# Patient Record
Sex: FEMALE | Race: BLACK OR AFRICAN AMERICAN | NOT HISPANIC OR LATINO | Employment: OTHER | URBAN - NONMETROPOLITAN AREA
[De-identification: names, ages, dates, MRNs, and addresses within clinical notes are randomized per-mention and may not be internally consistent; named-entity substitution may affect disease eponyms.]

---

## 2017-01-04 ENCOUNTER — OFFICE VISIT (OUTPATIENT)
Dept: FAMILY MEDICINE CLINIC | Facility: CLINIC | Age: 67
End: 2017-01-04

## 2017-01-04 VITALS
HEIGHT: 63 IN | DIASTOLIC BLOOD PRESSURE: 72 MMHG | WEIGHT: 218.5 LBS | SYSTOLIC BLOOD PRESSURE: 130 MMHG | BODY MASS INDEX: 38.71 KG/M2

## 2017-01-04 DIAGNOSIS — E11.8 TYPE 2 DIABETES MELLITUS WITH COMPLICATION, WITHOUT LONG-TERM CURRENT USE OF INSULIN (HCC): Primary | ICD-10-CM

## 2017-01-04 DIAGNOSIS — Z11.59 NEED FOR HEPATITIS C SCREENING TEST: ICD-10-CM

## 2017-01-04 DIAGNOSIS — E11.49 NEUROLOGIC DISORDER ASSOCIATED WITH DIABETES MELLITUS (HCC): ICD-10-CM

## 2017-01-04 PROCEDURE — 99213 OFFICE O/P EST LOW 20 MIN: CPT | Performed by: GENERAL PRACTICE

## 2017-01-04 RX ORDER — PREGABALIN 150 MG/1
150 CAPSULE ORAL 2 TIMES DAILY
Qty: 180 CAPSULE | Refills: 1 | Status: SHIPPED | OUTPATIENT
Start: 2017-01-04 | End: 2017-08-03 | Stop reason: SDUPTHER

## 2017-01-04 NOTE — MR AVS SNAPSHOT
Lexivan Wade   1/4/2017 10:45 AM   Office Visit    Dept Phone:  [phone]   Encounter #:  [id number]    Provider:  Joyce Plata MD   Department:  [facility]                Your Full Care Plan              Today's Medication Changes          These changes are accurate as of: 1/4/17 11:51 AM.  If you have any questions, ask your nurse or doctor.               Medication(s)that have changed:     pregabalin 150 MG capsule   Commonly known as:  LYRICA   Take 1 capsule by mouth 2 (Two) Times a Day.   What changed:  See the new instructions.   Changed by:  Joyce Plata MD            Where to Get Your Medications      You can get these medications from any pharmacy     Bring a paper prescription for each of these medications     pregabalin 150 MG capsule                  Your Updated Medication List          This list is accurate as of: 1/4/17 11:51 AM.  Always use your most recent med list.                aspirin 81 MG tablet       atorvastatin 40 MG tablet   Commonly known as:  LIPITOR       brimonidine 0.2 % ophthalmic solution   Commonly known as:  ALPHAGAN   Administer 1 drop to both eyes 2 (Two) Times a Day.       carvedilol 25 MG tablet   Commonly known as:  COREG   TAKE 1 TABLET BY MOUTH 2 TIMES PER DAY       digoxin 125 MCG tablet   Commonly known as:  LANOXIN       furosemide 80 MG tablet   Commonly known as:  LASIX   TAKE 1 TABLET BY MOUTH DAILY       glimepiride 4 MG tablet   Commonly known as:  AMARYL   TAKE 1 TABLET BY MOUTH 2 TIMES A DAY       Insulin Pen Needle 31G X 5 MM misc       latanoprost 0.005 % ophthalmic solution   Commonly known as:  XALATAN   Administer 1 drop to both eyes Every Night. 90 day supply.       lisinopril 10 MG tablet   Commonly known as:  PRINIVIL,ZESTRIL       LOVAZA 1 G capsule   Generic drug:  omega-3 acid ethyl esters   TAKE 2 CAPSULES BY MOUTH 2 TIMES A DAY       magnesium oxide 400 (241.3 MG) MG tablet  tablet   Commonly known as:  MAGOX       metFORMIN 1000 MG tablet   Commonly known as:  GLUCOPHAGE       NEXIUM 40 MG capsule   Generic drug:  esomeprazole   TAKE 1 CAPSULE BY MOUTH DAILY       potassium chloride 10 MEQ CR capsule   Commonly known as:  MICRO-K       pregabalin 150 MG capsule   Commonly known as:  LYRICA   Take 1 capsule by mouth 2 (Two) Times a Day.       VICTOZA 18 MG/3ML solution pen-injector   Generic drug:  Liraglutide       vitamin D 41202 UNITS capsule capsule   Commonly known as:  ERGOCALCIFEROL       ZETIA 10 MG tablet   Generic drug:  ezetimibe               Instructions    Robitussin DM for cough    Coricidin for runny nose and congestion     Patient Instructions History      Upcoming Appointments     Visit Type Date Time Department    OFFICE VISIT 1/4/2017 10:45 AM [facility]    OFFICE VISIT 4/3/2017  9:30 AM [facility]    VISUAL FIELD 4/26/2017  2:30 PM [facility] OPHTHALMOLOGY [facility]    OV WITH VISUAL FIELD 4/26/2017  3:00 PM [facility] OPHTHALMOLOGY [facility]      MyChart Signup     Our records indicate that you have declined [facility]. If you would like to sign up for [facility], please email [email or web address] or call [phone] to obtain an activation code.             Other Info from Your Visit           Your Appointments     Apr 03, 2017  9:30 AM CDT   Office Visit with Joyce Plata MD   [facility]INE (--)    [address]  [address] [place] [id number] [phone]           Arrive 15 minutes prior to appointment.            Apr 26, 2017  2:30 PM CDT   Visual Field with [facility] OPHTHALMOLOGY [facility] OPHTHALMOLOGY (--)    [address] [place] [address] [address]  [address] [place] [id number] [phone]            Apr 26, 2017  3:00 PM CDT   office visit with visual fields with Kun Walton MD   [facility] OPHTHALMOLOGY (--)    [address] [place] [address]  "Dr  [address] [facility] [id number] [phone]              Allergies     Aldactone [Spironolactone]      Nsaids        Reason for Visit     Follow-up     Hypertension           Vital Signs     Blood Pressure Height Weight Body Mass Index Smoking Status       130/72 (BP Location: Left arm, Patient Position: Sitting, Cuff Size: Adult) 63\" (160 cm) 218 lb 8 oz (99.1 kg) 38.71 kg/m2 Never Smoker         "

## 2017-01-04 NOTE — PROGRESS NOTES
"Subjective   Lexi Wade is a 66 y.o. female.     Chief Complaint   Patient presents with   • Follow-up   • Hypertension     Hypertension   This is a chronic problem. The current episode started more than 1 year ago. The problem is unchanged. The problem is controlled. Pertinent negatives include no chest pain, headaches, neck pain, palpitations or shortness of breath. Past treatments include beta blockers and ACE inhibitors. The current treatment provides significant improvement. There are no compliance problems.       The following portions of the patient's history were reviewed and updated as appropriate: allergies, current medications, past social history and problem list.    Current Outpatient Prescriptions:   •  aspirin 81 MG tablet, Take 81 mg by mouth every night., Disp: , Rfl:   •  atorvastatin (LIPITOR) 40 MG tablet, Take 40 mg by mouth daily., Disp: , Rfl:   •  brimonidine (ALPHAGAN) 0.2 % ophthalmic solution, Administer 1 drop to both eyes 2 (Two) Times a Day., Disp: 5 mL, Rfl: 3  •  carvedilol (COREG) 25 MG tablet, TAKE 1 TABLET BY MOUTH 2 TIMES PER DAY, Disp: 180 tablet, Rfl: 3  •  digoxin (LANOXIN) 125 MCG tablet, Take 125 mcg by mouth every day., Disp: , Rfl:   •  ezetimibe (ZETIA) 10 MG tablet, Take 10 mg by mouth daily., Disp: , Rfl:   •  furosemide (LASIX) 80 MG tablet, TAKE 1 TABLET BY MOUTH DAILY, Disp: 90 tablet, Rfl: 3  •  glimepiride (AMARYL) 4 MG tablet, TAKE 1 TABLET BY MOUTH 2 TIMES A DAY, Disp: 180 tablet, Rfl: 3  •  Insulin Pen Needle 31G X 5 MM misc, BD Insulin Pen Needle UF Mini 31 gauge x 3/16\"     USE DAILY WITH VICTOZA, Disp: , Rfl:   •  latanoprost (XALATAN) 0.005 % ophthalmic solution, Administer 1 drop to both eyes Every Night. 90 day supply., Disp: 2.5 mL, Rfl: 5  •  Liraglutide (VICTOZA) 18 MG/3ML solution pen-injector, Inject 12 mg under the skin Daily., Disp: , Rfl:   •  lisinopril (PRINIVIL,ZESTRIL) 10 MG tablet, Take 10 mg by mouth daily., Disp: , Rfl:   •  LOVAZA 1 G " capsule, TAKE 2 CAPSULES BY MOUTH 2 TIMES A DAY, Disp: 360 capsule, Rfl: 2  •  magnesium oxide (MAGOX) 400 (241.3 MG) MG tablet tablet, Take 400 mg by mouth daily., Disp: , Rfl:   •  metFORMIN (GLUCOPHAGE) 1000 MG tablet, TAKE 1 TABLET BY MOUTH 2 TIMES PER DAY, Disp: , Rfl: 3  •  NEXIUM 40 MG capsule, TAKE 1 CAPSULE BY MOUTH DAILY, Disp: 90 capsule, Rfl: 2  •  potassium chloride (MICRO-K) 10 MEQ CR capsule, Take 10 mEq by mouth 2 (two) times a day., Disp: , Rfl:   •  pregabalin (LYRICA) 150 MG capsule, Take 1 capsule by mouth 2 (Two) Times a Day., Disp: 180 capsule, Rfl: 1  •  vitamin D (ERGOCALCIFEROL) 22936 UNITS capsule capsule, Take 50,000 Units by mouth 1 (one) time per week., Disp: , Rfl:     Review of Systems   Constitutional: Negative.  Negative for activity change, appetite change, chills, fatigue, fever and unexpected weight change.   HENT: Negative.  Negative for congestion, ear pain, hearing loss, nosebleeds, rhinorrhea, sinus pressure, sneezing, sore throat, tinnitus and trouble swallowing.    Eyes: Negative.  Negative for pain, discharge, redness, itching and visual disturbance.   Respiratory: Negative.  Negative for apnea, cough, chest tightness, shortness of breath and wheezing.    Cardiovascular: Negative.  Negative for chest pain, palpitations and leg swelling.   Gastrointestinal: Negative.  Negative for abdominal distention, abdominal pain, constipation, diarrhea, nausea and vomiting.   Endocrine: Negative.    Genitourinary: Negative.  Negative for dysuria, frequency and urgency.   Musculoskeletal: Negative.  Negative for arthralgias, back pain, gait problem, joint swelling, myalgias, neck pain and neck stiffness.   Skin: Negative.  Negative for color change and rash.   Allergic/Immunologic: Negative.    Neurological: Negative.  Negative for dizziness, weakness, light-headedness, numbness and headaches.   Hematological: Negative.  Negative for adenopathy.   Psychiatric/Behavioral: Negative.   "Negative for dysphoric mood and sleep disturbance. The patient is not nervous/anxious.      Objective     Visit Vitals   • /72 (BP Location: Left arm, Patient Position: Sitting, Cuff Size: Adult)   • Ht 63\" (160 cm)   • Wt 218 lb 8 oz (99.1 kg)   • BMI 38.71 kg/m2     Physical Exam   Constitutional: She is oriented to person, place, and time. She appears well-developed and well-nourished. No distress.   HENT:   Head: Normocephalic and atraumatic.   Nose: Nose normal.   Mouth/Throat: Oropharynx is clear and moist.   Eyes: Conjunctivae and EOM are normal. Pupils are equal, round, and reactive to light. Right eye exhibits no discharge. Left eye exhibits no discharge.   Neck: No thyromegaly present.   Cardiovascular: Normal rate, regular rhythm, normal heart sounds and intact distal pulses.    Pulmonary/Chest: Effort normal and breath sounds normal.   Lymphadenopathy:     She has no cervical adenopathy.   Neurological: She is alert and oriented to person, place, and time.   Skin: Skin is warm and dry.   Psychiatric: She has a normal mood and affect.   Nursing note and vitals reviewed.    Assessment/Plan     Problem List Items Addressed This Visit        Endocrine    Type 2 diabetes mellitus - Primary    Relevant Orders    Hepatitis C Antibody    Hemoglobin A1c    LDL Cholesterol, Direct    Comprehensive Metabolic Panel    Neurologic disorder associated with diabetes mellitus      Other Visit Diagnoses     Need for hepatitis C screening test        Relevant Orders    Hepatitis C Antibody        Continue current treatment.     New Medications Ordered This Visit   Medications   • pregabalin (LYRICA) 150 MG capsule     Sig: Take 1 capsule by mouth 2 (Two) Times a Day.     Dispense:  180 capsule     Refill:  1     Not to exceed 5 additional fills before 05/02/2017.       "

## 2017-01-30 ENCOUNTER — HOSPITAL ENCOUNTER (EMERGENCY)
Facility: HOSPITAL | Age: 67
Discharge: HOME | End: 2017-01-30
Attending: FAMILY MEDICINE

## 2017-01-30 ENCOUNTER — APPOINTMENT (OUTPATIENT)
Dept: GENERAL RADIOLOGY | Facility: HOSPITAL | Age: 67
End: 2017-01-30

## 2017-01-30 VITALS
RESPIRATION RATE: 16 BRPM | SYSTOLIC BLOOD PRESSURE: 144 MMHG | TEMPERATURE: 97.4 F | WEIGHT: 221 LBS | HEIGHT: 63 IN | HEART RATE: 69 BPM | BODY MASS INDEX: 39.16 KG/M2 | OXYGEN SATURATION: 96 % | DIASTOLIC BLOOD PRESSURE: 72 MMHG

## 2017-01-30 DIAGNOSIS — M79.671 RIGHT FOOT PAIN: Primary | ICD-10-CM

## 2017-01-30 PROCEDURE — 99283 EMERGENCY DEPT VISIT LOW MDM: CPT

## 2017-01-30 PROCEDURE — 25010000002 TRIAMCINOLONE PER 10 MG: Performed by: NURSE PRACTITIONER

## 2017-01-30 PROCEDURE — 96372 THER/PROPH/DIAG INJ SC/IM: CPT

## 2017-01-30 PROCEDURE — 73630 X-RAY EXAM OF FOOT: CPT

## 2017-01-30 RX ORDER — TRIAMCINOLONE ACETONIDE 40 MG/ML
40 INJECTION, SUSPENSION INTRA-ARTICULAR; INTRAMUSCULAR ONCE
Status: COMPLETED | OUTPATIENT
Start: 2017-01-30 | End: 2017-01-30

## 2017-01-30 RX ADMIN — TRIAMCINOLONE ACETONIDE 40 MG: 40 INJECTION, SUSPENSION INTRA-ARTICULAR; INTRAMUSCULAR at 14:21

## 2017-02-24 RX ORDER — FLURBIPROFEN SODIUM 0.3 MG/ML
SOLUTION/ DROPS OPHTHALMIC
Qty: 100 EACH | Refills: 3 | Status: SHIPPED | OUTPATIENT
Start: 2017-02-24 | End: 2018-03-15 | Stop reason: SDUPTHER

## 2017-03-27 RX ORDER — ATORVASTATIN CALCIUM 40 MG/1
TABLET, FILM COATED ORAL
Qty: 90 TABLET | Refills: 2 | Status: SHIPPED | OUTPATIENT
Start: 2017-03-27 | End: 2017-06-13

## 2017-04-03 ENCOUNTER — LAB (OUTPATIENT)
Dept: LAB | Facility: HOSPITAL | Age: 67
End: 2017-04-03

## 2017-04-03 ENCOUNTER — OFFICE VISIT (OUTPATIENT)
Dept: FAMILY MEDICINE CLINIC | Facility: CLINIC | Age: 67
End: 2017-04-03

## 2017-04-03 ENCOUNTER — RESULTS ENCOUNTER (OUTPATIENT)
Dept: FAMILY MEDICINE CLINIC | Facility: CLINIC | Age: 67
End: 2017-04-03

## 2017-04-03 VITALS
HEIGHT: 63 IN | BODY MASS INDEX: 39.44 KG/M2 | HEART RATE: 100 BPM | SYSTOLIC BLOOD PRESSURE: 134 MMHG | WEIGHT: 222.6 LBS | DIASTOLIC BLOOD PRESSURE: 74 MMHG

## 2017-04-03 DIAGNOSIS — Z12.11 SCREENING FOR COLON CANCER: Primary | ICD-10-CM

## 2017-04-03 DIAGNOSIS — Z11.59 NEED FOR HEPATITIS C SCREENING TEST: ICD-10-CM

## 2017-04-03 DIAGNOSIS — I10 ESSENTIAL HYPERTENSION: Chronic | ICD-10-CM

## 2017-04-03 DIAGNOSIS — E78.49 OTHER HYPERLIPIDEMIA: Chronic | ICD-10-CM

## 2017-04-03 DIAGNOSIS — E11.8 TYPE 2 DIABETES MELLITUS WITH COMPLICATION, WITHOUT LONG-TERM CURRENT USE OF INSULIN (HCC): Chronic | ICD-10-CM

## 2017-04-03 DIAGNOSIS — E11.8 TYPE 2 DIABETES MELLITUS WITH COMPLICATION, WITHOUT LONG-TERM CURRENT USE OF INSULIN (HCC): ICD-10-CM

## 2017-04-03 DIAGNOSIS — Z12.11 SCREENING FOR COLON CANCER: ICD-10-CM

## 2017-04-03 DIAGNOSIS — Z12.11 SCREEN FOR COLON CANCER: ICD-10-CM

## 2017-04-03 LAB
ALBUMIN SERPL-MCNC: 4.1 G/DL (ref 3.4–4.8)
ALBUMIN/GLOB SERPL: 1.4 G/DL (ref 1.1–1.8)
ALP SERPL-CCNC: 136 U/L (ref 38–126)
ALT SERPL W P-5'-P-CCNC: 22 U/L (ref 9–52)
ANION GAP SERPL CALCULATED.3IONS-SCNC: 14 MMOL/L (ref 5–15)
ARTICHOKE IGE QN: 92 MG/DL (ref 1–129)
AST SERPL-CCNC: 34 U/L (ref 14–36)
BILIRUB SERPL-MCNC: 0.4 MG/DL (ref 0.2–1.3)
BUN BLD-MCNC: 14 MG/DL (ref 7–21)
BUN/CREAT SERPL: 17.1 (ref 7–25)
CALCIUM SPEC-SCNC: 9.8 MG/DL (ref 8.4–10.2)
CHLORIDE SERPL-SCNC: 100 MMOL/L (ref 95–110)
CO2 SERPL-SCNC: 27 MMOL/L (ref 22–31)
CREAT BLD-MCNC: 0.82 MG/DL (ref 0.5–1)
GFR SERPL CREATININE-BSD FRML MDRD: 84 ML/MIN/1.73 (ref 45–104)
GLOBULIN UR ELPH-MCNC: 2.9 GM/DL (ref 2.3–3.5)
GLUCOSE BLD-MCNC: 217 MG/DL (ref 60–100)
HBA1C MFR BLD: 9.25 % (ref 4–5.6)
POTASSIUM BLD-SCNC: 4 MMOL/L (ref 3.5–5.1)
PROT SERPL-MCNC: 7 G/DL (ref 6.3–8.6)
SODIUM BLD-SCNC: 141 MMOL/L (ref 137–145)

## 2017-04-03 PROCEDURE — 99214 OFFICE O/P EST MOD 30 MIN: CPT | Performed by: GENERAL PRACTICE

## 2017-04-03 PROCEDURE — 80053 COMPREHEN METABOLIC PANEL: CPT | Performed by: GENERAL PRACTICE

## 2017-04-03 PROCEDURE — 86803 HEPATITIS C AB TEST: CPT | Performed by: GENERAL PRACTICE

## 2017-04-03 PROCEDURE — 83036 HEMOGLOBIN GLYCOSYLATED A1C: CPT | Performed by: GENERAL PRACTICE

## 2017-04-03 PROCEDURE — 83721 ASSAY OF BLOOD LIPOPROTEIN: CPT | Performed by: GENERAL PRACTICE

## 2017-04-03 PROCEDURE — 36415 COLL VENOUS BLD VENIPUNCTURE: CPT

## 2017-04-03 NOTE — PROGRESS NOTES
Subjective   Lexi Wade is a 66 y.o. female.     Chief Complaint   Patient presents with   • Diabetes     labs   • Hypertension     For review and evaluation of management of chronic medical problems. Labs pending.   Diabetes   She presents for her follow-up diabetic visit. She has type 2 diabetes mellitus. Her disease course has been stable. Pertinent negatives for hypoglycemia include no dizziness, headaches or nervousness/anxiousness. There are no diabetic associated symptoms. Pertinent negatives for diabetes include no chest pain, no fatigue and no weakness. Diabetic complications include heart disease. Risk factors for coronary artery disease include dyslipidemia, diabetes mellitus and hypertension. Current diabetic treatment includes oral agent (dual therapy). She is compliant with treatment all of the time. She is following a generally healthy diet. When asked about meal planning, she reported none. She rarely participates in exercise. There is no change in her home blood glucose trend. An ACE inhibitor/angiotensin II receptor blocker is being taken. Eye exam is current.   Hypertension   The current episode started more than 1 year ago. The problem is unchanged. The problem is controlled. Pertinent negatives include no chest pain, headaches, neck pain, palpitations or shortness of breath. There are no associated agents to hypertension. Risk factors for coronary artery disease include diabetes mellitus, dyslipidemia and obesity. Past treatments include beta blockers, ACE inhibitors and diuretics. The current treatment provides significant improvement. There are no compliance problems.    Hyperlipidemia   This is a chronic problem. The current episode started more than 1 year ago. The problem is controlled. Recent lipid tests were reviewed and are normal. Exacerbating diseases include diabetes. There are no known factors aggravating her hyperlipidemia. Pertinent negatives include no chest pain, myalgias or  shortness of breath. Current antihyperlipidemic treatment includes statins. The current treatment provides significant improvement of lipids. There are no compliance problems.  Risk factors for coronary artery disease include diabetes mellitus, dyslipidemia, post-menopausal and hypertension.      The following portions of the patient's history were reviewed and updated as appropriate: allergies, current medications, past social history and problem list.    Current Outpatient Prescriptions:   •  aspirin 81 MG tablet, Take 81 mg by mouth every night., Disp: , Rfl:   •  atorvastatin (LIPITOR) 40 MG tablet, TAKE 1 TABLET BY MOUTH DAILY, Disp: 90 tablet, Rfl: 2  •  B-D UF III MINI PEN NEEDLES 31G X 5 MM misc, USE DAILY WITH VICTOZA, Disp: 100 each, Rfl: 3  •  brimonidine (ALPHAGAN) 0.2 % ophthalmic solution, Administer 1 drop to both eyes 2 (Two) Times a Day., Disp: 5 mL, Rfl: 3  •  carvedilol (COREG) 25 MG tablet, TAKE 1 TABLET BY MOUTH 2 TIMES PER DAY, Disp: 180 tablet, Rfl: 3  •  digoxin (LANOXIN) 125 MCG tablet, Take 125 mcg by mouth every day., Disp: , Rfl:   •  ezetimibe (ZETIA) 10 MG tablet, Take 10 mg by mouth daily., Disp: , Rfl:   •  furosemide (LASIX) 80 MG tablet, TAKE 1 TABLET BY MOUTH DAILY, Disp: 90 tablet, Rfl: 3  •  glimepiride (AMARYL) 4 MG tablet, TAKE 1 TABLET BY MOUTH 2 TIMES A DAY, Disp: 180 tablet, Rfl: 3  •  latanoprost (XALATAN) 0.005 % ophthalmic solution, Administer 1 drop to both eyes Every Night. 90 day supply., Disp: 2.5 mL, Rfl: 5  •  Liraglutide (VICTOZA) 18 MG/3ML solution pen-injector, Inject 12 mg under the skin Daily., Disp: , Rfl:   •  lisinopril (PRINIVIL,ZESTRIL) 10 MG tablet, Take 10 mg by mouth daily., Disp: , Rfl:   •  LOVAZA 1 G capsule, TAKE 2 CAPSULES BY MOUTH 2 TIMES A DAY, Disp: 360 capsule, Rfl: 2  •  magnesium oxide (MAGOX) 400 (241.3 MG) MG tablet tablet, Take 400 mg by mouth daily., Disp: , Rfl:   •  metFORMIN (GLUCOPHAGE) 1000 MG tablet, TAKE 1 TABLET BY MOUTH 2 TIMES  "PER DAY, Disp: , Rfl: 3  •  NEXIUM 40 MG capsule, TAKE 1 CAPSULE BY MOUTH DAILY, Disp: 90 capsule, Rfl: 2  •  potassium chloride (MICRO-K) 10 MEQ CR capsule, Take 10 mEq by mouth 2 (two) times a day., Disp: , Rfl:   •  pregabalin (LYRICA) 150 MG capsule, Take 1 capsule by mouth 2 (Two) Times a Day., Disp: 180 capsule, Rfl: 1  •  vitamin D (ERGOCALCIFEROL) 11166 UNITS capsule capsule, Take 50,000 Units by mouth 1 (one) time per week., Disp: , Rfl:     Review of Systems   Constitutional: Negative.  Negative for activity change, appetite change, chills, fatigue, fever and unexpected weight change.   HENT: Negative.  Negative for congestion, ear pain, hearing loss, nosebleeds, rhinorrhea, sinus pressure, sneezing, sore throat, tinnitus and trouble swallowing.    Eyes: Negative.  Negative for pain, discharge, redness, itching and visual disturbance.   Respiratory: Negative.  Negative for apnea, cough, chest tightness, shortness of breath and wheezing.    Cardiovascular: Negative.  Negative for chest pain, palpitations and leg swelling.   Gastrointestinal: Negative.  Negative for abdominal distention, abdominal pain, constipation, diarrhea, nausea and vomiting.   Endocrine: Negative.    Genitourinary: Negative.  Negative for dysuria, frequency and urgency.   Musculoskeletal: Negative.  Negative for arthralgias, back pain, gait problem, joint swelling, myalgias, neck pain and neck stiffness.   Skin: Negative.  Negative for color change and rash.   Allergic/Immunologic: Negative.    Neurological: Negative.  Negative for dizziness, weakness, light-headedness, numbness and headaches.   Hematological: Negative.  Negative for adenopathy.   Psychiatric/Behavioral: Negative.  Negative for dysphoric mood and sleep disturbance. The patient is not nervous/anxious.      Objective     Visit Vitals   • /74   • Pulse 100   • Ht 63\" (160 cm)   • Wt 222 lb 9.6 oz (101 kg)   • BMI 39.43 kg/m2     Physical Exam   Constitutional: She " is oriented to person, place, and time. She appears well-developed and well-nourished. No distress.   HENT:   Head: Normocephalic and atraumatic.   Nose: Nose normal.   Mouth/Throat: Oropharynx is clear and moist.   Eyes: Conjunctivae and EOM are normal. Pupils are equal, round, and reactive to light. Right eye exhibits no discharge. Left eye exhibits no discharge.   Neck: No thyromegaly present.   Cardiovascular: Normal rate, regular rhythm, normal heart sounds and intact distal pulses.    Pulmonary/Chest: Effort normal and breath sounds normal.   Lymphadenopathy:     She has no cervical adenopathy.   Neurological: She is alert and oriented to person, place, and time.   Skin: Skin is warm and dry.   Psychiatric: She has a normal mood and affect.   Nursing note and vitals reviewed.    Results for orders placed or performed in visit on 04/03/17   Hepatitis C Antibody   Result Value Ref Range    Hepatitis C Ab Negative Negative   Hemoglobin A1c   Result Value Ref Range    Hemoglobin A1C 9.25 (H) 4 - 5.6 %   LDL Cholesterol, Direct   Result Value Ref Range    LDL Cholesterol  92 1 - 129 mg/dL   Comprehensive Metabolic Panel   Result Value Ref Range    Glucose 217 (H) 60 - 100 mg/dL    BUN 14 7 - 21 mg/dL    Creatinine 0.82 0.50 - 1.00 mg/dL    Sodium 141 137 - 145 mmol/L    Potassium 4.0 3.5 - 5.1 mmol/L    Chloride 100 95 - 110 mmol/L    CO2 27.0 22.0 - 31.0 mmol/L    Calcium 9.8 8.4 - 10.2 mg/dL    Total Protein 7.0 6.3 - 8.6 g/dL    Albumin 4.10 3.40 - 4.80 g/dL    ALT (SGPT) 22 9 - 52 U/L    AST (SGOT) 34 14 - 36 U/L    Alkaline Phosphatase 136 (H) 38 - 126 U/L    Total Bilirubin 0.4 0.2 - 1.3 mg/dL    eGFR   Amer 84 45 - 104 mL/min/1.73    Globulin 2.9 2.3 - 3.5 gm/dL    A/G Ratio 1.4 1.1 - 1.8 g/dL    BUN/Creatinine Ratio 17.1 7.0 - 25.0    Anion Gap 14.0 5.0 - 15.0 mmol/L      Assessment/Plan     Problem List Items Addressed This Visit        Cardiovascular and Mediastinum    Essential hypertension        Endocrine    Type 2 diabetes mellitus    Relevant Orders    Hemoglobin A1c    Basic Metabolic Panel      Other Visit Diagnoses     Screening for colon cancer    -  Primary    Other hyperlipidemia  (Chronic)       Screen for colon cancer        Relevant Orders    Cologuard        Will notify regarding results.     No orders of the defined types were placed in this encounter.

## 2017-04-04 LAB — HCV AB SER DONR QL: NEGATIVE

## 2017-04-26 ENCOUNTER — OFFICE VISIT (OUTPATIENT)
Dept: OPHTHALMOLOGY | Facility: CLINIC | Age: 67
End: 2017-04-26

## 2017-04-26 DIAGNOSIS — Z96.1 PSEUDOPHAKIA: ICD-10-CM

## 2017-04-26 DIAGNOSIS — H40.1131 PRIMARY OPEN ANGLE GLAUCOMA OF BOTH EYES, MILD STAGE: Primary | ICD-10-CM

## 2017-04-26 PROCEDURE — 99212 OFFICE O/P EST SF 10 MIN: CPT | Performed by: OPHTHALMOLOGY

## 2017-04-26 PROCEDURE — 76514 ECHO EXAM OF EYE THICKNESS: CPT | Performed by: OPHTHALMOLOGY

## 2017-04-26 PROCEDURE — 92083 EXTENDED VISUAL FIELD XM: CPT | Performed by: OPHTHALMOLOGY

## 2017-04-26 NOTE — PROGRESS NOTES
Subjective   Lexi Wade is a 66 y.o. female.   Chief Complaint   Patient presents with   • Glaucoma     +FH brother  Uses Latanoprost qHS OU and Brimonidine BID OU  Patient states she misses taking her drops maybe 5x per month  Denies itching or redness    • Artificial Lens     Vision is stable      HPI     Glaucoma   In both eyes.  Treatment compliance is misses drops infrequently. Additional comments: +FH brother  Uses Latanoprost qHS OU and Brimonidine BID OU  Patient states she misses taking her drops maybe 5x per month  Denies itching or redness            Artificial Lens    Additional comments: Vision is stable        Last edited by Norris Foote MD on 4/26/2017  3:54 PM. (History)          Review of Systems   Constitutional: Negative for chills and fever.   Respiratory: Negative for shortness of breath.    Cardiovascular: Negative for chest pain.   Gastrointestinal: Positive for abdominal pain.   Genitourinary: Negative for dysuria.   Musculoskeletal: Negative for myalgias.   Psychiatric/Behavioral: Negative for confusion.       Objective   Base Eye Exam     Visual Acuity (Snellen - Linear)      Right Left   Dist sc 20/30 +1 20/30 -2   Near sc J3 J7         Tonometry (Applanation, 4:05 PM)      Right Left   Pressure 14 12         Pachymetry (4/26/2017)      Right Left   Thickness 531 524         Pupils      Pupils   Right PERRL   Left PERRL         Extraocular Movement      Right Left   Result Full Full         Neuro/Psych     Oriented x3:  Yes    Mood/Affect:  Normal            Slit Lamp and Fundus Exam     External Exam      Right Left    External Normal Normal      Slit Lamp Exam      Right Left    Lids/Lashes Normal Normal    Conjunctiva/Sclera White and quiet White and quiet    Cornea Clear Clear    Anterior Chamber Deep and quiet Deep and quiet    Iris Round and reactive Round and reactive    Lens Posterior chamber intraocular lens Posterior chamber intraocular lens    Vitreous Normal  Normal                  Dickinson Visual Field :   OD- reliable; developing superior arcuate (present on two previous HVF 4/2016 and 10/2014  OS- reliable, full      Assessment/Plan   Diagnoses and all orders for this visit:    Primary open angle glaucoma of both eyes, mild stage  Comments:  Patient has increased C/D ratio, over the age of 50, black and +FH with thinning temporally OD and Superior and temporal wedge thinning OS. HVF does not correspond to the OCT findings. Continue current treatment. F/u in 6 months HVF/OCT/Dilate/Gonio.    Pseudophakia  Comments:  Vision is stable observe.     Plan:       Return in about 6 months (around 10/26/2017) for Dilated exam, IOP check, OCT discs, Visual Field 24-2.                            This document has been signed by Norris Fotoe MD on April 26, 2017 4:51 PM

## 2017-06-01 ENCOUNTER — TELEPHONE (OUTPATIENT)
Dept: FAMILY MEDICINE CLINIC | Facility: CLINIC | Age: 67
End: 2017-06-01

## 2017-06-01 DIAGNOSIS — Z12.11 SCREENING FOR COLON CANCER: Primary | ICD-10-CM

## 2017-06-01 NOTE — TELEPHONE ENCOUNTER
Pr Dr. Quiroz, Ms. Huong Wade has been called with her recent [facility] Colorectal Screening Results & Recommendation.     The Cologuard Colorectal Screening came back as Positive. Results will be filed in the patient's chart.     I have called Ms. Wade and given her the results and relayed to her that she needs to be scheduled for a Colonoscopy.     She states her last Colonoscopy was quite sometime ago. I have found records indicating a Colonoscopy was performed December 2004, but could not determine who did the exam.     Liset Ba MA has sent the referral to Dr. Fleming's office for the patient to be scheduled for the Colonoscopy, She is to follow-up with Dr. Plata after her Colonoscopy.      (Pr Dr. Quiroz, Covering Provider during Dr. Plata's absence)

## 2017-06-02 RX ORDER — LISINOPRIL 10 MG/1
TABLET ORAL
Qty: 90 TABLET | Refills: 3 | Status: SHIPPED | OUTPATIENT
Start: 2017-06-02 | End: 2017-06-13

## 2017-06-05 ENCOUNTER — PREP FOR SURGERY (OUTPATIENT)
Dept: OTHER | Facility: HOSPITAL | Age: 67
End: 2017-06-05

## 2017-06-05 DIAGNOSIS — R19.5 POSITIVE COLORECTAL CANCER SCREENING USING DNA-BASED STOOL TEST: Primary | ICD-10-CM

## 2017-06-05 RX ORDER — DEXTROSE AND SODIUM CHLORIDE 5; .45 G/100ML; G/100ML
100 INJECTION, SOLUTION INTRAVENOUS CONTINUOUS
Status: CANCELLED | OUTPATIENT
Start: 2017-06-14

## 2017-06-13 RX ORDER — ESOMEPRAZOLE MAGNESIUM 40 MG/1
40 CAPSULE, DELAYED RELEASE ORAL DAILY
COMMUNITY
End: 2018-04-10 | Stop reason: SDUPTHER

## 2017-06-13 RX ORDER — GLIMEPIRIDE 4 MG/1
4 TABLET ORAL 2 TIMES DAILY
COMMUNITY
End: 2017-10-06 | Stop reason: SDUPTHER

## 2017-06-13 RX ORDER — FUROSEMIDE 80 MG
80 TABLET ORAL DAILY
COMMUNITY
End: 2017-10-06 | Stop reason: SDUPTHER

## 2017-06-13 RX ORDER — ATORVASTATIN CALCIUM 40 MG/1
40 TABLET, FILM COATED ORAL DAILY
COMMUNITY
End: 2017-10-06 | Stop reason: SDUPTHER

## 2017-06-13 RX ORDER — LISINOPRIL 10 MG/1
10 TABLET ORAL DAILY
COMMUNITY
End: 2017-10-06 | Stop reason: SDUPTHER

## 2017-06-13 RX ORDER — OMEGA-3-ACID ETHYL ESTERS 1 G/1
1 CAPSULE, LIQUID FILLED ORAL 2 TIMES DAILY
COMMUNITY
End: 2019-11-15 | Stop reason: SDUPTHER

## 2017-06-13 RX ORDER — DEXTROSE AND SODIUM CHLORIDE 5; .45 G/100ML; G/100ML
20 INJECTION, SOLUTION INTRAVENOUS CONTINUOUS
Status: CANCELLED | OUTPATIENT
Start: 2017-06-14

## 2017-06-13 RX ORDER — CARVEDILOL 25 MG/1
25 TABLET ORAL 2 TIMES DAILY WITH MEALS
COMMUNITY
End: 2017-10-06 | Stop reason: SDUPTHER

## 2017-06-14 ENCOUNTER — ANESTHESIA (OUTPATIENT)
Dept: GASTROENTEROLOGY | Facility: HOSPITAL | Age: 67
End: 2017-06-14

## 2017-06-14 ENCOUNTER — HOSPITAL ENCOUNTER (OUTPATIENT)
Facility: HOSPITAL | Age: 67
Setting detail: HOSPITAL OUTPATIENT SURGERY
Discharge: HOME | End: 2017-06-14
Attending: SURGERY | Admitting: SURGERY

## 2017-06-14 ENCOUNTER — ANESTHESIA EVENT (OUTPATIENT)
Dept: GASTROENTEROLOGY | Facility: HOSPITAL | Age: 67
End: 2017-06-14

## 2017-06-14 VITALS
HEART RATE: 77 BPM | TEMPERATURE: 97.8 F | WEIGHT: 215.83 LBS | RESPIRATION RATE: 18 BRPM | OXYGEN SATURATION: 96 % | BODY MASS INDEX: 38.24 KG/M2 | HEIGHT: 63 IN | DIASTOLIC BLOOD PRESSURE: 58 MMHG | SYSTOLIC BLOOD PRESSURE: 110 MMHG

## 2017-06-14 DIAGNOSIS — R19.5 POSITIVE COLORECTAL CANCER SCREENING USING DNA-BASED STOOL TEST: ICD-10-CM

## 2017-06-14 LAB — GLUCOSE BLDC GLUCOMTR-MCNC: 181 MG/DL (ref 70–130)

## 2017-06-14 PROCEDURE — 25010000002 PROPOFOL 10 MG/ML EMULSION: Performed by: NURSE ANESTHETIST, CERTIFIED REGISTERED

## 2017-06-14 PROCEDURE — G0121 COLON CA SCRN NOT HI RSK IND: HCPCS | Performed by: SURGERY

## 2017-06-14 PROCEDURE — 25010000002 MIDAZOLAM PER 1 MG: Performed by: NURSE ANESTHETIST, CERTIFIED REGISTERED

## 2017-06-14 PROCEDURE — 82962 GLUCOSE BLOOD TEST: CPT

## 2017-06-14 PROCEDURE — 25010000002 FENTANYL CITRATE (PF) 100 MCG/2ML SOLUTION: Performed by: NURSE ANESTHETIST, CERTIFIED REGISTERED

## 2017-06-14 RX ORDER — MIDAZOLAM HYDROCHLORIDE 1 MG/ML
INJECTION INTRAMUSCULAR; INTRAVENOUS AS NEEDED
Status: DISCONTINUED | OUTPATIENT
Start: 2017-06-14 | End: 2017-06-14 | Stop reason: SURG

## 2017-06-14 RX ORDER — FENTANYL CITRATE 50 UG/ML
INJECTION, SOLUTION INTRAMUSCULAR; INTRAVENOUS AS NEEDED
Status: DISCONTINUED | OUTPATIENT
Start: 2017-06-14 | End: 2017-06-14 | Stop reason: SURG

## 2017-06-14 RX ORDER — PROPOFOL 10 MG/ML
VIAL (ML) INTRAVENOUS AS NEEDED
Status: DISCONTINUED | OUTPATIENT
Start: 2017-06-14 | End: 2017-06-14 | Stop reason: SURG

## 2017-06-14 RX ORDER — SODIUM CHLORIDE, SODIUM GLUCONATE, SODIUM ACETATE, POTASSIUM CHLORIDE, AND MAGNESIUM CHLORIDE 526; 502; 368; 37; 30 MG/100ML; MG/100ML; MG/100ML; MG/100ML; MG/100ML
INJECTION, SOLUTION INTRAVENOUS CONTINUOUS PRN
Status: DISCONTINUED | OUTPATIENT
Start: 2017-06-14 | End: 2017-06-14 | Stop reason: SURG

## 2017-06-14 RX ORDER — DEXTROSE AND SODIUM CHLORIDE 5; .45 G/100ML; G/100ML
100 INJECTION, SOLUTION INTRAVENOUS CONTINUOUS
Status: DISCONTINUED | OUTPATIENT
Start: 2017-06-14 | End: 2017-06-14 | Stop reason: HOSPADM

## 2017-06-14 RX ADMIN — DEXTROSE AND SODIUM CHLORIDE 100 ML/HR: 5; 450 INJECTION, SOLUTION INTRAVENOUS at 09:35

## 2017-06-14 RX ADMIN — MIDAZOLAM 1 MG: 1 INJECTION INTRAMUSCULAR; INTRAVENOUS at 10:30

## 2017-06-14 RX ADMIN — FENTANYL CITRATE 50 MCG: 50 INJECTION, SOLUTION INTRAMUSCULAR; INTRAVENOUS at 10:20

## 2017-06-14 RX ADMIN — MIDAZOLAM 1 MG: 1 INJECTION INTRAMUSCULAR; INTRAVENOUS at 10:20

## 2017-06-14 RX ADMIN — SODIUM CHLORIDE, SODIUM GLUCONATE, SODIUM ACETATE, POTASSIUM CHLORIDE, AND MAGNESIUM CHLORIDE: 526; 502; 368; 37; 30 INJECTION, SOLUTION INTRAVENOUS at 10:20

## 2017-06-14 RX ADMIN — PROPOFOL 70 MG: 10 INJECTION, EMULSION INTRAVENOUS at 10:30

## 2017-06-14 RX ADMIN — PROPOFOL 80 MG: 10 INJECTION, EMULSION INTRAVENOUS at 10:20

## 2017-06-14 RX ADMIN — FENTANYL CITRATE 50 MCG: 50 INJECTION, SOLUTION INTRAMUSCULAR; INTRAVENOUS at 10:30

## 2017-06-14 NOTE — PLAN OF CARE
Problem: Patient Care Overview (Adult)  Goal: Plan of Care Review  Outcome: Outcome(s) achieved Date Met:  06/14/17 06/14/17 1104   Coping/Psychosocial Response Interventions   Plan Of Care Reviewed With patient   Patient Care Overview   Progress no change         Problem: GI Endoscopy (Adult)  Goal: Signs and Symptoms of Listed Potential Problems Will be Absent or Manageable (GI Endoscopy)  Outcome: Outcome(s) achieved Date Met:  06/14/17 06/14/17 1104   GI Endoscopy   Problems Assessed (GI Endoscopy) all   Problems Present (GI Endoscopy) none

## 2017-06-14 NOTE — H&P
No chief complaint on file.  Referred by Dr Plata for csope.  Polyp on cscope in 2004    Lexi Wade is a 66 y.o. female referred today for evaluation for colonoscopy.  She notes no change in bowel habits, no blood in the stool.     Prior Colonoscopy:yes  Prior Polyps:yes  Family History of Colon Cancer:no  On anticoagulation:no    Past Surgical History:   Procedure Laterality Date   • BREAST BIOPSY  07/28/2014    Stereotactic breast biopsy (Sterotactic mammotome biopsy and clip placement. Abnormal right-sided mammogram demonstrating microcalcifications.)   • CARDIAC CATHETERIZATION  10/28/2010    Cardiac cath 27410 (Noevidence of any obstructive epicardial coronary artery disease noted. Severe left ventricular dysfunction with an EF of 25%)   • CARDIAC CATHETERIZATION  01/31/2002    Cardiac cath 06551 (No significant coronary atherosclerosis. Probably mild left ventricular systolic dysfunction.)   • CARDIAC PACEMAKER PLACEMENT     • CATARACT EXTRACTION  01/28/2014    Remove cataract, insert lens (Right eye)   • CATARACT EXTRACTION  11/22/2011    Remove cataract, insert lens (Left eye)   • CATARACT EXTRACTION     • CENTRAL VENOUS LINE INSERTION  05/20/2016    Central line insertion (Successful placement of right upper extremity midline.)   • COLONOSCOPY  12/14/2004    Single small non-bleeding polyp in the rectum. The polyp was removed by hot biopsy polypectomy   • MAMMO BILATERAL  09/17/2014    [place] [address] [facility] (Microcalcifications of the breast) , Reason: s/p mammotome bx right side for benign disease   • MAMMO BILATERAL  07/24/2014    [place]MOGRAPHY [place] [address] [facility] (Abnormal mammogram, unspecified)    • OTHER SURGICAL HISTORY  11/02/2012    DEBRIDE NAIL 6 OR MORE 52663 (Onychomycosis)    • OTHER SURGICAL HISTORY  04/27/2016    EXTENDED VISUAL FIELDS STUDY 39595 (Open angle with borderline findings, low risk, unspecified eye)    • OTHER SURGICAL HISTORY       EXTENDED VISUAL FIELDS STUDY 82225 (Borderline glaucoma)  (2): 10/02/2014, 04/17/2013   • OTHER SURGICAL HISTORY      OCT DISC NFL 48201 (Borderline glaucoma)  (2): 02/02/2015, 08/21/2013   • PAP SMEAR  06/10/2010    NEGATIVE   • TOTAL ABDOMINAL HYSTERECTOMY WITH SALPINGO OOPHORECTOMY  09/16/2013    Lysis of adhesions.     Past Medical History:   Diagnosis Date   • Artificial lens present     IN  POSITION - BOTH   • Borderline glaucoma    • Cardiomyopathy    • Congestive heart failure    • Diabetes mellitus     IMPROVING   • Essential hypertension    • GERD (gastroesophageal reflux disease)    • History of bone density study 09/25/2015    DXA BONE DENSITY AXIAL [address]79[address] [facility] (Screening for osteoporosis)    • History of echocardiogram 03/25/2013    Echocadiogram W/ color flow 80343 (Mild left atrial enlargement wiht mild LV enlargement w/mild concentric LVH. Global hypokinesis. EF 30%. Mitral and Av thickened. Aortic sclerosis. Severe mitral regurg. mild tricuspid regurg. Mild right atrial enlargement noted. Pacemaker wire noted)   03/25/2013 RACHELE WILLIAM    • History of mammography, diagnostic 09/17/2014    [place]MOGR[place]HY [place] [address] [facility] (Microcalcifications of the breast) , Reason: s/p mammotome bx right side for benign disease   • History of mammography, diagnostic 07/24/2014    MAMMOGRAPHY DIAGNOSTIC UNILAT RT [address] [facility] (Abnormal mammogram, unspecified)    • History of mammography, screening 07/03/2012    BENIGN   • Hypercholesterolemia    • Low back pain    • Microcalcifications of the breast     BENIGN CHANGES   • Need for prophylactic vaccination against Streptococcus pneumoniae (pneumococcus)    • Neurologic disorder associated with diabetes mellitus    • Obesity    • Type 2 diabetes mellitus     NO BDR   • Upper respiratory infection    • Vitamin D deficiency      Social History     Social History   • Marital status: Single     Spouse name: N/A   • Number of children: N/A   • Years of  education: N/A     Occupational History   • Not on file.     Social History Main Topics   • Smoking status: Never Smoker   • Smokeless tobacco: Not on file   • Alcohol use No   • Drug use: No   • Sexual activity: Defer     Other Topics Concern   • Not on file     Social History Narrative     Current Facility-Administered Medications   Medication Dose Route Frequency Provider Last Rate Last Dose   • dextrose 5 % and sodium chloride 0.45 % infusion  100 mL/hr Intravenous Continuous Orville Farias  mL/hr at 06/14/17 0935 100 mL/hr at 06/14/17 0935       Review of Systems   Constitutional: Negative.    HENT: Negative for hearing loss, nosebleeds and trouble swallowing.    Respiratory: Negative for apnea, chest tightness and shortness of breath.    Cardiovascular: Negative for chest pain and palpitations.   Gastrointestinal: Negative for abdominal distention, abdominal pain, blood in stool, constipation, diarrhea, nausea and vomiting.   Genitourinary: Negative for difficulty urinating, dysuria, frequency and urgency.   Musculoskeletal: Negative for back pain, joint swelling and neck pain.   Skin: Negative for rash.   Neurological: Negative for dizziness, seizures, weakness, light-headedness, numbness and headaches.   Hematological: Negative for adenopathy.   Psychiatric/Behavioral: Negative for agitation. The patient is not nervous/anxious.        Vitals:    06/14/17 0918   BP: 148/79   Pulse: 79   Temp: 97 °F (36.1 °C)   SpO2: 100%       Physical Exam   Constitutional: She is oriented to person, place, and time. She appears well-developed and well-nourished. No distress.   HENT:   Head: Normocephalic and atraumatic.   Nose: Nose normal.   Eyes: Conjunctivae are normal. No scleral icterus.   Neck: Normal range of motion. No tracheal deviation present. No thyromegaly present.   Cardiovascular: Normal rate, regular rhythm and normal heart sounds.    No murmur heard.  Pulmonary/Chest: Effort normal and breath  sounds normal. No respiratory distress. She has no wheezes. She has no rales. She exhibits no tenderness.   Abdominal: Soft. She exhibits no distension. There is no tenderness. There is no rebound and no guarding. No hernia.   Musculoskeletal: She exhibits no tenderness or deformity.   Neurological: She is alert and oriented to person, place, and time.   Skin: Skin is warm and dry. No rash noted.   Psychiatric: She has a normal mood and affect. Her behavior is normal. Judgment and thought content normal.   Vitals reviewed.      Assessment     In need of screening colonoscopy.    Plan     Risks, benefits, rationale and prep for colonoscopy have been discussed with the patient.  The patient indicates understanding of these issues and agrees with the plan.        EMR Dragon/Transcription disclaimer:  Much of this encounter note is on electronic transcription/translation of spoken language to printed text.  The electronic translation of spoken language may permit erroneous or at times, nonsensical words or phrases to be inadvertently transcribed;  Although I have reviewed the note for such errors, some may still exist.

## 2017-06-14 NOTE — ANESTHESIA PREPROCEDURE EVALUATION
Anesthesia Evaluation     NPO Solid Status: > 8 hours       Airway   Mallampati: II  TM distance: >3 FB  Neck ROM: full  no difficulty expected  Dental - normal exam     Pulmonary - normal exam   (+) recent URI,   Cardiovascular - normal exam    (+) pacemaker, hypertension, CHF, hyperlipidemia      Neuro/Psych  GI/Hepatic/Renal/Endo    (+) obesity, morbid obesity, GERD, diabetes mellitus type 2,     Musculoskeletal     Abdominal  - normal exam   Substance History      OB/GYN          Other                                        Anesthesia Plan    ASA 3     MAC     intravenous induction

## 2017-06-14 NOTE — ANESTHESIA POSTPROCEDURE EVALUATION
Patient: Lexi Wade    Procedure Summary     Date Anesthesia Start Anesthesia Stop Room / Location    06/14/17 1019 1047 [facility] ENDOSCOPY 2 / [facility] ENDOSCOPY       Procedure Diagnosis Surgeon Provider    COLONOSCOPY (N/A ) Positive colorectal cancer screening using DNA-based stool test  (Positive colorectal cancer screening using DNA-based stool test [R19.5]) MD Mary Fitzgerald CRNA          Anesthesia Type: MAC  Last vitals  BP      Temp      Pulse     Resp      SpO2        Post Anesthesia Care and Evaluation    Patient location during evaluation: PACU  Patient participation: complete - patient participated  Level of consciousness: awake and alert  Pain management: adequate  Airway patency: patent  Anesthetic complications: No anesthetic complications    Cardiovascular status: acceptable  Respiratory status: acceptable  Hydration status: acceptable

## 2017-06-14 NOTE — PLAN OF CARE
Problem: Patient Care Overview (Adult)  Goal: Plan of Care Review  Outcome: Ongoing (interventions implemented as appropriate)    06/14/17 1046   Coping/Psychosocial Response Interventions   Plan Of Care Reviewed With patient   Patient Care Overview   Progress no change   Outcome Evaluation   Outcome Summary/Follow up Plan vss         Problem: GI Endoscopy (Adult)  Goal: Signs and Symptoms of Listed Potential Problems Will be Absent or Manageable (GI Endoscopy)  Outcome: Ongoing (interventions implemented as appropriate)    06/14/17 1046   GI Endoscopy   Problems Assessed (GI Endoscopy) all   Problems Present (GI Endoscopy) none

## 2017-06-22 RX ORDER — DIGOXIN 125 MCG
TABLET ORAL
Qty: 90 TABLET | Refills: 3 | Status: SHIPPED | OUTPATIENT
Start: 2017-06-22 | End: 2018-05-21 | Stop reason: SDUPTHER

## 2017-06-26 ENCOUNTER — LAB (OUTPATIENT)
Dept: LAB | Facility: HOSPITAL | Age: 67
End: 2017-06-26

## 2017-06-26 DIAGNOSIS — E11.8 TYPE 2 DIABETES MELLITUS WITH COMPLICATION, WITHOUT LONG-TERM CURRENT USE OF INSULIN (HCC): Chronic | ICD-10-CM

## 2017-06-26 LAB
ANION GAP SERPL CALCULATED.3IONS-SCNC: 12 MMOL/L (ref 5–15)
BUN BLD-MCNC: 12 MG/DL (ref 7–21)
BUN/CREAT SERPL: 14.1 (ref 7–25)
CALCIUM SPEC-SCNC: 9 MG/DL (ref 8.4–10.2)
CHLORIDE SERPL-SCNC: 99 MMOL/L (ref 95–110)
CO2 SERPL-SCNC: 28 MMOL/L (ref 22–31)
CREAT BLD-MCNC: 0.85 MG/DL (ref 0.5–1)
GFR SERPL CREATININE-BSD FRML MDRD: 81 ML/MIN/1.73 (ref 45–104)
GLUCOSE BLD-MCNC: 327 MG/DL (ref 60–100)
HBA1C MFR BLD: 9.41 % (ref 4–5.6)
POTASSIUM BLD-SCNC: 3.7 MMOL/L (ref 3.5–5.1)
SODIUM BLD-SCNC: 139 MMOL/L (ref 137–145)

## 2017-06-26 PROCEDURE — 36415 COLL VENOUS BLD VENIPUNCTURE: CPT

## 2017-06-26 PROCEDURE — 83036 HEMOGLOBIN GLYCOSYLATED A1C: CPT | Performed by: GENERAL PRACTICE

## 2017-06-26 PROCEDURE — 80048 BASIC METABOLIC PNL TOTAL CA: CPT | Performed by: GENERAL PRACTICE

## 2017-07-03 ENCOUNTER — OFFICE VISIT (OUTPATIENT)
Dept: FAMILY MEDICINE CLINIC | Facility: CLINIC | Age: 67
End: 2017-07-03

## 2017-07-03 VITALS
HEART RATE: 80 BPM | BODY MASS INDEX: 38.98 KG/M2 | WEIGHT: 220 LBS | HEIGHT: 63 IN | SYSTOLIC BLOOD PRESSURE: 130 MMHG | DIASTOLIC BLOOD PRESSURE: 70 MMHG

## 2017-07-03 DIAGNOSIS — I42.9 CARDIOMYOPATHY (HCC): Chronic | ICD-10-CM

## 2017-07-03 DIAGNOSIS — I10 ESSENTIAL HYPERTENSION: Chronic | ICD-10-CM

## 2017-07-03 DIAGNOSIS — E11.8 TYPE 2 DIABETES MELLITUS WITH COMPLICATION, WITHOUT LONG-TERM CURRENT USE OF INSULIN (HCC): Chronic | ICD-10-CM

## 2017-07-03 DIAGNOSIS — I50.22 CHRONIC SYSTOLIC CONGESTIVE HEART FAILURE (HCC): ICD-10-CM

## 2017-07-03 DIAGNOSIS — Z00.00 MEDICARE ANNUAL WELLNESS VISIT, INITIAL: Primary | ICD-10-CM

## 2017-07-03 DIAGNOSIS — E78.49 OTHER HYPERLIPIDEMIA: Chronic | ICD-10-CM

## 2017-07-03 DIAGNOSIS — E55.9 VITAMIN D DEFICIENCY: ICD-10-CM

## 2017-07-03 DIAGNOSIS — N93.9 VAGINAL BLEEDING: ICD-10-CM

## 2017-07-03 PROBLEM — E78.5 HYPERLIPIDEMIA: Chronic | Status: ACTIVE | Noted: 2017-07-03

## 2017-07-03 PROCEDURE — G0438 PPPS, INITIAL VISIT: HCPCS | Performed by: GENERAL PRACTICE

## 2017-07-03 PROCEDURE — 99213 OFFICE O/P EST LOW 20 MIN: CPT | Performed by: GENERAL PRACTICE

## 2017-07-03 RX ORDER — ERGOCALCIFEROL 1.25 MG/1
50000 CAPSULE ORAL WEEKLY
Qty: 12 CAPSULE | Refills: 3 | Status: SHIPPED | OUTPATIENT
Start: 2017-07-03 | End: 2018-06-05

## 2017-07-03 NOTE — PATIENT INSTRUCTIONS
Medicare Wellness  Personal Prevention Plan of Service     Date of Office Visit:  2017  Encounter Provider:  Joyce Plata MD  Place of Service:  [facility]  Patient Name: Lexi Wade  :  1950    As part of the Medicare Wellness portion of your visit today, we are providing you with this personalized preventive plan of services (PPPS). This plan is based upon recommendations of the [place] Preventive Services Task Force (USPSTF) and the Advisory Committee on Immunization Practices (ACIP).    This lists the preventive care services that should be considered, and provides dates of when you are due. Items listed as completed are up-to-date and do not require any further intervention.    Health Maintenance   Topic Date Due   • INFLUENZA VACCINE  2017   • DIABETIC EYE EXAM  2017   • URINE MICROALBUMIN  2017   • DIABETIC FOOT EXAM  10/04/2017   • HEMOGLOBIN A1C  2017   • LIPID PANEL  2018   • MEDICARE ANNUAL WELLNESS  2018   • MAMMOGRAM  10/05/2018   • COLOGUARD  2020   • TDAP/TD VACCINES (2 - Td) 2027   • COLONOSCOPY  2027   • HEPATITIS C SCREENING  Completed   • PNEUMOCOCCAL VACCINES (65+ LOW/MEDIUM RISK)  Completed   • ZOSTER VACCINE  Completed       Orders Placed This Encounter   Procedures   • Ambulatory Referral to Obstetrics / Gynecology     Referral Priority:   Routine     Referral Type:   Consultation     Referral Reason:   Specialty Services Required     Referred to Provider:   Avinash Montez MD     Requested Specialty:   Obstetrics and Gynecology     Number of Visits Requested:   1       Return in about 3 months (around 10/3/2017) for Recheck.

## 2017-07-03 NOTE — PROGRESS NOTES
Subjective   Lexi Wade is a 66 y.o. female.   Chief Complaint   Patient presents with   • Hypertension   • Diabetes     labs MEDICARE WELLNESS     For review and evaluation of management of chronic medical problems. Labs reviewed. Is still having some vaginal bleeding off and on, pap in Oct was negative. Has had TAHBSO for benign cause.   Diabetes   She presents for her follow-up diabetic visit. She has type 2 diabetes mellitus. Her disease course has been stable. There are no hypoglycemic associated symptoms. Pertinent negatives for hypoglycemia include no dizziness, headaches or nervousness/anxiousness. There are no diabetic associated symptoms. Pertinent negatives for diabetes include no chest pain, no fatigue and no weakness. There are no hypoglycemic complications. Diabetic complications include heart disease. Risk factors for coronary artery disease include dyslipidemia, diabetes mellitus and hypertension. Current diabetic treatment includes oral agent (dual therapy) (victoza). She is compliant with treatment some of the time. She is following a generally healthy diet. When asked about meal planning, she reported none. She rarely participates in exercise. There is no change in her home blood glucose trend. An ACE inhibitor/angiotensin II receptor blocker is being taken. Eye exam is current.      The following portions of the patient's history were reviewed and updated as appropriate: allergies, current medications, past social history and problem list.    Outpatient Medications Prior to Visit   Medication Sig Dispense Refill   • aspirin 81 MG tablet Take 81 mg by mouth every night.     • atorvastatin (LIPITOR) 40 MG tablet Take 40 mg by mouth Daily.     • B-D UF III MINI PEN NEEDLES 31G X 5 MM misc USE DAILY WITH VICTOZA 100 each 3   • brimonidine (ALPHAGAN) 0.2 % ophthalmic solution Administer 1 drop to both eyes 2 (Two) Times a Day. 5 mL 3   • carvedilol (COREG) 25 MG tablet Take 25 mg by mouth 2  (Two) Times a Day With Meals.     • digoxin (LANOXIN) 125 MCG tablet TAKE 1 TABLET BY MOUTH DAILY 90 tablet 3   • esomeprazole (nexIUM) 40 MG capsule Take 40 mg by mouth Daily.     • ezetimibe (ZETIA) 10 MG tablet Take 10 mg by mouth daily.     • furosemide (LASIX) 80 MG tablet Take 80 mg by mouth Daily.     • glimepiride (AMARYL) 4 MG tablet Take 4 mg by mouth 2 (Two) Times a Day.     • latanoprost (XALATAN) 0.005 % ophthalmic solution Administer 1 drop to both eyes Every Night. 90 day supply. 2.5 mL 5   • Liraglutide (VICTOZA) 18 MG/3ML solution pen-injector Inject 12 mg under the skin Daily.     • lisinopril (PRINIVIL,ZESTRIL) 10 MG tablet Take 10 mg by mouth Daily.     • magnesium oxide (MAGOX) 400 (241.3 MG) MG tablet tablet Take 400 mg by mouth daily.     • metFORMIN (GLUCOPHAGE) 1000 MG tablet Take 1,000 mg by mouth 2 (Two) Times a Day With Meals.     • omega-3 acid ethyl esters (LOVAZA) 1 G capsule Take 1 g by mouth 2 (Two) Times a Day. 2 capsules bid     • potassium chloride (MICRO-K) 10 MEQ CR capsule Take 10 mEq by mouth 2 (two) times a day.     • pregabalin (LYRICA) 150 MG capsule Take 1 capsule by mouth 2 (Two) Times a Day. 180 capsule 1   • vitamin D (ERGOCALCIFEROL) 53402 UNITS capsule capsule Take 50,000 Units by mouth 1 (one) time per week.       No facility-administered medications prior to visit.      Review of Systems   Constitutional: Negative.  Negative for activity change, appetite change, chills, fatigue, fever and unexpected weight change.   HENT: Negative.  Negative for congestion, ear pain, hearing loss, nosebleeds, rhinorrhea, sinus pressure, sneezing, sore throat, tinnitus and trouble swallowing.    Eyes: Negative.  Negative for pain, discharge, redness, itching and visual disturbance.   Respiratory: Negative.  Negative for apnea, cough, chest tightness, shortness of breath and wheezing.    Cardiovascular: Negative.  Negative for chest pain, palpitations and leg swelling.  "  Gastrointestinal: Negative.  Negative for abdominal distention, abdominal pain, constipation, diarrhea, nausea and vomiting.   Endocrine: Negative.    Genitourinary: Negative.  Negative for dysuria, frequency and urgency.   Musculoskeletal: Negative.  Negative for arthralgias, back pain, gait problem, joint swelling, myalgias, neck pain and neck stiffness.   Skin: Negative.  Negative for color change and rash.   Allergic/Immunologic: Negative.    Neurological: Negative.  Negative for dizziness, weakness, light-headedness, numbness and headaches.   Hematological: Negative.  Negative for adenopathy.   Psychiatric/Behavioral: Negative.  Negative for dysphoric mood and sleep disturbance. The patient is not nervous/anxious.      Objective   Visit Vitals   • /70   • Pulse 80   • Ht 63\" (160 cm)   • Wt 220 lb (99.8 kg)   • BMI 38.97 kg/m2     Physical Exam   Constitutional: She is oriented to person, place, and time. She appears well-developed and well-nourished. No distress.   HENT:   Head: Normocephalic and atraumatic.   Nose: Nose normal.   Mouth/Throat: Oropharynx is clear and moist.   Eyes: Conjunctivae and EOM are normal. Pupils are equal, round, and reactive to light. Right eye exhibits no discharge. Left eye exhibits no discharge.   Neck: No thyromegaly present.   Cardiovascular: Normal rate, regular rhythm, normal heart sounds and intact distal pulses.    Pulmonary/Chest: Effort normal and breath sounds normal.   Lymphadenopathy:     She has no cervical adenopathy.   Neurological: She is alert and oriented to person, place, and time.   Skin: Skin is warm and dry.   Psychiatric: She has a normal mood and affect.   Nursing note and vitals reviewed.    Results for orders placed or performed in visit on 06/26/17   Hemoglobin A1c   Result Value Ref Range    Hemoglobin A1C 9.41 (H) 4 - 5.6 %   Basic Metabolic Panel   Result Value Ref Range    Glucose 327 (H) 60 - 100 mg/dL    BUN 12 7 - 21 mg/dL    Creatinine " 0.85 0.50 - 1.00 mg/dL    Sodium 139 137 - 145 mmol/L    Potassium 3.7 3.5 - 5.1 mmol/L    Chloride 99 95 - 110 mmol/L    CO2 28.0 22.0 - 31.0 mmol/L    Calcium 9.0 8.4 - 10.2 mg/dL    eGFR   Amer 81 45 - 104 mL/min/1.73    BUN/Creatinine Ratio 14.1 7.0 - 25.0    Anion Gap 12.0 5.0 - 15.0 mmol/L      Assessment/Plan   Problem List Items Addressed This Visit        Cardiovascular and Mediastinum    Cardiomyopathy (Chronic)    Essential hypertension (Chronic)    Relevant Orders    Comprehensive Metabolic Panel    Urinalysis With / Culture If Indicated    Hyperlipidemia (Chronic)    Relevant Orders    Comprehensive Metabolic Panel    Lipid Panel    Congestive heart failure    Relevant Orders    Digoxin level       Digestive    Vitamin D deficiency (Chronic)    Relevant Orders    Vitamin D 25 Hydroxy       Endocrine    Type 2 diabetes mellitus (Chronic)    Relevant Orders    Comprehensive Metabolic Panel    Hemoglobin A1c    Urinalysis With / Culture If Indicated    MicroAlbumin, Urine, Random      Other Visit Diagnoses     Medicare annual wellness visit, initial    -  Primary    Vaginal bleeding        Relevant Orders    Ambulatory Referral to Obstetrics / Gynecology    Digoxin level        Advised to make sure she is taking her Victoza daily. If hemoglobin A1c not better next visit will switch to insulin. Recommended weight loss and exercise.      New Medications Ordered This Visit   Medications   • vitamin D (ERGOCALCIFEROL) 81385 UNITS capsule capsule     Sig: Take 1 capsule by mouth 1 (One) Time Per Week.     Dispense:  12 capsule     Refill:  3     Return in about 3 months (around 10/3/2017) for Annual physical.

## 2017-07-03 NOTE — PROGRESS NOTES
QUICK REFERENCE INFORMATION:  The ABCs of the Annual Wellness Visit    Initial Medicare Wellness Visit    HEALTH RISK ASSESSMENT    1950    Recent Hospitalizations:  No hospitalization(s) within the last year..    Current Medical Providers:  Patient Care Team:  Joyce Plata MD as PCP - General  Joyce Plata MD as PCP - Claims Attributed  Wang Felipe MD as Consulting Physician (Cardiology)    Smoking Status:  History   Smoking Status   • Never Smoker   Smokeless Tobacco   • Never Used     Alcohol Consumption:  History   Alcohol Use No     Depression Screen:   PHQ-9 Depression Screening 7/3/2017   Little interest or pleasure in doing things 3   Feeling down, depressed, or hopeless 0   Trouble falling or staying asleep, or sleeping too much 2   Feeling tired or having little energy 0   Poor appetite or overeating 2   Feeling bad about yourself - or that you are a failure or have let yourself or your family down 1   Trouble concentrating on things, such as reading the newspaper or watching television 0   Moving or speaking so slowly that other people could have noticed. Or the opposite - being so fidgety or restless that you have been moving around a lot more than usual 0   Thoughts that you would be better off dead, or of hurting yourself in some way 0   PHQ-9 Total Score 8     Health Habits and Functional and Cognitive Screening:  Functional & Cognitive Status 7/3/2017   Do you have difficulty preparing food and eating? No   Do you have difficulty bathing yourself? No   Do you have difficulty getting dressed? No   Do you have difficulty using the toilet? No   Do you have difficulty moving around from place to place? No   In the past year have you fallen or experienced a near fall? No   Do you need help using the phone?  No   Are you deaf or do you have serious difficulty hearing?  No   Do you need help with transportation? No   Do you need help shopping? No   Do you need help preparing meals?  No    Do you need help with housework?  No   Do you need help with laundry? No   Do you need help taking your medications? No   Do you need help managing money? No     Health Habits  Current Diet: Unhealthy Diet  Dental Exam: Not up to date  Eye Exam: Up to date  Exercise (times per week): 0 times per week  Current Exercise Activities Include: None    Does the patient have evidence of cognitive impairment? No    Asiprin use counseling: Taking ASA appropriately as indicated    Recent Lab Results:    Visual Acuity:  No exam data present    Age-appropriate Screening Schedule:  Refer to the list below for future screening recommendations based on patient's age, sex and/or medical conditions. Orders for these recommended tests are listed in the plan section. The patient has been provided with a written plan.    Health Maintenance   Topic Date Due   • INFLUENZA VACCINE  08/01/2017   • DIABETIC EYE EXAM  08/31/2017   • URINE MICROALBUMIN  09/27/2017   • DIABETIC FOOT EXAM  10/04/2017   • HEMOGLOBIN A1C  12/26/2017   • LIPID PANEL  04/03/2018   • MAMMOGRAM  10/05/2018   • TDAP/TD VACCINES (2 - Td) 04/04/2027   • COLONOSCOPY  06/14/2027   • PNEUMOCOCCAL VACCINES (65+ LOW/MEDIUM RISK)  Completed   • ZOSTER VACCINE  Completed        Subjective   History of Present Illness    Lexi Wade is a 66 y.o. female who presents for an Annual Wellness Visit.    The following portions of the patient's history were reviewed and updated as appropriate: allergies, current medications, past family history, past medical history, past social history, past surgical history and problem list.    Outpatient Medications Prior to Visit   Medication Sig Dispense Refill   • aspirin 81 MG tablet Take 81 mg by mouth every night.     • atorvastatin (LIPITOR) 40 MG tablet Take 40 mg by mouth Daily.     • B-D UF III MINI PEN NEEDLES 31G X 5 MM misc USE DAILY WITH VICTOZA 100 each 3   • brimonidine (ALPHAGAN) 0.2 % ophthalmic solution Administer 1 drop to  both eyes 2 (Two) Times a Day. 5 mL 3   • carvedilol (COREG) 25 MG tablet Take 25 mg by mouth 2 (Two) Times a Day With Meals.     • digoxin (LANOXIN) 125 MCG tablet TAKE 1 TABLET BY MOUTH DAILY 90 tablet 3   • esomeprazole (nexIUM) 40 MG capsule Take 40 mg by mouth Daily.     • ezetimibe (ZETIA) 10 MG tablet Take 10 mg by mouth daily.     • furosemide (LASIX) 80 MG tablet Take 80 mg by mouth Daily.     • glimepiride (AMARYL) 4 MG tablet Take 4 mg by mouth 2 (Two) Times a Day.     • latanoprost (XALATAN) 0.005 % ophthalmic solution Administer 1 drop to both eyes Every Night. 90 day supply. 2.5 mL 5   • Liraglutide (VICTOZA) 18 MG/3ML solution pen-injector Inject 12 mg under the skin Daily.     • lisinopril (PRINIVIL,ZESTRIL) 10 MG tablet Take 10 mg by mouth Daily.     • magnesium oxide (MAGOX) 400 (241.3 MG) MG tablet tablet Take 400 mg by mouth daily.     • metFORMIN (GLUCOPHAGE) 1000 MG tablet Take 1,000 mg by mouth 2 (Two) Times a Day With Meals.     • omega-3 acid ethyl esters (LOVAZA) 1 G capsule Take 1 g by mouth 2 (Two) Times a Day. 2 capsules bid     • potassium chloride (MICRO-K) 10 MEQ CR capsule Take 10 mEq by mouth 2 (two) times a day.     • pregabalin (LYRICA) 150 MG capsule Take 1 capsule by mouth 2 (Two) Times a Day. 180 capsule 1   • vitamin D (ERGOCALCIFEROL) 95548 UNITS capsule capsule Take 50,000 Units by mouth 1 (one) time per week.       No facility-administered medications prior to visit.        Patient Active Problem List   Diagnosis   • Pseudophakia   • Primary open angle glaucoma   • Cardiomyopathy   • Congestive heart failure   • Essential hypertension   • GERD (gastroesophageal reflux disease)   • Type 2 diabetes mellitus   • Vitamin D deficiency   • Borderline glaucoma   • Neurologic disorder associated with diabetes mellitus   • Hyperlipidemia       Advance Care Planning:  has NO advance directive - information provided to the patient today    Identification of Risk Factors:  Risk factors  include: weight , unhealthy diet, inactivity and increased fall risk.    Review of Systems   Constitutional: Negative.  Negative for activity change, appetite change, chills, fatigue, fever and unexpected weight change.   HENT: Negative.  Negative for congestion, ear pain, hearing loss, nosebleeds, rhinorrhea, sinus pressure, sneezing, sore throat, tinnitus and trouble swallowing.    Eyes: Negative.  Negative for pain, discharge, redness, itching and visual disturbance.   Respiratory: Negative.  Negative for apnea, cough, chest tightness, shortness of breath and wheezing.    Cardiovascular: Negative.  Negative for chest pain, palpitations and leg swelling.   Gastrointestinal: Negative.  Negative for abdominal distention, abdominal pain, constipation, diarrhea, nausea and vomiting.   Endocrine: Negative.    Genitourinary: Negative.  Negative for dysuria, frequency and urgency.   Musculoskeletal: Negative.  Negative for arthralgias, back pain, gait problem, joint swelling, myalgias, neck pain and neck stiffness.   Skin: Negative.  Negative for color change and rash.   Allergic/Immunologic: Negative.    Neurological: Negative.  Negative for dizziness, weakness, light-headedness, numbness and headaches.   Hematological: Negative.  Negative for adenopathy.   Psychiatric/Behavioral: Negative.  Negative for dysphoric mood and sleep disturbance. The patient is not nervous/anxious.        Compared to one year ago, the patient feels her physical health is the same.  Compared to one year ago, the patient feels her mental health is better.    Objective     Physical Exam   Constitutional: She is oriented to person, place, and time. She appears well-developed and well-nourished. No distress.   HENT:   Head: Normocephalic and atraumatic.   Nose: Nose normal.   Mouth/Throat: Oropharynx is clear and moist.   Eyes: Conjunctivae and EOM are normal. Pupils are equal, round, and reactive to light. Right eye exhibits no discharge. Left  "eye exhibits no discharge.   Neck: No thyromegaly present.   Cardiovascular: Normal rate, regular rhythm, normal heart sounds and intact distal pulses.    Pulmonary/Chest: Effort normal and breath sounds normal.   Lymphadenopathy:     She has no cervical adenopathy.   Neurological: She is alert and oriented to person, place, and time.   Skin: Skin is warm and dry.   Psychiatric: She has a normal mood and affect.   Nursing note and vitals reviewed.      Vitals:    07/03/17 0957   BP: 130/70   Pulse: 80   Weight: 220 lb (99.8 kg)   Height: 63\" (160 cm)   PainSc: 0-No pain       Body mass index is 38.97 kg/(m^2).  Discussed the patient's BMI with her. The BMI is above average; BMI management plan is completed.    Assessment/Plan   Patient Self-Management and Personalized Health Advice  The patient has been provided with information about: diet, exercise, weight management and fall prevention and preventive services including:   · Exercise counseling provided, Fall Risk assessment done, Fall Risk plan of care done.    Visit Diagnoses:    ICD-10-CM ICD-9-CM   1. Medicare annual wellness visit, initial Z00.00 V70.0   2. Type 2 diabetes mellitus with complication, without long-term current use of insulin E11.8 250.90   3. Vaginal bleeding N93.9 623.8   4. Cardiomyopathy I42.9 425.4   5. Essential hypertension I10 401.9   6. Other hyperlipidemia E78.4 272.4   7. Vitamin D deficiency E55.9 268.9   8. Chronic systolic congestive heart failure I50.22 428.22     428.0       Orders Placed This Encounter   Procedures   • Comprehensive Metabolic Panel     Standing Status:   Future     Standing Expiration Date:   7/3/2018   • Hemoglobin A1c     Standing Status:   Future     Standing Expiration Date:   7/3/2018   • Lipid Panel     Standing Status:   Future     Standing Expiration Date:   7/3/2018   • Vitamin D 25 Hydroxy     Standing Status:   Future     Standing Expiration Date:   7/3/2018   • Urinalysis With / Culture If Indicated "     Standing Status:   Future     Standing Expiration Date:   11/30/2017   • MicroAlbumin, Urine, Random     Standing Status:   Future     Standing Expiration Date:   11/30/2017   • Digoxin level     Standing Status:   Future     Standing Expiration Date:   7/3/2018   • Ambulatory Referral to Obstetrics / Gynecology     Referral Priority:   Routine     Referral Type:   Consultation     Referral Reason:   Specialty Services Required     Referred to Provider:   Avinash Montez MD     Requested Specialty:   Obstetrics and Gynecology     Number of Visits Requested:   1       Outpatient Encounter Prescriptions as of 7/3/2017   Medication Sig Dispense Refill   • aspirin 81 MG tablet Take 81 mg by mouth every night.     • atorvastatin (LIPITOR) 40 MG tablet Take 40 mg by mouth Daily.     • B-D UF III MINI PEN NEEDLES 31G X 5 MM misc USE DAILY WITH VICTOZA 100 each 3   • brimonidine (ALPHAGAN) 0.2 % ophthalmic solution Administer 1 drop to both eyes 2 (Two) Times a Day. 5 mL 3   • carvedilol (COREG) 25 MG tablet Take 25 mg by mouth 2 (Two) Times a Day With Meals.     • digoxin (LANOXIN) 125 MCG tablet TAKE 1 TABLET BY MOUTH DAILY 90 tablet 3   • esomeprazole (nexIUM) 40 MG capsule Take 40 mg by mouth Daily.     • ezetimibe (ZETIA) 10 MG tablet Take 10 mg by mouth daily.     • furosemide (LASIX) 80 MG tablet Take 80 mg by mouth Daily.     • glimepiride (AMARYL) 4 MG tablet Take 4 mg by mouth 2 (Two) Times a Day.     • latanoprost (XALATAN) 0.005 % ophthalmic solution Administer 1 drop to both eyes Every Night. 90 day supply. 2.5 mL 5   • Liraglutide (VICTOZA) 18 MG/3ML solution pen-injector Inject 12 mg under the skin Daily.     • lisinopril (PRINIVIL,ZESTRIL) 10 MG tablet Take 10 mg by mouth Daily.     • magnesium oxide (MAGOX) 400 (241.3 MG) MG tablet tablet Take 400 mg by mouth daily.     • metFORMIN (GLUCOPHAGE) 1000 MG tablet Take 1,000 mg by mouth 2 (Two) Times a Day With Meals.     • omega-3 acid ethyl esters (LOVAZA) 1  G capsule Take 1 g by mouth 2 (Two) Times a Day. 2 capsules bid     • potassium chloride (MICRO-K) 10 MEQ CR capsule Take 10 mEq by mouth 2 (two) times a day.     • pregabalin (LYRICA) 150 MG capsule Take 1 capsule by mouth 2 (Two) Times a Day. 180 capsule 1   • vitamin D (ERGOCALCIFEROL) 43828 UNITS capsule capsule Take 1 capsule by mouth 1 (One) Time Per Week. 12 capsule 3   • [DISCONTINUED] vitamin D (ERGOCALCIFEROL) 81417 UNITS capsule capsule Take 50,000 Units by mouth 1 (one) time per week.       No facility-administered encounter medications on file as of 7/3/2017.        Reviewed use of high risk medication in the elderly: yes  Reviewed for potential of harmful drug interactions in the elderly: yes    Follow Up:  Return in about 3 months (around 10/3/2017) for Annual physical.     An After Visit Summary and PPPS with all of these plans were given to the patient.   Information has been scanned into chart.  Discussed importance of taking medications as prescribed. Encouraged healthy eating habits with low fat, low salt choices and working towards maintaining a healthy weight. Recommended regular exercise if able as well as care to prevent falls.

## 2017-07-05 RX ORDER — POTASSIUM CHLORIDE 750 MG/1
CAPSULE, EXTENDED RELEASE ORAL
Qty: 180 CAPSULE | Refills: 1 | Status: SHIPPED | OUTPATIENT
Start: 2017-07-05 | End: 2017-10-06 | Stop reason: SDUPTHER

## 2017-07-05 RX ORDER — EZETIMIBE 10 MG/1
TABLET ORAL
Qty: 90 TABLET | Refills: 2 | Status: SHIPPED | OUTPATIENT
Start: 2017-07-05 | End: 2018-04-04 | Stop reason: SDUPTHER

## 2017-07-27 ENCOUNTER — APPOINTMENT (OUTPATIENT)
Dept: LAB | Facility: HOSPITAL | Age: 67
End: 2017-07-27

## 2017-07-27 ENCOUNTER — OFFICE VISIT (OUTPATIENT)
Dept: OBSTETRICS AND GYNECOLOGY | Facility: CLINIC | Age: 67
End: 2017-07-27

## 2017-07-27 VITALS
BODY MASS INDEX: 38.27 KG/M2 | SYSTOLIC BLOOD PRESSURE: 124 MMHG | DIASTOLIC BLOOD PRESSURE: 63 MMHG | WEIGHT: 216 LBS | HEIGHT: 63 IN

## 2017-07-27 DIAGNOSIS — N93.9 VAGINAL BLEEDING: Primary | Chronic | ICD-10-CM

## 2017-07-27 DIAGNOSIS — B96.89 BV (BACTERIAL VAGINOSIS): ICD-10-CM

## 2017-07-27 DIAGNOSIS — R30.0 DYSURIA: ICD-10-CM

## 2017-07-27 DIAGNOSIS — N76.0 BV (BACTERIAL VAGINOSIS): ICD-10-CM

## 2017-07-27 LAB
BILIRUB UR QL STRIP: NEGATIVE
CLARITY UR: CLEAR
COLOR UR: YELLOW
GLUCOSE UR STRIP-MCNC: NEGATIVE MG/DL
HGB UR QL STRIP.AUTO: ABNORMAL
KETONES UR QL STRIP: NEGATIVE
LEUKOCYTE ESTERASE UR QL STRIP.AUTO: NEGATIVE
NITRITE UR QL STRIP: NEGATIVE
PH UR STRIP.AUTO: 6 [PH] (ref 5–9)
PROT UR QL STRIP: ABNORMAL
SP GR UR STRIP: 1.02 (ref 1–1.03)
UROBILINOGEN UR QL STRIP: ABNORMAL

## 2017-07-27 PROCEDURE — 99204 OFFICE O/P NEW MOD 45 MIN: CPT | Performed by: OBSTETRICS & GYNECOLOGY

## 2017-07-27 PROCEDURE — 81003 URINALYSIS AUTO W/O SCOPE: CPT | Performed by: OBSTETRICS & GYNECOLOGY

## 2017-07-27 RX ORDER — METRONIDAZOLE 500 MG/1
500 TABLET ORAL 3 TIMES DAILY
Qty: 21 TABLET | Refills: 0 | Status: SHIPPED | OUTPATIENT
Start: 2017-07-27 | End: 2017-08-03

## 2017-07-27 RX ORDER — FLUCONAZOLE 150 MG/1
TABLET ORAL
Qty: 2 TABLET | Refills: 11 | Status: SHIPPED | OUTPATIENT
Start: 2017-07-27 | End: 2018-02-22

## 2017-07-27 NOTE — PROGRESS NOTES
Lexi Wade is a 66 y.o. y/o female     Chief Complaint: Vaginal bleeding    HPI: 66-year-old G0 who has undergone a total abdominal hysterectomy and bilateral salpingo-oophorectomy for benign disease has had some episodes of vaginal spotting over the past year.  She has not noticed any rectal bleeding or urinary bleeding.  She occasionally has some discomfort with urination, but is not having any urinary complaints at this time.  She is not sexually active.    Review of Systems   Constitutional: Negative for activity change, appetite change, chills, diaphoresis, fatigue, fever and unexpected weight change.   Gastrointestinal: Negative for abdominal pain, constipation, diarrhea and nausea.   Genitourinary: Positive for dysuria and vaginal bleeding. Negative for difficulty urinating, dyspareunia, pelvic pain, urgency, vaginal discharge and vaginal pain.   Neurological: Negative for headaches.   Psychiatric/Behavioral: Negative for dysphoric mood. The patient is not nervous/anxious.       Breast ROS: negative    The following portions of the patient's history were reviewed and updated as appropriate: allergies, current medications, past family history, past medical history, past social history, past surgical history and problem list.    Allergies   Allergen Reactions   • Aldactone [Spironolactone]    • Nsaids           Current Outpatient Prescriptions:   •  aspirin 81 MG tablet, Take 81 mg by mouth every night., Disp: , Rfl:   •  atorvastatin (LIPITOR) 40 MG tablet, Take 40 mg by mouth Daily., Disp: , Rfl:   •  B-D UF III MINI PEN NEEDLES 31G X 5 MM misc, USE DAILY WITH VICTOZA, Disp: 100 each, Rfl: 3  •  brimonidine (ALPHAGAN) 0.2 % ophthalmic solution, Administer 1 drop to both eyes 2 (Two) Times a Day., Disp: 5 mL, Rfl: 3  •  carvedilol (COREG) 25 MG tablet, Take 25 mg by mouth 2 (Two) Times a Day With Meals., Disp: , Rfl:   •  digoxin (LANOXIN) 125 MCG tablet, TAKE 1 TABLET BY MOUTH DAILY, Disp: 90 tablet,  Rfl: 3  •  esomeprazole (nexIUM) 40 MG capsule, Take 40 mg by mouth Daily., Disp: , Rfl:   •  furosemide (LASIX) 80 MG tablet, Take 80 mg by mouth Daily., Disp: , Rfl:   •  glimepiride (AMARYL) 4 MG tablet, Take 4 mg by mouth 2 (Two) Times a Day., Disp: , Rfl:   •  latanoprost (XALATAN) 0.005 % ophthalmic solution, Administer 1 drop to both eyes Every Night. 90 day supply., Disp: 2.5 mL, Rfl: 5  •  Liraglutide (VICTOZA) 18 MG/3ML solution pen-injector, Inject 12 mg under the skin Daily., Disp: , Rfl:   •  lisinopril (PRINIVIL,ZESTRIL) 10 MG tablet, Take 10 mg by mouth Daily., Disp: , Rfl:   •  magnesium oxide (MAGOX) 400 (241.3 MG) MG tablet tablet, Take 400 mg by mouth daily., Disp: , Rfl:   •  metFORMIN (GLUCOPHAGE) 1000 MG tablet, Take 1,000 mg by mouth 2 (Two) Times a Day With Meals., Disp: , Rfl:   •  omega-3 acid ethyl esters (LOVAZA) 1 G capsule, Take 1 g by mouth 2 (Two) Times a Day. 2 capsules bid, Disp: , Rfl:   •  potassium chloride (MICRO-K) 10 MEQ CR capsule, TAKE 1 CAPSULE BY MOUTH TWICE DAILY, Disp: 180 capsule, Rfl: 1  •  pregabalin (LYRICA) 150 MG capsule, Take 1 capsule by mouth 2 (Two) Times a Day., Disp: 180 capsule, Rfl: 1  •  vitamin D (ERGOCALCIFEROL) 82086 UNITS capsule capsule, Take 1 capsule by mouth 1 (One) Time Per Week., Disp: 12 capsule, Rfl: 3  •  ZETIA 10 MG tablet, TAKE 1 TABELT BY MOUTH DAILY, Disp: 90 tablet, Rfl: 2  •  fluconazole (DIFLUCAN) 150 MG tablet, Take one po today and take one po in 4 days., Disp: 2 tablet, Rfl: 11  •  metroNIDAZOLE (FLAGYL) 500 MG tablet, Take 1 tablet by mouth 3 (Three) Times a Day for 7 days., Disp: 21 tablet, Rfl: 0     The patient has a family history of   Family History   Problem Relation Age of Onset   • Heart disease Other    • Hypertension Other    • Alzheimer's disease Other    • Diabetes Sister      x 2   • Hypertension Sister    • Hyperlipidemia Sister    • Bone cancer Brother         Past Medical History:   Diagnosis Date   • Artificial lens  present     IN  POSITION - BOTH   • Borderline glaucoma    • Cardiomyopathy    • Congestive heart failure    • Diabetes mellitus     IMPROVING   • Essential hypertension    • GERD (gastroesophageal reflux disease)    • History of bone density study 09/25/2015    DXA BONE DENSITY AXIAL [address] [facility] (Screening for osteoporosis)    • History of echocardiogram 03/25/2013    Echocadiogram W/ color flow 76132 (Mild left atrial enlargement wiht mild LV enlargement w/mild concentric LVH. Global hypokinesis. EF 30%. Mitral and Av thickened. Aortic sclerosis. Severe mitral regurg. mild tricuspid regurg. Mild right atrial enlargement noted. Pacemaker wire noted)   03/25/2013 [facility]AC[facility] WILLIAM    • History of mammography, diagnostic 09/17/2014    [place] [address] [facility] (Microcalcifications of the breast) , Reason: s/p mammotome bx right side for benign disease   • History of mammography, diagnostic 07/24/2014    M[place]MOGRAPHY [place] [address] [facility] (Abnormal mammogram, unspecified)    • History of mammography, screening 07/03/2012    BENIGN   • Hypercholesterolemia    • Low back pain    • Microcalcifications of the breast     BENIGN CHANGES   • Need for prophylactic vaccination against Streptococcus pneumoniae (pneumococcus)    • Neurologic disorder associated with diabetes mellitus    • Obesity    • Type 2 diabetes mellitus     NO BDR   • Upper respiratory infection    • Vaginal bleeding 7/27/2017   • Vitamin D deficiency         OB History     No data available           Social History     Social History   • Marital status: Single     Spouse name: N/A   • Number of children: N/A   • Years of education: N/A     Occupational History   • Not on file.     Social History Main Topics   • Smoking status: Never Smoker   • Smokeless tobacco: Never Used   • Alcohol use No   • Drug use: No   • Sexual activity: Defer     Other Topics Concern   • Not on file     Social History Narrative        Past Surgical  History:   Procedure Laterality Date   • BREAST BIOPSY  07/28/2014    Stereotactic breast biopsy (Sterotactic mammotome biopsy and clip placement. Abnormal right-sided mammogram demonstrating microcalcifications.)   • CARDIAC CATHETERIZATION  10/28/2010    Cardiac cath 08221 (Noevidence of any obstructive epicardial coronary artery disease noted. Severe left ventricular dysfunction with an EF of 25%)   • CARDIAC CATHETERIZATION  01/31/2002    Cardiac cath 08246 (No significant coronary atherosclerosis. Probably mild left ventricular systolic dysfunction.)   • CARDIAC PACEMAKER PLACEMENT     • CATARACT EXTRACTION  01/28/2014    Remove cataract, insert lens (Right eye)   • CATARACT EXTRACTION  11/22/2011    Remove cataract, insert lens (Left eye)   • CATARACT EXTRACTION     • CENTRAL VENOUS LINE INSERTION  05/20/2016    Central line insertion (Successful placement of right upper extremity midline.)   • COLONOSCOPY  12/14/2004    Single small non-bleeding polyp in the rectum. The polyp was removed by hot biopsy polypectomy   • COLONOSCOPY N/A 6/14/2017    Procedure: COLONOSCOPY;  Surgeon: Orville Farias MD;  Location: [facility] ENDOSCOPY;  Service:    • MAMMO BILATERAL  09/17/2014    MAMMOGRAPHY [place]NOSTIC [place]IL[place] [address] [facility] (Microcalcifications of the breast) , Reason: s/p mammotome bx right side for benign disease   • MAMMO BILATERAL  07/24/2014    MAMMOGRAPHY DIAGNOSTIC UNILAT RT [address] [facility] (Abnormal mammogram, unspecified)    • OTHER SURGICAL HISTORY  11/02/2012    DEBRIDE NAIL 6 OR MORE 46658 (Onychomycosis)    • OTHER SURGICAL HISTORY  04/27/2016    EXTENDED VISUAL FIELDS STUDY 61566 (Open angle with borderline findings, low risk, unspecified eye)    • OTHER SURGICAL HISTORY      EXTENDED VISUAL FIELDS STUDY 03768 (Borderline glaucoma)  (2): 10/02/2014, 04/17/2013   • OTHER SURGICAL HISTORY      OCT DISC NFL 98507 (Borderline glaucoma)  (2): 02/02/2015, 08/21/2013   • PAP SMEAR  06/10/2010    NEGATIVE  "  • TOTAL ABDOMINAL HYSTERECTOMY WITH SALPINGO OOPHORECTOMY  09/16/2013    Lysis of adhesions.        Patient Active Problem List   Diagnosis   • Pseudophakia   • Primary open angle glaucoma   • Cardiomyopathy   • Congestive heart failure   • Essential hypertension   • GERD (gastroesophageal reflux disease)   • Type 2 diabetes mellitus   • Vitamin D deficiency   • Borderline glaucoma   • Neurologic disorder associated with diabetes mellitus   • Hyperlipidemia   • Vaginal bleeding        Documented Vitals    07/27/17 1100   BP: 124/63   Weight: 216 lb (98 kg)   Height: 63\" (160 cm)   PainSc: 0-No pain       Physical Exam   Constitutional: She is oriented to person, place, and time. No distress.   Obese black female weighing 216 pounds with BMI 38.3.    She looks younger than her stated age.   HENT:   Head: Normocephalic and atraumatic.   Eyes: Conjunctivae and EOM are normal. Pupils are equal, round, and reactive to light.   Neck: Normal range of motion. Neck supple. No JVD present. No tracheal deviation present. No thyromegaly present.   Cardiovascular: Normal rate, regular rhythm, normal heart sounds and intact distal pulses.  Exam reveals no gallop and no friction rub.    No murmur heard.  Pulmonary/Chest: Effort normal and breath sounds normal. No stridor. No respiratory distress. She has no wheezes. She has no rales.   Abdominal: Soft. Bowel sounds are normal. She exhibits no distension and no mass. There is no tenderness. There is no rebound and no guarding. No hernia. Hernia confirmed negative in the right inguinal area and confirmed negative in the left inguinal area.   Genitourinary: Rectal exam shows no external hemorrhoid, no internal hemorrhoid, no fissure, no mass, no tenderness, anal tone normal and guaiac negative stool. No labial fusion. There is no rash, tenderness, lesion or injury on the right labia. There is no rash, tenderness, lesion or injury on the left labia. No erythema, tenderness or " bleeding in the vagina. No foreign body in the vagina. No signs of injury around the vagina. Vaginal discharge found.   Genitourinary Comments: Cervix surgically absent.  Uterus surgically absent.  Adnexa surgically absent.  No pelvic masses palpated.  Urethral meatus without erythema or prolapse.    Some vaginal atrophy and bacterial vaginosis.    Currently there is no vaginal bleeding and no lesions noted at the vaginal cuff or inside vagina.   Musculoskeletal: Normal range of motion. She exhibits no edema, tenderness or deformity.   Lymphadenopathy:     She has no cervical adenopathy.        Right: No inguinal adenopathy present.        Left: No inguinal adenopathy present.   Neurological: She is alert and oriented to person, place, and time. She has normal reflexes. She displays normal reflexes. No cranial nerve deficit. She exhibits normal muscle tone. Coordination normal.   Skin: Skin is warm and dry. No rash noted. She is not diaphoretic. No erythema. No pallor.   Psychiatric: She has a normal mood and affect. Her behavior is normal. Judgment and thought content normal.   Nursing note and vitals reviewed.       Assessment        Diagnosis Plan   1. Vaginal bleeding     2. BV (bacterial vaginosis)     3. Dysuria  Urinalysis         Plan      1. Flagyl 500 mg twice a day and Diflucan today and in 4 days.  2. Check urinalysis.  3. Follow-up in 3 months.  Follow-up sooner as needed.            This document has been electronically signed by Avinash Montez MD on July 27, 2017 11:25 AM

## 2017-08-03 RX ORDER — PREGABALIN 150 MG/1
150 CAPSULE ORAL 2 TIMES DAILY
Qty: 180 CAPSULE | Refills: 0 | OUTPATIENT
Start: 2017-08-03 | End: 2017-10-06 | Stop reason: SDUPTHER

## 2017-10-03 ENCOUNTER — LAB (OUTPATIENT)
Dept: LAB | Facility: HOSPITAL | Age: 67
End: 2017-10-03

## 2017-10-03 DIAGNOSIS — N93.9 VAGINAL BLEEDING: ICD-10-CM

## 2017-10-03 DIAGNOSIS — E78.49 OTHER HYPERLIPIDEMIA: Chronic | ICD-10-CM

## 2017-10-03 DIAGNOSIS — I50.22 CHRONIC SYSTOLIC CONGESTIVE HEART FAILURE (HCC): ICD-10-CM

## 2017-10-03 DIAGNOSIS — I10 ESSENTIAL HYPERTENSION: ICD-10-CM

## 2017-10-03 DIAGNOSIS — E11.8 TYPE 2 DIABETES MELLITUS WITH COMPLICATION, WITHOUT LONG-TERM CURRENT USE OF INSULIN (HCC): ICD-10-CM

## 2017-10-03 DIAGNOSIS — E55.9 VITAMIN D DEFICIENCY: ICD-10-CM

## 2017-10-03 LAB
25(OH)D3 SERPL-MCNC: 31.3 NG/ML (ref 30–100)
ALBUMIN SERPL-MCNC: 4.4 G/DL (ref 3.4–4.8)
ALBUMIN UR-MCNC: 1.6 MG/L
ALBUMIN/GLOB SERPL: 1.3 G/DL (ref 1.1–1.8)
ALP SERPL-CCNC: 97 U/L (ref 38–126)
ALT SERPL W P-5'-P-CCNC: 39 U/L (ref 9–52)
ANION GAP SERPL CALCULATED.3IONS-SCNC: 14 MMOL/L (ref 5–15)
ARTICHOKE IGE QN: 84 MG/DL (ref 1–129)
AST SERPL-CCNC: 55 U/L (ref 14–36)
BACTERIA UR QL AUTO: ABNORMAL /HPF
BILIRUB SERPL-MCNC: 0.6 MG/DL (ref 0.2–1.3)
BILIRUB UR QL STRIP: NEGATIVE
BUN BLD-MCNC: 7 MG/DL (ref 7–21)
BUN/CREAT SERPL: 8.5 (ref 7–25)
CALCIUM SPEC-SCNC: 9.5 MG/DL (ref 8.4–10.2)
CHLORIDE SERPL-SCNC: 100 MMOL/L (ref 95–110)
CHOLEST SERPL-MCNC: 167 MG/DL (ref 0–199)
CLARITY UR: CLEAR
CO2 SERPL-SCNC: 25 MMOL/L (ref 22–31)
COLOR UR: YELLOW
CREAT BLD-MCNC: 0.82 MG/DL (ref 0.5–1)
GFR SERPL CREATININE-BSD FRML MDRD: 84 ML/MIN/1.73 (ref 45–104)
GLOBULIN UR ELPH-MCNC: 3.3 GM/DL (ref 2.3–3.5)
GLUCOSE BLD-MCNC: 66 MG/DL (ref 60–100)
GLUCOSE UR STRIP-MCNC: NEGATIVE MG/DL
HBA1C MFR BLD: 8.2 % (ref 4–5.6)
HDLC SERPL-MCNC: 35 MG/DL (ref 60–200)
HGB UR QL STRIP.AUTO: NEGATIVE
HYALINE CASTS UR QL AUTO: ABNORMAL /LPF
KETONES UR QL STRIP: NEGATIVE
LDLC/HDLC SERPL: 2.69 {RATIO} (ref 0–3.22)
LEUKOCYTE ESTERASE UR QL STRIP.AUTO: ABNORMAL
NITRITE UR QL STRIP: NEGATIVE
PH UR STRIP.AUTO: 6 [PH] (ref 5–9)
POTASSIUM BLD-SCNC: 3.7 MMOL/L (ref 3.5–5.1)
PROT SERPL-MCNC: 7.7 G/DL (ref 6.3–8.6)
PROT UR QL STRIP: NEGATIVE
RBC # UR: ABNORMAL /HPF
REF LAB TEST METHOD: ABNORMAL
SODIUM BLD-SCNC: 139 MMOL/L (ref 137–145)
SP GR UR STRIP: 1.01 (ref 1–1.03)
SQUAMOUS #/AREA URNS HPF: ABNORMAL /HPF
TRIGL SERPL-MCNC: 189 MG/DL (ref 20–199)
UROBILINOGEN UR QL STRIP: ABNORMAL
WBC UR QL AUTO: ABNORMAL /HPF

## 2017-10-03 PROCEDURE — 82306 VITAMIN D 25 HYDROXY: CPT | Performed by: GENERAL PRACTICE

## 2017-10-03 PROCEDURE — 82043 UR ALBUMIN QUANTITATIVE: CPT | Performed by: GENERAL PRACTICE

## 2017-10-03 PROCEDURE — 80053 COMPREHEN METABOLIC PANEL: CPT | Performed by: GENERAL PRACTICE

## 2017-10-03 PROCEDURE — 81001 URINALYSIS AUTO W/SCOPE: CPT | Performed by: GENERAL PRACTICE

## 2017-10-03 PROCEDURE — 36415 COLL VENOUS BLD VENIPUNCTURE: CPT

## 2017-10-03 PROCEDURE — 80061 LIPID PANEL: CPT | Performed by: GENERAL PRACTICE

## 2017-10-03 PROCEDURE — 83036 HEMOGLOBIN GLYCOSYLATED A1C: CPT | Performed by: GENERAL PRACTICE

## 2017-10-03 PROCEDURE — 87086 URINE CULTURE/COLONY COUNT: CPT | Performed by: GENERAL PRACTICE

## 2017-10-04 LAB — BACTERIA SPEC AEROBE CULT: NORMAL

## 2017-10-05 DIAGNOSIS — Z12.31 ENCOUNTER FOR SCREENING MAMMOGRAM FOR MALIGNANT NEOPLASM OF BREAST: Primary | ICD-10-CM

## 2017-10-06 ENCOUNTER — OFFICE VISIT (OUTPATIENT)
Dept: FAMILY MEDICINE CLINIC | Facility: CLINIC | Age: 67
End: 2017-10-06

## 2017-10-06 VITALS
WEIGHT: 215.5 LBS | HEART RATE: 93 BPM | DIASTOLIC BLOOD PRESSURE: 80 MMHG | BODY MASS INDEX: 38.18 KG/M2 | SYSTOLIC BLOOD PRESSURE: 130 MMHG | HEIGHT: 63 IN

## 2017-10-06 DIAGNOSIS — I10 ESSENTIAL HYPERTENSION: Chronic | ICD-10-CM

## 2017-10-06 DIAGNOSIS — Z23 NEED FOR VACCINATION: ICD-10-CM

## 2017-10-06 DIAGNOSIS — E78.49 OTHER HYPERLIPIDEMIA: Chronic | ICD-10-CM

## 2017-10-06 DIAGNOSIS — M51.36 DEGENERATIVE DISC DISEASE, LUMBAR: ICD-10-CM

## 2017-10-06 DIAGNOSIS — E11.8 TYPE 2 DIABETES MELLITUS WITH COMPLICATION, WITHOUT LONG-TERM CURRENT USE OF INSULIN (HCC): Primary | Chronic | ICD-10-CM

## 2017-10-06 PROCEDURE — 90662 IIV NO PRSV INCREASED AG IM: CPT | Performed by: GENERAL PRACTICE

## 2017-10-06 PROCEDURE — 99214 OFFICE O/P EST MOD 30 MIN: CPT | Performed by: GENERAL PRACTICE

## 2017-10-06 PROCEDURE — G0008 ADMIN INFLUENZA VIRUS VAC: HCPCS | Performed by: GENERAL PRACTICE

## 2017-10-06 RX ORDER — ATORVASTATIN CALCIUM 40 MG/1
40 TABLET, FILM COATED ORAL DAILY
Qty: 90 TABLET | Refills: 3 | Status: SHIPPED | OUTPATIENT
Start: 2017-10-06 | End: 2018-10-17 | Stop reason: SDUPTHER

## 2017-10-06 RX ORDER — POTASSIUM CHLORIDE 750 MG/1
10 CAPSULE, EXTENDED RELEASE ORAL 2 TIMES DAILY
Qty: 180 CAPSULE | Refills: 3 | Status: SHIPPED | OUTPATIENT
Start: 2017-10-06 | End: 2018-09-28 | Stop reason: SDUPTHER

## 2017-10-06 RX ORDER — LISINOPRIL 10 MG/1
10 TABLET ORAL DAILY
Qty: 90 TABLET | Refills: 3 | Status: SHIPPED | OUTPATIENT
Start: 2017-10-06 | End: 2020-04-10 | Stop reason: SDUPTHER

## 2017-10-06 RX ORDER — CARVEDILOL 25 MG/1
25 TABLET ORAL 2 TIMES DAILY WITH MEALS
Qty: 180 TABLET | Refills: 3 | Status: SHIPPED | OUTPATIENT
Start: 2017-10-06 | End: 2018-09-28 | Stop reason: SDUPTHER

## 2017-10-06 RX ORDER — PREGABALIN 150 MG/1
150 CAPSULE ORAL 2 TIMES DAILY
Qty: 180 CAPSULE | Refills: 0 | Status: SHIPPED | OUTPATIENT
Start: 2017-10-06 | End: 2017-10-06 | Stop reason: SDUPTHER

## 2017-10-06 RX ORDER — FUROSEMIDE 80 MG
80 TABLET ORAL DAILY
Qty: 90 TABLET | Refills: 3 | Status: SHIPPED | OUTPATIENT
Start: 2017-10-06 | End: 2019-01-04 | Stop reason: SDUPTHER

## 2017-10-06 RX ORDER — PREGABALIN 150 MG/1
150 CAPSULE ORAL 2 TIMES DAILY
Qty: 180 CAPSULE | Refills: 0 | Status: SHIPPED | OUTPATIENT
Start: 2017-10-06 | End: 2018-12-03 | Stop reason: SDUPTHER

## 2017-10-06 RX ORDER — GLIMEPIRIDE 4 MG/1
4 TABLET ORAL 2 TIMES DAILY
Qty: 180 TABLET | Refills: 3 | Status: SHIPPED | OUTPATIENT
Start: 2017-10-06 | End: 2018-09-28 | Stop reason: SDUPTHER

## 2017-10-06 NOTE — PROGRESS NOTES
Subjective   Lexi Wade is a 67 y.o. female.     Chief Complaint   Patient presents with   • Annual Exam     labs smamo   • Diabetes     For review and evaluation of management of chronic medical problems. Labs reviewed. Due for mammogram.    Diabetes   She presents for her follow-up diabetic visit. She has type 2 diabetes mellitus. Her disease course has been stable. Pertinent negatives for hypoglycemia include no dizziness, headaches or nervousness/anxiousness. There are no diabetic associated symptoms. Pertinent negatives for diabetes include no chest pain, no fatigue and no weakness. Diabetic complications include heart disease. Risk factors for coronary artery disease include dyslipidemia, diabetes mellitus and hypertension. Current diabetic treatment includes oral agent (dual therapy). She is compliant with treatment all of the time. She is following a generally healthy diet. When asked about meal planning, she reported none. She rarely participates in exercise. There is no change in her home blood glucose trend. An ACE inhibitor/angiotensin II receptor blocker is being taken. Eye exam is current.   Hypertension   The current episode started more than 1 year ago. The problem is unchanged. The problem is controlled. Pertinent negatives include no chest pain, headaches, neck pain, palpitations or shortness of breath. There are no associated agents to hypertension. Risk factors for coronary artery disease include diabetes mellitus, dyslipidemia and obesity. Past treatments include beta blockers, ACE inhibitors and diuretics. The current treatment provides significant improvement. There are no compliance problems.    Hyperlipidemia   This is a chronic problem. The current episode started more than 1 year ago. The problem is controlled. Recent lipid tests were reviewed and are normal. Exacerbating diseases include diabetes. There are no known factors aggravating her hyperlipidemia. Pertinent negatives include  no chest pain, myalgias or shortness of breath. Current antihyperlipidemic treatment includes statins. The current treatment provides significant improvement of lipids. There are no compliance problems.  Risk factors for coronary artery disease include diabetes mellitus, dyslipidemia, post-menopausal and hypertension.      The following portions of the patient's history were reviewed and updated as appropriate: allergies, current medications, past family and social history and problem list.    Outpatient Medications Prior to Visit   Medication Sig Dispense Refill   • aspirin 81 MG tablet Take 81 mg by mouth every night.     • B-D UF III MINI PEN NEEDLES 31G X 5 MM misc USE DAILY WITH VICTOZA 100 each 3   • brimonidine (ALPHAGAN) 0.2 % ophthalmic solution Administer 1 drop to both eyes 2 (Two) Times a Day. 5 mL 3   • digoxin (LANOXIN) 125 MCG tablet TAKE 1 TABLET BY MOUTH DAILY 90 tablet 3   • esomeprazole (nexIUM) 40 MG capsule Take 40 mg by mouth Daily.     • fluconazole (DIFLUCAN) 150 MG tablet Take one po today and take one po in 4 days. 2 tablet 11   • latanoprost (XALATAN) 0.005 % ophthalmic solution Administer 1 drop to both eyes Every Night. 90 day supply. 2.5 mL 5   • omega-3 acid ethyl esters (LOVAZA) 1 G capsule Take 1 g by mouth 2 (Two) Times a Day. 2 capsules bid     • vitamin D (ERGOCALCIFEROL) 92926 UNITS capsule capsule Take 1 capsule by mouth 1 (One) Time Per Week. 12 capsule 3   • ZETIA 10 MG tablet TAKE 1 TABELT BY MOUTH DAILY 90 tablet 2   • atorvastatin (LIPITOR) 40 MG tablet Take 40 mg by mouth Daily.     • carvedilol (COREG) 25 MG tablet Take 25 mg by mouth 2 (Two) Times a Day With Meals.     • furosemide (LASIX) 80 MG tablet Take 80 mg by mouth Daily.     • glimepiride (AMARYL) 4 MG tablet Take 4 mg by mouth 2 (Two) Times a Day.     • Liraglutide (VICTOZA) 18 MG/3ML solution pen-injector Inject 12 mg under the skin Daily.     • lisinopril (PRINIVIL,ZESTRIL) 10 MG tablet Take 10 mg by mouth  "Daily.     • magnesium oxide (MAGOX) 400 (241.3 MG) MG tablet tablet Take 400 mg by mouth daily.     • metFORMIN (GLUCOPHAGE) 1000 MG tablet Take 1,000 mg by mouth 2 (Two) Times a Day With Meals.     • potassium chloride (MICRO-K) 10 MEQ CR capsule TAKE 1 CAPSULE BY MOUTH TWICE DAILY 180 capsule 1   • pregabalin (LYRICA) 150 MG capsule Take 1 capsule by mouth 2 (Two) Times a Day. 180 capsule 0     No facility-administered medications prior to visit.        Review of Systems   Constitutional: Negative.  Negative for chills, fatigue, fever and unexpected weight change.   HENT: Negative.  Negative for congestion, ear pain, hearing loss, nosebleeds, rhinorrhea, sneezing, sore throat and tinnitus.    Eyes: Negative.  Negative for discharge.   Respiratory: Negative.  Negative for cough, shortness of breath and wheezing.    Cardiovascular: Negative.  Negative for chest pain and palpitations.   Gastrointestinal: Negative.  Negative for abdominal pain, constipation, diarrhea, nausea and vomiting.   Endocrine: Negative.    Genitourinary: Negative.  Negative for dysuria, frequency and urgency.   Musculoskeletal: Negative.  Negative for arthralgias, back pain, joint swelling, myalgias and neck pain.   Skin: Negative.  Negative for rash.   Allergic/Immunologic: Negative.    Neurological: Negative.  Negative for dizziness, weakness, numbness and headaches.   Hematological: Negative.  Negative for adenopathy.   Psychiatric/Behavioral: Negative.  Negative for dysphoric mood and sleep disturbance. The patient is not nervous/anxious.        Objective     Visit Vitals   • /80   • Pulse 93   • Ht 63\" (160 cm)   • Wt 215 lb 8 oz (97.8 kg)   • BMI 38.17 kg/m2     Physical Exam   Constitutional: She is oriented to person, place, and time. She appears well-developed and well-nourished. No distress.   HENT:   Head: Normocephalic and atraumatic.   Nose: Nose normal.   Mouth/Throat: Oropharynx is clear and moist.   Eyes: Conjunctivae " and EOM are normal. Pupils are equal, round, and reactive to light. Right eye exhibits no discharge. Left eye exhibits no discharge.   Neck: No tracheal deviation present. No thyromegaly present.   Cardiovascular: Normal rate, regular rhythm, normal heart sounds and intact distal pulses.    No murmur heard.  Pulmonary/Chest: Effort normal and breath sounds normal. No respiratory distress. She has no wheezes. She has no rales. She exhibits no tenderness. Right breast exhibits no inverted nipple, no mass, no nipple discharge, no skin change and no tenderness. Left breast exhibits no inverted nipple, no mass, no nipple discharge, no skin change and no tenderness.   Abdominal: Soft. Bowel sounds are normal. She exhibits no distension and no mass. There is no tenderness. No hernia.   Musculoskeletal: Normal range of motion. She exhibits no deformity.    Lexi had a diabetic foot exam performed today.   During the foot exam she had a monofilament test performed (normal).    Vascular Status -  Her exam exhibits right foot vasculature normal. Her exam exhibits no right foot edema. Her exam exhibits left foot vasculature normal. Her exam exhibits no left foot edema.   Skin Integrity  -  Her right foot skin is not intact (callus base of great toe).    Left foot skin intact:  callus base of great toe. .  Lymphadenopathy:     She has no cervical adenopathy.   Neurological: She is alert and oriented to person, place, and time. She has normal reflexes.   Skin: Skin is warm and dry.   Psychiatric: She has a normal mood and affect. Her behavior is normal. Judgment and thought content normal.   Nursing note and vitals reviewed.    Results for orders placed or performed in visit on 10/03/17   Urine Culture - Urine, Urine, Clean Catch   Result Value Ref Range    Urine Culture No growth at 24 hours    Comprehensive Metabolic Panel   Result Value Ref Range    Glucose 66 60 - 100 mg/dL    BUN 7 7 - 21 mg/dL    Creatinine 0.82 0.50 -  1.00 mg/dL    Sodium 139 137 - 145 mmol/L    Potassium 3.7 3.5 - 5.1 mmol/L    Chloride 100 95 - 110 mmol/L    CO2 25.0 22.0 - 31.0 mmol/L    Calcium 9.5 8.4 - 10.2 mg/dL    Total Protein 7.7 6.3 - 8.6 g/dL    Albumin 4.40 3.40 - 4.80 g/dL    ALT (SGPT) 39 9 - 52 U/L    AST (SGOT) 55 (H) 14 - 36 U/L    Alkaline Phosphatase 97 38 - 126 U/L    Total Bilirubin 0.6 0.2 - 1.3 mg/dL    eGFR   Amer 84 45 - 104 mL/min/1.73    Globulin 3.3 2.3 - 3.5 gm/dL    A/G Ratio 1.3 1.1 - 1.8 g/dL    BUN/Creatinine Ratio 8.5 7.0 - 25.0    Anion Gap 14.0 5.0 - 15.0 mmol/L   Hemoglobin A1c   Result Value Ref Range    Hemoglobin A1C 8.2 (H) 4 - 5.6 %   Lipid Panel   Result Value Ref Range    Total Cholesterol 167 0 - 199 mg/dL    Triglycerides 189 20 - 199 mg/dL    HDL Cholesterol 35 (L) 60 - 200 mg/dL    LDL Cholesterol  84 1 - 129 mg/dL    LDL/HDL Ratio 2.69 0.00 - 3.22   Vitamin D 25 Hydroxy   Result Value Ref Range    25 Hydroxy, Vitamin D 31.3 30.0 - 100.0 ng/ml   Urinalysis With / Culture If Indicated   Result Value Ref Range    Color, UA Yellow Yellow, Straw, Dark Yellow, Lucie    Appearance, UA Clear Clear    pH, UA 6.0 5.0 - 9.0    Specific Gravity, UA 1.009 1.003 - 1.030    Glucose, UA Negative Negative    Ketones, UA Negative Negative    Bilirubin, UA Negative Negative    Blood, UA Negative Negative    Protein, UA Negative Negative    Leuk Esterase, UA Small (1+) (A) Negative    Nitrite, UA Negative Negative    Urobilinogen, UA 0.2 E.U./dL 0.2 - 1.0 E.U./dL   MicroAlbumin, Urine, Random   Result Value Ref Range    Microalbumin, Urine 1.6 mg/L   Urinalysis, Microscopic Only - Urine, Clean Catch   Result Value Ref Range    RBC, UA 0-2 (A) None Seen /HPF    WBC, UA 6-12 (A) None Seen, 0-2, 3-5 /HPF    Bacteria, UA None Seen None Seen /HPF    Squamous Epithelial Cells, UA None Seen None Seen, 0-2 /HPF    Hyaline Casts, UA 0-2 None Seen /LPF    Methodology Automated Microscopy       Assessment/Plan   Problem List Items  Addressed This Visit        Cardiovascular and Mediastinum    Essential hypertension (Chronic)    Relevant Medications    carvedilol (COREG) 25 MG tablet    lisinopril (PRINIVIL,ZESTRIL) 10 MG tablet    furosemide (LASIX) 80 MG tablet    Hyperlipidemia (Chronic)    Relevant Medications    atorvastatin (LIPITOR) 40 MG tablet       Endocrine    Type 2 diabetes mellitus - Primary (Chronic)    Relevant Medications    metFORMIN (GLUCOPHAGE) 1000 MG tablet    Liraglutide (VICTOZA) 18 MG/3ML solution pen-injector injection    glimepiride (AMARYL) 4 MG tablet      Other Visit Diagnoses     Degenerative disc disease, lumbar        Relevant Orders    Ambulatory Referral to Physical Therapy Evaluate and treat    Need for vaccination        Relevant Orders    Flu Vaccine High Dose PF 65YR+ (Completed)        Recommended weight loss and exercise. Tighten up on diet.     New Medications Ordered This Visit   Medications   • pregabalin (LYRICA) 150 MG capsule     Sig: Take 1 capsule by mouth 2 (Two) Times a Day.     Dispense:  180 capsule     Refill:  0   • atorvastatin (LIPITOR) 40 MG tablet     Sig: Take 1 tablet by mouth Daily.     Dispense:  90 tablet     Refill:  3   • carvedilol (COREG) 25 MG tablet     Sig: Take 1 tablet by mouth 2 (Two) Times a Day With Meals.     Dispense:  180 tablet     Refill:  3   • potassium chloride (MICRO-K) 10 MEQ CR capsule     Sig: Take 1 capsule by mouth 2 (Two) Times a Day.     Dispense:  180 capsule     Refill:  3   • metFORMIN (GLUCOPHAGE) 1000 MG tablet     Sig: Take 1 tablet by mouth 2 (Two) Times a Day With Meals.     Dispense:  180 tablet     Refill:  3   • magnesium oxide (MAGOX) 400 (241.3 Mg) MG tablet tablet     Sig: Take 1 tablet by mouth Daily.     Dispense:  90 each     Refill:  3   • lisinopril (PRINIVIL,ZESTRIL) 10 MG tablet     Sig: Take 1 tablet by mouth Daily.     Dispense:  90 tablet     Refill:  3   • Liraglutide (VICTOZA) 18 MG/3ML solution pen-injector injection     Sig:  Inject 1.2 mg under the skin Daily.     Dispense:  6 pen     Refill:  3   • glimepiride (AMARYL) 4 MG tablet     Sig: Take 1 tablet by mouth 2 (Two) Times a Day.     Dispense:  180 tablet     Refill:  3   • furosemide (LASIX) 80 MG tablet     Sig: Take 1 tablet by mouth Daily.     Dispense:  90 tablet     Refill:  3     Return in about 3 months (around 1/6/2018) for Recheck.

## 2017-10-26 ENCOUNTER — OFFICE VISIT (OUTPATIENT)
Dept: OBSTETRICS AND GYNECOLOGY | Facility: CLINIC | Age: 67
End: 2017-10-26

## 2017-10-26 VITALS — DIASTOLIC BLOOD PRESSURE: 70 MMHG | BODY MASS INDEX: 38.9 KG/M2 | WEIGHT: 219.6 LBS | SYSTOLIC BLOOD PRESSURE: 126 MMHG

## 2017-10-26 DIAGNOSIS — N95.1 MENOPAUSAL SYNDROME: Chronic | ICD-10-CM

## 2017-10-26 DIAGNOSIS — N93.9 VAGINAL BLEEDING: Primary | Chronic | ICD-10-CM

## 2017-10-26 PROCEDURE — 99213 OFFICE O/P EST LOW 20 MIN: CPT | Performed by: OBSTETRICS & GYNECOLOGY

## 2017-10-26 NOTE — PROGRESS NOTES
Lexi Wade is a 67 y.o. y/o female     Chief Complaint:  Resolution of vaginal bleeding and vaginal discharge    HPI: 66-year-old G0 who has undergone a total abdominal hysterectomy and bilateral salpingo-oophorectomy for benign disease.    She has had some episodes of vaginal spotting over the past year.  She has not noticed any rectal bleeding or urinary bleeding.  She occasionally has some discomfort with urination, but is not having any urinary complaints at this time.  She is not sexually active.    Since being treated for bacterial vaginosis, she has not had any vaginal bleeding.  She does not have any GYN complaints at this time.    Review of Systems   Constitutional: Negative for activity change, appetite change, chills, diaphoresis, fatigue, fever and unexpected weight change.   Gastrointestinal: Negative for abdominal pain, constipation, diarrhea and nausea.   Genitourinary: Negative for difficulty urinating, dyspareunia, dysuria, pelvic pain, urgency, vaginal bleeding, vaginal discharge and vaginal pain.   Neurological: Negative for headaches.   Psychiatric/Behavioral: Negative for dysphoric mood. The patient is not nervous/anxious.    All other systems reviewed and are negative.     Breast ROS: negative    The following portions of the patient's history were reviewed and updated as appropriate: allergies, current medications, past family history, past medical history, past social history, past surgical history and problem list.    Allergies   Allergen Reactions   • Aldactone [Spironolactone]    • Nsaids           Current Outpatient Prescriptions:   •  aspirin 81 MG tablet, Take 81 mg by mouth every night., Disp: , Rfl:   •  atorvastatin (LIPITOR) 40 MG tablet, Take 1 tablet by mouth Daily., Disp: 90 tablet, Rfl: 3  •  B-D UF III MINI PEN NEEDLES 31G X 5 MM misc, USE DAILY WITH VICTOZA, Disp: 100 each, Rfl: 3  •  brimonidine (ALPHAGAN) 0.2 % ophthalmic solution, Administer 1 drop to both eyes 2  (Two) Times a Day., Disp: 5 mL, Rfl: 3  •  carvedilol (COREG) 25 MG tablet, Take 1 tablet by mouth 2 (Two) Times a Day With Meals., Disp: 180 tablet, Rfl: 3  •  digoxin (LANOXIN) 125 MCG tablet, TAKE 1 TABLET BY MOUTH DAILY, Disp: 90 tablet, Rfl: 3  •  esomeprazole (nexIUM) 40 MG capsule, Take 40 mg by mouth Daily., Disp: , Rfl:   •  fluconazole (DIFLUCAN) 150 MG tablet, Take one po today and take one po in 4 days., Disp: 2 tablet, Rfl: 11  •  furosemide (LASIX) 80 MG tablet, Take 1 tablet by mouth Daily., Disp: 90 tablet, Rfl: 3  •  glimepiride (AMARYL) 4 MG tablet, Take 1 tablet by mouth 2 (Two) Times a Day., Disp: 180 tablet, Rfl: 3  •  latanoprost (XALATAN) 0.005 % ophthalmic solution, Administer 1 drop to both eyes Every Night. 90 day supply., Disp: 2.5 mL, Rfl: 5  •  Liraglutide (VICTOZA) 18 MG/3ML solution pen-injector injection, Inject 1.2 mg under the skin Daily., Disp: 6 pen, Rfl: 3  •  lisinopril (PRINIVIL,ZESTRIL) 10 MG tablet, Take 1 tablet by mouth Daily., Disp: 90 tablet, Rfl: 3  •  magnesium oxide (MAGOX) 400 (241.3 Mg) MG tablet tablet, Take 1 tablet by mouth Daily., Disp: 90 each, Rfl: 3  •  metFORMIN (GLUCOPHAGE) 1000 MG tablet, Take 1 tablet by mouth 2 (Two) Times a Day With Meals., Disp: 180 tablet, Rfl: 3  •  omega-3 acid ethyl esters (LOVAZA) 1 G capsule, Take 1 g by mouth 2 (Two) Times a Day. 2 capsules bid, Disp: , Rfl:   •  potassium chloride (MICRO-K) 10 MEQ CR capsule, Take 1 capsule by mouth 2 (Two) Times a Day., Disp: 180 capsule, Rfl: 3  •  pregabalin (LYRICA) 150 MG capsule, Take 1 capsule by mouth 2 (Two) Times a Day., Disp: 180 capsule, Rfl: 0  •  vitamin D (ERGOCALCIFEROL) 72079 UNITS capsule capsule, Take 1 capsule by mouth 1 (One) Time Per Week., Disp: 12 capsule, Rfl: 3  •  ZETIA 10 MG tablet, TAKE 1 TABELT BY MOUTH DAILY, Disp: 90 tablet, Rfl: 2     The patient has a family history of   Family History   Problem Relation Age of Onset   • Heart disease Other    • Hypertension  Other    • Alzheimer's disease Other    • Diabetes Sister      x 2   • Hypertension Sister    • Hyperlipidemia Sister    • Bone cancer Brother         Past Medical History:   Diagnosis Date   • Artificial lens present     IN  POSITION - BOTH   • Borderline glaucoma    • Cardiomyopathy    • Congestive heart failure    • Diabetes mellitus     IMPROVING   • Essential hypertension    • GERD (gastroesophageal reflux disease)    • History of bone density study 09/25/2015    DXA BONE DENSITY AXIAL 13149 [facility] (Screening for osteoporosis)    • History of echocardiogram 03/25/2013    Echocadiogram W/ color flow 89700 (Mild left atrial enlargement wiht mild LV enlargement w/mild concentric LVH. Global hypokinesis. EF 30%. Mitral and Av thickened. Aortic sclerosis. Severe mitral regurg. mild tricuspid regurg. Mild right atrial enlargement noted. Pacemaker wire noted)   03/25/2013 [facility]AC[facility] WILLIAM    • History of mammography, diagnostic 09/17/2014    [place] [address] [facility] (Microcalcifications of the breast) , Reason: s/p mammotome bx right side for benign disease   • History of mammography, diagnostic 07/24/2014    MAMMOGRAPHY [place]NOSTIC UNILAT RT [address] [facility] (Abnormal mammogram, unspecified)    • History of mammography, screening 07/03/2012    BENIGN   • Hypercholesterolemia    • Low back pain    • Menopausal syndrome 10/26/2017   • Microcalcifications of the breast     BENIGN CHANGES   • Need for prophylactic vaccination against Streptococcus pneumoniae (pneumococcus)    • Neurologic disorder associated with diabetes mellitus    • Obesity    • Type 2 diabetes mellitus     NO BDR   • Upper respiratory infection    • Vaginal bleeding 7/27/2017   • Vitamin D deficiency         OB History     No data available           Social History     Social History   • Marital status: Single     Spouse name: N/A   • Number of children: N/A   • Years of education: N/A     Occupational History   • Not on file.      Social History Main Topics   • Smoking status: Never Smoker   • Smokeless tobacco: Never Used   • Alcohol use No   • Drug use: No   • Sexual activity: Defer     Other Topics Concern   • Not on file     Social History Narrative        Past Surgical History:   Procedure Laterality Date   • BREAST BIOPSY  07/28/2014    Stereotactic breast biopsy (Sterotactic mammotome biopsy and clip placement. Abnormal right-sided mammogram demonstrating microcalcifications.)   • CARDIAC CATHETERIZATION  10/28/2010    Cardiac cath 71458 (Noevidence of any obstructive epicardial coronary artery disease noted. Severe left ventricular dysfunction with an EF of 25%)   • CARDIAC CATHETERIZATION  01/31/2002    Cardiac cath 00918 (No significant coronary atherosclerosis. Probably mild left ventricular systolic dysfunction.)   • CARDIAC PACEMAKER PLACEMENT     • CATARACT EXTRACTION  01/28/2014    Remove cataract, insert lens (Right eye)   • CATARACT EXTRACTION  11/22/2011    Remove cataract, insert lens (Left eye)   • CATARACT EXTRACTION     • CENTRAL VENOUS LINE INSERTION  05/20/2016    Central line insertion (Successful placement of right upper extremity midline.)   • COLONOSCOPY  12/14/2004    Single small non-bleeding polyp in the rectum. The polyp was removed by hot biopsy polypectomy   • COLONOSCOPY N/A 6/14/2017    Procedure: COLONOSCOPY;  Surgeon: Orville Farias MD;  Location: [facility] ENDOSCOPY;  Service:    • MAMMO BILATERAL  09/17/2014    MAMMOGRAPHY DIAGNOSTIC UNILAT RT [address] [facility] (Microcalcifications of the breast) , Reason: s/p mammotome bx right side for benign disease   • MAMMO BILATERAL  07/24/2014    MAMMOGRAPHY DIAGNOSTIC UNILAT RT [address] [facility] (Abnormal mammogram, unspecified)    • OTHER SURGICAL HISTORY  11/02/2012    DEBRIDE NAIL 6 OR MORE 14051 (Onychomycosis)    • OTHER SURGICAL HISTORY  04/27/2016    EXTENDED VISUAL FIELDS STUDY 59449 (Open angle with borderline findings, low risk, unspecified eye)    •  OTHER SURGICAL HISTORY      EXTENDED VISUAL FIELDS STUDY 92765 (Borderline glaucoma)  (2): 10/02/2014, 04/17/2013   • OTHER SURGICAL HISTORY      OCT DISC NFL 47175 (Borderline glaucoma)  (2): 02/02/2015, 08/21/2013   • PAP SMEAR  06/10/2010    NEGATIVE   • TOTAL ABDOMINAL HYSTERECTOMY WITH SALPINGO OOPHORECTOMY  09/16/2013    Lysis of adhesions.        Patient Active Problem List   Diagnosis   • Pseudophakia   • Primary open angle glaucoma   • Cardiomyopathy   • Congestive heart failure   • Essential hypertension   • GERD (gastroesophageal reflux disease)   • Type 2 diabetes mellitus   • Vitamin D deficiency   • Borderline glaucoma   • Neurologic disorder associated with diabetes mellitus   • Hyperlipidemia   • Menopausal syndrome        Documented Vitals    10/26/17 1014   BP: 126/70   Weight: 219 lb 9.6 oz (99.6 kg)   PainSc: 0-No pain       Physical Exam   Constitutional: She is oriented to person, place, and time. No distress.   Obese black female weighing 219.6 pounds with BMI 38.9   HENT:   Head: Normocephalic and atraumatic.   Eyes: Conjunctivae and EOM are normal. Pupils are equal, round, and reactive to light.   Neck: Normal range of motion. Neck supple. No JVD present. No tracheal deviation present. No thyromegaly present.   Cardiovascular: Normal rate, regular rhythm, normal heart sounds and intact distal pulses.  Exam reveals no gallop and no friction rub.    No murmur heard.  Pulmonary/Chest: Effort normal and breath sounds normal. No stridor. No respiratory distress. She has no wheezes. She has no rales. She exhibits no tenderness.   Abdominal: Soft. Bowel sounds are normal. She exhibits no distension and no mass. There is no tenderness. There is no rebound and no guarding. No hernia.   Musculoskeletal: Normal range of motion. She exhibits no edema, tenderness or deformity.   Lymphadenopathy:     She has no cervical adenopathy.   Neurological: She is alert and oriented to person, place, and time. She  has normal reflexes. She displays normal reflexes. No cranial nerve deficit. She exhibits normal muscle tone. Coordination normal.   Skin: Skin is warm and dry. No rash noted. She is not diaphoretic. No erythema. No pallor.   Psychiatric: She has a normal mood and affect. Her behavior is normal. Judgment and thought content normal.   Nursing note and vitals reviewed.       Assessment        Diagnosis Plan   1. Vaginal bleeding     2. Menopausal syndrome           Plan      1. Encouraged in diet and exercise.  2. Handouts on depression, hot flashes, exercise, and vitamin use.   3. Follow-up in one year.  Follow-up sooner as needed.  4. Note to Dr. Plata            This document has been electronically signed by Avinash Montez MD on October 26, 2017 10:44 AM

## 2017-12-12 ENCOUNTER — APPOINTMENT (OUTPATIENT)
Dept: CT IMAGING | Facility: HOSPITAL | Age: 67
End: 2017-12-12

## 2017-12-12 ENCOUNTER — HOSPITAL ENCOUNTER (INPATIENT)
Facility: HOSPITAL | Age: 67
LOS: 4 days | Discharge: HOME HEALTH | End: 2017-12-16
Attending: EMERGENCY MEDICINE | Admitting: FAMILY MEDICINE

## 2017-12-12 ENCOUNTER — APPOINTMENT (OUTPATIENT)
Dept: GENERAL RADIOLOGY | Facility: HOSPITAL | Age: 67
End: 2017-12-12

## 2017-12-12 DIAGNOSIS — I42.9 CARDIOMYOPATHY, UNSPECIFIED TYPE (HCC): Chronic | ICD-10-CM

## 2017-12-12 DIAGNOSIS — E87.70 HYPERVOLEMIA, UNSPECIFIED HYPERVOLEMIA TYPE: ICD-10-CM

## 2017-12-12 DIAGNOSIS — I34.0 NON-RHEUMATIC MITRAL REGURGITATION: ICD-10-CM

## 2017-12-12 DIAGNOSIS — R06.09 DOE (DYSPNEA ON EXERTION): Primary | ICD-10-CM

## 2017-12-12 DIAGNOSIS — Z78.9 IMPAIRED MOBILITY AND ACTIVITIES OF DAILY LIVING: ICD-10-CM

## 2017-12-12 DIAGNOSIS — I42.8 NON-ISCHEMIC CARDIOMYOPATHY (HCC): ICD-10-CM

## 2017-12-12 DIAGNOSIS — Z74.09 IMPAIRED MOBILITY AND ACTIVITIES OF DAILY LIVING: ICD-10-CM

## 2017-12-12 DIAGNOSIS — I36.1 NON-RHEUMATIC TRICUSPID VALVE INSUFFICIENCY: ICD-10-CM

## 2017-12-12 DIAGNOSIS — I50.22 CHRONIC SYSTOLIC CONGESTIVE HEART FAILURE (HCC): ICD-10-CM

## 2017-12-12 DIAGNOSIS — Z74.09 IMPAIRED FUNCTIONAL MOBILITY AND ENDURANCE: ICD-10-CM

## 2017-12-12 DIAGNOSIS — D72.829 LEUKOCYTOSIS, UNSPECIFIED TYPE: ICD-10-CM

## 2017-12-12 DIAGNOSIS — R06.00 DYSPNEA, UNSPECIFIED TYPE: ICD-10-CM

## 2017-12-12 LAB
ALBUMIN SERPL-MCNC: 4.2 G/DL (ref 3.4–4.8)
ALBUMIN/GLOB SERPL: 1.2 G/DL (ref 1.1–1.8)
ALP SERPL-CCNC: 97 U/L (ref 38–126)
ALT SERPL W P-5'-P-CCNC: 41 U/L (ref 9–52)
ANION GAP SERPL CALCULATED.3IONS-SCNC: 13 MMOL/L (ref 5–15)
APTT PPP: 32.6 SECONDS (ref 20–40.3)
ARTERIAL PATENCY WRIST A: ABNORMAL
AST SERPL-CCNC: 38 U/L (ref 14–36)
ATMOSPHERIC PRESS: ABNORMAL MMHG
BASE EXCESS BLDA CALC-SCNC: -0.9 MMOL/L (ref -2.4–2.4)
BASOPHILS # BLD AUTO: 0.04 10*3/MM3 (ref 0–0.2)
BASOPHILS NFR BLD AUTO: 0.2 % (ref 0–2)
BDY SITE: ABNORMAL
BILIRUB SERPL-MCNC: 0.8 MG/DL (ref 0.2–1.3)
BILIRUB UR QL STRIP: NEGATIVE
BUN BLD-MCNC: 7 MG/DL (ref 7–21)
BUN/CREAT SERPL: 8.1 (ref 7–25)
CA-I BLD-MCNC: 4.7 MG/DL (ref 4.5–4.9)
CALCIUM SPEC-SCNC: 9.8 MG/DL (ref 8.4–10.2)
CHLORIDE SERPL-SCNC: 100 MMOL/L (ref 95–110)
CLARITY UR: CLEAR
CO2 BLDA-SCNC: 22.5 MMOL/L (ref 23–27)
CO2 SERPL-SCNC: 25 MMOL/L (ref 22–31)
COLOR UR: YELLOW
CREAT BLD-MCNC: 0.86 MG/DL (ref 0.5–1)
CRP SERPL-MCNC: 5.6 MG/DL (ref 0–1)
D-LACTATE SERPL-SCNC: 1.8 MMOL/L (ref 0.5–2)
DEPRECATED RDW RBC AUTO: 45 FL (ref 36.4–46.3)
DIGOXIN SERPL-MCNC: 0.5 NG/ML (ref 0.8–2)
EOSINOPHIL # BLD AUTO: 0.12 10*3/MM3 (ref 0–0.7)
EOSINOPHIL NFR BLD AUTO: 0.7 % (ref 0–7)
ERYTHROCYTE [DISTWIDTH] IN BLOOD BY AUTOMATED COUNT: 14.8 % (ref 11.5–14.5)
GFR SERPL CREATININE-BSD FRML MDRD: 79 ML/MIN/1.73 (ref 45–104)
GLOBULIN UR ELPH-MCNC: 3.4 GM/DL (ref 2.3–3.5)
GLUCOSE BLD-MCNC: 159 MG/DL (ref 60–100)
GLUCOSE BLDA-MCNC: 154 MMOL/L
GLUCOSE BLDC GLUCOMTR-MCNC: 223 MG/DL (ref 70–130)
GLUCOSE UR STRIP-MCNC: NEGATIVE MG/DL
HCO3 BLDA-SCNC: 21.6 MMOL/L (ref 22–26)
HCT VFR BLD AUTO: 33.4 % (ref 35–45)
HCT VFR BLD CALC: 34 % (ref 38–47)
HGB BLD-MCNC: 11 G/DL (ref 12–15.5)
HGB BLDA-MCNC: 11.5 G/DL (ref 12–16)
HGB UR QL STRIP.AUTO: NEGATIVE
IMM GRANULOCYTES # BLD: 0.07 10*3/MM3 (ref 0–0.02)
IMM GRANULOCYTES NFR BLD: 0.4 % (ref 0–0.5)
INR PPP: 1.03 (ref 0.8–1.2)
KETONES UR QL STRIP: NEGATIVE
LEUKOCYTE ESTERASE UR QL STRIP.AUTO: NEGATIVE
LYMPHOCYTES # BLD AUTO: 3.74 10*3/MM3 (ref 0.6–4.2)
LYMPHOCYTES NFR BLD AUTO: 23.4 % (ref 10–50)
MCH RBC QN AUTO: 27.3 PG (ref 26.5–34)
MCHC RBC AUTO-ENTMCNC: 32.9 G/DL (ref 31.4–36)
MCV RBC AUTO: 82.9 FL (ref 80–98)
MODALITY: ABNORMAL
MONOCYTES # BLD AUTO: 1.57 10*3/MM3 (ref 0–0.9)
MONOCYTES NFR BLD AUTO: 9.8 % (ref 0–12)
NEUTROPHILS # BLD AUTO: 10.47 10*3/MM3 (ref 2–8.6)
NEUTROPHILS NFR BLD AUTO: 65.5 % (ref 37–80)
NITRITE UR QL STRIP: NEGATIVE
NT-PROBNP SERPL-MCNC: 861 PG/ML (ref 0–900)
PCO2 BLDA: 28.9 MM HG (ref 35–45)
PH BLDA: 7.49 PH UNITS (ref 7.35–7.45)
PH UR STRIP.AUTO: 7 [PH] (ref 5–9)
PLATELET # BLD AUTO: 312 10*3/MM3 (ref 150–450)
PMV BLD AUTO: 9.5 FL (ref 8–12)
PO2 BLDA: 69 MM HG (ref 80–105)
POTASSIUM BLD-SCNC: 3 MMOL/L (ref 3.5–5.1)
POTASSIUM BLDA-SCNC: 3.05 MMOL/L (ref 3.6–4.9)
PROT SERPL-MCNC: 7.6 G/DL (ref 6.3–8.6)
PROT UR QL STRIP: NEGATIVE
PROTHROMBIN TIME: 13.4 SECONDS (ref 11.1–15.3)
RBC # BLD AUTO: 4.03 10*6/MM3 (ref 3.77–5.16)
SAO2 % BLDCOA: 93.6 % (ref 94–100)
SODIUM BLD-SCNC: 138 MMOL/L (ref 137–145)
SODIUM BLDA-SCNC: 138.4 MMOL/L (ref 138–146)
SP GR UR STRIP: 1.01 (ref 1–1.03)
TROPONIN I SERPL-MCNC: 0.02 NG/ML
TROPONIN I SERPL-MCNC: 0.03 NG/ML
UROBILINOGEN UR QL STRIP: NORMAL
WBC NRBC COR # BLD: 16.01 10*3/MM3 (ref 3.2–9.8)

## 2017-12-12 PROCEDURE — 93010 ELECTROCARDIOGRAM REPORT: CPT | Performed by: INTERNAL MEDICINE

## 2017-12-12 PROCEDURE — 25010000002 AZITHROMYCIN PER 500 MG: Performed by: FAMILY MEDICINE

## 2017-12-12 PROCEDURE — 0 IOPAMIDOL PER 1 ML: Performed by: EMERGENCY MEDICINE

## 2017-12-12 PROCEDURE — 84484 ASSAY OF TROPONIN QUANT: CPT | Performed by: EMERGENCY MEDICINE

## 2017-12-12 PROCEDURE — 80053 COMPREHEN METABOLIC PANEL: CPT | Performed by: EMERGENCY MEDICINE

## 2017-12-12 PROCEDURE — 82962 GLUCOSE BLOOD TEST: CPT

## 2017-12-12 PROCEDURE — 25010000002 CEFTRIAXONE: Performed by: EMERGENCY MEDICINE

## 2017-12-12 PROCEDURE — 85730 THROMBOPLASTIN TIME PARTIAL: CPT | Performed by: EMERGENCY MEDICINE

## 2017-12-12 PROCEDURE — 82803 BLOOD GASES ANY COMBINATION: CPT | Performed by: EMERGENCY MEDICINE

## 2017-12-12 PROCEDURE — 25010000002 DIGOXIN PER 500 MCG: Performed by: EMERGENCY MEDICINE

## 2017-12-12 PROCEDURE — 83880 ASSAY OF NATRIURETIC PEPTIDE: CPT | Performed by: EMERGENCY MEDICINE

## 2017-12-12 PROCEDURE — 36600 WITHDRAWAL OF ARTERIAL BLOOD: CPT

## 2017-12-12 PROCEDURE — 71010 HC CHEST PA OR AP: CPT

## 2017-12-12 PROCEDURE — 25010000002 FUROSEMIDE PER 20 MG: Performed by: EMERGENCY MEDICINE

## 2017-12-12 PROCEDURE — 71275 CT ANGIOGRAPHY CHEST: CPT

## 2017-12-12 PROCEDURE — 87040 BLOOD CULTURE FOR BACTERIA: CPT | Performed by: EMERGENCY MEDICINE

## 2017-12-12 PROCEDURE — 80162 ASSAY OF DIGOXIN TOTAL: CPT | Performed by: EMERGENCY MEDICINE

## 2017-12-12 PROCEDURE — 63710000001 INSULIN ASPART PER 5 UNITS: Performed by: FAMILY MEDICINE

## 2017-12-12 PROCEDURE — 81003 URINALYSIS AUTO W/O SCOPE: CPT | Performed by: EMERGENCY MEDICINE

## 2017-12-12 PROCEDURE — 85025 COMPLETE CBC W/AUTO DIFF WBC: CPT | Performed by: EMERGENCY MEDICINE

## 2017-12-12 PROCEDURE — 83605 ASSAY OF LACTIC ACID: CPT | Performed by: EMERGENCY MEDICINE

## 2017-12-12 PROCEDURE — 93005 ELECTROCARDIOGRAM TRACING: CPT | Performed by: EMERGENCY MEDICINE

## 2017-12-12 PROCEDURE — 86140 C-REACTIVE PROTEIN: CPT | Performed by: EMERGENCY MEDICINE

## 2017-12-12 PROCEDURE — 99284 EMERGENCY DEPT VISIT MOD MDM: CPT

## 2017-12-12 PROCEDURE — 85610 PROTHROMBIN TIME: CPT | Performed by: EMERGENCY MEDICINE

## 2017-12-12 RX ORDER — FUROSEMIDE 10 MG/ML
40 INJECTION INTRAMUSCULAR; INTRAVENOUS 2 TIMES DAILY
Status: DISCONTINUED | OUTPATIENT
Start: 2017-12-13 | End: 2017-12-16 | Stop reason: HOSPADM

## 2017-12-12 RX ORDER — ASPIRIN 81 MG/1
81 TABLET, CHEWABLE ORAL DAILY
Status: DISCONTINUED | OUTPATIENT
Start: 2017-12-12 | End: 2017-12-16 | Stop reason: HOSPADM

## 2017-12-12 RX ORDER — ATORVASTATIN CALCIUM 40 MG/1
40 TABLET, FILM COATED ORAL DAILY
Status: DISCONTINUED | OUTPATIENT
Start: 2017-12-12 | End: 2017-12-16 | Stop reason: HOSPADM

## 2017-12-12 RX ORDER — SODIUM CHLORIDE 0.9 % (FLUSH) 0.9 %
1-10 SYRINGE (ML) INJECTION AS NEEDED
Status: DISCONTINUED | OUTPATIENT
Start: 2017-12-12 | End: 2017-12-16 | Stop reason: HOSPADM

## 2017-12-12 RX ORDER — ACETAMINOPHEN 325 MG/1
650 TABLET ORAL EVERY 4 HOURS PRN
Status: DISCONTINUED | OUTPATIENT
Start: 2017-12-12 | End: 2017-12-16 | Stop reason: HOSPADM

## 2017-12-12 RX ORDER — LISINOPRIL 10 MG/1
10 TABLET ORAL DAILY
Status: DISCONTINUED | OUTPATIENT
Start: 2017-12-12 | End: 2017-12-16 | Stop reason: HOSPADM

## 2017-12-12 RX ORDER — PREGABALIN 75 MG/1
150 CAPSULE ORAL 2 TIMES DAILY
Status: DISCONTINUED | OUTPATIENT
Start: 2017-12-12 | End: 2017-12-16 | Stop reason: HOSPADM

## 2017-12-12 RX ORDER — FUROSEMIDE 10 MG/ML
40 INJECTION INTRAMUSCULAR; INTRAVENOUS ONCE
Status: COMPLETED | OUTPATIENT
Start: 2017-12-12 | End: 2017-12-12

## 2017-12-12 RX ORDER — MORPHINE SULFATE 1 MG/ML
1 INJECTION, SOLUTION EPIDURAL; INTRATHECAL; INTRAVENOUS EVERY 4 HOURS PRN
Status: DISCONTINUED | OUTPATIENT
Start: 2017-12-12 | End: 2017-12-16 | Stop reason: HOSPADM

## 2017-12-12 RX ORDER — DIGOXIN 0.25 MG/ML
125 INJECTION INTRAMUSCULAR; INTRAVENOUS ONCE
Status: COMPLETED | OUTPATIENT
Start: 2017-12-12 | End: 2017-12-12

## 2017-12-12 RX ORDER — DOCUSATE SODIUM 100 MG/1
100 CAPSULE, LIQUID FILLED ORAL 2 TIMES DAILY
Status: DISCONTINUED | OUTPATIENT
Start: 2017-12-12 | End: 2017-12-16 | Stop reason: HOSPADM

## 2017-12-12 RX ORDER — SODIUM CHLORIDE 9 MG/ML
INJECTION, SOLUTION INTRAVENOUS
Status: COMPLETED
Start: 2017-12-12 | End: 2017-12-12

## 2017-12-12 RX ORDER — POTASSIUM CHLORIDE 750 MG/1
20 CAPSULE, EXTENDED RELEASE ORAL ONCE
Status: COMPLETED | OUTPATIENT
Start: 2017-12-12 | End: 2017-12-12

## 2017-12-12 RX ORDER — CARVEDILOL 25 MG/1
25 TABLET ORAL 2 TIMES DAILY WITH MEALS
Status: DISCONTINUED | OUTPATIENT
Start: 2017-12-12 | End: 2017-12-16 | Stop reason: HOSPADM

## 2017-12-12 RX ORDER — SODIUM CHLORIDE 0.9 % (FLUSH) 0.9 %
10 SYRINGE (ML) INJECTION AS NEEDED
Status: DISCONTINUED | OUTPATIENT
Start: 2017-12-12 | End: 2017-12-16 | Stop reason: HOSPADM

## 2017-12-12 RX ORDER — NICOTINE POLACRILEX 4 MG
15 LOZENGE BUCCAL
Status: DISCONTINUED | OUTPATIENT
Start: 2017-12-12 | End: 2017-12-16 | Stop reason: HOSPADM

## 2017-12-12 RX ORDER — DEXTROSE MONOHYDRATE 25 G/50ML
25 INJECTION, SOLUTION INTRAVENOUS
Status: DISCONTINUED | OUTPATIENT
Start: 2017-12-12 | End: 2017-12-16 | Stop reason: HOSPADM

## 2017-12-12 RX ORDER — PANTOPRAZOLE SODIUM 40 MG/1
40 TABLET, DELAYED RELEASE ORAL EVERY MORNING
Status: DISCONTINUED | OUTPATIENT
Start: 2017-12-13 | End: 2017-12-16 | Stop reason: HOSPADM

## 2017-12-12 RX ORDER — ONDANSETRON 2 MG/ML
4 INJECTION INTRAMUSCULAR; INTRAVENOUS EVERY 6 HOURS PRN
Status: DISCONTINUED | OUTPATIENT
Start: 2017-12-12 | End: 2017-12-16 | Stop reason: HOSPADM

## 2017-12-12 RX ORDER — LABETALOL HYDROCHLORIDE 5 MG/ML
10 INJECTION, SOLUTION INTRAVENOUS ONCE
Status: DISCONTINUED | OUTPATIENT
Start: 2017-12-12 | End: 2017-12-12

## 2017-12-12 RX ORDER — DIGOXIN 125 MCG
125 TABLET ORAL DAILY
Status: DISCONTINUED | OUTPATIENT
Start: 2017-12-12 | End: 2017-12-16 | Stop reason: HOSPADM

## 2017-12-12 RX ORDER — NALOXONE HCL 0.4 MG/ML
0.4 VIAL (ML) INJECTION
Status: DISCONTINUED | OUTPATIENT
Start: 2017-12-12 | End: 2017-12-16 | Stop reason: HOSPADM

## 2017-12-12 RX ADMIN — POTASSIUM CHLORIDE 20 MEQ: 750 CAPSULE, EXTENDED RELEASE ORAL at 15:07

## 2017-12-12 RX ADMIN — AZITHROMYCIN 500 MG: 500 INJECTION, POWDER, LYOPHILIZED, FOR SOLUTION INTRAVENOUS at 20:15

## 2017-12-12 RX ADMIN — ATORVASTATIN CALCIUM 40 MG: 40 TABLET, FILM COATED ORAL at 20:12

## 2017-12-12 RX ADMIN — DIGOXIN 125 MCG: 0.25 INJECTION INTRAMUSCULAR; INTRAVENOUS at 15:09

## 2017-12-12 RX ADMIN — DIGOXIN 125 MCG: 125 TABLET ORAL at 20:13

## 2017-12-12 RX ADMIN — CARVEDILOL 25 MG: 25 TABLET, FILM COATED ORAL at 20:12

## 2017-12-12 RX ADMIN — SODIUM CHLORIDE: 900 INJECTION, SOLUTION INTRAVENOUS at 20:16

## 2017-12-12 RX ADMIN — FUROSEMIDE 40 MG: 10 INJECTION, SOLUTION INTRAMUSCULAR; INTRAVENOUS at 15:08

## 2017-12-12 RX ADMIN — INSULIN ASPART 4 UNITS: 100 INJECTION, SOLUTION INTRAVENOUS; SUBCUTANEOUS at 21:45

## 2017-12-12 RX ADMIN — PREGABALIN 150 MG: 75 CAPSULE ORAL at 20:11

## 2017-12-12 RX ADMIN — LISINOPRIL 10 MG: 10 TABLET ORAL at 20:12

## 2017-12-12 RX ADMIN — ASPIRIN 81 MG 81 MG: 81 TABLET ORAL at 20:12

## 2017-12-12 RX ADMIN — IOPAMIDOL 74 ML: 755 INJECTION, SOLUTION INTRAVENOUS at 14:15

## 2017-12-12 RX ADMIN — CEFTRIAXONE 1 G: 1 INJECTION, POWDER, FOR SOLUTION INTRAMUSCULAR; INTRAVENOUS at 17:06

## 2017-12-12 RX ADMIN — DOCUSATE SODIUM 100 MG: 100 CAPSULE, LIQUID FILLED ORAL at 20:12

## 2017-12-12 NOTE — ED NOTES
This RN attempted to collect ABG. Unsuccessful attempt. Will call respiratory to have them come and collect.      Wendi Palacio RN  12/12/17 0698

## 2017-12-12 NOTE — H&P
[facility] Medicine Admission      Date of Admission: 12/12/2017      Primary Care Physician: Joyce Plata MD      Chief Complaint   Patient presents with   • Shortness of Breath       HPI:  Patient Patient is a 67-year-old female with PMH of CHF complains of 3 days worsening orthopnea, exertional dyspnea and cough.  Patient denies fever or chills, headache , dizziness, visual changes, change in appetite , chest pain or palpitations, abdominal pain , N/V/D , blood in the stool or urine or urinary symptoms, weakness numbness or tingling in the extremities.    Past Medical History:   Past Medical History:   Diagnosis Date   • Artificial lens present     IN  POSITION - BOTH   • Borderline glaucoma    • Cardiomyopathy    • Congestive heart failure    • Diabetes mellitus     IMPROVING   • Essential hypertension    • GERD (gastroesophageal reflux disease)    • History of bone density study 09/25/2015    DXA BONE DENSITY AXIAL [address]3239 [facility] (Screening for osteoporosis)    • History of echocardiogram 03/25/2013    Echocadiogram W/ color flow 02216 (Mild left atrial enlargement wiht mild LV enlargement w/mild concentric LVH. Global hypokinesis. EF 30%. Mitral and Av thickened. Aortic sclerosis. Severe mitral regurg. mild tricuspid regurg. Mild right atrial enlargement noted. Pacemaker wire noted)   03/25/2013 RACHELE WILLIAM    • History of mammography, diagnostic 09/17/2014    [place] [address] [facility] (Microcalcifications of the breast) , Reason: s/p mammotome bx right side for benign disease   • History of mammography, diagnostic 07/24/2014    MAMMOGRAPHY [place]STIC UNILAT RT [address] [facility] (Abnormal mammogram, unspecified)    • History of mammography, screening 07/03/2012    BENIGN   • Hypercholesterolemia    • Low back pain    • Menopausal syndrome 10/26/2017   • Microcalcifications of the breast     BENIGN CHANGES   • Need for prophylactic vaccination  against Streptococcus pneumoniae (pneumococcus)    • Neurologic disorder associated with diabetes mellitus    • Obesity    • Type 2 diabetes mellitus     NO BDR   • Upper respiratory infection    • Vaginal bleeding 7/27/2017   • Vitamin D deficiency        Past Surgical History:   Past Surgical History:   Procedure Laterality Date   • BREAST BIOPSY  07/28/2014    Stereotactic breast biopsy (Sterotactic mammotome biopsy and clip placement. Abnormal right-sided mammogram demonstrating microcalcifications.)   • CARDIAC CATHETERIZATION  10/28/2010    Cardiac cath 27456 (Noevidence of any obstructive epicardial coronary artery disease noted. Severe left ventricular dysfunction with an EF of 25%)   • CARDIAC CATHETERIZATION  01/31/2002    Cardiac cath 38935 (No significant coronary atherosclerosis. Probably mild left ventricular systolic dysfunction.)   • CARDIAC PACEMAKER PLACEMENT     • CATARACT EXTRACTION  01/28/2014    Remove cataract, insert lens (Right eye)   • CATARACT EXTRACTION  11/22/2011    Remove cataract, insert lens (Left eye)   • CATARACT EXTRACTION     • CENTRAL VENOUS LINE INSERTION  05/20/2016    Central line insertion (Successful placement of right upper extremity midline.)   • COLONOSCOPY  12/14/2004    Single small non-bleeding polyp in the rectum. The polyp was removed by hot biopsy polypectomy   • COLONOSCOPY N/A 6/14/2017    Procedure: COLONOSCOPY;  Surgeon: Orville Farias MD;  Location: [facility] ENDOSCOPY;  Service:    • MAMMO BILATERAL  09/17/2014    MAMMOGRAPHY DIAGNOSTIC UNILAT RT [address] [facility] (Microcalcifications of the breast) , Reason: s/p mammotome bx right side for benign disease   • MAMMO BILATERAL  07/24/2014    MAMMOGRAPHY DIAGNOSTIC UNILAT RT [address] [facility] (Abnormal mammogram, unspecified)    • OTHER SURGICAL HISTORY  11/02/2012    DEBRIDE NAIL 6 OR MORE 02212 (Onychomycosis)    • OTHER SURGICAL HISTORY  04/27/2016    EXTENDED VISUAL FIELDS STUDY 82560 (Open angle with borderline  findings, low risk, unspecified eye)    • OTHER SURGICAL HISTORY      EXTENDED VISUAL FIELDS STUDY 34306 (Borderline glaucoma)  (2): 10/02/2014, 04/17/2013   • OTHER SURGICAL HISTORY      OCT DISC NFL 06907 (Borderline glaucoma)  (2): 02/02/2015, 08/21/2013   • PAP SMEAR  06/10/2010    NEGATIVE   • TOTAL ABDOMINAL HYSTERECTOMY WITH SALPINGO OOPHORECTOMY  09/16/2013    Lysis of adhesions.       Family History:   Family History   Problem Relation Age of Onset   • Heart disease Other    • Hypertension Other    • Alzheimer's disease Other    • Diabetes Sister      x 2   • Hypertension Sister    • Hyperlipidemia Sister    • Bone cancer Brother        Social History:   Social History     Social History   • Marital status: Single     Spouse name: N/A   • Number of children: N/A   • Years of education: N/A     Social History Main Topics   • Smoking status: Never Smoker   • Smokeless tobacco: Never Used   • Alcohol use No   • Drug use: No   • Sexual activity: Defer     Other Topics Concern   • None     Social History Narrative       Allergies:   Allergies   Allergen Reactions   • Aldactone [Spironolactone]    • Nsaids        Medications:   Prior to Admission medications    Medication Sig Start Date End Date Taking? Authorizing Provider   aspirin 81 MG tablet Take 81 mg by mouth every night. 6/2/16   Historical Provider, MD   atorvastatin (LIPITOR) 40 MG tablet Take 1 tablet by mouth Daily. 10/6/17   Joyce Plata MD   B-D UF III MINI PEN NEEDLES 31G X 5 MM misc USE DAILY WITH VICTOZA 2/24/17   Joyce Plata MD   brimonidine (ALPHAGAN) 0.2 % ophthalmic solution Administer 1 drop to both eyes 2 (Two) Times a Day. 12/20/16   Kun Walton MD   carvedilol (COREG) 25 MG tablet Take 1 tablet by mouth 2 (Two) Times a Day With Meals. 10/6/17   Joyce Plata MD   digoxin (LANOXIN) 125 MCG tablet TAKE 1 TABLET BY MOUTH DAILY 6/22/17   Joyce Plata MD   esomeprazole (nexIUM) 40 MG capsule Take 40 mg by mouth  Daily.    Historical Provider, MD   fluconazole (DIFLUCAN) 150 MG tablet Take one po today and take one po in 4 days. 7/27/17   Avinash Montez MD   furosemide (LASIX) 80 MG tablet Take 1 tablet by mouth Daily. 10/6/17   Joyce Plata MD   glimepiride (AMARYL) 4 MG tablet Take 1 tablet by mouth 2 (Two) Times a Day. 10/6/17   Joyce Plata MD   latanoprost (XALATAN) 0.005 % ophthalmic solution Administer 1 drop to both eyes Every Night. 90 day supply. 12/20/16   Kun Walton MD   Liraglutide (VICTOZA) 18 MG/3ML solution pen-injector injection Inject 1.2 mg under the skin Daily. 10/6/17   Joyce Plata MD   lisinopril (PRINIVIL,ZESTRIL) 10 MG tablet Take 1 tablet by mouth Daily. 10/6/17   Joyce Plata MD   magnesium oxide (MAGOX) 400 (241.3 Mg) MG tablet tablet Take 1 tablet by mouth Daily. 10/6/17   Joyce Plata MD   metFORMIN (GLUCOPHAGE) 1000 MG tablet Take 1 tablet by mouth 2 (Two) Times a Day With Meals. 10/6/17   Joyce Plata MD   omega-3 acid ethyl esters (LOVAZA) 1 G capsule Take 1 g by mouth 2 (Two) Times a Day. 2 capsules bid    Historical Provider, MD   potassium chloride (MICRO-K) 10 MEQ CR capsule Take 1 capsule by mouth 2 (Two) Times a Day. 10/6/17   Joyce Plata MD   pregabalin (LYRICA) 150 MG capsule Take 1 capsule by mouth 2 (Two) Times a Day. 10/6/17   Joyce Plata MD   vitamin D (ERGOCALCIFEROL) 11504 UNITS capsule capsule Take 1 capsule by mouth 1 (One) Time Per Week. 7/3/17   Joyce Plata MD   ZETIA 10 MG tablet TAKE 1 TABELT BY MOUTH DAILY 7/5/17   Joyce Plata MD       Review of Systems    Otherwise complete ROS is negative except as mentioned above.    Physical Exam:    Physical Exam     Temp:  [97.8 °F (36.6 °C)] 97.8 °F (36.6 °C)  Heart Rate:  [] 100  Resp:  [18-20] 18  BP: (158-193)/(81-99) 177/83    -General:Patient is oriented to person, place, and time. Patient appears well-developed and well-nourished. No distress.   -HEENT: Head:  Normocephalic and atraumatic. Right Ear: External ear normal. Left Ear: External ear normal. Nose: Nose normal. Mouth/Throat: Oropharynx is clear and moist.   Eyes: Conjunctivae and EOM are normal. Pupils are equal, round, and reactive to light. Right eye exhibits no discharge. Left eye exhibits no discharge.   Neck: Normal range of motion. Neck supple. No JVD present. No tracheal deviation present. No thyromegaly present.   -CVS: Normal rate, regular rhythm, normal heart sounds and intact distal pulses.  Exam reveals no gallop and no friction rub.    No murmur heard.  -Pulmonary:scaterd wheezes b/l.   -Abdominal: Soft, Bowel sounds are normal. No distension and no mass. There is no tenderness. There is no rebound and no guarding. No hernia.   -Musc: No Cyanosis clubbing . B/l LE edema  -Lymph: No cervical adenopathy.   -Neuro: patient is alert and oriented to person, place, and time.Patient has normal reflexes. No cranial nerve deficit. Patient exhibits normal muscle tone , strength and sensation bilaterally. Coordination normal.   -Skin: Skin is warm. No rash noted. Patient is not diaphoretic. No erythema. No pallor.   -Psych: Patient  has a normal mood and affect. behavior is normal. Judgment and thought content normal. Denies suicidal ideations or plans.    Nursing note and vitals reviewed.    Results Reviewed:  I have personally reviewed current lab, radiology, and data and agree with results.  Lab Results (last 24 hours)     Procedure Component Value Units Date/Time    CBC & Differential [[id number]] Collected:  12/12/17 1247    Specimen:  Blood Updated:  12/12/17 1309    Narrative:       The following orders were created for panel order CBC & Differential.  Procedure                               Abnormality         Status                     ---------                               -----------         ------                     CBC Auto Differential[[id number]]        Abnormal            Final result                  Please view results for these tests on the individual orders.    CBC Auto Differential [[id number]]  (Abnormal) Collected:  12/12/17 1247    Specimen:  Blood Updated:  12/12/17 1309     WBC 16.01 (H) 10*3/mm3      RBC 4.03 10*6/mm3      Hemoglobin 11.0 (L) g/dL      Hematocrit 33.4 (L) %      MCV 82.9 fL      MCH 27.3 pg      MCHC 32.9 g/dL      RDW 14.8 (H) %      RDW-SD 45.0 fl      MPV 9.5 fL      Platelets 312 10*3/mm3      Neutrophil % 65.5 %      Lymphocyte % 23.4 %      Monocyte % 9.8 %      Eosinophil % 0.7 %      Basophil % 0.2 %      Immature Grans % 0.4 %      Neutrophils, Absolute 10.47 (H) 10*3/mm3      Lymphocytes, Absolute 3.74 10*3/mm3      Monocytes, Absolute 1.57 (H) 10*3/mm3      Eosinophils, Absolute 0.12 10*3/mm3      Basophils, Absolute 0.04 10*3/mm3      Immature Grans, Absolute 0.07 (H) 10*3/mm3     Blood Gas, Arterial [[id number]]  (Abnormal) Collected:  12/12/17 1321    Specimen:  Arterial Blood Updated:  12/12/17 1331     Site --      Not performed at this site.        Heber's Test --      Not performed at this site.        pH, Arterial 7.491 (H) pH units      pCO2, Arterial 28.9 (L) mm Hg      pO2, Arterial 69.0 (L) mm Hg      HCO3, Arterial 21.6 (L) mmol/L      Base Excess, Arterial -0.9 mmol/L      O2 Saturation, Arterial 93.6 (L) %      Hemoglobin, Blood Gas 11.5 (L) g/dL      Hematocrit, Blood Gas 34.0 %      CO2 Content 22.5 (L)     Sodium, Arterial 138.4 mmol/L      Potassium, Arterial 3.05 (L) mmol/L      Glucose, Arterial 154 mmol/L      Barometric Pressure for Blood Gas -- mmHg       Not performed at this site.        Modality --      Not performed at this site.        Ionized Calcium 4.70 mg/dL     Protime-INR [[id number]]  (Normal) Collected:  12/12/17 1247    Specimen:  Blood Updated:  12/12/17 1338     Protime 13.4 Seconds      INR 1.03    Narrative:       Therapeutic range for most indications is 2.0-3.0 INR,  or 2.5-3.5 for mechanical heart valves.    aPTT [[id number]]   (Normal) Collected:  12/12/17 1247    Specimen:  Blood Updated:  12/12/17 1338     PTT 32.6 seconds     Narrative:       The recommended Heparin therapeutic range is 68-97 seconds.    Digoxin Level [[id number]]  (Abnormal) Collected:  12/12/17 1247    Specimen:  Blood Updated:  12/12/17 1341     Digoxin 0.50 (L) ng/mL     C-reactive Protein [[id number]]  (Abnormal) Collected:  12/12/17 1247    Specimen:  Blood Updated:  12/12/17 1341     C-Reactive Protein 5.60 (H) mg/dL     Comprehensive Metabolic Panel [[id number]]  (Abnormal) Collected:  12/12/17 1247    Specimen:  Blood Updated:  12/12/17 1341     Glucose 159 (H) mg/dL      BUN 7 mg/dL      Creatinine 0.86 mg/dL      Sodium 138 mmol/L      Potassium 3.0 (L) mmol/L      Chloride 100 mmol/L      CO2 25.0 mmol/L      Calcium 9.8 mg/dL      Total Protein 7.6 g/dL      Albumin 4.20 g/dL      ALT (SGPT) 41 U/L      AST (SGOT) 38 (H) U/L      Alkaline Phosphatase 97 U/L      Total Bilirubin 0.8 mg/dL      eGFR  African Amer 79 mL/min/1.73      Globulin 3.4 gm/dL      A/G Ratio 1.2 g/dL      BUN/Creatinine Ratio 8.1     Anion Gap 13.0 mmol/L     BNP [[id number]]  (Normal) Collected:  12/12/17 1247    Specimen:  Blood Updated:  12/12/17 1349     proBNP 861.0 pg/mL     Troponin [[id number]]  (Normal) Collected:  12/12/17 1247    Specimen:  Blood Updated:  12/12/17 1349     Troponin I 0.020 ng/mL     Blood Culture - Blood, [[id number]] Collected:  12/12/17 1340    Specimen:  Blood from Arm, Left Updated:  12/12/17 1357    Blood Culture - Blood, [[id number]] Collected:  12/12/17 1352    Specimen:  Blood from Arm, Right Updated:  12/12/17 1357    Lactic Acid, Plasma [[id number]]  (Normal) Collected:  12/12/17 1352    Specimen:  Blood from Arm, Left Updated:  12/12/17 1411     Lactate 1.8 mmol/L     Urinalysis With / Culture If Indicated - Urine, Clean Catch [[id number]]  (Normal) Collected:  12/12/17 1431    Specimen:  Urine from Urine, Clean Catch Updated:  12/12/17 1445     Color,  UA Yellow     Appearance, UA Clear     pH, UA 7.0     Specific [place], UA 1.011      Result obtained by Refractometer        Glucose, UA Negative     Ketones, UA Negative     Bilirubin, UA Negative     Blood, UA Negative     Protein, UA Negative     Leuk Esterase, UA Negative     Nitrite, UA Negative     Urobilinogen, UA 0.2 E.U./dL    Narrative:       Urine microscopic not indicated.    Troponin [[id number]]  (Normal) Collected:  12/12/17 1614    Specimen:  Blood from Arm, Left Updated:  12/12/17 1649     Troponin I 0.031 ng/mL         Imaging Results (last 24 hours)     Procedure Component Value Units Date/Time    XR Chest 1 View [[id number]] Collected:  12/12/17 1218     Updated:  12/12/17 1238    Narrative:       PROCEDURE: One view chest    COMPARISON: Two-view chest May 19, 2016.    HISTORY: Shortness of breath    FINDINGS: Single portable view of the chest is obtained. The  heart and mediastinal contours are within normal limits.  Pacemaker defibrillator device is stable. There are right basilar  opacities. The left lung is clear..      Impression:       1. Right basilar opacities favor atelectasis over pneumonia..    Electronically signed by:  Golden Newton MD  12/12/2017  12:37 PM CST Workstation: [id number]    CT Angiogram Chest With Contrast [[id number]] Collected:  12/12/17 1412     Updated:  12/12/17 1431    Narrative:         EXAM:  Computed Tomography with CTA         REGION:  Chest       INDICATION:    dyspnea     - rule out pulmonary embolism       CLINICAL HISTORY:  CORRELATIVE IMAGING:  None                         TECHNIQUE:     - PE / vascular protocol     - reconstructions:  axial, coronal, sagittal, obliques     - computer-generated 3D reconstructions (MIPS) were performed.     - contrast:  intravenous Isovue 370, 74 mL                                 This exam was performed according to the departmental  dose-optimization program which includes automated exposure  control, adjustment  of the mA and/or kV according to patient size  and/or use of iterative reconstruction technique.         COMMENTS:    - Pulmonary arterial system:      - Main pulmonary artery trunk:  negative      - Left, right main pulmonary arteries: negative      - Lobar arteries: negative       - Segmental arteries: negative      - Systemic vascularity (as visualized):        - Aorta:  grossly negative / normal caliber / no dissection        - roots of great vessels:  grossly negative / normal  caliber        - SVC / IVC:  grossly negative / normal caliber     - Misc (limited visualization):      - pulmonary parenchyma:  negative      - pleura:  Small bilateral pleural fluid collections.      - mediastinal / cecilia:  negative      - neck, inferior:  grossly wnl      - subdiaphragmatic structures:  grossly negative (limited  evaluation)       - osseous:      - misc:       .        Impression:       CONCLUSION:          1.  No evidence of pulmonary embolism.            2.  No evidence of pathology associated with the visualized  aorta.      3. Small bilateral pleural fluid collections.                                                           Electronically signed by:  ADDIE Blake MD  12/12/2017 2:30  PM CST Workstation: [id number]          Assessment/Plan         Hospital Problem List     CAMP (dyspnea on exertion)        Assessment / Plan  --acute dyspnea and cough  Likely secondary to CAP/CHF exacerbation  IVAB, IV lasix, strit  I/O, ECHO in AM    --DM  ISS    --HTN  Controled  continue home meds    --GERD  Protonix    I discussed the patients findings and my recommendations with the patient, and the patient verbalized understanding of the plan, risks and benefits of the plan.    This document has been electronically signed by John Shaffer MD on December 12, 2017 5:01 PM

## 2017-12-12 NOTE — ED NOTES
Notified patient that we are calling to order her some food.      Wendi Palacio RN  12/12/17 3227

## 2017-12-12 NOTE — ED PROVIDER NOTES
Subjective   History of Present Illness  Patient is a 67-year-old female who presents with shortness of breath.  Symptoms for 2 days.  Seem be getting worse.  Having a hard time lying flat.  She does have some dyspnea on exertion.  She says she has been cold but denies having any fever.  She also denies nausea and vomiting and overt chest pain.    She has a history of congestive heart failure.  Her last echo with color flow showed mild LV enlargement with mild concentric LVH.  Global hypokinesis.  EF of 30.  Mitral and AV valves are thickened.  She aortic aortic sclerosis.  Severe mitral regurg and mild tricuspid regurg.  There is also some mild atrial enlargement noted.  She is followed by Dr. Felipe.  She said pacemaker defibrillator was placed due to weak left side of her heart.  Review of Systems   Constitutional: Positive for activity change, chills and fatigue. Negative for appetite change, diaphoresis and fever.   Respiratory: Positive for cough and shortness of breath. Negative for apnea, choking, chest tightness, wheezing and stridor.    Cardiovascular: Positive for leg swelling. Negative for chest pain and palpitations.   All other systems reviewed and are negative.      Past Medical History:   Diagnosis Date   • Artificial lens present     IN  POSITION - BOTH   • Borderline glaucoma    • Cardiomyopathy    • Congestive heart failure    • Diabetes mellitus     IMPROVING   • Essential hypertension    • GERD (gastroesophageal reflux disease)    • History of bone density study 09/25/2015    DXA BONE DENSITY AXIAL [facility] [facility] (Screening for osteoporosis)    • History of echocardiogram 03/25/2013    Echocadiogram W/ color flow 25239 (Mild left atrial enlargement wiht mild LV enlargement w/mild concentric LVH. Global hypokinesis. EF 30%. Mitral and Av thickened. Aortic sclerosis. Severe mitral regurg. mild tricuspid regurg. Mild right atrial enlargement noted. Pacemaker wire noted)   03/25/2013 RACHELE WILLIAM    •  History of mammography, diagnostic 09/17/2014    [place] [address] [facility] (Microcalcifications of the breast) , Reason: s/p mammotome bx right side for benign disease   • History of mammography, diagnostic 07/24/2014    MAMMOGRAPHY [place]STIC [place]ILAT [place] [address] [facility] (Abnormal mammogram, unspecified)    • History of mammography, screening 07/03/2012    BENIGN   • Hypercholesterolemia    • Low back pain    • Menopausal syndrome 10/26/2017   • Microcalcifications of the breast     BENIGN CHANGES   • Need for prophylactic vaccination against Streptococcus pneumoniae (pneumococcus)    • Neurologic disorder associated with diabetes mellitus    • Obesity    • Type 2 diabetes mellitus     NO BDR   • Upper respiratory infection    • Vaginal bleeding 7/27/2017   • Vitamin D deficiency        Allergies   Allergen Reactions   • Aldactone [Spironolactone]    • Nsaids        Past Surgical History:   Procedure Laterality Date   • BREAST BIOPSY  07/28/2014    Stereotactic breast biopsy (Sterotactic mammotome biopsy and clip placement. Abnormal right-sided mammogram demonstrating microcalcifications.)   • CARDIAC CATHETERIZATION  10/28/2010    Cardiac cath 53764 (Noevidence of any obstructive epicardial coronary artery disease noted. Severe left ventricular dysfunction with an EF of 25%)   • CARDIAC CATHETERIZATION  01/31/2002    Cardiac cath 90560 (No significant coronary atherosclerosis. Probably mild left ventricular systolic dysfunction.)   • CARDIAC PACEMAKER PLACEMENT     • CATARACT EXTRACTION  01/28/2014    Remove cataract, insert lens (Right eye)   • CATARACT EXTRACTION  11/22/2011    Remove cataract, insert lens (Left eye)   • CATARACT EXTRACTION     • CENTRAL VENOUS LINE INSERTION  05/20/2016    Central line insertion (Successful placement of right upper extremity midline.)   • COLONOSCOPY  12/14/2004    Single small non-bleeding polyp in the rectum. The polyp was removed by hot biopsy polypectomy    • COLONOSCOPY N/A 6/14/2017    Procedure: COLONOSCOPY;  Surgeon: Orville Farias MD;  Location: [facility] ENDOSCOPY;  Service:    • MAMMO BILATERAL  09/17/2014    MAMMOGRAPHY [place]ST[place] [address] [facility] (Microcalcifications of the breast) , Reason: s/p mammotome bx right side for benign disease   • MAMMO BILATERAL  07/24/2014    MAMMOGRAPHY [place]NOSTIC [place]ILAT RT [address] [facility] (Abnormal mammogram, unspecified)    • OTHER SURGICAL HISTORY  11/02/2012    DEBRIDE NAIL 6 OR MORE 45557 (Onychomycosis)    • OTHER SURGICAL HISTORY  04/27/2016    EXTENDED VISUAL FIELDS STUDY 18975 (Open angle with borderline findings, low risk, unspecified eye)    • OTHER SURGICAL HISTORY      EXTENDED VISUAL FIELDS STUDY 20727 (Borderline glaucoma)  (2): 10/02/2014, 04/17/2013   • OTHER SURGICAL HISTORY      OCT DISC NFL 92185 (Borderline glaucoma)  (2): 02/02/2015, 08/21/2013   • PAP SMEAR  06/10/2010    NEGATIVE   • TOTAL ABDOMINAL HYSTERECTOMY WITH SALPINGO OOPHORECTOMY  09/16/2013    Lysis of adhesions.       Family History   Problem Relation Age of Onset   • Heart disease Other    • Hypertension Other    • Alzheimer's disease Other    • Diabetes Sister      x 2   • Hypertension Sister    • Hyperlipidemia Sister    • Bone cancer Brother        Social History     Social History   • Marital status: Single     Spouse name: N/A   • Number of children: N/A   • Years of education: N/A     Social History Main Topics   • Smoking status: Never Smoker   • Smokeless tobacco: Never Used   • Alcohol use No   • Drug use: No   • Sexual activity: Defer     Other Topics Concern   • None     Social History Narrative           Objective   Physical Exam   Constitutional: She is oriented to person, place, and time. She appears well-developed. No distress.   HENT:   Head: Normocephalic and atraumatic.   Mouth/Throat: Oropharynx is clear and moist.   Eyes: Conjunctivae and EOM are normal. Pupils are equal, round, and reactive to light.   Neck:  Normal range of motion. Neck supple. JVD (1+) present.   Cardiovascular: Regular rhythm.    Murmur heard.  Tachycardia at 108   Pulmonary/Chest: No respiratory distress. She exhibits no tenderness.   Abdominal: She exhibits no distension. There is no tenderness. There is no rebound and no guarding.   Musculoskeletal: Normal range of motion. She exhibits edema (2+ ankle foot). She exhibits no tenderness or deformity.   Neurological: She is alert and oriented to person, place, and time. No cranial nerve deficit. She exhibits normal muscle tone.   Skin: Skin is warm and dry. No rash noted. She is not diaphoretic. No erythema. No pallor.   Psychiatric: She has a normal mood and affect. Her behavior is normal. Judgment and thought content normal.   Nursing note and vitals reviewed.      Procedures         ED Course  ED Course    Patient got up to go the restroom after treatment and good diuresis.  She was still notable dyspnea on exertion.  There is also some concern on her imaging that she may have bilateral lower lobe infiltrates.  Radiologist favored atelectasis but she does have a little bit of an elevated white count.  I spoken to Dr. Jara and he will admit.  We will give her IV Rocephin in the emergency department.          MDM  Number of Diagnoses or Management Options  CAMP (dyspnea on exertion): established and worsening  Hypervolemia, unspecified hypervolemia type: established and worsening  Leukocytosis, unspecified type: new and requires workup     Amount and/or Complexity of Data Reviewed  Clinical lab tests: ordered and reviewed  Tests in the radiology section of CPT®: ordered and reviewed  Tests in the medicine section of CPT®: reviewed and ordered  Decide to obtain previous medical records or to obtain history from someone other than the patient: yes  Review and summarize past medical records: yes  Independent visualization of images, tracings, or specimens: yes    Risk of Complications, Morbidity,  and/or Mortality  Presenting problems: high  Diagnostic procedures: high  Management options: high        Final diagnoses:   CAMP (dyspnea on exertion)   Leukocytosis, unspecified type   Hypervolemia, unspecified hypervolemia type            Rober Garcia MD  12/12/17 4326

## 2017-12-13 ENCOUNTER — APPOINTMENT (OUTPATIENT)
Dept: CARDIOLOGY | Facility: HOSPITAL | Age: 67
End: 2017-12-13
Attending: FAMILY MEDICINE

## 2017-12-13 ENCOUNTER — EPISODE CHANGES (OUTPATIENT)
Dept: CASE MANAGEMENT | Facility: OTHER | Age: 67
End: 2017-12-13

## 2017-12-13 LAB
ANION GAP SERPL CALCULATED.3IONS-SCNC: 12 MMOL/L (ref 5–15)
BASOPHILS # BLD AUTO: 0.02 10*3/MM3 (ref 0–0.2)
BASOPHILS NFR BLD AUTO: 0.2 % (ref 0–2)
BH CV ECHO MEAS - ACS: 1.9 CM
BH CV ECHO MEAS - AO MAX PG (FULL): 2.4 MMHG
BH CV ECHO MEAS - AO MAX PG: 8.5 MMHG
BH CV ECHO MEAS - AO MEAN PG (FULL): 2.5 MMHG
BH CV ECHO MEAS - AO MEAN PG: 5.7 MMHG
BH CV ECHO MEAS - AO ROOT AREA: 7 CM^2
BH CV ECHO MEAS - AO ROOT DIAM: 3 CM
BH CV ECHO MEAS - AO V2 MAX: 145.5 CM/SEC
BH CV ECHO MEAS - AO V2 MEAN: 113.3 CM/SEC
BH CV ECHO MEAS - AO V2 VTI: 29.5 CM
BH CV ECHO MEAS - AVA(I,A): 3.1 CM^2
BH CV ECHO MEAS - AVA(I,D): 3.1 CM^2
BH CV ECHO MEAS - AVA(V,A): 3.1 CM^2
BH CV ECHO MEAS - AVA(V,D): 3.1 CM^2
BH CV ECHO MEAS - EDV(CUBED): 331.3 ML
BH CV ECHO MEAS - EDV(TEICH): 248.8 ML
BH CV ECHO MEAS - EF(CUBED): 53.2 %
BH CV ECHO MEAS - EF(TEICH): 43.8 %
BH CV ECHO MEAS - ESV(CUBED): 155.2 ML
BH CV ECHO MEAS - ESV(TEICH): 139.7 ML
BH CV ECHO MEAS - FS: 22.3 %
BH CV ECHO MEAS - IVS/LVPW: 0.83
BH CV ECHO MEAS - IVSD: 0.78 CM
BH CV ECHO MEAS - LA DIMENSION: 4.4 CM
BH CV ECHO MEAS - LA/AO: 1.5
BH CV ECHO MEAS - LV MASS(C)D: 259.8 GRAMS
BH CV ECHO MEAS - LV MAX PG: 6.1 MMHG
BH CV ECHO MEAS - LV MEAN PG: 3.2 MMHG
BH CV ECHO MEAS - LV V1 MAX: 123.1 CM/SEC
BH CV ECHO MEAS - LV V1 MEAN: 81.3 CM/SEC
BH CV ECHO MEAS - LV V1 VTI: 24.9 CM
BH CV ECHO MEAS - LVIDD: 6.9 CM
BH CV ECHO MEAS - LVIDS: 5.4 CM
BH CV ECHO MEAS - LVOT AREA (M): 3.8 CM^2
BH CV ECHO MEAS - LVOT AREA: 3.6 CM^2
BH CV ECHO MEAS - LVOT DIAM: 2.2 CM
BH CV ECHO MEAS - LVPWD: 0.94 CM
BH CV ECHO MEAS - MR MAX PG: 145 MMHG
BH CV ECHO MEAS - MR MAX VEL: 602.2 CM/SEC
BH CV ECHO MEAS - MV A MAX VEL: 120.3 CM/SEC
BH CV ECHO MEAS - MV DEC SLOPE: 764.5 CM/SEC^2
BH CV ECHO MEAS - MV E MAX VEL: 148.8 CM/SEC
BH CV ECHO MEAS - MV E/A: 1.2
BH CV ECHO MEAS - MV P1/2T MAX VEL: 191.1 CM/SEC
BH CV ECHO MEAS - MV P1/2T: 73.2 MSEC
BH CV ECHO MEAS - MVA P1/2T LCG: 1.2 CM^2
BH CV ECHO MEAS - MVA(P1/2T): 3 CM^2
BH CV ECHO MEAS - PA MAX PG: 2.8 MMHG
BH CV ECHO MEAS - PA V2 MAX: 83.7 CM/SEC
BH CV ECHO MEAS - RAP SYSTOLE: 10 MMHG
BH CV ECHO MEAS - RVDD: 2.3 CM
BH CV ECHO MEAS - RVSP: 51.2 MMHG
BH CV ECHO MEAS - SV(AO): 208.1 ML
BH CV ECHO MEAS - SV(CUBED): 176.1 ML
BH CV ECHO MEAS - SV(LVOT): 90.5 ML
BH CV ECHO MEAS - SV(TEICH): 109.1 ML
BH CV ECHO MEAS - TR MAX VEL: 320.9 CM/SEC
BUN BLD-MCNC: 9 MG/DL (ref 7–21)
BUN/CREAT SERPL: 11.4 (ref 7–25)
CALCIUM SPEC-SCNC: 9.2 MG/DL (ref 8.4–10.2)
CHLORIDE SERPL-SCNC: 100 MMOL/L (ref 95–110)
CO2 SERPL-SCNC: 26 MMOL/L (ref 22–31)
CREAT BLD-MCNC: 0.79 MG/DL (ref 0.5–1)
DEPRECATED RDW RBC AUTO: 46.7 FL (ref 36.4–46.3)
EOSINOPHIL # BLD AUTO: 0.25 10*3/MM3 (ref 0–0.7)
EOSINOPHIL NFR BLD AUTO: 2.3 % (ref 0–7)
ERYTHROCYTE [DISTWIDTH] IN BLOOD BY AUTOMATED COUNT: 15.1 % (ref 11.5–14.5)
GFR SERPL CREATININE-BSD FRML MDRD: 87 ML/MIN/1.73 (ref 45–104)
GLUCOSE BLD-MCNC: 131 MG/DL (ref 60–100)
GLUCOSE BLDC GLUCOMTR-MCNC: 180 MG/DL (ref 70–130)
GLUCOSE BLDC GLUCOMTR-MCNC: 227 MG/DL (ref 70–130)
GLUCOSE BLDC GLUCOMTR-MCNC: 270 MG/DL (ref 70–130)
GLUCOSE BLDC GLUCOMTR-MCNC: 300 MG/DL (ref 70–130)
HCT VFR BLD AUTO: 31.5 % (ref 35–45)
HGB BLD-MCNC: 10.1 G/DL (ref 12–15.5)
IMM GRANULOCYTES # BLD: 0.03 10*3/MM3 (ref 0–0.02)
IMM GRANULOCYTES NFR BLD: 0.3 % (ref 0–0.5)
LYMPHOCYTES # BLD AUTO: 4.05 10*3/MM3 (ref 0.6–4.2)
LYMPHOCYTES NFR BLD AUTO: 37.3 % (ref 10–50)
MCH RBC QN AUTO: 27.1 PG (ref 26.5–34)
MCHC RBC AUTO-ENTMCNC: 32.1 G/DL (ref 31.4–36)
MCV RBC AUTO: 84.5 FL (ref 80–98)
MONOCYTES # BLD AUTO: 1.49 10*3/MM3 (ref 0–0.9)
MONOCYTES NFR BLD AUTO: 13.7 % (ref 0–12)
NEUTROPHILS # BLD AUTO: 5.02 10*3/MM3 (ref 2–8.6)
NEUTROPHILS NFR BLD AUTO: 46.2 % (ref 37–80)
PLATELET # BLD AUTO: 281 10*3/MM3 (ref 150–450)
PMV BLD AUTO: 9.5 FL (ref 8–12)
POTASSIUM BLD-SCNC: 2.9 MMOL/L (ref 3.5–5.1)
POTASSIUM BLD-SCNC: 3.5 MMOL/L (ref 3.5–5.1)
RBC # BLD AUTO: 3.73 10*6/MM3 (ref 3.77–5.16)
SODIUM BLD-SCNC: 138 MMOL/L (ref 137–145)
WBC NRBC COR # BLD: 10.86 10*3/MM3 (ref 3.2–9.8)

## 2017-12-13 PROCEDURE — 82962 GLUCOSE BLOOD TEST: CPT

## 2017-12-13 PROCEDURE — 25010000002 AZITHROMYCIN PER 500 MG: Performed by: FAMILY MEDICINE

## 2017-12-13 PROCEDURE — 25010000002 POTASSIUM CHLORIDE PER 2 MEQ OF POTASSIUM: Performed by: FAMILY MEDICINE

## 2017-12-13 PROCEDURE — 25010000002 CEFTRIAXONE PER 250 MG: Performed by: FAMILY MEDICINE

## 2017-12-13 PROCEDURE — 93306 TTE W/DOPPLER COMPLETE: CPT

## 2017-12-13 PROCEDURE — 84132 ASSAY OF SERUM POTASSIUM: CPT | Performed by: NURSE PRACTITIONER

## 2017-12-13 PROCEDURE — 63710000001 INSULIN ASPART PER 5 UNITS: Performed by: FAMILY MEDICINE

## 2017-12-13 PROCEDURE — 85025 COMPLETE CBC W/AUTO DIFF WBC: CPT | Performed by: FAMILY MEDICINE

## 2017-12-13 PROCEDURE — 80048 BASIC METABOLIC PNL TOTAL CA: CPT | Performed by: FAMILY MEDICINE

## 2017-12-13 PROCEDURE — 25010000002 FUROSEMIDE PER 20 MG: Performed by: FAMILY MEDICINE

## 2017-12-13 RX ORDER — POTASSIUM CHLORIDE 750 MG/1
40 CAPSULE, EXTENDED RELEASE ORAL ONCE
Status: COMPLETED | OUTPATIENT
Start: 2017-12-13 | End: 2017-12-14

## 2017-12-13 RX ORDER — POTASSIUM CHLORIDE 7.45 MG/ML
10 INJECTION INTRAVENOUS
Status: DISCONTINUED | OUTPATIENT
Start: 2017-12-13 | End: 2017-12-16 | Stop reason: HOSPADM

## 2017-12-13 RX ADMIN — AZITHROMYCIN 500 MG: 500 INJECTION, POWDER, LYOPHILIZED, FOR SOLUTION INTRAVENOUS at 19:03

## 2017-12-13 RX ADMIN — INSULIN ASPART 6 UNITS: 100 INJECTION, SOLUTION INTRAVENOUS; SUBCUTANEOUS at 12:00

## 2017-12-13 RX ADMIN — INSULIN ASPART 4 UNITS: 100 INJECTION, SOLUTION INTRAVENOUS; SUBCUTANEOUS at 18:30

## 2017-12-13 RX ADMIN — CARVEDILOL 25 MG: 25 TABLET, FILM COATED ORAL at 08:20

## 2017-12-13 RX ADMIN — PREGABALIN 150 MG: 75 CAPSULE ORAL at 08:19

## 2017-12-13 RX ADMIN — FUROSEMIDE 40 MG: 10 INJECTION, SOLUTION INTRAMUSCULAR; INTRAVENOUS at 10:37

## 2017-12-13 RX ADMIN — Medication 10 ML: at 17:26

## 2017-12-13 RX ADMIN — POTASSIUM CHLORIDE: 149 INJECTION, SOLUTION, CONCENTRATE INTRAVENOUS at 14:15

## 2017-12-13 RX ADMIN — PANTOPRAZOLE SODIUM 40 MG: 40 TABLET, DELAYED RELEASE ORAL at 06:22

## 2017-12-13 RX ADMIN — CARVEDILOL 25 MG: 25 TABLET, FILM COATED ORAL at 17:31

## 2017-12-13 RX ADMIN — DOCUSATE SODIUM 100 MG: 100 CAPSULE, LIQUID FILLED ORAL at 08:20

## 2017-12-13 RX ADMIN — ATORVASTATIN CALCIUM 40 MG: 40 TABLET, FILM COATED ORAL at 08:19

## 2017-12-13 RX ADMIN — FUROSEMIDE 40 MG: 10 INJECTION, SOLUTION INTRAMUSCULAR; INTRAVENOUS at 17:24

## 2017-12-13 RX ADMIN — PREGABALIN 150 MG: 75 CAPSULE ORAL at 17:31

## 2017-12-13 RX ADMIN — CEFTRIAXONE SODIUM 1 G: 1 INJECTION, POWDER, FOR SOLUTION INTRAMUSCULAR; INTRAVENOUS at 17:19

## 2017-12-13 RX ADMIN — INSULIN ASPART 7 UNITS: 100 INJECTION, SOLUTION INTRAVENOUS; SUBCUTANEOUS at 20:24

## 2017-12-13 RX ADMIN — DIGOXIN 125 MCG: 125 TABLET ORAL at 08:19

## 2017-12-13 RX ADMIN — POTASSIUM CHLORIDE: 149 INJECTION, SOLUTION, CONCENTRATE INTRAVENOUS at 10:43

## 2017-12-13 RX ADMIN — LISINOPRIL 10 MG: 10 TABLET ORAL at 08:19

## 2017-12-13 RX ADMIN — ASPIRIN 81 MG 81 MG: 81 TABLET ORAL at 08:20

## 2017-12-13 NOTE — PLAN OF CARE
Problem: Patient Care Overview (Adult)  Goal: Plan of Care Review  Outcome: Ongoing (interventions implemented as appropriate)    12/12/17 1933   Coping/Psychosocial Response Interventions   Plan Of Care Reviewed With patient       Goal: Adult Individualization and Mutuality  Outcome: Ongoing (interventions implemented as appropriate)  Goal: Discharge Needs Assessment  Outcome: Ongoing (interventions implemented as appropriate)    Problem: Fall Risk (Adult)  Goal: Identify Related Risk Factors and Signs and Symptoms  Outcome: Ongoing (interventions implemented as appropriate)  Goal: Absence of Falls  Outcome: Ongoing (interventions implemented as appropriate)    Problem: Cardiac: Heart Failure (Adult)  Goal: Signs and Symptoms of Listed Potential Problems Will be Absent or Manageable (Cardiac: Heart Failure)  Outcome: Ongoing (interventions implemented as appropriate)    Problem: Pain, Acute (Adult)  Goal: Identify Related Risk Factors and Signs and Symptoms  Outcome: Ongoing (interventions implemented as appropriate)  Goal: Acceptable Pain Control/Comfort Level  Outcome: Ongoing (interventions implemented as appropriate)

## 2017-12-13 NOTE — PLAN OF CARE
Problem: Patient Care Overview (Adult)  Goal: Plan of Care Review  Outcome: Ongoing (interventions implemented as appropriate)    12/13/17 0101   Coping/Psychosocial Response Interventions   Plan Of Care Reviewed With patient   Patient Care Overview   Progress no change   Outcome Evaluation   Outcome Summary/Follow up Plan Patient has had no complaints and has rested comfortably. Will continue to monitor.        Goal: Adult Individualization and Mutuality  Outcome: Ongoing (interventions implemented as appropriate)  Goal: Discharge Needs Assessment  Outcome: Ongoing (interventions implemented as appropriate)    Problem: Fall Risk (Adult)  Goal: Identify Related Risk Factors and Signs and Symptoms  Outcome: Ongoing (interventions implemented as appropriate)  Goal: Absence of Falls  Outcome: Outcome(s) achieved Date Met:  12/13/17    Problem: Cardiac: Heart Failure (Adult)  Goal: Signs and Symptoms of Listed Potential Problems Will be Absent or Manageable (Cardiac: Heart Failure)  Outcome: Ongoing (interventions implemented as appropriate)    Problem: Pain, Acute (Adult)  Goal: Identify Related Risk Factors and Signs and Symptoms  Outcome: Ongoing (interventions implemented as appropriate)  Goal: Acceptable Pain Control/Comfort Level  Outcome: Ongoing (interventions implemented as appropriate)

## 2017-12-13 NOTE — CONSULTS
"Adult Nutrition  Assessment    Patient Name:  Lexi Wade  YOB: 1950  MRN: [id number]  Admit Date:  12/12/2017    Assessment Date:  12/13/2017    Comments:  Pt is a 67 year old female admitted with breathlessness on exertion with past medical hx CHF.  Currently on low sodium diet with fluid restriction in place.  Pt reports some decreased appetite prior to admission.  Wt up since admit and Lasix prescribed.  RD discussed low sodium food options with pt.  Pt states that she has limited income and she frequently eats foods like Bologna and Ramen noodles because they are cheap.  RD encouraged pt to limit intake of these high sodium foods as much as possible by watching for sales on other meats that she can cook on her [employer] and store to eat later, such as pork chops or frozen chicken breasts.  Also advised pt that the seasoning packet of the noodles has the most sodium content so to avoid using the entire packet.  Left handout detailing high sodium vs low sodium food choices and encouraged pt to eat regular meals.  RD will monitor.          Reason for Assessment       12/13/17 1553    Reason for Assessment    Reason For Assessment/Visit education;diagnosis/disease state    Identified At Risk By Screening Criteria BMI    Diagnosis Diagnosis    Cardiac CHF                Nutrition/Diet History       12/13/17 1553    Nutrition/Diet History    Typical Food/Fluid Intake Pt reports some decreased appetite prior to admission.  Pt states that she frequently eats bologna and has a limited food budget w/food stamps.            Anthropometrics       12/13/17 1555    Anthropometrics    Height 157.5 cm (62\")    Weight 103 kg (228 lb)    RD Documented Weight on Admission 99.8 kg (220 lb)    Anthropometrics (Special Considerations)    Height Used for Calculations 1.575 m (5' 2\")    Weight Used for Calculations 103 kg (228 lb)    RD Calculated     RD Calculated %     Ideal Body Weight " (IBW)    Ideal Body Weight (IBW), Female 50.83    % Ideal Body Weight 203.88    Body Mass Index (BMI)    BMI (kg/m2) 41.79    BMI Grade greater than 40 - obesity grade III            Labs/Tests/Procedures/Meds       12/13/17 1556    Labs/Tests/Procedures/Meds    Labs/Tests Review Reviewed    Medication Review Reviewed, pertinent;Diuretic            Physical Findings       12/13/17 1557    Physical Findings/Assessment    Additional Documentation Physical Appearance (Group)    Physical Appearance    Overall Physical Appearance overweight    Skin edema              Nutrition Prescription Ordered       12/13/17 1557    Nutrition Prescription PO    Current PO Diet Regular    Common Modifiers Cardiac;Consistent Carbohydrate;Fluid Restriction;Low Sodium    Fluid Restriction mL per Day 1500 mL            Evaluation of Received Nutrient/Fluid Intake       12/13/17 1557    PO Evaluation    Number of Days PO Intake Evaluated 1 day    Number of Meals 2    % PO Intake 100            Electronically signed by:  Jackie Sapp RD  12/13/17 4:00 PM

## 2017-12-13 NOTE — PROGRESS NOTES
[facility] Medicine Services  INPATIENT PROGRESS NOTE    Length of Stay: 1  Date of Admission: 12/12/2017  Primary Care Physician: Joyce Plata MD    Subjective   Chief Complaint: No complaints    HPI:  67 y.o. Female that was admitted 12/12/2017 with complaints of worsening orthopnea, exertional dyspnea and cough x 3 days.  The patient has a history of nonischemic cardiomyopathy with severe left ventricular systolic dysfunction with an ejection fraction of 25%.  The patient has a [place] cardioverter/debrillator.  The patient states her breathing is much better since admission.     Review of Systems   Constitutional: Positive for fatigue.   Respiratory: Positive for shortness of breath.         All pertinent negatives and positives are as above. All other systems have been reviewed and are negative unless otherwise stated.     Objective    Temp:  [96 °F (35.6 °C)-99.2 °F (37.3 °C)] 97.3 °F (36.3 °C)  Heart Rate:  [] 75  Resp:  [18-20] 18  BP: (114-193)/(58-99) 114/62    Physical Exam   Constitutional: She is oriented to person, place, and time. She appears well-developed and well-nourished.   HENT:   Head: Normocephalic and atraumatic.   Eyes: EOM are normal. Pupils are equal, round, and reactive to light.   Neck: Normal range of motion. Neck supple.   Cardiovascular: Normal rate and regular rhythm.    Pulmonary/Chest: Breath sounds normal.   Abdominal: Soft. Bowel sounds are normal.   Musculoskeletal: Normal range of motion.   Neurological: She is alert and oriented to person, place, and time.   Skin: Skin is warm and dry.   Psychiatric: She has a normal mood and affect.     Results Review:  I have reviewed the labs, radiology results, and diagnostic studies.    Laboratory Data:     Results from last 7 days  Lab Units 12/13/17  0616 12/12/17  1321 12/12/17  1247   SODIUM mmol/L 138  --  138   SODIUM, ARTERIAL mmol/L  --  138.4  --    POTASSIUM mmol/L 2.9*  --   3.0*   CHLORIDE mmol/L 100  --  100   CO2 mmol/L 26.0  --  25.0   BUN mg/dL 9  --  7   CREATININE mg/dL 0.79  --  0.86   GLUCOSE mg/dL 131*  --  159*   GLUCOSE, ARTERIAL mmol/L  --  154  --    CALCIUM mg/dL 9.2  --  9.8   BILIRUBIN mg/dL  --   --  0.8   ALK PHOS U/L  --   --  97   ALT (SGPT) U/L  --   --  41   AST (SGOT) U/L  --   --  38*   ANION GAP mmol/L 12.0  --  13.0     Estimated Creatinine Clearance: 75.1 mL/min (by C-G formula based on Cr of 0.79).            Results from last 7 days  Lab Units 12/13/17  0616 12/12/17  1247   WBC 10*3/mm3 10.86* 16.01*   HEMOGLOBIN g/dL 10.1* 11.0*   HEMATOCRIT % 31.5* 33.4*   PLATELETS 10*3/mm3 281 312       Results from last 7 days  Lab Units 12/12/17  1247   INR  1.03       Culture Data:   Blood Culture   Date Value Ref Range Status   12/12/2017 No growth at less than 24 hours  Preliminary   12/12/2017 No growth at less than 24 hours  Preliminary     No results found for: URINECX  No results found for: RESPCX  No results found for: WOUNDCX  No results found for: STOOLCX  No components found for: BODYFLD    Radiology Data:   Imaging Results (last 24 hours)     Procedure Component Value Units Date/Time    XR Chest 1 View [[id number]] Collected:  12/12/17 1218     Updated:  12/12/17 1238    Narrative:       PROCEDURE: One view chest    COMPARISON: Two-view chest May 19, 2016.    HISTORY: Shortness of breath    FINDINGS: Single portable view of the chest is obtained. The  heart and mediastinal contours are within normal limits.  Pacemaker defibrillator device is stable. There are right basilar  opacities. The left lung is clear..      Impression:       1. Right basilar opacities favor atelectasis over pneumonia..    Electronically signed by:  Golden Newton MD  12/12/2017  12:37 PM CST Workstation: [id number]    CT Angiogram Chest With Contrast [[id number]] Collected:  12/12/17 1412     Updated:  12/12/17 1431    Narrative:         EXAM:  Computed Tomography with CTA          REGION:  Chest       INDICATION:    dyspnea     - rule out pulmonary embolism       CLINICAL HISTORY:  CORRELATIVE IMAGING:  None                         TECHNIQUE:     - PE / vascular protocol     - reconstructions:  axial, coronal, sagittal, obliques     - computer-generated 3D reconstructions (MIPS) were performed.     - contrast:  intravenous Isovue 370, 74 mL                                 This exam was performed according to the departmental  dose-optimization program which includes automated exposure  control, adjustment of the mA and/or kV according to patient size  and/or use of iterative reconstruction technique.         COMMENTS:    - Pulmonary arterial system:      - Main pulmonary artery trunk:  negative      - Left, right main pulmonary arteries: negative      - Lobar arteries: negative       - Segmental arteries: negative      - Systemic vascularity (as visualized):        - Aorta:  grossly negative / normal caliber / no dissection        - roots of great vessels:  grossly negative / normal  caliber        - SVC / IVC:  grossly negative / normal caliber     - Misc (limited visualization):      - pulmonary parenchyma:  negative      - pleura:  Small bilateral pleural fluid collections.      - mediastinal / cecilia:  negative      - neck, inferior:  grossly wnl      - subdiaphragmatic structures:  grossly negative (limited  evaluation)       - osseous:      - misc:       .        Impression:       CONCLUSION:          1.  No evidence of pulmonary embolism.            2.  No evidence of pathology associated with the visualized  aorta.      3. Small bilateral pleural fluid collections.                                                           Electronically signed by:  ADDIE Blake MD  12/12/2017 2:30  PM CST Workstation: [id number]          I have reviewed the patient current medications.     Assessment/Plan     Plan:    1.  HFrEF:  Nonischemic cardiomyopathy with EF 25%.  Sodium and fluid  restrictions.  Continue Lasix 40 mg twice daily. Echo pending.  Xray indeterminate for pneumonia.  Continue Azithromycin and Rocephin due to leukocytosis.    2.  Hypokalemia:  Start K+ protocol.   3.  Diabetes mellitus, type II:  Continue SSI        Discharge Planning: I expect patient to be discharged to home in 2-3 days.      This document has been electronically signed by MARYANNE Strong on December 13, 2017 11:18 AM

## 2017-12-13 NOTE — CONSULTS
"  Cardiology Consultation Note.        Patient Name: Lexi Wade  Age/Sex: 67 y.o. female  : 1950  MRN: [id number]    Date of consultation: 2017  Consulting Physician: Wang Felipe MD  Primary care Physician: Joyce Plata MD  Requesting Physician:    John Shaffer MD   Reason for consultation:  Shortness of breath non-ischemic cardiomyopathy  Subjective:       Chief Complaint: Shortness of breath    History of Present Illness:  Lexi Wade is a 67 y.o. female     Body mass index is 41.75 kg/(m^2). with a PMH significant for nonischemic cardiomyopathy with severe left ventricular systolic dysfunction with an ejection fraction of 25%, with previous coronary angiogram done in 10/2010, which did not reveal any evidence of obstructive epicardial CAD with moderate mitral regurgitation, moderate tricuspid regurgitation, left ventricular enlargement, morbid obesity, arterial hypertension, hyperlipidemia, status post [employer] biventricular AICD Unify, model #3231, serial #[id number], done by Dr. Shepherd 2012, with history of pancreatitis, chronic renal insufficiency, exogenous obesity with BMI 36 with weight 95 kg and height 64\", history of GERD, reactive airways disease, with nuclear Cardiolite stress test done in 2012 which was positive for fixed and reversible ischemia with an ejection fraction of 42%.     Patient was the last 3-4 days has been having increasing episodes of shortness of breath with bilateral lower extremity edema.  Patient has had some symptoms of chest tightness.  Patient on questioning has been eating extra amount of salt.  Patient denies any fever or chill patient denies any hemoptysis hematuria bright red blood per rectum.  Patient denies any delivery of shock from the AICD.  Patient denies any prolonged episodes of palpitation lightheaded dizziness or complete loss of consciousness.    Patient 10 point review of system except for what is stated in the history of " present illness is negative.          Past Medical History:  1. Shortness of breath   2. Nuclear Cardiolite stress test done in 12/2012 which had revealed an ejection fraction of 42% with fixed and reversible defects globally   3. Coronary angiogram done in 2010 which had not revealed any evidence of any obstructive epicardial CAD   4. Nonischemic dilated cardiomyopathy with an ejection fraction of 25% with improvement of the ejection fraction to 42%  5. Moderate mitral regurgitation and moderate tricuspid regurgitation   6. Status post [employer] biventricular AICD generator replacement done in 07/2012 with [employer] AICD model #[id number], serial #[id number] done in 07/2012   7. Exogenous obesity with BMI of 34   8. Arterial hypertension   9. Hypertensive heart disease   10. Noninsulin dependent diabetes   11. GERD   12. Hyperlipidemia   13. History of pancreatitis   14. History of renal insufficiency   15. History of reactive airway disease   16.  Negative CT of the chest for any evidence of any pulmonary embolism.      PSH:   1. Dilatation and curettage done in 07/2010   2. Status post [employer] biventricular AICD placement, model #[id number], serial #[id number] done in 07/2012 3. Coronary angiogram done with the last coronary angiogram done in 2010   4. Colonoscopy   5. Cataract surgery     SH: Denies any tobacco or alcohol intake.     FH: Significant for malignancy.     CARDIAC RISK FACTORS:   1. Postmenopausal   2. Arterial hypertension   3. Hyperlipidemia   4. Diabetes   5. Obesity         Allergies:  Allergies   Allergen Reactions   • Aldactone [Spironolactone]    • Nsaids        Medication::  Prescriptions Prior to Admission   Medication Sig Dispense Refill Last Dose   • aspirin 81 MG tablet Take 81 mg by mouth every night.   Past Week at Unknown time   • atorvastatin (LIPITOR) 40 MG tablet Take 1 tablet by mouth Daily. 90 tablet 3 Past Week at Unknown time   • B-D UF III MINI PEN NEEDLES 31G X 5 MM misc USE DAILY WITH  VICTOZA 100 each 3 Past Week at Unknown time   • brimonidine (ALPHAGAN) 0.2 % ophthalmic solution Administer 1 drop to both eyes 2 (Two) Times a Day. 5 mL 3 Past Week at Unknown time   • carvedilol (COREG) 25 MG tablet Take 1 tablet by mouth 2 (Two) Times a Day With Meals. 180 tablet 3 Past Week at Unknown time   • digoxin (LANOXIN) 125 MCG tablet TAKE 1 TABLET BY MOUTH DAILY 90 tablet 3 12/11/2017 at Unknown time   • esomeprazole (nexIUM) 40 MG capsule Take 40 mg by mouth Daily.   Past Week at Unknown time   • furosemide (LASIX) 80 MG tablet Take 1 tablet by mouth Daily. 90 tablet 3 Past Week at Unknown time   • glimepiride (AMARYL) 4 MG tablet Take 1 tablet by mouth 2 (Two) Times a Day. 180 tablet 3 Past Week at Unknown time   • latanoprost (XALATAN) 0.005 % ophthalmic solution Administer 1 drop to both eyes Every Night. 90 day supply. 2.5 mL 5 Past Week at Unknown time   • magnesium oxide (MAGOX) 400 (241.3 Mg) MG tablet tablet Take 1 tablet by mouth Daily. 90 each 3 Past Week at Unknown time   • metFORMIN (GLUCOPHAGE) 1000 MG tablet Take 1 tablet by mouth 2 (Two) Times a Day With Meals. 180 tablet 3 Past Week at Unknown time   • omega-3 acid ethyl esters (LOVAZA) 1 G capsule Take 1 g by mouth 2 (Two) Times a Day. 2 capsules bid   Past Week at Unknown time   • potassium chloride (MICRO-K) 10 MEQ CR capsule Take 1 capsule by mouth 2 (Two) Times a Day. 180 capsule 3 12/11/2017 at Unknown time   • pregabalin (LYRICA) 150 MG capsule Take 1 capsule by mouth 2 (Two) Times a Day. 180 capsule 0 Past Week at Unknown time   • vitamin D (ERGOCALCIFEROL) 58714 UNITS capsule capsule Take 1 capsule by mouth 1 (One) Time Per Week. 12 capsule 3 Past Week at Unknown time   • ZETIA 10 MG tablet TAKE 1 TABELT BY MOUTH DAILY 90 tablet 2 Past Week at Unknown time   • fluconazole (DIFLUCAN) 150 MG tablet Take one po today and take one po in 4 days. 2 tablet 11 Taking   • Liraglutide (VICTOZA) 18 MG/3ML solution pen-injector injection  Inject 1.2 mg under the skin Daily. 6 pen 3 Taking   • lisinopril (PRINIVIL,ZESTRIL) 10 MG tablet Take 1 tablet by mouth Daily. 90 tablet 3 Taking           Review of Systems:       Constitutional:  Denies recent weight loss, weight gain, fever or chills, no change in exercise tolerance     HENT:  Denies any hearing loss, epistaxis, hoarseness, or difficulty speaking.     Eyes: Wears eyeglasses or contact lenses     Respiratory:  Denies dyspnea with exertion,no cough, wheezing, or hemoptysis.     Cardiovascular: Positive for chest pain and shortness of breath.  Negative for palpitations,  orthopnea, PND, peripheral edema, syncope, or claudication.     Gastrointestinal:  Denies change in bowel habits, dyspepsia, ulcer disease, hematochezia, or melena.  No nausea, no vomiting, no hematemesis, no diarrhea or constipation, no melena      Endocrine: Negative for cold intolerance, heat intolerance, polydipsia, polyphagia and polyuria. Denies any history of weight change, heat/cold intolerance, polydipsia, polyuria     Genitourinary: Negative for hematuria.      Musculoskeletal: Denies any history of arthritic symptoms or back problems .  No joint pain, joint stiffness, joint swelling, muscle pain, muscle weakness and neck pain    Skin:  Denies any change in hair or nails, rashes, or skin lesions.     Allergic/Immunologic: Negative.  Negative for environmental allergies, food allergies and immunocompromised state.     Neurological:  Denies any history of recurrent headaches, strokes, TIA, or seizure disorder.     Hematological: Denies excessive bleeding, easy bruising, fatigue, lymphadenopathy and petechiae or any bleeding disorders, or lymphadenopathy.     Psychiatric/Behavioral: Denies any history of depression, substance abuse, or change in cognitive function. Denies any psychomotor reaction or tangential thought.  No depression, homicidal ideations and suicidal ideations    Endocrine: No frequent urination and  nocturia, temperature intolerance, weight gain, unintended and weight loss, unintended            Objective:     Objective:  Vitals:    12/12/17 1735   BP: 180/90   Pulse: 106   Resp: 20   Temp: 99.2 °F (37.3 °C)   SpO2: 92%     .    Body mass index is 41.75 kg/(m^2).           Physical Exam:   General Appearance:    Alert, oriented, cooperative, in no acute distress   Head:    Normocephalic, atraumatic, without obvious abnormality   Eyes:           DEONDRE  Lids and lashes normal, conjunctivae and sclerae normal, no icterus, no pallor   Ears:    Ears appear intact with no abnormalities noted   Throat:   Mucous membranes pink and moist   Neck:   Supple, trachea midline, no carotid bruit, no organomegaly or JVD   Lungs:     Clear to auscultation and percussion, respirations regular, even and Unlabored. No wheezes, rales, rhonchi    Heart:    Regular rhythm and normal rate, normal S1 and S2, no            murmur, no gallop, no rub, no click   Abdomen:     Soft, non-tender, non-distended, no guarding, no rebound tenderness, Normal bowel sounds in all four quadrants, no masses, liver and spleen nonpalpable,    Genitalia:    Deferred   Extremities:   Moves all extremities well, no edema, no cyanosis, no              Redness, no clubbing   Pulses:   Pulses palpable and equal bilaterally   Skin:   Moist and warm. No bleeding, bruising or rash   Neurologic/Psychiatric:   Alert and oriented to person, place, and time.  Motor, power and tone in upper and lower extremities are grossly intact.  No focal neurological deficits. Normal cognitive function. No psychomotor reaction or tangential thought. No depression, homicidal ideations and suicidal ideations           Lab Review:       Results from last 7 days  Lab Units 12/12/17  1321 12/12/17  1247   SODIUM mmol/L  --  138   SODIUM, ARTERIAL mmol/L 138.4  --    POTASSIUM mmol/L  --  3.0*   CHLORIDE mmol/L  --  100   CO2 mmol/L  --  25.0   BUN mg/dL  --  7   CREATININE mg/dL  --   0.86   CALCIUM mg/dL  --  9.8   BILIRUBIN mg/dL  --  0.8   ALK PHOS U/L  --  97   ALT (SGPT) U/L  --  41   AST (SGOT) U/L  --  38*   GLUCOSE mg/dL  --  159*   GLUCOSE, ARTERIAL mmol/L 154  --        Results from last 7 days  Lab Units 12/12/17  1614 12/12/17  1247   TROPONIN I ng/mL 0.031 0.020           Results from last 7 days  Lab Units 12/12/17  1247   WBC 10*3/mm3 16.01*   HEMOGLOBIN g/dL 11.0*   HEMATOCRIT % 33.4*   PLATELETS 10*3/mm3 312       Results from last 7 days  Lab Units 12/12/17  1247   INR  1.03   APTT seconds 32.6                   Invalid input(s):  T4,  FREET4    EKG:   ECG/EMG Results (last 24 hours)     Procedure Component Value Units Date/Time    SCANNED EKG [[id number]] Resulted:  12/12/17      Updated:  12/12/17 1234    ECG 12 Lead [[id number]] Collected:  12/12/17 1214     Updated:  12/12/17 1315          Imaging:  Imaging Results (last 24 hours)     Procedure Component Value Units Date/Time    XR Chest 1 View [[id number]] Collected:  12/12/17 1218     Updated:  12/12/17 1238    Narrative:       PROCEDURE: One view chest    COMPARISON: Two-view chest May 19, 2016.    HISTORY: Shortness of breath    FINDINGS: Single portable view of the chest is obtained. The  heart and mediastinal contours are within normal limits.  Pacemaker defibrillator device is stable. There are right basilar  opacities. The left lung is clear..      Impression:       1. Right basilar opacities favor atelectasis over pneumonia..    Electronically signed by:  Goldne Newton MD  12/12/2017  12:37 PM CST Workstation: [id number]    CT Angiogram Chest With Contrast [[id number]] Collected:  12/12/17 1412     Updated:  12/12/17 1431    Narrative:         EXAM:  Computed Tomography with CTA         REGION:  Chest       INDICATION:    dyspnea     - rule out pulmonary embolism       CLINICAL HISTORY:  CORRELATIVE IMAGING:  None                         TECHNIQUE:     - PE / vascular protocol     - reconstructions:  axial,  coronal, sagittal, obliques     - computer-generated 3D reconstructions (MIPS) were performed.     - contrast:  intravenous Isovue 370, 74 mL                                 This exam was performed according to the departmental  dose-optimization program which includes automated exposure  control, adjustment of the mA and/or kV according to patient size  and/or use of iterative reconstruction technique.         COMMENTS:    - Pulmonary arterial system:      - Main pulmonary artery trunk:  negative      - Left, right main pulmonary arteries: negative      - Lobar arteries: negative       - Segmental arteries: negative      - Systemic vascularity (as visualized):        - Aorta:  grossly negative / normal caliber / no dissection        - roots of great vessels:  grossly negative / normal  caliber        - SVC / IVC:  grossly negative / normal caliber     - Misc (limited visualization):      - pulmonary parenchyma:  negative      - pleura:  Small bilateral pleural fluid collections.      - mediastinal / cecilia:  negative      - neck, inferior:  grossly wnl      - subdiaphragmatic structures:  grossly negative (limited  evaluation)       - osseous:      - misc:       .        Impression:       CONCLUSION:          1.  No evidence of pulmonary embolism.            2.  No evidence of pathology associated with the visualized  aorta.      3. Small bilateral pleural fluid collections.                                                           Electronically signed by:  ADDIE Blake MD  12/12/2017 2:30  PM CST Workstation: [id number]          I personally viewed and interpreted the patient's EKG/Telemetry data.    Assessment:   1.  Shortness of breath.  2.  Nonischemic cardiomyopathy.  3.  Arterial hypertension.  4.  Hypertensive heart disease.        Plan:   1. Nonischemic cardiomyopathy with previous coronary angiogram which had not revealed any evidence of obstructive epicardial coronary artery disease, with improvement in  the left ventricular systolic function, with moderate mitral and moderate tricuspid regurgitation, status post [employer] Unify biventricular automatic implantable cardioverter defibrillator placement done in 2012, model #3231, serial #[id number]. Patient, on specific questioning, had indicated has excessive amount of salt. Patient would be started on Aldactone 25 mg daily and continued on the Lasix.  Patient has been taking excessive amount of fluid subsequently, leading to decompensation of the systolic heart failure with symptoms of shortness of breath. At the present time, patient has been recommended to be compliant with her dietary restriction of salt intake and to continue with the present dose of the Coreg, lisinopril, Lasix, and digoxin. Patient has been counseled extensively on dietary restriction and fluid restriction to prevent decompensation of the systolic heart failure. Patient clinically, at the present time, is not in congestive heart failure after the administration of the Lasix.     2. Atypical symptoms of chest pain with indeterminate troponin with previous history of coronary angiogram, which had not revealed evidence of any obstructive epicardial coronary artery disease with the nuclear Cardiolite stress test done in 12/2012, which had revealed fixed and reversible defect globally with an ejection fraction of 42%. As the patient did not have any focal localized reversible ischemia with fixed defects suggestive of nonischemic cardiomyopathy, patient was subsequently treated medically. Patient currently is not having symptoms of substernal chest pain. Patient 's chest discomfort was precipitated after coughing, and  patient will be treated medically.     3. Moderate mitral regurgitation and moderate tricuspid regurgitation with left ventricular systolic dysfunction with an improvement in ejection fraction from 25% to 42%. Clinically at the present time, patient is in compensated systolic heart failure  and will continue with the present dose of Coreg, digoxin, Lasix.  Patient will be started on Aldactone.    4. Hyperlipidemia. Patient does have LPa which is elevated and elevated LDL. Patient is currently on pravastatin along with Zetia and fish oil supplement. Patient has been counseled extensively on dietary modification, low-fat low-cholesterol diet, and to be compliant with the low-cholesterol diet.     5. Obesity with body mass index of 36. Patient has been counseled extensively on weight reduction, dietary modification, lifestyle modification, and regular exercise.     6. Status post biventricular automatic implantable cardioverter defibrillator placement, [employer] Unify, model #3231 40CRT, serial #[id number]. Patient's automatic implantable cardioverter defibrillator last interrogation had revealed a battery voltage which was adequate with the battery current of 17 milliamps. The P-wave sensing was 3.5 and the R-wave sensing was 12 mV. The lead impedance in the atrium was 350, the lead impedance in the right ventricle was 490, and the lead impedance in the coronary sinus lead was 490. The threshold in the right atrium was 0.75 volts at 0.5 msec, in the right ventricle was 0.5 volts at 0.5 msec, and the coronary sinus left ventricular lead was 0.75 volts at 0.5 msec. Patient was 2% of the time atrial paced. Patient did not have any evidence of any tachyrhythmia with the pacing mode DDDR with a lower rate of 60 and upper rate of 120. Charge time for delivery of therapy was 8.9 seconds with appropriate sensing and pacing function with adequate battery result. 7. Patient, at the present time, is stable from the cardiac standpoint, and patient will ambulate in the hallway, and if the patient does not have any further recurrence of chest discomfort, patient would be stable enough to be discharged home with emphasis on being compliant with dietary restriction of salt intake and fluid intake.     7.  Bilateral leg  discomfort.  Patient underwent a CTA of the chest which did not reveal off any evidence of any pulmonary embolism.    8.  Hypokalemia.  Patient would be supplemented with potassium and would follow the potassium.            Time: time spent in face-to-face evaluation of greater than 55  minutes and interacting and formulating examining and discussing the plan with the patient with 50% of greater time spent in face-to-face interaction.    Wang Felipe MD  12/12/17  6:06 PM      Dictated utilizing Dragon dictation.

## 2017-12-13 NOTE — PLAN OF CARE
Problem: Patient Care Overview (Adult)  Goal: Plan of Care Review  Outcome: Ongoing (interventions implemented as appropriate)  Goal: Adult Individualization and Mutuality  Outcome: Ongoing (interventions implemented as appropriate)  Goal: Discharge Needs Assessment  Outcome: Ongoing (interventions implemented as appropriate)    Problem: Fall Risk (Adult)  Goal: Identify Related Risk Factors and Signs and Symptoms  Outcome: Outcome(s) achieved Date Met:  12/13/17  Goal: Absence of Falls  Outcome: Ongoing (interventions implemented as appropriate)    Problem: Cardiac: Heart Failure (Adult)  Goal: Signs and Symptoms of Listed Potential Problems Will be Absent or Manageable (Cardiac: Heart Failure)  Outcome: Ongoing (interventions implemented as appropriate)    Problem: Pain, Acute (Adult)  Goal: Identify Related Risk Factors and Signs and Symptoms  Outcome: Outcome(s) achieved Date Met:  12/13/17  Goal: Acceptable Pain Control/Comfort Level  Outcome: Ongoing (interventions implemented as appropriate)

## 2017-12-14 ENCOUNTER — APPOINTMENT (OUTPATIENT)
Dept: GENERAL RADIOLOGY | Facility: HOSPITAL | Age: 67
End: 2017-12-14

## 2017-12-14 LAB
ALBUMIN SERPL-MCNC: 3.5 G/DL (ref 3.4–4.8)
ALBUMIN/GLOB SERPL: 1.2 G/DL (ref 1.1–1.8)
ALP SERPL-CCNC: 83 U/L (ref 38–126)
ALT SERPL W P-5'-P-CCNC: 46 U/L (ref 9–52)
ANION GAP SERPL CALCULATED.3IONS-SCNC: 13 MMOL/L (ref 5–15)
AST SERPL-CCNC: 22 U/L (ref 14–36)
BASOPHILS # BLD AUTO: 0.02 10*3/MM3 (ref 0–0.2)
BASOPHILS NFR BLD AUTO: 0.2 % (ref 0–2)
BILIRUB SERPL-MCNC: 0.4 MG/DL (ref 0.2–1.3)
BUN BLD-MCNC: 13 MG/DL (ref 7–21)
BUN/CREAT SERPL: 15.3 (ref 7–25)
CALCIUM SPEC-SCNC: 9.2 MG/DL (ref 8.4–10.2)
CHLORIDE SERPL-SCNC: 104 MMOL/L (ref 95–110)
CO2 SERPL-SCNC: 25 MMOL/L (ref 22–31)
CREAT BLD-MCNC: 0.85 MG/DL (ref 0.5–1)
DEPRECATED RDW RBC AUTO: 47.2 FL (ref 36.4–46.3)
DIGOXIN SERPL-MCNC: 0.7 NG/ML (ref 0.8–2)
EOSINOPHIL # BLD AUTO: 0.27 10*3/MM3 (ref 0–0.7)
EOSINOPHIL NFR BLD AUTO: 2.4 % (ref 0–7)
ERYTHROCYTE [DISTWIDTH] IN BLOOD BY AUTOMATED COUNT: 15.2 % (ref 11.5–14.5)
GFR SERPL CREATININE-BSD FRML MDRD: 80 ML/MIN/1.73 (ref 45–104)
GLOBULIN UR ELPH-MCNC: 2.9 GM/DL (ref 2.3–3.5)
GLUCOSE BLD-MCNC: 148 MG/DL (ref 60–100)
GLUCOSE BLDC GLUCOMTR-MCNC: 155 MG/DL (ref 70–130)
GLUCOSE BLDC GLUCOMTR-MCNC: 248 MG/DL (ref 70–130)
GLUCOSE BLDC GLUCOMTR-MCNC: 260 MG/DL (ref 70–130)
GLUCOSE BLDC GLUCOMTR-MCNC: 300 MG/DL (ref 70–130)
HCT VFR BLD AUTO: 30.8 % (ref 35–45)
HGB BLD-MCNC: 10 G/DL (ref 12–15.5)
IMM GRANULOCYTES # BLD: 0.02 10*3/MM3 (ref 0–0.02)
IMM GRANULOCYTES NFR BLD: 0.2 % (ref 0–0.5)
LYMPHOCYTES # BLD AUTO: 4.03 10*3/MM3 (ref 0.6–4.2)
LYMPHOCYTES NFR BLD AUTO: 35.5 % (ref 10–50)
MCH RBC QN AUTO: 27.5 PG (ref 26.5–34)
MCHC RBC AUTO-ENTMCNC: 32.5 G/DL (ref 31.4–36)
MCV RBC AUTO: 84.8 FL (ref 80–98)
MONOCYTES # BLD AUTO: 1.4 10*3/MM3 (ref 0–0.9)
MONOCYTES NFR BLD AUTO: 12.3 % (ref 0–12)
NEUTROPHILS # BLD AUTO: 5.6 10*3/MM3 (ref 2–8.6)
NEUTROPHILS NFR BLD AUTO: 49.4 % (ref 37–80)
PLATELET # BLD AUTO: 284 10*3/MM3 (ref 150–450)
PMV BLD AUTO: 9.5 FL (ref 8–12)
POTASSIUM BLD-SCNC: 4 MMOL/L (ref 3.5–5.1)
PROT SERPL-MCNC: 6.4 G/DL (ref 6.3–8.6)
RBC # BLD AUTO: 3.63 10*6/MM3 (ref 3.77–5.16)
SODIUM BLD-SCNC: 142 MMOL/L (ref 137–145)
WBC NRBC COR # BLD: 11.34 10*3/MM3 (ref 3.2–9.8)

## 2017-12-14 PROCEDURE — 71020 HC CHEST PA AND LATERAL: CPT

## 2017-12-14 PROCEDURE — G8989 SELF CARE D/C STATUS: HCPCS

## 2017-12-14 PROCEDURE — 80053 COMPREHEN METABOLIC PANEL: CPT | Performed by: INTERNAL MEDICINE

## 2017-12-14 PROCEDURE — G8978 MOBILITY CURRENT STATUS: HCPCS

## 2017-12-14 PROCEDURE — 82962 GLUCOSE BLOOD TEST: CPT

## 2017-12-14 PROCEDURE — G8979 MOBILITY GOAL STATUS: HCPCS

## 2017-12-14 PROCEDURE — G8987 SELF CARE CURRENT STATUS: HCPCS

## 2017-12-14 PROCEDURE — 97116 GAIT TRAINING THERAPY: CPT

## 2017-12-14 PROCEDURE — 85025 COMPLETE CBC W/AUTO DIFF WBC: CPT | Performed by: FAMILY MEDICINE

## 2017-12-14 PROCEDURE — G8988 SELF CARE GOAL STATUS: HCPCS

## 2017-12-14 PROCEDURE — 25010000002 AZITHROMYCIN PER 500 MG: Performed by: FAMILY MEDICINE

## 2017-12-14 PROCEDURE — 25010000002 FUROSEMIDE PER 20 MG: Performed by: FAMILY MEDICINE

## 2017-12-14 PROCEDURE — 97165 OT EVAL LOW COMPLEX 30 MIN: CPT

## 2017-12-14 PROCEDURE — 97161 PT EVAL LOW COMPLEX 20 MIN: CPT

## 2017-12-14 PROCEDURE — 25010000002 CEFTRIAXONE PER 250 MG: Performed by: FAMILY MEDICINE

## 2017-12-14 PROCEDURE — 80162 ASSAY OF DIGOXIN TOTAL: CPT | Performed by: INTERNAL MEDICINE

## 2017-12-14 PROCEDURE — 63710000001 INSULIN ASPART PER 5 UNITS: Performed by: FAMILY MEDICINE

## 2017-12-14 RX ORDER — GLIPIZIDE 10 MG/1
10 TABLET ORAL
Status: DISCONTINUED | OUTPATIENT
Start: 2017-12-14 | End: 2017-12-16 | Stop reason: HOSPADM

## 2017-12-14 RX ADMIN — CEFTRIAXONE SODIUM 1 G: 1 INJECTION, POWDER, FOR SOLUTION INTRAMUSCULAR; INTRAVENOUS at 17:30

## 2017-12-14 RX ADMIN — PANTOPRAZOLE SODIUM 40 MG: 40 TABLET, DELAYED RELEASE ORAL at 06:04

## 2017-12-14 RX ADMIN — CARVEDILOL 25 MG: 25 TABLET, FILM COATED ORAL at 08:21

## 2017-12-14 RX ADMIN — INSULIN ASPART 4 UNITS: 100 INJECTION, SOLUTION INTRAVENOUS; SUBCUTANEOUS at 12:25

## 2017-12-14 RX ADMIN — LISINOPRIL 10 MG: 10 TABLET ORAL at 08:21

## 2017-12-14 RX ADMIN — INSULIN ASPART 7 UNITS: 100 INJECTION, SOLUTION INTRAVENOUS; SUBCUTANEOUS at 20:17

## 2017-12-14 RX ADMIN — PREGABALIN 150 MG: 75 CAPSULE ORAL at 18:29

## 2017-12-14 RX ADMIN — GLIPIZIDE 10 MG: 10 TABLET ORAL at 18:29

## 2017-12-14 RX ADMIN — PREGABALIN 150 MG: 75 CAPSULE ORAL at 08:21

## 2017-12-14 RX ADMIN — ATORVASTATIN CALCIUM 40 MG: 40 TABLET, FILM COATED ORAL at 08:21

## 2017-12-14 RX ADMIN — CARVEDILOL 25 MG: 25 TABLET, FILM COATED ORAL at 18:29

## 2017-12-14 RX ADMIN — FUROSEMIDE 40 MG: 10 INJECTION, SOLUTION INTRAMUSCULAR; INTRAVENOUS at 08:21

## 2017-12-14 RX ADMIN — ASPIRIN 81 MG 81 MG: 81 TABLET ORAL at 08:25

## 2017-12-14 RX ADMIN — POTASSIUM CHLORIDE 40 MEQ: 750 CAPSULE, EXTENDED RELEASE ORAL at 03:11

## 2017-12-14 RX ADMIN — FUROSEMIDE 40 MG: 10 INJECTION, SOLUTION INTRAMUSCULAR; INTRAVENOUS at 18:30

## 2017-12-14 RX ADMIN — METFORMIN HYDROCHLORIDE 1000 MG: 500 TABLET ORAL at 18:29

## 2017-12-14 RX ADMIN — AZITHROMYCIN 500 MG: 500 INJECTION, POWDER, LYOPHILIZED, FOR SOLUTION INTRAVENOUS at 18:30

## 2017-12-14 RX ADMIN — INSULIN ASPART 2 UNITS: 100 INJECTION, SOLUTION INTRAVENOUS; SUBCUTANEOUS at 08:25

## 2017-12-14 RX ADMIN — DIGOXIN 125 MCG: 125 TABLET ORAL at 08:21

## 2017-12-14 NOTE — PLAN OF CARE
Problem: Patient Care Overview (Adult)  Goal: Plan of Care Review  Outcome: Ongoing (interventions implemented as appropriate)    12/14/17 1328   Coping/Psychosocial Response Interventions   Plan Of Care Reviewed With patient   Outcome Evaluation   Outcome Summary/Follow up Plan PT evaluation completed today. Pt is independent with bed mobility and transfers and short gait in room but is limited by decreased endurance and bilateral heel pain. Pt used RW to ambulate in hallway for comfort and SpO2 dropped to 90% at lowest point without supplemental O2. She will benefit from cont skilled PT prior to d.c home with          Problem: Inpatient Physical Therapy  Goal: Transfer Training Goal 1 STG- PT  Outcome: Ongoing (interventions implemented as appropriate)    12/14/17 1328   Transfer Training PT STG   Transfer Training PT STG, Date Established 12/14/17   Transfer Training PT STG, Time to Achieve 1 wk   Transfer Training PT STG, Activity Type bed to chair /chair to bed;sit to stand/stand to sit;toilet   Transfer Training PT STG, Marcus Level independent       Goal: Gait Training Goal STG- PT  Outcome: Ongoing (interventions implemented as appropriate)    12/14/17 1328   Gait Training PT STG   Gait Training Goal PT STG, Date Established 12/14/17   Gait Training Goal PT STG, Time to Achieve 1 wk   Gait Training Goal PT STG, Marcus Level independent   Gait Training Goal PT STG, Distance to Achieve 300 ft with SpO2 >/= 92%

## 2017-12-14 NOTE — PROGRESS NOTES
[facility] Medicine Services  INPATIENT PROGRESS NOTE    Length of Stay: 2  Date of Admission: 12/12/2017  Primary Care Physician: Joyce Plata MD    Subjective   Chief Complaint/HPI:  This 67-year-old -American female reported to the emergency part was subsequently admitted to hospitalist services with complaints of shortness of air, dyspnea upon exertion, orthopnea.  Patient has history of congestive heart failure and it is reported that she had been taking less of her medication because of frequent urination.  Patient underwent CTA of the pulmonary arteries which ruled out pulmonary embolism.  Patient has been on twice a day IV Lasix.  States she is doing much better.  At this time we'll repeat PA and lateral chest x-ray, will restart diabetic medications which were held secondary to contrast exposure.  If patient continues to do well and does well with physical therapy and occupational therapy, suspect discharge within 24 hours.    Review of Systems   Constitutional: Negative for chills and fever.   Respiratory: Negative for chest tightness and wheezing.         Shortness of air much improved   Cardiovascular: Negative for chest pain.   Gastrointestinal: Negative for abdominal pain, constipation, diarrhea, nausea and vomiting.   Neurological: Negative for dizziness, syncope and light-headedness.      All pertinent negatives and positives are as above. All other systems have been reviewed and are negative unless otherwise stated.     Objective    Temp:  [97.6 °F (36.4 °C)-98.4 °F (36.9 °C)] 97.6 °F (36.4 °C)  Heart Rate:  [71-86] 71  Resp:  [18-20] 20  BP: (109-122)/(53-63) 112/63    Physical Exam   Constitutional: She is oriented to person, place, and time. She appears well-developed and well-nourished. No distress.   HENT:   Head: Normocephalic and atraumatic.   Eyes: Conjunctivae are normal.   Cardiovascular: Normal rate and regular rhythm.     Pulmonary/Chest: Effort normal. No respiratory distress. She has no wheezes.   Abdominal: Soft. Bowel sounds are normal. She exhibits no distension. There is no tenderness.   Neurological: She is alert and oriented to person, place, and time.   Skin: Skin is warm and dry. She is not diaphoretic.   Psychiatric: She has a normal mood and affect. Her behavior is normal.     Results Review:  I have reviewed the labs, radiology results, and diagnostic studies.    Laboratory Data:     Results from last 7 days  Lab Units 12/14/17  0654 12/13/17  2122 12/13/17  0616 12/12/17  1321 12/12/17  1247   SODIUM mmol/L 142  --  138  --  138   SODIUM, ARTERIAL mmol/L  --   --   --  138.4  --    POTASSIUM mmol/L 4.0 3.5 2.9*  --  3.0*   CHLORIDE mmol/L 104  --  100  --  100   CO2 mmol/L 25.0  --  26.0  --  25.0   BUN mg/dL 13  --  9  --  7   CREATININE mg/dL 0.85  --  0.79  --  0.86   GLUCOSE mg/dL 148*  --  131*  --  159*   GLUCOSE, ARTERIAL mmol/L  --   --   --  154  --    CALCIUM mg/dL 9.2  --  9.2  --  9.8   BILIRUBIN mg/dL 0.4  --   --   --  0.8   ALK PHOS U/L 83  --   --   --  97   ALT (SGPT) U/L 46  --   --   --  41   AST (SGOT) U/L 22  --   --   --  38*   ANION GAP mmol/L 13.0  --  12.0  --  13.0     Estimated Creatinine Clearance: 70.3 mL/min (by C-G formula based on Cr of 0.85).            Results from last 7 days  Lab Units 12/14/17  0654 12/13/17  0616 12/12/17  1247   WBC 10*3/mm3 11.34* 10.86* 16.01*   HEMOGLOBIN g/dL 10.0* 10.1* 11.0*   HEMATOCRIT % 30.8* 31.5* 33.4*   PLATELETS 10*3/mm3 284 281 312       Results from last 7 days  Lab Units 12/12/17  1247   INR  1.03       Culture Data:   Blood Culture   Date Value Ref Range Status   12/12/2017 No growth at 2 days  Preliminary   12/12/2017 No growth at 2 days  Preliminary     No results found for: URINECX  No results found for: RESPCX  No results found for: WOUNDCX  No results found for: STOOLCX  No components found for: BODYFLD    Radiology Data:   Imaging Results  (last 24 hours)     ** No results found for the last 24 hours. **          I have reviewed the patient current medications.     Assessment/Plan     Hospital Problem List     CAMP (dyspnea on exertion)      Nonischemic cardiomyopathy, congestive heart failure with ejection fraction of 30-35%  Status post [place] AICD, functioning properly  Indeterminate troponin, previous coronary angiogram demonstrates no obstructive coronary artery disease  Diabetes mellitus type 2    Plan:  As noted above.              This document has been electronically signed by EDMAR Irizarry on December 14, 2017 3:36 PM

## 2017-12-14 NOTE — PLAN OF CARE
Problem: Patient Care Overview (Adult)  Goal: Plan of Care Review  Outcome: Ongoing (interventions implemented as appropriate)    12/14/17 0557   Coping/Psychosocial Response Interventions   Plan Of Care Reviewed With patient   Patient Care Overview   Progress no change   Outcome Evaluation   Outcome Summary/Follow up Plan patient still has some SOA when up out of bed

## 2017-12-14 NOTE — THERAPY DISCHARGE NOTE
Acute Care - Occupational Therapy Initial Eval/Discharge  [facility]     Patient Name: Lexi Wade  : 1950  MRN: [id number]  Today's Date: 2017  Onset of Illness/Injury or Date of Surgery Date: 17  Date of Referral to OT: 17  Referring Physician: Kian HYATT      Admit Date: 2017       ICD-10-CM ICD-9-CM   1. CAMP (dyspnea on exertion) R06.09 786.09   2. Leukocytosis, unspecified type D72.829 288.60   3. Hypervolemia, unspecified hypervolemia type E87.70 276.69   4. Non-ischemic cardiomyopathy I42.8 425.4   5. Non-rheumatic tricuspid valve insufficiency I36.1 424.2   6. Non-rheumatic mitral regurgitation I34.0 424.0   7. Dyspnea, unspecified type R06.00 786.09   8. Impaired functional mobility and endurance Z74.09 V49.89   9. Impaired mobility and activities of daily living Z74.09 799.89     Patient Active Problem List   Diagnosis   • Pseudophakia   • Primary open angle glaucoma   • Cardiomyopathy   • Congestive heart failure   • Essential hypertension   • GERD (gastroesophageal reflux disease)   • Type 2 diabetes mellitus   • Vitamin D deficiency   • Borderline glaucoma   • Neurologic disorder associated with diabetes mellitus   • Hyperlipidemia   • Menopausal syndrome   • CAMP (dyspnea on exertion)     Past Medical History:   Diagnosis Date   • Artificial lens present     IN  POSITION - BOTH   • Borderline glaucoma    • Cardiomyopathy    • Congestive heart failure    • Diabetes mellitus     IMPROVING   • Essential hypertension    • GERD (gastroesophageal reflux disease)    • History of bone density study 2015    DXA BONE DENSITY AXIAL [address] [facility] (Screening for osteoporosis)    • History of echocardiogram 2013    Echocadiogram W/ color flow 28054 (Mild left atrial enlargement wiht mild LV enlargement w/mild concentric LVH. Global hypokinesis. EF 30%. Mitral and Av thickened. Aortic sclerosis. Severe mitral regurg. mild tricuspid regurg. Mild right  atrial enlargement noted. Pacemaker wire noted)   03/25/2013 RACHELE WILLIAM    • History of mammography, diagnostic 09/17/2014    [place] [address] [facility] (Microcalcifications of the breast) , Reason: s/p mammotome bx right side for benign disease   • History of mammography, diagnostic 07/24/2014    [place] [address] [facility] (Abnormal mammogram, unspecified)    • History of mammography, screening 07/03/2012    BENIGN   • Hypercholesterolemia    • Low back pain    • Menopausal syndrome 10/26/2017   • Microcalcifications of the breast     BENIGN CHANGES   • Need for prophylactic vaccination against Streptococcus pneumoniae (pneumococcus)    • Neurologic disorder associated with diabetes mellitus    • Obesity    • Type 2 diabetes mellitus     NO BDR   • Upper respiratory infection    • Vaginal bleeding 7/27/2017   • Vitamin D deficiency      Past Surgical History:   Procedure Laterality Date   • BREAST BIOPSY  07/28/2014    Stereotactic breast biopsy (Sterotactic mammotome biopsy and clip placement. Abnormal right-sided mammogram demonstrating microcalcifications.)   • CARDIAC CATHETERIZATION  10/28/2010    Cardiac cath 13051 (Noevidence of any obstructive epicardial coronary artery disease noted. Severe left ventricular dysfunction with an EF of 25%)   • CARDIAC CATHETERIZATION  01/31/2002    Cardiac cath 47224 (No significant coronary atherosclerosis. Probably mild left ventricular systolic dysfunction.)   • CARDIAC PACEMAKER PLACEMENT     • CATARACT EXTRACTION  01/28/2014    Remove cataract, insert lens (Right eye)   • CATARACT EXTRACTION  11/22/2011    Remove cataract, insert lens (Left eye)   • CATARACT EXTRACTION     • CENTRAL VENOUS LINE INSERTION  05/20/2016    Central line insertion (Successful placement of right upper extremity midline.)   • COLONOSCOPY  12/14/2004    Single small non-bleeding polyp in the rectum. The polyp was removed by hot biopsy polypectomy   •  COLONOSCOPY N/A 6/14/2017    Procedure: COLONOSCOPY;  Surgeon: Orville Farias MD;  Location: [facility] ENDOSCOPY;  Service:    • MAMMO BILATERAL  09/17/2014    [place] [address] [facility] (Microcalcifications of the breast) , Reason: s/p mammotome bx right side for benign disease   • MAMMO BILATERAL  07/24/2014    [place] [address] [facility] (Abnormal mammogram, unspecified)    • OTHER SURGICAL HISTORY  11/02/2012    DEBRIDE NAIL 6 OR MORE 91225 (Onychomycosis)    • OTHER SURGICAL HISTORY  04/27/2016    EXTENDED VISUAL FIELDS STUDY 54094 (Open angle with borderline findings, low risk, unspecified eye)    • OTHER SURGICAL HISTORY      EXTENDED VISUAL FIELDS STUDY 93853 (Borderline glaucoma)  (2): 10/02/2014, 04/17/2013   • OTHER SURGICAL HISTORY      OCT DISC NFL 20889 (Borderline glaucoma)  (2): 02/02/2015, 08/21/2013   • PAP SMEAR  06/10/2010    NEGATIVE   • TOTAL ABDOMINAL HYSTERECTOMY WITH SALPINGO OOPHORECTOMY  09/16/2013    Lysis of adhesions.          OT ASSESSMENT FLOWSHEET (last 72 hours)      OT Evaluation       12/14/17 1624 12/14/17 1452 12/14/17 1249 12/14/17 1000 12/13/17 0934    Rehab Evaluation    Document Type  evaluation  -RB evaluation  -MN      Subjective Information  agree to therapy  -RB agree to therapy;complains of;pain  -MN      Patient Effort, Rehab Treatment  good  -RB good  -MN      General Information    Patient Profile Review  yes  -RB yes  -MN      Onset of Illness/Injury or Date of Surgery Date  12/12/17  -RB 12/12/17  -MN      Referring Physician  Kian HYATT  -RB Kian HYATT  -MN      General Observations  Supine in bed with telemetry and IV.  -RB Sit EOB +tele +IV  -MN      Pertinent History Of Current Problem  3 days of worsening orthopenia.  Dx with cardiomyopathy.  ? CHF exacerbation.  -RB Pt with c/o SOB at home. Dx with nonischemia cardiomyopathy, hypokalemia  -MN      Precautions/Limitations  no known  precautions/limitations  -RB no known precautions/limitations  -MN      Prior Level of Function  independent:;gait;transfer;all household mobility;ADL's  -RB independent:;all household mobility;community mobility;ADL's;cooking;cleaning;driving  -MN      Equipment Currently Used at Home  none  -RB none  -MN  bipap/ cpap;glucometer;nebulizer  -EW    Plans/Goals Discussed With  patient  -RB patient;agreed upon  -MN      Risks Reviewed   patient:  -MN      Benefits Reviewed   patient:;increase independence;increase balance;improve function;increase strength;decrease pain;decrease risk of DVT;improve skin integrity  -MN      Barriers to Rehab   none identified  -MN      Living Environment    Lives With  alone  -RB alone  -MN  alone  -EW    Living Arrangements  apartment  -RB apartment  -MN  apartment  -EW    Home Accessibility  bed and bath on same level;ramps present at home  -RB bed and bath on same level;tub/shower is not walk in;grab bars present (bathtub)   sits in tub  -MN  no concerns  -EW    Stair Railings at Home     none  -EW    Type of Financial/Environmental Concern     none  -EW    Transportation Available  car  -RB car  -MN  car  -EW    Clinical Impression    Date of Referral to OT  12/14/17  -RB       OT Diagnosis  ? impaired mobility and ADLs.  -RB       Functional Level At Time Of Evaluation  Pt demo independence with toilet transfer and LE dressing.  -RB       Criteria for Skilled Therapeutic Interventions Met  no problems identified which require skilled intervention;patient/family declined skilled intervention at this time  -RB       Anticipated Discharge Disposition home  -RB home  -RB       Functional Level Prior    Transferring  0-->independent  -RB       Toileting  0-->independent  -RB       Bathing  0-->independent  -RB       Dressing  0-->independent  -RB       Eating  0-->independent  -RB       Communication  0-->understands/communicates without difficulty  -RB       Swallowing  0-->swallows  foods/liquids without difficulty  -RB       Vital Signs    Pre Systolic BP Rehab  112  -  -MN      Pre Treatment Diastolic BP  63  -RB 60  -MN      Post Systolic BP Rehab  133  -  -MN      Post Treatment Diastolic BP  69  -RB 87  -MN      Pretreatment Heart Rate (beats/min)  84  -RB 76  -MN      Intratreatment Heart Rate (beats/min)  88  -RB 90  -MN      Posttreatment Heart Rate (beats/min)  79  -RB 74  -MN      Pre SpO2 (%)  94  -RB 94  -MN      O2 Delivery Pre Treatment  supplemental O2  -RB room air  -MN      Intra SpO2 (%)  92  -RB 92  -MN      O2 Delivery Intra Treatment  room air  -RB room air  -MN      Post SpO2 (%)  95  -RB 98  -MN      O2 Delivery Post Treatment  supplemental O2  -RB room air  -MN      Pre Patient Position  Supine  -RB Sitting  -MN      Intra Patient Position  Standing  -RB Standing  -MN      Post Patient Position  Supine  -RB Sitting  -MN      Pain Assessment    Pain Assessment  No/denies pain  -RB 0-10  -MN      Pain Score   5  -MN      Post Pain Score   5  -MN      Pain Location   Back   bilateral heels  -MN      Pain Orientation   Upper  -MN      Pain Intervention(s)   Repositioned;Medication (See MAR)  -MN      Vision Assessment/Intervention    Visual Impairment  WNL  -RB WFL  -MN      Cognitive Assessment/Intervention    Current Cognitive/Communication Assessment  functional  -RB functional  -MN      Orientation Status  oriented x 4  -RB oriented x 4  -MN      Follows Commands/Answers Questions  100% of the time  -% of the time  -MN      Personal Safety  WNL/WFL  -RB WNL/WFL  -MN      Personal Safety Interventions  nonskid shoes/slippers when out of bed;gait belt  -RB gait belt;nonskid shoes/slippers when out of bed  -MN      ROM (Range of Motion)    General ROM  no range of motion deficits identified  -RB no range of motion deficits identified  -MN      General ROM Detail  B UEs.  -RB       MMT (Manual Muscle Testing)    General MMT Assessment  no strength deficits  identified  -RB no strength deficits identified  -MN      General MMT Assessment Detail  5/5  -RB BLEs: 5/5 grossly  -MN      Bed Mobility, Assessment/Treatment    Bed Mob, Supine to Sit, Dewey  independent  -RB       Bed Mob, Sit to Supine, Dewey  independent  -RB       Transfer Assessment/Treatment    Transfers, Sit-Stand Dewey  independent  -RB independent  -MN      Transfers, Stand-Sit Dewey  independent  -RB independent  -MN      Toilet Transfer, Dewey  independent  -RB       Functional Mobility    Functional Mobility- Ind. Level  conditional independence  -RB       Functional Mobility- Device  rolling walker  -RB       Lower Body Dressing Assessment/Training    LB Dressing Assess/Train, Clothing Type  socks  -RB       LB Dressing Assess/Train, Position  sitting  -RB       LB Dressing Assess/Train, Dewey  independent  -RB       Sensory Assessment/Intervention    Light Touch  LUE;RUE  -RB LLE;RLE  -MN      LUE Light Touch  WNL  -RB       RUE Light Touch  WNL  -RB       LLE Light Touch   WNL  -MN WNL  -CC     RLE Light Touch   WNL  -MN WNL  -CC     Edema Management    Edema Amount   minimal   BLEs  -MN      Positioning and Restraints    Pre-Treatment Position  in bed  -RB in bed  -MN      Post Treatment Position  bed  -RB bed  -MN      In Bed  supine;call light within reach  -RB sitting EOB;call light within reach  -MN        12/12/17 1929 12/12/17 1759             General Information    Equipment Currently Used at Home glucometer;nebulizer;bipap/ cpap  -RE none  -KE       Living Environment    Lives With alone  -RE        Living Arrangements apartment  -RE        Home Accessibility no concerns  -RE        Stair Railings at Home none  -RE        Type of Financial/Environmental Concern none  -RE        Transportation Available car  -RE        Functional Level Prior    Ambulation 0-->independent  -RE 0-->independent  -KE       Transferring 0-->independent  -RE  0-->independent  -KE       Toileting 0-->independent  -RE 0-->independent  -KE       Bathing 0-->independent  -RE 0-->independent  -KE       Dressing 0-->independent  -RE 0-->independent  -KE       Eating 0-->independent  -RE 0-->independent  -KE       Communication 0-->understands/communicates without difficulty  -RE 0-->understands/communicates without difficulty  -KE       Swallowing 0-->swallows foods/liquids without difficulty  -RE 0-->swallows foods/liquids without difficulty  -KE         User Key  (r) = Recorded By, (t) = Taken By, (c) = Cosigned By    Initials Name Effective Dates    RB Tony Childress, OT 06/15/16 -     MN Iris Hills, PT 10/17/16 -     CC Toshia Lemus, RN 10/17/16 -     RE Nilda Myers, RN 10/17/16 -     KE David Dunn RN 10/17/16 -     EW Cadence Osborne 01/05/17 -           Occupational Therapy Education     Title: PT OT SLP Therapies (Active)     Topic: Occupational Therapy (Active)     Point: Precautions (Resolved)    Description: Instruct learner(s) on prescribed precautions during self-care and functional transfers.    Learning Progress Summary    Learner Readiness Method Response Comment Documented by Status   Patient Acceptance E VU Edu pt on use of gait belt and non skid sock when OOB.  12/14/17 1619 Done                      User Key     Initials Effective Dates Name Provider Type Discipline     06/15/16 -  Tony Childress, OT Occupational Therapist OT                OT Recommendation and Plan  Anticipated Discharge Disposition: home  Plan of Care Review  Plan Of Care Reviewed With: patient  Outcome Summary/Follow up Plan: OT eval complete on this date.  Pt able to transfer to toilet without assist and don sock independently.  Pt able to walk independently with or without R/W.   OTR expalined purpose of OT and pt states she does not need OT at this time.               Outcome Measures       12/14/17 1452 12/14/17 1249       How much help from another person do you  currently need...    Turning from your back to your side while in flat bed without using bedrails?  4  -MN     Moving from lying on back to sitting on the side of a flat bed without bedrails?  4  -MN     Moving to and from a bed to a chair (including a wheelchair)?  4  -MN     Standing up from a chair using your arms (e.g., wheelchair, bedside chair)?  4  -MN     Climbing 3-5 steps with a railing?  3  -MN     To walk in hospital room?  4  -MN     AM-PAC 6 Clicks Score  23  -MN     How much help from another is currently needed...    Putting on and taking off regular lower body clothing? 4  -RB      Bathing (including washing, rinsing, and drying) 4  -RB      Toileting (which includes using toilet bed pan or urinal) 4  -RB      Putting on and taking off regular upper body clothing 4  -RB      Taking care of personal grooming (such as brushing teeth) 4  -RB      Eating meals 4  -RB      Score 24  -RB      Functional Assessment    Outcome Measure Options AM-PAC 6 Clicks Daily Activity (OT)  -RB AM-PAC 6 Clicks Basic Mobility (PT)  -MN       User Key  (r) = Recorded By, (t) = Taken By, (c) = Cosigned By    Initials Name Provider Type    RB Tony Childress OT Occupational Therapist    FRANCISCA Hills, PT Physical Therapist          Time Calculation:         Time Calculation- OT       12/14/17 1624          Time Calculation- OT    OT Start Time 1452  -RB      OT Stop Time 1517  -RB      OT Time Calculation (min) 25 min  -RB      OT Received On 12/14/17  -RB        User Key  (r) = Recorded By, (t) = Taken By, (c) = Cosigned By    Initials Name Provider Type    RB Tony Childress OT Occupational Therapist          Therapy Charges for Today     Code Description Service Date Service Provider Modifiers Qty    [id number]  OT SELFCARE CURRENT 12/14/2017 Tony Childress OT Psychiatric 1    [id number]  OT SELFCARE PROJECTED 12/14/2017 Tony Childress OT Psychiatric 1    [id number]  OT SELFCARE DISCHARGE 12/14/2017 MAIDA Joiner  [facility]    [id number]  OT EVAL LOW COMPLEXITY 2 12/14/2017 Tony Childress OT GO 1          OT G-codes  OT Professional Judgement Used?: Yes  OT Functional Scales Options: AM-PAC 6 Clicks Daily Activity (OT)  Score: 24  Functional Limitation: Self care  Self Care Current Status (): 0 percent impaired, limited or restricted  Self Care Goal Status (): 0 percent impaired, limited or restricted  Self Care Discharge Status (): 0 percent impaired, limited or restricted    OT Discharge Summary  Anticipated Discharge Disposition: home  Reason for Discharge: Independent, Patient/Caregiver request    Tony Childress OT  12/14/2017

## 2017-12-14 NOTE — PLAN OF CARE
Problem: Patient Care Overview (Adult)  Goal: Plan of Care Review  Outcome: Ongoing (interventions implemented as appropriate)    12/14/17 6081   Coping/Psychosocial Response Interventions   Plan Of Care Reviewed With patient   Outcome Evaluation   Outcome Summary/Follow up Plan OT eval complete on this date. Pt able to transfer to toilet without assist and don sock independently. Pt able to walk independently with or without R/W. OTR expalined purpose of OT and pt states she does not need OT at this time.

## 2017-12-14 NOTE — PLAN OF CARE
Problem: Patient Care Overview (Adult)  Goal: Plan of Care Review  Outcome: Ongoing (interventions implemented as appropriate)    12/14/17 1902   Coping/Psychosocial Response Interventions   Plan Of Care Reviewed With patient   Patient Care Overview   Progress improving   Outcome Evaluation   Outcome Summary/Follow up Plan Patient stated SOA is better today.

## 2017-12-14 NOTE — THERAPY EVALUATION
Acute Care - Physical Therapy Initial Evaluation  [facility]     Patient Name: Lexi Wade  : 1950  MRN: [id number]  Today's Date: 2017   Onset of Illness/Injury or Date of Surgery Date: 17  Date of Referral to PT: 17  Referring Physician: Kian HYATT      Admit Date: 2017     Visit Dx:    ICD-10-CM ICD-9-CM   1. CAMP (dyspnea on exertion) R06.09 786.09   2. Leukocytosis, unspecified type D72.829 288.60   3. Hypervolemia, unspecified hypervolemia type E87.70 276.69   4. Non-ischemic cardiomyopathy I42.8 425.4   5. Non-rheumatic tricuspid valve insufficiency I36.1 424.2   6. Non-rheumatic mitral regurgitation I34.0 424.0   7. Dyspnea, unspecified type R06.00 786.09   8. Impaired functional mobility and endurance Z74.09 V49.89     Patient Active Problem List   Diagnosis   • Pseudophakia   • Primary open angle glaucoma   • Cardiomyopathy   • Congestive heart failure   • Essential hypertension   • GERD (gastroesophageal reflux disease)   • Type 2 diabetes mellitus   • Vitamin D deficiency   • Borderline glaucoma   • Neurologic disorder associated with diabetes mellitus   • Hyperlipidemia   • Menopausal syndrome   • CAMP (dyspnea on exertion)     Past Medical History:   Diagnosis Date   • Artificial lens present     IN  POSITION - BOTH   • Borderline glaucoma    • Cardiomyopathy    • Congestive heart failure    • Diabetes mellitus     IMPROVING   • Essential hypertension    • GERD (gastroesophageal reflux disease)    • History of bone density study 2015    DXA BONE DENSITY AXIAL [address] [facility] (Screening for osteoporosis)    • History of echocardiogram 2013    Echocadiogram W/ color flow 44142 (Mild left atrial enlargement wiht mild LV enlargement w/mild concentric LVH. Global hypokinesis. EF 30%. Mitral and Av thickened. Aortic sclerosis. Severe mitral regurg. mild tricuspid regurg. Mild right atrial enlargement noted. Pacemaker wire noted)   2013 [facility]     • History of mammography, diagnostic 09/17/2014    [place] [address] [facility] (Microcalcifications of the breast) , Reason: s/p mammotome bx right side for benign disease   • History of mammography, diagnostic 07/24/2014    MAMMOGRAPHY DIAGNOSTIC UNILAT RT [address] [facility] (Abnormal mammogram, unspecified)    • History of mammography, screening 07/03/2012    BENIGN   • Hypercholesterolemia    • Low back pain    • Menopausal syndrome 10/26/2017   • Microcalcifications of the breast     BENIGN CHANGES   • Need for prophylactic vaccination against Streptococcus pneumoniae (pneumococcus)    • Neurologic disorder associated with diabetes mellitus    • Obesity    • Type 2 diabetes mellitus     NO BDR   • Upper respiratory infection    • Vaginal bleeding 7/27/2017   • Vitamin D deficiency      Past Surgical History:   Procedure Laterality Date   • BREAST BIOPSY  07/28/2014    Stereotactic breast biopsy (Sterotactic mammotome biopsy and clip placement. Abnormal right-sided mammogram demonstrating microcalcifications.)   • CARDIAC CATHETERIZATION  10/28/2010    Cardiac cath 07837 (Noevidence of any obstructive epicardial coronary artery disease noted. Severe left ventricular dysfunction with an EF of 25%)   • CARDIAC CATHETERIZATION  01/31/2002    Cardiac cath 06048 (No significant coronary atherosclerosis. Probably mild left ventricular systolic dysfunction.)   • CARDIAC PACEMAKER PLACEMENT     • CATARACT EXTRACTION  01/28/2014    Remove cataract, insert lens (Right eye)   • CATARACT EXTRACTION  11/22/2011    Remove cataract, insert lens (Left eye)   • CATARACT EXTRACTION     • CENTRAL VENOUS LINE INSERTION  05/20/2016    Central line insertion (Successful placement of right upper extremity midline.)   • COLONOSCOPY  12/14/2004    Single small non-bleeding polyp in the rectum. The polyp was removed by hot biopsy polypectomy   • COLONOSCOPY N/A 6/14/2017    Procedure: COLONOSCOPY;  Surgeon: Orville RESENDEZ  MD Dinora;  Location: [facility] ENDOSCOPY;  Service:    • MAMMO BILATERAL  09/17/2014    MAMMOGRAPHY [place]STIC [place]ILAT [place] [address] [facility] (Microcalcifications of the breast) , Reason: s/p mammotome bx right side for benign disease   • MAMMO BILATERAL  07/24/2014    MAMMOGRAPHY DIAGNOSTIC UNILAT RT [address] [facility] (Abnormal mammogram, unspecified)    • OTHER SURGICAL HISTORY  11/02/2012    DEBRIDE NAIL 6 OR MORE 53376 (Onychomycosis)    • OTHER SURGICAL HISTORY  04/27/2016    EXTENDED VISUAL FIELDS STUDY 68769 (Open angle with borderline findings, low risk, unspecified eye)    • OTHER SURGICAL HISTORY      EXTENDED VISUAL FIELDS STUDY 77226 (Borderline glaucoma)  (2): 10/02/2014, 04/17/2013   • OTHER SURGICAL HISTORY      OCT DISC NFL 29341 (Borderline glaucoma)  (2): 02/02/2015, 08/21/2013   • PAP SMEAR  06/10/2010    NEGATIVE   • TOTAL ABDOMINAL HYSTERECTOMY WITH SALPINGO OOPHORECTOMY  09/16/2013    Lysis of adhesions.          PT ASSESSMENT (last 72 hours)      PT Evaluation       12/14/17 1249 12/13/17 0934    Rehab Evaluation    Document Type evaluation  -MN     Subjective Information agree to therapy;complains of;pain  -MN     Patient Effort, Rehab Treatment good  -MN     General Information    Patient Profile Review yes  -MN     Onset of Illness/Injury or Date of Surgery Date 12/12/17  -MN     Referring Physician Kian HYATT  -MN     General Observations Sit EOB +tele +IV  -MN     Pertinent History Of Current Problem Pt with c/o SOB at home. Dx with nonischemia cardiomyopathy, hypokalemia  -MN     Precautions/Limitations no known precautions/limitations  -MN     Prior Level of Function independent:;all household mobility;community mobility;ADL's;cooking;cleaning;driving  -MN     Equipment Currently Used at Home none  -MN bipap/ cpap;glucometer;nebulizer  -EW    Plans/Goals Discussed With patient;agreed upon  -MN     Risks Reviewed patient:  -MN     Benefits Reviewed patient:;increase independence;increase  "balance;improve function;increase strength;decrease pain;decrease risk of DVT;improve skin integrity  -MN     Barriers to Rehab none identified  -MN     Living Environment    Lives With alone  -MN alone  -EW    Living Arrangements apartment  -MN apartment  -EW    Home Accessibility bed and bath on same level;tub/shower is not walk in;grab bars present (bathtub)   sits in tub  -MN no concerns  -EW    Stair Railings at Home  none  -EW    Type of Financial/Environmental Concern  none  -EW    Transportation Available car  -MN car  -EW    Clinical Impression    Date of Referral to PT 12/14/17  -MN     PT Diagnosis decreased endurance, impaired gait 2* heel pain  -MN     Patient/Family Goals Statement \"Go home\"  -MN     Criteria for Skilled Therapeutic Interventions Met yes;treatment indicated  -MN     Pathology/Pathophysiology Noted (Describe Specifically for Each System) pulmonary;cardiovascular  -MN     Impairments Found (describe specific impairments) aerobic capacity/endurance;gait, locomotion, and balance  -MN     Rehab Potential good, to achieve stated therapy goals  -MN     Predicted Duration of Therapy Intervention (days/wks) until d/c or all goals met  -MN     Vital Signs    Pre Systolic BP Rehab 121  -MN     Pre Treatment Diastolic BP 60  -MN     Post Systolic BP Rehab 136  -MN     Post Treatment Diastolic BP 87  -MN     Pretreatment Heart Rate (beats/min) 76  -MN     Intratreatment Heart Rate (beats/min) 90  -MN     Posttreatment Heart Rate (beats/min) 74  -MN     Pre SpO2 (%) 94  -MN     O2 Delivery Pre Treatment room air  -MN     Intra SpO2 (%) 92  -MN     O2 Delivery Intra Treatment room air  -MN     Post SpO2 (%) 98  -MN     O2 Delivery Post Treatment room air  -MN     Pre Patient Position Sitting  -MN     Intra Patient Position Standing  -MN     Post Patient Position Sitting  -MN     Pain Assessment    Pain Assessment 0-10  -MN     Pain Score 5  -MN     Post Pain Score 5  -MN     Pain Location Back "   bilateral heels  -MN     Pain Orientation Upper  -MN     Pain Intervention(s) Repositioned;Medication (See MAR)  -MN     Vision Assessment/Intervention    Visual Impairment WFL  -MN     Cognitive Assessment/Intervention    Current Cognitive/Communication Assessment functional  -MN     Orientation Status oriented x 4  -MN     Follows Commands/Answers Questions 100% of the time  -MN     Personal Safety WNL/WFL  -MN     Personal Safety Interventions gait belt;nonskid shoes/slippers when out of bed  -MN     ROM (Range of Motion)    General ROM no range of motion deficits identified  -MN     MMT (Manual Muscle Testing)    General MMT Assessment no strength deficits identified  -MN     General MMT Assessment Detail BLEs: 5/5 grossly  -MN     Transfer Assessment/Treatment    Transfers, Sit-Stand Soper independent  -MN     Transfers, Stand-Sit Soper independent  -MN     Gait Assessment/Treatment    Gait, Soper Level stand by assist;contact guard assist  -MN     Gait, Assistive Device rolling walker  -MN     Gait, Distance (Feet) 150  -MN     Gait, Comment pt attempted to ambulate in hallway without AD but c/o heel pain and given RW to help relieve pressure on feet. Pt reported gait more bearable with RW. Pt educated on AD managemet  -MN     Sensory Assessment/Intervention    Light Touch LLE;RLE  -MN     LLE Light Touch WNL  -MN     RLE Light Touch WNL  -MN     Edema Management    Edema Amount minimal   BLEs  -MN     Positioning and Restraints    Pre-Treatment Position in bed  -MN     Post Treatment Position bed  -MN     In Bed sitting EOB;call light within reach  -MN       12/12/17 1929 12/12/17 1754    General Information    Equipment Currently Used at Home glucometer;nebulizer;bipap/ cpap  -RE none  -KE    Living Environment    Lives With alone  -RE     Living Arrangements apartment  -RE     Home Accessibility no concerns  -RE     Stair Railings at Home none  -RE     Type of Financial/Environmental  Concern none  -RE     Transportation Available car  -RE       User Key  (r) = Recorded By, (t) = Taken By, (c) = Cosigned By    Initials Name Provider Type    MN Iris Hills, PT Physical Therapist    RE Nilda Myers, RN Registered Nurse    LEENA Dunn, RN Registered Nurse    BENTON Osborne           Physical Therapy Education     Title: PT OT SLP Therapies (Active)     Topic: Physical Therapy (Active)     Point: Mobility training (Done)    Learning Progress Summary    Learner Readiness Method Response Comment Documented by Status   Patient Acceptance E VU use of RW to help with heel pain MN 12/14/17 1328 Done                      User Key     Initials Effective Dates Name Provider Type Discipline    MN 10/17/16 -  Iris Hills, PT Physical Therapist PT                PT Recommendation and Plan  Anticipated Equipment Needs At Discharge: other (see comments) (RW if still having diff walking due to heel pain at d/c)  Anticipated Discharge Disposition: home, home with home health  Planned Therapy Interventions: balance training, bed mobility training, gait training, home exercise program, patient/family education, strengthening, stretching, transfer training  PT Frequency: other (see comments) (5-14x/week)  Plan of Care Review  Plan Of Care Reviewed With: patient  Outcome Summary/Follow up Plan: PT evaluation completed today. Pt is independent with bed mobility and transfers and short  gait in room but is limited by decreased endurance and bilateral heel pain. Pt used RW to ambulate in hallway for comfort and SpO2 dropped to 90% at lowest point without supplemental O2. She will benefit from cont skilled PT prior to d.c home with           IP PT Goals       12/14/17 1328          Transfer Training PT STG    Transfer Training PT STG, Date Established 12/14/17  -MN      Transfer Training PT STG, Time to Achieve 1 wk  -MN      Transfer Training PT STG, Activity Type bed to chair /chair  to bed;sit to stand/stand to sit;toilet  -MN      Transfer Training PT STG, Schleicher Level independent  -MN      Gait Training PT STG    Gait Training Goal PT STG, Date Established 12/14/17  -MN      Gait Training Goal PT STG, Time to Achieve 1 wk  -MN      Gait Training Goal PT STG, Schleicher Level independent  -MN      Gait Training Goal PT STG, Distance to Achieve 300 ft with SpO2 >/= 92%  -MN        User Key  (r) = Recorded By, (t) = Taken By, (c) = Cosigned By    Initials Name Provider Type    FRANCISCA Hills PT Physical Therapist                Outcome Measures       12/14/17 1249          How much help from another person do you currently need...    Turning from your back to your side while in flat bed without using bedrails? 4  -MN      Moving from lying on back to sitting on the side of a flat bed without bedrails? 4  -MN      Moving to and from a bed to a chair (including a wheelchair)? 4  -MN      Standing up from a chair using your arms (e.g., wheelchair, bedside chair)? 4  -MN      Climbing 3-5 steps with a railing? 3  -MN      To walk in hospital room? 4  -MN      AM-PAC 6 Clicks Score 23  -MN      Functional Assessment    Outcome Measure Options AM-PAC 6 Clicks Basic Mobility (PT)  -MN        User Key  (r) = Recorded By, (t) = Taken By, (c) = Cosigned By    Initials Name Provider Type    FRANCISCA Hills PT Physical Therapist           Time Calculation:         PT Charges       12/14/17 1327          Time Calculation    Start Time 1249  -MN      Stop Time 1319  -MN      Time Calculation (min) 30 min  -MN      PT Received On 12/14/17  -MN      PT Goal Re-Cert Due Date 12/27/17  -MN      Time Calculation- PT    Total Timed Code Minutes- PT 8 minute(s)  -MN        User Key  (r) = Recorded By, (t) = Taken By, (c) = Cosigned By    Initials Name Provider Type    FRANCISCA Hills PT Physical Therapist          Therapy Charges for Today     Code Description Service Date Service Provider  Modifiers Qty    68844768238 HC PT MOBILITY CURRENT 12/14/2017 Iris Hills, PT GP, CI 1    17153896054 HC PT MOBILITY PROJECTED 12/14/2017 Iris Hills, PT GP, CH 1    76439217319 HC PT EVAL LOW COMPLEXITY 1 12/14/2017 Iris Hills, PT GP 1    61950644125 HC GAIT TRAINING EA 15 MIN 12/14/2017 Iris Hills PT GP 1          PT G-Codes  PT Professional Judgement Used?: Yes  Outcome Measure Options: AM-PAC 6 Clicks Basic Mobility (PT)  Score: 23  Functional Limitation: Mobility: Walking and moving around  Mobility: Walking and Moving Around Current Status (): At least 1 percent but less than 20 percent impaired, limited or restricted  Mobility: Walking and Moving Around Goal Status (): 0 percent impaired, limited or restricted      Iris Hills, PT  12/14/2017

## 2017-12-14 NOTE — PROGRESS NOTES
Cardiology Progress Note:     LOS: 1 day   Patient Care Team:  Joyce Plata MD as PCP - General  Norris Foote MD as PCP - Claims Attributed  Wang Felipe MD as Consulting Physician (Cardiology)  Debra Jj, RN as Care Coordinator (Population Health)      Subjective:     Chart reviewed , patient seen and examined. Patient denies any chest pain, shortness of breath palpitation.  Patient symptoms of shortness of breath has improved.  Patient had low potassium of 2.9.  Patient echocardiogram had revealed an ejection fraction of 30-35%.  Patient AICD interrogation revealed appropriate sensing and pacing function.              Objective:     Objective:  Vitals:    12/13/17 1922   BP: 109/53   Pulse: 86   Resp: 18   Temp: 98 °F (36.7 °C)   SpO2: 99%       Intake/Output Summary (Last 24 hours) at 12/13/17 2125  Last data filed at 12/13/17 1800   Gross per 24 hour   Intake             1920 ml   Output              300 ml   Net             1620 ml             Physical Exam:   General Appearance:    Alert, oriented, cooperative, in no acute distress   Head:    Normocephalic, atraumatic, without obvious abnormality   Eyes:           DEONDRE  Lids and lashes normal, conjunctivae and sclerae normal, no icterus, no pallor   Ears:    Ears appear intact with no abnormalities noted   Throat:   Mucous membranes pink and moist   Neck:   Supple, trachea midline, no carotid bruit, no organomegaly or JVD   Lungs:     Clear to auscultation and percussion, respirations regular, even and Unlabored. No wheezes, rales, rhonchi    Heart:    Regular rhythm and normal rate, normal S1 and S2, no            murmur, no gallop, no rub, no click   Abdomen:     Soft, non-tender, non-distended, no guarding, no rebound tenderness, Normal bowel sounds in all four quadrant, no masses, liver and spleen nonpalpable,    Genitalia:    Deferred   Extremities:   Moves all extremities well, no edema, no cyanosis, no              Redness, no  clubbing   Pulses:   Pulses palpable and equal bilaterally   Skin:   Moist and warm. No bleeding, bruising or rash   Neurologic/Psychiatric:   Alert and oriented to person, place, and time.  Motor, power and tone in upper and lower extremity is grossly intact.  No focal neurological deficits. Normal cognitive function. No psychomotor reaction or tangential thought. No depression, homicidal ideations and suicidal ideations            Results Review:    Lab Results (last 24 hours)     Procedure Component Value Units Date/Time    CBC & Differential [[id number]] Collected:  12/13/17 0616    Specimen:  Blood Updated:  12/13/17 0703    Narrative:       The following orders were created for panel order CBC & Differential.  Procedure                               Abnormality         Status                     ---------                               -----------         ------                     CBC Auto Differential[[id number]]        Abnormal            Final result                 Please view results for these tests on the individual orders.    CBC Auto Differential [[id number]]  (Abnormal) Collected:  12/13/17 0616    Specimen:  Blood Updated:  12/13/17 0703     WBC 10.86 (H) 10*3/mm3      RBC 3.73 (L) 10*6/mm3      Hemoglobin 10.1 (L) g/dL      Hematocrit 31.5 (L) %      MCV 84.5 fL      MCH 27.1 pg      MCHC 32.1 g/dL      RDW 15.1 (H) %      RDW-SD 46.7 (H) fl      MPV 9.5 fL      Platelets 281 10*3/mm3      Neutrophil % 46.2 %      Lymphocyte % 37.3 %      Monocyte % 13.7 (H) %      Eosinophil % 2.3 %      Basophil % 0.2 %      Immature Grans % 0.3 %      Neutrophils, Absolute 5.02 10*3/mm3      Lymphocytes, Absolute 4.05 10*3/mm3      Monocytes, Absolute 1.49 (H) 10*3/mm3      Eosinophils, Absolute 0.25 10*3/mm3      Basophils, Absolute 0.02 10*3/mm3      Immature Grans, Absolute 0.03 (H) 10*3/mm3     Basic Metabolic Panel [[id number]]  (Abnormal) Collected:  12/13/17 0616    Specimen:  Blood Updated:  12/13/17 0715      Glucose 131 (H) mg/dL      BUN 9 mg/dL      Creatinine 0.79 mg/dL      Sodium 138 mmol/L      Potassium 2.9 (L) mmol/L      Chloride 100 mmol/L      CO2 26.0 mmol/L      Calcium 9.2 mg/dL      eGFR   Amer 87 mL/min/1.73      BUN/Creatinine Ratio 11.4     Anion Gap 12.0 mmol/L     POC Glucose Once [[id number]]  (Abnormal) Collected:  12/13/17 0726    Specimen:  Blood Updated:  12/13/17 0826     Glucose 180 (H) mg/dL       Meter: [id number]: [id number] [employer] CHOLO       POC Glucose Once [[id number]]  (Abnormal) Collected:  12/13/17 1039    Specimen:  Blood Updated:  12/13/17 1055     Glucose 270 (H) mg/dL       RN NotifiedMeter: [id number]: [id number] [facility]       Blood Culture - Blood, [[id number]]  (Normal) Collected:  12/12/17 1340    Specimen:  Blood from Arm, Left Updated:  12/13/17 1401     Blood Culture No growth at 24 hours    Blood Culture - Blood, [[id number]]  (Normal) Collected:  12/12/17 1352    Specimen:  Blood from Arm, Right Updated:  12/13/17 1401     Blood Culture No growth at 24 hours    POC Glucose Once [[id number]]  (Abnormal) Collected:  12/13/17 1654    Specimen:  Blood Updated:  12/13/17 1708     Glucose 227 (H) mg/dL       RN NotifiedMeter: [id number]: [id number] [facility] CHOLO       POC Glucose Once [[id number]]  (Abnormal) Collected:  12/13/17 1925    Specimen:  Blood Updated:  12/13/17 2044     Glucose 300 (H) mg/dL       RN NotifiedMeter: [id number]: [id number] JOVANI ZHU              Medication Review:   Current Facility-Administered Medications   Medication Dose Route Frequency Provider Last Rate Last Dose   • acetaminophen (TYLENOL) tablet 650 mg  650 mg Oral Q4H PRN John Shaffer MD       • aspirin chewable tablet 81 mg  81 mg Oral Daily John Shaffer MD   81 mg at 12/13/17 0820   • atorvastatin (LIPITOR) tablet 40 mg  40 mg Oral Daily John Shaffer MD   40 mg at 12/13/17 0819   • AZITHROMYCIN 500 MG/250 ML 0.9% NS IVPB  (vial-mate)  500 mg Intravenous Q24H John Shaffer MD   500 mg at 12/13/17 1903   • carvedilol (COREG) tablet 25 mg  25 mg Oral BID With Meals John Shaffer MD   25 mg at 12/13/17 1731   • cefTRIAXone (ROCEPHIN) in [facility] 1 gram/10ml IV PUSH syringe  1 g Intravenous Q24H John Shaffer MD   1 g at 12/13/17 1719   • dextrose (D50W) solution 25 g  25 g Intravenous Q15 Min PRN John Shaffer MD       • dextrose (GLUTOSE) oral gel 15 g  15 g Oral Q15 Min PRN John Shaffer MD       • digoxin (LANOXIN) tablet 125 mcg  125 mcg Oral Daily John Shaffer MD   125 mcg at 12/13/17 0819   • docusate sodium (COLACE) capsule 100 mg  100 mg Oral BID John Shaffer MD   100 mg at 12/13/17 0820   • furosemide (LASIX) injection 40 mg  40 mg Intravenous BID John Shaffer MD   40 mg at 12/13/17 1724   • glucagon (human recombinant) (GLUCAGEN DIAGNOSTIC) injection 1 mg  1 mg Subcutaneous Q15 Min PRN John Shaffer MD       • insulin aspart (novoLOG) injection 0-9 Units  0-9 Units Subcutaneous 4x Daily AC & at Bedtime John Shaffer MD   7 Units at 12/13/17 2024   • lisinopril (PRINIVIL,ZESTRIL) tablet 10 mg  10 mg Oral Daily John Shaffer MD   10 mg at 12/13/17 0819   • Morphine injection 1 mg  1 mg Intravenous Q4H PRN John Shaffer MD        And   • naloxone (NARCAN) injection 0.4 mg  0.4 mg Intravenous Q5 Min PRN John Shaffer MD       • ondansetron (ZOFRAN) injection 4 mg  4 mg Intravenous Q6H PRN John Shaffer MD       • pantoprazole (PROTONIX) EC tablet 40 mg  40 mg Oral QAM John Shaffer MD   40 mg at 12/13/17 0622   • potassium chloride 10 mEq in 100 mL IVPB  10 mEq Intravenous Q1H PRN Angelica G MARYANNE Sepulveda       • pregabalin (LYRICA) capsule 150 mg  150 mg Oral BID John Bleibel, MD   150 mg at 12/13/17 1731   • sodium chloride 0.9 % flush 1-10 mL  1-10 mL Intravenous PRN John Shaffer MD       • sodium chloride 0.9 % flush 10 mL  10 mL Intravenous PRN Rober Garcia MD   10 mL at 12/13/17 1726       Assessment and Plan:    Active  Problems:    CAMP (dyspnea on exertion)  1.  Nonischemic cardiomyopathy.  Patient underwent transthoracic echocardiogram.  Patient does have history of valvular insufficiency with moderate mitral regurgitation and moderate tricuspid regurgitation and mild pulmonic insufficiency with an ejection fraction of 30-35%.  Patient would be continued in the present dose of the Lasix and would add Aldactone 25 mg.  2.  Indeterminate troponin.  Patient had undergone previous coronary angiogram which had not revealed off any evidence of any obstructive epicardial coronary artery disease.  3.  Status post [place] AICD placement.  Patient AICD interrogation had revealed appropriate sensing and pacing function with adequate battery reserve.  There was no evidence of any ventricular tachyarrhythmia.  4.  Hypokalemia:  Patient potassium was 2.9.  Patient is currently on digoxin.  Patient would be supplemented with 40 mEq of KCl along with the IV and would recheck potassium and digoxin level in the morning.    Plan of management were discussed with the patient            Wang Felipe MD  12/13/17  9:25 PM      Time: Time spent on face-to-face interaction 20 minutes    Dictated utilizing Dragon dictation.

## 2017-12-14 NOTE — PROGRESS NOTES
Cardiology Progress Note:     LOS: 2 days   Patient Care Team:  Joyce Plata MD as PCP - General  Norris Foote MD as PCP - Claims Attributed  Wang Felipe MD as Consulting Physician (Cardiology)  Debra Jj RN as Care Coordinator (Population Health)      Subjective:   Chart reviewed , patient seen and examined.  Patient still continues to have symptoms of shortness of breath.  Patient receive potassium supplement.  Patient echocardiogram had revealed an ejection fraction of 30-35%.  Patient underwent AICD interrogation with did not reveal off any evidence of any ventricular tachyarrhythmia.        Objective:     Objective:  Vitals:    12/14/17 1459   BP: 112/63   Pulse: 71   Resp: 20   Temp: 97.6 °F (36.4 °C)   SpO2: 94%       Intake/Output Summary (Last 24 hours) at 12/14/17 1727  Last data filed at 12/14/17 1300   Gross per 24 hour   Intake              360 ml   Output             1000 ml   Net             -640 ml             Physical Exam:   General Appearance:    Alert, oriented, cooperative, in no acute distress   Head:    Normocephalic, atraumatic, without obvious abnormality   Eyes:           DEONDRE  Lids and lashes normal, conjunctivae and sclerae normal, no icterus, no pallor   Ears:    Ears appear intact with no abnormalities noted   Throat:   Mucous membranes pink and moist   Neck:   Supple, trachea midline, no carotid bruit, no organomegaly or JVD   Lungs:     Clear to auscultation and percussion, respirations regular, even and Unlabored. No wheezes, rales, rhonchi    Heart:    Regular rhythm and normal rate, normal S1 and S2, no            murmur, no gallop, no rub, no click   Abdomen:     Soft, non-tender, non-distended, no guarding, no rebound tenderness, Normal bowel sounds in all four quadrant, no masses, liver and spleen nonpalpable,    Genitalia:    Deferred   Extremities:   Moves all extremities well, no edema, no cyanosis, no              Redness, no clubbing   Pulses:    Pulses palpable and equal bilaterally   Skin:   Moist and warm. No bleeding, bruising or rash   Neurologic/Psychiatric:   Alert and oriented to person, place, and time.  Motor, power and tone in upper and lower extremity is grossly intact.  No focal neurological deficits. Normal cognitive function. No psychomotor reaction or tangential thought. No depression, homicidal ideations and suicidal ideations            Results Review:    Lab Results (last 24 hours)     Procedure Component Value Units Date/Time    POC Glucose Once [[id number]]  (Abnormal) Collected:  12/13/17 1925    Specimen:  Blood Updated:  12/13/17 2044     Glucose 300 (H) mg/dL       RN NotifiedMeter: [id number]: [id number] JOVANI JAN ZHU       Potassium [[id number]]  (Normal) Collected:  12/13/17 2122    Specimen:  Blood Updated:  12/13/17 2143     Potassium 3.5 mmol/L     CBC & Differential [[id number]] Collected:  12/14/17 0654    Specimen:  Blood Updated:  12/14/17 0731    Narrative:       The following orders were created for panel order CBC & Differential.  Procedure                               Abnormality         Status                     ---------                               -----------         ------                     CBC Auto Differential[[id number]]        Abnormal            Final result                 Please view results for these tests on the individual orders.    CBC Auto Differential [[id number]]  (Abnormal) Collected:  12/14/17 0654    Specimen:  Blood Updated:  12/14/17 0731     WBC 11.34 (H) 10*3/mm3      RBC 3.63 (L) 10*6/mm3      Hemoglobin 10.0 (L) g/dL      Hematocrit 30.8 (L) %      MCV 84.8 fL      MCH 27.5 pg      MCHC 32.5 g/dL      RDW 15.2 (H) %      RDW-SD 47.2 (H) fl      MPV 9.5 fL      Platelets 284 10*3/mm3      Neutrophil % 49.4 %      Lymphocyte % 35.5 %      Monocyte % 12.3 (H) %      Eosinophil % 2.4 %      Basophil % 0.2 %      Immature Grans % 0.2 %      Neutrophils, Absolute 5.60 10*3/mm3       Lymphocytes, Absolute 4.03 10*3/mm3      Monocytes, Absolute 1.40 (H) 10*3/mm3      Eosinophils, Absolute 0.27 10*3/mm3      Basophils, Absolute 0.02 10*3/mm3      Immature Grans, Absolute 0.02 10*3/mm3     Digoxin Level [[id number]]  (Abnormal) Collected:  12/14/17 0654    Specimen:  Blood Updated:  12/14/17 0754     Digoxin 0.70 (L) ng/mL     Comprehensive Metabolic Panel [[id number]]  (Abnormal) Collected:  12/14/17 0654    Specimen:  Blood Updated:  12/14/17 0758     Glucose 148 (H) mg/dL      BUN 13 mg/dL      Creatinine 0.85 mg/dL      Sodium 142 mmol/L      Potassium 4.0 mmol/L      Chloride 104 mmol/L      CO2 25.0 mmol/L      Calcium 9.2 mg/dL      Total Protein 6.4 g/dL      Albumin 3.50 g/dL      ALT (SGPT) 46 U/L      AST (SGOT) 22 U/L      Alkaline Phosphatase 83 U/L      Total Bilirubin 0.4 mg/dL      eGFR  African Amer 80 mL/min/1.73      Globulin 2.9 gm/dL      A/G Ratio 1.2 g/dL      BUN/Creatinine Ratio 15.3     Anion Gap 13.0 mmol/L     POC Glucose Once [[id number]]  (Abnormal) Collected:  12/14/17 0713    Specimen:  Blood Updated:  12/14/17 0818     Glucose 155 (H) mg/dL       Meter: [id number]: [id number] BROWN CHOLO       POC Glucose Once [[id number]]  (Abnormal) Collected:  12/14/17 1026    Specimen:  Blood Updated:  12/14/17 1042     Glucose 248 (H) mg/dL       RN NotifiedMeter: [id number]: [id number] [facility]       Blood Culture - Blood, [[id number]]  (Normal) Collected:  12/12/17 1340    Specimen:  Blood from Arm, Left Updated:  12/14/17 1401     Blood Culture No growth at 2 days    Blood Culture - Blood, [[id number]]  (Normal) Collected:  12/12/17 1352    Specimen:  Blood from Arm, Right Updated:  12/14/17 1401     Blood Culture No growth at 2 days    POC Glucose Once [[id number]]  (Abnormal) Collected:  12/14/17 1645    Specimen:  Blood Updated:  12/14/17 1703     Glucose 260 (H) mg/dL       RN NotifiedMeter: [id number]: [id number] BRIANDA EMMANUEL               Medication Review:   Current Facility-Administered Medications   Medication Dose Route Frequency Provider Last Rate Last Dose   • acetaminophen (TYLENOL) tablet 650 mg  650 mg Oral Q4H PRN John Shaffer MD       • aspirin chewable tablet 81 mg  81 mg Oral Daily John Shaffer MD   81 mg at 12/14/17 0825   • atorvastatin (LIPITOR) tablet 40 mg  40 mg Oral Daily John Shaffer MD   40 mg at 12/14/17 0821   • AZITHROMYCIN 500 MG/250 ML 0.9% NS IVPB (vial-mate)  500 mg Intravenous Q24H John Shaffer MD   500 mg at 12/13/17 1903   • carvedilol (COREG) tablet 25 mg  25 mg Oral BID With Meals John Shaffer MD   25 mg at 12/14/17 0821   • cefTRIAXone (ROCEPHIN) in SWFI 1 gram/10ml IV PUSH syringe  1 g Intravenous Q24H John Shaffer MD   1 g at 12/13/17 1719   • dextrose (D50W) solution 25 g  25 g Intravenous Q15 Min PRN John Shaffer MD       • dextrose (GLUTOSE) oral gel 15 g  15 g Oral Q15 Min PRN John Shaffer MD       • digoxin (LANOXIN) tablet 125 mcg  125 mcg Oral Daily John Shaffer MD   125 mcg at 12/14/17 0821   • docusate sodium (COLACE) capsule 100 mg  100 mg Oral BID John Shaffer MD   100 mg at 12/13/17 0820   • furosemide (LASIX) injection 40 mg  40 mg Intravenous BID John Shaffer MD   40 mg at 12/14/17 0821   • glipiZIDE (GLUCOTROL) tablet 10 mg  10 mg Oral BID AC EDMAR Irizarry       • glucagon (human recombinant) (GLUCAGEN DIAGNOSTIC) injection 1 mg  1 mg Subcutaneous Q15 Min PRN Jhon Shaffer MD       • insulin aspart (novoLOG) injection 0-9 Units  0-9 Units Subcutaneous 4x Daily AC & at Bedtime John Shaffer MD   4 Units at 12/14/17 1225   • lisinopril (PRINIVIL,ZESTRIL) tablet 10 mg  10 mg Oral Daily John Shaffer MD   10 mg at 12/14/17 0821   • metFORMIN (GLUCOPHAGE) tablet 1,000 mg  1,000 mg Oral BID With Meals EDMAR Irizarry       • Morphine injection 1 mg  1 mg Intravenous Q4H PRN John Shaffer MD        And   • naloxone (NARCAN) injection 0.4 mg  0.4 mg Intravenous Q5 Min PRN John  MD Edmund       • ondansetron (ZOFRAN) injection 4 mg  4 mg Intravenous Q6H PRN John Shaffer MD       • pantoprazole (PROTONIX) EC tablet 40 mg  40 mg Oral QAM John Shaffer MD   40 mg at 12/14/17 0604   • potassium chloride 10 mEq in 100 mL IVPB  10 mEq Intravenous Q1H PRN Angelica G Omidill APRN       • pregabalin (LYRICA) capsule 150 mg  150 mg Oral BID John Shaffer MD   150 mg at 12/14/17 0821   • sodium chloride 0.9 % flush 1-10 mL  1-10 mL Intravenous PRN John Shaffer MD       • sodium chloride 0.9 % flush 10 mL  10 mL Intravenous PRN Rober Garcia MD   10 mL at 12/13/17 1726       Assessment and Plan:    Active Problems:    CAMP (dyspnea on exertion)  1.  Nonischemic cardiomyopathy with left ventricular systolic dysfunction with an ejection fraction of 30-35%.  Patient symptoms of shortness of breath has improved.  Patient would be continued on the present dose of the Lasix and Aldactone.  2.  Patient had undergone previous coronary angiogram which had not revealed off any evidence of any obstructive epicardial coronary artery disease.  Patient had mild elevation in the troponin which is probably secondary to the AICD.  Patient has not complained of having any symptoms of recurrent chest pain.  Patient at the present time would be treated medically and not subjected to any invasive evaluation.  3.  Status post [facility] [facility] AICD placement.  Patient had undergone AICD interrogation which had revealed appropriate sensing and pacing function with adequate battery reserve.  4.  Hypokalemia.  Patient received potassium supplement and potassium is currently 4.    The above plan of management were discussed the patient.  Patient would ambulate in the hallway and the patient is stable patient would be stable to be discharged for outpatient follow-up.            Wang Felipe MD  12/14/17  5:27 PM      Time: Time spent on face-to-face interaction 20 minutes

## 2017-12-15 LAB
ANION GAP SERPL CALCULATED.3IONS-SCNC: 14 MMOL/L (ref 5–15)
BASOPHILS # BLD AUTO: 0.04 10*3/MM3 (ref 0–0.2)
BASOPHILS NFR BLD AUTO: 0.3 % (ref 0–2)
BUN BLD-MCNC: 14 MG/DL (ref 7–21)
BUN/CREAT SERPL: 15.2 (ref 7–25)
CALCIUM SPEC-SCNC: 9.1 MG/DL (ref 8.4–10.2)
CHLORIDE SERPL-SCNC: 100 MMOL/L (ref 95–110)
CO2 SERPL-SCNC: 25 MMOL/L (ref 22–31)
CREAT BLD-MCNC: 0.92 MG/DL (ref 0.5–1)
DEPRECATED RDW RBC AUTO: 48.4 FL (ref 36.4–46.3)
EOSINOPHIL # BLD AUTO: 0.32 10*3/MM3 (ref 0–0.7)
EOSINOPHIL NFR BLD AUTO: 2.6 % (ref 0–7)
ERYTHROCYTE [DISTWIDTH] IN BLOOD BY AUTOMATED COUNT: 15.4 % (ref 11.5–14.5)
GFR SERPL CREATININE-BSD FRML MDRD: 73 ML/MIN/1.73 (ref 45–104)
GLUCOSE BLD-MCNC: 141 MG/DL (ref 60–100)
GLUCOSE BLDC GLUCOMTR-MCNC: 149 MG/DL (ref 70–130)
GLUCOSE BLDC GLUCOMTR-MCNC: 204 MG/DL (ref 70–130)
GLUCOSE BLDC GLUCOMTR-MCNC: 207 MG/DL (ref 70–130)
GLUCOSE BLDC GLUCOMTR-MCNC: 251 MG/DL (ref 70–130)
GLUCOSE BLDC GLUCOMTR-MCNC: 87 MG/DL (ref 70–130)
HCT VFR BLD AUTO: 32.1 % (ref 35–45)
HGB BLD-MCNC: 10.4 G/DL (ref 12–15.5)
IMM GRANULOCYTES # BLD: 0.04 10*3/MM3 (ref 0–0.02)
IMM GRANULOCYTES NFR BLD: 0.3 % (ref 0–0.5)
LYMPHOCYTES # BLD AUTO: 4.32 10*3/MM3 (ref 0.6–4.2)
LYMPHOCYTES NFR BLD AUTO: 34.5 % (ref 10–50)
MCH RBC QN AUTO: 27.7 PG (ref 26.5–34)
MCHC RBC AUTO-ENTMCNC: 32.4 G/DL (ref 31.4–36)
MCV RBC AUTO: 85.6 FL (ref 80–98)
MONOCYTES # BLD AUTO: 1.22 10*3/MM3 (ref 0–0.9)
MONOCYTES NFR BLD AUTO: 9.7 % (ref 0–12)
NEUTROPHILS # BLD AUTO: 6.59 10*3/MM3 (ref 2–8.6)
NEUTROPHILS NFR BLD AUTO: 52.6 % (ref 37–80)
PLATELET # BLD AUTO: 306 10*3/MM3 (ref 150–450)
PMV BLD AUTO: 8.7 FL (ref 8–12)
POTASSIUM BLD-SCNC: 3.4 MMOL/L (ref 3.5–5.1)
RBC # BLD AUTO: 3.75 10*6/MM3 (ref 3.77–5.16)
SODIUM BLD-SCNC: 139 MMOL/L (ref 137–145)
WBC NRBC COR # BLD: 12.53 10*3/MM3 (ref 3.2–9.8)

## 2017-12-15 PROCEDURE — 82962 GLUCOSE BLOOD TEST: CPT

## 2017-12-15 PROCEDURE — 63710000001 INSULIN ASPART PER 5 UNITS: Performed by: FAMILY MEDICINE

## 2017-12-15 PROCEDURE — 97530 THERAPEUTIC ACTIVITIES: CPT

## 2017-12-15 PROCEDURE — 85025 COMPLETE CBC W/AUTO DIFF WBC: CPT | Performed by: FAMILY MEDICINE

## 2017-12-15 PROCEDURE — 25010000002 AZITHROMYCIN PER 500 MG: Performed by: FAMILY MEDICINE

## 2017-12-15 PROCEDURE — 25010000002 FUROSEMIDE PER 20 MG: Performed by: FAMILY MEDICINE

## 2017-12-15 PROCEDURE — 25010000002 CEFTRIAXONE PER 250 MG: Performed by: FAMILY MEDICINE

## 2017-12-15 PROCEDURE — 80048 BASIC METABOLIC PNL TOTAL CA: CPT | Performed by: FAMILY MEDICINE

## 2017-12-15 PROCEDURE — 94799 UNLISTED PULMONARY SVC/PX: CPT

## 2017-12-15 PROCEDURE — 97110 THERAPEUTIC EXERCISES: CPT

## 2017-12-15 PROCEDURE — 94760 N-INVAS EAR/PLS OXIMETRY 1: CPT

## 2017-12-15 RX ORDER — POTASSIUM CHLORIDE 750 MG/1
40 CAPSULE, EXTENDED RELEASE ORAL ONCE
Status: COMPLETED | OUTPATIENT
Start: 2017-12-15 | End: 2017-12-15

## 2017-12-15 RX ADMIN — PREGABALIN 150 MG: 75 CAPSULE ORAL at 20:29

## 2017-12-15 RX ADMIN — LISINOPRIL 10 MG: 10 TABLET ORAL at 08:48

## 2017-12-15 RX ADMIN — CARVEDILOL 25 MG: 25 TABLET, FILM COATED ORAL at 17:39

## 2017-12-15 RX ADMIN — MAGNESIUM OXIDE TAB 400 MG (241.3 MG ELEMENTAL MG) 1000 MG: 400 (241.3 MG) TAB at 15:02

## 2017-12-15 RX ADMIN — FUROSEMIDE 40 MG: 10 INJECTION, SOLUTION INTRAMUSCULAR; INTRAVENOUS at 17:38

## 2017-12-15 RX ADMIN — METFORMIN HYDROCHLORIDE 1000 MG: 500 TABLET ORAL at 08:48

## 2017-12-15 RX ADMIN — METFORMIN HYDROCHLORIDE 1000 MG: 500 TABLET ORAL at 17:38

## 2017-12-15 RX ADMIN — CARVEDILOL 25 MG: 25 TABLET, FILM COATED ORAL at 08:47

## 2017-12-15 RX ADMIN — GLIPIZIDE 10 MG: 10 TABLET ORAL at 08:47

## 2017-12-15 RX ADMIN — INSULIN ASPART 6 UNITS: 100 INJECTION, SOLUTION INTRAVENOUS; SUBCUTANEOUS at 12:12

## 2017-12-15 RX ADMIN — INSULIN ASPART 4 UNITS: 100 INJECTION, SOLUTION INTRAVENOUS; SUBCUTANEOUS at 17:42

## 2017-12-15 RX ADMIN — PANTOPRAZOLE SODIUM 40 MG: 40 TABLET, DELAYED RELEASE ORAL at 06:15

## 2017-12-15 RX ADMIN — CEFTRIAXONE SODIUM 1 G: 1 INJECTION, POWDER, FOR SOLUTION INTRAMUSCULAR; INTRAVENOUS at 17:36

## 2017-12-15 RX ADMIN — AZITHROMYCIN 500 MG: 500 INJECTION, POWDER, LYOPHILIZED, FOR SOLUTION INTRAVENOUS at 18:24

## 2017-12-15 RX ADMIN — GLIPIZIDE 10 MG: 10 TABLET ORAL at 20:31

## 2017-12-15 RX ADMIN — ASPIRIN 81 MG 81 MG: 81 TABLET ORAL at 08:48

## 2017-12-15 RX ADMIN — DIGOXIN 125 MCG: 125 TABLET ORAL at 08:48

## 2017-12-15 RX ADMIN — FUROSEMIDE 40 MG: 10 INJECTION, SOLUTION INTRAMUSCULAR; INTRAVENOUS at 08:48

## 2017-12-15 RX ADMIN — PREGABALIN 150 MG: 75 CAPSULE ORAL at 08:48

## 2017-12-15 RX ADMIN — ATORVASTATIN CALCIUM 40 MG: 40 TABLET, FILM COATED ORAL at 08:48

## 2017-12-15 RX ADMIN — INSULIN ASPART 4 UNITS: 100 INJECTION, SOLUTION INTRAVENOUS; SUBCUTANEOUS at 20:31

## 2017-12-15 RX ADMIN — POTASSIUM CHLORIDE 40 MEQ: 750 CAPSULE, EXTENDED RELEASE ORAL at 15:02

## 2017-12-15 RX ADMIN — Medication 10 ML: at 20:31

## 2017-12-15 NOTE — PROGRESS NOTES
[facility]  INPATIENT PROGRESS NOTE    Length of Stay: 3  Date of Admission: 12/12/2017  Primary Care Physician: Joyce Plata MD    Subjective   Please note that all previous progress notes, physical exam findings, medication changes, rate graft clotted, and lab results have been noted and updated as appropriate.    12/15/2017: Patient still somewhat short of air, but much better.  Will continue diuresis ×24 hours and wean oxygen today.  No chest pain, dizziness, syncope.    Chief Complaint/HPI:  This 67-year-old -American female reported to the emergency part was subsequently admitted to hospitalist services with complaints of shortness of air, dyspnea upon exertion, orthopnea.  Patient has history of congestive heart failure and it is reported that she had been taking less of her medication because of frequent urination.  Patient underwent CTA of the pulmonary arteries which ruled out pulmonary embolism.  Patient has been on twice a day IV Lasix.  States she is doing much better.  At this time we'll repeat PA and lateral chest x-ray, will restart diabetic medications which were held secondary to contrast exposure.  If patient continues to do well and does well with physical therapy and occupational therapy, suspect discharge within 24 hours.    Review of Systems   Constitutional: Negative for chills and fever.   Respiratory: Negative for chest tightness and wheezing.         Shortness of air much improved   Cardiovascular: Negative for chest pain.   Gastrointestinal: Negative for abdominal pain, constipation, diarrhea, nausea and vomiting.   Neurological: Negative for dizziness, syncope and light-headedness.      All pertinent negatives and positives are as above. All other systems have been reviewed and are negative unless otherwise stated.     Objective    Temp:  [96.3 °F (35.7 °C)-97.6 °F (36.4 °C)] 97.2 °F (36.2 °C)  Heart Rate:  [68-78]  76  Resp:  [18-20] 18  BP: (105-122)/(53-68) 122/68    Physical Exam   Constitutional: She is oriented to person, place, and time. She appears well-developed and well-nourished. No distress.   HENT:   Head: Normocephalic and atraumatic.   Eyes: Conjunctivae are normal.   Cardiovascular: Normal rate and regular rhythm.    Pulmonary/Chest: Effort normal. No respiratory distress. She has no wheezes.   Abdominal: Soft. Bowel sounds are normal. She exhibits no distension. There is no tenderness.   Neurological: She is alert and oriented to person, place, and time.   Skin: Skin is warm and dry. She is not diaphoretic.   Psychiatric: She has a normal mood and affect. Her behavior is normal.     Results Review:  I have reviewed the labs, radiology results, and diagnostic studies.    Laboratory Data:     Results from last 7 days  Lab Units 12/15/17  0724 12/14/17  0654 12/13/17  2122 12/13/17  0616  12/12/17  1247   SODIUM mmol/L 139 142  --  138  --  138   SODIUM, ARTERIAL   --   --   --   --   < >  --    POTASSIUM mmol/L 3.4* 4.0 3.5 2.9*  --  3.0*   CHLORIDE mmol/L 100 104  --  100  --  100   CO2 mmol/L 25.0 25.0  --  26.0  --  25.0   BUN mg/dL 14 13  --  9  --  7   CREATININE mg/dL 0.92 0.85  --  0.79  --  0.86   GLUCOSE mg/dL 141* 148*  --  131*  --  159*   GLUCOSE, ARTERIAL   --   --   --   --   < >  --    CALCIUM mg/dL 9.1 9.2  --  9.2  --  9.8   BILIRUBIN mg/dL  --  0.4  --   --   --  0.8   ALK PHOS U/L  --  83  --   --   --  97   ALT (SGPT) U/L  --  46  --   --   --  41   AST (SGOT) U/L  --  22  --   --   --  38*   ANION GAP mmol/L 14.0 13.0  --  12.0  --  13.0   < > = values in this interval not displayed.  Estimated Creatinine Clearance: 65 mL/min (by C-G formula based on Cr of 0.92).            Results from last 7 days  Lab Units 12/15/17  0724 12/14/17  0654 12/13/17  0616 12/12/17  1247   WBC 10*3/mm3 12.53* 11.34* 10.86* 16.01*   HEMOGLOBIN g/dL 10.4* 10.0* 10.1* 11.0*   HEMATOCRIT % 32.1* 30.8* 31.5* 33.4*    PLATELETS 10*3/mm3 306 284 281 312       Results from last 7 days  Lab Units 12/12/17  1247   INR  1.03       Culture Data:   No results found for: BLOODCX  No results found for: URINECX  No results found for: RESPCX  No results found for: WOUNDCX  No results found for: STOOLCX  No components found for: BODYFLD    Radiology Data:   Imaging Results (last 24 hours)     Procedure Component Value Units Date/Time    XR Chest PA & Lateral [[id number]] Collected:  12/14/17 1601     Updated:  12/14/17 1812    Narrative:       Chest x-ray PA and lateral       CLINICAL INDICATION: Shortness of breath    COMPARISON: Chest December 12, 2017.    FINDINGS: Borderline cardiomegaly. Pacemaker, AICD leads right  atrium, right ventricle. Slight prominence of the pulmonary  vasculature.    Lungs otherwise clear.    Slight improvement of basilar markings in comparison to prior  exam.      Impression:       CONCLUSION: Borderline cardiomegaly. Pacemaker, AICD leads in  right atrium and right ventricle. Slight prominence of pulmonary  vasculature. Lungs otherwise clear.    Electronically signed by:  Deric Harris MD  12/14/2017 6:11 PM CST  Workstation: [id number]          I have reviewed the patient current medications.     Assessment/Plan     Hospital Problem List     CAMP (dyspnea on exertion)      Nonischemic cardiomyopathy, congestive heart failure with ejection fraction of 30-35%  Status post [employer] AICD, functioning properly  Indeterminate troponin, previous coronary angiogram demonstrates no obstructive coronary artery disease  Diabetes mellitus type 2    Plan:  Replete potassium, continue diuresis, if patient continues to do well and weans off oxygen, discharge in 24 hours.              This document has been electronically signed by EDMAR Irizarry on December 15, 2017 2:54 PM

## 2017-12-15 NOTE — THERAPY TREATMENT NOTE
Acute Care - Physical Therapy Treatment Note  [facility]     Patient Name: Lexi Wade  : 1950  MRN: [id number]  Today's Date: 12/15/2017  Onset of Illness/Injury or Date of Surgery Date: 17  Date of Referral to PT: 17  Referring Physician: Kian HYATT    Admit Date: 2017    Visit Dx:    ICD-10-CM ICD-9-CM   1. CAMP (dyspnea on exertion) R06.09 786.09   2. Leukocytosis, unspecified type D72.829 288.60   3. Hypervolemia, unspecified hypervolemia type E87.70 276.69   4. Non-ischemic cardiomyopathy I42.8 425.4   5. Non-rheumatic tricuspid valve insufficiency I36.1 424.2   6. Non-rheumatic mitral regurgitation I34.0 424.0   7. Dyspnea, unspecified type R06.00 786.09   8. Impaired functional mobility and endurance Z74.09 V49.89   9. Impaired mobility and activities of daily living Z74.09 799.89     Patient Active Problem List   Diagnosis   • Pseudophakia   • Primary open angle glaucoma   • Cardiomyopathy   • Congestive heart failure   • Essential hypertension   • GERD (gastroesophageal reflux disease)   • Type 2 diabetes mellitus   • Vitamin D deficiency   • Borderline glaucoma   • Neurologic disorder associated with diabetes mellitus   • Hyperlipidemia   • Menopausal syndrome   • CAMP (dyspnea on exertion)               Adult Rehabilitation Note       12/15/17 1300          Rehab Assessment/Intervention    Discipline physical therapy assistant  -FRAN      Document Type therapy note (daily note)  -FRAN      Subjective Information agree to therapy  -FRAN      Patient Effort, Rehab Treatment good  -FRAN      Symptoms Noted During/After Treatment none  -FRAN      Recorded by [FRAN] Rin Kruse PTA      Vital Signs    Pre Systolic BP Rehab 107  -FRAN      Pre Treatment Diastolic BP 64  -FRAN      Pretreatment Heart Rate (beats/min) 74  -FRAN      Pre SpO2 (%) --   unable to get reading due to nails.  Attempted toes, unsuces  -FRAN      Post SpO2 (%) 100  -FRAN      O2 Delivery Post Treatment  supplemental O2  -FRAN      Pre Patient Position Supine  -FRAN      Intra Patient Position Sitting  -FRAN      Post Patient Position Sitting  -FRAN      Recorded by [FRAN] Rin Kruse PTA      Pain Assessment    Pain Assessment No/denies pain  -FRAN      Recorded by [FRAN] Rin Kruse PTA      Vision Assessment/Intervention    Visual Impairment WNL;WFL  -FRAN      Recorded by [FRAN] Rin Kruse PTA      Cognitive Assessment/Intervention    Current Cognitive/Communication Assessment functional  -      Orientation Status oriented x 4  -FRAN      Follows Commands/Answers Questions 100% of the time  -FRAN      Personal Safety WNL/WFL  -FRAN      Personal Safety Interventions gait belt;nonskid shoes/slippers when out of bed  -FRAN      Recorded by [FRAN] Rin Kruse PTA      Bed Mobility, Assessment/Treatment    Bed Mobility, Roll Left, [place] independent  -FRAN      Bed Mobility, Roll Right, [place] not tested  -      Bed Mobility, Scoot/Bridge, [place] independent  -FRAN      Bed Mob, Supine to Sit, [place] independent  -FRAN      Bed Mob, Sit to Supine, [place] not tested  -FRAN      Recorded by [FRAN] Rin Kruse PTA      Transfer Assessment/Treatment    Transfers, Bed-Chair [place] stand by assist;conditional independence  -      Transfers, Chair-Bed [place] not tested  -      Transfers, Bed-Chair-Bed, Assist Device other (see comments)   none  -FRAN      Transfers, Sit-Stand [place] independent  -      Transfers, Stand-Sit [place] independent  -      Recorded by [FRAN] Rin Kruse PTA      Gait Assessment/Treatment    Gait, [place] Level stand by assist  -FRAN      Gait, Assistive Device rolling walker  -      Gait, Distance (Feet) 5  -FRAN      Gait, Comment pt declined feng ambulation at this time  -FRAN      Recorded by [FRAN] Rin Kruse PTA      Therapy Exercises    Bilateral Lower Extremities AROM:;20 reps;supine;ankle pumps/circles;glut sets;heel raises;hip  abduction/adduction;hip flexion;LAQ;other reps   towel crunches  -FRAN      Recorded by [FRAN] Rin Kruse PTA      Positioning and Restraints    Pre-Treatment Position in bed  -FRAN      Post Treatment Position chair  -FRAN      In Chair sitting;reclined;legs elevated  -FRAN      Recorded by [FRAN] Rin Kruse PTA        User Key  (r) = Recorded By, (t) = Taken By, (c) = Cosigned By    Initials Name Effective Dates    FRAN Rin Kruse PTA 10/17/16 -                 IP PT Goals       12/15/17 1300 12/14/17 1328       Transfer Training PT STG    Transfer Training PT STG, Date Established  12/14/17  -MN     Transfer Training PT STG, Time to Achieve  1 wk  -MN     Transfer Training PT STG, Activity Type  bed to chair /chair to bed;sit to stand/stand to sit;toilet  -MN     Transfer Training PT STG, [place] Level  independent  -MN     Transfer Training PT STG, Date Goal Reviewed 12/15/17  -FRAN      Transfer Training PT STG, Outcome goal partially met  -FRAN      Gait Training PT STG    Gait Training Goal PT STG, Date Established  12/14/17  -MN     Gait Training Goal PT STG, Time to Achieve  1 wk  -MN     Gait Training Goal PT STG, [place] Level  independent  -MN     Gait Training Goal PT STG, Distance to Achieve  300 ft with SpO2 >/= 92%  -MN     Gait Training Goal PT STG, Date Goal Reviewed 12/15/17  -FRAN      Gait Training Goal PT STG, Outcome goal ongoing  -FRAN        User Key  (r) = Recorded By, (t) = Taken By, (c) = Cosigned By    Initials Name Provider Type    MN Iris Hills, PT Physical Therapist    FRAN Kruse PTA Physical Therapy Assistant          Physical Therapy Education     Title: PT OT SLP Therapies (Active)     Topic: Physical Therapy (Active)     Point: Mobility training (Done)    Learning Progress Summary    Learner Readiness Method Response Comment Documented by Status   Patient Acceptance E VU use of RW to help with heel pain MN 12/14/17 1328 Done                      User Key      Initials Effective Dates Name Provider Type Discipline    MN 10/17/16 -  Iris Hills, PT Physical Therapist PT                    PT Recommendation and Plan  Anticipated Equipment Needs At Discharge: other (see comments) (RW if still having diff walking due to heel pain at d/c)  Anticipated Discharge Disposition: home, home with home health  Planned Therapy Interventions: balance training, bed mobility training, gait training, home exercise program, patient/family education, strengthening, stretching, transfer training  PT Frequency: other (see comments) (5-14x/week)  Plan of Care Review  Plan Of Care Reviewed With: patient  Progress: progress toward functional goals is gradual  Outcome Summary/Follow up Plan: pt has demonstrated good tolerance to mobiltiy and ROM.  Pt is progressing approproately towards goals and is expected to return home at D/C          Outcome Measures       12/15/17 1300 12/14/17 1452 12/14/17 1249    How much help from another person do you currently need...    Turning from your back to your side while in flat bed without using bedrails? 4  -FRAN  4  -MN    Moving from lying on back to sitting on the side of a flat bed without bedrails? 4  -FRAN  4  -MN    Moving to and from a bed to a chair (including a wheelchair)? 4  -FRAN  4  -MN    Standing up from a chair using your arms (e.g., wheelchair, bedside chair)? 4  -FRAN  4  -MN    Climbing 3-5 steps with a railing? 3  -FRAN  3  -MN    To walk in hospital room? 4  -FRAN  4  -MN    AM-PAC 6 Clicks Score 23  -FRAN  23  -MN    How much help from another is currently needed...    Putting on and taking off regular lower body clothing?  4  -RB     Bathing (including washing, rinsing, and drying)  4  -RB     Toileting (which includes using toilet bed pan or urinal)  4  -RB     Putting on and taking off regular upper body clothing  4  -RB     Taking care of personal grooming (such as brushing teeth)  4  -RB     Eating meals  4  -RB     Score  24  -RB      Functional Assessment    Outcome Measure Options  AM-PAC 6 Clicks Daily Activity (OT)  -RB AM-PAC 6 Clicks Basic Mobility (PT)  -MN      User Key  (r) = Recorded By, (t) = Taken By, (c) = Cosigned By    Initials Name Provider Type    RB Tony Childress, OT Occupational Therapist    MN Iris Hills, PT Physical Therapist    FRAN Kruse PTA Physical Therapy Assistant           Time Calculation:         PT Charges       12/15/17 1419          Time Calculation    Start Time 1322  -FRAN      Stop Time 1400  -FRAN      Time Calculation (min) 38 min  -FRAN      Time Calculation- PT    Total Timed Code Minutes- PT 38 minute(s)  -FRAN        User Key  (r) = Recorded By, (t) = Taken By, (c) = Cosigned By    Initials Name Provider Type    FRAN Kruse PTA Physical Therapy Assistant          Therapy Charges for Today     Code Description Service Date Service Provider Modifiers Qty    93669994439 HC PT THER PROC EA 15 MIN 12/15/2017 Rin Kruse PTA GP 2    98799701085 HC PT THERAPEUTIC ACT EA 15 MIN 12/15/2017 Rin Kruse PTA GP 1          PT G-Codes  PT Professional Judgement Used?: Yes  Outcome Measure Options: AM-PAC 6 Clicks Daily Activity (OT)  Score: 23  Functional Limitation: Mobility: Walking and moving around  Mobility: Walking and Moving Around Current Status (): At least 1 percent but less than 20 percent impaired, limited or restricted  Mobility: Walking and Moving Around Goal Status (): 0 percent impaired, limited or restricted    Rin Kruse PTA  12/15/2017

## 2017-12-15 NOTE — PLAN OF CARE
Problem: Patient Care Overview (Adult)  Goal: Plan of Care Review  Outcome: Ongoing (interventions implemented as appropriate)    12/15/17 0400   Coping/Psychosocial Response Interventions   Plan Of Care Reviewed With patient   Patient Care Overview   Progress improving   Outcome Evaluation   Outcome Summary/Follow up Plan pt has not complained of pain or discomfort. Vitals stable. Will continue to monitor.        Goal: Adult Individualization and Mutuality  Outcome: Ongoing (interventions implemented as appropriate)  Goal: Discharge Needs Assessment  Outcome: Ongoing (interventions implemented as appropriate)    Problem: Fall Risk (Adult)  Goal: Absence of Falls  Outcome: Ongoing (interventions implemented as appropriate)    Problem: Cardiac: Heart Failure (Adult)  Goal: Signs and Symptoms of Listed Potential Problems Will be Absent or Manageable (Cardiac: Heart Failure)  Outcome: Ongoing (interventions implemented as appropriate)    Problem: Pain, Acute (Adult)  Goal: Acceptable Pain Control/Comfort Level  Outcome: Ongoing (interventions implemented as appropriate)

## 2017-12-15 NOTE — PLAN OF CARE
Problem: Patient Care Overview (Adult)  Goal: Plan of Care Review  Outcome: Ongoing (interventions implemented as appropriate)    12/15/17 1300   Coping/Psychosocial Response Interventions   Plan Of Care Reviewed With patient   Patient Care Overview   Progress progress toward functional goals is gradual   Outcome Evaluation   Outcome Summary/Follow up Plan pt has demonstrated good tolerance to mobiltiy and ROM. Pt is progressing approproately towards goals and is expected to return home at D/C         Problem: Inpatient Physical Therapy  Goal: Transfer Training Goal 1 STG- PT  Outcome: Ongoing (interventions implemented as appropriate)    12/14/17 1328 12/15/17 1300   Transfer Training PT STG   Transfer Training PT STG, Date Established 12/14/17 --    Transfer Training PT STG, Time to Achieve 1 wk --    Transfer Training PT STG, Activity Type bed to chair /chair to bed;sit to stand/stand to sit;toilet --    Transfer Training PT STG, [place] Level independent --    Transfer Training PT STG, Date Goal Reviewed --  12/15/17   Transfer Training PT STG, Outcome --  goal partially met       Goal: Gait Training Goal STG- PT  Outcome: Ongoing (interventions implemented as appropriate)    12/14/17 1328 12/15/17 1300   Gait Training PT STG   Gait Training Goal PT STG, Date Established 12/14/17 --    Gait Training Goal PT STG, Time to Achieve 1 wk --    Gait Training Goal PT STG, [place] Level independent --    Gait Training Goal PT STG, Distance to Achieve 300 ft with SpO2 >/= 92% --    Gait Training Goal PT STG, Date Goal Reviewed --  12/15/17   Gait Training Goal PT STG, Outcome --  goal ongoing

## 2017-12-16 VITALS
RESPIRATION RATE: 18 BRPM | TEMPERATURE: 98.2 F | SYSTOLIC BLOOD PRESSURE: 122 MMHG | WEIGHT: 226.6 LBS | HEART RATE: 78 BPM | DIASTOLIC BLOOD PRESSURE: 58 MMHG | OXYGEN SATURATION: 96 % | BODY MASS INDEX: 41.7 KG/M2 | HEIGHT: 62 IN

## 2017-12-16 PROBLEM — R06.09 DOE (DYSPNEA ON EXERTION): Status: RESOLVED | Noted: 2017-12-12 | Resolved: 2017-12-16

## 2017-12-16 LAB
ANION GAP SERPL CALCULATED.3IONS-SCNC: 13 MMOL/L (ref 5–15)
BASOPHILS # BLD AUTO: 0.04 10*3/MM3 (ref 0–0.2)
BASOPHILS NFR BLD AUTO: 0.3 % (ref 0–2)
BUN BLD-MCNC: 12 MG/DL (ref 7–21)
BUN/CREAT SERPL: 12.6 (ref 7–25)
CALCIUM SPEC-SCNC: 9.3 MG/DL (ref 8.4–10.2)
CHLORIDE SERPL-SCNC: 101 MMOL/L (ref 95–110)
CO2 SERPL-SCNC: 25 MMOL/L (ref 22–31)
CREAT BLD-MCNC: 0.95 MG/DL (ref 0.5–1)
DEPRECATED RDW RBC AUTO: 47.1 FL (ref 36.4–46.3)
EOSINOPHIL # BLD AUTO: 0.27 10*3/MM3 (ref 0–0.7)
EOSINOPHIL NFR BLD AUTO: 2.2 % (ref 0–7)
ERYTHROCYTE [DISTWIDTH] IN BLOOD BY AUTOMATED COUNT: 15.1 % (ref 11.5–14.5)
GFR SERPL CREATININE-BSD FRML MDRD: 71 ML/MIN/1.73 (ref 45–104)
GLUCOSE BLD-MCNC: 127 MG/DL (ref 60–100)
GLUCOSE BLDC GLUCOMTR-MCNC: 131 MG/DL (ref 70–130)
GLUCOSE BLDC GLUCOMTR-MCNC: 244 MG/DL (ref 70–130)
HCT VFR BLD AUTO: 33.1 % (ref 35–45)
HGB BLD-MCNC: 10.5 G/DL (ref 12–15.5)
IMM GRANULOCYTES # BLD: 0.04 10*3/MM3 (ref 0–0.02)
IMM GRANULOCYTES NFR BLD: 0.3 % (ref 0–0.5)
LYMPHOCYTES # BLD AUTO: 4.17 10*3/MM3 (ref 0.6–4.2)
LYMPHOCYTES NFR BLD AUTO: 34 % (ref 10–50)
MCH RBC QN AUTO: 27.1 PG (ref 26.5–34)
MCHC RBC AUTO-ENTMCNC: 31.7 G/DL (ref 31.4–36)
MCV RBC AUTO: 85.5 FL (ref 80–98)
MONOCYTES # BLD AUTO: 1.11 10*3/MM3 (ref 0–0.9)
MONOCYTES NFR BLD AUTO: 9.1 % (ref 0–12)
NEUTROPHILS # BLD AUTO: 6.63 10*3/MM3 (ref 2–8.6)
NEUTROPHILS NFR BLD AUTO: 54.1 % (ref 37–80)
PLATELET # BLD AUTO: 303 10*3/MM3 (ref 150–450)
PMV BLD AUTO: 9.5 FL (ref 8–12)
POTASSIUM BLD-SCNC: 3.5 MMOL/L (ref 3.5–5.1)
RBC # BLD AUTO: 3.87 10*6/MM3 (ref 3.77–5.16)
SODIUM BLD-SCNC: 139 MMOL/L (ref 137–145)
WBC NRBC COR # BLD: 12.26 10*3/MM3 (ref 3.2–9.8)

## 2017-12-16 PROCEDURE — 80048 BASIC METABOLIC PNL TOTAL CA: CPT | Performed by: FAMILY MEDICINE

## 2017-12-16 PROCEDURE — 82962 GLUCOSE BLOOD TEST: CPT

## 2017-12-16 PROCEDURE — 63710000001 INSULIN ASPART PER 5 UNITS: Performed by: FAMILY MEDICINE

## 2017-12-16 PROCEDURE — 85025 COMPLETE CBC W/AUTO DIFF WBC: CPT | Performed by: FAMILY MEDICINE

## 2017-12-16 PROCEDURE — 25010000002 FUROSEMIDE PER 20 MG: Performed by: FAMILY MEDICINE

## 2017-12-16 RX ORDER — CEFDINIR 300 MG/1
300 CAPSULE ORAL 2 TIMES DAILY
Qty: 10 CAPSULE | Refills: 0 | Status: SHIPPED | OUTPATIENT
Start: 2017-12-16 | End: 2018-02-22

## 2017-12-16 RX ADMIN — ASPIRIN 81 MG 81 MG: 81 TABLET ORAL at 08:09

## 2017-12-16 RX ADMIN — PANTOPRAZOLE SODIUM 40 MG: 40 TABLET, DELAYED RELEASE ORAL at 06:08

## 2017-12-16 RX ADMIN — DIGOXIN 125 MCG: 125 TABLET ORAL at 08:10

## 2017-12-16 RX ADMIN — METFORMIN HYDROCHLORIDE 1000 MG: 500 TABLET ORAL at 08:09

## 2017-12-16 RX ADMIN — GLIPIZIDE 10 MG: 10 TABLET ORAL at 08:09

## 2017-12-16 RX ADMIN — CARVEDILOL 25 MG: 25 TABLET, FILM COATED ORAL at 08:09

## 2017-12-16 RX ADMIN — LISINOPRIL 10 MG: 10 TABLET ORAL at 08:09

## 2017-12-16 RX ADMIN — PREGABALIN 150 MG: 75 CAPSULE ORAL at 08:09

## 2017-12-16 RX ADMIN — FUROSEMIDE 40 MG: 10 INJECTION, SOLUTION INTRAMUSCULAR; INTRAVENOUS at 08:07

## 2017-12-16 RX ADMIN — INSULIN ASPART 4 UNITS: 100 INJECTION, SOLUTION INTRAVENOUS; SUBCUTANEOUS at 12:31

## 2017-12-16 RX ADMIN — ATORVASTATIN CALCIUM 40 MG: 40 TABLET, FILM COATED ORAL at 08:09

## 2017-12-16 NOTE — DISCHARGE SUMMARY
[facility] Medicine Services  DISCHARGE SUMMARY       Date of Admission: 12/12/2017  Date of Discharge:  12/16/2017  Primary Care Physician: Joyce Plata MD    Presenting Problem/History of Present Illness:  CAMP (dyspnea on exertion) [R06.09]  Hypervolemia, unspecified hypervolemia type [E87.70]  Leukocytosis, unspecified type [D72.829]     Final Discharge Diagnoses:  Congestive heart failure, cardiomyopathy, systolic dysfunction, improved, stable  Diabetes mellitus type 2    Consults:   Consults     Date and Time Order Name Status Description    12/12/2017 1713 Inpatient Consult to Cardiology Completed         Pertinent Test Results:     Lab Results (last 24 hours)     Procedure Component Value Units Date/Time    POC Glucose Once [[id number]]  (Abnormal) Collected:  12/15/17 1634    Specimen:  Blood Updated:  12/15/17 1651     Glucose 204 (H) mg/dL       RN NotifiedMeter: [id number]: [id number] TORREY CRYSTAL       POC Glucose Once [[id number]]  (Abnormal) Collected:  12/15/17 1915    Specimen:  Blood Updated:  12/15/17 2037     Glucose 207 (H) mg/dL       RN NotifiedMeter: [id number]: [id number] JOVANI ZHU       CBC Auto Differential [[id number]]  (Abnormal) Collected:  12/16/17 0721    Specimen:  Blood Updated:  12/16/17 0750     WBC 12.26 (H) 10*3/mm3      RBC 3.87 10*6/mm3      Hemoglobin 10.5 (L) g/dL      Hematocrit 33.1 (L) %      MCV 85.5 fL      MCH 27.1 pg      MCHC 31.7 g/dL      RDW 15.1 (H) %      RDW-SD 47.1 (H) fl      MPV 9.5 fL      Platelets 303 10*3/mm3      Neutrophil % 54.1 %      Lymphocyte % 34.0 %      Monocyte % 9.1 %      Eosinophil % 2.2 %      Basophil % 0.3 %      Immature Grans % 0.3 %      Neutrophils, Absolute 6.63 10*3/mm3      Lymphocytes, Absolute 4.17 10*3/mm3      Monocytes, Absolute 1.11 (H) 10*3/mm3      Eosinophils, Absolute 0.27 10*3/mm3      Basophils, Absolute 0.04 10*3/mm3      Immature Grans, Absolute  0.04 (H) 10*3/mm3     CBC & Differential [[id number]] Collected:  12/16/17 0721    Specimen:  Blood Updated:  12/16/17 0750    Narrative:       The following orders were created for panel order CBC & Differential.  Procedure                               Abnormality         Status                     ---------                               -----------         ------                     CBC Auto Differential[[id number]]        Abnormal            Final result                 Please view results for these tests on the individual orders.    Basic Metabolic Panel [[id number]]  (Abnormal) Collected:  12/16/17 0721    Specimen:  Blood Updated:  12/16/17 0758     Glucose 127 (H) mg/dL      BUN 12 mg/dL      Creatinine 0.95 mg/dL      Sodium 139 mmol/L      Potassium 3.5 mmol/L      Chloride 101 mmol/L      CO2 25.0 mmol/L      Calcium 9.3 mg/dL      eGFR  African Amer 71 mL/min/1.73      BUN/Creatinine Ratio 12.6     Anion Gap 13.0 mmol/L     POC Glucose Once [[id number]]  (Abnormal) Collected:  12/16/17 0734    Specimen:  Blood Updated:  12/16/17 0802     Glucose 131 (H) mg/dL       RN NotifiedMeter: [id number]: [id number] TORREY CRYSTAL       POC Glucose Once [[id number]]  (Abnormal) Collected:  12/16/17 1107    Specimen:  Blood Updated:  12/16/17 1118     Glucose 244 (H) mg/dL       RN NotifiedMeter: [id number]: [id number] [employer]       Blood Culture - Blood, [[id number]]  (Normal) Collected:  12/12/17 1340    Specimen:  Blood from Arm, Left Updated:  12/16/17 1401     Blood Culture No growth at 4 days    Blood Culture - Blood, [[id number]]  (Normal) Collected:  12/12/17 1352    Specimen:  Blood from Arm, Right Updated:  12/16/17 1401     Blood Culture No growth at 4 days        Hospital Course:  The patient is a 67 y.o. female who presented to [facility] with Shortness of air, dyspnea upon exertion, and orthopnea.  Patient has a history of congestive heart failure and stated that  "she had not been taking her diuretics secondary to increased urination.  Patient underwent CTA of the pulmonary arteries which ruled out pulmonary embolism.  Patient was treated with IV Lasix twice a day and started to do much better.  She was also treated for pneumonia while here with the appropriate biotic therapy.  She will also be discharged on a third-generation cephalosporin to complete antimicrobial therapy.  Patient will be discharged with home health for medication assessment, cardiopulmonary assessment, and physical therapy and occupational therapy.  Patient denied shortness of air, states she felt much better, no fever or chills or chest pain.  Patient is well educated in how to take care of her diabetes.  She has a follow-up with her primary care provider within one week.  Patient was also cleared by her cardiologist, Dr. Felipe.  She will also be referred to congestive heart failure clinic.    Condition on Discharge:  Stable    Physical Exam on Discharge:  /59 (BP Location: Left arm, Patient Position: Sitting)  Pulse 70  Temp 98.2 °F (36.8 °C) (Temporal Artery )   Resp 18  Ht 157.5 cm (62\")  Wt 103 kg (226 lb 9.6 oz)  SpO2 99%  BMI 41.45 kg/m2  Physical Exam   Constitutional: She is oriented to person, place, and time. She appears well-developed and well-nourished. No distress.   HENT:   Head: Normocephalic and atraumatic.   Eyes: Conjunctivae are normal.   Cardiovascular: Normal rate and regular rhythm.    Pulmonary/Chest: Effort normal and breath sounds normal. No respiratory distress. She has no wheezes.   Abdominal: Soft. Bowel sounds are normal. She exhibits no distension. There is no tenderness.   Musculoskeletal: She exhibits no edema.   Neurological: She is alert and oriented to person, place, and time.   Skin: Skin is warm and dry. She is not diaphoretic.   Psychiatric: She has a normal mood and affect. Her behavior is normal.     Discharge Disposition:  Home-Health Care " [facility]    Discharge Medications:   Lexi Wade   Home Medication Instructions ELZA:[id number]    Printed on:12/16/17 1414   Medication Information                      aspirin 81 MG tablet  Take 81 mg by mouth every night.             atorvastatin (LIPITOR) 40 MG tablet  Take 1 tablet by mouth Daily.             B-D UF III MINI PEN NEEDLES 31G X 5 MM misc  USE DAILY WITH VICTOZA             brimonidine (ALPHAGAN) 0.2 % ophthalmic solution  Administer 1 drop to both eyes 2 (Two) Times a Day.             carvedilol (COREG) 25 MG tablet  Take 1 tablet by mouth 2 (Two) Times a Day With Meals.             cefdinir (OMNICEF) 300 MG capsule  Take 1 capsule by mouth 2 (Two) Times a Day.             digoxin (LANOXIN) 125 MCG tablet  TAKE 1 TABLET BY MOUTH DAILY             esomeprazole (nexIUM) 40 MG capsule  Take 40 mg by mouth Daily.             fluconazole (DIFLUCAN) 150 MG tablet  Take one po today and take one po in 4 days.             furosemide (LASIX) 80 MG tablet  Take 1 tablet by mouth Daily.             glimepiride (AMARYL) 4 MG tablet  Take 1 tablet by mouth 2 (Two) Times a Day.             latanoprost (XALATAN) 0.005 % ophthalmic solution  Administer 1 drop to both eyes Every Night. 90 day supply.             Liraglutide (VICTOZA) 18 MG/3ML solution pen-injector injection  Inject 1.2 mg under the skin Daily.             lisinopril (PRINIVIL,ZESTRIL) 10 MG tablet  Take 1 tablet by mouth Daily.             magnesium oxide (MAGOX) 400 (241.3 Mg) MG tablet tablet  Take 1 tablet by mouth Daily.             metFORMIN (GLUCOPHAGE) 1000 MG tablet  Take 1 tablet by mouth 2 (Two) Times a Day With Meals.             omega-3 acid ethyl esters (LOVAZA) 1 G capsule  Take 1 g by mouth 2 (Two) Times a Day. 2 capsules bid             potassium chloride (MICRO-K) 10 MEQ CR capsule  Take 1 capsule by mouth 2 (Two) Times a Day.             pregabalin (LYRICA) 150 MG capsule  Take 1 capsule by mouth 2 (Two) Times a Day.              vitamin D (ERGOCALCIFEROL) 23984 UNITS capsule capsule  Take 1 capsule by mouth 1 (One) Time Per Week.             ZETIA 10 MG tablet  TAKE 1 TABELT BY MOUTH DAILY                 Discharge Diet:   Diet Instructions     Diet: Consistent Carbohydrate, Cardiac; Thin       Discharge Diet:   Consistent Carbohydrate  Cardiac      Fluid Consistency:  Thin   Fluid Restriction per day:  1500 mL Fluid                 Activity at Discharge:   Activity Instructions     Activity as Tolerated                     Discharge Care Plan/Instructions:  Return with any chest pain, shortness of air, dizziness, syncope, fever, chills, nausea, vomiting, any worsening or concerning symptoms.    Follow-up Appointments:   Future Appointments  Date Time Provider Department Center   12/22/2017 1:45 PM Joyce Plata MD [facility] None   1/5/2018 11:30 AM Joyce Plata [place] [facility] None               This document has been electronically signed by EDMAR Irizarry on December 16, 2017 2:14 PM

## 2017-12-16 NOTE — DISCHARGE PLACEMENT REQUEST
"Carline Norris (67 y.o. Female)     Date of Birth Social Security Number Address Home Phone MRN    1950  [address]  [address] [place], [place] [address]  [id number]    P[employer]al Marital Status          Quaker Single       Admission Date Admission Type Admitting Provider Attending Provider Department, Room/Bed    12/12/17 Emergency John Shaffer MD Bleibel, Fadi, MD [address], 412/1    Discharge Date Discharge Disposition Dis[place] Destination         [place] [facility] [place]ending Provider: John Shaffer MD     Allergies:  Aldactone [Spironolactone], Nsaids    Isolation:  None   Infection:  None   Code Status:  FULL    Ht:  157.5 cm (62\")   Wt:  103 kg (226 lb 9.6 oz)    Admission Cmt:  None   Principal Problem:  None                Active Insurance as of 12/12/2017     Primary Coverage     Payor Plan Insurance Group Employer/Plan Group    MEDICARE MEDICARE A & B      Payor Plan Address Payor Plan Phone Number Effective From Effective To    [address] [phone] 11/1/2000     [place] [address]       Subscriber Name Subscriber Birth Date Member ID       CARLINE NORRIS 1950 [id number]           Secondary Coverage     Payor Plan Insurance Group Employer/Plan Group    [place]      Payor Plan Address Payor Plan Phone Number Effective From Effective To    [address] [phone] 10/4/2016     [place] [address]       Subscriber Name Subscriber Birth Date Member ID       CARLINE NORRIS 1950 [id number]                 Emergency Contacts      (Rel.) Home Phone Work Phone Mobile Phone    Yudi Norris (Sister) [phone] -- [phone]             [address] [address]  Phone:  [phone]  Fax:   Date: Dec 16, 2017      Referral to Home Health     Patient:  Carline Norris MRN:  [id number]   [address]  [facility] [address] :  1950  SSN:    Phone: No phone on record Sex:  F      INSURANCE PAYOR PLAN GROUP # SUBSCRIBER ID   Primary:  Secondary: MEDICARE  [place] [id number]  [id number]   [id number]  [id number]      Referring Provider Information:  KAROL ESPINAL Phone: [phone] Fax:       Referral Information:   # Visits:  1 Referral Type: Home Health [42]   Urgency:  Routine Referral Reason: Specialty Services Required   Start Date: Dec 16, 2017 End Date:  To be determined by Insurer   Diagnosis: CAMP (dyspnea on exertion) (R06.09 [ICD-10-CM] 786.09 [ICD-9-CM])  Impaired functional mobility and endurance (Z74.09 [ICD-10-CM] V49.89 [ICD-9-CM])  Impaired mobility and activities of daily living (Z74.09 [ICD-10-CM] 799.89 [ICD-9-CM])  Cardiomyopathy, unspecified type (I42.9 [ICD-10-CM] 425.4 [ICD-9-CM])  Chronic systolic congestive heart failure (I50.22 [ICD-10-CM] 428.22,428.0 [ICD-9-CM])      Refer to Dept:   Refer to Provider:   Refer to Facility:       Face to Face Visit Date: 2017  Follow-up Provider for Plan of Care? I treated the patient in an acute care facility and will not continue treatment after discharge.  Follow-up Provider: NO KNOWN PROVIDER [[id number]]  Reason/Clinical Findings: CHF, pneumonia  Describe mobility limitations that make leaving home difficult: CHF, pneumonia  Nursing/Therapeutic Services Requested: Skilled Nursing  Nursing/Therapeutic Services Requested: Physical Therapy  Nursing/Therapeutic Services Requested: Occupational Therapy  Nursing/Therapeutic Services Requested: Dietician  Skilled nursing orders: CHF management  Skilled nursing orders: Medication education  Skilled nursing orders: Cardiopulmonary assessments  PT orders: Therapeutic exercise  PT orders: Gait Training  PT orders: Transfer training  PT orders: Strengthening  PT orders: Home safety assessment  Weight Bearing Status: As Tolerated  Occupational orders: Activities of daily  living  Occupational orders: Energy conservation  Occupational orders: Strengthening  Occupational orders: Home safety assessment  Skilled nursing orders: Diet instruction (ADA/heart healthy/low sodium)  Frequency: 1 Week 1     This document serves as a request of services and does not constitute Insurance authorization or approval of services.  To determine eligibility, please contact the members Insurance carrier to verify and review coverage.     If you have medical questions regarding this request for services. Please contact [address] at [phone] between the hours of 8:00am - 5:00pm (Mon-Fri).             Authorizing Provider:EDMAR Irizarry  Authorizing Provider's NPI: [id number]  Order Entered By: EDMAR Irizarry 12/16/2017 11:33 AM     Electronically signed by: EDMAR Irizarry 12/16/2017 11:33 AM

## 2017-12-16 NOTE — PLAN OF CARE
Problem: Patient Care Overview (Adult)  Goal: Plan of Care Review  Outcome: Ongoing (interventions implemented as appropriate)    12/16/17 0423   Coping/Psychosocial Response Interventions   Plan Of Care Reviewed With patient   Patient Care Overview   Progress improving   Outcome Evaluation   Outcome Summary/Follow up Plan pt rested well overnight, vs stable, will continue to monitor.       Goal: Adult Individualization and Mutuality  Outcome: Ongoing (interventions implemented as appropriate)  Goal: Discharge Needs Assessment  Outcome: Ongoing (interventions implemented as appropriate)    Problem: Fall Risk (Adult)  Goal: Absence of Falls  Outcome: Ongoing (interventions implemented as appropriate)    Problem: Cardiac: Heart Failure (Adult)  Goal: Signs and Symptoms of Listed Potential Problems Will be Absent or Manageable (Cardiac: Heart Failure)  Outcome: Ongoing (interventions implemented as appropriate)    Problem: Pain, Acute (Adult)  Goal: Acceptable Pain Control/Comfort Level  Outcome: Ongoing (interventions implemented as appropriate)

## 2017-12-17 LAB
BACTERIA SPEC AEROBE CULT: NORMAL
BACTERIA SPEC AEROBE CULT: NORMAL

## 2017-12-17 NOTE — THERAPY DISCHARGE NOTE
Acute Care - Physical Therapy Discharge Summary  [facility]       Patient Name: Lexi Wade  : 1950  MRN: [id number]    Today's Date: 2017  Onset of Illness/Injury or Date of Surgery Date: 17    Date of Referral to PT: 17  Referring Physician: Kian HYATT      Admit Date: 2017      PT Recommendation and Plan    Visit Dx:    ICD-10-CM ICD-9-CM   1. CAMP (dyspnea on exertion) R06.09 786.09   2. Leukocytosis, unspecified type D72.829 288.60   3. Hypervolemia, unspecified hypervolemia type E87.70 276.69   4. Non-ischemic cardiomyopathy I42.8 425.4   5. Non-rheumatic tricuspid valve insufficiency I36.1 424.2   6. Non-rheumatic mitral regurgitation I34.0 424.0   7. Dyspnea, unspecified type R06.00 786.09   8. Impaired functional mobility and endurance Z74.09 V49.89   9. Impaired mobility and activities of daily living Z74.09 799.89   10. Cardiomyopathy, unspecified type I42.9 425.4   11. Chronic systolic congestive heart failure I50.22 428.22     428.0             Outcome Measures       12/15/17 1300          How much help from another person do you currently need...    Turning from your back to your side while in flat bed without using bedrails? 4  -FRAN      Moving from lying on back to sitting on the side of a flat bed without bedrails? 4  -FRAN      Moving to and from a bed to a chair (including a wheelchair)? 4  -FRAN      Standing up from a chair using your arms (e.g., wheelchair, bedside chair)? 4  -FRAN      Climbing 3-5 steps with a railing? 3  -FRAN      To walk in hospital room? 4  -FRAN      AM-PAC 6 Clicks Score 23  -FRAN        User Key  (r) = Recorded By, (t) = Taken By, (c) = Cosigned By    Initials Name Provider Type    FRAN Kruse PTA Physical Therapy Assistant                      IP PT Goals       17 1555 12/15/17 1300 17 1328    Transfer Training PT STG    Transfer Training PT STG, Date Established   17  -MN    Transfer Training PT STG, Time to  Achieve   1 wk  -MN    Transfer Training PT STG, Activity Type   bed to chair /chair to bed;sit to stand/stand to sit;toilet  -MN    Transfer Training PT STG, [place] Level   independent  -MN    Transfer Training PT STG, Date Goal Reviewed 12/17/17  -MN 12/15/17  -FRAN     Transfer Training PT STG, Outcome goal not met  -MN goal partially met  -FRAN     Transfer Training PT STG, Reason Goal Not Met discharged from facility  -MN      Gait Training PT STG    Gait Training Goal PT STG, Date Established   12/14/17  -MN    Gait Training Goal PT STG, Time to Achieve   1 wk  -MN    Gait Training Goal PT STG, [place] Level   independent  -MN    Gait Training Goal PT STG, Distance to Achieve   300 ft with SpO2 >/= 92%  -MN    Gait Training Goal PT STG, Date Goal Reviewed 12/17/17  -MN 12/15/17  -FRAN     Gait Training Goal PT STG, Outcome goal not met  -MN goal ongoing  -FRAN     Gait Training Goal PT STG, Reason Goal Not Met discharged from facility  -MN        User Key  (r) = Recorded By, (t) = Taken By, (c) = Cosigned By    Initials Name Provider Type    FRANCISCA Hills, PT Physical Therapist    FRAN Kruse PTA Physical Therapy Assistant              PT Discharge Summary  Anticipated Discharge Disposition: home, home with home health  Reason for Discharge: Discharge from facility, Per MD order  Outcomes Achieved: Unable to make functional progress toward goals at this time  Discharge Destination: Home with home health      Iris Hills, PT   12/17/2017

## 2017-12-17 NOTE — PLAN OF CARE
Problem: Inpatient Physical Therapy  Goal: Transfer Training Goal 1 STG- PT  Outcome: Unable to achieve outcome(s) by discharge Date Met:  12/17/17 12/14/17 1328 12/17/17 1555   Transfer Training PT STG   Transfer Training PT STG, Date Established 12/14/17 --    Transfer Training PT STG, Time to Achieve 1 wk --    Transfer Training PT STG, Activity Type bed to chair /chair to bed;sit to stand/stand to sit;toilet --    Transfer Training PT STG, [place] Level independent --    Transfer Training PT STG, Date Goal Reviewed --  12/17/17   Transfer Training PT STG, Outcome --  goal not met   Transfer Training PT STG, Reason Goal Not Met --  discharged from facility       Goal: Gait Training Goal STG- PT  Outcome: Unable to achieve outcome(s) by discharge Date Met:  12/17/17 12/14/17 1328 12/17/17 1555   Gait Training PT STG   Gait Training Goal PT STG, Date Established 12/14/17 --    Gait Training Goal PT STG, Time to Achieve 1 wk --    Gait Training Goal PT STG, [place] Level independent --    Gait Training Goal PT STG, Distance to Achieve 300 ft with SpO2 >/= 92% --    Gait Training Goal PT STG, Date Goal Reviewed --  12/17/17   Gait Training Goal PT STG, Outcome --  goal not met   Gait Training Goal PT STG, Reason Goal Not Met --  discharged from facility

## 2017-12-22 ENCOUNTER — OFFICE VISIT (OUTPATIENT)
Dept: FAMILY MEDICINE CLINIC | Facility: CLINIC | Age: 67
End: 2017-12-22

## 2017-12-22 VITALS
DIASTOLIC BLOOD PRESSURE: 70 MMHG | BODY MASS INDEX: 39.69 KG/M2 | HEIGHT: 62 IN | SYSTOLIC BLOOD PRESSURE: 132 MMHG | HEART RATE: 74 BPM | WEIGHT: 215.7 LBS | OXYGEN SATURATION: 96 %

## 2017-12-22 DIAGNOSIS — I42.9 CARDIOMYOPATHY, UNSPECIFIED TYPE (HCC): Chronic | ICD-10-CM

## 2017-12-22 DIAGNOSIS — I50.22 CHRONIC SYSTOLIC CONGESTIVE HEART FAILURE (HCC): Primary | ICD-10-CM

## 2017-12-22 PROCEDURE — 99213 OFFICE O/P EST LOW 20 MIN: CPT | Performed by: GENERAL PRACTICE

## 2018-02-06 ENCOUNTER — LAB (OUTPATIENT)
Dept: LAB | Facility: HOSPITAL | Age: 68
End: 2018-02-06

## 2018-02-06 DIAGNOSIS — E11.8 TYPE 2 DIABETES MELLITUS WITH COMPLICATION, WITHOUT LONG-TERM CURRENT USE OF INSULIN (HCC): Primary | ICD-10-CM

## 2018-02-06 DIAGNOSIS — E11.8 TYPE 2 DIABETES MELLITUS WITH COMPLICATION, WITHOUT LONG-TERM CURRENT USE OF INSULIN (HCC): ICD-10-CM

## 2018-02-06 LAB
ALBUMIN SERPL-MCNC: 4.4 G/DL (ref 3.4–4.8)
ALBUMIN/GLOB SERPL: 1.2 G/DL (ref 1.1–1.8)
ALP SERPL-CCNC: 133 U/L (ref 38–126)
ALT SERPL W P-5'-P-CCNC: 32 U/L (ref 9–52)
ANION GAP SERPL CALCULATED.3IONS-SCNC: 13 MMOL/L (ref 5–15)
AST SERPL-CCNC: 54 U/L (ref 14–36)
BILIRUB SERPL-MCNC: 0.3 MG/DL (ref 0.2–1.3)
BUN BLD-MCNC: 10 MG/DL (ref 7–21)
BUN/CREAT SERPL: 11.9 (ref 7–25)
CALCIUM SPEC-SCNC: 9.7 MG/DL (ref 8.4–10.2)
CHLORIDE SERPL-SCNC: 100 MMOL/L (ref 95–110)
CO2 SERPL-SCNC: 26 MMOL/L (ref 22–31)
CREAT BLD-MCNC: 0.84 MG/DL (ref 0.5–1)
DIGOXIN SERPL-MCNC: 0.6 NG/ML (ref 0.8–2)
GFR SERPL CREATININE-BSD FRML MDRD: 81 ML/MIN/1.73 (ref 45–104)
GLOBULIN UR ELPH-MCNC: 3.7 GM/DL (ref 2.3–3.5)
GLUCOSE BLD-MCNC: 131 MG/DL (ref 60–100)
HBA1C MFR BLD: 7.7 % (ref 4–5.6)
POTASSIUM BLD-SCNC: 3.7 MMOL/L (ref 3.5–5.1)
PROT SERPL-MCNC: 8.1 G/DL (ref 6.3–8.6)
SODIUM BLD-SCNC: 139 MMOL/L (ref 137–145)

## 2018-02-06 PROCEDURE — 83036 HEMOGLOBIN GLYCOSYLATED A1C: CPT

## 2018-02-06 PROCEDURE — 36415 COLL VENOUS BLD VENIPUNCTURE: CPT

## 2018-02-06 PROCEDURE — 80162 ASSAY OF DIGOXIN TOTAL: CPT

## 2018-02-06 PROCEDURE — 80053 COMPREHEN METABOLIC PANEL: CPT

## 2018-02-22 ENCOUNTER — OFFICE VISIT (OUTPATIENT)
Dept: FAMILY MEDICINE CLINIC | Facility: CLINIC | Age: 68
End: 2018-02-22

## 2018-02-22 VITALS
HEIGHT: 62 IN | WEIGHT: 215.2 LBS | OXYGEN SATURATION: 100 % | HEART RATE: 90 BPM | DIASTOLIC BLOOD PRESSURE: 60 MMHG | SYSTOLIC BLOOD PRESSURE: 110 MMHG | BODY MASS INDEX: 39.6 KG/M2

## 2018-02-22 DIAGNOSIS — E11.8 TYPE 2 DIABETES MELLITUS WITH COMPLICATION, WITHOUT LONG-TERM CURRENT USE OF INSULIN (HCC): Primary | ICD-10-CM

## 2018-02-22 DIAGNOSIS — I10 ESSENTIAL HYPERTENSION: ICD-10-CM

## 2018-02-22 PROCEDURE — 99213 OFFICE O/P EST LOW 20 MIN: CPT | Performed by: GENERAL PRACTICE

## 2018-02-22 NOTE — PROGRESS NOTES
Subjective   Lexi Wade is a 67 y.o. female.   Chief Complaint   Patient presents with   • Diabetes   • Hypertension     For review and evaluation of management of chronic medical problems. Labs reviewed.   Diabetes   She presents for her follow-up diabetic visit. She has type 2 diabetes mellitus. Her disease course has been stable. There are no hypoglycemic associated symptoms. Pertinent negatives for hypoglycemia include no dizziness, headaches or nervousness/anxiousness. There are no diabetic associated symptoms. Pertinent negatives for diabetes include no chest pain, no fatigue and no weakness. There are no hypoglycemic complications. Diabetic complications include heart disease. Risk factors for coronary artery disease include dyslipidemia, diabetes mellitus and hypertension. Current diabetic treatment includes oral agent (dual therapy) (victoza). She is compliant with treatment some of the time. She is following a generally healthy diet. When asked about meal planning, she reported none. She rarely participates in exercise. There is no change in her home blood glucose trend. An ACE inhibitor/angiotensin II receptor blocker is being taken. Eye exam is current.   Hypertension   This is a chronic problem. The current episode started more than 1 year ago. The problem is unchanged. The problem is controlled. Pertinent negatives include no chest pain, headaches, neck pain, palpitations or shortness of breath. Past treatments include beta blockers, ACE inhibitors and diuretics. The current treatment provides significant improvement. There are no compliance problems.       The following portions of the patient's history were reviewed and updated as appropriate: allergies, current medications, past social history and problem list.    Outpatient Medications Prior to Visit   Medication Sig Dispense Refill   • aspirin 81 MG tablet Take 81 mg by mouth every night.     • atorvastatin (LIPITOR) 40 MG tablet Take 1  tablet by mouth Daily. 90 tablet 3   • B-D UF III MINI PEN NEEDLES 31G X 5 MM misc USE DAILY WITH VICTOZA 100 each 3   • brimonidine (ALPHAGAN) 0.2 % ophthalmic solution Administer 1 drop to both eyes 2 (Two) Times a Day. 5 mL 3   • carvedilol (COREG) 25 MG tablet Take 1 tablet by mouth 2 (Two) Times a Day With Meals. 180 tablet 3   • digoxin (LANOXIN) 125 MCG tablet TAKE 1 TABLET BY MOUTH DAILY 90 tablet 3   • esomeprazole (nexIUM) 40 MG capsule Take 40 mg by mouth Daily.     • furosemide (LASIX) 80 MG tablet Take 1 tablet by mouth Daily. 90 tablet 3   • glimepiride (AMARYL) 4 MG tablet Take 1 tablet by mouth 2 (Two) Times a Day. 180 tablet 3   • latanoprost (XALATAN) 0.005 % ophthalmic solution Administer 1 drop to both eyes Every Night. 90 day supply. 2.5 mL 5   • Liraglutide (VICTOZA) 18 MG/3ML solution pen-injector injection Inject 1.2 mg under the skin Daily. 6 pen 3   • lisinopril (PRINIVIL,ZESTRIL) 10 MG tablet Take 1 tablet by mouth Daily. 90 tablet 3   • magnesium oxide (MAGOX) 400 (241.3 Mg) MG tablet tablet Take 1 tablet by mouth Daily. 90 each 3   • metFORMIN (GLUCOPHAGE) 1000 MG tablet Take 1 tablet by mouth 2 (Two) Times a Day With Meals. 180 tablet 3   • omega-3 acid ethyl esters (LOVAZA) 1 G capsule Take 1 g by mouth 2 (Two) Times a Day. 2 capsules bid     • potassium chloride (MICRO-K) 10 MEQ CR capsule Take 1 capsule by mouth 2 (Two) Times a Day. 180 capsule 3   • pregabalin (LYRICA) 150 MG capsule Take 1 capsule by mouth 2 (Two) Times a Day. 180 capsule 0   • vitamin D (ERGOCALCIFEROL) 01233 UNITS capsule capsule Take 1 capsule by mouth 1 (One) Time Per Week. 12 capsule 3   • ZETIA 10 MG tablet TAKE 1 TABELT BY MOUTH DAILY 90 tablet 2   • cefdinir (OMNICEF) 300 MG capsule Take 1 capsule by mouth 2 (Two) Times a Day. 10 capsule 0   • fluconazole (DIFLUCAN) 150 MG tablet Take one po today and take one po in 4 days. 2 tablet 11     No facility-administered medications prior to visit.        Review  "of Systems   Constitutional: Negative.  Negative for chills, fatigue, fever and unexpected weight change.   HENT: Negative.  Negative for congestion, ear pain, hearing loss, nosebleeds, rhinorrhea, sneezing, sore throat and tinnitus.    Eyes: Negative.  Negative for discharge.   Respiratory: Negative.  Negative for cough, shortness of breath and wheezing.    Cardiovascular: Negative.  Negative for chest pain and palpitations.   Gastrointestinal: Negative.  Negative for abdominal pain, constipation, diarrhea, nausea and vomiting.   Endocrine: Negative.    Genitourinary: Negative.  Negative for dysuria, frequency and urgency.   Musculoskeletal: Negative.  Negative for arthralgias, back pain, joint swelling, myalgias and neck pain.   Skin: Negative.  Negative for rash.   Allergic/Immunologic: Negative.    Neurological: Negative.  Negative for dizziness, weakness and headaches.   Hematological: Negative.  Negative for adenopathy.   Psychiatric/Behavioral: Negative.  Negative for dysphoric mood and sleep disturbance. The patient is not nervous/anxious.      Objective   Visit Vitals   • /60   • Pulse 90   • Ht 157.5 cm (62\")   • Wt 97.6 kg (215 lb 3.2 oz)   • SpO2 100%   • BMI 39.36 kg/m2     Physical Exam   Constitutional: She is oriented to person, place, and time. She appears well-developed and well-nourished. No distress.   HENT:   Head: Normocephalic and atraumatic.   Nose: Nose normal.   Mouth/Throat: Oropharynx is clear and moist.   Eyes: Conjunctivae and EOM are normal. Pupils are equal, round, and reactive to light. Right eye exhibits no discharge. Left eye exhibits no discharge.   Neck: No thyromegaly present.   Cardiovascular: Normal rate, regular rhythm, normal heart sounds and intact distal pulses.    Pulmonary/Chest: Effort normal and breath sounds normal.   Lymphadenopathy:     She has no cervical adenopathy.   Neurological: She is alert and oriented to person, place, and time.   Skin: Skin is warm " and dry.   Psychiatric: She has a normal mood and affect.   Nursing note and vitals reviewed.    Results for orders placed or performed in visit on 02/06/18   Hemoglobin A1c   Result Value Ref Range    Hemoglobin A1C 7.7 (H) 4 - 5.6 %   Comprehensive Metabolic Panel   Result Value Ref Range    Glucose 131 (H) 60 - 100 mg/dL    BUN 10 7 - 21 mg/dL    Creatinine 0.84 0.50 - 1.00 mg/dL    Sodium 139 137 - 145 mmol/L    Potassium 3.7 3.5 - 5.1 mmol/L    Chloride 100 95 - 110 mmol/L    CO2 26.0 22.0 - 31.0 mmol/L    Calcium 9.7 8.4 - 10.2 mg/dL    Total Protein 8.1 6.3 - 8.6 g/dL    Albumin 4.40 3.40 - 4.80 g/dL    ALT (SGPT) 32 9 - 52 U/L    AST (SGOT) 54 (H) 14 - 36 U/L    Alkaline Phosphatase 133 (H) 38 - 126 U/L    Total Bilirubin 0.3 0.2 - 1.3 mg/dL    eGFR   Amer 81 45 - 104 mL/min/1.73    Globulin 3.7 (H) 2.3 - 3.5 gm/dL    A/G Ratio 1.2 1.1 - 1.8 g/dL    BUN/Creatinine Ratio 11.9 7.0 - 25.0    Anion Gap 13.0 5.0 - 15.0 mmol/L       Assessment/Plan   Problem List Items Addressed This Visit        Cardiovascular and Mediastinum    Essential hypertension (Chronic)       Endocrine    Type 2 diabetes mellitus - Primary (Chronic)    Relevant Orders    Comprehensive Metabolic Panel    Hemoglobin A1c    LDL Cholesterol, Direct          Continue current treatment.     No orders of the defined types were placed in this encounter.    Return in about 3 months (around 5/22/2018) for Recheck.

## 2018-03-09 ENCOUNTER — EPISODE CHANGES (OUTPATIENT)
Dept: CASE MANAGEMENT | Facility: OTHER | Age: 68
End: 2018-03-09

## 2018-03-15 DIAGNOSIS — E11.9 DIABETES MELLITUS WITHOUT COMPLICATION (HCC): Primary | ICD-10-CM

## 2018-03-15 RX ORDER — FLURBIPROFEN SODIUM 0.3 MG/ML
SOLUTION/ DROPS OPHTHALMIC
Qty: 100 EACH | Refills: 2 | Status: SHIPPED | OUTPATIENT
Start: 2018-03-15 | End: 2018-10-17 | Stop reason: SDUPTHER

## 2018-04-04 RX ORDER — EZETIMIBE 10 MG/1
TABLET ORAL
Qty: 90 TABLET | Refills: 2 | Status: ON HOLD | OUTPATIENT
Start: 2018-04-04 | End: 2020-06-22

## 2018-04-05 DIAGNOSIS — H40.1110 PRIMARY OPEN ANGLE GLAUCOMA OF RIGHT EYE, UNSPECIFIED GLAUCOMA STAGE: ICD-10-CM

## 2018-04-05 RX ORDER — BRIMONIDINE TARTRATE 2 MG/ML
SOLUTION/ DROPS OPHTHALMIC
Qty: 15 ML | Refills: 3 | Status: SHIPPED | OUTPATIENT
Start: 2018-04-05 | End: 2019-04-08

## 2018-04-10 RX ORDER — ESOMEPRAZOLE MAGNESIUM 40 MG/1
40 CAPSULE, DELAYED RELEASE ORAL DAILY
Qty: 90 CAPSULE | Refills: 1 | Status: SHIPPED | OUTPATIENT
Start: 2018-04-10 | End: 2018-10-17 | Stop reason: SDUPTHER

## 2018-05-08 ENCOUNTER — LAB (OUTPATIENT)
Dept: LAB | Facility: HOSPITAL | Age: 68
End: 2018-05-08

## 2018-05-08 DIAGNOSIS — E11.8 TYPE 2 DIABETES MELLITUS WITH COMPLICATION, WITHOUT LONG-TERM CURRENT USE OF INSULIN (HCC): ICD-10-CM

## 2018-05-08 LAB
ALBUMIN SERPL-MCNC: 4.4 G/DL (ref 3.4–4.8)
ALBUMIN/GLOB SERPL: 1.2 G/DL (ref 1.1–1.8)
ALP SERPL-CCNC: 111 U/L (ref 38–126)
ALT SERPL W P-5'-P-CCNC: 27 U/L (ref 9–52)
ANION GAP SERPL CALCULATED.3IONS-SCNC: 13 MMOL/L (ref 5–15)
ARTICHOKE IGE QN: 76 MG/DL (ref 1–129)
AST SERPL-CCNC: 40 U/L (ref 14–36)
BILIRUB SERPL-MCNC: 0.4 MG/DL (ref 0.2–1.3)
BUN BLD-MCNC: 8 MG/DL (ref 7–21)
BUN/CREAT SERPL: 10.7 (ref 7–25)
CALCIUM SPEC-SCNC: 9.7 MG/DL (ref 8.4–10.2)
CHLORIDE SERPL-SCNC: 99 MMOL/L (ref 95–110)
CO2 SERPL-SCNC: 28 MMOL/L (ref 22–31)
CREAT BLD-MCNC: 0.75 MG/DL (ref 0.5–1)
GFR SERPL CREATININE-BSD FRML MDRD: 93 ML/MIN/1.73 (ref 45–104)
GLOBULIN UR ELPH-MCNC: 3.8 GM/DL (ref 2.3–3.5)
GLUCOSE BLD-MCNC: 97 MG/DL (ref 60–100)
HBA1C MFR BLD: 7.4 % (ref 4–5.6)
POTASSIUM BLD-SCNC: 4.1 MMOL/L (ref 3.5–5.1)
PROT SERPL-MCNC: 8.2 G/DL (ref 6.3–8.6)
SODIUM BLD-SCNC: 140 MMOL/L (ref 137–145)

## 2018-05-08 PROCEDURE — 36415 COLL VENOUS BLD VENIPUNCTURE: CPT

## 2018-05-08 PROCEDURE — 80053 COMPREHEN METABOLIC PANEL: CPT

## 2018-05-08 PROCEDURE — 83721 ASSAY OF BLOOD LIPOPROTEIN: CPT

## 2018-05-08 PROCEDURE — 83036 HEMOGLOBIN GLYCOSYLATED A1C: CPT

## 2018-05-21 RX ORDER — LIRAGLUTIDE 6 MG/ML
1.2 INJECTION SUBCUTANEOUS DAILY
Qty: 6 PEN | Refills: 3 | Status: SHIPPED | OUTPATIENT
Start: 2018-05-21 | End: 2018-10-17 | Stop reason: SDUPTHER

## 2018-05-21 RX ORDER — LISINOPRIL 10 MG/1
TABLET ORAL
Qty: 90 TABLET | Refills: 3 | Status: SHIPPED | OUTPATIENT
Start: 2018-05-21 | End: 2018-06-05 | Stop reason: SDUPTHER

## 2018-05-21 RX ORDER — DIGOXIN 125 MCG
TABLET ORAL
Qty: 90 TABLET | Refills: 3 | Status: SHIPPED | OUTPATIENT
Start: 2018-05-21 | End: 2019-06-30 | Stop reason: SDUPTHER

## 2018-06-05 ENCOUNTER — OFFICE VISIT (OUTPATIENT)
Dept: FAMILY MEDICINE CLINIC | Facility: CLINIC | Age: 68
End: 2018-06-05

## 2018-06-05 VITALS
WEIGHT: 213 LBS | SYSTOLIC BLOOD PRESSURE: 130 MMHG | DIASTOLIC BLOOD PRESSURE: 70 MMHG | HEIGHT: 62 IN | BODY MASS INDEX: 39.2 KG/M2 | HEART RATE: 92 BPM

## 2018-06-05 DIAGNOSIS — E11.8 TYPE 2 DIABETES MELLITUS WITH COMPLICATION, WITHOUT LONG-TERM CURRENT USE OF INSULIN (HCC): Primary | Chronic | ICD-10-CM

## 2018-06-05 DIAGNOSIS — E55.9 VITAMIN D DEFICIENCY: Chronic | ICD-10-CM

## 2018-06-05 DIAGNOSIS — I10 ESSENTIAL HYPERTENSION: Chronic | ICD-10-CM

## 2018-06-05 DIAGNOSIS — E78.2 MIXED HYPERLIPIDEMIA: Chronic | ICD-10-CM

## 2018-06-05 PROCEDURE — 99214 OFFICE O/P EST MOD 30 MIN: CPT | Performed by: GENERAL PRACTICE

## 2018-06-05 RX ORDER — ERGOCALCIFEROL 1.25 MG/1
50000 CAPSULE ORAL WEEKLY
Qty: 12 CAPSULE | Refills: 3 | Status: SHIPPED | OUTPATIENT
Start: 2018-06-05 | End: 2022-01-19 | Stop reason: HOSPADM

## 2018-06-05 RX ORDER — MELATONIN
1000
Status: ON HOLD | COMMUNITY
End: 2018-06-20

## 2018-06-05 NOTE — PROGRESS NOTES
Subjective   Lexi Wade is a 67 y.o. female.   Chief Complaint   Patient presents with   • Diabetes     labs   • Hypertension   • Hyperlipidemia     For review and evaluation of management of chronic medical problems. Labs reviewed.   Diabetes   She presents for her follow-up diabetic visit. She has type 2 diabetes mellitus. Her disease course has been stable. There are no hypoglycemic associated symptoms. Pertinent negatives for hypoglycemia include no dizziness, headaches or nervousness/anxiousness. There are no diabetic associated symptoms. Pertinent negatives for diabetes include no chest pain, no fatigue and no weakness. There are no hypoglycemic complications. Diabetic complications include heart disease. Risk factors for coronary artery disease include dyslipidemia, diabetes mellitus and hypertension. Current diabetic treatment includes oral agent (dual therapy) (victoza). She is compliant with treatment some of the time. She is following a generally healthy diet. When asked about meal planning, she reported none. She rarely participates in exercise. There is no change in her home blood glucose trend. An ACE inhibitor/angiotensin II receptor blocker is being taken. Eye exam is current.   Hypertension   This is a chronic problem. The current episode started more than 1 year ago. The problem is unchanged. The problem is controlled. Pertinent negatives include no chest pain, headaches, neck pain, palpitations or shortness of breath. There are no associated agents to hypertension. Risk factors for coronary artery disease include diabetes mellitus, dyslipidemia and obesity. Current antihypertension treatment includes beta blockers, ACE inhibitors and diuretics. The current treatment provides significant improvement. There are no compliance problems.    Hyperlipidemia   This is a chronic problem. The current episode started more than 1 year ago. The problem is controlled. Recent lipid tests were reviewed and  are normal. Exacerbating diseases include diabetes. There are no known factors aggravating her hyperlipidemia. Pertinent negatives include no chest pain, myalgias or shortness of breath. Current antihyperlipidemic treatment includes statins. The current treatment provides significant improvement of lipids. There are no compliance problems.  Risk factors for coronary artery disease include diabetes mellitus, dyslipidemia, post-menopausal and hypertension.      The following portions of the patient's history were reviewed and updated as appropriate: allergies, current medications, past social history and problem list.    Outpatient Medications Prior to Visit   Medication Sig Dispense Refill   • aspirin 81 MG tablet Take 81 mg by mouth every night.     • atorvastatin (LIPITOR) 40 MG tablet Take 1 tablet by mouth Daily. 90 tablet 3   • B-D UF III MINI PEN NEEDLES 31G X 5 MM misc USE DAILY WITH VICTOZA 100 each 2   • brimonidine (ALPHAGAN) 0.2 % ophthalmic solution Use in both eyes 2 times daily 15 mL 3   • carvedilol (COREG) 25 MG tablet Take 1 tablet by mouth 2 (Two) Times a Day With Meals. 180 tablet 3   • digoxin (LANOXIN) 125 MCG tablet TAKE 1 TABLET BY MOUTH DAILY 90 tablet 3   • esomeprazole (nexIUM) 40 MG capsule Take 1 capsule by mouth Daily. 90 capsule 1   • ezetimibe (ZETIA) 10 MG tablet TAKE 1 TABELT BY MOUTH DAILY 90 tablet 2   • furosemide (LASIX) 80 MG tablet Take 1 tablet by mouth Daily. 90 tablet 3   • glimepiride (AMARYL) 4 MG tablet Take 1 tablet by mouth 2 (Two) Times a Day. 180 tablet 3   • latanoprost (XALATAN) 0.005 % ophthalmic solution Administer 1 drop to both eyes Every Night. 90 day supply. 2.5 mL 5   • lisinopril (PRINIVIL,ZESTRIL) 10 MG tablet Take 1 tablet by mouth Daily. 90 tablet 3   • magnesium oxide (MAGOX) 400 (241.3 Mg) MG tablet tablet Take 1 tablet by mouth Daily. 90 each 3   • metFORMIN (GLUCOPHAGE) 1000 MG tablet Take 1 tablet by mouth 2 (Two) Times a Day With Meals. 180 tablet 3  "  • omega-3 acid ethyl esters (LOVAZA) 1 G capsule Take 1 g by mouth 2 (Two) Times a Day. 2 capsules bid     • potassium chloride (MICRO-K) 10 MEQ CR capsule Take 1 capsule by mouth 2 (Two) Times a Day. 180 capsule 3   • pregabalin (LYRICA) 150 MG capsule Take 1 capsule by mouth 2 (Two) Times a Day. 180 capsule 0   • VICTOZA 18 MG/3ML solution pen-injector injection INJECT 1.2 MG UNDER THE SKIN DAILY. 6 pen 3   • lisinopril (PRINIVIL,ZESTRIL) 10 MG tablet TAKE 1 TABLET BY MOUTH DAILY 90 tablet 3   • vitamin D (ERGOCALCIFEROL) 99526 UNITS capsule capsule Take 1 capsule by mouth 1 (One) Time Per Week. 12 capsule 3     No facility-administered medications prior to visit.        Review of Systems   Constitutional: Negative.  Negative for chills, fatigue, fever and unexpected weight change.   HENT: Negative.  Negative for congestion, ear pain, hearing loss, nosebleeds, rhinorrhea, sneezing, sore throat and tinnitus.    Eyes: Negative.  Negative for discharge.   Respiratory: Negative.  Negative for cough, shortness of breath and wheezing.    Cardiovascular: Negative.  Negative for chest pain and palpitations.   Gastrointestinal: Negative.  Negative for abdominal pain, constipation, diarrhea, nausea and vomiting.   Endocrine: Negative.    Genitourinary: Negative.  Negative for dysuria, frequency and urgency.   Musculoskeletal: Positive for back pain. Negative for arthralgias, joint swelling, myalgias and neck pain.   Skin: Negative.  Negative for rash.   Allergic/Immunologic: Negative.    Neurological: Negative.  Negative for dizziness, weakness, numbness and headaches.   Hematological: Negative.  Negative for adenopathy.   Psychiatric/Behavioral: Negative.  Negative for dysphoric mood and sleep disturbance. The patient is not nervous/anxious.        Objective   Visit Vitals  /70   Pulse 92   Ht 157.5 cm (62\")   Wt 96.6 kg (213 lb)   BMI 38.96 kg/m²     Physical Exam   Constitutional: She is oriented to person, " place, and time. She appears well-developed and well-nourished. No distress.   HENT:   Head: Normocephalic and atraumatic.   Nose: Nose normal.   Mouth/Throat: Oropharynx is clear and moist.   Eyes: Conjunctivae and EOM are normal. Pupils are equal, round, and reactive to light. Right eye exhibits no discharge. Left eye exhibits no discharge.   Neck: No thyromegaly present.   Cardiovascular: Normal rate, regular rhythm, normal heart sounds and intact distal pulses.    Pulmonary/Chest: Effort normal and breath sounds normal.   Lymphadenopathy:     She has no cervical adenopathy.   Neurological: She is alert and oriented to person, place, and time.   Skin: Skin is warm and dry.   Psychiatric: She has a normal mood and affect.   Nursing note and vitals reviewed.      Assessment/Plan   Problem List Items Addressed This Visit        Cardiovascular and Mediastinum    Essential hypertension (Chronic)    Relevant Orders    CBC & Differential    Comprehensive Metabolic Panel    Hemoglobin A1c    Lipid Panel    MicroAlbumin, Urine, Random - Urine, Clean Catch    Urinalysis With / Culture If Indicated - Urine, Clean Catch    Hyperlipidemia (Chronic)    Relevant Orders    CBC & Differential    Comprehensive Metabolic Panel    Hemoglobin A1c    Lipid Panel    MicroAlbumin, Urine, Random - Urine, Clean Catch    Urinalysis With / Culture If Indicated - Urine, Clean Catch       Digestive    Vitamin D deficiency (Chronic)    Relevant Orders    Vitamin D 25 Hydroxy       Endocrine    Type 2 diabetes mellitus - Primary (Chronic)    Relevant Orders    CBC & Differential    Comprehensive Metabolic Panel    Hemoglobin A1c    Lipid Panel    MicroAlbumin, Urine, Random - Urine, Clean Catch    Urinalysis With / Culture If Indicated - Urine, Clean Catch          Continue current treatment. Patient's Body mass index is 38.96 kg/m². BMI is above normal parameters. Recommendations include: exercise counseling and nutrition counseling. Check at  pharmacy on Shingrix shingles vaccine     New Medications Ordered This Visit   Medications   • vitamin D (ERGOCALCIFEROL) 06760 units capsule capsule     Sig: Take 1 capsule by mouth 1 (One) Time Per Week.     Dispense:  12 capsule     Refill:  3     Return in about 4 months (around 10/8/2018) for Annual physical.

## 2018-06-11 DIAGNOSIS — Z12.31 ENCOUNTER FOR SCREENING MAMMOGRAM FOR MALIGNANT NEOPLASM OF BREAST: Primary | ICD-10-CM

## 2018-06-20 ENCOUNTER — HOSPITAL ENCOUNTER (OUTPATIENT)
Facility: HOSPITAL | Age: 68
Setting detail: OBSERVATION
Discharge: HOME | End: 2018-06-23
Attending: EMERGENCY MEDICINE | Admitting: INTERNAL MEDICINE

## 2018-06-20 ENCOUNTER — APPOINTMENT (OUTPATIENT)
Dept: GENERAL RADIOLOGY | Facility: HOSPITAL | Age: 68
End: 2018-06-20

## 2018-06-20 DIAGNOSIS — R07.9 CHEST PAIN, UNSPECIFIED TYPE: Primary | ICD-10-CM

## 2018-06-20 LAB
ALBUMIN SERPL-MCNC: 4.1 G/DL (ref 3.4–4.8)
ALBUMIN/GLOB SERPL: 1.2 G/DL (ref 1.1–1.8)
ALP SERPL-CCNC: 105 U/L (ref 38–126)
ALT SERPL W P-5'-P-CCNC: 22 U/L (ref 9–52)
ANION GAP SERPL CALCULATED.3IONS-SCNC: 14 MMOL/L (ref 5–15)
AST SERPL-CCNC: 22 U/L (ref 14–36)
BASOPHILS # BLD AUTO: 0.03 10*3/MM3 (ref 0–0.2)
BASOPHILS NFR BLD AUTO: 0.2 % (ref 0–2)
BILIRUB SERPL-MCNC: 0.5 MG/DL (ref 0.2–1.3)
BUN BLD-MCNC: 10 MG/DL (ref 7–21)
BUN/CREAT SERPL: 13.3 (ref 7–25)
CALCIUM SPEC-SCNC: 9.4 MG/DL (ref 8.4–10.2)
CHLORIDE SERPL-SCNC: 102 MMOL/L (ref 95–110)
CK MB SERPL-CCNC: 0.69 NG/ML (ref 0–5)
CK SERPL-CCNC: 75 U/L (ref 30–135)
CO2 SERPL-SCNC: 25 MMOL/L (ref 22–31)
CREAT BLD-MCNC: 0.75 MG/DL (ref 0.5–1)
DEPRECATED RDW RBC AUTO: 45.5 FL (ref 36.4–46.3)
DIGOXIN SERPL-MCNC: 0.6 NG/ML (ref 0.8–2)
EOSINOPHIL # BLD AUTO: 0.09 10*3/MM3 (ref 0–0.7)
EOSINOPHIL NFR BLD AUTO: 0.6 % (ref 0–7)
ERYTHROCYTE [DISTWIDTH] IN BLOOD BY AUTOMATED COUNT: 15 % (ref 11.5–14.5)
GFR SERPL CREATININE-BSD FRML MDRD: 93 ML/MIN/1.73 (ref 45–104)
GLOBULIN UR ELPH-MCNC: 3.3 GM/DL (ref 2.3–3.5)
GLUCOSE BLD-MCNC: 151 MG/DL (ref 60–100)
GLUCOSE BLDC GLUCOMTR-MCNC: 162 MG/DL (ref 70–130)
GLUCOSE BLDC GLUCOMTR-MCNC: 178 MG/DL (ref 70–130)
HBA1C MFR BLD: 7.2 % (ref 4–5.6)
HCT VFR BLD AUTO: 32.8 % (ref 35–45)
HGB BLD-MCNC: 10.9 G/DL (ref 12–15.5)
HOLD SPECIMEN: NORMAL
HOLD SPECIMEN: NORMAL
IMM GRANULOCYTES # BLD: 0.06 10*3/MM3 (ref 0–0.02)
IMM GRANULOCYTES NFR BLD: 0.4 % (ref 0–0.5)
INR PPP: 1.05 (ref 0.8–1.2)
LYMPHOCYTES # BLD AUTO: 3.21 10*3/MM3 (ref 0.6–4.2)
LYMPHOCYTES NFR BLD AUTO: 21.4 % (ref 10–50)
MCH RBC QN AUTO: 27.5 PG (ref 26.5–34)
MCHC RBC AUTO-ENTMCNC: 33.2 G/DL (ref 31.4–36)
MCV RBC AUTO: 82.6 FL (ref 80–98)
MONOCYTES # BLD AUTO: 1.41 10*3/MM3 (ref 0–0.9)
MONOCYTES NFR BLD AUTO: 9.4 % (ref 0–12)
NEUTROPHILS # BLD AUTO: 10.21 10*3/MM3 (ref 2–8.6)
NEUTROPHILS NFR BLD AUTO: 68 % (ref 37–80)
NT-PROBNP SERPL-MCNC: 490 PG/ML (ref 0–900)
NT-PROBNP SERPL-MCNC: 493 PG/ML (ref 0–900)
PLATELET # BLD AUTO: 327 10*3/MM3 (ref 150–450)
PMV BLD AUTO: 8.7 FL (ref 8–12)
POTASSIUM BLD-SCNC: 4 MMOL/L (ref 3.5–5.1)
PROT SERPL-MCNC: 7.4 G/DL (ref 6.3–8.6)
PROTHROMBIN TIME: 13.5 SECONDS (ref 11.1–15.3)
RBC # BLD AUTO: 3.97 10*6/MM3 (ref 3.77–5.16)
SODIUM BLD-SCNC: 141 MMOL/L (ref 137–145)
TROPONIN I SERPL-MCNC: <0.012 NG/ML
TSH SERPL DL<=0.05 MIU/L-ACNC: 1.55 MIU/ML (ref 0.46–4.68)
WBC NRBC COR # BLD: 15.01 10*3/MM3 (ref 3.2–9.8)
WHOLE BLOOD HOLD SPECIMEN: NORMAL
WHOLE BLOOD HOLD SPECIMEN: NORMAL

## 2018-06-20 PROCEDURE — 84484 ASSAY OF TROPONIN QUANT: CPT | Performed by: PHYSICIAN ASSISTANT

## 2018-06-20 PROCEDURE — 83036 HEMOGLOBIN GLYCOSYLATED A1C: CPT | Performed by: INTERNAL MEDICINE

## 2018-06-20 PROCEDURE — 83880 ASSAY OF NATRIURETIC PEPTIDE: CPT | Performed by: INTERNAL MEDICINE

## 2018-06-20 PROCEDURE — 84443 ASSAY THYROID STIM HORMONE: CPT | Performed by: INTERNAL MEDICINE

## 2018-06-20 PROCEDURE — 84484 ASSAY OF TROPONIN QUANT: CPT | Performed by: INTERNAL MEDICINE

## 2018-06-20 PROCEDURE — 82550 ASSAY OF CK (CPK): CPT | Performed by: INTERNAL MEDICINE

## 2018-06-20 PROCEDURE — 83880 ASSAY OF NATRIURETIC PEPTIDE: CPT | Performed by: PHYSICIAN ASSISTANT

## 2018-06-20 PROCEDURE — G0378 HOSPITAL OBSERVATION PER HR: HCPCS

## 2018-06-20 PROCEDURE — 82962 GLUCOSE BLOOD TEST: CPT

## 2018-06-20 PROCEDURE — 71046 X-RAY EXAM CHEST 2 VIEWS: CPT

## 2018-06-20 PROCEDURE — 93010 ELECTROCARDIOGRAM REPORT: CPT | Performed by: INTERNAL MEDICINE

## 2018-06-20 PROCEDURE — 63710000001 INSULIN ASPART PER 5 UNITS: Performed by: INTERNAL MEDICINE

## 2018-06-20 PROCEDURE — 99284 EMERGENCY DEPT VISIT MOD MDM: CPT

## 2018-06-20 PROCEDURE — 82553 CREATINE MB FRACTION: CPT | Performed by: INTERNAL MEDICINE

## 2018-06-20 PROCEDURE — 80162 ASSAY OF DIGOXIN TOTAL: CPT | Performed by: PHYSICIAN ASSISTANT

## 2018-06-20 PROCEDURE — 93005 ELECTROCARDIOGRAM TRACING: CPT | Performed by: EMERGENCY MEDICINE

## 2018-06-20 PROCEDURE — 85025 COMPLETE CBC W/AUTO DIFF WBC: CPT | Performed by: PHYSICIAN ASSISTANT

## 2018-06-20 PROCEDURE — 80053 COMPREHEN METABOLIC PANEL: CPT | Performed by: PHYSICIAN ASSISTANT

## 2018-06-20 PROCEDURE — 85610 PROTHROMBIN TIME: CPT | Performed by: PHYSICIAN ASSISTANT

## 2018-06-20 RX ORDER — CARVEDILOL 25 MG/1
25 TABLET ORAL 2 TIMES DAILY WITH MEALS
Status: DISCONTINUED | OUTPATIENT
Start: 2018-06-20 | End: 2018-06-23 | Stop reason: HOSPADM

## 2018-06-20 RX ORDER — FUROSEMIDE 80 MG
80 TABLET ORAL DAILY
Status: DISCONTINUED | OUTPATIENT
Start: 2018-06-20 | End: 2018-06-23 | Stop reason: HOSPADM

## 2018-06-20 RX ORDER — PREGABALIN 75 MG/1
150 CAPSULE ORAL EVERY 12 HOURS SCHEDULED
Status: DISCONTINUED | OUTPATIENT
Start: 2018-06-20 | End: 2018-06-23 | Stop reason: HOSPADM

## 2018-06-20 RX ORDER — ASPIRIN 81 MG/1
81 TABLET ORAL DAILY
Status: DISCONTINUED | OUTPATIENT
Start: 2018-06-20 | End: 2018-06-23 | Stop reason: HOSPADM

## 2018-06-20 RX ORDER — ALBUTEROL SULFATE 2.5 MG/3ML
2.5 SOLUTION RESPIRATORY (INHALATION) EVERY 4 HOURS PRN
Status: DISCONTINUED | OUTPATIENT
Start: 2018-06-20 | End: 2018-06-23 | Stop reason: HOSPADM

## 2018-06-20 RX ORDER — DIGOXIN 125 MCG
125 TABLET ORAL DAILY
Status: DISCONTINUED | OUTPATIENT
Start: 2018-06-20 | End: 2018-06-23 | Stop reason: HOSPADM

## 2018-06-20 RX ORDER — BRIMONIDINE TARTRATE 0.15 %
1 DROPS OPHTHALMIC (EYE) EVERY 12 HOURS
Status: DISCONTINUED | OUTPATIENT
Start: 2018-06-20 | End: 2018-06-23 | Stop reason: HOSPADM

## 2018-06-20 RX ORDER — GLIPIZIDE 10 MG/1
10 TABLET ORAL
Status: DISCONTINUED | OUTPATIENT
Start: 2018-06-21 | End: 2018-06-23 | Stop reason: HOSPADM

## 2018-06-20 RX ORDER — DEXTROSE MONOHYDRATE 25 G/50ML
25 INJECTION, SOLUTION INTRAVENOUS
Status: DISCONTINUED | OUTPATIENT
Start: 2018-06-20 | End: 2018-06-23 | Stop reason: HOSPADM

## 2018-06-20 RX ORDER — LISINOPRIL 10 MG/1
10 TABLET ORAL DAILY
Status: DISCONTINUED | OUTPATIENT
Start: 2018-06-20 | End: 2018-06-23 | Stop reason: HOSPADM

## 2018-06-20 RX ORDER — ONDANSETRON 2 MG/ML
4 INJECTION INTRAMUSCULAR; INTRAVENOUS EVERY 6 HOURS PRN
Status: DISCONTINUED | OUTPATIENT
Start: 2018-06-20 | End: 2018-06-23 | Stop reason: HOSPADM

## 2018-06-20 RX ORDER — HYDRALAZINE HYDROCHLORIDE 20 MG/ML
10 INJECTION INTRAMUSCULAR; INTRAVENOUS EVERY 6 HOURS PRN
Status: DISCONTINUED | OUTPATIENT
Start: 2018-06-20 | End: 2018-06-23 | Stop reason: HOSPADM

## 2018-06-20 RX ORDER — ACETAMINOPHEN 325 MG/1
650 TABLET ORAL EVERY 4 HOURS PRN
Status: DISCONTINUED | OUTPATIENT
Start: 2018-06-20 | End: 2018-06-23 | Stop reason: HOSPADM

## 2018-06-20 RX ORDER — NICOTINE POLACRILEX 4 MG
15 LOZENGE BUCCAL
Status: DISCONTINUED | OUTPATIENT
Start: 2018-06-20 | End: 2018-06-23 | Stop reason: HOSPADM

## 2018-06-20 RX ORDER — SODIUM CHLORIDE 0.9 % (FLUSH) 0.9 %
10 SYRINGE (ML) INJECTION AS NEEDED
Status: DISCONTINUED | OUTPATIENT
Start: 2018-06-20 | End: 2018-06-23 | Stop reason: HOSPADM

## 2018-06-20 RX ORDER — ASPIRIN 325 MG
325 TABLET ORAL ONCE
Status: DISCONTINUED | OUTPATIENT
Start: 2018-06-20 | End: 2018-06-20

## 2018-06-20 RX ORDER — ATORVASTATIN CALCIUM 40 MG/1
40 TABLET, FILM COATED ORAL DAILY
Status: DISCONTINUED | OUTPATIENT
Start: 2018-06-20 | End: 2018-06-23 | Stop reason: HOSPADM

## 2018-06-20 RX ORDER — PANTOPRAZOLE SODIUM 40 MG/1
40 TABLET, DELAYED RELEASE ORAL EVERY MORNING
Status: DISCONTINUED | OUTPATIENT
Start: 2018-06-21 | End: 2018-06-23 | Stop reason: HOSPADM

## 2018-06-20 RX ORDER — DOCUSATE SODIUM 100 MG/1
100 CAPSULE, LIQUID FILLED ORAL 2 TIMES DAILY
Status: DISCONTINUED | OUTPATIENT
Start: 2018-06-20 | End: 2018-06-23 | Stop reason: HOSPADM

## 2018-06-20 RX ORDER — MORPHINE SULFATE 2 MG/ML
1 INJECTION, SOLUTION INTRAMUSCULAR; INTRAVENOUS
Status: DISCONTINUED | OUTPATIENT
Start: 2018-06-20 | End: 2018-06-23 | Stop reason: HOSPADM

## 2018-06-20 RX ORDER — LATANOPROST 50 UG/ML
1 SOLUTION/ DROPS OPHTHALMIC NIGHTLY
Status: DISCONTINUED | OUTPATIENT
Start: 2018-06-20 | End: 2018-06-23 | Stop reason: HOSPADM

## 2018-06-20 RX ORDER — SODIUM CHLORIDE 0.9 % (FLUSH) 0.9 %
1-10 SYRINGE (ML) INJECTION AS NEEDED
Status: DISCONTINUED | OUTPATIENT
Start: 2018-06-20 | End: 2018-06-23 | Stop reason: HOSPADM

## 2018-06-20 RX ADMIN — BRIMONIDINE TARTRATE 1 DROP: 1.5 SOLUTION OPHTHALMIC at 21:09

## 2018-06-20 RX ADMIN — Medication 400 MG: at 17:05

## 2018-06-20 RX ADMIN — PREGABALIN 150 MG: 75 CAPSULE ORAL at 21:09

## 2018-06-20 RX ADMIN — FUROSEMIDE 80 MG: 80 TABLET ORAL at 17:05

## 2018-06-20 RX ADMIN — PREGABALIN 150 MG: 75 CAPSULE ORAL at 17:04

## 2018-06-20 RX ADMIN — ASPIRIN 81 MG: 81 TABLET, COATED ORAL at 17:05

## 2018-06-20 RX ADMIN — CARVEDILOL 25 MG: 25 TABLET, FILM COATED ORAL at 17:06

## 2018-06-20 RX ADMIN — METFORMIN HYDROCHLORIDE 1000 MG: 500 TABLET ORAL at 17:05

## 2018-06-20 RX ADMIN — DIGOXIN 125 MCG: 125 TABLET ORAL at 17:06

## 2018-06-20 RX ADMIN — Medication 10 ML: at 09:15

## 2018-06-20 RX ADMIN — LISINOPRIL 10 MG: 10 TABLET ORAL at 17:06

## 2018-06-20 RX ADMIN — INSULIN ASPART 3 UNITS: 100 INJECTION, SOLUTION INTRAVENOUS; SUBCUTANEOUS at 17:06

## 2018-06-20 RX ADMIN — ATORVASTATIN CALCIUM 40 MG: 40 TABLET, FILM COATED ORAL at 17:06

## 2018-06-20 RX ADMIN — LATANOPROST 1 DROP: 50 SOLUTION OPHTHALMIC at 21:09

## 2018-06-20 RX ADMIN — INSULIN ASPART 3 UNITS: 100 INJECTION, SOLUTION INTRAVENOUS; SUBCUTANEOUS at 21:09

## 2018-06-21 ENCOUNTER — APPOINTMENT (OUTPATIENT)
Dept: CT IMAGING | Facility: HOSPITAL | Age: 68
End: 2018-06-21

## 2018-06-21 ENCOUNTER — APPOINTMENT (OUTPATIENT)
Dept: CARDIOLOGY | Facility: HOSPITAL | Age: 68
End: 2018-06-21
Attending: INTERNAL MEDICINE

## 2018-06-21 PROBLEM — IMO0001 CLASS 2 OBESITY DUE TO EXCESS CALORIES WITH SERIOUS COMORBIDITY AND BODY MASS INDEX (BMI) OF 37.0 TO 37.9 IN ADULT: Status: ACTIVE | Noted: 2018-06-21

## 2018-06-21 LAB
ANION GAP SERPL CALCULATED.3IONS-SCNC: 12 MMOL/L (ref 5–15)
ARTICHOKE IGE QN: 91 MG/DL (ref 1–129)
BASOPHILS # BLD AUTO: 0.02 10*3/MM3 (ref 0–0.2)
BASOPHILS NFR BLD AUTO: 0.2 % (ref 0–2)
BUN BLD-MCNC: 14 MG/DL (ref 7–21)
BUN/CREAT SERPL: 16.9 (ref 7–25)
CALCIUM SPEC-SCNC: 9.1 MG/DL (ref 8.4–10.2)
CHLORIDE SERPL-SCNC: 102 MMOL/L (ref 95–110)
CHOLEST SERPL-MCNC: 167 MG/DL (ref 0–199)
CO2 SERPL-SCNC: 27 MMOL/L (ref 22–31)
CREAT BLD-MCNC: 0.83 MG/DL (ref 0.5–1)
DEPRECATED RDW RBC AUTO: 47.3 FL (ref 36.4–46.3)
EOSINOPHIL # BLD AUTO: 0.18 10*3/MM3 (ref 0–0.7)
EOSINOPHIL NFR BLD AUTO: 1.6 % (ref 0–7)
ERYTHROCYTE [DISTWIDTH] IN BLOOD BY AUTOMATED COUNT: 15.3 % (ref 11.5–14.5)
GFR SERPL CREATININE-BSD FRML MDRD: 83 ML/MIN/1.73 (ref 45–104)
GLUCOSE BLD-MCNC: 133 MG/DL (ref 60–100)
GLUCOSE BLDC GLUCOMTR-MCNC: 145 MG/DL (ref 70–130)
GLUCOSE BLDC GLUCOMTR-MCNC: 160 MG/DL (ref 70–130)
GLUCOSE BLDC GLUCOMTR-MCNC: 208 MG/DL (ref 70–130)
GLUCOSE BLDC GLUCOMTR-MCNC: 84 MG/DL (ref 70–130)
HCT VFR BLD AUTO: 33.6 % (ref 35–45)
HDLC SERPL-MCNC: 34 MG/DL (ref 60–200)
HGB BLD-MCNC: 10.9 G/DL (ref 12–15.5)
IMM GRANULOCYTES # BLD: 0.03 10*3/MM3 (ref 0–0.02)
IMM GRANULOCYTES NFR BLD: 0.3 % (ref 0–0.5)
LDLC/HDLC SERPL: 3.28 {RATIO} (ref 0–3.22)
LYMPHOCYTES # BLD AUTO: 4.12 10*3/MM3 (ref 0.6–4.2)
LYMPHOCYTES NFR BLD AUTO: 36.2 % (ref 10–50)
MCH RBC QN AUTO: 27.2 PG (ref 26.5–34)
MCHC RBC AUTO-ENTMCNC: 32.4 G/DL (ref 31.4–36)
MCV RBC AUTO: 83.8 FL (ref 80–98)
MONOCYTES # BLD AUTO: 1.21 10*3/MM3 (ref 0–0.9)
MONOCYTES NFR BLD AUTO: 10.6 % (ref 0–12)
NEUTROPHILS # BLD AUTO: 5.81 10*3/MM3 (ref 2–8.6)
NEUTROPHILS NFR BLD AUTO: 51.1 % (ref 37–80)
PLATELET # BLD AUTO: 346 10*3/MM3 (ref 150–450)
PMV BLD AUTO: 8.8 FL (ref 8–12)
POTASSIUM BLD-SCNC: 3.8 MMOL/L (ref 3.5–5.1)
RBC # BLD AUTO: 4.01 10*6/MM3 (ref 3.77–5.16)
SODIUM BLD-SCNC: 141 MMOL/L (ref 137–145)
TRIGL SERPL-MCNC: 107 MG/DL (ref 20–199)
TROPONIN I SERPL-MCNC: <0.012 NG/ML
WBC NRBC COR # BLD: 11.37 10*3/MM3 (ref 3.2–9.8)

## 2018-06-21 PROCEDURE — 93306 TTE W/DOPPLER COMPLETE: CPT

## 2018-06-21 PROCEDURE — 85025 COMPLETE CBC W/AUTO DIFF WBC: CPT | Performed by: INTERNAL MEDICINE

## 2018-06-21 PROCEDURE — 0 IOPAMIDOL PER 1 ML: Performed by: INTERNAL MEDICINE

## 2018-06-21 PROCEDURE — 80048 BASIC METABOLIC PNL TOTAL CA: CPT | Performed by: INTERNAL MEDICINE

## 2018-06-21 PROCEDURE — 93005 ELECTROCARDIOGRAM TRACING: CPT | Performed by: INTERNAL MEDICINE

## 2018-06-21 PROCEDURE — 82962 GLUCOSE BLOOD TEST: CPT

## 2018-06-21 PROCEDURE — 93010 ELECTROCARDIOGRAM REPORT: CPT | Performed by: INTERNAL MEDICINE

## 2018-06-21 PROCEDURE — 63710000001 INSULIN ASPART PER 5 UNITS: Performed by: INTERNAL MEDICINE

## 2018-06-21 PROCEDURE — 84484 ASSAY OF TROPONIN QUANT: CPT | Performed by: INTERNAL MEDICINE

## 2018-06-21 PROCEDURE — 75574 CT ANGIO HRT W/3D IMAGE: CPT

## 2018-06-21 PROCEDURE — G0378 HOSPITAL OBSERVATION PER HR: HCPCS

## 2018-06-21 PROCEDURE — 80061 LIPID PANEL: CPT | Performed by: INTERNAL MEDICINE

## 2018-06-21 RX ORDER — METOPROLOL TARTRATE 5 MG/5ML
INJECTION INTRAVENOUS
Status: COMPLETED | OUTPATIENT
Start: 2018-06-21 | End: 2018-06-21

## 2018-06-21 RX ORDER — ISOSORBIDE MONONITRATE 30 MG/1
30 TABLET, EXTENDED RELEASE ORAL
Status: DISCONTINUED | OUTPATIENT
Start: 2018-06-22 | End: 2018-06-23 | Stop reason: HOSPADM

## 2018-06-21 RX ADMIN — CARVEDILOL 25 MG: 25 TABLET, FILM COATED ORAL at 08:28

## 2018-06-21 RX ADMIN — BRIMONIDINE TARTRATE 1 DROP: 1.5 SOLUTION OPHTHALMIC at 20:59

## 2018-06-21 RX ADMIN — DOCUSATE SODIUM 100 MG: 100 CAPSULE, LIQUID FILLED ORAL at 20:59

## 2018-06-21 RX ADMIN — INSULIN ASPART 5 UNITS: 100 INJECTION, SOLUTION INTRAVENOUS; SUBCUTANEOUS at 20:59

## 2018-06-21 RX ADMIN — DIGOXIN 125 MCG: 125 TABLET ORAL at 08:28

## 2018-06-21 RX ADMIN — IOPAMIDOL 90 ML: 755 INJECTION, SOLUTION INTRAVENOUS at 12:00

## 2018-06-21 RX ADMIN — PREGABALIN 150 MG: 75 CAPSULE ORAL at 20:59

## 2018-06-21 RX ADMIN — LISINOPRIL 10 MG: 10 TABLET ORAL at 08:29

## 2018-06-21 RX ADMIN — PREGABALIN 150 MG: 75 CAPSULE ORAL at 08:29

## 2018-06-21 RX ADMIN — Medication 400 MG: at 08:29

## 2018-06-21 RX ADMIN — BRIMONIDINE TARTRATE 1 DROP: 1.5 SOLUTION OPHTHALMIC at 08:31

## 2018-06-21 RX ADMIN — LATANOPROST 1 DROP: 50 SOLUTION OPHTHALMIC at 21:00

## 2018-06-21 RX ADMIN — INSULIN ASPART 3 UNITS: 100 INJECTION, SOLUTION INTRAVENOUS; SUBCUTANEOUS at 17:45

## 2018-06-21 RX ADMIN — CARVEDILOL 25 MG: 25 TABLET, FILM COATED ORAL at 17:44

## 2018-06-21 RX ADMIN — GLIPIZIDE 10 MG: 10 TABLET ORAL at 08:31

## 2018-06-21 RX ADMIN — FUROSEMIDE 80 MG: 80 TABLET ORAL at 12:17

## 2018-06-21 RX ADMIN — ATORVASTATIN CALCIUM 40 MG: 40 TABLET, FILM COATED ORAL at 08:28

## 2018-06-21 RX ADMIN — METOPROLOL TARTRATE 5 MG: 5 INJECTION INTRAVENOUS at 11:03

## 2018-06-21 RX ADMIN — ASPIRIN 81 MG: 81 TABLET, COATED ORAL at 08:28

## 2018-06-22 LAB
GLUCOSE BLDC GLUCOMTR-MCNC: 141 MG/DL (ref 70–130)
GLUCOSE BLDC GLUCOMTR-MCNC: 216 MG/DL (ref 70–130)
GLUCOSE BLDC GLUCOMTR-MCNC: 252 MG/DL (ref 70–130)
GLUCOSE BLDC GLUCOMTR-MCNC: 388 MG/DL (ref 70–130)

## 2018-06-22 PROCEDURE — G0378 HOSPITAL OBSERVATION PER HR: HCPCS

## 2018-06-22 PROCEDURE — 63710000001 INSULIN ASPART PER 5 UNITS: Performed by: INTERNAL MEDICINE

## 2018-06-22 PROCEDURE — 82962 GLUCOSE BLOOD TEST: CPT

## 2018-06-22 RX ADMIN — DOCUSATE SODIUM 100 MG: 100 CAPSULE, LIQUID FILLED ORAL at 21:08

## 2018-06-22 RX ADMIN — BRIMONIDINE TARTRATE 1 DROP: 1.5 SOLUTION OPHTHALMIC at 08:45

## 2018-06-22 RX ADMIN — ATORVASTATIN CALCIUM 40 MG: 40 TABLET, FILM COATED ORAL at 08:43

## 2018-06-22 RX ADMIN — ISOSORBIDE MONONITRATE 30 MG: 30 TABLET, EXTENDED RELEASE ORAL at 08:43

## 2018-06-22 RX ADMIN — INSULIN ASPART 12 UNITS: 100 INJECTION, SOLUTION INTRAVENOUS; SUBCUTANEOUS at 12:08

## 2018-06-22 RX ADMIN — INSULIN ASPART 5 UNITS: 100 INJECTION, SOLUTION INTRAVENOUS; SUBCUTANEOUS at 17:26

## 2018-06-22 RX ADMIN — INSULIN ASPART 5 UNITS: 100 INJECTION, SOLUTION INTRAVENOUS; SUBCUTANEOUS at 21:09

## 2018-06-22 RX ADMIN — CARVEDILOL 25 MG: 25 TABLET, FILM COATED ORAL at 08:43

## 2018-06-22 RX ADMIN — Medication 400 MG: at 08:43

## 2018-06-22 RX ADMIN — PANTOPRAZOLE SODIUM 40 MG: 40 TABLET, DELAYED RELEASE ORAL at 06:20

## 2018-06-22 RX ADMIN — FUROSEMIDE 80 MG: 80 TABLET ORAL at 08:43

## 2018-06-22 RX ADMIN — DIGOXIN 125 MCG: 125 TABLET ORAL at 08:43

## 2018-06-22 RX ADMIN — GLIPIZIDE 10 MG: 10 TABLET ORAL at 08:42

## 2018-06-22 RX ADMIN — ASPIRIN 81 MG: 81 TABLET, COATED ORAL at 08:43

## 2018-06-22 RX ADMIN — LATANOPROST 1 DROP: 50 SOLUTION OPHTHALMIC at 21:09

## 2018-06-22 RX ADMIN — DOCUSATE SODIUM 100 MG: 100 CAPSULE, LIQUID FILLED ORAL at 08:43

## 2018-06-22 RX ADMIN — CARVEDILOL 25 MG: 25 TABLET, FILM COATED ORAL at 17:26

## 2018-06-22 RX ADMIN — LISINOPRIL 10 MG: 10 TABLET ORAL at 08:43

## 2018-06-22 RX ADMIN — PREGABALIN 150 MG: 75 CAPSULE ORAL at 21:08

## 2018-06-22 RX ADMIN — BRIMONIDINE TARTRATE 1 DROP: 1.5 SOLUTION OPHTHALMIC at 21:09

## 2018-06-22 RX ADMIN — PREGABALIN 150 MG: 75 CAPSULE ORAL at 08:43

## 2018-06-23 VITALS
HEART RATE: 79 BPM | HEIGHT: 62 IN | SYSTOLIC BLOOD PRESSURE: 122 MMHG | DIASTOLIC BLOOD PRESSURE: 62 MMHG | WEIGHT: 211.8 LBS | TEMPERATURE: 97.4 F | BODY MASS INDEX: 38.98 KG/M2 | RESPIRATION RATE: 18 BRPM | OXYGEN SATURATION: 100 %

## 2018-06-23 LAB
GLUCOSE BLDC GLUCOMTR-MCNC: 223 MG/DL (ref 70–130)
GLUCOSE BLDC GLUCOMTR-MCNC: 318 MG/DL (ref 70–130)

## 2018-06-23 PROCEDURE — 63710000001 INSULIN ASPART PER 5 UNITS: Performed by: INTERNAL MEDICINE

## 2018-06-23 PROCEDURE — G0378 HOSPITAL OBSERVATION PER HR: HCPCS

## 2018-06-23 PROCEDURE — 82962 GLUCOSE BLOOD TEST: CPT

## 2018-06-23 RX ORDER — ISOSORBIDE MONONITRATE 30 MG/1
30 TABLET, EXTENDED RELEASE ORAL
Qty: 30 TABLET | Refills: 0 | Status: SHIPPED | OUTPATIENT
Start: 2018-06-23 | End: 2018-07-05 | Stop reason: SDUPTHER

## 2018-06-23 RX ADMIN — ACETAMINOPHEN 650 MG: 325 TABLET ORAL at 08:51

## 2018-06-23 RX ADMIN — PREGABALIN 150 MG: 75 CAPSULE ORAL at 08:45

## 2018-06-23 RX ADMIN — ASPIRIN 81 MG: 81 TABLET, COATED ORAL at 08:45

## 2018-06-23 RX ADMIN — ISOSORBIDE MONONITRATE 30 MG: 30 TABLET, EXTENDED RELEASE ORAL at 08:45

## 2018-06-23 RX ADMIN — INSULIN ASPART 5 UNITS: 100 INJECTION, SOLUTION INTRAVENOUS; SUBCUTANEOUS at 08:46

## 2018-06-23 RX ADMIN — LISINOPRIL 10 MG: 10 TABLET ORAL at 08:45

## 2018-06-23 RX ADMIN — DIGOXIN 125 MCG: 125 TABLET ORAL at 08:45

## 2018-06-23 RX ADMIN — CARVEDILOL 25 MG: 25 TABLET, FILM COATED ORAL at 08:45

## 2018-06-23 RX ADMIN — INSULIN ASPART 10 UNITS: 100 INJECTION, SOLUTION INTRAVENOUS; SUBCUTANEOUS at 13:13

## 2018-06-23 RX ADMIN — GLIPIZIDE 10 MG: 10 TABLET ORAL at 08:45

## 2018-06-23 RX ADMIN — BRIMONIDINE TARTRATE 1 DROP: 1.5 SOLUTION OPHTHALMIC at 08:47

## 2018-06-23 RX ADMIN — Medication 400 MG: at 08:45

## 2018-06-23 RX ADMIN — FUROSEMIDE 80 MG: 80 TABLET ORAL at 08:45

## 2018-06-23 RX ADMIN — DOCUSATE SODIUM 100 MG: 100 CAPSULE, LIQUID FILLED ORAL at 08:45

## 2018-06-23 RX ADMIN — PANTOPRAZOLE SODIUM 40 MG: 40 TABLET, DELAYED RELEASE ORAL at 05:59

## 2018-06-23 RX ADMIN — ATORVASTATIN CALCIUM 40 MG: 40 TABLET, FILM COATED ORAL at 08:45

## 2018-06-28 LAB
BH CV ECHO MEAS - ACS: 2 CM
BH CV ECHO MEAS - AO MAX PG (FULL): 4.7 MMHG
BH CV ECHO MEAS - AO MAX PG: 6.3 MMHG
BH CV ECHO MEAS - AO MEAN PG (FULL): 2.7 MMHG
BH CV ECHO MEAS - AO MEAN PG: 3.6 MMHG
BH CV ECHO MEAS - AO ROOT AREA (BSA CORRECTED): 1.6
BH CV ECHO MEAS - AO ROOT AREA: 8 CM^2
BH CV ECHO MEAS - AO ROOT DIAM: 3.2 CM
BH CV ECHO MEAS - AO V2 MAX: 125 CM/SEC
BH CV ECHO MEAS - AO V2 MEAN: 91.6 CM/SEC
BH CV ECHO MEAS - AO V2 VTI: 21.4 CM
BH CV ECHO MEAS - AVA(I,A): 1.3 CM^2
BH CV ECHO MEAS - AVA(I,D): 1.3 CM^2
BH CV ECHO MEAS - AVA(V,A): 1.4 CM^2
BH CV ECHO MEAS - AVA(V,D): 1.4 CM^2
BH CV ECHO MEAS - BSA(HAYCOCK): 2.1 M^2
BH CV ECHO MEAS - BSA: 1.9 M^2
BH CV ECHO MEAS - BZI_BMI: 38.1 KILOGRAMS/M^2
BH CV ECHO MEAS - BZI_METRIC_HEIGHT: 157 CM
BH CV ECHO MEAS - BZI_METRIC_WEIGHT: 94 KG
BH CV ECHO MEAS - EDV(CUBED): 280.2 ML
BH CV ECHO MEAS - EDV(TEICH): 219.3 ML
BH CV ECHO MEAS - EF(CUBED): 38.4 %
BH CV ECHO MEAS - EF(TEICH): 30.9 %
BH CV ECHO MEAS - ESV(CUBED): 172.5 ML
BH CV ECHO MEAS - ESV(TEICH): 151.6 ML
BH CV ECHO MEAS - FS: 14.9 %
BH CV ECHO MEAS - IVS/LVPW: 1.1
BH CV ECHO MEAS - IVSD: 1.1 CM
BH CV ECHO MEAS - LA DIMENSION: 4.3 CM
BH CV ECHO MEAS - LA/AO: 1.3
BH CV ECHO MEAS - LV MASS(C)D: 317.4 GRAMS
BH CV ECHO MEAS - LV MASS(C)DI: 163.9 GRAMS/M^2
BH CV ECHO MEAS - LV MAX PG: 1.5 MMHG
BH CV ECHO MEAS - LV MEAN PG: 0.9 MMHG
BH CV ECHO MEAS - LV V1 MAX: 61.9 CM/SEC
BH CV ECHO MEAS - LV V1 MEAN: 44.9 CM/SEC
BH CV ECHO MEAS - LV V1 VTI: 10.1 CM
BH CV ECHO MEAS - LVIDD: 6.5 CM
BH CV ECHO MEAS - LVIDS: 5.6 CM
BH CV ECHO MEAS - LVOT AREA: 2.8 CM^2
BH CV ECHO MEAS - LVOT DIAM: 1.9 CM
BH CV ECHO MEAS - LVPWD: 1 CM
BH CV ECHO MEAS - MR MAX PG: 86.6 MMHG
BH CV ECHO MEAS - MR MAX VEL: 465.3 CM/SEC
BH CV ECHO MEAS - MV A MAX VEL: 104.4 CM/SEC
BH CV ECHO MEAS - MV E MAX VEL: 98.4 CM/SEC
BH CV ECHO MEAS - MV E/A: 0.94
BH CV ECHO MEAS - MV MAX PG: 6.4 MMHG
BH CV ECHO MEAS - MV MEAN PG: 2.9 MMHG
BH CV ECHO MEAS - MV P1/2T MAX VEL: 111 CM/SEC
BH CV ECHO MEAS - MV V2 MAX: 126 CM/SEC
BH CV ECHO MEAS - MV V2 MEAN: 80.2 CM/SEC
BH CV ECHO MEAS - MV V2 VTI: 37.5 CM
BH CV ECHO MEAS - MVA P1/2T LCG: 2 CM^2
BH CV ECHO MEAS - MVA(VTI): 0.76 CM^2
BH CV ECHO MEAS - PA MAX PG: 3.6 MMHG
BH CV ECHO MEAS - PA V2 MAX: 94.2 CM/SEC
BH CV ECHO MEAS - RAP SYSTOLE: 10 MMHG
BH CV ECHO MEAS - SI(AO): 88.4 ML/M^2
BH CV ECHO MEAS - SI(CUBED): 55.6 ML/M^2
BH CV ECHO MEAS - SI(LVOT): 14.7 ML/M^2
BH CV ECHO MEAS - SI(TEICH): 35 ML/M^2
BH CV ECHO MEAS - SV(AO): 171.1 ML
BH CV ECHO MEAS - SV(CUBED): 107.7 ML
BH CV ECHO MEAS - SV(LVOT): 28.4 ML
BH CV ECHO MEAS - SV(TEICH): 67.8 ML
MAXIMAL PREDICTED HEART RATE: 151 BPM
STRESS TARGET HR: 128 BPM

## 2018-07-05 ENCOUNTER — OFFICE VISIT (OUTPATIENT)
Dept: FAMILY MEDICINE CLINIC | Facility: CLINIC | Age: 68
End: 2018-07-05

## 2018-07-05 VITALS
BODY MASS INDEX: 38.37 KG/M2 | DIASTOLIC BLOOD PRESSURE: 60 MMHG | HEIGHT: 62 IN | SYSTOLIC BLOOD PRESSURE: 112 MMHG | WEIGHT: 208.5 LBS | HEART RATE: 100 BPM

## 2018-07-05 DIAGNOSIS — I25.119 CORONARY ARTERY DISEASE INVOLVING NATIVE HEART WITH ANGINA PECTORIS, UNSPECIFIED VESSEL OR LESION TYPE (HCC): Primary | ICD-10-CM

## 2018-07-05 DIAGNOSIS — I10 ESSENTIAL HYPERTENSION: Chronic | ICD-10-CM

## 2018-07-05 PROCEDURE — 99213 OFFICE O/P EST LOW 20 MIN: CPT | Performed by: GENERAL PRACTICE

## 2018-07-05 RX ORDER — NITROGLYCERIN 0.4 MG/1
0.4 TABLET SUBLINGUAL
Qty: 25 TABLET | Refills: 0 | Status: SHIPPED | OUTPATIENT
Start: 2018-07-05 | End: 2022-01-19 | Stop reason: HOSPADM

## 2018-07-05 RX ORDER — ISOSORBIDE MONONITRATE 30 MG/1
30 TABLET, EXTENDED RELEASE ORAL
Qty: 90 TABLET | Refills: 3 | Status: SHIPPED | OUTPATIENT
Start: 2018-07-05 | End: 2018-10-17 | Stop reason: SDUPTHER

## 2018-07-05 NOTE — PROGRESS NOTES
Subjective   Lexi Wade is a 67 y.o. female.   Chief Complaint   Patient presents with   • Follow-up     [facility] [place] congestive heart failure   • Diabetes     History of Present Illness     Recent hospitalization, labs, xrays reviewed and medications reconciled. Had chest pain, enzymes were negative and her CTA of the chest did not show any significant occlusion. She was started on Ranexa by Dr. Felipe. . No further chest pain since discharge.    The following portions of the patient's history were reviewed and updated as appropriate: allergies, current medications, past social history and problem list.    Outpatient Medications Prior to Visit   Medication Sig Dispense Refill   • aspirin 81 MG tablet Take 81 mg by mouth every night.     • atorvastatin (LIPITOR) 40 MG tablet Take 1 tablet by mouth Daily. 90 tablet 3   • B-D UF III MINI PEN NEEDLES 31G X 5 MM misc USE DAILY WITH VICTOZA 100 each 2   • brimonidine (ALPHAGAN) 0.2 % ophthalmic solution Use in both eyes 2 times daily (Patient taking differently: Administer 1 drop to both eyes 2 (Two) Times a Day. Place 1 drop in both eyes two times daily) 15 mL 3   • carvedilol (COREG) 25 MG tablet Take 1 tablet by mouth 2 (Two) Times a Day With Meals. 180 tablet 3   • digoxin (LANOXIN) 125 MCG tablet TAKE 1 TABLET BY MOUTH DAILY 90 tablet 3   • esomeprazole (nexIUM) 40 MG capsule Take 1 capsule by mouth Daily. 90 capsule 1   • ezetimibe (ZETIA) 10 MG tablet TAKE 1 TABELT BY MOUTH DAILY 90 tablet 2   • furosemide (LASIX) 80 MG tablet Take 1 tablet by mouth Daily. 90 tablet 3   • glimepiride (AMARYL) 4 MG tablet Take 1 tablet by mouth 2 (Two) Times a Day. 180 tablet 3   • latanoprost (XALATAN) 0.005 % ophthalmic solution Administer 1 drop to both eyes Every Night. 90 day supply. 2.5 mL 5   • lisinopril (PRINIVIL,ZESTRIL) 10 MG tablet Take 1 tablet by mouth Daily. 90 tablet 3   • magnesium oxide (MAGOX) 400 (241.3 Mg) MG tablet tablet Take 1 tablet by mouth  Daily. 90 each 3   • metFORMIN (GLUCOPHAGE) 1000 MG tablet Take 1 tablet by mouth 2 (Two) Times a Day With Meals. 180 tablet 3   • omega-3 acid ethyl esters (LOVAZA) 1 G capsule Take 1 g by mouth 2 (Two) Times a Day. 2 capsules bid     • potassium chloride (MICRO-K) 10 MEQ CR capsule Take 1 capsule by mouth 2 (Two) Times a Day. 180 capsule 3   • pregabalin (LYRICA) 150 MG capsule Take 1 capsule by mouth 2 (Two) Times a Day. 180 capsule 0   • VICTOZA 18 MG/3ML solution pen-injector injection INJECT 1.2 MG UNDER THE SKIN DAILY. (Patient taking differently: Inject 1.2 mg under the skin Every Night.) 6 pen 3   • vitamin D (ERGOCALCIFEROL) 20495 units capsule capsule Take 1 capsule by mouth 1 (One) Time Per Week. (Patient taking differently: Take 50,000 Units by mouth 1 (One) Time Per Week. Take 1 capsule by mouth weekly on Tuesdays) 12 capsule 3   • isosorbide mononitrate (IMDUR) 30 MG 24 hr tablet Take 1 tablet by mouth Daily. 30 tablet 0     No facility-administered medications prior to visit.        Review of Systems   Constitutional: Negative.  Negative for chills, fatigue, fever and unexpected weight change.   HENT: Negative.  Negative for congestion, ear pain, hearing loss, nosebleeds, rhinorrhea, sneezing, sore throat and tinnitus.    Eyes: Negative.  Negative for discharge.   Respiratory: Negative.  Negative for cough, shortness of breath and wheezing.    Cardiovascular: Negative.  Negative for chest pain and palpitations.   Gastrointestinal: Negative.  Negative for abdominal pain, constipation, diarrhea, nausea and vomiting.   Endocrine: Negative.    Genitourinary: Negative.  Negative for dysuria, frequency and urgency.   Musculoskeletal: Negative.  Negative for arthralgias, back pain, joint swelling, myalgias and neck pain.   Skin: Negative.  Negative for rash.   Allergic/Immunologic: Negative.    Neurological: Negative.  Negative for dizziness, weakness, numbness and headaches.   Hematological: Negative.   "Negative for adenopathy.   Psychiatric/Behavioral: Negative.  Negative for dysphoric mood and sleep disturbance. The patient is not nervous/anxious.        Objective   Visit Vitals  /60   Pulse 100   Ht 157.5 cm (62\")   Wt 94.6 kg (208 lb 8 oz)   BMI 38.14 kg/m²     Physical Exam   Constitutional: She is oriented to person, place, and time. She appears well-developed and well-nourished. No distress.   HENT:   Head: Normocephalic and atraumatic.   Nose: Nose normal.   Mouth/Throat: Oropharynx is clear and moist.   Eyes: Conjunctivae and EOM are normal. Pupils are equal, round, and reactive to light. Right eye exhibits no discharge. Left eye exhibits no discharge.   Neck: No thyromegaly present.   Cardiovascular: Normal rate, regular rhythm, normal heart sounds and intact distal pulses.    Pulmonary/Chest: Effort normal and breath sounds normal.   Lymphadenopathy:     She has no cervical adenopathy.   Neurological: She is alert and oriented to person, place, and time.   Skin: Skin is warm and dry.   Psychiatric: She has a normal mood and affect.   Nursing note and vitals reviewed.      Assessment/Plan   Problem List Items Addressed This Visit        Cardiovascular and Mediastinum    Essential hypertension (Chronic)      Other Visit Diagnoses     Coronary artery disease involving native heart with angina pectoris, unspecified vessel or lesion type (CMS/HCC)    -  Primary    Relevant Medications    nitroglycerin (NITROSTAT) 0.4 MG SL tablet    isosorbide mononitrate (IMDUR) 30 MG 24 hr tablet          Continue current treatment. Follow up with Dr. Felipe as scheduled. Current outpatient and discharge medications have been reconciled for the patient.  Reviewed by: Joyce Plata MD      New Medications Ordered This Visit   Medications   • nitroglycerin (NITROSTAT) 0.4 MG SL tablet     Sig: Place 1 tablet under the tongue Every 5 (Five) Minutes As Needed for Chest Pain. Take no more than 3 doses in 15 minutes. "     Dispense:  25 tablet     Refill:  0   • isosorbide mononitrate (IMDUR) 30 MG 24 hr tablet     Sig: Take 1 tablet by mouth Daily.     Dispense:  90 tablet     Refill:  3     Return for Next scheduled follow up.

## 2018-08-22 ENCOUNTER — HOSPITAL ENCOUNTER (OUTPATIENT)
Facility: HOSPITAL | Age: 68
Discharge: HOME HEALTH | End: 2018-08-24
Attending: HOSPITALIST | Admitting: INTERNAL MEDICINE

## 2018-08-22 ENCOUNTER — APPOINTMENT (OUTPATIENT)
Dept: GENERAL RADIOLOGY | Facility: HOSPITAL | Age: 68
End: 2018-08-22

## 2018-08-22 DIAGNOSIS — R07.2 PRECORDIAL PAIN: Primary | ICD-10-CM

## 2018-08-22 DIAGNOSIS — T59.811A INHALATION OF SMOKE: ICD-10-CM

## 2018-08-22 DIAGNOSIS — E11.8 TYPE 2 DIABETES MELLITUS WITH COMPLICATION, UNSPECIFIED LONG TERM INSULIN USE STATUS: Chronic | ICD-10-CM

## 2018-08-22 DIAGNOSIS — R07.9 CHEST PAIN, UNSPECIFIED TYPE: ICD-10-CM

## 2018-08-22 DIAGNOSIS — Z74.09 IMPAIRED PHYSICAL MOBILITY: ICD-10-CM
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
## 2018-08-22 DIAGNOSIS — R06.00 DYSPNEA, UNSPECIFIED TYPE: ICD-10-CM

## 2018-08-22 DIAGNOSIS — J70.5 RESPIRATORY CONDITIONS DUE TO SMOKE INHALATION (HCC): ICD-10-CM

## 2018-08-22 DIAGNOSIS — I42.9 CARDIOMYOPATHY, UNSPECIFIED TYPE (HCC): Chronic | ICD-10-CM

## 2018-08-22 DIAGNOSIS — I50.22 CHRONIC SYSTOLIC CONGESTIVE HEART FAILURE (HCC): ICD-10-CM

## 2018-08-22 LAB
ALBUMIN SERPL-MCNC: 4.4 G/DL (ref 3.4–4.8)
ALBUMIN/GLOB SERPL: 1.1 G/DL (ref 1.1–1.8)
ALP SERPL-CCNC: 114 U/L (ref 38–126)
ALT SERPL W P-5'-P-CCNC: 36 U/L (ref 9–52)
ANION GAP SERPL CALCULATED.3IONS-SCNC: 10 MMOL/L (ref 5–15)
AST SERPL-CCNC: 35 U/L (ref 14–36)
BASOPHILS # BLD AUTO: 0.03 10*3/MM3 (ref 0–0.2)
BASOPHILS NFR BLD AUTO: 0.2 % (ref 0–2)
BILIRUB SERPL-MCNC: 0.4 MG/DL (ref 0.2–1.3)
BUN BLD-MCNC: 8 MG/DL (ref 7–21)
BUN/CREAT SERPL: 9 (ref 7–25)
CALCIUM SPEC-SCNC: 9.9 MG/DL (ref 8.4–10.2)
CHLORIDE SERPL-SCNC: 100 MMOL/L (ref 95–110)
CO2 SERPL-SCNC: 29 MMOL/L (ref 22–31)
CREAT BLD-MCNC: 0.89 MG/DL (ref 0.5–1)
DEPRECATED RDW RBC AUTO: 46.7 FL (ref 36.4–46.3)
DIGOXIN SERPL-MCNC: 0.5 NG/ML (ref 0.8–2)
EOSINOPHIL # BLD AUTO: 0.16 10*3/MM3 (ref 0–0.7)
EOSINOPHIL NFR BLD AUTO: 1.3 % (ref 0–7)
ERYTHROCYTE [DISTWIDTH] IN BLOOD BY AUTOMATED COUNT: 15.1 % (ref 11.5–14.5)
GFR SERPL CREATININE-BSD FRML MDRD: 76 ML/MIN/1.73 (ref 45–104)
GLOBULIN UR ELPH-MCNC: 3.9 GM/DL (ref 2.3–3.5)
GLUCOSE BLD-MCNC: 162 MG/DL (ref 60–100)
GLUCOSE BLDC GLUCOMTR-MCNC: 156 MG/DL (ref 70–130)
GLUCOSE BLDC GLUCOMTR-MCNC: 184 MG/DL (ref 70–130)
HCT VFR BLD AUTO: 36.4 % (ref 35–45)
HGB BLD-MCNC: 11.8 G/DL (ref 12–15.5)
HOLD SPECIMEN: NORMAL
HOLD SPECIMEN: NORMAL
IMM GRANULOCYTES # BLD: 0.02 10*3/MM3 (ref 0–0.02)
IMM GRANULOCYTES NFR BLD: 0.2 % (ref 0–0.5)
INR PPP: 0.99 (ref 0.8–1.2)
LYMPHOCYTES # BLD AUTO: 3.79 10*3/MM3 (ref 0.6–4.2)
LYMPHOCYTES NFR BLD AUTO: 31.2 % (ref 10–50)
MAGNESIUM SERPL-MCNC: 2.1 MG/DL (ref 1.6–2.3)
MCH RBC QN AUTO: 27.3 PG (ref 26.5–34)
MCHC RBC AUTO-ENTMCNC: 32.4 G/DL (ref 31.4–36)
MCV RBC AUTO: 84.1 FL (ref 80–98)
MONOCYTES # BLD AUTO: 1.05 10*3/MM3 (ref 0–0.9)
MONOCYTES NFR BLD AUTO: 8.6 % (ref 0–12)
NEUTROPHILS # BLD AUTO: 7.11 10*3/MM3 (ref 2–8.6)
NEUTROPHILS NFR BLD AUTO: 58.5 % (ref 37–80)
NT-PROBNP SERPL-MCNC: 248 PG/ML (ref 0–900)
PLATELET # BLD AUTO: 374 10*3/MM3 (ref 150–450)
PMV BLD AUTO: 9.1 FL (ref 8–12)
POTASSIUM BLD-SCNC: 3.4 MMOL/L (ref 3.5–5.1)
PROT SERPL-MCNC: 8.3 G/DL (ref 6.3–8.6)
PROTHROMBIN TIME: 12.9 SECONDS (ref 11.1–15.3)
RBC # BLD AUTO: 4.33 10*6/MM3 (ref 3.77–5.16)
SODIUM BLD-SCNC: 139 MMOL/L (ref 137–145)
TROPONIN I SERPL-MCNC: <0.012 NG/ML
TSH SERPL DL<=0.05 MIU/L-ACNC: 3.24 MIU/ML (ref 0.46–4.68)
WBC NRBC COR # BLD: 12.16 10*3/MM3 (ref 3.2–9.8)
WHOLE BLOOD HOLD SPECIMEN: NORMAL
WHOLE BLOOD HOLD SPECIMEN: NORMAL

## 2018-08-22 PROCEDURE — 99284 EMERGENCY DEPT VISIT MOD MDM: CPT

## 2018-08-22 PROCEDURE — 84484 ASSAY OF TROPONIN QUANT: CPT | Performed by: NURSE PRACTITIONER

## 2018-08-22 PROCEDURE — 80053 COMPREHEN METABOLIC PANEL: CPT

## 2018-08-22 PROCEDURE — 83880 ASSAY OF NATRIURETIC PEPTIDE: CPT

## 2018-08-22 PROCEDURE — G0378 HOSPITAL OBSERVATION PER HR: HCPCS

## 2018-08-22 PROCEDURE — 83735 ASSAY OF MAGNESIUM: CPT | Performed by: NURSE PRACTITIONER

## 2018-08-22 PROCEDURE — 93010 ELECTROCARDIOGRAM REPORT: CPT | Performed by: INTERNAL MEDICINE

## 2018-08-22 PROCEDURE — 84484 ASSAY OF TROPONIN QUANT: CPT

## 2018-08-22 PROCEDURE — 71045 X-RAY EXAM CHEST 1 VIEW: CPT

## 2018-08-22 PROCEDURE — 85610 PROTHROMBIN TIME: CPT | Performed by: NURSE PRACTITIONER

## 2018-08-22 PROCEDURE — 94660 CPAP INITIATION&MGMT: CPT

## 2018-08-22 PROCEDURE — 93005 ELECTROCARDIOGRAM TRACING: CPT | Performed by: EMERGENCY MEDICINE

## 2018-08-22 PROCEDURE — 93005 ELECTROCARDIOGRAM TRACING: CPT | Performed by: HOSPITALIST

## 2018-08-22 PROCEDURE — 84443 ASSAY THYROID STIM HORMONE: CPT | Performed by: NURSE PRACTITIONER

## 2018-08-22 PROCEDURE — 80162 ASSAY OF DIGOXIN TOTAL: CPT | Performed by: NURSE PRACTITIONER

## 2018-08-22 PROCEDURE — 85025 COMPLETE CBC W/AUTO DIFF WBC: CPT

## 2018-08-22 PROCEDURE — 82962 GLUCOSE BLOOD TEST: CPT

## 2018-08-22 PROCEDURE — 63710000001 INSULIN ASPART PER 5 UNITS: Performed by: NURSE PRACTITIONER

## 2018-08-22 PROCEDURE — 84484 ASSAY OF TROPONIN QUANT: CPT | Performed by: HOSPITALIST

## 2018-08-22 RX ORDER — SODIUM CHLORIDE 0.9 % (FLUSH) 0.9 %
10 SYRINGE (ML) INJECTION AS NEEDED
Status: DISCONTINUED | OUTPATIENT
Start: 2018-08-22 | End: 2018-08-24 | Stop reason: HOSPADM

## 2018-08-22 RX ORDER — ASPIRIN 81 MG/1
81 TABLET, CHEWABLE ORAL DAILY
Status: DISCONTINUED | OUTPATIENT
Start: 2018-08-22 | End: 2018-08-24 | Stop reason: HOSPADM

## 2018-08-22 RX ORDER — CARVEDILOL 25 MG/1
25 TABLET ORAL 2 TIMES DAILY WITH MEALS
Status: DISCONTINUED | OUTPATIENT
Start: 2018-08-22 | End: 2018-08-24 | Stop reason: HOSPADM

## 2018-08-22 RX ORDER — ASPIRIN 325 MG
325 TABLET ORAL ONCE
Status: COMPLETED | OUTPATIENT
Start: 2018-08-22 | End: 2018-08-22

## 2018-08-22 RX ORDER — FUROSEMIDE 80 MG
80 TABLET ORAL DAILY
Status: DISCONTINUED | OUTPATIENT
Start: 2018-08-22 | End: 2018-08-24 | Stop reason: HOSPADM

## 2018-08-22 RX ORDER — PANTOPRAZOLE SODIUM 40 MG/1
40 TABLET, DELAYED RELEASE ORAL EVERY MORNING
Status: DISCONTINUED | OUTPATIENT
Start: 2018-08-23 | End: 2018-08-24 | Stop reason: HOSPADM

## 2018-08-22 RX ORDER — LATANOPROST 50 UG/ML
1 SOLUTION/ DROPS OPHTHALMIC NIGHTLY
Status: DISCONTINUED | OUTPATIENT
Start: 2018-08-22 | End: 2018-08-24 | Stop reason: HOSPADM

## 2018-08-22 RX ORDER — ISOSORBIDE MONONITRATE 30 MG/1
30 TABLET, EXTENDED RELEASE ORAL
Status: DISCONTINUED | OUTPATIENT
Start: 2018-08-22 | End: 2018-08-24 | Stop reason: HOSPADM

## 2018-08-22 RX ORDER — ONDANSETRON 4 MG/1
4 TABLET, ORALLY DISINTEGRATING ORAL EVERY 6 HOURS PRN
Status: DISCONTINUED | OUTPATIENT
Start: 2018-08-22 | End: 2018-08-24 | Stop reason: HOSPADM

## 2018-08-22 RX ORDER — NICOTINE POLACRILEX 4 MG
15 LOZENGE BUCCAL
Status: DISCONTINUED | OUTPATIENT
Start: 2018-08-22 | End: 2018-08-24 | Stop reason: HOSPADM

## 2018-08-22 RX ORDER — PREGABALIN 75 MG/1
150 CAPSULE ORAL EVERY 12 HOURS SCHEDULED
Status: DISCONTINUED | OUTPATIENT
Start: 2018-08-22 | End: 2018-08-24 | Stop reason: HOSPADM

## 2018-08-22 RX ORDER — BRIMONIDINE TARTRATE 0.15 %
1 DROPS OPHTHALMIC (EYE) 2 TIMES DAILY
Status: DISCONTINUED | OUTPATIENT
Start: 2018-08-22 | End: 2018-08-24 | Stop reason: HOSPADM

## 2018-08-22 RX ORDER — GLIPIZIDE 10 MG/1
10 TABLET ORAL
Status: DISCONTINUED | OUTPATIENT
Start: 2018-08-23 | End: 2018-08-24 | Stop reason: HOSPADM

## 2018-08-22 RX ORDER — DEXTROSE MONOHYDRATE 25 G/50ML
25 INJECTION, SOLUTION INTRAVENOUS
Status: DISCONTINUED | OUTPATIENT
Start: 2018-08-22 | End: 2018-08-24 | Stop reason: HOSPADM

## 2018-08-22 RX ORDER — LISINOPRIL 10 MG/1
10 TABLET ORAL DAILY
Status: DISCONTINUED | OUTPATIENT
Start: 2018-08-22 | End: 2018-08-24 | Stop reason: HOSPADM

## 2018-08-22 RX ORDER — DIGOXIN 125 MCG
125 TABLET ORAL DAILY
Status: DISCONTINUED | OUTPATIENT
Start: 2018-08-22 | End: 2018-08-24 | Stop reason: HOSPADM

## 2018-08-22 RX ORDER — FAMOTIDINE 40 MG/1
40 TABLET, FILM COATED ORAL DAILY
Status: DISCONTINUED | OUTPATIENT
Start: 2018-08-22 | End: 2018-08-24 | Stop reason: HOSPADM

## 2018-08-22 RX ORDER — ATORVASTATIN CALCIUM 40 MG/1
40 TABLET, FILM COATED ORAL DAILY
Status: DISCONTINUED | OUTPATIENT
Start: 2018-08-22 | End: 2018-08-24 | Stop reason: HOSPADM

## 2018-08-22 RX ORDER — ONDANSETRON 2 MG/ML
4 INJECTION INTRAMUSCULAR; INTRAVENOUS EVERY 6 HOURS PRN
Status: DISCONTINUED | OUTPATIENT
Start: 2018-08-22 | End: 2018-08-24 | Stop reason: HOSPADM

## 2018-08-22 RX ORDER — ONDANSETRON 4 MG/1
4 TABLET, FILM COATED ORAL EVERY 6 HOURS PRN
Status: DISCONTINUED | OUTPATIENT
Start: 2018-08-22 | End: 2018-08-24 | Stop reason: HOSPADM

## 2018-08-22 RX ORDER — POTASSIUM CHLORIDE 750 MG/1
10 CAPSULE, EXTENDED RELEASE ORAL 2 TIMES DAILY WITH MEALS
Status: DISCONTINUED | OUTPATIENT
Start: 2018-08-22 | End: 2018-08-24 | Stop reason: HOSPADM

## 2018-08-22 RX ORDER — NITROGLYCERIN 0.4 MG/1
0.4 TABLET SUBLINGUAL
Status: DISCONTINUED | OUTPATIENT
Start: 2018-08-22 | End: 2018-08-24 | Stop reason: HOSPADM

## 2018-08-22 RX ORDER — SODIUM CHLORIDE 0.9 % (FLUSH) 0.9 %
1-10 SYRINGE (ML) INJECTION AS NEEDED
Status: DISCONTINUED | OUTPATIENT
Start: 2018-08-22 | End: 2018-08-24 | Stop reason: HOSPADM

## 2018-08-22 RX ADMIN — POTASSIUM CHLORIDE 10 MEQ: 750 CAPSULE, EXTENDED RELEASE ORAL at 17:49

## 2018-08-22 RX ADMIN — NITROGLYCERIN 1 INCH: 20 OINTMENT TOPICAL at 11:13

## 2018-08-22 RX ADMIN — FUROSEMIDE 80 MG: 80 TABLET ORAL at 16:11

## 2018-08-22 RX ADMIN — METFORMIN HYDROCHLORIDE 1000 MG: 500 TABLET ORAL at 17:48

## 2018-08-22 RX ADMIN — MAGNESIUM OXIDE TAB 400 MG (241.3 MG ELEMENTAL MG) 400 MG: 400 (241.3 MG) TAB at 16:11

## 2018-08-22 RX ADMIN — LATANOPROST 1 DROP: 50 SOLUTION OPHTHALMIC at 21:05

## 2018-08-22 RX ADMIN — DIGOXIN 125 MCG: 125 TABLET ORAL at 16:11

## 2018-08-22 RX ADMIN — ASPIRIN 325 MG: 325 TABLET ORAL at 09:56

## 2018-08-22 RX ADMIN — CARVEDILOL 25 MG: 25 TABLET, FILM COATED ORAL at 17:48

## 2018-08-22 RX ADMIN — ATORVASTATIN CALCIUM 40 MG: 40 TABLET, FILM COATED ORAL at 16:11

## 2018-08-22 RX ADMIN — ISOSORBIDE MONONITRATE 30 MG: 30 TABLET, EXTENDED RELEASE ORAL at 16:12

## 2018-08-22 RX ADMIN — LISINOPRIL 10 MG: 10 TABLET ORAL at 16:11

## 2018-08-22 RX ADMIN — FAMOTIDINE 40 MG: 40 TABLET ORAL at 16:11

## 2018-08-22 RX ADMIN — INSULIN ASPART 2 UNITS: 100 INJECTION, SOLUTION INTRAVENOUS; SUBCUTANEOUS at 21:05

## 2018-08-22 RX ADMIN — INSULIN ASPART 2 UNITS: 100 INJECTION, SOLUTION INTRAVENOUS; SUBCUTANEOUS at 17:49

## 2018-08-22 RX ADMIN — BRIMONIDINE TARTRATE 1 DROP: 1.5 SOLUTION OPHTHALMIC at 21:06

## 2018-08-22 RX ADMIN — ASPIRIN 81 MG 81 MG: 81 TABLET ORAL at 16:11

## 2018-08-22 RX ADMIN — PREGABALIN 150 MG: 75 CAPSULE ORAL at 21:06

## 2018-08-23 LAB
ANION GAP SERPL CALCULATED.3IONS-SCNC: 10 MMOL/L (ref 5–15)
ANION GAP SERPL CALCULATED.3IONS-SCNC: 12 MMOL/L (ref 5–15)
BASOPHILS # BLD AUTO: 0.02 10*3/MM3 (ref 0–0.2)
BASOPHILS # BLD AUTO: 0.03 10*3/MM3 (ref 0–0.2)
BASOPHILS NFR BLD AUTO: 0.2 % (ref 0–2)
BASOPHILS NFR BLD AUTO: 0.3 % (ref 0–2)
BUN BLD-MCNC: 11 MG/DL (ref 7–21)
BUN BLD-MCNC: 12 MG/DL (ref 7–21)
BUN/CREAT SERPL: 13 (ref 7–25)
BUN/CREAT SERPL: 13.3 (ref 7–25)
CALCIUM SPEC-SCNC: 8.7 MG/DL (ref 8.4–10.2)
CALCIUM SPEC-SCNC: 8.9 MG/DL (ref 8.4–10.2)
CHLORIDE SERPL-SCNC: 100 MMOL/L (ref 95–110)
CHLORIDE SERPL-SCNC: 99 MMOL/L (ref 95–110)
CO2 SERPL-SCNC: 28 MMOL/L (ref 22–31)
CO2 SERPL-SCNC: 28 MMOL/L (ref 22–31)
CREAT BLD-MCNC: 0.83 MG/DL (ref 0.5–1)
CREAT BLD-MCNC: 0.92 MG/DL (ref 0.5–1)
DEPRECATED RDW RBC AUTO: 47.6 FL (ref 36.4–46.3)
DEPRECATED RDW RBC AUTO: 49.8 FL (ref 36.4–46.3)
EOSINOPHIL # BLD AUTO: 0.21 10*3/MM3 (ref 0–0.7)
EOSINOPHIL # BLD AUTO: 0.24 10*3/MM3 (ref 0–0.7)
EOSINOPHIL NFR BLD AUTO: 1.8 % (ref 0–7)
EOSINOPHIL NFR BLD AUTO: 1.9 % (ref 0–7)
ERYTHROCYTE [DISTWIDTH] IN BLOOD BY AUTOMATED COUNT: 15.5 % (ref 11.5–14.5)
ERYTHROCYTE [DISTWIDTH] IN BLOOD BY AUTOMATED COUNT: 15.7 % (ref 11.5–14.5)
GFR SERPL CREATININE-BSD FRML MDRD: 73 ML/MIN/1.73 (ref 45–104)
GFR SERPL CREATININE-BSD FRML MDRD: 83 ML/MIN/1.73 (ref 45–104)
GLUCOSE BLD-MCNC: 132 MG/DL (ref 60–100)
GLUCOSE BLD-MCNC: 51 MG/DL (ref 60–100)
GLUCOSE BLDC GLUCOMTR-MCNC: 124 MG/DL (ref 70–130)
GLUCOSE BLDC GLUCOMTR-MCNC: 135 MG/DL (ref 70–130)
GLUCOSE BLDC GLUCOMTR-MCNC: 154 MG/DL (ref 70–130)
GLUCOSE BLDC GLUCOMTR-MCNC: 69 MG/DL (ref 70–130)
HCT VFR BLD AUTO: 32.3 % (ref 35–45)
HCT VFR BLD AUTO: 34 % (ref 35–45)
HGB BLD-MCNC: 10.3 G/DL (ref 12–15.5)
HGB BLD-MCNC: 10.8 G/DL (ref 12–15.5)
IMM GRANULOCYTES # BLD: 0.03 10*3/MM3 (ref 0–0.02)
IMM GRANULOCYTES # BLD: 0.03 10*3/MM3 (ref 0–0.02)
IMM GRANULOCYTES NFR BLD: 0.2 % (ref 0–0.5)
IMM GRANULOCYTES NFR BLD: 0.3 % (ref 0–0.5)
INR PPP: 0.98 (ref 0.8–1.2)
LYMPHOCYTES # BLD AUTO: 4.17 10*3/MM3 (ref 0.6–4.2)
LYMPHOCYTES # BLD AUTO: 4.72 10*3/MM3 (ref 0.6–4.2)
LYMPHOCYTES NFR BLD AUTO: 36.7 % (ref 10–50)
LYMPHOCYTES NFR BLD AUTO: 37.7 % (ref 10–50)
MCH RBC QN AUTO: 26.7 PG (ref 26.5–34)
MCH RBC QN AUTO: 27.3 PG (ref 26.5–34)
MCHC RBC AUTO-ENTMCNC: 31.8 G/DL (ref 31.4–36)
MCHC RBC AUTO-ENTMCNC: 31.9 G/DL (ref 31.4–36)
MCV RBC AUTO: 84.2 FL (ref 80–98)
MCV RBC AUTO: 85.7 FL (ref 80–98)
MONOCYTES # BLD AUTO: 1.14 10*3/MM3 (ref 0–0.9)
MONOCYTES # BLD AUTO: 1.24 10*3/MM3 (ref 0–0.9)
MONOCYTES NFR BLD AUTO: 10 % (ref 0–12)
MONOCYTES NFR BLD AUTO: 9.9 % (ref 0–12)
NEUTROPHILS # BLD AUTO: 5.78 10*3/MM3 (ref 2–8.6)
NEUTROPHILS # BLD AUTO: 6.26 10*3/MM3 (ref 2–8.6)
NEUTROPHILS NFR BLD AUTO: 50.1 % (ref 37–80)
NEUTROPHILS NFR BLD AUTO: 50.9 % (ref 37–80)
PLATELET # BLD AUTO: 318 10*3/MM3 (ref 150–450)
PLATELET # BLD AUTO: 343 10*3/MM3 (ref 150–450)
PMV BLD AUTO: 8.2 FL (ref 8–12)
PMV BLD AUTO: 9 FL (ref 8–12)
POTASSIUM BLD-SCNC: 3.2 MMOL/L (ref 3.5–5.1)
POTASSIUM BLD-SCNC: 3.6 MMOL/L (ref 3.5–5.1)
PROTHROMBIN TIME: 12.8 SECONDS (ref 11.1–15.3)
RBC # BLD AUTO: 3.77 10*6/MM3 (ref 3.77–5.16)
RBC # BLD AUTO: 4.04 10*6/MM3 (ref 3.77–5.16)
SODIUM BLD-SCNC: 138 MMOL/L (ref 137–145)
SODIUM BLD-SCNC: 139 MMOL/L (ref 137–145)
TROPONIN I SERPL-MCNC: <0.012 NG/ML
TROPONIN I SERPL-MCNC: <0.012 NG/ML
WBC NRBC COR # BLD: 11.36 10*3/MM3 (ref 3.2–9.8)
WBC NRBC COR # BLD: 12.51 10*3/MM3 (ref 3.2–9.8)

## 2018-08-23 PROCEDURE — 84484 ASSAY OF TROPONIN QUANT: CPT | Performed by: NURSE PRACTITIONER

## 2018-08-23 PROCEDURE — 0 IOPAMIDOL PER 1 ML: Performed by: INTERNAL MEDICINE

## 2018-08-23 PROCEDURE — 94640 AIRWAY INHALATION TREATMENT: CPT

## 2018-08-23 PROCEDURE — 85025 COMPLETE CBC W/AUTO DIFF WBC: CPT | Performed by: NURSE PRACTITIONER

## 2018-08-23 PROCEDURE — 63710000001 LISINOPRIL 10 MG TABLET: Performed by: NURSE PRACTITIONER

## 2018-08-23 PROCEDURE — 94660 CPAP INITIATION&MGMT: CPT

## 2018-08-23 PROCEDURE — A9270 NON-COVERED ITEM OR SERVICE: HCPCS | Performed by: NURSE PRACTITIONER

## 2018-08-23 PROCEDURE — 63710000001 POTASSIUM CHLORIDE 10 MEQ CAPSULE CONTROLLED-RELEASE: Performed by: NURSE PRACTITIONER

## 2018-08-23 PROCEDURE — 94799 UNLISTED PULMONARY SVC/PX: CPT

## 2018-08-23 PROCEDURE — G0378 HOSPITAL OBSERVATION PER HR: HCPCS

## 2018-08-23 PROCEDURE — 85610 PROTHROMBIN TIME: CPT | Performed by: INTERNAL MEDICINE

## 2018-08-23 PROCEDURE — C1894 INTRO/SHEATH, NON-LASER: HCPCS | Performed by: INTERNAL MEDICINE

## 2018-08-23 PROCEDURE — C1760 CLOSURE DEV, VASC: HCPCS | Performed by: INTERNAL MEDICINE

## 2018-08-23 PROCEDURE — 80048 BASIC METABOLIC PNL TOTAL CA: CPT | Performed by: INTERNAL MEDICINE

## 2018-08-23 PROCEDURE — 63710000001 INSULIN ASPART PER 5 UNITS: Performed by: NURSE PRACTITIONER

## 2018-08-23 PROCEDURE — 63710000001 PREGABALIN 75 MG CAPSULE: Performed by: NURSE PRACTITIONER

## 2018-08-23 PROCEDURE — 82962 GLUCOSE BLOOD TEST: CPT

## 2018-08-23 PROCEDURE — 94760 N-INVAS EAR/PLS OXIMETRY 1: CPT

## 2018-08-23 PROCEDURE — 63710000001 CARVEDILOL 25 MG TABLET: Performed by: NURSE PRACTITIONER

## 2018-08-23 PROCEDURE — 80048 BASIC METABOLIC PNL TOTAL CA: CPT | Performed by: NURSE PRACTITIONER

## 2018-08-23 PROCEDURE — 93458 L HRT ARTERY/VENTRICLE ANGIO: CPT | Performed by: INTERNAL MEDICINE

## 2018-08-23 PROCEDURE — C1769 GUIDE WIRE: HCPCS | Performed by: INTERNAL MEDICINE

## 2018-08-23 PROCEDURE — 85025 COMPLETE CBC W/AUTO DIFF WBC: CPT | Performed by: INTERNAL MEDICINE

## 2018-08-23 RX ORDER — SODIUM CHLORIDE 0.9 % (FLUSH) 0.9 %
1-10 SYRINGE (ML) INJECTION AS NEEDED
Status: DISCONTINUED | OUTPATIENT
Start: 2018-08-23 | End: 2018-08-24 | Stop reason: HOSPADM

## 2018-08-23 RX ORDER — IPRATROPIUM BROMIDE AND ALBUTEROL SULFATE 2.5; .5 MG/3ML; MG/3ML
3 SOLUTION RESPIRATORY (INHALATION)
Status: DISCONTINUED | OUTPATIENT
Start: 2018-08-23 | End: 2018-08-24 | Stop reason: HOSPADM

## 2018-08-23 RX ORDER — SODIUM CHLORIDE 9 MG/ML
100 INJECTION, SOLUTION INTRAVENOUS CONTINUOUS
Status: DISCONTINUED | OUTPATIENT
Start: 2018-08-23 | End: 2018-08-23

## 2018-08-23 RX ORDER — SODIUM CHLORIDE 9 MG/ML
1-3 INJECTION, SOLUTION INTRAVENOUS CONTINUOUS
Status: DISCONTINUED | OUTPATIENT
Start: 2018-08-23 | End: 2018-08-23 | Stop reason: ALTCHOICE

## 2018-08-23 RX ORDER — LIDOCAINE HYDROCHLORIDE 20 MG/ML
INJECTION, SOLUTION INFILTRATION; PERINEURAL AS NEEDED
Status: DISCONTINUED | OUTPATIENT
Start: 2018-08-23 | End: 2018-08-23 | Stop reason: HOSPADM

## 2018-08-23 RX ADMIN — POTASSIUM CHLORIDE 10 MEQ: 750 CAPSULE, EXTENDED RELEASE ORAL at 17:53

## 2018-08-23 RX ADMIN — BRIMONIDINE TARTRATE 1 DROP: 1.5 SOLUTION OPHTHALMIC at 08:36

## 2018-08-23 RX ADMIN — FAMOTIDINE 40 MG: 40 TABLET ORAL at 08:34

## 2018-08-23 RX ADMIN — CARVEDILOL 25 MG: 25 TABLET, FILM COATED ORAL at 17:53

## 2018-08-23 RX ADMIN — LISINOPRIL 10 MG: 10 TABLET ORAL at 17:52

## 2018-08-23 RX ADMIN — ISOSORBIDE MONONITRATE 30 MG: 30 TABLET, EXTENDED RELEASE ORAL at 08:34

## 2018-08-23 RX ADMIN — FUROSEMIDE 80 MG: 80 TABLET ORAL at 08:34

## 2018-08-23 RX ADMIN — PREGABALIN 150 MG: 75 CAPSULE ORAL at 08:34

## 2018-08-23 RX ADMIN — BRIMONIDINE TARTRATE 1 DROP: 1.5 SOLUTION OPHTHALMIC at 21:17

## 2018-08-23 RX ADMIN — PREGABALIN 150 MG: 75 CAPSULE ORAL at 21:18

## 2018-08-23 RX ADMIN — DIGOXIN 125 MCG: 125 TABLET ORAL at 08:33

## 2018-08-23 RX ADMIN — LATANOPROST 1 DROP: 50 SOLUTION OPHTHALMIC at 21:17

## 2018-08-23 RX ADMIN — INSULIN ASPART 2 UNITS: 100 INJECTION, SOLUTION INTRAVENOUS; SUBCUTANEOUS at 21:18

## 2018-08-23 RX ADMIN — PANTOPRAZOLE SODIUM 40 MG: 40 TABLET, DELAYED RELEASE ORAL at 06:07

## 2018-08-23 RX ADMIN — METFORMIN HYDROCHLORIDE 1000 MG: 500 TABLET ORAL at 08:33

## 2018-08-23 RX ADMIN — ASPIRIN 81 MG 81 MG: 81 TABLET ORAL at 08:34

## 2018-08-23 RX ADMIN — MAGNESIUM OXIDE TAB 400 MG (241.3 MG ELEMENTAL MG) 400 MG: 400 (241.3 MG) TAB at 08:34

## 2018-08-23 RX ADMIN — ATORVASTATIN CALCIUM 40 MG: 40 TABLET, FILM COATED ORAL at 08:34

## 2018-08-23 RX ADMIN — GLIPIZIDE 10 MG: 10 TABLET ORAL at 08:33

## 2018-08-23 RX ADMIN — IPRATROPIUM BROMIDE AND ALBUTEROL SULFATE 3 ML: 2.5; .5 SOLUTION RESPIRATORY (INHALATION) at 18:52

## 2018-08-23 RX ADMIN — POTASSIUM CHLORIDE 10 MEQ: 750 CAPSULE, EXTENDED RELEASE ORAL at 08:34

## 2018-08-24 VITALS
HEART RATE: 85 BPM | TEMPERATURE: 98.2 F | DIASTOLIC BLOOD PRESSURE: 58 MMHG | WEIGHT: 208 LBS | BODY MASS INDEX: 36.86 KG/M2 | OXYGEN SATURATION: 100 % | HEIGHT: 63 IN | RESPIRATION RATE: 18 BRPM | SYSTOLIC BLOOD PRESSURE: 113 MMHG

## 2018-08-24 LAB
ANION GAP SERPL CALCULATED.3IONS-SCNC: 6 MMOL/L (ref 5–15)
BASOPHILS # BLD AUTO: 0.04 10*3/MM3 (ref 0–0.2)
BASOPHILS NFR BLD AUTO: 0.3 % (ref 0–2)
BUN BLD-MCNC: 13 MG/DL (ref 7–21)
BUN/CREAT SERPL: 14.6 (ref 7–25)
CALCIUM SPEC-SCNC: 8.6 MG/DL (ref 8.4–10.2)
CHLORIDE SERPL-SCNC: 102 MMOL/L (ref 95–110)
CO2 SERPL-SCNC: 30 MMOL/L (ref 22–31)
CREAT BLD-MCNC: 0.89 MG/DL (ref 0.5–1)
DEPRECATED RDW RBC AUTO: 50.7 FL (ref 36.4–46.3)
EOSINOPHIL # BLD AUTO: 0.16 10*3/MM3 (ref 0–0.7)
EOSINOPHIL NFR BLD AUTO: 1.4 % (ref 0–7)
ERYTHROCYTE [DISTWIDTH] IN BLOOD BY AUTOMATED COUNT: 16 % (ref 11.5–14.5)
GFR SERPL CREATININE-BSD FRML MDRD: 76 ML/MIN/1.73 (ref 45–104)
GLUCOSE BLD-MCNC: 101 MG/DL (ref 60–100)
GLUCOSE BLDC GLUCOMTR-MCNC: 110 MG/DL (ref 70–130)
GLUCOSE BLDC GLUCOMTR-MCNC: 120 MG/DL (ref 70–130)
GLUCOSE BLDC GLUCOMTR-MCNC: 178 MG/DL (ref 70–130)
HCT VFR BLD AUTO: 32.8 % (ref 35–45)
HGB BLD-MCNC: 10.5 G/DL (ref 12–15.5)
IMM GRANULOCYTES # BLD: 0.03 10*3/MM3 (ref 0–0.02)
IMM GRANULOCYTES NFR BLD: 0.3 % (ref 0–0.5)
LYMPHOCYTES # BLD AUTO: 4.26 10*3/MM3 (ref 0.6–4.2)
LYMPHOCYTES NFR BLD AUTO: 36 % (ref 10–50)
MCH RBC QN AUTO: 27.5 PG (ref 26.5–34)
MCHC RBC AUTO-ENTMCNC: 32 G/DL (ref 31.4–36)
MCV RBC AUTO: 85.9 FL (ref 80–98)
MONOCYTES # BLD AUTO: 1.1 10*3/MM3 (ref 0–0.9)
MONOCYTES NFR BLD AUTO: 9.3 % (ref 0–12)
NEUTROPHILS # BLD AUTO: 6.23 10*3/MM3 (ref 2–8.6)
NEUTROPHILS NFR BLD AUTO: 52.7 % (ref 37–80)
NRBC BLD MANUAL-RTO: 0 /100 WBC (ref 0–0)
PLATELET # BLD AUTO: 325 10*3/MM3 (ref 150–450)
PMV BLD AUTO: 8.4 FL (ref 8–12)
POTASSIUM BLD-SCNC: 3.5 MMOL/L (ref 3.5–5.1)
RBC # BLD AUTO: 3.82 10*6/MM3 (ref 3.77–5.16)
SODIUM BLD-SCNC: 138 MMOL/L (ref 137–145)
WBC NRBC COR # BLD: 11.82 10*3/MM3 (ref 3.2–9.8)

## 2018-08-24 PROCEDURE — 63710000001 POTASSIUM CHLORIDE 10 MEQ CAPSULE CONTROLLED-RELEASE: Performed by: NURSE PRACTITIONER

## 2018-08-24 PROCEDURE — G8979 MOBILITY GOAL STATUS: HCPCS

## 2018-08-24 PROCEDURE — 63710000001 MAGNESIUM OXIDE 400 (241.3 MG) MG TABLET: Performed by: NURSE PRACTITIONER

## 2018-08-24 PROCEDURE — 63710000001 LISINOPRIL 10 MG TABLET: Performed by: NURSE PRACTITIONER

## 2018-08-24 PROCEDURE — A9270 NON-COVERED ITEM OR SERVICE: HCPCS | Performed by: NURSE PRACTITIONER

## 2018-08-24 PROCEDURE — G8978 MOBILITY CURRENT STATUS: HCPCS

## 2018-08-24 PROCEDURE — 97162 PT EVAL MOD COMPLEX 30 MIN: CPT

## 2018-08-24 PROCEDURE — 94799 UNLISTED PULMONARY SVC/PX: CPT

## 2018-08-24 PROCEDURE — 63710000001 INSULIN ASPART PER 5 UNITS: Performed by: NURSE PRACTITIONER

## 2018-08-24 PROCEDURE — 63710000001 DIGOXIN 125 MCG TABLET: Performed by: NURSE PRACTITIONER

## 2018-08-24 PROCEDURE — 94660 CPAP INITIATION&MGMT: CPT

## 2018-08-24 PROCEDURE — 63710000001 PREGABALIN 75 MG CAPSULE: Performed by: NURSE PRACTITIONER

## 2018-08-24 PROCEDURE — 63710000001 CARVEDILOL 25 MG TABLET: Performed by: NURSE PRACTITIONER

## 2018-08-24 PROCEDURE — 63710000001 FUROSEMIDE 80 MG TABLET: Performed by: NURSE PRACTITIONER

## 2018-08-24 PROCEDURE — 63710000001 PANTOPRAZOLE 40 MG TABLET DELAYED-RELEASE: Performed by: NURSE PRACTITIONER

## 2018-08-24 PROCEDURE — 63710000001 METFORMIN 500 MG TABLET: Performed by: NURSE PRACTITIONER

## 2018-08-24 PROCEDURE — 82962 GLUCOSE BLOOD TEST: CPT

## 2018-08-24 PROCEDURE — 80048 BASIC METABOLIC PNL TOTAL CA: CPT | Performed by: NURSE PRACTITIONER

## 2018-08-24 PROCEDURE — 85025 COMPLETE CBC W/AUTO DIFF WBC: CPT | Performed by: NURSE PRACTITIONER

## 2018-08-24 PROCEDURE — 63710000001 GLIPIZIDE 10 MG TABLET: Performed by: NURSE PRACTITIONER

## 2018-08-24 PROCEDURE — 63710000001 ASPIRIN 81 MG CHEWABLE TABLET: Performed by: NURSE PRACTITIONER

## 2018-08-24 PROCEDURE — 63710000001 FAMOTIDINE 40 MG TABLET: Performed by: NURSE PRACTITIONER

## 2018-08-24 PROCEDURE — 63710000001 ATORVASTATIN 40 MG TABLET: Performed by: NURSE PRACTITIONER

## 2018-08-24 PROCEDURE — G0378 HOSPITAL OBSERVATION PER HR: HCPCS

## 2018-08-24 PROCEDURE — 94760 N-INVAS EAR/PLS OXIMETRY 1: CPT

## 2018-08-24 PROCEDURE — 63710000001 ISOSORBIDE MONONITRATE 30 MG TABLET SUSTAINED-RELEASE 24 HOUR: Performed by: NURSE PRACTITIONER

## 2018-08-24 RX ORDER — IPRATROPIUM BROMIDE AND ALBUTEROL SULFATE 2.5; .5 MG/3ML; MG/3ML
3 SOLUTION RESPIRATORY (INHALATION)
Qty: 360 ML | Refills: 12 | Status: ON HOLD | OUTPATIENT
Start: 2018-08-24 | End: 2022-02-23 | Stop reason: SDUPTHER

## 2018-08-24 RX ADMIN — ASPIRIN 81 MG 81 MG: 81 TABLET ORAL at 08:25

## 2018-08-24 RX ADMIN — IPRATROPIUM BROMIDE AND ALBUTEROL SULFATE 3 ML: 2.5; .5 SOLUTION RESPIRATORY (INHALATION) at 06:46

## 2018-08-24 RX ADMIN — INSULIN ASPART 2 UNITS: 100 INJECTION, SOLUTION INTRAVENOUS; SUBCUTANEOUS at 12:32

## 2018-08-24 RX ADMIN — ISOSORBIDE MONONITRATE 30 MG: 30 TABLET, EXTENDED RELEASE ORAL at 08:25

## 2018-08-24 RX ADMIN — CARVEDILOL 25 MG: 25 TABLET, FILM COATED ORAL at 08:25

## 2018-08-24 RX ADMIN — PANTOPRAZOLE SODIUM 40 MG: 40 TABLET, DELAYED RELEASE ORAL at 06:15

## 2018-08-24 RX ADMIN — BRIMONIDINE TARTRATE 1 DROP: 1.5 SOLUTION OPHTHALMIC at 08:25

## 2018-08-24 RX ADMIN — FAMOTIDINE 40 MG: 40 TABLET ORAL at 08:25

## 2018-08-24 RX ADMIN — DIGOXIN 125 MCG: 125 TABLET ORAL at 08:24

## 2018-08-24 RX ADMIN — FUROSEMIDE 80 MG: 80 TABLET ORAL at 08:24

## 2018-08-24 RX ADMIN — POTASSIUM CHLORIDE 10 MEQ: 750 CAPSULE, EXTENDED RELEASE ORAL at 08:25

## 2018-08-24 RX ADMIN — METFORMIN HYDROCHLORIDE 1000 MG: 500 TABLET ORAL at 08:24

## 2018-08-24 RX ADMIN — IPRATROPIUM BROMIDE AND ALBUTEROL SULFATE 3 ML: 2.5; .5 SOLUTION RESPIRATORY (INHALATION) at 10:28

## 2018-08-24 RX ADMIN — GLIPIZIDE 10 MG: 10 TABLET ORAL at 08:25

## 2018-08-24 RX ADMIN — MAGNESIUM OXIDE TAB 400 MG (241.3 MG ELEMENTAL MG) 400 MG: 400 (241.3 MG) TAB at 08:24

## 2018-08-24 RX ADMIN — PREGABALIN 150 MG: 75 CAPSULE ORAL at 08:24

## 2018-08-24 RX ADMIN — LISINOPRIL 10 MG: 10 TABLET ORAL at 08:24

## 2018-08-24 RX ADMIN — ATORVASTATIN CALCIUM 40 MG: 40 TABLET, FILM COATED ORAL at 08:24

## 2018-08-27 ENCOUNTER — EPISODE CHANGES (OUTPATIENT)
Dept: CASE MANAGEMENT | Facility: OTHER | Age: 68
End: 2018-08-27

## 2018-09-25 ENCOUNTER — CONSULT (OUTPATIENT)
Dept: SLEEP MEDICINE | Facility: HOSPITAL | Age: 68
End: 2018-09-25

## 2018-09-25 VITALS
WEIGHT: 202 LBS | BODY MASS INDEX: 35.79 KG/M2 | SYSTOLIC BLOOD PRESSURE: 170 MMHG | OXYGEN SATURATION: 99 % | HEIGHT: 63 IN | DIASTOLIC BLOOD PRESSURE: 96 MMHG | HEART RATE: 88 BPM

## 2018-09-25 DIAGNOSIS — G47.33 OSA (OBSTRUCTIVE SLEEP APNEA): ICD-10-CM

## 2018-09-25 DIAGNOSIS — G47.33 OBSTRUCTIVE SLEEP APNEA, ADULT: Primary | ICD-10-CM

## 2018-09-25 PROCEDURE — 99204 OFFICE O/P NEW MOD 45 MIN: CPT | Performed by: INTERNAL MEDICINE

## 2018-09-25 NOTE — PROGRESS NOTES
New Patient Sleep Medicine Consultation    Encounter Date: 9/25/2018         Patient's PCP: Joyce Plata MD  Referring provider: No ref. provider found  Reason for consultation chief complaint: known DOROTHY - diagnosed in 2002, here to re-establish care    Lexi Wade is a 68 y.o. female who admits to snoring, unrestful sleep, High blod pressure, morning headaches, stop breathing during sleep, sleeping less than 6 hours per night, Disturbed or restless sleep, memory loss, Up to the bathroom at night, night sweats and difficulty staying asleep. She denies cataplexy, sleep paralysis, or hypnagogic hallucinations. Her bedtime is ~ 5730-4545. She  falls asleep after 5-10 minutes, and is up 3 times per night. She wakes up ~ 1551-9897. She endorses 6-8 hours of sleep. She drinks 2 cups of coffee, 2 teas, and 4 sodas per day. She drinks 0 alcoholic beverages per week. She is not a current smoker. She does not take sedatives or hypnotics. She has no sleepiness with driving. She naps when unstimulated.    [place] - 5    PSG on 10/02/2002 - AHI of 12  CPAP titration on  10/17/2002, 10 cm H2O    Past Medical History:   Diagnosis Date   • Artificial lens present     IN  POSITION - BOTH   • Borderline glaucoma    • Cardiomyopathy (CMS/HCC)    • Congestive heart failure (CMS/HCC)    • Coronary artery disease    • Diabetes mellitus (CMS/HCC)     IMPROVING   • Essential hypertension    • GERD (gastroesophageal reflux disease)    • History of bone density study 09/25/2015    DXA BONE DENSITY AXIAL [address] [facility] (Screening for osteoporosis)    • History of echocardiogram 03/25/2013    Echocadiogram W/ color flow 52393 (Mild left atrial enlargement wiht mild LV enlargement w/mild concentric LVH. Global hypokinesis. EF 30%. Mitral and Av thickened. Aortic sclerosis. Severe mitral regurg. mild tricuspid regurg. Mild right atrial enlargement noted. Pacemaker wire noted)   03/25/2013 [facility]    • History of mammography,  diagnostic 09/17/2014    [place] [address] [facility] (Microcalcifications of the breast) , Reason: s/p mammotome bx right side for benign disease   • History of mammography, diagnostic 07/24/2014    MAMMOGRAPHY DIAGNOSTIC [place] RT [address] [facility] (Abnormal mammogram, unspecified)    • History of mammography, screening 07/03/2012    BENIGN   • Hypercholesterolemia    • Low back pain    • Menopausal syndrome 10/26/2017   • Microcalcifications of the breast     BENIGN CHANGES   • Need for prophylactic vaccination against Streptococcus pneumoniae (pneumococcus)    • 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     Social History Main Topics   • Smoking status: Never Smoker   • Smokeless tobacco: Never Used   • Alcohol use No   • Drug use: No   • Sexual activity: Defer     Other Topics Concern   • Not on file     Social History Narrative   • No narrative on file   single, no kids  4 brothers and 5 sisters,  Other family history + for: 1 brother decreased with CA, another had ?, sister with DM, one as a child  Smoking history: smoked never  FH of sleep disorders: self    Review of Systems:  Constitutional: negative  Eyes: negative  Ears, nose, mouth, throat, and face: negative  Respiratory: negative  Cardiovascular: negative  Gastrointestinal: negative  Genitourinary:negative  Integument/breast: negative  Hematologic/lymphatic: negative  Musculoskeletal:negative  Neurological: negative  Behavioral/Psych: negative  Endocrine: negative  Allergic/Immunologic: negative Patient advised to discuss any positive ROS with PCP.      Vitals:    09/25/18 1103  "  BP: 170/96   Pulse: 88   SpO2: 99%     Body mass index is 35.78 kg/m². Patient's Body mass index is 35.78 kg/m². BMI is above normal parameters. Recommendations include: referral to primary care.  Neck circumference: 14.5\"          General: Alert. Cooperative. Well developed. No acute distress, poor memory, flat facies             Head:  Normocephalic. Symmetrical. Atraumatic.              Eyes: Sclera clear. No icterus. PERRLA. Normal EOM.             Ears: No deformities. Normal hearing.             Nose: No septal deviation. No drainage.          Throat: No oral lesions. No thrush. Moist mucous membranes. Trachea midline    Tongue is enlarged    Dentition is  Few teeth remaining       Pharynx: Posterior pharyngeal pillars are narrow    Mallampati score of IV (only hard palate visible)    Pharynx is normal with unrermarkable tonsils   Chest Wall:  Normal shape. Symmetric expansion with respiration. No tenderness.          Lungs:  Clear to auscultation bilaterally. No wheezes. No rhonchi. No rales. Respirations regular, even and unlabored.            Heart:  Regular rhythm and normal rate. Normal S1 and S2. No murmur.     Abdomen:  Soft, non-tender and non-distended. Normal bowel sounds. No masses.  Extremities:  Moves all extremities well. No edema.           Pulses: Pulses palpable and equal bilaterally.               Skin: Dry. Intact. No bleeding. No rash.           Neuro: Moves all 4 extremities and cranial nerves grossly intact.  Psychiatric: Normal mood and affect.      Current Outpatient Prescriptions:   •  aspirin 81 MG tablet, Take 81 mg by mouth every night., Disp: , Rfl:   •  atorvastatin (LIPITOR) 40 MG tablet, Take 1 tablet by mouth Daily., Disp: 90 tablet, Rfl: 3  •  B-D UF III MINI PEN NEEDLES 31G X 5 MM misc, USE DAILY WITH VICTOZA, Disp: 100 each, Rfl: 2  •  brimonidine (ALPHAGAN) 0.2 % ophthalmic solution, Use in both eyes 2 times daily (Patient taking differently: Administer 1 drop to both " eyes 2 (Two) Times a Day. Place 1 drop in both eyes two times daily), Disp: 15 mL, Rfl: 3  •  carvedilol (COREG) 25 MG tablet, Take 1 tablet by mouth 2 (Two) Times a Day With Meals., Disp: 180 tablet, Rfl: 3  •  digoxin (LANOXIN) 125 MCG tablet, TAKE 1 TABLET BY MOUTH DAILY, Disp: 90 tablet, Rfl: 3  •  esomeprazole (nexIUM) 40 MG capsule, Take 1 capsule by mouth Daily., Disp: 90 capsule, Rfl: 1  •  ezetimibe (ZETIA) 10 MG tablet, TAKE 1 TABELT BY MOUTH DAILY, Disp: 90 tablet, Rfl: 2  •  furosemide (LASIX) 80 MG tablet, Take 1 tablet by mouth Daily., Disp: 90 tablet, Rfl: 3  •  glimepiride (AMARYL) 4 MG tablet, Take 1 tablet by mouth 2 (Two) Times a Day., Disp: 180 tablet, Rfl: 3  •  ipratropium-albuterol (DUO-NEB) 0.5-2.5 mg/3 ml nebulizer, Take 3 mL by nebulization 4 (Four) Times a Day., Disp: 360 mL, Rfl: 12  •  isosorbide mononitrate (IMDUR) 30 MG 24 hr tablet, Take 1 tablet by mouth Daily., Disp: 90 tablet, Rfl: 3  •  latanoprost (XALATAN) 0.005 % ophthalmic solution, Administer 1 drop to both eyes Every Night. 90 day supply., Disp: 2.5 mL, Rfl: 5  •  lisinopril (PRINIVIL,ZESTRIL) 10 MG tablet, Take 1 tablet by mouth Daily., Disp: 90 tablet, Rfl: 3  •  magnesium oxide (MAGOX) 400 (241.3 Mg) MG tablet tablet, Take 1 tablet by mouth Daily., Disp: 90 each, Rfl: 3  •  metFORMIN (GLUCOPHAGE) 1000 MG tablet, Take 1 tablet by mouth 2 (Two) Times a Day With Meals., Disp: 180 tablet, Rfl: 3  •  nitroglycerin (NITROSTAT) 0.4 MG SL tablet, Place 1 tablet under the tongue Every 5 (Five) Minutes As Needed for Chest Pain. Take no more than 3 doses in 15 minutes., Disp: 25 tablet, Rfl: 0  •  omega-3 acid ethyl esters (LOVAZA) 1 G capsule, Take 1 g by mouth 2 (Two) Times a Day. 2 capsules bid, Disp: , Rfl:   •  potassium chloride (MICRO-K) 10 MEQ CR capsule, Take 1 capsule by mouth 2 (Two) Times a Day., Disp: 180 capsule, Rfl: 3  •  pregabalin (LYRICA) 150 MG capsule, Take 1 capsule by mouth 2 (Two) Times a Day., Disp: 180  capsule, Rfl: 0  •  VICTOZA 18 MG/3ML solution pen-injector injection, INJECT 1.2 MG UNDER THE SKIN DAILY. (Patient taking differently: Inject 1.2 mg under the skin Every Night.), Disp: 6 pen, Rfl: 3  •  vitamin D (ERGOCALCIFEROL) 20911 units capsule capsule, Take 1 capsule by mouth 1 (One) Time Per Week. (Patient taking differently: Take 50,000 Units by mouth 1 (One) Time Per Week. Take 1 capsule by mouth weekly on Tuesdays), Disp: 12 capsule, Rfl: 3    Contraindications to home sleep test: Unable to follow simple instructions due to memory    ASSESSMENT:  1. Obstructive sleep apnea   1. Check split night sleep study  2. Restless leg syndrome/ /Mao-Ekbom disease (RLS/WED)   3. HTN  1. Elevated today, follow up with PCP  4. DM  5. Obesity  6. CAD  7. Poor memory         This document has been electronically signed by David Guthrie MD on September 25, 2018         CC: Joyce Plata MD          No ref. provider found

## 2018-09-28 RX ORDER — GLIMEPIRIDE 4 MG/1
4 TABLET ORAL
Qty: 180 TABLET | Refills: 3 | Status: SHIPPED | OUTPATIENT
Start: 2018-09-28 | End: 2019-08-26 | Stop reason: HOSPADM

## 2018-09-28 RX ORDER — POTASSIUM CHLORIDE 750 MG/1
10 CAPSULE, EXTENDED RELEASE ORAL
Qty: 180 CAPSULE | Refills: 3 | Status: SHIPPED | OUTPATIENT
Start: 2018-09-28 | End: 2019-11-01 | Stop reason: SDUPTHER

## 2018-09-28 RX ORDER — CARVEDILOL 25 MG/1
25 TABLET ORAL 2 TIMES DAILY WITH MEALS
Qty: 180 TABLET | Refills: 3 | Status: SHIPPED | OUTPATIENT
Start: 2018-09-28 | End: 2020-07-02

## 2018-10-03 ENCOUNTER — TELEPHONE (OUTPATIENT)
Dept: FAMILY MEDICINE CLINIC | Facility: CLINIC | Age: 68
End: 2018-10-03

## 2018-10-03 ENCOUNTER — LAB (OUTPATIENT)
Dept: LAB | Facility: HOSPITAL | Age: 68
End: 2018-10-03

## 2018-10-03 DIAGNOSIS — E78.2 MIXED HYPERLIPIDEMIA: ICD-10-CM

## 2018-10-03 DIAGNOSIS — E55.9 VITAMIN D DEFICIENCY: Chronic | ICD-10-CM

## 2018-10-03 DIAGNOSIS — I10 ESSENTIAL HYPERTENSION: ICD-10-CM

## 2018-10-03 DIAGNOSIS — E11.8 TYPE 2 DIABETES MELLITUS WITH COMPLICATION, WITHOUT LONG-TERM CURRENT USE OF INSULIN (HCC): ICD-10-CM

## 2018-10-03 LAB
25(OH)D3 SERPL-MCNC: 44.1 NG/ML (ref 30–100)
ALBUMIN SERPL-MCNC: 4.4 G/DL (ref 3.4–4.8)
ALBUMIN UR-MCNC: 2.1 MG/L
ALBUMIN/GLOB SERPL: 1.1 G/DL (ref 1.1–1.8)
ALP SERPL-CCNC: 114 U/L (ref 38–126)
ALT SERPL W P-5'-P-CCNC: 21 U/L (ref 9–52)
ANION GAP SERPL CALCULATED.3IONS-SCNC: 13 MMOL/L (ref 5–15)
ARTICHOKE IGE QN: 80 MG/DL (ref 1–129)
AST SERPL-CCNC: 58 U/L (ref 14–36)
BACTERIA UR QL AUTO: ABNORMAL /HPF
BASOPHILS # BLD AUTO: 0.03 10*3/MM3 (ref 0–0.2)
BASOPHILS NFR BLD AUTO: 0.3 % (ref 0–2)
BILIRUB SERPL-MCNC: 0.4 MG/DL (ref 0.2–1.3)
BILIRUB UR QL STRIP: NEGATIVE
BUN BLD-MCNC: 11 MG/DL (ref 7–21)
BUN/CREAT SERPL: 11.7 (ref 7–25)
CALCIUM SPEC-SCNC: 9.4 MG/DL (ref 8.4–10.2)
CHLORIDE SERPL-SCNC: 100 MMOL/L (ref 95–110)
CHOLEST SERPL-MCNC: 153 MG/DL (ref 0–199)
CLARITY UR: CLEAR
CO2 SERPL-SCNC: 28 MMOL/L (ref 22–31)
COLOR UR: YELLOW
CREAT BLD-MCNC: 0.94 MG/DL (ref 0.5–1)
DEPRECATED RDW RBC AUTO: 48.7 FL (ref 36.4–46.3)
EOSINOPHIL # BLD AUTO: 0.17 10*3/MM3 (ref 0–0.7)
EOSINOPHIL NFR BLD AUTO: 1.4 % (ref 0–7)
ERYTHROCYTE [DISTWIDTH] IN BLOOD BY AUTOMATED COUNT: 15.6 % (ref 11.5–14.5)
GFR SERPL CREATININE-BSD FRML MDRD: 71 ML/MIN/1.73 (ref 39–90)
GLOBULIN UR ELPH-MCNC: 3.9 GM/DL (ref 2.3–3.5)
GLUCOSE BLD-MCNC: 83 MG/DL (ref 60–100)
GLUCOSE UR STRIP-MCNC: NEGATIVE MG/DL
HBA1C MFR BLD: 7.1 % (ref 4–5.6)
HCT VFR BLD AUTO: 37.2 % (ref 35–45)
HDLC SERPL-MCNC: 35 MG/DL (ref 60–200)
HGB BLD-MCNC: 11.8 G/DL (ref 12–15.5)
HGB UR QL STRIP.AUTO: NEGATIVE
HYALINE CASTS UR QL AUTO: ABNORMAL /LPF
IMM GRANULOCYTES # BLD: 0.04 10*3/MM3 (ref 0–0.02)
IMM GRANULOCYTES NFR BLD: 0.3 % (ref 0–0.5)
KETONES UR QL STRIP: ABNORMAL
LDLC/HDLC SERPL: 2.36 {RATIO} (ref 0–3.22)
LEUKOCYTE ESTERASE UR QL STRIP.AUTO: ABNORMAL
LYMPHOCYTES # BLD AUTO: 4.53 10*3/MM3 (ref 0.6–4.2)
LYMPHOCYTES NFR BLD AUTO: 38.4 % (ref 10–50)
MCH RBC QN AUTO: 27 PG (ref 26.5–34)
MCHC RBC AUTO-ENTMCNC: 31.7 G/DL (ref 31.4–36)
MCV RBC AUTO: 85.1 FL (ref 80–98)
MONOCYTES # BLD AUTO: 0.9 10*3/MM3 (ref 0–0.9)
MONOCYTES NFR BLD AUTO: 7.6 % (ref 0–12)
NEUTROPHILS # BLD AUTO: 6.14 10*3/MM3 (ref 2–8.6)
NEUTROPHILS NFR BLD AUTO: 52 % (ref 37–80)
NITRITE UR QL STRIP: NEGATIVE
PH UR STRIP.AUTO: 6.5 [PH] (ref 5–9)
PLATELET # BLD AUTO: 395 10*3/MM3 (ref 150–450)
PMV BLD AUTO: 9.4 FL (ref 8–12)
POTASSIUM BLD-SCNC: 4.2 MMOL/L (ref 3.5–5.1)
PROT SERPL-MCNC: 8.3 G/DL (ref 6.3–8.6)
PROT UR QL STRIP: ABNORMAL
RBC # BLD AUTO: 4.37 10*6/MM3 (ref 3.77–5.16)
RBC # UR: ABNORMAL /HPF
REF LAB TEST METHOD: ABNORMAL
SODIUM BLD-SCNC: 141 MMOL/L (ref 137–145)
SP GR UR STRIP: 1.02 (ref 1–1.03)
SQUAMOUS #/AREA URNS HPF: ABNORMAL /HPF
TRIGL SERPL-MCNC: 177 MG/DL (ref 20–199)
UROBILINOGEN UR QL STRIP: ABNORMAL
WBC NRBC COR # BLD: 11.81 10*3/MM3 (ref 3.2–9.8)
WBC UR QL AUTO: ABNORMAL /HPF

## 2018-10-03 PROCEDURE — 83036 HEMOGLOBIN GLYCOSYLATED A1C: CPT

## 2018-10-03 PROCEDURE — 36415 COLL VENOUS BLD VENIPUNCTURE: CPT

## 2018-10-03 PROCEDURE — 87086 URINE CULTURE/COLONY COUNT: CPT

## 2018-10-03 PROCEDURE — 81001 URINALYSIS AUTO W/SCOPE: CPT

## 2018-10-03 PROCEDURE — 80053 COMPREHEN METABOLIC PANEL: CPT

## 2018-10-03 PROCEDURE — 82306 VITAMIN D 25 HYDROXY: CPT

## 2018-10-03 PROCEDURE — 82043 UR ALBUMIN QUANTITATIVE: CPT

## 2018-10-03 PROCEDURE — 85025 COMPLETE CBC W/AUTO DIFF WBC: CPT

## 2018-10-03 PROCEDURE — 80061 LIPID PANEL: CPT

## 2018-10-04 LAB — BACTERIA SPEC AEROBE CULT: NORMAL

## 2018-10-08 ENCOUNTER — HOSPITAL ENCOUNTER (OUTPATIENT)
Dept: SLEEP MEDICINE | Facility: HOSPITAL | Age: 68
Discharge: HOME | End: 2018-10-08
Attending: INTERNAL MEDICINE | Admitting: INTERNAL MEDICINE

## 2018-10-08 ENCOUNTER — OFFICE VISIT (OUTPATIENT)
Dept: FAMILY MEDICINE CLINIC | Facility: CLINIC | Age: 68
End: 2018-10-08

## 2018-10-08 VITALS
HEART RATE: 87 BPM | HEIGHT: 63 IN | SYSTOLIC BLOOD PRESSURE: 120 MMHG | BODY MASS INDEX: 36.89 KG/M2 | OXYGEN SATURATION: 97 % | WEIGHT: 208.2 LBS | DIASTOLIC BLOOD PRESSURE: 60 MMHG

## 2018-10-08 VITALS — HEIGHT: 63 IN | BODY MASS INDEX: 35.79 KG/M2 | WEIGHT: 202 LBS

## 2018-10-08 DIAGNOSIS — E55.9 VITAMIN D DEFICIENCY: Chronic | ICD-10-CM

## 2018-10-08 DIAGNOSIS — E78.2 MIXED HYPERLIPIDEMIA: Chronic | ICD-10-CM

## 2018-10-08 DIAGNOSIS — Z23 NEED FOR IMMUNIZATION AGAINST INFLUENZA: ICD-10-CM

## 2018-10-08 DIAGNOSIS — Z00.00 MEDICARE ANNUAL WELLNESS VISIT, SUBSEQUENT: Primary | ICD-10-CM

## 2018-10-08 DIAGNOSIS — I10 ESSENTIAL HYPERTENSION: Chronic | ICD-10-CM

## 2018-10-08 DIAGNOSIS — I42.8 NONISCHEMIC CARDIOMYOPATHY (HCC): Chronic | ICD-10-CM

## 2018-10-08 DIAGNOSIS — E11.49 NEUROLOGIC DISORDER ASSOCIATED WITH DIABETES MELLITUS (HCC): Chronic | ICD-10-CM

## 2018-10-08 DIAGNOSIS — G47.33 OSA (OBSTRUCTIVE SLEEP APNEA): ICD-10-CM

## 2018-10-08 DIAGNOSIS — E11.8 TYPE 2 DIABETES MELLITUS WITH COMPLICATION, WITHOUT LONG-TERM CURRENT USE OF INSULIN (HCC): Chronic | ICD-10-CM

## 2018-10-08 PROCEDURE — G0439 PPPS, SUBSEQ VISIT: HCPCS | Performed by: GENERAL PRACTICE

## 2018-10-08 PROCEDURE — 95810 POLYSOM 6/> YRS 4/> PARAM: CPT | Performed by: INTERNAL MEDICINE

## 2018-10-08 PROCEDURE — 99214 OFFICE O/P EST MOD 30 MIN: CPT | Performed by: GENERAL PRACTICE

## 2018-10-08 PROCEDURE — G0008 ADMIN INFLUENZA VIRUS VAC: HCPCS | Performed by: GENERAL PRACTICE

## 2018-10-08 PROCEDURE — 95810 POLYSOM 6/> YRS 4/> PARAM: CPT

## 2018-10-08 PROCEDURE — 90662 IIV NO PRSV INCREASED AG IM: CPT | Performed by: GENERAL PRACTICE

## 2018-10-08 NOTE — PROGRESS NOTES
Subjective   Lexi Wade is a 68 y.o. female.     Chief Complaint   Patient presents with   • Annual Exam     labs [facility] wellness   • Diabetes     For review and evaluation of management of chronic medical problems. Labs reviewed. Due for mammogram.    Diabetes   She presents for her follow-up diabetic visit. She has type 2 diabetes mellitus. Her disease course has been stable. Pertinent negatives for hypoglycemia include no dizziness, headaches or nervousness/anxiousness. There are no diabetic associated symptoms. Pertinent negatives for diabetes include no chest pain, no fatigue and no weakness. Diabetic complications include heart disease. Risk factors for coronary artery disease include dyslipidemia, diabetes mellitus and hypertension. Current diabetic treatment includes oral agent (dual therapy). She is compliant with treatment all of the time. She is following a generally healthy diet. When asked about meal planning, she reported none. She rarely participates in exercise. There is no change in her home blood glucose trend. An ACE inhibitor/angiotensin II receptor blocker is being taken. Eye exam is current.   Hypertension   The current episode started more than 1 year ago. The problem is unchanged. The problem is controlled. Pertinent negatives include no chest pain, headaches, neck pain, palpitations or shortness of breath. There are no associated agents to hypertension. Risk factors for coronary artery disease include diabetes mellitus, dyslipidemia and obesity. Current antihypertension treatment includes beta blockers, ACE inhibitors and diuretics. The current treatment provides significant improvement. There are no compliance problems.    Hyperlipidemia   This is a chronic problem. The current episode started more than 1 year ago. The problem is controlled. Recent lipid tests were reviewed and are normal. Exacerbating diseases include diabetes. There are no known factors aggravating her  hyperlipidemia. Pertinent negatives include no chest pain, myalgias or shortness of breath. Current antihyperlipidemic treatment includes statins. The current treatment provides significant improvement of lipids. There are no compliance problems.  Risk factors for coronary artery disease include diabetes mellitus, dyslipidemia, post-menopausal and hypertension.        The following portions of the patient's history were reviewed and updated as appropriate: allergies, current medications, past family and social history and problem list.    Outpatient Medications Prior to Visit   Medication Sig Dispense Refill   • aspirin 81 MG tablet Take 81 mg by mouth every night.     • brimonidine (ALPHAGAN) 0.2 % ophthalmic solution Use in both eyes 2 times daily (Patient taking differently: Administer 1 drop to both eyes 2 (Two) Times a Day. Place 1 drop in both eyes two times daily) 15 mL 3   • carvedilol (COREG) 25 MG tablet TAKE 1 TABLET BY MOUTH 2 (TWO) TIMES A DAY WITH MEALS. 180 tablet 3   • digoxin (LANOXIN) 125 MCG tablet TAKE 1 TABLET BY MOUTH DAILY 90 tablet 3   • ezetimibe (ZETIA) 10 MG tablet TAKE 1 TABELT BY MOUTH DAILY 90 tablet 2   • furosemide (LASIX) 80 MG tablet Take 1 tablet by mouth Daily. 90 tablet 3   • glimepiride (AMARYL) 4 MG tablet TAKE 1 TABLET BY MOUTH 2 (TWO) TIMES A DAY. 180 tablet 3   • ipratropium-albuterol (DUO-NEB) 0.5-2.5 mg/3 ml nebulizer Take 3 mL by nebulization 4 (Four) Times a Day. 360 mL 12   • latanoprost (XALATAN) 0.005 % ophthalmic solution Administer 1 drop to both eyes Every Night. 90 day supply. 2.5 mL 5   • lisinopril (PRINIVIL,ZESTRIL) 10 MG tablet Take 1 tablet by mouth Daily. 90 tablet 3   • magnesium oxide (MAGOX) 400 (241.3 Mg) MG tablet tablet Take 1 tablet by mouth Daily. 90 each 3   • metFORMIN (GLUCOPHAGE) 1000 MG tablet TAKE 1 TABLET BY MOUTH 2 (TWO) TIMES A DAY WITH MEALS. 180 tablet 3   • nitroglycerin (NITROSTAT) 0.4 MG SL tablet Place 1 tablet under the tongue Every 5  (Five) Minutes As Needed for Chest Pain. Take no more than 3 doses in 15 minutes. 25 tablet 0   • omega-3 acid ethyl esters (LOVAZA) 1 G capsule Take 1 g by mouth 2 (Two) Times a Day. 2 capsules bid     • potassium chloride (MICRO-K) 10 MEQ CR capsule TAKE 1 CAPSULE BY MOUTH 2 (TWO) TIMES A DAY. 180 capsule 3   • pregabalin (LYRICA) 150 MG capsule Take 1 capsule by mouth 2 (Two) Times a Day. 180 capsule 0   • vitamin D (ERGOCALCIFEROL) 99069 units capsule capsule Take 1 capsule by mouth 1 (One) Time Per Week. (Patient taking differently: Take 50,000 Units by mouth 1 (One) Time Per Week. Take 1 capsule by mouth weekly on Tuesdays) 12 capsule 3   • atorvastatin (LIPITOR) 40 MG tablet Take 1 tablet by mouth Daily. 90 tablet 3   • B-D UF III MINI PEN NEEDLES 31G X 5 MM misc USE DAILY WITH VICTOZA 100 each 2   • esomeprazole (nexIUM) 40 MG capsule Take 1 capsule by mouth Daily. 90 capsule 1   • isosorbide mononitrate (IMDUR) 30 MG 24 hr tablet Take 1 tablet by mouth Daily. 90 tablet 3   • VICTOZA 18 MG/3ML solution pen-injector injection INJECT 1.2 MG UNDER THE SKIN DAILY. (Patient taking differently: Inject 1.2 mg under the skin Every Night.) 6 pen 3     No facility-administered medications prior to visit.        Review of Systems   Constitutional: Negative.  Negative for chills, fatigue, fever and unexpected weight change.   HENT: Negative.  Negative for congestion, ear pain, hearing loss, nosebleeds, rhinorrhea, sneezing, sore throat and tinnitus.    Eyes: Negative.  Negative for discharge.   Respiratory: Negative.  Negative for cough, shortness of breath and wheezing.    Cardiovascular: Negative.  Negative for chest pain and palpitations.   Gastrointestinal: Negative.  Negative for abdominal pain, constipation, diarrhea, nausea and vomiting.   Endocrine: Negative.    Genitourinary: Negative.  Negative for dysuria, frequency and urgency.   Musculoskeletal: Negative.  Negative for arthralgias, back pain, joint  "swelling, myalgias and neck pain.   Skin: Negative.  Negative for rash.   Allergic/Immunologic: Negative.    Neurological: Negative.  Negative for dizziness, weakness, numbness and headaches.   Hematological: Negative.  Negative for adenopathy.   Psychiatric/Behavioral: Negative.  Negative for dysphoric mood and sleep disturbance. The patient is not nervous/anxious.        Objective     Visit Vitals  /60   Pulse 87   Ht 160 cm (63\")   Wt 94.4 kg (208 lb 3.2 oz)   SpO2 97%   BMI 36.88 kg/m²     Physical Exam   Constitutional: She is oriented to person, place, and time. She appears well-developed and well-nourished. No distress.   HENT:   Head: Normocephalic and atraumatic.   Nose: Nose normal.   Mouth/Throat: Oropharynx is clear and moist.   Eyes: Pupils are equal, round, and reactive to light. Conjunctivae and EOM are normal. Right eye exhibits no discharge. Left eye exhibits no discharge.   Neck: No tracheal deviation present. No thyromegaly present.   Cardiovascular: Normal rate, regular rhythm, normal heart sounds and intact distal pulses.    No murmur heard.  Pulmonary/Chest: Effort normal and breath sounds normal. No respiratory distress. She has no wheezes. She has no rales. She exhibits no tenderness. Right breast exhibits no inverted nipple, no mass, no nipple discharge, no skin change and no tenderness. Left breast exhibits no inverted nipple, no mass, no nipple discharge, no skin change and no tenderness.   Abdominal: Soft. Bowel sounds are normal. She exhibits no distension and no mass. There is no tenderness. No hernia.   Musculoskeletal: Normal range of motion. She exhibits no deformity.    Lexi had a diabetic foot exam performed today.   During the foot exam she had a monofilament test performed (normal).  Vascular Status -  Her right foot exhibits normal foot vasculature  and no edema. Her left foot exhibits normal foot vasculature  and no edema.  Skin Integrity  -  Her right foot skin is " intact (callus base of great toe).  Left foot skin intact:  callus base of great toe..  Lymphadenopathy:     She has no cervical adenopathy.   Neurological: She is alert and oriented to person, place, and time. She has normal reflexes.   Skin: Skin is warm and dry.   Psychiatric: She has a normal mood and affect. Her behavior is normal. Judgment and thought content normal.   Nursing note and vitals reviewed.    Results for orders placed or performed in visit on 10/03/18   Urine Culture - Urine,   Result Value Ref Range    Urine Culture Mixed Malini Isolated    Comprehensive Metabolic Panel   Result Value Ref Range    Glucose 83 60 - 100 mg/dL    BUN 11 7 - 21 mg/dL    Creatinine 0.94 0.50 - 1.00 mg/dL    Sodium 141 137 - 145 mmol/L    Potassium 4.2 3.5 - 5.1 mmol/L    Chloride 100 95 - 110 mmol/L    CO2 28.0 22.0 - 31.0 mmol/L    Calcium 9.4 8.4 - 10.2 mg/dL    Total Protein 8.3 6.3 - 8.6 g/dL    Albumin 4.40 3.40 - 4.80 g/dL    ALT (SGPT) 21 9 - 52 U/L    AST (SGOT) 58 (H) 14 - 36 U/L    Alkaline Phosphatase 114 38 - 126 U/L    Total Bilirubin 0.4 0.2 - 1.3 mg/dL    eGFR  African Amer 71 39 - 90 mL/min/1.73    Globulin 3.9 (H) 2.3 - 3.5 gm/dL    A/G Ratio 1.1 1.1 - 1.8 g/dL    BUN/Creatinine Ratio 11.7 7.0 - 25.0    Anion Gap 13.0 5.0 - 15.0 mmol/L   Hemoglobin A1c   Result Value Ref Range    Hemoglobin A1C 7.1 (H) 4 - 5.6 %   Lipid Panel   Result Value Ref Range    Total Cholesterol 153 0 - 199 mg/dL    Triglycerides 177 20 - 199 mg/dL    HDL Cholesterol 35 (L) 60 - 200 mg/dL    LDL Cholesterol  80 1 - 129 mg/dL    LDL/HDL Ratio 2.36 0.00 - 3.22   MicroAlbumin, Urine, Random - Urine, Clean Catch   Result Value Ref Range    Microalbumin, Urine 2.1 mg/L   Urinalysis With / Culture If Indicated - Urine, Clean Catch   Result Value Ref Range    Color, UA Yellow Yellow, Straw, Dark Yellow, Lucie    Appearance, UA Clear Clear    pH, UA 6.5 5.0 - 9.0    Specific Gravity, UA 1.023 1.003 - 1.030    Glucose, UA Negative  Negative    Ketones, UA Trace (A) Negative    Bilirubin, UA Negative Negative    Blood, UA Negative Negative    Protein, UA Trace (A) Negative    Leuk Esterase, UA Small (1+) (A) Negative    Nitrite, UA Negative Negative    Urobilinogen, UA 0.2 E.U./dL 0.2 - 1.0 E.U./dL   Vitamin D 25 Hydroxy   Result Value Ref Range    25 Hydroxy, Vitamin D 44.1 30.0 - 100.0 ng/ml   CBC Auto Differential   Result Value Ref Range    WBC 11.81 (H) 3.20 - 9.80 10*3/mm3    RBC 4.37 3.77 - 5.16 10*6/mm3    Hemoglobin 11.8 (L) 12.0 - 15.5 g/dL    Hematocrit 37.2 35.0 - 45.0 %    MCV 85.1 80.0 - 98.0 fL    MCH 27.0 26.5 - 34.0 pg    MCHC 31.7 31.4 - 36.0 g/dL    RDW 15.6 (H) 11.5 - 14.5 %    RDW-SD 48.7 (H) 36.4 - 46.3 fl    MPV 9.4 8.0 - 12.0 fL    Platelets 395 150 - 450 10*3/mm3    Neutrophil % 52.0 37.0 - 80.0 %    Lymphocyte % 38.4 10.0 - 50.0 %    Monocyte % 7.6 0.0 - 12.0 %    Eosinophil % 1.4 0.0 - 7.0 %    Basophil % 0.3 0.0 - 2.0 %    Immature Grans % 0.3 0.0 - 0.5 %    Neutrophils, Absolute 6.14 2.00 - 8.60 10*3/mm3    Lymphocytes, Absolute 4.53 (H) 0.60 - 4.20 10*3/mm3    Monocytes, Absolute 0.90 0.00 - 0.90 10*3/mm3    Eosinophils, Absolute 0.17 0.00 - 0.70 10*3/mm3    Basophils, Absolute 0.03 0.00 - 0.20 10*3/mm3    Immature Grans, Absolute 0.04 (H) 0.00 - 0.02 10*3/mm3   Urinalysis, Microscopic Only - Urine, Clean Catch   Result Value Ref Range    RBC, UA 0-2 (A) None Seen /HPF    WBC, UA 3-5 None Seen, 0-2, 3-5 /HPF    Bacteria, UA None Seen None Seen /HPF    Squamous Epithelial Cells, UA 3-5 (A) None Seen, 0-2 /HPF    Hyaline Casts, UA 3-6 None Seen /LPF    Methodology Automated Microscopy       Assessment/Plan   Problem List Items Addressed This Visit        Cardiovascular and Mediastinum    Nonischemic cardiomyopathy (CMS/HCC) (Chronic)    Essential hypertension (Chronic)    Hyperlipidemia (Chronic)       Digestive    Vitamin D deficiency (Chronic)       Endocrine    Type 2 diabetes mellitus with complication, without  long-term current use of insulin (CMS/[facility]) (Chronic)    Relevant Orders    Comprehensive Metabolic Panel    Hemoglobin A1c    LDL Cholesterol, Direct    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 Will notify regarding results. Recommended weight loss and exercise.      No orders of the defined types were placed in this encounter.    Return in about 6 months (around 4/8/2019) for Recheck.

## 2018-10-08 NOTE — PROGRESS NOTES
QUICK REFERENCE INFORMATION:  The ABCs of the Annual Wellness Visit    Subsequent Medicare Wellness Visit    HEALTH RISK ASSESSMENT    1950    Recent Hospitalizations:  Recently treated at the following:  [facility].        Current Medical Providers:  Patient Care Team:  Joyce Plata MD as PCP - General  Joyce Plata MD as PCP - Claims Attributed  Wang Felipe MD as Consulting Physician (Cardiology)  Debra Orellana, RN as Care Coordinator (Population Health)        Smoking Status:  History   Smoking Status   • Never Smoker   Smokeless Tobacco   • Never Used       Alcohol Consumption:  History   Alcohol Use No       Depression Screen:   PHQ-2/PHQ-9 Depression Screening 10/8/2018   Little interest or pleasure in doing things 1   Feeling down, depressed, or hopeless 1   Trouble falling or staying asleep, or sleeping too much 1   Feeling tired or having little energy 1   Poor appetite or overeating 1   Feeling bad about yourself - or that you are a failure or have let yourself or your family down 0   Trouble concentrating on things, such as reading the newspaper or watching television 0   Moving or speaking so slowly that other people could have noticed. Or the opposite - being so fidgety or restless that you have been moving around a lot more than usual 0   Thoughts that you would be better off dead, or of hurting yourself in some way 0   Total Score 5       Health Habits and Functional and Cognitive Screening:  Functional & Cognitive Status 10/8/2018   Do you have difficulty preparing food and eating? No   Do you have difficulty bathing yourself, getting dressed or grooming yourself? No   Do you have difficulty using the toilet? No   Do you have difficulty moving around from place to place? No   Do you have trouble with steps or getting out of a bed or a chair? No   In the past year have you fallen or experienced a near fall? Yes   Current Diet Diabetic Diet   Dental Exam Not up  to date   Eye Exam Up to date   Exercise (times per week) 0 times per week   Current Exercise Activities Include None   Do you need help using the phone?  No   Are you deaf or do you have serious difficulty hearing?  No   Do you need help with transportation? No   Do you need help shopping? No   Do you need help preparing meals?  No   Do you need help with housework?  No   Do you need help with laundry? No   Do you need help taking your medications? No   Do you need help managing money? No   Do you ever drive or ride in a car without wearing a seat belt? No   Have you felt unusual stress, anger or loneliness in the last month? No   Who do you live with? Alone   If you need help, do you have trouble finding someone available to you? No   Have you been bothered in the last four weeks by sexual problems? No   Do you have difficulty concentrating, remembering or making decisions? No           Does the patient have evidence of cognitive impairment? No    Aspirin use counseling: Taking ASA appropriately as indicated      Recent Lab Results:  CMP:  Lab Results   Component Value Date    BUN 11 10/03/2018    CREATININE 0.94 10/03/2018    EGFRIFAFRI 71 10/03/2018    BCR 11.7 10/03/2018     10/03/2018    K 4.2 10/03/2018    CO2 28.0 10/03/2018    CALCIUM 9.4 10/03/2018    ALBUMIN 4.40 10/03/2018    BILITOT 0.4 10/03/2018    ALKPHOS 114 10/03/2018    AST 58 (H) 10/03/2018    ALT 21 10/03/2018     Lipid Panel:  Lab Results   Component Value Date    CHOL 153 10/03/2018    TRIG 177 10/03/2018    HDL 35 (L) 10/03/2018    LDLHDL 2.36 10/03/2018     HbA1c:  Lab Results   Component Value Date    HGBA1C 7.1 (H) 10/03/2018       Visual Acuity:  No exam data present    Age-appropriate Screening Schedule:  Refer to the list below for future screening recommendations based on patient's age, sex and/or medical conditions. Orders for these recommended tests are listed in the plan section. The patient has been provided with a written  plan.    Health Maintenance   Topic Date Due   • ZOSTER VACCINE (2 of 3) 05/30/2017   • DIABETIC EYE EXAM  04/26/2018   • DIABETIC FOOT EXAM  10/06/2018   • HEMOGLOBIN A1C  04/03/2019   • LIPID PANEL  10/03/2019   • URINE MICROALBUMIN  10/03/2019   • MAMMOGRAM  10/08/2020   • TDAP/TD VACCINES (2 - Td) 04/04/2027   • COLONOSCOPY  06/14/2027   • INFLUENZA VACCINE  Completed   • PNEUMOCOCCAL VACCINES (65+ LOW/MEDIUM RISK)  Completed        Subjective   History of Present Illness    Lexi Wade is a 68 y.o. female who presents for an Subsequent Wellness Visit.    The following portions of the patient's history were reviewed and updated as appropriate: allergies, current medications, past family history, past medical history, past social history, past surgical history and problem list.    Outpatient Medications Prior to Visit   Medication Sig Dispense Refill   • aspirin 81 MG tablet Take 81 mg by mouth every night.     • brimonidine (ALPHAGAN) 0.2 % ophthalmic solution Use in both eyes 2 times daily (Patient taking differently: Administer 1 drop to both eyes 2 (Two) Times a Day. Place 1 drop in both eyes two times daily) 15 mL 3   • carvedilol (COREG) 25 MG tablet TAKE 1 TABLET BY MOUTH 2 (TWO) TIMES A DAY WITH MEALS. 180 tablet 3   • digoxin (LANOXIN) 125 MCG tablet TAKE 1 TABLET BY MOUTH DAILY 90 tablet 3   • ezetimibe (ZETIA) 10 MG tablet TAKE 1 TABELT BY MOUTH DAILY 90 tablet 2   • furosemide (LASIX) 80 MG tablet Take 1 tablet by mouth Daily. 90 tablet 3   • glimepiride (AMARYL) 4 MG tablet TAKE 1 TABLET BY MOUTH 2 (TWO) TIMES A DAY. 180 tablet 3   • ipratropium-albuterol (DUO-NEB) 0.5-2.5 mg/3 ml nebulizer Take 3 mL by nebulization 4 (Four) Times a Day. 360 mL 12   • latanoprost (XALATAN) 0.005 % ophthalmic solution Administer 1 drop to both eyes Every Night. 90 day supply. 2.5 mL 5   • lisinopril (PRINIVIL,ZESTRIL) 10 MG tablet Take 1 tablet by mouth Daily. 90 tablet 3   • magnesium oxide (MAGOX) 400 (241.3  Mg) MG tablet tablet Take 1 tablet by mouth Daily. 90 each 3   • metFORMIN (GLUCOPHAGE) 1000 MG tablet TAKE 1 TABLET BY MOUTH 2 (TWO) TIMES A DAY WITH MEALS. 180 tablet 3   • nitroglycerin (NITROSTAT) 0.4 MG SL tablet Place 1 tablet under the tongue Every 5 (Five) Minutes As Needed for Chest Pain. Take no more than 3 doses in 15 minutes. 25 tablet 0   • omega-3 acid ethyl esters (LOVAZA) 1 G capsule Take 1 g by mouth 2 (Two) Times a Day. 2 capsules bid     • potassium chloride (MICRO-K) 10 MEQ CR capsule TAKE 1 CAPSULE BY MOUTH 2 (TWO) TIMES A DAY. 180 capsule 3   • pregabalin (LYRICA) 150 MG capsule Take 1 capsule by mouth 2 (Two) Times a Day. 180 capsule 0   • vitamin D (ERGOCALCIFEROL) 40940 units capsule capsule Take 1 capsule by mouth 1 (One) Time Per Week. (Patient taking differently: Take 50,000 Units by mouth 1 (One) Time Per Week. Take 1 capsule by mouth weekly on Tuesdays) 12 capsule 3   • atorvastatin (LIPITOR) 40 MG tablet Take 1 tablet by mouth Daily. 90 tablet 3   • B-D UF III MINI PEN NEEDLES 31G X 5 MM misc USE DAILY WITH VICTOZA 100 each 2   • esomeprazole (nexIUM) 40 MG capsule Take 1 capsule by mouth Daily. 90 capsule 1   • isosorbide mononitrate (IMDUR) 30 MG 24 hr tablet Take 1 tablet by mouth Daily. 90 tablet 3   • VICTOZA 18 MG/3ML solution pen-injector injection INJECT 1.2 MG UNDER THE SKIN DAILY. (Patient taking differently: Inject 1.2 mg under the skin Every Night.) 6 pen 3     No facility-administered medications prior to visit.        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    Review of Systems    Compared to one year ago, the patient feels her physical health is better.  Compared to one year ago, the patient feels her mental health is the same.    Objective     Physical Exam   Constitutional: She is oriented to person, place, and time. She appears well-developed and well-nourished. No distress.   HENT:   Head: Normocephalic and atraumatic.   Nose: Nose normal.   Mouth/Throat: Oropharynx is clear and moist.   Eyes: Pupils are equal, round, and reactive to light. Conjunctivae and EOM are normal. Right eye exhibits no discharge. Left eye exhibits no discharge.   Neck: No thyromegaly present.   Cardiovascular: Normal rate, regular rhythm, normal heart sounds and intact distal pulses.    Pulmonary/Chest: Effort normal and breath sounds normal.   Lymphadenopathy:     She has no cervical adenopathy.   Neurological: She is alert and oriented to person, place, and time.   Skin: Skin is warm and dry.   Psychiatric: She has a normal mood and affect.   Nursing note and vitals reviewed.      Vitals:    10/08/18 1351   BP: 120/60   Pulse: 87   SpO2: 97%   Weight: 94.4 kg (208 lb 3.2 oz)   Height: 160 cm (63\")   PainSc: 0-No pain       Patient's Body mass index is 36.88 kg/m². BMI is above normal parameters. Recommendations include: exercise counseling and nutrition counseling.      Assessment/Plan   Patient Self-Management and Personalized Health Advice  The patient has been provided with information about: diet, exercise, weight management and fall prevention and preventive services including:   · Advance directive, Exercise counseling provided, Fall Risk assessment done, Fall Risk " plan of care done, Influenza vaccine, Zostavax vaccine (Herpes Zoster).    Visit Diagnoses:    ICD-10-CM ICD-9-CM   1. Medicare annual wellness visit, subsequent Z00.00 V70.0   2. Need for immunization against influenza Z23 V04.81   3. Type 2 diabetes mellitus with complication, without long-term current use of insulin (CMS/HCC) E11.8 250.90   4. Essential hypertension I10 401.9   5. Mixed hyperlipidemia E78.2 272.2   6. 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 Vitamin D deficiency E55.9 268.9   8. Nonischemic cardiomyopathy (CMS/HCC) I42.8 425.4   9. Morbidly obese (CMS/HCC) E66.01 278.01       Orders Placed This Encounter   Procedures   • Flu Vaccine High Dose PF 65YR+ ([id number])   • Comprehensive Metabolic Panel     Standing Status:   Future     Standing Expiration Date:   5/9/2019   • Hemoglobin A1c     Standing Status:   Future     Standing Expiration Date:   5/9/2019   • LDL Cholesterol, Direct     Standing Status:   Future     Standing Expiration Date:   5/9/2019       Outpatient Encounter Prescriptions as of 10/8/2018   Medication Sig Dispense Refill   • aspirin 81 MG tablet Take 81 mg by mouth every night.     • brimonidine (ALPHAGAN) 0.2 % ophthalmic solution Use in both eyes 2 times daily (Patient taking differently: Administer 1 drop to both eyes 2 (Two) Times a Day. Place 1 drop in both eyes two times daily) 15 mL 3   • carvedilol (COREG) 25 MG tablet TAKE 1 TABLET BY MOUTH 2 (TWO) TIMES A DAY WITH MEALS. 180 tablet 3   • digoxin (LANOXIN) 125 MCG tablet TAKE 1 TABLET BY MOUTH DAILY 90 tablet 3   • ezetimibe (ZETIA) 10 MG tablet TAKE 1 TABELT BY MOUTH DAILY 90 tablet 2   • furosemide (LASIX) 80 MG tablet Take 1 tablet by mouth Daily. 90 tablet 3   • glimepiride (AMARYL) 4 MG tablet TAKE 1 TABLET BY MOUTH 2 (TWO) TIMES A DAY. 180 tablet 3   • ipratropium-albuterol (DUO-NEB) 0.5-2.5 mg/3 ml nebulizer Take 3 mL by nebulization 4 (Four) Times a Day. 360  mL 12   • latanoprost (XALATAN) 0.005 % ophthalmic solution Administer 1 drop to both eyes Every Night. 90 day supply. 2.5 mL 5   • lisinopril (PRINIVIL,ZESTRIL) 10 MG tablet Take 1 tablet by mouth Daily. 90 tablet 3   • magnesium oxide (MAGOX) 400 (241.3 Mg) MG tablet tablet Take 1 tablet by mouth Daily. 90 each 3   • metFORMIN (GLUCOPHAGE) 1000 MG tablet TAKE 1 TABLET BY MOUTH 2 (TWO) TIMES A DAY WITH MEALS. 180 tablet 3   • nitroglycerin (NITROSTAT) 0.4 MG SL tablet Place 1 tablet under the tongue Every 5 (Five) Minutes As Needed for Chest Pain. Take no more than 3 doses in 15 minutes. 25 tablet 0   • omega-3 acid ethyl esters (LOVAZA) 1 G capsule Take 1 g by mouth 2 (Two) Times a Day. 2 capsules bid     • potassium chloride (MICRO-K) 10 MEQ CR capsule TAKE 1 CAPSULE BY MOUTH 2 (TWO) TIMES A DAY. 180 capsule 3   • pregabalin (LYRICA) 150 MG capsule Take 1 capsule by mouth 2 (Two) Times a Day. 180 capsule 0   • vitamin D (ERGOCALCIFEROL) 81592 units capsule capsule Take 1 capsule by mouth 1 (One) Time Per Week. (Patient taking differently: Take 50,000 Units by mouth 1 (One) Time Per Week. Take 1 capsule by mouth weekly on Tuesdays) 12 capsule 3   • [DISCONTINUED] atorvastatin (LIPITOR) 40 MG tablet Take 1 tablet by mouth Daily. 90 tablet 3   • [DISCONTINUED] B-D UF III MINI PEN NEEDLES 31G X 5 MM misc USE DAILY WITH VICTOZA 100 each 2   • [DISCONTINUED] esomeprazole (nexIUM) 40 MG capsule Take 1 capsule by mouth Daily. 90 capsule 1   • [DISCONTINUED] isosorbide mononitrate (IMDUR) 30 MG 24 hr tablet Take 1 tablet by mouth Daily. 90 tablet 3   • [DISCONTINUED] VICTOZA 18 MG/3ML solution pen-injector injection INJECT 1.2 MG UNDER THE SKIN DAILY. (Patient taking differently: Inject 1.2 mg under the skin Every Night.) 6 pen 3     No facility-administered encounter medications on file as of 10/8/2018.        Reviewed use of high risk medication in the elderly: yes  Reviewed for potential of harmful drug interactions in  the elderly: yes    Follow Up:  Return in about 6 months (around 4/8/2019) for Recheck.     An After Visit Summary and PPPS with all of these plans were given to the patient.

## 2018-10-17 ENCOUNTER — TELEPHONE (OUTPATIENT)
Dept: FAMILY MEDICINE CLINIC | Facility: CLINIC | Age: 68
End: 2018-10-17

## 2018-10-17 DIAGNOSIS — E11.9 DIABETES MELLITUS WITHOUT COMPLICATION (HCC): ICD-10-CM

## 2018-10-17 RX ORDER — ATORVASTATIN CALCIUM 40 MG/1
40 TABLET, FILM COATED ORAL DAILY
Qty: 90 TABLET | Refills: 3 | Status: SHIPPED | OUTPATIENT
Start: 2018-10-17 | End: 2019-11-11 | Stop reason: SDUPTHER

## 2018-10-17 RX ORDER — ISOSORBIDE MONONITRATE 30 MG/1
30 TABLET, EXTENDED RELEASE ORAL
Qty: 90 TABLET | Refills: 3 | Status: SHIPPED | OUTPATIENT
Start: 2018-10-17 | End: 2019-11-11 | Stop reason: SDUPTHER

## 2018-10-17 RX ORDER — PREGABALIN 150 MG/1
150 CAPSULE ORAL 2 TIMES DAILY
Qty: 180 CAPSULE | Refills: 0 | Status: CANCELLED | OUTPATIENT
Start: 2018-10-17

## 2018-10-17 RX ORDER — ESOMEPRAZOLE MAGNESIUM 40 MG/1
40 CAPSULE, DELAYED RELEASE ORAL DAILY
Qty: 90 CAPSULE | Refills: 2 | Status: SHIPPED | OUTPATIENT
Start: 2018-10-17 | End: 2019-08-07 | Stop reason: SDUPTHER

## 2018-10-21 PROBLEM — E66.01 MORBIDLY OBESE (HCC): Status: ACTIVE | Noted: 2018-06-21

## 2018-10-29 ENCOUNTER — OFFICE VISIT (OUTPATIENT)
Dept: SLEEP MEDICINE | Facility: HOSPITAL | Age: 68
End: 2018-10-29

## 2018-10-29 VITALS
SYSTOLIC BLOOD PRESSURE: 127 MMHG | HEART RATE: 81 BPM | WEIGHT: 212.2 LBS | OXYGEN SATURATION: 99 % | DIASTOLIC BLOOD PRESSURE: 70 MMHG | HEIGHT: 63 IN | BODY MASS INDEX: 37.6 KG/M2

## 2018-10-29 DIAGNOSIS — F51.02: ICD-10-CM

## 2018-10-29 DIAGNOSIS — Z72.821 INADEQUATE SLEEP HYGIENE: Primary | ICD-10-CM

## 2018-10-29 PROCEDURE — 99213 OFFICE O/P EST LOW 20 MIN: CPT | Performed by: INTERNAL MEDICINE

## 2018-10-29 NOTE — PROGRESS NOTES
CHIEF COMPLAINT: follow up diagnostic polysomnography    HPI:    The patient is a 68 y.o. female.  She returns to sleep clinic to discuss the results of the recent diagnostic polysomnography performed on 10/08/2018.  The AHI/GILBERT was 0.2, REM AHI 0.0. The patient had a periodic limb movement index of 0.  The patient did not have any significant cardiac arrhythmias. Total sleep time was 336.5, although she felt as if she slept less.    INTERVAL MEDICAL HISTORY: no change    Bed time: 0200 (tv on all day and night)  Sleep latency: 60-90 minutes  Number of times awakens during the night: 3-4  Wake time: 7444-7904  Estimated total sleep time at night: 5-6 hours  Caffeine intake: 0oz of coffee, 0oz of tea, and 2 L of soda  Alcohol intake: 0 drinks per week  Nap time: throughout the day  Sleepiness with Driving: some      MEDICATIONS:   Current Outpatient Prescriptions:   •  aspirin 81 MG tablet, Take 81 mg by mouth every night., Disp: , Rfl:   •  atorvastatin (LIPITOR) 40 MG tablet, Take 1 tablet by mouth Daily., Disp: 90 tablet, Rfl: 3  •  brimonidine (ALPHAGAN) 0.2 % ophthalmic solution, Use in both eyes 2 times daily (Patient taking differently: Administer 1 drop to both eyes 2 (Two) Times a Day. Place 1 drop in both eyes two times daily), Disp: 15 mL, Rfl: 3  •  carvedilol (COREG) 25 MG tablet, TAKE 1 TABLET BY MOUTH 2 (TWO) TIMES A DAY WITH MEALS., Disp: 180 tablet, Rfl: 3  •  digoxin (LANOXIN) 125 MCG tablet, TAKE 1 TABLET BY MOUTH DAILY, Disp: 90 tablet, Rfl: 3  •  esomeprazole (nexIUM) 40 MG capsule, Take 1 capsule by mouth Daily., Disp: 90 capsule, Rfl: 2  •  ezetimibe (ZETIA) 10 MG tablet, TAKE 1 TABELT BY MOUTH DAILY, Disp: 90 tablet, Rfl: 2  •  furosemide (LASIX) 80 MG tablet, Take 1 tablet by mouth Daily., Disp: 90 tablet, Rfl: 3  •  glimepiride (AMARYL) 4 MG tablet, TAKE 1 TABLET BY MOUTH 2 (TWO) TIMES A DAY., Disp: 180 tablet, Rfl: 3  •  Insulin Pen Needle (B-D UF III MINI PEN NEEDLES) 31G X 5 MM misc, Use  with Insulin Injections Nightly, Disp: 100 each, Rfl: 3  •  ipratropium-albuterol (DUO-NEB) 0.5-2.5 mg/3 ml nebulizer, Take 3 mL by nebulization 4 (Four) Times a Day., Disp: 360 mL, Rfl: 12  •  isosorbide mononitrate (IMDUR) 30 MG 24 hr tablet, Take 1 tablet by mouth Daily., Disp: 90 tablet, Rfl: 3  •  latanoprost (XALATAN) 0.005 % ophthalmic solution, Administer 1 drop to both eyes Every Night. 90 day supply., Disp: 2.5 mL, Rfl: 5  •  Liraglutide (VICTOZA) 18 MG/3ML solution pen-injector injection, Inject 1.2 mg under the skin into the appropriate area as directed Every Night., Disp: 6 mL, Rfl: 5  •  lisinopril (PRINIVIL,ZESTRIL) 10 MG tablet, Take 1 tablet by mouth Daily., Disp: 90 tablet, Rfl: 3  •  magnesium oxide (MAGOX) 400 (241.3 Mg) MG tablet tablet, Take 1 tablet by mouth Daily., Disp: 90 each, Rfl: 3  •  metFORMIN (GLUCOPHAGE) 1000 MG tablet, TAKE 1 TABLET BY MOUTH 2 (TWO) TIMES A DAY WITH MEALS., Disp: 180 tablet, Rfl: 3  •  nitroglycerin (NITROSTAT) 0.4 MG SL tablet, Place 1 tablet under the tongue Every 5 (Five) Minutes As Needed for Chest Pain. Take no more than 3 doses in 15 minutes., Disp: 25 tablet, Rfl: 0  •  omega-3 acid ethyl esters (LOVAZA) 1 G capsule, Take 1 g by mouth 2 (Two) Times a Day. 2 capsules bid, Disp: , Rfl:   •  potassium chloride (MICRO-K) 10 MEQ CR capsule, TAKE 1 CAPSULE BY MOUTH 2 (TWO) TIMES A DAY., Disp: 180 capsule, Rfl: 3  •  pregabalin (LYRICA) 150 MG capsule, Take 1 capsule by mouth 2 (Two) Times a Day., Disp: 180 capsule, Rfl: 0  •  vitamin D (ERGOCALCIFEROL) 67081 units capsule capsule, Take 1 capsule by mouth 1 (One) Time Per Week. (Patient taking differently: Take 50,000 Units by mouth 1 (One) Time Per Week. Take 1 capsule by mouth weekly on Tuesdays), Disp: 12 capsule, Rfl: 3    PHYSICAL EXAM  Vital Signs (last 24 hours)       10/28 0700  -  10/29 0659 10/29 0700  -  10/29 1330   Most Recent    Heart Rate     81     81    BP     127/70     127/70    SpO2 (%)     99      99          Gen:  No acute distress, alert, oriented  Lungs:  CTA with normal effort   CV:  RRR, no M/R/G  GI:  soft, non-tender  Ext:  no c/c/e    Sleep Testin. diagnostic polysomnography on 10/08/2018, AHI of 0.8     ASSESSMENT / PLAN:     1. Sleep state misperception  2. Poor sleep hygiene  1. tv on all night, sleeps on couch.  2. Keep routine  3. 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      The sleep results were discussed in detail with the patient.  The risks of untreated sleep apnea were reviewed.  Treatment options for sleep apnea were discussed. I counseled patient on sleep hygiene, including regular sleep wake schedule and stimulus control therapy, the importance of weight reduction, and abstaining from smoking and alcohol consumption.    Other treatment options including UPPP surgery, LAUP surgery, radiofrequency somnoplasty, nasal 1-way valves, implantable stimulators, and dental appliances. The patient decided to pursue CPAP therapy.      All of the patient's questions were answered. She states understanding and agreement with my assessment and plan as above.  Total time 15 min, more than half spent in face to face counseling and coordination of care.    RTC in 6 months    She agrees to return sooner on a prn basis.             This document has been electronically signed by David Guthrie MD on 2018           CC: Joyce Plata MD          No ref. provider found

## 2018-11-06 ENCOUNTER — EPISODE CHANGES (OUTPATIENT)
Dept: CASE MANAGEMENT | Facility: OTHER | Age: 68
End: 2018-11-06

## 2018-12-03 NOTE — TELEPHONE ENCOUNTER
Requesting a refill on her Lyrica 150 mg #180 Take 1 BID    Last OV 10/08/18    Next Andres OV 1/8/19    Last Script Written 10/16/17  #180 with No Refills    Last QAMAR  10/08/18    Please Advise    Thank You

## 2018-12-04 NOTE — TELEPHONE ENCOUNTER
Per Dr. Plata's approval, Script has been called to [facility] for Ms. Milligan Lyrica 150 mg #180 with No Refills.    Changed to [facility] and ready for Dr. Plata to sign

## 2018-12-10 ENCOUNTER — APPOINTMENT (OUTPATIENT)
Dept: CT IMAGING | Facility: HOSPITAL | Age: 68
End: 2018-12-10

## 2018-12-10 ENCOUNTER — HOSPITAL ENCOUNTER (EMERGENCY)
Facility: HOSPITAL | Age: 68
Discharge: HOME | End: 2018-12-10
Attending: EMERGENCY MEDICINE | Admitting: EMERGENCY MEDICINE

## 2018-12-10 VITALS
TEMPERATURE: 97.6 F | RESPIRATION RATE: 16 BRPM | DIASTOLIC BLOOD PRESSURE: 73 MMHG | HEART RATE: 86 BPM | BODY MASS INDEX: 36.7 KG/M2 | HEIGHT: 64 IN | SYSTOLIC BLOOD PRESSURE: 150 MMHG | WEIGHT: 215 LBS | OXYGEN SATURATION: 100 %

## 2018-12-10 DIAGNOSIS — N13.5 URETERAL OBSTRUCTION: ICD-10-CM

## 2018-12-10 DIAGNOSIS — R19.7 DIARRHEA IN ADULT PATIENT: ICD-10-CM

## 2018-12-10 DIAGNOSIS — N39.0 URINARY TRACT INFECTION WITHOUT HEMATURIA, SITE UNSPECIFIED: Primary | ICD-10-CM

## 2018-12-10 LAB
ALBUMIN SERPL-MCNC: 4.5 G/DL (ref 3.4–4.8)
ALBUMIN/GLOB SERPL: 1.5 G/DL (ref 1.1–1.8)
ALP SERPL-CCNC: 106 U/L (ref 38–126)
ALT SERPL W P-5'-P-CCNC: 22 U/L (ref 9–52)
ANION GAP SERPL CALCULATED.3IONS-SCNC: 15 MMOL/L (ref 5–15)
AST SERPL-CCNC: 23 U/L (ref 14–36)
BACTERIA UR QL AUTO: ABNORMAL /HPF
BASOPHILS # BLD AUTO: 0.03 10*3/MM3 (ref 0–0.2)
BASOPHILS NFR BLD AUTO: 0.2 % (ref 0–2)
BILIRUB SERPL-MCNC: 0.5 MG/DL (ref 0.2–1.3)
BILIRUB UR QL STRIP: NEGATIVE
BUN BLD-MCNC: 10 MG/DL (ref 7–21)
BUN/CREAT SERPL: 11.4 (ref 7–25)
CALCIUM SPEC-SCNC: 9.4 MG/DL (ref 8.4–10.2)
CHLORIDE SERPL-SCNC: 95 MMOL/L (ref 95–110)
CLARITY UR: ABNORMAL
CO2 SERPL-SCNC: 27 MMOL/L (ref 22–31)
COLOR UR: YELLOW
CREAT BLD-MCNC: 0.88 MG/DL (ref 0.5–1)
D-LACTATE SERPL-SCNC: 2 MMOL/L (ref 0.5–2)
DEPRECATED RDW RBC AUTO: 48.7 FL (ref 36.4–46.3)
EOSINOPHIL # BLD AUTO: 0.16 10*3/MM3 (ref 0–0.7)
EOSINOPHIL NFR BLD AUTO: 1 % (ref 0–7)
ERYTHROCYTE [DISTWIDTH] IN BLOOD BY AUTOMATED COUNT: 16 % (ref 11.5–14.5)
GFR SERPL CREATININE-BSD FRML MDRD: 77 ML/MIN/1.73 (ref 39–90)
GLOBULIN UR ELPH-MCNC: 3 GM/DL (ref 2.3–3.5)
GLUCOSE BLD-MCNC: 171 MG/DL (ref 60–100)
GLUCOSE UR STRIP-MCNC: NEGATIVE MG/DL
HCT VFR BLD AUTO: 36.8 % (ref 35–45)
HGB BLD-MCNC: 12.1 G/DL (ref 12–15.5)
HGB UR QL STRIP.AUTO: ABNORMAL
HOLD SPECIMEN: NORMAL
HOLD SPECIMEN: NORMAL
HYALINE CASTS UR QL AUTO: ABNORMAL /LPF
IMM GRANULOCYTES # BLD: 0.03 10*3/MM3 (ref 0–0.02)
IMM GRANULOCYTES NFR BLD: 0.2 % (ref 0–0.5)
KETONES UR QL STRIP: NEGATIVE
LEUKOCYTE ESTERASE UR QL STRIP.AUTO: ABNORMAL
LIPASE SERPL-CCNC: 251 U/L (ref 23–300)
LYMPHOCYTES # BLD AUTO: 3.96 10*3/MM3 (ref 0.6–4.2)
LYMPHOCYTES NFR BLD AUTO: 25.8 % (ref 10–50)
MCH RBC QN AUTO: 27.5 PG (ref 26.5–34)
MCHC RBC AUTO-ENTMCNC: 32.9 G/DL (ref 31.4–36)
MCV RBC AUTO: 83.6 FL (ref 80–98)
MONOCYTES # BLD AUTO: 1.76 10*3/MM3 (ref 0–0.9)
MONOCYTES NFR BLD AUTO: 11.5 % (ref 0–12)
NEUTROPHILS # BLD AUTO: 9.4 10*3/MM3 (ref 2–8.6)
NEUTROPHILS NFR BLD AUTO: 61.3 % (ref 37–80)
NITRITE UR QL STRIP: NEGATIVE
PH UR STRIP.AUTO: 6 [PH] (ref 5–9)
PLATELET # BLD AUTO: 340 10*3/MM3 (ref 150–450)
PMV BLD AUTO: 9.2 FL (ref 8–12)
POTASSIUM BLD-SCNC: 3.8 MMOL/L (ref 3.5–5.1)
PROT SERPL-MCNC: 7.5 G/DL (ref 6.3–8.6)
PROT UR QL STRIP: ABNORMAL
RBC # BLD AUTO: 4.4 10*6/MM3 (ref 3.77–5.16)
RBC # UR: ABNORMAL /HPF
REF LAB TEST METHOD: ABNORMAL
SODIUM BLD-SCNC: 137 MMOL/L (ref 137–145)
SP GR UR STRIP: 1.01 (ref 1–1.03)
SQUAMOUS #/AREA URNS HPF: ABNORMAL /HPF
UROBILINOGEN UR QL STRIP: ABNORMAL
WBC NRBC COR # BLD: 15.34 10*3/MM3 (ref 3.2–9.8)
WBC UR QL AUTO: ABNORMAL /HPF
WHOLE BLOOD HOLD SPECIMEN: NORMAL

## 2018-12-10 PROCEDURE — 96367 TX/PROPH/DG ADDL SEQ IV INF: CPT

## 2018-12-10 PROCEDURE — 85025 COMPLETE CBC W/AUTO DIFF WBC: CPT | Performed by: EMERGENCY MEDICINE

## 2018-12-10 PROCEDURE — 96375 TX/PRO/DX INJ NEW DRUG ADDON: CPT

## 2018-12-10 PROCEDURE — 80053 COMPREHEN METABOLIC PANEL: CPT | Performed by: EMERGENCY MEDICINE

## 2018-12-10 PROCEDURE — 87040 BLOOD CULTURE FOR BACTERIA: CPT | Performed by: EMERGENCY MEDICINE

## 2018-12-10 PROCEDURE — 25010000002 IOPAMIDOL 61 % SOLUTION: Performed by: EMERGENCY MEDICINE

## 2018-12-10 PROCEDURE — 96376 TX/PRO/DX INJ SAME DRUG ADON: CPT

## 2018-12-10 PROCEDURE — 25010000002 HYDROMORPHONE PER 4 MG: Performed by: EMERGENCY MEDICINE

## 2018-12-10 PROCEDURE — 74177 CT ABD & PELVIS W/CONTRAST: CPT

## 2018-12-10 PROCEDURE — 99284 EMERGENCY DEPT VISIT MOD MDM: CPT

## 2018-12-10 PROCEDURE — 83690 ASSAY OF LIPASE: CPT | Performed by: EMERGENCY MEDICINE

## 2018-12-10 PROCEDURE — 96366 THER/PROPH/DIAG IV INF ADDON: CPT

## 2018-12-10 PROCEDURE — 81001 URINALYSIS AUTO W/SCOPE: CPT | Performed by: EMERGENCY MEDICINE

## 2018-12-10 PROCEDURE — 25010000002 ONDANSETRON PER 1 MG: Performed by: EMERGENCY MEDICINE

## 2018-12-10 PROCEDURE — 25010000002 LEVOFLOXACIN PER 250 MG: Performed by: EMERGENCY MEDICINE

## 2018-12-10 PROCEDURE — 83605 ASSAY OF LACTIC ACID: CPT | Performed by: EMERGENCY MEDICINE

## 2018-12-10 PROCEDURE — 96365 THER/PROPH/DIAG IV INF INIT: CPT

## 2018-12-10 PROCEDURE — 36415 COLL VENOUS BLD VENIPUNCTURE: CPT | Performed by: EMERGENCY MEDICINE

## 2018-12-10 RX ORDER — HYDROMORPHONE HCL 110MG/55ML
0.5 PATIENT CONTROLLED ANALGESIA SYRINGE INTRAVENOUS ONCE
Status: COMPLETED | OUTPATIENT
Start: 2018-12-10 | End: 2018-12-10

## 2018-12-10 RX ORDER — ONDANSETRON 2 MG/ML
4 INJECTION INTRAMUSCULAR; INTRAVENOUS ONCE
Status: COMPLETED | OUTPATIENT
Start: 2018-12-10 | End: 2018-12-10

## 2018-12-10 RX ORDER — SODIUM CHLORIDE 0.9 % (FLUSH) 0.9 %
10 SYRINGE (ML) INJECTION AS NEEDED
Status: DISCONTINUED | OUTPATIENT
Start: 2018-12-10 | End: 2018-12-10 | Stop reason: HOSPADM

## 2018-12-10 RX ORDER — SULFAMETHOXAZOLE AND TRIMETHOPRIM 800; 160 MG/1; MG/1
1 TABLET ORAL 2 TIMES DAILY
Qty: 20 TABLET | Refills: 0 | Status: SHIPPED | OUTPATIENT
Start: 2018-12-10 | End: 2019-01-08

## 2018-12-10 RX ORDER — LOPERAMIDE HYDROCHLORIDE 2 MG/1
2 CAPSULE ORAL 4 TIMES DAILY PRN
Qty: 24 CAPSULE | Refills: 0 | Status: ON HOLD | OUTPATIENT
Start: 2018-12-10 | End: 2020-06-22

## 2018-12-10 RX ORDER — LEVOFLOXACIN 5 MG/ML
500 INJECTION, SOLUTION INTRAVENOUS ONCE
Status: COMPLETED | OUTPATIENT
Start: 2018-12-10 | End: 2018-12-10

## 2018-12-10 RX ORDER — ONDANSETRON 4 MG/1
4 TABLET, ORALLY DISINTEGRATING ORAL EVERY 6 HOURS PRN
Qty: 15 TABLET | Refills: 0 | Status: ON HOLD | OUTPATIENT
Start: 2018-12-10 | End: 2020-06-22

## 2018-12-10 RX ORDER — SODIUM CHLORIDE 9 MG/ML
125 INJECTION, SOLUTION INTRAVENOUS CONTINUOUS
Status: DISCONTINUED | OUTPATIENT
Start: 2018-12-10 | End: 2018-12-10 | Stop reason: HOSPADM

## 2018-12-10 RX ORDER — HYDROCODONE BITARTRATE AND ACETAMINOPHEN 5; 325 MG/1; MG/1
1 TABLET ORAL EVERY 6 HOURS PRN
Qty: 15 TABLET | Refills: 0 | Status: SHIPPED | OUTPATIENT
Start: 2018-12-10 | End: 2019-01-08

## 2018-12-10 RX ADMIN — LEVOFLOXACIN 500 MG: 5 INJECTION, SOLUTION INTRAVENOUS at 10:10

## 2018-12-10 RX ADMIN — SODIUM CHLORIDE 500 ML: 9 INJECTION, SOLUTION INTRAVENOUS at 09:25

## 2018-12-10 RX ADMIN — IOPAMIDOL 96 ML: 612 INJECTION, SOLUTION INTRAVENOUS at 11:14

## 2018-12-10 RX ADMIN — HYDROMORPHONE HYDROCHLORIDE 0.5 MG: 2 INJECTION INTRAMUSCULAR; INTRAVENOUS; SUBCUTANEOUS at 09:13

## 2018-12-10 RX ADMIN — HYDROMORPHONE HYDROCHLORIDE 0.5 MG: 2 INJECTION INTRAMUSCULAR; INTRAVENOUS; SUBCUTANEOUS at 12:19

## 2018-12-10 RX ADMIN — Medication 10 ML: at 12:19

## 2018-12-10 RX ADMIN — ONDANSETRON 4 MG: 2 INJECTION INTRAMUSCULAR; INTRAVENOUS at 09:12

## 2018-12-10 RX ADMIN — METRONIDAZOLE 500 MG: 500 INJECTION, SOLUTION INTRAVENOUS at 10:21

## 2018-12-10 NOTE — ED PROVIDER NOTES
Subjective   Is presents emergency Department with 3 days of left lower quadrant pain and diarrhea.  Patient is had generalized abdominal discomfort during this time..  Patient says surgical history includes MARTINE/BSO.  Patient has been noted have adhesions in the past.  Patient notes no obstructive type symptoms has had nausea without vomiting.  Patient has had passing diarrhea.  No known exposures no travel and no recent antibiotic use.  Patient denies any fevers.  Has had less by mouth intake secondary to the illness.  No vaginal bleeding or discharge.            Review of Systems   Constitutional: Negative.  Negative for appetite change, chills and fever.   HENT: Negative.  Negative for congestion.    Eyes: Negative.  Negative for photophobia and visual disturbance.   Respiratory: Negative.  Negative for cough, chest tightness and shortness of breath.    Cardiovascular: Negative.  Negative for chest pain and palpitations.   Gastrointestinal: Positive for abdominal pain, diarrhea and nausea. Negative for constipation and vomiting.   Endocrine: Negative.    Genitourinary: Negative.  Negative for decreased urine volume, dysuria, flank pain and hematuria.   Musculoskeletal: Positive for back pain. Negative for arthralgias, myalgias, neck pain and neck stiffness.   Skin: Negative.  Negative for pallor.   Neurological: Negative.  Negative for dizziness, syncope, weakness, light-headedness, numbness and headaches.   Psychiatric/Behavioral: Negative.  Negative for confusion and suicidal ideas. The patient is not nervous/anxious.    All other systems reviewed and are negative.      Past Medical History:   Diagnosis Date   • Artificial lens present     IN  POSITION - BOTH   • Borderline glaucoma    • Cardiomyopathy (CMS/HCC)    • Congestive heart failure (CMS/HCC)    • Coronary artery disease    • Diabetes mellitus (CMS/HCC)     IMPROVING   • Essential hypertension    • GERD (gastroesophageal reflux disease)    • History of  bone density study 09/25/2015    DXA BONE DENSITY AXIAL 92293 [facility] (Screening for osteoporosis)    • History of echocardiogram 03/25/2013    Echocadiogram W/ color flow 17716 (Mild left atrial enlargement wiht mild LV enlargement w/mild concentric LVH. Global hypokinesis. EF 30%. Mitral and Av thickened. Aortic sclerosis. Severe mitral regurg. mild tricuspid regurg. Mild right atrial enlargement noted. Pacemaker wire noted)   03/25/2013 RACHELE WILLIAM    • History of mammography, diagnostic 09/17/2014    [place] [address] [facility] (Microcalcifications of the breast) , Reason: s/p mammotome bx right side for benign disease   • History of mammography, diagnostic 07/24/2014    MAMMOGRAPHY DIAGNOSTIC UNILAT RT 213[address] [facility] (Abnormal mammogram, unspecified)    • History of mammography, screening 07/03/2012    BENIGN   • Hypercholesterolemia    • Low back pain    • Menopausal syndrome 10/26/2017   • Microcalcifications of the breast     BENIGN CHANGES   • Need for prophylactic vaccination against Streptococcus pneumoniae (pneumococcus)    • 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 Severe left ventricular dysfunction with an EF of 25%)   • CARDIAC CATHETERIZATION  01/31/2002    Cardiac cath 37688 (No significant coronary atherosclerosis. Probably mild left ventricular systolic dysfunction.)   • CARDIAC PACEMAKER PLACEMENT     • CATARACT EXTRACTION  01/28/2014    Remove cataract, insert lens (Right eye)   • CATARACT EXTRACTION  11/22/2011    Remove cataract,  insert lens (Left eye)   • CATARACT EXTRACTION     • CENTRAL VENOUS LINE INSERTION  05/20/2016    Central line insertion (Successful placement of right upper extremity midline.)   • COLONOSCOPY  12/14/2004    Single small non-bleeding polyp in the rectum. The polyp was removed by hot biopsy polypectomy   • MAMMO BILATERAL  09/17/2014    MAMMOGRAPHY DIAGNOSTIC UNILAT RT [address] [facility] (Microcalcifications of the breast) , Reason: s/p mammotome bx right side for benign disease   • MAMMO BILATERAL  07/24/2014    MAMMOGRAPHY DIAGNOSTIC UNILAT RT [address] [facility] (Abnormal mammogram, unspecified)    • OTHER SURGICAL HISTORY  11/02/2012    DEBRIDE NAIL 6 OR MORE 44119 (Onychomycosis)    • OTHER SURGICAL HISTORY  04/27/2016    EXTENDED VISUAL FIELDS STUDY 48101 (Open angle with borderline findings, low risk, unspecified eye)    • OTHER SURGICAL HISTORY      EXTENDED VISUAL FIELDS STUDY 74726 (Borderline glaucoma)  (2): 10/02/2014, 04/17/2013   • OTHER SURGICAL HISTORY      OCT DISC NFL 64480 (Borderline glaucoma)  (2): 02/02/2015, 08/21/2013   • PACEMAKER IMPLANTATION  2007   • PAP SMEAR  06/10/2010    NEGATIVE   • TOTAL ABDOMINAL HYSTERECTOMY WITH SALPINGO OOPHORECTOMY  09/16/2013    Lysis of adhesions.       Family History   Problem Relation Age of Onset   • Heart disease Other    • Hypertension Other    • Alzheimer's disease Other    • Diabetes Sister         x 2   • Hypertension Sister    • Hyperlipidemia Sister    • Bone cancer Brother        Social History     Socioeconomic History   • Marital status: Single     Spouse name: Not on file   • Number of children: Not on file   • Years of education: Not on file   • Highest education level: Not on file   Tobacco Use   • Smoking status: Never Smoker   • Smokeless tobacco: Never Used   Substance and Sexual Activity   • Alcohol use: No   • Drug use: No   • Sexual activity: Defer           Objective   Physical Exam   Constitutional: She is oriented to person, place, and time.  She appears well-developed and well-nourished. She appears distressed.   HENT:   Head: Normocephalic and atraumatic.   Nose: Nose normal.   Mouth/Throat: Oropharynx is clear and moist.   Eyes: Conjunctivae and EOM are normal. No scleral icterus.   Neck: Normal range of motion. Neck supple. No JVD present.   Cardiovascular: Normal rate, regular rhythm, normal heart sounds and intact distal pulses. Exam reveals no gallop and no friction rub.   No murmur heard.  Pulmonary/Chest: Effort normal. No respiratory distress. She has no wheezes. She has no rales. She exhibits no tenderness.   Abdominal: Soft. She exhibits no distension and no mass. There is tenderness in the left lower quadrant. There is guarding. There is no rigidity and no rebound. No hernia.       Left lower quadrant tenderness.  Some suprapubic and right lower quadrant though more mild than the left lower quadrant which has guarding.  No rebound.   Musculoskeletal: Normal range of motion. She exhibits no edema, tenderness or deformity.   Lymphadenopathy:     She has no cervical adenopathy.   Neurological: She is alert and oriented to person, place, and time. No cranial nerve deficit. She exhibits normal muscle tone.   Skin: Skin is warm and dry. Capillary refill takes less than 2 seconds. No rash noted. She is not diaphoretic. No erythema. No pallor.   Psychiatric: She has a normal mood and affect. Her behavior is normal. Judgment and thought content normal.   Nursing note and vitals reviewed.      Procedures           ED Course        Labs Reviewed   COMPREHENSIVE METABOLIC PANEL - Abnormal; Notable for the following components:       Result Value    Glucose 171 (*)     All other components within normal limits   URINALYSIS W/ MICROSCOPIC IF INDICATED (NO CULTURE) - Abnormal; Notable for the following components:    Appearance, UA Cloudy (*)     Blood, UA Small (1+) (*)     Protein,  mg/dL (2+) (*)     Leuk Esterase, UA Small (1+) (*)     All other  components within normal limits   CBC WITH AUTO DIFFERENTIAL - Abnormal; Notable for the following components:    WBC 15.34 (*)     RDW 16.0 (*)     RDW-SD 48.7 (*)     Neutrophils, Absolute 9.40 (*)     Monocytes, Absolute 1.76 (*)     Immature Grans, Absolute 0.03 (*)     All other components within normal limits   URINALYSIS, MICROSCOPIC ONLY - Abnormal; Notable for the following components:    RBC, UA 3-5 (*)     WBC, UA 21-30 (*)     Bacteria, UA 1+ (*)     Squamous Epithelial Cells, UA 6-12 (*)     All other components within normal limits   LIPASE - Normal   LACTIC ACID, PLASMA - Normal   GASTROINTESTINAL PANEL, PCR   BLOOD CULTURE   BLOOD CULTURE   RAINBOW DRAW    Narrative:     The following orders were created for panel order [place] Draw.  Procedure                               Abnormality         Status                     ---------                               -----------         ------                     Light Blue Top[[id number]]                                                              Green Top (Gel)[[id number]]                                  Final result               Lavender Top[[id number]]                                     Final result               Gold Top - SST[[id number]]                                   Final result                 Please view results for these tests on the individual orders.   CBC AND DIFFERENTIAL    Narrative:     The following orders were created for panel order CBC & Differential.  Procedure                               Abnormality         Status                     ---------                               -----------         ------                     CBC Auto Differential[[id number]]        Abnormal            Final result                 Please view results for these tests on the individual orders.   GREEN TOP   LAVENDER TOP   GOLD TOP - SST       CT Abdomen Pelvis With Contrast   Final Result   1. Bilateral proximal to distal ureter mild wall thickening and    enhancement with adjacent fatty retroperitoneal fatty stranding.   These may represent inflammatory changes versus retroperitoneal   fibrosis versus less likely neoplastic involvement. Recommend   clinical correlation and  consultation.   2. Otherwise unremarkable CT abdomen pelvis study..      Electronically signed by:  Sreedhar Otero MD  12/10/2018 11:36 AM CST   Workstation: [id number]        Patient with CT evidence of bilateral ureteral thickening in setting of UTI markers.  Plan cipro.  Case discussed with Dr. Capone for urgent followup this week for further evaluation.  Nontoxic, tolerating po.  Normal, stable VS.  KRD# [id number]           [facility]      Final diagnoses:   Urinary tract infection without hematuria, site unspecified   Ureteral inflammation   Diarrhea in adult patient            Sukhdev Polk MD  12/10/18 [id number]

## 2018-12-10 NOTE — DISCHARGE INSTRUCTIONS
Follow-up with Dr. Capone clinic this week.  Wednesday 12/12/2018 at 11:25 am for further management.  Drink plenty of fluids.  Medications as directed. Return with any new or worsening symptoms or any concerns.

## 2018-12-10 NOTE — ED NOTES
IV ABX STILL INFUSING- PATENCY OF IV CHECKED AT THIS TIME. IV PATENT AND NO SWELLING/REDNESS/PAIN NOTED. WILL CONTINUE TO MONITOR.      Sue Parker, RN  12/10/18 [id number]

## 2018-12-11 ENCOUNTER — EPISODE CHANGES (OUTPATIENT)
Dept: CASE MANAGEMENT | Facility: OTHER | Age: 68
End: 2018-12-11

## 2018-12-12 ENCOUNTER — PATIENT OUTREACH (OUTPATIENT)
Dept: CASE MANAGEMENT | Facility: OTHER | Age: 68
End: 2018-12-12

## 2018-12-12 NOTE — OUTREACH NOTE
Care Management Plan 12/12/2018   Lifestyle Goals Decrease falls risk;Avoid respiratory irritants;Eat a healthy diet;Fewer exacerbations;Increase physical activity;Have more energy;Plan meals;Routine follow-up with doctor(s);Routine foot care;Routine eye exam;Self monitor blood sugar;Self monitor blood pressure   Barriers Disease education;Unhealthy food   Self Management Get flu/pneumonia shot;Dietary Changes - Eat More Fruits/Vegetables;Dietary Changes -  Reduce Caloric Intake;Check Weight Daily;Home Glucose Monitoring;Increase Physical Activities;Medication Adherence   Suggested Appointments Other (See Comment)   Suggested Appointments Follow with providers as scheduled.     Annual Wellness Visit:  Patient Has Completed   Care Gaps Addressed Mammogram;Flu Shot;Diabetic Eye Exam;Diabetic A1C   Specific Disease Process Teaching Diabetes;Heart Disease;Heart Failure   Specific Disease Process Teaching Falls risk, importance of follow-up, s/s of worsening, and discharge medications.   Does patient have depression diagnosis? No   Advanced Directives: Not Interested At This Time   Ed Visits past 12 months: 2 or 3   Hospitalizations past 12 months 2 or 3   Discharged From: [facility] - ED   Discharged to: Home / Self Care   Admit Date: 12/10/2018   Discharge Date: 12/10/2018   Discharge destination: Home   Medication Adherence Medications understood   Medication Adherence Patient states adherence and understanding.   Health Literacy Moderate     The main concerns and/or symptoms the patient would like to address are: Disease Education and Care Coordination.      Education/instruction provided by Care Coordinator: Assessment and Care Plan created with patient this session.  Patient was at ED with complaints of LLQ pain.  Patient has follow up with Urology/ Fellow next week.  She invites RN-Care Advisor to call back after the holidays for further discussion.  JOSH complete, Kenny inactive.      Follow  Up Outreach Due: 3-5 weeks    Nga Diego RN

## 2018-12-15 LAB
BACTERIA SPEC AEROBE CULT: NORMAL
BACTERIA SPEC AEROBE CULT: NORMAL

## 2019-01-04 RX ORDER — FUROSEMIDE 80 MG
80 TABLET ORAL DAILY
Qty: 90 TABLET | Refills: 3 | Status: SHIPPED | OUTPATIENT
Start: 2019-01-04 | End: 2019-12-10 | Stop reason: SDUPTHER

## 2019-01-08 ENCOUNTER — OFFICE VISIT (OUTPATIENT)
Dept: FAMILY MEDICINE CLINIC | Facility: CLINIC | Age: 69
End: 2019-01-08

## 2019-01-08 VITALS
HEIGHT: 64 IN | WEIGHT: 213.7 LBS | HEART RATE: 78 BPM | BODY MASS INDEX: 36.48 KG/M2 | OXYGEN SATURATION: 99 % | DIASTOLIC BLOOD PRESSURE: 80 MMHG | SYSTOLIC BLOOD PRESSURE: 120 MMHG

## 2019-01-08 DIAGNOSIS — I10 ESSENTIAL HYPERTENSION: Primary | Chronic | ICD-10-CM

## 2019-01-08 DIAGNOSIS — E11.8 TYPE 2 DIABETES MELLITUS WITH COMPLICATION, WITHOUT LONG-TERM CURRENT USE OF INSULIN (HCC): Chronic | ICD-10-CM

## 2019-01-08 DIAGNOSIS — E78.2 MIXED HYPERLIPIDEMIA: ICD-10-CM

## 2019-01-08 DIAGNOSIS — E66.01 MORBIDLY OBESE (HCC): ICD-10-CM

## 2019-01-08 PROCEDURE — 99213 OFFICE O/P EST LOW 20 MIN: CPT | Performed by: GENERAL PRACTICE

## 2019-01-08 NOTE — PROGRESS NOTES
Subjective   Lexi Wade is a 68 y.o. female.   Chief Complaint   Patient presents with   • Follow-up   • Hyperlipidemia   • Hypertension   • Diabetes     For review and evaluation of management of chronic medical problems.   Hypertension   The current episode started more than 1 year ago. The problem is unchanged. The problem is controlled. Pertinent negatives include no chest pain, headaches, neck pain, palpitations or shortness of breath. There are no associated agents to hypertension. Risk factors for coronary artery disease include diabetes mellitus, dyslipidemia and obesity. Current antihypertension treatment includes beta blockers, ACE inhibitors and diuretics. The current treatment provides significant improvement. There are no compliance problems.    Obesity   This is a chronic problem. The current episode started more than 1 year ago. The problem occurs constantly. The problem has been unchanged. Pertinent negatives include no abdominal pain, arthralgias, chest pain, chills, congestion, coughing, fatigue, fever, headaches, joint swelling, myalgias, nausea, neck pain, numbness, rash, sore throat, vomiting or weakness. The symptoms are aggravated by stress. Treatments tried: diet and exercise. The treatment provided mild relief.      The following portions of the patient's history were reviewed and updated as appropriate: allergies, current medications, past social history and problem list.    Outpatient Medications Prior to Visit   Medication Sig Dispense Refill   • aspirin 81 MG tablet Take 81 mg by mouth every night.     • atorvastatin (LIPITOR) 40 MG tablet Take 1 tablet by mouth Daily. 90 tablet 3   • brimonidine (ALPHAGAN) 0.2 % ophthalmic solution Use in both eyes 2 times daily (Patient taking differently: Administer 1 drop to both eyes 2 (Two) Times a Day. Place 1 drop in both eyes two times daily) 15 mL 3   • carvedilol (COREG) 25 MG tablet TAKE 1 TABLET BY MOUTH 2 (TWO) TIMES A DAY WITH  MEALS. 180 tablet 3   • digoxin (LANOXIN) 125 MCG tablet TAKE 1 TABLET BY MOUTH DAILY 90 tablet 3   • esomeprazole (nexIUM) 40 MG capsule Take 1 capsule by mouth Daily. 90 capsule 2   • ezetimibe (ZETIA) 10 MG tablet TAKE 1 TABELT BY MOUTH DAILY 90 tablet 2   • furosemide (LASIX) 80 MG tablet TAKE 1 TABLET BY MOUTH DAILY. 90 tablet 3   • glimepiride (AMARYL) 4 MG tablet TAKE 1 TABLET BY MOUTH 2 (TWO) TIMES A DAY. 180 tablet 3   • Insulin Pen Needle (B-D UF III MINI PEN NEEDLES) 31G X 5 MM misc Use with Insulin Injections Nightly 100 each 3   • ipratropium-albuterol (DUO-NEB) 0.5-2.5 mg/3 ml nebulizer Take 3 mL by nebulization 4 (Four) Times a Day. 360 mL 12   • isosorbide mononitrate (IMDUR) 30 MG 24 hr tablet Take 1 tablet by mouth Daily. 90 tablet 3   • latanoprost (XALATAN) 0.005 % ophthalmic solution Administer 1 drop to both eyes Every Night. 90 day supply. 2.5 mL 5   • Liraglutide (VICTOZA) 18 MG/3ML solution pen-injector injection Inject 1.2 mg under the skin into the appropriate area as directed Every Night. 6 mL 5   • lisinopril (PRINIVIL,ZESTRIL) 10 MG tablet Take 1 tablet by mouth Daily. 90 tablet 3   • loperamide (IMODIUM) 2 MG capsule Take 1 capsule by mouth 4 (Four) Times a Day As Needed for Diarrhea. 24 capsule 0   • LYRICA 150 MG capsule TAKE ONE CAPSULE BY MOUTH TWICE A  capsule 0   • magnesium oxide (MAGOX) 400 (241.3 Mg) MG tablet tablet Take 1 tablet by mouth Daily. 90 each 3   • metFORMIN (GLUCOPHAGE) 1000 MG tablet TAKE 1 TABLET BY MOUTH 2 (TWO) TIMES A DAY WITH MEALS. 180 tablet 3   • nitroglycerin (NITROSTAT) 0.4 MG SL tablet Place 1 tablet under the tongue Every 5 (Five) Minutes As Needed for Chest Pain. Take no more than 3 doses in 15 minutes. 25 tablet 0   • omega-3 acid ethyl esters (LOVAZA) 1 G capsule Take 1 g by mouth 2 (Two) Times a Day. 2 capsules bid     • ondansetron ODT (ZOFRAN-ODT) 4 MG disintegrating tablet Take 1 tablet by mouth Every 6 (Six) Hours As Needed for Nausea  "or Vomiting. 15 tablet 0   • potassium chloride (MICRO-K) 10 MEQ CR capsule TAKE 1 CAPSULE BY MOUTH 2 (TWO) TIMES A DAY. 180 capsule 3   • vitamin D (ERGOCALCIFEROL) 55695 units capsule capsule Take 1 capsule by mouth 1 (One) Time Per Week. (Patient taking differently: Take 50,000 Units by mouth 1 (One) Time Per Week. Take 1 capsule by mouth weekly on Tuesdays) 12 capsule 3   • HYDROcodone-acetaminophen (NORCO) 5-325 MG per tablet Take 1 tablet by mouth Every 6 (Six) Hours As Needed for Severe Pain . 15 tablet 0   • sulfamethoxazole-trimethoprim (BACTRIM DS,SEPTRA DS) 800-160 MG per tablet Take 1 tablet by mouth 2 (Two) Times a Day. 20 tablet 0     No facility-administered medications prior to visit.        Review of Systems   Constitutional: Negative.  Negative for chills, fatigue, fever and unexpected weight change.   HENT: Negative.  Negative for congestion, ear pain, hearing loss, nosebleeds, rhinorrhea, sneezing, sore throat and tinnitus.    Eyes: Negative.  Negative for discharge.   Respiratory: Negative.  Negative for cough, shortness of breath and wheezing.    Cardiovascular: Negative.  Negative for chest pain and palpitations.   Gastrointestinal: Negative.  Negative for abdominal pain, constipation, diarrhea, nausea and vomiting.   Endocrine: Negative.    Genitourinary: Negative.  Negative for dysuria, frequency and urgency.   Musculoskeletal: Negative.  Negative for arthralgias, back pain, joint swelling, myalgias and neck pain.   Skin: Negative.  Negative for rash.   Allergic/Immunologic: Negative.    Neurological: Negative.  Negative for dizziness, weakness, numbness and headaches.   Hematological: Negative.  Negative for adenopathy.   Psychiatric/Behavioral: Negative.  Negative for dysphoric mood and sleep disturbance. The patient is not nervous/anxious.        Objective   Visit Vitals  /80 (BP Location: Left arm, Patient Position: Sitting, Cuff Size: Adult)   Pulse 78   Ht 162.6 cm (64\")   Wt " 96.9 kg (213 lb 11.2 oz)   SpO2 99%   BMI 36.68 kg/m²     Physical Exam   Constitutional: She is oriented to person, place, and time. She appears well-developed and well-nourished. No distress.   HENT:   Head: Normocephalic and atraumatic.   Nose: Nose normal.   Mouth/Throat: Oropharynx is clear and moist.   Eyes: Conjunctivae and EOM are normal. Pupils are equal, round, and reactive to light. Right eye exhibits no discharge. Left eye exhibits no discharge.   Neck: No thyromegaly present.   Cardiovascular: Normal rate, regular rhythm, normal heart sounds and intact distal pulses.   Pulmonary/Chest: Effort normal and breath sounds normal.   Lymphadenopathy:     She has no cervical adenopathy.   Neurological: She is alert and oriented to person, place, and time.   Skin: Skin is warm and dry.   Psychiatric: She has a normal mood and affect.   Nursing note and vitals reviewed.    Assessment/Plan   Problem List Items Addressed This Visit        Cardiovascular and Mediastinum    Essential hypertension - Primary (Chronic)    Relevant Orders    CBC & Differential    Mixed hyperlipidemia (Chronic)    Relevant Orders    CBC & Differential       Digestive    Morbidly obese (CMS/HCC)       Endocrine    Type 2 diabetes mellitus with complication, without long-term current use of insulin (CMS/HCC) (Chronic)    Relevant Orders    CBC & Differential          Continue current treatment. Patient's Body mass index is 36.68 kg/m². BMI is above normal parameters. Recommendations include: exercise counseling and nutrition counseling.      No orders of the defined types were placed in this encounter.    Return in about 3 months (around 4/8/2019) for Recheck.

## 2019-03-04 DIAGNOSIS — E11.9 DIABETES MELLITUS WITHOUT COMPLICATION (HCC): ICD-10-CM

## 2019-03-29 ENCOUNTER — LAB (OUTPATIENT)
Dept: LAB | Facility: HOSPITAL | Age: 69
End: 2019-03-29

## 2019-03-29 DIAGNOSIS — I10 ESSENTIAL HYPERTENSION: ICD-10-CM

## 2019-03-29 DIAGNOSIS — E78.2 MIXED HYPERLIPIDEMIA: ICD-10-CM

## 2019-03-29 DIAGNOSIS — E11.8 TYPE 2 DIABETES MELLITUS WITH COMPLICATION, WITHOUT LONG-TERM CURRENT USE OF INSULIN (HCC): ICD-10-CM

## 2019-03-29 LAB
ALBUMIN SERPL-MCNC: 4.5 G/DL (ref 3.5–5.2)
ALBUMIN/GLOB SERPL: 1.5 G/DL
ALP SERPL-CCNC: 102 U/L (ref 39–117)
ALT SERPL W P-5'-P-CCNC: 19 U/L (ref 1–33)
ANION GAP SERPL CALCULATED.3IONS-SCNC: 17.4 MMOL/L
ARTICHOKE IGE QN: 95 MG/DL (ref 0–100)
AST SERPL-CCNC: 17 U/L (ref 1–32)
BASOPHILS # BLD AUTO: 0.05 10*3/MM3 (ref 0–0.2)
BASOPHILS NFR BLD AUTO: 0.4 % (ref 0–1.5)
BILIRUB SERPL-MCNC: 0.4 MG/DL (ref 0.2–1.2)
BUN BLD-MCNC: 7 MG/DL (ref 8–23)
BUN/CREAT SERPL: 8 (ref 7–25)
CALCIUM SPEC-SCNC: 9.2 MG/DL (ref 8.6–10.5)
CHLORIDE SERPL-SCNC: 99 MMOL/L (ref 98–107)
CO2 SERPL-SCNC: 22.6 MMOL/L (ref 22–29)
CREAT BLD-MCNC: 0.88 MG/DL (ref 0.57–1)
DEPRECATED RDW RBC AUTO: 49.9 FL (ref 37–54)
EOSINOPHIL # BLD AUTO: 0.16 10*3/MM3 (ref 0–0.4)
EOSINOPHIL NFR BLD AUTO: 1.2 % (ref 0.3–6.2)
ERYTHROCYTE [DISTWIDTH] IN BLOOD BY AUTOMATED COUNT: 15.5 % (ref 12.3–15.4)
GFR SERPL CREATININE-BSD FRML MDRD: 77 ML/MIN/1.73
GLOBULIN UR ELPH-MCNC: 3 GM/DL
GLUCOSE BLD-MCNC: 74 MG/DL (ref 65–99)
HBA1C MFR BLD: 7.15 % (ref 4.8–5.6)
HCT VFR BLD AUTO: 35.4 % (ref 34–46.6)
HGB BLD-MCNC: 11.2 G/DL (ref 12–15.9)
IMM GRANULOCYTES # BLD AUTO: 0.05 10*3/MM3 (ref 0–0.05)
IMM GRANULOCYTES NFR BLD AUTO: 0.4 % (ref 0–0.5)
LYMPHOCYTES # BLD AUTO: 3.79 10*3/MM3 (ref 0.7–3.1)
LYMPHOCYTES NFR BLD AUTO: 28.3 % (ref 19.6–45.3)
MCH RBC QN AUTO: 27.7 PG (ref 26.6–33)
MCHC RBC AUTO-ENTMCNC: 31.6 G/DL (ref 31.5–35.7)
MCV RBC AUTO: 87.6 FL (ref 79–97)
MONOCYTES # BLD AUTO: 1.11 10*3/MM3 (ref 0.1–0.9)
MONOCYTES NFR BLD AUTO: 8.3 % (ref 5–12)
NEUTROPHILS # BLD AUTO: 8.24 10*3/MM3 (ref 1.4–7)
NEUTROPHILS NFR BLD AUTO: 61.4 % (ref 42.7–76)
NRBC BLD AUTO-RTO: 0 /100 WBC (ref 0–0)
PLATELET # BLD AUTO: 350 10*3/MM3 (ref 140–450)
PMV BLD AUTO: 9.9 FL (ref 6–12)
POTASSIUM BLD-SCNC: 3.4 MMOL/L (ref 3.5–5.2)
PROT SERPL-MCNC: 7.5 G/DL (ref 6–8.5)
RBC # BLD AUTO: 4.04 10*6/MM3 (ref 3.77–5.28)
SODIUM BLD-SCNC: 139 MMOL/L (ref 136–145)
WBC NRBC COR # BLD: 13.4 10*3/MM3 (ref 3.4–10.8)

## 2019-03-29 PROCEDURE — 83036 HEMOGLOBIN GLYCOSYLATED A1C: CPT

## 2019-03-29 PROCEDURE — 83721 ASSAY OF BLOOD LIPOPROTEIN: CPT

## 2019-03-29 PROCEDURE — 80053 COMPREHEN METABOLIC PANEL: CPT

## 2019-03-29 PROCEDURE — 85025 COMPLETE CBC W/AUTO DIFF WBC: CPT

## 2019-03-29 PROCEDURE — 36415 COLL VENOUS BLD VENIPUNCTURE: CPT

## 2019-04-08 ENCOUNTER — OFFICE VISIT (OUTPATIENT)
Dept: FAMILY MEDICINE CLINIC | Facility: CLINIC | Age: 69
End: 2019-04-08

## 2019-04-08 ENCOUNTER — TELEPHONE (OUTPATIENT)
Dept: FAMILY MEDICINE CLINIC | Facility: CLINIC | Age: 69
End: 2019-04-08

## 2019-04-08 VITALS
SYSTOLIC BLOOD PRESSURE: 134 MMHG | WEIGHT: 218.5 LBS | DIASTOLIC BLOOD PRESSURE: 76 MMHG | OXYGEN SATURATION: 98 % | HEART RATE: 83 BPM | BODY MASS INDEX: 37.3 KG/M2 | HEIGHT: 64 IN

## 2019-04-08 DIAGNOSIS — D64.9 ANEMIA, UNSPECIFIED TYPE: ICD-10-CM

## 2019-04-08 DIAGNOSIS — E78.2 MIXED HYPERLIPIDEMIA: Chronic | ICD-10-CM

## 2019-04-08 DIAGNOSIS — E11.8 TYPE 2 DIABETES MELLITUS WITH COMPLICATION, WITHOUT LONG-TERM CURRENT USE OF INSULIN (HCC): Primary | Chronic | ICD-10-CM

## 2019-04-08 DIAGNOSIS — I10 ESSENTIAL HYPERTENSION: Chronic | ICD-10-CM

## 2019-04-08 PROCEDURE — 99214 OFFICE O/P EST MOD 30 MIN: CPT | Performed by: GENERAL PRACTICE

## 2019-04-08 NOTE — PROGRESS NOTES
Subjective   Lexi Wade is a 68 y.o. female.   Chief Complaint   Patient presents with   • Hypertension   • Diabetes     labs     For review and evaluation of management of chronic medical problems. Labs reviewed.   Diabetes   She presents for her follow-up diabetic visit. She has type 2 diabetes mellitus. Her disease course has been stable. Pertinent negatives for hypoglycemia include no dizziness, headaches or nervousness/anxiousness. There are no diabetic associated symptoms. Pertinent negatives for diabetes include no chest pain, no fatigue and no weakness. Diabetic complications include heart disease. Risk factors for coronary artery disease include dyslipidemia, diabetes mellitus and hypertension. Current diabetic treatment includes oral agent (dual therapy). She is compliant with treatment all of the time. She is following a generally healthy diet. When asked about meal planning, she reported none. She rarely participates in exercise. There is no change in her home blood glucose trend. An ACE inhibitor/angiotensin II receptor blocker is being taken. Eye exam is current.   Hypertension   The current episode started more than 1 year ago. The problem is unchanged. The problem is controlled. Pertinent negatives include no chest pain, headaches, neck pain, palpitations or shortness of breath. There are no associated agents to hypertension. Risk factors for coronary artery disease include diabetes mellitus, dyslipidemia and obesity. Current antihypertension treatment includes beta blockers, ACE inhibitors and diuretics. The current treatment provides significant improvement. There are no compliance problems.    Hyperlipidemia   This is a chronic problem. The current episode started more than 1 year ago. The problem is controlled. Recent lipid tests were reviewed and are normal. Exacerbating diseases include diabetes. There are no known factors aggravating her hyperlipidemia. Pertinent negatives include no  chest pain, myalgias or shortness of breath. Current antihyperlipidemic treatment includes statins. The current treatment provides significant improvement of lipids. There are no compliance problems.  Risk factors for coronary artery disease include diabetes mellitus, dyslipidemia, post-menopausal and hypertension.      The following portions of the patient's history were reviewed and updated as appropriate: allergies, current medications, past social history and problem list.    Outpatient Medications Prior to Visit   Medication Sig Dispense Refill   • aspirin 81 MG tablet Take 81 mg by mouth every night.     • atorvastatin (LIPITOR) 40 MG tablet Take 1 tablet by mouth Daily. 90 tablet 3   • carvedilol (COREG) 25 MG tablet TAKE 1 TABLET BY MOUTH 2 (TWO) TIMES A DAY WITH MEALS. 180 tablet 3   • digoxin (LANOXIN) 125 MCG tablet TAKE 1 TABLET BY MOUTH DAILY 90 tablet 3   • esomeprazole (nexIUM) 40 MG capsule Take 1 capsule by mouth Daily. 90 capsule 2   • ezetimibe (ZETIA) 10 MG tablet TAKE 1 TABELT BY MOUTH DAILY 90 tablet 2   • furosemide (LASIX) 80 MG tablet TAKE 1 TABLET BY MOUTH DAILY. 90 tablet 3   • glimepiride (AMARYL) 4 MG tablet TAKE 1 TABLET BY MOUTH 2 (TWO) TIMES A DAY. 180 tablet 3   • Insulin Pen Needle (B-D UF III MINI PEN NEEDLES) 31G X 5 MM misc Use with Insulin Injections Nightly 100 each 3   • ipratropium-albuterol (DUO-NEB) 0.5-2.5 mg/3 ml nebulizer Take 3 mL by nebulization 4 (Four) Times a Day. 360 mL 12   • isosorbide mononitrate (IMDUR) 30 MG 24 hr tablet Take 1 tablet by mouth Daily. 90 tablet 3   • latanoprost (XALATAN) 0.005 % ophthalmic solution Administer 1 drop to both eyes Every Night. 90 day supply. 2.5 mL 5   • Liraglutide (VICTOZA) 18 MG/3ML solution pen-injector injection Inject 1.2 mg under the skin into the appropriate area as directed Every Night. 18 pen 53   • lisinopril (PRINIVIL,ZESTRIL) 10 MG tablet Take 1 tablet by mouth Daily. 90 tablet 3   • loperamide (IMODIUM) 2 MG  capsule Take 1 capsule by mouth 4 (Four) Times a Day As Needed for Diarrhea. 24 capsule 0   • LYRICA 150 MG capsule TAKE ONE CAPSULE BY MOUTH TWICE A  capsule 0   • magnesium oxide (MAGOX) 400 (241.3 Mg) MG tablet tablet Take 1 tablet by mouth Daily. 90 each 3   • metFORMIN (GLUCOPHAGE) 1000 MG tablet TAKE 1 TABLET BY MOUTH 2 (TWO) TIMES A DAY WITH MEALS. 180 tablet 3   • nitroglycerin (NITROSTAT) 0.4 MG SL tablet Place 1 tablet under the tongue Every 5 (Five) Minutes As Needed for Chest Pain. Take no more than 3 doses in 15 minutes. 25 tablet 0   • omega-3 acid ethyl esters (LOVAZA) 1 G capsule Take 1 g by mouth 2 (Two) Times a Day. 2 capsules bid     • ondansetron ODT (ZOFRAN-ODT) 4 MG disintegrating tablet Take 1 tablet by mouth Every 6 (Six) Hours As Needed for Nausea or Vomiting. 15 tablet 0   • potassium chloride (MICRO-K) 10 MEQ CR capsule TAKE 1 CAPSULE BY MOUTH 2 (TWO) TIMES A DAY. 180 capsule 3   • vitamin D (ERGOCALCIFEROL) 29980 units capsule capsule Take 1 capsule by mouth 1 (One) Time Per Week. (Patient taking differently: Take 50,000 Units by mouth 1 (One) Time Per Week. Take 1 capsule by mouth weekly on Tuesdays) 12 capsule 3   • brimonidine (ALPHAGAN) 0.2 % ophthalmic solution Use in both eyes 2 times daily (Patient taking differently: Administer 1 drop to both eyes 2 (Two) Times a Day. Place 1 drop in both eyes two times daily) 15 mL 3     No facility-administered medications prior to visit.        Review of Systems   Constitutional: Negative.  Negative for chills, fatigue, fever and unexpected weight change.   HENT: Negative.  Negative for congestion, ear pain, hearing loss, nosebleeds, rhinorrhea, sneezing, sore throat and tinnitus.    Eyes: Negative.  Negative for discharge.   Respiratory: Negative.  Negative for cough, shortness of breath and wheezing.    Cardiovascular: Negative.  Negative for chest pain and palpitations.   Gastrointestinal: Negative.  Negative for abdominal pain,  "constipation, diarrhea, nausea and vomiting.   Endocrine: Negative.    Genitourinary: Negative.  Negative for dysuria, frequency and urgency.   Musculoskeletal: Negative.  Negative for arthralgias, back pain, joint swelling, myalgias and neck pain.   Skin: Negative.  Negative for rash.   Allergic/Immunologic: Negative.    Neurological: Negative.  Negative for dizziness, weakness, numbness and headaches.   Hematological: Negative.  Negative for adenopathy.   Psychiatric/Behavioral: Negative.  Negative for dysphoric mood and sleep disturbance. The patient is not nervous/anxious.        Objective   Visit Vitals  /76   Pulse 83   Ht 162.6 cm (64\")   Wt 99.1 kg (218 lb 8 oz)   SpO2 98%   BMI 37.51 kg/m²     Physical Exam   Constitutional: She is oriented to person, place, and time. She appears well-developed and well-nourished. No distress.   HENT:   Head: Normocephalic and atraumatic.   Nose: Nose normal.   Mouth/Throat: Oropharynx is clear and moist.   Eyes: Conjunctivae and EOM are normal. Pupils are equal, round, and reactive to light. Right eye exhibits no discharge. Left eye exhibits no discharge.   Neck: No thyromegaly present.   Cardiovascular: Normal rate, regular rhythm, normal heart sounds and intact distal pulses.   Pulmonary/Chest: Effort normal and breath sounds normal.   Lymphadenopathy:     She has no cervical adenopathy.   Neurological: She is alert and oriented to person, place, and time.   Skin: Skin is warm and dry.   Psychiatric: She has a normal mood and affect.   Nursing note and vitals reviewed.    Results for orders placed or performed in visit on 03/29/19   Comprehensive Metabolic Panel   Result Value Ref Range    Glucose 74 65 - 99 mg/dL    BUN 7 (L) 8 - 23 mg/dL    Creatinine 0.88 0.57 - 1.00 mg/dL    Sodium 139 136 - 145 mmol/L    Potassium 3.4 (L) 3.5 - 5.2 mmol/L    Chloride 99 98 - 107 mmol/L    CO2 22.6 22.0 - 29.0 mmol/L    Calcium 9.2 8.6 - 10.5 mg/dL    Total Protein 7.5 6.0 - " 8.5 g/dL    Albumin 4.50 3.50 - 5.20 g/dL    ALT (SGPT) 19 1 - 33 U/L    AST (SGOT) 17 1 - 32 U/L    Alkaline Phosphatase 102 39 - 117 U/L    Total Bilirubin 0.4 0.2 - 1.2 mg/dL    eGFR  African Amer 77 >60 mL/min/1.73    Globulin 3.0 gm/dL    A/G Ratio 1.5 g/dL    BUN/Creatinine Ratio 8.0 7.0 - 25.0    Anion Gap 17.4 mmol/L   Hemoglobin A1c   Result Value Ref Range    Hemoglobin A1C 7.15 (H) 4.80 - 5.60 %   LDL Cholesterol, Direct   Result Value Ref Range    LDL Cholesterol  95 0 - 100 mg/dL   CBC Auto Differential   Result Value Ref Range    WBC 13.40 (H) 3.40 - 10.80 10*3/mm3    RBC 4.04 3.77 - 5.28 10*6/mm3    Hemoglobin 11.2 (L) 12.0 - 15.9 g/dL    Hematocrit 35.4 34.0 - 46.6 %    MCV 87.6 79.0 - 97.0 fL    MCH 27.7 26.6 - 33.0 pg    MCHC 31.6 31.5 - 35.7 g/dL    RDW 15.5 (H) 12.3 - 15.4 %    RDW-SD 49.9 37.0 - 54.0 fl    MPV 9.9 6.0 - 12.0 fL    Platelets 350 140 - 450 10*3/mm3    Neutrophil % 61.4 42.7 - 76.0 %    Lymphocyte % 28.3 19.6 - 45.3 %    Monocyte % 8.3 5.0 - 12.0 %    Eosinophil % 1.2 0.3 - 6.2 %    Basophil % 0.4 0.0 - 1.5 %    Immature Grans % 0.4 0.0 - 0.5 %    Neutrophils, Absolute 8.24 (H) 1.40 - 7.00 10*3/mm3    Lymphocytes, Absolute 3.79 (H) 0.70 - 3.10 10*3/mm3    Monocytes, Absolute 1.11 (H) 0.10 - 0.90 10*3/mm3    Eosinophils, Absolute 0.16 0.00 - 0.40 10*3/mm3    Basophils, Absolute 0.05 0.00 - 0.20 10*3/mm3    Immature Grans, Absolute 0.05 0.00 - 0.05 10*3/mm3    nRBC 0.0 0.0 - 0.0 /100 WBC      Assessment/Plan   Problem List Items Addressed This Visit        Cardiovascular and Mediastinum    Essential hypertension (Chronic)    Relevant Orders    Basic Metabolic Panel    Mixed hyperlipidemia (Chronic)       Endocrine    Type 2 diabetes mellitus with complication, without long-term current use of insulin (CMS/[facility]) - Primary (Chronic)    Relevant Orders    Basic Metabolic Panel      Other Visit Diagnoses     Anemia, unspecified type        Relevant Orders    CBC & Differential    Iron  Profile    Vitamin B12    Ferritin    Folate          Continue current treatment. Got to lab in 1 month to check potassium and blood level, don't need to be fasting. Take potassium twice a day    No orders of the defined types were placed in this encounter.    Return in about 6 months (around 10/10/2019) for Annual physical, medicare wellness visit.

## 2019-04-08 NOTE — PATIENT INSTRUCTIONS
Got to lab in 1 month to check potassium and blood level, don't need to be fasting    Take potassium twice a day

## 2019-04-10 DIAGNOSIS — Z12.31 ENCOUNTER FOR SCREENING MAMMOGRAM FOR MALIGNANT NEOPLASM OF BREAST: Primary | ICD-10-CM

## 2019-04-30 DIAGNOSIS — E11.9 DIABETES MELLITUS WITHOUT COMPLICATION (HCC): ICD-10-CM

## 2019-04-30 RX ORDER — LIRAGLUTIDE 6 MG/ML
INJECTION SUBCUTANEOUS
Qty: 6 PEN | Refills: 5 | Status: SHIPPED | OUTPATIENT
Start: 2019-04-30 | End: 2019-10-25 | Stop reason: SDUPTHER

## 2019-05-15 ENCOUNTER — LAB (OUTPATIENT)
Dept: LAB | Facility: HOSPITAL | Age: 69
End: 2019-05-15

## 2019-05-15 DIAGNOSIS — I10 ESSENTIAL HYPERTENSION: Chronic | ICD-10-CM

## 2019-05-15 DIAGNOSIS — E11.8 TYPE 2 DIABETES MELLITUS WITH COMPLICATION, WITHOUT LONG-TERM CURRENT USE OF INSULIN (HCC): Chronic | ICD-10-CM

## 2019-05-15 DIAGNOSIS — D64.9 ANEMIA, UNSPECIFIED TYPE: ICD-10-CM

## 2019-05-15 LAB
ANION GAP SERPL CALCULATED.3IONS-SCNC: 14.8 MMOL/L
BASOPHILS # BLD AUTO: 0.06 10*3/MM3 (ref 0–0.2)
BASOPHILS NFR BLD AUTO: 0.4 % (ref 0–1.5)
BUN BLD-MCNC: 9 MG/DL (ref 8–23)
BUN/CREAT SERPL: 10.5 (ref 7–25)
CALCIUM SPEC-SCNC: 10.5 MG/DL (ref 8.6–10.5)
CHLORIDE SERPL-SCNC: 98 MMOL/L (ref 98–107)
CO2 SERPL-SCNC: 25.2 MMOL/L (ref 22–29)
CREAT BLD-MCNC: 0.86 MG/DL (ref 0.57–1)
DEPRECATED RDW RBC AUTO: 49.1 FL (ref 37–54)
EOSINOPHIL # BLD AUTO: 0.17 10*3/MM3 (ref 0–0.4)
EOSINOPHIL NFR BLD AUTO: 1.3 % (ref 0.3–6.2)
ERYTHROCYTE [DISTWIDTH] IN BLOOD BY AUTOMATED COUNT: 15.3 % (ref 12.3–15.4)
FERRITIN SERPL-MCNC: 129 NG/ML (ref 13–150)
FOLATE SERPL-MCNC: 10.7 NG/ML (ref 4.78–24.2)
GFR SERPL CREATININE-BSD FRML MDRD: 79 ML/MIN/1.73
GLUCOSE BLD-MCNC: 108 MG/DL (ref 65–99)
HCT VFR BLD AUTO: 38.4 % (ref 34–46.6)
HGB BLD-MCNC: 12.1 G/DL (ref 12–15.9)
IMM GRANULOCYTES # BLD AUTO: 0.06 10*3/MM3 (ref 0–0.05)
IMM GRANULOCYTES NFR BLD AUTO: 0.4 % (ref 0–0.5)
IRON 24H UR-MRATE: 43 MCG/DL (ref 37–145)
IRON SATN MFR SERPL: 12 % (ref 20–50)
LYMPHOCYTES # BLD AUTO: 4.36 10*3/MM3 (ref 0.7–3.1)
LYMPHOCYTES NFR BLD AUTO: 32.3 % (ref 19.6–45.3)
MCH RBC QN AUTO: 27.4 PG (ref 26.6–33)
MCHC RBC AUTO-ENTMCNC: 31.5 G/DL (ref 31.5–35.7)
MCV RBC AUTO: 86.9 FL (ref 79–97)
MONOCYTES # BLD AUTO: 1.08 10*3/MM3 (ref 0.1–0.9)
MONOCYTES NFR BLD AUTO: 8 % (ref 5–12)
NEUTROPHILS # BLD AUTO: 7.75 10*3/MM3 (ref 1.7–7)
NEUTROPHILS NFR BLD AUTO: 57.6 % (ref 42.7–76)
NRBC BLD AUTO-RTO: 0 /100 WBC (ref 0–0.2)
PLATELET # BLD AUTO: 401 10*3/MM3 (ref 140–450)
PMV BLD AUTO: 10.2 FL (ref 6–12)
POTASSIUM BLD-SCNC: 4.2 MMOL/L (ref 3.5–5.2)
RBC # BLD AUTO: 4.42 10*6/MM3 (ref 3.77–5.28)
SODIUM BLD-SCNC: 138 MMOL/L (ref 136–145)
TIBC SERPL-MCNC: 367 MCG/DL (ref 298–536)
TRANSFERRIN SERPL-MCNC: 246 MG/DL (ref 200–360)
VIT B12 BLD-MCNC: 273 PG/ML (ref 211–946)
WBC NRBC COR # BLD: 13.48 10*3/MM3 (ref 3.4–10.8)

## 2019-05-15 PROCEDURE — 85025 COMPLETE CBC W/AUTO DIFF WBC: CPT

## 2019-05-15 PROCEDURE — 80048 BASIC METABOLIC PNL TOTAL CA: CPT

## 2019-05-15 PROCEDURE — 83540 ASSAY OF IRON: CPT

## 2019-05-15 PROCEDURE — 82746 ASSAY OF FOLIC ACID SERUM: CPT

## 2019-05-15 PROCEDURE — 82607 VITAMIN B-12: CPT

## 2019-05-15 PROCEDURE — 82728 ASSAY OF FERRITIN: CPT

## 2019-05-15 PROCEDURE — 36415 COLL VENOUS BLD VENIPUNCTURE: CPT

## 2019-05-15 PROCEDURE — 84466 ASSAY OF TRANSFERRIN: CPT

## 2019-05-22 RX ORDER — LISINOPRIL 10 MG/1
TABLET ORAL
Qty: 90 TABLET | Refills: 3 | Status: ON HOLD | OUTPATIENT
Start: 2019-05-22 | End: 2019-08-23

## 2019-07-01 RX ORDER — DIGOXIN 125 MCG
TABLET ORAL
Qty: 90 TABLET | Refills: 3 | Status: SHIPPED | OUTPATIENT
Start: 2019-07-01 | End: 2020-04-02 | Stop reason: SDUPTHER

## 2019-08-07 RX ORDER — ESOMEPRAZOLE MAGNESIUM 40 MG/1
CAPSULE, DELAYED RELEASE ORAL
Qty: 90 CAPSULE | Refills: 2 | Status: SHIPPED | OUTPATIENT
Start: 2019-08-07 | End: 2020-03-09

## 2019-08-23 ENCOUNTER — HOSPITAL ENCOUNTER (INPATIENT)
Facility: HOSPITAL | Age: 69
LOS: 3 days | Discharge: HOME | End: 2019-08-26
Attending: EMERGENCY MEDICINE | Admitting: HOSPITALIST

## 2019-08-23 ENCOUNTER — APPOINTMENT (OUTPATIENT)
Dept: GENERAL RADIOLOGY | Facility: HOSPITAL | Age: 69
End: 2019-08-23

## 2019-08-23 DIAGNOSIS — J96.01 ACUTE RESPIRATORY FAILURE WITH HYPOXIA (HCC): Primary | ICD-10-CM

## 2019-08-23 DIAGNOSIS — I50.9 ACUTE ON CHRONIC CONGESTIVE HEART FAILURE, UNSPECIFIED HEART FAILURE TYPE (HCC): ICD-10-CM

## 2019-08-23 LAB
ALBUMIN SERPL-MCNC: 4.4 G/DL (ref 3.5–5.2)
ALBUMIN/GLOB SERPL: 1.1 G/DL
ALP SERPL-CCNC: 122 U/L (ref 39–117)
ALT SERPL W P-5'-P-CCNC: 27 U/L (ref 1–33)
ANION GAP SERPL CALCULATED.3IONS-SCNC: 18 MMOL/L (ref 5–15)
ANISOCYTOSIS BLD QL: ABNORMAL
ARTERIAL PATENCY WRIST A: ABNORMAL
AST SERPL-CCNC: 24 U/L (ref 1–32)
ATMOSPHERIC PRESS: 749 MMHG
BACTERIA UR QL AUTO: ABNORMAL /HPF
BASE EXCESS BLDA CALC-SCNC: -4.1 MMOL/L (ref 0–2)
BDY SITE: ABNORMAL
BILIRUB SERPL-MCNC: 0.4 MG/DL (ref 0.2–1.2)
BILIRUB UR QL STRIP: NEGATIVE
BUN BLD-MCNC: 10 MG/DL (ref 8–23)
BUN/CREAT SERPL: 10.2 (ref 7–25)
CALCIUM SPEC-SCNC: 9.3 MG/DL (ref 8.6–10.5)
CHLORIDE SERPL-SCNC: 99 MMOL/L (ref 98–107)
CLARITY UR: CLEAR
CO2 SERPL-SCNC: 23 MMOL/L (ref 22–29)
COLOR UR: YELLOW
CREAT BLD-MCNC: 0.98 MG/DL (ref 0.57–1)
D-LACTATE SERPL-SCNC: 3 MMOL/L (ref 0.5–2)
D-LACTATE SERPL-SCNC: 3.9 MMOL/L (ref 0.5–2)
DEPRECATED RDW RBC AUTO: 48 FL (ref 37–54)
EOSINOPHIL # BLD MANUAL: 0.23 10*3/MM3 (ref 0–0.4)
EOSINOPHIL NFR BLD MANUAL: 1 % (ref 0.3–6.2)
EPAP: 8
ERYTHROCYTE [DISTWIDTH] IN BLOOD BY AUTOMATED COUNT: 15.5 % (ref 12.3–15.4)
GFR SERPL CREATININE-BSD FRML MDRD: 68 ML/MIN/1.73
GLOBULIN UR ELPH-MCNC: 3.9 GM/DL
GLUCOSE BLD-MCNC: 313 MG/DL (ref 65–99)
GLUCOSE BLDC GLUCOMTR-MCNC: 213 MG/DL (ref 70–130)
GLUCOSE BLDC GLUCOMTR-MCNC: 287 MG/DL (ref 70–130)
GLUCOSE BLDC GLUCOMTR-MCNC: 371 MG/DL (ref 70–130)
GLUCOSE UR STRIP-MCNC: ABNORMAL MG/DL
HCO3 BLDA-SCNC: 21.7 MMOL/L (ref 20–26)
HCT VFR BLD AUTO: 38.8 % (ref 34–46.6)
HGB BLD-MCNC: 12.3 G/DL (ref 12–15.9)
HGB UR QL STRIP.AUTO: NEGATIVE
HOLD SPECIMEN: NORMAL
HOROWITZ INDEX BLD+IHG-RTO: 55 %
HYALINE CASTS UR QL AUTO: ABNORMAL /LPF
IPAP: 20
KETONES UR QL STRIP: NEGATIVE
LEUKOCYTE ESTERASE UR QL STRIP.AUTO: NEGATIVE
LYMPHOCYTES # BLD MANUAL: 5.08 10*3/MM3 (ref 0.7–3.1)
LYMPHOCYTES NFR BLD MANUAL: 22 % (ref 19.6–45.3)
LYMPHOCYTES NFR BLD MANUAL: 4 % (ref 5–12)
Lab: ABNORMAL
MAGNESIUM SERPL-MCNC: 1.6 MG/DL (ref 1.6–2.4)
MCH RBC QN AUTO: 27 PG (ref 26.6–33)
MCHC RBC AUTO-ENTMCNC: 31.7 G/DL (ref 31.5–35.7)
MCV RBC AUTO: 85.3 FL (ref 79–97)
MODALITY: ABNORMAL
MONOCYTES # BLD AUTO: 0.92 10*3/MM3 (ref 0.1–0.9)
NEUTROPHILS # BLD AUTO: 16.62 10*3/MM3 (ref 1.7–7)
NEUTROPHILS NFR BLD MANUAL: 72 % (ref 42.7–76)
NITRITE UR QL STRIP: NEGATIVE
NT-PROBNP SERPL-MCNC: 335.5 PG/ML (ref 5–900)
PCO2 BLDA: 41.5 MM HG (ref 35–45)
PH BLDA: 7.33 PH UNITS (ref 7.35–7.45)
PH UR STRIP.AUTO: 6.5 [PH] (ref 5–9)
PLATELET # BLD AUTO: 421 10*3/MM3 (ref 140–450)
PMV BLD AUTO: 9.4 FL (ref 6–12)
PO2 BLDA: 60.7 MM HG (ref 83–108)
POLYCHROMASIA BLD QL SMEAR: ABNORMAL
POTASSIUM BLD-SCNC: 3.6 MMOL/L (ref 3.5–5.2)
PROT SERPL-MCNC: 8.3 G/DL (ref 6–8.5)
PROT UR QL STRIP: ABNORMAL
RBC # BLD AUTO: 4.55 10*6/MM3 (ref 3.77–5.28)
RBC # UR: ABNORMAL /HPF
REF LAB TEST METHOD: ABNORMAL
SAO2 % BLDCOA: 89.2 % (ref 94–99)
SET MECH RESP RATE: 16
SMALL PLATELETS BLD QL SMEAR: ADEQUATE
SODIUM BLD-SCNC: 140 MMOL/L (ref 136–145)
SP GR UR STRIP: 1.01 (ref 1–1.03)
SQUAMOUS #/AREA URNS HPF: ABNORMAL /HPF
TROPONIN T SERPL-MCNC: 0.02 NG/ML (ref 0–0.03)
TROPONIN T SERPL-MCNC: 0.06 NG/ML (ref 0–0.03)
TROPONIN T SERPL-MCNC: 0.1 NG/ML (ref 0–0.03)
UROBILINOGEN UR QL STRIP: ABNORMAL
VARIANT LYMPHS NFR BLD MANUAL: 1 % (ref 0–5)
VENTILATOR MODE: ABNORMAL
WBC MORPH BLD: NORMAL
WBC NRBC COR # BLD: 23.08 10*3/MM3 (ref 3.4–10.8)
WBC UR QL AUTO: ABNORMAL /HPF

## 2019-08-23 PROCEDURE — 83605 ASSAY OF LACTIC ACID: CPT | Performed by: STUDENT IN AN ORGANIZED HEALTH CARE EDUCATION/TRAINING PROGRAM

## 2019-08-23 PROCEDURE — 94640 AIRWAY INHALATION TREATMENT: CPT

## 2019-08-23 PROCEDURE — 25010000002 PIPERACILLIN-TAZOBACTAM: Performed by: EMERGENCY MEDICINE

## 2019-08-23 PROCEDURE — 25010000002 FUROSEMIDE PER 20 MG: Performed by: FAMILY MEDICINE

## 2019-08-23 PROCEDURE — 94799 UNLISTED PULMONARY SVC/PX: CPT

## 2019-08-23 PROCEDURE — 94660 CPAP INITIATION&MGMT: CPT

## 2019-08-23 PROCEDURE — 82962 GLUCOSE BLOOD TEST: CPT

## 2019-08-23 PROCEDURE — 80053 COMPREHEN METABOLIC PANEL: CPT | Performed by: STUDENT IN AN ORGANIZED HEALTH CARE EDUCATION/TRAINING PROGRAM

## 2019-08-23 PROCEDURE — 83735 ASSAY OF MAGNESIUM: CPT | Performed by: STUDENT IN AN ORGANIZED HEALTH CARE EDUCATION/TRAINING PROGRAM

## 2019-08-23 PROCEDURE — 25010000002 FUROSEMIDE PER 20 MG

## 2019-08-23 PROCEDURE — 25010000002 CEFTRIAXONE PER 250 MG: Performed by: FAMILY MEDICINE

## 2019-08-23 PROCEDURE — 99285 EMERGENCY DEPT VISIT HI MDM: CPT

## 2019-08-23 PROCEDURE — 94760 N-INVAS EAR/PLS OXIMETRY 1: CPT

## 2019-08-23 PROCEDURE — 83880 ASSAY OF NATRIURETIC PEPTIDE: CPT | Performed by: STUDENT IN AN ORGANIZED HEALTH CARE EDUCATION/TRAINING PROGRAM

## 2019-08-23 PROCEDURE — 84484 ASSAY OF TROPONIN QUANT: CPT | Performed by: STUDENT IN AN ORGANIZED HEALTH CARE EDUCATION/TRAINING PROGRAM

## 2019-08-23 PROCEDURE — 63710000001 INSULIN ASPART PER 5 UNITS: Performed by: FAMILY MEDICINE

## 2019-08-23 PROCEDURE — 93005 ELECTROCARDIOGRAM TRACING: CPT | Performed by: STUDENT IN AN ORGANIZED HEALTH CARE EDUCATION/TRAINING PROGRAM

## 2019-08-23 PROCEDURE — 87040 BLOOD CULTURE FOR BACTERIA: CPT | Performed by: STUDENT IN AN ORGANIZED HEALTH CARE EDUCATION/TRAINING PROGRAM

## 2019-08-23 PROCEDURE — 25010000002 MORPHINE PER 10 MG: Performed by: EMERGENCY MEDICINE

## 2019-08-23 PROCEDURE — 85007 BL SMEAR W/DIFF WBC COUNT: CPT | Performed by: STUDENT IN AN ORGANIZED HEALTH CARE EDUCATION/TRAINING PROGRAM

## 2019-08-23 PROCEDURE — 93010 ELECTROCARDIOGRAM REPORT: CPT | Performed by: INTERNAL MEDICINE

## 2019-08-23 PROCEDURE — 84484 ASSAY OF TROPONIN QUANT: CPT | Performed by: FAMILY MEDICINE

## 2019-08-23 PROCEDURE — 82803 BLOOD GASES ANY COMBINATION: CPT

## 2019-08-23 PROCEDURE — 36600 WITHDRAWAL OF ARTERIAL BLOOD: CPT

## 2019-08-23 PROCEDURE — 36415 COLL VENOUS BLD VENIPUNCTURE: CPT | Performed by: STUDENT IN AN ORGANIZED HEALTH CARE EDUCATION/TRAINING PROGRAM

## 2019-08-23 PROCEDURE — 85025 COMPLETE CBC W/AUTO DIFF WBC: CPT | Performed by: STUDENT IN AN ORGANIZED HEALTH CARE EDUCATION/TRAINING PROGRAM

## 2019-08-23 PROCEDURE — 87040 BLOOD CULTURE FOR BACTERIA: CPT | Performed by: FAMILY MEDICINE

## 2019-08-23 PROCEDURE — 25010000002 METHYLPREDNISOLONE PER 125 MG

## 2019-08-23 PROCEDURE — 81001 URINALYSIS AUTO W/SCOPE: CPT | Performed by: STUDENT IN AN ORGANIZED HEALTH CARE EDUCATION/TRAINING PROGRAM

## 2019-08-23 PROCEDURE — 71045 X-RAY EXAM CHEST 1 VIEW: CPT

## 2019-08-23 RX ORDER — SODIUM CHLORIDE 0.9 % (FLUSH) 0.9 %
3-10 SYRINGE (ML) INJECTION AS NEEDED
Status: DISCONTINUED | OUTPATIENT
Start: 2019-08-23 | End: 2019-08-26 | Stop reason: HOSPADM

## 2019-08-23 RX ORDER — NITROGLYCERIN 20 MG/100ML
10-50 INJECTION INTRAVENOUS
Status: DISCONTINUED | OUTPATIENT
Start: 2019-08-23 | End: 2019-08-24

## 2019-08-23 RX ORDER — ACETAMINOPHEN 650 MG/1
650 SUPPOSITORY RECTAL EVERY 4 HOURS PRN
Status: DISCONTINUED | OUTPATIENT
Start: 2019-08-23 | End: 2019-08-26 | Stop reason: HOSPADM

## 2019-08-23 RX ORDER — BISACODYL 5 MG/1
5 TABLET, DELAYED RELEASE ORAL DAILY PRN
Status: DISCONTINUED | OUTPATIENT
Start: 2019-08-23 | End: 2019-08-26 | Stop reason: HOSPADM

## 2019-08-23 RX ORDER — ONDANSETRON 2 MG/ML
4 INJECTION INTRAMUSCULAR; INTRAVENOUS EVERY 6 HOURS PRN
Status: DISCONTINUED | OUTPATIENT
Start: 2019-08-23 | End: 2019-08-26 | Stop reason: HOSPADM

## 2019-08-23 RX ORDER — ACETAMINOPHEN 160 MG/5ML
650 SOLUTION ORAL EVERY 4 HOURS PRN
Status: DISCONTINUED | OUTPATIENT
Start: 2019-08-23 | End: 2019-08-26 | Stop reason: HOSPADM

## 2019-08-23 RX ORDER — DIGOXIN 125 MCG
125 TABLET ORAL DAILY
Status: DISCONTINUED | OUTPATIENT
Start: 2019-08-23 | End: 2019-08-26 | Stop reason: HOSPADM

## 2019-08-23 RX ORDER — FUROSEMIDE 10 MG/ML
INJECTION INTRAMUSCULAR; INTRAVENOUS
Status: COMPLETED
Start: 2019-08-23 | End: 2019-08-23

## 2019-08-23 RX ORDER — BISACODYL 10 MG
10 SUPPOSITORY, RECTAL RECTAL DAILY PRN
Status: DISCONTINUED | OUTPATIENT
Start: 2019-08-23 | End: 2019-08-26 | Stop reason: HOSPADM

## 2019-08-23 RX ORDER — METHYLPREDNISOLONE SODIUM SUCCINATE 125 MG/2ML
INJECTION, POWDER, LYOPHILIZED, FOR SOLUTION INTRAMUSCULAR; INTRAVENOUS
Status: COMPLETED
Start: 2019-08-23 | End: 2019-08-23

## 2019-08-23 RX ORDER — LANOLIN ALCOHOL/MO/W.PET/CERES
6 CREAM (GRAM) TOPICAL NIGHTLY PRN
Status: DISCONTINUED | OUTPATIENT
Start: 2019-08-23 | End: 2019-08-26 | Stop reason: HOSPADM

## 2019-08-23 RX ORDER — LOPERAMIDE HYDROCHLORIDE 2 MG/1
2 CAPSULE ORAL 4 TIMES DAILY PRN
Status: DISCONTINUED | OUTPATIENT
Start: 2019-08-23 | End: 2019-08-26 | Stop reason: HOSPADM

## 2019-08-23 RX ORDER — ASPIRIN 81 MG/1
81 TABLET ORAL DAILY
Status: DISCONTINUED | OUTPATIENT
Start: 2019-08-24 | End: 2019-08-26 | Stop reason: HOSPADM

## 2019-08-23 RX ORDER — NITROGLYCERIN 0.4 MG/1
0.4 TABLET SUBLINGUAL
Status: DISCONTINUED | OUTPATIENT
Start: 2019-08-23 | End: 2019-08-26 | Stop reason: HOSPADM

## 2019-08-23 RX ORDER — LATANOPROST 50 UG/ML
1 SOLUTION/ DROPS OPHTHALMIC NIGHTLY
Status: DISCONTINUED | OUTPATIENT
Start: 2019-08-23 | End: 2019-08-26 | Stop reason: HOSPADM

## 2019-08-23 RX ORDER — PANTOPRAZOLE SODIUM 40 MG/1
40 TABLET, DELAYED RELEASE ORAL EVERY MORNING
Status: DISCONTINUED | OUTPATIENT
Start: 2019-08-24 | End: 2019-08-26 | Stop reason: HOSPADM

## 2019-08-23 RX ORDER — SODIUM CHLORIDE 0.9 % (FLUSH) 0.9 %
3 SYRINGE (ML) INJECTION EVERY 12 HOURS SCHEDULED
Status: DISCONTINUED | OUTPATIENT
Start: 2019-08-23 | End: 2019-08-26 | Stop reason: HOSPADM

## 2019-08-23 RX ORDER — ASPIRIN 325 MG
325 TABLET ORAL ONCE
Status: COMPLETED | OUTPATIENT
Start: 2019-08-23 | End: 2019-08-23

## 2019-08-23 RX ORDER — POTASSIUM CHLORIDE 750 MG/1
10 CAPSULE, EXTENDED RELEASE ORAL 2 TIMES DAILY WITH MEALS
Status: DISCONTINUED | OUTPATIENT
Start: 2019-08-23 | End: 2019-08-26 | Stop reason: HOSPADM

## 2019-08-23 RX ORDER — FUROSEMIDE 10 MG/ML
60 INJECTION INTRAMUSCULAR; INTRAVENOUS ONCE
Status: COMPLETED | OUTPATIENT
Start: 2019-08-23 | End: 2019-08-23

## 2019-08-23 RX ORDER — ATORVASTATIN CALCIUM 40 MG/1
40 TABLET, FILM COATED ORAL DAILY
Status: DISCONTINUED | OUTPATIENT
Start: 2019-08-23 | End: 2019-08-26 | Stop reason: HOSPADM

## 2019-08-23 RX ORDER — DEXTROSE MONOHYDRATE 25 G/50ML
25 INJECTION, SOLUTION INTRAVENOUS
Status: DISCONTINUED | OUTPATIENT
Start: 2019-08-23 | End: 2019-08-26 | Stop reason: HOSPADM

## 2019-08-23 RX ORDER — PREGABALIN 75 MG/1
150 CAPSULE ORAL 2 TIMES DAILY
Status: DISCONTINUED | OUTPATIENT
Start: 2019-08-23 | End: 2019-08-26 | Stop reason: HOSPADM

## 2019-08-23 RX ORDER — ACETAMINOPHEN 325 MG/1
650 TABLET ORAL EVERY 4 HOURS PRN
Status: DISCONTINUED | OUTPATIENT
Start: 2019-08-23 | End: 2019-08-26 | Stop reason: HOSPADM

## 2019-08-23 RX ORDER — ISOSORBIDE MONONITRATE 30 MG/1
30 TABLET, EXTENDED RELEASE ORAL
Status: DISCONTINUED | OUTPATIENT
Start: 2019-08-23 | End: 2019-08-26 | Stop reason: HOSPADM

## 2019-08-23 RX ORDER — LISINOPRIL 10 MG/1
10 TABLET ORAL DAILY
Status: DISCONTINUED | OUTPATIENT
Start: 2019-08-23 | End: 2019-08-26 | Stop reason: HOSPADM

## 2019-08-23 RX ORDER — CARVEDILOL 25 MG/1
25 TABLET ORAL 2 TIMES DAILY WITH MEALS
Status: DISCONTINUED | OUTPATIENT
Start: 2019-08-23 | End: 2019-08-26 | Stop reason: HOSPADM

## 2019-08-23 RX ORDER — IPRATROPIUM BROMIDE AND ALBUTEROL SULFATE 2.5; .5 MG/3ML; MG/3ML
3 SOLUTION RESPIRATORY (INHALATION)
Status: DISCONTINUED | OUTPATIENT
Start: 2019-08-23 | End: 2019-08-26 | Stop reason: HOSPADM

## 2019-08-23 RX ORDER — ONDANSETRON 4 MG/1
4 TABLET, FILM COATED ORAL EVERY 6 HOURS PRN
Status: DISCONTINUED | OUTPATIENT
Start: 2019-08-23 | End: 2019-08-26 | Stop reason: HOSPADM

## 2019-08-23 RX ORDER — FUROSEMIDE 10 MG/ML
40 INJECTION INTRAMUSCULAR; INTRAVENOUS EVERY 8 HOURS SCHEDULED
Status: DISCONTINUED | OUTPATIENT
Start: 2019-08-23 | End: 2019-08-25

## 2019-08-23 RX ORDER — METHYLPREDNISOLONE SODIUM SUCCINATE 125 MG/2ML
125 INJECTION, POWDER, LYOPHILIZED, FOR SOLUTION INTRAMUSCULAR; INTRAVENOUS ONCE
Status: COMPLETED | OUTPATIENT
Start: 2019-08-23 | End: 2019-08-23

## 2019-08-23 RX ORDER — NICOTINE POLACRILEX 4 MG
15 LOZENGE BUCCAL
Status: DISCONTINUED | OUTPATIENT
Start: 2019-08-23 | End: 2019-08-26 | Stop reason: HOSPADM

## 2019-08-23 RX ADMIN — IPRATROPIUM BROMIDE AND ALBUTEROL SULFATE 3 ML: 2.5; .5 SOLUTION RESPIRATORY (INHALATION) at 16:04

## 2019-08-23 RX ADMIN — FUROSEMIDE 60 MG: 10 INJECTION INTRAMUSCULAR; INTRAVENOUS at 08:45

## 2019-08-23 RX ADMIN — FUROSEMIDE 40 MG: 10 INJECTION, SOLUTION INTRAMUSCULAR; INTRAVENOUS at 21:14

## 2019-08-23 RX ADMIN — SODIUM CHLORIDE, PRESERVATIVE FREE 3 ML: 5 INJECTION INTRAVENOUS at 20:01

## 2019-08-23 RX ADMIN — ACETAMINOPHEN 650 MG: 325 TABLET, FILM COATED ORAL at 16:44

## 2019-08-23 RX ADMIN — Medication 400 MG: at 15:16

## 2019-08-23 RX ADMIN — LISINOPRIL 10 MG: 10 TABLET ORAL at 15:17

## 2019-08-23 RX ADMIN — CARVEDILOL 25 MG: 25 TABLET, FILM COATED ORAL at 17:41

## 2019-08-23 RX ADMIN — ATORVASTATIN CALCIUM 40 MG: 40 TABLET, FILM COATED ORAL at 15:17

## 2019-08-23 RX ADMIN — LATANOPROST 1 DROP: 50 SOLUTION OPHTHALMIC at 20:02

## 2019-08-23 RX ADMIN — PREGABALIN 150 MG: 75 CAPSULE ORAL at 20:00

## 2019-08-23 RX ADMIN — METHYLPREDNISOLONE SODIUM SUCCINATE 125 MG: 125 INJECTION, POWDER, LYOPHILIZED, FOR SOLUTION INTRAMUSCULAR; INTRAVENOUS at 08:45

## 2019-08-23 RX ADMIN — INSULIN ASPART 6 UNITS: 100 INJECTION, SOLUTION INTRAVENOUS; SUBCUTANEOUS at 20:01

## 2019-08-23 RX ADMIN — ACETAMINOPHEN 650 MG: 325 TABLET, FILM COATED ORAL at 23:09

## 2019-08-23 RX ADMIN — FUROSEMIDE 60 MG: 10 INJECTION, SOLUTION INTRAMUSCULAR; INTRAVENOUS at 08:45

## 2019-08-23 RX ADMIN — DIGOXIN 125 MCG: 125 TABLET ORAL at 15:17

## 2019-08-23 RX ADMIN — FUROSEMIDE 40 MG: 10 INJECTION, SOLUTION INTRAMUSCULAR; INTRAVENOUS at 15:16

## 2019-08-23 RX ADMIN — NITROGLYCERIN 1 INCH: 20 OINTMENT TOPICAL at 10:29

## 2019-08-23 RX ADMIN — PIPERACILLIN SODIUM,TAZOBACTAM SODIUM 3.38 G: 3; .375 INJECTION, POWDER, FOR SOLUTION INTRAVENOUS at 10:30

## 2019-08-23 RX ADMIN — POTASSIUM CHLORIDE 10 MEQ: 750 CAPSULE, EXTENDED RELEASE ORAL at 17:41

## 2019-08-23 RX ADMIN — METHYLPREDNISOLONE SODIUM SUCCINATE 125 MG: 125 INJECTION, POWDER, FOR SOLUTION INTRAMUSCULAR; INTRAVENOUS at 08:45

## 2019-08-23 RX ADMIN — ASPIRIN 325 MG: 325 TABLET, COATED ORAL at 10:29

## 2019-08-23 RX ADMIN — ISOSORBIDE MONONITRATE 30 MG: 30 TABLET, EXTENDED RELEASE ORAL at 15:17

## 2019-08-23 RX ADMIN — MORPHINE SULFATE 4 MG: 4 INJECTION, SOLUTION INTRAMUSCULAR; INTRAVENOUS at 11:02

## 2019-08-23 RX ADMIN — CEFTRIAXONE SODIUM 1 G: 1 INJECTION, POWDER, FOR SOLUTION INTRAMUSCULAR; INTRAVENOUS at 15:17

## 2019-08-23 RX ADMIN — NITROGLYCERIN 10 MCG/MIN: 20 INJECTION INTRAVENOUS at 11:36

## 2019-08-24 LAB
ANION GAP SERPL CALCULATED.3IONS-SCNC: 17 MMOL/L (ref 5–15)
BUN BLD-MCNC: 22 MG/DL (ref 8–23)
BUN/CREAT SERPL: 17.9 (ref 7–25)
CALCIUM SPEC-SCNC: 9.2 MG/DL (ref 8.6–10.5)
CHLORIDE SERPL-SCNC: 98 MMOL/L (ref 98–107)
CO2 SERPL-SCNC: 23 MMOL/L (ref 22–29)
CREAT BLD-MCNC: 1.23 MG/DL (ref 0.57–1)
DEPRECATED RDW RBC AUTO: 46.3 FL (ref 37–54)
ERYTHROCYTE [DISTWIDTH] IN BLOOD BY AUTOMATED COUNT: 15.1 % (ref 12.3–15.4)
GFR SERPL CREATININE-BSD FRML MDRD: 52 ML/MIN/1.73
GLUCOSE BLD-MCNC: 262 MG/DL (ref 65–99)
GLUCOSE BLDC GLUCOMTR-MCNC: 249 MG/DL (ref 70–130)
GLUCOSE BLDC GLUCOMTR-MCNC: 262 MG/DL (ref 70–130)
GLUCOSE BLDC GLUCOMTR-MCNC: 346 MG/DL (ref 70–130)
GLUCOSE BLDC GLUCOMTR-MCNC: 368 MG/DL (ref 70–130)
HCT VFR BLD AUTO: 32.4 % (ref 34–46.6)
HGB BLD-MCNC: 10.6 G/DL (ref 12–15.9)
MCH RBC QN AUTO: 27 PG (ref 26.6–33)
MCHC RBC AUTO-ENTMCNC: 32.7 G/DL (ref 31.5–35.7)
MCV RBC AUTO: 82.4 FL (ref 79–97)
PLATELET # BLD AUTO: 330 10*3/MM3 (ref 140–450)
PMV BLD AUTO: 9.3 FL (ref 6–12)
POTASSIUM BLD-SCNC: 3.7 MMOL/L (ref 3.5–5.2)
RBC # BLD AUTO: 3.93 10*6/MM3 (ref 3.77–5.28)
SODIUM BLD-SCNC: 138 MMOL/L (ref 136–145)
TROPONIN T SERPL-MCNC: 0.04 NG/ML (ref 0–0.03)
WBC NRBC COR # BLD: 21.2 10*3/MM3 (ref 3.4–10.8)

## 2019-08-24 PROCEDURE — 84484 ASSAY OF TROPONIN QUANT: CPT | Performed by: FAMILY MEDICINE

## 2019-08-24 PROCEDURE — 85027 COMPLETE CBC AUTOMATED: CPT | Performed by: FAMILY MEDICINE

## 2019-08-24 PROCEDURE — 82962 GLUCOSE BLOOD TEST: CPT

## 2019-08-24 PROCEDURE — 25010000002 FUROSEMIDE PER 20 MG: Performed by: FAMILY MEDICINE

## 2019-08-24 PROCEDURE — 94799 UNLISTED PULMONARY SVC/PX: CPT

## 2019-08-24 PROCEDURE — 63710000001 INSULIN ASPART PER 5 UNITS: Performed by: FAMILY MEDICINE

## 2019-08-24 PROCEDURE — 25010000002 ENOXAPARIN PER 10 MG: Performed by: FAMILY MEDICINE

## 2019-08-24 PROCEDURE — 80048 BASIC METABOLIC PNL TOTAL CA: CPT | Performed by: FAMILY MEDICINE

## 2019-08-24 PROCEDURE — 25010000002 CEFTRIAXONE PER 250 MG: Performed by: FAMILY MEDICINE

## 2019-08-24 PROCEDURE — 94760 N-INVAS EAR/PLS OXIMETRY 1: CPT

## 2019-08-24 RX ORDER — OXYCODONE HYDROCHLORIDE AND ACETAMINOPHEN 5; 325 MG/1; MG/1
1 TABLET ORAL ONCE
Status: COMPLETED | OUTPATIENT
Start: 2019-08-24 | End: 2019-08-24

## 2019-08-24 RX ORDER — OXYCODONE HYDROCHLORIDE AND ACETAMINOPHEN 5; 325 MG/1; MG/1
1 TABLET ORAL EVERY 6 HOURS PRN
Status: DISCONTINUED | OUTPATIENT
Start: 2019-08-24 | End: 2019-08-26 | Stop reason: HOSPADM

## 2019-08-24 RX ADMIN — Medication 400 MG: at 08:24

## 2019-08-24 RX ADMIN — ASPIRIN 81 MG: 81 TABLET, COATED ORAL at 08:25

## 2019-08-24 RX ADMIN — LATANOPROST 1 DROP: 50 SOLUTION OPHTHALMIC at 21:52

## 2019-08-24 RX ADMIN — SODIUM CHLORIDE, PRESERVATIVE FREE 3 ML: 5 INJECTION INTRAVENOUS at 21:20

## 2019-08-24 RX ADMIN — ISOSORBIDE MONONITRATE 30 MG: 30 TABLET, EXTENDED RELEASE ORAL at 08:25

## 2019-08-24 RX ADMIN — PREGABALIN 150 MG: 75 CAPSULE ORAL at 08:24

## 2019-08-24 RX ADMIN — IPRATROPIUM BROMIDE AND ALBUTEROL SULFATE 3 ML: 2.5; .5 SOLUTION RESPIRATORY (INHALATION) at 15:06

## 2019-08-24 RX ADMIN — MELATONIN 6 MG: at 21:52

## 2019-08-24 RX ADMIN — ATORVASTATIN CALCIUM 40 MG: 40 TABLET, FILM COATED ORAL at 08:26

## 2019-08-24 RX ADMIN — OXYCODONE HYDROCHLORIDE AND ACETAMINOPHEN 1 TABLET: 5; 325 TABLET ORAL at 00:46

## 2019-08-24 RX ADMIN — IPRATROPIUM BROMIDE AND ALBUTEROL SULFATE 3 ML: 2.5; .5 SOLUTION RESPIRATORY (INHALATION) at 11:44

## 2019-08-24 RX ADMIN — IPRATROPIUM BROMIDE AND ALBUTEROL SULFATE 3 ML: 2.5; .5 SOLUTION RESPIRATORY (INHALATION) at 19:59

## 2019-08-24 RX ADMIN — OXYCODONE HYDROCHLORIDE AND ACETAMINOPHEN 1 TABLET: 5; 325 TABLET ORAL at 21:52

## 2019-08-24 RX ADMIN — INSULIN ASPART 5 UNITS: 100 INJECTION, SOLUTION INTRAVENOUS; SUBCUTANEOUS at 17:26

## 2019-08-24 RX ADMIN — IPRATROPIUM BROMIDE AND ALBUTEROL SULFATE 3 ML: 2.5; .5 SOLUTION RESPIRATORY (INHALATION) at 07:49

## 2019-08-24 RX ADMIN — CARVEDILOL 25 MG: 25 TABLET, FILM COATED ORAL at 08:25

## 2019-08-24 RX ADMIN — INSULIN ASPART 4 UNITS: 100 INJECTION, SOLUTION INTRAVENOUS; SUBCUTANEOUS at 21:19

## 2019-08-24 RX ADMIN — PANTOPRAZOLE SODIUM 40 MG: 40 TABLET, DELAYED RELEASE ORAL at 08:00

## 2019-08-24 RX ADMIN — FUROSEMIDE 40 MG: 10 INJECTION, SOLUTION INTRAMUSCULAR; INTRAVENOUS at 14:54

## 2019-08-24 RX ADMIN — PREGABALIN 150 MG: 75 CAPSULE ORAL at 21:19

## 2019-08-24 RX ADMIN — SODIUM CHLORIDE, PRESERVATIVE FREE 3 ML: 5 INJECTION INTRAVENOUS at 08:53

## 2019-08-24 RX ADMIN — POTASSIUM CHLORIDE 10 MEQ: 750 CAPSULE, EXTENDED RELEASE ORAL at 17:25

## 2019-08-24 RX ADMIN — FUROSEMIDE 40 MG: 10 INJECTION, SOLUTION INTRAMUSCULAR; INTRAVENOUS at 21:19

## 2019-08-24 RX ADMIN — INSULIN ASPART 3 UNITS: 100 INJECTION, SOLUTION INTRAVENOUS; SUBCUTANEOUS at 08:16

## 2019-08-24 RX ADMIN — LISINOPRIL 10 MG: 10 TABLET ORAL at 08:24

## 2019-08-24 RX ADMIN — DIGOXIN 125 MCG: 125 TABLET ORAL at 08:25

## 2019-08-24 RX ADMIN — CARVEDILOL 25 MG: 25 TABLET, FILM COATED ORAL at 17:25

## 2019-08-24 RX ADMIN — INSULIN ASPART 6 UNITS: 100 INJECTION, SOLUTION INTRAVENOUS; SUBCUTANEOUS at 12:25

## 2019-08-24 RX ADMIN — CEFTRIAXONE SODIUM 1 G: 1 INJECTION, POWDER, FOR SOLUTION INTRAMUSCULAR; INTRAVENOUS at 16:48

## 2019-08-24 RX ADMIN — SODIUM CHLORIDE, PRESERVATIVE FREE 3 ML: 5 INJECTION INTRAVENOUS at 08:52

## 2019-08-24 RX ADMIN — POTASSIUM CHLORIDE 10 MEQ: 750 CAPSULE, EXTENDED RELEASE ORAL at 08:25

## 2019-08-24 RX ADMIN — ACETAMINOPHEN 650 MG: 325 TABLET, FILM COATED ORAL at 09:02

## 2019-08-24 RX ADMIN — FUROSEMIDE 40 MG: 10 INJECTION, SOLUTION INTRAMUSCULAR; INTRAVENOUS at 05:25

## 2019-08-24 RX ADMIN — ENOXAPARIN SODIUM 40 MG: 40 INJECTION SUBCUTANEOUS at 17:30

## 2019-08-25 LAB
GLUCOSE BLDC GLUCOMTR-MCNC: 165 MG/DL (ref 70–130)
GLUCOSE BLDC GLUCOMTR-MCNC: 201 MG/DL (ref 70–130)
GLUCOSE BLDC GLUCOMTR-MCNC: 242 MG/DL (ref 70–130)
GLUCOSE BLDC GLUCOMTR-MCNC: 251 MG/DL (ref 70–130)

## 2019-08-25 PROCEDURE — 94799 UNLISTED PULMONARY SVC/PX: CPT

## 2019-08-25 PROCEDURE — 25010000002 CEFTRIAXONE PER 250 MG: Performed by: FAMILY MEDICINE

## 2019-08-25 PROCEDURE — 25010000002 FUROSEMIDE PER 20 MG: Performed by: FAMILY MEDICINE

## 2019-08-25 PROCEDURE — 94760 N-INVAS EAR/PLS OXIMETRY 1: CPT

## 2019-08-25 PROCEDURE — 25010000002 ENOXAPARIN PER 10 MG: Performed by: FAMILY MEDICINE

## 2019-08-25 PROCEDURE — 82962 GLUCOSE BLOOD TEST: CPT

## 2019-08-25 PROCEDURE — 63710000001 INSULIN ASPART PER 5 UNITS: Performed by: FAMILY MEDICINE

## 2019-08-25 RX ORDER — FUROSEMIDE 10 MG/ML
40 INJECTION INTRAMUSCULAR; INTRAVENOUS DAILY
Status: DISCONTINUED | OUTPATIENT
Start: 2019-08-26 | End: 2019-08-26 | Stop reason: HOSPADM

## 2019-08-25 RX ADMIN — CARVEDILOL 25 MG: 25 TABLET, FILM COATED ORAL at 08:29

## 2019-08-25 RX ADMIN — SODIUM CHLORIDE, PRESERVATIVE FREE 3 ML: 5 INJECTION INTRAVENOUS at 20:27

## 2019-08-25 RX ADMIN — PREGABALIN 150 MG: 75 CAPSULE ORAL at 08:29

## 2019-08-25 RX ADMIN — IPRATROPIUM BROMIDE AND ALBUTEROL SULFATE 3 ML: 2.5; .5 SOLUTION RESPIRATORY (INHALATION) at 21:05

## 2019-08-25 RX ADMIN — ENOXAPARIN SODIUM 40 MG: 40 INJECTION SUBCUTANEOUS at 16:26

## 2019-08-25 RX ADMIN — SODIUM CHLORIDE, PRESERVATIVE FREE 10 ML: 5 INJECTION INTRAVENOUS at 08:29

## 2019-08-25 RX ADMIN — LATANOPROST 1 DROP: 50 SOLUTION OPHTHALMIC at 20:27

## 2019-08-25 RX ADMIN — CEFTRIAXONE SODIUM 1 G: 1 INJECTION, POWDER, FOR SOLUTION INTRAMUSCULAR; INTRAVENOUS at 16:26

## 2019-08-25 RX ADMIN — ASPIRIN 81 MG: 81 TABLET, COATED ORAL at 08:29

## 2019-08-25 RX ADMIN — PREGABALIN 150 MG: 75 CAPSULE ORAL at 20:27

## 2019-08-25 RX ADMIN — INSULIN ASPART 3 UNITS: 100 INJECTION, SOLUTION INTRAVENOUS; SUBCUTANEOUS at 20:27

## 2019-08-25 RX ADMIN — DIGOXIN 125 MCG: 125 TABLET ORAL at 08:29

## 2019-08-25 RX ADMIN — IPRATROPIUM BROMIDE AND ALBUTEROL SULFATE 3 ML: 2.5; .5 SOLUTION RESPIRATORY (INHALATION) at 08:11

## 2019-08-25 RX ADMIN — IPRATROPIUM BROMIDE AND ALBUTEROL SULFATE 3 ML: 2.5; .5 SOLUTION RESPIRATORY (INHALATION) at 16:06

## 2019-08-25 RX ADMIN — BISACODYL 5 MG: 5 TABLET, COATED ORAL at 11:04

## 2019-08-25 RX ADMIN — ISOSORBIDE MONONITRATE 30 MG: 30 TABLET, EXTENDED RELEASE ORAL at 08:29

## 2019-08-25 RX ADMIN — OXYCODONE HYDROCHLORIDE AND ACETAMINOPHEN 1 TABLET: 5; 325 TABLET ORAL at 08:41

## 2019-08-25 RX ADMIN — LISINOPRIL 10 MG: 10 TABLET ORAL at 08:29

## 2019-08-25 RX ADMIN — INSULIN ASPART 4 UNITS: 100 INJECTION, SOLUTION INTRAVENOUS; SUBCUTANEOUS at 11:04

## 2019-08-25 RX ADMIN — INSULIN ASPART 3 UNITS: 100 INJECTION, SOLUTION INTRAVENOUS; SUBCUTANEOUS at 17:14

## 2019-08-25 RX ADMIN — OXYCODONE HYDROCHLORIDE AND ACETAMINOPHEN 1 TABLET: 5; 325 TABLET ORAL at 18:14

## 2019-08-25 RX ADMIN — SODIUM CHLORIDE, PRESERVATIVE FREE 10 ML: 5 INJECTION INTRAVENOUS at 08:31

## 2019-08-25 RX ADMIN — CARVEDILOL 25 MG: 25 TABLET, FILM COATED ORAL at 17:14

## 2019-08-25 RX ADMIN — POTASSIUM CHLORIDE 10 MEQ: 750 CAPSULE, EXTENDED RELEASE ORAL at 17:14

## 2019-08-25 RX ADMIN — PANTOPRAZOLE SODIUM 40 MG: 40 TABLET, DELAYED RELEASE ORAL at 05:51

## 2019-08-25 RX ADMIN — SODIUM CHLORIDE, PRESERVATIVE FREE 3 ML: 5 INJECTION INTRAVENOUS at 20:28

## 2019-08-25 RX ADMIN — ATORVASTATIN CALCIUM 40 MG: 40 TABLET, FILM COATED ORAL at 08:29

## 2019-08-25 RX ADMIN — POTASSIUM CHLORIDE 10 MEQ: 750 CAPSULE, EXTENDED RELEASE ORAL at 08:29

## 2019-08-25 RX ADMIN — MELATONIN 6 MG: at 23:44

## 2019-08-25 RX ADMIN — OXYCODONE HYDROCHLORIDE AND ACETAMINOPHEN 1 TABLET: 5; 325 TABLET ORAL at 23:44

## 2019-08-25 RX ADMIN — FUROSEMIDE 40 MG: 10 INJECTION, SOLUTION INTRAMUSCULAR; INTRAVENOUS at 05:51

## 2019-08-25 RX ADMIN — INSULIN ASPART 2 UNITS: 100 INJECTION, SOLUTION INTRAVENOUS; SUBCUTANEOUS at 08:32

## 2019-08-25 RX ADMIN — Medication 400 MG: at 08:38

## 2019-08-26 ENCOUNTER — APPOINTMENT (OUTPATIENT)
Dept: CT IMAGING | Facility: HOSPITAL | Age: 69
End: 2019-08-26

## 2019-08-26 VITALS
WEIGHT: 221.3 LBS | TEMPERATURE: 97.2 F | OXYGEN SATURATION: 94 % | SYSTOLIC BLOOD PRESSURE: 129 MMHG | BODY MASS INDEX: 40.72 KG/M2 | HEIGHT: 62 IN | RESPIRATION RATE: 18 BRPM | DIASTOLIC BLOOD PRESSURE: 73 MMHG | HEART RATE: 67 BPM

## 2019-08-26 LAB
ANION GAP SERPL CALCULATED.3IONS-SCNC: 13 MMOL/L (ref 5–15)
BASOPHILS # BLD AUTO: 0.05 10*3/MM3 (ref 0–0.2)
BASOPHILS NFR BLD AUTO: 0.3 % (ref 0–1.5)
BUN BLD-MCNC: 22 MG/DL (ref 8–23)
BUN/CREAT SERPL: 22.9 (ref 7–25)
CALCIUM SPEC-SCNC: 9.7 MG/DL (ref 8.6–10.5)
CHLORIDE SERPL-SCNC: 98 MMOL/L (ref 98–107)
CO2 SERPL-SCNC: 26 MMOL/L (ref 22–29)
CREAT BLD-MCNC: 0.96 MG/DL (ref 0.57–1)
DEPRECATED RDW RBC AUTO: 47.9 FL (ref 37–54)
EOSINOPHIL # BLD AUTO: 0.18 10*3/MM3 (ref 0–0.4)
EOSINOPHIL NFR BLD AUTO: 1.1 % (ref 0.3–6.2)
ERYTHROCYTE [DISTWIDTH] IN BLOOD BY AUTOMATED COUNT: 15.5 % (ref 12.3–15.4)
GFR SERPL CREATININE-BSD FRML MDRD: 70 ML/MIN/1.73
GLUCOSE BLD-MCNC: 289 MG/DL (ref 65–99)
GLUCOSE BLDC GLUCOMTR-MCNC: 186 MG/DL (ref 70–130)
GLUCOSE BLDC GLUCOMTR-MCNC: 248 MG/DL (ref 70–130)
HCT VFR BLD AUTO: 36.2 % (ref 34–46.6)
HGB BLD-MCNC: 11.4 G/DL (ref 12–15.9)
IMM GRANULOCYTES # BLD AUTO: 0.07 10*3/MM3 (ref 0–0.05)
IMM GRANULOCYTES NFR BLD AUTO: 0.4 % (ref 0–0.5)
LYMPHOCYTES # BLD AUTO: 5.08 10*3/MM3 (ref 0.7–3.1)
LYMPHOCYTES NFR BLD AUTO: 30.9 % (ref 19.6–45.3)
MCH RBC QN AUTO: 26.9 PG (ref 26.6–33)
MCHC RBC AUTO-ENTMCNC: 31.5 G/DL (ref 31.5–35.7)
MCV RBC AUTO: 85.4 FL (ref 79–97)
MONOCYTES # BLD AUTO: 1.34 10*3/MM3 (ref 0.1–0.9)
MONOCYTES NFR BLD AUTO: 8.1 % (ref 5–12)
NEUTROPHILS # BLD AUTO: 9.74 10*3/MM3 (ref 1.7–7)
NEUTROPHILS NFR BLD AUTO: 59.2 % (ref 42.7–76)
NRBC BLD AUTO-RTO: 0 /100 WBC (ref 0–0.2)
PLATELET # BLD AUTO: 366 10*3/MM3 (ref 140–450)
PMV BLD AUTO: 9.5 FL (ref 6–12)
POTASSIUM BLD-SCNC: 4.1 MMOL/L (ref 3.5–5.2)
RBC # BLD AUTO: 4.24 10*6/MM3 (ref 3.77–5.28)
SODIUM BLD-SCNC: 137 MMOL/L (ref 136–145)
WBC NRBC COR # BLD: 16.46 10*3/MM3 (ref 3.4–10.8)

## 2019-08-26 PROCEDURE — 82962 GLUCOSE BLOOD TEST: CPT

## 2019-08-26 PROCEDURE — 63710000001 INSULIN ASPART PER 5 UNITS: Performed by: FAMILY MEDICINE

## 2019-08-26 PROCEDURE — 94760 N-INVAS EAR/PLS OXIMETRY 1: CPT

## 2019-08-26 PROCEDURE — 70450 CT HEAD/BRAIN W/O DYE: CPT

## 2019-08-26 PROCEDURE — 80048 BASIC METABOLIC PNL TOTAL CA: CPT | Performed by: HOSPITALIST

## 2019-08-26 PROCEDURE — 94799 UNLISTED PULMONARY SVC/PX: CPT

## 2019-08-26 PROCEDURE — 85025 COMPLETE CBC W/AUTO DIFF WBC: CPT | Performed by: HOSPITALIST

## 2019-08-26 PROCEDURE — 25010000002 FUROSEMIDE PER 20 MG: Performed by: FAMILY MEDICINE

## 2019-08-26 RX ORDER — LACTOBACILLUS RHAMNOSUS GG 10B CELL
1 CAPSULE ORAL 2 TIMES DAILY
Qty: 14 CAPSULE | Refills: 0 | Status: SHIPPED | OUTPATIENT
Start: 2019-08-26 | End: 2019-09-02

## 2019-08-26 RX ORDER — GLIPIZIDE 5 MG/1
5 TABLET ORAL
Qty: 60 TABLET | Refills: 0 | Status: SHIPPED | OUTPATIENT
Start: 2019-08-26 | End: 2019-10-09 | Stop reason: SDUPTHER

## 2019-08-26 RX ORDER — CEFDINIR 300 MG/1
300 CAPSULE ORAL 2 TIMES DAILY
Qty: 8 CAPSULE | Refills: 0 | Status: SHIPPED | OUTPATIENT
Start: 2019-08-26 | End: 2019-08-30

## 2019-08-26 RX ADMIN — IPRATROPIUM BROMIDE AND ALBUTEROL SULFATE 3 ML: 2.5; .5 SOLUTION RESPIRATORY (INHALATION) at 11:19

## 2019-08-26 RX ADMIN — CARVEDILOL 25 MG: 25 TABLET, FILM COATED ORAL at 09:06

## 2019-08-26 RX ADMIN — SODIUM CHLORIDE, PRESERVATIVE FREE 3 ML: 5 INJECTION INTRAVENOUS at 09:08

## 2019-08-26 RX ADMIN — INSULIN ASPART 2 UNITS: 100 INJECTION, SOLUTION INTRAVENOUS; SUBCUTANEOUS at 09:09

## 2019-08-26 RX ADMIN — PREGABALIN 150 MG: 75 CAPSULE ORAL at 09:06

## 2019-08-26 RX ADMIN — FUROSEMIDE 40 MG: 10 INJECTION, SOLUTION INTRAMUSCULAR; INTRAVENOUS at 09:05

## 2019-08-26 RX ADMIN — DIGOXIN 125 MCG: 125 TABLET ORAL at 09:08

## 2019-08-26 RX ADMIN — POTASSIUM CHLORIDE 10 MEQ: 750 CAPSULE, EXTENDED RELEASE ORAL at 09:06

## 2019-08-26 RX ADMIN — Medication 400 MG: at 09:06

## 2019-08-26 RX ADMIN — ISOSORBIDE MONONITRATE 30 MG: 30 TABLET, EXTENDED RELEASE ORAL at 09:06

## 2019-08-26 RX ADMIN — OXYCODONE HYDROCHLORIDE AND ACETAMINOPHEN 1 TABLET: 5; 325 TABLET ORAL at 09:05

## 2019-08-26 RX ADMIN — INSULIN ASPART 3 UNITS: 100 INJECTION, SOLUTION INTRAVENOUS; SUBCUTANEOUS at 11:16

## 2019-08-26 RX ADMIN — LISINOPRIL 10 MG: 10 TABLET ORAL at 09:06

## 2019-08-26 RX ADMIN — ATORVASTATIN CALCIUM 40 MG: 40 TABLET, FILM COATED ORAL at 09:06

## 2019-08-26 RX ADMIN — IPRATROPIUM BROMIDE AND ALBUTEROL SULFATE 3 ML: 2.5; .5 SOLUTION RESPIRATORY (INHALATION) at 08:17

## 2019-08-26 RX ADMIN — ASPIRIN 81 MG: 81 TABLET, COATED ORAL at 09:06

## 2019-08-26 RX ADMIN — PANTOPRAZOLE SODIUM 40 MG: 40 TABLET, DELAYED RELEASE ORAL at 06:03

## 2019-08-27 ENCOUNTER — EPISODE CHANGES (OUTPATIENT)
Dept: CASE MANAGEMENT | Facility: OTHER | Age: 69
End: 2019-08-27

## 2019-08-27 ENCOUNTER — READMISSION MANAGEMENT (OUTPATIENT)
Dept: CALL CENTER | Facility: HOSPITAL | Age: 69
End: 2019-08-27

## 2019-08-27 NOTE — OUTREACH NOTE
Prep Survey      Responses   Facility patient discharged from?  [place]   Is patient eligible?  Yes   Discharge diagnosis  CHF   Does the patient have one of the following disease processes/diagnoses(primary or secondary)?  CHF   Does the patient have Home health ordered?  Yes   What is the Home health agency?   pending PT/OT recommendations   Is there a DME ordered?  No   What DME was ordered?  pending PT/OT recommendations   Comments regarding appointments  see AVD   Prep survey completed?  Yes          Gisselle Thorpe RN

## 2019-08-28 ENCOUNTER — DOCUMENTATION (OUTPATIENT)
Dept: CARDIAC REHAB | Facility: HOSPITAL | Age: 69
End: 2019-08-28

## 2019-08-28 ENCOUNTER — READMISSION MANAGEMENT (OUTPATIENT)
Dept: CALL CENTER | Facility: HOSPITAL | Age: 69
End: 2019-08-28

## 2019-08-28 LAB
BACTERIA SPEC AEROBE CULT: NORMAL

## 2019-08-28 NOTE — OUTREACH NOTE
CHF Week 1 Survey      Responses   Facility patient discharged from?  [place]   Does the patient have one of the following disease processes/diagnoses(primary or secondary)?  CHF   Is there a successful TCM telephone encounter documented?  No   CHF Week 1 attempt successful?  Yes   Call start time  1053   Call end time  1058   Discharge diagnosis  CHF   Is patient permission given to speak with other caregiver?  Yes   List who call center can speak with  sister   Meds reviewed with patient/caregiver?  Yes   Is the patient having any side effects they believe may be caused by any medication additions or changes?  No   Does the patient have all medications ordered at discharge?  Yes   Is the patient taking all medications as directed (includes completed medication regime)?  Yes   Does the patient have a primary care provider?   Yes   Does the patient have an appointment with their PCP within 7 days of discharge?  Yes   Has the patient kept scheduled appointments due by today?  N/A   What is the Home health agency?   Pt did not want PT, per her report.    Psychosocial issues?  No   Comments  GLucose was 251 this morning.    Did the patient receive a copy of their discharge instructions?  Yes   Nursing interventions  Reviewed instructions with patient   What is the patient's perception of their health status since discharge?  Improving   Nursing interventions  Nurse provided patient education   Is the patient weighing daily?  No   Does the patient have scales?  Yes   Daily weight interventions  Education provided on importance of daily weight   Is the patient able to teach back Heart Failure diet management?  Yes   Is the patient able to teach back Heart Failure Zones?  Yes   Is the patient able to teach back signs and symptoms of worsening condition? (i.e. weight gain, shortness of air, etc.)  Yes   Is the patient/caregiver able to teach back the hierarchy of who to call/visit for symptoms/problems? PCP,  Specialist, Home health nurse, Urgent Care, ED, 911  Yes    CHF Week 1 call completed?  Yes          Taryn Priest RN

## 2019-08-30 ENCOUNTER — DOCUMENTATION (OUTPATIENT)
Dept: CARDIAC REHAB | Facility: HOSPITAL | Age: 69
End: 2019-08-30

## 2019-08-30 ENCOUNTER — TRANSCRIBE ORDERS (OUTPATIENT)
Dept: CARDIAC REHAB | Facility: HOSPITAL | Age: 69
End: 2019-08-30

## 2019-08-30 DIAGNOSIS — I50.22 CHRONIC SYSTOLIC HEART FAILURE (HCC): Primary | ICD-10-CM

## 2019-09-04 ENCOUNTER — OFFICE VISIT (OUTPATIENT)
Dept: FAMILY MEDICINE CLINIC | Facility: CLINIC | Age: 69
End: 2019-09-04

## 2019-09-04 VITALS
SYSTOLIC BLOOD PRESSURE: 125 MMHG | WEIGHT: 206 LBS | OXYGEN SATURATION: 99 % | DIASTOLIC BLOOD PRESSURE: 62 MMHG | HEIGHT: 62 IN | BODY MASS INDEX: 37.91 KG/M2 | HEART RATE: 91 BPM

## 2019-09-04 DIAGNOSIS — J96.01 ACUTE RESPIRATORY FAILURE WITH HYPOXIA (HCC): Primary | ICD-10-CM

## 2019-09-04 DIAGNOSIS — I42.8 NONISCHEMIC CARDIOMYOPATHY (HCC): Chronic | ICD-10-CM

## 2019-09-04 DIAGNOSIS — M79.605 LEFT LEG PAIN: ICD-10-CM

## 2019-09-04 PROCEDURE — 99214 OFFICE O/P EST MOD 30 MIN: CPT | Performed by: GENERAL PRACTICE

## 2019-09-04 NOTE — PROGRESS NOTES
"Subjective   Lexivan Wade is a 68 y.o. female.   Chief Complaint   Patient presents with   • Breathing Problem     History of Present Illness     Recent hospitalization, labs, xrays reviewed and medications reconciled. Also had a swelling of her left upper leg, thought it might me a bug bite before she went in the hospital.     Hospital Course:  The patient is a 68 y.o. female who presented to [facility] with the following:     From H&P:\"ms. Wade is a 69 yo female who presents to the ER today with a 2 day history of SOA, orthopnea, increased LE edema and occasional cough.  She denies any sputum, fevers, chills, sick contacts, or recent hospitalizations.  \"     Hospital course: Patient was admitted for acute hypoxic respiratory failure, CHF exacerbation was on BiPAP briefly now stable on room air.  Symptoms complicated by worsening heart failure.  Patient was diuresed and her white blood count is getting better on antibiotics.  We will discharge the patient on antibiotics.  Patient also had a fall while in the hospital and a CT of head was negative, patient asymptomatic.  Patient was discharged home in stable condition.    The following portions of the patient's history were reviewed and updated as appropriate: allergies, current medications, past social history and problem list.    Outpatient Medications Prior to Visit   Medication Sig Dispense Refill   • aspirin 81 MG tablet Take 81 mg by mouth every night.     • atorvastatin (LIPITOR) 40 MG tablet Take 1 tablet by mouth Daily. 90 tablet 3   • carvedilol (COREG) 25 MG tablet TAKE 1 TABLET BY MOUTH 2 (TWO) TIMES A DAY WITH MEALS. 180 tablet 3   • digoxin (LANOXIN) 125 MCG tablet TAKE 1 TABLET BY MOUTH DAILY 90 tablet 3   • esomeprazole (nexIUM) 40 MG capsule TAKE 1 CAPSULE BY MOUTH EVERY DAY 90 capsule 2   • ezetimibe (ZETIA) 10 MG tablet TAKE 1 TABELT BY MOUTH DAILY 90 tablet 2   • furosemide (LASIX) 80 MG tablet TAKE 1 TABLET BY MOUTH " DAILY. 90 tablet 3   • glipiZIDE (GLUCOTROL) 5 MG tablet Take 1 tablet by mouth 2 (Two) Times a Day Before Meals for 30 days. 60 tablet 0   • glucose blood (TRUE METRIX BLOOD GLUCOSE TEST) test strip Check BS daily E11.8 100 each 3   • Insulin Pen Needle (B-D UF III MINI PEN NEEDLES) 31G X 5 MM misc Use with Insulin Injections Nightly 100 each 3   • ipratropium-albuterol (DUO-NEB) 0.5-2.5 mg/3 ml nebulizer Take 3 mL by nebulization 4 (Four) Times a Day. 360 mL 12   • isosorbide mononitrate (IMDUR) 30 MG 24 hr tablet Take 1 tablet by mouth Daily. 90 tablet 3   • latanoprost (XALATAN) 0.005 % ophthalmic solution Administer 1 drop to both eyes Every Night. 90 day supply. 2.5 mL 5   • lisinopril (PRINIVIL,ZESTRIL) 10 MG tablet Take 1 tablet by mouth Daily. 90 tablet 3   • loperamide (IMODIUM) 2 MG capsule Take 1 capsule by mouth 4 (Four) Times a Day As Needed for Diarrhea. 24 capsule 0   • LYRICA 150 MG capsule TAKE ONE CAPSULE BY MOUTH TWICE A  capsule 0   • magnesium oxide (MAGOX) 400 (241.3 Mg) MG tablet tablet Take 1 tablet by mouth Daily. 90 each 3   • metFORMIN (GLUCOPHAGE) 1000 MG tablet TAKE 1 TABLET BY MOUTH 2 (TWO) TIMES A DAY WITH MEALS. 180 tablet 3   • nitroglycerin (NITROSTAT) 0.4 MG SL tablet Place 1 tablet under the tongue Every 5 (Five) Minutes As Needed for Chest Pain. Take no more than 3 doses in 15 minutes. 25 tablet 0   • omega-3 acid ethyl esters (LOVAZA) 1 G capsule Take 1 g by mouth 2 (Two) Times a Day. 2 capsules bid     • ondansetron ODT (ZOFRAN-ODT) 4 MG disintegrating tablet Take 1 tablet by mouth Every 6 (Six) Hours As Needed for Nausea or Vomiting. 15 tablet 0   • potassium chloride (MICRO-K) 10 MEQ CR capsule TAKE 1 CAPSULE BY MOUTH 2 (TWO) TIMES A DAY. 180 capsule 3   • VICTOZA 18 MG/3ML solution pen-injector injection INJECT 1.2 MG UNDER THE SKIN INTO THE APPROPRIATE AREA AS DIRECTED EVERY NIGHT. 6 pen 5   • vitamin D (ERGOCALCIFEROL) 59770 units capsule capsule Take 1 capsule by  "mouth 1 (One) Time Per Week. (Patient taking differently: Take 50,000 Units by mouth 1 (One) Time Per Week. Take 1 capsule by mouth weekly on Tuesdays) 12 capsule 3     No facility-administered medications prior to visit.        Review of Systems   Constitutional: Negative.  Negative for chills, fatigue, fever and unexpected weight change.   HENT: Negative.  Negative for congestion, ear pain, hearing loss, nosebleeds, rhinorrhea, sneezing, sore throat and tinnitus.    Eyes: Negative.  Negative for discharge.   Respiratory: Negative.  Negative for cough, shortness of breath and wheezing.    Cardiovascular: Negative.  Negative for chest pain and palpitations.   Gastrointestinal: Negative.  Negative for abdominal pain, constipation, diarrhea, nausea and vomiting.   Endocrine: Negative.    Genitourinary: Negative.  Negative for dysuria, frequency and urgency.   Musculoskeletal: Positive for myalgias. Negative for arthralgias, back pain, joint swelling and neck pain.   Skin: Negative.  Negative for rash.   Allergic/Immunologic: Negative.    Neurological: Negative.  Negative for dizziness, weakness, numbness and headaches.   Hematological: Negative.  Negative for adenopathy.   Psychiatric/Behavioral: Negative.  Negative for dysphoric mood and sleep disturbance. The patient is not nervous/anxious.      Objective   Visit Vitals  /62 (BP Location: Left arm)   Pulse 91   Ht 157.5 cm (62\")   Wt 93.4 kg (206 lb)   SpO2 99%   BMI 37.68 kg/m²     Physical Exam   Constitutional: She is oriented to person, place, and time. She appears well-developed and well-nourished. No distress.   HENT:   Head: Normocephalic and atraumatic.   Nose: Nose normal.   Mouth/Throat: Oropharynx is clear and moist.   Eyes: Conjunctivae and EOM are normal. Pupils are equal, round, and reactive to light. Right eye exhibits no discharge. Left eye exhibits no discharge.   Neck: No thyromegaly present.   Cardiovascular: Normal rate, regular rhythm, " normal heart sounds and intact distal pulses.   Pulmonary/Chest: Effort normal and breath sounds normal.   Musculoskeletal:        Legs:  Lymphadenopathy:     She has no cervical adenopathy.   Neurological: She is alert and oriented to person, place, and time.   Skin: Skin is warm and dry.   Psychiatric: She has a normal mood and affect.   Nursing note and vitals reviewed.    Assessment/Plan   Problem List Items Addressed This Visit        Cardiovascular and Mediastinum    Nonischemic cardiomyopathy (CMS/HCC) (Chronic)       Respiratory    Acute respiratory failure with hypoxia (CMS/HCC) - Primary      Other Visit Diagnoses     Left leg pain        Relevant Medications    diclofenac (VOLTAREN) 1 % gel gel          Continue current treatment. Restart Lyrica as does not look like she is taking it. Follow up as scheduled. Recheck if not improving. Current outpatient and discharge medications have been reconciled for the patient.  Reviewed by: Joyce Plata MD     New Medications Ordered This Visit   Medications   • diclofenac (VOLTAREN) 1 % gel gel     Sig: Apply to affected area qid     Dispense:  100 g     Refill:  0     Return if symptoms worsen or fail to improve, for Next scheduled follow up.

## 2019-09-05 ENCOUNTER — READMISSION MANAGEMENT (OUTPATIENT)
Dept: CALL CENTER | Facility: HOSPITAL | Age: 69
End: 2019-09-05

## 2019-09-05 NOTE — OUTREACH NOTE
CHF Week 2 Survey      Responses   Facility patient discharged from?  [place]   Does the patient have one of the following disease processes/diagnoses(primary or secondary)?  CHF   Week 2 attempt successful?  Yes   Call start time  1741   Call end time  1743   Discharge diagnosis  CHF   Meds reviewed with patient/caregiver?  Yes   Is the patient having any side effects they believe may be caused by any medication additions or changes?  No   Does the patient have all medications ordered at discharge?  Yes   Is the patient taking all medications as directed (includes completed medication regime)?  Yes   Does the patient have a primary care provider?   Yes   Does the patient have an appointment with their PCP within 7 days of discharge?  Yes   Has the patient kept scheduled appointments due by today?  Yes   What is the Home health agency?   Pt did not want PT, per her report.    Has home health visited the patient within 72 hours of discharge?  Yes   What DME was ordered?  pending PT/OT recommendations   Psychosocial issues?  No   Did the patient receive a copy of their discharge instructions?  Yes   Nursing interventions  Reviewed instructions with patient   What is the patient's perception of their health status since discharge?  Improving   Nursing interventions  Nurse provided patient education   Is the patient weighing daily?  No   Does the patient have scales?  Yes   Daily weight interventions  Education provided on importance of daily weight   Is the patient able to teach back Heart Failure diet management?  Yes   Is the patient able to teach back Heart Failure Zones?  Yes   Is the patient able to teach back signs and symptoms of worsening condition? (i.e. weight gain, shortness of air, etc.)  Yes   Is the patient/caregiver able to teach back the hierarchy of who to call/visit for symptoms/problems? PCP, Specialist, Home health nurse, Urgent Care, ED, 911  Yes   CHF Week 2 call completed?  Yes           Chuy King RN

## 2019-09-05 NOTE — TELEPHONE ENCOUNTER
Dr. Plata,    [facility] is requesting a refill for Ms. Lexi Wade on her Lyrica 150 mg #180 capsules Take 1 capsule BID    Last OV    Yesterday 09/04/19    Next Andres OV    10/11/2019    Last Script Written  12/04/18  #180 with No refills      Last Alex     10/08/18    Please advise on refill    Thank you

## 2019-09-12 ENCOUNTER — READMISSION MANAGEMENT (OUTPATIENT)
Dept: CALL CENTER | Facility: HOSPITAL | Age: 69
End: 2019-09-12

## 2019-09-12 NOTE — OUTREACH NOTE
CHF Week 3 Survey      Responses   Facility patient discharged from?  [facility]   Does the patient have one of the following disease processes/diagnoses(primary or secondary)?  CHF   Week 3 attempt successful?  Yes   Call start time  1619   Call end time  1625   Discharge diagnosis  CHF   Meds reviewed with patient/caregiver?  Yes   Is the patient having any side effects they believe may be caused by any medication additions or changes?  No   Does the patient have all medications ordered at discharge?  Yes   Is the patient taking all medications as directed (includes completed medication regime)?  Yes   Does the patient have a primary care provider?   Yes   Does the patient have an appointment with their PCP within 7 days of discharge?  Yes   Has the patient kept scheduled appointments due by today?  Yes   What is the Home health agency?   Pt did not want PT, per her report.    Has home health visited the patient within 72 hours of discharge?  N/A   What DME was ordered?  pending PT/OT recommendations   Psychosocial issues?  No   Did the patient receive a copy of their discharge instructions?  Yes   Nursing interventions  Reviewed instructions with patient   What is the patient's perception of their health status since discharge?  Improving   Nursing interventions  Nurse provided patient education   Is the patient weighing daily?  No   Does the patient have scales?  No [Scales are broken- patient will buy new scales. ]   Daily weight interventions  Education provided on importance of daily weight   Is the patient able to teach back Heart Failure diet management?  Yes   Is the patient able to teach back Heart Failure Zones?  Yes   Is the patient able to teach back signs and symptoms of worsening condition? (i.e. weight gain, shortness of air, etc.)  Yes   Is the patient/caregiver able to teach back the hierarchy of who to call/visit for symptoms/problems? PCP, Specialist, Home health nurse, Urgent Care, ED, 911   Yes   Additional teach back comments  Advised patient if weight gain of 3 lbs overnight or 5 lbs in a week to call MD. Patient verbalized understanding.   CHF Week 3 call completed?  Yes          Chuy King RN

## 2019-09-20 ENCOUNTER — READMISSION MANAGEMENT (OUTPATIENT)
Dept: CALL CENTER | Facility: HOSPITAL | Age: 69
End: 2019-09-20

## 2019-09-20 NOTE — OUTREACH NOTE
CHF Week 4 Survey      Responses   Facility patient discharged from?  [place]   Does the patient have one of the following disease processes/diagnoses(primary or secondary)?  CHF   Week 4 attempt successful?  Yes   Call start time  0928   Call end time  0932   Discharge diagnosis  CHF   Is patient permission given to speak with other caregiver?  Yes   List who call center can speak with  Yudi herrera   Person spoke with today (if not patient) and relationship  Yudi herrera   Meds reviewed with patient/caregiver?  No   Is the patient taking all medications as directed (includes completed medication regime)?  Yes [Sister does not know of any medication issues. ]   Has the patient kept scheduled appointments due by today?  Yes   Is the patient still receiving Home Health Services?  N/A   Psychosocial issues?  No   What is the patient's perception of their health status since discharge?  Improving   Nursing interventions  Nurse provided patient education   Is the patient able to teach back signs and symptoms of worsening condition? (i.e. weight gain, shortness of air, etc.)  Yes   Is the patient/caregiver able to teach back the hierarchy of who to call/visit for symptoms/problems? PCP, Specialist, Home health nurse, Urgent Care, ED, 911  Yes   Week 4 Call Completed?  Yes   Would the patient like one additional call?  No   Graduated  Yes   Wrap up additional comments  Sister did not have a lot of patient information today except that when she talked to her this week that she was doing well.           Lorrie Sanchez RN

## 2019-09-23 ENCOUNTER — EPISODE CHANGES (OUTPATIENT)
Dept: CASE MANAGEMENT | Facility: OTHER | Age: 69
End: 2019-09-23

## 2019-09-25 DIAGNOSIS — M79.605 LEFT LEG PAIN: ICD-10-CM

## 2019-09-30 ENCOUNTER — TELEPHONE (OUTPATIENT)
Dept: FAMILY MEDICINE CLINIC | Facility: CLINIC | Age: 69
End: 2019-09-30

## 2019-09-30 DIAGNOSIS — M79.605 LEFT LEG PAIN: ICD-10-CM

## 2019-09-30 NOTE — TELEPHONE ENCOUNTER
[facility] called and said they need some instruction on script for Diclofenac gel, needs a call back [phone]

## 2019-10-03 ENCOUNTER — TELEPHONE (OUTPATIENT)
Dept: FAMILY MEDICINE CLINIC | Facility: CLINIC | Age: 69
End: 2019-10-03

## 2019-10-04 ENCOUNTER — LAB (OUTPATIENT)
Dept: LAB | Facility: HOSPITAL | Age: 69
End: 2019-10-04

## 2019-10-04 DIAGNOSIS — E78.2 MIXED HYPERLIPIDEMIA: Chronic | ICD-10-CM

## 2019-10-04 DIAGNOSIS — I10 ESSENTIAL HYPERTENSION: ICD-10-CM

## 2019-10-04 DIAGNOSIS — E78.2 MIXED HYPERLIPIDEMIA: ICD-10-CM

## 2019-10-04 DIAGNOSIS — E11.8 TYPE 2 DIABETES MELLITUS WITH COMPLICATION, WITHOUT LONG-TERM CURRENT USE OF INSULIN (HCC): Chronic | ICD-10-CM

## 2019-10-04 DIAGNOSIS — E11.8 TYPE 2 DIABETES MELLITUS WITH COMPLICATION, WITHOUT LONG-TERM CURRENT USE OF INSULIN (HCC): ICD-10-CM

## 2019-10-04 DIAGNOSIS — I10 ESSENTIAL HYPERTENSION: Primary | Chronic | ICD-10-CM

## 2019-10-04 PROCEDURE — 85025 COMPLETE CBC W/AUTO DIFF WBC: CPT

## 2019-10-04 PROCEDURE — 80053 COMPREHEN METABOLIC PANEL: CPT

## 2019-10-04 PROCEDURE — 36415 COLL VENOUS BLD VENIPUNCTURE: CPT

## 2019-10-04 PROCEDURE — 80061 LIPID PANEL: CPT

## 2019-10-04 PROCEDURE — 83036 HEMOGLOBIN GLYCOSYLATED A1C: CPT

## 2019-10-04 PROCEDURE — 81003 URINALYSIS AUTO W/O SCOPE: CPT

## 2019-10-04 PROCEDURE — 82043 UR ALBUMIN QUANTITATIVE: CPT

## 2019-10-05 LAB
ALBUMIN SERPL-MCNC: 4.6 G/DL (ref 3.5–5.2)
ALBUMIN UR-MCNC: <1.2 MG/DL
ALBUMIN/GLOB SERPL: 1.4 G/DL
ALP SERPL-CCNC: 116 U/L (ref 39–117)
ALT SERPL W P-5'-P-CCNC: 17 U/L (ref 1–33)
ANION GAP SERPL CALCULATED.3IONS-SCNC: 14.8 MMOL/L (ref 5–15)
AST SERPL-CCNC: 19 U/L (ref 1–32)
BASOPHILS # BLD AUTO: 0.05 10*3/MM3 (ref 0–0.2)
BASOPHILS NFR BLD AUTO: 0.4 % (ref 0–1.5)
BILIRUB SERPL-MCNC: 0.3 MG/DL (ref 0.2–1.2)
BILIRUB UR QL STRIP: NEGATIVE
BUN BLD-MCNC: 9 MG/DL (ref 8–23)
BUN/CREAT SERPL: 10.5 (ref 7–25)
CALCIUM SPEC-SCNC: 10.2 MG/DL (ref 8.6–10.5)
CHLORIDE SERPL-SCNC: 97 MMOL/L (ref 98–107)
CHOLEST SERPL-MCNC: 197 MG/DL (ref 0–200)
CLARITY UR: CLEAR
CO2 SERPL-SCNC: 27.2 MMOL/L (ref 22–29)
COLOR UR: YELLOW
CREAT BLD-MCNC: 0.86 MG/DL (ref 0.57–1)
DEPRECATED RDW RBC AUTO: 44.5 FL (ref 37–54)
EOSINOPHIL # BLD AUTO: 1.57 10*3/MM3 (ref 0–0.4)
EOSINOPHIL NFR BLD AUTO: 11.9 % (ref 0.3–6.2)
ERYTHROCYTE [DISTWIDTH] IN BLOOD BY AUTOMATED COUNT: 14.5 % (ref 12.3–15.4)
GFR SERPL CREATININE-BSD FRML MDRD: 79 ML/MIN/1.73
GLOBULIN UR ELPH-MCNC: 3.3 GM/DL
GLUCOSE BLD-MCNC: 70 MG/DL (ref 65–99)
GLUCOSE UR STRIP-MCNC: NEGATIVE MG/DL
HBA1C MFR BLD: 7.2 % (ref 4.8–5.6)
HCT VFR BLD AUTO: 36.2 % (ref 34–46.6)
HDLC SERPL-MCNC: 43 MG/DL (ref 40–60)
HGB BLD-MCNC: 11.5 G/DL (ref 12–15.9)
HGB UR QL STRIP.AUTO: NEGATIVE
IMM GRANULOCYTES # BLD AUTO: 0.05 10*3/MM3 (ref 0–0.05)
IMM GRANULOCYTES NFR BLD AUTO: 0.4 % (ref 0–0.5)
KETONES UR QL STRIP: NEGATIVE
LDLC SERPL CALC-MCNC: 127 MG/DL (ref 0–100)
LDLC/HDLC SERPL: 2.95 {RATIO}
LEUKOCYTE ESTERASE UR QL STRIP.AUTO: NEGATIVE
LYMPHOCYTES # BLD AUTO: 3.86 10*3/MM3 (ref 0.7–3.1)
LYMPHOCYTES NFR BLD AUTO: 29.3 % (ref 19.6–45.3)
MCH RBC QN AUTO: 26.7 PG (ref 26.6–33)
MCHC RBC AUTO-ENTMCNC: 31.8 G/DL (ref 31.5–35.7)
MCV RBC AUTO: 84 FL (ref 79–97)
MONOCYTES # BLD AUTO: 1.06 10*3/MM3 (ref 0.1–0.9)
MONOCYTES NFR BLD AUTO: 8 % (ref 5–12)
NEUTROPHILS # BLD AUTO: 6.59 10*3/MM3 (ref 1.7–7)
NEUTROPHILS NFR BLD AUTO: 50 % (ref 42.7–76)
NITRITE UR QL STRIP: NEGATIVE
NRBC BLD AUTO-RTO: 0.1 /100 WBC (ref 0–0.2)
PH UR STRIP.AUTO: 6 [PH] (ref 5–8)
PLATELET # BLD AUTO: 392 10*3/MM3 (ref 140–450)
PMV BLD AUTO: 10.4 FL (ref 6–12)
POTASSIUM BLD-SCNC: 3.8 MMOL/L (ref 3.5–5.2)
PROT SERPL-MCNC: 7.9 G/DL (ref 6–8.5)
PROT UR QL STRIP: NEGATIVE
RBC # BLD AUTO: 4.31 10*6/MM3 (ref 3.77–5.28)
SODIUM BLD-SCNC: 139 MMOL/L (ref 136–145)
SP GR UR STRIP: 1.01 (ref 1–1.03)
TRIGL SERPL-MCNC: 136 MG/DL (ref 0–150)
UROBILINOGEN UR QL STRIP: NORMAL
VLDLC SERPL-MCNC: 27.2 MG/DL (ref 5–40)
WBC NRBC COR # BLD: 13.18 10*3/MM3 (ref 3.4–10.8)

## 2019-10-09 ENCOUNTER — TELEPHONE (OUTPATIENT)
Dept: FAMILY MEDICINE CLINIC | Facility: CLINIC | Age: 69
End: 2019-10-09

## 2019-10-09 RX ORDER — GLIPIZIDE 5 MG/1
5 TABLET ORAL
Qty: 180 TABLET | Refills: 0 | Status: SHIPPED | OUTPATIENT
Start: 2019-10-09 | End: 2019-10-11

## 2019-10-09 NOTE — TELEPHONE ENCOUNTER
Please call Lexi back regarding her Glipizide.  They changed the dosage while she was in the hospital.

## 2019-10-11 ENCOUNTER — OFFICE VISIT (OUTPATIENT)
Dept: FAMILY MEDICINE CLINIC | Facility: CLINIC | Age: 69
End: 2019-10-11

## 2019-10-11 VITALS
SYSTOLIC BLOOD PRESSURE: 110 MMHG | HEART RATE: 80 BPM | OXYGEN SATURATION: 99 % | WEIGHT: 211 LBS | DIASTOLIC BLOOD PRESSURE: 65 MMHG | HEIGHT: 62 IN | BODY MASS INDEX: 38.83 KG/M2

## 2019-10-11 DIAGNOSIS — Z00.00 MEDICARE ANNUAL WELLNESS VISIT, SUBSEQUENT: Primary | ICD-10-CM

## 2019-10-11 DIAGNOSIS — Z23 NEED FOR INFLUENZA VACCINATION: ICD-10-CM

## 2019-10-11 DIAGNOSIS — E11.8 TYPE 2 DIABETES MELLITUS WITH COMPLICATION, WITHOUT LONG-TERM CURRENT USE OF INSULIN (HCC): Chronic | ICD-10-CM

## 2019-10-11 DIAGNOSIS — E11.49 NEUROLOGIC DISORDER ASSOCIATED WITH DIABETES MELLITUS (HCC): Chronic | ICD-10-CM

## 2019-10-11 DIAGNOSIS — I10 ESSENTIAL HYPERTENSION: Chronic | ICD-10-CM

## 2019-10-11 DIAGNOSIS — I25.119 CORONARY ARTERY DISEASE INVOLVING NATIVE HEART WITH ANGINA PECTORIS, UNSPECIFIED VESSEL OR LESION TYPE (HCC): ICD-10-CM

## 2019-10-11 DIAGNOSIS — E78.2 MIXED HYPERLIPIDEMIA: Chronic | ICD-10-CM

## 2019-10-11 PROCEDURE — 90653 IIV ADJUVANT VACCINE IM: CPT | Performed by: GENERAL PRACTICE

## 2019-10-11 PROCEDURE — 99214 OFFICE O/P EST MOD 30 MIN: CPT | Performed by: GENERAL PRACTICE

## 2019-10-11 PROCEDURE — G0008 ADMIN INFLUENZA VIRUS VAC: HCPCS | Performed by: GENERAL PRACTICE

## 2019-10-11 PROCEDURE — G0439 PPPS, SUBSEQ VISIT: HCPCS | Performed by: GENERAL PRACTICE

## 2019-10-11 RX ORDER — GLIMEPIRIDE 4 MG/1
4 TABLET ORAL
Qty: 90 TABLET | Refills: 3
Start: 2019-10-11 | End: 2020-10-13 | Stop reason: SDUPTHER

## 2019-10-11 RX ORDER — PREGABALIN 150 MG/1
150 CAPSULE ORAL 2 TIMES DAILY
Qty: 180 CAPSULE | Refills: 1 | Status: SHIPPED | OUTPATIENT
Start: 2019-10-11 | End: 2020-04-02

## 2019-10-11 NOTE — PROGRESS NOTES
The ABCs of the Annual Wellness Visit  Subsequent Medicare Wellness Visit    Chief Complaint   Patient presents with   • Medicare Wellness-subsequent   • Annual Exam   • Diabetes   • Hypertension       Subjective   History of Present Illness:  Lexi Wade is a 69 y.o. female who presents for a Subsequent Medicare Wellness Visit.    HEALTH RISK ASSESSMENT    Recent Hospitalizations:  Recently treated at the following:  [facility]    Current Medical Providers:  Patient Care Team:  Joyce Plata MD as PCP - General  Joyce Plata MD as PCP - Claims Attributed  Wang Felipe MD as Consulting Physician (Cardiology)  Nga Diego, RN as Community Care Coordinator (Population Health)    Smoking Status:  Social History     Tobacco Use   Smoking Status Never Smoker   Smokeless Tobacco Never Used       Alcohol Consumption:  Social History     Substance and Sexual Activity   Alcohol Use No       Depression Screen:   PHQ-2/PHQ-9 Depression Screening 10/11/2019   Little interest or pleasure in doing things 1   Feeling down, depressed, or hopeless 1   Trouble falling or staying asleep, or sleeping too much 1   Feeling tired or having little energy 1   Poor appetite or overeating 1   Feeling bad about yourself - or that you are a failure or have let yourself or your family down 0   Trouble concentrating on things, such as reading the newspaper or watching television 0   Moving or speaking so slowly that other people could have noticed. Or the opposite - being so fidgety or restless that you have been moving around a lot more than usual 0   Thoughts that you would be better off dead, or of hurting yourself in some way 0   Total Score 5   If you checked off any problems, how difficult have these problems made it for you to do your work, take care of things at home, or get along with other people? Not difficult at all       Fall Risk Screen:  STEADI Fall Risk Assessment was completed, and  patient is at MODERATE risk for falls. Assessment completed on:10/11/2019    Health Habits and Functional and Cognitive Screening:  Functional & Cognitive Status 10/11/2019   Do you have difficulty preparing food and eating? No   Do you have difficulty bathing yourself, getting dressed or grooming yourself? No   Do you have difficulty using the toilet? No   Do you have difficulty moving around from place to place? No   Do you have trouble with steps or getting out of a bed or a chair? No   Current Diet Well Balanced Diet   Dental Exam Unknown   Eye Exam Up to date   Exercise (times per week) 0 times per week   Current Exercise Activities Include -   Do you need help using the phone?  No   Are you deaf or do you have serious difficulty hearing?  No   Do you need help with transportation? No   Do you need help shopping? No   Do you need help preparing meals?  No   Do you need help with housework?  No   Do you need help with laundry? No   Do you need help taking your medications? No   Do you need help managing money? No   Do you ever drive or ride in a car without wearing a seat belt? No   Have you felt unusual stress, anger or loneliness in the last month? No   Who do you live with? Alone   If you need help, do you have trouble finding someone available to you? No   Have you been bothered in the last four weeks by sexual problems? No   Do you have difficulty concentrating, remembering or making decisions? No         Does the patient have evidence of cognitive impairment? No    Asprin use counseling:Taking ASA appropriately as indicated    Age-appropriate Screening Schedule:  Refer to the list below for future screening recommendations based on patient's age, sex and/or medical conditions. Orders for these recommended tests are listed in the plan section. The patient has been provided with a written plan.    Health Maintenance   Topic Date Due   • INFLUENZA VACCINE  08/01/2019   • DIABETIC FOOT EXAM  10/21/2019   •  HEMOGLOBIN A1C  04/04/2020   • DIABETIC EYE EXAM  04/05/2020   • LIPID PANEL  10/04/2020   • URINE MICROALBUMIN  10/04/2020   • MAMMOGRAM  10/08/2020   • TDAP/TD VACCINES (2 - Td) 04/04/2027   • PNEUMOCOCCAL VACCINES (65+ LOW/MEDIUM RISK)  Completed   • ZOSTER VACCINE  Completed          The following portions of the patient's history were reviewed and updated as appropriate: allergies, current medications, past family history, past medical history, past social history, past surgical history and problem list.    Outpatient Medications Prior to Visit   Medication Sig Dispense Refill   • aspirin 81 MG tablet Take 81 mg by mouth every night.     • atorvastatin (LIPITOR) 40 MG tablet Take 1 tablet by mouth Daily. 90 tablet 3   • carvedilol (COREG) 25 MG tablet TAKE 1 TABLET BY MOUTH 2 (TWO) TIMES A DAY WITH MEALS. 180 tablet 3   • diclofenac (VOLTAREN) 1 % gel gel APPLY TO AFFECTED AREA 4 TIMES A  g 0   • digoxin (LANOXIN) 125 MCG tablet TAKE 1 TABLET BY MOUTH DAILY 90 tablet 3   • esomeprazole (nexIUM) 40 MG capsule TAKE 1 CAPSULE BY MOUTH EVERY DAY 90 capsule 2   • ezetimibe (ZETIA) 10 MG tablet TAKE 1 TABELT BY MOUTH DAILY 90 tablet 2   • furosemide (LASIX) 80 MG tablet TAKE 1 TABLET BY MOUTH DAILY. 90 tablet 3   • glucose blood (TRUE METRIX BLOOD GLUCOSE TEST) test strip Check BS daily E11.8 100 each 3   • Insulin Pen Needle (B-D UF III MINI PEN NEEDLES) 31G X 5 MM misc Use with Insulin Injections Nightly 100 each 3   • ipratropium-albuterol (DUO-NEB) 0.5-2.5 mg/3 ml nebulizer Take 3 mL by nebulization 4 (Four) Times a Day. 360 mL 12   • isosorbide mononitrate (IMDUR) 30 MG 24 hr tablet Take 1 tablet by mouth Daily. 90 tablet 3   • latanoprost (XALATAN) 0.005 % ophthalmic solution Administer 1 drop to both eyes Every Night. 90 day supply. 2.5 mL 5   • lisinopril (PRINIVIL,ZESTRIL) 10 MG tablet Take 1 tablet by mouth Daily. 90 tablet 3   • loperamide (IMODIUM) 2 MG capsule Take 1 capsule by mouth 4 (Four)  Times a Day As Needed for Diarrhea. 24 capsule 0   • magnesium oxide (MAGOX) 400 (241.3 Mg) MG tablet tablet Take 1 tablet by mouth Daily. 90 each 3   • metFORMIN (GLUCOPHAGE) 1000 MG tablet TAKE 1 TABLET BY MOUTH 2 (TWO) TIMES A DAY WITH MEALS. 180 tablet 3   • nitroglycerin (NITROSTAT) 0.4 MG SL tablet Place 1 tablet under the tongue Every 5 (Five) Minutes As Needed for Chest Pain. Take no more than 3 doses in 15 minutes. 25 tablet 0   • omega-3 acid ethyl esters (LOVAZA) 1 G capsule Take 1 g by mouth 2 (Two) Times a Day. 2 capsules bid     • ondansetron ODT (ZOFRAN-ODT) 4 MG disintegrating tablet Take 1 tablet by mouth Every 6 (Six) Hours As Needed for Nausea or Vomiting. 15 tablet 0   • potassium chloride (MICRO-K) 10 MEQ CR capsule TAKE 1 CAPSULE BY MOUTH 2 (TWO) TIMES A DAY. 180 capsule 3   • VICTOZA 18 MG/3ML solution pen-injector injection INJECT 1.2 MG UNDER THE SKIN INTO THE APPROPRIATE AREA AS DIRECTED EVERY NIGHT. 6 pen 5   • vitamin D (ERGOCALCIFEROL) 32967 units capsule capsule Take 1 capsule by mouth 1 (One) Time Per Week. (Patient taking differently: Take 50,000 Units by mouth 1 (One) Time Per Week. Take 1 capsule by mouth weekly on Tuesdays) 12 capsule 3   • glipizide (GLUCOTROL) 5 MG tablet Take 1 tablet by mouth 2 (Two) Times a Day Before Meals for 30 days. 180 tablet 0   • LYRICA 150 MG capsule TAKE ONE CAPSULE BY MOUTH TWICE A  capsule 0     No facility-administered medications prior to visit.        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  Negative for chills, fatigue, fever and unexpected weight change.   HENT: Negative.  Negative for congestion, ear pain, hearing loss, nosebleeds, rhinorrhea, sneezing, sore throat and tinnitus.    Eyes: Negative.  Negative for discharge.   Respiratory: Negative.  Negative for cough, shortness of breath and wheezing.    Cardiovascular: Negative.  Negative for chest pain and palpitations.   Gastrointestinal: Negative.  Negative for abdominal pain, constipation, diarrhea, nausea and vomiting.   Endocrine: Negative.    Genitourinary: Negative.  Negative for dysuria, frequency and urgency.   Musculoskeletal: Positive for arthralgias and myalgias. Negative for back pain, joint swelling and neck pain.   Skin: Negative.  Negative for rash.   Allergic/Immunologic: Negative.    Neurological: Negative.  Negative for dizziness, weakness, numbness and headaches.   Hematological: Negative.  Negative for adenopathy.   Psychiatric/Behavioral: Negative.  Negative for dysphoric mood and sleep disturbance. The patient is not nervous/anxious.        Compared to one year ago, the patient feels her physical health is better.  Compared to one year ago, the patient feels her mental health is better.    Reviewed chart for potential of high risk medication in the elderly: yes  Reviewed chart for potential of harmful drug interactions in the elderly:yes    Objective         Vitals:    10/11/19 1302   BP: 110/65   BP Location: Left arm   Pulse: 80   SpO2: 99%   Weight: 95.7 kg (211 lb)   Height: 157.5 cm (62\")   PainSc: 0-No pain       Body mass index is 38.59 " kg/m².  Discussed the patient's BMI with her. The BMI is above average; BMI management plan is completed.    Physical Exam   Constitutional: She is oriented to person, place, and time. She appears well-developed and well-nourished. No distress.   HENT:   Head: Normocephalic and atraumatic.   Nose: Nose normal.   Mouth/Throat: Oropharynx is clear and moist.   Eyes: Conjunctivae and EOM are normal. Pupils are equal, round, and reactive to light. Right eye exhibits no discharge. Left eye exhibits no discharge.   Neck: No thyromegaly present.   Cardiovascular: Normal rate, regular rhythm, normal heart sounds and intact distal pulses.   Pulmonary/Chest: Effort normal and breath sounds normal.   Lymphadenopathy:     She has no cervical adenopathy.   Neurological: She is alert and oriented to person, place, and time.   Skin: Skin is warm and dry.   Psychiatric: She has a normal mood and affect.   Nursing note and vitals reviewed.      Lab Results   Component Value Date    TRIG 136 10/04/2019    HDL 43 10/04/2019     (H) 10/04/2019    VLDL 27.2 10/04/2019    HGBA1C 7.20 (H) 10/04/2019        Assessment/Plan   Medicare Risks and Personalized Health Plan  CMS Preventative Services Quick Reference  Breast Cancer/Mammogram Screening  Fall Risk  Immunizations Discussed/Encouraged (specific immunizations; Influenza )    The above risks/problems have been discussed with the patient.  Pertinent information has been shared with the patient in the After Visit Summary.  Follow up plans and orders are seen below in the Assessment/Plan Section.    Diagnoses and all orders for this visit:    1. Medicare annual wellness visit, subsequent (Primary)    2. Need for influenza vaccination  -     Fluad Quad >65 years    3. Type 2 diabetes mellitus with complication, without long-term current use of insulin (CMS/[facility])  -     glimepiride (AMARYL) 4 MG tablet; Take 1 tablet by mouth Every Morning Before Breakfast.  Dispense: 90 tablet;  Refill: 3    4. Essential hypertension    5. Mixed hyperlipidemia    6. 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  Dispense: 180 capsule; Refill: 1    7. Coronary artery disease involving native heart with angina pectoris, unspecified vessel or lesion type (CMS/HCC)      Follow Up:  Return in about 6 months (around 4/11/2020) for Recheck.     An After Visit Summary and PPPS were given to the patient.    Information has been scanned into chart.  Discussed importance of taking medications as prescribed. Encouraged healthy eating habits with low fat, low salt choices and working towards maintaining a healthy weight. Recommended regular exercise if able as well as care to prevent falls including avoiding anything on the floor that they could slip or trip on such as throw rugs, making sure they have a bathmat to step onto when their feet are wet and having grab bars and railings where needed.

## 2019-10-11 NOTE — PATIENT INSTRUCTIONS
Medicare Wellness  Personal Prevention Plan of Service     Date of Office Visit:  10/11/2019  Encounter Provider:  Joyce Plata MD  Place of Service:  [facility]  Patient Name: Lexi Wade  :  1950    As part of the Medicare Wellness portion of your visit today, we are providing you with this personalized preventive plan of services (PPPS). This plan is based upon recommendations of the [place] Preventive Services Task Force (USPSTF) and the Advisory Committee on Immunization Practices (ACIP).    This lists the preventive care services that should be considered, and provides dates of when you are due. Items listed as completed are up-to-date and do not require any further intervention.    Health Maintenance   Topic Date Due   • INFLUENZA VACCINE  2019   • MEDICARE ANNUAL WELLNESS  10/08/2019   • DIABETIC FOOT EXAM  10/21/2019   • HEMOGLOBIN A1C  2020   • DIABETIC EYE EXAM  2020   • COLOGUARD  2020   • LIPID PANEL  10/04/2020   • URINE MICROALBUMIN  10/04/2020   • MAMMOGRAM  10/08/2020   • TDAP/TD VACCINES (2 - Td) 2027   • HEPATITIS C SCREENING  Completed   • PNEUMOCOCCAL VACCINES (65+ LOW/MEDIUM RISK)  Completed   • ZOSTER VACCINE  Completed       Orders Placed This Encounter   Procedures   • Fluad Quad >65 years       Return in about 6 months (around 2020) for Recheck.

## 2019-10-11 NOTE — PROGRESS NOTES
Subjective   Lexi Wade is a 69 y.o. female.     Chief Complaint   Patient presents with   • Medicare Wellness-subsequent   • Annual Exam   • Diabetes   • Hypertension     For review and evaluation of management of chronic medical problems. Labs reviewed. Due for mammogram.    Diabetes   She presents for her follow-up diabetic visit. She has type 2 diabetes mellitus. Her disease course has been stable. Pertinent negatives for hypoglycemia include no dizziness, headaches or nervousness/anxiousness. There are no diabetic associated symptoms. Pertinent negatives for diabetes include no chest pain, no fatigue and no weakness. Diabetic complications include heart disease. Risk factors for coronary artery disease include dyslipidemia, diabetes mellitus and hypertension. Current diabetic treatment includes oral agent (dual therapy). She is compliant with treatment all of the time. She is following a generally healthy diet. When asked about meal planning, she reported none. She rarely participates in exercise. There is no change in her home blood glucose trend. An ACE inhibitor/angiotensin II receptor blocker is being taken. Eye exam is current.   Hypertension   The current episode started more than 1 year ago. The problem is unchanged. The problem is controlled. Pertinent negatives include no chest pain, headaches, neck pain, palpitations or shortness of breath. There are no associated agents to hypertension. Risk factors for coronary artery disease include diabetes mellitus, dyslipidemia and obesity. Current antihypertension treatment includes beta blockers, ACE inhibitors and diuretics. The current treatment provides significant improvement. There are no compliance problems.    Hyperlipidemia   This is a chronic problem. The current episode started more than 1 year ago. The problem is controlled. Recent lipid tests were reviewed and are normal. Exacerbating diseases include diabetes. There are no known factors  aggravating her hyperlipidemia. Associated symptoms include myalgias. Pertinent negatives include no chest pain or shortness of breath. Current antihyperlipidemic treatment includes statins. The current treatment provides significant improvement of lipids. There are no compliance problems.  Risk factors for coronary artery disease include diabetes mellitus, dyslipidemia, post-menopausal and hypertension.        The following portions of the patient's history were reviewed and updated as appropriate: allergies, current medications, past family and social history and problem list.    Outpatient Medications Prior to Visit   Medication Sig Dispense Refill   • aspirin 81 MG tablet Take 81 mg by mouth every night.     • atorvastatin (LIPITOR) 40 MG tablet Take 1 tablet by mouth Daily. 90 tablet 3   • carvedilol (COREG) 25 MG tablet TAKE 1 TABLET BY MOUTH 2 (TWO) TIMES A DAY WITH MEALS. 180 tablet 3   • diclofenac (VOLTAREN) 1 % gel gel APPLY TO AFFECTED AREA 4 TIMES A  g 0   • digoxin (LANOXIN) 125 MCG tablet TAKE 1 TABLET BY MOUTH DAILY 90 tablet 3   • esomeprazole (nexIUM) 40 MG capsule TAKE 1 CAPSULE BY MOUTH EVERY DAY 90 capsule 2   • ezetimibe (ZETIA) 10 MG tablet TAKE 1 TABELT BY MOUTH DAILY 90 tablet 2   • furosemide (LASIX) 80 MG tablet TAKE 1 TABLET BY MOUTH DAILY. 90 tablet 3   • glucose blood (TRUE METRIX BLOOD GLUCOSE TEST) test strip Check BS daily E11.8 100 each 3   • Insulin Pen Needle (B-D UF III MINI PEN NEEDLES) 31G X 5 MM misc Use with Insulin Injections Nightly 100 each 3   • ipratropium-albuterol (DUO-NEB) 0.5-2.5 mg/3 ml nebulizer Take 3 mL by nebulization 4 (Four) Times a Day. 360 mL 12   • isosorbide mononitrate (IMDUR) 30 MG 24 hr tablet Take 1 tablet by mouth Daily. 90 tablet 3   • latanoprost (XALATAN) 0.005 % ophthalmic solution Administer 1 drop to both eyes Every Night. 90 day supply. 2.5 mL 5   • lisinopril (PRINIVIL,ZESTRIL) 10 MG tablet Take 1 tablet by mouth Daily. 90 tablet 3   •  loperamide (IMODIUM) 2 MG capsule Take 1 capsule by mouth 4 (Four) Times a Day As Needed for Diarrhea. 24 capsule 0   • magnesium oxide (MAGOX) 400 (241.3 Mg) MG tablet tablet Take 1 tablet by mouth Daily. 90 each 3   • metFORMIN (GLUCOPHAGE) 1000 MG tablet TAKE 1 TABLET BY MOUTH 2 (TWO) TIMES A DAY WITH MEALS. 180 tablet 3   • nitroglycerin (NITROSTAT) 0.4 MG SL tablet Place 1 tablet under the tongue Every 5 (Five) Minutes As Needed for Chest Pain. Take no more than 3 doses in 15 minutes. 25 tablet 0   • omega-3 acid ethyl esters (LOVAZA) 1 G capsule Take 1 g by mouth 2 (Two) Times a Day. 2 capsules bid     • ondansetron ODT (ZOFRAN-ODT) 4 MG disintegrating tablet Take 1 tablet by mouth Every 6 (Six) Hours As Needed for Nausea or Vomiting. 15 tablet 0   • potassium chloride (MICRO-K) 10 MEQ CR capsule TAKE 1 CAPSULE BY MOUTH 2 (TWO) TIMES A DAY. 180 capsule 3   • VICTOZA 18 MG/3ML solution pen-injector injection INJECT 1.2 MG UNDER THE SKIN INTO THE APPROPRIATE AREA AS DIRECTED EVERY NIGHT. 6 pen 5   • vitamin D (ERGOCALCIFEROL) 66341 units capsule capsule Take 1 capsule by mouth 1 (One) Time Per Week. (Patient taking differently: Take 50,000 Units by mouth 1 (One) Time Per Week. Take 1 capsule by mouth weekly on Tuesdays) 12 capsule 3   • glipizide (GLUCOTROL) 5 MG tablet Take 1 tablet by mouth 2 (Two) Times a Day Before Meals for 30 days. 180 tablet 0   • LYRICA 150 MG capsule TAKE ONE CAPSULE BY MOUTH TWICE A  capsule 0     No facility-administered medications prior to visit.        Review of Systems   Constitutional: Negative.  Negative for chills, fatigue, fever and unexpected weight change.   HENT: Negative.  Negative for congestion, ear pain, hearing loss, nosebleeds, rhinorrhea, sneezing, sore throat and tinnitus.    Eyes: Negative.  Negative for discharge.   Respiratory: Negative.  Negative for cough, shortness of breath and wheezing.    Cardiovascular: Negative.  Negative for chest pain and  "palpitations.   Gastrointestinal: Negative.  Negative for abdominal pain, constipation, diarrhea, nausea and vomiting.   Endocrine: Negative.    Genitourinary: Negative.  Negative for dysuria, frequency and urgency.   Musculoskeletal: Positive for arthralgias and myalgias. Negative for back pain, joint swelling and neck pain.   Skin: Negative.  Negative for rash.   Allergic/Immunologic: Negative.    Neurological: Negative.  Negative for dizziness, weakness, numbness and headaches.   Hematological: Negative.  Negative for adenopathy.   Psychiatric/Behavioral: Negative.  Negative for dysphoric mood and sleep disturbance. The patient is not nervous/anxious.        Objective     Visit Vitals  /65 (BP Location: Left arm)   Pulse 80   Ht 157.5 cm (62\")   Wt 95.7 kg (211 lb)   SpO2 99%   BMI 38.59 kg/m²     Physical Exam   Constitutional: She is oriented to person, place, and time. She appears well-developed and well-nourished. No distress.   HENT:   Head: Normocephalic and atraumatic.   Nose: Nose normal.   Mouth/Throat: Oropharynx is clear and moist.   Eyes: Conjunctivae and EOM are normal. Pupils are equal, round, and reactive to light. Right eye exhibits no discharge. Left eye exhibits no discharge.   Neck: No tracheal deviation present. No thyromegaly present.   Cardiovascular: Normal rate, regular rhythm, normal heart sounds and intact distal pulses.   No murmur heard.  Pulmonary/Chest: Effort normal and breath sounds normal. No respiratory distress. She has no wheezes. She has no rales. She exhibits no tenderness. Right breast exhibits no inverted nipple, no mass, no nipple discharge, no skin change and no tenderness. Left breast exhibits no inverted nipple, no mass, no nipple discharge, no skin change and no tenderness.   Abdominal: Soft. Bowel sounds are normal. She exhibits no distension and no mass. There is no tenderness. No hernia.   Musculoskeletal: Normal range of motion. She exhibits no deformity.    " Lexi had a diabetic foot exam performed today.   During the foot exam she had a monofilament test performed (normal).  Vascular Status -  Her right foot exhibits normal foot vasculature  and no edema. Her left foot exhibits normal foot vasculature  and no edema.  Skin Integrity  -  Her right foot skin is intact (callus base of great toe).  Left foot skin intact:  callus base of great toe..  Lymphadenopathy:     She has no cervical adenopathy.   Neurological: She is alert and oriented to person, place, and time. She has normal reflexes.   Skin: Skin is warm and dry.   Psychiatric: She has a normal mood and affect. Her behavior is normal. Judgment and thought content normal.   Nursing note and vitals reviewed.    Results for orders placed or performed in visit on 10/04/19   Comprehensive Metabolic Panel   Result Value Ref Range    Glucose 70 65 - 99 mg/dL    BUN 9 8 - 23 mg/dL    Creatinine 0.86 0.57 - 1.00 mg/dL    Sodium 139 136 - 145 mmol/L    Potassium 3.8 3.5 - 5.2 mmol/L    Chloride 97 (L) 98 - 107 mmol/L    CO2 27.2 22.0 - 29.0 mmol/L    Calcium 10.2 8.6 - 10.5 mg/dL    Total Protein 7.9 6.0 - 8.5 g/dL    Albumin 4.60 3.50 - 5.20 g/dL    ALT (SGPT) 17 1 - 33 U/L    AST (SGOT) 19 1 - 32 U/L    Alkaline Phosphatase 116 39 - 117 U/L    Total Bilirubin 0.3 0.2 - 1.2 mg/dL    eGFR  African Amer 79 >60 mL/min/1.73    Globulin 3.3 gm/dL    A/G Ratio 1.4 g/dL    BUN/Creatinine Ratio 10.5 7.0 - 25.0    Anion Gap 14.8 5.0 - 15.0 mmol/L   Hemoglobin A1c   Result Value Ref Range    Hemoglobin A1C 7.20 (H) 4.80 - 5.60 %   Lipid Panel   Result Value Ref Range    Total Cholesterol 197 0 - 200 mg/dL    Triglycerides 136 0 - 150 mg/dL    HDL Cholesterol 43 40 - 60 mg/dL    LDL Cholesterol  127 (H) 0 - 100 mg/dL    VLDL Cholesterol 27.2 5 - 40 mg/dL    LDL/HDL Ratio 2.95    MicroAlbumin, Urine, Random - Urine, Clean Catch   Result Value Ref Range    Microalbumin, Urine <1.2 mg/dL   Urinalysis With Culture If Indicated - Urine,  Clean Catch   Result Value Ref Range    Color, UA Yellow Yellow, Straw    Appearance, UA Clear Clear    pH, UA 6.0 5.0 - 8.0    Specific Gravity, UA 1.009 1.005 - 1.030    Glucose, UA Negative Negative    Ketones, UA Negative Negative    Bilirubin, UA Negative Negative    Blood, UA Negative Negative    Protein, UA Negative Negative    Leuk Esterase, UA Negative Negative    Nitrite, UA Negative Negative    Urobilinogen, UA 0.2 E.U./dL 0.2 - 1.0 E.U./dL   CBC Auto Differential   Result Value Ref Range    WBC 13.18 (H) 3.40 - 10.80 10*3/mm3    RBC 4.31 3.77 - 5.28 10*6/mm3    Hemoglobin 11.5 (L) 12.0 - 15.9 g/dL    Hematocrit 36.2 34.0 - 46.6 %    MCV 84.0 79.0 - 97.0 fL    MCH 26.7 26.6 - 33.0 pg    MCHC 31.8 31.5 - 35.7 g/dL    RDW 14.5 12.3 - 15.4 %    RDW-SD 44.5 37.0 - 54.0 fl    MPV 10.4 6.0 - 12.0 fL    Platelets 392 140 - 450 10*3/mm3    Neutrophil % 50.0 42.7 - 76.0 %    Lymphocyte % 29.3 19.6 - 45.3 %    Monocyte % 8.0 5.0 - 12.0 %    Eosinophil % 11.9 (H) 0.3 - 6.2 %    Basophil % 0.4 0.0 - 1.5 %    Immature Grans % 0.4 0.0 - 0.5 %    Neutrophils, Absolute 6.59 1.70 - 7.00 10*3/mm3    Lymphocytes, Absolute 3.86 (H) 0.70 - 3.10 10*3/mm3    Monocytes, Absolute 1.06 (H) 0.10 - 0.90 10*3/mm3    Eosinophils, Absolute 1.57 (H) 0.00 - 0.40 10*3/mm3    Basophils, Absolute 0.05 0.00 - 0.20 10*3/mm3    Immature Grans, Absolute 0.05 0.00 - 0.05 10*3/mm3    nRBC 0.1 0.0 - 0.2 /100 WBC      Assessment/Plan   Problem List Items Addressed This Visit        Cardiovascular and Mediastinum    Essential hypertension (Chronic)    Mixed hyperlipidemia (Chronic)    Coronary artery disease involving native heart with angina pectoris (CMS/HCC)       Endocrine    Type 2 diabetes mellitus with complication, without long-term current use of insulin (CMS/[facility]) (Chronic)    Relevant Medications    glimepiride (AMARYL) 4 MG tablet    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  Decrease glimepiride to 1 tab daily in the morning as she has been having a few low sugars first thing in the morning.    New Medications Ordered This Visit   Medications   • glimepiride (AMARYL) 4 MG tablet     Sig: Take 1 tablet by mouth Every Morning Before Breakfast.     Dispense:  90 tablet     Refill:  3   • pregabalin (LYRICA) 150 MG capsule     Sig: Take 1 capsule by mouth 2 (Two) Times a Day.     Dispense:  180 capsule     Refill:  1     This request is for a new prescription for a controlled substance as required by Federal/State law.NEED NEW SCRIPT.     Return in about 6 months (around 4/11/2020) for Recheck.

## 2019-10-16 ENCOUNTER — HOSPITAL ENCOUNTER (OUTPATIENT)
Dept: CARDIAC REHAB | Facility: HOSPITAL | Age: 69
Setting detail: THERAPIES SERIES
Discharge: HOME | End: 2019-10-16

## 2019-10-16 VITALS
DIASTOLIC BLOOD PRESSURE: 66 MMHG | WEIGHT: 217.5 LBS | HEIGHT: 62 IN | HEART RATE: 91 BPM | SYSTOLIC BLOOD PRESSURE: 146 MMHG | BODY MASS INDEX: 40.03 KG/M2

## 2019-10-16 DIAGNOSIS — I50.22 CHRONIC SYSTOLIC HF (HEART FAILURE) (HCC): Primary | ICD-10-CM

## 2019-10-16 PROCEDURE — 93798 PHYS/QHP OP CAR RHAB W/ECG: CPT

## 2019-10-16 NOTE — PROGRESS NOTES
Cardiac Rehab Initial Assessment      Name: Lexi Wade  :1950 Allergies:Aldactone [spironolactone] and Nsaids   MRN: [id number] 69 y.o. Physician: Joyce Plata MD   Primary Diagnosis:    Diagnosis Plan   1. Chronic systolic HF (heart failure) (CMS/[facility])      Event Date: 19 Specialist: Destinee   Secondary Diagnosis:  Risk Stratification:Moderate Risk Note Author: Namrata Ramos RN     Cardiovascular History: AICD     EXERCISE AT HOME  no    EF: 20-30%      Source: echo          Ambulatory Status:Cane  Ambulatory Fall Risk Assessed on Initial Visit: yes 6 Minute Walk Pre- Cardiac Rehab:  Distance:432ft      RPE:11  Max. HR: 100       SPO2:98    MET: 1  MPH: 0.8             RPD: 0  Resting BP: 153/58 LA, 146/66 RA    Peak BP: 158/58  Recovery BP: 157/85   Sopped 3 minutes. Tired and back and legs starting to hurt     NUTRITION  Lipids:yes If yes, labs as follows;  Total: No components found for: CHOLESTEROL  HDL:   HDL Cholesterol   Date Value Ref Range Status   10/04/2019 43 40 - 60 mg/dL Final    Lipids continued:  LDL:  LDL Cholesterol    Date Value Ref Range Status   10/04/2019 127 (H) 0 - 100 mg/dL Final     LDLCALC ELECT   Date Value Ref Range Status   2014 93 0 - 129 mg/dl Final     Comment:     LDL DESIRED: < 130 MG/DL     Triglyceride: No components found for: TRIGLYCERIDE   Weight Management:                 Weight: 217.5  Height: 63                                   BMI: Body mass index is 39.78 kg/m².  Waist Circumference: 41  inches   Alcohol Use: none Diabetes:Yes,  Monitors BS at home- yes, Frequency: 1 time daily, Random BS: 95    Last HGBA1C with date if applicable:No components found for: A1C         SOCIAL HISTORY  Social History     Socioeconomic History   • Marital status: Single     Spouse name: Not on file   • Number of children: Not on file   • Years of education: Not on file   • Highest education level: Not on file   Tobacco Use   • Smoking status: Never  Smoker   • Smokeless tobacco: Never Used   Substance and Sexual Activity   • Alcohol use: No   • Drug use: No   • Sexual activity: Defer       Educational Level (choose one that applies) high school diploma/GED Learning Barriers:Ready to Learn    Family Support:yes    Living Arrangement: lives alone         Tobacco Adjunct: N/A             PSYCHOSOCIAL  Clinical Depression: no    Stress: no     Assess presence or absence of depression using a valid screening tool: yes                No angina. Only pain today was back and leg pain with walk Diagnosed with Hypertension:yes    Heart Sounds: wnl     Lung Sounds: normal air entry, lungs clear to auscultation         Assessment: wnl Orthopedic Problems: osteoarthritis  Back and leg pain    Are you being hurt, hit, or frightened by anyone at home or in your life? no    Are you being neglected by a caregiver? N/A        Assessment: wnl    Family attends IA: no Time of arrival: 1300  Time of departure: 1430     Patient Goals: increase strength and stamina         10/16/2019  3:01 PM  Namrata Ramos RN

## 2019-10-18 ENCOUNTER — HOSPITAL ENCOUNTER (OUTPATIENT)
Dept: CARDIAC REHAB | Facility: HOSPITAL | Age: 69
Setting detail: THERAPIES SERIES
Discharge: HOME | End: 2019-10-18

## 2019-10-18 VITALS
HEART RATE: 85 BPM | WEIGHT: 215 LBS | BODY MASS INDEX: 39.32 KG/M2 | DIASTOLIC BLOOD PRESSURE: 58 MMHG | SYSTOLIC BLOOD PRESSURE: 121 MMHG

## 2019-10-18 DIAGNOSIS — I50.22 CHRONIC SYSTOLIC HF (HEART FAILURE) (HCC): Primary | ICD-10-CM

## 2019-10-18 PROCEDURE — 93798 PHYS/QHP OP CAR RHAB W/ECG: CPT

## 2019-10-21 ENCOUNTER — HOSPITAL ENCOUNTER (OUTPATIENT)
Dept: CARDIAC REHAB | Facility: HOSPITAL | Age: 69
Setting detail: THERAPIES SERIES
Discharge: HOME | End: 2019-10-21

## 2019-10-21 VITALS
SYSTOLIC BLOOD PRESSURE: 161 MMHG | DIASTOLIC BLOOD PRESSURE: 76 MMHG | WEIGHT: 210 LBS | BODY MASS INDEX: 38.41 KG/M2 | HEART RATE: 97 BPM

## 2019-10-21 DIAGNOSIS — I50.22 CHRONIC SYSTOLIC HF (HEART FAILURE) (HCC): Primary | ICD-10-CM

## 2019-10-21 PROCEDURE — 93798 PHYS/QHP OP CAR RHAB W/ECG: CPT

## 2019-10-23 ENCOUNTER — HOSPITAL ENCOUNTER (OUTPATIENT)
Dept: CARDIAC REHAB | Facility: HOSPITAL | Age: 69
Setting detail: THERAPIES SERIES
Discharge: HOME | End: 2019-10-23

## 2019-10-23 VITALS
WEIGHT: 212 LBS | SYSTOLIC BLOOD PRESSURE: 123 MMHG | HEART RATE: 94 BPM | DIASTOLIC BLOOD PRESSURE: 59 MMHG | BODY MASS INDEX: 38.78 KG/M2

## 2019-10-23 DIAGNOSIS — I50.22 CHRONIC SYSTOLIC HF (HEART FAILURE) (HCC): Primary | ICD-10-CM

## 2019-10-23 PROCEDURE — 93798 PHYS/QHP OP CAR RHAB W/ECG: CPT

## 2019-10-25 ENCOUNTER — HOSPITAL ENCOUNTER (OUTPATIENT)
Dept: CARDIAC REHAB | Facility: HOSPITAL | Age: 69
Setting detail: THERAPIES SERIES
Discharge: HOME | End: 2019-10-25

## 2019-10-25 VITALS — DIASTOLIC BLOOD PRESSURE: 67 MMHG | SYSTOLIC BLOOD PRESSURE: 153 MMHG | HEART RATE: 79 BPM

## 2019-10-25 DIAGNOSIS — E11.9 DIABETES MELLITUS WITHOUT COMPLICATION (HCC): ICD-10-CM

## 2019-10-25 DIAGNOSIS — I50.22 CHRONIC SYSTOLIC HF (HEART FAILURE) (HCC): Primary | ICD-10-CM

## 2019-10-25 PROCEDURE — 93798 PHYS/QHP OP CAR RHAB W/ECG: CPT

## 2019-10-25 RX ORDER — LIRAGLUTIDE 6 MG/ML
INJECTION SUBCUTANEOUS
Qty: 6 PEN | Refills: 5 | Status: SHIPPED | OUTPATIENT
Start: 2019-10-25 | End: 2020-10-13 | Stop reason: SDUPTHER

## 2019-10-30 ENCOUNTER — HOSPITAL ENCOUNTER (OUTPATIENT)
Dept: CARDIAC REHAB | Facility: HOSPITAL | Age: 69
Setting detail: THERAPIES SERIES
Discharge: HOME | End: 2019-10-30

## 2019-10-30 VITALS — DIASTOLIC BLOOD PRESSURE: 66 MMHG | SYSTOLIC BLOOD PRESSURE: 129 MMHG | HEART RATE: 89 BPM

## 2019-10-30 DIAGNOSIS — I50.22 CHRONIC SYSTOLIC HF (HEART FAILURE) (HCC): Primary | ICD-10-CM

## 2019-10-30 PROCEDURE — 93798 PHYS/QHP OP CAR RHAB W/ECG: CPT

## 2019-11-01 RX ORDER — POTASSIUM CHLORIDE 750 MG/1
10 CAPSULE, EXTENDED RELEASE ORAL 2 TIMES DAILY
Qty: 180 CAPSULE | Refills: 3 | Status: SHIPPED | OUTPATIENT
Start: 2019-11-01 | End: 2020-10-13 | Stop reason: SDUPTHER

## 2019-11-04 ENCOUNTER — HOSPITAL ENCOUNTER (OUTPATIENT)
Dept: CARDIAC REHAB | Facility: HOSPITAL | Age: 69
Setting detail: THERAPIES SERIES
Discharge: HOME | End: 2019-11-04

## 2019-11-04 VITALS
SYSTOLIC BLOOD PRESSURE: 144 MMHG | WEIGHT: 215 LBS | HEART RATE: 81 BPM | DIASTOLIC BLOOD PRESSURE: 68 MMHG | BODY MASS INDEX: 39.32 KG/M2

## 2019-11-04 DIAGNOSIS — I50.22 CHRONIC SYSTOLIC HF (HEART FAILURE) (HCC): Primary | ICD-10-CM

## 2019-11-04 PROCEDURE — 93798 PHYS/QHP OP CAR RHAB W/ECG: CPT

## 2019-11-08 ENCOUNTER — HOSPITAL ENCOUNTER (OUTPATIENT)
Dept: CARDIAC REHAB | Facility: HOSPITAL | Age: 69
Setting detail: THERAPIES SERIES
Discharge: HOME | End: 2019-11-08

## 2019-11-08 VITALS
SYSTOLIC BLOOD PRESSURE: 164 MMHG | BODY MASS INDEX: 39.32 KG/M2 | DIASTOLIC BLOOD PRESSURE: 80 MMHG | WEIGHT: 215 LBS | HEART RATE: 95 BPM

## 2019-11-08 DIAGNOSIS — I50.22 CHRONIC SYSTOLIC HF (HEART FAILURE) (HCC): Primary | ICD-10-CM

## 2019-11-08 PROCEDURE — 93798 PHYS/QHP OP CAR RHAB W/ECG: CPT

## 2019-11-11 ENCOUNTER — HOSPITAL ENCOUNTER (OUTPATIENT)
Dept: CARDIAC REHAB | Facility: HOSPITAL | Age: 69
Setting detail: THERAPIES SERIES
Discharge: HOME | End: 2019-11-11

## 2019-11-11 VITALS
SYSTOLIC BLOOD PRESSURE: 157 MMHG | WEIGHT: 214 LBS | HEART RATE: 82 BPM | DIASTOLIC BLOOD PRESSURE: 75 MMHG | BODY MASS INDEX: 39.14 KG/M2

## 2019-11-11 DIAGNOSIS — E11.9 DIABETES MELLITUS WITHOUT COMPLICATION (HCC): ICD-10-CM

## 2019-11-11 DIAGNOSIS — I50.22 CHRONIC SYSTOLIC HF (HEART FAILURE) (HCC): Primary | ICD-10-CM

## 2019-11-11 PROCEDURE — 93798 PHYS/QHP OP CAR RHAB W/ECG: CPT

## 2019-11-11 RX ORDER — ATORVASTATIN CALCIUM 40 MG/1
TABLET, FILM COATED ORAL
Qty: 90 TABLET | Refills: 3 | Status: SHIPPED | OUTPATIENT
Start: 2019-11-11 | End: 2020-10-13 | Stop reason: SDUPTHER

## 2019-11-11 RX ORDER — ISOSORBIDE MONONITRATE 30 MG/1
TABLET, EXTENDED RELEASE ORAL
Qty: 90 TABLET | Refills: 3 | Status: SHIPPED | OUTPATIENT
Start: 2019-11-11 | End: 2020-10-13 | Stop reason: SDUPTHER

## 2019-11-13 ENCOUNTER — HOSPITAL ENCOUNTER (OUTPATIENT)
Dept: CARDIAC REHAB | Facility: HOSPITAL | Age: 69
Setting detail: THERAPIES SERIES
Discharge: HOME | End: 2019-11-13

## 2019-11-13 VITALS
SYSTOLIC BLOOD PRESSURE: 136 MMHG | HEART RATE: 82 BPM | WEIGHT: 215 LBS | DIASTOLIC BLOOD PRESSURE: 66 MMHG | BODY MASS INDEX: 39.32 KG/M2

## 2019-11-13 DIAGNOSIS — I50.22 CHRONIC SYSTOLIC HF (HEART FAILURE) (HCC): Primary | ICD-10-CM

## 2019-11-13 PROCEDURE — 93798 PHYS/QHP OP CAR RHAB W/ECG: CPT

## 2019-11-15 ENCOUNTER — TELEPHONE (OUTPATIENT)
Dept: FAMILY MEDICINE CLINIC | Facility: CLINIC | Age: 69
End: 2019-11-15

## 2019-11-15 ENCOUNTER — HOSPITAL ENCOUNTER (OUTPATIENT)
Dept: CARDIAC REHAB | Facility: HOSPITAL | Age: 69
Setting detail: THERAPIES SERIES
Discharge: HOME | End: 2019-11-15

## 2019-11-15 VITALS — SYSTOLIC BLOOD PRESSURE: 136 MMHG | DIASTOLIC BLOOD PRESSURE: 61 MMHG | HEART RATE: 92 BPM

## 2019-11-15 DIAGNOSIS — I50.22 CHRONIC SYSTOLIC HF (HEART FAILURE) (HCC): Primary | ICD-10-CM

## 2019-11-15 PROCEDURE — 93798 PHYS/QHP OP CAR RHAB W/ECG: CPT

## 2019-11-15 RX ORDER — OMEGA-3-ACID ETHYL ESTERS 1 G/1
1 CAPSULE, LIQUID FILLED ORAL 2 TIMES DAILY
Qty: 60 CAPSULE | Refills: 5 | Status: ON HOLD | OUTPATIENT
Start: 2019-11-15 | End: 2020-06-22

## 2019-11-18 ENCOUNTER — HOSPITAL ENCOUNTER (OUTPATIENT)
Dept: CARDIAC REHAB | Facility: HOSPITAL | Age: 69
Setting detail: THERAPIES SERIES
Discharge: HOME | End: 2019-11-18

## 2019-11-18 VITALS
SYSTOLIC BLOOD PRESSURE: 114 MMHG | DIASTOLIC BLOOD PRESSURE: 56 MMHG | HEIGHT: 62 IN | WEIGHT: 212 LBS | HEART RATE: 80 BPM | BODY MASS INDEX: 39.01 KG/M2

## 2019-11-18 DIAGNOSIS — I50.22 CHRONIC SYSTOLIC HF (HEART FAILURE) (HCC): Primary | ICD-10-CM

## 2019-11-18 PROCEDURE — 93798 PHYS/QHP OP CAR RHAB W/ECG: CPT

## 2019-11-19 ENCOUNTER — TELEPHONE (OUTPATIENT)
Dept: FAMILY MEDICINE CLINIC | Facility: CLINIC | Age: 69
End: 2019-11-19

## 2019-11-19 NOTE — TELEPHONE ENCOUNTER
Lexi called and said she has one pill left of Lovaza 1 mg twice a day.  I did not see on her med list, she wants to know if she is still supposed to be taking it and if so she needs it sent to [facility] in [place].  She asked if someone would call and let her know.  [phone]

## 2019-11-19 NOTE — TELEPHONE ENCOUNTER
I think Dr. Felipe started her on this, she needs to check with his office, is ok for her to be off for a few days until she can find out

## 2019-11-20 ENCOUNTER — HOSPITAL ENCOUNTER (OUTPATIENT)
Dept: CARDIAC REHAB | Facility: HOSPITAL | Age: 69
Setting detail: THERAPIES SERIES
Discharge: HOME | End: 2019-11-20

## 2019-11-20 VITALS
SYSTOLIC BLOOD PRESSURE: 129 MMHG | DIASTOLIC BLOOD PRESSURE: 64 MMHG | BODY MASS INDEX: 39.56 KG/M2 | WEIGHT: 215 LBS | HEART RATE: 79 BPM | HEIGHT: 62 IN

## 2019-11-20 DIAGNOSIS — I50.22 CHRONIC SYSTOLIC HF (HEART FAILURE) (HCC): Primary | ICD-10-CM

## 2019-11-20 PROCEDURE — 93798 PHYS/QHP OP CAR RHAB W/ECG: CPT

## 2019-11-25 ENCOUNTER — APPOINTMENT (OUTPATIENT)
Dept: CARDIAC REHAB | Facility: HOSPITAL | Age: 69
End: 2019-11-25

## 2019-11-27 ENCOUNTER — APPOINTMENT (OUTPATIENT)
Dept: CARDIAC REHAB | Facility: HOSPITAL | Age: 69
End: 2019-11-27

## 2019-12-02 ENCOUNTER — HOSPITAL ENCOUNTER (OUTPATIENT)
Dept: CARDIAC REHAB | Facility: HOSPITAL | Age: 69
Setting detail: THERAPIES SERIES
Discharge: HOME | End: 2019-12-02

## 2019-12-02 VITALS
WEIGHT: 212 LBS | SYSTOLIC BLOOD PRESSURE: 152 MMHG | HEART RATE: 80 BPM | BODY MASS INDEX: 39.01 KG/M2 | DIASTOLIC BLOOD PRESSURE: 73 MMHG | HEIGHT: 62 IN

## 2019-12-02 DIAGNOSIS — I50.22 CHRONIC SYSTOLIC HF (HEART FAILURE) (HCC): Primary | ICD-10-CM

## 2019-12-02 PROCEDURE — 93798 PHYS/QHP OP CAR RHAB W/ECG: CPT

## 2019-12-04 ENCOUNTER — HOSPITAL ENCOUNTER (OUTPATIENT)
Dept: CARDIAC REHAB | Facility: HOSPITAL | Age: 69
Setting detail: THERAPIES SERIES
Discharge: HOME | End: 2019-12-04

## 2019-12-04 VITALS — HEART RATE: 86 BPM | DIASTOLIC BLOOD PRESSURE: 57 MMHG | SYSTOLIC BLOOD PRESSURE: 150 MMHG

## 2019-12-04 DIAGNOSIS — I50.22 CHRONIC SYSTOLIC HF (HEART FAILURE) (HCC): Primary | ICD-10-CM

## 2019-12-04 PROCEDURE — 93798 PHYS/QHP OP CAR RHAB W/ECG: CPT

## 2019-12-06 ENCOUNTER — HOSPITAL ENCOUNTER (OUTPATIENT)
Dept: CARDIAC REHAB | Facility: HOSPITAL | Age: 69
Setting detail: THERAPIES SERIES
Discharge: HOME | End: 2019-12-06

## 2019-12-06 VITALS — HEART RATE: 86 BPM | SYSTOLIC BLOOD PRESSURE: 173 MMHG | DIASTOLIC BLOOD PRESSURE: 81 MMHG

## 2019-12-06 DIAGNOSIS — I50.22 CHRONIC SYSTOLIC HF (HEART FAILURE) (HCC): Primary | ICD-10-CM

## 2019-12-06 PROCEDURE — 93798 PHYS/QHP OP CAR RHAB W/ECG: CPT

## 2019-12-09 ENCOUNTER — HOSPITAL ENCOUNTER (OUTPATIENT)
Dept: CARDIAC REHAB | Facility: HOSPITAL | Age: 69
Setting detail: THERAPIES SERIES
Discharge: HOME | End: 2019-12-09

## 2019-12-09 VITALS — DIASTOLIC BLOOD PRESSURE: 67 MMHG | HEART RATE: 83 BPM | SYSTOLIC BLOOD PRESSURE: 142 MMHG

## 2019-12-09 DIAGNOSIS — I50.22 CHRONIC SYSTOLIC HF (HEART FAILURE) (HCC): Primary | ICD-10-CM

## 2019-12-09 PROCEDURE — 93798 PHYS/QHP OP CAR RHAB W/ECG: CPT

## 2019-12-10 RX ORDER — FUROSEMIDE 80 MG
80 TABLET ORAL DAILY
Qty: 90 TABLET | Refills: 3 | Status: SHIPPED | OUTPATIENT
Start: 2019-12-10 | End: 2020-10-13 | Stop reason: SDUPTHER

## 2019-12-11 ENCOUNTER — HOSPITAL ENCOUNTER (OUTPATIENT)
Dept: CARDIAC REHAB | Facility: HOSPITAL | Age: 69
Setting detail: THERAPIES SERIES
Discharge: HOME | End: 2019-12-11

## 2019-12-11 VITALS
BODY MASS INDEX: 39.87 KG/M2 | WEIGHT: 218 LBS | SYSTOLIC BLOOD PRESSURE: 149 MMHG | DIASTOLIC BLOOD PRESSURE: 72 MMHG | HEART RATE: 82 BPM

## 2019-12-11 DIAGNOSIS — I50.22 CHRONIC SYSTOLIC HF (HEART FAILURE) (HCC): Primary | ICD-10-CM

## 2019-12-11 PROCEDURE — 93798 PHYS/QHP OP CAR RHAB W/ECG: CPT

## 2019-12-13 ENCOUNTER — HOSPITAL ENCOUNTER (OUTPATIENT)
Dept: CARDIAC REHAB | Facility: HOSPITAL | Age: 69
Setting detail: THERAPIES SERIES
Discharge: HOME | End: 2019-12-13

## 2019-12-13 VITALS — HEART RATE: 91 BPM | DIASTOLIC BLOOD PRESSURE: 63 MMHG | SYSTOLIC BLOOD PRESSURE: 142 MMHG

## 2019-12-13 DIAGNOSIS — I50.22 CHRONIC SYSTOLIC HF (HEART FAILURE) (HCC): Primary | ICD-10-CM

## 2019-12-13 PROCEDURE — 93798 PHYS/QHP OP CAR RHAB W/ECG: CPT

## 2019-12-16 ENCOUNTER — HOSPITAL ENCOUNTER (OUTPATIENT)
Dept: CARDIAC REHAB | Facility: HOSPITAL | Age: 69
Setting detail: THERAPIES SERIES
Discharge: HOME | End: 2019-12-16

## 2019-12-16 VITALS
SYSTOLIC BLOOD PRESSURE: 139 MMHG | BODY MASS INDEX: 40.12 KG/M2 | HEART RATE: 83 BPM | WEIGHT: 218 LBS | DIASTOLIC BLOOD PRESSURE: 70 MMHG | HEIGHT: 62 IN

## 2019-12-16 DIAGNOSIS — I50.22 CHRONIC SYSTOLIC HF (HEART FAILURE) (HCC): Primary | ICD-10-CM

## 2019-12-16 PROCEDURE — 93798 PHYS/QHP OP CAR RHAB W/ECG: CPT

## 2019-12-18 ENCOUNTER — HOSPITAL ENCOUNTER (OUTPATIENT)
Dept: CARDIAC REHAB | Facility: HOSPITAL | Age: 69
Setting detail: THERAPIES SERIES
Discharge: HOME | End: 2019-12-18

## 2019-12-18 VITALS
SYSTOLIC BLOOD PRESSURE: 131 MMHG | BODY MASS INDEX: 39.69 KG/M2 | DIASTOLIC BLOOD PRESSURE: 70 MMHG | HEART RATE: 84 BPM | WEIGHT: 217 LBS

## 2019-12-18 DIAGNOSIS — I50.22 CHRONIC SYSTOLIC HF (HEART FAILURE) (HCC): Primary | ICD-10-CM

## 2019-12-18 PROCEDURE — 93798 PHYS/QHP OP CAR RHAB W/ECG: CPT

## 2019-12-20 ENCOUNTER — APPOINTMENT (OUTPATIENT)
Dept: CARDIAC REHAB | Facility: HOSPITAL | Age: 69
End: 2019-12-20

## 2019-12-27 ENCOUNTER — HOSPITAL ENCOUNTER (OUTPATIENT)
Dept: CARDIAC REHAB | Facility: HOSPITAL | Age: 69
Setting detail: THERAPIES SERIES
Discharge: HOME | End: 2019-12-27

## 2019-12-27 VITALS — HEART RATE: 85 BPM | DIASTOLIC BLOOD PRESSURE: 81 MMHG | SYSTOLIC BLOOD PRESSURE: 175 MMHG

## 2019-12-27 DIAGNOSIS — I50.22 CHRONIC SYSTOLIC HF (HEART FAILURE) (HCC): Primary | ICD-10-CM

## 2019-12-27 PROCEDURE — 93798 PHYS/QHP OP CAR RHAB W/ECG: CPT

## 2019-12-30 ENCOUNTER — HOSPITAL ENCOUNTER (OUTPATIENT)
Dept: CARDIAC REHAB | Facility: HOSPITAL | Age: 69
Setting detail: THERAPIES SERIES
Discharge: HOME | End: 2019-12-30

## 2019-12-30 VITALS
DIASTOLIC BLOOD PRESSURE: 69 MMHG | HEART RATE: 82 BPM | WEIGHT: 213 LBS | SYSTOLIC BLOOD PRESSURE: 148 MMHG | HEIGHT: 62 IN | BODY MASS INDEX: 39.2 KG/M2

## 2019-12-30 DIAGNOSIS — I50.22 CHRONIC SYSTOLIC HF (HEART FAILURE) (HCC): Primary | ICD-10-CM

## 2019-12-30 PROCEDURE — 93798 PHYS/QHP OP CAR RHAB W/ECG: CPT

## 2020-01-03 ENCOUNTER — APPOINTMENT (OUTPATIENT)
Dept: CARDIAC REHAB | Facility: HOSPITAL | Age: 70
End: 2020-01-03

## 2020-01-08 ENCOUNTER — APPOINTMENT (OUTPATIENT)
Dept: CARDIAC REHAB | Facility: HOSPITAL | Age: 70
End: 2020-01-08

## 2020-01-10 ENCOUNTER — APPOINTMENT (OUTPATIENT)
Dept: CARDIAC REHAB | Facility: HOSPITAL | Age: 70
End: 2020-01-10

## 2020-01-12 ENCOUNTER — HOSPITAL ENCOUNTER (EMERGENCY)
Facility: HOSPITAL | Age: 70
Discharge: HOME | End: 2020-01-12
Attending: EMERGENCY MEDICINE | Admitting: EMERGENCY MEDICINE

## 2020-01-12 ENCOUNTER — APPOINTMENT (OUTPATIENT)
Dept: GENERAL RADIOLOGY | Facility: HOSPITAL | Age: 70
End: 2020-01-12

## 2020-01-12 VITALS
HEART RATE: 94 BPM | RESPIRATION RATE: 20 BRPM | OXYGEN SATURATION: 92 % | DIASTOLIC BLOOD PRESSURE: 76 MMHG | BODY MASS INDEX: 40.37 KG/M2 | WEIGHT: 219.4 LBS | HEIGHT: 62 IN | SYSTOLIC BLOOD PRESSURE: 151 MMHG | TEMPERATURE: 98.5 F

## 2020-01-12 DIAGNOSIS — J40 BRONCHITIS: Primary | ICD-10-CM

## 2020-01-12 LAB
ALBUMIN SERPL-MCNC: 4 G/DL (ref 3.5–5.2)
ALBUMIN/GLOB SERPL: 1.1 G/DL
ALP SERPL-CCNC: 99 U/L (ref 39–117)
ALT SERPL W P-5'-P-CCNC: 26 U/L (ref 1–33)
ANION GAP SERPL CALCULATED.3IONS-SCNC: 15 MMOL/L (ref 5–15)
AST SERPL-CCNC: 22 U/L (ref 1–32)
BASOPHILS # BLD AUTO: 0.04 10*3/MM3 (ref 0–0.2)
BASOPHILS NFR BLD AUTO: 0.3 % (ref 0–1.5)
BILIRUB SERPL-MCNC: 0.7 MG/DL (ref 0.2–1.2)
BUN BLD-MCNC: 11 MG/DL (ref 8–23)
BUN/CREAT SERPL: 11.8 (ref 7–25)
CALCIUM SPEC-SCNC: 9.5 MG/DL (ref 8.6–10.5)
CHLORIDE SERPL-SCNC: 101 MMOL/L (ref 98–107)
CO2 SERPL-SCNC: 23 MMOL/L (ref 22–29)
CREAT BLD-MCNC: 0.93 MG/DL (ref 0.57–1)
D-LACTATE SERPL-SCNC: 1.4 MMOL/L (ref 0.5–2)
DEPRECATED RDW RBC AUTO: 47.1 FL (ref 37–54)
DIGOXIN SERPL-MCNC: 0.6 NG/ML (ref 0.6–1.2)
EOSINOPHIL # BLD AUTO: 0.18 10*3/MM3 (ref 0–0.4)
EOSINOPHIL NFR BLD AUTO: 1.2 % (ref 0.3–6.2)
ERYTHROCYTE [DISTWIDTH] IN BLOOD BY AUTOMATED COUNT: 15.5 % (ref 12.3–15.4)
GFR SERPL CREATININE-BSD FRML MDRD: 72 ML/MIN/1.73
GLOBULIN UR ELPH-MCNC: 3.5 GM/DL
GLUCOSE BLD-MCNC: 187 MG/DL (ref 65–99)
HCT VFR BLD AUTO: 32.4 % (ref 34–46.6)
HGB BLD-MCNC: 10.5 G/DL (ref 12–15.9)
HOLD SPECIMEN: NORMAL
HOLD SPECIMEN: NORMAL
IMM GRANULOCYTES # BLD AUTO: 0.07 10*3/MM3 (ref 0–0.05)
IMM GRANULOCYTES NFR BLD AUTO: 0.5 % (ref 0–0.5)
LYMPHOCYTES # BLD AUTO: 3.81 10*3/MM3 (ref 0.7–3.1)
LYMPHOCYTES NFR BLD AUTO: 25.3 % (ref 19.6–45.3)
MCH RBC QN AUTO: 27.1 PG (ref 26.6–33)
MCHC RBC AUTO-ENTMCNC: 32.4 G/DL (ref 31.5–35.7)
MCV RBC AUTO: 83.7 FL (ref 79–97)
MONOCYTES # BLD AUTO: 1.11 10*3/MM3 (ref 0.1–0.9)
MONOCYTES NFR BLD AUTO: 7.4 % (ref 5–12)
NEUTROPHILS # BLD AUTO: 9.86 10*3/MM3 (ref 1.7–7)
NEUTROPHILS NFR BLD AUTO: 65.3 % (ref 42.7–76)
NRBC BLD AUTO-RTO: 0 /100 WBC (ref 0–0.2)
NT-PROBNP SERPL-MCNC: 1339 PG/ML (ref 5–900)
PLATELET # BLD AUTO: 371 10*3/MM3 (ref 140–450)
PMV BLD AUTO: 9.1 FL (ref 6–12)
POTASSIUM BLD-SCNC: 3.5 MMOL/L (ref 3.5–5.2)
PROT SERPL-MCNC: 7.5 G/DL (ref 6–8.5)
RBC # BLD AUTO: 3.87 10*6/MM3 (ref 3.77–5.28)
SODIUM BLD-SCNC: 139 MMOL/L (ref 136–145)
TROPONIN T SERPL-MCNC: <0.01 NG/ML (ref 0–0.03)
WBC NRBC COR # BLD: 15.07 10*3/MM3 (ref 3.4–10.8)
WHOLE BLOOD HOLD SPECIMEN: NORMAL
WHOLE BLOOD HOLD SPECIMEN: NORMAL

## 2020-01-12 PROCEDURE — 96375 TX/PRO/DX INJ NEW DRUG ADDON: CPT

## 2020-01-12 PROCEDURE — 83880 ASSAY OF NATRIURETIC PEPTIDE: CPT | Performed by: EMERGENCY MEDICINE

## 2020-01-12 PROCEDURE — 80162 ASSAY OF DIGOXIN TOTAL: CPT | Performed by: EMERGENCY MEDICINE

## 2020-01-12 PROCEDURE — 93005 ELECTROCARDIOGRAM TRACING: CPT

## 2020-01-12 PROCEDURE — 25010000002 ONDANSETRON PER 1 MG: Performed by: EMERGENCY MEDICINE

## 2020-01-12 PROCEDURE — 96374 THER/PROPH/DIAG INJ IV PUSH: CPT

## 2020-01-12 PROCEDURE — 93010 ELECTROCARDIOGRAM REPORT: CPT | Performed by: INTERNAL MEDICINE

## 2020-01-12 PROCEDURE — 94799 UNLISTED PULMONARY SVC/PX: CPT

## 2020-01-12 PROCEDURE — 84484 ASSAY OF TROPONIN QUANT: CPT | Performed by: EMERGENCY MEDICINE

## 2020-01-12 PROCEDURE — 87040 BLOOD CULTURE FOR BACTERIA: CPT | Performed by: EMERGENCY MEDICINE

## 2020-01-12 PROCEDURE — 94640 AIRWAY INHALATION TREATMENT: CPT

## 2020-01-12 PROCEDURE — 83605 ASSAY OF LACTIC ACID: CPT | Performed by: EMERGENCY MEDICINE

## 2020-01-12 PROCEDURE — 71046 X-RAY EXAM CHEST 2 VIEWS: CPT

## 2020-01-12 PROCEDURE — 80053 COMPREHEN METABOLIC PANEL: CPT | Performed by: EMERGENCY MEDICINE

## 2020-01-12 PROCEDURE — 93005 ELECTROCARDIOGRAM TRACING: CPT | Performed by: EMERGENCY MEDICINE

## 2020-01-12 PROCEDURE — 99284 EMERGENCY DEPT VISIT MOD MDM: CPT

## 2020-01-12 PROCEDURE — 85025 COMPLETE CBC W/AUTO DIFF WBC: CPT | Performed by: EMERGENCY MEDICINE

## 2020-01-12 PROCEDURE — 25010000002 MORPHINE PER 10 MG: Performed by: EMERGENCY MEDICINE

## 2020-01-12 RX ORDER — IPRATROPIUM BROMIDE AND ALBUTEROL SULFATE 2.5; .5 MG/3ML; MG/3ML
3 SOLUTION RESPIRATORY (INHALATION) ONCE
Status: COMPLETED | OUTPATIENT
Start: 2020-01-12 | End: 2020-01-12

## 2020-01-12 RX ORDER — ONDANSETRON 2 MG/ML
4 INJECTION INTRAMUSCULAR; INTRAVENOUS ONCE
Status: COMPLETED | OUTPATIENT
Start: 2020-01-12 | End: 2020-01-12

## 2020-01-12 RX ORDER — DOXYCYCLINE 100 MG/1
100 CAPSULE ORAL ONCE
Status: COMPLETED | OUTPATIENT
Start: 2020-01-12 | End: 2020-01-12

## 2020-01-12 RX ORDER — DOXYCYCLINE 100 MG/1
100 CAPSULE ORAL 2 TIMES DAILY
Qty: 16 CAPSULE | Refills: 0 | Status: SHIPPED | OUTPATIENT
Start: 2020-01-12 | End: 2020-04-10

## 2020-01-12 RX ADMIN — ONDANSETRON 4 MG: 2 INJECTION INTRAMUSCULAR; INTRAVENOUS at 16:16

## 2020-01-12 RX ADMIN — DOXYCYCLINE 100 MG: 100 CAPSULE ORAL at 17:32

## 2020-01-12 RX ADMIN — MORPHINE SULFATE 4 MG: 4 INJECTION, SOLUTION INTRAMUSCULAR; INTRAVENOUS at 16:16

## 2020-01-12 RX ADMIN — IPRATROPIUM BROMIDE AND ALBUTEROL SULFATE 3 ML: 2.5; .5 SOLUTION RESPIRATORY (INHALATION) at 14:52

## 2020-01-12 NOTE — ED NOTES
Pt presents to ED with C/O with cough X2 weeks ago and SOA X1 week ago.     Sue Huynh, RN  01/12/20 [id number]

## 2020-01-12 NOTE — ED PROVIDER NOTES
Subjective   29 years old female with history of nonischemic cardiomyopathy, hypertension, diabetes mellitus, GERD presented in the ER with 2 weeks history of intermittent cough productive of yellow sputum but gradually worsening associated with shortness of breath with no relief after using of inhalers.  She is also having nasal/sinus congestion and sore throat.  No chest pain.      History provided by:  Patient  Cough   Cough characteristics:  Productive  Sputum characteristics:  Yellow  Severity:  Moderate  Onset quality:  Gradual  Duration:  2 weeks  Timing:  Constant  Progression:  Worsening  Chronicity:  New  Smoker: no    Relieved by:  Nothing  Worsened by:  Nothing  Ineffective treatments:  Beta-agonist inhaler  Associated symptoms: shortness of breath, sinus congestion and sore throat    Associated symptoms: no chest pain, no ear pain, no headaches, no rash and no wheezing        Review of Systems   Constitutional: Positive for fatigue.   HENT: Positive for congestion and sore throat. Negative for ear pain.    Respiratory: Positive for cough, chest tightness and shortness of breath. Negative for wheezing.    Cardiovascular: Negative for chest pain.   Gastrointestinal: Negative for abdominal pain, nausea and vomiting.   Genitourinary: Negative for flank pain.   Skin: Negative for rash.   Neurological: Negative for syncope and headaches.   Psychiatric/Behavioral: Negative for agitation.       Past Medical History:   Diagnosis Date   • Artificial lens present     IN  POSITION - BOTH   • Borderline glaucoma    • Cardiomyopathy (CMS/HCC)    • Congestive heart failure (CMS/HCC)    • Coronary artery disease    • Diabetes mellitus (CMS/HCC)     IMPROVING   • Essential hypertension    • GERD (gastroesophageal reflux disease)    • History of bone density study 09/25/2015    DXA BONE DENSITY AXIAL [address] [facility] (Screening for osteoporosis)    • History of echocardiogram 03/25/2013    Echocadiogram W/ color flow  81077 (Mild left atrial enlargement wiht mild LV enlargement w/mild concentric LVH. Global hypokinesis. EF 30%. Mitral and Av thickened. Aortic sclerosis. Severe mitral regurg. mild tricuspid regurg. Mild right atrial enlargement noted. Pacemaker wire noted)   03/25/2013 RACHELE NATALIIA    • History of mammography, diagnostic 09/17/2014    [place] [address] [facility] (Microcalcifications of the breast) , Reason: s/p mammotome bx right side for benign disease   • History of mammography, diagnostic 07/24/2014    [place]AMMOGRAPHY [place] [address] [facility] (Abnormal mammogram, unspecified)    • History of mammography, screening 07/03/2012    BENIGN   • Hypercholesterolemia    • Low back pain    • Menopausal syndrome 10/26/2017   • Microcalcifications of the breast     BENIGN CHANGES   • Need for prophylactic vaccination against Streptococcus pneumoniae (pneumococcus)    • Neurologic disorder associated with diabetes mellitus (CMS/HCC)    • Obesity    • Type 2 diabetes mellitus (CMS/HCC)     NO BDR   • Upper respiratory infection    • Vaginal bleeding 7/27/2017   • Vitamin D deficiency        Allergies   Allergen Reactions   • Aldactone [Spironolactone]    • Nsaids        Past Surgical History:   Procedure Laterality Date   • CARDIAC CATHETERIZATION  10/28/2010    Cardiac cath 73273 (Noevidence of any obstructive epicardial coronary artery disease noted. Severe left ventricular dysfunction with an EF of 25%)   • CARDIAC CATHETERIZATION  01/31/2002    Cardiac cath 52863 (No significant coronary atherosclerosis. Probably mild left ventricular systolic dysfunction.)   • CARDIAC CATHETERIZATION N/A 8/23/2018    Procedure: Left Heart Cath;  Surgeon: Wang Felipe MD;  Location: [facility] CATH INVASIVE LOCATION;  Service: Cardiology   • CARDIAC PACEMAKER PLACEMENT     • CATARACT EXTRACTION  01/28/2014    Remove cataract, insert lens (Right eye)   • CATARACT EXTRACTION  11/22/2011    Remove cataract, insert  lens (Left eye)   • CATARACT EXTRACTION     • CENTRAL VENOUS LINE INSERTION  05/20/2016    Central line insertion (Successful placement of right upper extremity midline.)   • COLONOSCOPY  12/14/2004    Single small non-bleeding polyp in the rectum. The polyp was removed by hot biopsy polypectomy   • COLONOSCOPY N/A 6/14/2017    Procedure: COLONOSCOPY;  Surgeon: Orville Farias MD;  Location: [facility] ENDOSCOPY;  Service:    • MAMMO BILATERAL  09/17/2014    MAMMOGRAPHY [place] [address] [facility] (Microcalcifications of the breast) , Reason: s/p mammotome bx right side for benign disease   • MAMMO BILATERAL  07/24/2014    MAMMOGRAPHY DIAGNOSTIC UNILAT RT [address] [facility] (Abnormal mammogram, unspecified)    • OTHER SURGICAL HISTORY  11/02/2012    DEBRIDE NAIL 6 OR MORE 12448 (Onychomycosis)    • OTHER SURGICAL HISTORY  04/27/2016    EXTENDED VISUAL FIELDS STUDY 08746 (Open angle with borderline findings, low risk, unspecified eye)    • OTHER SURGICAL HISTORY      EXTENDED VISUAL FIELDS STUDY 67551 (Borderline glaucoma)  (2): 10/02/2014, 04/17/2013   • OTHER SURGICAL HISTORY      OCT DISC NFL 45194 (Borderline glaucoma)  (2): 02/02/2015, 08/21/2013   • PACEMAKER IMPLANTATION  2007   • PAP SMEAR  06/10/2010    NEGATIVE   • TOTAL ABDOMINAL HYSTERECTOMY WITH SALPINGO OOPHORECTOMY  09/16/2013    Lysis of adhesions.       Family History   Problem Relation Age of Onset   • Heart disease Other    • Hypertension Other    • Alzheimer's disease Other    • Diabetes Sister         x 2   • Hypertension Sister    • Hyperlipidemia Sister    • Bone cancer Brother        Social History     Socioeconomic History   • Marital status: Single     Spouse name: Not on file   • Number of children: Not on file   • Years of education: Not on file   • Highest education level: Not on file   Tobacco Use   • Smoking status: Never Smoker   • Smokeless tobacco: Never Used   Substance and Sexual Activity   • Alcohol use: No   • Drug use: No    • Sexual activity: Defer           Objective   Physical Exam   Constitutional: She is oriented to person, place, and time. She appears well-developed and well-nourished.   HENT:   Head: Atraumatic.   Mouth/Throat: Oropharynx is clear and moist.   Eyes: EOM are normal.   Neck: Normal range of motion. Neck supple.   Cardiovascular: Normal rate, regular rhythm and normal heart sounds.   Pulmonary/Chest: Effort normal. She has no decreased breath sounds. She has no wheezes. She has no rales.   Abdominal: Soft. Bowel sounds are normal.   Musculoskeletal: Normal range of motion.        Right lower leg: Normal.        Left lower leg: Normal.   Neurological: She is alert and oriented to person, place, and time.   Skin: Skin is warm and dry. Capillary refill takes less than 2 seconds.   Psychiatric: She has a normal mood and affect.   Nursing note and vitals reviewed.      ECG 12 Lead    Date/Time: 1/12/2020 5:13 PM  Performed by: Santos Schmidt MD  Authorized by: Santos Schmidt MD   Interpreted by physician  Rhythm: sinus tachycardia  Rate: tachycardic  BPM: 102  QRS axis: left  T Waves: T waves normal  Other findings: PRWP  Clinical impression: abnormal ECG  Comments: Q wave inferolateral leads                 ED Course                      [place] Coma Scale Score: 15                          MDM  Number of Diagnoses or Management Options  Bronchitis:   Diagnosis management comments: She is given breathing treatment and steroid while in the ER.  Ruled out pneumonia.  Patient is feeling better on reevaluation.  Troponin and EKG are nonspecific.  Symptoms are more of a infectious etiology and has been going on for 2 weeks I will treat her with doxycycline along with a short course of steroid.  patient is not in any distress and do not think she needs admission and is stable for discharge with outpatient follow-up.    Labs Reviewed   COMPREHENSIVE METABOLIC PANEL - Abnormal; Notable for the following components:       Result  Value    Glucose 187 (*)     All other components within normal limits    Narrative:     GFR Normal >60  Chronic Kidney Disease <60  Kidney Failure <15     BNP (IN-HOUSE) - Abnormal; Notable for the following components:    proBNP 1,339.0 (*)     All other components within normal limits    Narrative:     Among patients with dyspnea, NT-proBNP is highly sensitive for the detection of acute congestive heart failure. In addition NT-proBNP of <300 pg/ml effectively rules out acute congestive heart failure with 99% negative predictive value.    Results may be falsely decreased if patient taking Biotin.     CBC WITH AUTO DIFFERENTIAL - Abnormal; Notable for the following components:    WBC 15.07 (*)     Hemoglobin 10.5 (*)     Hematocrit 32.4 (*)     RDW 15.5 (*)     Neutrophils, Absolute 9.86 (*)     Lymphocytes, Absolute 3.81 (*)     Monocytes, Absolute 1.11 (*)     Immature Grans, Absolute 0.07 (*)     All other components within normal limits   DIGOXIN LEVEL - Normal    Narrative:     Results may be falsely increased if patient taking Biotin.     TROPONIN (IN-HOUSE) - Normal    Narrative:     Troponin T Reference Range:  <= 0.03 ng/mL-   Negative for AMI  >0.03 ng/mL-     Abnormal for myocardial necrosis.  Clinicians would have to utilize clinical acumen, EKG, Troponin and serial changes to determine if it is an Acute Myocardial Infarction or myocardial injury due to an underlying chronic condition.       Results may be falsely decreased if patient taking Biotin.     LACTIC ACID, PLASMA - Normal   BLOOD CULTURE   BLOOD CULTURE   RAINBOW DRAW    Narrative:     The following orders were created for panel order [place] Draw.  Procedure                               Abnormality         Status                     ---------                               -----------         ------                     Light Blue Top[[id number]]                                   Final result               Green Top (Gel)[[id number]]                                   Final result               Lavender Top[[id number]]                                     Final result               Gold Top - SST[[id number]]                                   Final result                 Please view results for these tests on the individual orders.   CBC AND DIFFERENTIAL    Narrative:     The following orders were created for panel order CBC & Differential.  Procedure                               Abnormality         Status                     ---------                               -----------         ------                     CBC Auto Differential[[id number]]        Abnormal            Final result                 Please view results for these tests on the individual orders.   LIGHT BLUE TOP   GREEN TOP   LAVENDER TOP   GOLD TOP - SST       Xr Chest 2 View    Result Date: 1/12/2020  Narrative: Cough. Chest, AP and lateral views. Comparison is made with study dated to August 23, 2019. There is mild cardiomegaly. The pulmonary vasculature is slightly prominent. There are mild bilateral interstitial markings. No pleural effusion is identified. The pacemaker is unchanged. No acute bony abnormality is seen.     Impression: CONCLUSION: Cardiomegaly and prominent pulmonary vasculature. Suspect pulmonary venous hypertension Electronically signed by:  Juan Espinoza MD  1/12/2020 2:51 PM [facility] Workstation: [id number]        Final diagnoses:   Bronchitis            Santos Schmidt MD  01/12/20 8201

## 2020-01-12 NOTE — DISCHARGE INSTRUCTIONS
Continue with inhaler.  Follow-up with primary care physician for reevaluation.  Return to ER for any worsening.

## 2020-01-12 NOTE — ED NOTES
Pt informed that she is being discharged and to call her nephew for a ride.      Sue Huynh RN  01/12/20 8723

## 2020-01-12 NOTE — ED NOTES
Pt being discharged to ED waiting room, waiting for nephew to pick her up. Paperwork given to Lino cha Taylor Danielle, RN  01/12/20 6073

## 2020-01-13 ENCOUNTER — EPISODE CHANGES (OUTPATIENT)
Dept: CASE MANAGEMENT | Facility: OTHER | Age: 70
End: 2020-01-13

## 2020-01-13 ENCOUNTER — APPOINTMENT (OUTPATIENT)
Dept: CARDIAC REHAB | Facility: HOSPITAL | Age: 70
End: 2020-01-13

## 2020-01-17 LAB
BACTERIA SPEC AEROBE CULT: NORMAL
BACTERIA SPEC AEROBE CULT: NORMAL

## 2020-01-20 ENCOUNTER — HOSPITAL ENCOUNTER (OUTPATIENT)
Dept: CARDIAC REHAB | Facility: HOSPITAL | Age: 70
Setting detail: THERAPIES SERIES
Discharge: HOME | End: 2020-01-20

## 2020-01-20 VITALS
WEIGHT: 215 LBS | HEIGHT: 62 IN | BODY MASS INDEX: 39.56 KG/M2 | SYSTOLIC BLOOD PRESSURE: 126 MMHG | HEART RATE: 89 BPM | DIASTOLIC BLOOD PRESSURE: 70 MMHG

## 2020-01-20 DIAGNOSIS — I50.22 CHRONIC SYSTOLIC HF (HEART FAILURE) (HCC): Primary | ICD-10-CM

## 2020-01-20 PROCEDURE — 93798 PHYS/QHP OP CAR RHAB W/ECG: CPT

## 2020-01-22 ENCOUNTER — HOSPITAL ENCOUNTER (OUTPATIENT)
Dept: CARDIAC REHAB | Facility: HOSPITAL | Age: 70
Setting detail: THERAPIES SERIES
Discharge: HOME | End: 2020-01-22

## 2020-01-22 VITALS
HEART RATE: 92 BPM | WEIGHT: 211 LBS | SYSTOLIC BLOOD PRESSURE: 156 MMHG | DIASTOLIC BLOOD PRESSURE: 72 MMHG | BODY MASS INDEX: 38.59 KG/M2

## 2020-01-22 DIAGNOSIS — I50.22 CHRONIC SYSTOLIC HF (HEART FAILURE) (HCC): Primary | ICD-10-CM

## 2020-01-22 PROCEDURE — 93798 PHYS/QHP OP CAR RHAB W/ECG: CPT

## 2020-01-24 ENCOUNTER — HOSPITAL ENCOUNTER (OUTPATIENT)
Dept: CARDIAC REHAB | Facility: HOSPITAL | Age: 70
Setting detail: THERAPIES SERIES
Discharge: HOME | End: 2020-01-24

## 2020-01-24 ENCOUNTER — TELEPHONE (OUTPATIENT)
Dept: FAMILY MEDICINE CLINIC | Facility: CLINIC | Age: 70
End: 2020-01-24

## 2020-01-24 VITALS — SYSTOLIC BLOOD PRESSURE: 140 MMHG | HEART RATE: 91 BPM | DIASTOLIC BLOOD PRESSURE: 73 MMHG

## 2020-01-24 DIAGNOSIS — I50.22 CHRONIC SYSTOLIC HF (HEART FAILURE) (HCC): Primary | ICD-10-CM

## 2020-01-24 PROCEDURE — 93798 PHYS/QHP OP CAR RHAB W/ECG: CPT

## 2020-01-24 NOTE — TELEPHONE ENCOUNTER
Lexi called and requested a refill on inhaler, she said Dr Plata had prescribed for her in the past and when she went to use it last night she was out.  She said she only uses it when needed and she wants it sent to [facility] in [place].

## 2020-01-27 ENCOUNTER — TELEPHONE (OUTPATIENT)
Dept: FAMILY MEDICINE CLINIC | Facility: CLINIC | Age: 70
End: 2020-01-27

## 2020-01-27 ENCOUNTER — APPOINTMENT (OUTPATIENT)
Dept: CARDIAC REHAB | Facility: HOSPITAL | Age: 70
End: 2020-01-27

## 2020-01-27 RX ORDER — ALBUTEROL SULFATE 90 UG/1
2 AEROSOL, METERED RESPIRATORY (INHALATION) EVERY 4 HOURS PRN
Qty: 18 G | Refills: 1 | Status: SHIPPED | OUTPATIENT
Start: 2020-01-27 | End: 2020-03-16

## 2020-01-27 NOTE — TELEPHONE ENCOUNTER
Yes, I have called MsTorie Mauro and the script has been sent to her pharmacy  Sorry, I missed the message when I was asked earlier today.

## 2020-01-27 NOTE — TELEPHONE ENCOUNTER
I have talked to Ms. Wade and she is requesting her Albuterol Inhaler  Refill has been sent to [facility] here in town.

## 2020-01-29 ENCOUNTER — HOSPITAL ENCOUNTER (OUTPATIENT)
Dept: CARDIAC REHAB | Facility: HOSPITAL | Age: 70
Setting detail: THERAPIES SERIES
Discharge: HOME | End: 2020-01-29

## 2020-01-29 VITALS
WEIGHT: 209 LBS | DIASTOLIC BLOOD PRESSURE: 78 MMHG | HEART RATE: 84 BPM | SYSTOLIC BLOOD PRESSURE: 158 MMHG | BODY MASS INDEX: 38.46 KG/M2 | HEIGHT: 62 IN

## 2020-01-29 DIAGNOSIS — I50.22 CHRONIC SYSTOLIC HF (HEART FAILURE) (HCC): Primary | ICD-10-CM

## 2020-01-29 PROCEDURE — 93798 PHYS/QHP OP CAR RHAB W/ECG: CPT

## 2020-01-31 ENCOUNTER — APPOINTMENT (OUTPATIENT)
Dept: CARDIAC REHAB | Facility: HOSPITAL | Age: 70
End: 2020-01-31

## 2020-02-03 ENCOUNTER — HOSPITAL ENCOUNTER (OUTPATIENT)
Dept: CARDIAC REHAB | Facility: HOSPITAL | Age: 70
Setting detail: THERAPIES SERIES
Discharge: HOME | End: 2020-02-03

## 2020-02-03 VITALS
HEART RATE: 90 BPM | WEIGHT: 213 LBS | DIASTOLIC BLOOD PRESSURE: 65 MMHG | HEIGHT: 62 IN | BODY MASS INDEX: 39.2 KG/M2 | SYSTOLIC BLOOD PRESSURE: 140 MMHG

## 2020-02-03 DIAGNOSIS — I50.22 CHRONIC SYSTOLIC HF (HEART FAILURE) (HCC): Primary | ICD-10-CM

## 2020-02-03 PROCEDURE — 93798 PHYS/QHP OP CAR RHAB W/ECG: CPT

## 2020-02-04 ENCOUNTER — PATIENT OUTREACH (OUTPATIENT)
Dept: CASE MANAGEMENT | Facility: OTHER | Age: 70
End: 2020-02-04

## 2020-02-04 NOTE — OUTREACH NOTE
"Patient Outreach Note    ACM received a return call from the patient. She stated \"I'm doing fine\". She stated she has almost completed the antibiotics prescribed in ED on 1- for bronchitis;educated these need to be completed. She denied needs and was not engaged in the call. Provided patient with my contact information for needs.     Candice Emerson RN  Ambulatory     2/4/2020, 3:46 PM      "

## 2020-02-05 ENCOUNTER — APPOINTMENT (OUTPATIENT)
Dept: CARDIAC REHAB | Facility: HOSPITAL | Age: 70
End: 2020-02-05

## 2020-02-07 ENCOUNTER — HOSPITAL ENCOUNTER (OUTPATIENT)
Dept: CARDIAC REHAB | Facility: HOSPITAL | Age: 70
Setting detail: THERAPIES SERIES
Discharge: HOME | End: 2020-02-07

## 2020-02-07 VITALS — SYSTOLIC BLOOD PRESSURE: 144 MMHG | DIASTOLIC BLOOD PRESSURE: 71 MMHG | HEART RATE: 86 BPM

## 2020-02-07 DIAGNOSIS — I50.22 CHRONIC SYSTOLIC HF (HEART FAILURE) (HCC): Primary | ICD-10-CM

## 2020-02-07 PROCEDURE — 93798 PHYS/QHP OP CAR RHAB W/ECG: CPT

## 2020-02-10 ENCOUNTER — APPOINTMENT (OUTPATIENT)
Dept: CARDIAC REHAB | Facility: HOSPITAL | Age: 70
End: 2020-02-10

## 2020-02-12 ENCOUNTER — HOSPITAL ENCOUNTER (OUTPATIENT)
Dept: CARDIAC REHAB | Facility: HOSPITAL | Age: 70
Setting detail: THERAPIES SERIES
Discharge: HOME | End: 2020-02-12

## 2020-02-12 VITALS
SYSTOLIC BLOOD PRESSURE: 171 MMHG | WEIGHT: 211 LBS | HEART RATE: 93 BPM | DIASTOLIC BLOOD PRESSURE: 80 MMHG | BODY MASS INDEX: 38.59 KG/M2

## 2020-02-12 DIAGNOSIS — I50.22 CHRONIC SYSTOLIC HF (HEART FAILURE) (HCC): Primary | ICD-10-CM

## 2020-02-12 PROCEDURE — 93798 PHYS/QHP OP CAR RHAB W/ECG: CPT

## 2020-02-17 ENCOUNTER — HOSPITAL ENCOUNTER (OUTPATIENT)
Dept: CARDIAC REHAB | Facility: HOSPITAL | Age: 70
Setting detail: THERAPIES SERIES
Discharge: HOME | End: 2020-02-17

## 2020-02-17 VITALS
SYSTOLIC BLOOD PRESSURE: 133 MMHG | HEART RATE: 87 BPM | HEIGHT: 62 IN | BODY MASS INDEX: 38.64 KG/M2 | DIASTOLIC BLOOD PRESSURE: 61 MMHG | WEIGHT: 210 LBS

## 2020-02-17 DIAGNOSIS — I50.22 CHRONIC SYSTOLIC HF (HEART FAILURE) (HCC): Primary | ICD-10-CM

## 2020-02-17 PROCEDURE — 93798 PHYS/QHP OP CAR RHAB W/ECG: CPT

## 2020-02-19 ENCOUNTER — HOSPITAL ENCOUNTER (OUTPATIENT)
Dept: CARDIAC REHAB | Facility: HOSPITAL | Age: 70
Setting detail: THERAPIES SERIES
Discharge: HOME | End: 2020-02-19

## 2020-02-19 VITALS
SYSTOLIC BLOOD PRESSURE: 124 MMHG | WEIGHT: 212 LBS | HEART RATE: 80 BPM | DIASTOLIC BLOOD PRESSURE: 64 MMHG | BODY MASS INDEX: 38.78 KG/M2

## 2020-02-19 DIAGNOSIS — I50.22 CHRONIC SYSTOLIC HF (HEART FAILURE) (HCC): Primary | ICD-10-CM

## 2020-02-19 PROCEDURE — 93798 PHYS/QHP OP CAR RHAB W/ECG: CPT

## 2020-02-21 ENCOUNTER — HOSPITAL ENCOUNTER (OUTPATIENT)
Dept: CARDIAC REHAB | Facility: HOSPITAL | Age: 70
Setting detail: THERAPIES SERIES
Discharge: HOME | End: 2020-02-21

## 2020-02-21 VITALS
DIASTOLIC BLOOD PRESSURE: 60 MMHG | BODY MASS INDEX: 38.23 KG/M2 | WEIGHT: 209 LBS | SYSTOLIC BLOOD PRESSURE: 128 MMHG | HEART RATE: 84 BPM

## 2020-02-21 DIAGNOSIS — I50.22 CHRONIC SYSTOLIC HF (HEART FAILURE) (HCC): Primary | ICD-10-CM

## 2020-02-21 PROCEDURE — 93798 PHYS/QHP OP CAR RHAB W/ECG: CPT

## 2020-02-24 ENCOUNTER — HOSPITAL ENCOUNTER (OUTPATIENT)
Dept: CARDIAC REHAB | Facility: HOSPITAL | Age: 70
Setting detail: THERAPIES SERIES
Discharge: HOME | End: 2020-02-24

## 2020-02-24 ENCOUNTER — TELEPHONE (OUTPATIENT)
Dept: FAMILY MEDICINE CLINIC | Facility: CLINIC | Age: 70
End: 2020-02-24

## 2020-02-24 VITALS
HEIGHT: 62 IN | DIASTOLIC BLOOD PRESSURE: 65 MMHG | BODY MASS INDEX: 38.83 KG/M2 | SYSTOLIC BLOOD PRESSURE: 138 MMHG | WEIGHT: 211 LBS | HEART RATE: 87 BPM

## 2020-02-24 DIAGNOSIS — E11.8 TYPE 2 DIABETES MELLITUS WITH COMPLICATION, WITHOUT LONG-TERM CURRENT USE OF INSULIN (HCC): Primary | Chronic | ICD-10-CM

## 2020-02-24 DIAGNOSIS — I50.22 CHRONIC SYSTOLIC HF (HEART FAILURE) (HCC): Primary | ICD-10-CM

## 2020-02-24 PROCEDURE — 93798 PHYS/QHP OP CAR RHAB W/ECG: CPT

## 2020-02-24 NOTE — TELEPHONE ENCOUNTER
Lexi has an apt to see you in April and supposed to do non fasting labs.  Can you please order these?

## 2020-02-26 ENCOUNTER — HOSPITAL ENCOUNTER (OUTPATIENT)
Dept: CARDIAC REHAB | Facility: HOSPITAL | Age: 70
Setting detail: THERAPIES SERIES
Discharge: HOME | End: 2020-02-26

## 2020-02-26 VITALS — DIASTOLIC BLOOD PRESSURE: 70 MMHG | SYSTOLIC BLOOD PRESSURE: 140 MMHG | HEART RATE: 89 BPM

## 2020-02-26 DIAGNOSIS — I50.22 CHRONIC SYSTOLIC HF (HEART FAILURE) (HCC): Primary | ICD-10-CM

## 2020-02-26 PROCEDURE — 93798 PHYS/QHP OP CAR RHAB W/ECG: CPT

## 2020-03-02 ENCOUNTER — HOSPITAL ENCOUNTER (OUTPATIENT)
Dept: CARDIAC REHAB | Facility: HOSPITAL | Age: 70
Setting detail: THERAPIES SERIES
Discharge: HOME | End: 2020-03-02

## 2020-03-02 ENCOUNTER — TELEPHONE (OUTPATIENT)
Dept: FAMILY MEDICINE CLINIC | Facility: CLINIC | Age: 70
End: 2020-03-02

## 2020-03-02 VITALS
HEART RATE: 83 BPM | DIASTOLIC BLOOD PRESSURE: 66 MMHG | HEIGHT: 62 IN | SYSTOLIC BLOOD PRESSURE: 121 MMHG | WEIGHT: 213 LBS | BODY MASS INDEX: 39.2 KG/M2

## 2020-03-02 DIAGNOSIS — I50.22 CHRONIC SYSTOLIC HF (HEART FAILURE) (HCC): Primary | ICD-10-CM

## 2020-03-02 PROCEDURE — 93798 PHYS/QHP OP CAR RHAB W/ECG: CPT

## 2020-03-09 RX ORDER — PANTOPRAZOLE SODIUM 40 MG/1
40 TABLET, DELAYED RELEASE ORAL DAILY
Qty: 90 TABLET | Refills: 1 | Status: ON HOLD | OUTPATIENT
Start: 2020-03-09 | End: 2022-01-19 | Stop reason: SDUPTHER

## 2020-03-16 RX ORDER — ALBUTEROL SULFATE 90 UG/1
AEROSOL, METERED RESPIRATORY (INHALATION)
Qty: 18 INHALER | Refills: 1 | Status: SHIPPED | OUTPATIENT
Start: 2020-03-16 | End: 2020-04-10 | Stop reason: SDUPTHER

## 2020-04-01 DIAGNOSIS — E11.49 NEUROLOGIC DISORDER ASSOCIATED WITH DIABETES MELLITUS (HCC): Chronic | ICD-10-CM

## 2020-04-02 RX ORDER — DIGOXIN 125 MCG
TABLET ORAL
Qty: 90 TABLET | Refills: 3 | Status: SHIPPED | OUTPATIENT
Start: 2020-04-02 | End: 2020-11-23

## 2020-04-02 RX ORDER — PREGABALIN 150 MG/1
CAPSULE ORAL
Qty: 180 CAPSULE | Refills: 1 | Status: SHIPPED | OUTPATIENT
Start: 2020-04-02 | End: 2020-10-13 | Stop reason: SDUPTHER

## 2020-04-02 RX ORDER — DIGOXIN 125 MCG
125 TABLET ORAL DAILY
Qty: 90 TABLET | Refills: 1 | Status: SHIPPED | OUTPATIENT
Start: 2020-04-02 | End: 2020-06-22 | Stop reason: HOSPADM

## 2020-04-10 ENCOUNTER — OFFICE VISIT (OUTPATIENT)
Dept: FAMILY MEDICINE CLINIC | Facility: CLINIC | Age: 70
End: 2020-04-10

## 2020-04-10 VITALS — WEIGHT: 205 LBS | HEIGHT: 62 IN | BODY MASS INDEX: 37.73 KG/M2

## 2020-04-10 DIAGNOSIS — E11.8 TYPE 2 DIABETES MELLITUS WITH COMPLICATION, WITHOUT LONG-TERM CURRENT USE OF INSULIN (HCC): Chronic | ICD-10-CM

## 2020-04-10 DIAGNOSIS — E78.2 MIXED HYPERLIPIDEMIA: Chronic | ICD-10-CM

## 2020-04-10 DIAGNOSIS — I10 ESSENTIAL HYPERTENSION: Primary | Chronic | ICD-10-CM

## 2020-04-10 PROCEDURE — 99442 PR PHYS/QHP TELEPHONE EVALUATION 11-20 MIN: CPT | Performed by: GENERAL PRACTICE

## 2020-04-10 RX ORDER — LISINOPRIL 10 MG/1
10 TABLET ORAL DAILY
Qty: 90 TABLET | Refills: 3 | Status: SHIPPED | OUTPATIENT
Start: 2020-04-10 | End: 2021-03-22

## 2020-04-10 RX ORDER — ALBUTEROL SULFATE 90 UG/1
2 AEROSOL, METERED RESPIRATORY (INHALATION) EVERY 4 HOURS PRN
Qty: 18 INHALER | Refills: 1 | Status: SHIPPED | OUTPATIENT
Start: 2020-04-10 | End: 2020-07-29

## 2020-04-10 NOTE — PROGRESS NOTES
Subjective   Lexi Wade is a 69 y.o. female.   Chief Complaint   Patient presents with   • Diabetes     For review and evaluation of management of chronic medical problems. Unable to do labs due to coronavirus.     Diabetes   She presents for her follow-up diabetic visit. She has type 2 diabetes mellitus. Her disease course has been stable. Pertinent negatives for hypoglycemia include no dizziness, headaches or nervousness/anxiousness. There are no diabetic associated symptoms. Pertinent negatives for diabetes include no chest pain, no fatigue and no weakness. Diabetic complications include heart disease. Risk factors for coronary artery disease include dyslipidemia, diabetes mellitus and hypertension. Current diabetic treatment includes oral agent (dual therapy) (victoza). She is compliant with treatment all of the time. She is following a generally healthy diet. When asked about meal planning, she reported none. She rarely participates in exercise. There is no change in her home blood glucose trend. An ACE inhibitor/angiotensin II receptor blocker is being taken. Eye exam is current.   Hypertension   The current episode started more than 1 year ago. The problem is unchanged. The problem is controlled. Pertinent negatives include no chest pain, headaches, neck pain, palpitations or shortness of breath. There are no associated agents to hypertension. Risk factors for coronary artery disease include diabetes mellitus, dyslipidemia and obesity. Current antihypertension treatment includes beta blockers, ACE inhibitors and diuretics. The current treatment provides significant improvement. There are no compliance problems.    Hyperlipidemia   This is a chronic problem. The current episode started more than 1 year ago. The problem is controlled. Recent lipid tests were reviewed and are normal. Exacerbating diseases include diabetes. There are no known factors aggravating her hyperlipidemia. Associated symptoms  include myalgias. Pertinent negatives include no chest pain or shortness of breath. Current antihyperlipidemic treatment includes statins. The current treatment provides significant improvement of lipids. There are no compliance problems.  Risk factors for coronary artery disease include diabetes mellitus, dyslipidemia, post-menopausal and hypertension.      The following portions of the patient's history were reviewed and updated as appropriate: allergies, current medications, past social history and problem list.    Outpatient Medications Prior to Visit   Medication Sig Dispense Refill   • aspirin 81 MG tablet Take 81 mg by mouth every night.     • atorvastatin (LIPITOR) 40 MG tablet TAKE 1 TABLET BY MOUTH EVERY DAY 90 tablet 3   • carvedilol (COREG) 25 MG tablet TAKE 1 TABLET BY MOUTH 2 (TWO) TIMES A DAY WITH MEALS. 180 tablet 3   • diclofenac (VOLTAREN) 1 % gel gel APPLY TO AFFECTED AREA 4 TIMES A  g 0   • digoxin (LANOXIN) 125 MCG tablet TAKE 1 TABLET BY MOUTH DAILY 90 tablet 3   • digoxin (LANOXIN) 125 MCG tablet Take 1 tablet by mouth Daily. 90 tablet 1   • ezetimibe (ZETIA) 10 MG tablet TAKE 1 TABELT BY MOUTH DAILY 90 tablet 2   • furosemide (LASIX) 80 MG tablet TAKE 1 TABLET BY MOUTH DAILY. 90 tablet 3   • glimepiride (AMARYL) 4 MG tablet Take 1 tablet by mouth Every Morning Before Breakfast. 90 tablet 3   • Insulin Pen Needle (B-D UF III MINI PEN NEEDLES) 31G X 5 MM misc USE WITH INSULIN INJECTIONS NIGHTLY 100 each 3   • ipratropium-albuterol (DUO-NEB) 0.5-2.5 mg/3 ml nebulizer Take 3 mL by nebulization 4 (Four) Times a Day. 360 mL 12   • isosorbide mononitrate (IMDUR) 30 MG 24 hr tablet TAKE 1 TABLET BY MOUTH EVERY DAY 90 tablet 3   • latanoprost (XALATAN) 0.005 % ophthalmic solution Administer 1 drop to both eyes Every Night. 90 day supply. 2.5 mL 5   • loperamide (IMODIUM) 2 MG capsule Take 1 capsule by mouth 4 (Four) Times a Day As Needed for Diarrhea. 24 capsule 0   • magnesium oxide (MAGOX)  400 (241.3 Mg) MG tablet tablet Take 1 tablet by mouth Daily. 90 each 3   • metFORMIN (GLUCOPHAGE) 1000 MG tablet TAKE 1 TABLET BY MOUTH 2 (TWO) TIMES A DAY WITH MEALS. 180 tablet 3   • nitroglycerin (NITROSTAT) 0.4 MG SL tablet Place 1 tablet under the tongue Every 5 (Five) Minutes As Needed for Chest Pain. Take no more than 3 doses in 15 minutes. 25 tablet 0   • omega-3 acid ethyl esters (LOVAZA) 1 g capsule Take 1 capsule by mouth 2 (Two) Times a Day. 60 capsule 5   • ondansetron ODT (ZOFRAN-ODT) 4 MG disintegrating tablet Take 1 tablet by mouth Every 6 (Six) Hours As Needed for Nausea or Vomiting. 15 tablet 0   • pantoprazole (PROTONIX) 40 MG EC tablet Take 1 tablet by mouth Daily. 90 tablet 1   • potassium chloride (MICRO-K) 10 MEQ CR capsule Take 1 capsule by mouth 2 (Two) Times a Day. 180 capsule 3   • pregabalin (LYRICA) 150 MG capsule TAKE 1 CAPSULE BY MOUTH TWICE A  capsule 1   • VICTOZA 18 MG/3ML solution pen-injector injection INJECT 1.2 MG UNDER THE SKIN INTO THE APPROPRIATE AREA AS DIRECTED EVERY NIGHT. 6 pen 5   • vitamin D (ERGOCALCIFEROL) 77247 units capsule capsule Take 1 capsule by mouth 1 (One) Time Per Week. (Patient taking differently: Take 50,000 Units by mouth 1 (One) Time Per Week. Take 1 capsule by mouth weekly on Tuesdays) 12 capsule 3   • glucose blood (TRUE METRIX BLOOD GLUCOSE TEST) test strip Check BS daily E11.8 100 each 3   • lisinopril (PRINIVIL,ZESTRIL) 10 MG tablet Take 1 tablet by mouth Daily. 90 tablet 3   • VENTOLIN  (90 Base) MCG/ACT inhaler INHALE 2 PUFFS EVERY 4 (FOUR) HOURS AS NEEDED FOR WHEEZING OR SHORTNESS OF AIR. 18 inhaler 1   • doxycycline (MONODOX) 100 MG capsule Take 1 capsule by mouth 2 (Two) Times a Day. 16 capsule 0     No facility-administered medications prior to visit.        Review of Systems   Constitutional: Negative.  Negative for chills, fatigue, fever and unexpected weight change.   HENT: Negative.  Negative for congestion, ear pain,  "hearing loss, nosebleeds, rhinorrhea, sneezing, sore throat and tinnitus.    Eyes: Negative.  Negative for discharge.   Respiratory: Negative.  Negative for cough, shortness of breath and wheezing.    Cardiovascular: Negative.  Negative for chest pain and palpitations.   Gastrointestinal: Negative.  Negative for abdominal pain, constipation, diarrhea, nausea and vomiting.   Endocrine: Negative.    Genitourinary: Negative.  Negative for dysuria, frequency and urgency.   Musculoskeletal: Positive for myalgias. Negative for arthralgias, back pain, joint swelling and neck pain.   Skin: Negative.  Negative for rash.   Allergic/Immunologic: Negative.    Neurological: Negative.  Negative for dizziness, weakness, numbness and headaches.   Hematological: Negative.  Negative for adenopathy.   Psychiatric/Behavioral: Negative.  Negative for dysphoric mood and sleep disturbance. The patient is not nervous/anxious.        Objective   Visit Vitals  Ht 157.5 cm (62\")   Wt 93 kg (205 lb)   BMI 37.49 kg/m²     Physical Exam    Telephone visit     Assessment/Plan   Problem List Items Addressed This Visit        Cardiovascular and Mediastinum    Essential hypertension - Primary (Chronic)    Relevant Medications    lisinopril (PRINIVIL,ZESTRIL) 10 MG tablet    Other Relevant Orders    CBC & Differential    Comprehensive Metabolic Panel    Hemoglobin A1c    LDL Cholesterol, Direct    Mixed hyperlipidemia (Chronic)    Relevant Orders    CBC & Differential    Comprehensive Metabolic Panel    Hemoglobin A1c    LDL Cholesterol, Direct       Endocrine    Type 2 diabetes mellitus with complication, without long-term current use of insulin (CMS/[facility]) (Chronic)    Relevant Orders    CBC & Differential    Comprehensive Metabolic Panel    Hemoglobin A1c    LDL Cholesterol, Direct          Continue current treatment. Encouraged to stay at home.  Avoid getting out unless absolutely necessary and if does go out to maintain the social distancing " recommendations and use hand sanitizers or handwashing often.     This visit has been rescheduled as a phone visit to comply with patient safety concerns in accordance with CDC recommendations. The time that was spent in reviewing the patient's chart and addressing the patient's symptoms, diagnosis and treatment was 15 mins.       New Medications Ordered This Visit   Medications   • albuterol sulfate HFA (Ventolin HFA) 108 (90 Base) MCG/ACT inhaler     Sig: Inhale 2 puffs Every 4 (Four) Hours As Needed for Wheezing or Shortness of Air.     Dispense:  18 inhaler     Refill:  1   • lisinopril (PRINIVIL,ZESTRIL) 10 MG tablet     Sig: Take 1 tablet by mouth Daily.     Dispense:  90 tablet     Refill:  3   • glucose blood (True Metrix Blood Glucose Test) test strip     Sig: Check BS daily E11.8     Dispense:  100 each     Refill:  3     Return in about 2 months (around 6/10/2020) for Recheck.

## 2020-06-08 ENCOUNTER — LAB (OUTPATIENT)
Dept: LAB | Facility: HOSPITAL | Age: 70
End: 2020-06-08

## 2020-06-08 DIAGNOSIS — E78.2 MIXED HYPERLIPIDEMIA: Chronic | ICD-10-CM

## 2020-06-08 DIAGNOSIS — I10 ESSENTIAL HYPERTENSION: Chronic | ICD-10-CM

## 2020-06-08 DIAGNOSIS — E11.8 TYPE 2 DIABETES MELLITUS WITH COMPLICATION, WITHOUT LONG-TERM CURRENT USE OF INSULIN (HCC): ICD-10-CM

## 2020-06-08 PROCEDURE — 80053 COMPREHEN METABOLIC PANEL: CPT

## 2020-06-08 PROCEDURE — 83036 HEMOGLOBIN GLYCOSYLATED A1C: CPT

## 2020-06-08 PROCEDURE — 83721 ASSAY OF BLOOD LIPOPROTEIN: CPT | Performed by: GENERAL PRACTICE

## 2020-06-08 PROCEDURE — 85025 COMPLETE CBC W/AUTO DIFF WBC: CPT

## 2020-06-08 PROCEDURE — 36415 COLL VENOUS BLD VENIPUNCTURE: CPT

## 2020-06-09 LAB
ALBUMIN SERPL-MCNC: 4.6 G/DL (ref 3.5–5.2)
ALBUMIN/GLOB SERPL: 1.5 G/DL
ALP SERPL-CCNC: 93 U/L (ref 39–117)
ALT SERPL W P-5'-P-CCNC: 20 U/L (ref 1–33)
ANION GAP SERPL CALCULATED.3IONS-SCNC: 16.9 MMOL/L (ref 5–15)
ARTICHOKE IGE QN: 117 MG/DL (ref 0–100)
AST SERPL-CCNC: 22 U/L (ref 1–32)
BASOPHILS # BLD AUTO: 0.05 10*3/MM3 (ref 0–0.2)
BASOPHILS NFR BLD AUTO: 0.4 % (ref 0–1.5)
BILIRUB SERPL-MCNC: 0.3 MG/DL (ref 0.2–1.2)
BUN BLD-MCNC: 14 MG/DL (ref 8–23)
BUN/CREAT SERPL: 14.3 (ref 7–25)
CALCIUM SPEC-SCNC: 9.9 MG/DL (ref 8.6–10.5)
CHLORIDE SERPL-SCNC: 100 MMOL/L (ref 98–107)
CO2 SERPL-SCNC: 23.1 MMOL/L (ref 22–29)
CREAT BLD-MCNC: 0.98 MG/DL (ref 0.57–1)
DEPRECATED RDW RBC AUTO: 45.8 FL (ref 37–54)
EOSINOPHIL # BLD AUTO: 0.16 10*3/MM3 (ref 0–0.4)
EOSINOPHIL NFR BLD AUTO: 1.2 % (ref 0.3–6.2)
ERYTHROCYTE [DISTWIDTH] IN BLOOD BY AUTOMATED COUNT: 14.9 % (ref 12.3–15.4)
GFR SERPL CREATININE-BSD FRML MDRD: 68 ML/MIN/1.73
GLOBULIN UR ELPH-MCNC: 3.1 GM/DL
GLUCOSE BLD-MCNC: 84 MG/DL (ref 65–99)
HBA1C MFR BLD: 7.3 % (ref 4.8–5.6)
HCT VFR BLD AUTO: 35 % (ref 34–46.6)
HGB BLD-MCNC: 11.5 G/DL (ref 12–15.9)
IMM GRANULOCYTES # BLD AUTO: 0.04 10*3/MM3 (ref 0–0.05)
IMM GRANULOCYTES NFR BLD AUTO: 0.3 % (ref 0–0.5)
LYMPHOCYTES # BLD AUTO: 4.51 10*3/MM3 (ref 0.7–3.1)
LYMPHOCYTES NFR BLD AUTO: 34.4 % (ref 19.6–45.3)
MCH RBC QN AUTO: 27.8 PG (ref 26.6–33)
MCHC RBC AUTO-ENTMCNC: 32.9 G/DL (ref 31.5–35.7)
MCV RBC AUTO: 84.5 FL (ref 79–97)
MONOCYTES # BLD AUTO: 1.13 10*3/MM3 (ref 0.1–0.9)
MONOCYTES NFR BLD AUTO: 8.6 % (ref 5–12)
NEUTROPHILS # BLD AUTO: 7.22 10*3/MM3 (ref 1.7–7)
NEUTROPHILS NFR BLD AUTO: 55.1 % (ref 42.7–76)
NRBC BLD AUTO-RTO: 0 /100 WBC (ref 0–0.2)
PLATELET # BLD AUTO: 359 10*3/MM3 (ref 140–450)
PMV BLD AUTO: 10.1 FL (ref 6–12)
POTASSIUM BLD-SCNC: 4.4 MMOL/L (ref 3.5–5.2)
PROT SERPL-MCNC: 7.7 G/DL (ref 6–8.5)
RBC # BLD AUTO: 4.14 10*6/MM3 (ref 3.77–5.28)
SODIUM BLD-SCNC: 140 MMOL/L (ref 136–145)
WBC NRBC COR # BLD: 13.11 10*3/MM3 (ref 3.4–10.8)

## 2020-06-10 ENCOUNTER — OFFICE VISIT (OUTPATIENT)
Dept: FAMILY MEDICINE CLINIC | Facility: CLINIC | Age: 70
End: 2020-06-10

## 2020-06-10 VITALS
WEIGHT: 219 LBS | OXYGEN SATURATION: 98 % | HEART RATE: 84 BPM | BODY MASS INDEX: 40.3 KG/M2 | DIASTOLIC BLOOD PRESSURE: 65 MMHG | SYSTOLIC BLOOD PRESSURE: 120 MMHG | HEIGHT: 62 IN

## 2020-06-10 DIAGNOSIS — I10 ESSENTIAL HYPERTENSION: Chronic | ICD-10-CM

## 2020-06-10 DIAGNOSIS — E11.8 TYPE 2 DIABETES MELLITUS WITH COMPLICATION, WITHOUT LONG-TERM CURRENT USE OF INSULIN (HCC): Primary | Chronic | ICD-10-CM

## 2020-06-10 DIAGNOSIS — E66.01 MORBIDLY OBESE (HCC): ICD-10-CM

## 2020-06-10 DIAGNOSIS — Z12.11 SCREENING FOR COLON CANCER: ICD-10-CM

## 2020-06-10 DIAGNOSIS — E78.2 MIXED HYPERLIPIDEMIA: Chronic | ICD-10-CM

## 2020-06-10 DIAGNOSIS — I25.119 CORONARY ARTERY DISEASE INVOLVING NATIVE HEART WITH ANGINA PECTORIS, UNSPECIFIED VESSEL OR LESION TYPE (HCC): ICD-10-CM

## 2020-06-10 DIAGNOSIS — E55.9 VITAMIN D DEFICIENCY: Chronic | ICD-10-CM

## 2020-06-10 PROCEDURE — 99214 OFFICE O/P EST MOD 30 MIN: CPT | Performed by: GENERAL PRACTICE

## 2020-06-10 NOTE — PROGRESS NOTES
Subjective   Lexi Wade is a 69 y.o. female.   Chief Complaint   Patient presents with   • Hypertension   • Hyperlipidemia     For review and evaluation of management of chronic medical problems. Labs reviewed.   Diabetes   She presents for her follow-up diabetic visit. She has type 2 diabetes mellitus. Her disease course has been stable. Pertinent negatives for hypoglycemia include no dizziness, headaches or nervousness/anxiousness. There are no diabetic associated symptoms. Pertinent negatives for diabetes include no chest pain, no fatigue and no weakness. Diabetic complications include heart disease. Risk factors for coronary artery disease include dyslipidemia, diabetes mellitus and hypertension. Current diabetic treatment includes oral agent (dual therapy) (Victoza). She is compliant with treatment all of the time. She is following a generally healthy diet. When asked about meal planning, she reported none. She rarely participates in exercise. There is no change in her home blood glucose trend. An ACE inhibitor/angiotensin II receptor blocker is being taken. Eye exam is current.   Hypertension   The current episode started more than 1 year ago. The problem is unchanged. The problem is controlled. Pertinent negatives include no chest pain, headaches, neck pain, palpitations or shortness of breath. There are no associated agents to hypertension. Risk factors for coronary artery disease include diabetes mellitus, dyslipidemia and obesity. Current antihypertension treatment includes beta blockers, ACE inhibitors and diuretics. The current treatment provides significant improvement. There are no compliance problems.    Hyperlipidemia   This is a chronic problem. The current episode started more than 1 year ago. The problem is controlled. Recent lipid tests were reviewed and are normal. Exacerbating diseases include diabetes and obesity. There are no known factors aggravating her hyperlipidemia. Associated  symptoms include myalgias. Pertinent negatives include no chest pain or shortness of breath. Current antihyperlipidemic treatment includes statins. The current treatment provides significant improvement of lipids. There are no compliance problems.  Risk factors for coronary artery disease include diabetes mellitus, dyslipidemia, post-menopausal and hypertension.   Obesity   This is a chronic problem. The current episode started more than 1 year ago. The problem occurs constantly. The problem has been unchanged. Associated symptoms include myalgias. Pertinent negatives include no abdominal pain, arthralgias, chest pain, chills, congestion, coughing, fatigue, fever, headaches, joint swelling, nausea, neck pain, numbness, rash, sore throat, vomiting or weakness. The symptoms are aggravated by stress. Treatments tried: diet. The treatment provided mild relief.      The following portions of the patient's history were reviewed and updated as appropriate: allergies, current medications, past social history and problem list.    Outpatient Medications Prior to Visit   Medication Sig Dispense Refill   • albuterol sulfate HFA (Ventolin HFA) 108 (90 Base) MCG/ACT inhaler Inhale 2 puffs Every 4 (Four) Hours As Needed for Wheezing or Shortness of Air. 18 inhaler 1   • aspirin 81 MG tablet Take 81 mg by mouth every night.     • atorvastatin (LIPITOR) 40 MG tablet TAKE 1 TABLET BY MOUTH EVERY DAY 90 tablet 3   • carvedilol (COREG) 25 MG tablet TAKE 1 TABLET BY MOUTH 2 (TWO) TIMES A DAY WITH MEALS. 180 tablet 3   • diclofenac (VOLTAREN) 1 % gel gel APPLY TO AFFECTED AREA 4 TIMES A  g 0   • digoxin (LANOXIN) 125 MCG tablet TAKE 1 TABLET BY MOUTH DAILY 90 tablet 3   • furosemide (LASIX) 80 MG tablet TAKE 1 TABLET BY MOUTH DAILY. 90 tablet 3   • glimepiride (AMARYL) 4 MG tablet Take 1 tablet by mouth Every Morning Before Breakfast. 90 tablet 3   • glucose blood (True Metrix Blood Glucose Test) test strip Check BS daily E11.8  100 each 3   • Insulin Pen Needle (B-D UF III MINI PEN NEEDLES) 31G X 5 MM misc USE WITH INSULIN INJECTIONS NIGHTLY 100 each 3   • ipratropium-albuterol (DUO-NEB) 0.5-2.5 mg/3 ml nebulizer Take 3 mL by nebulization 4 (Four) Times a Day. 360 mL 12   • isosorbide mononitrate (IMDUR) 30 MG 24 hr tablet TAKE 1 TABLET BY MOUTH EVERY DAY (Patient taking differently: Take 30 mg by mouth Daily.) 90 tablet 3   • lisinopril (PRINIVIL,ZESTRIL) 10 MG tablet Take 1 tablet by mouth Daily. 90 tablet 3   • magnesium oxide (MAGOX) 400 (241.3 Mg) MG tablet tablet Take 1 tablet by mouth Daily. 90 each 3   • metFORMIN (GLUCOPHAGE) 1000 MG tablet TAKE 1 TABLET BY MOUTH 2 (TWO) TIMES A DAY WITH MEALS. 180 tablet 3   • nitroglycerin (NITROSTAT) 0.4 MG SL tablet Place 1 tablet under the tongue Every 5 (Five) Minutes As Needed for Chest Pain. Take no more than 3 doses in 15 minutes. 25 tablet 0   • pantoprazole (PROTONIX) 40 MG EC tablet Take 1 tablet by mouth Daily. 90 tablet 1   • potassium chloride (MICRO-K) 10 MEQ CR capsule Take 1 capsule by mouth 2 (Two) Times a Day. 180 capsule 3   • pregabalin (LYRICA) 150 MG capsule TAKE 1 CAPSULE BY MOUTH TWICE A  capsule 1   • VICTOZA 18 MG/3ML solution pen-injector injection INJECT 1.2 MG UNDER THE SKIN INTO THE APPROPRIATE AREA AS DIRECTED EVERY NIGHT. 6 pen 5   • vitamin D (ERGOCALCIFEROL) 58759 units capsule capsule Take 1 capsule by mouth 1 (One) Time Per Week. (Patient taking differently: Take 50,000 Units by mouth 1 (One) Time Per Week. Take 1 capsule by mouth weekly on Tuesdays) 12 capsule 3   • digoxin (LANOXIN) 125 MCG tablet Take 1 tablet by mouth Daily. 90 tablet 1   • ezetimibe (ZETIA) 10 MG tablet TAKE 1 TABELT BY MOUTH DAILY 90 tablet 2   • latanoprost (XALATAN) 0.005 % ophthalmic solution Administer 1 drop to both eyes Every Night. 90 day supply. 2.5 mL 5   • loperamide (IMODIUM) 2 MG capsule Take 1 capsule by mouth 4 (Four) Times a Day As Needed for Diarrhea. 24 capsule 0  "  • omega-3 acid ethyl esters (LOVAZA) 1 g capsule Take 1 capsule by mouth 2 (Two) Times a Day. 60 capsule 5   • ondansetron ODT (ZOFRAN-ODT) 4 MG disintegrating tablet Take 1 tablet by mouth Every 6 (Six) Hours As Needed for Nausea or Vomiting. 15 tablet 0     No facility-administered medications prior to visit.        Review of Systems   Constitutional: Negative.  Negative for chills, fatigue, fever and unexpected weight change.   HENT: Negative.  Negative for congestion, ear pain, hearing loss, nosebleeds, rhinorrhea, sneezing, sore throat and tinnitus.    Eyes: Negative.  Negative for discharge.   Respiratory: Negative.  Negative for cough, shortness of breath and wheezing.    Cardiovascular: Negative.  Negative for chest pain and palpitations.   Gastrointestinal: Negative.  Negative for abdominal pain, constipation, diarrhea, nausea and vomiting.   Endocrine: Negative.    Genitourinary: Negative.  Negative for dysuria, frequency and urgency.   Musculoskeletal: Positive for myalgias. Negative for arthralgias, back pain, joint swelling and neck pain.   Skin: Negative.  Negative for rash.   Allergic/Immunologic: Negative.    Neurological: Negative.  Negative for dizziness, weakness, numbness and headaches.   Hematological: Negative.  Negative for adenopathy.   Psychiatric/Behavioral: Negative.  Negative for dysphoric mood and sleep disturbance. The patient is not nervous/anxious.        Objective   Visit Vitals  /65 (BP Location: Left arm)   Pulse 84   Ht 157.5 cm (62\")   Wt 99.3 kg (219 lb)   SpO2 98%   BMI 40.06 kg/m²     Physical Exam   Constitutional: She is oriented to person, place, and time. She appears well-developed and well-nourished. No distress.   HENT:   Head: Normocephalic and atraumatic.   Nose: Nose normal.   Mouth/Throat: Oropharynx is clear and moist.   Eyes: Pupils are equal, round, and reactive to light. Conjunctivae and EOM are normal. Right eye exhibits no discharge. Left eye exhibits " no discharge.   Neck: No thyromegaly present.   Cardiovascular: Normal rate, regular rhythm, normal heart sounds and intact distal pulses.   Pulmonary/Chest: Effort normal and breath sounds normal.   Lymphadenopathy:     She has no cervical adenopathy.   Neurological: She is alert and oriented to person, place, and time.   Skin: Skin is warm and dry.   Psychiatric: She has a normal mood and affect.   Nursing note and vitals reviewed.    Results for orders placed or performed in visit on 06/08/20   Comprehensive Metabolic Panel   Result Value Ref Range    Glucose 84 65 - 99 mg/dL    BUN 14 8 - 23 mg/dL    Creatinine 0.98 0.57 - 1.00 mg/dL    Sodium 140 136 - 145 mmol/L    Potassium 4.4 3.5 - 5.2 mmol/L    Chloride 100 98 - 107 mmol/L    CO2 23.1 22.0 - 29.0 mmol/L    Calcium 9.9 8.6 - 10.5 mg/dL    Total Protein 7.7 6.0 - 8.5 g/dL    Albumin 4.60 3.50 - 5.20 g/dL    ALT (SGPT) 20 1 - 33 U/L    AST (SGOT) 22 1 - 32 U/L    Alkaline Phosphatase 93 39 - 117 U/L    Total Bilirubin 0.3 0.2 - 1.2 mg/dL    eGFR  African Amer 68 >60 mL/min/1.73    Globulin 3.1 gm/dL    A/G Ratio 1.5 g/dL    BUN/Creatinine Ratio 14.3 7.0 - 25.0    Anion Gap 16.9 (H) 5.0 - 15.0 mmol/L   Hemoglobin A1c   Result Value Ref Range    Hemoglobin A1C 7.30 (H) 4.80 - 5.60 %   CBC Auto Differential   Result Value Ref Range    WBC 13.11 (H) 3.40 - 10.80 10*3/mm3    RBC 4.14 3.77 - 5.28 10*6/mm3    Hemoglobin 11.5 (L) 12.0 - 15.9 g/dL    Hematocrit 35.0 34.0 - 46.6 %    MCV 84.5 79.0 - 97.0 fL    MCH 27.8 26.6 - 33.0 pg    MCHC 32.9 31.5 - 35.7 g/dL    RDW 14.9 12.3 - 15.4 %    RDW-SD 45.8 37.0 - 54.0 fl    MPV 10.1 6.0 - 12.0 fL    Platelets 359 140 - 450 10*3/mm3    Neutrophil % 55.1 42.7 - 76.0 %    Lymphocyte % 34.4 19.6 - 45.3 %    Monocyte % 8.6 5.0 - 12.0 %    Eosinophil % 1.2 0.3 - 6.2 %    Basophil % 0.4 0.0 - 1.5 %    Immature Grans % 0.3 0.0 - 0.5 %    Neutrophils, Absolute 7.22 (H) 1.70 - 7.00 10*3/mm3    Lymphocytes, Absolute 4.51 (H) 0.70 -  3.10 10*3/mm3    Monocytes, Absolute 1.13 (H) 0.10 - 0.90 10*3/mm3    Eosinophils, Absolute 0.16 0.00 - 0.40 10*3/mm3    Basophils, Absolute 0.05 0.00 - 0.20 10*3/mm3    Immature Grans, Absolute 0.04 0.00 - 0.05 10*3/mm3    nRBC 0.0 0.0 - 0.2 /100 WBC      Notes brought forward are reviewed and updated if indicated.     Assessment/Plan   Problem List Items Addressed This Visit        Cardiovascular and Mediastinum    Essential hypertension (Chronic)    Relevant Orders    CBC & Differential    Comprehensive Metabolic Panel    Hemoglobin A1c    Lipid Panel    MicroAlbumin, Urine, Random - Urine, Clean Catch    Urinalysis With Culture If Indicated -    Mixed hyperlipidemia (Chronic)    Relevant Orders    CBC & Differential    Comprehensive Metabolic Panel    Hemoglobin A1c    Lipid Panel    MicroAlbumin, Urine, Random - Urine, Clean Catch    Urinalysis With Culture If Indicated -    Coronary artery disease involving native heart with angina pectoris (CMS/[facility])       Digestive    Vitamin D deficiency (Chronic)    Relevant Orders    Vitamin D 25 Hydroxy    Morbidly obese (CMS/[facility])       Endocrine    Type 2 diabetes mellitus with complication, without long-term current use of insulin (CMS/[facility]) - Primary (Chronic)    Relevant Orders    CBC & Differential    Comprehensive Metabolic Panel    Hemoglobin A1c    Lipid Panel    MicroAlbumin, Urine, Random - Urine, Clean Catch    Urinalysis With Culture If Indicated -      Other Visit Diagnoses     Screening for colon cancer        Relevant Orders    Cologuard - Stool, Per Rectum          Continue current treatment. Patient's Body mass index is 40.06 kg/m². BMI is above normal parameters. Recommendations include: exercise counseling and nutrition counseling.     No orders of the defined types were placed in this encounter.    Return in about 4 months (around 10/12/2020) for Annual physical, medicare wellness visit.

## 2020-06-15 ENCOUNTER — APPOINTMENT (OUTPATIENT)
Dept: GENERAL RADIOLOGY | Facility: HOSPITAL | Age: 70
End: 2020-06-15

## 2020-06-15 ENCOUNTER — HOSPITAL ENCOUNTER (EMERGENCY)
Facility: HOSPITAL | Age: 70
Discharge: HOME | End: 2020-06-15
Attending: EMERGENCY MEDICINE | Admitting: EMERGENCY MEDICINE

## 2020-06-15 VITALS
BODY MASS INDEX: 40.3 KG/M2 | SYSTOLIC BLOOD PRESSURE: 127 MMHG | OXYGEN SATURATION: 99 % | TEMPERATURE: 98 F | WEIGHT: 219 LBS | RESPIRATION RATE: 18 BRPM | HEART RATE: 84 BPM | DIASTOLIC BLOOD PRESSURE: 63 MMHG | HEIGHT: 62 IN

## 2020-06-15 DIAGNOSIS — Z45.010 PACEMAKER AT END OF BATTERY LIFE: Primary | ICD-10-CM

## 2020-06-15 LAB
ALBUMIN SERPL-MCNC: 4.3 G/DL (ref 3.5–5.2)
ALBUMIN/GLOB SERPL: 1.4 G/DL
ALP SERPL-CCNC: 111 U/L (ref 39–117)
ALT SERPL W P-5'-P-CCNC: 15 U/L (ref 1–33)
ANION GAP SERPL CALCULATED.3IONS-SCNC: 14 MMOL/L (ref 5–15)
ANISOCYTOSIS BLD QL: ABNORMAL
AST SERPL-CCNC: 17 U/L (ref 1–32)
BILIRUB SERPL-MCNC: 0.3 MG/DL (ref 0.2–1.2)
BUN BLD-MCNC: 14 MG/DL (ref 8–23)
BUN/CREAT SERPL: 13.6 (ref 7–25)
CALCIUM SPEC-SCNC: 9.9 MG/DL (ref 8.6–10.5)
CHLORIDE SERPL-SCNC: 100 MMOL/L (ref 98–107)
CO2 SERPL-SCNC: 26 MMOL/L (ref 22–29)
CREAT BLD-MCNC: 1.03 MG/DL (ref 0.57–1)
DEPRECATED RDW RBC AUTO: 50.4 FL (ref 37–54)
DIGOXIN SERPL-MCNC: 0.7 NG/ML (ref 0.6–1.2)
EOSINOPHIL # BLD MANUAL: 0.12 10*3/MM3 (ref 0–0.4)
EOSINOPHIL NFR BLD MANUAL: 1 % (ref 0.3–6.2)
ERYTHROCYTE [DISTWIDTH] IN BLOOD BY AUTOMATED COUNT: 15.9 % (ref 12.3–15.4)
GFR SERPL CREATININE-BSD FRML MDRD: 64 ML/MIN/1.73
GLOBULIN UR ELPH-MCNC: 3.1 GM/DL
GLUCOSE BLD-MCNC: 121 MG/DL (ref 65–99)
HCT VFR BLD AUTO: 34.5 % (ref 34–46.6)
HGB BLD-MCNC: 11.3 G/DL (ref 12–15.9)
LYMPHOCYTES # BLD MANUAL: 4.58 10*3/MM3 (ref 0.7–3.1)
LYMPHOCYTES NFR BLD MANUAL: 38 % (ref 19.6–45.3)
LYMPHOCYTES NFR BLD MANUAL: 9 % (ref 5–12)
MCH RBC QN AUTO: 28.2 PG (ref 26.6–33)
MCHC RBC AUTO-ENTMCNC: 32.8 G/DL (ref 31.5–35.7)
MCV RBC AUTO: 86 FL (ref 79–97)
MONOCYTES # BLD AUTO: 1.09 10*3/MM3 (ref 0.1–0.9)
MYELOCYTES NFR BLD MANUAL: 1 % (ref 0–0)
NEUTROPHILS # BLD AUTO: 5.67 10*3/MM3 (ref 1.7–7)
NEUTROPHILS NFR BLD MANUAL: 47 % (ref 42.7–76)
NT-PROBNP SERPL-MCNC: 29.1 PG/ML (ref 5–900)
PLATELET # BLD AUTO: 345 10*3/MM3 (ref 140–450)
PMV BLD AUTO: 9.4 FL (ref 6–12)
POTASSIUM BLD-SCNC: 4 MMOL/L (ref 3.5–5.2)
PROT SERPL-MCNC: 7.4 G/DL (ref 6–8.5)
RBC # BLD AUTO: 4.01 10*6/MM3 (ref 3.77–5.28)
SMALL PLATELETS BLD QL SMEAR: ADEQUATE
SODIUM BLD-SCNC: 140 MMOL/L (ref 136–145)
TROPONIN T SERPL-MCNC: <0.01 NG/ML (ref 0–0.03)
VARIANT LYMPHS NFR BLD MANUAL: 4 % (ref 0–5)
WBC MORPH BLD: NORMAL
WBC NRBC COR # BLD: 12.06 10*3/MM3 (ref 3.4–10.8)
WHOLE BLOOD HOLD SPECIMEN: NORMAL

## 2020-06-15 PROCEDURE — 71045 X-RAY EXAM CHEST 1 VIEW: CPT

## 2020-06-15 PROCEDURE — 93010 ELECTROCARDIOGRAM REPORT: CPT | Performed by: INTERNAL MEDICINE

## 2020-06-15 PROCEDURE — 84484 ASSAY OF TROPONIN QUANT: CPT | Performed by: EMERGENCY MEDICINE

## 2020-06-15 PROCEDURE — 93005 ELECTROCARDIOGRAM TRACING: CPT | Performed by: EMERGENCY MEDICINE

## 2020-06-15 PROCEDURE — 85025 COMPLETE CBC W/AUTO DIFF WBC: CPT | Performed by: EMERGENCY MEDICINE

## 2020-06-15 PROCEDURE — 80162 ASSAY OF DIGOXIN TOTAL: CPT | Performed by: EMERGENCY MEDICINE

## 2020-06-15 PROCEDURE — 99284 EMERGENCY DEPT VISIT MOD MDM: CPT

## 2020-06-15 PROCEDURE — 83880 ASSAY OF NATRIURETIC PEPTIDE: CPT | Performed by: EMERGENCY MEDICINE

## 2020-06-15 PROCEDURE — 80053 COMPREHEN METABOLIC PANEL: CPT | Performed by: EMERGENCY MEDICINE

## 2020-06-15 PROCEDURE — 85007 BL SMEAR W/DIFF WBC COUNT: CPT | Performed by: EMERGENCY MEDICINE

## 2020-06-15 RX ORDER — SODIUM CHLORIDE 0.9 % (FLUSH) 0.9 %
10 SYRINGE (ML) INJECTION AS NEEDED
Status: DISCONTINUED | OUTPATIENT
Start: 2020-06-15 | End: 2020-06-15 | Stop reason: HOSPADM

## 2020-06-16 ENCOUNTER — EPISODE CHANGES (OUTPATIENT)
Dept: CASE MANAGEMENT | Facility: OTHER | Age: 70
End: 2020-06-16

## 2020-06-16 ENCOUNTER — PATIENT OUTREACH (OUTPATIENT)
Dept: CASE MANAGEMENT | Facility: OTHER | Age: 70
End: 2020-06-16

## 2020-06-16 NOTE — ED PROVIDER NOTES
Subjective   69-year-old female presents to the emergency department with concern for malfunction of her pacemaker.  She has a pacemaker defibrillator.  She reports that this is due to history of congestive heart failure and irregular heartbeat.  She reports that she knows the battery on her pacemaker defibrillator is running out but she has an appointment on Wednesday to discuss changing this with Dr. Felipe.  She reports that he told her she had any problems to come to the emergency department prior to that appointment.  She reports that today she had a feeling of the pacemaker vibrating and it made a noise.  She denies that she feels like it shocked her.  She reports that she has had some burning in her chest but denies shortness of breath.  Denies any lower extremity swelling.  Denies any trauma that could have affected the pacemaker.    Family history, surgical history, social history, current medications and allergies are reviewed with the patient and triage documentation and vitals are reviewed.      History provided by:  Patient and medical records   used: No        Review of Systems   Constitutional: Negative for chills and fever.   HENT: Negative for congestion, sinus pressure, sinus pain and sore throat.    Eyes: Negative for photophobia and visual disturbance.   Respiratory: Negative for cough, shortness of breath and wheezing.    Cardiovascular: Positive for chest pain. Negative for palpitations and leg swelling.   Gastrointestinal: Negative for abdominal pain, diarrhea and vomiting.   Endocrine: Negative for polydipsia, polyphagia and polyuria.   Genitourinary: Negative for dysuria, frequency and urgency.   Musculoskeletal: Negative for back pain and neck pain.   Skin: Negative for color change, pallor, rash and wound.   Allergic/Immunologic: Negative.    Neurological: Negative for dizziness, weakness, light-headedness and numbness.   Hematological: Negative.     Psychiatric/Behavioral: Negative.        Past Medical History:   Diagnosis Date   • Artificial lens present     IN  POSITION - BOTH   • Borderline glaucoma    • Cardiomyopathy (CMS/HCC)    • Congestive heart failure (CMS/HCC)    • Coronary artery disease    • Diabetes mellitus (CMS/HCC)     IMPROVING   • Essential hypertension    • GERD (gastroesophageal reflux disease)    • History of bone density study 09/25/2015    DXA BONE DENSITY AXIAL [address] [facility] (Screening for osteoporosis)    • History of echocardiogram 03/25/2013    Echocadiogram W/ color flow 38830 (Mild left atrial enlargement wiht mild LV enlargement w/mild concentric LVH. Global hypokinesis. EF 30%. Mitral and Av thickened. Aortic sclerosis. Severe mitral regurg. mild tricuspid regurg. Mild right atrial enlargement noted. Pacemaker wire noted)   03/25/2013 RACHELE WILLIAM    • History of mammography, diagnostic 09/17/2014    [place] [address] [facility] (Microcalcifications of the breast) , Reason: s/p mammotome bx right side for benign disease   • History of mammography, diagnostic 07/24/2014    MAMMOGRAPHY DIAGNOSTIC [place]ILAT RT [address] [facility] (Abnormal mammogram, unspecified)    • History of mammography, screening 07/03/2012    BENIGN   • Hypercholesterolemia    • Low back pain    • Menopausal syndrome 10/26/2017   • Microcalcifications of the breast     BENIGN CHANGES   • Need for prophylactic vaccination against Streptococcus pneumoniae (pneumococcus)    • 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 Severe left ventricular dysfunction with an EF of 25%)   • CARDIAC CATHETERIZATION  01/31/2002    Cardiac cath 77601 (No significant coronary atherosclerosis. Probably mild left ventricular systolic dysfunction.)   • CARDIAC CATHETERIZATION N/A 8/23/2018    Procedure: Left Heart Cath;  Surgeon: Wang Felipe MD;  Location: [facility] CATH INVASIVE LOCATION;  Service: Cardiology   • CARDIAC PACEMAKER PLACEMENT     • CATARACT EXTRACTION  01/28/2014    Remove cataract, insert lens (Right eye)   • CATARACT EXTRACTION  11/22/2011    Remove cataract, insert lens (Left eye)   • CATARACT EXTRACTION     • CENTRAL VENOUS LINE INSERTION  05/20/2016    Central line insertion (Successful placement of right upper extremity midline.)   • COLONOSCOPY  12/14/2004    Single small non-bleeding polyp in the rectum. The polyp was removed by hot biopsy polypectomy   • COLONOSCOPY N/A 6/14/2017    Procedure: COLONOSCOPY;  Surgeon: Orville Farias MD;  Location: [facility] ENDOSCOPY;  Service:    • MAMMO BILATERAL  09/17/2014    MAMMOGRAPHY DI[place]NOSTIC [place]ILAT RT [address] [facility] (Microcalcifications of the breast) , Reason: s/p mammotome bx right side for benign disease   • MAMMO BILATERAL  07/24/2014    MAMMOGRAPHY DIAGNOSTIC UNILAT RT [address] [facility] (Abnormal mammogram, unspecified)    • OTHER SURGICAL HISTORY  11/02/2012    DEBRIDE NAIL 6 OR MORE 94261 (Onychomycosis)    • OTHER SURGICAL HISTORY  04/27/2016    EXTENDED VISUAL FIELDS STUDY 86894 (Open angle with borderline findings, low risk, unspecified eye)    • OTHER SURGICAL HISTORY      EXTENDED VISUAL FIELDS STUDY 42128 (Borderline glaucoma)  (2): 10/02/2014, 04/17/2013   • OTHER SURGICAL HISTORY      OCT DISC NFL 80120 (Borderline glaucoma)  (2): 02/02/2015, 08/21/2013   • PACEMAKER IMPLANTATION  2007   • PAP SMEAR  06/10/2010    NEGATIVE   • TOTAL ABDOMINAL HYSTERECTOMY WITH SALPINGO OOPHORECTOMY  09/16/2013    Lysis of adhesions.       Family History   Problem Relation Age of  Onset   • Heart disease Other    • Hypertension Other    • Alzheimer's disease Other    • Diabetes Sister         x 2   • Hypertension Sister    • Hyperlipidemia Sister    • Bone cancer Brother        Social History     Socioeconomic History   • Marital status: Single     Spouse name: Not on file   • Number of children: Not on file   • Years of education: Not on file   • Highest education level: Not on file   Tobacco Use   • Smoking status: Never Smoker   • Smokeless tobacco: Never Used   Substance and Sexual Activity   • Alcohol use: No   • Drug use: No   • Sexual activity: Defer           Objective   Physical Exam   Constitutional: She is oriented to person, place, and time. She appears well-developed and well-nourished. No distress.   HENT:   Head: Normocephalic.   Mouth/Throat: Oropharynx is clear and moist.   Eyes: Pupils are equal, round, and reactive to light. Conjunctivae are normal.   Neck: Normal range of motion. Neck supple.   Cardiovascular: Normal rate, regular rhythm and intact distal pulses.   No murmur heard.  Pacemaker is palpable in the left upper chest with no overlying skin change   Pulmonary/Chest: Effort normal and breath sounds normal.   Abdominal: Soft. Bowel sounds are normal.   Musculoskeletal: Normal range of motion.   Neurological: She is alert and oriented to person, place, and time.   Skin: Skin is warm and dry. Capillary refill takes less than 2 seconds. She is not diaphoretic.   Nursing note and vitals reviewed.      Procedures  none         ED Course      Labs Reviewed   COMPREHENSIVE METABOLIC PANEL - Abnormal; Notable for the following components:       Result Value    Glucose 121 (*)     Creatinine 1.03 (*)     All other components within normal limits    Narrative:     GFR Normal >60  Chronic Kidney Disease <60  Kidney Failure <15     CBC WITH AUTO DIFFERENTIAL - Abnormal; Notable for the following components:    WBC 12.06 (*)     Hemoglobin 11.3 (*)     RDW 15.9 (*)     All  other components within normal limits   MANUAL DIFFERENTIAL - Abnormal; Notable for the following components:    Myelocyte % 1.0 (*)     Lymphocytes Absolute 4.58 (*)     Monocytes Absolute 1.09 (*)     All other components within normal limits   TROPONIN (IN-HOUSE) - Normal    Narrative:     Troponin T Reference Range:  <= 0.03 ng/mL-   Negative for AMI  >0.03 ng/mL-     Abnormal for myocardial necrosis.  Clinicians would have to utilize clinical acumen, EKG, Troponin and serial changes to determine if it is an Acute Myocardial Infarction or myocardial injury due to an underlying chronic condition.       Results may be falsely decreased if patient taking Biotin.     BNP (IN-HOUSE) - Normal    Narrative:     Among patients with dyspnea, NT-proBNP is highly sensitive for the detection of acute congestive heart failure. In addition NT-proBNP of <300 pg/ml effectively rules out acute congestive heart failure with 99% negative predictive value.    Results may be falsely decreased if patient taking Biotin.     DIGOXIN LEVEL - Normal   CBC AND DIFFERENTIAL    Narrative:     The following orders were created for panel order CBC & Differential.  Procedure                               Abnormality         Status                     ---------                               -----------         ------                     CBC Auto Differential[[id number]]        Abnormal            Final result                 Please view results for these tests on the individual orders.   EXTRA TUBES    Narrative:     The following orders were created for panel order Extra Tubes.  Procedure                               Abnormality         Status                     ---------                               -----------         ------                     Light Blue Top[[id number]]                                   Final result               Gold Top - [facility][[id number]]                                                                Please view results  for these tests on the individual orders.   LIGHT BLUE TOP     Xr Chest 1 View    Result Date: 6/15/2020  Narrative: PROCEDURE: XR CHEST 1 VW VIEWS:Single INDICATION: Chest burning sensation, vibration of pacemaker COMPARISON: CXR: 1/12/2020 FINDINGS:   - lines/tubes: Left-sided pacemaker/ICD is present. There is also pacemaker larger present on the current exam.   - cardiac: Size within normal limits.   - mediastinum: Contour within normal limits.   - lungs: No evidence of a focal air space process, pulmonary interstitial edema, nodule(s)/mass. Mild elevation of the right hemidiaphragm is similar to prior study   - pleura: No evidence of  fluid.    - osseous: Unremarkable for age.     Impression: No acute abnormality identified Electronically signed by:  Joyce Joyce MD  6/15/2020 8:08 PM CDT Workstation: [id number]    EKG Blessing 15, 2020 at 1936 reveals pacemaker rhythm at a rate 86 bpm.  No evidence of acute ischemia.              MDM  Number of Diagnoses or Management Options  Pacemaker at end of battery life:      Amount and/or Complexity of Data Reviewed  Clinical lab tests: reviewed  Tests in the radiology section of CPT®: reviewed  Tests in the medicine section of CPT®: reviewed  Discuss the patient with other providers: yes    Patient Progress  Patient progress: stable    Work-up today including EKG, chest x-ray, troponin and BNP all unremarkable.  Pacemaker evaluation reveals the patient did receive a notice today about the battery of her pacemaker coming to the end of his life.  It also reports that she has had no events.  Discussed the patient's presentation and findings with Dr. Shepherd on-call for cardiology who reports that patient following up with Dr. Felipe in the next few days as planned is appropriate.  Patient advised of this and is agreeable to discharge and plan.    Final diagnoses:   Pacemaker at end of battery life            Florencio Pinon, DO  06/16/20 0025

## 2020-06-16 NOTE — ED NOTES
Pacemaker interrogation showed battery life at NAI.  Battery needs to be replaced.  Recommended follow up with cardiac and to schedule appointment for replacement. MD notified     Eliza Mix RN  06/15/20 2020

## 2020-06-16 NOTE — OUTREACH NOTE
Care Plan Note      Responses   Lifestyle Goals  Routine follow-up with doctor(s), Medication management, Routine foot care, Self monitor blood sugar, Routine eye exam   Barriers  Disease education   Self Management  Get flu/pneumonia shot, Medication Adherence, Home Glucose Monitoring, Check Weight Daily   Annual Wellness Visit:   Patient Has Completed [next AWV sched in Oct 2020]   Care Gaps Addressed  Colon Cancer Screening, Diabetic A1C, Diabetic Eye Exam, Flu Shot, Mammogram, Pneumonia Vaccine   Colon Cancer Screening Type  Cologuard   Cologuard Status  Patient Will Complete   HbA1c Status  Up to Date-outside defined limits   Diabetic Eye Exam Status  Up to Date   Flu Shot Status  Up to Date   Mammogram Status  Up to Date   Pneumonia Vaccine Status  Up to Date   Specific Disease Process Teaching  Heart Failure, Diabetes   Other Patient Education/Resources   24/7 [facility] Nurse Call Line, MyChart   24/7 Nurse Call Line Education Method  Verbal   MyChart Education Method  Verbal [Get [employer] help desk # given to pt to get signed up]   Does patient have depression diagnosis?  No   Advanced Directives:  -- [pt may request for info at next pcp appt]   Ed Visits past 12 months:  2 or 3   Hospitalizations past 12 months  1   Medication Adherence  Medications understood   Goal Progress  Making Progress Toward Goal(s)        The main concerns and/or symptoms the patient would like to address are: ED visit 6/15/20 for pacemaker running out of battery. Pt states she's doing alright, denies any chest pain, sob, increased swelling, has appt with Dr Felipe tomorrow. She lives alone but has family support. She voices compliance with her medications and denies issues filling/affording. She said she monitors her bg at home. She said she uses a cane with ambulation. She drives but at times her vehicle gets repaired, she has a friend who can drive. She denies food insecurities, uses local food samuels.      Education/instruction provided by Care Coordinator: Discussed following diabetic diet. Discussed covid 19 precautions such as social distancing, good handwashing, and wearing mask when out. ACM contact info and 24/7 nurse line provided. Discussed care gaps as listed above.    Follow Up Outreach Due: as needed    Yamilka Wilson, RN  Ambulatory     6/16/2020, 15:27

## 2020-06-16 NOTE — DISCHARGE INSTRUCTIONS
Please return with new or worsening symptoms.  Contact Dr. Felipe tomorrow to discuss expedited follow-up.

## 2020-06-16 NOTE — OUTREACH NOTE
Care Coordination Assessment    Documented/Reviewed By:  Yamilka Wilson RN Date/time:  6/16/2020  2:42 PM   Assessment completed with:  patient  Enrolled in care management program:  No  Living arrangement:  family members  Support system:  family  Type of residence:  private residence  Home care services:  No  Equipment used at home:  cane (Comment: glucometer)  Other issues:  visual problem  Bed or wheelchair confined:  No  Medication adherence problem:  No  History of fall(s) in last 6 months:  No

## 2020-06-17 DIAGNOSIS — I10 ESSENTIAL HYPERTENSION: Chronic | ICD-10-CM

## 2020-06-17 DIAGNOSIS — E78.2 MIXED HYPERLIPIDEMIA: Chronic | ICD-10-CM

## 2020-06-17 DIAGNOSIS — I42.8 NONISCHEMIC CARDIOMYOPATHY (HCC): Chronic | ICD-10-CM

## 2020-06-17 DIAGNOSIS — Z45.02 IMPLANTABLE CARDIOVERTER-DEFIBRILLATOR (ICD) GENERATOR END OF LIFE: Primary | ICD-10-CM

## 2020-06-17 RX ORDER — BUPIVACAINE HCL/0.9 % NACL/PF 0.1 %
2 PLASTIC BAG, INJECTION (ML) EPIDURAL ONCE
Status: CANCELLED | OUTPATIENT
Start: 2020-06-22 | End: 2020-06-17

## 2020-06-17 RX ORDER — SODIUM CHLORIDE 9 MG/ML
30 INJECTION, SOLUTION INTRAVENOUS CONTINUOUS
Status: CANCELLED | OUTPATIENT
Start: 2020-06-22

## 2020-06-18 ENCOUNTER — DOCUMENTATION (OUTPATIENT)
Dept: CARDIOLOGY | Facility: CLINIC | Age: 70
End: 2020-06-18

## 2020-06-18 DIAGNOSIS — Z78.0 POST-MENOPAUSAL: Primary | ICD-10-CM

## 2020-06-18 DIAGNOSIS — Z12.31 ENCOUNTER FOR SCREENING MAMMOGRAM FOR MALIGNANT NEOPLASM OF BREAST: ICD-10-CM

## 2020-06-18 NOTE — PROGRESS NOTES
Yesterday Patient was given packet of information including date and instructions for gen change and date time and instructions for pre op covid test. I call patient today to discuss. She had no questions. Understanding was verbalized

## 2020-06-19 PROCEDURE — U0003 INFECTIOUS AGENT DETECTION BY NUCLEIC ACID (DNA OR RNA); SEVERE ACUTE RESPIRATORY SYNDROME CORONAVIRUS 2 (SARS-COV-2) (CORONAVIRUS DISEASE [COVID-19]), AMPLIFIED PROBE TECHNIQUE, MAKING USE OF HIGH THROUGHPUT TECHNOLOGIES AS DESCRIBED BY CMS-2020-01-R: HCPCS | Performed by: INTERNAL MEDICINE

## 2020-06-20 LAB
COVID LABCORP PRIORITY: NORMAL
SARS-COV-2 RNA RESP QL NAA+PROBE: NOT DETECTED

## 2020-06-21 ENCOUNTER — ANESTHESIA EVENT (OUTPATIENT)
Dept: CARDIOLOGY | Facility: HOSPITAL | Age: 70
End: 2020-06-21

## 2020-06-22 ENCOUNTER — ANESTHESIA (OUTPATIENT)
Dept: CARDIOLOGY | Facility: HOSPITAL | Age: 70
End: 2020-06-22

## 2020-06-22 ENCOUNTER — HOSPITAL ENCOUNTER (OUTPATIENT)
Facility: HOSPITAL | Age: 70
Setting detail: HOSPITAL OUTPATIENT SURGERY
Discharge: HOME | End: 2020-06-22
Attending: INTERNAL MEDICINE | Admitting: INTERNAL MEDICINE

## 2020-06-22 VITALS
SYSTOLIC BLOOD PRESSURE: 150 MMHG | TEMPERATURE: 97.7 F | BODY MASS INDEX: 40.16 KG/M2 | OXYGEN SATURATION: 97 % | HEIGHT: 62 IN | WEIGHT: 218.26 LBS | RESPIRATION RATE: 18 BRPM | DIASTOLIC BLOOD PRESSURE: 74 MMHG | HEART RATE: 84 BPM

## 2020-06-22 DIAGNOSIS — Z45.02 IMPLANTABLE CARDIOVERTER-DEFIBRILLATOR (ICD) GENERATOR END OF LIFE: ICD-10-CM

## 2020-06-22 DIAGNOSIS — I10 ESSENTIAL HYPERTENSION: ICD-10-CM

## 2020-06-22 DIAGNOSIS — I42.8 NONISCHEMIC CARDIOMYOPATHY (HCC): Chronic | ICD-10-CM

## 2020-06-22 DIAGNOSIS — E78.2 MIXED HYPERLIPIDEMIA: ICD-10-CM

## 2020-06-22 LAB
ALBUMIN SERPL-MCNC: 4.2 G/DL (ref 3.5–5.2)
ALBUMIN/GLOB SERPL: 1.4 G/DL
ALP SERPL-CCNC: 101 U/L (ref 39–117)
ALT SERPL W P-5'-P-CCNC: 16 U/L (ref 1–33)
ANION GAP SERPL CALCULATED.3IONS-SCNC: 12 MMOL/L (ref 5–15)
AST SERPL-CCNC: 16 U/L (ref 1–32)
BASOPHILS # BLD AUTO: 0.05 10*3/MM3 (ref 0–0.2)
BASOPHILS NFR BLD AUTO: 0.4 % (ref 0–1.5)
BILIRUB SERPL-MCNC: 0.3 MG/DL (ref 0.2–1.2)
BUN BLD-MCNC: 12 MG/DL (ref 8–23)
BUN/CREAT SERPL: 12.6 (ref 7–25)
CALCIUM SPEC-SCNC: 9.4 MG/DL (ref 8.6–10.5)
CHLORIDE SERPL-SCNC: 103 MMOL/L (ref 98–107)
CO2 SERPL-SCNC: 24 MMOL/L (ref 22–29)
CREAT BLD-MCNC: 0.95 MG/DL (ref 0.57–1)
DEPRECATED RDW RBC AUTO: 48.7 FL (ref 37–54)
EOSINOPHIL # BLD AUTO: 0.19 10*3/MM3 (ref 0–0.4)
EOSINOPHIL NFR BLD AUTO: 1.4 % (ref 0.3–6.2)
ERYTHROCYTE [DISTWIDTH] IN BLOOD BY AUTOMATED COUNT: 15.5 % (ref 12.3–15.4)
GFR SERPL CREATININE-BSD FRML MDRD: 70 ML/MIN/1.73
GLOBULIN UR ELPH-MCNC: 2.9 GM/DL
GLUCOSE BLD-MCNC: 150 MG/DL (ref 65–99)
GLUCOSE BLDC GLUCOMTR-MCNC: 146 MG/DL (ref 70–130)
HCT VFR BLD AUTO: 33.7 % (ref 34–46.6)
HGB BLD-MCNC: 10.9 G/DL (ref 12–15.9)
IMM GRANULOCYTES # BLD AUTO: 0.05 10*3/MM3 (ref 0–0.05)
IMM GRANULOCYTES NFR BLD AUTO: 0.4 % (ref 0–0.5)
INR PPP: 1.03 (ref 0.8–1.2)
LYMPHOCYTES # BLD AUTO: 3.67 10*3/MM3 (ref 0.7–3.1)
LYMPHOCYTES NFR BLD AUTO: 27.4 % (ref 19.6–45.3)
MCH RBC QN AUTO: 27.7 PG (ref 26.6–33)
MCHC RBC AUTO-ENTMCNC: 32.3 G/DL (ref 31.5–35.7)
MCV RBC AUTO: 85.8 FL (ref 79–97)
MONOCYTES # BLD AUTO: 1.22 10*3/MM3 (ref 0.1–0.9)
MONOCYTES NFR BLD AUTO: 9.1 % (ref 5–12)
NEUTROPHILS # BLD AUTO: 8.23 10*3/MM3 (ref 1.7–7)
NEUTROPHILS NFR BLD AUTO: 61.3 % (ref 42.7–76)
NRBC BLD AUTO-RTO: 0 /100 WBC (ref 0–0.2)
PLATELET # BLD AUTO: 342 10*3/MM3 (ref 140–450)
PMV BLD AUTO: 9.4 FL (ref 6–12)
POTASSIUM BLD-SCNC: 4 MMOL/L (ref 3.5–5.2)
PROT SERPL-MCNC: 7.1 G/DL (ref 6–8.5)
PROTHROMBIN TIME: 13.3 SECONDS (ref 11.1–15.3)
RBC # BLD AUTO: 3.93 10*6/MM3 (ref 3.77–5.28)
SODIUM BLD-SCNC: 139 MMOL/L (ref 136–145)
WBC NRBC COR # BLD: 13.41 10*3/MM3 (ref 3.4–10.8)

## 2020-06-22 PROCEDURE — 93641 EP EVL 1/2CHMB PAC CVDFB TST: CPT | Performed by: INTERNAL MEDICINE

## 2020-06-22 PROCEDURE — 85025 COMPLETE CBC W/AUTO DIFF WBC: CPT | Performed by: INTERNAL MEDICINE

## 2020-06-22 PROCEDURE — 82962 GLUCOSE BLOOD TEST: CPT

## 2020-06-22 PROCEDURE — 33264 RMVL & RPLCMT DFB GEN MLT LD: CPT | Performed by: INTERNAL MEDICINE

## 2020-06-22 PROCEDURE — 25010000002 GENTAMICIN PER 80 MG: Performed by: INTERNAL MEDICINE

## 2020-06-22 PROCEDURE — 80053 COMPREHEN METABOLIC PANEL: CPT | Performed by: INTERNAL MEDICINE

## 2020-06-22 PROCEDURE — 25010000002 PROPOFOL 10 MG/ML EMULSION: Performed by: NURSE ANESTHETIST, CERTIFIED REGISTERED

## 2020-06-22 PROCEDURE — C1882 AICD, OTHER THAN SING/DUAL: HCPCS | Performed by: INTERNAL MEDICINE

## 2020-06-22 PROCEDURE — 25010000002 FENTANYL CITRATE (PF) 100 MCG/2ML SOLUTION: Performed by: NURSE ANESTHETIST, CERTIFIED REGISTERED

## 2020-06-22 PROCEDURE — 85610 PROTHROMBIN TIME: CPT | Performed by: INTERNAL MEDICINE

## 2020-06-22 PROCEDURE — 25010000002 MIDAZOLAM PER 1 MG: Performed by: NURSE ANESTHETIST, CERTIFIED REGISTERED

## 2020-06-22 DEVICE — ICD UNIFY ASSURA CRTD CD335740C: Type: IMPLANTABLE DEVICE | Status: FUNCTIONAL

## 2020-06-22 RX ORDER — SODIUM CHLORIDE 9 MG/ML
30 INJECTION, SOLUTION INTRAVENOUS CONTINUOUS
Status: DISCONTINUED | OUTPATIENT
Start: 2020-06-22 | End: 2020-06-22 | Stop reason: HOSPADM

## 2020-06-22 RX ORDER — FENTANYL CITRATE 50 UG/ML
INJECTION, SOLUTION INTRAMUSCULAR; INTRAVENOUS AS NEEDED
Status: DISCONTINUED | OUTPATIENT
Start: 2020-06-22 | End: 2020-06-22 | Stop reason: SURG

## 2020-06-22 RX ORDER — LIDOCAINE HYDROCHLORIDE 20 MG/ML
INJECTION, SOLUTION INFILTRATION; PERINEURAL AS NEEDED
Status: DISCONTINUED | OUTPATIENT
Start: 2020-06-22 | End: 2020-06-22 | Stop reason: HOSPADM

## 2020-06-22 RX ORDER — PROPOFOL 10 MG/ML
VIAL (ML) INTRAVENOUS AS NEEDED
Status: DISCONTINUED | OUTPATIENT
Start: 2020-06-22 | End: 2020-06-22 | Stop reason: SURG

## 2020-06-22 RX ORDER — MIDAZOLAM HYDROCHLORIDE 1 MG/ML
INJECTION INTRAMUSCULAR; INTRAVENOUS AS NEEDED
Status: DISCONTINUED | OUTPATIENT
Start: 2020-06-22 | End: 2020-06-22 | Stop reason: SURG

## 2020-06-22 RX ORDER — BUPIVACAINE HCL/0.9 % NACL/PF 0.1 %
2 PLASTIC BAG, INJECTION (ML) EPIDURAL ONCE
Status: COMPLETED | OUTPATIENT
Start: 2020-06-22 | End: 2020-06-22

## 2020-06-22 RX ADMIN — SODIUM CHLORIDE 30 ML/HR: 9 INJECTION, SOLUTION INTRAVENOUS at 10:58

## 2020-06-22 RX ADMIN — PROPOFOL 30 MG: 10 INJECTION, EMULSION INTRAVENOUS at 14:07

## 2020-06-22 RX ADMIN — PROPOFOL 50 MG: 10 INJECTION, EMULSION INTRAVENOUS at 13:51

## 2020-06-22 RX ADMIN — FENTANYL CITRATE 50 MCG: 50 INJECTION, SOLUTION INTRAMUSCULAR; INTRAVENOUS at 13:36

## 2020-06-22 RX ADMIN — MIDAZOLAM HYDROCHLORIDE 0.5 MG: 2 INJECTION, SOLUTION INTRAMUSCULAR; INTRAVENOUS at 13:41

## 2020-06-22 RX ADMIN — Medication 2 G: at 13:27

## 2020-06-22 RX ADMIN — PROPOFOL 30 MG: 10 INJECTION, EMULSION INTRAVENOUS at 14:25

## 2020-06-22 RX ADMIN — FENTANYL CITRATE 25 MCG: 50 INJECTION, SOLUTION INTRAMUSCULAR; INTRAVENOUS at 14:35

## 2020-06-22 RX ADMIN — FENTANYL CITRATE 25 MCG: 50 INJECTION, SOLUTION INTRAMUSCULAR; INTRAVENOUS at 14:40

## 2020-06-22 RX ADMIN — MIDAZOLAM HYDROCHLORIDE 1 MG: 2 INJECTION, SOLUTION INTRAMUSCULAR; INTRAVENOUS at 13:32

## 2020-06-22 RX ADMIN — PROPOFOL 30 MG: 10 INJECTION, EMULSION INTRAVENOUS at 14:02

## 2020-06-22 RX ADMIN — GENTAMICIN SULFATE 60 MG: 40 INJECTION, SOLUTION INTRAMUSCULAR; INTRAVENOUS at 13:48

## 2020-06-22 RX ADMIN — PROPOFOL 30 MG: 10 INJECTION, EMULSION INTRAVENOUS at 14:34

## 2020-06-22 RX ADMIN — PROPOFOL 30 MG: 10 INJECTION, EMULSION INTRAVENOUS at 14:12

## 2020-06-22 RX ADMIN — MIDAZOLAM HYDROCHLORIDE 0.5 MG: 2 INJECTION, SOLUTION INTRAMUSCULAR; INTRAVENOUS at 14:10

## 2020-06-22 RX ADMIN — PROPOFOL 30 MG: 10 INJECTION, EMULSION INTRAVENOUS at 14:41

## 2020-06-22 NOTE — DISCHARGE INSTRUCTIONS
Keep it dry for 1 week    Do not lift left arm above the shoulder and driving for 30 days    Wound checkup with the pacer clinic 6/24/2020 and then in 1 week

## 2020-06-22 NOTE — H&P
Cardiology at [facility] History and Physical Note      Lexi Wade  Lauri/NONE  [id number]  1950    DATE OF ADMISSION: 6/22/2020    Reason for Hospitalization: Biventricular defibrillator generator replacement and DFT testing reached NAI status upon recent interrogation    History of Present Illness:  Body mass index is 39.92 kg/m². BMI is above normal parameters. Recommendations include: exercise counseling, nutrition counseling and referral to primary care.    69 years old patient with a background history of nonischemic cardiomyopathy nonobstructive CAD, congestive heart failure, intraventricular conduction delay status post biventricular defibrillator with a secondary recovery left and systolic function history of congestive heart failure euvolemic at the time of evaluation, hypertension with hypertensive heart disease,, diabetes, hyperlipidemia, history of degeneration of insufficiency, metabolic syndrome who recently evaluated at Dr. Milian's office and noted biventricular defibrillator has reached NAI status and pacing in the ventricle greater than 99% of time.  Patient presented to [facility] for biventricular generator replacement.  She denies orthopnea PND fever coverages nauseous vomiting diarrhea polydipsia polyuria or chest pain syncope or near syncopal episode.            PSH:   1. Dilatation and curettage done in 07/2010   2. Status post [place] biventricular AICD placement, model #[id number], serial #[id number] done in 07/2012   3. Coronary angiogram done with the last coronary angiogram done in 2010 and 2018  4. Colonoscopy   5. Cataract surgery      SH: Denies any tobacco or alcohol intake.      FH: Significant for malignancy.      CARDIAC RISK FACTORS:   1. Postmenopausal   2. Arterial hypertension   3. Hyperlipidemia   4. Diabetes   5. Obesity     Allergies   Allergen Reactions   • Aldactone [Spironolactone] Unknown - Low Severity   • Nsaids        Prior to  Admission medications    Medication Sig Start Date End Date Taking? Authorizing Provider   atorvastatin (LIPITOR) 40 MG tablet TAKE 1 TABLET BY MOUTH EVERY DAY 11/11/19  Yes Joyce Plata MD   carvedilol (COREG) 25 MG tablet TAKE 1 TABLET BY MOUTH 2 (TWO) TIMES A DAY WITH MEALS. 9/28/18  Yes Joyce Plata MD   diclofenac (VOLTAREN) 1 % gel gel APPLY TO AFFECTED AREA 4 TIMES A DAY 9/30/19  Yes Joyce Plata MD   digoxin (LANOXIN) 125 MCG tablet Take 1 tablet by mouth Daily. 4/2/20  Yes Joyce Plata MD   furosemide (LASIX) 80 MG tablet TAKE 1 TABLET BY MOUTH DAILY. 12/10/19  Yes Joyce Plata MD   glimepiride (AMARYL) 4 MG tablet Take 1 tablet by mouth Every Morning Before Breakfast. 10/11/19  Yes Joyce Plata MD   isosorbide mononitrate (IMDUR) 30 MG 24 hr tablet TAKE 1 TABLET BY MOUTH EVERY DAY  Patient taking differently: Take 30 mg by mouth Daily. 11/11/19  Yes Joyce Plata MD   magnesium oxide (MAGOX) 400 (241.3 Mg) MG tablet tablet Take 1 tablet by mouth Daily. 10/6/17  Yes Joyce Plata MD   metFORMIN (GLUCOPHAGE) 1000 MG tablet TAKE 1 TABLET BY MOUTH 2 (TWO) TIMES A DAY WITH MEALS. 10/28/19  Yes Joyce Plata MD   pantoprazole (PROTONIX) 40 MG EC tablet Take 1 tablet by mouth Daily. 3/9/20  Yes Joyce Plata MD   potassium chloride (MICRO-K) 10 MEQ CR capsule Take 1 capsule by mouth 2 (Two) Times a Day. 11/1/19  Yes Joyce Plata MD   pregabalin (LYRICA) 150 MG capsule TAKE 1 CAPSULE BY MOUTH TWICE A DAY 4/2/20  Yes Joyce Plata MD   vitamin D (ERGOCALCIFEROL) 45212 units capsule capsule Take 1 capsule by mouth 1 (One) Time Per Week.  Patient taking differently: Take 50,000 Units by mouth 1 (One) Time Per Week. Take 1 capsule by mouth weekly on Tuesdays 6/5/18  Yes Joyce Plata MD   albuterol sulfate HFA (Ventolin HFA) 108 (90 Base) MCG/ACT inhaler Inhale 2 puffs Every 4 (Four) Hours As Needed for Wheezing or Shortness of Air. 4/10/20    Joyce Plata MD   aspirin 81 MG tablet Take 81 mg by mouth every night. 6/2/16   Heidi Rausch MD   digoxin (LANOXIN) 125 MCG tablet TAKE 1 TABLET BY MOUTH DAILY 4/2/20   Joyce Plata MD   glucose blood (True Metrix Blood Glucose Test) test strip Check BS daily E11.8 4/10/20   Joyce Plata MD   Insulin Pen Needle (B-D UF III MINI PEN NEEDLES) 31G X 5 MM misc USE WITH INSULIN INJECTIONS NIGHTLY 11/11/19   Joyce Plata MD   ipratropium-albuterol (DUO-NEB) 0.5-2.5 mg/3 ml nebulizer Take 3 mL by nebulization 4 (Four) Times a Day. 8/24/18   Kian Bruner PA   lisinopril (PRINIVIL,ZESTRIL) 10 MG tablet Take 1 tablet by mouth Daily. 4/10/20   Joyce Plata MD   nitroglycerin (NITROSTAT) 0.4 MG SL tablet Place 1 tablet under the tongue Every 5 (Five) Minutes As Needed for Chest Pain. Take no more than 3 doses in 15 minutes. 7/5/18   Joyce Plata MD   VICTOZA 18 MG/3ML solution pen-injector injection INJECT 1.2 MG UNDER THE SKIN INTO THE APPROPRIATE AREA AS DIRECTED EVERY NIGHT. 10/25/19   Joyce Plata MD   ezetimibe (ZETIA) 10 MG tablet TAKE 1 TABELT BY MOUTH DAILY 4/4/18 6/22/20  Joyce Plata MD   latanoprost (XALATAN) 0.005 % ophthalmic solution Administer 1 drop to both eyes Every Night. 90 day supply. 12/20/16 6/22/20  Kun Walton MD   loperamide (IMODIUM) 2 MG capsule Take 1 capsule by mouth 4 (Four) Times a Day As Needed for Diarrhea. 12/10/18 6/22/20  Sukhdev Polk MD   omega-3 acid ethyl esters (LOVAZA) 1 g capsule Take 1 capsule by mouth 2 (Two) Times a Day. 11/15/19 6/22/20  Joyce Plata MD   ondansetron ODT (ZOFRAN-ODT) 4 MG disintegrating tablet Take 1 tablet by mouth Every 6 (Six) Hours As Needed for Nausea or Vomiting. 12/10/18 6/22/20  Sukhdev Polk MD       Past Medical History:   Diagnosis Date   • Artificial lens present     IN  POSITION - BOTH   • Borderline glaucoma    • Cardiomyopathy (CMS/HCC)    • Congestive heart  failure (CMS/HCC)    • Coronary artery disease    • Diabetes mellitus (CMS/HCC)     IMPROVING   • Essential hypertension    • GERD (gastroesophageal reflux disease)    • History of bone density study 09/25/2015    DXA BONE DENSITY AXIAL 09160 [facility] (Screening for osteoporosis)    • History of echocardiogram 03/25/2013    Echocadiogram W/ color flow 42986 (Mild left atrial enlargement wiht mild LV enlargement w/mild concentric LVH. Global hypokinesis. EF 30%. Mitral and Av thickened. Aortic sclerosis. Severe mitral regurg. mild tricuspid regurg. Mild right atrial enlargement noted. Pacemaker wire noted)   03/25/2013 RACHELE WILLIAM    • History of mammography, diagnostic 09/17/2014    [place] [address] [facility] (Microcalcifications of the breast) , Reason: s/p mammotome bx right side for benign disease   • History of mammography, diagnostic 07/24/2014    MAMMOGRAPHY DIAGNOSTIC UNILAT RT [address] [facility] (Abnormal mammogram, unspecified)    • History of mammography, screening 07/03/2012    BENIGN   • Hypercholesterolemia    • Low back pain    • Menopausal syndrome 10/26/2017   • Microcalcifications of the breast     BENIGN CHANGES   • Need for prophylactic vaccination against Streptococcus pneumoniae (pneumococcus)    • Neurologic disorder associated with diabetes mellitus (CMS/HCC)    • Obesity    • Sleep apnea     better now; no longer has the machine   • Type 2 diabetes mellitus (CMS/HCC)     NO BDR   • Upper respiratory infection    • Vaginal bleeding 7/27/2017   • Vitamin D deficiency        Past Surgical History:   Procedure Laterality Date   • CARDIAC CATHETERIZATION  10/28/2010    Cardiac cath 01558 (Noevidence of any obstructive epicardial coronary artery disease noted. Severe left ventricular dysfunction with an EF of 25%)   • CARDIAC CATHETERIZATION  01/31/2002    Cardiac cath 14701 (No significant coronary atherosclerosis. Probably mild left ventricular systolic dysfunction.)   • CARDIAC  CATHETERIZATION N/A 8/23/2018    Procedure: Left Heart Cath;  Surgeon: Wang Felipe MD;  Location: [facility] CATH INVASIVE LOCATION;  Service: Cardiology   • CARDIAC PACEMAKER PLACEMENT     • CATARACT EXTRACTION  01/28/2014    Remove cataract, insert lens (Right eye)   • CATARACT EXTRACTION  11/22/2011    Remove cataract, insert lens (Left eye)   • CATARACT EXTRACTION     • CENTRAL VENOUS LINE INSERTION  05/20/2016    Central line insertion (Successful placement of right upper extremity midline.)   • COLONOSCOPY  12/14/2004    Single small non-bleeding polyp in the rectum. The polyp was removed by hot biopsy polypectomy   • COLONOSCOPY N/A 6/14/2017    Procedure: COLONOSCOPY;  Surgeon: Orville Farias MD;  Location: [facility] ENDOSCOPY;  Service:    • MAMMO BILATERAL  09/17/2014    MAMMOGRAPHY DIAGNOSTIC UNILAT RT [address] [facility] (Microcalcifications of the breast) , Reason: s/p mammotome bx right side for benign disease   • MAMMO BILATERAL  07/24/2014    MAMMOGRAPHY DIAGNOSTIC UNILAT RT 65631 [facility] (Abnormal mammogram, unspecified)    • OTHER SURGICAL HISTORY  11/02/2012    DEBRIDE NAIL 6 OR MORE 66114 (Onychomycosis)    • OTHER SURGICAL HISTORY  04/27/2016    EXTENDED VISUAL FIELDS STUDY 17545 (Open angle with borderline findings, low risk, unspecified eye)    • OTHER SURGICAL HISTORY      EXTENDED VISUAL FIELDS STUDY 52603 (Borderline glaucoma)  (2): 10/02/2014, 04/17/2013   • OTHER SURGICAL HISTORY      OCT DISC NFL 92225 (Borderline glaucoma)  (2): 02/02/2015, 08/21/2013   • PACEMAKER IMPLANTATION  2007   • PAP SMEAR  06/10/2010    NEGATIVE   • TOTAL ABDOMINAL HYSTERECTOMY WITH SALPINGO OOPHORECTOMY  09/16/2013    Lysis of adhesions.       Social History     Socioeconomic History   • Marital status: Single     Spouse name: Not on file   • Number of children: Not on file   • Years of education: Not on file   • Highest education level: Not on file   Tobacco Use   • Smoking status: Former Smoker   • Smokeless  "tobacco: Never Used   • Tobacco comment: as a teenager   Substance and Sexual Activity   • Alcohol use: No   • Drug use: No   • Sexual activity: Defer       Family History   Problem Relation Age of Onset   • Heart disease Other    • Hypertension Other    • Alzheimer's disease Other    • Diabetes Sister         x 2   • Hypertension Sister    • Hyperlipidemia Sister    • Bone cancer Brother        Patient Active Problem List   Diagnosis   • Pseudophakia   • Primary open angle glaucoma   • Nonischemic cardiomyopathy (CMS/HCC)   • Congestive heart failure (CMS/HCC)   • Essential hypertension   • GERD (gastroesophageal reflux disease)   • Type 2 diabetes mellitus with complication, without long-term current use of insulin (CMS/HCC)   • Vitamin D deficiency   • Borderline glaucoma   • Neurologic disorder associated with diabetes mellitus (CMS/HCC)   • Mixed hyperlipidemia   • Menopausal syndrome   • Chest pain   • Morbidly obese (CMS/[facility])   • Precordial pain   • Acute respiratory failure with hypoxia (CMS/HCC)   • Coronary artery disease involving native heart with angina pectoris (CMS/[facility])   • Implantable cardioverter-defibrillator (ICD) generator end of life        REVIEW OF SYSTEMS:   Review of Systems    12 point ROS was performed and is negative except as outlined in HPI.       Objective:     Vitals:    06/18/20 0911 06/22/20 1029   BP:  130/65   Pulse:  82   Resp:  16   Temp:  96.9 °F (36.1 °C)   SpO2:  99%   Weight: 99.3 kg (219 lb) 99 kg (218 lb 4.1 oz)   Height: 157.5 cm (62.01\") 157.5 cm (62\")     Body mass index is 39.92 kg/m².  Flowsheet Rows      First Filed Value   Admission Height  157.5 cm (62.01\") Documented at 06/18/2020 0911   Admission Weight  99.3 kg (219 lb) Documented at 06/18/2020 0911        No intake or output data in the 24 hours ending 06/22/20 1145      Physical Exam:  Constitutional: Oriented to person, place, and time.  Well-developed and well-nourished. No distress.   HENT: Normocephalic. "   Eyes: Conjunctivae are normal. No scleral icterus.   Neck: Normal carotid pulses, no hepatojugular reflux and no JVD present. Carotid bruit is not present. No tracheal deviation, no edema and no erythema present. No thyromegaly present.   Cardiovascular: Normal rate, regular rhythm, S1 normal, S2 normal, normal heart sounds and intact distal pulses.   No extrasystoles are present.   Pulmonary/Chest: Effort normal and breath sounds normal. No respiratory distress.   Abdominal: Soft. Bowel sounds are normal. Exhibits no distension and no mass.   Musculoskeletal:  Exhibits no edema, no tenderness.   Neurological: Alert and awake.  Skin: Skin warm and dry.  Psychiatric: Normal mood and affect.      Lab Review:                Results from last 7 days   Lab Units 06/22/20  1044   SODIUM mmol/L 139   POTASSIUM mmol/L 4.0   CHLORIDE mmol/L 103   CO2 mmol/L 24.0   BUN mg/dL 12   CREATININE mg/dL 0.95   GLUCOSE mg/dL 150*     Results from last 7 days   Lab Units 06/22/20  1044   WBC 10*3/mm3 13.41*   HEMOGLOBIN g/dL 10.9*   HEMATOCRIT % 33.7*   PLATELETS 10*3/mm3 342     Results from last 7 days   Lab Units 06/22/20  1044   INR  1.03                     I personally viewed and interpreted the patient's EKG/Telemetry data.      Assessment/Plan:     1.    History of congestive heart failure status post biventricular defibrillator reached NAI status  2.  Nonischemic cardiomyopathy.  3.  Arterial hypertension.  4.  Hypertensive heart disease.    69 years old patient with a background history of nonischemic cardiomyopathy, LV dysfunction, congestive heart failure status post biventricular defibrillator implantation with good recovery left and systolic function and recent ejection fractions 42% previously was 25%.  She presented today for biventricular defibrillation generator replacement.  Procedure risk discussed with the patient.  Risk included but not limited to infection, bleeding, stroke, pneumothorax, hematoma.  Understand  willing to proceed forward.  Will resume the medication after the biventricular generator placement.  Significant of low carbohydrate, low-fat, DASH diet and graded exercise discussed with the patient's.      Thank you for allowing me to participate in the care of Lexivan Wade. Feel free to contact me directly with any further questions or concerns.    Kathy Shepherd MD  06/22/20  11:45.    Part of this note may be an electronic transcription/translation of spoken language to printed text using the Dragon Dictation system.

## 2020-06-22 NOTE — ANESTHESIA PREPROCEDURE EVALUATION
Anesthesia Evaluation     Patient summary reviewed and Nursing notes reviewed   no history of anesthetic complications:  NPO Solid Status: > 8 hours  NPO Liquid Status: > 8 hours           Airway   Mallampati: II  TM distance: >3 FB  Neck ROM: full  possible difficult intubation  Dental    (+) poor dentation    Pulmonary - normal exam    breath sounds clear to auscultation  (+) a smoker Former, asthma,sleep apnea on CPAP,     ROS comment: FINDINGS:        - lines/tubes: Left-sided pacemaker/ICD is present. There is  also pacemaker larger present on the current exam.    - cardiac: Size within normal limits.    - mediastinum: Contour within normal limits.     - lungs: No evidence of a focal air space process, pulmonary  interstitial edema, nodule(s)/mass. Mild elevation of the right  hemidiaphragm is similar to prior study    - pleura: No evidence of  fluid.      - osseous: Unremarkable for age.     IMPRESSION:  No acute abnormality identified     Electronically signed by:  Jocye Joyce MD  6/15/2020 8:08 PM CDT  Workstation: [id number]  Cardiovascular     ECG reviewed  Rhythm: regular  Rate: normal    (+) pacemaker (2013.) pacemaker, hypertension, CAD, angina, CHF , murmur (Grade I-II/VI systolic), hyperlipidemia,     ROS comment: Electronic ventricular pacemaker  When compared with ECG of 12-JAN-2020 14:08,  Electronic ventricular pacemaker has replaced Sinus rhythm  Confirmed by ALONSO VAUGHAN MDHAMMAD ([id number]) on 6/16/2020 8:55:45 AM Impression   1.  No evidence of any obstructive epicardial coronary artery disease in the circumflex right or the left anterior descending artery.  2.  Calcification noted in the left main without any evidence of dampening of the pressure.  3.  Elevated left ventricular end-diastolic pressure.     Recommendations   Patient was recommended medical management for nonischemic cardiomyopathy and to workup noncardiac etiology of chest pain.             Wang Felipe MD  8/23/2018  1:03 PMLeft  Ventricle Estimated EF appears to be in the range of 26 - 30%. There is left ventricular global hypokinesis noted.   The left ventricular cavity is mildly dilated. Left ventricular diastolic function is normal. There is no evidence of a left ventricular mass or thrombus present.        Neuro/Psych- negative ROS  GI/Hepatic/Renal/Endo    (+) obesity,  GERD well controlled,  diabetes mellitus type 2 using insulin,     ROS Comment: HGB 10.9 HCT 33.7    Musculoskeletal (-) negative ROS    Abdominal   (+) obese,    Substance History - negative use     OB/GYN negative ob/gyn ROS         Other - negative ROS                       Anesthesia Plan    ASA 4     MAC   total IV anesthesia  intravenous induction     Anesthetic plan, all risks, benefits, and alternatives have been provided, discussed and informed consent has been obtained with: patient.

## 2020-07-02 RX ORDER — CARVEDILOL 25 MG/1
25 TABLET ORAL 2 TIMES DAILY WITH MEALS
Qty: 180 TABLET | Refills: 3 | Status: SHIPPED | OUTPATIENT
Start: 2020-07-02 | End: 2021-06-17

## 2020-07-21 DIAGNOSIS — M79.605 LEFT LEG PAIN: ICD-10-CM

## 2020-07-24 ENCOUNTER — OFFICE VISIT (OUTPATIENT)
Dept: CARDIOLOGY | Facility: CLINIC | Age: 70
End: 2020-07-24

## 2020-07-24 VITALS
BODY MASS INDEX: 41.22 KG/M2 | SYSTOLIC BLOOD PRESSURE: 160 MMHG | DIASTOLIC BLOOD PRESSURE: 90 MMHG | WEIGHT: 224 LBS | OXYGEN SATURATION: 98 % | HEART RATE: 94 BPM | HEIGHT: 62 IN

## 2020-07-24 DIAGNOSIS — E78.2 MIXED HYPERLIPIDEMIA: Chronic | ICD-10-CM

## 2020-07-24 DIAGNOSIS — I25.119 CORONARY ARTERY DISEASE INVOLVING NATIVE HEART WITH ANGINA PECTORIS, UNSPECIFIED VESSEL OR LESION TYPE (HCC): ICD-10-CM

## 2020-07-24 DIAGNOSIS — I42.8 NONISCHEMIC CARDIOMYOPATHY (HCC): Primary | Chronic | ICD-10-CM

## 2020-07-24 DIAGNOSIS — I50.9 CHRONIC CONGESTIVE HEART FAILURE, UNSPECIFIED HEART FAILURE TYPE (HCC): ICD-10-CM

## 2020-07-24 DIAGNOSIS — I10 ESSENTIAL HYPERTENSION: Chronic | ICD-10-CM

## 2020-07-24 PROCEDURE — 99024 POSTOP FOLLOW-UP VISIT: CPT | Performed by: INTERNAL MEDICINE

## 2020-07-24 NOTE — PROGRESS NOTES
Lexi Wade  69 y.o. female    07/24/2020  1. Nonischemic cardiomyopathy (CMS/HCC)    2. Essential hypertension    3. Mixed hyperlipidemia    4. Chronic congestive heart failure, unspecified heart failure type (CMS/HCC)    5. Coronary artery disease involving native heart with angina pectoris, unspecified vessel or lesion type (CMS/HCC)        History of Present Illness:    Body mass index is 40.96 kg/m². BMI is above normal parameters. Recommendations include: exercise counseling, nutrition counseling and referral to primary care.      69 years old patient presented post hospital follow-up status post biventricular ICD generator replacement site healed and recovered very well she is a pleased with the clinical outcome.  Her blood pressure noted to be 160/90 advised the patient she can increase the dose of lisinopril to 20 mg and she will continue follow-up with Dr. Felipe as per schedule appointment and I will see her on PRN basis.  With a background history of nonischemic cardiomyopathy nonobstructive CAD, congestive heart failure, intraventricular conduction delay status post biventricular defibrillator with a secondary recovery left and systolic function history of congestive heart failure euvolemic at the time of evaluation, hypertension with hypertensive heart disease,, diabetes, hyperlipidemia, history of degeneration of insufficiency, metabolic syndrome who recently evaluated at Dr. Milian's office and noted biventricular defibrillator has reached NAI status and pacing in the ventricle greater than 99% of time.  Patient presented to [facility] for biventricular generator replacement.  She denies orthopnea PND fever coverages nauseous vomiting diarrhea polydipsia polyuria or chest pain syncope or near syncopal episode.                 PSH:   1. Dilatation and curettage done in 07/2010   2. Status post [employer] [place] biventricular AICD placement, model #[id number], serial #[id number] done in 07/2012    3. Coronary angiogram done with the last coronary angiogram done in 2010 and 2018  4. Colonoscopy   5. Cataract surgery      SH: Denies any tobacco or alcohol intake.      FH: Significant for malignancy.      CARDIAC RISK FACTORS:   1. Postmenopausal   2. Arterial hypertension   3. Hyperlipidemia   4. Diabetes   5. Obesity         SUBJECTIVE:    Allergies   Allergen Reactions   • Aldactone [Spironolactone] Unknown - Low Severity   • Nsaids          Past Medical History:   Diagnosis Date   • Artificial lens present     IN  POSITION - BOTH   • Borderline glaucoma    • Cardiomyopathy (CMS/HCC)    • Congestive heart failure (CMS/HCC)    • Coronary artery disease    • Diabetes mellitus (CMS/HCC)     IMPROVING   • Essential hypertension    • GERD (gastroesophageal reflux disease)    • History of bone density study 09/25/2015    DXA BONE DENSITY AXIAL [address] [facility] (Screening for osteoporosis)    • History of echocardiogram 03/25/2013    Echocadiogram W/ color flow 66013 (Mild left atrial enlargement wiht mild LV enlargement w/mild concentric LVH. Global hypokinesis. EF 30%. Mitral and Av thickened. Aortic sclerosis. Severe mitral regurg. mild tricuspid regurg. Mild right atrial enlargement noted. Pacemaker wire noted)   03/25/2013 RACHELE WILLIAM    • History of mammography, diagnostic 09/17/2014    [place] [address] [facility] (Microcalcifications of the breast) , Reason: s/p mammotome bx right side for benign disease   • History of mammography, diagnostic 07/24/2014    MAMMOGRAPHY DIAGNOSTIC UNILAT RT [address] [facility] (Abnormal mammogram, unspecified)    • History of mammography, screening 07/03/2012    BENIGN   • Hypercholesterolemia    • Low back pain    • Menopausal syndrome 10/26/2017   • Microcalcifications of the breast     BENIGN CHANGES   • Need for prophylactic vaccination against Streptococcus pneumoniae (pneumococcus)    • Neurologic disorder associated with diabetes mellitus (CMS/HCC)    •  Obesity    • Sleep apnea     better now; no longer has the machine   • Type 2 diabetes mellitus (CMS/HCC)     NO BDR   • Upper respiratory infection    • Vaginal bleeding 7/27/2017   • Vitamin D deficiency          Past Surgical History:   Procedure Laterality Date   • CARDIAC CATHETERIZATION  10/28/2010    Cardiac cath 26561 (Noevidence of any obstructive epicardial coronary artery disease noted. Severe left ventricular dysfunction with an EF of 25%)   • CARDIAC CATHETERIZATION  01/31/2002    Cardiac cath 71813 (No significant coronary atherosclerosis. Probably mild left ventricular systolic dysfunction.)   • CARDIAC CATHETERIZATION N/A 8/23/2018    Procedure: Left Heart Cath;  Surgeon: Wang Felipe MD;  Location: [facility] CATH INVASIVE LOCATION;  Service: Cardiology   • CARDIAC ELECTROPHYSIOLOGY PROCEDURE N/A 6/22/2020    Procedure: ICD DC generator change;  Surgeon: Kathy Shepherd MD;  Location: [facility] CATH INVASIVE LOCATION;  Service: Cardiology;  Laterality: N/A;   • CARDIAC PACEMAKER PLACEMENT     • CATARACT EXTRACTION  01/28/2014    Remove cataract, insert lens (Right eye)   • CATARACT EXTRACTION  11/22/2011    Remove cataract, insert lens (Left eye)   • CATARACT EXTRACTION     • CENTRAL VENOUS LINE INSERTION  05/20/2016    Central line insertion (Successful placement of right upper extremity midline.)   • COLONOSCOPY  12/14/2004    Single small non-bleeding polyp in the rectum. The polyp was removed by hot biopsy polypectomy   • COLONOSCOPY N/A 6/14/2017    Procedure: COLONOSCOPY;  Surgeon: Orville Farias MD;  Location: [facility] ENDOSCOPY;  Service:    • MAMMO BILATERAL  09/17/2014    MAMMOGRAPHY [place] [address] [facility] (Microcalcifications of the breast) , Reason: s/p mammotome bx right side for benign disease   • MAMMO BILATERAL  07/24/2014    MAMMOGRAPHY DIAGNOSTIC UNILAT RT [address] [facility] (Abnormal mammogram, unspecified)    • OTHER SURGICAL HISTORY  11/02/2012    DEBRIDE NAIL 6 OR  MORE 60962 (Onychomycosis)    • OTHER SURGICAL HISTORY  04/27/2016    EXTENDED VISUAL FIELDS STUDY 25865 (Open angle with borderline findings, low risk, unspecified eye)    • OTHER SURGICAL HISTORY      EXTENDED VISUAL FIELDS STUDY 45260 (Borderline glaucoma)  (2): 10/02/2014, 04/17/2013   • OTHER SURGICAL HISTORY      OCT DISC NFL 03398 (Borderline glaucoma)  (2): 02/02/2015, 08/21/2013   • PACEMAKER IMPLANTATION  2007   • PAP SMEAR  06/10/2010    NEGATIVE   • TOTAL ABDOMINAL HYSTERECTOMY WITH SALPINGO OOPHORECTOMY  09/16/2013    Lysis of adhesions.         Family History   Problem Relation Age of Onset   • Heart disease Other    • Hypertension Other    • Alzheimer's disease Other    • Diabetes Sister         x 2   • Hypertension Sister    • Hyperlipidemia Sister    • Bone cancer Brother          Social History     Socioeconomic History   • Marital status: Single     Spouse name: Not on file   • Number of children: Not on file   • Years of education: Not on file   • Highest education level: Not on file   Tobacco Use   • Smoking status: Former Smoker   • Smokeless tobacco: Never Used   • Tobacco comment: as a teenager   Substance and Sexual Activity   • Alcohol use: No   • Drug use: No   • Sexual activity: Defer         Current Outpatient Medications   Medication Sig Dispense Refill   • albuterol sulfate HFA (Ventolin HFA) 108 (90 Base) MCG/ACT inhaler Inhale 2 puffs Every 4 (Four) Hours As Needed for Wheezing or Shortness of Air. 18 inhaler 1   • aspirin 81 MG tablet Take 81 mg by mouth every night.     • atorvastatin (LIPITOR) 40 MG tablet TAKE 1 TABLET BY MOUTH EVERY DAY 90 tablet 3   • carvedilol (COREG) 25 MG tablet TAKE 1 TABLET BY MOUTH 2 (TWO) TIMES A DAY WITH MEALS. 180 tablet 3   • diclofenac (VOLTAREN) 1 % gel gel APPLY TO AFFECTED AREA 4 TIMES A DAY Oral Volteran DC'D) 100 g 3   • digoxin (LANOXIN) 125 MCG tablet TAKE 1 TABLET BY MOUTH DAILY 90 tablet 3   • furosemide (LASIX) 80 MG tablet TAKE 1 TABLET  BY MOUTH DAILY. 90 tablet 3   • glimepiride (AMARYL) 4 MG tablet Take 1 tablet by mouth Every Morning Before Breakfast. 90 tablet 3   • glucose blood (True Metrix Blood Glucose Test) test strip Check BS daily E11.8 100 each 3   • Insulin Pen Needle (B-D UF III MINI PEN NEEDLES) 31G X 5 MM misc USE WITH INSULIN INJECTIONS NIGHTLY 100 each 3   • ipratropium-albuterol (DUO-NEB) 0.5-2.5 mg/3 ml nebulizer Take 3 mL by nebulization 4 (Four) Times a Day. 360 mL 12   • isosorbide mononitrate (IMDUR) 30 MG 24 hr tablet TAKE 1 TABLET BY MOUTH EVERY DAY (Patient taking differently: Take 30 mg by mouth Daily.) 90 tablet 3   • lisinopril (PRINIVIL,ZESTRIL) 10 MG tablet Take 1 tablet by mouth Daily. 90 tablet 3   • magnesium oxide (MAGOX) 400 (241.3 Mg) MG tablet tablet Take 1 tablet by mouth Daily. 90 each 3   • metFORMIN (GLUCOPHAGE) 1000 MG tablet TAKE 1 TABLET BY MOUTH 2 (TWO) TIMES A DAY WITH MEALS. 180 tablet 3   • nitroglycerin (NITROSTAT) 0.4 MG SL tablet Place 1 tablet under the tongue Every 5 (Five) Minutes As Needed for Chest Pain. Take no more than 3 doses in 15 minutes. 25 tablet 0   • pantoprazole (PROTONIX) 40 MG EC tablet Take 1 tablet by mouth Daily. 90 tablet 1   • potassium chloride (MICRO-K) 10 MEQ CR capsule Take 1 capsule by mouth 2 (Two) Times a Day. 180 capsule 3   • pregabalin (LYRICA) 150 MG capsule TAKE 1 CAPSULE BY MOUTH TWICE A  capsule 1   • VICTOZA 18 MG/3ML solution pen-injector injection INJECT 1.2 MG UNDER THE SKIN INTO THE APPROPRIATE AREA AS DIRECTED EVERY NIGHT. 6 pen 5   • vitamin D (ERGOCALCIFEROL) 78150 units capsule capsule Take 1 capsule by mouth 1 (One) Time Per Week. (Patient taking differently: Take 50,000 Units by mouth 1 (One) Time Per Week. Take 1 capsule by mouth weekly on Tuesdays) 12 capsule 3     No current facility-administered medications for this visit.            Review of Systems:     Constitutional:  Denies recent weight loss, weight gain, fever or chills, no change  "in exercise tolerance.     HENT:  Denies any hearing loss, epistaxis, hoarseness, or difficulty speaking.     Eyes: No blurred .    Respiratory:  Denies dyspnea with exertion,no cough, wheezing, or hemoptysis.     Cardiovascular: See H&P    Gastrointestinal:  Denies change in bowel habits, dyspepsia, ulcer disease, hematochezia, or melena.     Endocrine: Negative for cold intolerance, heat intolerance, polydipsia, polyphagia and polyuria. Denies any history of weight change, polydipsia, polyuria.     Genitourinary: Negative.      Musculoskeletal: Denies any history of arthritic symptoms or back problems.     Skin:  Denies any change in hair or nails, rashes, or skin lesions.     Allergic/Immunologic: Negative.  Negative for environmental allergies, food allergies and immunocompromised state.     Neurological:  Denies any history of recurrent headaches, strokes, TIA, or seizure disorder.     Hematological: Denies any food allergies, seasonal allergies, bleeding disorders, or lymphadenopathy.     Psychiatric/Behavioral: Denies any history of depression, substance abuse, or change in cognitive function.       OBJECTIVE:    /90 (BP Location: Right arm, Patient Position: Sitting, Cuff Size: Adult)   Pulse 94   Ht 157.5 cm (62.01\")   Wt 102 kg (224 lb)   SpO2 98%   BMI 40.96 kg/m²       Physical Exam:     Constitutional: Cooperative, alert and oriented, well-developed, well-nourished, in no acute distress.     HENT:   Head: Normocephalic, normal hair patterns, no masses or tenderness.  Ears, Nose, and Throat: No gross abnormalities. No pallor or cyanosis. Dentition good.   Eyes: EOMS intact, PERRL, conjunctivae and lids unremarkable. Fundoscopic exam and visual fields not performed.   Neck: No palpable masses or adenopathy, no thyromegaly, no JVD, carotid pulses are full and equal bilaterally and without  Bruits.     Cardiovascular: Regular rhythm, S1 and S2 normal, no S3 or S4. Apical impulse not displaced. No " murmurs, gallops, or rubs detected.     Pulmonary/Chest: Chest: normal symmetry, no tenderness to palpation, normal respiratory excursion, no intercostal retraction, no use of accessory muscles. Pulmonary: Normal breath sounds. No rales or rhonchi.    Abdominal: Abdomen soft, bowel sounds normoactive, no masses, no hepatosplenomegaly, non-tender, no bruits.     Musculoskeletal: No deformities, clubbing, cyanosis, erythema, or edema observed. There are no spinal abnormalities noted. Normal muscle strength and tone. Pulses full and equal in all extremities, no bruits auscultated.     Neurological: No gross motor or sensory deficits noted, affect appropriate, oriented to time, person, place.     Skin: Warm and dry to the touch, no apparent skin lesions or masses noted.     Psychiatric: She has a normal mood and affect. Her behavior is normal. Judgment and thought content normal.         Procedures      Lab Results   Component Value Date    WBC 13.41 (H) 06/22/2020    HGB 10.9 (L) 06/22/2020    HCT 33.7 (L) 06/22/2020    MCV 85.8 06/22/2020     06/22/2020     Lab Results   Component Value Date    GLUCOSE 150 (H) 06/22/2020    BUN 12 06/22/2020    CREATININE 0.95 06/22/2020    EGFRIFAFRI 70 06/22/2020    BCR 12.6 06/22/2020    CO2 24.0 06/22/2020    CALCIUM 9.4 06/22/2020    ALBUMIN 4.20 06/22/2020    AST 16 06/22/2020    ALT 16 06/22/2020     Lab Results   Component Value Date    CHOL 197 10/04/2019    CHOL 153 10/03/2018    CHOL 167 06/21/2018     Lab Results   Component Value Date    TRIG 136 10/04/2019    TRIG 177 10/03/2018    TRIG 107 06/21/2018     Lab Results   Component Value Date    HDL 43 10/04/2019    HDL 35 (L) 10/03/2018    HDL 34 (L) 06/21/2018     No components found for: LDLCALC  Lab Results   Component Value Date     (H) 06/08/2020     (H) 10/04/2019    LDL 95 03/29/2019     No results found for: HDLLDLRATIO  No components found for: CHOLHDL  Lab Results   Component Value Date     HGBA1C 7.30 (H) 06/08/2020     Lab Results   Component Value Date    TSH 3.240 08/22/2018           ASSESSMENT AND PLAN:      1.    History of congestive heart failure status post biventricular defibrillator reached NAI status  2.  Nonischemic cardiomyopathy.  3.  Arterial hypertension.  4.  Hypertensive heart disease.     69 years old patient with a background history of nonischemic cardiomyopathy, LV dysfunction, congestive heart failure status post biventricular defibrillator implantation with good recovery left and systolic function and recent ejection fractions 42% previously was 25%.  Patient presented post hospital follow-up and she is doing remarkably well.  Her blood pressure made in the higher side.  She will increase the dose of lisinopril to 10 mg and continue follow-up with Dr. Felipe as per schedule appointment.  Her other medications of Coreg and Lasix with history of congestive heart failure hypertension hypertensive heart disease compensated, diabetes being managed with oral hypoglycemic agent.  Significantly low carbohydrate, low-fat, DASH diet and graded exercise discussed with the patient..  Lexi was seen today for follow-up.    Diagnoses and all orders for this visit:    Nonischemic cardiomyopathy (CMS/HCC)    Essential hypertension    Mixed hyperlipidemia    Chronic congestive heart failure, unspecified heart failure type (CMS/HCC)    Coronary artery disease involving native heart with angina pectoris, unspecified vessel or lesion type (CMS/HCC)          Kathy Shepherd MD  7/24/2020  11:42

## 2020-07-29 RX ORDER — ALBUTEROL SULFATE 90 UG/1
AEROSOL, METERED RESPIRATORY (INHALATION)
Qty: 18 INHALER | Refills: 1 | Status: SHIPPED | OUTPATIENT
Start: 2020-07-29 | End: 2020-11-23

## 2020-09-17 DIAGNOSIS — Z12.11 SCREENING FOR COLON CANCER: ICD-10-CM

## 2020-10-13 ENCOUNTER — LAB (OUTPATIENT)
Dept: LAB | Facility: HOSPITAL | Age: 70
End: 2020-10-13

## 2020-10-13 ENCOUNTER — OFFICE VISIT (OUTPATIENT)
Dept: FAMILY MEDICINE CLINIC | Facility: CLINIC | Age: 70
End: 2020-10-13

## 2020-10-13 VITALS
RESPIRATION RATE: 20 BRPM | SYSTOLIC BLOOD PRESSURE: 128 MMHG | BODY MASS INDEX: 40.89 KG/M2 | HEIGHT: 62 IN | WEIGHT: 222.2 LBS | HEART RATE: 88 BPM | DIASTOLIC BLOOD PRESSURE: 64 MMHG | OXYGEN SATURATION: 98 %

## 2020-10-13 DIAGNOSIS — E78.2 MIXED HYPERLIPIDEMIA: Chronic | ICD-10-CM

## 2020-10-13 DIAGNOSIS — E11.49 NEUROLOGIC DISORDER ASSOCIATED WITH DIABETES MELLITUS (HCC): Chronic | ICD-10-CM

## 2020-10-13 DIAGNOSIS — E11.9 DIABETES MELLITUS WITHOUT COMPLICATION (HCC): ICD-10-CM

## 2020-10-13 DIAGNOSIS — I10 ESSENTIAL HYPERTENSION: Chronic | ICD-10-CM

## 2020-10-13 DIAGNOSIS — E11.8 TYPE 2 DIABETES MELLITUS WITH COMPLICATION, WITHOUT LONG-TERM CURRENT USE OF INSULIN (HCC): ICD-10-CM

## 2020-10-13 DIAGNOSIS — E11.8 TYPE 2 DIABETES MELLITUS WITH COMPLICATION, WITHOUT LONG-TERM CURRENT USE OF INSULIN (HCC): Chronic | ICD-10-CM

## 2020-10-13 DIAGNOSIS — I10 ESSENTIAL HYPERTENSION: ICD-10-CM

## 2020-10-13 DIAGNOSIS — E55.9 VITAMIN D DEFICIENCY: Chronic | ICD-10-CM

## 2020-10-13 DIAGNOSIS — E78.2 MIXED HYPERLIPIDEMIA: ICD-10-CM

## 2020-10-13 DIAGNOSIS — Z00.00 MEDICARE ANNUAL WELLNESS VISIT, SUBSEQUENT: Primary | ICD-10-CM

## 2020-10-13 PROCEDURE — 85025 COMPLETE CBC W/AUTO DIFF WBC: CPT

## 2020-10-13 PROCEDURE — G0439 PPPS, SUBSEQ VISIT: HCPCS | Performed by: GENERAL PRACTICE

## 2020-10-13 PROCEDURE — 83036 HEMOGLOBIN GLYCOSYLATED A1C: CPT

## 2020-10-13 PROCEDURE — 36415 COLL VENOUS BLD VENIPUNCTURE: CPT

## 2020-10-13 PROCEDURE — 82043 UR ALBUMIN QUANTITATIVE: CPT

## 2020-10-13 PROCEDURE — 99214 OFFICE O/P EST MOD 30 MIN: CPT | Performed by: GENERAL PRACTICE

## 2020-10-13 PROCEDURE — 80053 COMPREHEN METABOLIC PANEL: CPT

## 2020-10-13 PROCEDURE — 81001 URINALYSIS AUTO W/SCOPE: CPT

## 2020-10-13 PROCEDURE — 80061 LIPID PANEL: CPT

## 2020-10-13 PROCEDURE — 82306 VITAMIN D 25 HYDROXY: CPT

## 2020-10-13 RX ORDER — ISOSORBIDE MONONITRATE 30 MG/1
30 TABLET, EXTENDED RELEASE ORAL DAILY
Qty: 90 TABLET | Refills: 3 | Status: SHIPPED | OUTPATIENT
Start: 2020-10-13 | End: 2021-12-16 | Stop reason: SDUPTHER

## 2020-10-13 RX ORDER — LIRAGLUTIDE 6 MG/ML
1.2 INJECTION SUBCUTANEOUS DAILY
Qty: 6 PEN | Refills: 5 | Status: SHIPPED | OUTPATIENT
Start: 2020-10-13 | End: 2021-12-02 | Stop reason: HOSPADM

## 2020-10-13 RX ORDER — ATORVASTATIN CALCIUM 40 MG/1
40 TABLET, FILM COATED ORAL DAILY
Qty: 90 TABLET | Refills: 3 | Status: SHIPPED | OUTPATIENT
Start: 2020-10-13 | End: 2021-12-16 | Stop reason: SDUPTHER

## 2020-10-13 RX ORDER — POTASSIUM CHLORIDE 750 MG/1
10 CAPSULE, EXTENDED RELEASE ORAL 2 TIMES DAILY
Qty: 180 CAPSULE | Refills: 3 | Status: SHIPPED | OUTPATIENT
Start: 2020-10-13 | End: 2021-12-28 | Stop reason: SDUPTHER

## 2020-10-13 RX ORDER — GLIMEPIRIDE 4 MG/1
4 TABLET ORAL
Qty: 90 TABLET | Refills: 3
Start: 2020-10-13 | End: 2021-12-02 | Stop reason: HOSPADM

## 2020-10-13 RX ORDER — FUROSEMIDE 80 MG
80 TABLET ORAL DAILY
Qty: 90 TABLET | Refills: 3 | Status: SHIPPED | OUTPATIENT
Start: 2020-10-13 | End: 2021-12-02 | Stop reason: HOSPADM

## 2020-10-13 RX ORDER — PREGABALIN 150 MG/1
150 CAPSULE ORAL 2 TIMES DAILY
Qty: 180 CAPSULE | Refills: 1 | Status: SHIPPED | OUTPATIENT
Start: 2020-10-13 | End: 2021-06-30

## 2020-10-13 NOTE — PATIENT INSTRUCTIONS
Medicare Wellness  Personal Prevention Plan of Service     Date of Office Visit:  10/13/2020  Encounter Provider:  Joyce Plata MD  Place of Service:  [facility]  Patient Name: Lexi Wade  :  1950    As part of the Medicare Wellness portion of your visit today, we are providing you with this personalized preventive plan of services (PPPS). This plan is based upon recommendations of the [place] Preventive Services Task Force (USPSTF) and the Advisory Committee on Immunization Practices (ACIP).    This lists the preventive care services that should be considered, and provides dates of when you are due. Items listed as completed are up-to-date and do not require any further intervention.    Health Maintenance   Topic Date Due   • ANNUAL WELLNESS VISIT  10/11/2020   • DIABETIC FOOT EXAM  10/30/2020   • DIABETIC EYE EXAM  2021   • HEMOGLOBIN A1C  2021   • LIPID PANEL  10/13/2021   • URINE MICROALBUMIN  10/13/2021   • MAMMOGRAM  10/13/2022   • COLOGUARD  2023   • TDAP/TD VACCINES (2 - Td) 2027   • HEPATITIS C SCREENING  Completed   • INFLUENZA VACCINE  Completed   • Pneumococcal Vaccine 65+  Completed   • ZOSTER VACCINE  Completed       No orders of the defined types were placed in this encounter.      Return in about 6 months (around 2021) for Recheck.

## 2020-10-13 NOTE — PROGRESS NOTES
Subjective   Lexi Wade is a 70 y.o. female.     Chief Complaint   Patient presents with   • Medicare Wellness-subsequent   • Diabetes   • Hypertension   • Hyperlipidemia     For review and evaluation of management of chronic medical problems. Labs pending.  Due for mammogram and bone density.   Diabetes  She presents for her follow-up diabetic visit. She has type 2 diabetes mellitus. Her disease course has been stable. Pertinent negatives for hypoglycemia include no dizziness, headaches or nervousness/anxiousness. There are no diabetic associated symptoms. Pertinent negatives for diabetes include no chest pain, no fatigue and no weakness. Diabetic complications include heart disease. Risk factors for coronary artery disease include dyslipidemia, diabetes mellitus and hypertension. Current diabetic treatment includes oral agent (dual therapy). She is compliant with treatment all of the time. She is following a generally healthy diet. When asked about meal planning, she reported none. She rarely participates in exercise. There is no change in her home blood glucose trend. An ACE inhibitor/angiotensin II receptor blocker is being taken. Eye exam is current.   Hypertension  The current episode started more than 1 year ago. The problem is unchanged. The problem is controlled. Pertinent negatives include no chest pain, headaches, neck pain, palpitations or shortness of breath. There are no associated agents to hypertension. Risk factors for coronary artery disease include diabetes mellitus, dyslipidemia and obesity. Current antihypertension treatment includes beta blockers, ACE inhibitors and diuretics. The current treatment provides significant improvement. There are no compliance problems.    Hyperlipidemia  This is a chronic problem. The current episode started more than 1 year ago. The problem is controlled. Recent lipid tests were reviewed and are normal. Exacerbating diseases include diabetes. There are no  known factors aggravating her hyperlipidemia. Associated symptoms include myalgias. Pertinent negatives include no chest pain or shortness of breath. Current antihyperlipidemic treatment includes statins. The current treatment provides significant improvement of lipids. There are no compliance problems.  Risk factors for coronary artery disease include diabetes mellitus, dyslipidemia, post-menopausal and hypertension.        The following portions of the patient's history were reviewed and updated as appropriate: allergies, current medications, past family and social history and problem list.    Outpatient Medications Prior to Visit   Medication Sig Dispense Refill   • aspirin 81 MG tablet Take 81 mg by mouth every night.     • carvedilol (COREG) 25 MG tablet TAKE 1 TABLET BY MOUTH 2 (TWO) TIMES A DAY WITH MEALS. 180 tablet 3   • diclofenac (VOLTAREN) 1 % gel gel APPLY TO AFFECTED AREA 4 TIMES A DAY Oral Volteran DC'D) 100 g 3   • digoxin (LANOXIN) 125 MCG tablet TAKE 1 TABLET BY MOUTH DAILY 90 tablet 3   • glucose blood (True Metrix Blood Glucose Test) test strip Check BS daily E11.8 100 each 3   • Insulin Pen Needle (B-D UF III MINI PEN NEEDLES) 31G X 5 MM misc USE WITH INSULIN INJECTIONS NIGHTLY 100 each 3   • ipratropium-albuterol (DUO-NEB) 0.5-2.5 mg/3 ml nebulizer Take 3 mL by nebulization 4 (Four) Times a Day. 360 mL 12   • lisinopril (PRINIVIL,ZESTRIL) 10 MG tablet Take 1 tablet by mouth Daily. 90 tablet 3   • magnesium oxide (MAGOX) 400 (241.3 Mg) MG tablet tablet Take 1 tablet by mouth Daily. 90 each 3   • nitroglycerin (NITROSTAT) 0.4 MG SL tablet Place 1 tablet under the tongue Every 5 (Five) Minutes As Needed for Chest Pain. Take no more than 3 doses in 15 minutes. 25 tablet 0   • pantoprazole (PROTONIX) 40 MG EC tablet Take 1 tablet by mouth Daily. 90 tablet 1   • VENTOLIN  (90 Base) MCG/ACT inhaler INHALE 2 PUFFS EVERY 4 (FOUR) HOURS AS NEEDED FOR WHEEZING OR SHORTNESS OF AIR. 18 inhaler 1   •  vitamin D (ERGOCALCIFEROL) 93863 units capsule capsule Take 1 capsule by mouth 1 (One) Time Per Week. (Patient taking differently: Take 50,000 Units by mouth 1 (One) Time Per Week. Take 1 capsule by mouth weekly on Tuesdays) 12 capsule 3   • atorvastatin (LIPITOR) 40 MG tablet TAKE 1 TABLET BY MOUTH EVERY DAY 90 tablet 3   • furosemide (LASIX) 80 MG tablet TAKE 1 TABLET BY MOUTH DAILY. 90 tablet 3   • glimepiride (AMARYL) 4 MG tablet Take 1 tablet by mouth Every Morning Before Breakfast. 90 tablet 3   • isosorbide mononitrate (IMDUR) 30 MG 24 hr tablet TAKE 1 TABLET BY MOUTH EVERY DAY (Patient taking differently: Take 30 mg by mouth Daily.) 90 tablet 3   • metFORMIN (GLUCOPHAGE) 1000 MG tablet TAKE 1 TABLET BY MOUTH 2 (TWO) TIMES A DAY WITH MEALS. 180 tablet 3   • potassium chloride (MICRO-K) 10 MEQ CR capsule Take 1 capsule by mouth 2 (Two) Times a Day. 180 capsule 3   • pregabalin (LYRICA) 150 MG capsule TAKE 1 CAPSULE BY MOUTH TWICE A  capsule 1   • VICTOZA 18 MG/3ML solution pen-injector injection INJECT 1.2 MG UNDER THE SKIN INTO THE APPROPRIATE AREA AS DIRECTED EVERY NIGHT. 6 pen 5     No facility-administered medications prior to visit.        Review of Systems   Constitutional: Negative.  Negative for chills, fatigue, fever and unexpected weight change.   HENT: Negative.  Negative for congestion, ear pain, hearing loss, nosebleeds, rhinorrhea, sneezing, sore throat and tinnitus.    Eyes: Negative.  Negative for discharge.   Respiratory: Negative.  Negative for cough, shortness of breath and wheezing.    Cardiovascular: Negative.  Negative for chest pain and palpitations.   Gastrointestinal: Negative.  Negative for abdominal pain, constipation, diarrhea, nausea and vomiting.   Endocrine: Negative.    Genitourinary: Negative.  Negative for dysuria, frequency and urgency.   Musculoskeletal: Positive for back pain and myalgias. Negative for arthralgias, joint swelling and neck pain.   Skin: Negative.   "Negative for rash.   Allergic/Immunologic: Negative.    Neurological: Negative.  Negative for dizziness, weakness, numbness and headaches.   Hematological: Negative.  Negative for adenopathy.   Psychiatric/Behavioral: Negative.  Negative for dysphoric mood and sleep disturbance. The patient is not nervous/anxious.        Objective     Visit Vitals  /64 (BP Location: Left arm, Patient Position: Sitting, Cuff Size: Adult)   Pulse 88   Resp 20   Ht 157.5 cm (62.01\")   Wt 101 kg (222 lb 3.2 oz)   SpO2 98%   BMI 40.63 kg/m²     Physical Exam  Vitals signs and nursing note reviewed.   Constitutional:       General: She is not in acute distress.     Appearance: She is well-developed.   HENT:      Head: Normocephalic and atraumatic.      Nose: Nose normal.   Eyes:      General:         Right eye: No discharge.         Left eye: No discharge.      Conjunctiva/sclera: Conjunctivae normal.      Pupils: Pupils are equal, round, and reactive to light.   Neck:      Thyroid: No thyromegaly.      Trachea: No tracheal deviation.   Cardiovascular:      Rate and Rhythm: Normal rate and regular rhythm.      Heart sounds: Normal heart sounds. No murmur.   Pulmonary:      Effort: Pulmonary effort is normal. No respiratory distress.      Breath sounds: Normal breath sounds. No wheezing or rales.   Chest:      Chest wall: No tenderness.      Breasts:         Right: No inverted nipple, mass, nipple discharge, skin change or tenderness.         Left: No inverted nipple, mass, nipple discharge, skin change or tenderness.   Abdominal:      General: Bowel sounds are normal. There is no distension.      Palpations: Abdomen is soft. There is no mass.      Tenderness: There is no abdominal tenderness.      Hernia: No hernia is present.   Musculoskeletal: Normal range of motion.         General: No deformity.   Lymphadenopathy:      Cervical: No cervical adenopathy.   Skin:     General: Skin is warm and dry.   Neurological:      Mental " Status: She is alert and oriented to person, place, and time.      Deep Tendon Reflexes: Reflexes are normal and symmetric.   Psychiatric:         Behavior: Behavior normal.         Thought Content: Thought content normal.         Judgment: Judgment normal.          Notes brought forward are reviewed and updated if indicated.     Assessment/Plan   Problems Addressed this Visit        Cardiovascular and Mediastinum    Essential hypertension (Chronic)    Relevant Medications    furosemide (LASIX) 80 MG tablet    potassium chloride (MICRO-K) 10 MEQ CR capsule    Mixed hyperlipidemia (Chronic)    Relevant Medications    atorvastatin (LIPITOR) 40 MG tablet       Endocrine    Type 2 diabetes mellitus with complication, without long-term current use of insulin (CMS/[facility]) (Chronic)    Relevant Medications    glimepiride (AMARYL) 4 MG tablet    metFORMIN (GLUCOPHAGE) 1000 MG tablet    Liraglutide (Victoza) 18 MG/3ML solution pen-injector injection    Other Relevant Orders    Comprehensive Metabolic Panel    Hemoglobin A1c    LDL Cholesterol, Direct    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)    Relevant Medications    glimepiride (AMARYL) 4 MG tablet    metFORMIN (GLUCOPHAGE) 1000 MG tablet    pregabalin (LYRICA) 150 MG capsule    Liraglutide (Victoza) 18 MG/3ML solution pen-injector injection      Other Visit Diagnoses     Medicare annual wellness visit, subsequent    -  Primary    Diabetes mellitus without complication (CMS/[facility])        Relevant Medications    glimepiride (AMARYL) 4 MG tablet    metFORMIN (GLUCOPHAGE) 1000 MG tablet    Liraglutide (Victoza) 18 MG/3ML solution pen-injector injection      Diagnoses       Codes Comments    Medicare annual wellness visit, subsequent    -  Primary ICD-10-CM: Z00.00  ICD-9-CM: V70.0     Essential hypertension     ICD-10-CM: I10  ICD-9-CM: 401.9     Mixed hyperlipidemia     ICD-10-CM: E78.2  ICD-9-CM: 272.2     Type 2 diabetes mellitus with  complication, without long-term current use of insulin (CMS/HCC)     ICD-10-CM: E11.8  ICD-9-CM: 250.90     Neurologic disorder associated with diabetes mellitus (CMS/HCC)     ICD-10-CM: E11.49  ICD-9-CM: 250.60, 349.9     Diabetes mellitus without complication (CMS/HCC)     ICD-10-CM: E11.9  ICD-9-CM: 250.00          Will notify regarding results. Continue current treatment. Patient's Body mass index is 40.63 kg/m². BMI is above normal parameters. Recommendations include: exercise counseling and nutrition counseling.     New Medications Ordered This Visit   Medications   • furosemide (LASIX) 80 MG tablet     Sig: Take 1 tablet by mouth Daily.     Dispense:  90 tablet     Refill:  3   • glimepiride (AMARYL) 4 MG tablet     Sig: Take 1 tablet by mouth Every Morning Before Breakfast.     Dispense:  90 tablet     Refill:  3   • atorvastatin (LIPITOR) 40 MG tablet     Sig: Take 1 tablet by mouth Daily.     Dispense:  90 tablet     Refill:  3     NEED REFILLS   • isosorbide mononitrate (IMDUR) 30 MG 24 hr tablet     Sig: Take 1 tablet by mouth Daily.     Dispense:  90 tablet     Refill:  3     NEED REFILLS   • metFORMIN (GLUCOPHAGE) 1000 MG tablet     Sig: Take 1 tablet by mouth 2 (Two) Times a Day With Meals.     Dispense:  180 tablet     Refill:  3   • potassium chloride (MICRO-K) 10 MEQ CR capsule     Sig: Take 1 capsule by mouth 2 (Two) Times a Day.     Dispense:  180 capsule     Refill:  3   • pregabalin (LYRICA) 150 MG capsule     Sig: Take 1 capsule by mouth 2 (Two) Times a Day.     Dispense:  180 capsule     Refill:  1     Not to exceed 4 additional fills before 04/08/2020NEEDS A NEW REFILL.   • Liraglutide (Victoza) 18 MG/3ML solution pen-injector injection     Sig: Inject 1.2 mg under the skin into the appropriate area as directed Daily.     Dispense:  6 pen     Refill:  5     Return in about 6 months (around 4/13/2021) for Recheck.

## 2020-10-14 ENCOUNTER — TELEPHONE (OUTPATIENT)
Dept: FAMILY MEDICINE CLINIC | Facility: CLINIC | Age: 70
End: 2020-10-14

## 2020-10-14 LAB
25(OH)D3 SERPL-MCNC: 46.1 NG/ML (ref 30–100)
ALBUMIN SERPL-MCNC: 4.5 G/DL (ref 3.5–5.2)
ALBUMIN UR-MCNC: 11.9 MG/DL
ALBUMIN/GLOB SERPL: 1.5 G/DL
ALP SERPL-CCNC: 125 U/L (ref 39–117)
ALT SERPL W P-5'-P-CCNC: 26 U/L (ref 1–33)
ANION GAP SERPL CALCULATED.3IONS-SCNC: 12.2 MMOL/L (ref 5–15)
AST SERPL-CCNC: 33 U/L (ref 1–32)
BACTERIA UR QL AUTO: NORMAL /HPF
BASOPHILS # BLD AUTO: 0.05 10*3/MM3 (ref 0–0.2)
BASOPHILS NFR BLD AUTO: 0.4 % (ref 0–1.5)
BILIRUB SERPL-MCNC: 0.3 MG/DL (ref 0–1.2)
BILIRUB UR QL STRIP: NEGATIVE
BUN SERPL-MCNC: 9 MG/DL (ref 8–23)
BUN/CREAT SERPL: 9.2 (ref 7–25)
CALCIUM SPEC-SCNC: 10.3 MG/DL (ref 8.6–10.5)
CHLORIDE SERPL-SCNC: 101 MMOL/L (ref 98–107)
CHOLEST SERPL-MCNC: 189 MG/DL (ref 0–200)
CLARITY UR: CLEAR
CO2 SERPL-SCNC: 24.8 MMOL/L (ref 22–29)
COLOR UR: YELLOW
CREAT SERPL-MCNC: 0.98 MG/DL (ref 0.57–1)
DEPRECATED RDW RBC AUTO: 42.2 FL (ref 37–54)
EOSINOPHIL # BLD AUTO: 0.17 10*3/MM3 (ref 0–0.4)
EOSINOPHIL NFR BLD AUTO: 1.4 % (ref 0.3–6.2)
ERYTHROCYTE [DISTWIDTH] IN BLOOD BY AUTOMATED COUNT: 14 % (ref 12.3–15.4)
GFR SERPL CREATININE-BSD FRML MDRD: 68 ML/MIN/1.73
GLOBULIN UR ELPH-MCNC: 3 GM/DL
GLUCOSE SERPL-MCNC: 120 MG/DL (ref 65–99)
GLUCOSE UR STRIP-MCNC: NEGATIVE MG/DL
HBA1C MFR BLD: 7.62 % (ref 4.8–5.6)
HCT VFR BLD AUTO: 35.5 % (ref 34–46.6)
HDLC SERPL-MCNC: 46 MG/DL (ref 40–60)
HGB BLD-MCNC: 11.7 G/DL (ref 12–15.9)
HGB UR QL STRIP.AUTO: NEGATIVE
HYALINE CASTS UR QL AUTO: NORMAL /LPF
IMM GRANULOCYTES # BLD AUTO: 0.06 10*3/MM3 (ref 0–0.05)
IMM GRANULOCYTES NFR BLD AUTO: 0.5 % (ref 0–0.5)
KETONES UR QL STRIP: NEGATIVE
LDLC SERPL CALC-MCNC: 116 MG/DL (ref 0–100)
LDLC/HDLC SERPL: 2.44 {RATIO}
LEUKOCYTE ESTERASE UR QL STRIP.AUTO: NEGATIVE
LYMPHOCYTES # BLD AUTO: 3.9 10*3/MM3 (ref 0.7–3.1)
LYMPHOCYTES NFR BLD AUTO: 31.1 % (ref 19.6–45.3)
MCH RBC QN AUTO: 27.5 PG (ref 26.6–33)
MCHC RBC AUTO-ENTMCNC: 33 G/DL (ref 31.5–35.7)
MCV RBC AUTO: 83.5 FL (ref 79–97)
MONOCYTES # BLD AUTO: 1.09 10*3/MM3 (ref 0.1–0.9)
MONOCYTES NFR BLD AUTO: 8.7 % (ref 5–12)
NEUTROPHILS NFR BLD AUTO: 57.9 % (ref 42.7–76)
NEUTROPHILS NFR BLD AUTO: 7.28 10*3/MM3 (ref 1.7–7)
NITRITE UR QL STRIP: NEGATIVE
NRBC BLD AUTO-RTO: 0 /100 WBC (ref 0–0.2)
PH UR STRIP.AUTO: 6 [PH] (ref 5–8)
PLATELET # BLD AUTO: 360 10*3/MM3 (ref 140–450)
PMV BLD AUTO: 10.4 FL (ref 6–12)
POTASSIUM SERPL-SCNC: 4.5 MMOL/L (ref 3.5–5.2)
PROT SERPL-MCNC: 7.5 G/DL (ref 6–8.5)
PROT UR QL STRIP: ABNORMAL
RBC # BLD AUTO: 4.25 10*6/MM3 (ref 3.77–5.28)
RBC # UR: NORMAL /HPF
REF LAB TEST METHOD: NORMAL
SODIUM SERPL-SCNC: 138 MMOL/L (ref 136–145)
SP GR UR STRIP: 1.01 (ref 1–1.03)
SQUAMOUS #/AREA URNS HPF: NORMAL /HPF
TRIGL SERPL-MCNC: 153 MG/DL (ref 0–150)
UROBILINOGEN UR QL STRIP: ABNORMAL
VLDLC SERPL-MCNC: 27 MG/DL (ref 5–40)
WBC # BLD AUTO: 12.55 10*3/MM3 (ref 3.4–10.8)
WBC UR QL AUTO: NORMAL /HPF

## 2020-10-14 NOTE — TELEPHONE ENCOUNTER
Per , Ms. Wade has been called with recent DEXA Bouchra Margaret Scan results & recommendations.  Continue current medications and follow-up as planned or sooner if any problems.      ----- Message from Joyce Plata MD sent at 10/14/2020  5:57 PM CDT -----  Card normal

## 2020-10-14 NOTE — TELEPHONE ENCOUNTER
Per Dr. Plata, Ms Wade has been called with recent Bilateral Screening 3-D Mammogram results & recommendations.  Continue current medications and follow-up as planned or sooner if any problems.      ----- Message from Joyce Plata MD sent at 10/14/2020  1:29 PM CDT -----  Card normal

## 2020-11-23 RX ORDER — DIGOXIN 125 MCG
TABLET ORAL
Qty: 90 TABLET | Refills: 1 | Status: SHIPPED | OUTPATIENT
Start: 2020-11-23 | End: 2021-06-22 | Stop reason: SDUPTHER

## 2020-11-23 RX ORDER — ALBUTEROL SULFATE 90 UG/1
AEROSOL, METERED RESPIRATORY (INHALATION)
Qty: 18 G | Refills: 1 | Status: SHIPPED | OUTPATIENT
Start: 2020-11-23 | End: 2020-12-18

## 2020-12-03 DIAGNOSIS — E11.9 DIABETES MELLITUS WITHOUT COMPLICATION (HCC): ICD-10-CM

## 2020-12-03 RX ORDER — FLURBIPROFEN SODIUM 0.3 MG/ML
SOLUTION/ DROPS OPHTHALMIC
Qty: 100 EACH | Refills: 3 | Status: SHIPPED | OUTPATIENT
Start: 2020-12-03 | End: 2021-12-06

## 2020-12-18 RX ORDER — ALBUTEROL SULFATE 90 UG/1
AEROSOL, METERED RESPIRATORY (INHALATION)
Qty: 18 G | Refills: 1 | Status: SHIPPED | OUTPATIENT
Start: 2020-12-18 | End: 2021-02-22

## 2021-02-22 RX ORDER — ALBUTEROL SULFATE 90 UG/1
AEROSOL, METERED RESPIRATORY (INHALATION)
Qty: 18 G | Refills: 1 | Status: SHIPPED | OUTPATIENT
Start: 2021-02-22 | End: 2021-06-30

## 2021-03-04 ENCOUNTER — IMMUNIZATION (OUTPATIENT)
Dept: VACCINE CLINIC | Facility: HOSPITAL | Age: 71
End: 2021-03-04

## 2021-03-04 ENCOUNTER — APPOINTMENT (OUTPATIENT)
Dept: VACCINE CLINIC | Facility: HOSPITAL | Age: 71
End: 2021-03-04

## 2021-03-04 PROCEDURE — 0001A: CPT | Performed by: THORACIC SURGERY (CARDIOTHORACIC VASCULAR SURGERY)

## 2021-03-04 PROCEDURE — 91300 HC SARSCOV02 VAC 30MCG/0.3ML IM: CPT | Performed by: THORACIC SURGERY (CARDIOTHORACIC VASCULAR SURGERY)

## 2021-03-22 RX ORDER — LISINOPRIL 10 MG/1
TABLET ORAL
Qty: 90 TABLET | Refills: 3 | Status: SHIPPED | OUTPATIENT
Start: 2021-03-22 | End: 2022-01-01

## 2021-03-25 ENCOUNTER — IMMUNIZATION (OUTPATIENT)
Dept: VACCINE CLINIC | Facility: HOSPITAL | Age: 71
End: 2021-03-25

## 2021-03-25 PROCEDURE — 0002A: CPT | Performed by: THORACIC SURGERY (CARDIOTHORACIC VASCULAR SURGERY)

## 2021-03-25 PROCEDURE — 91300 HC SARSCOV02 VAC 30MCG/0.3ML IM: CPT | Performed by: THORACIC SURGERY (CARDIOTHORACIC VASCULAR SURGERY)

## 2021-04-13 ENCOUNTER — OFFICE VISIT (OUTPATIENT)
Dept: FAMILY MEDICINE CLINIC | Facility: CLINIC | Age: 71
End: 2021-04-13

## 2021-04-13 ENCOUNTER — LAB (OUTPATIENT)
Dept: LAB | Facility: HOSPITAL | Age: 71
End: 2021-04-13

## 2021-04-13 VITALS
SYSTOLIC BLOOD PRESSURE: 130 MMHG | DIASTOLIC BLOOD PRESSURE: 80 MMHG | BODY MASS INDEX: 41.59 KG/M2 | HEART RATE: 85 BPM | WEIGHT: 226 LBS | HEIGHT: 62 IN | OXYGEN SATURATION: 98 %

## 2021-04-13 DIAGNOSIS — I10 ESSENTIAL HYPERTENSION: Chronic | ICD-10-CM

## 2021-04-13 DIAGNOSIS — E11.8 TYPE 2 DIABETES MELLITUS WITH COMPLICATION, WITHOUT LONG-TERM CURRENT USE OF INSULIN (HCC): Chronic | ICD-10-CM

## 2021-04-13 DIAGNOSIS — E11.8 TYPE 2 DIABETES MELLITUS WITH COMPLICATION, WITHOUT LONG-TERM CURRENT USE OF INSULIN (HCC): Primary | Chronic | ICD-10-CM

## 2021-04-13 DIAGNOSIS — E78.2 MIXED HYPERLIPIDEMIA: Chronic | ICD-10-CM

## 2021-04-13 DIAGNOSIS — E11.9 ENCOUNTER FOR DIABETIC FOOT EXAM (HCC): ICD-10-CM

## 2021-04-13 DIAGNOSIS — E66.01 MORBIDLY OBESE (HCC): Chronic | ICD-10-CM

## 2021-04-13 DIAGNOSIS — E55.9 VITAMIN D DEFICIENCY: ICD-10-CM

## 2021-04-13 PROCEDURE — 83721 ASSAY OF BLOOD LIPOPROTEIN: CPT

## 2021-04-13 PROCEDURE — 36415 COLL VENOUS BLD VENIPUNCTURE: CPT

## 2021-04-13 PROCEDURE — 80053 COMPREHEN METABOLIC PANEL: CPT

## 2021-04-13 PROCEDURE — 83036 HEMOGLOBIN GLYCOSYLATED A1C: CPT

## 2021-04-13 PROCEDURE — 99214 OFFICE O/P EST MOD 30 MIN: CPT | Performed by: GENERAL PRACTICE

## 2021-04-13 NOTE — PROGRESS NOTES
Subjective   Lexi Wade is a 70 y.o. female.   Chief Complaint   Patient presents with   • Diabetes     For review and evaluation of management of chronic medical problems. Records reviewed. Recent labs, xrays reviewed and medications reconciled.  Labs pending. Is struggling with her weight.   Diabetes  She presents for her follow-up diabetic visit. She has type 2 diabetes mellitus. Her disease course has been stable. Pertinent negatives for hypoglycemia include no dizziness, headaches or nervousness/anxiousness. There are no diabetic associated symptoms. Pertinent negatives for diabetes include no chest pain, no fatigue and no weakness. Diabetic complications include heart disease. Risk factors for coronary artery disease include dyslipidemia, diabetes mellitus and hypertension. Current diabetic treatment includes oral agent (dual therapy) (victoza). She is compliant with treatment all of the time. She is following a generally healthy diet. When asked about meal planning, she reported none. She rarely participates in exercise. There is no change in her home blood glucose trend. An ACE inhibitor/angiotensin II receptor blocker is being taken. Eye exam is current.   Hypertension  The current episode started more than 1 year ago. The problem is unchanged. The problem is controlled. Pertinent negatives include no chest pain, headaches, neck pain, palpitations or shortness of breath. There are no associated agents to hypertension. Risk factors for coronary artery disease include diabetes mellitus, dyslipidemia and obesity. Current antihypertension treatment includes beta blockers, ACE inhibitors and diuretics. The current treatment provides significant improvement. There are no compliance problems.    Hyperlipidemia  This is a chronic problem. The current episode started more than 1 year ago. The problem is controlled. Recent lipid tests were reviewed and are normal. Exacerbating diseases include diabetes and  obesity. There are no known factors aggravating her hyperlipidemia. Associated symptoms include myalgias. Pertinent negatives include no chest pain or shortness of breath. Current antihyperlipidemic treatment includes statins. The current treatment provides significant improvement of lipids. There are no compliance problems.  Risk factors for coronary artery disease include diabetes mellitus, dyslipidemia, post-menopausal and hypertension.   Obesity  This is a chronic problem. The current episode started more than 1 year ago. The problem occurs constantly. The problem has been unchanged. Associated symptoms include myalgias. Pertinent negatives include no chest pain, fatigue, headaches, neck pain or weakness. The symptoms are aggravated by stress. Treatments tried: diet and exercise. The treatment provided mild relief.      The following portions of the patient's history were reviewed and updated as appropriate: allergies, current medications, past social history and problem list.    Outpatient Medications Prior to Visit   Medication Sig Dispense Refill   • aspirin 81 MG tablet Take 81 mg by mouth every night.     • atorvastatin (LIPITOR) 40 MG tablet Take 1 tablet by mouth Daily. 90 tablet 3   • diclofenac (VOLTAREN) 1 % gel gel APPLY TO AFFECTED AREA 4 TIMES A DAY Oral Volteran DC'D) 100 g 3   • furosemide (LASIX) 80 MG tablet Take 1 tablet by mouth Daily. 90 tablet 3   • glimepiride (AMARYL) 4 MG tablet Take 1 tablet by mouth Every Morning Before Breakfast. 90 tablet 3   • Insulin Pen Needle (B-D UF III MINI PEN NEEDLES) 31G X 5 MM misc USE WITH INSULIN INJECTIONS NIGHTLY 100 each 3   • ipratropium-albuterol (DUO-NEB) 0.5-2.5 mg/3 ml nebulizer Take 3 mL by nebulization 4 (Four) Times a Day. 360 mL 12   • isosorbide mononitrate (IMDUR) 30 MG 24 hr tablet Take 1 tablet by mouth Daily. 90 tablet 3   • Liraglutide (Victoza) 18 MG/3ML solution pen-injector injection Inject 1.2 mg under the skin into the appropriate  area as directed Daily. 6 pen 5   • lisinopril (PRINIVIL,ZESTRIL) 10 MG tablet TAKE 1 TABLET BY MOUTH EVERY DAY 90 tablet 3   • magnesium oxide (MAGOX) 400 (241.3 Mg) MG tablet tablet Take 1 tablet by mouth Daily. 90 each 3   • metFORMIN (GLUCOPHAGE) 1000 MG tablet Take 1 tablet by mouth 2 (Two) Times a Day With Meals. 180 tablet 3   • nitroglycerin (NITROSTAT) 0.4 MG SL tablet Place 1 tablet under the tongue Every 5 (Five) Minutes As Needed for Chest Pain. Take no more than 3 doses in 15 minutes. 25 tablet 0   • pantoprazole (PROTONIX) 40 MG EC tablet Take 1 tablet by mouth Daily. 90 tablet 1   • potassium chloride (MICRO-K) 10 MEQ CR capsule Take 1 capsule by mouth 2 (Two) Times a Day. 180 capsule 3   • vitamin D (ERGOCALCIFEROL) 90849 units capsule capsule Take 1 capsule by mouth 1 (One) Time Per Week. (Patient taking differently: Take 50,000 Units by mouth 1 (One) Time Per Week. Take 1 capsule by mouth weekly on Tuesdays) 12 capsule 3   • albuterol sulfate  (90 Base) MCG/ACT inhaler INHALE 2 PUFFS EVERY 4 (FOUR) HOURS AS NEEDED FOR WHEEZING OR SHORTNESS OF AIR. 18 g 1   • carvedilol (COREG) 25 MG tablet TAKE 1 TABLET BY MOUTH 2 (TWO) TIMES A DAY WITH MEALS. 180 tablet 3   • digoxin (LANOXIN) 125 MCG tablet TAKE 1 TABLET BY MOUTH EVERY DAY 90 tablet 1   • glucose blood (True Metrix Blood Glucose Test) test strip Check BS daily E11.8 100 each 3   • pregabalin (LYRICA) 150 MG capsule Take 1 capsule by mouth 2 (Two) Times a Day. 180 capsule 1     No facility-administered medications prior to visit.     I have reviewed 12 systems with patient. Findings were negative except what is noted below and/or in history of present illness.   Review of Systems   Constitutional: Negative for fatigue.   Respiratory: Negative for shortness of breath.    Cardiovascular: Negative for chest pain and palpitations.   Musculoskeletal: Positive for myalgias. Negative for neck pain.   Neurological: Negative for dizziness, weakness  "and headaches.   Psychiatric/Behavioral: The patient is not nervous/anxious.        Objective   Visit Vitals  /80 (BP Location: Left arm)   Pulse 85   Ht 157.5 cm (62\")   Wt 103 kg (226 lb)   SpO2 98%   BMI 41.34 kg/m²     Physical Exam  Vitals and nursing note reviewed.   Constitutional:       General: She is not in acute distress.     Appearance: She is well-developed.   HENT:      Head: Normocephalic and atraumatic.      Nose: Nose normal.   Eyes:      General:         Right eye: No discharge.         Left eye: No discharge.      Conjunctiva/sclera: Conjunctivae normal.      Pupils: Pupils are equal, round, and reactive to light.   Neck:      Thyroid: No thyromegaly.   Cardiovascular:      Rate and Rhythm: Normal rate and regular rhythm.      Pulses:           Dorsalis pedis pulses are 2+ on the right side and 2+ on the left side.      Heart sounds: Normal heart sounds.   Pulmonary:      Effort: Pulmonary effort is normal.      Breath sounds: Normal breath sounds.   Musculoskeletal:      Right foot: Bunion present.      Left foot: Bunion present.   Feet:      Right foot:      Protective Sensation: 5 sites tested. 5 sites sensed.      Skin integrity: Callus and dry skin present.      Toenail Condition: Right toenails are abnormally thick.      Left foot:      Protective Sensation: 5 sites tested. 5 sites sensed.      Skin integrity: Callus and dry skin present.      Toenail Condition: Left toenails are abnormally thick.   Lymphadenopathy:      Cervical: No cervical adenopathy.   Skin:     General: Skin is warm and dry.   Neurological:      Mental Status: She is alert and oriented to person, place, and time.       Notes brought forward are reviewed and updated if indicated.     Assessment/Plan   Problems Addressed this Visit        Cardiac and Vasculature    Essential hypertension (Chronic)    Relevant Orders    CBC & Differential    Comprehensive Metabolic Panel    Lipid Panel    Urinalysis With Culture If " Indicated -    Hemoglobin A1c    Mixed hyperlipidemia (Chronic)    Relevant Orders    CBC & Differential    Comprehensive Metabolic Panel    Lipid Panel    Urinalysis With Culture If Indicated -    Hemoglobin A1c       Endocrine and Metabolic    Type 2 diabetes mellitus with complication, without long-term current use of insulin (CMS/[facility]) - Primary (Chronic)    Relevant Orders    CBC & Differential    Comprehensive Metabolic Panel    Lipid Panel    Urinalysis With Culture If Indicated -    Hemoglobin A1c    Vitamin D deficiency (Chronic)    Relevant Orders    Vitamin D 25 Hydroxy    Morbidly obese (CMS/[facility]) (Chronic)      Other Visit Diagnoses     Encounter for diabetic foot exam (CMS/[facility])          Diagnoses       Codes Comments    Type 2 diabetes mellitus with complication, without long-term current use of insulin (CMS/[facility])    -  Primary ICD-10-CM: E11.8  ICD-9-CM: 250.90     Essential hypertension     ICD-10-CM: I10  ICD-9-CM: 401.9     Mixed hyperlipidemia     ICD-10-CM: E78.2  ICD-9-CM: 272.2     Morbidly obese (CMS/[facility])     ICD-10-CM: E66.01  ICD-9-CM: 278.01     Vitamin D deficiency     ICD-10-CM: E55.9  ICD-9-CM: 268.9     Encounter for diabetic foot exam (CMS/[facility])     ICD-10-CM: E11.9  ICD-9-CM: 250.00           Continue current treatment. Will notify regarding results. Patient's Body mass index is 41.34 kg/m². BMI is above normal parameters. Recommendations include: exercise counseling, nutrition counseling and referral to a weight management program.    No orders of the defined types were placed in this encounter.    Return in about 6 months (around 10/14/2021) for medicare wellness visit.

## 2021-04-14 DIAGNOSIS — Z12.31 ENCOUNTER FOR SCREENING MAMMOGRAM FOR MALIGNANT NEOPLASM OF BREAST: Primary | ICD-10-CM

## 2021-04-14 LAB
ALBUMIN SERPL-MCNC: 4.3 G/DL (ref 3.5–5.2)
ALBUMIN/GLOB SERPL: 1.4 G/DL
ALP SERPL-CCNC: 116 U/L (ref 39–117)
ALT SERPL W P-5'-P-CCNC: 27 U/L (ref 1–33)
ANION GAP SERPL CALCULATED.3IONS-SCNC: 12.2 MMOL/L (ref 5–15)
ARTICHOKE IGE QN: 103 MG/DL (ref 0–100)
AST SERPL-CCNC: 22 U/L (ref 1–32)
BILIRUB SERPL-MCNC: 0.3 MG/DL (ref 0–1.2)
BUN SERPL-MCNC: 11 MG/DL (ref 8–23)
BUN/CREAT SERPL: 10.7 (ref 7–25)
CALCIUM SPEC-SCNC: 9.9 MG/DL (ref 8.6–10.5)
CHLORIDE SERPL-SCNC: 103 MMOL/L (ref 98–107)
CO2 SERPL-SCNC: 24.8 MMOL/L (ref 22–29)
CREAT SERPL-MCNC: 1.03 MG/DL (ref 0.57–1)
GFR SERPL CREATININE-BSD FRML MDRD: 64 ML/MIN/1.73
GLOBULIN UR ELPH-MCNC: 3 GM/DL
GLUCOSE SERPL-MCNC: 159 MG/DL (ref 65–99)
HBA1C MFR BLD: 7.5 % (ref 4.8–5.6)
POTASSIUM SERPL-SCNC: 4.3 MMOL/L (ref 3.5–5.2)
PROT SERPL-MCNC: 7.3 G/DL (ref 6–8.5)
SODIUM SERPL-SCNC: 140 MMOL/L (ref 136–145)

## 2021-05-24 RX ORDER — CALCIUM CITRATE/VITAMIN D3 200MG-6.25
TABLET ORAL
Qty: 100 EACH | Refills: 3 | Status: SHIPPED | OUTPATIENT
Start: 2021-05-24 | End: 2021-12-02 | Stop reason: HOSPADM

## 2021-05-25 NOTE — TELEPHONE ENCOUNTER
Lexi said the dates for her shingles shot was 09-20-18 and 02-01-19     She also needs refill for True Metrix test strips to [facility] in [place].   [Anxiety] : anxiety [Negative] : Genitourinary

## 2021-06-17 RX ORDER — CARVEDILOL 25 MG/1
TABLET ORAL
Qty: 180 TABLET | Refills: 3 | Status: SHIPPED | OUTPATIENT
Start: 2021-06-17 | End: 2022-01-19 | Stop reason: HOSPADM

## 2021-06-22 RX ORDER — DIGOXIN 125 MCG
125 TABLET ORAL DAILY
Qty: 90 TABLET | Refills: 1 | Status: SHIPPED | OUTPATIENT
Start: 2021-06-22 | End: 2021-12-02 | Stop reason: HOSPADM

## 2021-06-22 NOTE — TELEPHONE ENCOUNTER
Pt needs refill of digoxin (LANOXIN) 125 MCG tablet 90 day supply sent to [facility] in [place].

## 2021-06-29 NOTE — ANESTHESIA POSTPROCEDURE EVALUATION
Patient: Lexi Wade    Procedure Summary     Date:  06/22/20 Room / Location:  [facility] CATH/EP LAB 3 / [facility] CATH INVASIVE LOCATION    Anesthesia Start:  1326 Anesthesia Stop:  1507    Procedure:  ICD DC generator change (N/A ) Diagnosis:  Implantable cardioverter-defibrillator (ICD) generator end of life    Provider:  Kathy Shepherd MD Provider:  Erik Lazaro MD    Anesthesia Type:  MAC ASA Status:  4          Anesthesia Type: MAC    Vitals  No vitals data found for the desired time range.          Post Anesthesia Care and Evaluation    Patient location during evaluation: bedside  Patient participation: complete - patient participated  Level of consciousness: awake  Pain score: 0  Pain management: adequate  Airway patency: patent  Anesthetic complications: No anesthetic complications  PONV Status: none  Cardiovascular status: acceptable  Respiratory status: acceptable  Hydration status: acceptable      
Pt states he doesn't know why he's here.  Pt is from the same group home as another resident who is a pt here concurrently.  Pt's kept separate from each other. Pt denies SI/HI, AH/VH, touching another resident.  Pt states he doesn't know what happened to his head where there are 2 staples in his scalp. Pt had no further injuries, scratches, bruises, cuts on his body.  Pt sitting in low acuity.  Pt discharged from low acuity.  Discharge instructions and folder of his information given to staff member Cailin who was here to transport him back to the group home.

## 2021-06-30 DIAGNOSIS — E11.49 NEUROLOGIC DISORDER ASSOCIATED WITH DIABETES MELLITUS (HCC): Chronic | ICD-10-CM

## 2021-06-30 RX ORDER — ALBUTEROL SULFATE 90 UG/1
AEROSOL, METERED RESPIRATORY (INHALATION)
Qty: 18 G | Refills: 1 | Status: SHIPPED | OUTPATIENT
Start: 2021-06-30 | End: 2021-09-02

## 2021-06-30 RX ORDER — PREGABALIN 150 MG/1
CAPSULE ORAL
Qty: 180 CAPSULE | Refills: 1 | Status: SHIPPED | OUTPATIENT
Start: 2021-06-30 | End: 2021-12-06

## 2021-07-31 ENCOUNTER — APPOINTMENT (OUTPATIENT)
Dept: GENERAL RADIOLOGY | Facility: HOSPITAL | Age: 71
End: 2021-07-31

## 2021-07-31 ENCOUNTER — HOSPITAL ENCOUNTER (OUTPATIENT)
Facility: HOSPITAL | Age: 71
Setting detail: OBSERVATION
Discharge: HOME | End: 2021-08-02
Attending: FAMILY MEDICINE | Admitting: GENERAL PRACTICE

## 2021-07-31 DIAGNOSIS — I50.22 CHRONIC SYSTOLIC HEART FAILURE (HCC): ICD-10-CM

## 2021-07-31 DIAGNOSIS — I42.8 NICM (NONISCHEMIC CARDIOMYOPATHY) (HCC): ICD-10-CM

## 2021-07-31 DIAGNOSIS — R07.2 PRECORDIAL PAIN: Primary | ICD-10-CM

## 2021-07-31 DIAGNOSIS — R07.9 CHEST PAIN, UNSPECIFIED TYPE: ICD-10-CM

## 2021-07-31 LAB
ALBUMIN SERPL-MCNC: 3.9 G/DL (ref 3.5–5.2)
ALBUMIN/GLOB SERPL: 1.3 G/DL
ALP SERPL-CCNC: 102 U/L (ref 39–117)
ALT SERPL W P-5'-P-CCNC: 19 U/L (ref 1–33)
ANION GAP SERPL CALCULATED.3IONS-SCNC: 11 MMOL/L (ref 5–15)
AST SERPL-CCNC: 19 U/L (ref 1–32)
BASOPHILS # BLD AUTO: 0.04 10*3/MM3 (ref 0–0.2)
BASOPHILS NFR BLD AUTO: 0.3 % (ref 0–1.5)
BILIRUB SERPL-MCNC: 0.5 MG/DL (ref 0–1.2)
BUN SERPL-MCNC: 9 MG/DL (ref 8–23)
BUN/CREAT SERPL: 8.5 (ref 7–25)
CALCIUM SPEC-SCNC: 9.1 MG/DL (ref 8.6–10.5)
CHLORIDE SERPL-SCNC: 97 MMOL/L (ref 98–107)
CK SERPL-CCNC: 75 U/L (ref 20–180)
CO2 SERPL-SCNC: 22 MMOL/L (ref 22–29)
CREAT SERPL-MCNC: 1.06 MG/DL (ref 0.57–1)
DEPRECATED RDW RBC AUTO: 46.3 FL (ref 37–54)
DIGOXIN SERPL-MCNC: 0.6 NG/ML (ref 0.6–1.2)
EOSINOPHIL # BLD AUTO: 0.16 10*3/MM3 (ref 0–0.4)
EOSINOPHIL NFR BLD AUTO: 1.1 % (ref 0.3–6.2)
ERYTHROCYTE [DISTWIDTH] IN BLOOD BY AUTOMATED COUNT: 14.6 % (ref 12.3–15.4)
FLUAV RNA RESP QL NAA+PROBE: NOT DETECTED
FLUBV RNA RESP QL NAA+PROBE: NOT DETECTED
GFR SERPL CREATININE-BSD FRML MDRD: 62 ML/MIN/1.73
GLOBULIN UR ELPH-MCNC: 3 GM/DL
GLUCOSE BLDC GLUCOMTR-MCNC: 99 MG/DL (ref 70–130)
GLUCOSE SERPL-MCNC: 134 MG/DL (ref 65–99)
HCT VFR BLD AUTO: 34.6 % (ref 34–46.6)
HGB BLD-MCNC: 11.2 G/DL (ref 12–15.9)
HOLD SPECIMEN: NORMAL
IMM GRANULOCYTES # BLD AUTO: 0.05 10*3/MM3 (ref 0–0.05)
IMM GRANULOCYTES NFR BLD AUTO: 0.3 % (ref 0–0.5)
INR PPP: 1.04 (ref 0.8–1.2)
LYMPHOCYTES # BLD AUTO: 3.61 10*3/MM3 (ref 0.7–3.1)
LYMPHOCYTES NFR BLD AUTO: 24.2 % (ref 19.6–45.3)
MAGNESIUM SERPL-MCNC: 1.9 MG/DL (ref 1.6–2.4)
MCH RBC QN AUTO: 27.9 PG (ref 26.6–33)
MCHC RBC AUTO-ENTMCNC: 32.4 G/DL (ref 31.5–35.7)
MCV RBC AUTO: 86.1 FL (ref 79–97)
MONOCYTES # BLD AUTO: 1.06 10*3/MM3 (ref 0.1–0.9)
MONOCYTES NFR BLD AUTO: 7.1 % (ref 5–12)
NEUTROPHILS NFR BLD AUTO: 67 % (ref 42.7–76)
NEUTROPHILS NFR BLD AUTO: 9.98 10*3/MM3 (ref 1.7–7)
NRBC BLD AUTO-RTO: 0 /100 WBC (ref 0–0.2)
NT-PROBNP SERPL-MCNC: 742.1 PG/ML (ref 0–900)
PLATELET # BLD AUTO: 298 10*3/MM3 (ref 140–450)
PMV BLD AUTO: 9.6 FL (ref 6–12)
POTASSIUM SERPL-SCNC: 3.5 MMOL/L (ref 3.5–5.2)
PROT SERPL-MCNC: 6.9 G/DL (ref 6–8.5)
PROTHROMBIN TIME: 13.5 SECONDS (ref 11.1–15.3)
RBC # BLD AUTO: 4.02 10*6/MM3 (ref 3.77–5.28)
SARS-COV-2 RNA RESP QL NAA+PROBE: NOT DETECTED
SODIUM SERPL-SCNC: 130 MMOL/L (ref 136–145)
TROPONIN T SERPL-MCNC: <0.01 NG/ML (ref 0–0.03)
TROPONIN T SERPL-MCNC: <0.01 NG/ML (ref 0–0.03)
WBC # BLD AUTO: 14.9 10*3/MM3 (ref 3.4–10.8)

## 2021-07-31 PROCEDURE — C9803 HOPD COVID-19 SPEC COLLECT: HCPCS

## 2021-07-31 PROCEDURE — G0378 HOSPITAL OBSERVATION PER HR: HCPCS

## 2021-07-31 PROCEDURE — 93010 ELECTROCARDIOGRAM REPORT: CPT | Performed by: INTERNAL MEDICINE

## 2021-07-31 PROCEDURE — 84484 ASSAY OF TROPONIN QUANT: CPT | Performed by: NURSE PRACTITIONER

## 2021-07-31 PROCEDURE — 80162 ASSAY OF DIGOXIN TOTAL: CPT | Performed by: FAMILY MEDICINE

## 2021-07-31 PROCEDURE — 99284 EMERGENCY DEPT VISIT MOD MDM: CPT

## 2021-07-31 PROCEDURE — 71045 X-RAY EXAM CHEST 1 VIEW: CPT

## 2021-07-31 PROCEDURE — 25010000002 HEPARIN (PORCINE) PER 1000 UNITS: Performed by: NURSE PRACTITIONER

## 2021-07-31 PROCEDURE — 96372 THER/PROPH/DIAG INJ SC/IM: CPT

## 2021-07-31 PROCEDURE — 82550 ASSAY OF CK (CPK): CPT | Performed by: FAMILY MEDICINE

## 2021-07-31 PROCEDURE — 83735 ASSAY OF MAGNESIUM: CPT | Performed by: FAMILY MEDICINE

## 2021-07-31 PROCEDURE — 82962 GLUCOSE BLOOD TEST: CPT

## 2021-07-31 PROCEDURE — 83880 ASSAY OF NATRIURETIC PEPTIDE: CPT | Performed by: FAMILY MEDICINE

## 2021-07-31 PROCEDURE — 93005 ELECTROCARDIOGRAM TRACING: CPT | Performed by: FAMILY MEDICINE

## 2021-07-31 PROCEDURE — 85610 PROTHROMBIN TIME: CPT | Performed by: FAMILY MEDICINE

## 2021-07-31 PROCEDURE — 80053 COMPREHEN METABOLIC PANEL: CPT | Performed by: FAMILY MEDICINE

## 2021-07-31 PROCEDURE — 84484 ASSAY OF TROPONIN QUANT: CPT | Performed by: FAMILY MEDICINE

## 2021-07-31 PROCEDURE — 87636 SARSCOV2 & INF A&B AMP PRB: CPT | Performed by: FAMILY MEDICINE

## 2021-07-31 PROCEDURE — 85025 COMPLETE CBC W/AUTO DIFF WBC: CPT | Performed by: FAMILY MEDICINE

## 2021-07-31 RX ORDER — PANTOPRAZOLE SODIUM 40 MG/1
40 TABLET, DELAYED RELEASE ORAL DAILY
Status: DISCONTINUED | OUTPATIENT
Start: 2021-07-31 | End: 2021-08-02 | Stop reason: HOSPADM

## 2021-07-31 RX ORDER — LISINOPRIL 10 MG/1
10 TABLET ORAL DAILY
Status: DISCONTINUED | OUTPATIENT
Start: 2021-07-31 | End: 2021-08-02 | Stop reason: HOSPADM

## 2021-07-31 RX ORDER — NITROGLYCERIN 0.4 MG/1
0.4 TABLET SUBLINGUAL
Status: DISCONTINUED | OUTPATIENT
Start: 2021-07-31 | End: 2021-08-02 | Stop reason: HOSPADM

## 2021-07-31 RX ORDER — PREGABALIN 75 MG/1
150 CAPSULE ORAL 2 TIMES DAILY
Status: DISCONTINUED | OUTPATIENT
Start: 2021-07-31 | End: 2021-08-02 | Stop reason: HOSPADM

## 2021-07-31 RX ORDER — ATORVASTATIN CALCIUM 40 MG/1
40 TABLET, FILM COATED ORAL DAILY
Status: DISCONTINUED | OUTPATIENT
Start: 2021-07-31 | End: 2021-08-02 | Stop reason: HOSPADM

## 2021-07-31 RX ORDER — ASPIRIN 81 MG/1
81 TABLET ORAL DAILY
Status: DISCONTINUED | OUTPATIENT
Start: 2021-08-01 | End: 2021-08-02 | Stop reason: HOSPADM

## 2021-07-31 RX ORDER — ONDANSETRON 2 MG/ML
4 INJECTION INTRAMUSCULAR; INTRAVENOUS EVERY 6 HOURS PRN
Status: DISCONTINUED | OUTPATIENT
Start: 2021-07-31 | End: 2021-08-02 | Stop reason: HOSPADM

## 2021-07-31 RX ORDER — HEPARIN SODIUM 5000 [USP'U]/ML
5000 INJECTION, SOLUTION INTRAVENOUS; SUBCUTANEOUS EVERY 8 HOURS SCHEDULED
Status: DISCONTINUED | OUTPATIENT
Start: 2021-07-31 | End: 2021-08-02 | Stop reason: HOSPADM

## 2021-07-31 RX ORDER — GLIPIZIDE 10 MG/1
10 TABLET ORAL
Status: DISCONTINUED | OUTPATIENT
Start: 2021-08-01 | End: 2021-08-02 | Stop reason: HOSPADM

## 2021-07-31 RX ORDER — ALBUTEROL SULFATE 2.5 MG/3ML
2.5 SOLUTION RESPIRATORY (INHALATION) EVERY 4 HOURS PRN
Status: DISCONTINUED | OUTPATIENT
Start: 2021-07-31 | End: 2021-08-02 | Stop reason: HOSPADM

## 2021-07-31 RX ORDER — ASPIRIN 81 MG/1
324 TABLET, CHEWABLE ORAL DAILY
Status: DISCONTINUED | OUTPATIENT
Start: 2021-07-31 | End: 2021-08-02 | Stop reason: HOSPADM

## 2021-07-31 RX ORDER — CARVEDILOL 25 MG/1
25 TABLET ORAL 2 TIMES DAILY WITH MEALS
Status: DISCONTINUED | OUTPATIENT
Start: 2021-07-31 | End: 2021-08-02 | Stop reason: HOSPADM

## 2021-07-31 RX ORDER — FUROSEMIDE 40 MG/1
80 TABLET ORAL DAILY
Status: DISCONTINUED | OUTPATIENT
Start: 2021-07-31 | End: 2021-08-02 | Stop reason: HOSPADM

## 2021-07-31 RX ORDER — ISOSORBIDE MONONITRATE 30 MG/1
30 TABLET, EXTENDED RELEASE ORAL DAILY
Status: DISCONTINUED | OUTPATIENT
Start: 2021-07-31 | End: 2021-08-02 | Stop reason: HOSPADM

## 2021-07-31 RX ORDER — SODIUM CHLORIDE 0.9 % (FLUSH) 0.9 %
10 SYRINGE (ML) INJECTION AS NEEDED
Status: DISCONTINUED | OUTPATIENT
Start: 2021-07-31 | End: 2021-08-02 | Stop reason: HOSPADM

## 2021-07-31 RX ORDER — NALOXONE HCL 0.4 MG/ML
0.4 VIAL (ML) INJECTION
Status: DISCONTINUED | OUTPATIENT
Start: 2021-07-31 | End: 2021-08-02 | Stop reason: HOSPADM

## 2021-07-31 RX ORDER — DEXTROSE MONOHYDRATE 25 G/50ML
25 INJECTION, SOLUTION INTRAVENOUS
Status: DISCONTINUED | OUTPATIENT
Start: 2021-07-31 | End: 2021-08-02 | Stop reason: HOSPADM

## 2021-07-31 RX ORDER — MORPHINE SULFATE 2 MG/ML
1 INJECTION, SOLUTION INTRAMUSCULAR; INTRAVENOUS EVERY 4 HOURS PRN
Status: DISCONTINUED | OUTPATIENT
Start: 2021-07-31 | End: 2021-08-02 | Stop reason: HOSPADM

## 2021-07-31 RX ORDER — DIGOXIN 125 MCG
125 TABLET ORAL DAILY
Status: DISCONTINUED | OUTPATIENT
Start: 2021-07-31 | End: 2021-08-02 | Stop reason: HOSPADM

## 2021-07-31 RX ORDER — LANOLIN ALCOHOL/MO/W.PET/CERES
5 CREAM (GRAM) TOPICAL NIGHTLY PRN
Status: DISCONTINUED | OUTPATIENT
Start: 2021-07-31 | End: 2021-08-02 | Stop reason: HOSPADM

## 2021-07-31 RX ORDER — SODIUM CHLORIDE 0.9 % (FLUSH) 0.9 %
10 SYRINGE (ML) INJECTION EVERY 12 HOURS SCHEDULED
Status: DISCONTINUED | OUTPATIENT
Start: 2021-07-31 | End: 2021-08-02 | Stop reason: HOSPADM

## 2021-07-31 RX ORDER — POTASSIUM CHLORIDE 750 MG/1
10 CAPSULE, EXTENDED RELEASE ORAL 2 TIMES DAILY
Status: DISCONTINUED | OUTPATIENT
Start: 2021-07-31 | End: 2021-08-02 | Stop reason: HOSPADM

## 2021-07-31 RX ORDER — ONDANSETRON 4 MG/1
4 TABLET, FILM COATED ORAL EVERY 6 HOURS PRN
Status: DISCONTINUED | OUTPATIENT
Start: 2021-07-31 | End: 2021-08-02 | Stop reason: HOSPADM

## 2021-07-31 RX ORDER — NICOTINE POLACRILEX 4 MG
15 LOZENGE BUCCAL
Status: DISCONTINUED | OUTPATIENT
Start: 2021-07-31 | End: 2021-08-02 | Stop reason: HOSPADM

## 2021-07-31 RX ADMIN — ASPIRIN 324 MG: 81 TABLET, CHEWABLE ORAL at 21:02

## 2021-07-31 RX ADMIN — POTASSIUM CHLORIDE 10 MEQ: 750 CAPSULE, EXTENDED RELEASE ORAL at 22:17

## 2021-07-31 RX ADMIN — Medication 400 MG: at 21:02

## 2021-07-31 RX ADMIN — CARVEDILOL 25 MG: 25 TABLET, FILM COATED ORAL at 21:02

## 2021-07-31 RX ADMIN — LISINOPRIL 10 MG: 10 TABLET ORAL at 21:02

## 2021-07-31 RX ADMIN — PANTOPRAZOLE SODIUM 40 MG: 40 TABLET, DELAYED RELEASE ORAL at 21:02

## 2021-07-31 RX ADMIN — PREGABALIN 150 MG: 75 CAPSULE ORAL at 21:02

## 2021-07-31 RX ADMIN — SODIUM CHLORIDE, PRESERVATIVE FREE 10 ML: 5 INJECTION INTRAVENOUS at 21:04

## 2021-07-31 RX ADMIN — ATORVASTATIN CALCIUM 40 MG: 40 TABLET, FILM COATED ORAL at 21:02

## 2021-07-31 RX ADMIN — FUROSEMIDE 80 MG: 40 TABLET ORAL at 21:02

## 2021-07-31 RX ADMIN — ISOSORBIDE MONONITRATE 30 MG: 30 TABLET, EXTENDED RELEASE ORAL at 21:02

## 2021-07-31 RX ADMIN — DIGOXIN 125 MCG: 125 TABLET ORAL at 21:02

## 2021-07-31 RX ADMIN — HEPARIN SODIUM 5000 UNITS: 5000 INJECTION INTRAVENOUS; SUBCUTANEOUS at 21:04

## 2021-08-01 ENCOUNTER — APPOINTMENT (OUTPATIENT)
Dept: CT IMAGING | Facility: HOSPITAL | Age: 71
End: 2021-08-01

## 2021-08-01 ENCOUNTER — APPOINTMENT (OUTPATIENT)
Dept: CARDIOLOGY | Facility: HOSPITAL | Age: 71
End: 2021-08-01

## 2021-08-01 LAB
ANION GAP SERPL CALCULATED.3IONS-SCNC: 13 MMOL/L (ref 5–15)
BUN SERPL-MCNC: 11 MG/DL (ref 8–23)
BUN/CREAT SERPL: 10.1 (ref 7–25)
CALCIUM SPEC-SCNC: 8.9 MG/DL (ref 8.6–10.5)
CHLORIDE SERPL-SCNC: 104 MMOL/L (ref 98–107)
CO2 SERPL-SCNC: 23 MMOL/L (ref 22–29)
CREAT SERPL-MCNC: 1.09 MG/DL (ref 0.57–1)
DEPRECATED RDW RBC AUTO: 48.2 FL (ref 37–54)
EOSINOPHIL # BLD MANUAL: 0.22 10*3/MM3 (ref 0–0.4)
EOSINOPHIL NFR BLD MANUAL: 2 % (ref 0.3–6.2)
ERYTHROCYTE [DISTWIDTH] IN BLOOD BY AUTOMATED COUNT: 15 % (ref 12.3–15.4)
GFR SERPL CREATININE-BSD FRML MDRD: 60 ML/MIN/1.73
GLUCOSE BLDC GLUCOMTR-MCNC: 132 MG/DL (ref 70–130)
GLUCOSE BLDC GLUCOMTR-MCNC: 134 MG/DL (ref 70–130)
GLUCOSE BLDC GLUCOMTR-MCNC: 155 MG/DL (ref 70–130)
GLUCOSE BLDC GLUCOMTR-MCNC: 178 MG/DL (ref 70–130)
GLUCOSE SERPL-MCNC: 126 MG/DL (ref 65–99)
HCT VFR BLD AUTO: 32.6 % (ref 34–46.6)
HGB BLD-MCNC: 10.6 G/DL (ref 12–15.9)
LYMPHOCYTES # BLD MANUAL: 4.52 10*3/MM3 (ref 0.7–3.1)
LYMPHOCYTES NFR BLD MANUAL: 41 % (ref 19.6–45.3)
LYMPHOCYTES NFR BLD MANUAL: 9 % (ref 5–12)
MCH RBC QN AUTO: 28.3 PG (ref 26.6–33)
MCHC RBC AUTO-ENTMCNC: 32.5 G/DL (ref 31.5–35.7)
MCV RBC AUTO: 87.2 FL (ref 79–97)
MONOCYTES # BLD AUTO: 0.97 10*3/MM3 (ref 0.1–0.9)
NEUTROPHILS # BLD AUTO: 5.05 10*3/MM3 (ref 1.7–7)
NEUTROPHILS NFR BLD MANUAL: 47 % (ref 42.7–76)
PLAT MORPH BLD: NORMAL
PLATELET # BLD AUTO: 280 10*3/MM3 (ref 140–450)
PMV BLD AUTO: 10.2 FL (ref 6–12)
POTASSIUM SERPL-SCNC: 3.6 MMOL/L (ref 3.5–5.2)
QT INTERVAL: 398 MS
QT INTERVAL: 412 MS
QT INTERVAL: 438 MS
QTC INTERVAL: 478 MS
QTC INTERVAL: 481 MS
QTC INTERVAL: 505 MS
RBC # BLD AUTO: 3.74 10*6/MM3 (ref 3.77–5.28)
RBC MORPH BLD: NORMAL
SODIUM SERPL-SCNC: 140 MMOL/L (ref 136–145)
VARIANT LYMPHS NFR BLD MANUAL: 1 % (ref 0–5)
WBC # BLD AUTO: 10.75 10*3/MM3 (ref 3.4–10.8)
WBC MORPH BLD: NORMAL

## 2021-08-01 PROCEDURE — 82962 GLUCOSE BLOOD TEST: CPT

## 2021-08-01 PROCEDURE — 93010 ELECTROCARDIOGRAM REPORT: CPT | Performed by: INTERNAL MEDICINE

## 2021-08-01 PROCEDURE — 93306 TTE W/DOPPLER COMPLETE: CPT

## 2021-08-01 PROCEDURE — 25010000002 HEPARIN (PORCINE) PER 1000 UNITS: Performed by: NURSE PRACTITIONER

## 2021-08-01 PROCEDURE — 63710000001 INSULIN ASPART PER 5 UNITS: Performed by: NURSE PRACTITIONER

## 2021-08-01 PROCEDURE — 93005 ELECTROCARDIOGRAM TRACING: CPT | Performed by: NURSE PRACTITIONER

## 2021-08-01 PROCEDURE — 85025 COMPLETE CBC W/AUTO DIFF WBC: CPT | Performed by: NURSE PRACTITIONER

## 2021-08-01 PROCEDURE — G0378 HOSPITAL OBSERVATION PER HR: HCPCS

## 2021-08-01 PROCEDURE — 85007 BL SMEAR W/DIFF WBC COUNT: CPT | Performed by: NURSE PRACTITIONER

## 2021-08-01 PROCEDURE — 96372 THER/PROPH/DIAG INJ SC/IM: CPT

## 2021-08-01 PROCEDURE — 80048 BASIC METABOLIC PNL TOTAL CA: CPT | Performed by: NURSE PRACTITIONER

## 2021-08-01 RX ADMIN — ASPIRIN 324 MG: 81 TABLET, CHEWABLE ORAL at 08:19

## 2021-08-01 RX ADMIN — DIGOXIN 125 MCG: 125 TABLET ORAL at 08:20

## 2021-08-01 RX ADMIN — PREGABALIN 150 MG: 75 CAPSULE ORAL at 08:19

## 2021-08-01 RX ADMIN — CARVEDILOL 25 MG: 25 TABLET, FILM COATED ORAL at 17:30

## 2021-08-01 RX ADMIN — PREGABALIN 150 MG: 75 CAPSULE ORAL at 20:58

## 2021-08-01 RX ADMIN — FUROSEMIDE 80 MG: 40 TABLET ORAL at 09:04

## 2021-08-01 RX ADMIN — POTASSIUM CHLORIDE 10 MEQ: 750 CAPSULE, EXTENDED RELEASE ORAL at 09:03

## 2021-08-01 RX ADMIN — CARVEDILOL 25 MG: 25 TABLET, FILM COATED ORAL at 08:20

## 2021-08-01 RX ADMIN — POTASSIUM CHLORIDE 10 MEQ: 750 CAPSULE, EXTENDED RELEASE ORAL at 20:58

## 2021-08-01 RX ADMIN — PANTOPRAZOLE SODIUM 40 MG: 40 TABLET, DELAYED RELEASE ORAL at 09:04

## 2021-08-01 RX ADMIN — HEPARIN SODIUM 5000 UNITS: 5000 INJECTION INTRAVENOUS; SUBCUTANEOUS at 05:38

## 2021-08-01 RX ADMIN — METOPROLOL TARTRATE 2.5 MG: 5 INJECTION INTRAVENOUS at 09:04

## 2021-08-01 RX ADMIN — SODIUM CHLORIDE, PRESERVATIVE FREE 10 ML: 5 INJECTION INTRAVENOUS at 09:46

## 2021-08-01 RX ADMIN — ASPIRIN 81 MG: 81 TABLET, FILM COATED ORAL at 09:04

## 2021-08-01 RX ADMIN — Medication 400 MG: at 08:19

## 2021-08-01 RX ADMIN — HEPARIN SODIUM 5000 UNITS: 5000 INJECTION INTRAVENOUS; SUBCUTANEOUS at 14:12

## 2021-08-01 RX ADMIN — ATORVASTATIN CALCIUM 40 MG: 40 TABLET, FILM COATED ORAL at 08:20

## 2021-08-01 RX ADMIN — LISINOPRIL 10 MG: 10 TABLET ORAL at 08:20

## 2021-08-01 RX ADMIN — SODIUM CHLORIDE, PRESERVATIVE FREE 10 ML: 5 INJECTION INTRAVENOUS at 20:58

## 2021-08-01 RX ADMIN — ISOSORBIDE MONONITRATE 30 MG: 30 TABLET, EXTENDED RELEASE ORAL at 08:20

## 2021-08-01 RX ADMIN — INSULIN ASPART 2 UNITS: 100 INJECTION, SOLUTION INTRAVENOUS; SUBCUTANEOUS at 17:30

## 2021-08-01 RX ADMIN — HEPARIN SODIUM 5000 UNITS: 5000 INJECTION INTRAVENOUS; SUBCUTANEOUS at 21:01

## 2021-08-01 NOTE — PLAN OF CARE
Goal Outcome Evaluation:  Plan of Care Reviewed With: patient        Progress: improving  Outcome Summary: New admit to the floor. VVS. No chest pain reported. Pt ready to go home. No s/s of distress noted at this time. Will continue to monitor.

## 2021-08-01 NOTE — NURSING NOTE
Spoke with Swetha MADDEN regarding patients HR for CTA of cornary. Pts heart rate remains unchanged after medications but blood pressure is dropping. Richard stated to hold on the CTA for now if HR does become low enough for scan to follow through if not just continue with the ECHO and will follow up as needed.

## 2021-08-01 NOTE — PROGRESS NOTES
[facility] Medicine Services  INPATIENT PROGRESS NOTE    Length of Stay: 0  Date of Admission: 7/31/2021  Primary Care Physician: Joyce Plata MD    Subjective   Chief Complaint: chest pain   HPI:  70-year-old -American female with past medical history glaucoma, nonischemic cardiomyopathy, CHF, hypertension, hyperlipidemia, type 2 diabetes, obesity with BMI of 42.98 who presented with complaints of a 2-day history of chest pain that started while she was watching TV. She is currently under observation for further work-up and monitoring.  During today's visit, she denies chest pain.     Review of Systems   Constitutional: Negative for chills, fatigue and fever.   HENT: Negative for congestion, rhinorrhea and sore throat.    Respiratory: Negative for cough, chest tightness, shortness of breath and wheezing.    Cardiovascular: Negative for chest pain, palpitations and leg swelling.   Gastrointestinal: Negative for abdominal pain, nausea and vomiting.   Musculoskeletal: Negative for back pain and neck pain.   Skin: Negative for pallor.   Neurological: Negative for dizziness, weakness and headaches.   Psychiatric/Behavioral: Negative for confusion. The patient is not nervous/anxious.         All pertinent negatives and positives are as above. All other systems have been reviewed and are negative unless otherwise stated.     Objective    Temp:  [96.8 °F (36 °C)-98 °F (36.7 °C)] 97.1 °F (36.2 °C)  Heart Rate:  [75-88] 79  Resp:  [18] 18  BP: ()/(51-88) 102/55    Physical Exam  Vitals and nursing note reviewed.   Constitutional:       Appearance: Normal appearance. She is obese.   HENT:      Head: Normocephalic and atraumatic.      Right Ear: External ear normal.      Left Ear: External ear normal.      Nose: Nose normal.      Mouth/Throat:      Mouth: Mucous membranes are moist.      Pharynx: Oropharynx is clear.   Eyes:      General: No scleral icterus.         Right eye: No discharge.         Left eye: No discharge.      Conjunctiva/sclera: Conjunctivae normal.   Cardiovascular:      Rate and Rhythm: Normal rate and regular rhythm.      Pulses: Normal pulses.      Heart sounds: Normal heart sounds. No murmur heard.   No friction rub. No gallop.    Pulmonary:      Effort: Pulmonary effort is normal. No respiratory distress.      Breath sounds: Normal breath sounds. No stridor. No wheezing, rhonchi or rales.   Abdominal:      General: Bowel sounds are normal. There is no distension.      Palpations: Abdomen is soft.      Tenderness: There is no abdominal tenderness.   Musculoskeletal:         General: Normal range of motion.      Cervical back: Normal range of motion and neck supple.   Skin:     General: Skin is warm and dry.   Neurological:      General: No focal deficit present.      Mental Status: She is alert and oriented to person, place, and time.   Psychiatric:         Mood and Affect: Mood normal.         Behavior: Behavior normal.             Results Review:  I have reviewed the labs, radiology results, and diagnostic studies.    Laboratory Data:   Results from last 7 days   Lab Units 08/01/21  0623 07/31/21  1633   SODIUM mmol/L 140 130*   POTASSIUM mmol/L 3.6 3.5   CHLORIDE mmol/L 104 97*   CO2 mmol/L 23.0 22.0   BUN mg/dL 11 9   CREATININE mg/dL 1.09* 1.06*   GLUCOSE mg/dL 126* 134*   CALCIUM mg/dL 8.9 9.1   BILIRUBIN mg/dL  --  0.5   ALK PHOS U/L  --  102   ALT (SGPT) U/L  --  19   AST (SGOT) U/L  --  19   ANION GAP mmol/L 13.0 11.0     Estimated Creatinine Clearance: 53 mL/min (A) (by C-G formula based on SCr of 1.09 mg/dL (H)).  Results from last 7 days   Lab Units 07/31/21  1633   MAGNESIUM mg/dL 1.9         Results from last 7 days   Lab Units 08/01/21  0623 07/31/21  1633   WBC 10*3/mm3 10.75 14.90*   HEMOGLOBIN g/dL 10.6* 11.2*   HEMATOCRIT % 32.6* 34.6   PLATELETS 10*3/mm3 280 298     Results from last 7 days   Lab Units 07/31/21  1633   INR  1.04        Culture Data:   No results found for: BLOODCX  No results found for: URINECX  No results found for: RESPCX  No results found for: WOUNDCX  No results found for: STOOLCX  No components found for: BODYFLD    Radiology Data:   Imaging Results (Last 24 Hours)     ** No results found for the last 24 hours. **          I have reviewed the patient's current medications.     Assessment/Plan     Active Hospital Problems    Diagnosis    • Precordial pain        Plan:    #1.  Chest pain rule out acute coronary syndrome: Serial cardiac enzymes are negative, continuous telemetry, EKG as needed for chest pain.  Morphine, oxygen, nitro, aspirin therapy.  Continue Coreg, lisinopril, Imdur.  Echo ordered.  Coronary CTA unable to be performed as heart rates remain in 80's despite beta blocker.   2.  Type 2 diabetes: Fingerstick blood sugars before meals and at bedtime with sliding scale insulin coverage.  3.  Hypertension: Continue lisinopril, Coreg.  4.  Hyperlipidemia: Continue statin therapy.      I confirmed that the patient's Advance Care Plan is present, code status is documented, or surrogate decision maker is listed in the patient's medical record.            This document has been electronically signed by MARYANNE Galan on August 1, 2021 18:15 CDT

## 2021-08-01 NOTE — H&P
[facility] Medicine Admission      Date of Admission: 7/31/2021      Primary Care Physician: Joyce Plata MD      Chief Complaint: chest pain     HPI: 70-year-old -American female with past medical history glaucoma, nonischemic cardiomyopathy, CHF, hypertension, hyperlipidemia, type 2 diabetes, obesity with BMI of 42.98 who presented with complaints of a 2-day history of chest pain that started while she was watching TV. She reports the pain starts as a stinging sensation that is accompanied with tightness in the chest that radiates towards her back. She also reports having dyspnea with the episodes of pain. She denies nausea, vomiting, palpitations, diaphoresis, syncope. She will be placed under observation for further work-up and monitoring.    Concurrent Medical History:  has a past medical history of Artificial lens present, Borderline glaucoma, Cardiomyopathy (CMS/HCC), Congestive heart failure (CMS/HCC), Coronary artery disease, Diabetes mellitus (CMS/HCC), Essential hypertension, GERD (gastroesophageal reflux disease), History of bone density study (09/25/2015), History of echocardiogram (03/25/2013), History of mammography, diagnostic (09/17/2014), History of mammography, diagnostic (07/24/2014), History of mammography, screening (07/03/2012), Hypercholesterolemia, Low back pain, Menopausal syndrome (10/26/2017), Microcalcifications of the breast, Need for prophylactic vaccination against Streptococcus pneumoniae (pneumococcus), Neurologic disorder associated with diabetes mellitus (CMS/HCC), Obesity, Sleep apnea, Type 2 diabetes mellitus (CMS/HCC), Upper respiratory infection, Vaginal bleeding (7/27/2017), and Vitamin D deficiency.    Past Surgical History:  has a past surgical history that includes Cardiac catheterization (10/28/2010); Cardiac catheterization (01/31/2002); Central venous catheter insertion (05/20/2016); Colonoscopy (12/14/2004);  Other surgical history (11/02/2012); Other surgical history (04/27/2016); Other surgical history; Other surgical history; Pap Smear (06/10/2010); Cataract Extraction (01/28/2014); Cataract Extraction (11/22/2011); Mammo bilateral (09/17/2014); Mammo bilateral (07/24/2014); Total abdominal hysterectomy w/ bilateral salpingoophorectomy (09/16/2013); Cataract extraction; Colonoscopy (N/A, 6/14/2017); Pacemaker Implantation (2007); Cardiac catheterization (N/A, 8/23/2018); Cardiac pacemaker placement; and Cardiac electrophysiology procedure (N/A, 6/22/2020).    Family History: family history includes Alzheimer's disease in an other family member; Bone cancer in her brother; Diabetes in her sister; Heart disease in an other family member; Hyperlipidemia in her sister; Hypertension in her sister and another family member.    Social History:  reports that she has quit smoking. She has never used smokeless tobacco. She reports that she does not drink alcohol and does not use drugs.    Allergies:   Allergies   Allergen Reactions   • Aldactone [Spironolactone] Unknown - Low Severity   • Nsaids        Medications:   Prior to Admission medications    Medication Sig Start Date End Date Taking? Authorizing Provider   albuterol sulfate  (90 Base) MCG/ACT inhaler INHALE 2 PUFFS EVERY 4 (FOUR) HOURS AS NEEDED FOR WHEEZING OR SHORTNESS OF AIR. 6/30/21   Joyce Plata MD   aspirin 81 MG tablet Take 81 mg by mouth every night. 6/2/16   Provider, MD Heidi   atorvastatin (LIPITOR) 40 MG tablet Take 1 tablet by mouth Daily. 10/13/20   Joyce Plata MD   carvedilol (COREG) 25 MG tablet TAKE 1 TABLET BY MOUTH TWICE A DAY WITH MEALS 6/17/21   Joyce Plata MD   diclofenac (VOLTAREN) 1 % gel gel APPLY TO AFFECTED AREA 4 TIMES A DAY Oral Volteran DC'D) 7/21/20   Joyce Plata MD   digoxin (LANOXIN) 125 MCG tablet Take 1 tablet by mouth Daily. 6/22/21   Joyce Plata MD   furosemide (LASIX) 80 MG tablet  Take 1 tablet by mouth Daily. 10/13/20   Joyce Plata MD   glimepiride (AMARYL) 4 MG tablet Take 1 tablet by mouth Every Morning Before Breakfast. 10/13/20   Joyce Plata MD   Insulin Pen Needle (B-D UF III MINI PEN NEEDLES) 31G X 5 MM misc USE WITH INSULIN INJECTIONS NIGHTLY 12/3/20   Joyce Plata MD   ipratropium-albuterol (DUO-NEB) 0.5-2.5 mg/3 ml nebulizer Take 3 mL by nebulization 4 (Four) Times a Day. 8/24/18   Kian Bruner PA   isosorbide mononitrate (IMDUR) 30 MG 24 hr tablet Take 1 tablet by mouth Daily. 10/13/20   Joyce Plata MD   Liraglutide (Victoza) 18 MG/3ML solution pen-injector injection Inject 1.2 mg under the skin into the appropriate area as directed Daily. 10/13/20   Joyce Plata MD   lisinopril (PRINIVIL,ZESTRIL) 10 MG tablet TAKE 1 TABLET BY MOUTH EVERY DAY 3/22/21   Joyce Plata MD   magnesium oxide (MAGOX) 400 (241.3 Mg) MG tablet tablet Take 1 tablet by mouth Daily. 10/6/17   Joyce Plata MD   metFORMIN (GLUCOPHAGE) 1000 MG tablet Take 1 tablet by mouth 2 (Two) Times a Day With Meals. 10/13/20   Joyce Plata MD   nitroglycerin (NITROSTAT) 0.4 MG SL tablet Place 1 tablet under the tongue Every 5 (Five) Minutes As Needed for Chest Pain. Take no more than 3 doses in 15 minutes. 7/5/18   Joyce Plata MD   pantoprazole (PROTONIX) 40 MG EC tablet Take 1 tablet by mouth Daily. 3/9/20   Joyce Plata MD   potassium chloride (MICRO-K) 10 MEQ CR capsule Take 1 capsule by mouth 2 (Two) Times a Day. 10/13/20   Joyce Plata MD   pregabalin (LYRICA) 150 MG capsule TAKE 1 CAPSULE BY MOUTH TWICE A DAY 6/30/21   Joyce Plata MD   True Metrix Blood Glucose Test test strip CHECK BLOOD SUGAR ONCE DAILY DAILY E11.8 5/24/21   Joyce Plata MD   vitamin D (ERGOCALCIFEROL) 13532 units capsule capsule Take 1 capsule by mouth 1 (One) Time Per Week.  Patient taking differently: Take 50,000 Units by mouth 1 (One) Time Per Week. Take 1  capsule by mouth weekly on Tuesdays 6/5/18   Joyce Plata MD       Review of Systems:  Review of Systems   Constitutional: Negative for chills, fatigue and fever.   HENT: Negative for congestion, rhinorrhea and sore throat.    Respiratory: Negative for chest tightness, shortness of breath and wheezing.    Cardiovascular: Positive for chest pain. Negative for palpitations and leg swelling.   Gastrointestinal: Negative for abdominal pain, constipation, nausea and vomiting.   Musculoskeletal: Negative for back pain and neck pain.   Skin: Negative for pallor.   Neurological: Negative for dizziness, weakness and light-headedness.   Psychiatric/Behavioral: Negative for confusion. The patient is not nervous/anxious.             Physical Exam:   Temp:  [97.8 °F (36.6 °C)-98.4 °F (36.9 °C)] 98.4 °F (36.9 °C)  Heart Rate:  [86-96] 86  Resp:  [20] 20  BP: (126-151)/(57-98) 126/57  Physical Exam  Vitals and nursing note reviewed.   Constitutional:       General: She is not in acute distress.     Appearance: Normal appearance.   HENT:      Head: Normocephalic and atraumatic.      Right Ear: External ear normal.      Left Ear: External ear normal.      Nose: Nose normal.      Mouth/Throat:      Mouth: Mucous membranes are moist.      Pharynx: Oropharynx is clear.   Eyes:      General: No scleral icterus.        Right eye: No discharge.         Left eye: No discharge.      Conjunctiva/sclera: Conjunctivae normal.   Cardiovascular:      Rate and Rhythm: Normal rate and regular rhythm.      Pulses: Normal pulses.      Heart sounds: Normal heart sounds. No murmur heard.   No friction rub. No gallop.    Pulmonary:      Effort: Pulmonary effort is normal. No respiratory distress.      Breath sounds: Normal breath sounds. No stridor. No wheezing, rhonchi or rales.   Abdominal:      General: Bowel sounds are normal. There is no distension.      Palpations: Abdomen is soft.      Tenderness: There is no abdominal tenderness.    Musculoskeletal:         General: No swelling. Normal range of motion.      Cervical back: Normal range of motion and neck supple.   Skin:     General: Skin is warm and dry.   Neurological:      General: No focal deficit present.      Mental Status: She is alert and oriented to person, place, and time.   Psychiatric:         Mood and Affect: Mood normal.         Behavior: Behavior normal.           Results Reviewed:  I have personally reviewed current lab, radiology, and data and agree with results.  Lab Results (last 24 hours)     Procedure Component Value Units Date/Time    Extra Tubes [[id number]] Collected: 07/31/21 1633    Specimen: Blood, Venous Line Updated: 07/31/21 1746    Narrative:      The following orders were created for panel order Extra Tubes.  Procedure                               Abnormality         Status                     ---------                               -----------         ------                     Gold Top - SST[[id number]]                                   Final result                 Please view results for these tests on the individual orders.    Gold Top - SST [[id number]] Collected: 07/31/21 1633    Specimen: Blood Updated: 07/31/21 1746     Extra Tube Hold for add-ons.     Comment: Auto resulted.       COVID-19 and FLU A/B PCR - Swab, Nasopharynx [[id number]]  (Normal) Collected: 07/31/21 1637    Specimen: Swab from Nasopharynx Updated: 07/31/21 1703     COVID19 Not Detected     Influenza A PCR Not Detected     Influenza B PCR Not Detected    Narrative:      Fact sheet for providers: https://www.fda.gov/media/893910/download    Fact sheet for patients: https://www.fda.gov/media/416737/download    Test performed by PCR.    Comprehensive Metabolic Panel [[id number]]  (Abnormal) Collected: 07/31/21 1633    Specimen: Blood Updated: 07/31/21 1657     Glucose 134 mg/dL      BUN 9 mg/dL      Creatinine 1.06 mg/dL      Sodium 130 mmol/L      Potassium 3.5 mmol/L      Chloride 97 mmol/L       CO2 22.0 mmol/L      Calcium 9.1 mg/dL      Total Protein 6.9 g/dL      Albumin 3.90 g/dL      ALT (SGPT) 19 U/L      AST (SGOT) 19 U/L      Alkaline Phosphatase 102 U/L      Total Bilirubin 0.5 mg/dL      eGFR  Marlyn Amer 62 mL/min/1.73      Globulin 3.0 gm/dL      A/G Ratio 1.3 g/dL      BUN/Creatinine Ratio 8.5     Anion Gap 11.0 mmol/L     Narrative:      GFR Normal >60  Chronic Kidney Disease <60  Kidney Failure <15      CK [[id number]]  (Normal) Collected: 07/31/21 1633    Specimen: Blood Updated: 07/31/21 1657     Creatine Kinase 75 U/L     Magnesium [[id number]]  (Normal) Collected: 07/31/21 1633    Specimen: Blood Updated: 07/31/21 1657     Magnesium 1.9 mg/dL     Troponin [[id number]]  (Normal) Collected: 07/31/21 1633    Specimen: Blood Updated: 07/31/21 1657     Troponin T <0.010 ng/mL     Narrative:      Troponin T Reference Range:  <= 0.03 ng/mL-   Negative for AMI  >0.03 ng/mL-     Abnormal for myocardial necrosis.  Clinicians would have to utilize clinical acumen, EKG, Troponin and serial changes to determine if it is an Acute Myocardial Infarction or myocardial injury due to an underlying chronic condition.       Results may be falsely decreased if patient taking Biotin.      Protime-INR [[id number]]  (Normal) Collected: 07/31/21 1633    Specimen: Blood Updated: 07/31/21 1656     Protime 13.5 Seconds      INR 1.04    Narrative:      Therapeutic range for most indications is 2.0-3.0 INR,  or 2.5-3.5 for mechanical heart valves.    BNP [[id number]]  (Normal) Collected: 07/31/21 1633    Specimen: Blood Updated: 07/31/21 1655     proBNP 742.1 pg/mL     Narrative:      Among patients with dyspnea, NT-proBNP is highly sensitive for the detection of acute congestive heart failure. In addition NT-proBNP of <300 pg/ml effectively rules out acute congestive heart failure with 99% negative predictive value.    Results may be falsely decreased if patient taking Biotin.      CBC & Differential [[id number]]   (Abnormal) Collected: 07/31/21 1633    Specimen: Blood Updated: 07/31/21 1640    Narrative:      The following orders were created for panel order CBC & Differential.  Procedure                               Abnormality         Status                     ---------                               -----------         ------                     CBC Auto Differential[[id number]]        Abnormal            Final result                 Please view results for these tests on the individual orders.    CBC Auto Differential [[id number]]  (Abnormal) Collected: 07/31/21 1633    Specimen: Blood Updated: 07/31/21 1640     WBC 14.90 10*3/mm3      RBC 4.02 10*6/mm3      Hemoglobin 11.2 g/dL      Hematocrit 34.6 %      MCV 86.1 fL      MCH 27.9 pg      MCHC 32.4 g/dL      RDW 14.6 %      RDW-SD 46.3 fl      MPV 9.6 fL      Platelets 298 10*3/mm3      Neutrophil % 67.0 %      Lymphocyte % 24.2 %      Monocyte % 7.1 %      Eosinophil % 1.1 %      Basophil % 0.3 %      Immature Grans % 0.3 %      Neutrophils, Absolute 9.98 10*3/mm3      Lymphocytes, Absolute 3.61 10*3/mm3      Monocytes, Absolute 1.06 10*3/mm3      Eosinophils, Absolute 0.16 10*3/mm3      Basophils, Absolute 0.04 10*3/mm3      Immature Grans, Absolute 0.05 10*3/mm3      nRBC 0.0 /100 WBC         Imaging Results (Last 24 Hours)     Procedure Component Value Units Date/Time    XR Chest 1 View [[id number]] Collected: 07/31/21 1634     Updated: 07/31/21 1707    Narrative:      PROCEDURE: XR CHEST 1 VW    VIEWS:Single    INDICATION: Shortness of breath    COMPARISON: CXR: 6/15/2020    FINDINGS:       - lines/tubes: None. Left-sided pacemaker/AICD is in stable  position    - cardiac: Borderline size, unchanged.    - mediastinum: Contour within normal limits.     - lungs: No evidence of a focal air space process, pulmonary  interstitial edema, nodule(s)/mass. Mild elevation of the right  hemidiaphragm    - pleura: No evidence of  fluid.      - osseous: Unremarkable for age.         Impression:      No acute cardiopulmonary process identified.      Electronically signed by:  Joyce Joyce MD  7/31/2021 5:06 PM CDT  Workstation: [id number]            Assessment:    Active Hospital Problems    Diagnosis    • Precordial pain              Plan:    #1.  Chest pain rule out acute coronary syndrome: Serial cardiac enzymes, continuous telemetry, EKG as needed for chest pain.  Morphine, oxygen, nitrates, aspirin therapy.  Continue Coreg, lisinopril, Imdur.  Echo in a.m.  Coronary CTA in a.m.  2.  Type 2 diabetes: Fingerstick blood sugars before meals and at bedtime with sliding scale insulin coverage.  3.  Hypertension: Continue lisinopril, Coreg.  4.  Hyperlipidemia: Continue statin therapy.    Further orders will depend upon hospital course.  Plan of care discussed with patient.  CODE STATUS confirmed with patient and her wishes are for full CODE STATUS.  She designates her sister Yudi Wade as her decision maker in the event of emergency.    I confirmed that the patient's Advance Care Plan is present, code status is documented, or surrogate decision maker is listed in the patient's medical record.      I have utilized all available immediate resources to obtain, update, or review the patient's current medications.          This document has been electronically signed by MARYANNE Galan on July 31, 2021 19:08 CDT

## 2021-08-02 ENCOUNTER — READMISSION MANAGEMENT (OUTPATIENT)
Dept: CALL CENTER | Facility: HOSPITAL | Age: 71
End: 2021-08-02

## 2021-08-02 VITALS
BODY MASS INDEX: 37.05 KG/M2 | SYSTOLIC BLOOD PRESSURE: 149 MMHG | HEART RATE: 73 BPM | WEIGHT: 217 LBS | OXYGEN SATURATION: 100 % | HEIGHT: 64 IN | DIASTOLIC BLOOD PRESSURE: 67 MMHG | TEMPERATURE: 96.7 F | RESPIRATION RATE: 18 BRPM

## 2021-08-02 LAB
ANION GAP SERPL CALCULATED.3IONS-SCNC: 13 MMOL/L (ref 5–15)
BASOPHILS # BLD AUTO: 0.06 10*3/MM3 (ref 0–0.2)
BASOPHILS NFR BLD AUTO: 0.4 % (ref 0–1.5)
BH CV ECHO MEAS - ACS: 2.1 CM
BH CV ECHO MEAS - AO MAX PG (FULL): 5.4 MMHG
BH CV ECHO MEAS - AO MAX PG: 8.6 MMHG
BH CV ECHO MEAS - AO MEAN PG (FULL): 3 MMHG
BH CV ECHO MEAS - AO MEAN PG: 5 MMHG
BH CV ECHO MEAS - AO ROOT AREA (BSA CORRECTED): 1.6
BH CV ECHO MEAS - AO ROOT AREA: 8 CM^2
BH CV ECHO MEAS - AO ROOT DIAM: 3.2 CM
BH CV ECHO MEAS - AO V2 MAX: 147 CM/SEC
BH CV ECHO MEAS - AO V2 MEAN: 106 CM/SEC
BH CV ECHO MEAS - AO V2 VTI: 27.4 CM
BH CV ECHO MEAS - ASC AORTA: 3.1 CM
BH CV ECHO MEAS - AVA(I,A): 1.8 CM^2
BH CV ECHO MEAS - AVA(I,D): 1.8 CM^2
BH CV ECHO MEAS - AVA(V,A): 1.7 CM^2
BH CV ECHO MEAS - AVA(V,D): 1.7 CM^2
BH CV ECHO MEAS - BSA(HAYCOCK): 2.1 M^2
BH CV ECHO MEAS - BSA: 2 M^2
BH CV ECHO MEAS - BZI_BMI: 35.4 KILOGRAMS/M^2
BH CV ECHO MEAS - BZI_METRIC_HEIGHT: 165.1 CM
BH CV ECHO MEAS - BZI_METRIC_WEIGHT: 96.6 KG
BH CV ECHO MEAS - EDV(CUBED): 294.1 ML
BH CV ECHO MEAS - EDV(MOD-SP2): 87.8 ML
BH CV ECHO MEAS - EDV(MOD-SP4): 109 ML
BH CV ECHO MEAS - EDV(TEICH): 227.5 ML
BH CV ECHO MEAS - EF(CUBED): 40.9 %
BH CV ECHO MEAS - EF(MOD-SP2): 39 %
BH CV ECHO MEAS - EF(MOD-SP4): 36.2 %
BH CV ECHO MEAS - EF(TEICH): 33 %
BH CV ECHO MEAS - ESV(CUBED): 173.7 ML
BH CV ECHO MEAS - ESV(MOD-SP2): 53.6 ML
BH CV ECHO MEAS - ESV(MOD-SP4): 69.5 ML
BH CV ECHO MEAS - ESV(TEICH): 152.4 ML
BH CV ECHO MEAS - FS: 16.1 %
BH CV ECHO MEAS - IVS/LVPW: 0.78
BH CV ECHO MEAS - IVSD: 0.78 CM
BH CV ECHO MEAS - LA DIMENSION: 3.7 CM
BH CV ECHO MEAS - LA/AO: 1.2
BH CV ECHO MEAS - LV DIASTOLIC VOL/BSA (35-75): 53.6 ML/M^2
BH CV ECHO MEAS - LV MASS(C)D: 256.7 GRAMS
BH CV ECHO MEAS - LV MASS(C)DI: 126.3 GRAMS/M^2
BH CV ECHO MEAS - LV MAX PG: 3.3 MMHG
BH CV ECHO MEAS - LV MEAN PG: 2 MMHG
BH CV ECHO MEAS - LV SYSTOLIC VOL/BSA (12-30): 34.2 ML/M^2
BH CV ECHO MEAS - LV V1 MAX: 90.7 CM/SEC
BH CV ECHO MEAS - LV V1 MEAN: 68.2 CM/SEC
BH CV ECHO MEAS - LV V1 VTI: 17.7 CM
BH CV ECHO MEAS - LVIDD: 6.7 CM
BH CV ECHO MEAS - LVIDS: 5.6 CM
BH CV ECHO MEAS - LVLD AP2: 8.1 CM
BH CV ECHO MEAS - LVLD AP4: 7.9 CM
BH CV ECHO MEAS - LVLS AP2: 7.1 CM
BH CV ECHO MEAS - LVLS AP4: 7.2 CM
BH CV ECHO MEAS - LVOT AREA (M): 2.8 CM^2
BH CV ECHO MEAS - LVOT AREA: 2.8 CM^2
BH CV ECHO MEAS - LVOT DIAM: 1.9 CM
BH CV ECHO MEAS - LVPWD: 1 CM
BH CV ECHO MEAS - MR MAX PG: 81.4 MMHG
BH CV ECHO MEAS - MR MAX VEL: 451 CM/SEC
BH CV ECHO MEAS - MV A MAX VEL: 123 CM/SEC
BH CV ECHO MEAS - MV DEC SLOPE: 766 CM/SEC^2
BH CV ECHO MEAS - MV E MAX VEL: 84.8 CM/SEC
BH CV ECHO MEAS - MV E/A: 0.69
BH CV ECHO MEAS - MV MAX PG: 7.3 MMHG
BH CV ECHO MEAS - MV MEAN PG: 4 MMHG
BH CV ECHO MEAS - MV P1/2T MAX VEL: 130 CM/SEC
BH CV ECHO MEAS - MV P1/2T: 49.7 MSEC
BH CV ECHO MEAS - MV V2 MAX: 135 CM/SEC
BH CV ECHO MEAS - MV V2 MEAN: 91 CM/SEC
BH CV ECHO MEAS - MV V2 VTI: 33.6 CM
BH CV ECHO MEAS - MVA P1/2T LCG: 1.7 CM^2
BH CV ECHO MEAS - MVA(P1/2T): 4.4 CM^2
BH CV ECHO MEAS - MVA(VTI): 1.5 CM^2
BH CV ECHO MEAS - PA MAX PG: 5 MMHG
BH CV ECHO MEAS - PA V2 MAX: 112 CM/SEC
BH CV ECHO MEAS - RAP SYSTOLE: 5 MMHG
BH CV ECHO MEAS - RVDD: 2 CM
BH CV ECHO MEAS - RVSP: 49.6 MMHG
BH CV ECHO MEAS - SI(AO): 108.5 ML/M^2
BH CV ECHO MEAS - SI(CUBED): 59.2 ML/M^2
BH CV ECHO MEAS - SI(LVOT): 24.7 ML/M^2
BH CV ECHO MEAS - SI(MOD-SP2): 16.8 ML/M^2
BH CV ECHO MEAS - SI(MOD-SP4): 19.4 ML/M^2
BH CV ECHO MEAS - SI(TEICH): 36.9 ML/M^2
BH CV ECHO MEAS - SV(AO): 220.4 ML
BH CV ECHO MEAS - SV(CUBED): 120.3 ML
BH CV ECHO MEAS - SV(LVOT): 50.2 ML
BH CV ECHO MEAS - SV(MOD-SP2): 34.2 ML
BH CV ECHO MEAS - SV(MOD-SP4): 39.5 ML
BH CV ECHO MEAS - SV(TEICH): 75.1 ML
BH CV ECHO MEAS - TR MAX VEL: 334 CM/SEC
BUN SERPL-MCNC: 16 MG/DL (ref 8–23)
BUN/CREAT SERPL: 13.4 (ref 7–25)
CALCIUM SPEC-SCNC: 9.6 MG/DL (ref 8.6–10.5)
CHLORIDE SERPL-SCNC: 104 MMOL/L (ref 98–107)
CO2 SERPL-SCNC: 23 MMOL/L (ref 22–29)
CREAT SERPL-MCNC: 1.19 MG/DL (ref 0.57–1)
DEPRECATED RDW RBC AUTO: 48.4 FL (ref 37–54)
EOSINOPHIL # BLD AUTO: 0.22 10*3/MM3 (ref 0–0.4)
EOSINOPHIL NFR BLD AUTO: 1.6 % (ref 0.3–6.2)
ERYTHROCYTE [DISTWIDTH] IN BLOOD BY AUTOMATED COUNT: 15.3 % (ref 12.3–15.4)
GFR SERPL CREATININE-BSD FRML MDRD: 54 ML/MIN/1.73
GLUCOSE BLDC GLUCOMTR-MCNC: 163 MG/DL (ref 70–130)
GLUCOSE BLDC GLUCOMTR-MCNC: 231 MG/DL (ref 70–130)
GLUCOSE SERPL-MCNC: 160 MG/DL (ref 65–99)
HCT VFR BLD AUTO: 35.5 % (ref 34–46.6)
HGB BLD-MCNC: 11.8 G/DL (ref 12–15.9)
IMM GRANULOCYTES # BLD AUTO: 0.03 10*3/MM3 (ref 0–0.05)
IMM GRANULOCYTES NFR BLD AUTO: 0.2 % (ref 0–0.5)
LYMPHOCYTES # BLD AUTO: 5.44 10*3/MM3 (ref 0.7–3.1)
LYMPHOCYTES NFR BLD AUTO: 40.7 % (ref 19.6–45.3)
MAXIMAL PREDICTED HEART RATE: 148 BPM
MCH RBC QN AUTO: 29.4 PG (ref 26.6–33)
MCHC RBC AUTO-ENTMCNC: 33.2 G/DL (ref 31.5–35.7)
MCV RBC AUTO: 88.5 FL (ref 79–97)
MONOCYTES # BLD AUTO: 1.49 10*3/MM3 (ref 0.1–0.9)
MONOCYTES NFR BLD AUTO: 11.2 % (ref 5–12)
NEUTROPHILS NFR BLD AUTO: 45.9 % (ref 42.7–76)
NEUTROPHILS NFR BLD AUTO: 6.12 10*3/MM3 (ref 1.7–7)
NRBC BLD AUTO-RTO: 0 /100 WBC (ref 0–0.2)
PLATELET # BLD AUTO: 284 10*3/MM3 (ref 140–450)
PMV BLD AUTO: 9.6 FL (ref 6–12)
POTASSIUM SERPL-SCNC: 4 MMOL/L (ref 3.5–5.2)
RBC # BLD AUTO: 4.01 10*6/MM3 (ref 3.77–5.28)
SODIUM SERPL-SCNC: 140 MMOL/L (ref 136–145)
STRESS TARGET HR: 126 BPM
WBC # BLD AUTO: 13.36 10*3/MM3 (ref 3.4–10.8)

## 2021-08-02 PROCEDURE — 63710000001 INSULIN ASPART PER 5 UNITS: Performed by: NURSE PRACTITIONER

## 2021-08-02 PROCEDURE — 96372 THER/PROPH/DIAG INJ SC/IM: CPT

## 2021-08-02 PROCEDURE — G0378 HOSPITAL OBSERVATION PER HR: HCPCS

## 2021-08-02 PROCEDURE — 82962 GLUCOSE BLOOD TEST: CPT

## 2021-08-02 PROCEDURE — 85025 COMPLETE CBC W/AUTO DIFF WBC: CPT | Performed by: NURSE PRACTITIONER

## 2021-08-02 PROCEDURE — 25010000002 HEPARIN (PORCINE) PER 1000 UNITS: Performed by: NURSE PRACTITIONER

## 2021-08-02 PROCEDURE — 80048 BASIC METABOLIC PNL TOTAL CA: CPT | Performed by: NURSE PRACTITIONER

## 2021-08-02 RX ADMIN — LISINOPRIL 10 MG: 10 TABLET ORAL at 08:23

## 2021-08-02 RX ADMIN — Medication 400 MG: at 08:22

## 2021-08-02 RX ADMIN — GLIPIZIDE 10 MG: 10 TABLET ORAL at 08:23

## 2021-08-02 RX ADMIN — POTASSIUM CHLORIDE 10 MEQ: 750 CAPSULE, EXTENDED RELEASE ORAL at 08:23

## 2021-08-02 RX ADMIN — ASPIRIN 81 MG: 81 TABLET, FILM COATED ORAL at 08:22

## 2021-08-02 RX ADMIN — CARVEDILOL 25 MG: 25 TABLET, FILM COATED ORAL at 08:22

## 2021-08-02 RX ADMIN — INSULIN ASPART 3 UNITS: 100 INJECTION, SOLUTION INTRAVENOUS; SUBCUTANEOUS at 10:58

## 2021-08-02 RX ADMIN — ATORVASTATIN CALCIUM 40 MG: 40 TABLET, FILM COATED ORAL at 08:23

## 2021-08-02 RX ADMIN — DIGOXIN 125 MCG: 125 TABLET ORAL at 08:22

## 2021-08-02 RX ADMIN — SODIUM CHLORIDE, PRESERVATIVE FREE 10 ML: 5 INJECTION INTRAVENOUS at 08:23

## 2021-08-02 RX ADMIN — ASPIRIN 324 MG: 81 TABLET, CHEWABLE ORAL at 08:22

## 2021-08-02 RX ADMIN — PANTOPRAZOLE SODIUM 40 MG: 40 TABLET, DELAYED RELEASE ORAL at 08:23

## 2021-08-02 RX ADMIN — HEPARIN SODIUM 5000 UNITS: 5000 INJECTION INTRAVENOUS; SUBCUTANEOUS at 05:29

## 2021-08-02 RX ADMIN — PREGABALIN 150 MG: 75 CAPSULE ORAL at 08:23

## 2021-08-02 RX ADMIN — ISOSORBIDE MONONITRATE 30 MG: 30 TABLET, EXTENDED RELEASE ORAL at 08:22

## 2021-08-02 RX ADMIN — FUROSEMIDE 80 MG: 40 TABLET ORAL at 08:23

## 2021-08-02 NOTE — PLAN OF CARE
Goal Outcome Evaluation:  Plan of Care Reviewed With: patient        Progress: improving  Outcome Summary: VVS. No chest pain reported. No s/s of distress noted. Will continue to monitor.

## 2021-08-02 NOTE — OUTREACH NOTE
Prep Survey      Responses   Holiness facility patient discharged from?  [place]   Is LACE score < 7 ?  No   Emergency Room discharge w/ pulse ox?  No   Eligibility  [facility]   Date of Admission  07/31/21   Date of Discharge  08/02/21   Discharge Disposition  Home or Self Care   Discharge diagnosis  precordial pain   Does the patient have one of the following disease processes/diagnoses(primary or secondary)?  Other   Does the patient have Home health ordered?  No   Is there a DME ordered?  No   Prep survey completed?  Yes          Marissa Thorpe RN

## 2021-08-02 NOTE — DISCHARGE SUMMARY
[facility] Medicine Services  DISCHARGE SUMMARY       Date of Admission: 7/31/2021  Date of Discharge:  8/2/2021  Primary Care Physician: Joyce Plata MD    Presenting Problem/History of Present Illness:  Precordial pain [R07.2]       Final Discharge Diagnoses:  Active Hospital Problems    Diagnosis    • Precordial pain        Consults:   Consults     Date and Time Order Name Status Description    7/31/2021  6:45 PM Hospitalist (on-call MD unless specified)            Procedures Performed:                 Pertinent Test Results:   Lab Results (last 24 hours)     Procedure Component Value Units Date/Time    POC Glucose Once [[id number]]  (Abnormal) Collected: 08/02/21 1041    Specimen: Blood Updated: 08/02/21 1103     Glucose 231 mg/dL      Comment: RN NotifiedOperator: [id number] DOYLE CRYSTALMeter ID: [id number]       POC Glucose Once [[id number]]  (Abnormal) Collected: 08/02/21 0637    Specimen: Blood Updated: 08/02/21 0657     Glucose 163 mg/dL      Comment: RN NotifiedOperator: [id number] ANDREZ Ferreira ID: [id number]       Basic Metabolic Panel [[id number]]  (Abnormal) Collected: 08/02/21 0552    Specimen: Blood Updated: 08/02/21 0640     Glucose 160 mg/dL      BUN 16 mg/dL      Creatinine 1.19 mg/dL      Sodium 140 mmol/L      Potassium 4.0 mmol/L      Chloride 104 mmol/L      CO2 23.0 mmol/L      Calcium 9.6 mg/dL      eGFR   Amer 54 mL/min/1.73      BUN/Creatinine Ratio 13.4     Anion Gap 13.0 mmol/L     Narrative:      GFR Normal >60  Chronic Kidney Disease <60  Kidney Failure <15      CBC & Differential [[id number]]  (Abnormal) Collected: 08/02/21 0552    Specimen: Blood Updated: 08/02/21 0618    Narrative:      The following orders were created for panel order CBC & Differential.  Procedure                               Abnormality         Status                     ---------                               -----------         ------                      CBC Auto Differential[[id number]]        Abnormal            Final result                 Please view results for these tests on the individual orders.    CBC Auto Differential [[id number]]  (Abnormal) Collected: 08/02/21 0552    Specimen: Blood Updated: 08/02/21 0618     WBC 13.36 10*3/mm3      RBC 4.01 10*6/mm3      Hemoglobin 11.8 g/dL      Hematocrit 35.5 %      MCV 88.5 fL      MCH 29.4 pg      MCHC 33.2 g/dL      RDW 15.3 %      RDW-SD 48.4 fl      MPV 9.6 fL      Platelets 284 10*3/mm3      Neutrophil % 45.9 %      Lymphocyte % 40.7 %      Monocyte % 11.2 %      Eosinophil % 1.6 %      Basophil % 0.4 %      Immature Grans % 0.2 %      Neutrophils, Absolute 6.12 10*3/mm3      Lymphocytes, Absolute 5.44 10*3/mm3      Monocytes, Absolute 1.49 10*3/mm3      Eosinophils, Absolute 0.22 10*3/mm3      Basophils, Absolute 0.06 10*3/mm3      Immature Grans, Absolute 0.03 10*3/mm3      nRBC 0.0 /100 WBC     POC Glucose Once [[id number]]  (Abnormal) Collected: 08/01/21 1940    Specimen: Blood Updated: 08/01/21 2009     Glucose 178 mg/dL      Comment: RN NotifiedOperator: [id number] ANDREZ Ferreira ID: [id number]           Imaging Results (All)     Procedure Component Value Units Date/Time    XR Chest 1 View [[id number]] Collected: 07/31/21 1634     Updated: 07/31/21 1707    Narrative:      PROCEDURE: XR CHEST 1 VW    VIEWS:Single    INDICATION: Shortness of breath    COMPARISON: CXR: 6/15/2020    FINDINGS:       - lines/tubes: None. Left-sided pacemaker/AICD is in stable  position    - cardiac: Borderline size, unchanged.    - mediastinum: Contour within normal limits.     - lungs: No evidence of a focal air space process, pulmonary  interstitial edema, nodule(s)/mass. Mild elevation of the right  hemidiaphragm    - pleura: No evidence of  fluid.      - osseous: Unremarkable for age.        Impression:      No acute cardiopulmonary process identified.      Electronically signed by:  Joyce Joyce MD  7/31/2021 5:06  "PM CDT  Workstation: [id number]            Chief Complaint on Day of Discharge: none    Hospital Course:  70-year-old -American female with past medical history glaucoma, nonischemic cardiomyopathy, CHF, hypertension, hyperlipidemia, type 2 diabetes, obesity with BMI of 42.98 who presented with complaints of a 2-day history of chest pain.  She was placed under observation for further workup and monitoring.  EKG showed no signs of ischemia. Echo revealed EF of 36-40% improved from previous echo in 2018 that showed EF 26-30%.  Orders were placed for coronary CTA but heart rate could not be lowered enough to achieve adequate imaging for exam.  Patient had cath in 2018 that showed no evidence of obstructive CAD.  She had no further complaints of chest pain during admission and will be discharged home in stable condition with instructions for PCP and cardiology follow up as outpatient.      The patient has current or prior documentation of LVEF less than 40%, or moderate to severely depressed left ventricular systolic function. The patent was prescribed or already taking an ACE inhibitor or ARB.      The patient has current or prior documentation of LVEF less than 40%, or moderate to severely depressed left ventricular systolic function. The patient was prescribed or already taking a beta-blocker.        Condition on Discharge:  Stable     Physical Exam on Discharge:  /67 (BP Location: Left arm, Patient Position: Sitting)   Pulse 73   Temp 96.7 °F (35.9 °C) (Temporal)   Resp 18   Ht 162.6 cm (64.02\")   Wt 98.4 kg (217 lb)   SpO2 100%   BMI 37.23 kg/m²   Physical Exam  Vitals and nursing note reviewed.   Constitutional:       General: She is not in acute distress.     Appearance: Normal appearance. She is not ill-appearing.   HENT:      Head: Normocephalic and atraumatic.      Right Ear: External ear normal.      Left Ear: External ear normal.      Nose: Nose normal.      Mouth/Throat:      Mouth: " Mucous membranes are moist.      Pharynx: Oropharynx is clear.   Eyes:      General: No scleral icterus.        Right eye: No discharge.         Left eye: No discharge.      Conjunctiva/sclera: Conjunctivae normal.   Cardiovascular:      Rate and Rhythm: Normal rate and regular rhythm.      Pulses: Normal pulses.      Heart sounds: Normal heart sounds.   Pulmonary:      Effort: Pulmonary effort is normal. No respiratory distress.      Breath sounds: Normal breath sounds. No stridor. No wheezing, rhonchi or rales.   Abdominal:      General: Bowel sounds are normal. There is no distension.      Palpations: Abdomen is soft.      Tenderness: There is no abdominal tenderness.   Musculoskeletal:         General: No swelling. Normal range of motion.      Cervical back: Normal range of motion and neck supple.   Skin:     General: Skin is warm and dry.   Neurological:      General: No focal deficit present.      Mental Status: She is alert and oriented to person, place, and time.   Psychiatric:         Mood and Affect: Mood normal.         Behavior: Behavior normal.           Discharge Disposition:  Home or Self Care    Discharge Medications:     Discharge Medications      Changes to Medications      Instructions Start Date   vitamin D 1.25 MG (17222 UT) capsule capsule  Commonly known as: ERGOCALCIFEROL  What changed: additional instructions  Notes to patient: As directed   50,000 Units, Oral, Weekly         Continue These Medications      Instructions Start Date   albuterol sulfate  (90 Base) MCG/ACT inhaler  Commonly known as: PROVENTIL HFA;VENTOLIN HFA;PROAIR HFA  Notes to patient: As needed   INHALE 2 PUFFS EVERY 4 (FOUR) HOURS AS NEEDED FOR WHEEZING OR SHORTNESS OF AIR.      aspirin 81 MG tablet  Notes to patient: 08/02/2021   81 mg, Oral, Nightly      atorvastatin 40 MG tablet  Commonly known as: LIPITOR  Notes to patient: 08/03/2021   40 mg, Oral, Daily      B-D UF III MINI PEN NEEDLES 31G X 5 MM misc  Generic  drug: Insulin Pen Needle   USE WITH INSULIN INJECTIONS NIGHTLY      carvedilol 25 MG tablet  Commonly known as: COREG  Notes to patient: 07/02/2021   TAKE 1 TABLET BY MOUTH TWICE A DAY WITH MEALS      diclofenac 1 % gel gel  Commonly known as: VOLTAREN  Notes to patient: 08/02/2021   APPLY TO AFFECTED AREA 4 TIMES A DAY Oral Volteran DC'D)      digoxin 125 MCG tablet  Commonly known as: LANOXIN  Notes to patient: 08/03/2021   125 mcg, Oral, Daily      furosemide 80 MG tablet  Commonly known as: LASIX  Notes to patient: 08/03/2021   80 mg, Oral, Daily      glimepiride 4 MG tablet  Commonly known as: AMARYL  Notes to patient: 08/03/2021   4 mg, Oral, Every Morning Before Breakfast      ipratropium-albuterol 0.5-2.5 mg/3 ml nebulizer  Commonly known as: DUO-NEB  Notes to patient: 08/02/2021   3 mL, Nebulization, 4 Times Daily - RT      isosorbide mononitrate 30 MG 24 hr tablet  Commonly known as: IMDUR  Notes to patient: 08/02/2021   30 mg, Oral, Daily      lisinopril 10 MG tablet  Commonly known as: PRINIVIL,ZESTRIL   TAKE 1 TABLET BY MOUTH EVERY DAY      magnesium oxide 400 (241.3 Mg) MG tablet tablet  Commonly known as: MAGOX  Notes to patient: 08/03/2021   400 mg, Oral, Daily      metFORMIN 1000 MG tablet  Commonly known as: GLUCOPHAGE  Notes to patient: 08/02/2021   1,000 mg, Oral, 2 Times Daily With Meals      nitroglycerin 0.4 MG SL tablet  Commonly known as: Nitrostat  Notes to patient: As needed   0.4 mg, Sublingual, Every 5 Minutes PRN, Take no more than 3 doses in 15 minutes.      pantoprazole 40 MG EC tablet  Commonly known as: Protonix  Notes to patient: 08/03/2021   40 mg, Oral, Daily      potassium chloride 10 MEQ CR capsule  Commonly known as: MICRO-K  Notes to patient: 08/02/2021   10 mEq, Oral, 2 Times Daily      pregabalin 150 MG capsule  Commonly known as: LYRICA  Notes to patient: 08/02/2021   TAKE 1 CAPSULE BY MOUTH TWICE A DAY      True Metrix Blood Glucose Test test strip  Generic drug: glucose  blood  Notes to patient: 08/03/2021   CHECK BLOOD SUGAR ONCE DAILY DAILY E11.8      Victoza 18 MG/3ML solution pen-injector injection  Generic drug: Liraglutide  Notes to patient: 08/03/2021   1.2 mg, Subcutaneous, Daily             Discharge Diet:   Diet Instructions     Diet: Consistent Carbohydrate, Cardiac; Thin      Discharge Diet:  Consistent Carbohydrate  Cardiac       Fluid Consistency: Thin          Activity at Discharge:   Activity Instructions     Activity as Tolerated            Discharge Care Plan/Instructions: Follow up with PCP within one week.  Follow up with Dr. Felipe in one month.     Follow-up Appointments:   Additional Instructions for the Follow-ups that You Need to Schedule     Discharge Follow-up with PCP   As directed       Currently Documented PCP:    Joyce Plata MD    PCP Phone Number:    [phone]     Follow Up Details: one week         Discharge Follow-up with Specified Provider: Destinee; 1 Month   As directed      To: Destinee    Follow Up: 1 Month           Follow-up Information     Joyce Plata MD .    Specialties: Family Medicine, Hospitalist  Why: one week  Contact information:  [address]  [address]  [place] [address]  [phone]                   Test Results Pending at Discharge:           This document has been electronically signed by MARYANNE Galan on August 2, 2021 12:36 CDT        Time: 30 minutes spent on assessment, discussion, management, and discharge planning for this patient.

## 2021-08-03 ENCOUNTER — TRANSITIONAL CARE MANAGEMENT TELEPHONE ENCOUNTER (OUTPATIENT)
Dept: CALL CENTER | Facility: HOSPITAL | Age: 71
End: 2021-08-03

## 2021-08-03 NOTE — OUTREACH NOTE
Call Center TCM Note      Responses   [facility] patient discharged from?  [facility]   Does the patient have one of the following disease processes/diagnoses(primary or secondary)?  Other   TCM attempt successful?  Yes   Call start time  1534   Discharge diagnosis  precordial pain   Meds reviewed with patient/caregiver?  Yes   Is the patient having any side effects they believe may be caused by any medication additions or changes?  No   Does the patient have all medications ordered at discharge?  Yes   Is the patient taking all medications as directed (includes completed medication regime)?  Yes   Does the patient have a primary care provider?   Yes   Does the patient have an appointment with their PCP within 7 days of discharge?  Yes   Comments regarding PCP  Patient has a hospital followup on 8/9/2021 with Dr Plata   Has the patient kept scheduled appointments due by today?  N/A   Psychosocial issues?  No   Did the patient receive a copy of their discharge instructions?  Yes   Nursing interventions  Reviewed instructions with patient   What is the patient's perception of their health status since discharge?  Improving   Is the patient/caregiver able to teach back signs and symptoms related to disease process for when to call PCP?  Yes   Is the patient/caregiver able to teach back signs and symptoms related to disease process for when to call 911?  Yes   Is the patient/caregiver able to teach back the hierarchy of who to call/visit for symptoms/problems? PCP, Specialist, Home health nurse, Urgent Care, ED, 911  Yes   If the patient is a current smoker, are they able to teach back resources for cessation?  Not a smoker   TCM call completed?  Yes          Chuy King RN    8/3/2021, 15:36 EDT

## 2021-08-09 ENCOUNTER — OFFICE VISIT (OUTPATIENT)
Dept: FAMILY MEDICINE CLINIC | Facility: CLINIC | Age: 71
End: 2021-08-09

## 2021-08-09 VITALS
HEIGHT: 62 IN | BODY MASS INDEX: 39.43 KG/M2 | DIASTOLIC BLOOD PRESSURE: 70 MMHG | OXYGEN SATURATION: 99 % | WEIGHT: 214.3 LBS | HEART RATE: 92 BPM | SYSTOLIC BLOOD PRESSURE: 138 MMHG

## 2021-08-09 DIAGNOSIS — R07.89 OTHER CHEST PAIN: Primary | ICD-10-CM

## 2021-08-09 DIAGNOSIS — I42.8 NONISCHEMIC CARDIOMYOPATHY (HCC): Chronic | ICD-10-CM

## 2021-08-09 DIAGNOSIS — I10 ESSENTIAL HYPERTENSION: Chronic | ICD-10-CM

## 2021-08-09 PROCEDURE — 99495 TRANSJ CARE MGMT MOD F2F 14D: CPT | Performed by: GENERAL PRACTICE

## 2021-08-09 NOTE — PROGRESS NOTES
Transitional Care Follow Up Visit  Subjective     Lexi Wade is a 70 y.o. female who presents for a transitional care management visit.    Within 48 business hours after discharge our office contacted her via telephone to coordinate her care and needs.      I reviewed and discussed the details of that call along with the discharge summary, hospital problems, inpatient lab results, inpatient diagnostic studies, and consultation reports with Lexi.     Current outpatient and discharge medications have been reconciled for the patient.  Reviewed by: Joyce Plata MD      Date of TCM Phone Call 8/2/2021   [facility]   Date of Admission 7/31/2021   Date of Discharge 8/2/2021   Discharge Disposition Home or Self Care     Risk for Readmission (LACE) Score: 7 (8/2/2021  5:02 AM)      History of Present Illness   Course During Hospital Stay:  Recent hospitalization, labs, xrays reviewed and medications reconciled.  Had some right chest and upper back pain. Investigations were negative. Has not had any further pain.    Copied from hospital record:     Hospital Course:  70-year-old -American female with past medical history glaucoma, nonischemic cardiomyopathy, CHF, hypertension, hyperlipidemia, type 2 diabetes, obesity with BMI of 42.98 who presented with complaints of a 2-day history of chest pain.  She was placed under observation for further workup and monitoring.  EKG showed no signs of ischemia. Echo revealed EF of 36-40% improved from previous echo in 2018 that showed EF 26-30%.  Orders were placed for coronary CTA but heart rate could not be lowered enough to achieve adequate imaging for exam.  Patient had cath in 2018 that showed no evidence of obstructive CAD.  She had no further complaints of chest pain during admission and will be discharged home in stable condition with instructions for PCP and cardiology follow up as outpatient.       The patient has current or prior documentation  of LVEF less than 40%, or moderate to severely depressed left ventricular systolic function. The patent was prescribed or already taking an ACE inhibitor or ARB.       The patient has current or prior documentation of LVEF less than 40%, or moderate to severely depressed left ventricular systolic function. The patient was prescribed or already taking a beta-blocker.     The following portions of the patient's history were reviewed and updated as appropriate: allergies, current medications, past family history, past medical history, past social history, past surgical history and problem list.  Outpatient Medications Prior to Visit   Medication Sig Dispense Refill   • albuterol sulfate  (90 Base) MCG/ACT inhaler INHALE 2 PUFFS EVERY 4 (FOUR) HOURS AS NEEDED FOR WHEEZING OR SHORTNESS OF AIR. 18 g 1   • aspirin 81 MG tablet Take 81 mg by mouth every night.     • atorvastatin (LIPITOR) 40 MG tablet Take 1 tablet by mouth Daily. 90 tablet 3   • carvedilol (COREG) 25 MG tablet TAKE 1 TABLET BY MOUTH TWICE A DAY WITH MEALS 180 tablet 3   • diclofenac (VOLTAREN) 1 % gel gel APPLY TO AFFECTED AREA 4 TIMES A DAY Oral Volteran DC'D) 100 g 3   • digoxin (LANOXIN) 125 MCG tablet Take 1 tablet by mouth Daily. 90 tablet 1   • furosemide (LASIX) 80 MG tablet Take 1 tablet by mouth Daily. 90 tablet 3   • glimepiride (AMARYL) 4 MG tablet Take 1 tablet by mouth Every Morning Before Breakfast. 90 tablet 3   • Insulin Pen Needle (B-D UF III MINI PEN NEEDLES) 31G X 5 MM misc USE WITH INSULIN INJECTIONS NIGHTLY 100 each 3   • ipratropium-albuterol (DUO-NEB) 0.5-2.5 mg/3 ml nebulizer Take 3 mL by nebulization 4 (Four) Times a Day. 360 mL 12   • isosorbide mononitrate (IMDUR) 30 MG 24 hr tablet Take 1 tablet by mouth Daily. 90 tablet 3   • Liraglutide (Victoza) 18 MG/3ML solution pen-injector injection Inject 1.2 mg under the skin into the appropriate area as directed Daily. 6 pen 5   • lisinopril (PRINIVIL,ZESTRIL) 10 MG tablet TAKE  1 TABLET BY MOUTH EVERY DAY 90 tablet 3   • magnesium oxide (MAGOX) 400 (241.3 Mg) MG tablet tablet Take 1 tablet by mouth Daily. 90 each 3   • metFORMIN (GLUCOPHAGE) 1000 MG tablet Take 1 tablet by mouth 2 (Two) Times a Day With Meals. 180 tablet 3   • nitroglycerin (NITROSTAT) 0.4 MG SL tablet Place 1 tablet under the tongue Every 5 (Five) Minutes As Needed for Chest Pain. Take no more than 3 doses in 15 minutes. 25 tablet 0   • pantoprazole (PROTONIX) 40 MG EC tablet Take 1 tablet by mouth Daily. 90 tablet 1   • potassium chloride (MICRO-K) 10 MEQ CR capsule Take 1 capsule by mouth 2 (Two) Times a Day. 180 capsule 3   • pregabalin (LYRICA) 150 MG capsule TAKE 1 CAPSULE BY MOUTH TWICE A  capsule 1   • True Metrix Blood Glucose Test test strip CHECK BLOOD SUGAR ONCE DAILY DAILY E11.8 100 each 3   • vitamin D (ERGOCALCIFEROL) 06131 units capsule capsule Take 1 capsule by mouth 1 (One) Time Per Week. (Patient taking differently: Take 50,000 Units by mouth 1 (One) Time Per Week. Take 1 capsule by mouth weekly on Tuesdays) 12 capsule 3     No facility-administered medications prior to visit.       Review of Systems   Constitutional: Negative.  Negative for chills, fatigue, fever and unexpected weight change.   HENT: Negative.  Negative for congestion, ear pain, hearing loss, nosebleeds, rhinorrhea, sneezing, sore throat and tinnitus.    Eyes: Negative.  Negative for discharge.   Respiratory: Negative.  Negative for cough, shortness of breath and wheezing.    Cardiovascular: Negative.  Negative for chest pain and palpitations.   Gastrointestinal: Negative.  Negative for abdominal pain, constipation, diarrhea, nausea and vomiting.   Endocrine: Negative.    Genitourinary: Negative.  Negative for dysuria, frequency and urgency.   Musculoskeletal: Positive for arthralgias. Negative for back pain, joint swelling, myalgias and neck pain.   Skin: Negative.  Negative for rash.   Allergic/Immunologic: Negative.   "  Neurological: Negative.  Negative for dizziness, weakness, numbness and headaches.   Hematological: Negative.  Negative for adenopathy.   Psychiatric/Behavioral: Negative.  Negative for dysphoric mood and sleep disturbance. The patient is not nervous/anxious.        Objective   Visit Vitals  /70   Pulse 92   Ht 157.5 cm (62\")   Wt 97.2 kg (214 lb 4.8 oz)   SpO2 99%   BMI 39.20 kg/m²         Physical Exam  Vitals and nursing note reviewed.   Constitutional:       General: She is not in acute distress.     Appearance: She is well-developed.   HENT:      Head: Normocephalic and atraumatic.      Nose: Nose normal.   Eyes:      General:         Right eye: No discharge.         Left eye: No discharge.      Conjunctiva/sclera: Conjunctivae normal.      Pupils: Pupils are equal, round, and reactive to light.   Neck:      Thyroid: No thyromegaly.   Cardiovascular:      Rate and Rhythm: Normal rate and regular rhythm.      Heart sounds: Normal heart sounds.   Pulmonary:      Effort: Pulmonary effort is normal.      Breath sounds: Normal breath sounds.   Lymphadenopathy:      Cervical: No cervical adenopathy.   Skin:     General: Skin is warm and dry.   Neurological:      Mental Status: She is alert and oriented to person, place, and time.       Assessment/Plan   Diagnoses and all orders for this visit:    1. Other chest pain (Primary)    2. Nonischemic cardiomyopathy (CMS/HCC)    3. Essential hypertension      Continue current medications. Follow up with cardiology as scheduled.    No orders of the defined types were placed in this encounter.      Current outpatient and discharge medications have been reconciled for the patient.  Reviewed by: Joyce Plata MD      Return if symptoms worsen or fail to improve, for Next scheduled follow up.  "

## 2021-08-09 NOTE — PATIENT INSTRUCTIONS
Calorie Counting for Weight Loss  Calories are units of energy. Your body needs a certain number of calories from food to keep going throughout the day. When you eat or drink more calories than your body needs, your body stores the extra calories mostly as fat. When you eat or drink fewer calories than your body needs, your body burns fat to get the energy it needs.  Calorie counting means keeping track of how many calories you eat and drink each day. Calorie counting can be helpful if you need to lose weight. If you eat fewer calories than your body needs, you should lose weight. Ask your health care provider what a healthy weight is for you.  For calorie counting to work, you will need to eat the right number of calories each day to lose a healthy amount of weight per week. A dietitian can help you figure out how many calories you need in a day and will suggest ways to reach your calorie goal.  · A healthy amount of weight to lose each week is usually 1-2 lb (0.5-0.9 kg). This usually means that your daily calorie intake should be reduced by 500-750 calories.  · Eating 1,200-1,500 calories a day can help most women lose weight.  · Eating 1,500-1,800 calories a day can help most men lose weight.  What do I need to know about calorie counting?  Work with your health care provider or dietitian to determine how many calories you should get each day. To meet your daily calorie goal, you will need to:  · Find out how many calories are in each food that you would like to eat. Try to do this before you eat.  · Decide how much of the food you plan to eat.  · Keep a food log. Do this by writing down what you ate and how many calories it had.  To successfully lose weight, it is important to balance calorie counting with a healthy lifestyle that includes regular activity.  Where do I find calorie information?    The number of calories in a food can be found on a Nutrition Facts label. If a food does not have a Nutrition Facts  label, try to look up the calories online or ask your dietitian for help.  Remember that calories are listed per serving. If you choose to have more than one serving of a food, you will have to multiply the calories per serving by the number of servings you plan to eat. For example, the label on a package of bread might say that a serving size is 1 slice and that there are 90 calories in a serving. If you eat 1 slice, you will have eaten 90 calories. If you eat 2 slices, you will have eaten 180 calories.  How do I keep a food log?  After each time that you eat, record the following in your food log as soon as possible:  · What you ate. Be sure to include toppings, sauces, and other extras on the food.  · How much you ate. This can be measured in cups, ounces, or number of items.  · How many calories were in each food and drink.  · The total number of calories in the food you ate.  Keep your food log near you, such as in a pocket-sized notebook or on an jimmy or website on your mobile phone. Some programs will calculate calories for you and show you how many calories you have left to meet your daily goal.  What are some portion-control tips?  · Know how many calories are in a serving. This will help you know how many servings you can have of a certain food.  · Use a measuring cup to measure serving sizes. You could also try weighing out portions on a kitchen scale. With time, you will be able to estimate serving sizes for some foods.  · Take time to put servings of different foods on your favorite plates or in your favorite bowls and cups so you know what a serving looks like.  · Try not to eat straight from a food's packaging, such as from a bag or box. Eating straight from the package makes it hard to see how much you are eating and can lead to overeating. Put the amount you would like to eat in a cup or on a plate to make sure you are eating the right portion.  · Use smaller plates, glasses, and bowls for smaller  portions and to prevent overeating.  · Try not to multitask. For example, avoid watching TV or using your computer while eating. If it is time to eat, sit down at a table and enjoy your food. This will help you recognize when you are full. It will also help you be more mindful of what and how much you are eating.  What are tips for following this plan?  Reading food labels  · Check the calorie count compared with the serving size. The serving size may be smaller than what you are used to eating.  · Check the source of the calories. Try to choose foods that are high in protein, fiber, and vitamins, and low in saturated fat, trans fat, and sodium.  Shopping  · Read nutrition labels while you shop. This will help you make healthy decisions about which foods to buy.  · Pay attention to nutrition labels for low-fat or fat-free foods. These foods sometimes have the same number of calories or more calories than the full-fat versions. They also often have added sugar, starch, or salt to make up for flavor that was removed with the fat.  · Make a grocery list of lower-calorie foods and stick to it.  Cooking  · Try to cook your favorite foods in a healthier way. For example, try baking instead of frying.  · Use low-fat dairy products.  Meal planning  · Use more fruits and vegetables. One-half of your plate should be fruits and vegetables.  · Include lean proteins, such as chicken, turkey, and fish.  Lifestyle  Each week, aim to do one of the following:  · 150 minutes of moderate exercise, such as walking.  · 75 minutes of vigorous exercise, such as running.  General information  · Know how many calories are in the foods you eat most often. This will help you calculate calorie counts faster.  · Find a way of tracking calories that works for you. Get creative. Try different apps or programs if writing down calories does not work for you.  What foods should I eat?    · Eat nutritious foods. It is better to have a nutritious,  high-calorie food, such as an avocado, than a food with few nutrients, such as a bag of potato chips.  · Use your calories on foods and drinks that will fill you up and will not leave you hungry soon after eating.  ? Examples of foods that fill you up are nuts and nut butters, vegetables, lean proteins, and high-fiber foods such as whole grains. High-fiber foods are foods with more than 5 g of fiber per serving.  · Pay attention to calories in drinks. Low-calorie drinks include water and unsweetened drinks.  The items listed above may not be a complete list of foods and beverages you can eat. Contact a dietitian for more information.  What foods should I limit?  Limit foods or drinks that are not good sources of vitamins, minerals, or protein or that are high in unhealthy fats. These include:  · Candy.  · Other sweets.  · Sodas, specialty coffee drinks, alcohol, and juice.  The items listed above may not be a complete list of foods and beverages you should avoid. Contact a dietitian for more information.  How do I count calories when eating out?  · Pay attention to portions. Often, portions are much larger when eating out. Try these tips to keep portions smaller:  ? Consider sharing a meal instead of getting your own.  ? If you get your own meal, eat only half of it. Before you start eating, ask for a container and put half of your meal into it.  ? When available, consider ordering smaller portions from the menu instead of full portions.  · Pay attention to your food and drink choices. Knowing the way food is cooked and what is included with the meal can help you eat fewer calories.  ? If calories are listed on the menu, choose the lower-calorie options.  ? Choose dishes that include vegetables, fruits, whole grains, low-fat dairy products, and lean proteins.  ? Choose items that are boiled, broiled, grilled, or steamed. Avoid items that are buttered, battered, fried, or served with cream sauce. Items labeled as  crispy are usually fried, unless stated otherwise.  ? Choose water, low-fat milk, unsweetened iced tea, or other drinks without added sugar. If you want an alcoholic beverage, choose a lower-calorie option, such as a glass of wine or light beer.  ? Ask for dressings, sauces, and syrups on the side. These are usually high in calories, so you should limit the amount you eat.  ? If you want a salad, choose a garden salad and ask for grilled meats. Avoid extra toppings such as finley, cheese, or fried items. Ask for the dressing on the side, or ask for olive oil and vinegar or lemon to use as dressing.  · Estimate how many servings of a food you are given. Knowing serving sizes will help you be aware of how much food you are eating at restaurants.  Where to find more information  · Centers for Disease Control and Prevention: www.cdc.gov  · [place]. Department of Agriculture: myplate.gov  Summary  · Calorie counting means keeping track of how many calories you eat and drink each day. If you eat fewer calories than your body needs, you should lose weight.  · A healthy amount of weight to lose per week is usually 1-2 lb (0.5-0.9 kg). This usually means reducing your daily calorie intake by 500-750 calories.  · The number of calories in a food can be found on a Nutrition Facts label. If a food does not have a Nutrition Facts label, try to look up the calories online or ask your dietitian for help.  · Use smaller plates, glasses, and bowls for smaller portions and to prevent overeating.  · Use your calories on foods and drinks that will fill you up and not leave you hungry shortly after a meal.  This information is not intended to replace advice given to you by your health care provider. Make sure you discuss any questions you have with your health care provider.  Document Revised: 01/28/2021 Document Reviewed: 01/28/2021  Elsevier Patient Education © 2021 [employer]

## 2021-08-13 ENCOUNTER — READMISSION MANAGEMENT (OUTPATIENT)
Dept: CALL CENTER | Facility: HOSPITAL | Age: 71
End: 2021-08-13

## 2021-08-13 NOTE — OUTREACH NOTE
Medical Week 2 Survey      Responses   [facility] patient discharged from?  [place]   Does the patient have one of the following disease processes/diagnoses(primary or secondary)?  Other   Week 2 attempt successful?  Yes   Call start time  1159   Discharge diagnosis  precordial pain   Call end time  1200   Meds reviewed with patient/caregiver?  Yes   Is the patient taking all medications as directed (includes completed medication regime)?  Yes   Has the patient kept scheduled appointments due by today?  Yes   Has home health visited the patient within 72 hours of discharge?  N/A   What is the patient's perception of their health status since discharge?  Improving   Week 2 Call Completed?  Yes          Rose Curry RN

## 2021-08-20 ENCOUNTER — TELEPHONE (OUTPATIENT)
Dept: FAMILY MEDICINE CLINIC | Facility: CLINIC | Age: 71
End: 2021-08-20

## 2021-08-20 NOTE — TELEPHONE ENCOUNTER
Lexi called stating Sienna with [employer] has been trying to reach Sherlyn about some diabetic shoes for Lexi. She asked that we give Sienna a call so PT can get some shoes.     Sienna with [employer] contact [phone]

## 2021-08-23 ENCOUNTER — READMISSION MANAGEMENT (OUTPATIENT)
Dept: CALL CENTER | Facility: HOSPITAL | Age: 71
End: 2021-08-23

## 2021-08-23 NOTE — OUTREACH NOTE
Medical Week 3 Survey      Responses   [facility] patient discharged from?  [place]   Does the patient have one of the following disease processes/diagnoses(primary or secondary)?  Other   Week 3 attempt successful?  No   Unsuccessful attempts  Attempt 1          Amanda Ram RN

## 2021-08-25 ENCOUNTER — READMISSION MANAGEMENT (OUTPATIENT)
Dept: CALL CENTER | Facility: HOSPITAL | Age: 71
End: 2021-08-25

## 2021-08-25 NOTE — OUTREACH NOTE
Medical Week 3 Survey      Responses   [facility] patient discharged from?  [place]   Does the patient have one of the following disease processes/diagnoses(primary or secondary)?  Other   Week 3 attempt successful?  No   Unsuccessful attempts  Attempt 2          Roes Curry RN

## 2021-09-02 RX ORDER — ALBUTEROL SULFATE 90 UG/1
AEROSOL, METERED RESPIRATORY (INHALATION)
Qty: 18 G | Refills: 1 | Status: SHIPPED | OUTPATIENT
Start: 2021-09-02

## 2021-09-27 ENCOUNTER — TELEPHONE (OUTPATIENT)
Dept: FAMILY MEDICINE CLINIC | Facility: CLINIC | Age: 71
End: 2021-09-27

## 2021-09-27 NOTE — TELEPHONE ENCOUNTER
I rescheduled Lexi's apt with you in October and she was supposed to have labs before she comes in.  I checked and there isn't a lab order in there.

## 2021-10-20 ENCOUNTER — LAB (OUTPATIENT)
Dept: LAB | Facility: HOSPITAL | Age: 71
End: 2021-10-20

## 2021-10-20 DIAGNOSIS — I10 ESSENTIAL HYPERTENSION: ICD-10-CM

## 2021-10-20 DIAGNOSIS — E55.9 VITAMIN D DEFICIENCY: ICD-10-CM

## 2021-10-20 DIAGNOSIS — E11.8 TYPE 2 DIABETES MELLITUS WITH COMPLICATION, WITHOUT LONG-TERM CURRENT USE OF INSULIN (HCC): ICD-10-CM

## 2021-10-20 DIAGNOSIS — E78.2 MIXED HYPERLIPIDEMIA: ICD-10-CM

## 2021-10-20 PROCEDURE — 82306 VITAMIN D 25 HYDROXY: CPT

## 2021-10-20 PROCEDURE — 80053 COMPREHEN METABOLIC PANEL: CPT

## 2021-10-20 PROCEDURE — 80061 LIPID PANEL: CPT

## 2021-10-20 PROCEDURE — 83036 HEMOGLOBIN GLYCOSYLATED A1C: CPT

## 2021-10-20 PROCEDURE — 36415 COLL VENOUS BLD VENIPUNCTURE: CPT

## 2021-10-20 PROCEDURE — 87086 URINE CULTURE/COLONY COUNT: CPT

## 2021-10-20 PROCEDURE — 85025 COMPLETE CBC W/AUTO DIFF WBC: CPT

## 2021-10-20 PROCEDURE — 81001 URINALYSIS AUTO W/SCOPE: CPT

## 2021-10-21 LAB
25(OH)D3 SERPL-MCNC: 43.2 NG/ML (ref 30–100)
ALBUMIN SERPL-MCNC: 4.8 G/DL (ref 3.5–5.2)
ALBUMIN/GLOB SERPL: 1.7 G/DL
ALP SERPL-CCNC: 112 U/L (ref 39–117)
ALT SERPL W P-5'-P-CCNC: 15 U/L (ref 1–33)
ANION GAP SERPL CALCULATED.3IONS-SCNC: 13 MMOL/L (ref 5–15)
AST SERPL-CCNC: 20 U/L (ref 1–32)
BACTERIA UR QL AUTO: ABNORMAL /HPF
BASOPHILS # BLD AUTO: 0.05 10*3/MM3 (ref 0–0.2)
BASOPHILS NFR BLD AUTO: 0.4 % (ref 0–1.5)
BILIRUB SERPL-MCNC: 0.3 MG/DL (ref 0–1.2)
BILIRUB UR QL STRIP: NEGATIVE
BUN SERPL-MCNC: 9 MG/DL (ref 8–23)
BUN/CREAT SERPL: 9.1 (ref 7–25)
CALCIUM SPEC-SCNC: 9.3 MG/DL (ref 8.6–10.5)
CHLORIDE SERPL-SCNC: 104 MMOL/L (ref 98–107)
CHOLEST SERPL-MCNC: 192 MG/DL (ref 0–200)
CLARITY UR: ABNORMAL
CO2 SERPL-SCNC: 26 MMOL/L (ref 22–29)
COLOR UR: YELLOW
CREAT SERPL-MCNC: 0.99 MG/DL (ref 0.57–1)
DEPRECATED RDW RBC AUTO: 42.7 FL (ref 37–54)
EOSINOPHIL # BLD AUTO: 0.18 10*3/MM3 (ref 0–0.4)
EOSINOPHIL NFR BLD AUTO: 1.5 % (ref 0.3–6.2)
ERYTHROCYTE [DISTWIDTH] IN BLOOD BY AUTOMATED COUNT: 14.1 % (ref 12.3–15.4)
GFR SERPL CREATININE-BSD FRML MDRD: 67 ML/MIN/1.73
GLOBULIN UR ELPH-MCNC: 2.8 GM/DL
GLUCOSE SERPL-MCNC: 78 MG/DL (ref 65–99)
GLUCOSE UR STRIP-MCNC: NEGATIVE MG/DL
HBA1C MFR BLD: 7.05 % (ref 4.8–5.6)
HCT VFR BLD AUTO: 36.1 % (ref 34–46.6)
HDLC SERPL-MCNC: 46 MG/DL (ref 40–60)
HGB BLD-MCNC: 12.1 G/DL (ref 12–15.9)
HGB UR QL STRIP.AUTO: NEGATIVE
HYALINE CASTS UR QL AUTO: ABNORMAL /LPF
IMM GRANULOCYTES # BLD AUTO: 0.05 10*3/MM3 (ref 0–0.05)
IMM GRANULOCYTES NFR BLD AUTO: 0.4 % (ref 0–0.5)
KETONES UR QL STRIP: NEGATIVE
LDLC SERPL CALC-MCNC: 124 MG/DL (ref 0–100)
LDLC/HDLC SERPL: 2.64 {RATIO}
LEUKOCYTE ESTERASE UR QL STRIP.AUTO: ABNORMAL
LYMPHOCYTES # BLD AUTO: 3.98 10*3/MM3 (ref 0.7–3.1)
LYMPHOCYTES NFR BLD AUTO: 32.9 % (ref 19.6–45.3)
MCH RBC QN AUTO: 28.3 PG (ref 26.6–33)
MCHC RBC AUTO-ENTMCNC: 33.5 G/DL (ref 31.5–35.7)
MCV RBC AUTO: 84.5 FL (ref 79–97)
MONOCYTES # BLD AUTO: 0.84 10*3/MM3 (ref 0.1–0.9)
MONOCYTES NFR BLD AUTO: 6.9 % (ref 5–12)
NEUTROPHILS NFR BLD AUTO: 57.9 % (ref 42.7–76)
NEUTROPHILS NFR BLD AUTO: 6.99 10*3/MM3 (ref 1.7–7)
NITRITE UR QL STRIP: NEGATIVE
NRBC BLD AUTO-RTO: 0 /100 WBC (ref 0–0.2)
PH UR STRIP.AUTO: 6.5 [PH] (ref 5–8)
PLATELET # BLD AUTO: 392 10*3/MM3 (ref 140–450)
PMV BLD AUTO: 10.2 FL (ref 6–12)
POTASSIUM SERPL-SCNC: 4.1 MMOL/L (ref 3.5–5.2)
PROT SERPL-MCNC: 7.6 G/DL (ref 6–8.5)
PROT UR QL STRIP: ABNORMAL
RBC # BLD AUTO: 4.27 10*6/MM3 (ref 3.77–5.28)
RBC # UR: ABNORMAL /HPF
REF LAB TEST METHOD: ABNORMAL
SODIUM SERPL-SCNC: 143 MMOL/L (ref 136–145)
SP GR UR STRIP: 1.02 (ref 1–1.03)
SQUAMOUS #/AREA URNS HPF: ABNORMAL /HPF
TRIGL SERPL-MCNC: 123 MG/DL (ref 0–150)
UROBILINOGEN UR QL STRIP: ABNORMAL
VLDLC SERPL-MCNC: 22 MG/DL (ref 5–40)
WBC # BLD AUTO: 12.09 10*3/MM3 (ref 3.4–10.8)
WBC UR QL AUTO: ABNORMAL /HPF

## 2021-10-22 LAB — BACTERIA SPEC AEROBE CULT: NO GROWTH

## 2021-10-25 ENCOUNTER — OFFICE VISIT (OUTPATIENT)
Dept: FAMILY MEDICINE CLINIC | Facility: CLINIC | Age: 71
End: 2021-10-25

## 2021-10-25 VITALS
DIASTOLIC BLOOD PRESSURE: 70 MMHG | BODY MASS INDEX: 39.9 KG/M2 | OXYGEN SATURATION: 100 % | HEART RATE: 93 BPM | WEIGHT: 216.8 LBS | HEIGHT: 62 IN | SYSTOLIC BLOOD PRESSURE: 130 MMHG

## 2021-10-25 DIAGNOSIS — E78.2 MIXED HYPERLIPIDEMIA: ICD-10-CM

## 2021-10-25 DIAGNOSIS — E11.8 TYPE 2 DIABETES MELLITUS WITH COMPLICATION, WITHOUT LONG-TERM CURRENT USE OF INSULIN (HCC): ICD-10-CM

## 2021-10-25 DIAGNOSIS — Z00.00 MEDICARE ANNUAL WELLNESS VISIT, SUBSEQUENT: Primary | ICD-10-CM

## 2021-10-25 DIAGNOSIS — I10 ESSENTIAL HYPERTENSION: ICD-10-CM

## 2021-10-25 DIAGNOSIS — Z23 NEED FOR IMMUNIZATION AGAINST INFLUENZA: ICD-10-CM

## 2021-10-25 PROCEDURE — 90662 IIV NO PRSV INCREASED AG IM: CPT | Performed by: GENERAL PRACTICE

## 2021-10-25 PROCEDURE — 1170F FXNL STATUS ASSESSED: CPT | Performed by: GENERAL PRACTICE

## 2021-10-25 PROCEDURE — 1125F AMNT PAIN NOTED PAIN PRSNT: CPT | Performed by: GENERAL PRACTICE

## 2021-10-25 PROCEDURE — G0008 ADMIN INFLUENZA VIRUS VAC: HCPCS | Performed by: GENERAL PRACTICE

## 2021-10-25 PROCEDURE — 1159F MED LIST DOCD IN RCRD: CPT | Performed by: GENERAL PRACTICE

## 2021-10-25 PROCEDURE — G0439 PPPS, SUBSEQ VISIT: HCPCS | Performed by: GENERAL PRACTICE

## 2021-11-28 ENCOUNTER — APPOINTMENT (OUTPATIENT)
Dept: GENERAL RADIOLOGY | Facility: HOSPITAL | Age: 71
End: 2021-11-28

## 2021-11-28 ENCOUNTER — HOSPITAL ENCOUNTER (INPATIENT)
Facility: HOSPITAL | Age: 71
LOS: 4 days | Discharge: HOME HEALTH | End: 2021-12-02
Attending: EMERGENCY MEDICINE | Admitting: INTERNAL MEDICINE

## 2021-11-28 ENCOUNTER — APPOINTMENT (OUTPATIENT)
Dept: CT IMAGING | Facility: HOSPITAL | Age: 71
End: 2021-11-28

## 2021-11-28 DIAGNOSIS — R07.9 CHEST PAIN, UNSPECIFIED TYPE: ICD-10-CM

## 2021-11-28 DIAGNOSIS — J18.9 PNEUMONIA OF BOTH LUNGS DUE TO INFECTIOUS ORGANISM, UNSPECIFIED PART OF LUNG: ICD-10-CM

## 2021-11-28 DIAGNOSIS — I50.23 ACUTE ON CHRONIC SYSTOLIC CHF (CONGESTIVE HEART FAILURE) (HCC): Primary | Chronic | ICD-10-CM

## 2021-11-28 PROBLEM — R06.00 DYSPNEA: Status: ACTIVE | Noted: 2017-12-12

## 2021-11-28 LAB
ALBUMIN SERPL-MCNC: 4.3 G/DL (ref 3.5–5.2)
ALBUMIN/GLOB SERPL: 1.2 G/DL
ALP SERPL-CCNC: 111 U/L (ref 39–117)
ALT SERPL W P-5'-P-CCNC: 44 U/L (ref 1–33)
ANION GAP SERPL CALCULATED.3IONS-SCNC: 13 MMOL/L (ref 5–15)
AST SERPL-CCNC: 34 U/L (ref 1–32)
BASOPHILS # BLD AUTO: 0.06 10*3/MM3 (ref 0–0.2)
BASOPHILS NFR BLD AUTO: 0.4 % (ref 0–1.5)
BILIRUB SERPL-MCNC: 0.8 MG/DL (ref 0–1.2)
BUN SERPL-MCNC: 13 MG/DL (ref 8–23)
BUN/CREAT SERPL: 11.9 (ref 7–25)
CALCIUM SPEC-SCNC: 9.9 MG/DL (ref 8.6–10.5)
CHLORIDE SERPL-SCNC: 99 MMOL/L (ref 98–107)
CO2 SERPL-SCNC: 22 MMOL/L (ref 22–29)
CREAT SERPL-MCNC: 1.09 MG/DL (ref 0.57–1)
D-DIMER, QUANTITATIVE (MAD,POW, STR): 1587 NG/ML (FEU) (ref 0–470)
D-LACTATE SERPL-SCNC: 2.2 MMOL/L (ref 0.5–2)
D-LACTATE SERPL-SCNC: 2.5 MMOL/L (ref 0.5–2)
DEPRECATED RDW RBC AUTO: 48 FL (ref 37–54)
DIGOXIN SERPL-MCNC: 0.5 NG/ML (ref 0.6–1.2)
EOSINOPHIL # BLD AUTO: 0.05 10*3/MM3 (ref 0–0.4)
EOSINOPHIL NFR BLD AUTO: 0.3 % (ref 0.3–6.2)
ERYTHROCYTE [DISTWIDTH] IN BLOOD BY AUTOMATED COUNT: 15.7 % (ref 12.3–15.4)
FLUAV SUBTYP SPEC NAA+PROBE: NOT DETECTED
FLUBV RNA ISLT QL NAA+PROBE: NOT DETECTED
GFR SERPL CREATININE-BSD FRML MDRD: 60 ML/MIN/1.73
GLOBULIN UR ELPH-MCNC: 3.6 GM/DL
GLUCOSE BLDC GLUCOMTR-MCNC: 187 MG/DL (ref 70–130)
GLUCOSE SERPL-MCNC: 215 MG/DL (ref 65–99)
HCT VFR BLD AUTO: 36 % (ref 34–46.6)
HGB BLD-MCNC: 11.8 G/DL (ref 12–15.9)
HOLD SPECIMEN: NORMAL
IMM GRANULOCYTES # BLD AUTO: 0.12 10*3/MM3 (ref 0–0.05)
IMM GRANULOCYTES NFR BLD AUTO: 0.7 % (ref 0–0.5)
LYMPHOCYTES # BLD AUTO: 2.57 10*3/MM3 (ref 0.7–3.1)
LYMPHOCYTES NFR BLD AUTO: 15 % (ref 19.6–45.3)
MCH RBC QN AUTO: 28 PG (ref 26.6–33)
MCHC RBC AUTO-ENTMCNC: 32.8 G/DL (ref 31.5–35.7)
MCV RBC AUTO: 85.5 FL (ref 79–97)
MONOCYTES # BLD AUTO: 1.44 10*3/MM3 (ref 0.1–0.9)
MONOCYTES NFR BLD AUTO: 8.4 % (ref 5–12)
NEUTROPHILS NFR BLD AUTO: 12.89 10*3/MM3 (ref 1.7–7)
NEUTROPHILS NFR BLD AUTO: 75.2 % (ref 42.7–76)
NRBC BLD AUTO-RTO: 0 /100 WBC (ref 0–0.2)
NT-PROBNP SERPL-MCNC: 1629 PG/ML (ref 0–900)
PLATELET # BLD AUTO: 319 10*3/MM3 (ref 140–450)
PMV BLD AUTO: 10.3 FL (ref 6–12)
POTASSIUM SERPL-SCNC: 3.6 MMOL/L (ref 3.5–5.2)
PROT SERPL-MCNC: 7.9 G/DL (ref 6–8.5)
RBC # BLD AUTO: 4.21 10*6/MM3 (ref 3.77–5.28)
SARS-COV-2 RNA PNL SPEC NAA+PROBE: NOT DETECTED
SODIUM SERPL-SCNC: 134 MMOL/L (ref 136–145)
TROPONIN T SERPL-MCNC: <0.01 NG/ML (ref 0–0.03)
TROPONIN T SERPL-MCNC: <0.01 NG/ML (ref 0–0.03)
WBC NRBC COR # BLD: 17.13 10*3/MM3 (ref 3.4–10.8)
WHOLE BLOOD HOLD SPECIMEN: NORMAL
WHOLE BLOOD HOLD SPECIMEN: NORMAL

## 2021-11-28 PROCEDURE — 82962 GLUCOSE BLOOD TEST: CPT

## 2021-11-28 PROCEDURE — 25010000002 FUROSEMIDE PER 20 MG: Performed by: INTERNAL MEDICINE

## 2021-11-28 PROCEDURE — 80162 ASSAY OF DIGOXIN TOTAL: CPT | Performed by: EMERGENCY MEDICINE

## 2021-11-28 PROCEDURE — 94640 AIRWAY INHALATION TREATMENT: CPT

## 2021-11-28 PROCEDURE — 93005 ELECTROCARDIOGRAM TRACING: CPT

## 2021-11-28 PROCEDURE — 93010 ELECTROCARDIOGRAM REPORT: CPT | Performed by: INTERNAL MEDICINE

## 2021-11-28 PROCEDURE — 0 IOPAMIDOL PER 1 ML: Performed by: EMERGENCY MEDICINE

## 2021-11-28 PROCEDURE — 71275 CT ANGIOGRAPHY CHEST: CPT

## 2021-11-28 PROCEDURE — 25010000002 AZITHROMYCIN PER 500 MG: Performed by: EMERGENCY MEDICINE

## 2021-11-28 PROCEDURE — 99285 EMERGENCY DEPT VISIT HI MDM: CPT

## 2021-11-28 PROCEDURE — 85025 COMPLETE CBC W/AUTO DIFF WBC: CPT | Performed by: EMERGENCY MEDICINE

## 2021-11-28 PROCEDURE — 83880 ASSAY OF NATRIURETIC PEPTIDE: CPT | Performed by: EMERGENCY MEDICINE

## 2021-11-28 PROCEDURE — 93005 ELECTROCARDIOGRAM TRACING: CPT | Performed by: EMERGENCY MEDICINE

## 2021-11-28 PROCEDURE — 87040 BLOOD CULTURE FOR BACTERIA: CPT | Performed by: EMERGENCY MEDICINE

## 2021-11-28 PROCEDURE — 87636 SARSCOV2 & INF A&B AMP PRB: CPT | Performed by: EMERGENCY MEDICINE

## 2021-11-28 PROCEDURE — 84484 ASSAY OF TROPONIN QUANT: CPT | Performed by: EMERGENCY MEDICINE

## 2021-11-28 PROCEDURE — 25010000002 CEFTRIAXONE PER 250 MG: Performed by: EMERGENCY MEDICINE

## 2021-11-28 PROCEDURE — 83605 ASSAY OF LACTIC ACID: CPT | Performed by: EMERGENCY MEDICINE

## 2021-11-28 PROCEDURE — 71045 X-RAY EXAM CHEST 1 VIEW: CPT

## 2021-11-28 PROCEDURE — 80053 COMPREHEN METABOLIC PANEL: CPT | Performed by: EMERGENCY MEDICINE

## 2021-11-28 PROCEDURE — 85379 FIBRIN DEGRADATION QUANT: CPT | Performed by: EMERGENCY MEDICINE

## 2021-11-28 RX ORDER — NICOTINE POLACRILEX 4 MG
15 LOZENGE BUCCAL
Status: DISCONTINUED | OUTPATIENT
Start: 2021-11-28 | End: 2021-12-02 | Stop reason: HOSPADM

## 2021-11-28 RX ORDER — SODIUM CHLORIDE 0.9 % (FLUSH) 0.9 %
10 SYRINGE (ML) INJECTION EVERY 12 HOURS SCHEDULED
Status: DISCONTINUED | OUTPATIENT
Start: 2021-11-28 | End: 2021-12-02 | Stop reason: HOSPADM

## 2021-11-28 RX ORDER — ASPIRIN 325 MG
325 TABLET ORAL ONCE
Status: COMPLETED | OUTPATIENT
Start: 2021-11-28 | End: 2021-11-28

## 2021-11-28 RX ORDER — IPRATROPIUM BROMIDE AND ALBUTEROL SULFATE 2.5; .5 MG/3ML; MG/3ML
3 SOLUTION RESPIRATORY (INHALATION)
Status: DISCONTINUED | OUTPATIENT
Start: 2021-11-28 | End: 2021-12-02 | Stop reason: HOSPADM

## 2021-11-28 RX ORDER — DEXTROSE MONOHYDRATE 25 G/50ML
25 INJECTION, SOLUTION INTRAVENOUS
Status: DISCONTINUED | OUTPATIENT
Start: 2021-11-28 | End: 2021-12-02 | Stop reason: HOSPADM

## 2021-11-28 RX ORDER — FUROSEMIDE 10 MG/ML
40 INJECTION INTRAMUSCULAR; INTRAVENOUS
Status: DISCONTINUED | OUTPATIENT
Start: 2021-11-28 | End: 2021-12-02 | Stop reason: HOSPADM

## 2021-11-28 RX ORDER — ALBUTEROL SULFATE 2.5 MG/3ML
2.5 SOLUTION RESPIRATORY (INHALATION) EVERY 4 HOURS PRN
Status: DISCONTINUED | OUTPATIENT
Start: 2021-11-28 | End: 2021-12-02 | Stop reason: HOSPADM

## 2021-11-28 RX ORDER — ASPIRIN 81 MG/1
81 TABLET ORAL DAILY
Status: DISCONTINUED | OUTPATIENT
Start: 2021-11-29 | End: 2021-12-02 | Stop reason: HOSPADM

## 2021-11-28 RX ORDER — ATORVASTATIN CALCIUM 40 MG/1
40 TABLET, FILM COATED ORAL DAILY
Status: DISCONTINUED | OUTPATIENT
Start: 2021-11-29 | End: 2021-12-02 | Stop reason: HOSPADM

## 2021-11-28 RX ORDER — PANTOPRAZOLE SODIUM 40 MG/1
40 TABLET, DELAYED RELEASE ORAL DAILY
Status: DISCONTINUED | OUTPATIENT
Start: 2021-11-29 | End: 2021-12-02 | Stop reason: HOSPADM

## 2021-11-28 RX ORDER — SODIUM CHLORIDE 0.9 % (FLUSH) 0.9 %
10 SYRINGE (ML) INJECTION AS NEEDED
Status: DISCONTINUED | OUTPATIENT
Start: 2021-11-28 | End: 2021-12-02 | Stop reason: HOSPADM

## 2021-11-28 RX ORDER — LISINOPRIL 10 MG/1
10 TABLET ORAL DAILY
Status: DISCONTINUED | OUTPATIENT
Start: 2021-11-29 | End: 2021-12-02 | Stop reason: HOSPADM

## 2021-11-28 RX ORDER — PREGABALIN 75 MG/1
150 CAPSULE ORAL 2 TIMES DAILY
Status: DISCONTINUED | OUTPATIENT
Start: 2021-11-28 | End: 2021-12-02 | Stop reason: HOSPADM

## 2021-11-28 RX ORDER — CARVEDILOL 25 MG/1
25 TABLET ORAL 2 TIMES DAILY WITH MEALS
Status: DISCONTINUED | OUTPATIENT
Start: 2021-11-28 | End: 2021-12-02 | Stop reason: HOSPADM

## 2021-11-28 RX ORDER — ISOSORBIDE MONONITRATE 30 MG/1
30 TABLET, EXTENDED RELEASE ORAL DAILY
Status: DISCONTINUED | OUTPATIENT
Start: 2021-11-29 | End: 2021-12-02 | Stop reason: HOSPADM

## 2021-11-28 RX ADMIN — IPRATROPIUM BROMIDE AND ALBUTEROL SULFATE 3 ML: 2.5; .5 SOLUTION RESPIRATORY (INHALATION) at 21:39

## 2021-11-28 RX ADMIN — AZITHROMYCIN MONOHYDRATE 500 MG: 500 INJECTION, POWDER, LYOPHILIZED, FOR SOLUTION INTRAVENOUS at 19:42

## 2021-11-28 RX ADMIN — PREGABALIN 150 MG: 75 CAPSULE ORAL at 21:58

## 2021-11-28 RX ADMIN — CARVEDILOL 25 MG: 25 TABLET, FILM COATED ORAL at 21:58

## 2021-11-28 RX ADMIN — ASPIRIN 325 MG: 325 TABLET ORAL at 18:42

## 2021-11-28 RX ADMIN — IOPAMIDOL 90 ML: 755 INJECTION, SOLUTION INTRAVENOUS at 19:28

## 2021-11-28 RX ADMIN — CEFTRIAXONE SODIUM 2 G: 2 INJECTION, POWDER, FOR SOLUTION INTRAMUSCULAR; INTRAVENOUS at 18:43

## 2021-11-28 RX ADMIN — SODIUM CHLORIDE, PRESERVATIVE FREE 10 ML: 5 INJECTION INTRAVENOUS at 21:59

## 2021-11-28 RX ADMIN — FUROSEMIDE 40 MG: 10 INJECTION, SOLUTION INTRAVENOUS at 21:59

## 2021-11-29 LAB
ANION GAP SERPL CALCULATED.3IONS-SCNC: 14 MMOL/L (ref 5–15)
BASOPHILS # BLD AUTO: 0.07 10*3/MM3 (ref 0–0.2)
BASOPHILS NFR BLD AUTO: 0.4 % (ref 0–1.5)
BUN SERPL-MCNC: 16 MG/DL (ref 8–23)
BUN/CREAT SERPL: 13.9 (ref 7–25)
CALCIUM SPEC-SCNC: 9.7 MG/DL (ref 8.6–10.5)
CHLORIDE SERPL-SCNC: 103 MMOL/L (ref 98–107)
CO2 SERPL-SCNC: 22 MMOL/L (ref 22–29)
CREAT SERPL-MCNC: 1.15 MG/DL (ref 0.57–1)
DEPRECATED RDW RBC AUTO: 51.1 FL (ref 37–54)
EOSINOPHIL # BLD AUTO: 0.14 10*3/MM3 (ref 0–0.4)
EOSINOPHIL NFR BLD AUTO: 0.8 % (ref 0.3–6.2)
ERYTHROCYTE [DISTWIDTH] IN BLOOD BY AUTOMATED COUNT: 16.1 % (ref 12.3–15.4)
GFR SERPL CREATININE-BSD FRML MDRD: 56 ML/MIN/1.73
GLUCOSE BLDC GLUCOMTR-MCNC: 193 MG/DL (ref 70–130)
GLUCOSE BLDC GLUCOMTR-MCNC: 301 MG/DL (ref 70–130)
GLUCOSE BLDC GLUCOMTR-MCNC: 324 MG/DL (ref 70–130)
GLUCOSE SERPL-MCNC: 181 MG/DL (ref 65–99)
HCT VFR BLD AUTO: 33.9 % (ref 34–46.6)
HGB BLD-MCNC: 10.9 G/DL (ref 12–15.9)
IMM GRANULOCYTES # BLD AUTO: 0.07 10*3/MM3 (ref 0–0.05)
IMM GRANULOCYTES NFR BLD AUTO: 0.4 % (ref 0–0.5)
LYMPHOCYTES # BLD AUTO: 4.8 10*3/MM3 (ref 0.7–3.1)
LYMPHOCYTES NFR BLD AUTO: 27.7 % (ref 19.6–45.3)
MAGNESIUM SERPL-MCNC: 1.7 MG/DL (ref 1.6–2.4)
MCH RBC QN AUTO: 27.8 PG (ref 26.6–33)
MCHC RBC AUTO-ENTMCNC: 32.2 G/DL (ref 31.5–35.7)
MCV RBC AUTO: 86.5 FL (ref 79–97)
MONOCYTES # BLD AUTO: 1.89 10*3/MM3 (ref 0.1–0.9)
MONOCYTES NFR BLD AUTO: 10.9 % (ref 5–12)
NEUTROPHILS NFR BLD AUTO: 10.34 10*3/MM3 (ref 1.7–7)
NEUTROPHILS NFR BLD AUTO: 59.8 % (ref 42.7–76)
NRBC BLD AUTO-RTO: 0 /100 WBC (ref 0–0.2)
PLATELET # BLD AUTO: 263 10*3/MM3 (ref 140–450)
PMV BLD AUTO: 10.8 FL (ref 6–12)
POTASSIUM SERPL-SCNC: 3.5 MMOL/L (ref 3.5–5.2)
RBC # BLD AUTO: 3.92 10*6/MM3 (ref 3.77–5.28)
SODIUM SERPL-SCNC: 139 MMOL/L (ref 136–145)
WBC NRBC COR # BLD: 17.31 10*3/MM3 (ref 3.4–10.8)

## 2021-11-29 PROCEDURE — 25010000002 AZITHROMYCIN PER 500 MG: Performed by: INTERNAL MEDICINE

## 2021-11-29 PROCEDURE — 94799 UNLISTED PULMONARY SVC/PX: CPT

## 2021-11-29 PROCEDURE — 83735 ASSAY OF MAGNESIUM: CPT | Performed by: INTERNAL MEDICINE

## 2021-11-29 PROCEDURE — 85025 COMPLETE CBC W/AUTO DIFF WBC: CPT | Performed by: INTERNAL MEDICINE

## 2021-11-29 PROCEDURE — 94760 N-INVAS EAR/PLS OXIMETRY 1: CPT

## 2021-11-29 PROCEDURE — 63710000001 INSULIN ASPART PER 5 UNITS: Performed by: INTERNAL MEDICINE

## 2021-11-29 PROCEDURE — 80048 BASIC METABOLIC PNL TOTAL CA: CPT | Performed by: INTERNAL MEDICINE

## 2021-11-29 PROCEDURE — 25010000002 FUROSEMIDE PER 20 MG: Performed by: INTERNAL MEDICINE

## 2021-11-29 PROCEDURE — 82962 GLUCOSE BLOOD TEST: CPT

## 2021-11-29 PROCEDURE — 25010000002 CEFTRIAXONE PER 250 MG: Performed by: INTERNAL MEDICINE

## 2021-11-29 RX ADMIN — PREGABALIN 150 MG: 75 CAPSULE ORAL at 08:32

## 2021-11-29 RX ADMIN — IPRATROPIUM BROMIDE AND ALBUTEROL SULFATE 3 ML: 2.5; .5 SOLUTION RESPIRATORY (INHALATION) at 19:27

## 2021-11-29 RX ADMIN — FUROSEMIDE 40 MG: 10 INJECTION, SOLUTION INTRAVENOUS at 08:32

## 2021-11-29 RX ADMIN — INSULIN ASPART 3 UNITS: 100 INJECTION, SOLUTION INTRAVENOUS; SUBCUTANEOUS at 08:32

## 2021-11-29 RX ADMIN — INSULIN ASPART 5 UNITS: 100 INJECTION, SOLUTION INTRAVENOUS; SUBCUTANEOUS at 18:10

## 2021-11-29 RX ADMIN — IPRATROPIUM BROMIDE AND ALBUTEROL SULFATE 3 ML: 2.5; .5 SOLUTION RESPIRATORY (INHALATION) at 11:08

## 2021-11-29 RX ADMIN — CARVEDILOL 25 MG: 25 TABLET, FILM COATED ORAL at 18:05

## 2021-11-29 RX ADMIN — INSULIN ASPART 10 UNITS: 100 INJECTION, SOLUTION INTRAVENOUS; SUBCUTANEOUS at 12:05

## 2021-11-29 RX ADMIN — LISINOPRIL 10 MG: 10 TABLET ORAL at 08:32

## 2021-11-29 RX ADMIN — SODIUM CHLORIDE, PRESERVATIVE FREE 10 ML: 5 INJECTION INTRAVENOUS at 08:33

## 2021-11-29 RX ADMIN — CARVEDILOL 25 MG: 25 TABLET, FILM COATED ORAL at 08:32

## 2021-11-29 RX ADMIN — IPRATROPIUM BROMIDE AND ALBUTEROL SULFATE 3 ML: 2.5; .5 SOLUTION RESPIRATORY (INHALATION) at 15:04

## 2021-11-29 RX ADMIN — PANTOPRAZOLE SODIUM 40 MG: 40 TABLET, DELAYED RELEASE ORAL at 08:32

## 2021-11-29 RX ADMIN — IPRATROPIUM BROMIDE AND ALBUTEROL SULFATE 3 ML: 2.5; .5 SOLUTION RESPIRATORY (INHALATION) at 07:23

## 2021-11-29 RX ADMIN — AZITHROMYCIN MONOHYDRATE 500 MG: 500 INJECTION, POWDER, LYOPHILIZED, FOR SOLUTION INTRAVENOUS at 20:32

## 2021-11-29 RX ADMIN — ISOSORBIDE MONONITRATE 30 MG: 30 TABLET, EXTENDED RELEASE ORAL at 08:32

## 2021-11-29 RX ADMIN — FUROSEMIDE 40 MG: 10 INJECTION, SOLUTION INTRAVENOUS at 18:05

## 2021-11-29 RX ADMIN — PREGABALIN 150 MG: 75 CAPSULE ORAL at 20:32

## 2021-11-29 RX ADMIN — ATORVASTATIN CALCIUM 40 MG: 40 TABLET, FILM COATED ORAL at 08:32

## 2021-11-29 RX ADMIN — SODIUM CHLORIDE, PRESERVATIVE FREE 10 ML: 5 INJECTION INTRAVENOUS at 20:32

## 2021-11-29 RX ADMIN — CEFTRIAXONE SODIUM 1 G: 1 INJECTION, POWDER, FOR SOLUTION INTRAMUSCULAR; INTRAVENOUS at 18:05

## 2021-11-29 RX ADMIN — ASPIRIN 81 MG: 81 TABLET, FILM COATED ORAL at 08:32

## 2021-11-30 LAB
GLUCOSE BLDC GLUCOMTR-MCNC: 221 MG/DL (ref 70–130)
GLUCOSE BLDC GLUCOMTR-MCNC: 226 MG/DL (ref 70–130)
GLUCOSE BLDC GLUCOMTR-MCNC: 276 MG/DL (ref 70–130)
GLUCOSE BLDC GLUCOMTR-MCNC: 342 MG/DL (ref 70–130)

## 2021-11-30 PROCEDURE — 25010000002 AZITHROMYCIN PER 500 MG: Performed by: INTERNAL MEDICINE

## 2021-11-30 PROCEDURE — 25010000002 METHYLPREDNISOLONE PER 40 MG: Performed by: INTERNAL MEDICINE

## 2021-11-30 PROCEDURE — 82962 GLUCOSE BLOOD TEST: CPT

## 2021-11-30 PROCEDURE — 25010000002 FUROSEMIDE PER 20 MG: Performed by: INTERNAL MEDICINE

## 2021-11-30 PROCEDURE — 94799 UNLISTED PULMONARY SVC/PX: CPT

## 2021-11-30 PROCEDURE — 94760 N-INVAS EAR/PLS OXIMETRY 1: CPT

## 2021-11-30 PROCEDURE — 63710000001 INSULIN ASPART PER 5 UNITS: Performed by: INTERNAL MEDICINE

## 2021-11-30 PROCEDURE — 94664 DEMO&/EVAL PT USE INHALER: CPT

## 2021-11-30 PROCEDURE — 25010000002 CEFTRIAXONE PER 250 MG: Performed by: INTERNAL MEDICINE

## 2021-11-30 PROCEDURE — 63710000001 INSULIN DETEMIR PER 5 UNITS: Performed by: INTERNAL MEDICINE

## 2021-11-30 RX ORDER — METHYLPREDNISOLONE SODIUM SUCCINATE 40 MG/ML
20 INJECTION, POWDER, LYOPHILIZED, FOR SOLUTION INTRAMUSCULAR; INTRAVENOUS EVERY 8 HOURS
Status: DISCONTINUED | OUTPATIENT
Start: 2021-11-30 | End: 2021-12-01

## 2021-11-30 RX ORDER — GUAIFENESIN 600 MG/1
600 TABLET, EXTENDED RELEASE ORAL EVERY 12 HOURS SCHEDULED
Status: DISCONTINUED | OUTPATIENT
Start: 2021-11-30 | End: 2021-12-02 | Stop reason: HOSPADM

## 2021-11-30 RX ADMIN — INSULIN ASPART 14 UNITS: 100 INJECTION, SOLUTION INTRAVENOUS; SUBCUTANEOUS at 17:12

## 2021-11-30 RX ADMIN — METHYLPREDNISOLONE SODIUM SUCCINATE 20 MG: 40 INJECTION, POWDER, FOR SOLUTION INTRAMUSCULAR; INTRAVENOUS at 17:11

## 2021-11-30 RX ADMIN — SODIUM CHLORIDE, PRESERVATIVE FREE 10 ML: 5 INJECTION INTRAVENOUS at 20:36

## 2021-11-30 RX ADMIN — ASPIRIN 81 MG: 81 TABLET, FILM COATED ORAL at 08:12

## 2021-11-30 RX ADMIN — IPRATROPIUM BROMIDE AND ALBUTEROL SULFATE 3 ML: 2.5; .5 SOLUTION RESPIRATORY (INHALATION) at 14:46

## 2021-11-30 RX ADMIN — FUROSEMIDE 40 MG: 10 INJECTION, SOLUTION INTRAVENOUS at 08:11

## 2021-11-30 RX ADMIN — INSULIN ASPART 8 UNITS: 100 INJECTION, SOLUTION INTRAVENOUS; SUBCUTANEOUS at 11:33

## 2021-11-30 RX ADMIN — AZITHROMYCIN MONOHYDRATE 500 MG: 500 INJECTION, POWDER, LYOPHILIZED, FOR SOLUTION INTRAVENOUS at 20:36

## 2021-11-30 RX ADMIN — PANTOPRAZOLE SODIUM 40 MG: 40 TABLET, DELAYED RELEASE ORAL at 08:12

## 2021-11-30 RX ADMIN — FUROSEMIDE 40 MG: 10 INJECTION, SOLUTION INTRAVENOUS at 17:12

## 2021-11-30 RX ADMIN — IPRATROPIUM BROMIDE AND ALBUTEROL SULFATE 3 ML: 2.5; .5 SOLUTION RESPIRATORY (INHALATION) at 07:28

## 2021-11-30 RX ADMIN — IPRATROPIUM BROMIDE AND ALBUTEROL SULFATE 3 ML: 2.5; .5 SOLUTION RESPIRATORY (INHALATION) at 19:30

## 2021-11-30 RX ADMIN — GUAIFENESIN 600 MG: 600 TABLET, EXTENDED RELEASE ORAL at 11:41

## 2021-11-30 RX ADMIN — SODIUM CHLORIDE, PRESERVATIVE FREE 10 ML: 5 INJECTION INTRAVENOUS at 08:13

## 2021-11-30 RX ADMIN — PREGABALIN 150 MG: 75 CAPSULE ORAL at 20:36

## 2021-11-30 RX ADMIN — IPRATROPIUM BROMIDE AND ALBUTEROL SULFATE 3 ML: 2.5; .5 SOLUTION RESPIRATORY (INHALATION) at 10:52

## 2021-11-30 RX ADMIN — INSULIN DETEMIR 15 UNITS: 100 INJECTION, SOLUTION SUBCUTANEOUS at 19:46

## 2021-11-30 RX ADMIN — CEFTRIAXONE SODIUM 1 G: 1 INJECTION, POWDER, FOR SOLUTION INTRAMUSCULAR; INTRAVENOUS at 19:02

## 2021-11-30 RX ADMIN — LISINOPRIL 10 MG: 10 TABLET ORAL at 08:12

## 2021-11-30 RX ADMIN — PREGABALIN 150 MG: 75 CAPSULE ORAL at 08:12

## 2021-11-30 RX ADMIN — CARVEDILOL 25 MG: 25 TABLET, FILM COATED ORAL at 08:12

## 2021-11-30 RX ADMIN — GUAIFENESIN 600 MG: 600 TABLET, EXTENDED RELEASE ORAL at 20:36

## 2021-11-30 RX ADMIN — INSULIN ASPART 5 UNITS: 100 INJECTION, SOLUTION INTRAVENOUS; SUBCUTANEOUS at 08:12

## 2021-11-30 RX ADMIN — ATORVASTATIN CALCIUM 40 MG: 40 TABLET, FILM COATED ORAL at 08:12

## 2021-11-30 RX ADMIN — CARVEDILOL 25 MG: 25 TABLET, FILM COATED ORAL at 17:12

## 2021-11-30 RX ADMIN — ISOSORBIDE MONONITRATE 30 MG: 30 TABLET, EXTENDED RELEASE ORAL at 08:11

## 2021-12-01 PROBLEM — I50.23 ACUTE ON CHRONIC SYSTOLIC CHF (CONGESTIVE HEART FAILURE): Chronic | Status: ACTIVE | Noted: 2021-11-28

## 2021-12-01 PROBLEM — E66.9 OBESITY (BMI 30-39.9): Chronic | Status: ACTIVE | Noted: 2021-12-01

## 2021-12-01 PROBLEM — E66.9 OBESITY (BMI 30-39.9): Status: ACTIVE | Noted: 2021-12-01

## 2021-12-01 LAB
ANION GAP SERPL CALCULATED.3IONS-SCNC: 16 MMOL/L (ref 5–15)
BUN SERPL-MCNC: 27 MG/DL (ref 8–23)
BUN/CREAT SERPL: 23.3 (ref 7–25)
CALCIUM SPEC-SCNC: 9.5 MG/DL (ref 8.6–10.5)
CHLORIDE SERPL-SCNC: 99 MMOL/L (ref 98–107)
CO2 SERPL-SCNC: 18 MMOL/L (ref 22–29)
CREAT SERPL-MCNC: 1.16 MG/DL (ref 0.57–1)
GFR SERPL CREATININE-BSD FRML MDRD: 56 ML/MIN/1.73
GLUCOSE BLDC GLUCOMTR-MCNC: 206 MG/DL (ref 70–130)
GLUCOSE BLDC GLUCOMTR-MCNC: 285 MG/DL (ref 70–130)
GLUCOSE BLDC GLUCOMTR-MCNC: 333 MG/DL (ref 70–130)
GLUCOSE BLDC GLUCOMTR-MCNC: 336 MG/DL (ref 70–130)
GLUCOSE BLDC GLUCOMTR-MCNC: 421 MG/DL (ref 70–130)
GLUCOSE BLDC GLUCOMTR-MCNC: 458 MG/DL (ref 70–130)
GLUCOSE SERPL-MCNC: 334 MG/DL (ref 65–99)
POTASSIUM SERPL-SCNC: 4.3 MMOL/L (ref 3.5–5.2)
SODIUM SERPL-SCNC: 133 MMOL/L (ref 136–145)

## 2021-12-01 PROCEDURE — 94799 UNLISTED PULMONARY SVC/PX: CPT

## 2021-12-01 PROCEDURE — 25010000002 AZITHROMYCIN PER 500 MG: Performed by: INTERNAL MEDICINE

## 2021-12-01 PROCEDURE — 25010000002 METHYLPREDNISOLONE PER 40 MG: Performed by: INTERNAL MEDICINE

## 2021-12-01 PROCEDURE — 63710000001 INSULIN DETEMIR PER 5 UNITS: Performed by: INTERNAL MEDICINE

## 2021-12-01 PROCEDURE — 63710000001 INSULIN ASPART PER 5 UNITS: Performed by: INTERNAL MEDICINE

## 2021-12-01 PROCEDURE — 80048 BASIC METABOLIC PNL TOTAL CA: CPT | Performed by: INTERNAL MEDICINE

## 2021-12-01 PROCEDURE — 25010000002 FUROSEMIDE PER 20 MG: Performed by: INTERNAL MEDICINE

## 2021-12-01 PROCEDURE — 25010000002 CEFTRIAXONE PER 250 MG: Performed by: INTERNAL MEDICINE

## 2021-12-01 PROCEDURE — 94760 N-INVAS EAR/PLS OXIMETRY 1: CPT

## 2021-12-01 PROCEDURE — 82962 GLUCOSE BLOOD TEST: CPT

## 2021-12-01 RX ORDER — GUAIFENESIN 600 MG/1
600 TABLET, EXTENDED RELEASE ORAL EVERY 12 HOURS SCHEDULED
Qty: 60 TABLET | Refills: 1 | Status: SHIPPED | OUTPATIENT
Start: 2021-12-01 | End: 2022-01-01 | Stop reason: SDUPTHER

## 2021-12-01 RX ORDER — FUROSEMIDE 40 MG/1
40 TABLET ORAL 2 TIMES DAILY
Qty: 60 TABLET | Refills: 1 | Status: SHIPPED | OUTPATIENT
Start: 2021-12-01 | End: 2021-12-28 | Stop reason: SDUPTHER

## 2021-12-01 RX ADMIN — METHYLPREDNISOLONE SODIUM SUCCINATE 20 MG: 40 INJECTION, POWDER, FOR SOLUTION INTRAMUSCULAR; INTRAVENOUS at 10:03

## 2021-12-01 RX ADMIN — AZITHROMYCIN MONOHYDRATE 500 MG: 500 INJECTION, POWDER, LYOPHILIZED, FOR SOLUTION INTRAVENOUS at 20:50

## 2021-12-01 RX ADMIN — INSULIN DETEMIR 30 UNITS: 100 INJECTION, SOLUTION SUBCUTANEOUS at 20:59

## 2021-12-01 RX ADMIN — PREGABALIN 150 MG: 75 CAPSULE ORAL at 10:03

## 2021-12-01 RX ADMIN — ASPIRIN 81 MG: 81 TABLET, FILM COATED ORAL at 10:03

## 2021-12-01 RX ADMIN — INSULIN ASPART 10 UNITS: 100 INJECTION, SOLUTION INTRAVENOUS; SUBCUTANEOUS at 10:06

## 2021-12-01 RX ADMIN — METFORMIN HYDROCHLORIDE 500 MG: 500 TABLET ORAL at 17:25

## 2021-12-01 RX ADMIN — FUROSEMIDE 40 MG: 10 INJECTION, SOLUTION INTRAVENOUS at 17:25

## 2021-12-01 RX ADMIN — INSULIN ASPART 12 UNITS: 100 INJECTION, SOLUTION INTRAVENOUS; SUBCUTANEOUS at 17:25

## 2021-12-01 RX ADMIN — CARVEDILOL 25 MG: 25 TABLET, FILM COATED ORAL at 17:25

## 2021-12-01 RX ADMIN — METHYLPREDNISOLONE SODIUM SUCCINATE 20 MG: 40 INJECTION, POWDER, FOR SOLUTION INTRAMUSCULAR; INTRAVENOUS at 00:25

## 2021-12-01 RX ADMIN — GUAIFENESIN 600 MG: 600 TABLET, EXTENDED RELEASE ORAL at 20:51

## 2021-12-01 RX ADMIN — FUROSEMIDE 40 MG: 10 INJECTION, SOLUTION INTRAVENOUS at 10:03

## 2021-12-01 RX ADMIN — IPRATROPIUM BROMIDE AND ALBUTEROL SULFATE 3 ML: 2.5; .5 SOLUTION RESPIRATORY (INHALATION) at 20:19

## 2021-12-01 RX ADMIN — INSULIN ASPART 20 UNITS: 100 INJECTION, SOLUTION INTRAVENOUS; SUBCUTANEOUS at 11:43

## 2021-12-01 RX ADMIN — SODIUM CHLORIDE, PRESERVATIVE FREE 10 ML: 5 INJECTION INTRAVENOUS at 20:51

## 2021-12-01 RX ADMIN — CEFTRIAXONE SODIUM 1 G: 1 INJECTION, POWDER, FOR SOLUTION INTRAMUSCULAR; INTRAVENOUS at 18:48

## 2021-12-01 RX ADMIN — GUAIFENESIN 600 MG: 600 TABLET, EXTENDED RELEASE ORAL at 10:03

## 2021-12-01 RX ADMIN — LISINOPRIL 10 MG: 10 TABLET ORAL at 10:03

## 2021-12-01 RX ADMIN — IPRATROPIUM BROMIDE AND ALBUTEROL SULFATE 3 ML: 2.5; .5 SOLUTION RESPIRATORY (INHALATION) at 07:42

## 2021-12-01 RX ADMIN — PREGABALIN 150 MG: 75 CAPSULE ORAL at 20:51

## 2021-12-01 RX ADMIN — SODIUM CHLORIDE, PRESERVATIVE FREE 10 ML: 5 INJECTION INTRAVENOUS at 10:04

## 2021-12-01 RX ADMIN — ATORVASTATIN CALCIUM 40 MG: 40 TABLET, FILM COATED ORAL at 10:03

## 2021-12-01 RX ADMIN — PANTOPRAZOLE SODIUM 40 MG: 40 TABLET, DELAYED RELEASE ORAL at 10:03

## 2021-12-01 RX ADMIN — CARVEDILOL 25 MG: 25 TABLET, FILM COATED ORAL at 10:03

## 2021-12-01 RX ADMIN — IPRATROPIUM BROMIDE AND ALBUTEROL SULFATE 3 ML: 2.5; .5 SOLUTION RESPIRATORY (INHALATION) at 11:07

## 2021-12-01 RX ADMIN — IPRATROPIUM BROMIDE AND ALBUTEROL SULFATE 3 ML: 2.5; .5 SOLUTION RESPIRATORY (INHALATION) at 14:50

## 2021-12-01 RX ADMIN — INSULIN ASPART 28 UNITS: 100 INJECTION, SOLUTION INTRAVENOUS; SUBCUTANEOUS at 15:07

## 2021-12-01 RX ADMIN — ISOSORBIDE MONONITRATE 30 MG: 30 TABLET, EXTENDED RELEASE ORAL at 10:02

## 2021-12-01 NOTE — NURSING NOTE
Educated pt again on drawing up and injecting insulin, pt did her own injection tonight with very little help from me, continue with education

## 2021-12-01 NOTE — PROGRESS NOTES
Patient has not had any further recurrent symptoms of chest pain.  Patient symptoms of shortness of breath is improved.  Patient would undergo the AICD interrogation.    Patient would be stable from the cardiac standpoint to be discharged for outpatient follow-up.

## 2021-12-01 NOTE — PROGRESS NOTES
Progress Note  Justin Wheeler MD  Hospitalist    Date of visit: 12/1/2021     LOS: 3 days   Patient Care Team:  Joyce Plata MD as PCP - General  Wang Felipe MD as Consulting Physician (Cardiology)    Chief Complaint: Shortness of breath    Subjective     Interval History:     Patient Complaints: Shortness of breath, orthopnea, lower extremity edema, improved to some extent with the help of IV diuresis. Now her diabetes is out of control with glucose values higher than 300 mg/deciliter    History taken from: Patient    Medication Review:   Current Facility-Administered Medications   Medication Dose Route Frequency Provider Last Rate Last Admin   • albuterol (PROVENTIL) nebulizer solution 0.083% 2.5 mg/3mL  2.5 mg Nebulization Q4H PRN Justin Wheeler MD       • aspirin EC tablet 81 mg  81 mg Oral Daily Justin Wheeler MD   81 mg at 12/01/21 1003   • atorvastatin (LIPITOR) tablet 40 mg  40 mg Oral Daily Justin Wheeler MD   40 mg at 12/01/21 1003   • AZITHROMYCIN 500 MG/250 ML 0.9% NS IVPB (vial-mate)  500 mg Intravenous Q24H Justin Wheeler MD   500 mg at 11/30/21 2036   • carvedilol (COREG) tablet 25 mg  25 mg Oral BID With Meals Justin Wheeler MD   25 mg at 12/01/21 1003   • cefTRIAXone (ROCEPHIN) 1 g/100 mL 0.9% NS (MBP)  1 g Intravenous Q24H Justin Wheeler MD   1 g at 11/30/21 1902   • dextrose (D50W) (25 g/50 mL) IV injection 25 g  25 g Intravenous Q15 Min PRN Justin Wheeler MD       • dextrose (GLUTOSE) oral gel 15 g  15 g Oral Q15 Min PRN Justin Wheeler MD       • furosemide (LASIX) injection 40 mg  40 mg Intravenous BID Justin Wheeler MD   40 mg at 12/01/21 1003   • glucagon (human recombinant) (GLUCAGEN DIAGNOSTIC) injection 1 mg  1 mg Subcutaneous Q15 Min PRN Justin Wheeler MD       • guaiFENesin (MUCINEX) 12 hr tablet 600 mg  600 mg Oral Q12H Justin Wheeler MD   600 mg at 12/01/21 1003   • insulin aspart (novoLOG) injection 0-24 Units  0-24 Units Subcutaneous TID AC Justin Wheeler MD        • insulin detemir (LEVEMIR) injection 30 Units  30 Units Subcutaneous Nightly Justin Wheeler MD       • ipratropium-albuterol (DUO-NEB) nebulizer solution 3 mL  3 mL Nebulization 4x Daily - RT Justin Wheeler MD   3 mL at 12/01/21 1450   • isosorbide mononitrate (IMDUR) 24 hr tablet 30 mg  30 mg Oral Daily Justin Wheeler MD   30 mg at 12/01/21 1002   • lisinopril (PRINIVIL,ZESTRIL) tablet 10 mg  10 mg Oral Daily Justin Wheeler MD   10 mg at 12/01/21 1003   • metFORMIN (GLUCOPHAGE) tablet 500 mg  500 mg Oral BID With Meals Justin Wheeler MD       • pantoprazole (PROTONIX) EC tablet 40 mg  40 mg Oral Daily Justin Wheeler MD   40 mg at 12/01/21 1003   • pregabalin (LYRICA) capsule 150 mg  150 mg Oral BID Justin Wheeler MD   150 mg at 12/01/21 1003   • sodium chloride 0.9 % flush 10 mL  10 mL Intravenous Q12H Justin Wheeler MD   10 mL at 12/01/21 1004   • sodium chloride 0.9 % flush 10 mL  10 mL Intravenous PRN Justin Wheeler MD           Review of Systems:   Review of Systems   Constitutional: Positive for fatigue. Negative for fever.   Respiratory: Positive for cough. Negative for shortness of breath.    Cardiovascular: Negative for chest pain, palpitations and leg swelling.   Gastrointestinal: Negative for abdominal distention, abdominal pain, anal bleeding, diarrhea, nausea and vomiting.   Genitourinary: Negative for dysuria, flank pain, frequency, hematuria and urgency.   Musculoskeletal: Positive for arthralgias. Negative for back pain.   Skin: Negative for color change, pallor and rash.   Neurological: Positive for weakness. Negative for seizures, syncope and headaches.   Psychiatric/Behavioral: Negative for agitation, behavioral problems and confusion.   All other systems reviewed and are negative.      Objective     Vital Signs  Temp:  [97.1 °F (36.2 °C)-97.6 °F (36.4 °C)] 97.5 °F (36.4 °C)  Heart Rate:  [71-94] 71  Resp:  [16-18] 16  BP: (103-141)/(51-81) 141/80    Physical Exam:  Physical  Exam  Vitals reviewed.   Constitutional:       General: She is not in acute distress.     Appearance: Normal appearance. She is obese. She is ill-appearing.   HENT:      Head: Normocephalic and atraumatic.   Eyes:      General: No scleral icterus.     Extraocular Movements: Extraocular movements intact.      Pupils: Pupils are equal, round, and reactive to light.   Cardiovascular:      Rate and Rhythm: Normal rate and regular rhythm.   Pulmonary:      Effort: No respiratory distress.      Breath sounds: Normal breath sounds. No stridor. No wheezing or rales.   Abdominal:      General: Bowel sounds are normal. There is no distension.      Palpations: Abdomen is soft. There is no mass.      Tenderness: There is no abdominal tenderness. There is no right CVA tenderness, left CVA tenderness or guarding.   Musculoskeletal:         General: No swelling, tenderness or deformity. Normal range of motion.      Cervical back: Normal range of motion and neck supple. No rigidity or tenderness.      Right lower leg: No edema.      Left lower leg: No edema.   Skin:     General: Skin is warm and dry.      Coloration: Skin is pale. Skin is not jaundiced.      Findings: No bruising.   Neurological:      General: No focal deficit present.      Mental Status: She is alert and oriented to person, place, and time. Mental status is at baseline.      Cranial Nerves: No cranial nerve deficit.      Motor: No weakness.   Psychiatric:         Mood and Affect: Mood normal.         Behavior: Behavior normal.          Results Review:    Lab Results (last 24 hours)     Procedure Component Value Units Date/Time    POC Glucose Once [[id number]]  (Abnormal) Collected: 12/01/21 1044    Specimen: Blood Updated: 12/01/21 1440     Glucose 458 mg/dL      Comment: RN NotifiedOperator: [id number] TEODORO POWERMeter ID: [id number]       POC Glucose Once [[id number]]  (Abnormal) Collected: 11/30/21 1636    Specimen: Blood Updated: 12/01/21 1434     Glucose  421 mg/dL      Comment: RN NotifiedOperator: [id number] ANAM PLATTMeter ID: [id number]       POC Glucose Once [[id number]]  (Abnormal) Collected: 12/01/21 1353    Specimen: Blood Updated: 12/01/21 1405     Glucose 336 mg/dL      Comment: RN NotifiedOperator: [id number] TEODORO PEMBERTONFERMeter ID: [id number]       Basic Metabolic Panel [[id number]]  (Abnormal) Collected: 12/01/21 0646    Specimen: Blood Updated: 12/01/21 0740     Glucose 334 mg/dL      BUN 27 mg/dL      Creatinine 1.16 mg/dL      Sodium 133 mmol/L      Potassium 4.3 mmol/L      Comment: Slight hemolysis detected by analyzer. Results may be affected.        Chloride 99 mmol/L      CO2 18.0 mmol/L      Calcium 9.5 mg/dL      eGFR   Amer 56 mL/min/1.73      BUN/Creatinine Ratio 23.3     Anion Gap 16.0 mmol/L     Narrative:      GFR Normal >60  Chronic Kidney Disease <60  Kidney Failure <15      POC Glucose Once [[id number]]  (Abnormal) Collected: 12/01/21 0612    Specimen: Blood Updated: 12/01/21 0640     Glucose 333 mg/dL      Comment: RN NotifiedOperator: [id number] BING SYBILMeter ID: [id number]       POC Glucose Once [[id number]]  (Abnormal) Collected: 11/30/21 1935    Specimen: Blood Updated: 11/30/21 2007     Glucose 342 mg/dL      Comment: RN NotifiedOperator: [id number] SABINO TONYAHMeter ID: [id number]       Blood Culture - Blood, Arm, Left [[id number]]  (Normal) Collected: 11/28/21 1843    Specimen: Blood from Arm, Left Updated: 11/30/21 1900     Blood Culture No growth at 2 days    Blood Culture - Blood, Arm, Right [[id number]]  (Normal) Collected: 11/28/21 1843    Specimen: Blood from Arm, Right Updated: 11/30/21 1900     Blood Culture No growth at 2 days          Imaging Results (Last 24 Hours)     ** No results found for the last 24 hours. **          Assessment/Plan       Dyspnea    Acute on chronic systolic CHF (congestive heart failure) (HCC)    Diabetes mellitus (HCC)    Pneumonia    Obesity (BMI 30-39.9)    Her glucose  over now at around 400 mg/dL - will increase Levemir to 30 units at bedtime, increase the regular insulin sliding scale and add Metformin. Stop the IV steroids.    Try PT/OT    I confirmed that the patient's Advance Care Plan is present, code status is documented, or surrogate decision maker is listed in the patient's medical record.      Justin Wheeler MD  12/01/21  15:10 CST

## 2021-12-02 ENCOUNTER — READMISSION MANAGEMENT (OUTPATIENT)
Dept: CALL CENTER | Facility: HOSPITAL | Age: 71
End: 2021-12-02

## 2021-12-02 ENCOUNTER — HOME HEALTH ADMISSION (OUTPATIENT)
Dept: HOME HEALTH SERVICES | Facility: HOME HEALTHCARE | Age: 71
End: 2021-12-02

## 2021-12-02 VITALS
SYSTOLIC BLOOD PRESSURE: 101 MMHG | HEART RATE: 58 BPM | TEMPERATURE: 97.6 F | WEIGHT: 219.2 LBS | DIASTOLIC BLOOD PRESSURE: 57 MMHG | RESPIRATION RATE: 16 BRPM | BODY MASS INDEX: 37.42 KG/M2 | HEIGHT: 64 IN | OXYGEN SATURATION: 99 %

## 2021-12-02 PROBLEM — J18.9 PNEUMONIA: Chronic | Status: ACTIVE | Noted: 2021-11-28

## 2021-12-02 LAB
GLUCOSE BLDC GLUCOMTR-MCNC: 201 MG/DL (ref 70–130)
GLUCOSE BLDC GLUCOMTR-MCNC: 202 MG/DL (ref 70–130)
SARS-COV-2 N GENE RESP QL NAA+PROBE: NOT DETECTED

## 2021-12-02 PROCEDURE — 94799 UNLISTED PULMONARY SVC/PX: CPT

## 2021-12-02 PROCEDURE — 25010000002 FUROSEMIDE PER 20 MG: Performed by: INTERNAL MEDICINE

## 2021-12-02 PROCEDURE — 87635 SARS-COV-2 COVID-19 AMP PRB: CPT | Performed by: HOSPITALIST

## 2021-12-02 PROCEDURE — 94760 N-INVAS EAR/PLS OXIMETRY 1: CPT

## 2021-12-02 PROCEDURE — 82962 GLUCOSE BLOOD TEST: CPT

## 2021-12-02 PROCEDURE — 63710000001 INSULIN ASPART PER 5 UNITS: Performed by: INTERNAL MEDICINE

## 2021-12-02 RX ORDER — NAPROXEN SODIUM 220 MG
1 TABLET ORAL NIGHTLY
Qty: 100 EACH | Refills: 1 | Status: SHIPPED | OUTPATIENT
Start: 2021-12-02

## 2021-12-02 RX ADMIN — FUROSEMIDE 40 MG: 10 INJECTION, SOLUTION INTRAVENOUS at 08:13

## 2021-12-02 RX ADMIN — METFORMIN HYDROCHLORIDE 500 MG: 500 TABLET ORAL at 08:10

## 2021-12-02 RX ADMIN — SODIUM CHLORIDE, PRESERVATIVE FREE 10 ML: 5 INJECTION INTRAVENOUS at 08:12

## 2021-12-02 RX ADMIN — ASPIRIN 81 MG: 81 TABLET, FILM COATED ORAL at 08:09

## 2021-12-02 RX ADMIN — PANTOPRAZOLE SODIUM 40 MG: 40 TABLET, DELAYED RELEASE ORAL at 08:11

## 2021-12-02 RX ADMIN — LISINOPRIL 10 MG: 10 TABLET ORAL at 08:10

## 2021-12-02 RX ADMIN — IPRATROPIUM BROMIDE AND ALBUTEROL SULFATE 3 ML: 2.5; .5 SOLUTION RESPIRATORY (INHALATION) at 07:27

## 2021-12-02 RX ADMIN — INSULIN ASPART 8 UNITS: 100 INJECTION, SOLUTION INTRAVENOUS; SUBCUTANEOUS at 11:14

## 2021-12-02 RX ADMIN — ATORVASTATIN CALCIUM 40 MG: 40 TABLET, FILM COATED ORAL at 08:11

## 2021-12-02 RX ADMIN — CARVEDILOL 25 MG: 25 TABLET, FILM COATED ORAL at 08:12

## 2021-12-02 RX ADMIN — GUAIFENESIN 600 MG: 600 TABLET, EXTENDED RELEASE ORAL at 08:09

## 2021-12-02 RX ADMIN — ISOSORBIDE MONONITRATE 30 MG: 30 TABLET, EXTENDED RELEASE ORAL at 08:11

## 2021-12-02 RX ADMIN — PREGABALIN 150 MG: 75 CAPSULE ORAL at 08:09

## 2021-12-02 RX ADMIN — IPRATROPIUM BROMIDE AND ALBUTEROL SULFATE 3 ML: 2.5; .5 SOLUTION RESPIRATORY (INHALATION) at 11:32

## 2021-12-02 RX ADMIN — INSULIN ASPART 8 UNITS: 100 INJECTION, SOLUTION INTRAVENOUS; SUBCUTANEOUS at 08:08

## 2021-12-02 NOTE — NURSING NOTE
Patient educated on long acting insulin, how to administer med, and injection sites. Patient showed proper technique with little instruction.

## 2021-12-02 NOTE — OUTREACH NOTE
Prep Survey      Responses   [facility] facility patient discharged from? [place]   Is LACE score < 7 ? No   Emergency Room discharge w/ pulse ox? No   Eligibility [facility]   Date of Admission 11/28/21   Date of Discharge 12/02/21   Discharge Disposition Home or Self Care   Discharge diagnosis Dyspnea   Acute on chronic systolic CHF Pneumonia   Does the patient have one of the following disease processes/diagnoses(primary or secondary)? CHF   Does the patient have Home health ordered? Yes   What is the Home health agency?  [facility],   Is there a DME ordered? No   Prep survey completed? Yes          Lorrie Dee RN

## 2021-12-02 NOTE — NURSING NOTE
Report called to [facility]. Discharge paperwork reviewed with patient. Gregorio education reinforced with patient. Prescriptions sent to patient pharmacy. No further questions at this time. Patient waiting on sister to pick her up.

## 2021-12-02 NOTE — DISCHARGE SUMMARY
Discharge Summary  Justin Wheeler MD  Hospitalist     Date of Discharge:  12/2/2021    Discharge Diagnosis:      Dyspnea    Acute on chronic systolic CHF (congestive heart failure) (HCC)    Nonischemic cardiomyopathy (HCC)    Hypertension    Diabetes mellitus (HCC)    Pneumonia    Obesity (BMI 30-39.9)      Presenting Problem/History of Present Illness  Dyspnea    Hospital Course  Patient is a 71 y.o. female admitted for dyspnea due to an exacerbation of the underlying chronic systolic heart failure complicated by possible, questionable pneumonia. Her symptoms as well as her diabetic control improved with the help of supplemental oxygen, nebulized treatments, IV diuresis, IV steroids as well as insulin.    Her vital signs are stable, her blood pressure is 100/55 with an oxygen saturation of 99% on room air and she will be discharged home on the medications as listed.  She will follow up with Home Health regarding diabetic teaching, medication management, cardiorespiratory assessments.    Consults:   Consults     Date and Time Order Name Status Description    11/29/2021 10:16 AM Inpatient Cardiology Consult          Pertinent Test Results: Normal hypokinesis/depressed LVEF of 35 to 40% on the most recent Echo obtained in August    Condition on Discharge: stable    Vital Signs  Temp:  [97.3 °F (36.3 °C)-97.8 °F (36.6 °C)] 97.6 °F (36.4 °C)  Heart Rate:  [46-90] 58  Resp:  [16-18] 16  BP: (101-134)/(57-75) 101/57    Physical Exam:  Physical Exam  Vitals reviewed.   Constitutional:       Appearance: She is obese. She is ill-appearing.   HENT:      Head: Normocephalic and atraumatic.   Eyes:      General: No scleral icterus.     Extraocular Movements: Extraocular movements intact.      Pupils: Pupils are equal, round, and reactive to light.   Cardiovascular:      Rate and Rhythm: Normal rate and regular rhythm.   Pulmonary:      Effort: No respiratory distress.      Breath sounds: Normal breath sounds. No stridor. No  "wheezing or rales.   Chest:      Chest wall: No tenderness.   Abdominal:      General: There is no distension.      Palpations: There is no mass.      Tenderness: There is no abdominal tenderness. There is no right CVA tenderness, left CVA tenderness or guarding.      Hernia: No hernia is present.   Musculoskeletal:         General: No swelling, tenderness or deformity. Normal range of motion.      Cervical back: Normal range of motion and neck supple. No rigidity or tenderness.      Right lower leg: No edema.      Left lower leg: No edema.   Skin:     General: Skin is warm and dry.      Coloration: Skin is not jaundiced or pale.      Findings: No bruising or lesion.   Neurological:      General: No focal deficit present.      Mental Status: She is alert and oriented to person, place, and time. Mental status is at baseline.      Cranial Nerves: No cranial nerve deficit.      Motor: No weakness.   Psychiatric:         Mood and Affect: Mood normal.         Behavior: Behavior normal.         Discharge Disposition  Home with Home Health    Discharge Medications     Discharge Medications      New Medications      Instructions Start Date   guaiFENesin 600 MG 12 hr tablet  Commonly known as: MUCINEX   600 mg, Oral, Every 12 Hours Scheduled      insulin detemir 100 UNIT/ML injection  Commonly known as: LEVEMIR   30 Units, Subcutaneous, Nightly      Insulin Syringe 31G X 5/16\" 0.5 ML misc   1 each, Subcutaneous, Nightly         Changes to Medications      Instructions Start Date   furosemide 40 MG tablet  Commonly known as: Lasix  What changed:   · medication strength  · how much to take  · when to take this   40 mg, Oral, 2 Times Daily      vitamin D 1.25 MG (74667 UT) capsule capsule  Commonly known as: ERGOCALCIFEROL  What changed: additional instructions  Notes to patient: Take as directed   50,000 Units, Oral, Weekly         Continue These Medications      Instructions Start Date   albuterol sulfate  (90 Base) " MCG/ACT inhaler  Commonly known as: PROVENTIL HFA;VENTOLIN HFA;PROAIR HFA   INHALE 2 PUFFS EVERY 4 (FOUR) HOURS AS NEEDED FOR WHEEZING OR SHORTNESS OF AIR.      aspirin 81 MG tablet   81 mg, Oral, Nightly      atorvastatin 40 MG tablet  Commonly known as: LIPITOR   40 mg, Oral, Daily      B-D UF III MINI PEN NEEDLES 31G X 5 MM misc  Generic drug: Insulin Pen Needle   USE WITH INSULIN INJECTIONS NIGHTLY      carvedilol 25 MG tablet  Commonly known as: COREG   TAKE 1 TABLET BY MOUTH TWICE A DAY WITH MEALS      diclofenac 1 % gel gel  Commonly known as: VOLTAREN   APPLY TO AFFECTED AREA 4 TIMES A DAY Oral Volteran DC'D)      ipratropium-albuterol 0.5-2.5 mg/3 ml nebulizer  Commonly known as: DUO-NEB   3 mL, Nebulization, 4 Times Daily - RT      isosorbide mononitrate 30 MG 24 hr tablet  Commonly known as: IMDUR   30 mg, Oral, Daily      lisinopril 10 MG tablet  Commonly known as: PRINIVIL,ZESTRIL   TAKE 1 TABLET BY MOUTH EVERY DAY      magnesium oxide 400 (241.3 Mg) MG tablet tablet  Commonly known as: MAGOX   400 mg, Oral, Daily      metFORMIN 1000 MG tablet  Commonly known as: GLUCOPHAGE   1,000 mg, Oral, 2 Times Daily With Meals      nitroglycerin 0.4 MG SL tablet  Commonly known as: Nitrostat   0.4 mg, Sublingual, Every 5 Minutes PRN, Take no more than 3 doses in 15 minutes.      pantoprazole 40 MG EC tablet  Commonly known as: Protonix   40 mg, Oral, Daily      potassium chloride 10 MEQ CR capsule  Commonly known as: MICRO-K   10 mEq, Oral, 2 Times Daily      pregabalin 150 MG capsule  Commonly known as: LYRICA   TAKE 1 CAPSULE BY MOUTH TWICE A DAY         Stop These Medications    digoxin 125 MCG tablet  Commonly known as: LANOXIN     glimepiride 4 MG tablet  Commonly known as: AMARYL     True Metrix Blood Glucose Test test strip  Generic drug: glucose blood     Victoza 18 MG/3ML solution pen-injector injection  Generic drug: Liraglutide            Discharge Diet:   Diet Instructions     Diet: Consistent  Carbohydrate, Cardiac      Discharge Diet:  Consistent Carbohydrate  Cardiac             Activity at Discharge:   Activity Instructions     Activity as Tolerated            Follow-up Appointments  Future Appointments   Date Time Provider Department Center   12/4/2021 To Be Determined Letitia Thomas RN [facility]   12/6/2021 To Be Determined Nurys Arias, PT [facility]   12/7/2021 To Be Determined Angelica Goncalves OT [facility]   12/8/2021 11:15 AM Alireza Gregory MD [address]   4/25/2022  1:30 PM Alireza Gregory MD [address]     Additional Instructions for the Follow-ups that You Need to Schedule     Ambulatory Referral to Home Health   As directed      Face to Face Visit Date: 12/2/2021    Follow-up provider for Plan of Care?: I treated the patient in an acute care facility and will not continue treatment after discharge.    Follow-up provider: ALIREZA GREGORY [[id number]]    Reason/Clinical Findings: CHF, DM    Describe mobility limitations that make leaving home difficult: CHF, DM    Nursing/Therapeutic Services Requested: Skilled Nursing Physical Therapy Occupational Therapy    Skilled nursing orders: Medication education Cardiopulmonary assessments    PT orders: Therapeutic exercise    Occupational orders: Strengthening    Frequency: 1 Week 1         Discharge Follow-up with PCP   As directed       Currently Documented PCP:    Alireza Gregory MD    PCP Phone Number:    [phone]     Follow Up Details: in 1 week         Discharge Follow-up with Specified Provider: Dr. Felipe - Cardiology; 1 Month   As directed      To: Dr. Destinee Noland Cardiology    Follow Up: 1 Month               Test Results Pending at Discharge  Pending Labs     Order Current Status    Blood Culture - Blood, Arm, Left Preliminary result    Blood Culture - Blood, Arm, Right Preliminary result           Justin Wheeler MD  12/02/21  14:31 CST

## 2021-12-02 NOTE — PROGRESS NOTES
Cardiology Progress Note     LOS: 3 days   Patient Care Team:  Joyce Plata MD as PCP - General  Wang Felipe MD as Consulting Physician (Cardiology)    Subjective:    Chart reviewed. Patient seen and examined. Patient symptoms of shortness of breath is improved.  Patient complains of having symptoms of atypical chest pain.  Patient complains of having fatigue.  Patient has had elevated blood sugar.  Patient AICD interrogation did not reveal of any evidence of any ventricular tachyarrhythmia.      Objective:  Temp:  [97.4 °F (36.3 °C)-97.8 °F (36.6 °C)] 97.6 °F (36.4 °C)  Heart Rate:  [66-94] 83  Resp:  [16-18] 18  BP: (107-141)/(59-81) 111/63    Intake/Output Summary (Last 24 hours) at 12/1/2021 2043  Last data filed at 12/1/2021 1800  Gross per 24 hour   Intake 1280 ml   Output 2000 ml   Net -720 ml       Physical Exam:   General Appearance:    Alert, oriented, cooperative, in no acute distress.   Head:    Normocephalic, atraumatic, without obvious abnormality   Eyes:             DEONDRE. Lids and lashes normal, conjunctivae and sclerae normal, no icterus, no pallor.   Ears:    Ears appear intact with no abnormalities noted.   Throat:   Mucous membranes pink and moist.   Neck:  Supple, trachea midline, no carotid bruit, no organomegaly or JVD.   Lungs:    Clear to auscultation and percussion.  Respirations regular, even and unlabored. No wheezes, rales, or rhonchi.    Heart:    Regular rhythm and normal rate, normal S1 and S2, no      murmur, no gallop, no rub, no click.   Abdomen:    Soft, nontender, nondistended, no guarding, no rebound tenderness. Normal bowel sounds in all four quadrants, no masses, liver and spleen nonpalpable.    Genitalia:    Deferred.   Extremities:   Moves all extremities well, no edema, no cyanosis, no       redness, no clubbing.   Pulses:   Pulses palpable and equal bilaterally.   Skin:   Moist and warm. No bleeding, bruising or rash.   Neurologic/Psychiatric:   Alert and  oriented to person, place, and time.  Motor, power and tone in upper and lower extremities are grossly intact.  No focal neurological deficits. Normal cognitive function. No psychomotor reaction or tangential thought. No depression, homicidal ideations and suicidal ideations.          Results Review:    Results from last 7 days   Lab Units 12/01/21  0646 11/29/21  0523 11/28/21  1739   SODIUM mmol/L 133*   < > 134*   POTASSIUM mmol/L 4.3   < > 3.6   CHLORIDE mmol/L 99   < > 99   CO2 mmol/L 18.0*   < > 22.0   BUN mg/dL 27*   < > 13   CREATININE mg/dL 1.16*   < > 1.09*   CALCIUM mg/dL 9.5   < > 9.9   BILIRUBIN mg/dL  --   --  0.8   ALK PHOS U/L  --   --  111   ALT (SGPT) U/L  --   --  44*   AST (SGOT) U/L  --   --  34*   GLUCOSE mg/dL 334*   < > 215*    < > = values in this interval not displayed.     Results from last 7 days   Lab Units 11/28/21  1942 11/28/21  1739   TROPONIN T ng/mL <0.010 <0.010         Results from last 7 days   Lab Units 11/29/21  0523   WBC 10*3/mm3 17.31*   HEMOGLOBIN g/dL 10.9*   HEMATOCRIT % 33.9*   PLATELETS 10*3/mm3 263         Results from last 7 days   Lab Units 11/29/21  0523   MAGNESIUM mg/dL 1.7                   ECHO:  Results for orders placed during the hospital encounter of 07/31/21    Adult Transthoracic Echo Complete W/ Cont if Necessary Per Protocol    Interpretation Summary  · The left ventricular cavity is borderline dilated.  · Left ventricular wall thickness is consistent with borderline concentric hypertrophy.  · Left ventricular ejection fraction appears to be 36 - 40%.  · Left ventricular diastolic function is consistent with (grade I) impaired relaxation.  · Mild mitral valve stenosis is present.  · The right atrial cavity is borderline dilated.  · The right ventricular cavity is borderline dilated.  · The following left ventricular wall segments are hypokinetic: mid anterior, apical anterior, basal anterolateral, mid anterolateral, apical lateral, basal inferolateral,  mid inferolateral, apical inferior, mid inferior, apical septal, basal inferoseptal, mid inferoseptal, apex hypokinetic, mid anteroseptal, basal anterior, basal inferior and basal inferoseptal.      ECG 12 Lead   Final Result   Test Reason : cp   Blood Pressure :   */*   mmHG   Vent. Rate : 104 BPM     Atrial Rate : 104 BPM      P-R Int : 134 ms          QRS Dur : 118 ms       QT Int : 352 ms       P-R-T Axes :  35 -53   1 degrees      QTc Int : 462 ms      electronic pacemaker  with Premature supraventricular complexes and Fusion    complexes   Left anterior fascicular block   Abnormal ECG   When compared with ECG of 01-AUG-2021 07:43,   pac's are noted      Referred By:            Confirmed By: DANG PHAM      SCANNED EKG   Final Result           Medication Review:   Current Facility-Administered Medications   Medication Dose Route Frequency Provider Last Rate Last Admin   • albuterol (PROVENTIL) nebulizer solution 0.083% 2.5 mg/3mL  2.5 mg Nebulization Q4H PRN Justin Wheeler MD       • aspirin EC tablet 81 mg  81 mg Oral Daily Justin Wheeler MD   81 mg at 12/01/21 1003   • atorvastatin (LIPITOR) tablet 40 mg  40 mg Oral Daily Justin Wheeler MD   40 mg at 12/01/21 1003   • AZITHROMYCIN 500 MG/250 ML 0.9% NS IVPB (vial-mate)  500 mg Intravenous Q24H Justin Wheeler MD   500 mg at 11/30/21 2036   • carvedilol (COREG) tablet 25 mg  25 mg Oral BID With Meals Justin Wheeler MD   25 mg at 12/01/21 1725   • dextrose (D50W) (25 g/50 mL) IV injection 25 g  25 g Intravenous Q15 Min PRN Justin Wheeler MD       • dextrose (GLUTOSE) oral gel 15 g  15 g Oral Q15 Min PRN Justin Wheeler MD       • furosemide (LASIX) injection 40 mg  40 mg Intravenous BID Justin Wheeler MD   40 mg at 12/01/21 1725   • glucagon (human recombinant) (GLUCAGEN DIAGNOSTIC) injection 1 mg  1 mg Subcutaneous Q15 Min PRN Justin Wheeler MD       • guaiFENesin (MUCINEX) 12 hr tablet 600 mg  600 mg Oral Q12H Justin Wheeler MD   600 mg at 12/01/21  1003   • insulin aspart (novoLOG) injection 0-24 Units  0-24 Units Subcutaneous TID AC Justin Wheeler MD   12 Units at 12/01/21 1725   • insulin detemir (LEVEMIR) injection 30 Units  30 Units Subcutaneous Nightly Justin Wheeler MD       • ipratropium-albuterol (DUO-NEB) nebulizer solution 3 mL  3 mL Nebulization 4x Daily - RT Justin Wheeler MD   3 mL at 12/01/21 2019   • isosorbide mononitrate (IMDUR) 24 hr tablet 30 mg  30 mg Oral Daily Justin Wheeler MD   30 mg at 12/01/21 1002   • lisinopril (PRINIVIL,ZESTRIL) tablet 10 mg  10 mg Oral Daily Justin Wheeler MD   10 mg at 12/01/21 1003   • metFORMIN (GLUCOPHAGE) tablet 500 mg  500 mg Oral BID With Meals Justin Wheeler MD   500 mg at 12/01/21 1725   • pantoprazole (PROTONIX) EC tablet 40 mg  40 mg Oral Daily Justin Wheeler MD   40 mg at 12/01/21 1003   • pregabalin (LYRICA) capsule 150 mg  150 mg Oral BID Justin Wheeler MD   150 mg at 12/01/21 1003   • sodium chloride 0.9 % flush 10 mL  10 mL Intravenous Q12H Justin Wheeler MD   10 mL at 12/01/21 1004   • sodium chloride 0.9 % flush 10 mL  10 mL Intravenous PRN Justin Wheeler MD           Assessment and Plan:      Dyspnea    Diabetes mellitus (HCC)    Pneumonia    Acute on chronic systolic CHF (congestive heart failure) (formerly [facility])    Obesity (BMI 30-39.9)  1.  Atypical chest pain.  Patient has nonischemic cardiomyopathy.  Patient last coronary angiogram done in 2018 had not reveal of any evidence of any obstructive epicardial coronary artery disease.  Patient at the present time would be treated medically and not subjected to any invasive evaluation from the cardiac standpoint.  Last coronary angiogram had revealed:  1.  No evidence of any obstructive epicardial coronary artery disease in the circumflex right or the left anterior descending artery.  2.  Calcification noted in the left main without any evidence of dampening of the pressure.  3.  Elevated left ventricular end-diastolic pressure.      2.   Nonischemic cardiomyopathy.  Patient is status post [employer] [place] model number [id number] and serial #[id number].  Patient underwent AICD interrogation which had revealed appropriate sensing and pacing function with adequate battery reserve.    3.  Shortness of breath.  Patient symptoms of shortness of breath has improved with gentle diuresis.  Patient is currently on antibiotics.    4.  Poorly controlled diabetes.  Patient has been followed by the hospitalist.    5.  Obesity.  Patient has been counseled on weight reduction lifestyle modification and dietary restriction.    The above plan of management were discussed with the patient                Electronically signed by Wang Felipe MD, 12/01/21, 8:43 PM CST.      Time: Time spent on face-to-face interaction 20 minutes    Dictated utilizing Dragon dictation.

## 2021-12-02 NOTE — PLAN OF CARE
Goal Outcome Evaluation:  Plan of Care Reviewed With: patient        Progress: no change  Outcome Summary: was gong to dc today, however pt's glucose was over 400, decided to monitor overnight with attempt to get them more under control, vss, no pain, started education on insulin injection sites, continue to monitor

## 2021-12-02 NOTE — DISCHARGE PLACEMENT REQUEST
"Carline Norris (71 y.o. Female)             Date of Birth Social Security Number Address Home Phone MRN    [id number]  [id number]  [address]   [facility] [address] [phone] [id number]    [employer] Mar[employer] Status             [employer] Single       Admission Date Admission Type Admitting Provider Attending Provider Department, Room/Bed    11/28/21 Emergency David Pinon MD Williams, Kevin L, MD [address], [facility]/1    Discharge Date Discharge Disposition Discharge Destination           Home or Self Care              Attending Provider: David Pinon MD    Allergies: Aldactone [Spironolactone], Nsaids    Isolation: None   Infection: COVID (rule out) (12/02/21)   Code Status: CPR   Advance Care Planning Activity    Ht: 162.6 cm (64\")   Wt: 99.4 kg (219 lb 3.2 oz)    Admission Cmt: None   Principal Problem: Dyspnea [R06.00]                 Active Insurance as of 11/28/2021     Primary Coverage     Payor Plan Insurance Group Employer/Plan Group    MEDICARE MEDICARE A & B      Payor Plan Address Payor Plan Phone Number Payor Plan Fax Number Effective Dates    [address] [phone]  11/1/2000 - None Entered    [facility] [id number]       Subscriber Name Subscriber Birth Date Member ID       CARLINE NORRIS 1950 [id number]           Secondary Coverage     Payor Plan Insurance Group Employer/Plan Group    [place]      Payor Plan Address Payor Plan Phone Number Payor Plan Fax Number Effective Dates    [address] [phone]  10/4/2016 - None Entered    [place] [address]       Subscriber Name Subscriber Birth Date Member ID       CARLINE NORRIS 1950 [id number]                 Emergency Contacts      (Rel.) Home Phone Work Phone Mobile Phone    MauroYudi (Sister) [phone] -- [phone]              "

## 2021-12-03 ENCOUNTER — TRANSITIONAL CARE MANAGEMENT TELEPHONE ENCOUNTER (OUTPATIENT)
Dept: CALL CENTER | Facility: HOSPITAL | Age: 71
End: 2021-12-03

## 2021-12-03 ENCOUNTER — HOME CARE VISIT (OUTPATIENT)
Dept: HOME HEALTH SERVICES | Facility: CLINIC | Age: 71
End: 2021-12-03

## 2021-12-03 ENCOUNTER — TELEPHONE (OUTPATIENT)
Dept: FAMILY MEDICINE CLINIC | Facility: CLINIC | Age: 71
End: 2021-12-03

## 2021-12-03 DIAGNOSIS — E11.8 TYPE 2 DIABETES MELLITUS WITH COMPLICATION, WITHOUT LONG-TERM CURRENT USE OF INSULIN (HCC): Primary | ICD-10-CM

## 2021-12-03 LAB
BACTERIA SPEC AEROBE CULT: NORMAL
BACTERIA SPEC AEROBE CULT: NORMAL

## 2021-12-03 PROCEDURE — G0151 HHCP-SERV OF PT,EA 15 MIN: HCPCS

## 2021-12-03 RX ORDER — INSULIN DETEMIR 100 [IU]/ML
30 INJECTION, SOLUTION SUBCUTANEOUS NIGHTLY
Qty: 5 PEN | Refills: 5 | Status: SHIPPED | OUTPATIENT
Start: 2021-12-03 | End: 2022-01-21 | Stop reason: SDUPTHER

## 2021-12-03 NOTE — OUTREACH NOTE
Call Center TCM Note      Responses   [facility] patient discharged from? [place]   Does the patient have one of the following disease processes/diagnoses(primary or secondary)? CHF   TCM attempt successful? No   Unsuccessful attempts Attempt 1  [attempted patient and emergency contact]           Debbie Simmons RN    12/3/2021, 10:03 EST

## 2021-12-03 NOTE — OUTREACH NOTE
Call Center TCM Note      Responses   [facility] patient discharged from? [place]   Does the patient have one of the following disease processes/diagnoses(primary or secondary)? CHF   TCM attempt successful? No   Unsuccessful attempts Attempt 2           Debbie Simmons RN    12/3/2021, 11:03 EST

## 2021-12-03 NOTE — TELEPHONE ENCOUNTER
Lexi said the hospital sent her home with insulin and she said she cannot draw the medication out of the bottle does not know how and they did not show her how.  She wanted to know if Dr Plata could order her a quick pen, she said she can use those.  She uses [facility] in [place].  She asked if someone would please call her back

## 2021-12-04 ENCOUNTER — TRANSITIONAL CARE MANAGEMENT TELEPHONE ENCOUNTER (OUTPATIENT)
Dept: CALL CENTER | Facility: HOSPITAL | Age: 71
End: 2021-12-04

## 2021-12-04 ENCOUNTER — HOME CARE VISIT (OUTPATIENT)
Dept: HOME HEALTH SERVICES | Facility: HOME HEALTHCARE | Age: 71
End: 2021-12-04

## 2021-12-04 VITALS
HEART RATE: 80 BPM | TEMPERATURE: 97.9 F | SYSTOLIC BLOOD PRESSURE: 152 MMHG | RESPIRATION RATE: 18 BRPM | DIASTOLIC BLOOD PRESSURE: 82 MMHG | OXYGEN SATURATION: 100 %

## 2021-12-04 NOTE — OUTREACH NOTE
Call Center TCM Note      Responses   [facility] patient discharged from? [place]   Does the patient have one of the following disease processes/diagnoses(primary or secondary)? CHF   TCM attempt successful? No   Unsuccessful attempts Attempt 3   Call Status Left message   Does the patient have a primary care provider?  Yes   Does the patient have an appointment with their PCP within 7 days of discharge? Yes   Comments regarding PCP hospital fu appt on 12/8/21 at 11:15 AM   Has the patient kept scheduled appointments due by today? N/A           Aubree Chavira RN    12/4/2021, 11:57 EST

## 2021-12-04 NOTE — HOME HEALTH
REASON FOR REFERRAL:  Patient admitted to  11/28/2021-12/2/2021 with CHF exacerbation and questionable pneumonia.  She reports she is breathing better and getting around better.  She has questions about her new insulin and has already called the doctor.  DIAGNOSIS: Weakness, abnormal gait  SURGICAL PROCEDURE: N/A  PERTINENT MEDICAL HISTORY:  CHF, cardiomyopathy, HTN, DM with neuropathy, obesity, GERD, DOROTHY, LBP  PRIOR LEVEL OF FUNCTION: Patient ambulate in home and short distances in community I with std cane  PATIENT GOAL FOR THIS EPISODE OF CARE: Get back to doing what I used to do  HOME SOCIAL ENVIRONMENT: Lives in single story apartment without steps to enter

## 2021-12-06 ENCOUNTER — HOME CARE VISIT (OUTPATIENT)
Dept: HOME HEALTH SERVICES | Facility: CLINIC | Age: 71
End: 2021-12-06

## 2021-12-06 DIAGNOSIS — E11.9 DIABETES MELLITUS WITHOUT COMPLICATION (HCC): ICD-10-CM

## 2021-12-06 LAB
QT INTERVAL: 352 MS
QTC INTERVAL: 462 MS

## 2021-12-06 PROCEDURE — G0299 HHS/HOSPICE OF RN EA 15 MIN: HCPCS

## 2021-12-06 RX ORDER — FLURBIPROFEN SODIUM 0.3 MG/ML
SOLUTION/ DROPS OPHTHALMIC
Qty: 100 EACH | Refills: 3 | Status: SHIPPED | OUTPATIENT
Start: 2021-12-06

## 2021-12-07 ENCOUNTER — HOME CARE VISIT (OUTPATIENT)
Dept: HOME HEALTH SERVICES | Facility: CLINIC | Age: 71
End: 2021-12-07

## 2021-12-07 VITALS
RESPIRATION RATE: 18 BRPM | TEMPERATURE: 97.8 F | OXYGEN SATURATION: 99 % | SYSTOLIC BLOOD PRESSURE: 142 MMHG | HEART RATE: 72 BPM | DIASTOLIC BLOOD PRESSURE: 84 MMHG

## 2021-12-07 VITALS
RESPIRATION RATE: 16 BRPM | TEMPERATURE: 97.7 F | HEART RATE: 80 BPM | DIASTOLIC BLOOD PRESSURE: 86 MMHG | SYSTOLIC BLOOD PRESSURE: 140 MMHG | OXYGEN SATURATION: 99 %

## 2021-12-07 PROCEDURE — G0157 HHC PT ASSISTANT EA 15: HCPCS

## 2021-12-07 PROCEDURE — G0152 HHCP-SERV OF OT,EA 15 MIN: HCPCS

## 2021-12-07 NOTE — CASE COMMUNICATION
[facility]  / Case Conference Note  Medical Necessity and focus of care: B UE strengthening HEP.  Caregiver Status: Sister  Patient's goal(s):  GET STRENGTH BACK  Any additional needs:    Based upon record review and collaboration conference, the recommended frequency for this patient is   SN-----  PT-----  OT---- 1 week 2  ST-----  HHA---  MSW---    Please verify your eval  scores: (Answer the scores  that pertain to your area of focus rem ove the others)   : 1  : 1  : 2

## 2021-12-07 NOTE — HOME HEALTH
REASON FOR REFERRAL: 70 y/o female hospitalized at [facility] 11/28/21-12/2/21 with complaints of dyspnea, weight gain, and B LE edema.  She was treated for acute on chronic CHF and pneumonia.  Patient reported that she is wanting to regain strength.    PMH:  DM with neuropathy, CHF, GERD, HTN, HLD, low back pain, cardiomyopathy, sleep apnea, pacemaker placement, cataract extraction, hysterectomy.    PLOF:  Independent with ADLs, IADLs, functional transfers/functional mobility using SC PRN.  Did drive however no longer has a car.    HOME ENVIRONMENT:  Patient lives alone in one level apartment with 1 small step to enter her apartment without grab bar/railing.    PRECAUTIONS:  High Fall Risk    MD APPTS: 12/8/21 Dr Plata    DME: SC, grab bar in tub/shower combo.    AROM B UES: WFL    STRENGTH B UES: 4/5 grossly throughout.    HAND DOMINANCE: R hand dominant, B  strengths: 4+/5.    REHAB POTENTIAL:  Good for Goal    WISH TO ADDRESS BATH/DRESSING WITH OT:  NO    PATIENT'S GOAL:  Good for Goals

## 2021-12-08 ENCOUNTER — OFFICE VISIT (OUTPATIENT)
Dept: FAMILY MEDICINE CLINIC | Facility: CLINIC | Age: 71
End: 2021-12-08

## 2021-12-08 ENCOUNTER — LAB (OUTPATIENT)
Dept: LAB | Facility: HOSPITAL | Age: 71
End: 2021-12-08

## 2021-12-08 VITALS
SYSTOLIC BLOOD PRESSURE: 122 MMHG | WEIGHT: 222.8 LBS | DIASTOLIC BLOOD PRESSURE: 80 MMHG | HEART RATE: 95 BPM | HEIGHT: 64 IN | BODY MASS INDEX: 38.04 KG/M2 | OXYGEN SATURATION: 96 %

## 2021-12-08 VITALS
SYSTOLIC BLOOD PRESSURE: 110 MMHG | TEMPERATURE: 97.2 F | DIASTOLIC BLOOD PRESSURE: 60 MMHG | HEART RATE: 78 BPM | RESPIRATION RATE: 18 BRPM | OXYGEN SATURATION: 99 %

## 2021-12-08 DIAGNOSIS — I50.23 ACUTE ON CHRONIC SYSTOLIC CHF (CONGESTIVE HEART FAILURE) (HCC): Primary | ICD-10-CM

## 2021-12-08 DIAGNOSIS — E11.8 TYPE 2 DIABETES MELLITUS WITH COMPLICATION, WITHOUT LONG-TERM CURRENT USE OF INSULIN (HCC): ICD-10-CM

## 2021-12-08 DIAGNOSIS — J18.9 PNEUMONIA OF BOTH LOWER LOBES DUE TO INFECTIOUS ORGANISM: ICD-10-CM

## 2021-12-08 LAB
ALBUMIN SERPL-MCNC: 4.2 G/DL (ref 3.5–5.2)
ALBUMIN/GLOB SERPL: 1.3 G/DL
ALP SERPL-CCNC: 114 U/L (ref 39–117)
ALT SERPL W P-5'-P-CCNC: 62 U/L (ref 1–33)
ANION GAP SERPL CALCULATED.3IONS-SCNC: 11 MMOL/L (ref 5–15)
AST SERPL-CCNC: 43 U/L (ref 1–32)
BASOPHILS # BLD AUTO: 0.04 10*3/MM3 (ref 0–0.2)
BASOPHILS NFR BLD AUTO: 0.3 % (ref 0–1.5)
BILIRUB SERPL-MCNC: 0.7 MG/DL (ref 0–1.2)
BUN SERPL-MCNC: 14 MG/DL (ref 8–23)
BUN/CREAT SERPL: 14 (ref 7–25)
CALCIUM SPEC-SCNC: 9.5 MG/DL (ref 8.6–10.5)
CHLORIDE SERPL-SCNC: 103 MMOL/L (ref 98–107)
CO2 SERPL-SCNC: 24 MMOL/L (ref 22–29)
CREAT SERPL-MCNC: 1 MG/DL (ref 0.57–1)
DEPRECATED RDW RBC AUTO: 50.7 FL (ref 37–54)
EOSINOPHIL # BLD AUTO: 0.13 10*3/MM3 (ref 0–0.4)
EOSINOPHIL NFR BLD AUTO: 1 % (ref 0.3–6.2)
ERYTHROCYTE [DISTWIDTH] IN BLOOD BY AUTOMATED COUNT: 16.1 % (ref 12.3–15.4)
GFR SERPL CREATININE-BSD FRML MDRD: 66 ML/MIN/1.73
GLOBULIN UR ELPH-MCNC: 3.3 GM/DL
GLUCOSE SERPL-MCNC: 247 MG/DL (ref 65–99)
HCT VFR BLD AUTO: 35 % (ref 34–46.6)
HGB BLD-MCNC: 11.3 G/DL (ref 12–15.9)
IMM GRANULOCYTES # BLD AUTO: 0.06 10*3/MM3 (ref 0–0.05)
IMM GRANULOCYTES NFR BLD AUTO: 0.5 % (ref 0–0.5)
LYMPHOCYTES # BLD AUTO: 2.61 10*3/MM3 (ref 0.7–3.1)
LYMPHOCYTES NFR BLD AUTO: 20 % (ref 19.6–45.3)
MCH RBC QN AUTO: 28 PG (ref 26.6–33)
MCHC RBC AUTO-ENTMCNC: 32.3 G/DL (ref 31.5–35.7)
MCV RBC AUTO: 86.6 FL (ref 79–97)
MONOCYTES # BLD AUTO: 0.96 10*3/MM3 (ref 0.1–0.9)
MONOCYTES NFR BLD AUTO: 7.4 % (ref 5–12)
NEUTROPHILS NFR BLD AUTO: 70.8 % (ref 42.7–76)
NEUTROPHILS NFR BLD AUTO: 9.25 10*3/MM3 (ref 1.7–7)
NRBC BLD AUTO-RTO: 0 /100 WBC (ref 0–0.2)
PLATELET # BLD AUTO: 341 10*3/MM3 (ref 140–450)
PMV BLD AUTO: 9.9 FL (ref 6–12)
POTASSIUM SERPL-SCNC: 4 MMOL/L (ref 3.5–5.2)
PROT SERPL-MCNC: 7.5 G/DL (ref 6–8.5)
RBC # BLD AUTO: 4.04 10*6/MM3 (ref 3.77–5.28)
SODIUM SERPL-SCNC: 138 MMOL/L (ref 136–145)
WBC NRBC COR # BLD: 13.05 10*3/MM3 (ref 3.4–10.8)

## 2021-12-08 PROCEDURE — 80053 COMPREHEN METABOLIC PANEL: CPT | Performed by: GENERAL PRACTICE

## 2021-12-08 PROCEDURE — 99495 TRANSJ CARE MGMT MOD F2F 14D: CPT | Performed by: GENERAL PRACTICE

## 2021-12-08 PROCEDURE — 85025 COMPLETE CBC W/AUTO DIFF WBC: CPT | Performed by: GENERAL PRACTICE

## 2021-12-08 PROCEDURE — 36415 COLL VENOUS BLD VENIPUNCTURE: CPT | Performed by: GENERAL PRACTICE

## 2021-12-08 PROCEDURE — 1111F DSCHRG MED/CURRENT MED MERGE: CPT | Performed by: GENERAL PRACTICE

## 2021-12-08 RX ORDER — CEFUROXIME AXETIL 500 MG/1
500 TABLET ORAL 2 TIMES DAILY
Qty: 14 TABLET | Refills: 0 | Status: ON HOLD | OUTPATIENT
Start: 2021-12-08 | End: 2022-01-16

## 2021-12-08 NOTE — PATIENT INSTRUCTIONS
"Increase Levemir to 35 units nightly     Low-Sodium Eating Plan  Sodium, which is an element that makes up salt, helps you maintain a healthy balance of fluids in your body. Too much sodium can increase your blood pressure and cause fluid and waste to be held in your body.  Your health care provider or dietitian may recommend following this plan if you have high blood pressure (hypertension), kidney disease, liver disease, or heart failure. Eating less sodium can help lower your blood pressure, reduce swelling, and protect your heart, liver, and kidneys.  What are tips for following this plan?  Reading food labels  · The Nutrition Facts label lists the amount of sodium in one serving of the food. If you eat more than one serving, you must multiply the listed amount of sodium by the number of servings.  · Choose foods with less than 140 mg of sodium per serving.  · Avoid foods with 300 mg of sodium or more per serving.  Shopping    · Look for lower-sodium products, often labeled as \"low-sodium\" or \"no salt added.\"  · Always check the sodium content, even if foods are labeled as \"unsalted\" or \"no salt added.\"  · Buy fresh foods.  ? Avoid canned foods and pre-made or frozen meals.  ? Avoid canned, cured, or processed meats.  · Buy breads that have less than 80 mg of sodium per slice.    Cooking    · Eat more home-cooked food and less restaurant, buffet, and fast food.  · Avoid adding salt when cooking. Use salt-free seasonings or herbs instead of table salt or sea salt. Check with your health care provider or pharmacist before using salt substitutes.  · Cook with plant-based oils, such as canola, sunflower, or olive oil.    Meal planning  · When eating at a restaurant, ask that your food be prepared with less salt or no salt, if possible. Avoid dishes labeled as brined, pickled, cured, smoked, or made with soy sauce, miso, or teriyaki sauce.  · Avoid foods that contain MSG (monosodium glutamate). MSG is sometimes added " "to Chinese food, bouillon, and some canned foods.  · Make meals that can be grilled, baked, poached, roasted, or steamed. These are generally made with less sodium.  General information  Most people on this plan should limit their sodium intake to 1,500-2,000 mg (milligrams) of sodium each day.  What foods should I eat?  Fruits  Fresh, frozen, or canned fruit. Fruit juice.  Vegetables  Fresh or frozen vegetables. \"No salt added\" canned vegetables. \"No salt added\" tomato sauce and paste. Low-sodium or reduced-sodium tomato and vegetable juice.  Grains  Low-sodium cereals, including oats, puffed wheat and rice, and shredded wheat. Low-sodium crackers. Unsalted rice. Unsalted pasta. Low-sodium bread. Whole-grain breads and whole-grain pasta.  Meats and other proteins  Fresh or frozen (no salt added) meat, poultry, seafood, and fish. Low-sodium canned tuna and salmon. Unsalted nuts. Dried peas, beans, and lentils without added salt. Unsalted canned beans. Eggs. Unsalted nut butters.  Dairy  Milk. Soy milk. Cheese that is naturally low in sodium, such as ricotta cheese, fresh mozzarella, or Swiss cheese. Low-sodium or reduced-sodium cheese. Cream cheese. Yogurt.  Seasonings and condiments  Fresh and dried herbs and spices. Salt-free seasonings. Low-sodium mustard and ketchup. Sodium-free salad dressing. Sodium-free light mayonnaise. Fresh or refrigerated horseradish. Lemon juice. Vinegar.  Other foods  Homemade, reduced-sodium, or low-sodium soups. Unsalted popcorn and pretzels. Low-salt or salt-free chips.  The items listed above may not be a complete list of foods and beverages you can eat. Contact a dietitian for more information.  What foods should I avoid?  Vegetables  Sauerkraut, pickled vegetables, and relishes. Olives. French fries. Onion rings. Regular canned vegetables (not low-sodium or reduced-sodium). Regular canned tomato sauce and paste (not low-sodium or reduced-sodium). Regular tomato and vegetable juice " (not low-sodium or reduced-sodium). Frozen vegetables in sauces.  Grains  Instant hot cereals. Bread stuffing, pancake, and biscuit mixes. Croutons. Seasoned rice or pasta mixes. Noodle soup cups. Boxed or frozen macaroni and cheese. Regular salted crackers. Self-rising flour.  Meats and other proteins  Meat or fish that is salted, canned, smoked, spiced, or pickled. Precooked or cured meat, such as sausages or meat loaves. Quintero. Ham. Pepperoni. Hot dogs. Corned beef. Chipped beef. Salt pork. Jerky. Pickled herring. Anchovies and sardines. Regular canned tuna. Salted nuts.  Dairy  Processed cheese and cheese spreads. Hard cheeses. Cheese curds. Blue cheese. Feta cheese. String cheese. Regular cottage cheese. Buttermilk. Canned milk.  Fats and oils  Salted butter. Regular margarine. Ghee. Quintero fat.  Seasonings and condiments  Onion salt, garlic salt, seasoned salt, table salt, and sea salt. Canned and packaged gravies. Worcestershire sauce. Tartar sauce. Barbecue sauce. Teriyaki sauce. Soy sauce, including reduced-sodium. Steak sauce. Fish sauce. Oyster sauce. Cocktail sauce. Horseradish that you find on the shelf. Regular ketchup and mustard. Meat flavorings and tenderizers. Bouillon cubes. Hot sauce. Pre-made or packaged marinades. Pre-made or packaged taco seasonings. Relishes. Regular salad dressings. Salsa.  Other foods  Salted popcorn and pretzels. Corn chips and puffs. Potato and tortilla chips. Canned or dried soups. Pizza. Frozen entrees and pot pies.  The items listed above may not be a complete list of foods and beverages you should avoid. Contact a dietitian for more information.  Summary  · Eating less sodium can help lower your blood pressure, reduce swelling, and protect your heart, liver, and kidneys.  · Most people on this plan should limit their sodium intake to 1,500-2,000 mg (milligrams) of sodium each day.  · Canned, boxed, and frozen foods are high in sodium. Restaurant foods, fast foods, and  pizza are also very high in sodium. You also get sodium by adding salt to food.  · Try to cook at home, eat more fresh fruits and vegetables, and eat less fast food and canned, processed, or prepared foods.  This information is not intended to replace advice given to you by your health care provider. Make sure you discuss any questions you have with your health care provider.  Document Revised: 01/22/2021 Document Reviewed: 11/18/2020  Elsevier Patient Education © 2021 [employer]

## 2021-12-08 NOTE — PROGRESS NOTES
Transitional Care Follow Up Visit  Subjective     Lexi Raheem Wade is a 71 y.o. female who presents for a transitional care management visit.    Within 48 business hours after discharge our office contacted her via telephone to coordinate her care and needs.      I reviewed and discussed the details of that call along with the discharge summary, hospital problems, inpatient lab results, inpatient diagnostic studies, and consultation reports with Lexi.     Current outpatient and discharge medications have been reconciled for the patient.  Reviewed by: Joyce Plata MD      Date of TCM Phone Call 12/2/2021   [facility]   Date of Admission 11/28/2021   Date of Discharge 12/2/2021   Discharge Disposition Home or Self Care     Risk for Readmission (LACE) Score: 13 (12/2/2021  5:01 AM)      History of Present Illness   Course During Hospital Stay:  Recent hospitalization, labs, xrays reviewed and medications reconciled.  Was having some shortness of breath. Found to have CHF exacerbation and/or pneumonia. Treated with antibiotic and diuretic.  She is still having some shortness of breath with pain on deep breathing.  Was taken off digoxin, victoza, and glimeperide. Started on levemir 30 units at bedtime.  Sugars are still running high.  Home health is following.    Copied from hospital record:   Hospital Course  Patient is a 71 y.o. female admitted for dyspnea due to an exacerbation of the underlying chronic systolic heart failure complicated by possible, questionable pneumonia. Her symptoms as well as her diabetic control improved with the help of supplemental oxygen, nebulized treatments, IV diuresis, IV steroids as well as insulin.     Her vital signs are stable, her blood pressure is 100/55 with an oxygen saturation of 99% on room air and she will be discharged home on the medications as listed.  She will follow up with Home Health regarding diabetic teaching, medication management,  "cardiorespiratory assessments.     The following portions of the patient's history were reviewed and updated as appropriate: allergies, current medications, past family history, past medical history, past social history, past surgical history and problem list.  Outpatient Medications Prior to Visit   Medication Sig Dispense Refill   • albuterol sulfate  (90 Base) MCG/ACT inhaler INHALE 2 PUFFS EVERY 4 (FOUR) HOURS AS NEEDED FOR WHEEZING OR SHORTNESS OF AIR. 18 g 1   • aspirin (aspirin) 81 MG EC tablet Take 81 mg by mouth Every Night.     • atorvastatin (LIPITOR) 40 MG tablet Take 1 tablet by mouth Daily. 90 tablet 3   • B-D UF III MINI PEN NEEDLES 31G X 5 MM misc USE WITH INSULIN INJECTIONS NIGHTLY 100 each 3   • carvedilol (COREG) 25 MG tablet TAKE 1 TABLET BY MOUTH TWICE A DAY WITH MEALS 180 tablet 3   • diclofenac (VOLTAREN) 1 % gel gel APPLY TO AFFECTED AREA 4 TIMES A DAY Oral Volteran DC'D) 100 g 3   • furosemide (Lasix) 40 MG tablet Take 1 tablet by mouth 2 (Two) Times a Day. 60 tablet 1   • guaiFENesin (MUCINEX) 600 MG 12 hr tablet Take 1 tablet by mouth Every 12 (Twelve) Hours. 60 tablet 1   • insulin detemir (Levemir FlexTouch) 100 UNIT/ML injection Inject 30 Units under the skin into the appropriate area as directed Every Night. 5 pen 5   • Insulin Syringe 31G X 5/16\" 0.5 ML misc Inject 1 each under the skin into the appropriate area as directed Every Night. 100 each 1   • ipratropium-albuterol (DUO-NEB) 0.5-2.5 mg/3 ml nebulizer Take 3 mL by nebulization 4 (Four) Times a Day. 360 mL 12   • isosorbide mononitrate (IMDUR) 30 MG 24 hr tablet Take 1 tablet by mouth Daily. 90 tablet 3   • lisinopril (PRINIVIL,ZESTRIL) 10 MG tablet TAKE 1 TABLET BY MOUTH EVERY DAY 90 tablet 3   • magnesium oxide (MAGOX) 400 (241.3 Mg) MG tablet tablet Take 1 tablet by mouth Daily. 90 each 3   • metFORMIN (GLUCOPHAGE) 1000 MG tablet Take 1 tablet by mouth 2 (Two) Times a Day With Meals. 180 tablet 3   • nitroglycerin " "(NITROSTAT) 0.4 MG SL tablet Place 1 tablet under the tongue Every 5 (Five) Minutes As Needed for Chest Pain. Take no more than 3 doses in 15 minutes. 25 tablet 0   • pantoprazole (PROTONIX) 40 MG EC tablet Take 1 tablet by mouth Daily. 90 tablet 1   • potassium chloride (MICRO-K) 10 MEQ CR capsule Take 1 capsule by mouth 2 (Two) Times a Day. 180 capsule 3   • vitamin D (ERGOCALCIFEROL) 22322 units capsule capsule Take 1 capsule by mouth 1 (One) Time Per Week. (Patient taking differently: Take 50,000 Units by mouth 1 (One) Time Per Week. Take 1 capsule by mouth weekly on ) 12 capsule 3     No facility-administered medications prior to visit.       Review of Systems  I have reviewed 12 systems with patient. Findings were negative except what is noted below and/or in history of present illness.    Objective   Visit Vitals  /80 (BP Location: Left arm)   Pulse 95   Ht 162.6 cm (64\")   Wt 101 kg (222 lb 12.8 oz)   SpO2 96%   BMI 38.24 kg/m²         Physical Exam  Vitals and nursing note reviewed.   Constitutional:       General: She is not in acute distress.     Appearance: She is well-developed.   HENT:      Head: Normocephalic and atraumatic.      Nose: Nose normal.   Eyes:      General:         Right eye: No discharge.         Left eye: No discharge.      Conjunctiva/sclera: Conjunctivae normal.      Pupils: Pupils are equal, round, and reactive to light.   Neck:      Thyroid: No thyromegaly.   Cardiovascular:      Rate and Rhythm: Normal rate and regular rhythm.      Heart sounds: Normal heart sounds.   Pulmonary:      Effort: Pulmonary effort is normal.      Breath sounds: Normal breath sounds.   Musculoskeletal:      Right lower le+ Edema present.      Left lower le+ Edema present.   Lymphadenopathy:      Cervical: No cervical adenopathy.   Skin:     General: Skin is warm and dry.   Neurological:      Mental Status: She is alert and oriented to person, place, and time.       Assessment/Plan "   Diagnoses and all orders for this visit:    1. Acute on chronic systolic CHF (congestive heart failure) (HCC) (Primary)  -     CBC & Differential  -     Comprehensive Metabolic Panel    2. Pneumonia of both lower lobes due to infectious organism  -     cefuroxime (CEFTIN) 500 MG tablet; Take 1 tablet by mouth 2 (Two) Times a Day.  Dispense: 14 tablet; Refill: 0    3. Type 2 diabetes mellitus with complication, without long-term current use of insulin ([facility])    Continue current medications.  Increase Levemir to 35 units. Start ceftin.  Will notify regarding results. Recheck if not improving.  Follow-up with cardiology as scheduled.    New Medications Ordered This Visit   Medications   • cefuroxime (CEFTIN) 500 MG tablet     Sig: Take 1 tablet by mouth 2 (Two) Times a Day.     Dispense:  14 tablet     Refill:  0       Current outpatient and discharge medications have been reconciled for the patient.  Reviewed by: Joyce Plata MD      No follow-ups on file.

## 2021-12-09 ENCOUNTER — TELEPHONE (OUTPATIENT)
Dept: FAMILY MEDICINE CLINIC | Facility: CLINIC | Age: 71
End: 2021-12-09

## 2021-12-09 NOTE — TELEPHONE ENCOUNTER
Per Dr. Plata, Ms. Wade has been called with recent lab results & recommendations.  Continue current medications and follow-up as planned or sooner if any problems.       ----- Message from Joyce Plata MD sent at 12/8/2021  1:39 PM CST -----  Call and tell labs are improving except her sugar is still high.  Increase insulin as planned.

## 2021-12-09 NOTE — PROGRESS NOTES
Per Dr. Plata, Ms. Wade has been called with recent lab results & recommendations.  Continue current medications and follow-up as planned or sooner if any problems.

## 2021-12-13 ENCOUNTER — READMISSION MANAGEMENT (OUTPATIENT)
Dept: CALL CENTER | Facility: HOSPITAL | Age: 71
End: 2021-12-13

## 2021-12-13 NOTE — OUTREACH NOTE
CHF Week 2 Survey      Responses   [facility] patient discharged from? [place]   Does the patient have one of the following disease processes/diagnoses(primary or secondary)? CHF   Week 2 attempt successful? No   Unsuccessful attempts Attempt 1          Taryn Priest RN

## 2021-12-14 ENCOUNTER — HOME CARE VISIT (OUTPATIENT)
Dept: HOME HEALTH SERVICES | Facility: CLINIC | Age: 71
End: 2021-12-14

## 2021-12-14 VITALS
HEART RATE: 81 BPM | OXYGEN SATURATION: 94 % | RESPIRATION RATE: 18 BRPM | SYSTOLIC BLOOD PRESSURE: 128 MMHG | DIASTOLIC BLOOD PRESSURE: 80 MMHG | TEMPERATURE: 98 F

## 2021-12-14 PROCEDURE — G0158 HHC OT ASSISTANT EA 15: HCPCS

## 2021-12-14 PROCEDURE — G0180 MD CERTIFICATION HHA PATIENT: HCPCS | Performed by: GENERAL PRACTICE

## 2021-12-15 ENCOUNTER — HOME CARE VISIT (OUTPATIENT)
Dept: HOME HEALTH SERVICES | Facility: CLINIC | Age: 71
End: 2021-12-15

## 2021-12-15 VITALS
HEART RATE: 75 BPM | SYSTOLIC BLOOD PRESSURE: 120 MMHG | TEMPERATURE: 97.9 F | RESPIRATION RATE: 18 BRPM | OXYGEN SATURATION: 98 % | DIASTOLIC BLOOD PRESSURE: 80 MMHG

## 2021-12-15 PROCEDURE — G0157 HHC PT ASSISTANT EA 15: HCPCS

## 2021-12-16 ENCOUNTER — HOME CARE VISIT (OUTPATIENT)
Dept: HOME HEALTH SERVICES | Facility: CLINIC | Age: 71
End: 2021-12-16

## 2021-12-16 VITALS
SYSTOLIC BLOOD PRESSURE: 154 MMHG | HEART RATE: 88 BPM | DIASTOLIC BLOOD PRESSURE: 82 MMHG | RESPIRATION RATE: 20 BRPM | TEMPERATURE: 97.4 F

## 2021-12-16 DIAGNOSIS — E78.2 MIXED HYPERLIPIDEMIA: Chronic | ICD-10-CM

## 2021-12-16 PROCEDURE — G0300 HHS/HOSPICE OF LPN EA 15 MIN: HCPCS

## 2021-12-16 RX ORDER — ATORVASTATIN CALCIUM 40 MG/1
40 TABLET, FILM COATED ORAL DAILY
Qty: 90 TABLET | Refills: 3 | Status: SHIPPED | OUTPATIENT
Start: 2021-12-16 | End: 2022-01-01

## 2021-12-16 RX ORDER — ISOSORBIDE MONONITRATE 30 MG/1
30 TABLET, EXTENDED RELEASE ORAL DAILY
Qty: 90 TABLET | Refills: 3 | Status: SHIPPED | OUTPATIENT
Start: 2021-12-16 | End: 2022-01-01

## 2021-12-16 NOTE — HOME HEALTH
Patient stated she is improving with everything but still limited with walking and standing endurance.

## 2021-12-17 ENCOUNTER — READMISSION MANAGEMENT (OUTPATIENT)
Dept: CALL CENTER | Facility: HOSPITAL | Age: 71
End: 2021-12-17

## 2021-12-17 NOTE — OUTREACH NOTE
CHF Week 2 Survey      Responses   [facility] patient discharged from? [place]   Does the patient have one of the following disease processes/diagnoses(primary or secondary)? CHF   Week 2 attempt successful? No   Unsuccessful attempts Attempt 2          Taryn Priest RN

## 2021-12-21 ENCOUNTER — HOME CARE VISIT (OUTPATIENT)
Dept: HOME HEALTH SERVICES | Facility: CLINIC | Age: 71
End: 2021-12-21

## 2021-12-21 VITALS
TEMPERATURE: 96.8 F | SYSTOLIC BLOOD PRESSURE: 124 MMHG | HEART RATE: 84 BPM | DIASTOLIC BLOOD PRESSURE: 80 MMHG | RESPIRATION RATE: 18 BRPM | OXYGEN SATURATION: 99 %

## 2021-12-21 PROCEDURE — G0157 HHC PT ASSISTANT EA 15: HCPCS

## 2021-12-22 ENCOUNTER — HOME CARE VISIT (OUTPATIENT)
Dept: HOME HEALTH SERVICES | Facility: CLINIC | Age: 71
End: 2021-12-22

## 2021-12-22 VITALS
RESPIRATION RATE: 18 BRPM | SYSTOLIC BLOOD PRESSURE: 144 MMHG | DIASTOLIC BLOOD PRESSURE: 88 MMHG | TEMPERATURE: 98.1 F | HEART RATE: 90 BPM

## 2021-12-22 PROCEDURE — G0493 RN CARE EA 15 MIN HH/HOSPICE: HCPCS

## 2021-12-22 NOTE — HOME HEALTH
pt up amb. in home Ind. w/o AAD and reports steady progress w/ activities tolerance in home; pt reports still uses SPC during gait outside home

## 2021-12-28 ENCOUNTER — READMISSION MANAGEMENT (OUTPATIENT)
Dept: CALL CENTER | Facility: HOSPITAL | Age: 71
End: 2021-12-28

## 2021-12-28 DIAGNOSIS — I10 ESSENTIAL HYPERTENSION: Chronic | ICD-10-CM

## 2021-12-28 RX ORDER — POTASSIUM CHLORIDE 750 MG/1
10 CAPSULE, EXTENDED RELEASE ORAL 2 TIMES DAILY
Qty: 180 CAPSULE | Refills: 1 | Status: ON HOLD | OUTPATIENT
Start: 2021-12-28 | End: 2022-01-01 | Stop reason: SDUPTHER

## 2021-12-28 RX ORDER — FUROSEMIDE 40 MG/1
40 TABLET ORAL 2 TIMES DAILY
Qty: 60 TABLET | Refills: 1 | Status: SHIPPED | OUTPATIENT
Start: 2021-12-28 | End: 2022-01-25

## 2021-12-28 NOTE — OUTREACH NOTE
CHF Week 3 Survey      Responses   [facility] patient discharged from? [place]   Does the patient have one of the following disease processes/diagnoses(primary or secondary)? CHF   Week 3 attempt successful? Yes   Call start time 1600   Call end time 1606   Meds reviewed with patient/caregiver? Yes   Is the patient taking all medications as directed (includes completed medication regime)? Yes   Has the patient kept scheduled appointments due by today? Yes   Comments has seen her provider   Has home health visited the patient within 72 hours of discharge? N/A   Pulse Ox monitoring None   What is the patient's perception of their health status since discharge? Improving  [blood sugar are  doing well]   Is the patient weighing daily? No   Does the patient have scales? No   Is the patient able to teach back Heart Failure diet management? Yes   Is the patient able to teach back Heart Failure Zones? No  [discussed with the patient]   Is the patient able to teach back signs and symptoms of worsening condition? (i.e. weight gain, shortness of air, etc.) Yes  [discussed  with the patient]   If the patient is a current smoker, are they able to teach back resources for cessation? Not a smoker   CHF Week 3 call completed? Yes   Wrap up additional comments patient bs are 88-98 in the morning, feels she is in the yellow zones          Maria Fernanda Ramirez RN   Pt responded in a new encounter.  Please refer to the mychart encounter dated, 6/5/19, for further documentation.    Mary Pyle RN

## 2021-12-29 NOTE — TELEPHONE ENCOUNTER
Next Andres 04/25/2022    Fax refill request for her Digoxin 125 mcg was Denied.   Per Dr Wheeler patient was taken off the medication when DC from the hospital on 12/02/2021

## 2022-01-01 ENCOUNTER — READMISSION MANAGEMENT (OUTPATIENT)
Dept: CALL CENTER | Facility: HOSPITAL | Age: 72
End: 2022-01-01

## 2022-01-01 ENCOUNTER — HOME CARE VISIT (OUTPATIENT)
Dept: HOME HEALTH SERVICES | Facility: CLINIC | Age: 72
End: 2022-01-01

## 2022-01-01 ENCOUNTER — TRANSITIONAL CARE MANAGEMENT TELEPHONE ENCOUNTER (OUTPATIENT)
Dept: CALL CENTER | Facility: HOSPITAL | Age: 72
End: 2022-01-01

## 2022-01-01 ENCOUNTER — TELEPHONE (OUTPATIENT)
Dept: FAMILY MEDICINE CLINIC | Facility: CLINIC | Age: 72
End: 2022-01-01

## 2022-01-01 ENCOUNTER — HOME HEALTH ADMISSION (OUTPATIENT)
Dept: HOME HEALTH SERVICES | Facility: HOME HEALTHCARE | Age: 72
End: 2022-01-01

## 2022-01-01 ENCOUNTER — APPOINTMENT (OUTPATIENT)
Dept: CT IMAGING | Facility: HOSPITAL | Age: 72
End: 2022-01-01

## 2022-01-01 ENCOUNTER — HOME CARE VISIT (OUTPATIENT)
Dept: HOME HEALTH SERVICES | Facility: HOME HEALTHCARE | Age: 72
End: 2022-01-01

## 2022-01-01 ENCOUNTER — OFFICE VISIT (OUTPATIENT)
Dept: FAMILY MEDICINE CLINIC | Facility: CLINIC | Age: 72
End: 2022-01-01

## 2022-01-01 ENCOUNTER — APPOINTMENT (OUTPATIENT)
Dept: GENERAL RADIOLOGY | Facility: HOSPITAL | Age: 72
End: 2022-01-01

## 2022-01-01 ENCOUNTER — ANESTHESIA EVENT (OUTPATIENT)
Dept: PERIOP | Facility: HOSPITAL | Age: 72
End: 2022-01-01

## 2022-01-01 ENCOUNTER — HOSPITAL ENCOUNTER (OUTPATIENT)
Facility: HOSPITAL | Age: 72
Setting detail: OBSERVATION
LOS: 1 days | Discharge: HOME | End: 2022-08-19
Attending: EMERGENCY MEDICINE | Admitting: EMERGENCY MEDICINE

## 2022-01-01 ENCOUNTER — HOSPITAL ENCOUNTER (EMERGENCY)
Facility: HOSPITAL | Age: 72
Discharge: HOME | End: 2022-11-10
Attending: FAMILY MEDICINE | Admitting: FAMILY MEDICINE

## 2022-01-01 ENCOUNTER — HOSPITAL ENCOUNTER (EMERGENCY)
Facility: HOSPITAL | Age: 72
Discharge: HOME | End: 2022-12-10
Attending: EMERGENCY MEDICINE | Admitting: EMERGENCY MEDICINE

## 2022-01-01 ENCOUNTER — HOSPITAL ENCOUNTER (OUTPATIENT)
Facility: HOSPITAL | Age: 72
Discharge: HOME HEALTH | End: 2022-07-16
Attending: EMERGENCY MEDICINE | Admitting: SURGERY

## 2022-01-01 ENCOUNTER — HOSPITAL ENCOUNTER (OUTPATIENT)
Facility: HOSPITAL | Age: 72
Setting detail: OBSERVATION
Discharge: HOME | End: 2022-04-05
Attending: FAMILY MEDICINE | Admitting: INTERNAL MEDICINE

## 2022-01-01 ENCOUNTER — HOSPITAL ENCOUNTER (EMERGENCY)
Facility: HOSPITAL | Age: 72
Discharge: HOME | End: 2022-06-25
Attending: FAMILY MEDICINE | Admitting: FAMILY MEDICINE

## 2022-01-01 ENCOUNTER — HOSPITAL ENCOUNTER (EMERGENCY)
Facility: HOSPITAL | Age: 72
Discharge: HOME | End: 2022-08-09
Attending: FAMILY MEDICINE | Admitting: FAMILY MEDICINE

## 2022-01-01 ENCOUNTER — HOSPITAL ENCOUNTER (INPATIENT)
Facility: HOSPITAL | Age: 72
LOS: 6 days | Discharge: HOME | End: 2022-06-08
Attending: FAMILY MEDICINE | Admitting: INTERNAL MEDICINE

## 2022-01-01 ENCOUNTER — LAB (OUTPATIENT)
Dept: LAB | Facility: HOSPITAL | Age: 72
End: 2022-01-01

## 2022-01-01 ENCOUNTER — APPOINTMENT (OUTPATIENT)
Dept: CT IMAGING | Facility: HOSPITAL | Age: 72
DRG: 871 | End: 2022-01-01
Payer: MEDICARE

## 2022-01-01 ENCOUNTER — APPOINTMENT (OUTPATIENT)
Dept: ULTRASOUND IMAGING | Facility: HOSPITAL | Age: 72
End: 2022-01-01

## 2022-01-01 ENCOUNTER — APPOINTMENT (OUTPATIENT)
Dept: CARDIOLOGY | Facility: HOSPITAL | Age: 72
End: 2022-01-01

## 2022-01-01 ENCOUNTER — APPOINTMENT (OUTPATIENT)
Dept: ULTRASOUND IMAGING | Facility: HOSPITAL | Age: 72
DRG: 871 | End: 2022-01-01
Payer: MEDICARE

## 2022-01-01 ENCOUNTER — HOSPITAL ENCOUNTER (EMERGENCY)
Facility: HOSPITAL | Age: 72
Discharge: HOME | End: 2022-08-02
Attending: EMERGENCY MEDICINE | Admitting: EMERGENCY MEDICINE

## 2022-01-01 ENCOUNTER — HOSPITAL ENCOUNTER (EMERGENCY)
Facility: HOSPITAL | Age: 72
Discharge: HOME | End: 2022-04-12
Attending: STUDENT IN AN ORGANIZED HEALTH CARE EDUCATION/TRAINING PROGRAM | Admitting: STUDENT IN AN ORGANIZED HEALTH CARE EDUCATION/TRAINING PROGRAM

## 2022-01-01 ENCOUNTER — PREP FOR SURGERY (OUTPATIENT)
Dept: OTHER | Facility: HOSPITAL | Age: 72
End: 2022-01-01

## 2022-01-01 ENCOUNTER — ANESTHESIA (OUTPATIENT)
Dept: PERIOP | Facility: HOSPITAL | Age: 72
End: 2022-01-01

## 2022-01-01 ENCOUNTER — HOSPITAL ENCOUNTER (INPATIENT)
Facility: HOSPITAL | Age: 72
LOS: 8 days | Discharge: HOME HEALTH | End: 2022-09-26
Attending: STUDENT IN AN ORGANIZED HEALTH CARE EDUCATION/TRAINING PROGRAM

## 2022-01-01 ENCOUNTER — APPOINTMENT (OUTPATIENT)
Dept: NUCLEAR MEDICINE | Facility: HOSPITAL | Age: 72
End: 2022-01-01

## 2022-01-01 ENCOUNTER — HOSPITAL ENCOUNTER (OUTPATIENT)
Facility: HOSPITAL | Age: 72
Setting detail: OBSERVATION
Discharge: HOME HEALTH | End: 2022-05-05
Attending: STUDENT IN AN ORGANIZED HEALTH CARE EDUCATION/TRAINING PROGRAM | Admitting: INTERNAL MEDICINE

## 2022-01-01 ENCOUNTER — APPOINTMENT (OUTPATIENT)
Dept: GENERAL RADIOLOGY | Facility: HOSPITAL | Age: 72
DRG: 871 | End: 2022-01-01
Payer: MEDICARE

## 2022-01-01 ENCOUNTER — HOSPITAL ENCOUNTER (INPATIENT)
Facility: HOSPITAL | Age: 72
LOS: 7 days | Discharge: NURSING, REHAB OR LONG-TERM CARE | DRG: 871 | End: 2023-01-07
Attending: STUDENT IN AN ORGANIZED HEALTH CARE EDUCATION/TRAINING PROGRAM | Admitting: HOSPITALIST
Payer: MEDICARE

## 2022-01-01 ENCOUNTER — HOSPITAL ENCOUNTER (INPATIENT)
Facility: HOSPITAL | Age: 72
LOS: 1 days | Discharge: HOME HEALTH | End: 2022-05-11
Attending: FAMILY MEDICINE | Admitting: INTERNAL MEDICINE

## 2022-01-01 ENCOUNTER — HOSPITAL ENCOUNTER (EMERGENCY)
Facility: HOSPITAL | Age: 72
Discharge: HOME | End: 2022-12-01
Attending: EMERGENCY MEDICINE | Admitting: EMERGENCY MEDICINE

## 2022-01-01 ENCOUNTER — APPOINTMENT (OUTPATIENT)
Dept: PULMONOLOGY | Facility: HOSPITAL | Age: 72
End: 2022-01-01

## 2022-01-01 ENCOUNTER — APPOINTMENT (OUTPATIENT)
Dept: INTERVENTIONAL RADIOLOGY/VASCULAR | Facility: HOSPITAL | Age: 72
End: 2022-01-01

## 2022-01-01 VITALS
HEART RATE: 87 BPM | OXYGEN SATURATION: 99 % | SYSTOLIC BLOOD PRESSURE: 130 MMHG | RESPIRATION RATE: 18 BRPM | BODY MASS INDEX: 34.33 KG/M2 | HEIGHT: 64 IN | WEIGHT: 201.1 LBS | DIASTOLIC BLOOD PRESSURE: 72 MMHG

## 2022-01-01 VITALS
OXYGEN SATURATION: 96 % | HEART RATE: 74 BPM | SYSTOLIC BLOOD PRESSURE: 118 MMHG | RESPIRATION RATE: 18 BRPM | TEMPERATURE: 97.8 F | DIASTOLIC BLOOD PRESSURE: 70 MMHG

## 2022-01-01 VITALS
SYSTOLIC BLOOD PRESSURE: 114 MMHG | RESPIRATION RATE: 18 BRPM | DIASTOLIC BLOOD PRESSURE: 68 MMHG | HEART RATE: 87 BPM | BODY MASS INDEX: 36.84 KG/M2 | WEIGHT: 200.2 LBS | HEIGHT: 62 IN | OXYGEN SATURATION: 99 %

## 2022-01-01 VITALS
DIASTOLIC BLOOD PRESSURE: 80 MMHG | TEMPERATURE: 97 F | HEART RATE: 84 BPM | OXYGEN SATURATION: 99 % | SYSTOLIC BLOOD PRESSURE: 138 MMHG | RESPIRATION RATE: 18 BRPM

## 2022-01-01 VITALS
DIASTOLIC BLOOD PRESSURE: 78 MMHG | HEART RATE: 84 BPM | SYSTOLIC BLOOD PRESSURE: 130 MMHG | TEMPERATURE: 98.4 F | OXYGEN SATURATION: 98 % | RESPIRATION RATE: 20 BRPM

## 2022-01-01 VITALS
BODY MASS INDEX: 37.67 KG/M2 | SYSTOLIC BLOOD PRESSURE: 148 MMHG | HEIGHT: 63 IN | RESPIRATION RATE: 16 BRPM | TEMPERATURE: 97.6 F | WEIGHT: 212.6 LBS | HEART RATE: 88 BPM | DIASTOLIC BLOOD PRESSURE: 88 MMHG | OXYGEN SATURATION: 99 %

## 2022-01-01 VITALS
SYSTOLIC BLOOD PRESSURE: 128 MMHG | SYSTOLIC BLOOD PRESSURE: 127 MMHG | TEMPERATURE: 96.8 F | HEIGHT: 63 IN | HEIGHT: 64 IN | DIASTOLIC BLOOD PRESSURE: 82 MMHG | OXYGEN SATURATION: 99 % | WEIGHT: 222 LBS | RESPIRATION RATE: 20 BRPM | RESPIRATION RATE: 18 BRPM | HEART RATE: 90 BPM | WEIGHT: 216 LBS | BODY MASS INDEX: 36.88 KG/M2 | DIASTOLIC BLOOD PRESSURE: 68 MMHG | HEART RATE: 84 BPM | BODY MASS INDEX: 39.34 KG/M2 | OXYGEN SATURATION: 100 %

## 2022-01-01 VITALS — SYSTOLIC BLOOD PRESSURE: 138 MMHG | DIASTOLIC BLOOD PRESSURE: 72 MMHG | RESPIRATION RATE: 18 BRPM | HEART RATE: 81 BPM

## 2022-01-01 VITALS
OXYGEN SATURATION: 98 % | RESPIRATION RATE: 18 BRPM | DIASTOLIC BLOOD PRESSURE: 56 MMHG | BODY MASS INDEX: 37.74 KG/M2 | TEMPERATURE: 96.6 F | SYSTOLIC BLOOD PRESSURE: 104 MMHG | HEIGHT: 62 IN | HEART RATE: 76 BPM | WEIGHT: 205.1 LBS

## 2022-01-01 VITALS
DIASTOLIC BLOOD PRESSURE: 72 MMHG | OXYGEN SATURATION: 96 % | RESPIRATION RATE: 22 BRPM | TEMPERATURE: 97.4 F | HEART RATE: 82 BPM | SYSTOLIC BLOOD PRESSURE: 138 MMHG

## 2022-01-01 VITALS
RESPIRATION RATE: 18 BRPM | OXYGEN SATURATION: 98 % | DIASTOLIC BLOOD PRESSURE: 82 MMHG | BODY MASS INDEX: 36.4 KG/M2 | HEIGHT: 62 IN | HEART RATE: 90 BPM | SYSTOLIC BLOOD PRESSURE: 150 MMHG | TEMPERATURE: 97.9 F

## 2022-01-01 VITALS
DIASTOLIC BLOOD PRESSURE: 70 MMHG | OXYGEN SATURATION: 96 % | TEMPERATURE: 97.8 F | SYSTOLIC BLOOD PRESSURE: 122 MMHG | HEART RATE: 78 BPM | RESPIRATION RATE: 20 BRPM

## 2022-01-01 VITALS
HEART RATE: 72 BPM | RESPIRATION RATE: 22 BRPM | SYSTOLIC BLOOD PRESSURE: 128 MMHG | OXYGEN SATURATION: 98 % | TEMPERATURE: 97 F | DIASTOLIC BLOOD PRESSURE: 66 MMHG

## 2022-01-01 VITALS
WEIGHT: 194.4 LBS | SYSTOLIC BLOOD PRESSURE: 140 MMHG | TEMPERATURE: 97.4 F | HEART RATE: 89 BPM | OXYGEN SATURATION: 99 % | RESPIRATION RATE: 20 BRPM | BODY MASS INDEX: 33.35 KG/M2 | DIASTOLIC BLOOD PRESSURE: 84 MMHG

## 2022-01-01 VITALS
OXYGEN SATURATION: 99 % | SYSTOLIC BLOOD PRESSURE: 124 MMHG | DIASTOLIC BLOOD PRESSURE: 68 MMHG | BODY MASS INDEX: 36.07 KG/M2 | WEIGHT: 196 LBS | HEART RATE: 84 BPM | RESPIRATION RATE: 18 BRPM | HEIGHT: 62 IN

## 2022-01-01 VITALS
HEART RATE: 78 BPM | TEMPERATURE: 97.7 F | OXYGEN SATURATION: 96 % | RESPIRATION RATE: 22 BRPM | DIASTOLIC BLOOD PRESSURE: 88 MMHG | SYSTOLIC BLOOD PRESSURE: 130 MMHG

## 2022-01-01 VITALS
SYSTOLIC BLOOD PRESSURE: 106 MMHG | BODY MASS INDEX: 36.95 KG/M2 | HEART RATE: 74 BPM | WEIGHT: 200.8 LBS | DIASTOLIC BLOOD PRESSURE: 61 MMHG | TEMPERATURE: 96 F | HEIGHT: 62 IN | RESPIRATION RATE: 18 BRPM | OXYGEN SATURATION: 98 %

## 2022-01-01 VITALS
OXYGEN SATURATION: 97 % | TEMPERATURE: 98.3 F | BODY MASS INDEX: 37.21 KG/M2 | SYSTOLIC BLOOD PRESSURE: 162 MMHG | DIASTOLIC BLOOD PRESSURE: 86 MMHG | WEIGHT: 210 LBS | HEART RATE: 87 BPM | HEIGHT: 63 IN | RESPIRATION RATE: 20 BRPM

## 2022-01-01 VITALS
TEMPERATURE: 98 F | HEART RATE: 94 BPM | RESPIRATION RATE: 20 BRPM | OXYGEN SATURATION: 98 % | SYSTOLIC BLOOD PRESSURE: 120 MMHG | DIASTOLIC BLOOD PRESSURE: 68 MMHG

## 2022-01-01 VITALS
TEMPERATURE: 97.9 F | SYSTOLIC BLOOD PRESSURE: 162 MMHG | OXYGEN SATURATION: 99 % | RESPIRATION RATE: 18 BRPM | HEART RATE: 88 BPM | DIASTOLIC BLOOD PRESSURE: 88 MMHG

## 2022-01-01 VITALS
SYSTOLIC BLOOD PRESSURE: 142 MMHG | BODY MASS INDEX: 36.7 KG/M2 | HEART RATE: 94 BPM | OXYGEN SATURATION: 98 % | DIASTOLIC BLOOD PRESSURE: 96 MMHG | RESPIRATION RATE: 20 BRPM | TEMPERATURE: 97.4 F | WEIGHT: 207.1 LBS | HEIGHT: 63 IN

## 2022-01-01 VITALS
DIASTOLIC BLOOD PRESSURE: 92 MMHG | HEART RATE: 84 BPM | OXYGEN SATURATION: 97 % | RESPIRATION RATE: 20 BRPM | SYSTOLIC BLOOD PRESSURE: 141 MMHG | BODY MASS INDEX: 38.98 KG/M2 | TEMPERATURE: 98.6 F | HEIGHT: 63 IN | WEIGHT: 220 LBS

## 2022-01-01 VITALS
SYSTOLIC BLOOD PRESSURE: 166 MMHG | HEIGHT: 64 IN | BODY MASS INDEX: 33.29 KG/M2 | RESPIRATION RATE: 16 BRPM | DIASTOLIC BLOOD PRESSURE: 95 MMHG | WEIGHT: 195 LBS | HEART RATE: 99 BPM | OXYGEN SATURATION: 99 % | TEMPERATURE: 97.6 F

## 2022-01-01 VITALS
SYSTOLIC BLOOD PRESSURE: 134 MMHG | OXYGEN SATURATION: 98 % | HEART RATE: 87 BPM | DIASTOLIC BLOOD PRESSURE: 80 MMHG | TEMPERATURE: 97.9 F | RESPIRATION RATE: 20 BRPM

## 2022-01-01 VITALS
RESPIRATION RATE: 18 BRPM | BODY MASS INDEX: 33.84 KG/M2 | HEART RATE: 80 BPM | SYSTOLIC BLOOD PRESSURE: 123 MMHG | DIASTOLIC BLOOD PRESSURE: 64 MMHG | HEIGHT: 64 IN | WEIGHT: 198.2 LBS | OXYGEN SATURATION: 98 % | TEMPERATURE: 97.4 F

## 2022-01-01 VITALS
HEIGHT: 64 IN | WEIGHT: 195 LBS | BODY MASS INDEX: 33.29 KG/M2 | HEART RATE: 78 BPM | RESPIRATION RATE: 18 BRPM | OXYGEN SATURATION: 98 % | DIASTOLIC BLOOD PRESSURE: 66 MMHG | SYSTOLIC BLOOD PRESSURE: 108 MMHG | TEMPERATURE: 97.3 F

## 2022-01-01 VITALS
SYSTOLIC BLOOD PRESSURE: 108 MMHG | WEIGHT: 196.2 LBS | BODY MASS INDEX: 36.1 KG/M2 | TEMPERATURE: 97.7 F | OXYGEN SATURATION: 96 % | HEIGHT: 62 IN | HEART RATE: 72 BPM | RESPIRATION RATE: 16 BRPM | DIASTOLIC BLOOD PRESSURE: 64 MMHG

## 2022-01-01 VITALS
OXYGEN SATURATION: 99 % | RESPIRATION RATE: 18 BRPM | DIASTOLIC BLOOD PRESSURE: 68 MMHG | HEART RATE: 76 BPM | SYSTOLIC BLOOD PRESSURE: 116 MMHG | TEMPERATURE: 98.3 F

## 2022-01-01 VITALS
RESPIRATION RATE: 18 BRPM | HEART RATE: 86 BPM | WEIGHT: 210 LBS | SYSTOLIC BLOOD PRESSURE: 120 MMHG | DIASTOLIC BLOOD PRESSURE: 70 MMHG | BODY MASS INDEX: 36.03 KG/M2 | OXYGEN SATURATION: 98 % | TEMPERATURE: 98.5 F

## 2022-01-01 VITALS
OXYGEN SATURATION: 98 % | WEIGHT: 202 LBS | TEMPERATURE: 98.5 F | BODY MASS INDEX: 34.65 KG/M2 | SYSTOLIC BLOOD PRESSURE: 130 MMHG | HEART RATE: 80 BPM | DIASTOLIC BLOOD PRESSURE: 78 MMHG | RESPIRATION RATE: 20 BRPM

## 2022-01-01 VITALS
TEMPERATURE: 97.1 F | HEART RATE: 78 BPM | SYSTOLIC BLOOD PRESSURE: 122 MMHG | OXYGEN SATURATION: 98 % | RESPIRATION RATE: 18 BRPM | DIASTOLIC BLOOD PRESSURE: 64 MMHG

## 2022-01-01 VITALS
OXYGEN SATURATION: 99 % | RESPIRATION RATE: 18 BRPM | TEMPERATURE: 98 F | DIASTOLIC BLOOD PRESSURE: 70 MMHG | HEART RATE: 78 BPM | SYSTOLIC BLOOD PRESSURE: 132 MMHG

## 2022-01-01 VITALS
HEIGHT: 64 IN | HEART RATE: 89 BPM | OXYGEN SATURATION: 98 % | RESPIRATION RATE: 18 BRPM | DIASTOLIC BLOOD PRESSURE: 82 MMHG | WEIGHT: 197.2 LBS | BODY MASS INDEX: 33.67 KG/M2 | SYSTOLIC BLOOD PRESSURE: 130 MMHG

## 2022-01-01 VITALS
SYSTOLIC BLOOD PRESSURE: 120 MMHG | TEMPERATURE: 97.1 F | OXYGEN SATURATION: 97 % | RESPIRATION RATE: 18 BRPM | DIASTOLIC BLOOD PRESSURE: 70 MMHG | HEART RATE: 68 BPM

## 2022-01-01 VITALS
HEART RATE: 100 BPM | OXYGEN SATURATION: 97 % | RESPIRATION RATE: 20 BRPM | DIASTOLIC BLOOD PRESSURE: 90 MMHG | SYSTOLIC BLOOD PRESSURE: 160 MMHG | TEMPERATURE: 98.6 F

## 2022-01-01 VITALS
OXYGEN SATURATION: 99 % | SYSTOLIC BLOOD PRESSURE: 128 MMHG | HEART RATE: 78 BPM | RESPIRATION RATE: 20 BRPM | DIASTOLIC BLOOD PRESSURE: 72 MMHG

## 2022-01-01 VITALS
OXYGEN SATURATION: 95 % | HEART RATE: 68 BPM | DIASTOLIC BLOOD PRESSURE: 76 MMHG | TEMPERATURE: 98 F | RESPIRATION RATE: 20 BRPM | SYSTOLIC BLOOD PRESSURE: 120 MMHG

## 2022-01-01 VITALS
SYSTOLIC BLOOD PRESSURE: 116 MMHG | HEART RATE: 76 BPM | DIASTOLIC BLOOD PRESSURE: 68 MMHG | TEMPERATURE: 98.3 F | BODY MASS INDEX: 34.71 KG/M2 | RESPIRATION RATE: 20 BRPM | WEIGHT: 202.36 LBS | OXYGEN SATURATION: 99 %

## 2022-01-01 VITALS
SYSTOLIC BLOOD PRESSURE: 138 MMHG | RESPIRATION RATE: 18 BRPM | HEART RATE: 95 BPM | TEMPERATURE: 97.3 F | OXYGEN SATURATION: 99 % | DIASTOLIC BLOOD PRESSURE: 80 MMHG

## 2022-01-01 VITALS
HEART RATE: 70 BPM | DIASTOLIC BLOOD PRESSURE: 76 MMHG | OXYGEN SATURATION: 97 % | SYSTOLIC BLOOD PRESSURE: 130 MMHG | TEMPERATURE: 96.4 F | RESPIRATION RATE: 22 BRPM

## 2022-01-01 VITALS
BODY MASS INDEX: 33.51 KG/M2 | SYSTOLIC BLOOD PRESSURE: 122 MMHG | RESPIRATION RATE: 20 BRPM | WEIGHT: 195.2 LBS | HEART RATE: 81 BPM | DIASTOLIC BLOOD PRESSURE: 70 MMHG | TEMPERATURE: 98.5 F | OXYGEN SATURATION: 98 %

## 2022-01-01 VITALS
BODY MASS INDEX: 36.62 KG/M2 | RESPIRATION RATE: 16 BRPM | HEIGHT: 62 IN | TEMPERATURE: 97.3 F | WEIGHT: 199 LBS | HEART RATE: 86 BPM | DIASTOLIC BLOOD PRESSURE: 86 MMHG | OXYGEN SATURATION: 98 % | SYSTOLIC BLOOD PRESSURE: 147 MMHG

## 2022-01-01 VITALS
DIASTOLIC BLOOD PRESSURE: 78 MMHG | HEIGHT: 64 IN | HEART RATE: 80 BPM | SYSTOLIC BLOOD PRESSURE: 138 MMHG | OXYGEN SATURATION: 100 % | BODY MASS INDEX: 35.85 KG/M2 | WEIGHT: 210 LBS

## 2022-01-01 VITALS
SYSTOLIC BLOOD PRESSURE: 118 MMHG | RESPIRATION RATE: 18 BRPM | OXYGEN SATURATION: 100 % | HEART RATE: 78 BPM | TEMPERATURE: 98 F | DIASTOLIC BLOOD PRESSURE: 68 MMHG

## 2022-01-01 VITALS
OXYGEN SATURATION: 98 % | BODY MASS INDEX: 33.68 KG/M2 | HEART RATE: 98 BPM | DIASTOLIC BLOOD PRESSURE: 86 MMHG | WEIGHT: 197.3 LBS | RESPIRATION RATE: 18 BRPM | HEIGHT: 64 IN | SYSTOLIC BLOOD PRESSURE: 138 MMHG

## 2022-01-01 VITALS
OXYGEN SATURATION: 97 % | DIASTOLIC BLOOD PRESSURE: 63 MMHG | TEMPERATURE: 96.6 F | HEIGHT: 64 IN | WEIGHT: 201.4 LBS | BODY MASS INDEX: 34.38 KG/M2 | RESPIRATION RATE: 18 BRPM | HEART RATE: 74 BPM | SYSTOLIC BLOOD PRESSURE: 131 MMHG

## 2022-01-01 VITALS
TEMPERATURE: 97.9 F | DIASTOLIC BLOOD PRESSURE: 76 MMHG | SYSTOLIC BLOOD PRESSURE: 128 MMHG | RESPIRATION RATE: 18 BRPM | OXYGEN SATURATION: 98 % | HEART RATE: 88 BPM

## 2022-01-01 VITALS
TEMPERATURE: 97.6 F | RESPIRATION RATE: 18 BRPM | DIASTOLIC BLOOD PRESSURE: 74 MMHG | SYSTOLIC BLOOD PRESSURE: 126 MMHG | HEART RATE: 68 BPM | OXYGEN SATURATION: 96 %

## 2022-01-01 VITALS
RESPIRATION RATE: 18 BRPM | DIASTOLIC BLOOD PRESSURE: 82 MMHG | SYSTOLIC BLOOD PRESSURE: 130 MMHG | OXYGEN SATURATION: 97 % | HEART RATE: 68 BPM | TEMPERATURE: 97.2 F

## 2022-01-01 VITALS
SYSTOLIC BLOOD PRESSURE: 130 MMHG | DIASTOLIC BLOOD PRESSURE: 64 MMHG | OXYGEN SATURATION: 97 % | HEART RATE: 94 BPM | HEIGHT: 64 IN | BODY MASS INDEX: 33.97 KG/M2 | WEIGHT: 199 LBS

## 2022-01-01 VITALS
HEART RATE: 100 BPM | RESPIRATION RATE: 20 BRPM | SYSTOLIC BLOOD PRESSURE: 166 MMHG | DIASTOLIC BLOOD PRESSURE: 94 MMHG | OXYGEN SATURATION: 97 % | TEMPERATURE: 98.8 F

## 2022-01-01 VITALS
OXYGEN SATURATION: 96 % | SYSTOLIC BLOOD PRESSURE: 122 MMHG | TEMPERATURE: 97.9 F | HEART RATE: 80 BPM | DIASTOLIC BLOOD PRESSURE: 64 MMHG | RESPIRATION RATE: 18 BRPM

## 2022-01-01 DIAGNOSIS — Z74.09 IMPAIRED FUNCTIONAL MOBILITY, BALANCE, GAIT, AND ENDURANCE: ICD-10-CM

## 2022-01-01 DIAGNOSIS — N39.0 UTI (URINARY TRACT INFECTION), UNCOMPLICATED: ICD-10-CM

## 2022-01-01 DIAGNOSIS — E66.9 OBESITY (BMI 30-39.9): Chronic | ICD-10-CM

## 2022-01-01 DIAGNOSIS — R07.89 OTHER CHEST PAIN: ICD-10-CM

## 2022-01-01 DIAGNOSIS — J18.9 PNEUMONIA OF BOTH LOWER LOBES DUE TO INFECTIOUS ORGANISM: ICD-10-CM

## 2022-01-01 DIAGNOSIS — I10 ESSENTIAL HYPERTENSION: Chronic | ICD-10-CM

## 2022-01-01 DIAGNOSIS — E87.6 HYPOKALEMIA: ICD-10-CM

## 2022-01-01 DIAGNOSIS — E66.9 OBESITY (BMI 30-39.9): Primary | Chronic | ICD-10-CM

## 2022-01-01 DIAGNOSIS — I50.23 ACUTE ON CHRONIC SYSTOLIC CONGESTIVE HEART FAILURE: Primary | ICD-10-CM

## 2022-01-01 DIAGNOSIS — I25.119 CORONARY ARTERY DISEASE INVOLVING NATIVE HEART WITH ANGINA PECTORIS, UNSPECIFIED VESSEL OR LESION TYPE: Primary | ICD-10-CM

## 2022-01-01 DIAGNOSIS — N39.0 URINARY TRACT INFECTION WITH HEMATURIA, SITE UNSPECIFIED: Primary | ICD-10-CM

## 2022-01-01 DIAGNOSIS — I50.23 ACUTE ON CHRONIC SYSTOLIC CONGESTIVE HEART FAILURE: ICD-10-CM

## 2022-01-01 DIAGNOSIS — Z78.9 IMPAIRED MOBILITY AND ADLS: ICD-10-CM

## 2022-01-01 DIAGNOSIS — K21.9 GASTROESOPHAGEAL REFLUX DISEASE WITHOUT ESOPHAGITIS: ICD-10-CM

## 2022-01-01 DIAGNOSIS — L29.9 ITCHING: Primary | ICD-10-CM

## 2022-01-01 DIAGNOSIS — N30.01 ACUTE CYSTITIS WITH HEMATURIA: Primary | ICD-10-CM

## 2022-01-01 DIAGNOSIS — Z74.09 IMPAIRED MOBILITY AND ACTIVITIES OF DAILY LIVING: ICD-10-CM

## 2022-01-01 DIAGNOSIS — J96.01 ACUTE RESPIRATORY FAILURE WITH HYPOXIA: ICD-10-CM

## 2022-01-01 DIAGNOSIS — Z45.02 IMPLANTABLE CARDIOVERTER-DEFIBRILLATOR (ICD) GENERATOR END OF LIFE: ICD-10-CM

## 2022-01-01 DIAGNOSIS — I50.9 CHRONIC CONGESTIVE HEART FAILURE, UNSPECIFIED HEART FAILURE TYPE: ICD-10-CM

## 2022-01-01 DIAGNOSIS — I42.8 NONISCHEMIC CARDIOMYOPATHY: Chronic | ICD-10-CM

## 2022-01-01 DIAGNOSIS — E11.8 TYPE 2 DIABETES MELLITUS WITH COMPLICATION, WITHOUT LONG-TERM CURRENT USE OF INSULIN: ICD-10-CM

## 2022-01-01 DIAGNOSIS — K81.9 CHOLECYSTITIS: Primary | ICD-10-CM

## 2022-01-01 DIAGNOSIS — R31.9 URINARY TRACT INFECTION WITH HEMATURIA, SITE UNSPECIFIED: Primary | ICD-10-CM

## 2022-01-01 DIAGNOSIS — I42.8 NICM (NONISCHEMIC CARDIOMYOPATHY): ICD-10-CM

## 2022-01-01 DIAGNOSIS — I50.23 ACUTE ON CHRONIC SYSTOLIC CHF (CONGESTIVE HEART FAILURE): Chronic | ICD-10-CM

## 2022-01-01 DIAGNOSIS — R06.02 SHORTNESS OF BREATH: ICD-10-CM

## 2022-01-01 DIAGNOSIS — H93.13 TINNITUS OF BOTH EARS: ICD-10-CM

## 2022-01-01 DIAGNOSIS — Z12.31 ENCOUNTER FOR SCREENING MAMMOGRAM FOR MALIGNANT NEOPLASM OF BREAST: Primary | ICD-10-CM

## 2022-01-01 DIAGNOSIS — E11.8 TYPE 2 DIABETES MELLITUS WITH COMPLICATION, WITHOUT LONG-TERM CURRENT USE OF INSULIN: Primary | Chronic | ICD-10-CM

## 2022-01-01 DIAGNOSIS — E66.01 MORBID OBESITY: Chronic | ICD-10-CM

## 2022-01-01 DIAGNOSIS — E78.2 MIXED HYPERLIPIDEMIA: Chronic | ICD-10-CM

## 2022-01-01 DIAGNOSIS — Z12.31 ENCOUNTER FOR SCREENING MAMMOGRAM FOR MALIGNANT NEOPLASM OF BREAST: ICD-10-CM

## 2022-01-01 DIAGNOSIS — I50.22 CHRONIC SYSTOLIC HEART FAILURE: ICD-10-CM

## 2022-01-01 DIAGNOSIS — Z78.0 ASYMPTOMATIC POSTMENOPAUSAL STATE: ICD-10-CM

## 2022-01-01 DIAGNOSIS — R07.2 PRECORDIAL PAIN: Primary | ICD-10-CM

## 2022-01-01 DIAGNOSIS — N95.1 MENOPAUSAL SYNDROME: Chronic | ICD-10-CM

## 2022-01-01 DIAGNOSIS — D64.9 ANEMIA, UNSPECIFIED TYPE: ICD-10-CM

## 2022-01-01 DIAGNOSIS — K81.0 ACUTE CHOLECYSTITIS: Primary | ICD-10-CM

## 2022-01-01 DIAGNOSIS — R07.9 CHEST PAIN, UNSPECIFIED TYPE: Primary | ICD-10-CM

## 2022-01-01 DIAGNOSIS — R53.1 WEAKNESS: Primary | ICD-10-CM

## 2022-01-01 DIAGNOSIS — I50.23 ACUTE ON CHRONIC SYSTOLIC HEART FAILURE: ICD-10-CM

## 2022-01-01 DIAGNOSIS — Z96.1 PSEUDOPHAKIA: ICD-10-CM

## 2022-01-01 DIAGNOSIS — Z23 NEED FOR IMMUNIZATION AGAINST INFLUENZA: ICD-10-CM

## 2022-01-01 DIAGNOSIS — Z90.49 STATUS POST LAPAROSCOPIC CHOLECYSTECTOMY: ICD-10-CM

## 2022-01-01 DIAGNOSIS — K85.00 IDIOPATHIC ACUTE PANCREATITIS WITHOUT INFECTION OR NECROSIS: Primary | ICD-10-CM

## 2022-01-01 DIAGNOSIS — R06.00 DYSPNEA, UNSPECIFIED TYPE: ICD-10-CM

## 2022-01-01 DIAGNOSIS — Z00.00 MEDICARE ANNUAL WELLNESS VISIT, SUBSEQUENT: Primary | ICD-10-CM

## 2022-01-01 DIAGNOSIS — Z74.09 IMPAIRED MOBILITY AND ADLS: ICD-10-CM

## 2022-01-01 DIAGNOSIS — R07.9 CHEST PAIN, UNSPECIFIED TYPE: ICD-10-CM

## 2022-01-01 DIAGNOSIS — Z78.9 IMPAIRED MOBILITY AND ACTIVITIES OF DAILY LIVING: ICD-10-CM

## 2022-01-01 DIAGNOSIS — E11.8 TYPE 2 DIABETES MELLITUS WITH COMPLICATION, WITHOUT LONG-TERM CURRENT USE OF INSULIN: Chronic | ICD-10-CM

## 2022-01-01 DIAGNOSIS — I50.9 ACUTE ON CHRONIC CONGESTIVE HEART FAILURE, UNSPECIFIED HEART FAILURE TYPE: Primary | ICD-10-CM

## 2022-01-01 DIAGNOSIS — H40.003 BORDERLINE GLAUCOMA OF BOTH EYES: ICD-10-CM

## 2022-01-01 DIAGNOSIS — K85.80 OTHER ACUTE PANCREATITIS WITHOUT INFECTION OR NECROSIS: Primary | ICD-10-CM

## 2022-01-01 DIAGNOSIS — I25.119 CORONARY ARTERY DISEASE INVOLVING NATIVE HEART WITH ANGINA PECTORIS, UNSPECIFIED VESSEL OR LESION TYPE: ICD-10-CM

## 2022-01-01 DIAGNOSIS — E55.9 VITAMIN D DEFICIENCY: Chronic | ICD-10-CM

## 2022-01-01 DIAGNOSIS — J18.9 MULTIFOCAL PNEUMONIA: ICD-10-CM

## 2022-01-01 DIAGNOSIS — I50.9 ACUTE ON CHRONIC CONGESTIVE HEART FAILURE, UNSPECIFIED HEART FAILURE TYPE: ICD-10-CM

## 2022-01-01 DIAGNOSIS — J81.0 ACUTE PULMONARY EDEMA: Primary | ICD-10-CM

## 2022-01-01 DIAGNOSIS — I21.4 NSTEMI (NON-ST ELEVATED MYOCARDIAL INFARCTION): Primary | ICD-10-CM

## 2022-01-01 DIAGNOSIS — I25.119 CORONARY ARTERY DISEASE INVOLVING NATIVE CORONARY ARTERY OF NATIVE HEART WITH ANGINA PECTORIS: ICD-10-CM

## 2022-01-01 DIAGNOSIS — R41.82 ALTERED MENTAL STATUS, UNSPECIFIED ALTERED MENTAL STATUS TYPE: Primary | ICD-10-CM

## 2022-01-01 DIAGNOSIS — I25.5 ISCHEMIC CARDIOMYOPATHY: Primary | Chronic | ICD-10-CM

## 2022-01-01 DIAGNOSIS — R07.2 PRECORDIAL PAIN: ICD-10-CM

## 2022-01-01 DIAGNOSIS — Z78.0 ASYMPTOMATIC MENOPAUSAL STATE: ICD-10-CM

## 2022-01-01 DIAGNOSIS — N28.9 RENAL INSUFFICIENCY, MILD: ICD-10-CM

## 2022-01-01 DIAGNOSIS — R10.11 RIGHT UPPER QUADRANT ABDOMINAL PAIN: ICD-10-CM

## 2022-01-01 DIAGNOSIS — H91.93 HEARING PROBLEM OF BOTH EARS: ICD-10-CM

## 2022-01-01 DIAGNOSIS — R40.0 DROWSINESS: ICD-10-CM

## 2022-01-01 DIAGNOSIS — R10.30 LOWER ABDOMINAL PAIN: ICD-10-CM

## 2022-01-01 DIAGNOSIS — E11.49 NEUROLOGIC DISORDER ASSOCIATED WITH DIABETES MELLITUS: Chronic | ICD-10-CM

## 2022-01-01 DIAGNOSIS — R53.81 DEBILITY: Primary | ICD-10-CM

## 2022-01-01 DIAGNOSIS — Z91.81 AT HIGH RISK FOR INJURY RELATED TO FALL: ICD-10-CM

## 2022-01-01 DIAGNOSIS — I50.23 ACUTE ON CHRONIC SYSTOLIC CHF (CONGESTIVE HEART FAILURE): Primary | Chronic | ICD-10-CM

## 2022-01-01 DIAGNOSIS — E83.42 HYPOMAGNESEMIA: ICD-10-CM

## 2022-01-01 DIAGNOSIS — Z78.0 POSTMENOPAUSAL: Primary | ICD-10-CM
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
## 2022-01-01 DIAGNOSIS — R92.8 ABNORMAL MAMMOGRAM: ICD-10-CM

## 2022-01-01 DIAGNOSIS — Z78.0 POSTMENOPAUSAL: ICD-10-CM

## 2022-01-01 DIAGNOSIS — L29.9 PRURITUS: Primary | ICD-10-CM

## 2022-01-01 DIAGNOSIS — R92.8 ABNORMAL MAMMOGRAM: Primary | ICD-10-CM

## 2022-01-01 LAB
ABO GROUP BLD: NORMAL
ABO GROUP BLD: NORMAL
ALBUMIN SERPL-MCNC: 2.9 G/DL (ref 3.5–5.2)
ALBUMIN SERPL-MCNC: 3.2 G/DL (ref 3.5–5.2)
ALBUMIN SERPL-MCNC: 3.4 G/DL (ref 3.5–5.2)
ALBUMIN SERPL-MCNC: 3.4 G/DL (ref 3.5–5.2)
ALBUMIN SERPL-MCNC: 3.5 G/DL (ref 3.5–5.2)
ALBUMIN SERPL-MCNC: 3.7 G/DL (ref 3.5–5.2)
ALBUMIN SERPL-MCNC: 3.8 G/DL (ref 3.5–5.2)
ALBUMIN SERPL-MCNC: 3.9 G/DL (ref 3.5–5.2)
ALBUMIN SERPL-MCNC: 4 G/DL (ref 3.5–5.2)
ALBUMIN SERPL-MCNC: 4 G/DL (ref 3.5–5.2)
ALBUMIN SERPL-MCNC: 4.1 G/DL (ref 3.5–5.2)
ALBUMIN SERPL-MCNC: 4.2 G/DL (ref 3.5–5.2)
ALBUMIN SERPL-MCNC: 4.4 G/DL (ref 3.5–5.2)
ALBUMIN/GLOB SERPL: 1.2 G/DL
ALBUMIN/GLOB SERPL: 1.3 G/DL
ALBUMIN/GLOB SERPL: 1.4 G/DL
ALBUMIN/GLOB SERPL: 1.5 G/DL
ALBUMIN/GLOB SERPL: 1.5 G/DL
ALBUMIN/GLOB SERPL: 1.6 G/DL
ALBUMIN/GLOB SERPL: 1.7 G/DL
ALBUMIN/GLOB SERPL: 1.9 G/DL
ALP SERPL-CCNC: 101 U/L (ref 39–117)
ALP SERPL-CCNC: 102 U/L (ref 39–117)
ALP SERPL-CCNC: 104 U/L (ref 39–117)
ALP SERPL-CCNC: 104 U/L (ref 39–117)
ALP SERPL-CCNC: 106 U/L (ref 39–117)
ALP SERPL-CCNC: 106 U/L (ref 39–117)
ALP SERPL-CCNC: 111 U/L (ref 39–117)
ALP SERPL-CCNC: 112 U/L (ref 39–117)
ALP SERPL-CCNC: 113 U/L (ref 39–117)
ALP SERPL-CCNC: 119 U/L (ref 39–117)
ALP SERPL-CCNC: 125 U/L (ref 39–117)
ALP SERPL-CCNC: 125 U/L (ref 39–117)
ALP SERPL-CCNC: 127 U/L (ref 39–117)
ALP SERPL-CCNC: 132 U/L (ref 39–117)
ALP SERPL-CCNC: 133 U/L (ref 39–117)
ALP SERPL-CCNC: 135 U/L (ref 39–117)
ALP SERPL-CCNC: 146 U/L (ref 39–117)
ALP SERPL-CCNC: 149 U/L (ref 39–117)
ALP SERPL-CCNC: 152 U/L (ref 39–117)
ALP SERPL-CCNC: 155 U/L (ref 39–117)
ALP SERPL-CCNC: 164 U/L (ref 39–117)
ALP SERPL-CCNC: 174 U/L (ref 39–117)
ALP SERPL-CCNC: 180 U/L (ref 39–117)
ALP SERPL-CCNC: 203 U/L (ref 39–117)
ALP SERPL-CCNC: 83 U/L (ref 39–117)
ALT SERPL W P-5'-P-CCNC: 10 U/L (ref 1–33)
ALT SERPL W P-5'-P-CCNC: 10 U/L (ref 1–33)
ALT SERPL W P-5'-P-CCNC: 11 U/L (ref 1–33)
ALT SERPL W P-5'-P-CCNC: 12 U/L (ref 1–33)
ALT SERPL W P-5'-P-CCNC: 13 U/L (ref 1–33)
ALT SERPL W P-5'-P-CCNC: 14 U/L (ref 1–33)
ALT SERPL W P-5'-P-CCNC: 16 U/L (ref 1–33)
ALT SERPL W P-5'-P-CCNC: 16 U/L (ref 1–33)
ALT SERPL W P-5'-P-CCNC: 17 U/L (ref 1–33)
ALT SERPL W P-5'-P-CCNC: 18 U/L (ref 1–33)
ALT SERPL W P-5'-P-CCNC: 18 U/L (ref 1–33)
ALT SERPL W P-5'-P-CCNC: 20 U/L (ref 1–33)
ALT SERPL W P-5'-P-CCNC: 20 U/L (ref 1–33)
ALT SERPL W P-5'-P-CCNC: 21 U/L (ref 1–33)
ALT SERPL W P-5'-P-CCNC: 22 U/L (ref 1–33)
ALT SERPL W P-5'-P-CCNC: 24 U/L (ref 1–33)
ALT SERPL W P-5'-P-CCNC: 24 U/L (ref 1–33)
ALT SERPL W P-5'-P-CCNC: 25 U/L (ref 1–33)
ALT SERPL W P-5'-P-CCNC: 26 U/L (ref 1–33)
ALT SERPL W P-5'-P-CCNC: 35 U/L (ref 1–33)
ALT SERPL W P-5'-P-CCNC: 35 U/L (ref 1–33)
ALT SERPL W P-5'-P-CCNC: 44 U/L (ref 1–33)
ALT SERPL W P-5'-P-CCNC: 9 U/L (ref 1–33)
ANION GAP SERPL CALCULATED.3IONS-SCNC: 10 MMOL/L (ref 5–15)
ANION GAP SERPL CALCULATED.3IONS-SCNC: 10 MMOL/L (ref 5–15)
ANION GAP SERPL CALCULATED.3IONS-SCNC: 11 MMOL/L (ref 5–15)
ANION GAP SERPL CALCULATED.3IONS-SCNC: 12 MMOL/L (ref 5–15)
ANION GAP SERPL CALCULATED.3IONS-SCNC: 13 MMOL/L (ref 5–15)
ANION GAP SERPL CALCULATED.3IONS-SCNC: 13.2 MMOL/L (ref 5–15)
ANION GAP SERPL CALCULATED.3IONS-SCNC: 14 MMOL/L (ref 5–15)
ANION GAP SERPL CALCULATED.3IONS-SCNC: 15 MMOL/L (ref 5–15)
ANION GAP SERPL CALCULATED.3IONS-SCNC: 16 MMOL/L (ref 5–15)
ANION GAP SERPL CALCULATED.3IONS-SCNC: 16.3 MMOL/L (ref 5–15)
ANION GAP SERPL CALCULATED.3IONS-SCNC: 17 MMOL/L (ref 5–15)
ANION GAP SERPL CALCULATED.3IONS-SCNC: 18 MMOL/L (ref 5–15)
ANION GAP SERPL CALCULATED.3IONS-SCNC: 19 MMOL/L (ref 5–15)
ANION GAP SERPL CALCULATED.3IONS-SCNC: 20 MMOL/L (ref 5–15)
ANION GAP SERPL CALCULATED.3IONS-SCNC: 21 MMOL/L (ref 5–15)
ANION GAP SERPL CALCULATED.3IONS-SCNC: 9 MMOL/L (ref 5–15)
APTT PPP: 22.9 SECONDS (ref 20–40.3)
APTT PPP: 25.8 SECONDS (ref 20–40.3)
ARTERIAL PATENCY WRIST A: ABNORMAL
ARTICHOKE IGE QN: 122 MG/DL (ref 0–100)
AST SERPL-CCNC: 15 U/L (ref 1–32)
AST SERPL-CCNC: 17 U/L (ref 1–32)
AST SERPL-CCNC: 20 U/L (ref 1–32)
AST SERPL-CCNC: 21 U/L (ref 1–32)
AST SERPL-CCNC: 22 U/L (ref 1–32)
AST SERPL-CCNC: 23 U/L (ref 1–32)
AST SERPL-CCNC: 24 U/L (ref 1–32)
AST SERPL-CCNC: 24 U/L (ref 1–32)
AST SERPL-CCNC: 26 U/L (ref 1–32)
AST SERPL-CCNC: 29 U/L (ref 1–32)
AST SERPL-CCNC: 31 U/L (ref 1–32)
AST SERPL-CCNC: 31 U/L (ref 1–32)
AST SERPL-CCNC: 33 U/L (ref 1–32)
AST SERPL-CCNC: 33 U/L (ref 1–32)
AST SERPL-CCNC: 34 U/L (ref 1–32)
AST SERPL-CCNC: 36 U/L (ref 1–32)
AST SERPL-CCNC: 36 U/L (ref 1–32)
AST SERPL-CCNC: 37 U/L (ref 1–32)
AST SERPL-CCNC: 43 U/L (ref 1–32)
AST SERPL-CCNC: 49 U/L (ref 1–32)
AST SERPL-CCNC: 50 U/L (ref 1–32)
ATMOSPHERIC PRESS: 746 MMHG
BACTERIA SPEC AEROBE CULT: NORMAL
BACTERIA UR QL AUTO: ABNORMAL /HPF
BASE EXCESS BLDA CALC-SCNC: -4.2 MMOL/L (ref 0–2)
BASOPHILS # BLD AUTO: 0.02 10*3/MM3 (ref 0–0.2)
BASOPHILS # BLD AUTO: 0.03 10*3/MM3 (ref 0–0.2)
BASOPHILS # BLD AUTO: 0.04 10*3/MM3 (ref 0–0.2)
BASOPHILS # BLD AUTO: 0.05 10*3/MM3 (ref 0–0.2)
BASOPHILS # BLD AUTO: 0.06 10*3/MM3 (ref 0–0.2)
BASOPHILS # BLD AUTO: 0.08 10*3/MM3 (ref 0–0.2)
BASOPHILS NFR BLD AUTO: 0.3 % (ref 0–1.5)
BASOPHILS NFR BLD AUTO: 0.4 % (ref 0–1.5)
BASOPHILS NFR BLD AUTO: 0.5 % (ref 0–1.5)
BASOPHILS NFR BLD AUTO: 0.6 % (ref 0–1.5)
BASOPHILS NFR BLD AUTO: 0.7 % (ref 0–1.5)
BASOPHILS NFR BLD AUTO: 0.8 % (ref 0–1.5)
BASOPHILS NFR BLD AUTO: 1 % (ref 0–1.5)
BDY SITE: ABNORMAL
BH CV ECHO MEAS - ACS: 2.13 CM
BH CV ECHO MEAS - AO MAX PG: 11.4 MMHG
BH CV ECHO MEAS - AO MAX PG: 5.5 MMHG
BH CV ECHO MEAS - AO MEAN PG: 5.4 MMHG
BH CV ECHO MEAS - AO ROOT DIAM: 3.3 CM
BH CV ECHO MEAS - AO V2 MAX: 117.5 CM/SEC
BH CV ECHO MEAS - AO V2 MAX: 168.7 CM/SEC
BH CV ECHO MEAS - AO V2 VTI: 24.5 CM
BH CV ECHO MEAS - AVA(I,D): 1.36 CM2
BH CV ECHO MEAS - EDV(CUBED): 250 ML
BH CV ECHO MEAS - EDV(CUBED): 333.8 ML
BH CV ECHO MEAS - EDV(MOD-SP2): 129 ML
BH CV ECHO MEAS - EDV(MOD-SP2): 147 ML
BH CV ECHO MEAS - EDV(MOD-SP4): 127 ML
BH CV ECHO MEAS - EDV(MOD-SP4): 179 ML
BH CV ECHO MEAS - EF(MOD-BP): 26.3 %
BH CV ECHO MEAS - EF(MOD-BP): 34 %
BH CV ECHO MEAS - EF(MOD-SP2): 21.7 %
BH CV ECHO MEAS - EF(MOD-SP2): 21.8 %
BH CV ECHO MEAS - EF(MOD-SP4): 29.8 %
BH CV ECHO MEAS - EF(MOD-SP4): 39.7 %
BH CV ECHO MEAS - ESV(CUBED): 139.4 ML
BH CV ECHO MEAS - ESV(CUBED): 232.7 ML
BH CV ECHO MEAS - ESV(MOD-SP2): 101 ML
BH CV ECHO MEAS - ESV(MOD-SP2): 115 ML
BH CV ECHO MEAS - ESV(MOD-SP4): 108 ML
BH CV ECHO MEAS - ESV(MOD-SP4): 89.2 ML
BH CV ECHO MEAS - FS: 11.3 %
BH CV ECHO MEAS - FS: 17.7 %
BH CV ECHO MEAS - IVS/LVPW: 0.93 CM
BH CV ECHO MEAS - IVS/LVPW: 1.06 CM
BH CV ECHO MEAS - IVSD: 0.84 CM
BH CV ECHO MEAS - IVSD: 1.07 CM
BH CV ECHO MEAS - LA DIMENSION: 4.5 CM
BH CV ECHO MEAS - LA DIMENSION: 4.8 CM
BH CV ECHO MEAS - LV DIASTOLIC VOL/BSA (35-75): 92.7 CM2
BH CV ECHO MEAS - LV MASS(C)D: 245 GRAMS
BH CV ECHO MEAS - LV MASS(C)D: 305.2 GRAMS
BH CV ECHO MEAS - LV MAX PG: 3 MMHG
BH CV ECHO MEAS - LV MEAN PG: 1.59 MMHG
BH CV ECHO MEAS - LV SYSTOLIC VOL/BSA (12-30): 55.9 CM2
BH CV ECHO MEAS - LV V1 MAX: 87 CM/SEC
BH CV ECHO MEAS - LV V1 VTI: 11.7 CM
BH CV ECHO MEAS - LVIDD: 6.3 CM
BH CV ECHO MEAS - LVIDD: 6.9 CM
BH CV ECHO MEAS - LVIDS: 5.2 CM
BH CV ECHO MEAS - LVIDS: 6.2 CM
BH CV ECHO MEAS - LVOT AREA: 2.8 CM2
BH CV ECHO MEAS - LVOT DIAM: 1.9 CM
BH CV ECHO MEAS - LVPWD: 0.79 CM
BH CV ECHO MEAS - LVPWD: 1.14 CM
BH CV ECHO MEAS - MR MAX PG: 126.9 MMHG
BH CV ECHO MEAS - MR MAX PG: 95.8 MMHG
BH CV ECHO MEAS - MR MAX VEL: 489.5 CM/SEC
BH CV ECHO MEAS - MR MAX VEL: 563.2 CM/SEC
BH CV ECHO MEAS - MV DEC SLOPE: 985 CM/SEC2
BH CV ECHO MEAS - MV MAX PG: 9.9 MMHG
BH CV ECHO MEAS - MV MEAN PG: 2.9 MMHG
BH CV ECHO MEAS - MV P1/2T: 45.9 MSEC
BH CV ECHO MEAS - MV V2 VTI: 22.9 CM
BH CV ECHO MEAS - MVA(P1/2T): 4.8 CM2
BH CV ECHO MEAS - MVA(VTI): 1.46 CM2
BH CV ECHO MEAS - PA V2 MAX: 103 CM/SEC
BH CV ECHO MEAS - RAP SYSTOLE: 10 MMHG
BH CV ECHO MEAS - RAP SYSTOLE: 20 MMHG
BH CV ECHO MEAS - RVDD: 2.5 CM
BH CV ECHO MEAS - RVDD: 3 CM
BH CV ECHO MEAS - RVSP: 64.5 MMHG
BH CV ECHO MEAS - RVSP: 95.2 MMHG
BH CV ECHO MEAS - SI(MOD-SP2): 14.5 ML/M2
BH CV ECHO MEAS - SI(MOD-SP4): 36.8 ML/M2
BH CV ECHO MEAS - SV(LVOT): 33.3 ML
BH CV ECHO MEAS - SV(MOD-SP2): 28 ML
BH CV ECHO MEAS - SV(MOD-SP2): 32 ML
BH CV ECHO MEAS - SV(MOD-SP4): 37.8 ML
BH CV ECHO MEAS - SV(MOD-SP4): 71 ML
BH CV ECHO MEAS - TR MAX PG: 54.5 MMHG
BH CV ECHO MEAS - TR MAX PG: 75.2 MMHG
BH CV ECHO MEAS - TR MAX VEL: 369.2 CM/SEC
BH CV ECHO MEAS - TR MAX VEL: 433.6 CM/SEC
BILIRUB SERPL-MCNC: 0.6 MG/DL (ref 0–1.2)
BILIRUB SERPL-MCNC: 0.7 MG/DL (ref 0–1.2)
BILIRUB SERPL-MCNC: 0.7 MG/DL (ref 0–1.2)
BILIRUB SERPL-MCNC: 0.8 MG/DL (ref 0–1.2)
BILIRUB SERPL-MCNC: 0.9 MG/DL (ref 0–1.2)
BILIRUB SERPL-MCNC: 0.9 MG/DL (ref 0–1.2)
BILIRUB SERPL-MCNC: 1 MG/DL (ref 0–1.2)
BILIRUB SERPL-MCNC: 1 MG/DL (ref 0–1.2)
BILIRUB SERPL-MCNC: 1.1 MG/DL (ref 0–1.2)
BILIRUB SERPL-MCNC: 1.2 MG/DL (ref 0–1.2)
BILIRUB SERPL-MCNC: 1.3 MG/DL (ref 0–1.2)
BILIRUB SERPL-MCNC: 1.4 MG/DL (ref 0–1.2)
BILIRUB SERPL-MCNC: 1.4 MG/DL (ref 0–1.2)
BILIRUB SERPL-MCNC: 1.6 MG/DL (ref 0–1.2)
BILIRUB SERPL-MCNC: 1.7 MG/DL (ref 0–1.2)
BILIRUB SERPL-MCNC: 2.1 MG/DL (ref 0–1.2)
BILIRUB SERPL-MCNC: 2.2 MG/DL (ref 0–1.2)
BILIRUB SERPL-MCNC: 2.5 MG/DL (ref 0–1.2)
BILIRUB UR QL STRIP: ABNORMAL
BILIRUB UR QL STRIP: NEGATIVE
BLD GP AB SCN SERPL QL: NEGATIVE
BUN SERPL-MCNC: 10 MG/DL (ref 8–23)
BUN SERPL-MCNC: 10 MG/DL (ref 8–23)
BUN SERPL-MCNC: 11 MG/DL (ref 8–23)
BUN SERPL-MCNC: 12 MG/DL (ref 8–23)
BUN SERPL-MCNC: 12 MG/DL (ref 8–23)
BUN SERPL-MCNC: 13 MG/DL (ref 8–23)
BUN SERPL-MCNC: 14 MG/DL (ref 8–23)
BUN SERPL-MCNC: 14 MG/DL (ref 8–23)
BUN SERPL-MCNC: 15 MG/DL (ref 8–23)
BUN SERPL-MCNC: 16 MG/DL (ref 8–23)
BUN SERPL-MCNC: 17 MG/DL (ref 8–23)
BUN SERPL-MCNC: 18 MG/DL (ref 8–23)
BUN SERPL-MCNC: 18 MG/DL (ref 8–23)
BUN SERPL-MCNC: 19 MG/DL (ref 8–23)
BUN SERPL-MCNC: 21 MG/DL (ref 8–23)
BUN SERPL-MCNC: 6 MG/DL (ref 8–23)
BUN SERPL-MCNC: 9 MG/DL (ref 8–23)
BUN/CREAT SERPL: 10.2 (ref 7–25)
BUN/CREAT SERPL: 10.2 (ref 7–25)
BUN/CREAT SERPL: 10.3 (ref 7–25)
BUN/CREAT SERPL: 10.3 (ref 7–25)
BUN/CREAT SERPL: 11.9 (ref 7–25)
BUN/CREAT SERPL: 12.2 (ref 7–25)
BUN/CREAT SERPL: 13.2 (ref 7–25)
BUN/CREAT SERPL: 13.4 (ref 7–25)
BUN/CREAT SERPL: 13.6 (ref 7–25)
BUN/CREAT SERPL: 13.7 (ref 7–25)
BUN/CREAT SERPL: 13.7 (ref 7–25)
BUN/CREAT SERPL: 13.9 (ref 7–25)
BUN/CREAT SERPL: 14.2 (ref 7–25)
BUN/CREAT SERPL: 14.3 (ref 7–25)
BUN/CREAT SERPL: 14.3 (ref 7–25)
BUN/CREAT SERPL: 14.4 (ref 7–25)
BUN/CREAT SERPL: 14.6 (ref 7–25)
BUN/CREAT SERPL: 14.6 (ref 7–25)
BUN/CREAT SERPL: 14.7 (ref 7–25)
BUN/CREAT SERPL: 14.8 (ref 7–25)
BUN/CREAT SERPL: 15 (ref 7–25)
BUN/CREAT SERPL: 15.5 (ref 7–25)
BUN/CREAT SERPL: 15.7 (ref 7–25)
BUN/CREAT SERPL: 16.1 (ref 7–25)
BUN/CREAT SERPL: 16.2 (ref 7–25)
BUN/CREAT SERPL: 18.6 (ref 7–25)
BUN/CREAT SERPL: 20.4 (ref 7–25)
BUN/CREAT SERPL: 6.6 (ref 7–25)
BUN/CREAT SERPL: 8.6 (ref 7–25)
BUN/CREAT SERPL: 8.8 (ref 7–25)
BUN/CREAT SERPL: 9.4 (ref 7–25)
BUN/CREAT SERPL: 9.4 (ref 7–25)
BUN/CREAT SERPL: 9.5 (ref 7–25)
BUN/CREAT SERPL: 9.6 (ref 7–25)
BUN/CREAT SERPL: 9.7 (ref 7–25)
CALCIUM SPEC-SCNC: 10.2 MG/DL (ref 8.6–10.5)
CALCIUM SPEC-SCNC: 10.3 MG/DL (ref 8.6–10.5)
CALCIUM SPEC-SCNC: 10.5 MG/DL (ref 8.6–10.5)
CALCIUM SPEC-SCNC: 8.2 MG/DL (ref 8.6–10.5)
CALCIUM SPEC-SCNC: 8.9 MG/DL (ref 8.6–10.5)
CALCIUM SPEC-SCNC: 9 MG/DL (ref 8.6–10.5)
CALCIUM SPEC-SCNC: 9.1 MG/DL (ref 8.6–10.5)
CALCIUM SPEC-SCNC: 9.1 MG/DL (ref 8.6–10.5)
CALCIUM SPEC-SCNC: 9.2 MG/DL (ref 8.6–10.5)
CALCIUM SPEC-SCNC: 9.3 MG/DL (ref 8.6–10.5)
CALCIUM SPEC-SCNC: 9.4 MG/DL (ref 8.6–10.5)
CALCIUM SPEC-SCNC: 9.5 MG/DL (ref 8.6–10.5)
CALCIUM SPEC-SCNC: 9.6 MG/DL (ref 8.6–10.5)
CALCIUM SPEC-SCNC: 9.6 MG/DL (ref 8.6–10.5)
CALCIUM SPEC-SCNC: 9.7 MG/DL (ref 8.6–10.5)
CALCIUM SPEC-SCNC: 9.8 MG/DL (ref 8.6–10.5)
CALCIUM SPEC-SCNC: 9.9 MG/DL (ref 8.6–10.5)
CHLORIDE SERPL-SCNC: 100 MMOL/L (ref 98–107)
CHLORIDE SERPL-SCNC: 101 MMOL/L (ref 98–107)
CHLORIDE SERPL-SCNC: 102 MMOL/L (ref 98–107)
CHLORIDE SERPL-SCNC: 103 MMOL/L (ref 98–107)
CHLORIDE SERPL-SCNC: 104 MMOL/L (ref 98–107)
CHLORIDE SERPL-SCNC: 105 MMOL/L (ref 98–107)
CHLORIDE SERPL-SCNC: 106 MMOL/L (ref 98–107)
CHLORIDE SERPL-SCNC: 107 MMOL/L (ref 98–107)
CHLORIDE SERPL-SCNC: 107 MMOL/L (ref 98–107)
CHLORIDE SERPL-SCNC: 109 MMOL/L (ref 98–107)
CHLORIDE SERPL-SCNC: 109 MMOL/L (ref 98–107)
CHLORIDE SERPL-SCNC: 96 MMOL/L (ref 98–107)
CHLORIDE SERPL-SCNC: 97 MMOL/L (ref 98–107)
CHLORIDE SERPL-SCNC: 98 MMOL/L (ref 98–107)
CHLORIDE SERPL-SCNC: 99 MMOL/L (ref 98–107)
CHOLEST SERPL-MCNC: 136 MG/DL (ref 0–200)
CHOLEST SERPL-MCNC: 143 MG/DL (ref 0–200)
CHOLEST SERPL-MCNC: 164 MG/DL (ref 0–200)
CK SERPL-CCNC: 60 U/L (ref 20–180)
CK SERPL-CCNC: 66 U/L (ref 20–180)
CK SERPL-CCNC: 74 U/L (ref 20–180)
CK SERPL-CCNC: 79 U/L (ref 20–180)
CLARITY UR: ABNORMAL
CLARITY UR: CLEAR
CO2 SERPL-SCNC: 12 MMOL/L (ref 22–29)
CO2 SERPL-SCNC: 15 MMOL/L (ref 22–29)
CO2 SERPL-SCNC: 16 MMOL/L (ref 22–29)
CO2 SERPL-SCNC: 16 MMOL/L (ref 22–29)
CO2 SERPL-SCNC: 17 MMOL/L (ref 22–29)
CO2 SERPL-SCNC: 17 MMOL/L (ref 22–29)
CO2 SERPL-SCNC: 18 MMOL/L (ref 22–29)
CO2 SERPL-SCNC: 18 MMOL/L (ref 22–29)
CO2 SERPL-SCNC: 19 MMOL/L (ref 22–29)
CO2 SERPL-SCNC: 19.7 MMOL/L (ref 22–29)
CO2 SERPL-SCNC: 19.8 MMOL/L (ref 22–29)
CO2 SERPL-SCNC: 20 MMOL/L (ref 22–29)
CO2 SERPL-SCNC: 21 MMOL/L (ref 22–29)
CO2 SERPL-SCNC: 22 MMOL/L (ref 22–29)
CO2 SERPL-SCNC: 23 MMOL/L (ref 22–29)
CO2 SERPL-SCNC: 23 MMOL/L (ref 22–29)
CO2 SERPL-SCNC: 24 MMOL/L (ref 22–29)
CO2 SERPL-SCNC: 25 MMOL/L (ref 22–29)
CO2 SERPL-SCNC: 27 MMOL/L (ref 22–29)
CO2 SERPL-SCNC: 27 MMOL/L (ref 22–29)
CO2 SERPL-SCNC: 28 MMOL/L (ref 22–29)
CO2 SERPL-SCNC: 28 MMOL/L (ref 22–29)
COD CRY URNS QL: ABNORMAL /HPF
COLOR UR: ABNORMAL
COLOR UR: YELLOW
CREAT SERPL-MCNC: 0.82 MG/DL (ref 0.57–1)
CREAT SERPL-MCNC: 0.83 MG/DL (ref 0.57–1)
CREAT SERPL-MCNC: 0.88 MG/DL (ref 0.57–1)
CREAT SERPL-MCNC: 0.89 MG/DL (ref 0.57–1)
CREAT SERPL-MCNC: 0.89 MG/DL (ref 0.57–1)
CREAT SERPL-MCNC: 0.91 MG/DL (ref 0.57–1)
CREAT SERPL-MCNC: 0.94 MG/DL (ref 0.57–1)
CREAT SERPL-MCNC: 0.94 MG/DL (ref 0.57–1)
CREAT SERPL-MCNC: 0.95 MG/DL (ref 0.57–1)
CREAT SERPL-MCNC: 0.95 MG/DL (ref 0.57–1)
CREAT SERPL-MCNC: 0.96 MG/DL (ref 0.57–1)
CREAT SERPL-MCNC: 0.97 MG/DL (ref 0.57–1)
CREAT SERPL-MCNC: 0.97 MG/DL (ref 0.57–1)
CREAT SERPL-MCNC: 0.99 MG/DL (ref 0.57–1)
CREAT SERPL-MCNC: 1 MG/DL (ref 0.57–1)
CREAT SERPL-MCNC: 1 MG/DL (ref 0.57–1)
CREAT SERPL-MCNC: 1.02 MG/DL (ref 0.57–1)
CREAT SERPL-MCNC: 1.02 MG/DL (ref 0.57–1)
CREAT SERPL-MCNC: 1.03 MG/DL (ref 0.57–1)
CREAT SERPL-MCNC: 1.03 MG/DL (ref 0.57–1)
CREAT SERPL-MCNC: 1.05 MG/DL (ref 0.57–1)
CREAT SERPL-MCNC: 1.06 MG/DL (ref 0.57–1)
CREAT SERPL-MCNC: 1.06 MG/DL (ref 0.57–1)
CREAT SERPL-MCNC: 1.07 MG/DL (ref 0.57–1)
CREAT SERPL-MCNC: 1.07 MG/DL (ref 0.57–1)
CREAT SERPL-MCNC: 1.08 MG/DL (ref 0.57–1)
CREAT SERPL-MCNC: 1.08 MG/DL (ref 0.57–1)
CREAT SERPL-MCNC: 1.11 MG/DL (ref 0.57–1)
CREAT SERPL-MCNC: 1.12 MG/DL (ref 0.57–1)
CREAT SERPL-MCNC: 1.14 MG/DL (ref 0.57–1)
CREAT SERPL-MCNC: 1.14 MG/DL (ref 0.57–1)
CREAT SERPL-MCNC: 1.15 MG/DL (ref 0.57–1)
CREAT SERPL-MCNC: 1.18 MG/DL (ref 0.57–1)
CREAT SERPL-MCNC: 1.18 MG/DL (ref 0.57–1)
CREAT SERPL-MCNC: 1.19 MG/DL (ref 0.57–1)
CREAT SERPL-MCNC: 1.2 MG/DL (ref 0.57–1)
CREAT SERPL-MCNC: 1.24 MG/DL (ref 0.57–1)
CREAT SERPL-MCNC: 1.24 MG/DL (ref 0.57–1)
D-DIMER, QUANTITATIVE (MAD,POW, STR): 1631 NG/ML (FEU) (ref 0–470)
D-DIMER, QUANTITATIVE (MAD,POW, STR): 975 NG/ML (FEU) (ref 0–470)
D-LACTATE SERPL-SCNC: 1.5 MMOL/L (ref 0.5–2)
D-LACTATE SERPL-SCNC: 1.8 MMOL/L (ref 0.5–2)
D-LACTATE SERPL-SCNC: 1.9 MMOL/L (ref 0.5–2)
D-LACTATE SERPL-SCNC: 2 MMOL/L (ref 0.5–2)
D-LACTATE SERPL-SCNC: 2 MMOL/L (ref 0.5–2)
D-LACTATE SERPL-SCNC: 2.1 MMOL/L (ref 0.5–2)
D-LACTATE SERPL-SCNC: 2.2 MMOL/L (ref 0.5–2)
D-LACTATE SERPL-SCNC: 2.4 MMOL/L (ref 0.5–2)
D-LACTATE SERPL-SCNC: 2.5 MMOL/L (ref 0.5–2)
D-LACTATE SERPL-SCNC: 3.6 MMOL/L (ref 0.5–2)
DEPRECATED RDW RBC AUTO: 47.2 FL (ref 37–54)
DEPRECATED RDW RBC AUTO: 47.2 FL (ref 37–54)
DEPRECATED RDW RBC AUTO: 47.4 FL (ref 37–54)
DEPRECATED RDW RBC AUTO: 47.4 FL (ref 37–54)
DEPRECATED RDW RBC AUTO: 47.6 FL (ref 37–54)
DEPRECATED RDW RBC AUTO: 47.8 FL (ref 37–54)
DEPRECATED RDW RBC AUTO: 47.8 FL (ref 37–54)
DEPRECATED RDW RBC AUTO: 47.9 FL (ref 37–54)
DEPRECATED RDW RBC AUTO: 48.5 FL (ref 37–54)
DEPRECATED RDW RBC AUTO: 48.7 FL (ref 37–54)
DEPRECATED RDW RBC AUTO: 48.8 FL (ref 37–54)
DEPRECATED RDW RBC AUTO: 48.9 FL (ref 37–54)
DEPRECATED RDW RBC AUTO: 48.9 FL (ref 37–54)
DEPRECATED RDW RBC AUTO: 49 FL (ref 37–54)
DEPRECATED RDW RBC AUTO: 49.4 FL (ref 37–54)
DEPRECATED RDW RBC AUTO: 49.5 FL (ref 37–54)
DEPRECATED RDW RBC AUTO: 49.6 FL (ref 37–54)
DEPRECATED RDW RBC AUTO: 49.7 FL (ref 37–54)
DEPRECATED RDW RBC AUTO: 49.8 FL (ref 37–54)
DEPRECATED RDW RBC AUTO: 50.7 FL (ref 37–54)
DEPRECATED RDW RBC AUTO: 51.2 FL (ref 37–54)
DEPRECATED RDW RBC AUTO: 51.6 FL (ref 37–54)
DEPRECATED RDW RBC AUTO: 52.4 FL (ref 37–54)
DEPRECATED RDW RBC AUTO: 53 FL (ref 37–54)
DEPRECATED RDW RBC AUTO: 53.1 FL (ref 37–54)
DEPRECATED RDW RBC AUTO: 53.3 FL (ref 37–54)
DEPRECATED RDW RBC AUTO: 54.5 FL (ref 37–54)
DEPRECATED RDW RBC AUTO: 55.9 FL (ref 37–54)
DEPRECATED RDW RBC AUTO: 56.2 FL (ref 37–54)
DEPRECATED RDW RBC AUTO: 56.9 FL (ref 37–54)
DEPRECATED RDW RBC AUTO: 57.8 FL (ref 37–54)
DEPRECATED RDW RBC AUTO: 58.4 FL (ref 37–54)
DEPRECATED RDW RBC AUTO: 60.3 FL (ref 37–54)
DEPRECATED RDW RBC AUTO: 60.9 FL (ref 37–54)
EGFRCR SERPLBLD CKD-EPI 2021: 45.8 ML/MIN/1.73
EGFRCR SERPLBLD CKD-EPI 2021: 46 ML/MIN/1.73
EGFRCR SERPLBLD CKD-EPI 2021: 47.9 ML/MIN/1.73
EGFRCR SERPLBLD CKD-EPI 2021: 48.1 ML/MIN/1.73
EGFRCR SERPLBLD CKD-EPI 2021: 48.6 ML/MIN/1.73
EGFRCR SERPLBLD CKD-EPI 2021: 48.9 ML/MIN/1.73
EGFRCR SERPLBLD CKD-EPI 2021: 50.4 ML/MIN/1.73
EGFRCR SERPLBLD CKD-EPI 2021: 50.6 ML/MIN/1.73
EGFRCR SERPLBLD CKD-EPI 2021: 50.6 ML/MIN/1.73
EGFRCR SERPLBLD CKD-EPI 2021: 52 ML/MIN/1.73
EGFRCR SERPLBLD CKD-EPI 2021: 52.6 ML/MIN/1.73
EGFRCR SERPLBLD CKD-EPI 2021: 54 ML/MIN/1.73
EGFRCR SERPLBLD CKD-EPI 2021: 54 ML/MIN/1.73
EGFRCR SERPLBLD CKD-EPI 2021: 54.6 ML/MIN/1.73
EGFRCR SERPLBLD CKD-EPI 2021: 55 ML/MIN/1.73
EGFRCR SERPLBLD CKD-EPI 2021: 55.6 ML/MIN/1.73
EGFRCR SERPLBLD CKD-EPI 2021: 55.6 ML/MIN/1.73
EGFRCR SERPLBLD CKD-EPI 2021: 55.9 ML/MIN/1.73
EGFRCR SERPLBLD CKD-EPI 2021: 57.5 ML/MIN/1.73
EGFRCR SERPLBLD CKD-EPI 2021: 57.5 ML/MIN/1.73
EGFRCR SERPLBLD CKD-EPI 2021: 58.2 ML/MIN/1.73
EGFRCR SERPLBLD CKD-EPI 2021: 58.2 ML/MIN/1.73
EGFRCR SERPLBLD CKD-EPI 2021: 59.6 ML/MIN/1.73
EGFRCR SERPLBLD CKD-EPI 2021: 59.6 ML/MIN/1.73
EGFRCR SERPLBLD CKD-EPI 2021: 60.3 ML/MIN/1.73
EGFRCR SERPLBLD CKD-EPI 2021: 61.4 ML/MIN/1.73
EGFRCR SERPLBLD CKD-EPI 2021: 61.8 ML/MIN/1.73
EGFRCR SERPLBLD CKD-EPI 2021: 62.2 ML/MIN/1.73
EGFRCR SERPLBLD CKD-EPI 2021: 63.4 ML/MIN/1.73
EGFRCR SERPLBLD CKD-EPI 2021: 63.4 ML/MIN/1.73
EGFRCR SERPLBLD CKD-EPI 2021: 64.2 ML/MIN/1.73
EGFRCR SERPLBLD CKD-EPI 2021: 64.2 ML/MIN/1.73
EGFRCR SERPLBLD CKD-EPI 2021: 66.8 ML/MIN/1.73
EGFRCR SERPLBLD CKD-EPI 2021: 68.6 ML/MIN/1.73
EGFRCR SERPLBLD CKD-EPI 2021: 68.6 ML/MIN/1.73
EGFRCR SERPLBLD CKD-EPI 2021: 69.5 ML/MIN/1.73
EGFRCR SERPLBLD CKD-EPI 2021: 74.5 ML/MIN/1.73
EGFRCR SERPLBLD CKD-EPI 2021: 75.6 ML/MIN/1.73
EOSINOPHIL # BLD AUTO: 0.02 10*3/MM3 (ref 0–0.4)
EOSINOPHIL # BLD AUTO: 0.03 10*3/MM3 (ref 0–0.4)
EOSINOPHIL # BLD AUTO: 0.04 10*3/MM3 (ref 0–0.4)
EOSINOPHIL # BLD AUTO: 0.07 10*3/MM3 (ref 0–0.4)
EOSINOPHIL # BLD AUTO: 0.1 10*3/MM3 (ref 0–0.4)
EOSINOPHIL # BLD AUTO: 0.1 10*3/MM3 (ref 0–0.4)
EOSINOPHIL # BLD AUTO: 0.11 10*3/MM3 (ref 0–0.4)
EOSINOPHIL # BLD AUTO: 0.12 10*3/MM3 (ref 0–0.4)
EOSINOPHIL # BLD AUTO: 0.12 10*3/MM3 (ref 0–0.4)
EOSINOPHIL # BLD AUTO: 0.13 10*3/MM3 (ref 0–0.4)
EOSINOPHIL # BLD AUTO: 0.14 10*3/MM3 (ref 0–0.4)
EOSINOPHIL # BLD AUTO: 0.14 10*3/MM3 (ref 0–0.4)
EOSINOPHIL # BLD AUTO: 0.15 10*3/MM3 (ref 0–0.4)
EOSINOPHIL # BLD AUTO: 0.16 10*3/MM3 (ref 0–0.4)
EOSINOPHIL # BLD AUTO: 0.18 10*3/MM3 (ref 0–0.4)
EOSINOPHIL # BLD AUTO: 0.18 10*3/MM3 (ref 0–0.4)
EOSINOPHIL # BLD AUTO: 0.19 10*3/MM3 (ref 0–0.4)
EOSINOPHIL # BLD AUTO: 0.19 10*3/MM3 (ref 0–0.4)
EOSINOPHIL # BLD AUTO: 0.2 10*3/MM3 (ref 0–0.4)
EOSINOPHIL # BLD AUTO: 0.21 10*3/MM3 (ref 0–0.4)
EOSINOPHIL # BLD AUTO: 0.22 10*3/MM3 (ref 0–0.4)
EOSINOPHIL # BLD AUTO: 0.23 10*3/MM3 (ref 0–0.4)
EOSINOPHIL # BLD AUTO: 0.25 10*3/MM3 (ref 0–0.4)
EOSINOPHIL # BLD AUTO: 0.28 10*3/MM3 (ref 0–0.4)
EOSINOPHIL # BLD AUTO: 0.28 10*3/MM3 (ref 0–0.4)
EOSINOPHIL # BLD AUTO: 0.3 10*3/MM3 (ref 0–0.4)
EOSINOPHIL NFR BLD AUTO: 0.3 % (ref 0.3–6.2)
EOSINOPHIL NFR BLD AUTO: 0.3 % (ref 0.3–6.2)
EOSINOPHIL NFR BLD AUTO: 0.5 % (ref 0.3–6.2)
EOSINOPHIL NFR BLD AUTO: 0.5 % (ref 0.3–6.2)
EOSINOPHIL NFR BLD AUTO: 0.7 % (ref 0.3–6.2)
EOSINOPHIL NFR BLD AUTO: 0.9 % (ref 0.3–6.2)
EOSINOPHIL NFR BLD AUTO: 1 % (ref 0.3–6.2)
EOSINOPHIL NFR BLD AUTO: 1 % (ref 0.3–6.2)
EOSINOPHIL NFR BLD AUTO: 1.1 % (ref 0.3–6.2)
EOSINOPHIL NFR BLD AUTO: 1.2 % (ref 0.3–6.2)
EOSINOPHIL NFR BLD AUTO: 1.4 % (ref 0.3–6.2)
EOSINOPHIL NFR BLD AUTO: 1.4 % (ref 0.3–6.2)
EOSINOPHIL NFR BLD AUTO: 1.6 % (ref 0.3–6.2)
EOSINOPHIL NFR BLD AUTO: 1.6 % (ref 0.3–6.2)
EOSINOPHIL NFR BLD AUTO: 1.8 % (ref 0.3–6.2)
EOSINOPHIL NFR BLD AUTO: 1.9 % (ref 0.3–6.2)
EOSINOPHIL NFR BLD AUTO: 2 % (ref 0.3–6.2)
EOSINOPHIL NFR BLD AUTO: 2.1 % (ref 0.3–6.2)
EOSINOPHIL NFR BLD AUTO: 2.2 % (ref 0.3–6.2)
EOSINOPHIL NFR BLD AUTO: 2.2 % (ref 0.3–6.2)
EOSINOPHIL NFR BLD AUTO: 2.3 % (ref 0.3–6.2)
EOSINOPHIL NFR BLD AUTO: 2.6 % (ref 0.3–6.2)
EOSINOPHIL NFR BLD AUTO: 2.7 % (ref 0.3–6.2)
EOSINOPHIL NFR BLD AUTO: 2.7 % (ref 0.3–6.2)
EOSINOPHIL NFR BLD AUTO: 2.8 % (ref 0.3–6.2)
EOSINOPHIL NFR BLD AUTO: 2.9 % (ref 0.3–6.2)
EOSINOPHIL NFR BLD AUTO: 3.2 % (ref 0.3–6.2)
ERYTHROCYTE [DISTWIDTH] IN BLOOD BY AUTOMATED COUNT: 15.4 % (ref 12.3–15.4)
ERYTHROCYTE [DISTWIDTH] IN BLOOD BY AUTOMATED COUNT: 15.5 % (ref 12.3–15.4)
ERYTHROCYTE [DISTWIDTH] IN BLOOD BY AUTOMATED COUNT: 15.5 % (ref 12.3–15.4)
ERYTHROCYTE [DISTWIDTH] IN BLOOD BY AUTOMATED COUNT: 15.6 % (ref 12.3–15.4)
ERYTHROCYTE [DISTWIDTH] IN BLOOD BY AUTOMATED COUNT: 15.6 % (ref 12.3–15.4)
ERYTHROCYTE [DISTWIDTH] IN BLOOD BY AUTOMATED COUNT: 15.7 % (ref 12.3–15.4)
ERYTHROCYTE [DISTWIDTH] IN BLOOD BY AUTOMATED COUNT: 15.7 % (ref 12.3–15.4)
ERYTHROCYTE [DISTWIDTH] IN BLOOD BY AUTOMATED COUNT: 15.8 % (ref 12.3–15.4)
ERYTHROCYTE [DISTWIDTH] IN BLOOD BY AUTOMATED COUNT: 15.9 % (ref 12.3–15.4)
ERYTHROCYTE [DISTWIDTH] IN BLOOD BY AUTOMATED COUNT: 16 % (ref 12.3–15.4)
ERYTHROCYTE [DISTWIDTH] IN BLOOD BY AUTOMATED COUNT: 16.1 % (ref 12.3–15.4)
ERYTHROCYTE [DISTWIDTH] IN BLOOD BY AUTOMATED COUNT: 16.3 % (ref 12.3–15.4)
ERYTHROCYTE [DISTWIDTH] IN BLOOD BY AUTOMATED COUNT: 16.4 % (ref 12.3–15.4)
ERYTHROCYTE [DISTWIDTH] IN BLOOD BY AUTOMATED COUNT: 16.4 % (ref 12.3–15.4)
ERYTHROCYTE [DISTWIDTH] IN BLOOD BY AUTOMATED COUNT: 16.7 % (ref 12.3–15.4)
ERYTHROCYTE [DISTWIDTH] IN BLOOD BY AUTOMATED COUNT: 16.8 % (ref 12.3–15.4)
ERYTHROCYTE [DISTWIDTH] IN BLOOD BY AUTOMATED COUNT: 16.9 % (ref 12.3–15.4)
ERYTHROCYTE [DISTWIDTH] IN BLOOD BY AUTOMATED COUNT: 18.6 % (ref 12.3–15.4)
ERYTHROCYTE [DISTWIDTH] IN BLOOD BY AUTOMATED COUNT: 18.7 % (ref 12.3–15.4)
ERYTHROCYTE [DISTWIDTH] IN BLOOD BY AUTOMATED COUNT: 18.8 % (ref 12.3–15.4)
ERYTHROCYTE [DISTWIDTH] IN BLOOD BY AUTOMATED COUNT: 19.2 % (ref 12.3–15.4)
ERYTHROCYTE [DISTWIDTH] IN BLOOD BY AUTOMATED COUNT: 19.3 % (ref 12.3–15.4)
ERYTHROCYTE [DISTWIDTH] IN BLOOD BY AUTOMATED COUNT: 19.4 % (ref 12.3–15.4)
ERYTHROCYTE [DISTWIDTH] IN BLOOD BY AUTOMATED COUNT: 20.2 % (ref 12.3–15.4)
ERYTHROCYTE [DISTWIDTH] IN BLOOD BY AUTOMATED COUNT: 20.7 % (ref 12.3–15.4)
ERYTHROCYTE [DISTWIDTH] IN BLOOD BY AUTOMATED COUNT: 21.2 % (ref 12.3–15.4)
ERYTHROCYTE [DISTWIDTH] IN BLOOD BY AUTOMATED COUNT: 21.5 % (ref 12.3–15.4)
ERYTHROCYTE [DISTWIDTH] IN BLOOD BY AUTOMATED COUNT: 21.6 % (ref 12.3–15.4)
FINE GRAN CASTS URNS QL MICRO: ABNORMAL /LPF
FLUAV RNA RESP QL NAA+PROBE: NOT DETECTED
FLUAV SUBTYP SPEC NAA+PROBE: NOT DETECTED
FLUBV RNA ISLT QL NAA+PROBE: NOT DETECTED
FLUBV RNA RESP QL NAA+PROBE: NOT DETECTED
GLOBULIN UR ELPH-MCNC: 1.9 GM/DL
GLOBULIN UR ELPH-MCNC: 2.1 GM/DL
GLOBULIN UR ELPH-MCNC: 2.2 GM/DL
GLOBULIN UR ELPH-MCNC: 2.2 GM/DL
GLOBULIN UR ELPH-MCNC: 2.3 GM/DL
GLOBULIN UR ELPH-MCNC: 2.3 GM/DL
GLOBULIN UR ELPH-MCNC: 2.4 GM/DL
GLOBULIN UR ELPH-MCNC: 2.4 GM/DL
GLOBULIN UR ELPH-MCNC: 2.5 GM/DL
GLOBULIN UR ELPH-MCNC: 2.6 GM/DL
GLOBULIN UR ELPH-MCNC: 2.6 GM/DL
GLOBULIN UR ELPH-MCNC: 2.7 GM/DL
GLOBULIN UR ELPH-MCNC: 2.8 GM/DL
GLOBULIN UR ELPH-MCNC: 3 GM/DL
GLOBULIN UR ELPH-MCNC: 3.1 GM/DL
GLUCOSE BLDC GLUCOMTR-MCNC: 101 MG/DL (ref 70–130)
GLUCOSE BLDC GLUCOMTR-MCNC: 102 MG/DL (ref 70–130)
GLUCOSE BLDC GLUCOMTR-MCNC: 102 MG/DL (ref 70–130)
GLUCOSE BLDC GLUCOMTR-MCNC: 103 MG/DL (ref 70–130)
GLUCOSE BLDC GLUCOMTR-MCNC: 104 MG/DL (ref 70–130)
GLUCOSE BLDC GLUCOMTR-MCNC: 107 MG/DL (ref 70–130)
GLUCOSE BLDC GLUCOMTR-MCNC: 108 MG/DL (ref 70–130)
GLUCOSE BLDC GLUCOMTR-MCNC: 108 MG/DL (ref 70–130)
GLUCOSE BLDC GLUCOMTR-MCNC: 111 MG/DL (ref 70–130)
GLUCOSE BLDC GLUCOMTR-MCNC: 111 MG/DL (ref 70–130)
GLUCOSE BLDC GLUCOMTR-MCNC: 112 MG/DL (ref 70–130)
GLUCOSE BLDC GLUCOMTR-MCNC: 114 MG/DL (ref 70–130)
GLUCOSE BLDC GLUCOMTR-MCNC: 115 MG/DL (ref 70–130)
GLUCOSE BLDC GLUCOMTR-MCNC: 116 MG/DL (ref 70–130)
GLUCOSE BLDC GLUCOMTR-MCNC: 117 MG/DL (ref 70–130)
GLUCOSE BLDC GLUCOMTR-MCNC: 118 MG/DL (ref 70–130)
GLUCOSE BLDC GLUCOMTR-MCNC: 119 MG/DL (ref 70–130)
GLUCOSE BLDC GLUCOMTR-MCNC: 121 MG/DL (ref 70–130)
GLUCOSE BLDC GLUCOMTR-MCNC: 124 MG/DL (ref 70–130)
GLUCOSE BLDC GLUCOMTR-MCNC: 124 MG/DL (ref 70–130)
GLUCOSE BLDC GLUCOMTR-MCNC: 126 MG/DL (ref 70–130)
GLUCOSE BLDC GLUCOMTR-MCNC: 130 MG/DL (ref 70–130)
GLUCOSE BLDC GLUCOMTR-MCNC: 131 MG/DL (ref 70–130)
GLUCOSE BLDC GLUCOMTR-MCNC: 131 MG/DL (ref 70–130)
GLUCOSE BLDC GLUCOMTR-MCNC: 132 MG/DL (ref 70–130)
GLUCOSE BLDC GLUCOMTR-MCNC: 134 MG/DL (ref 70–130)
GLUCOSE BLDC GLUCOMTR-MCNC: 137 MG/DL (ref 70–130)
GLUCOSE BLDC GLUCOMTR-MCNC: 137 MG/DL (ref 70–130)
GLUCOSE BLDC GLUCOMTR-MCNC: 138 MG/DL (ref 70–130)
GLUCOSE BLDC GLUCOMTR-MCNC: 138 MG/DL (ref 70–130)
GLUCOSE BLDC GLUCOMTR-MCNC: 141 MG/DL (ref 70–130)
GLUCOSE BLDC GLUCOMTR-MCNC: 143 MG/DL (ref 70–130)
GLUCOSE BLDC GLUCOMTR-MCNC: 144 MG/DL (ref 70–130)
GLUCOSE BLDC GLUCOMTR-MCNC: 145 MG/DL (ref 70–130)
GLUCOSE BLDC GLUCOMTR-MCNC: 146 MG/DL (ref 70–130)
GLUCOSE BLDC GLUCOMTR-MCNC: 148 MG/DL (ref 70–130)
GLUCOSE BLDC GLUCOMTR-MCNC: 149 MG/DL (ref 70–130)
GLUCOSE BLDC GLUCOMTR-MCNC: 150 MG/DL (ref 70–130)
GLUCOSE BLDC GLUCOMTR-MCNC: 153 MG/DL (ref 70–130)
GLUCOSE BLDC GLUCOMTR-MCNC: 153 MG/DL (ref 70–130)
GLUCOSE BLDC GLUCOMTR-MCNC: 159 MG/DL (ref 70–130)
GLUCOSE BLDC GLUCOMTR-MCNC: 160 MG/DL (ref 70–130)
GLUCOSE BLDC GLUCOMTR-MCNC: 161 MG/DL (ref 70–130)
GLUCOSE BLDC GLUCOMTR-MCNC: 161 MG/DL (ref 70–130)
GLUCOSE BLDC GLUCOMTR-MCNC: 163 MG/DL (ref 70–130)
GLUCOSE BLDC GLUCOMTR-MCNC: 163 MG/DL (ref 70–130)
GLUCOSE BLDC GLUCOMTR-MCNC: 165 MG/DL (ref 70–130)
GLUCOSE BLDC GLUCOMTR-MCNC: 165 MG/DL (ref 70–130)
GLUCOSE BLDC GLUCOMTR-MCNC: 167 MG/DL (ref 70–130)
GLUCOSE BLDC GLUCOMTR-MCNC: 167 MG/DL (ref 70–130)
GLUCOSE BLDC GLUCOMTR-MCNC: 168 MG/DL (ref 70–130)
GLUCOSE BLDC GLUCOMTR-MCNC: 170 MG/DL (ref 70–130)
GLUCOSE BLDC GLUCOMTR-MCNC: 172 MG/DL (ref 70–130)
GLUCOSE BLDC GLUCOMTR-MCNC: 172 MG/DL (ref 70–130)
GLUCOSE BLDC GLUCOMTR-MCNC: 173 MG/DL (ref 70–130)
GLUCOSE BLDC GLUCOMTR-MCNC: 173 MG/DL (ref 70–130)
GLUCOSE BLDC GLUCOMTR-MCNC: 175 MG/DL (ref 70–130)
GLUCOSE BLDC GLUCOMTR-MCNC: 175 MG/DL (ref 70–130)
GLUCOSE BLDC GLUCOMTR-MCNC: 176 MG/DL (ref 70–130)
GLUCOSE BLDC GLUCOMTR-MCNC: 179 MG/DL (ref 70–130)
GLUCOSE BLDC GLUCOMTR-MCNC: 183 MG/DL (ref 70–130)
GLUCOSE BLDC GLUCOMTR-MCNC: 184 MG/DL (ref 70–130)
GLUCOSE BLDC GLUCOMTR-MCNC: 186 MG/DL (ref 70–130)
GLUCOSE BLDC GLUCOMTR-MCNC: 187 MG/DL (ref 70–130)
GLUCOSE BLDC GLUCOMTR-MCNC: 190 MG/DL (ref 70–130)
GLUCOSE BLDC GLUCOMTR-MCNC: 190 MG/DL (ref 70–130)
GLUCOSE BLDC GLUCOMTR-MCNC: 194 MG/DL (ref 70–130)
GLUCOSE BLDC GLUCOMTR-MCNC: 195 MG/DL (ref 70–130)
GLUCOSE BLDC GLUCOMTR-MCNC: 195 MG/DL (ref 70–130)
GLUCOSE BLDC GLUCOMTR-MCNC: 196 MG/DL (ref 70–130)
GLUCOSE BLDC GLUCOMTR-MCNC: 202 MG/DL (ref 70–130)
GLUCOSE BLDC GLUCOMTR-MCNC: 204 MG/DL (ref 70–130)
GLUCOSE BLDC GLUCOMTR-MCNC: 205 MG/DL (ref 70–130)
GLUCOSE BLDC GLUCOMTR-MCNC: 208 MG/DL (ref 70–130)
GLUCOSE BLDC GLUCOMTR-MCNC: 209 MG/DL (ref 70–130)
GLUCOSE BLDC GLUCOMTR-MCNC: 209 MG/DL (ref 70–130)
GLUCOSE BLDC GLUCOMTR-MCNC: 210 MG/DL (ref 70–130)
GLUCOSE BLDC GLUCOMTR-MCNC: 211 MG/DL (ref 70–130)
GLUCOSE BLDC GLUCOMTR-MCNC: 212 MG/DL (ref 70–130)
GLUCOSE BLDC GLUCOMTR-MCNC: 214 MG/DL (ref 70–130)
GLUCOSE BLDC GLUCOMTR-MCNC: 215 MG/DL (ref 70–130)
GLUCOSE BLDC GLUCOMTR-MCNC: 217 MG/DL (ref 70–130)
GLUCOSE BLDC GLUCOMTR-MCNC: 219 MG/DL (ref 70–130)
GLUCOSE BLDC GLUCOMTR-MCNC: 223 MG/DL (ref 70–130)
GLUCOSE BLDC GLUCOMTR-MCNC: 223 MG/DL (ref 70–130)
GLUCOSE BLDC GLUCOMTR-MCNC: 224 MG/DL (ref 70–130)
GLUCOSE BLDC GLUCOMTR-MCNC: 229 MG/DL (ref 70–130)
GLUCOSE BLDC GLUCOMTR-MCNC: 229 MG/DL (ref 70–130)
GLUCOSE BLDC GLUCOMTR-MCNC: 237 MG/DL (ref 70–130)
GLUCOSE BLDC GLUCOMTR-MCNC: 239 MG/DL (ref 70–130)
GLUCOSE BLDC GLUCOMTR-MCNC: 241 MG/DL (ref 70–130)
GLUCOSE BLDC GLUCOMTR-MCNC: 253 MG/DL (ref 70–130)
GLUCOSE BLDC GLUCOMTR-MCNC: 267 MG/DL (ref 70–130)
GLUCOSE BLDC GLUCOMTR-MCNC: 270 MG/DL (ref 70–130)
GLUCOSE BLDC GLUCOMTR-MCNC: 50 MG/DL (ref 70–130)
GLUCOSE BLDC GLUCOMTR-MCNC: 57 MG/DL (ref 70–130)
GLUCOSE BLDC GLUCOMTR-MCNC: 58 MG/DL (ref 70–130)
GLUCOSE BLDC GLUCOMTR-MCNC: 58 MG/DL (ref 70–130)
GLUCOSE BLDC GLUCOMTR-MCNC: 62 MG/DL (ref 70–130)
GLUCOSE BLDC GLUCOMTR-MCNC: 65 MG/DL (ref 70–130)
GLUCOSE BLDC GLUCOMTR-MCNC: 66 MG/DL (ref 70–130)
GLUCOSE BLDC GLUCOMTR-MCNC: 69 MG/DL (ref 70–130)
GLUCOSE BLDC GLUCOMTR-MCNC: 69 MG/DL (ref 70–130)
GLUCOSE BLDC GLUCOMTR-MCNC: 70 MG/DL (ref 70–130)
GLUCOSE BLDC GLUCOMTR-MCNC: 75 MG/DL (ref 70–130)
GLUCOSE BLDC GLUCOMTR-MCNC: 76 MG/DL (ref 70–130)
GLUCOSE BLDC GLUCOMTR-MCNC: 78 MG/DL (ref 70–130)
GLUCOSE BLDC GLUCOMTR-MCNC: 78 MG/DL (ref 70–130)
GLUCOSE BLDC GLUCOMTR-MCNC: 80 MG/DL (ref 70–130)
GLUCOSE BLDC GLUCOMTR-MCNC: 81 MG/DL (ref 70–130)
GLUCOSE BLDC GLUCOMTR-MCNC: 82 MG/DL (ref 70–130)
GLUCOSE BLDC GLUCOMTR-MCNC: 83 MG/DL (ref 70–130)
GLUCOSE BLDC GLUCOMTR-MCNC: 84 MG/DL (ref 70–130)
GLUCOSE BLDC GLUCOMTR-MCNC: 85 MG/DL (ref 70–130)
GLUCOSE BLDC GLUCOMTR-MCNC: 86 MG/DL (ref 70–130)
GLUCOSE BLDC GLUCOMTR-MCNC: 86 MG/DL (ref 70–130)
GLUCOSE BLDC GLUCOMTR-MCNC: 88 MG/DL (ref 70–130)
GLUCOSE BLDC GLUCOMTR-MCNC: 89 MG/DL (ref 70–130)
GLUCOSE BLDC GLUCOMTR-MCNC: 92 MG/DL (ref 70–130)
GLUCOSE BLDC GLUCOMTR-MCNC: 93 MG/DL (ref 70–130)
GLUCOSE BLDC GLUCOMTR-MCNC: 93 MG/DL (ref 70–130)
GLUCOSE BLDC GLUCOMTR-MCNC: 94 MG/DL (ref 70–130)
GLUCOSE BLDC GLUCOMTR-MCNC: 95 MG/DL (ref 70–130)
GLUCOSE BLDC GLUCOMTR-MCNC: 96 MG/DL (ref 70–130)
GLUCOSE BLDC GLUCOMTR-MCNC: 99 MG/DL (ref 70–130)
GLUCOSE SERPL-MCNC: 105 MG/DL (ref 65–99)
GLUCOSE SERPL-MCNC: 107 MG/DL (ref 65–99)
GLUCOSE SERPL-MCNC: 110 MG/DL (ref 65–99)
GLUCOSE SERPL-MCNC: 112 MG/DL (ref 65–99)
GLUCOSE SERPL-MCNC: 118 MG/DL (ref 65–99)
GLUCOSE SERPL-MCNC: 118 MG/DL (ref 65–99)
GLUCOSE SERPL-MCNC: 122 MG/DL (ref 65–99)
GLUCOSE SERPL-MCNC: 132 MG/DL (ref 65–99)
GLUCOSE SERPL-MCNC: 132 MG/DL (ref 65–99)
GLUCOSE SERPL-MCNC: 134 MG/DL (ref 65–99)
GLUCOSE SERPL-MCNC: 137 MG/DL (ref 65–99)
GLUCOSE SERPL-MCNC: 160 MG/DL (ref 65–99)
GLUCOSE SERPL-MCNC: 165 MG/DL (ref 65–99)
GLUCOSE SERPL-MCNC: 199 MG/DL (ref 65–99)
GLUCOSE SERPL-MCNC: 200 MG/DL (ref 65–99)
GLUCOSE SERPL-MCNC: 206 MG/DL (ref 65–99)
GLUCOSE SERPL-MCNC: 216 MG/DL (ref 65–99)
GLUCOSE SERPL-MCNC: 219 MG/DL (ref 65–99)
GLUCOSE SERPL-MCNC: 225 MG/DL (ref 65–99)
GLUCOSE SERPL-MCNC: 228 MG/DL (ref 65–99)
GLUCOSE SERPL-MCNC: 230 MG/DL (ref 65–99)
GLUCOSE SERPL-MCNC: 244 MG/DL (ref 65–99)
GLUCOSE SERPL-MCNC: 250 MG/DL (ref 65–99)
GLUCOSE SERPL-MCNC: 281 MG/DL (ref 65–99)
GLUCOSE SERPL-MCNC: 298 MG/DL (ref 65–99)
GLUCOSE SERPL-MCNC: 381 MG/DL (ref 65–99)
GLUCOSE SERPL-MCNC: 39 MG/DL (ref 65–99)
GLUCOSE SERPL-MCNC: 53 MG/DL (ref 65–99)
GLUCOSE SERPL-MCNC: 70 MG/DL (ref 65–99)
GLUCOSE SERPL-MCNC: 71 MG/DL (ref 65–99)
GLUCOSE SERPL-MCNC: 74 MG/DL (ref 65–99)
GLUCOSE SERPL-MCNC: 78 MG/DL (ref 65–99)
GLUCOSE SERPL-MCNC: 81 MG/DL (ref 65–99)
GLUCOSE SERPL-MCNC: 81 MG/DL (ref 65–99)
GLUCOSE SERPL-MCNC: 86 MG/DL (ref 65–99)
GLUCOSE SERPL-MCNC: 90 MG/DL (ref 65–99)
GLUCOSE SERPL-MCNC: 91 MG/DL (ref 65–99)
GLUCOSE SERPL-MCNC: 96 MG/DL (ref 65–99)
GLUCOSE UR STRIP-MCNC: ABNORMAL MG/DL
GLUCOSE UR STRIP-MCNC: NEGATIVE MG/DL
GRAN CASTS URNS QL MICRO: ABNORMAL /LPF
HAV IGM SERPL QL IA: NORMAL
HBA1C MFR BLD: 8 % (ref 4.8–5.6)
HBA1C MFR BLD: 8.2 % (ref 4.8–5.6)
HBA1C MFR BLD: 8.7 % (ref 4.8–5.6)
HBA1C MFR BLD: 8.8 % (ref 4.8–5.6)
HBV CORE IGM SERPL QL IA: NORMAL
HBV SURFACE AG SERPL QL IA: NORMAL
HCO3 BLDA-SCNC: 20 MMOL/L (ref 20–26)
HCT VFR BLD AUTO: 30.7 % (ref 34–46.6)
HCT VFR BLD AUTO: 31.4 % (ref 34–46.6)
HCT VFR BLD AUTO: 32.2 % (ref 34–46.6)
HCT VFR BLD AUTO: 32.5 % (ref 34–46.6)
HCT VFR BLD AUTO: 32.5 % (ref 34–46.6)
HCT VFR BLD AUTO: 32.6 % (ref 34–46.6)
HCT VFR BLD AUTO: 32.9 % (ref 34–46.6)
HCT VFR BLD AUTO: 33 % (ref 34–46.6)
HCT VFR BLD AUTO: 33.3 % (ref 34–46.6)
HCT VFR BLD AUTO: 33.6 % (ref 34–46.6)
HCT VFR BLD AUTO: 33.6 % (ref 34–46.6)
HCT VFR BLD AUTO: 34 % (ref 34–46.6)
HCT VFR BLD AUTO: 34.1 % (ref 34–46.6)
HCT VFR BLD AUTO: 34.4 % (ref 34–46.6)
HCT VFR BLD AUTO: 34.7 % (ref 34–46.6)
HCT VFR BLD AUTO: 34.9 % (ref 34–46.6)
HCT VFR BLD AUTO: 35.1 % (ref 34–46.6)
HCT VFR BLD AUTO: 35.1 % (ref 34–46.6)
HCT VFR BLD AUTO: 35.5 % (ref 34–46.6)
HCT VFR BLD AUTO: 35.9 % (ref 34–46.6)
HCT VFR BLD AUTO: 36.1 % (ref 34–46.6)
HCT VFR BLD AUTO: 36.3 % (ref 34–46.6)
HCT VFR BLD AUTO: 36.4 % (ref 34–46.6)
HCT VFR BLD AUTO: 36.6 % (ref 34–46.6)
HCT VFR BLD AUTO: 37 % (ref 34–46.6)
HCT VFR BLD AUTO: 37 % (ref 34–46.6)
HCT VFR BLD AUTO: 37.5 % (ref 34–46.6)
HCT VFR BLD AUTO: 38.1 % (ref 34–46.6)
HCT VFR BLD AUTO: 38.2 % (ref 34–46.6)
HCT VFR BLD AUTO: 38.2 % (ref 34–46.6)
HCT VFR BLD AUTO: 39.1 % (ref 34–46.6)
HCT VFR BLD AUTO: 40.1 % (ref 34–46.6)
HCT VFR BLD AUTO: 44.2 % (ref 34–46.6)
HCT VFR BLD AUTO: 44.4 % (ref 34–46.6)
HCV AB SER DONR QL: NORMAL
HDLC SERPL-MCNC: 30 MG/DL (ref 40–60)
HDLC SERPL-MCNC: 41 MG/DL (ref 40–60)
HDLC SERPL-MCNC: 43 MG/DL (ref 40–60)
HGB BLD-MCNC: 10.2 G/DL (ref 12–15.9)
HGB BLD-MCNC: 10.3 G/DL (ref 12–15.9)
HGB BLD-MCNC: 10.5 G/DL (ref 12–15.9)
HGB BLD-MCNC: 10.7 G/DL (ref 12–15.9)
HGB BLD-MCNC: 10.8 G/DL (ref 12–15.9)
HGB BLD-MCNC: 10.8 G/DL (ref 12–15.9)
HGB BLD-MCNC: 10.9 G/DL (ref 12–15.9)
HGB BLD-MCNC: 10.9 G/DL (ref 12–15.9)
HGB BLD-MCNC: 11 G/DL (ref 12–15.9)
HGB BLD-MCNC: 11 G/DL (ref 12–15.9)
HGB BLD-MCNC: 11.1 G/DL (ref 12–15.9)
HGB BLD-MCNC: 11.2 G/DL (ref 12–15.9)
HGB BLD-MCNC: 11.3 G/DL (ref 12–15.9)
HGB BLD-MCNC: 11.4 G/DL (ref 12–15.9)
HGB BLD-MCNC: 11.7 G/DL (ref 12–15.9)
HGB BLD-MCNC: 11.8 G/DL (ref 12–15.9)
HGB BLD-MCNC: 11.9 G/DL (ref 12–15.9)
HGB BLD-MCNC: 12 G/DL (ref 12–15.9)
HGB BLD-MCNC: 12.4 G/DL (ref 12–15.9)
HGB BLD-MCNC: 12.4 G/DL (ref 12–15.9)
HGB BLD-MCNC: 12.6 G/DL (ref 12–15.9)
HGB BLD-MCNC: 12.7 G/DL (ref 12–15.9)
HGB BLD-MCNC: 12.7 G/DL (ref 12–15.9)
HGB BLD-MCNC: 13.1 G/DL (ref 12–15.9)
HGB BLD-MCNC: 13.4 G/DL (ref 12–15.9)
HGB BLD-MCNC: 14.5 G/DL (ref 12–15.9)
HGB BLD-MCNC: 14.5 G/DL (ref 12–15.9)
HGB UR QL STRIP.AUTO: ABNORMAL
HGB UR QL STRIP.AUTO: ABNORMAL
HGB UR QL STRIP.AUTO: NEGATIVE
HOLD SPECIMEN: NORMAL
HYALINE CASTS UR QL AUTO: ABNORMAL /LPF
IMM GRANULOCYTES # BLD AUTO: 0.01 10*3/MM3 (ref 0–0.05)
IMM GRANULOCYTES # BLD AUTO: 0.02 10*3/MM3 (ref 0–0.05)
IMM GRANULOCYTES # BLD AUTO: 0.03 10*3/MM3 (ref 0–0.05)
IMM GRANULOCYTES # BLD AUTO: 0.04 10*3/MM3 (ref 0–0.05)
IMM GRANULOCYTES # BLD AUTO: 0.05 10*3/MM3 (ref 0–0.05)
IMM GRANULOCYTES # BLD AUTO: 0.06 10*3/MM3 (ref 0–0.05)
IMM GRANULOCYTES # BLD AUTO: 0.07 10*3/MM3 (ref 0–0.05)
IMM GRANULOCYTES # BLD AUTO: 0.07 10*3/MM3 (ref 0–0.05)
IMM GRANULOCYTES NFR BLD AUTO: 0.1 % (ref 0–0.5)
IMM GRANULOCYTES NFR BLD AUTO: 0.2 % (ref 0–0.5)
IMM GRANULOCYTES NFR BLD AUTO: 0.3 % (ref 0–0.5)
IMM GRANULOCYTES NFR BLD AUTO: 0.4 % (ref 0–0.5)
IMM GRANULOCYTES NFR BLD AUTO: 0.5 % (ref 0–0.5)
IMM GRANULOCYTES NFR BLD AUTO: 0.6 % (ref 0–0.5)
IMM GRANULOCYTES NFR BLD AUTO: 0.6 % (ref 0–0.5)
IMM GRANULOCYTES NFR BLD AUTO: 0.7 % (ref 0–0.5)
IMM GRANULOCYTES NFR BLD AUTO: 0.9 % (ref 0–0.5)
INR PPP: 1.19 (ref 0.8–1.2)
INR PPP: 1.35 (ref 0.8–1.2)
INR PPP: 1.41 (ref 0.8–1.2)
IRON 24H UR-MRATE: 38 MCG/DL (ref 37–145)
IRON SATN MFR SERPL: 9 % (ref 20–50)
KETONES UR QL STRIP: ABNORMAL
KETONES UR QL STRIP: NEGATIVE
LDLC SERPL CALC-MCNC: 103 MG/DL (ref 0–100)
LDLC SERPL CALC-MCNC: 77 MG/DL (ref 0–100)
LDLC SERPL CALC-MCNC: 99 MG/DL (ref 0–100)
LDLC/HDLC SERPL: 1.79 {RATIO}
LDLC/HDLC SERPL: 2.45 {RATIO}
LDLC/HDLC SERPL: 3.29 {RATIO}
LEFT ATRIUM VOLUME INDEX: 64.8 ML/M2
LEUKOCYTE ESTERASE UR QL STRIP.AUTO: ABNORMAL
LEUKOCYTE ESTERASE UR QL STRIP.AUTO: NEGATIVE
LEUKOCYTE ESTERASE UR QL STRIP.AUTO: NEGATIVE
LIPASE SERPL-CCNC: 18 U/L (ref 13–60)
LIPASE SERPL-CCNC: 194 U/L (ref 13–60)
LIPASE SERPL-CCNC: 25 U/L (ref 13–60)
LIPASE SERPL-CCNC: 29 U/L (ref 13–60)
LIPASE SERPL-CCNC: 37 U/L (ref 13–60)
LIPASE SERPL-CCNC: 40 U/L (ref 13–60)
LIPASE SERPL-CCNC: 41 U/L (ref 13–60)
LIPASE SERPL-CCNC: 50 U/L (ref 13–60)
LIPASE SERPL-CCNC: 52 U/L (ref 13–60)
LYMPHOCYTES # BLD AUTO: 2.09 10*3/MM3 (ref 0.7–3.1)
LYMPHOCYTES # BLD AUTO: 2.12 10*3/MM3 (ref 0.7–3.1)
LYMPHOCYTES # BLD AUTO: 2.15 10*3/MM3 (ref 0.7–3.1)
LYMPHOCYTES # BLD AUTO: 2.24 10*3/MM3 (ref 0.7–3.1)
LYMPHOCYTES # BLD AUTO: 2.39 10*3/MM3 (ref 0.7–3.1)
LYMPHOCYTES # BLD AUTO: 2.46 10*3/MM3 (ref 0.7–3.1)
LYMPHOCYTES # BLD AUTO: 2.48 10*3/MM3 (ref 0.7–3.1)
LYMPHOCYTES # BLD AUTO: 2.5 10*3/MM3 (ref 0.7–3.1)
LYMPHOCYTES # BLD AUTO: 2.5 10*3/MM3 (ref 0.7–3.1)
LYMPHOCYTES # BLD AUTO: 2.52 10*3/MM3 (ref 0.7–3.1)
LYMPHOCYTES # BLD AUTO: 2.53 10*3/MM3 (ref 0.7–3.1)
LYMPHOCYTES # BLD AUTO: 2.53 10*3/MM3 (ref 0.7–3.1)
LYMPHOCYTES # BLD AUTO: 2.58 10*3/MM3 (ref 0.7–3.1)
LYMPHOCYTES # BLD AUTO: 2.67 10*3/MM3 (ref 0.7–3.1)
LYMPHOCYTES # BLD AUTO: 2.68 10*3/MM3 (ref 0.7–3.1)
LYMPHOCYTES # BLD AUTO: 2.69 10*3/MM3 (ref 0.7–3.1)
LYMPHOCYTES # BLD AUTO: 2.74 10*3/MM3 (ref 0.7–3.1)
LYMPHOCYTES # BLD AUTO: 2.75 10*3/MM3 (ref 0.7–3.1)
LYMPHOCYTES # BLD AUTO: 2.76 10*3/MM3 (ref 0.7–3.1)
LYMPHOCYTES # BLD AUTO: 2.97 10*3/MM3 (ref 0.7–3.1)
LYMPHOCYTES # BLD AUTO: 2.98 10*3/MM3 (ref 0.7–3.1)
LYMPHOCYTES # BLD AUTO: 2.99 10*3/MM3 (ref 0.7–3.1)
LYMPHOCYTES # BLD AUTO: 3.01 10*3/MM3 (ref 0.7–3.1)
LYMPHOCYTES # BLD AUTO: 3.08 10*3/MM3 (ref 0.7–3.1)
LYMPHOCYTES # BLD AUTO: 3.14 10*3/MM3 (ref 0.7–3.1)
LYMPHOCYTES # BLD AUTO: 3.15 10*3/MM3 (ref 0.7–3.1)
LYMPHOCYTES # BLD AUTO: 3.17 10*3/MM3 (ref 0.7–3.1)
LYMPHOCYTES # BLD AUTO: 3.31 10*3/MM3 (ref 0.7–3.1)
LYMPHOCYTES # BLD AUTO: 3.34 10*3/MM3 (ref 0.7–3.1)
LYMPHOCYTES # BLD AUTO: 3.61 10*3/MM3 (ref 0.7–3.1)
LYMPHOCYTES # BLD AUTO: 3.82 10*3/MM3 (ref 0.7–3.1)
LYMPHOCYTES # BLD AUTO: 3.86 10*3/MM3 (ref 0.7–3.1)
LYMPHOCYTES # BLD AUTO: 3.93 10*3/MM3 (ref 0.7–3.1)
LYMPHOCYTES # BLD AUTO: 3.96 10*3/MM3 (ref 0.7–3.1)
LYMPHOCYTES NFR BLD AUTO: 17.5 % (ref 19.6–45.3)
LYMPHOCYTES NFR BLD AUTO: 22.4 % (ref 19.6–45.3)
LYMPHOCYTES NFR BLD AUTO: 22.5 % (ref 19.6–45.3)
LYMPHOCYTES NFR BLD AUTO: 24.7 % (ref 19.6–45.3)
LYMPHOCYTES NFR BLD AUTO: 24.9 % (ref 19.6–45.3)
LYMPHOCYTES NFR BLD AUTO: 26.9 % (ref 19.6–45.3)
LYMPHOCYTES NFR BLD AUTO: 27.7 % (ref 19.6–45.3)
LYMPHOCYTES NFR BLD AUTO: 28.5 % (ref 19.6–45.3)
LYMPHOCYTES NFR BLD AUTO: 28.5 % (ref 19.6–45.3)
LYMPHOCYTES NFR BLD AUTO: 28.6 % (ref 19.6–45.3)
LYMPHOCYTES NFR BLD AUTO: 29.5 % (ref 19.6–45.3)
LYMPHOCYTES NFR BLD AUTO: 29.6 % (ref 19.6–45.3)
LYMPHOCYTES NFR BLD AUTO: 29.6 % (ref 19.6–45.3)
LYMPHOCYTES NFR BLD AUTO: 29.7 % (ref 19.6–45.3)
LYMPHOCYTES NFR BLD AUTO: 30 % (ref 19.6–45.3)
LYMPHOCYTES NFR BLD AUTO: 30.1 % (ref 19.6–45.3)
LYMPHOCYTES NFR BLD AUTO: 31.1 % (ref 19.6–45.3)
LYMPHOCYTES NFR BLD AUTO: 31.3 % (ref 19.6–45.3)
LYMPHOCYTES NFR BLD AUTO: 31.6 % (ref 19.6–45.3)
LYMPHOCYTES NFR BLD AUTO: 32.5 % (ref 19.6–45.3)
LYMPHOCYTES NFR BLD AUTO: 34 % (ref 19.6–45.3)
LYMPHOCYTES NFR BLD AUTO: 34.1 % (ref 19.6–45.3)
LYMPHOCYTES NFR BLD AUTO: 34.2 % (ref 19.6–45.3)
LYMPHOCYTES NFR BLD AUTO: 34.3 % (ref 19.6–45.3)
LYMPHOCYTES NFR BLD AUTO: 34.3 % (ref 19.6–45.3)
LYMPHOCYTES NFR BLD AUTO: 34.8 % (ref 19.6–45.3)
LYMPHOCYTES NFR BLD AUTO: 35.5 % (ref 19.6–45.3)
LYMPHOCYTES NFR BLD AUTO: 35.9 % (ref 19.6–45.3)
LYMPHOCYTES NFR BLD AUTO: 36.3 % (ref 19.6–45.3)
LYMPHOCYTES NFR BLD AUTO: 36.8 % (ref 19.6–45.3)
LYMPHOCYTES NFR BLD AUTO: 39.4 % (ref 19.6–45.3)
LYMPHOCYTES NFR BLD AUTO: 40.3 % (ref 19.6–45.3)
LYMPHOCYTES NFR BLD AUTO: 41.1 % (ref 19.6–45.3)
LYMPHOCYTES NFR BLD AUTO: 43.3 % (ref 19.6–45.3)
Lab: ABNORMAL
Lab: NORMAL
MAGNESIUM SERPL-MCNC: 1.3 MG/DL (ref 1.6–2.4)
MAGNESIUM SERPL-MCNC: 1.4 MG/DL (ref 1.6–2.4)
MAGNESIUM SERPL-MCNC: 1.6 MG/DL (ref 1.6–2.4)
MAGNESIUM SERPL-MCNC: 1.7 MG/DL (ref 1.6–2.4)
MAGNESIUM SERPL-MCNC: 1.8 MG/DL (ref 1.6–2.4)
MAGNESIUM SERPL-MCNC: 1.8 MG/DL (ref 1.6–2.4)
MAGNESIUM SERPL-MCNC: 1.9 MG/DL (ref 1.6–2.4)
MAGNESIUM SERPL-MCNC: 1.9 MG/DL (ref 1.6–2.4)
MAXIMAL PREDICTED HEART RATE: 146 BPM
MAXIMAL PREDICTED HEART RATE: 147 BPM
MCH RBC QN AUTO: 26.3 PG (ref 26.6–33)
MCH RBC QN AUTO: 26.3 PG (ref 26.6–33)
MCH RBC QN AUTO: 26.6 PG (ref 26.6–33)
MCH RBC QN AUTO: 26.7 PG (ref 26.6–33)
MCH RBC QN AUTO: 26.8 PG (ref 26.6–33)
MCH RBC QN AUTO: 26.8 PG (ref 26.6–33)
MCH RBC QN AUTO: 26.9 PG (ref 26.6–33)
MCH RBC QN AUTO: 26.9 PG (ref 26.6–33)
MCH RBC QN AUTO: 27 PG (ref 26.6–33)
MCH RBC QN AUTO: 27 PG (ref 26.6–33)
MCH RBC QN AUTO: 27.4 PG (ref 26.6–33)
MCH RBC QN AUTO: 27.4 PG (ref 26.6–33)
MCH RBC QN AUTO: 27.5 PG (ref 26.6–33)
MCH RBC QN AUTO: 27.5 PG (ref 26.6–33)
MCH RBC QN AUTO: 27.7 PG (ref 26.6–33)
MCH RBC QN AUTO: 27.7 PG (ref 26.6–33)
MCH RBC QN AUTO: 27.8 PG (ref 26.6–33)
MCH RBC QN AUTO: 27.9 PG (ref 26.6–33)
MCH RBC QN AUTO: 28 PG (ref 26.6–33)
MCH RBC QN AUTO: 28 PG (ref 26.6–33)
MCH RBC QN AUTO: 28.1 PG (ref 26.6–33)
MCH RBC QN AUTO: 28.1 PG (ref 26.6–33)
MCH RBC QN AUTO: 28.2 PG (ref 26.6–33)
MCH RBC QN AUTO: 28.3 PG (ref 26.6–33)
MCH RBC QN AUTO: 28.3 PG (ref 26.6–33)
MCH RBC QN AUTO: 28.5 PG (ref 26.6–33)
MCH RBC QN AUTO: 28.6 PG (ref 26.6–33)
MCH RBC QN AUTO: 28.6 PG (ref 26.6–33)
MCHC RBC AUTO-ENTMCNC: 32 G/DL (ref 31.5–35.7)
MCHC RBC AUTO-ENTMCNC: 32.1 G/DL (ref 31.5–35.7)
MCHC RBC AUTO-ENTMCNC: 32.2 G/DL (ref 31.5–35.7)
MCHC RBC AUTO-ENTMCNC: 32.2 G/DL (ref 31.5–35.7)
MCHC RBC AUTO-ENTMCNC: 32.5 G/DL (ref 31.5–35.7)
MCHC RBC AUTO-ENTMCNC: 32.6 G/DL (ref 31.5–35.7)
MCHC RBC AUTO-ENTMCNC: 32.7 G/DL (ref 31.5–35.7)
MCHC RBC AUTO-ENTMCNC: 32.7 G/DL (ref 31.5–35.7)
MCHC RBC AUTO-ENTMCNC: 32.8 G/DL (ref 31.5–35.7)
MCHC RBC AUTO-ENTMCNC: 32.9 G/DL (ref 31.5–35.7)
MCHC RBC AUTO-ENTMCNC: 32.9 G/DL (ref 31.5–35.7)
MCHC RBC AUTO-ENTMCNC: 33 G/DL (ref 31.5–35.7)
MCHC RBC AUTO-ENTMCNC: 33 G/DL (ref 31.5–35.7)
MCHC RBC AUTO-ENTMCNC: 33.1 G/DL (ref 31.5–35.7)
MCHC RBC AUTO-ENTMCNC: 33.2 G/DL (ref 31.5–35.7)
MCHC RBC AUTO-ENTMCNC: 33.3 G/DL (ref 31.5–35.7)
MCHC RBC AUTO-ENTMCNC: 33.3 G/DL (ref 31.5–35.7)
MCHC RBC AUTO-ENTMCNC: 33.4 G/DL (ref 31.5–35.7)
MCHC RBC AUTO-ENTMCNC: 33.5 G/DL (ref 31.5–35.7)
MCHC RBC AUTO-ENTMCNC: 33.5 G/DL (ref 31.5–35.7)
MCHC RBC AUTO-ENTMCNC: 33.6 G/DL (ref 31.5–35.7)
MCHC RBC AUTO-ENTMCNC: 33.8 G/DL (ref 31.5–35.7)
MCV RBC AUTO: 78.6 FL (ref 79–97)
MCV RBC AUTO: 79.4 FL (ref 79–97)
MCV RBC AUTO: 79.8 FL (ref 79–97)
MCV RBC AUTO: 80.7 FL (ref 79–97)
MCV RBC AUTO: 80.9 FL (ref 79–97)
MCV RBC AUTO: 81.2 FL (ref 79–97)
MCV RBC AUTO: 81.4 FL (ref 79–97)
MCV RBC AUTO: 81.4 FL (ref 79–97)
MCV RBC AUTO: 81.5 FL (ref 79–97)
MCV RBC AUTO: 81.6 FL (ref 79–97)
MCV RBC AUTO: 82.1 FL (ref 79–97)
MCV RBC AUTO: 82.3 FL (ref 79–97)
MCV RBC AUTO: 82.5 FL (ref 79–97)
MCV RBC AUTO: 83.1 FL (ref 79–97)
MCV RBC AUTO: 83.4 FL (ref 79–97)
MCV RBC AUTO: 83.6 FL (ref 79–97)
MCV RBC AUTO: 84.2 FL (ref 79–97)
MCV RBC AUTO: 84.3 FL (ref 79–97)
MCV RBC AUTO: 84.4 FL (ref 79–97)
MCV RBC AUTO: 84.6 FL (ref 79–97)
MCV RBC AUTO: 84.8 FL (ref 79–97)
MCV RBC AUTO: 85.1 FL (ref 79–97)
MCV RBC AUTO: 85.2 FL (ref 79–97)
MCV RBC AUTO: 85.4 FL (ref 79–97)
MCV RBC AUTO: 85.4 FL (ref 79–97)
MCV RBC AUTO: 85.5 FL (ref 79–97)
MCV RBC AUTO: 85.6 FL (ref 79–97)
MCV RBC AUTO: 86 FL (ref 79–97)
MCV RBC AUTO: 86.1 FL (ref 79–97)
MCV RBC AUTO: 86.6 FL (ref 79–97)
MCV RBC AUTO: 86.9 FL (ref 79–97)
MCV RBC AUTO: 87.1 FL (ref 79–97)
MCV RBC AUTO: 87.2 FL (ref 79–97)
MCV RBC AUTO: 87.3 FL (ref 79–97)
MODALITY: ABNORMAL
MONOCYTES # BLD AUTO: 0.79 10*3/MM3 (ref 0.1–0.9)
MONOCYTES # BLD AUTO: 0.84 10*3/MM3 (ref 0.1–0.9)
MONOCYTES # BLD AUTO: 0.9 10*3/MM3 (ref 0.1–0.9)
MONOCYTES # BLD AUTO: 0.94 10*3/MM3 (ref 0.1–0.9)
MONOCYTES # BLD AUTO: 0.98 10*3/MM3 (ref 0.1–0.9)
MONOCYTES # BLD AUTO: 0.99 10*3/MM3 (ref 0.1–0.9)
MONOCYTES # BLD AUTO: 0.99 10*3/MM3 (ref 0.1–0.9)
MONOCYTES # BLD AUTO: 1 10*3/MM3 (ref 0.1–0.9)
MONOCYTES # BLD AUTO: 1.01 10*3/MM3 (ref 0.1–0.9)
MONOCYTES # BLD AUTO: 1.02 10*3/MM3 (ref 0.1–0.9)
MONOCYTES # BLD AUTO: 1.03 10*3/MM3 (ref 0.1–0.9)
MONOCYTES # BLD AUTO: 1.04 10*3/MM3 (ref 0.1–0.9)
MONOCYTES # BLD AUTO: 1.1 10*3/MM3 (ref 0.1–0.9)
MONOCYTES # BLD AUTO: 1.11 10*3/MM3 (ref 0.1–0.9)
MONOCYTES # BLD AUTO: 1.12 10*3/MM3 (ref 0.1–0.9)
MONOCYTES # BLD AUTO: 1.15 10*3/MM3 (ref 0.1–0.9)
MONOCYTES # BLD AUTO: 1.18 10*3/MM3 (ref 0.1–0.9)
MONOCYTES # BLD AUTO: 1.19 10*3/MM3 (ref 0.1–0.9)
MONOCYTES # BLD AUTO: 1.2 10*3/MM3 (ref 0.1–0.9)
MONOCYTES # BLD AUTO: 1.21 10*3/MM3 (ref 0.1–0.9)
MONOCYTES # BLD AUTO: 1.21 10*3/MM3 (ref 0.1–0.9)
MONOCYTES # BLD AUTO: 1.24 10*3/MM3 (ref 0.1–0.9)
MONOCYTES # BLD AUTO: 1.26 10*3/MM3 (ref 0.1–0.9)
MONOCYTES # BLD AUTO: 1.29 10*3/MM3 (ref 0.1–0.9)
MONOCYTES # BLD AUTO: 1.3 10*3/MM3 (ref 0.1–0.9)
MONOCYTES # BLD AUTO: 1.3 10*3/MM3 (ref 0.1–0.9)
MONOCYTES # BLD AUTO: 1.31 10*3/MM3 (ref 0.1–0.9)
MONOCYTES NFR BLD AUTO: 10.2 % (ref 5–12)
MONOCYTES NFR BLD AUTO: 10.2 % (ref 5–12)
MONOCYTES NFR BLD AUTO: 10.6 % (ref 5–12)
MONOCYTES NFR BLD AUTO: 10.6 % (ref 5–12)
MONOCYTES NFR BLD AUTO: 10.7 % (ref 5–12)
MONOCYTES NFR BLD AUTO: 11.2 % (ref 5–12)
MONOCYTES NFR BLD AUTO: 11.3 % (ref 5–12)
MONOCYTES NFR BLD AUTO: 11.8 % (ref 5–12)
MONOCYTES NFR BLD AUTO: 11.9 % (ref 5–12)
MONOCYTES NFR BLD AUTO: 12.1 % (ref 5–12)
MONOCYTES NFR BLD AUTO: 12.1 % (ref 5–12)
MONOCYTES NFR BLD AUTO: 12.2 % (ref 5–12)
MONOCYTES NFR BLD AUTO: 12.3 % (ref 5–12)
MONOCYTES NFR BLD AUTO: 12.5 % (ref 5–12)
MONOCYTES NFR BLD AUTO: 12.5 % (ref 5–12)
MONOCYTES NFR BLD AUTO: 12.6 % (ref 5–12)
MONOCYTES NFR BLD AUTO: 12.8 % (ref 5–12)
MONOCYTES NFR BLD AUTO: 12.9 % (ref 5–12)
MONOCYTES NFR BLD AUTO: 13.1 % (ref 5–12)
MONOCYTES NFR BLD AUTO: 13.2 % (ref 5–12)
MONOCYTES NFR BLD AUTO: 13.2 % (ref 5–12)
MONOCYTES NFR BLD AUTO: 13.3 % (ref 5–12)
MONOCYTES NFR BLD AUTO: 13.6 % (ref 5–12)
MONOCYTES NFR BLD AUTO: 13.8 % (ref 5–12)
MONOCYTES NFR BLD AUTO: 13.9 % (ref 5–12)
MONOCYTES NFR BLD AUTO: 14.4 % (ref 5–12)
MONOCYTES NFR BLD AUTO: 15.6 % (ref 5–12)
MONOCYTES NFR BLD AUTO: 16.9 % (ref 5–12)
MONOCYTES NFR BLD AUTO: 7.6 % (ref 5–12)
MONOCYTES NFR BLD AUTO: 9.7 % (ref 5–12)
MONOCYTES NFR BLD AUTO: 9.9 % (ref 5–12)
NEUTROPHILS NFR BLD AUTO: 2.69 10*3/MM3 (ref 1.7–7)
NEUTROPHILS NFR BLD AUTO: 2.95 10*3/MM3 (ref 1.7–7)
NEUTROPHILS NFR BLD AUTO: 3.21 10*3/MM3 (ref 1.7–7)
NEUTROPHILS NFR BLD AUTO: 3.32 10*3/MM3 (ref 1.7–7)
NEUTROPHILS NFR BLD AUTO: 3.51 10*3/MM3 (ref 1.7–7)
NEUTROPHILS NFR BLD AUTO: 3.72 10*3/MM3 (ref 1.7–7)
NEUTROPHILS NFR BLD AUTO: 3.76 10*3/MM3 (ref 1.7–7)
NEUTROPHILS NFR BLD AUTO: 3.97 10*3/MM3 (ref 1.7–7)
NEUTROPHILS NFR BLD AUTO: 4.06 10*3/MM3 (ref 1.7–7)
NEUTROPHILS NFR BLD AUTO: 4.08 10*3/MM3 (ref 1.7–7)
NEUTROPHILS NFR BLD AUTO: 4.14 10*3/MM3 (ref 1.7–7)
NEUTROPHILS NFR BLD AUTO: 4.29 10*3/MM3 (ref 1.7–7)
NEUTROPHILS NFR BLD AUTO: 4.48 10*3/MM3 (ref 1.7–7)
NEUTROPHILS NFR BLD AUTO: 4.49 10*3/MM3 (ref 1.7–7)
NEUTROPHILS NFR BLD AUTO: 4.59 10*3/MM3 (ref 1.7–7)
NEUTROPHILS NFR BLD AUTO: 4.61 10*3/MM3 (ref 1.7–7)
NEUTROPHILS NFR BLD AUTO: 4.68 10*3/MM3 (ref 1.7–7)
NEUTROPHILS NFR BLD AUTO: 4.86 10*3/MM3 (ref 1.7–7)
NEUTROPHILS NFR BLD AUTO: 41.7 % (ref 42.7–76)
NEUTROPHILS NFR BLD AUTO: 43.5 % (ref 42.7–76)
NEUTROPHILS NFR BLD AUTO: 44 % (ref 42.7–76)
NEUTROPHILS NFR BLD AUTO: 45.9 % (ref 42.7–76)
NEUTROPHILS NFR BLD AUTO: 47 % (ref 42.7–76)
NEUTROPHILS NFR BLD AUTO: 47.1 % (ref 42.7–76)
NEUTROPHILS NFR BLD AUTO: 48.2 % (ref 42.7–76)
NEUTROPHILS NFR BLD AUTO: 48.3 % (ref 42.7–76)
NEUTROPHILS NFR BLD AUTO: 49.2 % (ref 42.7–76)
NEUTROPHILS NFR BLD AUTO: 49.5 % (ref 42.7–76)
NEUTROPHILS NFR BLD AUTO: 49.7 % (ref 42.7–76)
NEUTROPHILS NFR BLD AUTO: 49.8 % (ref 42.7–76)
NEUTROPHILS NFR BLD AUTO: 5.1 10*3/MM3 (ref 1.7–7)
NEUTROPHILS NFR BLD AUTO: 5.13 10*3/MM3 (ref 1.7–7)
NEUTROPHILS NFR BLD AUTO: 5.28 10*3/MM3 (ref 1.7–7)
NEUTROPHILS NFR BLD AUTO: 5.42 10*3/MM3 (ref 1.7–7)
NEUTROPHILS NFR BLD AUTO: 5.57 10*3/MM3 (ref 1.7–7)
NEUTROPHILS NFR BLD AUTO: 5.8 10*3/MM3 (ref 1.7–7)
NEUTROPHILS NFR BLD AUTO: 5.84 10*3/MM3 (ref 1.7–7)
NEUTROPHILS NFR BLD AUTO: 5.9 10*3/MM3 (ref 1.7–7)
NEUTROPHILS NFR BLD AUTO: 5.9 10*3/MM3 (ref 1.7–7)
NEUTROPHILS NFR BLD AUTO: 5.91 10*3/MM3 (ref 1.7–7)
NEUTROPHILS NFR BLD AUTO: 50.3 % (ref 42.7–76)
NEUTROPHILS NFR BLD AUTO: 51.2 % (ref 42.7–76)
NEUTROPHILS NFR BLD AUTO: 51.5 % (ref 42.7–76)
NEUTROPHILS NFR BLD AUTO: 52.3 % (ref 42.7–76)
NEUTROPHILS NFR BLD AUTO: 52.8 % (ref 42.7–76)
NEUTROPHILS NFR BLD AUTO: 53.1 % (ref 42.7–76)
NEUTROPHILS NFR BLD AUTO: 53.5 % (ref 42.7–76)
NEUTROPHILS NFR BLD AUTO: 54 % (ref 42.7–76)
NEUTROPHILS NFR BLD AUTO: 54.9 % (ref 42.7–76)
NEUTROPHILS NFR BLD AUTO: 55.2 % (ref 42.7–76)
NEUTROPHILS NFR BLD AUTO: 55.4 % (ref 42.7–76)
NEUTROPHILS NFR BLD AUTO: 56 % (ref 42.7–76)
NEUTROPHILS NFR BLD AUTO: 56.6 % (ref 42.7–76)
NEUTROPHILS NFR BLD AUTO: 57.7 % (ref 42.7–76)
NEUTROPHILS NFR BLD AUTO: 59.1 % (ref 42.7–76)
NEUTROPHILS NFR BLD AUTO: 6.14 10*3/MM3 (ref 1.7–7)
NEUTROPHILS NFR BLD AUTO: 6.4 10*3/MM3 (ref 1.7–7)
NEUTROPHILS NFR BLD AUTO: 60.2 % (ref 42.7–76)
NEUTROPHILS NFR BLD AUTO: 60.4 % (ref 42.7–76)
NEUTROPHILS NFR BLD AUTO: 60.7 % (ref 42.7–76)
NEUTROPHILS NFR BLD AUTO: 62.5 % (ref 42.7–76)
NEUTROPHILS NFR BLD AUTO: 64 % (ref 42.7–76)
NEUTROPHILS NFR BLD AUTO: 66 % (ref 42.7–76)
NEUTROPHILS NFR BLD AUTO: 7.13 10*3/MM3 (ref 1.7–7)
NEUTROPHILS NFR BLD AUTO: 7.84 10*3/MM3 (ref 1.7–7)
NEUTROPHILS NFR BLD AUTO: 7.86 10*3/MM3 (ref 1.7–7)
NEUTROPHILS NFR BLD AUTO: 71.5 % (ref 42.7–76)
NEUTROPHILS NFR BLD AUTO: 9.14 10*3/MM3 (ref 1.7–7)
NITRITE UR QL STRIP: NEGATIVE
NITRITE UR QL STRIP: POSITIVE
NRBC BLD AUTO-RTO: 0 /100 WBC (ref 0–0.2)
NRBC BLD AUTO-RTO: 0.3 /100 WBC (ref 0–0.2)
NRBC BLD AUTO-RTO: 0.5 /100 WBC (ref 0–0.2)
NT-PROBNP SERPL-MCNC: 1433 PG/ML (ref 0–900)
NT-PROBNP SERPL-MCNC: 2099 PG/ML (ref 0–900)
NT-PROBNP SERPL-MCNC: 2131 PG/ML (ref 0–900)
NT-PROBNP SERPL-MCNC: 2199 PG/ML (ref 0–900)
NT-PROBNP SERPL-MCNC: 2265 PG/ML (ref 0–900)
NT-PROBNP SERPL-MCNC: 2721 PG/ML (ref 0–900)
NT-PROBNP SERPL-MCNC: 2738 PG/ML (ref 0–900)
NT-PROBNP SERPL-MCNC: 3092 PG/ML (ref 0–900)
NT-PROBNP SERPL-MCNC: 3910 PG/ML (ref 0–900)
NT-PROBNP SERPL-MCNC: 5066 PG/ML (ref 0–900)
NT-PROBNP SERPL-MCNC: 964.1 PG/ML (ref 0–900)
PCO2 BLDA: 33.1 MM HG (ref 35–45)
PH BLDA: 7.39 PH UNITS (ref 7.35–7.45)
PH UR STRIP.AUTO: 5.5 [PH] (ref 5–9)
PH UR STRIP.AUTO: 5.5 [PH] (ref 5–9)
PH UR STRIP.AUTO: 6 [PH] (ref 5–9)
PH UR STRIP.AUTO: 6.5 [PH] (ref 5–9)
PH UR STRIP.AUTO: 6.5 [PH] (ref 5–9)
PH UR STRIP.AUTO: <=5 [PH] (ref 5–9)
PHOSPHATE SERPL-MCNC: 3.2 MG/DL (ref 2.5–4.5)
PHOSPHATE SERPL-MCNC: 3.4 MG/DL (ref 2.5–4.5)
PHOSPHATE SERPL-MCNC: 3.4 MG/DL (ref 2.5–4.5)
PHOSPHATE SERPL-MCNC: 4.2 MG/DL (ref 2.5–4.5)
PLATELET # BLD AUTO: 176 10*3/MM3 (ref 140–450)
PLATELET # BLD AUTO: 215 10*3/MM3 (ref 140–450)
PLATELET # BLD AUTO: 216 10*3/MM3 (ref 140–450)
PLATELET # BLD AUTO: 220 10*3/MM3 (ref 140–450)
PLATELET # BLD AUTO: 221 10*3/MM3 (ref 140–450)
PLATELET # BLD AUTO: 229 10*3/MM3 (ref 140–450)
PLATELET # BLD AUTO: 237 10*3/MM3 (ref 140–450)
PLATELET # BLD AUTO: 244 10*3/MM3 (ref 140–450)
PLATELET # BLD AUTO: 245 10*3/MM3 (ref 140–450)
PLATELET # BLD AUTO: 253 10*3/MM3 (ref 140–450)
PLATELET # BLD AUTO: 258 10*3/MM3 (ref 140–450)
PLATELET # BLD AUTO: 258 10*3/MM3 (ref 140–450)
PLATELET # BLD AUTO: 260 10*3/MM3 (ref 140–450)
PLATELET # BLD AUTO: 261 10*3/MM3 (ref 140–450)
PLATELET # BLD AUTO: 261 10*3/MM3 (ref 140–450)
PLATELET # BLD AUTO: 268 10*3/MM3 (ref 140–450)
PLATELET # BLD AUTO: 273 10*3/MM3 (ref 140–450)
PLATELET # BLD AUTO: 273 10*3/MM3 (ref 140–450)
PLATELET # BLD AUTO: 280 10*3/MM3 (ref 140–450)
PLATELET # BLD AUTO: 281 10*3/MM3 (ref 140–450)
PLATELET # BLD AUTO: 285 10*3/MM3 (ref 140–450)
PLATELET # BLD AUTO: 286 10*3/MM3 (ref 140–450)
PLATELET # BLD AUTO: 287 10*3/MM3 (ref 140–450)
PLATELET # BLD AUTO: 288 10*3/MM3 (ref 140–450)
PLATELET # BLD AUTO: 291 10*3/MM3 (ref 140–450)
PLATELET # BLD AUTO: 292 10*3/MM3 (ref 140–450)
PLATELET # BLD AUTO: 292 10*3/MM3 (ref 140–450)
PLATELET # BLD AUTO: 294 10*3/MM3 (ref 140–450)
PLATELET # BLD AUTO: 295 10*3/MM3 (ref 140–450)
PLATELET # BLD AUTO: 296 10*3/MM3 (ref 140–450)
PLATELET # BLD AUTO: 307 10*3/MM3 (ref 140–450)
PLATELET # BLD AUTO: 314 10*3/MM3 (ref 140–450)
PLATELET # BLD AUTO: 328 10*3/MM3 (ref 140–450)
PLATELET # BLD AUTO: 370 10*3/MM3 (ref 140–450)
PMV BLD AUTO: 10 FL (ref 6–12)
PMV BLD AUTO: 10.1 FL (ref 6–12)
PMV BLD AUTO: 10.1 FL (ref 6–12)
PMV BLD AUTO: 10.2 FL (ref 6–12)
PMV BLD AUTO: 10.3 FL (ref 6–12)
PMV BLD AUTO: 10.4 FL (ref 6–12)
PMV BLD AUTO: 10.5 FL (ref 6–12)
PMV BLD AUTO: 9.1 FL (ref 6–12)
PMV BLD AUTO: 9.2 FL (ref 6–12)
PMV BLD AUTO: 9.2 FL (ref 6–12)
PMV BLD AUTO: 9.4 FL (ref 6–12)
PMV BLD AUTO: 9.4 FL (ref 6–12)
PMV BLD AUTO: 9.5 FL (ref 6–12)
PMV BLD AUTO: 9.6 FL (ref 6–12)
PMV BLD AUTO: 9.7 FL (ref 6–12)
PMV BLD AUTO: 9.8 FL (ref 6–12)
PMV BLD AUTO: 9.9 FL (ref 6–12)
PO2 BLDA: 89.7 MM HG (ref 83–108)
POTASSIUM SERPL-SCNC: 2.7 MMOL/L (ref 3.5–5.2)
POTASSIUM SERPL-SCNC: 2.9 MMOL/L (ref 3.5–5.2)
POTASSIUM SERPL-SCNC: 2.9 MMOL/L (ref 3.5–5.2)
POTASSIUM SERPL-SCNC: 3.1 MMOL/L (ref 3.5–5.2)
POTASSIUM SERPL-SCNC: 3.2 MMOL/L (ref 3.5–5.2)
POTASSIUM SERPL-SCNC: 3.3 MMOL/L (ref 3.5–5.2)
POTASSIUM SERPL-SCNC: 3.3 MMOL/L (ref 3.5–5.2)
POTASSIUM SERPL-SCNC: 3.4 MMOL/L (ref 3.5–5.2)
POTASSIUM SERPL-SCNC: 3.5 MMOL/L (ref 3.5–5.2)
POTASSIUM SERPL-SCNC: 3.6 MMOL/L (ref 3.5–5.2)
POTASSIUM SERPL-SCNC: 3.7 MMOL/L (ref 3.5–5.2)
POTASSIUM SERPL-SCNC: 3.8 MMOL/L (ref 3.5–5.2)
POTASSIUM SERPL-SCNC: 3.9 MMOL/L (ref 3.5–5.2)
POTASSIUM SERPL-SCNC: 4 MMOL/L (ref 3.5–5.2)
POTASSIUM SERPL-SCNC: 4 MMOL/L (ref 3.5–5.2)
POTASSIUM SERPL-SCNC: 4.1 MMOL/L (ref 3.5–5.2)
POTASSIUM SERPL-SCNC: 4.1 MMOL/L (ref 3.5–5.2)
POTASSIUM SERPL-SCNC: 4.2 MMOL/L (ref 3.5–5.2)
POTASSIUM SERPL-SCNC: 4.2 MMOL/L (ref 3.5–5.2)
POTASSIUM SERPL-SCNC: 4.3 MMOL/L (ref 3.5–5.2)
POTASSIUM SERPL-SCNC: 4.3 MMOL/L (ref 3.5–5.2)
POTASSIUM SERPL-SCNC: 4.7 MMOL/L (ref 3.5–5.2)
POTASSIUM SERPL-SCNC: 4.7 MMOL/L (ref 3.5–5.2)
PROCALCITONIN SERPL-MCNC: 0.17 NG/ML (ref 0–0.25)
PROT SERPL-MCNC: 4.8 G/DL (ref 6–8.5)
PROT SERPL-MCNC: 5.6 G/DL (ref 6–8.5)
PROT SERPL-MCNC: 5.7 G/DL (ref 6–8.5)
PROT SERPL-MCNC: 5.9 G/DL (ref 6–8.5)
PROT SERPL-MCNC: 6 G/DL (ref 6–8.5)
PROT SERPL-MCNC: 6.1 G/DL (ref 6–8.5)
PROT SERPL-MCNC: 6.2 G/DL (ref 6–8.5)
PROT SERPL-MCNC: 6.3 G/DL (ref 6–8.5)
PROT SERPL-MCNC: 6.3 G/DL (ref 6–8.5)
PROT SERPL-MCNC: 6.4 G/DL (ref 6–8.5)
PROT SERPL-MCNC: 6.5 G/DL (ref 6–8.5)
PROT SERPL-MCNC: 6.6 G/DL (ref 6–8.5)
PROT SERPL-MCNC: 6.6 G/DL (ref 6–8.5)
PROT SERPL-MCNC: 6.7 G/DL (ref 6–8.5)
PROT SERPL-MCNC: 6.7 G/DL (ref 6–8.5)
PROT SERPL-MCNC: 6.8 G/DL (ref 6–8.5)
PROT SERPL-MCNC: 6.9 G/DL (ref 6–8.5)
PROT SERPL-MCNC: 6.9 G/DL (ref 6–8.5)
PROT SERPL-MCNC: 7 G/DL (ref 6–8.5)
PROT UR QL STRIP: ABNORMAL
PROTHROMBIN TIME: 14.9 SECONDS (ref 11.1–15.3)
PROTHROMBIN TIME: 16.7 SECONDS (ref 11.1–15.3)
PROTHROMBIN TIME: 17.2 SECONDS (ref 11.1–15.3)
QT INTERVAL: 366 MS
QT INTERVAL: 372 MS
QT INTERVAL: 372 MS
QT INTERVAL: 382 MS
QT INTERVAL: 382 MS
QT INTERVAL: 386 MS
QT INTERVAL: 390 MS
QT INTERVAL: 404 MS
QT INTERVAL: 420 MS
QT INTERVAL: 426 MS
QT INTERVAL: 428 MS
QT INTERVAL: 450 MS
QTC INTERVAL: 479 MS
QTC INTERVAL: 486 MS
QTC INTERVAL: 487 MS
QTC INTERVAL: 487 MS
QTC INTERVAL: 498 MS
QTC INTERVAL: 503 MS
QTC INTERVAL: 503 MS
QTC INTERVAL: 507 MS
QTC INTERVAL: 509 MS
QTC INTERVAL: 509 MS
QTC INTERVAL: 515 MS
QTC INTERVAL: 522 MS
RBC # BLD AUTO: 3.64 10*6/MM3 (ref 3.77–5.28)
RBC # BLD AUTO: 3.74 10*6/MM3 (ref 3.77–5.28)
RBC # BLD AUTO: 3.77 10*6/MM3 (ref 3.77–5.28)
RBC # BLD AUTO: 3.85 10*6/MM3 (ref 3.77–5.28)
RBC # BLD AUTO: 3.86 10*6/MM3 (ref 3.77–5.28)
RBC # BLD AUTO: 3.88 10*6/MM3 (ref 3.77–5.28)
RBC # BLD AUTO: 3.89 10*6/MM3 (ref 3.77–5.28)
RBC # BLD AUTO: 3.9 10*6/MM3 (ref 3.77–5.28)
RBC # BLD AUTO: 3.91 10*6/MM3 (ref 3.77–5.28)
RBC # BLD AUTO: 3.96 10*6/MM3 (ref 3.77–5.28)
RBC # BLD AUTO: 3.98 10*6/MM3 (ref 3.77–5.28)
RBC # BLD AUTO: 4 10*6/MM3 (ref 3.77–5.28)
RBC # BLD AUTO: 4.04 10*6/MM3 (ref 3.77–5.28)
RBC # BLD AUTO: 4.11 10*6/MM3 (ref 3.77–5.28)
RBC # BLD AUTO: 4.13 10*6/MM3 (ref 3.77–5.28)
RBC # BLD AUTO: 4.16 10*6/MM3 (ref 3.77–5.28)
RBC # BLD AUTO: 4.17 10*6/MM3 (ref 3.77–5.28)
RBC # BLD AUTO: 4.2 10*6/MM3 (ref 3.77–5.28)
RBC # BLD AUTO: 4.2 10*6/MM3 (ref 3.77–5.28)
RBC # BLD AUTO: 4.21 10*6/MM3 (ref 3.77–5.28)
RBC # BLD AUTO: 4.23 10*6/MM3 (ref 3.77–5.28)
RBC # BLD AUTO: 4.29 10*6/MM3 (ref 3.77–5.28)
RBC # BLD AUTO: 4.35 10*6/MM3 (ref 3.77–5.28)
RBC # BLD AUTO: 4.37 10*6/MM3 (ref 3.77–5.28)
RBC # BLD AUTO: 4.42 10*6/MM3 (ref 3.77–5.28)
RBC # BLD AUTO: 4.45 10*6/MM3 (ref 3.77–5.28)
RBC # BLD AUTO: 4.46 10*6/MM3 (ref 3.77–5.28)
RBC # BLD AUTO: 4.57 10*6/MM3 (ref 3.77–5.28)
RBC # BLD AUTO: 4.64 10*6/MM3 (ref 3.77–5.28)
RBC # BLD AUTO: 4.8 10*6/MM3 (ref 3.77–5.28)
RBC # BLD AUTO: 4.9 10*6/MM3 (ref 3.77–5.28)
RBC # BLD AUTO: 5.1 10*6/MM3 (ref 3.77–5.28)
RBC # BLD AUTO: 5.38 10*6/MM3 (ref 3.77–5.28)
RBC # BLD AUTO: 5.42 10*6/MM3 (ref 3.77–5.28)
RBC # UR STRIP: ABNORMAL /HPF
REF LAB TEST METHOD: ABNORMAL
REF LAB TEST METHOD: NORMAL
RENAL EPI CELLS #/AREA URNS HPF: ABNORMAL /HPF
RH BLD: POSITIVE
RH BLD: POSITIVE
SAO2 % BLDCOA: 97 % (ref 94–99)
SARS-COV-2 RNA PNL SPEC NAA+PROBE: NOT DETECTED
SARS-COV-2 RNA RESP QL NAA+PROBE: NOT DETECTED
SODIUM SERPL-SCNC: 131 MMOL/L (ref 136–145)
SODIUM SERPL-SCNC: 132 MMOL/L (ref 136–145)
SODIUM SERPL-SCNC: 134 MMOL/L (ref 136–145)
SODIUM SERPL-SCNC: 134 MMOL/L (ref 136–145)
SODIUM SERPL-SCNC: 135 MMOL/L (ref 136–145)
SODIUM SERPL-SCNC: 136 MMOL/L (ref 136–145)
SODIUM SERPL-SCNC: 136 MMOL/L (ref 136–145)
SODIUM SERPL-SCNC: 137 MMOL/L (ref 136–145)
SODIUM SERPL-SCNC: 138 MMOL/L (ref 136–145)
SODIUM SERPL-SCNC: 139 MMOL/L (ref 136–145)
SODIUM SERPL-SCNC: 140 MMOL/L (ref 136–145)
SODIUM SERPL-SCNC: 141 MMOL/L (ref 136–145)
SODIUM SERPL-SCNC: 142 MMOL/L (ref 136–145)
SODIUM SERPL-SCNC: 142 MMOL/L (ref 136–145)
SP GR UR STRIP: 1.01 (ref 1–1.03)
SP GR UR STRIP: 1.03 (ref 1–1.03)
SP GR UR STRIP: 1.04 (ref 1–1.03)
SP GR UR STRIP: <=1.005 (ref 1–1.03)
SQUAMOUS #/AREA URNS HPF: ABNORMAL /HPF
STRESS TARGET HR: 124 BPM
STRESS TARGET HR: 125 BPM
T&S EXPIRATION DATE: NORMAL
T4 FREE SERPL-MCNC: 1.54 NG/DL (ref 0.93–1.7)
T4 FREE SERPL-MCNC: 1.57 NG/DL (ref 0.93–1.7)
TIBC SERPL-MCNC: 432 MCG/DL (ref 298–536)
TRANSFERRIN SERPL-MCNC: 290 MG/DL (ref 200–360)
TRIGL SERPL-MCNC: 112 MG/DL (ref 0–150)
TRIGL SERPL-MCNC: 72 MG/DL (ref 0–150)
TRIGL SERPL-MCNC: 80 MG/DL (ref 0–150)
TROPONIN T SERPL-MCNC: 0.08 NG/ML (ref 0–0.03)
TROPONIN T SERPL-MCNC: 0.08 NG/ML (ref 0–0.03)
TROPONIN T SERPL-MCNC: 0.09 NG/ML (ref 0–0.03)
TROPONIN T SERPL-MCNC: 0.11 NG/ML (ref 0–0.03)
TROPONIN T SERPL-MCNC: <0.01 NG/ML (ref 0–0.03)
TSH SERPL DL<=0.05 MIU/L-ACNC: 1.98 UIU/ML (ref 0.27–4.2)
TSH SERPL DL<=0.05 MIU/L-ACNC: 2.02 UIU/ML (ref 0.27–4.2)
TSH SERPL DL<=0.05 MIU/L-ACNC: 3.82 UIU/ML (ref 0.27–4.2)
UROBILINOGEN UR QL STRIP: ABNORMAL
VENTILATOR MODE: ABNORMAL
VIT B12 BLD-MCNC: 611 PG/ML (ref 211–946)
VLDLC SERPL-MCNC: 14 MG/DL (ref 5–40)
VLDLC SERPL-MCNC: 16 MG/DL (ref 5–40)
VLDLC SERPL-MCNC: 20 MG/DL (ref 5–40)
WBC # UR STRIP: ABNORMAL /HPF
WBC CASTS #/AREA URNS LPF: ABNORMAL /LPF
WBC NRBC COR # BLD: 10.04 10*3/MM3 (ref 3.4–10.8)
WBC NRBC COR # BLD: 10.06 10*3/MM3 (ref 3.4–10.8)
WBC NRBC COR # BLD: 10.06 10*3/MM3 (ref 3.4–10.8)
WBC NRBC COR # BLD: 10.16 10*3/MM3 (ref 3.4–10.8)
WBC NRBC COR # BLD: 10.24 10*3/MM3 (ref 3.4–10.8)
WBC NRBC COR # BLD: 10.43 10*3/MM3 (ref 3.4–10.8)
WBC NRBC COR # BLD: 10.56 10*3/MM3 (ref 3.4–10.8)
WBC NRBC COR # BLD: 11.15 10*3/MM3 (ref 3.4–10.8)
WBC NRBC COR # BLD: 11.46 10*3/MM3 (ref 3.4–10.8)
WBC NRBC COR # BLD: 11.89 10*3/MM3 (ref 3.4–10.8)
WBC NRBC COR # BLD: 12.8 10*3/MM3 (ref 3.4–10.8)
WBC NRBC COR # BLD: 12.96 10*3/MM3 (ref 3.4–10.8)
WBC NRBC COR # BLD: 6.11 10*3/MM3 (ref 3.4–10.8)
WBC NRBC COR # BLD: 6.27 10*3/MM3 (ref 3.4–10.8)
WBC NRBC COR # BLD: 6.49 10*3/MM3 (ref 3.4–10.8)
WBC NRBC COR # BLD: 7.06 10*3/MM3 (ref 3.4–10.8)
WBC NRBC COR # BLD: 7.55 10*3/MM3 (ref 3.4–10.8)
WBC NRBC COR # BLD: 7.64 10*3/MM3 (ref 3.4–10.8)
WBC NRBC COR # BLD: 7.65 10*3/MM3 (ref 3.4–10.8)
WBC NRBC COR # BLD: 7.75 10*3/MM3 (ref 3.4–10.8)
WBC NRBC COR # BLD: 7.92 10*3/MM3 (ref 3.4–10.8)
WBC NRBC COR # BLD: 8.07 10*3/MM3 (ref 3.4–10.8)
WBC NRBC COR # BLD: 8.32 10*3/MM3 (ref 3.4–10.8)
WBC NRBC COR # BLD: 8.67 10*3/MM3 (ref 3.4–10.8)
WBC NRBC COR # BLD: 8.86 10*3/MM3 (ref 3.4–10.8)
WBC NRBC COR # BLD: 8.91 10*3/MM3 (ref 3.4–10.8)
WBC NRBC COR # BLD: 9.25 10*3/MM3 (ref 3.4–10.8)
WBC NRBC COR # BLD: 9.32 10*3/MM3 (ref 3.4–10.8)
WBC NRBC COR # BLD: 9.33 10*3/MM3 (ref 3.4–10.8)
WBC NRBC COR # BLD: 9.41 10*3/MM3 (ref 3.4–10.8)
WBC NRBC COR # BLD: 9.67 10*3/MM3 (ref 3.4–10.8)
WBC NRBC COR # BLD: 9.7 10*3/MM3 (ref 3.4–10.8)
WBC NRBC COR # BLD: 9.89 10*3/MM3 (ref 3.4–10.8)
WBC NRBC COR # BLD: 9.98 10*3/MM3 (ref 3.4–10.8)
WHOLE BLOOD HOLD COAG: NORMAL
WHOLE BLOOD HOLD SPECIMEN: NORMAL

## 2022-01-01 PROCEDURE — 63710000001 INSULIN ASPART PER 5 UNITS: Performed by: INTERNAL MEDICINE

## 2022-01-01 PROCEDURE — 25010000002 FUROSEMIDE PER 20 MG: Performed by: INTERNAL MEDICINE

## 2022-01-01 PROCEDURE — 94799 UNLISTED PULMONARY SVC/PX: CPT

## 2022-01-01 PROCEDURE — 85379 FIBRIN DEGRADATION QUANT: CPT | Performed by: FAMILY MEDICINE

## 2022-01-01 PROCEDURE — 80061 LIPID PANEL: CPT | Performed by: NURSE PRACTITIONER

## 2022-01-01 PROCEDURE — 25010000002 MORPHINE PER 10 MG: Performed by: NURSE PRACTITIONER

## 2022-01-01 PROCEDURE — 85025 COMPLETE CBC W/AUTO DIFF WBC: CPT | Performed by: INTERNAL MEDICINE

## 2022-01-01 PROCEDURE — 71045 X-RAY EXAM CHEST 1 VIEW: CPT

## 2022-01-01 PROCEDURE — 99214 OFFICE O/P EST MOD 30 MIN: CPT | Performed by: NURSE PRACTITIONER

## 2022-01-01 PROCEDURE — A9270 NON-COVERED ITEM OR SERVICE: HCPCS | Performed by: ANESTHESIOLOGY

## 2022-01-01 PROCEDURE — 85025 COMPLETE CBC W/AUTO DIFF WBC: CPT | Performed by: STUDENT IN AN ORGANIZED HEALTH CARE EDUCATION/TRAINING PROGRAM

## 2022-01-01 PROCEDURE — 83690 ASSAY OF LIPASE: CPT | Performed by: EMERGENCY MEDICINE

## 2022-01-01 PROCEDURE — 82962 GLUCOSE BLOOD TEST: CPT

## 2022-01-01 PROCEDURE — G0180 MD CERTIFICATION HHA PATIENT: HCPCS | Performed by: GENERAL PRACTICE

## 2022-01-01 PROCEDURE — 94760 N-INVAS EAR/PLS OXIMETRY 1: CPT

## 2022-01-01 PROCEDURE — 25010000002 ONDANSETRON PER 1 MG: Performed by: NURSE ANESTHETIST, CERTIFIED REGISTERED

## 2022-01-01 PROCEDURE — 93010 ELECTROCARDIOGRAM REPORT: CPT | Performed by: INTERNAL MEDICINE

## 2022-01-01 PROCEDURE — 84484 ASSAY OF TROPONIN QUANT: CPT | Performed by: NURSE PRACTITIONER

## 2022-01-01 PROCEDURE — 93005 ELECTROCARDIOGRAM TRACING: CPT

## 2022-01-01 PROCEDURE — 63710000001 INSULIN ASPART PER 5 UNITS: Performed by: HOSPITALIST

## 2022-01-01 PROCEDURE — G0378 HOSPITAL OBSERVATION PER HR: HCPCS

## 2022-01-01 PROCEDURE — 1159F MED LIST DOCD IN RCRD: CPT | Performed by: GENERAL PRACTICE

## 2022-01-01 PROCEDURE — 25010000002 ENOXAPARIN PER 10 MG

## 2022-01-01 PROCEDURE — 87636 SARSCOV2 & INF A&B AMP PRB: CPT | Performed by: STUDENT IN AN ORGANIZED HEALTH CARE EDUCATION/TRAINING PROGRAM

## 2022-01-01 PROCEDURE — C9803 HOPD COVID-19 SPEC COLLECT: HCPCS

## 2022-01-01 PROCEDURE — 99495 TRANSJ CARE MGMT MOD F2F 14D: CPT | Performed by: GENERAL PRACTICE

## 2022-01-01 PROCEDURE — G0494 LPN CARE EA 15MIN HH/HOSPICE: HCPCS

## 2022-01-01 PROCEDURE — 99284 EMERGENCY DEPT VISIT MOD MDM: CPT

## 2022-01-01 PROCEDURE — 81001 URINALYSIS AUTO W/SCOPE: CPT | Performed by: INTERNAL MEDICINE

## 2022-01-01 PROCEDURE — 74176 CT ABD & PELVIS W/O CONTRAST: CPT

## 2022-01-01 PROCEDURE — 93308 TTE F-UP OR LMTD: CPT

## 2022-01-01 PROCEDURE — 81001 URINALYSIS AUTO W/SCOPE: CPT | Performed by: STUDENT IN AN ORGANIZED HEALTH CARE EDUCATION/TRAINING PROGRAM

## 2022-01-01 PROCEDURE — 99213 OFFICE O/P EST LOW 20 MIN: CPT | Performed by: GENERAL PRACTICE

## 2022-01-01 PROCEDURE — 84484 ASSAY OF TROPONIN QUANT: CPT | Performed by: FAMILY MEDICINE

## 2022-01-01 PROCEDURE — 71275 CT ANGIOGRAPHY CHEST: CPT

## 2022-01-01 PROCEDURE — 76937 US GUIDE VASCULAR ACCESS: CPT

## 2022-01-01 PROCEDURE — 80053 COMPREHEN METABOLIC PANEL: CPT | Performed by: STUDENT IN AN ORGANIZED HEALTH CARE EDUCATION/TRAINING PROGRAM

## 2022-01-01 PROCEDURE — 93005 ELECTROCARDIOGRAM TRACING: CPT | Performed by: INTERNAL MEDICINE

## 2022-01-01 PROCEDURE — 85025 COMPLETE CBC W/AUTO DIFF WBC: CPT | Performed by: FAMILY MEDICINE

## 2022-01-01 PROCEDURE — 78226 HEPATOBILIARY SYSTEM IMAGING: CPT

## 2022-01-01 PROCEDURE — A9270 NON-COVERED ITEM OR SERVICE: HCPCS | Performed by: SURGERY

## 2022-01-01 PROCEDURE — 94664 DEMO&/EVAL PT USE INHALER: CPT

## 2022-01-01 PROCEDURE — 36415 COLL VENOUS BLD VENIPUNCTURE: CPT | Performed by: INTERNAL MEDICINE

## 2022-01-01 PROCEDURE — G0300 HHS/HOSPICE OF LPN EA 15 MIN: HCPCS

## 2022-01-01 PROCEDURE — G0439 PPPS, SUBSEQ VISIT: HCPCS | Performed by: GENERAL PRACTICE

## 2022-01-01 PROCEDURE — 63710000001 POTASSIUM CHLORIDE 20 MEQ PACK: Performed by: NURSE PRACTITIONER

## 2022-01-01 PROCEDURE — 63710000001 INSULIN DETEMIR PER 5 UNITS

## 2022-01-01 PROCEDURE — 96365 THER/PROPH/DIAG IV INF INIT: CPT

## 2022-01-01 PROCEDURE — P9047 ALBUMIN (HUMAN), 25%, 50ML: HCPCS | Performed by: NURSE PRACTITIONER

## 2022-01-01 PROCEDURE — 63710000001 FUROSEMIDE 40 MG TABLET: Performed by: NURSE PRACTITIONER

## 2022-01-01 PROCEDURE — 96376 TX/PRO/DX INJ SAME DRUG ADON: CPT

## 2022-01-01 PROCEDURE — 96375 TX/PRO/DX INJ NEW DRUG ADDON: CPT

## 2022-01-01 PROCEDURE — 63710000001 INSULIN DETEMIR PER 5 UNITS: Performed by: INTERNAL MEDICINE

## 2022-01-01 PROCEDURE — 85610 PROTHROMBIN TIME: CPT | Performed by: NURSE PRACTITIONER

## 2022-01-01 PROCEDURE — 36415 COLL VENOUS BLD VENIPUNCTURE: CPT

## 2022-01-01 PROCEDURE — 99285 EMERGENCY DEPT VISIT HI MDM: CPT

## 2022-01-01 PROCEDURE — 80053 COMPREHEN METABOLIC PANEL: CPT | Performed by: INTERNAL MEDICINE

## 2022-01-01 PROCEDURE — 25010000002 DOBUTAMINE 250 MG/20ML SOLUTION 20 ML VIAL: Performed by: INTERNAL MEDICINE

## 2022-01-01 PROCEDURE — 4A023N7 MEASUREMENT OF CARDIAC SAMPLING AND PRESSURE, LEFT HEART, PERCUTANEOUS APPROACH: ICD-10-PCS | Performed by: INTERNAL MEDICINE

## 2022-01-01 PROCEDURE — 74300 X-RAY BILE DUCTS/PANCREAS: CPT | Performed by: SURGERY

## 2022-01-01 PROCEDURE — 87636 SARSCOV2 & INF A&B AMP PRB: CPT | Performed by: EMERGENCY MEDICINE

## 2022-01-01 PROCEDURE — 80053 COMPREHEN METABOLIC PANEL: CPT | Performed by: NURSE PRACTITIONER

## 2022-01-01 PROCEDURE — 80048 BASIC METABOLIC PNL TOTAL CA: CPT | Performed by: INTERNAL MEDICINE

## 2022-01-01 PROCEDURE — 83690 ASSAY OF LIPASE: CPT | Performed by: NURSE PRACTITIONER

## 2022-01-01 PROCEDURE — 97535 SELF CARE MNGMENT TRAINING: CPT

## 2022-01-01 PROCEDURE — 96374 THER/PROPH/DIAG INJ IV PUSH: CPT

## 2022-01-01 PROCEDURE — 82550 ASSAY OF CK (CPK): CPT | Performed by: EMERGENCY MEDICINE

## 2022-01-01 PROCEDURE — 94640 AIRWAY INHALATION TREATMENT: CPT

## 2022-01-01 PROCEDURE — 25010000002 ENOXAPARIN PER 10 MG: Performed by: INTERNAL MEDICINE

## 2022-01-01 PROCEDURE — 80053 COMPREHEN METABOLIC PANEL: CPT | Performed by: EMERGENCY MEDICINE

## 2022-01-01 PROCEDURE — 83735 ASSAY OF MAGNESIUM: CPT | Performed by: INTERNAL MEDICINE

## 2022-01-01 PROCEDURE — 86901 BLOOD TYPING SEROLOGIC RH(D): CPT

## 2022-01-01 PROCEDURE — 99283 EMERGENCY DEPT VISIT LOW MDM: CPT

## 2022-01-01 PROCEDURE — G0299 HHS/HOSPICE OF RN EA 15 MIN: HCPCS

## 2022-01-01 PROCEDURE — 97110 THERAPEUTIC EXERCISES: CPT

## 2022-01-01 PROCEDURE — 25010000002 FUROSEMIDE PER 20 MG: Performed by: FAMILY MEDICINE

## 2022-01-01 PROCEDURE — 1111F DSCHRG MED/CURRENT MED MERGE: CPT | Performed by: GENERAL PRACTICE

## 2022-01-01 PROCEDURE — 36415 COLL VENOUS BLD VENIPUNCTURE: CPT | Performed by: PHYSICIAN ASSISTANT

## 2022-01-01 PROCEDURE — 25010000002 FUROSEMIDE PER 20 MG: Performed by: STUDENT IN AN ORGANIZED HEALTH CARE EDUCATION/TRAINING PROGRAM

## 2022-01-01 PROCEDURE — C1751 CATH, INF, PER/CENT/MIDLINE: HCPCS

## 2022-01-01 PROCEDURE — 25010000002 NEOSTIGMINE 10 MG/10ML SOLUTION: Performed by: NURSE ANESTHETIST, CERTIFIED REGISTERED

## 2022-01-01 PROCEDURE — 97116 GAIT TRAINING THERAPY: CPT

## 2022-01-01 PROCEDURE — 84484 ASSAY OF TROPONIN QUANT: CPT | Performed by: INTERNAL MEDICINE

## 2022-01-01 PROCEDURE — 81001 URINALYSIS AUTO W/SCOPE: CPT | Performed by: EMERGENCY MEDICINE

## 2022-01-01 PROCEDURE — 25010000002 FENTANYL CITRATE (PF) 50 MCG/ML SOLUTION: Performed by: NURSE ANESTHETIST, CERTIFIED REGISTERED

## 2022-01-01 PROCEDURE — 63710000001 ONDANSETRON ODT 4 MG TABLET DISPERSIBLE: Performed by: ANESTHESIOLOGY

## 2022-01-01 PROCEDURE — 84466 ASSAY OF TRANSFERRIN: CPT

## 2022-01-01 PROCEDURE — 84484 ASSAY OF TROPONIN QUANT: CPT | Performed by: EMERGENCY MEDICINE

## 2022-01-01 PROCEDURE — 99282 EMERGENCY DEPT VISIT SF MDM: CPT

## 2022-01-01 PROCEDURE — 25010000002 CEFTRIAXONE PER 250 MG: Performed by: INTERNAL MEDICINE

## 2022-01-01 PROCEDURE — 83880 ASSAY OF NATRIURETIC PEPTIDE: CPT | Performed by: NURSE PRACTITIONER

## 2022-01-01 PROCEDURE — 82550 ASSAY OF CK (CPK): CPT | Performed by: FAMILY MEDICINE

## 2022-01-01 PROCEDURE — 25010000002 HEPARIN (PORCINE) PER 1000 UNITS: Performed by: INTERNAL MEDICINE

## 2022-01-01 PROCEDURE — 85025 COMPLETE CBC W/AUTO DIFF WBC: CPT | Performed by: NURSE PRACTITIONER

## 2022-01-01 PROCEDURE — 80074 ACUTE HEPATITIS PANEL: CPT | Performed by: NURSE PRACTITIONER

## 2022-01-01 PROCEDURE — 47563 LAPARO CHOLECYSTECTOMY/GRAPH: CPT | Performed by: SURGERY

## 2022-01-01 PROCEDURE — 25010000002 CEFTRIAXONE PER 250 MG: Performed by: NURSE PRACTITIONER

## 2022-01-01 PROCEDURE — 25010000002 IOPAMIDOL 61 % SOLUTION: Performed by: STUDENT IN AN ORGANIZED HEALTH CARE EDUCATION/TRAINING PROGRAM

## 2022-01-01 PROCEDURE — 80061 LIPID PANEL: CPT

## 2022-01-01 PROCEDURE — 25010000002 HYDROMORPHONE 1 MG/ML SOLUTION: Performed by: NURSE ANESTHETIST, CERTIFIED REGISTERED

## 2022-01-01 PROCEDURE — 36410 VNPNXR 3YR/> PHY/QHP DX/THER: CPT

## 2022-01-01 PROCEDURE — 25010000002 ENOXAPARIN PER 10 MG: Performed by: PHYSICIAN ASSISTANT

## 2022-01-01 PROCEDURE — 83690 ASSAY OF LIPASE: CPT | Performed by: FAMILY MEDICINE

## 2022-01-01 PROCEDURE — 80053 COMPREHEN METABOLIC PANEL: CPT | Performed by: FAMILY MEDICINE

## 2022-01-01 PROCEDURE — 96366 THER/PROPH/DIAG IV INF ADDON: CPT

## 2022-01-01 PROCEDURE — 84132 ASSAY OF SERUM POTASSIUM: CPT | Performed by: INTERNAL MEDICINE

## 2022-01-01 PROCEDURE — 84145 PROCALCITONIN (PCT): CPT | Performed by: NURSE PRACTITIONER

## 2022-01-01 PROCEDURE — 80048 BASIC METABOLIC PNL TOTAL CA: CPT | Performed by: HOSPITALIST

## 2022-01-01 PROCEDURE — 97165 OT EVAL LOW COMPLEX 30 MIN: CPT

## 2022-01-01 PROCEDURE — 84132 ASSAY OF SERUM POTASSIUM: CPT | Performed by: NURSE PRACTITIONER

## 2022-01-01 PROCEDURE — A9270 NON-COVERED ITEM OR SERVICE: HCPCS | Performed by: NURSE PRACTITIONER

## 2022-01-01 PROCEDURE — 93325 DOPPLER ECHO COLOR FLOW MAPG: CPT

## 2022-01-01 PROCEDURE — 84443 ASSAY THYROID STIM HORMONE: CPT | Performed by: INTERNAL MEDICINE

## 2022-01-01 PROCEDURE — 83690 ASSAY OF LIPASE: CPT | Performed by: STUDENT IN AN ORGANIZED HEALTH CARE EDUCATION/TRAINING PROGRAM

## 2022-01-01 PROCEDURE — 83735 ASSAY OF MAGNESIUM: CPT | Performed by: FAMILY MEDICINE

## 2022-01-01 PROCEDURE — 84484 ASSAY OF TROPONIN QUANT: CPT | Performed by: STUDENT IN AN ORGANIZED HEALTH CARE EDUCATION/TRAINING PROGRAM

## 2022-01-01 PROCEDURE — 93454 CORONARY ARTERY ANGIO S&I: CPT | Performed by: INTERNAL MEDICINE

## 2022-01-01 PROCEDURE — 86850 RBC ANTIBODY SCREEN: CPT | Performed by: NURSE PRACTITIONER

## 2022-01-01 PROCEDURE — 74177 CT ABD & PELVIS W/CONTRAST: CPT

## 2022-01-01 PROCEDURE — 97162 PT EVAL MOD COMPLEX 30 MIN: CPT

## 2022-01-01 PROCEDURE — 71046 X-RAY EXAM CHEST 2 VIEWS: CPT

## 2022-01-01 PROCEDURE — 70450 CT HEAD/BRAIN W/O DYE: CPT

## 2022-01-01 PROCEDURE — 96372 THER/PROPH/DIAG INJ SC/IM: CPT

## 2022-01-01 PROCEDURE — 83036 HEMOGLOBIN GLYCOSYLATED A1C: CPT | Performed by: INTERNAL MEDICINE

## 2022-01-01 PROCEDURE — 93005 ELECTROCARDIOGRAM TRACING: CPT | Performed by: STUDENT IN AN ORGANIZED HEALTH CARE EDUCATION/TRAINING PROGRAM

## 2022-01-01 PROCEDURE — 25010000002 METOCLOPRAMIDE PER 10 MG

## 2022-01-01 PROCEDURE — 25010000002 MAGNESIUM SULFATE 2 GM/50ML SOLUTION: Performed by: INTERNAL MEDICINE

## 2022-01-01 PROCEDURE — 83690 ASSAY OF LIPASE: CPT | Performed by: PHYSICIAN ASSISTANT

## 2022-01-01 PROCEDURE — 25010000002 IOPAMIDOL 61 % SOLUTION: Performed by: EMERGENCY MEDICINE

## 2022-01-01 PROCEDURE — 83880 ASSAY OF NATRIURETIC PEPTIDE: CPT | Performed by: FAMILY MEDICINE

## 2022-01-01 PROCEDURE — 87077 CULTURE AEROBIC IDENTIFY: CPT | Performed by: STUDENT IN AN ORGANIZED HEALTH CARE EDUCATION/TRAINING PROGRAM

## 2022-01-01 PROCEDURE — 93005 ELECTROCARDIOGRAM TRACING: CPT | Performed by: FAMILY MEDICINE

## 2022-01-01 PROCEDURE — 87636 SARSCOV2 & INF A&B AMP PRB: CPT | Performed by: FAMILY MEDICINE

## 2022-01-01 PROCEDURE — 83605 ASSAY OF LACTIC ACID: CPT | Performed by: EMERGENCY MEDICINE

## 2022-01-01 PROCEDURE — 81001 URINALYSIS AUTO W/SCOPE: CPT | Performed by: NURSE PRACTITIONER

## 2022-01-01 PROCEDURE — 84443 ASSAY THYROID STIM HORMONE: CPT

## 2022-01-01 PROCEDURE — 36415 COLL VENOUS BLD VENIPUNCTURE: CPT | Performed by: FAMILY MEDICINE

## 2022-01-01 PROCEDURE — 81001 URINALYSIS AUTO W/SCOPE: CPT | Performed by: FAMILY MEDICINE

## 2022-01-01 PROCEDURE — 83721 ASSAY OF BLOOD LIPOPROTEIN: CPT

## 2022-01-01 PROCEDURE — 1170F FXNL STATUS ASSESSED: CPT | Performed by: GENERAL PRACTICE

## 2022-01-01 PROCEDURE — 94660 CPAP INITIATION&MGMT: CPT

## 2022-01-01 PROCEDURE — 85730 THROMBOPLASTIN TIME PARTIAL: CPT | Performed by: EMERGENCY MEDICINE

## 2022-01-01 PROCEDURE — 99214 OFFICE O/P EST MOD 30 MIN: CPT | Performed by: GENERAL PRACTICE

## 2022-01-01 PROCEDURE — 80048 BASIC METABOLIC PNL TOTAL CA: CPT | Performed by: FAMILY MEDICINE

## 2022-01-01 PROCEDURE — 85025 COMPLETE CBC W/AUTO DIFF WBC: CPT | Performed by: HOSPITALIST

## 2022-01-01 PROCEDURE — 97530 THERAPEUTIC ACTIVITIES: CPT

## 2022-01-01 PROCEDURE — 63710000001 INSULIN DETEMIR PER 5 UNITS: Performed by: FAMILY MEDICINE

## 2022-01-01 PROCEDURE — 85025 COMPLETE CBC W/AUTO DIFF WBC: CPT | Performed by: EMERGENCY MEDICINE

## 2022-01-01 PROCEDURE — 82803 BLOOD GASES ANY COMBINATION: CPT

## 2022-01-01 PROCEDURE — 0 IOPAMIDOL PER 1 ML: Performed by: STUDENT IN AN ORGANIZED HEALTH CARE EDUCATION/TRAINING PROGRAM

## 2022-01-01 PROCEDURE — 80053 COMPREHEN METABOLIC PANEL: CPT

## 2022-01-01 PROCEDURE — G0151 HHCP-SERV OF PT,EA 15 MIN: HCPCS

## 2022-01-01 PROCEDURE — 93005 ELECTROCARDIOGRAM TRACING: CPT | Performed by: EMERGENCY MEDICINE

## 2022-01-01 PROCEDURE — 36600 WITHDRAWAL OF ARTERIAL BLOOD: CPT

## 2022-01-01 PROCEDURE — 25010000002 HYDROMORPHONE 1 MG/ML SOLUTION: Performed by: HOSPITALIST

## 2022-01-01 PROCEDURE — 76000 FLUOROSCOPY <1 HR PHYS/QHP: CPT

## 2022-01-01 PROCEDURE — 25010000002 ONDANSETRON PER 1 MG: Performed by: EMERGENCY MEDICINE

## 2022-01-01 PROCEDURE — 63710000001 CARVEDILOL 25 MG TABLET: Performed by: SURGERY

## 2022-01-01 PROCEDURE — 85025 COMPLETE CBC W/AUTO DIFF WBC: CPT | Performed by: SURGERY

## 2022-01-01 PROCEDURE — 83605 ASSAY OF LACTIC ACID: CPT | Performed by: STUDENT IN AN ORGANIZED HEALTH CARE EDUCATION/TRAINING PROGRAM

## 2022-01-01 PROCEDURE — 1125F AMNT PAIN NOTED PAIN PRSNT: CPT | Performed by: GENERAL PRACTICE

## 2022-01-01 PROCEDURE — 95816 EEG AWAKE AND DROWSY: CPT

## 2022-01-01 PROCEDURE — 86900 BLOOD TYPING SEROLOGIC ABO: CPT | Performed by: NURSE PRACTITIONER

## 2022-01-01 PROCEDURE — P9612 CATHETERIZE FOR URINE SPEC: HCPCS

## 2022-01-01 PROCEDURE — 80053 COMPREHEN METABOLIC PANEL: CPT | Performed by: HOSPITALIST

## 2022-01-01 PROCEDURE — 25010000002 MAGNESIUM SULFATE 2 GM/50ML SOLUTION: Performed by: FAMILY MEDICINE

## 2022-01-01 PROCEDURE — 93321 DOPPLER ECHO F-UP/LMTD STD: CPT

## 2022-01-01 PROCEDURE — 0 TECHNETIUM TC 99M MEBROFENIN KIT: Performed by: STUDENT IN AN ORGANIZED HEALTH CARE EDUCATION/TRAINING PROGRAM

## 2022-01-01 PROCEDURE — G0157 HHC PT ASSISTANT EA 15: HCPCS

## 2022-01-01 PROCEDURE — 84100 ASSAY OF PHOSPHORUS: CPT | Performed by: INTERNAL MEDICINE

## 2022-01-01 PROCEDURE — 85025 COMPLETE CBC W/AUTO DIFF WBC: CPT

## 2022-01-01 PROCEDURE — 87040 BLOOD CULTURE FOR BACTERIA: CPT | Performed by: NURSE PRACTITIONER

## 2022-01-01 PROCEDURE — 83036 HEMOGLOBIN GLYCOSYLATED A1C: CPT

## 2022-01-01 PROCEDURE — C1760 CLOSURE DEV, VASC: HCPCS | Performed by: INTERNAL MEDICINE

## 2022-01-01 PROCEDURE — 80048 BASIC METABOLIC PNL TOTAL CA: CPT | Performed by: PHYSICIAN ASSISTANT

## 2022-01-01 PROCEDURE — 84439 ASSAY OF FREE THYROXINE: CPT

## 2022-01-01 PROCEDURE — 96360 HYDRATION IV INFUSION INIT: CPT

## 2022-01-01 PROCEDURE — 88304 TISSUE EXAM BY PATHOLOGIST: CPT

## 2022-01-01 PROCEDURE — 63710000001 INSULIN ASPART PER 5 UNITS: Performed by: NURSE PRACTITIONER

## 2022-01-01 PROCEDURE — 83605 ASSAY OF LACTIC ACID: CPT | Performed by: NURSE PRACTITIONER

## 2022-01-01 PROCEDURE — 85730 THROMBOPLASTIN TIME PARTIAL: CPT | Performed by: NURSE PRACTITIONER

## 2022-01-01 PROCEDURE — 85610 PROTHROMBIN TIME: CPT | Performed by: EMERGENCY MEDICINE

## 2022-01-01 PROCEDURE — 25010000002 MAGNESIUM SULFATE 2 GM/50ML SOLUTION: Performed by: NURSE PRACTITIONER

## 2022-01-01 PROCEDURE — C1894 INTRO/SHEATH, NON-LASER: HCPCS | Performed by: INTERNAL MEDICINE

## 2022-01-01 PROCEDURE — 0 POTASSIUM CHLORIDE 10 MEQ/100ML SOLUTION: Performed by: INTERNAL MEDICINE

## 2022-01-01 PROCEDURE — 84100 ASSAY OF PHOSPHORUS: CPT | Performed by: FAMILY MEDICINE

## 2022-01-01 PROCEDURE — 25010000002 DOBUTAMINE PER 250 MG: Performed by: INTERNAL MEDICINE

## 2022-01-01 PROCEDURE — 84439 ASSAY OF FREE THYROXINE: CPT | Performed by: INTERNAL MEDICINE

## 2022-01-01 PROCEDURE — 36415 COLL VENOUS BLD VENIPUNCTURE: CPT | Performed by: STUDENT IN AN ORGANIZED HEALTH CARE EDUCATION/TRAINING PROGRAM

## 2022-01-01 PROCEDURE — 83735 ASSAY OF MAGNESIUM: CPT

## 2022-01-01 PROCEDURE — 25010000002 HYDROMORPHONE 1 MG/ML SOLUTION: Performed by: EMERGENCY MEDICINE

## 2022-01-01 PROCEDURE — 87086 URINE CULTURE/COLONY COUNT: CPT | Performed by: FAMILY MEDICINE

## 2022-01-01 PROCEDURE — A9537 TC99M MEBROFENIN: HCPCS | Performed by: STUDENT IN AN ORGANIZED HEALTH CARE EDUCATION/TRAINING PROGRAM

## 2022-01-01 PROCEDURE — 82607 VITAMIN B-12: CPT

## 2022-01-01 PROCEDURE — 83880 ASSAY OF NATRIURETIC PEPTIDE: CPT | Performed by: EMERGENCY MEDICINE

## 2022-01-01 PROCEDURE — 36415 COLL VENOUS BLD VENIPUNCTURE: CPT | Performed by: EMERGENCY MEDICINE

## 2022-01-01 PROCEDURE — 87086 URINE CULTURE/COLONY COUNT: CPT | Performed by: STUDENT IN AN ORGANIZED HEALTH CARE EDUCATION/TRAINING PROGRAM

## 2022-01-01 PROCEDURE — 25010000002 PIPERACILLIN SOD-TAZOBACTAM PER 1 G: Performed by: EMERGENCY MEDICINE

## 2022-01-01 PROCEDURE — 63710000001 INSULIN DETEMIR PER 5 UNITS: Performed by: PHYSICIAN ASSISTANT

## 2022-01-01 PROCEDURE — G0008 ADMIN INFLUENZA VIRUS VAC: HCPCS | Performed by: GENERAL PRACTICE

## 2022-01-01 PROCEDURE — 76705 ECHO EXAM OF ABDOMEN: CPT

## 2022-01-01 PROCEDURE — 93005 ELECTROCARDIOGRAM TRACING: CPT | Performed by: NURSE PRACTITIONER

## 2022-01-01 PROCEDURE — 25010000002 ENOXAPARIN PER 10 MG: Performed by: SURGERY

## 2022-01-01 PROCEDURE — 96361 HYDRATE IV INFUSION ADD-ON: CPT

## 2022-01-01 PROCEDURE — C1769 GUIDE WIRE: HCPCS | Performed by: INTERNAL MEDICINE

## 2022-01-01 PROCEDURE — 99152 MOD SED SAME PHYS/QHP 5/>YRS: CPT | Performed by: INTERNAL MEDICINE

## 2022-01-01 PROCEDURE — 25010000002 FENTANYL CITRATE (PF) 50 MCG/ML SOLUTION: Performed by: INTERNAL MEDICINE

## 2022-01-01 PROCEDURE — 87040 BLOOD CULTURE FOR BACTERIA: CPT | Performed by: FAMILY MEDICINE

## 2022-01-01 PROCEDURE — 25010000002 SUCCINYLCHOLINE PER 20 MG: Performed by: NURSE ANESTHETIST, CERTIFIED REGISTERED

## 2022-01-01 PROCEDURE — G0152 HHCP-SERV OF OT,EA 15 MIN: HCPCS

## 2022-01-01 PROCEDURE — 25010000002 ONDANSETRON PER 1 MG: Performed by: STUDENT IN AN ORGANIZED HEALTH CARE EDUCATION/TRAINING PROGRAM

## 2022-01-01 PROCEDURE — 83690 ASSAY OF LIPASE: CPT

## 2022-01-01 PROCEDURE — 80053 COMPREHEN METABOLIC PANEL: CPT | Performed by: SURGERY

## 2022-01-01 PROCEDURE — 25010000002 AMPICILLIN-SULBACTAM PER 1.5 G: Performed by: SURGERY

## 2022-01-01 PROCEDURE — 87186 SC STD MICRODIL/AGAR DIL: CPT | Performed by: STUDENT IN AN ORGANIZED HEALTH CARE EDUCATION/TRAINING PROGRAM

## 2022-01-01 PROCEDURE — 90662 IIV NO PRSV INCREASED AG IM: CPT | Performed by: GENERAL PRACTICE

## 2022-01-01 PROCEDURE — 99153 MOD SED SAME PHYS/QHP EA: CPT | Performed by: INTERNAL MEDICINE

## 2022-01-01 PROCEDURE — 25010000002 IOPAMIDOL 61 % SOLUTION: Performed by: SURGERY

## 2022-01-01 PROCEDURE — G0158 HHC OT ASSISTANT EA 15: HCPCS

## 2022-01-01 PROCEDURE — 80061 LIPID PANEL: CPT | Performed by: INTERNAL MEDICINE

## 2022-01-01 PROCEDURE — 0 IOPAMIDOL PER 1 ML: Performed by: INTERNAL MEDICINE

## 2022-01-01 PROCEDURE — 83880 ASSAY OF NATRIURETIC PEPTIDE: CPT | Performed by: STUDENT IN AN ORGANIZED HEALTH CARE EDUCATION/TRAINING PROGRAM

## 2022-01-01 PROCEDURE — 25010000002 ONDANSETRON PER 1 MG: Performed by: NURSE PRACTITIONER

## 2022-01-01 PROCEDURE — 63710000001 POTASSIUM CHLORIDE 10 MEQ CAPSULE CONTROLLED-RELEASE: Performed by: NURSE PRACTITIONER

## 2022-01-01 PROCEDURE — 99213 OFFICE O/P EST LOW 20 MIN: CPT | Performed by: NURSE PRACTITIONER

## 2022-01-01 PROCEDURE — 25010000002 FUROSEMIDE PER 20 MG: Performed by: NURSE PRACTITIONER

## 2022-01-01 PROCEDURE — 84484 ASSAY OF TROPONIN QUANT: CPT

## 2022-01-01 PROCEDURE — 83605 ASSAY OF LACTIC ACID: CPT | Performed by: FAMILY MEDICINE

## 2022-01-01 PROCEDURE — 86900 BLOOD TYPING SEROLOGIC ABO: CPT

## 2022-01-01 PROCEDURE — 25010000002 ALBUMIN HUMAN 25% PER 50 ML: Performed by: NURSE PRACTITIONER

## 2022-01-01 PROCEDURE — 0 IOPAMIDOL PER 1 ML: Performed by: FAMILY MEDICINE

## 2022-01-01 PROCEDURE — 25010000002 PROPOFOL 10 MG/ML EMULSION: Performed by: NURSE ANESTHETIST, CERTIFIED REGISTERED

## 2022-01-01 PROCEDURE — 83540 ASSAY OF IRON: CPT

## 2022-01-01 PROCEDURE — 83036 HEMOGLOBIN GLYCOSYLATED A1C: CPT | Performed by: NURSE PRACTITIONER

## 2022-01-01 PROCEDURE — 97166 OT EVAL MOD COMPLEX 45 MIN: CPT

## 2022-01-01 PROCEDURE — 63710000001 HYDROCODONE-ACETAMINOPHEN 7.5-325 MG TABLET: Performed by: SURGERY

## 2022-01-01 PROCEDURE — 63710000001 ISOSORBIDE MONONITRATE 30 MG TABLET SUSTAINED-RELEASE 24 HOUR: Performed by: SURGERY

## 2022-01-01 PROCEDURE — 83605 ASSAY OF LACTIC ACID: CPT | Performed by: INTERNAL MEDICINE

## 2022-01-01 PROCEDURE — 83880 ASSAY OF NATRIURETIC PEPTIDE: CPT

## 2022-01-01 PROCEDURE — 86901 BLOOD TYPING SEROLOGIC RH(D): CPT | Performed by: NURSE PRACTITIONER

## 2022-01-01 PROCEDURE — 63710000001 ATORVASTATIN 40 MG TABLET: Performed by: SURGERY

## 2022-01-01 PROCEDURE — 25010000002 MIDAZOLAM PER 1 MG: Performed by: INTERNAL MEDICINE

## 2022-01-01 PROCEDURE — 63710000001 LISINOPRIL 10 MG TABLET: Performed by: SURGERY

## 2022-01-01 PROCEDURE — B2111ZZ FLUOROSCOPY OF MULTIPLE CORONARY ARTERIES USING LOW OSMOLAR CONTRAST: ICD-10-PCS | Performed by: INTERNAL MEDICINE

## 2022-01-01 PROCEDURE — 25010000002 CEFTRIAXONE PER 250 MG: Performed by: FAMILY MEDICINE

## 2022-01-01 PROCEDURE — 25010000002 MIDAZOLAM PER 1 MG: Performed by: NURSE ANESTHETIST, CERTIFIED REGISTERED

## 2022-01-01 PROCEDURE — 25010000002 CEFTRIAXONE PER 250 MG: Performed by: STUDENT IN AN ORGANIZED HEALTH CARE EDUCATION/TRAINING PROGRAM

## 2022-01-01 PROCEDURE — 0 DIATRIZOATE MEGLUMINE & SODIUM PER 1 ML: Performed by: EMERGENCY MEDICINE

## 2022-01-01 PROCEDURE — 85379 FIBRIN DEGRADATION QUANT: CPT | Performed by: STUDENT IN AN ORGANIZED HEALTH CARE EDUCATION/TRAINING PROGRAM

## 2022-01-01 PROCEDURE — 99024 POSTOP FOLLOW-UP VISIT: CPT | Performed by: SURGERY

## 2022-01-01 PROCEDURE — 87636 SARSCOV2 & INF A&B AMP PRB: CPT | Performed by: NURSE PRACTITIONER

## 2022-01-01 PROCEDURE — 83735 ASSAY OF MAGNESIUM: CPT | Performed by: NURSE PRACTITIONER

## 2022-01-01 DEVICE — LIGAMAX 5 MM ENDOSCOPIC MULTIPLE CLIP APPLIER
Type: IMPLANTABLE DEVICE | Site: ABDOMEN | Status: FUNCTIONAL
Brand: LIGAMAX

## 2022-01-01 RX ORDER — ACETAMINOPHEN 325 MG/1
650 TABLET ORAL EVERY 4 HOURS PRN
Status: DISCONTINUED | OUTPATIENT
Start: 2022-01-01 | End: 2022-01-01

## 2022-01-01 RX ORDER — CARVEDILOL 25 MG/1
25 TABLET ORAL 2 TIMES DAILY WITH MEALS
Status: DISCONTINUED | OUTPATIENT
Start: 2022-01-01 | End: 2022-01-01 | Stop reason: HOSPADM

## 2022-01-01 RX ORDER — LISINOPRIL 10 MG/1
10 TABLET ORAL DAILY
Status: DISCONTINUED | OUTPATIENT
Start: 2022-01-01 | End: 2022-01-01 | Stop reason: HOSPADM

## 2022-01-01 RX ORDER — MAGNESIUM SULFATE HEPTAHYDRATE 40 MG/ML
2 INJECTION, SOLUTION INTRAVENOUS ONCE
Status: COMPLETED | OUTPATIENT
Start: 2022-01-01 | End: 2022-01-01

## 2022-01-01 RX ORDER — CALCIUM CARBONATE 200(500)MG
2 TABLET,CHEWABLE ORAL 2 TIMES DAILY PRN
Status: DISCONTINUED | OUTPATIENT
Start: 2022-01-01 | End: 2023-01-01 | Stop reason: HOSPADM

## 2022-01-01 RX ORDER — NITROGLYCERIN 0.4 MG/1
0.4 TABLET SUBLINGUAL
Status: DISCONTINUED | OUTPATIENT
Start: 2022-01-01 | End: 2022-01-01

## 2022-01-01 RX ORDER — DEXTROSE MONOHYDRATE 25 G/50ML
25 INJECTION, SOLUTION INTRAVENOUS
Status: DISCONTINUED | OUTPATIENT
Start: 2022-01-01 | End: 2022-01-01 | Stop reason: HOSPADM

## 2022-01-01 RX ORDER — FUROSEMIDE 40 MG/1
40 TABLET ORAL
Status: DISCONTINUED | OUTPATIENT
Start: 2022-01-01 | End: 2022-01-01 | Stop reason: HOSPADM

## 2022-01-01 RX ORDER — ALBUTEROL SULFATE 2.5 MG/3ML
2.5 SOLUTION RESPIRATORY (INHALATION) EVERY 4 HOURS PRN
Status: DISCONTINUED | OUTPATIENT
Start: 2022-01-01 | End: 2022-01-01 | Stop reason: HOSPADM

## 2022-01-01 RX ORDER — PANTOPRAZOLE SODIUM 40 MG/10ML
40 INJECTION, POWDER, LYOPHILIZED, FOR SOLUTION INTRAVENOUS EVERY 12 HOURS SCHEDULED
Status: DISCONTINUED | OUTPATIENT
Start: 2023-01-01 | End: 2023-01-01

## 2022-01-01 RX ORDER — FUROSEMIDE 40 MG/1
40 TABLET ORAL 2 TIMES DAILY
Status: DISCONTINUED | OUTPATIENT
Start: 2022-01-01 | End: 2022-01-01 | Stop reason: HOSPADM

## 2022-01-01 RX ORDER — FUROSEMIDE 40 MG/1
TABLET ORAL
Qty: 60 TABLET | Refills: 1 | Status: SHIPPED | OUTPATIENT
Start: 2022-01-01

## 2022-01-01 RX ORDER — FUROSEMIDE 40 MG/1
TABLET ORAL
Qty: 60 TABLET | Refills: 1 | Status: SHIPPED | OUTPATIENT
Start: 2022-01-01 | End: 2022-01-01

## 2022-01-01 RX ORDER — DOBUTAMINE HYDROCHLORIDE 200 MG/100ML
8 INJECTION INTRAVENOUS
Status: DISCONTINUED | OUTPATIENT
Start: 2022-01-01 | End: 2022-01-01

## 2022-01-01 RX ORDER — POTASSIUM CHLORIDE 7.45 MG/ML
10 INJECTION INTRAVENOUS
Status: DISCONTINUED | OUTPATIENT
Start: 2022-01-01 | End: 2022-01-01 | Stop reason: HOSPADM

## 2022-01-01 RX ORDER — EPHEDRINE SULFATE 50 MG/ML
5 INJECTION, SOLUTION INTRAVENOUS ONCE AS NEEDED
Status: DISCONTINUED | OUTPATIENT
Start: 2022-01-01 | End: 2022-01-01 | Stop reason: HOSPADM

## 2022-01-01 RX ORDER — MORPHINE SULFATE 2 MG/ML
2 INJECTION, SOLUTION INTRAMUSCULAR; INTRAVENOUS ONCE
Status: COMPLETED | OUTPATIENT
Start: 2022-01-01 | End: 2022-01-01

## 2022-01-01 RX ORDER — PROPOFOL 10 MG/ML
VIAL (ML) INTRAVENOUS AS NEEDED
Status: DISCONTINUED | OUTPATIENT
Start: 2022-01-01 | End: 2022-01-01 | Stop reason: SURG

## 2022-01-01 RX ORDER — MAGNESIUM SULFATE HEPTAHYDRATE 40 MG/ML
2 INJECTION, SOLUTION INTRAVENOUS AS NEEDED
Status: DISCONTINUED | OUTPATIENT
Start: 2022-01-01 | End: 2022-01-01 | Stop reason: HOSPADM

## 2022-01-01 RX ORDER — IPRATROPIUM BROMIDE AND ALBUTEROL SULFATE 2.5; .5 MG/3ML; MG/3ML
3 SOLUTION RESPIRATORY (INHALATION) 4 TIMES DAILY
Status: DISCONTINUED | OUTPATIENT
Start: 2022-01-01 | End: 2022-01-01 | Stop reason: HOSPADM

## 2022-01-01 RX ORDER — PANTOPRAZOLE SODIUM 40 MG/10ML
40 INJECTION, POWDER, LYOPHILIZED, FOR SOLUTION INTRAVENOUS ONCE
Status: COMPLETED | OUTPATIENT
Start: 2022-01-01 | End: 2022-01-01

## 2022-01-01 RX ORDER — SUCCINYLCHOLINE CHLORIDE 20 MG/ML
INJECTION INTRAMUSCULAR; INTRAVENOUS AS NEEDED
Status: DISCONTINUED | OUTPATIENT
Start: 2022-01-01 | End: 2022-01-01 | Stop reason: SURG

## 2022-01-01 RX ORDER — ATORVASTATIN CALCIUM 40 MG/1
40 TABLET, FILM COATED ORAL DAILY
Status: DISCONTINUED | OUTPATIENT
Start: 2022-01-01 | End: 2022-01-01 | Stop reason: HOSPADM

## 2022-01-01 RX ORDER — SODIUM CHLORIDE 0.9 % (FLUSH) 0.9 %
10 SYRINGE (ML) INJECTION AS NEEDED
Status: DISCONTINUED | OUTPATIENT
Start: 2022-01-01 | End: 2022-01-01 | Stop reason: HOSPADM

## 2022-01-01 RX ORDER — NICOTINE POLACRILEX 4 MG
15 LOZENGE BUCCAL
Status: DISCONTINUED | OUTPATIENT
Start: 2022-01-01 | End: 2022-01-01 | Stop reason: HOSPADM

## 2022-01-01 RX ORDER — ONDANSETRON 2 MG/ML
4 INJECTION INTRAMUSCULAR; INTRAVENOUS ONCE
Status: COMPLETED | OUTPATIENT
Start: 2022-01-01 | End: 2022-01-01

## 2022-01-01 RX ORDER — ONDANSETRON 2 MG/ML
4 INJECTION INTRAMUSCULAR; INTRAVENOUS EVERY 6 HOURS PRN
Status: DISCONTINUED | OUTPATIENT
Start: 2022-01-01 | End: 2022-01-01 | Stop reason: HOSPADM

## 2022-01-01 RX ORDER — ISOSORBIDE MONONITRATE 30 MG/1
30 TABLET, EXTENDED RELEASE ORAL DAILY
Status: DISCONTINUED | OUTPATIENT
Start: 2022-01-01 | End: 2022-01-01 | Stop reason: HOSPADM

## 2022-01-01 RX ORDER — PANTOPRAZOLE SODIUM 40 MG/1
40 TABLET, DELAYED RELEASE ORAL
Status: DISCONTINUED | OUTPATIENT
Start: 2022-01-01 | End: 2022-01-01

## 2022-01-01 RX ORDER — SUCRALFATE 1 G/1
1 TABLET ORAL 3 TIMES DAILY
Status: DISCONTINUED | OUTPATIENT
Start: 2022-01-01 | End: 2022-01-01 | Stop reason: HOSPADM

## 2022-01-01 RX ORDER — ACETAMINOPHEN 650 MG/1
650 SUPPOSITORY RECTAL EVERY 4 HOURS PRN
Status: DISCONTINUED | OUTPATIENT
Start: 2022-01-01 | End: 2023-01-01 | Stop reason: HOSPADM

## 2022-01-01 RX ORDER — SODIUM CHLORIDE 0.9 % (FLUSH) 0.9 %
10 SYRINGE (ML) INJECTION AS NEEDED
Status: DISCONTINUED | OUTPATIENT
Start: 2022-01-01 | End: 2022-01-01

## 2022-01-01 RX ORDER — POTASSIUM CHLORIDE 1.5 G/1.77G
40 POWDER, FOR SOLUTION ORAL 2 TIMES DAILY
Status: COMPLETED | OUTPATIENT
Start: 2022-01-01 | End: 2022-01-01

## 2022-01-01 RX ORDER — INSULIN ASPART 100 [IU]/ML
0-7 INJECTION, SOLUTION INTRAVENOUS; SUBCUTANEOUS
Status: DISCONTINUED | OUTPATIENT
Start: 2022-01-01 | End: 2022-01-01 | Stop reason: HOSPADM

## 2022-01-01 RX ORDER — POTASSIUM CHLORIDE 1.5 G/1.77G
40 POWDER, FOR SOLUTION ORAL AS NEEDED
Status: DISCONTINUED | OUTPATIENT
Start: 2022-01-01 | End: 2022-01-01 | Stop reason: HOSPADM

## 2022-01-01 RX ORDER — ENOXAPARIN SODIUM 100 MG/ML
40 INJECTION SUBCUTANEOUS NIGHTLY
Status: DISCONTINUED | OUTPATIENT
Start: 2022-01-01 | End: 2022-01-01

## 2022-01-01 RX ORDER — FUROSEMIDE 10 MG/ML
40 INJECTION INTRAMUSCULAR; INTRAVENOUS ONCE
Status: COMPLETED | OUTPATIENT
Start: 2022-01-01 | End: 2022-01-01

## 2022-01-01 RX ORDER — INSULIN DETEMIR 100 [IU]/ML
20 INJECTION, SOLUTION SUBCUTANEOUS NIGHTLY
Start: 2022-01-01

## 2022-01-01 RX ORDER — ONDANSETRON HCL IN 0.9 % NACL 8 MG/50 ML
8 INTRAVENOUS SOLUTION, PIGGYBACK (ML) INTRAVENOUS EVERY 8 HOURS PRN
Status: DISCONTINUED | OUTPATIENT
Start: 2022-01-01 | End: 2022-01-01 | Stop reason: HOSPADM

## 2022-01-01 RX ORDER — HYDROCODONE BITARTRATE AND ACETAMINOPHEN 7.5; 325 MG/1; MG/1
1 TABLET ORAL EVERY 6 HOURS PRN
Status: DISCONTINUED | OUTPATIENT
Start: 2022-01-01 | End: 2022-01-01 | Stop reason: HOSPADM

## 2022-01-01 RX ORDER — ALBUTEROL SULFATE 2.5 MG/3ML
2.5 SOLUTION RESPIRATORY (INHALATION) EVERY 6 HOURS PRN
Refills: 1 | Status: DISCONTINUED | OUTPATIENT
Start: 2022-01-01 | End: 2022-01-01 | Stop reason: HOSPADM

## 2022-01-01 RX ORDER — HYDROCODONE BITARTRATE AND ACETAMINOPHEN 7.5; 325 MG/1; MG/1
1 TABLET ORAL EVERY 8 HOURS PRN
Qty: 9 TABLET | Refills: 0 | Status: SHIPPED | OUTPATIENT
Start: 2022-01-01 | End: 2022-01-01

## 2022-01-01 RX ORDER — SODIUM CHLORIDE 9 MG/ML
75 INJECTION, SOLUTION INTRAVENOUS CONTINUOUS
Status: DISCONTINUED | OUTPATIENT
Start: 2022-01-01 | End: 2022-01-01

## 2022-01-01 RX ORDER — PROMETHAZINE HYDROCHLORIDE 25 MG/1
25 TABLET ORAL ONCE AS NEEDED
Status: DISCONTINUED | OUTPATIENT
Start: 2022-01-01 | End: 2022-01-01 | Stop reason: HOSPADM

## 2022-01-01 RX ORDER — FUROSEMIDE 40 MG/1
40 TABLET ORAL DAILY
Status: DISCONTINUED | OUTPATIENT
Start: 2022-01-01 | End: 2022-01-01

## 2022-01-01 RX ORDER — SODIUM CHLORIDE 0.9 % (FLUSH) 0.9 %
10 SYRINGE (ML) INJECTION AS NEEDED
Status: CANCELLED | OUTPATIENT
Start: 2022-01-01

## 2022-01-01 RX ORDER — MAGNESIUM SULFATE HEPTAHYDRATE 40 MG/ML
4 INJECTION, SOLUTION INTRAVENOUS AS NEEDED
Status: DISCONTINUED | OUTPATIENT
Start: 2022-01-01 | End: 2022-01-01 | Stop reason: HOSPADM

## 2022-01-01 RX ORDER — ENOXAPARIN SODIUM 100 MG/ML
40 INJECTION SUBCUTANEOUS EVERY 24 HOURS
Status: DISCONTINUED | OUTPATIENT
Start: 2022-01-01 | End: 2022-01-01 | Stop reason: HOSPADM

## 2022-01-01 RX ORDER — POTASSIUM CHLORIDE 750 MG/1
10 CAPSULE, EXTENDED RELEASE ORAL 2 TIMES DAILY
Status: DISCONTINUED | OUTPATIENT
Start: 2022-01-01 | End: 2022-01-01

## 2022-01-01 RX ORDER — FUROSEMIDE 10 MG/ML
40 INJECTION INTRAMUSCULAR; INTRAVENOUS DAILY
Status: DISCONTINUED | OUTPATIENT
Start: 2022-01-01 | End: 2022-01-01 | Stop reason: HOSPADM

## 2022-01-01 RX ORDER — LIDOCAINE HYDROCHLORIDE 20 MG/ML
INJECTION, SOLUTION INFILTRATION; PERINEURAL AS NEEDED
Status: DISCONTINUED | OUTPATIENT
Start: 2022-01-01 | End: 2022-01-01 | Stop reason: HOSPADM

## 2022-01-01 RX ORDER — ONDANSETRON 4 MG/1
4 TABLET, FILM COATED ORAL EVERY 6 HOURS PRN
Status: DISCONTINUED | OUTPATIENT
Start: 2022-01-01 | End: 2022-01-01 | Stop reason: HOSPADM

## 2022-01-01 RX ORDER — POTASSIUM CHLORIDE 750 MG/1
40 CAPSULE, EXTENDED RELEASE ORAL AS NEEDED
Status: DISCONTINUED | OUTPATIENT
Start: 2022-01-01 | End: 2022-01-01 | Stop reason: HOSPADM

## 2022-01-01 RX ORDER — DEXTROSE MONOHYDRATE 100 MG/ML
75 INJECTION, SOLUTION INTRAVENOUS CONTINUOUS
Status: DISCONTINUED | OUTPATIENT
Start: 2022-01-01 | End: 2023-01-01

## 2022-01-01 RX ORDER — FUROSEMIDE 40 MG/1
40 TABLET ORAL DAILY
Status: DISCONTINUED | OUTPATIENT
Start: 2022-01-01 | End: 2022-01-01 | Stop reason: SDUPTHER

## 2022-01-01 RX ORDER — NITROGLYCERIN 0.4 MG/1
0.4 TABLET SUBLINGUAL
Status: DISCONTINUED | OUTPATIENT
Start: 2022-01-01 | End: 2022-01-01 | Stop reason: HOSPADM

## 2022-01-01 RX ORDER — ISOSORBIDE MONONITRATE 60 MG/1
120 TABLET, EXTENDED RELEASE ORAL
Status: DISCONTINUED | OUTPATIENT
Start: 2022-01-01 | End: 2022-01-01 | Stop reason: SDUPTHER

## 2022-01-01 RX ORDER — CARVEDILOL 25 MG/1
25 TABLET ORAL 2 TIMES DAILY WITH MEALS
Status: DISCONTINUED | OUTPATIENT
Start: 2022-01-01 | End: 2022-01-01

## 2022-01-01 RX ORDER — PANTOPRAZOLE SODIUM 40 MG/1
40 TABLET, DELAYED RELEASE ORAL DAILY
Status: DISCONTINUED | OUTPATIENT
Start: 2022-01-01 | End: 2022-01-01

## 2022-01-01 RX ORDER — NICOTINE POLACRILEX 4 MG
15 LOZENGE BUCCAL
Status: DISCONTINUED | OUTPATIENT
Start: 2022-01-01 | End: 2022-01-01 | Stop reason: SDUPTHER

## 2022-01-01 RX ORDER — FUROSEMIDE 10 MG/ML
80 INJECTION INTRAMUSCULAR; INTRAVENOUS ONCE
Status: COMPLETED | OUTPATIENT
Start: 2022-01-01 | End: 2022-01-01

## 2022-01-01 RX ORDER — ENOXAPARIN SODIUM 100 MG/ML
40 INJECTION SUBCUTANEOUS DAILY
Status: DISCONTINUED | OUTPATIENT
Start: 2022-01-01 | End: 2022-01-01 | Stop reason: HOSPADM

## 2022-01-01 RX ORDER — CEFDINIR 300 MG/1
300 CAPSULE ORAL 2 TIMES DAILY
Qty: 10 CAPSULE | Refills: 0 | Status: SHIPPED | OUTPATIENT
Start: 2022-01-01 | End: 2023-01-01

## 2022-01-01 RX ORDER — KIT FOR THE PREPARATION OF TECHNETIUM TC 99M MEBROFENIN 45 MG/10ML
1 INJECTION, POWDER, LYOPHILIZED, FOR SOLUTION INTRAVENOUS
Status: COMPLETED | OUTPATIENT
Start: 2022-01-01 | End: 2022-01-01

## 2022-01-01 RX ORDER — BLOOD-GLUCOSE METER
1 KIT MISCELLANEOUS 3 TIMES DAILY
Qty: 1 EACH | Refills: 0 | Status: SHIPPED | OUTPATIENT
Start: 2022-01-01

## 2022-01-01 RX ORDER — FUROSEMIDE 10 MG/ML
40 INJECTION INTRAMUSCULAR; INTRAVENOUS EVERY 12 HOURS
Status: DISCONTINUED | OUTPATIENT
Start: 2022-01-01 | End: 2022-01-01

## 2022-01-01 RX ORDER — BUPIVACAINE HYDROCHLORIDE 2.5 MG/ML
INJECTION, SOLUTION EPIDURAL; INFILTRATION; INTRACAUDAL AS NEEDED
Status: DISCONTINUED | OUTPATIENT
Start: 2022-01-01 | End: 2022-01-01 | Stop reason: HOSPADM

## 2022-01-01 RX ORDER — POTASSIUM CHLORIDE 750 MG/1
40 CAPSULE, EXTENDED RELEASE ORAL ONCE
Status: COMPLETED | OUTPATIENT
Start: 2022-01-01 | End: 2022-01-01

## 2022-01-01 RX ORDER — FUROSEMIDE 10 MG/ML
40 INJECTION INTRAMUSCULAR; INTRAVENOUS DAILY
Status: DISCONTINUED | OUTPATIENT
Start: 2022-01-01 | End: 2022-01-01

## 2022-01-01 RX ORDER — BLOOD-GLUCOSE METER
1 KIT MISCELLANEOUS 3 TIMES DAILY
Qty: 100 EACH | Refills: 1 | Status: SHIPPED | OUTPATIENT
Start: 2022-01-01

## 2022-01-01 RX ORDER — ONDANSETRON 2 MG/ML
4 INJECTION INTRAMUSCULAR; INTRAVENOUS ONCE AS NEEDED
Status: DISCONTINUED | OUTPATIENT
Start: 2022-01-01 | End: 2022-01-01 | Stop reason: HOSPADM

## 2022-01-01 RX ORDER — ROCURONIUM BROMIDE 10 MG/ML
INJECTION, SOLUTION INTRAVENOUS AS NEEDED
Status: DISCONTINUED | OUTPATIENT
Start: 2022-01-01 | End: 2022-01-01 | Stop reason: SURG

## 2022-01-01 RX ORDER — FUROSEMIDE 10 MG/ML
40 INJECTION INTRAMUSCULAR; INTRAVENOUS EVERY 6 HOURS
Status: DISCONTINUED | OUTPATIENT
Start: 2022-01-01 | End: 2022-01-01

## 2022-01-01 RX ORDER — ACETAMINOPHEN 325 MG/1
650 TABLET ORAL EVERY 4 HOURS PRN
Status: DISCONTINUED | OUTPATIENT
Start: 2022-01-01 | End: 2023-01-01 | Stop reason: HOSPADM

## 2022-01-01 RX ORDER — SODIUM CHLORIDE 0.9 % (FLUSH) 0.9 %
10 SYRINGE (ML) INJECTION EVERY 12 HOURS SCHEDULED
Status: DISCONTINUED | OUTPATIENT
Start: 2022-01-01 | End: 2022-01-01

## 2022-01-01 RX ORDER — FENTANYL CITRATE 50 UG/ML
INJECTION, SOLUTION INTRAMUSCULAR; INTRAVENOUS AS NEEDED
Status: DISCONTINUED | OUTPATIENT
Start: 2022-01-01 | End: 2022-01-01 | Stop reason: SURG

## 2022-01-01 RX ORDER — SODIUM CHLORIDE 9 MG/ML
125 INJECTION, SOLUTION INTRAVENOUS CONTINUOUS
Status: DISCONTINUED | OUTPATIENT
Start: 2022-01-01 | End: 2022-01-01 | Stop reason: HOSPADM

## 2022-01-01 RX ORDER — LISINOPRIL 5 MG/1
10 TABLET ORAL DAILY
Status: DISCONTINUED | OUTPATIENT
Start: 2022-01-01 | End: 2022-01-01

## 2022-01-01 RX ORDER — ACETAMINOPHEN 325 MG/1
650 TABLET ORAL ONCE AS NEEDED
Status: DISCONTINUED | OUTPATIENT
Start: 2022-01-01 | End: 2022-01-01 | Stop reason: HOSPADM

## 2022-01-01 RX ORDER — ATORVASTATIN CALCIUM 40 MG/1
40 TABLET, FILM COATED ORAL DAILY
Status: DISCONTINUED | OUTPATIENT
Start: 2022-01-01 | End: 2023-01-01 | Stop reason: HOSPADM

## 2022-01-01 RX ORDER — ONDANSETRON 4 MG/1
4 TABLET, ORALLY DISINTEGRATING ORAL ONCE
Status: COMPLETED | OUTPATIENT
Start: 2022-01-01 | End: 2022-01-01

## 2022-01-01 RX ORDER — POTASSIUM CHLORIDE 1.5 G/1.77G
40 POWDER, FOR SOLUTION ORAL ONCE
Status: COMPLETED | OUTPATIENT
Start: 2022-01-01 | End: 2022-01-01

## 2022-01-01 RX ORDER — POTASSIUM CHLORIDE 750 MG/1
20 CAPSULE, EXTENDED RELEASE ORAL 2 TIMES DAILY
Status: DISCONTINUED | OUTPATIENT
Start: 2022-01-01 | End: 2022-01-01 | Stop reason: HOSPADM

## 2022-01-01 RX ORDER — ASPIRIN 325 MG
325 TABLET ORAL DAILY
Status: DISCONTINUED | OUTPATIENT
Start: 2022-01-01 | End: 2022-01-01

## 2022-01-01 RX ORDER — SODIUM CHLORIDE 0.9 % (FLUSH) 0.9 %
10 SYRINGE (ML) INJECTION EVERY 12 HOURS SCHEDULED
Status: DISCONTINUED | OUTPATIENT
Start: 2022-01-01 | End: 2023-01-01 | Stop reason: HOSPADM

## 2022-01-01 RX ORDER — ASPIRIN 81 MG/1
81 TABLET ORAL NIGHTLY
Status: DISCONTINUED | OUTPATIENT
Start: 2022-01-01 | End: 2022-01-01 | Stop reason: HOSPADM

## 2022-01-01 RX ORDER — 0.9 % SODIUM CHLORIDE 0.9 %
VIAL (ML) INJECTION AS NEEDED
Status: DISCONTINUED | OUTPATIENT
Start: 2022-01-01 | End: 2022-01-01 | Stop reason: HOSPADM

## 2022-01-01 RX ORDER — METOCLOPRAMIDE HYDROCHLORIDE 5 MG/ML
10 INJECTION INTRAMUSCULAR; INTRAVENOUS EVERY 8 HOURS PRN
Status: DISCONTINUED | OUTPATIENT
Start: 2022-01-01 | End: 2022-01-01 | Stop reason: HOSPADM

## 2022-01-01 RX ORDER — GUAIFENESIN 600 MG/1
600 TABLET, EXTENDED RELEASE ORAL EVERY 12 HOURS SCHEDULED
Qty: 60 TABLET | Refills: 1 | Status: SHIPPED | OUTPATIENT
Start: 2022-01-01 | End: 2022-01-01

## 2022-01-01 RX ORDER — PANTOPRAZOLE SODIUM 40 MG/1
40 TABLET, DELAYED RELEASE ORAL DAILY
Status: DISCONTINUED | OUTPATIENT
Start: 2022-01-01 | End: 2022-01-01 | Stop reason: HOSPADM

## 2022-01-01 RX ORDER — NALOXONE HCL 0.4 MG/ML
0.4 VIAL (ML) INJECTION
Status: DISCONTINUED | OUTPATIENT
Start: 2022-01-01 | End: 2022-01-01

## 2022-01-01 RX ORDER — DOBUTAMINE HYDROCHLORIDE 100 MG/100ML
8 INJECTION INTRAVENOUS CONTINUOUS
Status: DISCONTINUED | OUTPATIENT
Start: 2022-01-01 | End: 2022-01-01

## 2022-01-01 RX ORDER — NITROGLYCERIN 0.4 MG/1
TABLET SUBLINGUAL
Qty: 25 TABLET | Refills: 0 | Status: SHIPPED | OUTPATIENT
Start: 2022-01-01

## 2022-01-01 RX ORDER — LANOLIN ALCOHOL/MO/W.PET/CERES
6 CREAM (GRAM) TOPICAL NIGHTLY PRN
Status: DISCONTINUED | OUTPATIENT
Start: 2022-01-01 | End: 2023-01-01 | Stop reason: HOSPADM

## 2022-01-01 RX ORDER — PANTOPRAZOLE SODIUM 40 MG/1
40 TABLET, DELAYED RELEASE ORAL DAILY
Qty: 30 TABLET | Refills: 0 | Status: SHIPPED | OUTPATIENT
Start: 2022-01-01 | End: 2022-01-01 | Stop reason: SDUPTHER

## 2022-01-01 RX ORDER — ACETAMINOPHEN 325 MG/1
650 TABLET ORAL EVERY 4 HOURS PRN
Status: DISCONTINUED | OUTPATIENT
Start: 2022-01-01 | End: 2022-01-01 | Stop reason: SDUPTHER

## 2022-01-01 RX ORDER — INSULIN DETEMIR 100 [IU]/ML
18 INJECTION, SOLUTION SUBCUTANEOUS NIGHTLY
Start: 2022-01-01 | End: 2022-01-01 | Stop reason: SDUPTHER

## 2022-01-01 RX ORDER — LANCETS 28 GAUGE
1 EACH MISCELLANEOUS 3 TIMES DAILY
Qty: 100 EACH | Refills: 1 | Status: SHIPPED | OUTPATIENT
Start: 2022-01-01

## 2022-01-01 RX ORDER — NOREPINEPHRINE BIT/0.9 % NACL 8 MG/250ML
.02-.3 INFUSION BOTTLE (ML) INTRAVENOUS
Status: DISCONTINUED | OUTPATIENT
Start: 2022-01-01 | End: 2023-01-01

## 2022-01-01 RX ORDER — ASPIRIN 300 MG/1
300 SUPPOSITORY RECTAL DAILY
Status: DISCONTINUED | OUTPATIENT
Start: 2022-01-01 | End: 2022-01-01

## 2022-01-01 RX ORDER — ACETAMINOPHEN 650 MG/1
650 SUPPOSITORY RECTAL EVERY 4 HOURS PRN
Status: DISCONTINUED | OUTPATIENT
Start: 2022-01-01 | End: 2022-01-01 | Stop reason: HOSPADM

## 2022-01-01 RX ORDER — DEXTROSE MONOHYDRATE 25 G/50ML
25 INJECTION, SOLUTION INTRAVENOUS
Status: DISCONTINUED | OUTPATIENT
Start: 2022-01-01 | End: 2023-01-01 | Stop reason: HOSPADM

## 2022-01-01 RX ORDER — ACETAMINOPHEN 325 MG/1
650 TABLET ORAL EVERY 4 HOURS PRN
Status: DISCONTINUED | OUTPATIENT
Start: 2022-01-01 | End: 2022-01-01 | Stop reason: HOSPADM

## 2022-01-01 RX ORDER — MIDAZOLAM HYDROCHLORIDE 1 MG/ML
INJECTION INTRAMUSCULAR; INTRAVENOUS AS NEEDED
Status: DISCONTINUED | OUTPATIENT
Start: 2022-01-01 | End: 2022-01-01 | Stop reason: HOSPADM

## 2022-01-01 RX ORDER — DEXTROSE MONOHYDRATE 25 G/50ML
25 INJECTION, SOLUTION INTRAVENOUS
Status: DISCONTINUED | OUTPATIENT
Start: 2022-01-01 | End: 2022-01-01 | Stop reason: SDUPTHER

## 2022-01-01 RX ORDER — HYDROXYZINE HYDROCHLORIDE 10 MG/1
10 TABLET, FILM COATED ORAL EVERY 8 HOURS PRN
Qty: 30 TABLET | Refills: 0 | Status: SHIPPED | OUTPATIENT
Start: 2022-01-01 | End: 2023-01-01

## 2022-01-01 RX ORDER — IPRATROPIUM BROMIDE AND ALBUTEROL SULFATE 2.5; .5 MG/3ML; MG/3ML
3 SOLUTION RESPIRATORY (INHALATION)
Status: DISCONTINUED | OUTPATIENT
Start: 2022-01-01 | End: 2022-01-01 | Stop reason: HOSPADM

## 2022-01-01 RX ORDER — FAMOTIDINE 40 MG/1
40 TABLET, FILM COATED ORAL DAILY
Status: DISCONTINUED | OUTPATIENT
Start: 2022-01-01 | End: 2022-01-01 | Stop reason: HOSPADM

## 2022-01-01 RX ORDER — DIPHENHYDRAMINE HYDROCHLORIDE 50 MG/ML
12.5 INJECTION INTRAMUSCULAR; INTRAVENOUS
Status: DISCONTINUED | OUTPATIENT
Start: 2022-01-01 | End: 2022-01-01 | Stop reason: HOSPADM

## 2022-01-01 RX ORDER — INSULIN ASPART 100 [IU]/ML
0-14 INJECTION, SOLUTION INTRAVENOUS; SUBCUTANEOUS
Status: DISCONTINUED | OUTPATIENT
Start: 2022-01-01 | End: 2022-01-01 | Stop reason: HOSPADM

## 2022-01-01 RX ORDER — FUROSEMIDE 10 MG/ML
60 INJECTION INTRAMUSCULAR; INTRAVENOUS ONCE
Status: COMPLETED | OUTPATIENT
Start: 2022-01-01 | End: 2022-01-01

## 2022-01-01 RX ORDER — NALOXONE HCL 0.4 MG/ML
0.4 VIAL (ML) INJECTION
Status: DISCONTINUED | OUTPATIENT
Start: 2022-01-01 | End: 2022-01-01 | Stop reason: HOSPADM

## 2022-01-01 RX ORDER — NITROGLYCERIN 0.4 MG/1
0.4 TABLET SUBLINGUAL
Qty: 25 TABLET | Refills: 0 | Status: SHIPPED | OUTPATIENT
Start: 2022-01-01 | End: 2022-01-01

## 2022-01-01 RX ORDER — CARVEDILOL 25 MG/1
25 TABLET ORAL 2 TIMES DAILY WITH MEALS
Status: DISCONTINUED | OUTPATIENT
Start: 2022-01-01 | End: 2022-01-01 | Stop reason: SDUPTHER

## 2022-01-01 RX ORDER — NALOXONE HCL 0.4 MG/ML
0.4 VIAL (ML) INJECTION AS NEEDED
Status: DISCONTINUED | OUTPATIENT
Start: 2022-01-01 | End: 2022-01-01 | Stop reason: HOSPADM

## 2022-01-01 RX ORDER — ONDANSETRON 4 MG/1
4 TABLET, ORALLY DISINTEGRATING ORAL EVERY 6 HOURS PRN
Qty: 20 TABLET | Refills: 0 | Status: SHIPPED | OUTPATIENT
Start: 2022-01-01

## 2022-01-01 RX ORDER — CEPHALEXIN 500 MG/1
500 CAPSULE ORAL 4 TIMES DAILY
Qty: 28 CAPSULE | Refills: 0 | Status: SHIPPED | OUTPATIENT
Start: 2022-01-01 | End: 2022-01-01

## 2022-01-01 RX ORDER — SODIUM CHLORIDE 9 MG/ML
40 INJECTION, SOLUTION INTRAVENOUS AS NEEDED
Status: DISCONTINUED | OUTPATIENT
Start: 2022-01-01 | End: 2023-01-01 | Stop reason: HOSPADM

## 2022-01-01 RX ORDER — POTASSIUM CHLORIDE 750 MG/1
20 CAPSULE, EXTENDED RELEASE ORAL 2 TIMES DAILY
Qty: 60 CAPSULE | Refills: 0 | Status: SHIPPED | OUTPATIENT
Start: 2022-01-01

## 2022-01-01 RX ORDER — SODIUM CHLORIDE 0.9 % (FLUSH) 0.9 %
10 SYRINGE (ML) INJECTION EVERY 12 HOURS SCHEDULED
Status: DISCONTINUED | OUTPATIENT
Start: 2022-01-01 | End: 2022-01-01 | Stop reason: HOSPADM

## 2022-01-01 RX ORDER — EPHEDRINE SULFATE 50 MG/ML
INJECTION INTRAVENOUS AS NEEDED
Status: DISCONTINUED | OUTPATIENT
Start: 2022-01-01 | End: 2022-01-01 | Stop reason: SURG

## 2022-01-01 RX ORDER — TRAMADOL HYDROCHLORIDE 50 MG/1
50 TABLET ORAL EVERY 6 HOURS PRN
Status: DISPENSED | OUTPATIENT
Start: 2022-01-01 | End: 2022-01-01

## 2022-01-01 RX ORDER — SODIUM CHLORIDE 0.9 % (FLUSH) 0.9 %
10 SYRINGE (ML) INJECTION AS NEEDED
Status: DISCONTINUED | OUTPATIENT
Start: 2022-01-01 | End: 2023-01-01 | Stop reason: HOSPADM

## 2022-01-01 RX ORDER — NICOTINE POLACRILEX 4 MG
15 LOZENGE BUCCAL
Status: DISCONTINUED | OUTPATIENT
Start: 2022-01-01 | End: 2023-01-01 | Stop reason: HOSPADM

## 2022-01-01 RX ORDER — ONDANSETRON 4 MG/1
4 TABLET, ORALLY DISINTEGRATING ORAL ONCE
Status: DISCONTINUED | OUTPATIENT
Start: 2022-01-01 | End: 2022-01-01

## 2022-01-01 RX ORDER — ASPIRIN 81 MG/1
81 TABLET ORAL NIGHTLY
Qty: 90 TABLET | Refills: 3 | Status: SHIPPED | OUTPATIENT
Start: 2022-01-01

## 2022-01-01 RX ORDER — FUROSEMIDE 10 MG/ML
40 INJECTION INTRAMUSCULAR; INTRAVENOUS EVERY 12 HOURS
Status: DISCONTINUED | OUTPATIENT
Start: 2022-01-01 | End: 2022-01-01 | Stop reason: HOSPADM

## 2022-01-01 RX ORDER — HYDROCODONE BITARTRATE AND ACETAMINOPHEN 7.5; 325 MG/1; MG/1
1 TABLET ORAL EVERY 6 HOURS PRN
COMMUNITY
End: 2022-01-01

## 2022-01-01 RX ORDER — METRONIDAZOLE 500 MG/100ML
500 INJECTION, SOLUTION INTRAVENOUS EVERY 8 HOURS
Status: DISCONTINUED | OUTPATIENT
Start: 2022-01-01 | End: 2023-01-01 | Stop reason: HOSPADM

## 2022-01-01 RX ORDER — FLUMAZENIL 0.1 MG/ML
0.2 INJECTION INTRAVENOUS AS NEEDED
Status: DISCONTINUED | OUTPATIENT
Start: 2022-01-01 | End: 2022-01-01 | Stop reason: HOSPADM

## 2022-01-01 RX ORDER — BISACODYL 10 MG
10 SUPPOSITORY, RECTAL RECTAL DAILY PRN
Status: DISCONTINUED | OUTPATIENT
Start: 2022-01-01 | End: 2022-01-01

## 2022-01-01 RX ORDER — ALBUMIN, HUMAN INJ 5% 5 %
12.5 SOLUTION INTRAVENOUS ONCE
Status: COMPLETED | OUTPATIENT
Start: 2023-01-01 | End: 2022-01-01

## 2022-01-01 RX ORDER — ACETAMINOPHEN 325 MG/1
650 TABLET ORAL ONCE
Status: COMPLETED | OUTPATIENT
Start: 2022-01-01 | End: 2022-01-01

## 2022-01-01 RX ORDER — MORPHINE SULFATE 2 MG/ML
2 INJECTION, SOLUTION INTRAMUSCULAR; INTRAVENOUS EVERY 4 HOURS PRN
Status: DISCONTINUED | OUTPATIENT
Start: 2022-01-01 | End: 2022-01-01 | Stop reason: HOSPADM

## 2022-01-01 RX ORDER — INSULIN ASPART 100 [IU]/ML
0-14 INJECTION, SOLUTION INTRAVENOUS; SUBCUTANEOUS
Status: DISCONTINUED | OUTPATIENT
Start: 2022-01-01 | End: 2022-01-01

## 2022-01-01 RX ORDER — FENTANYL CITRATE 50 UG/ML
INJECTION, SOLUTION INTRAMUSCULAR; INTRAVENOUS AS NEEDED
Status: DISCONTINUED | OUTPATIENT
Start: 2022-01-01 | End: 2022-01-01 | Stop reason: HOSPADM

## 2022-01-01 RX ORDER — LISINOPRIL 10 MG/1
10 TABLET ORAL
Status: DISCONTINUED | OUTPATIENT
Start: 2022-01-01 | End: 2022-01-01 | Stop reason: SDUPTHER

## 2022-01-01 RX ORDER — SODIUM CHLORIDE, SODIUM LACTATE, POTASSIUM CHLORIDE, CALCIUM CHLORIDE 600; 310; 30; 20 MG/100ML; MG/100ML; MG/100ML; MG/100ML
100 INJECTION, SOLUTION INTRAVENOUS CONTINUOUS
Status: DISCONTINUED | OUTPATIENT
Start: 2022-01-01 | End: 2022-01-01

## 2022-01-01 RX ORDER — INSULIN ASPART 100 [IU]/ML
0-24 INJECTION, SOLUTION INTRAVENOUS; SUBCUTANEOUS
Status: DISCONTINUED | OUTPATIENT
Start: 2022-01-01 | End: 2022-01-01

## 2022-01-01 RX ORDER — LISINOPRIL 10 MG/1
TABLET ORAL
Qty: 90 TABLET | Refills: 3 | Status: SHIPPED | OUTPATIENT
Start: 2022-01-01

## 2022-01-01 RX ORDER — SODIUM CHLORIDE 0.9 % (FLUSH) 0.9 %
10 SYRINGE (ML) INJECTION EVERY 12 HOURS SCHEDULED
Status: CANCELLED | OUTPATIENT
Start: 2022-01-01

## 2022-01-01 RX ORDER — FUROSEMIDE 10 MG/ML
20 INJECTION INTRAMUSCULAR; INTRAVENOUS EVERY 12 HOURS
Status: DISCONTINUED | OUTPATIENT
Start: 2022-01-01 | End: 2022-01-01

## 2022-01-01 RX ORDER — POLYETHYLENE GLYCOL 3350 17 G/17G
17 POWDER, FOR SOLUTION ORAL DAILY PRN
Status: DISCONTINUED | OUTPATIENT
Start: 2022-01-01 | End: 2022-01-01

## 2022-01-01 RX ORDER — CALCIUM CARBONATE 200(500)MG
1 TABLET,CHEWABLE ORAL 2 TIMES DAILY PRN
Status: DISCONTINUED | OUTPATIENT
Start: 2022-01-01 | End: 2022-01-01 | Stop reason: HOSPADM

## 2022-01-01 RX ORDER — MIDAZOLAM HYDROCHLORIDE 1 MG/ML
INJECTION INTRAMUSCULAR; INTRAVENOUS AS NEEDED
Status: DISCONTINUED | OUTPATIENT
Start: 2022-01-01 | End: 2022-01-01 | Stop reason: SURG

## 2022-01-01 RX ORDER — ACETAMINOPHEN 160 MG/5ML
650 SOLUTION ORAL EVERY 4 HOURS PRN
Status: DISCONTINUED | OUTPATIENT
Start: 2022-01-01 | End: 2023-01-01 | Stop reason: HOSPADM

## 2022-01-01 RX ORDER — LANOLIN ALCOHOL/MO/W.PET/CERES
400 CREAM (GRAM) TOPICAL DAILY
Qty: 21 TABLET | Refills: 0 | Status: SHIPPED | OUTPATIENT
Start: 2022-01-01 | End: 2022-01-01

## 2022-01-01 RX ORDER — ISOSORBIDE MONONITRATE 30 MG/1
30 TABLET, EXTENDED RELEASE ORAL DAILY
Status: DISCONTINUED | OUTPATIENT
Start: 2022-01-01 | End: 2022-01-01

## 2022-01-01 RX ORDER — MORPHINE SULFATE 2 MG/ML
1 INJECTION, SOLUTION INTRAMUSCULAR; INTRAVENOUS EVERY 4 HOURS PRN
Status: DISCONTINUED | OUTPATIENT
Start: 2022-01-01 | End: 2022-01-01 | Stop reason: HOSPADM

## 2022-01-01 RX ORDER — AMOXICILLIN 250 MG
2 CAPSULE ORAL 2 TIMES DAILY
Status: DISCONTINUED | OUTPATIENT
Start: 2022-01-01 | End: 2022-01-01

## 2022-01-01 RX ORDER — METHYLPREDNISOLONE 4 MG/1
TABLET ORAL
Qty: 21 TABLET | Refills: 0 | Status: SHIPPED | OUTPATIENT
Start: 2022-01-01

## 2022-01-01 RX ORDER — ASPIRIN 81 MG/1
81 TABLET ORAL NIGHTLY
Status: DISCONTINUED | OUTPATIENT
Start: 2022-01-01 | End: 2023-01-01 | Stop reason: HOSPADM

## 2022-01-01 RX ORDER — NEOSTIGMINE METHYLSULFATE 1 MG/ML
INJECTION, SOLUTION INTRAVENOUS AS NEEDED
Status: DISCONTINUED | OUTPATIENT
Start: 2022-01-01 | End: 2022-01-01 | Stop reason: SURG

## 2022-01-01 RX ORDER — ALBUMIN (HUMAN) 12.5 G/50ML
25 SOLUTION INTRAVENOUS ONCE
Status: COMPLETED | OUTPATIENT
Start: 2022-01-01 | End: 2022-01-01

## 2022-01-01 RX ORDER — DEXTROSE MONOHYDRATE 50 MG/ML
75 INJECTION, SOLUTION INTRAVENOUS CONTINUOUS
Status: DISCONTINUED | OUTPATIENT
Start: 2022-01-01 | End: 2022-01-01

## 2022-01-01 RX ORDER — FUROSEMIDE 10 MG/ML
40 INJECTION INTRAMUSCULAR; INTRAVENOUS
Status: DISCONTINUED | OUTPATIENT
Start: 2022-01-01 | End: 2022-01-01 | Stop reason: HOSPADM

## 2022-01-01 RX ORDER — AZITHROMYCIN 250 MG/1
500 TABLET, FILM COATED ORAL ONCE
Status: COMPLETED | OUTPATIENT
Start: 2022-01-01 | End: 2022-01-01

## 2022-01-01 RX ORDER — INSULIN ASPART 100 [IU]/ML
0-7 INJECTION, SOLUTION INTRAVENOUS; SUBCUTANEOUS
Status: DISCONTINUED | OUTPATIENT
Start: 2022-01-01 | End: 2023-01-01 | Stop reason: HOSPADM

## 2022-01-01 RX ORDER — POTASSIUM CHLORIDE 750 MG/1
20 CAPSULE, EXTENDED RELEASE ORAL ONCE
Status: COMPLETED | OUTPATIENT
Start: 2022-01-01 | End: 2022-01-01

## 2022-01-01 RX ORDER — PANTOPRAZOLE SODIUM 40 MG/1
40 TABLET, DELAYED RELEASE ORAL DAILY
Qty: 90 TABLET | Refills: 1 | Status: SHIPPED | OUTPATIENT
Start: 2022-01-01

## 2022-01-01 RX ORDER — ONDANSETRON 4 MG/1
4 TABLET, FILM COATED ORAL EVERY 6 HOURS PRN
Status: DISCONTINUED | OUTPATIENT
Start: 2022-01-01 | End: 2023-01-01 | Stop reason: HOSPADM

## 2022-01-01 RX ORDER — SODIUM CHLORIDE, SODIUM LACTATE, POTASSIUM CHLORIDE, CALCIUM CHLORIDE 600; 310; 30; 20 MG/100ML; MG/100ML; MG/100ML; MG/100ML
50 INJECTION, SOLUTION INTRAVENOUS CONTINUOUS
Status: DISCONTINUED | OUTPATIENT
Start: 2022-01-01 | End: 2022-01-01

## 2022-01-01 RX ORDER — ONDANSETRON 2 MG/ML
4 INJECTION INTRAMUSCULAR; INTRAVENOUS EVERY 6 HOURS PRN
Status: DISCONTINUED | OUTPATIENT
Start: 2022-01-01 | End: 2023-01-01 | Stop reason: HOSPADM

## 2022-01-01 RX ORDER — SODIUM CHLORIDE 9 MG/ML
INJECTION, SOLUTION INTRAVENOUS
Status: COMPLETED
Start: 2022-01-01 | End: 2022-01-01

## 2022-01-01 RX ORDER — ISOSORBIDE MONONITRATE 30 MG/1
TABLET, EXTENDED RELEASE ORAL
Qty: 90 TABLET | Refills: 3 | Status: SHIPPED | OUTPATIENT
Start: 2022-01-01

## 2022-01-01 RX ORDER — PROMETHAZINE HYDROCHLORIDE 25 MG/1
25 SUPPOSITORY RECTAL ONCE AS NEEDED
Status: DISCONTINUED | OUTPATIENT
Start: 2022-01-01 | End: 2022-01-01 | Stop reason: HOSPADM

## 2022-01-01 RX ORDER — INSULIN ASPART 100 [IU]/ML
0-9 INJECTION, SOLUTION INTRAVENOUS; SUBCUTANEOUS
Status: DISCONTINUED | OUTPATIENT
Start: 2022-01-01 | End: 2022-01-01 | Stop reason: HOSPADM

## 2022-01-01 RX ORDER — ACETAMINOPHEN 160 MG/5ML
650 SOLUTION ORAL EVERY 4 HOURS PRN
Status: DISCONTINUED | OUTPATIENT
Start: 2022-01-01 | End: 2022-01-01

## 2022-01-01 RX ORDER — ATORVASTATIN CALCIUM 40 MG/1
80 TABLET, FILM COATED ORAL NIGHTLY
Status: DISCONTINUED | OUTPATIENT
Start: 2022-01-01 | End: 2022-01-01

## 2022-01-01 RX ORDER — ATORVASTATIN CALCIUM 40 MG/1
TABLET, FILM COATED ORAL
Qty: 90 TABLET | Refills: 3 | Status: SHIPPED | OUTPATIENT
Start: 2022-01-01

## 2022-01-01 RX ORDER — MIDODRINE HYDROCHLORIDE 5 MG/1
5 TABLET ORAL
Status: DISCONTINUED | OUTPATIENT
Start: 2022-01-01 | End: 2023-01-01 | Stop reason: HOSPADM

## 2022-01-01 RX ORDER — GUAIFENESIN 600 MG/1
600 TABLET, EXTENDED RELEASE ORAL EVERY 12 HOURS SCHEDULED
Status: DISCONTINUED | OUTPATIENT
Start: 2022-01-01 | End: 2022-01-01 | Stop reason: HOSPADM

## 2022-01-01 RX ORDER — ONDANSETRON 2 MG/ML
INJECTION INTRAMUSCULAR; INTRAVENOUS AS NEEDED
Status: DISCONTINUED | OUTPATIENT
Start: 2022-01-01 | End: 2022-01-01 | Stop reason: SURG

## 2022-01-01 RX ORDER — ACETAMINOPHEN 650 MG/1
650 SUPPOSITORY RECTAL ONCE AS NEEDED
Status: DISCONTINUED | OUTPATIENT
Start: 2022-01-01 | End: 2022-01-01 | Stop reason: HOSPADM

## 2022-01-01 RX ORDER — FAMOTIDINE 40 MG/1
40 TABLET, FILM COATED ORAL DAILY
Status: DISCONTINUED | OUTPATIENT
Start: 2022-01-01 | End: 2022-01-01

## 2022-01-01 RX ORDER — SUCRALFATE 1 G/1
1 TABLET ORAL 3 TIMES DAILY
COMMUNITY
Start: 2022-01-01

## 2022-01-01 RX ORDER — BISACODYL 5 MG/1
5 TABLET, DELAYED RELEASE ORAL DAILY PRN
Status: DISCONTINUED | OUTPATIENT
Start: 2022-01-01 | End: 2022-01-01

## 2022-01-01 RX ORDER — IPRATROPIUM BROMIDE AND ALBUTEROL SULFATE 2.5; .5 MG/3ML; MG/3ML
3 SOLUTION RESPIRATORY (INHALATION) 4 TIMES DAILY PRN
Status: DISCONTINUED | OUTPATIENT
Start: 2022-01-01 | End: 2023-01-01

## 2022-01-01 RX ORDER — ALBUTEROL SULFATE 2.5 MG/3ML
2.5 SOLUTION RESPIRATORY (INHALATION) EVERY 4 HOURS PRN
Status: DISCONTINUED | OUTPATIENT
Start: 2022-01-01 | End: 2023-01-01 | Stop reason: SDUPTHER

## 2022-01-01 RX ORDER — SODIUM CHLORIDE 9 MG/ML
30 INJECTION, SOLUTION INTRAVENOUS CONTINUOUS
Status: DISCONTINUED | OUTPATIENT
Start: 2022-01-01 | End: 2022-01-01

## 2022-01-01 RX ORDER — ASPIRIN 81 MG/1
81 TABLET ORAL NIGHTLY
Qty: 30 TABLET | Refills: 2 | Status: SHIPPED | OUTPATIENT
Start: 2022-01-01 | End: 2022-01-01 | Stop reason: SDUPTHER

## 2022-01-01 RX ADMIN — CARVEDILOL 25 MG: 25 TABLET, FILM COATED ORAL at 07:56

## 2022-01-01 RX ADMIN — INSULIN DETEMIR 20 UNITS: 100 INJECTION, SOLUTION SUBCUTANEOUS at 21:28

## 2022-01-01 RX ADMIN — LISINOPRIL 10 MG: 10 TABLET ORAL at 08:04

## 2022-01-01 RX ADMIN — LISINOPRIL 10 MG: 10 TABLET ORAL at 18:47

## 2022-01-01 RX ADMIN — ASPIRIN 81 MG: 81 TABLET, FILM COATED ORAL at 20:51

## 2022-01-01 RX ADMIN — IPRATROPIUM BROMIDE AND ALBUTEROL SULFATE 3 ML: 2.5; .5 SOLUTION RESPIRATORY (INHALATION) at 23:29

## 2022-01-01 RX ADMIN — ATORVASTATIN CALCIUM 40 MG: 40 TABLET, FILM COATED ORAL at 08:35

## 2022-01-01 RX ADMIN — CARVEDILOL 25 MG: 25 TABLET, FILM COATED ORAL at 17:44

## 2022-01-01 RX ADMIN — SUCRALFATE 1 G: 1 TABLET ORAL at 16:51

## 2022-01-01 RX ADMIN — ONDANSETRON 4 MG: 2 INJECTION INTRAMUSCULAR; INTRAVENOUS at 00:38

## 2022-01-01 RX ADMIN — MEBROFENIN 1 DOSE: 45 INJECTION, POWDER, LYOPHILIZED, FOR SOLUTION INTRAVENOUS at 12:28

## 2022-01-01 RX ADMIN — Medication 10 ML: at 09:51

## 2022-01-01 RX ADMIN — IPRATROPIUM BROMIDE AND ALBUTEROL SULFATE 3 ML: 2.5; .5 SOLUTION RESPIRATORY (INHALATION) at 10:21

## 2022-01-01 RX ADMIN — SUCRALFATE 1 G: 1 TABLET ORAL at 08:28

## 2022-01-01 RX ADMIN — IPRATROPIUM BROMIDE AND ALBUTEROL SULFATE 3 ML: 2.5; .5 SOLUTION RESPIRATORY (INHALATION) at 00:07

## 2022-01-01 RX ADMIN — LISINOPRIL 10 MG: 10 TABLET ORAL at 07:56

## 2022-01-01 RX ADMIN — POTASSIUM CHLORIDE 20 MEQ: 750 CAPSULE, EXTENDED RELEASE ORAL at 08:38

## 2022-01-01 RX ADMIN — DEXTROSE MONOHYDRATE 25 G: 25 INJECTION, SOLUTION INTRAVENOUS at 19:17

## 2022-01-01 RX ADMIN — PANTOPRAZOLE SODIUM 40 MG: 40 TABLET, DELAYED RELEASE ORAL at 09:25

## 2022-01-01 RX ADMIN — FUROSEMIDE 40 MG: 40 TABLET ORAL at 08:34

## 2022-01-01 RX ADMIN — ASPIRIN 325 MG: 325 TABLET ORAL at 12:17

## 2022-01-01 RX ADMIN — IPRATROPIUM BROMIDE AND ALBUTEROL SULFATE 3 ML: 2.5; .5 SOLUTION RESPIRATORY (INHALATION) at 11:00

## 2022-01-01 RX ADMIN — Medication 10 ML: at 08:49

## 2022-01-01 RX ADMIN — IPRATROPIUM BROMIDE AND ALBUTEROL SULFATE 3 ML: 2.5; .5 SOLUTION RESPIRATORY (INHALATION) at 10:17

## 2022-01-01 RX ADMIN — FUROSEMIDE 40 MG: 40 TABLET ORAL at 13:06

## 2022-01-01 RX ADMIN — ISOSORBIDE MONONITRATE 120 MG: 60 TABLET, EXTENDED RELEASE ORAL at 16:01

## 2022-01-01 RX ADMIN — Medication 10 ML: at 09:02

## 2022-01-01 RX ADMIN — LISINOPRIL 10 MG: 10 TABLET ORAL at 10:22

## 2022-01-01 RX ADMIN — ASPIRIN 81 MG: 81 TABLET, FILM COATED ORAL at 00:03

## 2022-01-01 RX ADMIN — DOBUTAMINE 8 MCG/KG/MIN: 12.5 INJECTION, SOLUTION, CONCENTRATE INTRAVENOUS at 04:19

## 2022-01-01 RX ADMIN — DOBUTAMINE 8 MCG/KG/MIN: 12.5 INJECTION, SOLUTION, CONCENTRATE INTRAVENOUS at 17:39

## 2022-01-01 RX ADMIN — METRONIDAZOLE 500 MG: 500 INJECTION, SOLUTION INTRAVENOUS at 20:59

## 2022-01-01 RX ADMIN — Medication 10 ML: at 21:14

## 2022-01-01 RX ADMIN — INSULIN ASPART 3 UNITS: 100 INJECTION, SOLUTION INTRAVENOUS; SUBCUTANEOUS at 18:45

## 2022-01-01 RX ADMIN — INSULIN DETEMIR 20 UNITS: 100 INJECTION, SOLUTION SUBCUTANEOUS at 20:44

## 2022-01-01 RX ADMIN — INSULIN ASPART 2 UNITS: 100 INJECTION, SOLUTION INTRAVENOUS; SUBCUTANEOUS at 18:19

## 2022-01-01 RX ADMIN — Medication 10 ML: at 22:16

## 2022-01-01 RX ADMIN — INSULIN DETEMIR 35 UNITS: 100 INJECTION, SOLUTION SUBCUTANEOUS at 20:01

## 2022-01-01 RX ADMIN — IPRATROPIUM BROMIDE AND ALBUTEROL SULFATE 3 ML: 2.5; .5 SOLUTION RESPIRATORY (INHALATION) at 10:54

## 2022-01-01 RX ADMIN — CARVEDILOL 25 MG: 25 TABLET, FILM COATED ORAL at 09:25

## 2022-01-01 RX ADMIN — CARVEDILOL 25 MG: 25 TABLET, FILM COATED ORAL at 08:09

## 2022-01-01 RX ADMIN — IPRATROPIUM BROMIDE AND ALBUTEROL SULFATE 3 ML: 2.5; .5 SOLUTION RESPIRATORY (INHALATION) at 12:19

## 2022-01-01 RX ADMIN — FUROSEMIDE 40 MG: 10 INJECTION, SOLUTION INTRAVENOUS at 09:02

## 2022-01-01 RX ADMIN — ISOSORBIDE MONONITRATE 30 MG: 30 TABLET, EXTENDED RELEASE ORAL at 08:04

## 2022-01-01 RX ADMIN — POTASSIUM CHLORIDE 20 MEQ: 750 CAPSULE, EXTENDED RELEASE ORAL at 08:49

## 2022-01-01 RX ADMIN — DOBUTAMINE 8 MCG/KG/MIN: 12.5 INJECTION, SOLUTION, CONCENTRATE INTRAVENOUS at 19:33

## 2022-01-01 RX ADMIN — Medication 10 ML: at 08:48

## 2022-01-01 RX ADMIN — Medication 10 ML: at 22:12

## 2022-01-01 RX ADMIN — FUROSEMIDE 40 MG: 10 INJECTION, SOLUTION INTRAMUSCULAR; INTRAVENOUS at 08:25

## 2022-01-01 RX ADMIN — IPRATROPIUM BROMIDE AND ALBUTEROL SULFATE 3 ML: 2.5; .5 SOLUTION RESPIRATORY (INHALATION) at 20:06

## 2022-01-01 RX ADMIN — POTASSIUM CHLORIDE 40 MEQ: 750 CAPSULE, EXTENDED RELEASE ORAL at 03:46

## 2022-01-01 RX ADMIN — ATORVASTATIN CALCIUM 40 MG: 40 TABLET, FILM COATED ORAL at 09:02

## 2022-01-01 RX ADMIN — Medication 10 ML: at 21:35

## 2022-01-01 RX ADMIN — FUROSEMIDE 40 MG: 10 INJECTION, SOLUTION INTRAVENOUS at 09:50

## 2022-01-01 RX ADMIN — ENOXAPARIN SODIUM 40 MG: 40 INJECTION SUBCUTANEOUS at 08:30

## 2022-01-01 RX ADMIN — INSULIN DETEMIR 30 UNITS: 100 INJECTION, SOLUTION SUBCUTANEOUS at 20:56

## 2022-01-01 RX ADMIN — ASPIRIN 81 MG: 81 TABLET, FILM COATED ORAL at 20:56

## 2022-01-01 RX ADMIN — Medication 10 ML: at 21:37

## 2022-01-01 RX ADMIN — SUCRALFATE 1 G: 1 TABLET ORAL at 20:50

## 2022-01-01 RX ADMIN — PANTOPRAZOLE SODIUM 40 MG: 40 TABLET, DELAYED RELEASE ORAL at 08:22

## 2022-01-01 RX ADMIN — CARVEDILOL 25 MG: 25 TABLET, FILM COATED ORAL at 08:28

## 2022-01-01 RX ADMIN — ASPIRIN 81 MG: 81 TABLET, FILM COATED ORAL at 20:58

## 2022-01-01 RX ADMIN — INSULIN ASPART 5 UNITS: 100 INJECTION, SOLUTION INTRAVENOUS; SUBCUTANEOUS at 16:49

## 2022-01-01 RX ADMIN — ATORVASTATIN CALCIUM 40 MG: 40 TABLET, FILM COATED ORAL at 18:47

## 2022-01-01 RX ADMIN — CARVEDILOL 25 MG: 25 TABLET, FILM COATED ORAL at 09:20

## 2022-01-01 RX ADMIN — SUCRALFATE 1 G: 1 TABLET ORAL at 16:57

## 2022-01-01 RX ADMIN — DOBUTAMINE 8 MCG/KG/MIN: 12.5 INJECTION, SOLUTION, CONCENTRATE INTRAVENOUS at 17:44

## 2022-01-01 RX ADMIN — CARVEDILOL 25 MG: 25 TABLET, FILM COATED ORAL at 17:36

## 2022-01-01 RX ADMIN — ATORVASTATIN CALCIUM 40 MG: 40 TABLET, FILM COATED ORAL at 08:52

## 2022-01-01 RX ADMIN — IPRATROPIUM BROMIDE AND ALBUTEROL SULFATE 3 ML: 2.5; .5 SOLUTION RESPIRATORY (INHALATION) at 19:58

## 2022-01-01 RX ADMIN — DOBUTAMINE HYDROCHLORIDE 8 MCG/KG/MIN: 100 INJECTION INTRAVENOUS at 07:47

## 2022-01-01 RX ADMIN — ISOSORBIDE MONONITRATE 30 MG: 30 TABLET, EXTENDED RELEASE ORAL at 08:38

## 2022-01-01 RX ADMIN — Medication 10 ML: at 08:14

## 2022-01-01 RX ADMIN — SODIUM CHLORIDE 75 ML/HR: 9 INJECTION, SOLUTION INTRAVENOUS at 23:23

## 2022-01-01 RX ADMIN — CARVEDILOL 25 MG: 25 TABLET, FILM COATED ORAL at 21:37

## 2022-01-01 RX ADMIN — LISINOPRIL 10 MG: 10 TABLET ORAL at 08:52

## 2022-01-01 RX ADMIN — Medication 400 MG: at 08:28

## 2022-01-01 RX ADMIN — INSULIN ASPART 5 UNITS: 100 INJECTION, SOLUTION INTRAVENOUS; SUBCUTANEOUS at 11:19

## 2022-01-01 RX ADMIN — IPRATROPIUM BROMIDE AND ALBUTEROL SULFATE 3 ML: 2.5; .5 SOLUTION RESPIRATORY (INHALATION) at 19:50

## 2022-01-01 RX ADMIN — CARVEDILOL 25 MG: 25 TABLET, FILM COATED ORAL at 08:42

## 2022-01-01 RX ADMIN — IPRATROPIUM BROMIDE AND ALBUTEROL SULFATE 3 ML: 2.5; .5 SOLUTION RESPIRATORY (INHALATION) at 14:06

## 2022-01-01 RX ADMIN — IPRATROPIUM BROMIDE AND ALBUTEROL SULFATE 3 ML: 2.5; .5 SOLUTION RESPIRATORY (INHALATION) at 14:55

## 2022-01-01 RX ADMIN — SUCRALFATE 1 G: 1 TABLET ORAL at 17:36

## 2022-01-01 RX ADMIN — INSULIN ASPART 3 UNITS: 100 INJECTION, SOLUTION INTRAVENOUS; SUBCUTANEOUS at 17:51

## 2022-01-01 RX ADMIN — INSULIN ASPART 4 UNITS: 100 INJECTION, SOLUTION INTRAVENOUS; SUBCUTANEOUS at 17:35

## 2022-01-01 RX ADMIN — IPRATROPIUM BROMIDE AND ALBUTEROL SULFATE 3 ML: 2.5; .5 SOLUTION RESPIRATORY (INHALATION) at 20:05

## 2022-01-01 RX ADMIN — CARVEDILOL 25 MG: 25 TABLET, FILM COATED ORAL at 07:47

## 2022-01-01 RX ADMIN — IPRATROPIUM BROMIDE AND ALBUTEROL SULFATE 3 ML: 2.5; .5 SOLUTION RESPIRATORY (INHALATION) at 15:40

## 2022-01-01 RX ADMIN — LISINOPRIL 10 MG: 10 TABLET ORAL at 08:31

## 2022-01-01 RX ADMIN — LISINOPRIL 10 MG: 10 TABLET ORAL at 11:30

## 2022-01-01 RX ADMIN — Medication 10 ML: at 03:12

## 2022-01-01 RX ADMIN — ISOSORBIDE MONONITRATE 30 MG: 30 TABLET, EXTENDED RELEASE ORAL at 08:29

## 2022-01-01 RX ADMIN — IPRATROPIUM BROMIDE AND ALBUTEROL SULFATE 3 ML: 2.5; .5 SOLUTION RESPIRATORY (INHALATION) at 15:58

## 2022-01-01 RX ADMIN — SUCRALFATE 1 G: 1 TABLET ORAL at 17:56

## 2022-01-01 RX ADMIN — POTASSIUM CHLORIDE 40 MEQ: 750 CAPSULE, EXTENDED RELEASE ORAL at 09:09

## 2022-01-01 RX ADMIN — ACETAMINOPHEN 650 MG: 325 TABLET, FILM COATED ORAL at 16:01

## 2022-01-01 RX ADMIN — INSULIN DETEMIR 20 UNITS: 100 INJECTION, SOLUTION SUBCUTANEOUS at 20:48

## 2022-01-01 RX ADMIN — Medication 10 ML: at 23:06

## 2022-01-01 RX ADMIN — CARVEDILOL 25 MG: 25 TABLET, FILM COATED ORAL at 08:52

## 2022-01-01 RX ADMIN — INSULIN DETEMIR 20 UNITS: 100 INJECTION, SOLUTION SUBCUTANEOUS at 21:19

## 2022-01-01 RX ADMIN — SODIUM CHLORIDE, POTASSIUM CHLORIDE, SODIUM LACTATE AND CALCIUM CHLORIDE 150 ML/HR: 600; 310; 30; 20 INJECTION, SOLUTION INTRAVENOUS at 05:12

## 2022-01-01 RX ADMIN — IPRATROPIUM BROMIDE AND ALBUTEROL SULFATE 3 ML: 2.5; .5 SOLUTION RESPIRATORY (INHALATION) at 07:34

## 2022-01-01 RX ADMIN — Medication 10 ML: at 20:52

## 2022-01-01 RX ADMIN — SODIUM CHLORIDE 500 ML: 9 INJECTION, SOLUTION INTRAVENOUS at 23:18

## 2022-01-01 RX ADMIN — ATORVASTATIN CALCIUM 40 MG: 40 TABLET, FILM COATED ORAL at 17:17

## 2022-01-01 RX ADMIN — FUROSEMIDE 80 MG: 10 INJECTION, SOLUTION INTRAMUSCULAR; INTRAVENOUS at 18:09

## 2022-01-01 RX ADMIN — CARVEDILOL 25 MG: 25 TABLET, FILM COATED ORAL at 10:22

## 2022-01-01 RX ADMIN — ASPIRIN 81 MG: 81 TABLET, FILM COATED ORAL at 20:50

## 2022-01-01 RX ADMIN — SUCRALFATE 1 G: 1 TABLET ORAL at 08:42

## 2022-01-01 RX ADMIN — PANTOPRAZOLE SODIUM 40 MG: 40 TABLET, DELAYED RELEASE ORAL at 08:38

## 2022-01-01 RX ADMIN — Medication 10 ML: at 07:51

## 2022-01-01 RX ADMIN — CARVEDILOL 25 MG: 25 TABLET, FILM COATED ORAL at 09:01

## 2022-01-01 RX ADMIN — LISINOPRIL 10 MG: 10 TABLET ORAL at 09:17

## 2022-01-01 RX ADMIN — FUROSEMIDE 40 MG: 10 INJECTION, SOLUTION INTRAVENOUS at 08:49

## 2022-01-01 RX ADMIN — IPRATROPIUM BROMIDE AND ALBUTEROL SULFATE 3 ML: .5; 3 SOLUTION RESPIRATORY (INHALATION) at 07:20

## 2022-01-01 RX ADMIN — CEFTRIAXONE SODIUM 1 G: 1 INJECTION, POWDER, FOR SOLUTION INTRAMUSCULAR; INTRAVENOUS at 23:11

## 2022-01-01 RX ADMIN — SODIUM CHLORIDE 500 ML: 9 INJECTION, SOLUTION INTRAVENOUS at 18:53

## 2022-01-01 RX ADMIN — INSULIN ASPART 6 UNITS: 100 INJECTION, SOLUTION INTRAVENOUS; SUBCUTANEOUS at 11:39

## 2022-01-01 RX ADMIN — HYDROMORPHONE HYDROCHLORIDE 0.5 MG: 1 INJECTION, SOLUTION INTRAMUSCULAR; INTRAVENOUS; SUBCUTANEOUS at 20:57

## 2022-01-01 RX ADMIN — IPRATROPIUM BROMIDE AND ALBUTEROL SULFATE 3 ML: 2.5; .5 SOLUTION RESPIRATORY (INHALATION) at 06:59

## 2022-01-01 RX ADMIN — IPRATROPIUM BROMIDE AND ALBUTEROL SULFATE 3 ML: 2.5; .5 SOLUTION RESPIRATORY (INHALATION) at 14:37

## 2022-01-01 RX ADMIN — SUCRALFATE 1 G: 1 TABLET ORAL at 08:31

## 2022-01-01 RX ADMIN — INSULIN ASPART 2 UNITS: 100 INJECTION, SOLUTION INTRAVENOUS; SUBCUTANEOUS at 11:41

## 2022-01-01 RX ADMIN — ATORVASTATIN CALCIUM 40 MG: 40 TABLET, FILM COATED ORAL at 08:29

## 2022-01-01 RX ADMIN — ATORVASTATIN CALCIUM 40 MG: 40 TABLET, FILM COATED ORAL at 09:50

## 2022-01-01 RX ADMIN — Medication 10 ML: at 20:29

## 2022-01-01 RX ADMIN — ISOSORBIDE MONONITRATE 30 MG: 30 TABLET, EXTENDED RELEASE ORAL at 22:42

## 2022-01-01 RX ADMIN — POTASSIUM CHLORIDE 20 MEQ: 750 CAPSULE, EXTENDED RELEASE ORAL at 21:15

## 2022-01-01 RX ADMIN — CEFTRIAXONE SODIUM 2 G: 2 INJECTION, POWDER, FOR SOLUTION INTRAMUSCULAR; INTRAVENOUS at 00:39

## 2022-01-01 RX ADMIN — MAGNESIUM SULFATE HEPTAHYDRATE 2 G: 40 INJECTION, SOLUTION INTRAVENOUS at 21:36

## 2022-01-01 RX ADMIN — ISOSORBIDE MONONITRATE 30 MG: 30 TABLET, EXTENDED RELEASE ORAL at 08:31

## 2022-01-01 RX ADMIN — POTASSIUM CHLORIDE 20 MEQ: 750 CAPSULE, EXTENDED RELEASE ORAL at 21:36

## 2022-01-01 RX ADMIN — ISOSORBIDE MONONITRATE 30 MG: 30 TABLET, EXTENDED RELEASE ORAL at 08:26

## 2022-01-01 RX ADMIN — METOCLOPRAMIDE 10 MG: 5 INJECTION, SOLUTION INTRAMUSCULAR; INTRAVENOUS at 14:29

## 2022-01-01 RX ADMIN — SUCCINYLCHOLINE CHLORIDE 100 MG: 20 INJECTION, SOLUTION INTRAMUSCULAR; INTRAVENOUS at 13:21

## 2022-01-01 RX ADMIN — SODIUM CHLORIDE 75 ML/HR: 9 INJECTION, SOLUTION INTRAVENOUS at 17:08

## 2022-01-01 RX ADMIN — LISINOPRIL 10 MG: 10 TABLET ORAL at 08:38

## 2022-01-01 RX ADMIN — SUCRALFATE 1 G: 1 TABLET ORAL at 21:36

## 2022-01-01 RX ADMIN — INSULIN DETEMIR 20 UNITS: 100 INJECTION, SOLUTION SUBCUTANEOUS at 20:52

## 2022-01-01 RX ADMIN — INSULIN DETEMIR 20 UNITS: 100 INJECTION, SOLUTION SUBCUTANEOUS at 20:49

## 2022-01-01 RX ADMIN — IPRATROPIUM BROMIDE AND ALBUTEROL SULFATE 3 ML: 2.5; .5 SOLUTION RESPIRATORY (INHALATION) at 07:08

## 2022-01-01 RX ADMIN — Medication 10 ML: at 20:53

## 2022-01-01 RX ADMIN — Medication 10 ML: at 09:03

## 2022-01-01 RX ADMIN — ATORVASTATIN CALCIUM 40 MG: 40 TABLET, FILM COATED ORAL at 09:00

## 2022-01-01 RX ADMIN — CEFTRIAXONE SODIUM 1 G: 10 INJECTION, POWDER, FOR SOLUTION INTRAVENOUS at 22:11

## 2022-01-01 RX ADMIN — POTASSIUM CHLORIDE 20 MEQ: 750 CAPSULE, EXTENDED RELEASE ORAL at 21:13

## 2022-01-01 RX ADMIN — POTASSIUM CHLORIDE 40 MEQ: 750 CAPSULE, EXTENDED RELEASE ORAL at 18:46

## 2022-01-01 RX ADMIN — HYDROMORPHONE HYDROCHLORIDE 0.5 MG: 1 INJECTION, SOLUTION INTRAMUSCULAR; INTRAVENOUS; SUBCUTANEOUS at 15:32

## 2022-01-01 RX ADMIN — SODIUM CHLORIDE 30 ML/HR: 9 INJECTION, SOLUTION INTRAVENOUS at 19:33

## 2022-01-01 RX ADMIN — PANTOPRAZOLE SODIUM 40 MG: 40 TABLET, DELAYED RELEASE ORAL at 08:34

## 2022-01-01 RX ADMIN — ENOXAPARIN SODIUM 40 MG: 40 INJECTION SUBCUTANEOUS at 08:17

## 2022-01-01 RX ADMIN — PANTOPRAZOLE SODIUM 40 MG: 40 TABLET, DELAYED RELEASE ORAL at 10:23

## 2022-01-01 RX ADMIN — INSULIN DETEMIR 20 UNITS: 100 INJECTION, SOLUTION SUBCUTANEOUS at 20:56

## 2022-01-01 RX ADMIN — FUROSEMIDE 40 MG: 10 INJECTION, SOLUTION INTRAMUSCULAR; INTRAVENOUS at 17:09

## 2022-01-01 RX ADMIN — FUROSEMIDE 20 MG: 10 INJECTION, SOLUTION INTRAMUSCULAR; INTRAVENOUS at 15:32

## 2022-01-01 RX ADMIN — CARVEDILOL 25 MG: 25 TABLET, FILM COATED ORAL at 17:16

## 2022-01-01 RX ADMIN — FUROSEMIDE 40 MG: 10 INJECTION, SOLUTION INTRAVENOUS at 14:47

## 2022-01-01 RX ADMIN — SUCRALFATE 1 G: 1 TABLET ORAL at 17:04

## 2022-01-01 RX ADMIN — ENOXAPARIN SODIUM 40 MG: 40 INJECTION SUBCUTANEOUS at 07:57

## 2022-01-01 RX ADMIN — SUCRALFATE 1 G: 1 TABLET ORAL at 09:50

## 2022-01-01 RX ADMIN — LISINOPRIL 10 MG: 10 TABLET ORAL at 08:33

## 2022-01-01 RX ADMIN — INSULIN DETEMIR 25 UNITS: 100 INJECTION, SOLUTION SUBCUTANEOUS at 21:21

## 2022-01-01 RX ADMIN — SUCRALFATE 1 G: 1 TABLET ORAL at 21:21

## 2022-01-01 RX ADMIN — POTASSIUM CHLORIDE 40 MEQ: 750 CAPSULE, EXTENDED RELEASE ORAL at 17:09

## 2022-01-01 RX ADMIN — LISINOPRIL 10 MG: 10 TABLET ORAL at 09:01

## 2022-01-01 RX ADMIN — FUROSEMIDE 40 MG: 10 INJECTION, SOLUTION INTRAVENOUS at 20:55

## 2022-01-01 RX ADMIN — POTASSIUM CHLORIDE 20 MEQ: 750 CAPSULE, EXTENDED RELEASE ORAL at 08:04

## 2022-01-01 RX ADMIN — EPHEDRINE SULFATE 10 MG: 50 INJECTION INTRAVENOUS at 13:35

## 2022-01-01 RX ADMIN — MORPHINE SULFATE 2 MG: 2 INJECTION, SOLUTION INTRAMUSCULAR; INTRAVENOUS at 21:01

## 2022-01-01 RX ADMIN — IPRATROPIUM BROMIDE AND ALBUTEROL SULFATE 3 ML: 2.5; .5 SOLUTION RESPIRATORY (INHALATION) at 19:24

## 2022-01-01 RX ADMIN — ISOSORBIDE MONONITRATE 30 MG: 30 TABLET, EXTENDED RELEASE ORAL at 18:47

## 2022-01-01 RX ADMIN — IPRATROPIUM BROMIDE AND ALBUTEROL SULFATE 3 ML: 2.5; .5 SOLUTION RESPIRATORY (INHALATION) at 07:20

## 2022-01-01 RX ADMIN — ASPIRIN 81 MG: 81 TABLET, FILM COATED ORAL at 20:31

## 2022-01-01 RX ADMIN — POTASSIUM CHLORIDE 20 MEQ: 750 CAPSULE, EXTENDED RELEASE ORAL at 20:14

## 2022-01-01 RX ADMIN — SUCRALFATE 1 G: 1 TABLET ORAL at 22:42

## 2022-01-01 RX ADMIN — PANTOPRAZOLE SODIUM 40 MG: 40 INJECTION, POWDER, FOR SOLUTION INTRAVENOUS at 19:52

## 2022-01-01 RX ADMIN — MIDAZOLAM HYDROCHLORIDE 1 MG: 1 INJECTION, SOLUTION INTRAMUSCULAR; INTRAVENOUS at 13:08

## 2022-01-01 RX ADMIN — LISINOPRIL 10 MG: 10 TABLET ORAL at 07:47

## 2022-01-01 RX ADMIN — SODIUM CHLORIDE 125 ML/HR: 9 INJECTION, SOLUTION INTRAVENOUS at 09:01

## 2022-01-01 RX ADMIN — INSULIN DETEMIR 20 UNITS: 100 INJECTION, SOLUTION SUBCUTANEOUS at 20:46

## 2022-01-01 RX ADMIN — INSULIN ASPART 5 UNITS: 100 INJECTION, SOLUTION INTRAVENOUS; SUBCUTANEOUS at 11:30

## 2022-01-01 RX ADMIN — POTASSIUM CHLORIDE 20 MEQ: 750 CAPSULE, EXTENDED RELEASE ORAL at 08:10

## 2022-01-01 RX ADMIN — CARVEDILOL 25 MG: 25 TABLET, FILM COATED ORAL at 17:10

## 2022-01-01 RX ADMIN — PANTOPRAZOLE SODIUM 40 MG: 40 TABLET, DELAYED RELEASE ORAL at 08:48

## 2022-01-01 RX ADMIN — GUAIFENESIN 600 MG: 600 TABLET, EXTENDED RELEASE ORAL at 08:28

## 2022-01-01 RX ADMIN — ASPIRIN 81 MG: 81 TABLET, FILM COATED ORAL at 20:49

## 2022-01-01 RX ADMIN — ENOXAPARIN SODIUM 40 MG: 40 INJECTION SUBCUTANEOUS at 13:10

## 2022-01-01 RX ADMIN — FENTANYL CITRATE 25 MCG: 50 INJECTION INTRAMUSCULAR; INTRAVENOUS at 13:54

## 2022-01-01 RX ADMIN — ISOSORBIDE MONONITRATE 30 MG: 30 TABLET, EXTENDED RELEASE ORAL at 08:33

## 2022-01-01 RX ADMIN — ENOXAPARIN SODIUM 40 MG: 40 INJECTION SUBCUTANEOUS at 22:42

## 2022-01-01 RX ADMIN — CARVEDILOL 25 MG: 25 TABLET, FILM COATED ORAL at 17:27

## 2022-01-01 RX ADMIN — LISINOPRIL 10 MG: 10 TABLET ORAL at 08:48

## 2022-01-01 RX ADMIN — POTASSIUM CHLORIDE 20 MEQ: 750 CAPSULE, EXTENDED RELEASE ORAL at 22:32

## 2022-01-01 RX ADMIN — IPRATROPIUM BROMIDE AND ALBUTEROL SULFATE 3 ML: 2.5; .5 SOLUTION RESPIRATORY (INHALATION) at 19:31

## 2022-01-01 RX ADMIN — PANTOPRAZOLE SODIUM 40 MG: 40 TABLET, DELAYED RELEASE ORAL at 08:45

## 2022-01-01 RX ADMIN — IPRATROPIUM BROMIDE AND ALBUTEROL SULFATE 3 ML: 2.5; .5 SOLUTION RESPIRATORY (INHALATION) at 07:12

## 2022-01-01 RX ADMIN — CARVEDILOL 25 MG: 25 TABLET, FILM COATED ORAL at 16:53

## 2022-01-01 RX ADMIN — ATORVASTATIN CALCIUM 40 MG: 40 TABLET, FILM COATED ORAL at 08:17

## 2022-01-01 RX ADMIN — FUROSEMIDE 40 MG: 10 INJECTION, SOLUTION INTRAMUSCULAR; INTRAVENOUS at 19:05

## 2022-01-01 RX ADMIN — ISOSORBIDE MONONITRATE 30 MG: 30 TABLET, EXTENDED RELEASE ORAL at 09:28

## 2022-01-01 RX ADMIN — ROCURONIUM BROMIDE 5 MG: 10 INJECTION INTRAVENOUS at 13:17

## 2022-01-01 RX ADMIN — AZITHROMYCIN DIHYDRATE 500 MG: 250 TABLET, FILM COATED ORAL at 20:13

## 2022-01-01 RX ADMIN — ENOXAPARIN SODIUM 40 MG: 40 INJECTION SUBCUTANEOUS at 21:16

## 2022-01-01 RX ADMIN — ISOSORBIDE MONONITRATE 30 MG: 30 TABLET, EXTENDED RELEASE ORAL at 09:02

## 2022-01-01 RX ADMIN — IPRATROPIUM BROMIDE AND ALBUTEROL SULFATE 3 ML: 2.5; .5 SOLUTION RESPIRATORY (INHALATION) at 06:57

## 2022-01-01 RX ADMIN — IPRATROPIUM BROMIDE AND ALBUTEROL SULFATE 3 ML: 2.5; .5 SOLUTION RESPIRATORY (INHALATION) at 11:23

## 2022-01-01 RX ADMIN — INSULIN ASPART 3 UNITS: 100 INJECTION, SOLUTION INTRAVENOUS; SUBCUTANEOUS at 11:29

## 2022-01-01 RX ADMIN — CARVEDILOL 25 MG: 25 TABLET, FILM COATED ORAL at 17:46

## 2022-01-01 RX ADMIN — ISOSORBIDE MONONITRATE 30 MG: 30 TABLET, EXTENDED RELEASE ORAL at 08:22

## 2022-01-01 RX ADMIN — Medication 10 ML: at 22:40

## 2022-01-01 RX ADMIN — ENOXAPARIN SODIUM 40 MG: 40 INJECTION SUBCUTANEOUS at 09:28

## 2022-01-01 RX ADMIN — CARVEDILOL 25 MG: 25 TABLET, FILM COATED ORAL at 22:15

## 2022-01-01 RX ADMIN — POTASSIUM CHLORIDE 40 MEQ: 750 CAPSULE, EXTENDED RELEASE ORAL at 09:00

## 2022-01-01 RX ADMIN — GUAIFENESIN 600 MG: 600 TABLET, EXTENDED RELEASE ORAL at 08:04

## 2022-01-01 RX ADMIN — IPRATROPIUM BROMIDE AND ALBUTEROL SULFATE 3 ML: 2.5; .5 SOLUTION RESPIRATORY (INHALATION) at 11:10

## 2022-01-01 RX ADMIN — CARVEDILOL 25 MG: 25 TABLET, FILM COATED ORAL at 17:56

## 2022-01-01 RX ADMIN — INSULIN ASPART 5 UNITS: 100 INJECTION, SOLUTION INTRAVENOUS; SUBCUTANEOUS at 11:36

## 2022-01-01 RX ADMIN — SODIUM CHLORIDE, POTASSIUM CHLORIDE, SODIUM LACTATE AND CALCIUM CHLORIDE 100 ML/HR: 600; 310; 30; 20 INJECTION, SOLUTION INTRAVENOUS at 18:47

## 2022-01-01 RX ADMIN — INSULIN DETEMIR 20 UNITS: 100 INJECTION, SOLUTION SUBCUTANEOUS at 22:42

## 2022-01-01 RX ADMIN — LISINOPRIL 10 MG: 10 TABLET ORAL at 09:09

## 2022-01-01 RX ADMIN — LISINOPRIL 10 MG: 10 TABLET ORAL at 08:26

## 2022-01-01 RX ADMIN — LISINOPRIL 10 MG: 10 TABLET ORAL at 07:49

## 2022-01-01 RX ADMIN — IPRATROPIUM BROMIDE AND ALBUTEROL SULFATE 3 ML: 2.5; .5 SOLUTION RESPIRATORY (INHALATION) at 20:02

## 2022-01-01 RX ADMIN — SODIUM CHLORIDE 75 ML/HR: 9 INJECTION, SOLUTION INTRAVENOUS at 03:11

## 2022-01-01 RX ADMIN — CARVEDILOL 25 MG: 25 TABLET, FILM COATED ORAL at 08:18

## 2022-01-01 RX ADMIN — ENOXAPARIN SODIUM 40 MG: 40 INJECTION SUBCUTANEOUS at 08:35

## 2022-01-01 RX ADMIN — FUROSEMIDE 40 MG: 10 INJECTION, SOLUTION INTRAMUSCULAR; INTRAVENOUS at 17:16

## 2022-01-01 RX ADMIN — IPRATROPIUM BROMIDE AND ALBUTEROL SULFATE 3 ML: 2.5; .5 SOLUTION RESPIRATORY (INHALATION) at 07:01

## 2022-01-01 RX ADMIN — IPRATROPIUM BROMIDE AND ALBUTEROL SULFATE 3 ML: .5; 3 SOLUTION RESPIRATORY (INHALATION) at 14:33

## 2022-01-01 RX ADMIN — PANTOPRAZOLE SODIUM 40 MG: 40 TABLET, DELAYED RELEASE ORAL at 09:01

## 2022-01-01 RX ADMIN — SUCRALFATE 1 G: 1 TABLET ORAL at 21:16

## 2022-01-01 RX ADMIN — ENOXAPARIN SODIUM 40 MG: 40 INJECTION SUBCUTANEOUS at 09:19

## 2022-01-01 RX ADMIN — LISINOPRIL 10 MG: 5 TABLET ORAL at 16:01

## 2022-01-01 RX ADMIN — INSULIN DETEMIR 20 UNITS: 100 INJECTION, SOLUTION SUBCUTANEOUS at 21:36

## 2022-01-01 RX ADMIN — CARVEDILOL 25 MG: 25 TABLET, FILM COATED ORAL at 08:34

## 2022-01-01 RX ADMIN — ATORVASTATIN CALCIUM 40 MG: 40 TABLET, FILM COATED ORAL at 07:49

## 2022-01-01 RX ADMIN — CARVEDILOL 25 MG: 25 TABLET, FILM COATED ORAL at 16:51

## 2022-01-01 RX ADMIN — FUROSEMIDE 40 MG: 10 INJECTION, SOLUTION INTRAVENOUS at 12:04

## 2022-01-01 RX ADMIN — Medication 10 ML: at 21:17

## 2022-01-01 RX ADMIN — ATORVASTATIN CALCIUM 40 MG: 40 TABLET, FILM COATED ORAL at 08:38

## 2022-01-01 RX ADMIN — ATORVASTATIN CALCIUM 40 MG: 40 TABLET, FILM COATED ORAL at 08:49

## 2022-01-01 RX ADMIN — POTASSIUM CHLORIDE 10 MEQ: 750 CAPSULE, EXTENDED RELEASE ORAL at 20:01

## 2022-01-01 RX ADMIN — INSULIN ASPART 2 UNITS: 100 INJECTION, SOLUTION INTRAVENOUS; SUBCUTANEOUS at 11:24

## 2022-01-01 RX ADMIN — IPRATROPIUM BROMIDE AND ALBUTEROL SULFATE 3 ML: 2.5; .5 SOLUTION RESPIRATORY (INHALATION) at 11:02

## 2022-01-01 RX ADMIN — LISINOPRIL 10 MG: 10 TABLET ORAL at 08:18

## 2022-01-01 RX ADMIN — INSULIN ASPART 3 UNITS: 100 INJECTION, SOLUTION INTRAVENOUS; SUBCUTANEOUS at 16:56

## 2022-01-01 RX ADMIN — NEOSTIGMINE METHYLSULFATE 4 MG: 0.5 INJECTION INTRAVENOUS at 14:50

## 2022-01-01 RX ADMIN — ENOXAPARIN SODIUM 40 MG: 40 INJECTION SUBCUTANEOUS at 09:02

## 2022-01-01 RX ADMIN — IPRATROPIUM BROMIDE AND ALBUTEROL SULFATE 3 ML: 2.5; .5 SOLUTION RESPIRATORY (INHALATION) at 22:29

## 2022-01-01 RX ADMIN — SODIUM CHLORIDE 125 ML/HR: 9 INJECTION, SOLUTION INTRAVENOUS at 18:28

## 2022-01-01 RX ADMIN — SUCRALFATE 1 G: 1 TABLET ORAL at 15:52

## 2022-01-01 RX ADMIN — SUCRALFATE 1 G: 1 TABLET ORAL at 20:43

## 2022-01-01 RX ADMIN — Medication 10 ML: at 21:43

## 2022-01-01 RX ADMIN — CARVEDILOL 25 MG: 25 TABLET, FILM COATED ORAL at 17:42

## 2022-01-01 RX ADMIN — CARVEDILOL 25 MG: 25 TABLET, FILM COATED ORAL at 08:17

## 2022-01-01 RX ADMIN — ONDANSETRON 4 MG: 2 INJECTION INTRAMUSCULAR; INTRAVENOUS at 10:46

## 2022-01-01 RX ADMIN — IPRATROPIUM BROMIDE AND ALBUTEROL SULFATE 3 ML: 2.5; .5 SOLUTION RESPIRATORY (INHALATION) at 07:37

## 2022-01-01 RX ADMIN — PANTOPRAZOLE SODIUM 40 MG: 40 TABLET, DELAYED RELEASE ORAL at 08:17

## 2022-01-01 RX ADMIN — IPRATROPIUM BROMIDE AND ALBUTEROL SULFATE 3 ML: .5; 3 SOLUTION RESPIRATORY (INHALATION) at 10:39

## 2022-01-01 RX ADMIN — ISOSORBIDE MONONITRATE 30 MG: 30 TABLET, EXTENDED RELEASE ORAL at 07:47

## 2022-01-01 RX ADMIN — SUCRALFATE 1 G: 1 TABLET ORAL at 16:50

## 2022-01-01 RX ADMIN — ENOXAPARIN SODIUM 40 MG: 40 INJECTION SUBCUTANEOUS at 23:40

## 2022-01-01 RX ADMIN — IPRATROPIUM BROMIDE AND ALBUTEROL SULFATE 3 ML: .5; 3 SOLUTION RESPIRATORY (INHALATION) at 19:18

## 2022-01-01 RX ADMIN — PANTOPRAZOLE SODIUM 40 MG: 40 TABLET, DELAYED RELEASE ORAL at 09:02

## 2022-01-01 RX ADMIN — METOCLOPRAMIDE 10 MG: 5 INJECTION, SOLUTION INTRAMUSCULAR; INTRAVENOUS at 13:32

## 2022-01-01 RX ADMIN — METOCLOPRAMIDE 10 MG: 5 INJECTION, SOLUTION INTRAMUSCULAR; INTRAVENOUS at 03:35

## 2022-01-01 RX ADMIN — DEXTROSE MONOHYDRATE 25 G: 25 INJECTION, SOLUTION INTRAVENOUS at 06:52

## 2022-01-01 RX ADMIN — DEXTROSE MONOHYDRATE 75 ML/HR: 50 INJECTION, SOLUTION INTRAVENOUS at 20:11

## 2022-01-01 RX ADMIN — IPRATROPIUM BROMIDE AND ALBUTEROL SULFATE 3 ML: 2.5; .5 SOLUTION RESPIRATORY (INHALATION) at 20:18

## 2022-01-01 RX ADMIN — SODIUM CHLORIDE 250 ML: 9 INJECTION, SOLUTION INTRAVENOUS at 18:19

## 2022-01-01 RX ADMIN — ISOSORBIDE MONONITRATE 30 MG: 30 TABLET, EXTENDED RELEASE ORAL at 08:42

## 2022-01-01 RX ADMIN — CEFTRIAXONE SODIUM 1 G: 1 INJECTION, POWDER, FOR SOLUTION INTRAMUSCULAR; INTRAVENOUS at 13:57

## 2022-01-01 RX ADMIN — POTASSIUM CHLORIDE 20 MEQ: 750 CAPSULE, EXTENDED RELEASE ORAL at 08:34

## 2022-01-01 RX ADMIN — CARVEDILOL 25 MG: 25 TABLET, FILM COATED ORAL at 16:49

## 2022-01-01 RX ADMIN — SODIUM CHLORIDE 100 ML/HR: 9 INJECTION, SOLUTION INTRAVENOUS at 00:39

## 2022-01-01 RX ADMIN — IPRATROPIUM BROMIDE AND ALBUTEROL SULFATE 3 ML: 2.5; .5 SOLUTION RESPIRATORY (INHALATION) at 14:43

## 2022-01-01 RX ADMIN — CARVEDILOL 25 MG: 25 TABLET, FILM COATED ORAL at 17:04

## 2022-01-01 RX ADMIN — TRAMADOL HYDROCHLORIDE 50 MG: 50 TABLET, COATED ORAL at 10:47

## 2022-01-01 RX ADMIN — IPRATROPIUM BROMIDE AND ALBUTEROL SULFATE 3 ML: 2.5; .5 SOLUTION RESPIRATORY (INHALATION) at 07:29

## 2022-01-01 RX ADMIN — LISINOPRIL 10 MG: 10 TABLET ORAL at 09:02

## 2022-01-01 RX ADMIN — ASPIRIN 81 MG: 81 TABLET, FILM COATED ORAL at 20:43

## 2022-01-01 RX ADMIN — POTASSIUM CHLORIDE 20 MEQ: 750 CAPSULE, EXTENDED RELEASE ORAL at 08:31

## 2022-01-01 RX ADMIN — ASPIRIN 81 MG: 81 TABLET, FILM COATED ORAL at 22:23

## 2022-01-01 RX ADMIN — ATORVASTATIN CALCIUM 40 MG: 40 TABLET, FILM COATED ORAL at 08:48

## 2022-01-01 RX ADMIN — ISOSORBIDE MONONITRATE 30 MG: 30 TABLET, EXTENDED RELEASE ORAL at 08:49

## 2022-01-01 RX ADMIN — Medication 10 ML: at 08:26

## 2022-01-01 RX ADMIN — IPRATROPIUM BROMIDE AND ALBUTEROL SULFATE 3 ML: 2.5; .5 SOLUTION RESPIRATORY (INHALATION) at 11:24

## 2022-01-01 RX ADMIN — FUROSEMIDE 40 MG: 10 INJECTION, SOLUTION INTRAMUSCULAR; INTRAVENOUS at 06:07

## 2022-01-01 RX ADMIN — SUCRALFATE 1 G: 1 TABLET ORAL at 17:43

## 2022-01-01 RX ADMIN — Medication 10 ML: at 21:21

## 2022-01-01 RX ADMIN — SUCRALFATE 1 G: 1 TABLET ORAL at 22:48

## 2022-01-01 RX ADMIN — ASPIRIN 81 MG: 81 TABLET, FILM COATED ORAL at 20:44

## 2022-01-01 RX ADMIN — PANTOPRAZOLE SODIUM 40 MG: 40 TABLET, DELAYED RELEASE ORAL at 09:08

## 2022-01-01 RX ADMIN — FUROSEMIDE 40 MG: 10 INJECTION, SOLUTION INTRAMUSCULAR; INTRAVENOUS at 06:20

## 2022-01-01 RX ADMIN — IPRATROPIUM BROMIDE AND ALBUTEROL SULFATE 3 ML: 2.5; .5 SOLUTION RESPIRATORY (INHALATION) at 10:30

## 2022-01-01 RX ADMIN — POTASSIUM CHLORIDE 20 MEQ: 750 CAPSULE, EXTENDED RELEASE ORAL at 08:48

## 2022-01-01 RX ADMIN — Medication 10 ML: at 10:24

## 2022-01-01 RX ADMIN — IPRATROPIUM BROMIDE AND ALBUTEROL SULFATE 3 ML: 2.5; .5 SOLUTION RESPIRATORY (INHALATION) at 15:27

## 2022-01-01 RX ADMIN — IPRATROPIUM BROMIDE AND ALBUTEROL SULFATE 3 ML: 2.5; .5 SOLUTION RESPIRATORY (INHALATION) at 20:54

## 2022-01-01 RX ADMIN — POTASSIUM CHLORIDE 40 MEQ: 750 CAPSULE, EXTENDED RELEASE ORAL at 10:42

## 2022-01-01 RX ADMIN — CARVEDILOL 25 MG: 25 TABLET, FILM COATED ORAL at 09:50

## 2022-01-01 RX ADMIN — FENTANYL CITRATE 50 MCG: 50 INJECTION INTRAMUSCULAR; INTRAVENOUS at 13:16

## 2022-01-01 RX ADMIN — DEXTROSE MONOHYDRATE 75 ML/HR: 100 INJECTION, SOLUTION INTRAVENOUS at 21:07

## 2022-01-01 RX ADMIN — PANTOPRAZOLE SODIUM 40 MG: 40 TABLET, DELAYED RELEASE ORAL at 08:31

## 2022-01-01 RX ADMIN — POTASSIUM CHLORIDE 20 MEQ: 750 CAPSULE, EXTENDED RELEASE ORAL at 09:49

## 2022-01-01 RX ADMIN — POTASSIUM CHLORIDE 20 MEQ: 750 CAPSULE, EXTENDED RELEASE ORAL at 22:15

## 2022-01-01 RX ADMIN — IPRATROPIUM BROMIDE AND ALBUTEROL SULFATE 3 ML: 2.5; .5 SOLUTION RESPIRATORY (INHALATION) at 15:10

## 2022-01-01 RX ADMIN — IOPAMIDOL 90 ML: 612 INJECTION, SOLUTION INTRAVENOUS at 12:51

## 2022-01-01 RX ADMIN — PANTOPRAZOLE SODIUM 40 MG: 40 TABLET, DELAYED RELEASE ORAL at 07:56

## 2022-01-01 RX ADMIN — INSULIN ASPART 2 UNITS: 100 INJECTION, SOLUTION INTRAVENOUS; SUBCUTANEOUS at 08:33

## 2022-01-01 RX ADMIN — POTASSIUM CHLORIDE 40 MEQ: 1.5 POWDER, FOR SOLUTION ORAL at 00:26

## 2022-01-01 RX ADMIN — ATORVASTATIN CALCIUM 40 MG: 40 TABLET, FILM COATED ORAL at 07:56

## 2022-01-01 RX ADMIN — INSULIN ASPART 2 UNITS: 100 INJECTION, SOLUTION INTRAVENOUS; SUBCUTANEOUS at 11:16

## 2022-01-01 RX ADMIN — ISOSORBIDE MONONITRATE 30 MG: 30 TABLET, EXTENDED RELEASE ORAL at 09:08

## 2022-01-01 RX ADMIN — DOCUSATE SODIUM 50 MG AND SENNOSIDES 8.6 MG 2 TABLET: 8.6; 5 TABLET, FILM COATED ORAL at 23:05

## 2022-01-01 RX ADMIN — IPRATROPIUM BROMIDE AND ALBUTEROL SULFATE 3 ML: 2.5; .5 SOLUTION RESPIRATORY (INHALATION) at 20:37

## 2022-01-01 RX ADMIN — PANTOPRAZOLE SODIUM 40 MG: 40 TABLET, DELAYED RELEASE ORAL at 08:29

## 2022-01-01 RX ADMIN — IPRATROPIUM BROMIDE AND ALBUTEROL SULFATE 3 ML: .5; 3 SOLUTION RESPIRATORY (INHALATION) at 07:46

## 2022-01-01 RX ADMIN — INSULIN ASPART 3 UNITS: 100 INJECTION, SOLUTION INTRAVENOUS; SUBCUTANEOUS at 10:42

## 2022-01-01 RX ADMIN — CEFTRIAXONE SODIUM 2 G: 2 INJECTION, POWDER, FOR SOLUTION INTRAMUSCULAR; INTRAVENOUS at 21:43

## 2022-01-01 RX ADMIN — IOPAMIDOL 90 ML: 612 INJECTION, SOLUTION INTRAVENOUS at 20:59

## 2022-01-01 RX ADMIN — PANTOPRAZOLE SODIUM 40 MG: 40 TABLET, DELAYED RELEASE ORAL at 08:49

## 2022-01-01 RX ADMIN — IPRATROPIUM BROMIDE AND ALBUTEROL SULFATE 3 ML: 2.5; .5 SOLUTION RESPIRATORY (INHALATION) at 11:01

## 2022-01-01 RX ADMIN — CARVEDILOL 25 MG: 25 TABLET, FILM COATED ORAL at 08:49

## 2022-01-01 RX ADMIN — POTASSIUM CHLORIDE 20 MEQ: 750 CAPSULE, EXTENDED RELEASE ORAL at 20:43

## 2022-01-01 RX ADMIN — PROPOFOL 150 MG: 10 INJECTION, EMULSION INTRAVENOUS at 13:21

## 2022-01-01 RX ADMIN — Medication 400 MG: at 08:48

## 2022-01-01 RX ADMIN — INSULIN DETEMIR 20 UNITS: 100 INJECTION, SOLUTION SUBCUTANEOUS at 22:15

## 2022-01-01 RX ADMIN — IPRATROPIUM BROMIDE AND ALBUTEROL SULFATE 3 ML: 2.5; .5 SOLUTION RESPIRATORY (INHALATION) at 06:49

## 2022-01-01 RX ADMIN — ISOSORBIDE MONONITRATE 30 MG: 30 TABLET, EXTENDED RELEASE ORAL at 09:49

## 2022-01-01 RX ADMIN — FUROSEMIDE 40 MG: 10 INJECTION, SOLUTION INTRAMUSCULAR; INTRAVENOUS at 05:24

## 2022-01-01 RX ADMIN — PANTOPRAZOLE SODIUM 40 MG: 40 TABLET, DELAYED RELEASE ORAL at 08:18

## 2022-01-01 RX ADMIN — LISINOPRIL 10 MG: 10 TABLET ORAL at 08:10

## 2022-01-01 RX ADMIN — FUROSEMIDE 40 MG: 10 INJECTION, SOLUTION INTRAVENOUS at 07:46

## 2022-01-01 RX ADMIN — PANTOPRAZOLE SODIUM 40 MG: 40 TABLET, DELAYED RELEASE ORAL at 08:35

## 2022-01-01 RX ADMIN — FUROSEMIDE 40 MG: 40 TABLET ORAL at 17:04

## 2022-01-01 RX ADMIN — SUCRALFATE 1 G: 1 TABLET ORAL at 21:37

## 2022-01-01 RX ADMIN — IPRATROPIUM BROMIDE AND ALBUTEROL SULFATE 3 ML: 2.5; .5 SOLUTION RESPIRATORY (INHALATION) at 10:48

## 2022-01-01 RX ADMIN — SUCRALFATE 1 G: 1 TABLET ORAL at 21:15

## 2022-01-01 RX ADMIN — CARVEDILOL 25 MG: 25 TABLET, FILM COATED ORAL at 08:31

## 2022-01-01 RX ADMIN — IPRATROPIUM BROMIDE AND ALBUTEROL SULFATE 3 ML: 2.5; .5 SOLUTION RESPIRATORY (INHALATION) at 20:01

## 2022-01-01 RX ADMIN — CARVEDILOL 25 MG: 25 TABLET, FILM COATED ORAL at 18:05

## 2022-01-01 RX ADMIN — CARVEDILOL 25 MG: 25 TABLET, FILM COATED ORAL at 09:02

## 2022-01-01 RX ADMIN — FUROSEMIDE 60 MG: 10 INJECTION, SOLUTION INTRAMUSCULAR; INTRAVENOUS at 23:12

## 2022-01-01 RX ADMIN — FAMOTIDINE 40 MG: 40 TABLET, FILM COATED ORAL at 08:18

## 2022-01-01 RX ADMIN — IPRATROPIUM BROMIDE AND ALBUTEROL SULFATE 3 ML: 2.5; .5 SOLUTION RESPIRATORY (INHALATION) at 18:49

## 2022-01-01 RX ADMIN — IPRATROPIUM BROMIDE AND ALBUTEROL SULFATE 3 ML: 2.5; .5 SOLUTION RESPIRATORY (INHALATION) at 14:36

## 2022-01-01 RX ADMIN — IPRATROPIUM BROMIDE AND ALBUTEROL SULFATE 3 ML: 2.5; .5 SOLUTION RESPIRATORY (INHALATION) at 14:27

## 2022-01-01 RX ADMIN — ISOSORBIDE MONONITRATE 30 MG: 30 TABLET, EXTENDED RELEASE ORAL at 07:49

## 2022-01-01 RX ADMIN — Medication 400 MG: at 08:10

## 2022-01-01 RX ADMIN — CEFTRIAXONE SODIUM 2 G: 2 INJECTION, POWDER, FOR SOLUTION INTRAMUSCULAR; INTRAVENOUS at 22:27

## 2022-01-01 RX ADMIN — LISINOPRIL 10 MG: 10 TABLET ORAL at 17:44

## 2022-01-01 RX ADMIN — FUROSEMIDE 40 MG: 10 INJECTION, SOLUTION INTRAMUSCULAR; INTRAVENOUS at 16:51

## 2022-01-01 RX ADMIN — ASPIRIN 81 MG: 81 TABLET, FILM COATED ORAL at 21:19

## 2022-01-01 RX ADMIN — ALBUMIN (HUMAN) 12.5 G: 12.5 INJECTION, SOLUTION INTRAVENOUS at 23:42

## 2022-01-01 RX ADMIN — ENOXAPARIN SODIUM 40 MG: 40 INJECTION SUBCUTANEOUS at 07:46

## 2022-01-01 RX ADMIN — SODIUM CHLORIDE 75 ML/HR: 9 INJECTION, SOLUTION INTRAVENOUS at 12:39

## 2022-01-01 RX ADMIN — ATORVASTATIN CALCIUM 40 MG: 40 TABLET, FILM COATED ORAL at 09:08

## 2022-01-01 RX ADMIN — IPRATROPIUM BROMIDE AND ALBUTEROL SULFATE 3 ML: 2.5; .5 SOLUTION RESPIRATORY (INHALATION) at 14:18

## 2022-01-01 RX ADMIN — INSULIN DETEMIR 20 UNITS: 100 INJECTION, SOLUTION SUBCUTANEOUS at 23:05

## 2022-01-01 RX ADMIN — CARVEDILOL 25 MG: 25 TABLET, FILM COATED ORAL at 17:24

## 2022-01-01 RX ADMIN — ISOSORBIDE MONONITRATE 30 MG: 30 TABLET, EXTENDED RELEASE ORAL at 08:40

## 2022-01-01 RX ADMIN — FAMOTIDINE 40 MG: 40 TABLET, FILM COATED ORAL at 09:08

## 2022-01-01 RX ADMIN — IPRATROPIUM BROMIDE AND ALBUTEROL SULFATE 3 ML: 2.5; .5 SOLUTION RESPIRATORY (INHALATION) at 14:07

## 2022-01-01 RX ADMIN — ENOXAPARIN SODIUM 40 MG: 40 INJECTION SUBCUTANEOUS at 23:05

## 2022-01-01 RX ADMIN — CEFTRIAXONE SODIUM 2 G: 2 INJECTION, POWDER, FOR SOLUTION INTRAMUSCULAR; INTRAVENOUS at 22:42

## 2022-01-01 RX ADMIN — MIDODRINE HYDROCHLORIDE 5 MG: 5 TABLET ORAL at 16:30

## 2022-01-01 RX ADMIN — IOPAMIDOL 72 ML: 755 INJECTION, SOLUTION INTRAVENOUS at 21:20

## 2022-01-01 RX ADMIN — GUAIFENESIN 600 MG: 600 TABLET, EXTENDED RELEASE ORAL at 07:48

## 2022-01-01 RX ADMIN — SUCRALFATE 1 G: 1 TABLET ORAL at 08:34

## 2022-01-01 RX ADMIN — SUCRALFATE 1 G: 1 TABLET ORAL at 07:48

## 2022-01-01 RX ADMIN — INSULIN DETEMIR 20 UNITS: 100 INJECTION, SOLUTION SUBCUTANEOUS at 20:38

## 2022-01-01 RX ADMIN — ROCURONIUM BROMIDE 20 MG: 10 INJECTION INTRAVENOUS at 13:40

## 2022-01-01 RX ADMIN — INSULIN DETEMIR 20 UNITS: 100 INJECTION, SOLUTION SUBCUTANEOUS at 20:40

## 2022-01-01 RX ADMIN — ATORVASTATIN CALCIUM 40 MG: 40 TABLET, FILM COATED ORAL at 08:22

## 2022-01-01 RX ADMIN — Medication 10 ML: at 20:06

## 2022-01-01 RX ADMIN — DOBUTAMINE 8 MCG/KG/MIN: 12.5 INJECTION, SOLUTION, CONCENTRATE INTRAVENOUS at 00:03

## 2022-01-01 RX ADMIN — IPRATROPIUM BROMIDE AND ALBUTEROL SULFATE 3 ML: 2.5; .5 SOLUTION RESPIRATORY (INHALATION) at 19:14

## 2022-01-01 RX ADMIN — ATORVASTATIN CALCIUM 40 MG: 40 TABLET, FILM COATED ORAL at 08:18

## 2022-01-01 RX ADMIN — DEXTROSE MONOHYDRATE 25 G: 25 INJECTION, SOLUTION INTRAVENOUS at 20:39

## 2022-01-01 RX ADMIN — SUCRALFATE 1 G: 1 TABLET ORAL at 09:02

## 2022-01-01 RX ADMIN — SODIUM CHLORIDE, POTASSIUM CHLORIDE, SODIUM LACTATE AND CALCIUM CHLORIDE 1000 ML: 600; 310; 30; 20 INJECTION, SOLUTION INTRAVENOUS at 00:04

## 2022-01-01 RX ADMIN — CARVEDILOL 25 MG: 25 TABLET, FILM COATED ORAL at 07:49

## 2022-01-01 RX ADMIN — PANTOPRAZOLE SODIUM 40 MG: 40 INJECTION, POWDER, FOR SOLUTION INTRAVENOUS at 23:45

## 2022-01-01 RX ADMIN — SODIUM CHLORIDE 75 ML/HR: 9 INJECTION, SOLUTION INTRAVENOUS at 10:56

## 2022-01-01 RX ADMIN — ATORVASTATIN CALCIUM 40 MG: 40 TABLET, FILM COATED ORAL at 17:44

## 2022-01-01 RX ADMIN — LISINOPRIL 10 MG: 10 TABLET ORAL at 22:42

## 2022-01-01 RX ADMIN — ISOSORBIDE MONONITRATE 30 MG: 30 TABLET, EXTENDED RELEASE ORAL at 08:52

## 2022-01-01 RX ADMIN — Medication 10 ML: at 10:43

## 2022-01-01 RX ADMIN — MAGNESIUM SULFATE HEPTAHYDRATE 2 G: 40 INJECTION, SOLUTION INTRAVENOUS at 20:29

## 2022-01-01 RX ADMIN — ASPIRIN 81 MG: 81 TABLET, FILM COATED ORAL at 20:48

## 2022-01-01 RX ADMIN — CEFTRIAXONE SODIUM 2 G: 2 INJECTION, POWDER, FOR SOLUTION INTRAMUSCULAR; INTRAVENOUS at 22:12

## 2022-01-01 RX ADMIN — CEFTRIAXONE SODIUM 1 G: 1 INJECTION, POWDER, FOR SOLUTION INTRAMUSCULAR; INTRAVENOUS at 13:08

## 2022-01-01 RX ADMIN — SUCRALFATE 1 G: 1 TABLET ORAL at 08:04

## 2022-01-01 RX ADMIN — SODIUM CHLORIDE 75 ML/HR: 9 INJECTION, SOLUTION INTRAVENOUS at 22:40

## 2022-01-01 RX ADMIN — ATORVASTATIN CALCIUM 40 MG: 40 TABLET, FILM COATED ORAL at 22:42

## 2022-01-01 RX ADMIN — INSULIN DETEMIR 20 UNITS: 100 INJECTION, SOLUTION SUBCUTANEOUS at 21:16

## 2022-01-01 RX ADMIN — Medication 10 ML: at 08:05

## 2022-01-01 RX ADMIN — FUROSEMIDE 40 MG: 10 INJECTION, SOLUTION INTRAVENOUS at 20:49

## 2022-01-01 RX ADMIN — ATORVASTATIN CALCIUM 40 MG: 40 TABLET, FILM COATED ORAL at 10:23

## 2022-01-01 RX ADMIN — FUROSEMIDE 40 MG: 10 INJECTION, SOLUTION INTRAMUSCULAR; INTRAVENOUS at 08:20

## 2022-01-01 RX ADMIN — MAGNESIUM SULFATE HEPTAHYDRATE 2 G: 40 INJECTION, SOLUTION INTRAVENOUS at 15:45

## 2022-01-01 RX ADMIN — IPRATROPIUM BROMIDE AND ALBUTEROL SULFATE 3 ML: 2.5; .5 SOLUTION RESPIRATORY (INHALATION) at 06:58

## 2022-01-01 RX ADMIN — PANTOPRAZOLE SODIUM 40 MG: 40 TABLET, DELAYED RELEASE ORAL at 08:04

## 2022-01-01 RX ADMIN — FUROSEMIDE 40 MG: 10 INJECTION, SOLUTION INTRAVENOUS at 08:29

## 2022-01-01 RX ADMIN — LISINOPRIL 10 MG: 10 TABLET ORAL at 08:29

## 2022-01-01 RX ADMIN — INSULIN ASPART 3 UNITS: 100 INJECTION, SOLUTION INTRAVENOUS; SUBCUTANEOUS at 11:33

## 2022-01-01 RX ADMIN — INSULIN ASPART 3 UNITS: 100 INJECTION, SOLUTION INTRAVENOUS; SUBCUTANEOUS at 08:41

## 2022-01-01 RX ADMIN — IPRATROPIUM BROMIDE AND ALBUTEROL SULFATE 3 ML: 2.5; .5 SOLUTION RESPIRATORY (INHALATION) at 07:38

## 2022-01-01 RX ADMIN — Medication 10 ML: at 15:47

## 2022-01-01 RX ADMIN — ISOSORBIDE MONONITRATE 30 MG: 30 TABLET, EXTENDED RELEASE ORAL at 08:09

## 2022-01-01 RX ADMIN — IPRATROPIUM BROMIDE AND ALBUTEROL SULFATE 3 ML: 2.5; .5 SOLUTION RESPIRATORY (INHALATION) at 11:59

## 2022-01-01 RX ADMIN — POTASSIUM CHLORIDE 20 MEQ: 750 CAPSULE, EXTENDED RELEASE ORAL at 21:19

## 2022-01-01 RX ADMIN — CARVEDILOL 25 MG: 25 TABLET, FILM COATED ORAL at 17:52

## 2022-01-01 RX ADMIN — ALBUMIN HUMAN 25 G: 0.25 SOLUTION INTRAVENOUS at 16:29

## 2022-01-01 RX ADMIN — IPRATROPIUM BROMIDE AND ALBUTEROL SULFATE 3 ML: 2.5; .5 SOLUTION RESPIRATORY (INHALATION) at 20:08

## 2022-01-01 RX ADMIN — ENOXAPARIN SODIUM 40 MG: 40 INJECTION SUBCUTANEOUS at 08:32

## 2022-01-01 RX ADMIN — CARVEDILOL 25 MG: 25 TABLET, FILM COATED ORAL at 18:19

## 2022-01-01 RX ADMIN — ASPIRIN 81 MG: 81 TABLET, FILM COATED ORAL at 20:38

## 2022-01-01 RX ADMIN — ISOSORBIDE MONONITRATE 30 MG: 30 TABLET, EXTENDED RELEASE ORAL at 17:17

## 2022-01-01 RX ADMIN — POTASSIUM CHLORIDE 10 MEQ: 750 CAPSULE, EXTENDED RELEASE ORAL at 10:24

## 2022-01-01 RX ADMIN — SODIUM CHLORIDE 500 ML: 9 INJECTION, SOLUTION INTRAVENOUS at 18:29

## 2022-01-01 RX ADMIN — IPRATROPIUM BROMIDE AND ALBUTEROL SULFATE 3 ML: 2.5; .5 SOLUTION RESPIRATORY (INHALATION) at 07:59

## 2022-01-01 RX ADMIN — SUCRALFATE 1 G: 1 TABLET ORAL at 08:10

## 2022-01-01 RX ADMIN — ATORVASTATIN CALCIUM 40 MG: 40 TABLET, FILM COATED ORAL at 07:47

## 2022-01-01 RX ADMIN — ONDANSETRON 4 MG: 2 INJECTION INTRAMUSCULAR; INTRAVENOUS at 11:20

## 2022-01-01 RX ADMIN — INSULIN DETEMIR 20 UNITS: 100 INJECTION, SOLUTION SUBCUTANEOUS at 00:34

## 2022-01-01 RX ADMIN — DOBUTAMINE 8 MCG/KG/MIN: 12.5 INJECTION, SOLUTION, CONCENTRATE INTRAVENOUS at 23:51

## 2022-01-01 RX ADMIN — Medication 10 ML: at 20:58

## 2022-01-01 RX ADMIN — SUCRALFATE 1 G: 1 TABLET ORAL at 08:48

## 2022-01-01 RX ADMIN — SUCRALFATE 1 G: 1 TABLET ORAL at 10:23

## 2022-01-01 RX ADMIN — ISOSORBIDE MONONITRATE 30 MG: 30 TABLET, EXTENDED RELEASE ORAL at 10:22

## 2022-01-01 RX ADMIN — Medication 400 MG: at 22:42

## 2022-01-01 RX ADMIN — SUCRALFATE 1 G: 1 TABLET ORAL at 22:15

## 2022-01-01 RX ADMIN — FENTANYL CITRATE 25 MCG: 50 INJECTION INTRAMUSCULAR; INTRAVENOUS at 14:27

## 2022-01-01 RX ADMIN — INSULIN DETEMIR 20 UNITS: 100 INJECTION, SOLUTION SUBCUTANEOUS at 20:55

## 2022-01-01 RX ADMIN — ATORVASTATIN CALCIUM 40 MG: 40 TABLET, FILM COATED ORAL at 08:04

## 2022-01-01 RX ADMIN — LISINOPRIL 10 MG: 10 TABLET ORAL at 09:49

## 2022-01-01 RX ADMIN — FUROSEMIDE 40 MG: 10 INJECTION, SOLUTION INTRAMUSCULAR; INTRAVENOUS at 05:25

## 2022-01-01 RX ADMIN — LISINOPRIL 10 MG: 10 TABLET ORAL at 08:49

## 2022-01-01 RX ADMIN — CARVEDILOL 25 MG: 25 TABLET, FILM COATED ORAL at 08:29

## 2022-01-01 RX ADMIN — CEFTRIAXONE SODIUM 2 G: 2 INJECTION, POWDER, FOR SOLUTION INTRAMUSCULAR; INTRAVENOUS at 21:38

## 2022-01-01 RX ADMIN — IPRATROPIUM BROMIDE AND ALBUTEROL SULFATE 3 ML: 2.5; .5 SOLUTION RESPIRATORY (INHALATION) at 14:54

## 2022-01-01 RX ADMIN — CARVEDILOL 25 MG: 25 TABLET, FILM COATED ORAL at 08:48

## 2022-01-01 RX ADMIN — INSULIN ASPART 2 UNITS: 100 INJECTION, SOLUTION INTRAVENOUS; SUBCUTANEOUS at 16:54

## 2022-01-01 RX ADMIN — INSULIN ASPART 2 UNITS: 100 INJECTION, SOLUTION INTRAVENOUS; SUBCUTANEOUS at 17:54

## 2022-01-01 RX ADMIN — FUROSEMIDE 40 MG: 10 INJECTION, SOLUTION INTRAMUSCULAR; INTRAVENOUS at 15:51

## 2022-01-01 RX ADMIN — CARVEDILOL 25 MG: 25 TABLET, FILM COATED ORAL at 09:08

## 2022-01-01 RX ADMIN — CEFTRIAXONE SODIUM 2 G: 2 INJECTION, POWDER, FOR SOLUTION INTRAMUSCULAR; INTRAVENOUS at 22:48

## 2022-01-01 RX ADMIN — SUCRALFATE 1 G: 1 TABLET ORAL at 20:46

## 2022-01-01 RX ADMIN — LISINOPRIL 10 MG: 5 TABLET ORAL at 08:22

## 2022-01-01 RX ADMIN — ONDANSETRON 4 MG: 2 INJECTION INTRAMUSCULAR; INTRAVENOUS at 18:54

## 2022-01-01 RX ADMIN — SODIUM CHLORIDE, POTASSIUM CHLORIDE, SODIUM LACTATE AND CALCIUM CHLORIDE 500 ML: 600; 310; 30; 20 INJECTION, SOLUTION INTRAVENOUS at 20:20

## 2022-01-01 RX ADMIN — PANTOPRAZOLE SODIUM 40 MG: 40 TABLET, DELAYED RELEASE ORAL at 08:28

## 2022-01-01 RX ADMIN — FUROSEMIDE 40 MG: 10 INJECTION, SOLUTION INTRAVENOUS at 08:48

## 2022-01-01 RX ADMIN — FUROSEMIDE 40 MG: 10 INJECTION, SOLUTION INTRAVENOUS at 07:56

## 2022-01-01 RX ADMIN — IPRATROPIUM BROMIDE AND ALBUTEROL SULFATE 3 ML: 2.5; .5 SOLUTION RESPIRATORY (INHALATION) at 14:24

## 2022-01-01 RX ADMIN — IPRATROPIUM BROMIDE AND ALBUTEROL SULFATE 3 ML: 2.5; .5 SOLUTION RESPIRATORY (INHALATION) at 20:15

## 2022-01-01 RX ADMIN — POTASSIUM CHLORIDE 20 MEQ: 750 CAPSULE, EXTENDED RELEASE ORAL at 20:50

## 2022-01-01 RX ADMIN — CARVEDILOL 25 MG: 25 TABLET, FILM COATED ORAL at 18:56

## 2022-01-01 RX ADMIN — CARVEDILOL 25 MG: 25 TABLET, FILM COATED ORAL at 17:35

## 2022-01-01 RX ADMIN — Medication 400 MG: at 08:31

## 2022-01-01 RX ADMIN — IPRATROPIUM BROMIDE AND ALBUTEROL SULFATE 3 ML: .5; 3 SOLUTION RESPIRATORY (INHALATION) at 21:57

## 2022-01-01 RX ADMIN — IPRATROPIUM BROMIDE AND ALBUTEROL SULFATE 3 ML: 2.5; .5 SOLUTION RESPIRATORY (INHALATION) at 07:54

## 2022-01-01 RX ADMIN — SUCRALFATE 1 G: 1 TABLET ORAL at 20:25

## 2022-01-01 RX ADMIN — PANTOPRAZOLE SODIUM 40 MG: 40 TABLET, DELAYED RELEASE ORAL at 08:10

## 2022-01-01 RX ADMIN — ISOSORBIDE MONONITRATE 30 MG: 30 TABLET, EXTENDED RELEASE ORAL at 08:18

## 2022-01-01 RX ADMIN — Medication 10 ML: at 08:38

## 2022-01-01 RX ADMIN — IOPAMIDOL 72 ML: 755 INJECTION, SOLUTION INTRAVENOUS at 17:30

## 2022-01-01 RX ADMIN — INSULIN ASPART 2 UNITS: 100 INJECTION, SOLUTION INTRAVENOUS; SUBCUTANEOUS at 11:39

## 2022-01-01 RX ADMIN — CARVEDILOL 25 MG: 25 TABLET, FILM COATED ORAL at 08:38

## 2022-01-01 RX ADMIN — FUROSEMIDE 40 MG: 10 INJECTION, SOLUTION INTRAMUSCULAR; INTRAVENOUS at 09:08

## 2022-01-01 RX ADMIN — SUCRALFATE 1 G: 1 TABLET ORAL at 08:49

## 2022-01-01 RX ADMIN — ASPIRIN 81 MG: 81 TABLET, FILM COATED ORAL at 20:25

## 2022-01-01 RX ADMIN — SUCRALFATE 1 G: 1 TABLET ORAL at 17:16

## 2022-01-01 RX ADMIN — Medication 10 ML: at 20:51

## 2022-01-01 RX ADMIN — IPRATROPIUM BROMIDE AND ALBUTEROL SULFATE 3 ML: .5; 3 SOLUTION RESPIRATORY (INHALATION) at 11:08

## 2022-01-01 RX ADMIN — ASPIRIN 81 MG: 81 TABLET, FILM COATED ORAL at 20:46

## 2022-01-01 RX ADMIN — ASPIRIN 81 MG: 81 TABLET, FILM COATED ORAL at 21:15

## 2022-01-01 RX ADMIN — Medication 400 MG: at 08:49

## 2022-01-01 RX ADMIN — INSULIN DETEMIR 30 UNITS: 100 INJECTION, SOLUTION SUBCUTANEOUS at 22:23

## 2022-01-01 RX ADMIN — Medication 3 G: at 13:22

## 2022-01-01 RX ADMIN — Medication 400 MG: at 09:49

## 2022-01-01 RX ADMIN — SUCRALFATE 1 G: 1 TABLET ORAL at 16:07

## 2022-01-01 RX ADMIN — PANTOPRAZOLE SODIUM 40 MG: 40 TABLET, DELAYED RELEASE ORAL at 09:20

## 2022-01-01 RX ADMIN — LISINOPRIL 10 MG: 10 TABLET ORAL at 22:14

## 2022-01-01 RX ADMIN — PANTOPRAZOLE SODIUM 40 MG: 40 TABLET, DELAYED RELEASE ORAL at 08:26

## 2022-01-01 RX ADMIN — INSULIN ASPART 2 UNITS: 100 INJECTION, SOLUTION INTRAVENOUS; SUBCUTANEOUS at 17:46

## 2022-01-01 RX ADMIN — Medication 10 ML: at 20:55

## 2022-01-01 RX ADMIN — DOCUSATE SODIUM 50 MG AND SENNOSIDES 8.6 MG 2 TABLET: 8.6; 5 TABLET, FILM COATED ORAL at 20:44

## 2022-01-01 RX ADMIN — Medication 10 ML: at 08:43

## 2022-01-01 RX ADMIN — INSULIN ASPART 2 UNITS: 100 INJECTION, SOLUTION INTRAVENOUS; SUBCUTANEOUS at 17:42

## 2022-01-01 RX ADMIN — CARVEDILOL 25 MG: 25 TABLET, FILM COATED ORAL at 17:31

## 2022-01-01 RX ADMIN — FUROSEMIDE 40 MG: 40 TABLET ORAL at 08:42

## 2022-01-01 RX ADMIN — ONDANSETRON 4 MG: 4 TABLET, ORALLY DISINTEGRATING ORAL at 12:38

## 2022-01-01 RX ADMIN — PIPERACILLIN SODIUM AND TAZOBACTAM SODIUM 3.38 G: 3; .375 INJECTION, POWDER, LYOPHILIZED, FOR SOLUTION INTRAVENOUS at 15:34

## 2022-01-01 RX ADMIN — INSULIN ASPART 5 UNITS: 100 INJECTION, SOLUTION INTRAVENOUS; SUBCUTANEOUS at 08:22

## 2022-01-01 RX ADMIN — INSULIN ASPART 3 UNITS: 100 INJECTION, SOLUTION INTRAVENOUS; SUBCUTANEOUS at 17:27

## 2022-01-01 RX ADMIN — ATORVASTATIN CALCIUM 40 MG: 40 TABLET, FILM COATED ORAL at 08:42

## 2022-01-01 RX ADMIN — IPRATROPIUM BROMIDE AND ALBUTEROL SULFATE 3 ML: .5; 3 SOLUTION RESPIRATORY (INHALATION) at 10:35

## 2022-01-01 RX ADMIN — INSULIN ASPART 2 UNITS: 100 INJECTION, SOLUTION INTRAVENOUS; SUBCUTANEOUS at 17:33

## 2022-01-01 RX ADMIN — IPRATROPIUM BROMIDE AND ALBUTEROL SULFATE 3 ML: 2.5; .5 SOLUTION RESPIRATORY (INHALATION) at 14:51

## 2022-01-01 RX ADMIN — ATORVASTATIN CALCIUM 40 MG: 40 TABLET, FILM COATED ORAL at 09:25

## 2022-01-01 RX ADMIN — ENOXAPARIN SODIUM 40 MG: 40 INJECTION SUBCUTANEOUS at 08:09

## 2022-01-01 RX ADMIN — ATORVASTATIN CALCIUM 40 MG: 40 TABLET, FILM COATED ORAL at 09:20

## 2022-01-01 RX ADMIN — DOBUTAMINE 8 MCG/KG/MIN: 12.5 INJECTION, SOLUTION, CONCENTRATE INTRAVENOUS at 13:25

## 2022-01-01 RX ADMIN — CARVEDILOL 25 MG: 25 TABLET, FILM COATED ORAL at 08:26

## 2022-01-01 RX ADMIN — ASPIRIN 81 MG: 81 TABLET, FILM COATED ORAL at 22:14

## 2022-01-01 RX ADMIN — POTASSIUM CHLORIDE 20 MEQ: 750 CAPSULE, EXTENDED RELEASE ORAL at 09:01

## 2022-01-01 RX ADMIN — Medication 10 ML: at 20:01

## 2022-01-01 RX ADMIN — ROCURONIUM BROMIDE 25 MG: 10 INJECTION INTRAVENOUS at 13:25

## 2022-01-01 RX ADMIN — IPRATROPIUM BROMIDE AND ALBUTEROL SULFATE 3 ML: 2.5; .5 SOLUTION RESPIRATORY (INHALATION) at 08:35

## 2022-01-01 RX ADMIN — ASPIRIN 81 MG: 81 TABLET, FILM COATED ORAL at 20:04

## 2022-01-01 RX ADMIN — HYDROCODONE BITARTRATE AND ACETAMINOPHEN 1 TABLET: 7.5; 325 TABLET ORAL at 22:35

## 2022-01-01 RX ADMIN — ONDANSETRON 4 MG: 2 INJECTION INTRAMUSCULAR; INTRAVENOUS at 14:47

## 2022-01-01 RX ADMIN — Medication 10 ML: at 08:29

## 2022-01-01 RX ADMIN — PANTOPRAZOLE SODIUM 40 MG: 40 TABLET, DELAYED RELEASE ORAL at 07:49

## 2022-01-01 RX ADMIN — Medication 400 MG: at 07:49

## 2022-01-01 RX ADMIN — FUROSEMIDE 40 MG: 10 INJECTION, SOLUTION INTRAVENOUS at 08:09

## 2022-01-01 RX ADMIN — ENOXAPARIN SODIUM 40 MG: 40 INJECTION SUBCUTANEOUS at 13:57

## 2022-01-01 RX ADMIN — Medication 400 MG: at 08:04

## 2022-01-01 RX ADMIN — SODIUM CHLORIDE 125 ML/HR: 9 INJECTION, SOLUTION INTRAVENOUS at 10:45

## 2022-01-01 RX ADMIN — CARVEDILOL 25 MG: 25 TABLET, FILM COATED ORAL at 08:04

## 2022-01-01 RX ADMIN — ISOSORBIDE MONONITRATE 30 MG: 30 TABLET, EXTENDED RELEASE ORAL at 17:44

## 2022-01-01 RX ADMIN — GUAIFENESIN 600 MG: 600 TABLET, EXTENDED RELEASE ORAL at 20:43

## 2022-01-01 RX ADMIN — FUROSEMIDE 40 MG: 10 INJECTION, SOLUTION INTRAMUSCULAR; INTRAVENOUS at 22:22

## 2022-01-01 RX ADMIN — FUROSEMIDE 40 MG: 40 TABLET ORAL at 22:14

## 2022-01-01 RX ADMIN — ISOSORBIDE MONONITRATE 30 MG: 30 TABLET, EXTENDED RELEASE ORAL at 22:15

## 2022-01-01 RX ADMIN — POTASSIUM CHLORIDE 20 MEQ: 750 CAPSULE, EXTENDED RELEASE ORAL at 08:42

## 2022-01-01 RX ADMIN — INSULIN DETEMIR 35 UNITS: 100 INJECTION, SOLUTION SUBCUTANEOUS at 20:26

## 2022-01-01 RX ADMIN — ASPIRIN 81 MG: 81 TABLET, FILM COATED ORAL at 21:16

## 2022-01-01 RX ADMIN — POTASSIUM CHLORIDE 20 MEQ: 750 CAPSULE, EXTENDED RELEASE ORAL at 07:48

## 2022-01-01 RX ADMIN — Medication 10 ML: at 08:35

## 2022-01-01 RX ADMIN — LISINOPRIL 10 MG: 10 TABLET ORAL at 09:25

## 2022-01-01 RX ADMIN — CARVEDILOL 25 MG: 25 TABLET, FILM COATED ORAL at 16:01

## 2022-01-01 RX ADMIN — DOBUTAMINE HYDROCHLORIDE 8 MCG/KG/MIN: 100 INJECTION INTRAVENOUS at 21:17

## 2022-01-01 RX ADMIN — CARVEDILOL 25 MG: 25 TABLET, FILM COATED ORAL at 18:32

## 2022-01-01 RX ADMIN — INSULIN ASPART 3 UNITS: 100 INJECTION, SOLUTION INTRAVENOUS; SUBCUTANEOUS at 11:24

## 2022-01-01 RX ADMIN — IPRATROPIUM BROMIDE AND ALBUTEROL SULFATE 3 ML: 2.5; .5 SOLUTION RESPIRATORY (INHALATION) at 08:18

## 2022-01-01 RX ADMIN — POTASSIUM CHLORIDE 40 MEQ: 750 CAPSULE, EXTENDED RELEASE ORAL at 18:18

## 2022-01-01 RX ADMIN — DIATRIZOATE MEGLUMINE AND DIATRIZOATE SODIUM 30 ML: 660; 100 LIQUID ORAL; RECTAL at 10:45

## 2022-01-01 RX ADMIN — FUROSEMIDE 40 MG: 10 INJECTION, SOLUTION INTRAVENOUS at 08:10

## 2022-01-01 RX ADMIN — FUROSEMIDE 40 MG: 10 INJECTION, SOLUTION INTRAMUSCULAR; INTRAVENOUS at 17:42

## 2022-01-01 RX ADMIN — ATORVASTATIN CALCIUM 40 MG: 40 TABLET, FILM COATED ORAL at 08:09

## 2022-01-01 RX ADMIN — POTASSIUM CHLORIDE 40 MEQ: 750 CAPSULE, EXTENDED RELEASE ORAL at 18:33

## 2022-01-01 RX ADMIN — IPRATROPIUM BROMIDE AND ALBUTEROL SULFATE 3 ML: 2.5; .5 SOLUTION RESPIRATORY (INHALATION) at 06:46

## 2022-01-01 RX ADMIN — FUROSEMIDE 40 MG: 10 INJECTION, SOLUTION INTRAMUSCULAR; INTRAVENOUS at 17:51

## 2022-01-01 RX ADMIN — SUCRALFATE 1 G: 1 TABLET ORAL at 09:01

## 2022-01-01 RX ADMIN — SUCRALFATE 1 G: 1 TABLET ORAL at 20:01

## 2022-01-01 RX ADMIN — LISINOPRIL 10 MG: 10 TABLET ORAL at 09:20

## 2022-01-01 RX ADMIN — PANTOPRAZOLE SODIUM 40 MG: 40 TABLET, DELAYED RELEASE ORAL at 17:17

## 2022-01-01 RX ADMIN — IPRATROPIUM BROMIDE AND ALBUTEROL SULFATE 3 ML: 2.5; .5 SOLUTION RESPIRATORY (INHALATION) at 07:41

## 2022-01-01 RX ADMIN — SUCRALFATE 1 G: 1 TABLET ORAL at 15:33

## 2022-01-01 RX ADMIN — IPRATROPIUM BROMIDE AND ALBUTEROL SULFATE 3 ML: 2.5; .5 SOLUTION RESPIRATORY (INHALATION) at 19:02

## 2022-01-01 RX ADMIN — INSULIN ASPART 3 UNITS: 100 INJECTION, SOLUTION INTRAVENOUS; SUBCUTANEOUS at 12:40

## 2022-01-01 RX ADMIN — CARVEDILOL 25 MG: 25 TABLET, FILM COATED ORAL at 08:10

## 2022-01-01 RX ADMIN — CARVEDILOL 25 MG: 25 TABLET, FILM COATED ORAL at 11:30

## 2022-01-01 RX ADMIN — ASPIRIN 81 MG: 81 TABLET, FILM COATED ORAL at 20:39

## 2022-01-01 RX ADMIN — PANTOPRAZOLE SODIUM 40 MG: 40 TABLET, DELAYED RELEASE ORAL at 08:33

## 2022-01-01 RX ADMIN — ISOSORBIDE MONONITRATE 30 MG: 30 TABLET, EXTENDED RELEASE ORAL at 08:17

## 2022-01-01 RX ADMIN — Medication 10 ML: at 08:32

## 2022-01-01 RX ADMIN — ONDANSETRON 4 MG: 2 INJECTION INTRAMUSCULAR; INTRAVENOUS at 20:20

## 2022-01-01 RX ADMIN — CARVEDILOL 25 MG: 25 TABLET, FILM COATED ORAL at 18:47

## 2022-01-01 RX ADMIN — POTASSIUM CHLORIDE 20 MEQ: 750 CAPSULE, EXTENDED RELEASE ORAL at 20:25

## 2022-01-01 RX ADMIN — ASPIRIN 81 MG: 81 TABLET, FILM COATED ORAL at 22:41

## 2022-01-01 RX ADMIN — PANTOPRAZOLE SODIUM 40 MG: 40 TABLET, DELAYED RELEASE ORAL at 09:50

## 2022-01-01 RX ADMIN — FUROSEMIDE 40 MG: 40 TABLET ORAL at 08:38

## 2022-01-01 RX ADMIN — SUCRALFATE 1 G: 1 TABLET ORAL at 22:36

## 2022-01-01 RX ADMIN — LISINOPRIL 10 MG: 10 TABLET ORAL at 08:17

## 2022-01-01 RX ADMIN — INSULIN DETEMIR 20 UNITS: 100 INJECTION, SOLUTION SUBCUTANEOUS at 08:27

## 2022-01-01 RX ADMIN — INSULIN DETEMIR 20 UNITS: 100 INJECTION, SOLUTION SUBCUTANEOUS at 20:57

## 2022-01-01 RX ADMIN — INSULIN ASPART 3 UNITS: 100 INJECTION, SOLUTION INTRAVENOUS; SUBCUTANEOUS at 12:56

## 2022-01-01 RX ADMIN — FUROSEMIDE 40 MG: 40 TABLET ORAL at 08:52

## 2022-01-01 RX ADMIN — ATORVASTATIN CALCIUM 40 MG: 40 TABLET, FILM COATED ORAL at 08:10

## 2022-01-01 RX ADMIN — POTASSIUM CHLORIDE 40 MEQ: 1.5 POWDER, FOR SOLUTION ORAL at 21:15

## 2022-01-01 RX ADMIN — ENOXAPARIN SODIUM 40 MG: 40 INJECTION SUBCUTANEOUS at 21:36

## 2022-01-01 RX ADMIN — Medication 10 ML: at 08:28

## 2022-01-01 RX ADMIN — GLYCOPYRROLATE 0.8 MG: 0.2 INJECTION, SOLUTION INTRAMUSCULAR; INTRAVITREAL at 14:50

## 2022-01-01 RX ADMIN — FUROSEMIDE 40 MG: 10 INJECTION, SOLUTION INTRAMUSCULAR; INTRAVENOUS at 04:42

## 2022-01-01 RX ADMIN — CARVEDILOL 25 MG: 25 TABLET, FILM COATED ORAL at 08:22

## 2022-01-01 RX ADMIN — IPRATROPIUM BROMIDE AND ALBUTEROL SULFATE 3 ML: 2.5; .5 SOLUTION RESPIRATORY (INHALATION) at 10:50

## 2022-01-01 RX ADMIN — IPRATROPIUM BROMIDE AND ALBUTEROL SULFATE 3 ML: 2.5; .5 SOLUTION RESPIRATORY (INHALATION) at 11:05

## 2022-01-01 RX ADMIN — CARVEDILOL 25 MG: 25 TABLET, FILM COATED ORAL at 17:05

## 2022-01-01 RX ADMIN — NOREPINEPHRINE BITARTRATE 0.02 MCG/KG/MIN: 1 INJECTION, SOLUTION, CONCENTRATE INTRAVENOUS at 20:10

## 2022-01-01 RX ADMIN — PANTOPRAZOLE SODIUM 40 MG: 40 TABLET, DELAYED RELEASE ORAL at 07:47

## 2022-01-01 RX ADMIN — INSULIN ASPART 5 UNITS: 100 INJECTION, SOLUTION INTRAVENOUS; SUBCUTANEOUS at 17:05

## 2022-01-01 RX ADMIN — DOBUTAMINE 8 MCG/KG/MIN: 12.5 INJECTION, SOLUTION, CONCENTRATE INTRAVENOUS at 06:41

## 2022-01-01 RX ADMIN — POTASSIUM CHLORIDE 20 MEQ: 750 CAPSULE, EXTENDED RELEASE ORAL at 21:16

## 2022-01-01 RX ADMIN — FUROSEMIDE 40 MG: 10 INJECTION, SOLUTION INTRAVENOUS at 08:31

## 2022-01-01 RX ADMIN — ATORVASTATIN CALCIUM 40 MG: 40 TABLET, FILM COATED ORAL at 08:26

## 2022-01-01 RX ADMIN — IPRATROPIUM BROMIDE AND ALBUTEROL SULFATE 3 ML: 2.5; .5 SOLUTION RESPIRATORY (INHALATION) at 11:16

## 2022-01-01 RX ADMIN — ISOSORBIDE MONONITRATE 30 MG: 30 TABLET, EXTENDED RELEASE ORAL at 09:20

## 2022-01-01 RX ADMIN — ATORVASTATIN CALCIUM 40 MG: 40 TABLET, FILM COATED ORAL at 08:33

## 2022-01-01 RX ADMIN — ENOXAPARIN SODIUM 40 MG: 40 INJECTION SUBCUTANEOUS at 08:52

## 2022-01-01 RX ADMIN — Medication 10 ML: at 21:16

## 2022-01-01 RX ADMIN — LISINOPRIL 10 MG: 10 TABLET ORAL at 08:42

## 2022-01-01 RX ADMIN — DOBUTAMINE HYDROCHLORIDE 8 MCG/KG/MIN: 100 INJECTION INTRAVENOUS at 15:37

## 2022-01-01 RX ADMIN — LISINOPRIL 10 MG: 10 TABLET ORAL at 17:17

## 2022-01-01 RX ADMIN — ISOSORBIDE MONONITRATE 30 MG: 30 TABLET, EXTENDED RELEASE ORAL at 08:48

## 2022-01-01 RX ADMIN — SUCRALFATE 1 G: 1 TABLET ORAL at 08:38

## 2022-01-01 RX ADMIN — FUROSEMIDE 40 MG: 10 INJECTION, SOLUTION INTRAMUSCULAR; INTRAVENOUS at 17:56

## 2022-01-01 RX ADMIN — ACETAMINOPHEN 650 MG: 325 TABLET ORAL at 06:19

## 2022-01-01 RX ADMIN — POTASSIUM CHLORIDE 40 MEQ: 750 CAPSULE, EXTENDED RELEASE ORAL at 16:03

## 2022-01-01 RX ADMIN — GUAIFENESIN 600 MG: 600 TABLET, EXTENDED RELEASE ORAL at 21:16

## 2022-01-01 RX ADMIN — CEFTRIAXONE SODIUM 1 G: 1 INJECTION, POWDER, FOR SOLUTION INTRAMUSCULAR; INTRAVENOUS at 10:30

## 2022-01-01 RX ADMIN — INSULIN ASPART 2 UNITS: 100 INJECTION, SOLUTION INTRAVENOUS; SUBCUTANEOUS at 08:30

## 2022-01-01 RX ADMIN — POTASSIUM CHLORIDE 40 MEQ: 750 CAPSULE, EXTENDED RELEASE ORAL at 14:51

## 2022-01-01 RX ADMIN — GUAIFENESIN 600 MG: 600 TABLET, EXTENDED RELEASE ORAL at 21:36

## 2022-01-01 RX ADMIN — FUROSEMIDE 40 MG: 40 TABLET ORAL at 16:01

## 2022-01-01 RX ADMIN — IPRATROPIUM BROMIDE AND ALBUTEROL SULFATE 3 ML: 2.5; .5 SOLUTION RESPIRATORY (INHALATION) at 08:47

## 2022-01-01 RX ADMIN — SUCRALFATE 1 G: 1 TABLET ORAL at 15:23

## 2022-01-01 RX ADMIN — HYDROMORPHONE HYDROCHLORIDE 0.5 MG: 1 INJECTION, SOLUTION INTRAMUSCULAR; INTRAVENOUS; SUBCUTANEOUS at 10:46

## 2022-01-01 RX ADMIN — Medication 10 ML: at 20:44

## 2022-01-01 RX ADMIN — ISOSORBIDE MONONITRATE 30 MG: 30 TABLET, EXTENDED RELEASE ORAL at 08:19

## 2022-01-01 RX ADMIN — ASPIRIN 81 MG: 81 TABLET, FILM COATED ORAL at 20:15

## 2022-01-01 RX ADMIN — SODIUM CHLORIDE, POTASSIUM CHLORIDE, SODIUM LACTATE AND CALCIUM CHLORIDE 150 ML/HR: 600; 310; 30; 20 INJECTION, SOLUTION INTRAVENOUS at 15:12

## 2022-01-01 RX ADMIN — ISOSORBIDE MONONITRATE 30 MG: 30 TABLET, EXTENDED RELEASE ORAL at 07:56

## 2022-01-01 RX ADMIN — CARVEDILOL 25 MG: 25 TABLET, FILM COATED ORAL at 17:43

## 2022-01-01 RX ADMIN — ASPIRIN 81 MG: 81 TABLET, FILM COATED ORAL at 21:36

## 2022-01-01 RX ADMIN — IPRATROPIUM BROMIDE AND ALBUTEROL SULFATE 3 ML: .5; 3 SOLUTION RESPIRATORY (INHALATION) at 07:30

## 2022-01-01 RX ADMIN — MORPHINE SULFATE 2 MG: 2 INJECTION, SOLUTION INTRAMUSCULAR; INTRAVENOUS at 11:22

## 2022-01-01 RX ADMIN — FUROSEMIDE 80 MG: 10 INJECTION, SOLUTION INTRAMUSCULAR; INTRAVENOUS at 14:17

## 2022-01-01 RX ADMIN — POTASSIUM CHLORIDE 40 MEQ: 1.5 POWDER, FOR SOLUTION ORAL at 22:53

## 2022-01-01 RX ADMIN — ATORVASTATIN CALCIUM 40 MG: 40 TABLET, FILM COATED ORAL at 22:14

## 2022-01-01 RX ADMIN — ASPIRIN 81 MG: 81 TABLET, FILM COATED ORAL at 21:13

## 2022-01-01 RX ADMIN — POTASSIUM CHLORIDE 40 MEQ: 750 CAPSULE, EXTENDED RELEASE ORAL at 13:06

## 2022-01-01 RX ADMIN — MAGNESIUM SULFATE HEPTAHYDRATE 2 G: 40 INJECTION, SOLUTION INTRAVENOUS at 21:14

## 2022-01-01 RX ADMIN — ENOXAPARIN SODIUM 40 MG: 40 INJECTION SUBCUTANEOUS at 20:43

## 2022-01-01 RX ADMIN — INSULIN ASPART 3 UNITS: 100 INJECTION, SOLUTION INTRAVENOUS; SUBCUTANEOUS at 17:04

## 2022-01-01 RX ADMIN — POTASSIUM CHLORIDE 20 MEQ: 750 CAPSULE, EXTENDED RELEASE ORAL at 20:46

## 2022-01-01 RX ADMIN — CARVEDILOL 25 MG: 25 TABLET, FILM COATED ORAL at 17:54

## 2022-01-01 RX ADMIN — IPRATROPIUM BROMIDE AND ALBUTEROL SULFATE 3 ML: 2.5; .5 SOLUTION RESPIRATORY (INHALATION) at 06:44

## 2022-01-01 RX ADMIN — ENOXAPARIN SODIUM 40 MG: 40 INJECTION SUBCUTANEOUS at 21:15

## 2022-01-01 RX ADMIN — ROCURONIUM BROMIDE 10 MG: 10 INJECTION INTRAVENOUS at 14:13

## 2022-01-01 RX ADMIN — FUROSEMIDE 40 MG: 10 INJECTION, SOLUTION INTRAMUSCULAR; INTRAVENOUS at 16:53

## 2022-01-01 RX ADMIN — Medication 400 MG: at 09:01

## 2022-01-01 RX ADMIN — PANTOPRAZOLE SODIUM 40 MG: 40 TABLET, DELAYED RELEASE ORAL at 08:19

## 2022-01-01 RX ADMIN — ASPIRIN 81 MG: 81 TABLET, FILM COATED ORAL at 20:55

## 2022-01-01 RX ADMIN — FUROSEMIDE 40 MG: 40 TABLET ORAL at 22:35

## 2022-01-01 RX ADMIN — POTASSIUM CHLORIDE 20 MEQ: 750 CAPSULE, EXTENDED RELEASE ORAL at 08:28

## 2022-01-01 RX ADMIN — FAMOTIDINE 40 MG: 40 TABLET, FILM COATED ORAL at 08:26

## 2022-01-01 RX ADMIN — ISOSORBIDE MONONITRATE 30 MG: 30 TABLET, EXTENDED RELEASE ORAL at 11:30

## 2022-01-01 RX ADMIN — ENOXAPARIN SODIUM 40 MG: 40 INJECTION SUBCUTANEOUS at 08:18

## 2022-01-01 RX ADMIN — CARVEDILOL 25 MG: 25 TABLET, FILM COATED ORAL at 08:33

## 2022-01-01 RX ADMIN — Medication 10 ML: at 08:09

## 2022-01-01 RX ADMIN — ISOSORBIDE MONONITRATE 30 MG: 30 TABLET, EXTENDED RELEASE ORAL at 08:10

## 2022-01-01 RX ADMIN — TRAMADOL HYDROCHLORIDE 50 MG: 50 TABLET, COATED ORAL at 02:26

## 2022-01-01 RX ADMIN — Medication 10 ML: at 20:24

## 2022-01-01 RX ADMIN — INSULIN DETEMIR 30 UNITS: 100 INJECTION, SOLUTION SUBCUTANEOUS at 20:06

## 2022-01-01 RX ADMIN — ATORVASTATIN CALCIUM 40 MG: 40 TABLET, FILM COATED ORAL at 08:31

## 2022-01-01 RX ADMIN — SODIUM CHLORIDE: 9 INJECTION, SOLUTION INTRAVENOUS at 13:09

## 2022-01-05 ENCOUNTER — READMISSION MANAGEMENT (OUTPATIENT)
Dept: CALL CENTER | Facility: HOSPITAL | Age: 72
End: 2022-01-05

## 2022-01-05 NOTE — OUTREACH NOTE
CHF Week 4 Survey      Responses   [facility] patient discharged from? [place]   Does the patient have one of the following disease processes/diagnoses(primary or secondary)? CHF   Week 4 attempt successful? No          Carrie Lazo LPN

## 2022-01-15 ENCOUNTER — APPOINTMENT (OUTPATIENT)
Dept: CT IMAGING | Facility: HOSPITAL | Age: 72
End: 2022-01-15

## 2022-01-15 ENCOUNTER — HOSPITAL ENCOUNTER (OUTPATIENT)
Facility: HOSPITAL | Age: 72
Setting detail: OBSERVATION
Discharge: HOME HEALTH | End: 2022-01-19
Attending: FAMILY MEDICINE | Admitting: FAMILY MEDICINE

## 2022-01-15 ENCOUNTER — APPOINTMENT (OUTPATIENT)
Dept: GENERAL RADIOLOGY | Facility: HOSPITAL | Age: 72
End: 2022-01-15

## 2022-01-15 DIAGNOSIS — J18.9 PNEUMONIA OF BOTH LUNGS DUE TO INFECTIOUS ORGANISM, UNSPECIFIED PART OF LUNG: Primary | ICD-10-CM

## 2022-01-15 DIAGNOSIS — R07.9 CHEST PAIN, UNSPECIFIED TYPE: ICD-10-CM

## 2022-01-15 LAB
ALBUMIN SERPL-MCNC: 4.1 G/DL (ref 3.5–5.2)
ALBUMIN/GLOB SERPL: 1.5 G/DL
ALP SERPL-CCNC: 132 U/L (ref 39–117)
ALT SERPL W P-5'-P-CCNC: 59 U/L (ref 1–33)
ANION GAP SERPL CALCULATED.3IONS-SCNC: 13 MMOL/L (ref 5–15)
AST SERPL-CCNC: 55 U/L (ref 1–32)
BASOPHILS # BLD AUTO: 0.05 10*3/MM3 (ref 0–0.2)
BASOPHILS NFR BLD AUTO: 0.4 % (ref 0–1.5)
BILIRUB SERPL-MCNC: 0.5 MG/DL (ref 0–1.2)
BUN SERPL-MCNC: 18 MG/DL (ref 8–23)
BUN/CREAT SERPL: 15.8 (ref 7–25)
CALCIUM SPEC-SCNC: 9.7 MG/DL (ref 8.6–10.5)
CHLORIDE SERPL-SCNC: 106 MMOL/L (ref 98–107)
CO2 SERPL-SCNC: 22 MMOL/L (ref 22–29)
CREAT SERPL-MCNC: 1.14 MG/DL (ref 0.57–1)
D-DIMER, QUANTITATIVE (MAD,POW, STR): 1067 NG/ML (FEU) (ref 0–470)
D-LACTATE SERPL-SCNC: 0.9 MMOL/L (ref 0.5–2)
DEPRECATED RDW RBC AUTO: 50.8 FL (ref 37–54)
EOSINOPHIL # BLD AUTO: 0.14 10*3/MM3 (ref 0–0.4)
EOSINOPHIL NFR BLD AUTO: 1.1 % (ref 0.3–6.2)
ERYTHROCYTE [DISTWIDTH] IN BLOOD BY AUTOMATED COUNT: 15.8 % (ref 12.3–15.4)
FLUAV RNA RESP QL NAA+PROBE: NOT DETECTED
FLUBV RNA RESP QL NAA+PROBE: NOT DETECTED
GFR SERPL CREATININE-BSD FRML MDRD: 57 ML/MIN/1.73
GLOBULIN UR ELPH-MCNC: 2.8 GM/DL
GLUCOSE BLDC GLUCOMTR-MCNC: 85 MG/DL (ref 70–130)
GLUCOSE BLDC GLUCOMTR-MCNC: 97 MG/DL (ref 70–130)
GLUCOSE SERPL-MCNC: 129 MG/DL (ref 65–99)
HCT VFR BLD AUTO: 36.1 % (ref 34–46.6)
HGB BLD-MCNC: 11.4 G/DL (ref 12–15.9)
HOLD SPECIMEN: NORMAL
IMM GRANULOCYTES # BLD AUTO: 0.06 10*3/MM3 (ref 0–0.05)
IMM GRANULOCYTES NFR BLD AUTO: 0.5 % (ref 0–0.5)
INR PPP: 1.09 (ref 0.8–1.2)
LYMPHOCYTES # BLD AUTO: 2.79 10*3/MM3 (ref 0.7–3.1)
LYMPHOCYTES NFR BLD AUTO: 22.4 % (ref 19.6–45.3)
MCH RBC QN AUTO: 27.9 PG (ref 26.6–33)
MCHC RBC AUTO-ENTMCNC: 31.6 G/DL (ref 31.5–35.7)
MCV RBC AUTO: 88.5 FL (ref 79–97)
MONOCYTES # BLD AUTO: 0.95 10*3/MM3 (ref 0.1–0.9)
MONOCYTES NFR BLD AUTO: 7.6 % (ref 5–12)
NEUTROPHILS NFR BLD AUTO: 68 % (ref 42.7–76)
NEUTROPHILS NFR BLD AUTO: 8.48 10*3/MM3 (ref 1.7–7)
NRBC BLD AUTO-RTO: 0 /100 WBC (ref 0–0.2)
NT-PROBNP SERPL-MCNC: 916.6 PG/ML (ref 0–900)
PLATELET # BLD AUTO: 327 10*3/MM3 (ref 140–450)
PMV BLD AUTO: 9.8 FL (ref 6–12)
POTASSIUM SERPL-SCNC: 4.5 MMOL/L (ref 3.5–5.2)
PROT SERPL-MCNC: 6.9 G/DL (ref 6–8.5)
PROTHROMBIN TIME: 14 SECONDS (ref 11.1–15.3)
RBC # BLD AUTO: 4.08 10*6/MM3 (ref 3.77–5.28)
SARS-COV-2 RNA RESP QL NAA+PROBE: NOT DETECTED
SODIUM SERPL-SCNC: 141 MMOL/L (ref 136–145)
TROPONIN T SERPL-MCNC: <0.01 NG/ML (ref 0–0.03)
WBC NRBC COR # BLD: 12.47 10*3/MM3 (ref 3.4–10.8)
WHOLE BLOOD HOLD SPECIMEN: NORMAL
WHOLE BLOOD HOLD SPECIMEN: NORMAL

## 2022-01-15 PROCEDURE — 25010000002 MORPHINE PER 10 MG: Performed by: FAMILY MEDICINE

## 2022-01-15 PROCEDURE — G0378 HOSPITAL OBSERVATION PER HR: HCPCS

## 2022-01-15 PROCEDURE — 25010000002 HEPARIN (PORCINE) PER 1000 UNITS: Performed by: NURSE PRACTITIONER

## 2022-01-15 PROCEDURE — 71275 CT ANGIOGRAPHY CHEST: CPT

## 2022-01-15 PROCEDURE — 71045 X-RAY EXAM CHEST 1 VIEW: CPT

## 2022-01-15 PROCEDURE — 94799 UNLISTED PULMONARY SVC/PX: CPT

## 2022-01-15 PROCEDURE — 85379 FIBRIN DEGRADATION QUANT: CPT | Performed by: PHYSICIAN ASSISTANT

## 2022-01-15 PROCEDURE — 94760 N-INVAS EAR/PLS OXIMETRY 1: CPT

## 2022-01-15 PROCEDURE — 96372 THER/PROPH/DIAG INJ SC/IM: CPT

## 2022-01-15 PROCEDURE — 96365 THER/PROPH/DIAG IV INF INIT: CPT

## 2022-01-15 PROCEDURE — 99285 EMERGENCY DEPT VISIT HI MDM: CPT

## 2022-01-15 PROCEDURE — 25010000002 CEFTRIAXONE PER 250 MG: Performed by: PHYSICIAN ASSISTANT

## 2022-01-15 PROCEDURE — 96375 TX/PRO/DX INJ NEW DRUG ADDON: CPT

## 2022-01-15 PROCEDURE — 85025 COMPLETE CBC W/AUTO DIFF WBC: CPT | Performed by: PHYSICIAN ASSISTANT

## 2022-01-15 PROCEDURE — 82962 GLUCOSE BLOOD TEST: CPT

## 2022-01-15 PROCEDURE — 83605 ASSAY OF LACTIC ACID: CPT | Performed by: PHYSICIAN ASSISTANT

## 2022-01-15 PROCEDURE — 87636 SARSCOV2 & INF A&B AMP PRB: CPT

## 2022-01-15 PROCEDURE — 93005 ELECTROCARDIOGRAM TRACING: CPT

## 2022-01-15 PROCEDURE — C9803 HOPD COVID-19 SPEC COLLECT: HCPCS

## 2022-01-15 PROCEDURE — 84484 ASSAY OF TROPONIN QUANT: CPT | Performed by: PHYSICIAN ASSISTANT

## 2022-01-15 PROCEDURE — 87040 BLOOD CULTURE FOR BACTERIA: CPT | Performed by: PHYSICIAN ASSISTANT

## 2022-01-15 PROCEDURE — 99284 EMERGENCY DEPT VISIT MOD MDM: CPT

## 2022-01-15 PROCEDURE — 80053 COMPREHEN METABOLIC PANEL: CPT | Performed by: PHYSICIAN ASSISTANT

## 2022-01-15 PROCEDURE — 85610 PROTHROMBIN TIME: CPT | Performed by: PHYSICIAN ASSISTANT

## 2022-01-15 PROCEDURE — 36415 COLL VENOUS BLD VENIPUNCTURE: CPT

## 2022-01-15 PROCEDURE — 25010000002 CEFTRIAXONE PER 250 MG: Performed by: NURSE PRACTITIONER

## 2022-01-15 PROCEDURE — 0 IOPAMIDOL PER 1 ML: Performed by: FAMILY MEDICINE

## 2022-01-15 PROCEDURE — 36415 COLL VENOUS BLD VENIPUNCTURE: CPT | Performed by: PHYSICIAN ASSISTANT

## 2022-01-15 PROCEDURE — 93010 ELECTROCARDIOGRAM REPORT: CPT | Performed by: INTERNAL MEDICINE

## 2022-01-15 PROCEDURE — 83880 ASSAY OF NATRIURETIC PEPTIDE: CPT | Performed by: PHYSICIAN ASSISTANT

## 2022-01-15 PROCEDURE — 96374 THER/PROPH/DIAG INJ IV PUSH: CPT

## 2022-01-15 PROCEDURE — 93005 ELECTROCARDIOGRAM TRACING: CPT | Performed by: FAMILY MEDICINE

## 2022-01-15 PROCEDURE — 93005 ELECTROCARDIOGRAM TRACING: CPT | Performed by: PHYSICIAN ASSISTANT

## 2022-01-15 PROCEDURE — 94640 AIRWAY INHALATION TREATMENT: CPT

## 2022-01-15 RX ORDER — NICOTINE POLACRILEX 4 MG
15 LOZENGE BUCCAL
Status: DISCONTINUED | OUTPATIENT
Start: 2022-01-15 | End: 2022-01-19 | Stop reason: HOSPADM

## 2022-01-15 RX ORDER — ONDANSETRON 4 MG/1
4 TABLET, FILM COATED ORAL EVERY 6 HOURS PRN
Status: DISCONTINUED | OUTPATIENT
Start: 2022-01-15 | End: 2022-01-19 | Stop reason: HOSPADM

## 2022-01-15 RX ORDER — ONDANSETRON 2 MG/ML
4 INJECTION INTRAMUSCULAR; INTRAVENOUS EVERY 6 HOURS PRN
Status: DISCONTINUED | OUTPATIENT
Start: 2022-01-15 | End: 2022-01-19 | Stop reason: HOSPADM

## 2022-01-15 RX ORDER — SODIUM CHLORIDE 0.9 % (FLUSH) 0.9 %
10 SYRINGE (ML) INJECTION EVERY 12 HOURS SCHEDULED
Status: DISCONTINUED | OUTPATIENT
Start: 2022-01-15 | End: 2022-01-19 | Stop reason: HOSPADM

## 2022-01-15 RX ORDER — ACETAMINOPHEN 650 MG/1
650 SUPPOSITORY RECTAL EVERY 4 HOURS PRN
Status: DISCONTINUED | OUTPATIENT
Start: 2022-01-15 | End: 2022-01-19 | Stop reason: HOSPADM

## 2022-01-15 RX ORDER — POLYETHYLENE GLYCOL 3350 17 G/17G
17 POWDER, FOR SOLUTION ORAL DAILY PRN
Status: DISCONTINUED | OUTPATIENT
Start: 2022-01-15 | End: 2022-01-19 | Stop reason: HOSPADM

## 2022-01-15 RX ORDER — HEPARIN SODIUM 5000 [USP'U]/ML
5000 INJECTION, SOLUTION INTRAVENOUS; SUBCUTANEOUS EVERY 8 HOURS SCHEDULED
Status: DISCONTINUED | OUTPATIENT
Start: 2022-01-15 | End: 2022-01-19 | Stop reason: HOSPADM

## 2022-01-15 RX ORDER — DEXTROSE MONOHYDRATE 25 G/50ML
25 INJECTION, SOLUTION INTRAVENOUS
Status: DISCONTINUED | OUTPATIENT
Start: 2022-01-15 | End: 2022-01-19 | Stop reason: HOSPADM

## 2022-01-15 RX ORDER — BISACODYL 5 MG/1
5 TABLET, DELAYED RELEASE ORAL DAILY PRN
Status: DISCONTINUED | OUTPATIENT
Start: 2022-01-15 | End: 2022-01-19 | Stop reason: HOSPADM

## 2022-01-15 RX ORDER — SODIUM CHLORIDE 0.9 % (FLUSH) 0.9 %
10 SYRINGE (ML) INJECTION AS NEEDED
Status: DISCONTINUED | OUTPATIENT
Start: 2022-01-15 | End: 2022-01-19 | Stop reason: HOSPADM

## 2022-01-15 RX ORDER — ASPIRIN 81 MG/1
324 TABLET, CHEWABLE ORAL ONCE
Status: COMPLETED | OUTPATIENT
Start: 2022-01-15 | End: 2022-01-15

## 2022-01-15 RX ORDER — ACETAMINOPHEN 325 MG/1
650 TABLET ORAL EVERY 4 HOURS PRN
Status: DISCONTINUED | OUTPATIENT
Start: 2022-01-15 | End: 2022-01-19 | Stop reason: HOSPADM

## 2022-01-15 RX ORDER — IPRATROPIUM BROMIDE AND ALBUTEROL SULFATE 2.5; .5 MG/3ML; MG/3ML
3 SOLUTION RESPIRATORY (INHALATION)
Status: DISCONTINUED | OUTPATIENT
Start: 2022-01-15 | End: 2022-01-19 | Stop reason: HOSPADM

## 2022-01-15 RX ORDER — AMOXICILLIN 250 MG
2 CAPSULE ORAL 2 TIMES DAILY PRN
Status: DISCONTINUED | OUTPATIENT
Start: 2022-01-15 | End: 2022-01-19 | Stop reason: HOSPADM

## 2022-01-15 RX ORDER — BISACODYL 10 MG
10 SUPPOSITORY, RECTAL RECTAL DAILY PRN
Status: DISCONTINUED | OUTPATIENT
Start: 2022-01-15 | End: 2022-01-19 | Stop reason: HOSPADM

## 2022-01-15 RX ORDER — LANOLIN ALCOHOL/MO/W.PET/CERES
5 CREAM (GRAM) TOPICAL NIGHTLY PRN
Status: DISCONTINUED | OUTPATIENT
Start: 2022-01-15 | End: 2022-01-19 | Stop reason: HOSPADM

## 2022-01-15 RX ORDER — ACETAMINOPHEN 160 MG/5ML
650 SOLUTION ORAL EVERY 4 HOURS PRN
Status: DISCONTINUED | OUTPATIENT
Start: 2022-01-15 | End: 2022-01-19 | Stop reason: HOSPADM

## 2022-01-15 RX ADMIN — IPRATROPIUM BROMIDE AND ALBUTEROL SULFATE 3 ML: 2.5; .5 SOLUTION RESPIRATORY (INHALATION) at 23:13

## 2022-01-15 RX ADMIN — IPRATROPIUM BROMIDE AND ALBUTEROL SULFATE 3 ML: 2.5; .5 SOLUTION RESPIRATORY (INHALATION) at 20:11

## 2022-01-15 RX ADMIN — SODIUM CHLORIDE, PRESERVATIVE FREE 10 ML: 5 INJECTION INTRAVENOUS at 21:00

## 2022-01-15 RX ADMIN — HEPARIN SODIUM 5000 UNITS: 5000 INJECTION INTRAVENOUS; SUBCUTANEOUS at 21:37

## 2022-01-15 RX ADMIN — IOPAMIDOL 69 ML: 755 INJECTION, SOLUTION INTRAVENOUS at 15:50

## 2022-01-15 RX ADMIN — DOXYCYCLINE 100 MG: 100 INJECTION, POWDER, LYOPHILIZED, FOR SOLUTION INTRAVENOUS at 16:55

## 2022-01-15 RX ADMIN — CEFTRIAXONE SODIUM 2 G: 2 INJECTION, POWDER, FOR SOLUTION INTRAMUSCULAR; INTRAVENOUS at 16:21

## 2022-01-15 RX ADMIN — MORPHINE SULFATE 4 MG: 4 INJECTION INTRAVENOUS at 13:15

## 2022-01-15 RX ADMIN — SODIUM CHLORIDE 1641 ML: 9 INJECTION, SOLUTION INTRAVENOUS at 15:24

## 2022-01-15 RX ADMIN — ASPIRIN 324 MG: 81 TABLET, CHEWABLE ORAL at 12:45

## 2022-01-15 RX ADMIN — CEFTRIAXONE SODIUM 1 G: 1 INJECTION, POWDER, FOR SOLUTION INTRAMUSCULAR; INTRAVENOUS at 18:38

## 2022-01-15 NOTE — ED PROVIDER NOTES
Subjective   Patient presents to emergency department for chest pain, shortness of breath starting at 5 AM this morning.  She describes chest pain as mid chest and sharp.  Aggravated by breathing, nothing alleviates.  Endorses cough starting 2 days ago.  Denies fever/chills.  History of CHF, DM, GERD, hypertension, hyperlipidemia, nonischemic cardiomyopathy.  Patient has a pacemaker.        History provided by:  Patient   used: No        Review of Systems   Constitutional: Negative for chills and fever.   HENT: Negative for sore throat and trouble swallowing.    Respiratory: Positive for cough and shortness of breath. Negative for wheezing.    Cardiovascular: Positive for chest pain. Negative for leg swelling.   Gastrointestinal: Negative for nausea and vomiting.   Genitourinary: Negative for dysuria and flank pain.   Musculoskeletal: Negative for back pain.   Skin: Negative for color change.   Allergic/Immunologic: Negative for immunocompromised state.   Neurological: Negative for syncope and weakness.   Hematological: Does not bruise/bleed easily.   Psychiatric/Behavioral: Negative for confusion.       Past Medical History:   Diagnosis Date   • Chronic systolic heart failure (HCC)    • Diabetes mellitus (HCC)    • Diabetic neuropathy (HCC)    • GERD (gastroesophageal reflux disease)    • Hypercholesterolemia    • Hypertension    • Low back pain    • Nonischemic cardiomyopathy (HCC)    • Obesity    • Sleep apnea    • Vitamin D deficiency        Allergies   Allergen Reactions   • Aldactone [Spironolactone] Unknown - Low Severity   • Nsaids        Past Surgical History:   Procedure Laterality Date   • CARDIAC CATHETERIZATION     • CARDIAC CATHETERIZATION N/A 8/23/2018   • CARDIAC ELECTROPHYSIOLOGY PROCEDURE N/A 6/22/2020   • CARDIAC PACEMAKER PLACEMENT     • CATARACT EXTRACTION     • CATARACT EXTRACTION     • COLONOSCOPY N/A 6/14/2017   • PACEMAKER IMPLANTATION     • TOTAL ABDOMINAL HYSTERECTOMY  "WITH SALPINGO OOPHORECTOMY         Family History   Problem Relation Age of Onset   • Diabetes Sister    • Bone cancer Brother        Social History     Socioeconomic History   • Marital status: Single   Tobacco Use   • Smoking status: Former Smoker   • Smokeless tobacco: Never Used   Substance and Sexual Activity   • Alcohol use: No   • Drug use: No   • Sexual activity: Not Currently           Objective      /79   Pulse 86   Temp 98.7 °F (37.1 °C) (Oral)   Resp 16   Ht 162.6 cm (64\")   Wt 101 kg (222 lb 12.8 oz)   SpO2 100%   BMI 38.24 kg/m²     Physical Exam  Vitals and nursing note reviewed.   Constitutional:       General: She is not in acute distress.     Appearance: She is well-developed. She is obese. She is ill-appearing.   HENT:      Head: Normocephalic.   Eyes:      Conjunctiva/sclera: Conjunctivae normal.   Cardiovascular:      Rate and Rhythm: Regular rhythm.   Pulmonary:      Effort: Pulmonary effort is normal. No respiratory distress.      Breath sounds: Normal breath sounds. No wheezing.   Skin:     General: Skin is warm.      Capillary Refill: Capillary refill takes less than 2 seconds.   Neurological:      Mental Status: She is alert and oriented to person, place, and time. Mental status is at baseline.   Psychiatric:         Mood and Affect: Mood normal.         Behavior: Behavior normal.         Thought Content: Thought content normal.         ECG 12 Lead      Date/Time: 1/15/2022 1:23 PM  Performed by: Channing Espitia PA-C  Authorized by: Channing Espitia PA-C   Interpreted by physician  Comparison: compared with previous ECG from 11/28/2021  Similar to previous ECG  Rhythm: sinus rhythm  Rate: normal  BPM: 93  Clinical impression: non-specific ECG                 ED Course      Results for orders placed or performed during the hospital encounter of 01/15/22   COVID-19 and FLU A/B PCR - Swab, Nasopharynx    Specimen: Nasopharynx; Swab   Result Value Ref Range    COVID19 " Not Detected Not Detected - Ref. Range    Influenza A PCR Not Detected Not Detected    Influenza B PCR Not Detected Not Detected   Troponin    Specimen: Blood   Result Value Ref Range    Troponin T <0.010 0.000 - 0.030 ng/mL   Comprehensive Metabolic Panel    Specimen: Blood   Result Value Ref Range    Glucose 129 (H) 65 - 99 mg/dL    BUN 18 8 - 23 mg/dL    Creatinine 1.14 (H) 0.57 - 1.00 mg/dL    Sodium 141 136 - 145 mmol/L    Potassium 4.5 3.5 - 5.2 mmol/L    Chloride 106 98 - 107 mmol/L    CO2 22.0 22.0 - 29.0 mmol/L    Calcium 9.7 8.6 - 10.5 mg/dL    Total Protein 6.9 6.0 - 8.5 g/dL    Albumin 4.10 3.50 - 5.20 g/dL    ALT (SGPT) 59 (H) 1 - 33 U/L    AST (SGOT) 55 (H) 1 - 32 U/L    Alkaline Phosphatase 132 (H) 39 - 117 U/L    Total Bilirubin 0.5 0.0 - 1.2 mg/dL    eGFR  African Amer 57 (L) >60 mL/min/1.73    Globulin 2.8 gm/dL    A/G Ratio 1.5 g/dL    BUN/Creatinine Ratio 15.8 7.0 - 25.0    Anion Gap 13.0 5.0 - 15.0 mmol/L   BNP    Specimen: Blood   Result Value Ref Range    proBNP 916.6 (H) 0.0 - 900.0 pg/mL   Protime-INR    Specimen: Blood   Result Value Ref Range    Protime 14.0 11.1 - 15.3 Seconds    INR 1.09 0.80 - 1.20   CBC Auto Differential    Specimen: Blood   Result Value Ref Range    WBC 12.47 (H) 3.40 - 10.80 10*3/mm3    RBC 4.08 3.77 - 5.28 10*6/mm3    Hemoglobin 11.4 (L) 12.0 - 15.9 g/dL    Hematocrit 36.1 34.0 - 46.6 %    MCV 88.5 79.0 - 97.0 fL    MCH 27.9 26.6 - 33.0 pg    MCHC 31.6 31.5 - 35.7 g/dL    RDW 15.8 (H) 12.3 - 15.4 %    RDW-SD 50.8 37.0 - 54.0 fl    MPV 9.8 6.0 - 12.0 fL    Platelets 327 140 - 450 10*3/mm3    Neutrophil % 68.0 42.7 - 76.0 %    Lymphocyte % 22.4 19.6 - 45.3 %    Monocyte % 7.6 5.0 - 12.0 %    Eosinophil % 1.1 0.3 - 6.2 %    Basophil % 0.4 0.0 - 1.5 %    Immature Grans % 0.5 0.0 - 0.5 %    Neutrophils, Absolute 8.48 (H) 1.70 - 7.00 10*3/mm3    Lymphocytes, Absolute 2.79 0.70 - 3.10 10*3/mm3    Monocytes, Absolute 0.95 (H) 0.10 - 0.90 10*3/mm3    Eosinophils, Absolute  0.14 0.00 - 0.40 10*3/mm3    Basophils, Absolute 0.05 0.00 - 0.20 10*3/mm3    Immature Grans, Absolute 0.06 (H) 0.00 - 0.05 10*3/mm3    nRBC 0.0 0.0 - 0.2 /100 WBC   D-dimer, Quantitative    Specimen: Blood   Result Value Ref Range    D-Dimer, Quantitative 1,067 (H) 0 - 470 ng/mL (FEU)   Lactic Acid, Plasma    Specimen: Blood   Result Value Ref Range    Lactate 0.9 0.5 - 2.0 mmol/L   Green Top (Gel)   Result Value Ref Range    Extra Tube Hold for add-ons.    Lavender Top   Result Value Ref Range    Extra Tube hold for add-on    Gold Top - SST   Result Value Ref Range    Extra Tube Hold for add-ons.    Light Blue Top   Result Value Ref Range    Extra Tube hold for add-on           XR Chest 1 View    Result Date: 1/15/2022  Narrative: PROCEDURE: XR CHEST 1 VW VIEWS:Single INDICATION: Chest pain COMPARISON: CXR: 11/28/2021 FINDINGS:   - lines/tubes: None. Left-sided pacemaker/AICD in stable position   - cardiac: Mild stable cardiomegaly.   - mediastinum: Contour unchanged from prior.   - lungs: Ill-defined bilateral opacities, right greater than left, with perihilar predominance.   - pleura: No evidence of  fluid.    - osseous: Unremarkable for age.       Impression: Ill-defined prominently perihilar opacity, which could represent infectious process or more likely, pulmonary edema and clinical correlation needed Electronically signed by:  Joyce Joyce MD  1/15/2022 1:42 PM CST Workstation: [id number]    CT Angiogram Chest    Result Date: 1/15/2022  Narrative: EXAM: CT CHEST ANGIOGRAPHY WITH IV CONTRAST ORDERING PROVIDER: JESUS RAMOS CLINICAL HISTORY: Shortness of breath COMPARISON: TECHNIQUE: Chest CT was performed using a high resolution pulmonary angiogram protocol with 69ml of [id number]-[id number] as IV contrast and reformatted in the sagittal and coronal planes. 3-dimensional images also acquired with special processing of the CT scan data with a specialized workstation for evaluation. This examination was performed  according to our departmental dose optimization program which includes automated exposure control, adjustment of the MA and kV according to patient size, and/or use of iterative reconstruction technique. FINDINGS: LUNGS AND PLEURA: Bilateral small pleural effusion, more on the right side. Bilateral diffuse airspace process, concerning for multifocal pneumonia. No pneumothorax. No evidence of gross endotracheal or endobronchial lesion. HEART: Prominence in size and configuration. Small amount of pericardial effusion. MEDIASTINUM AND RACHNA: No significant adenopathy. AORTA AND GREAT VESSELS: No aneurysm or dissection. PULMONARY ARTERIES: No filling defects. UPPER ABDOMEN: Unremarkable. MUSCULOSKELETAL: Left pacemaker in place.  No aggressive osseous lesion.. Unremarkable vertebral body height and alignment. No lytic or sclerotic lesion. EXTRATHORACIC SOFT TISSUES: No axillary adenopathy. No mass. Unremarkable supraclavicular soft tissues.     Impression: 1.  No evidence of pulmonary embolism. 2.  Bilateral small pleural effusion. 3.  Multifocal pneumonia. 4.  Small pericardial effusion. Electronically signed by:  Reggie Monae MD  1/15/2022 4:32 PM CST Workstation: [id number]                                          [facility]    Final diagnoses:   Pneumonia of both lungs due to infectious organism, unspecified part of lung   Chest pain, unspecified type       ED Disposition  ED Disposition     ED Disposition Condition Comment    Decision to Admit  Level of Care: Telemetry [5]   Diagnosis: Pneumonia of both lungs due to infectious organism, unspecified part of lung [[id number]]   Admitting Physician: TOBY VELASQUEZ [[id number]]   Attending Physician: TOBY VELASQUEZ [[id number]]            No follow-up provider specified.       Medication List      No changes were made to your prescriptions during this visit.          Channing Espitia PA-C  01/15/22 [id number]

## 2022-01-15 NOTE — ED NOTES
Pt is a hard stick and attempts x 3 RN's at this time. Pt asked to be placed on 02 , her 02 sat was 94% but she does have audible wheezes. Pt placed on 2 liters of 02 per Tila Melvin RN  01/15/22 1151

## 2022-01-15 NOTE — H&P
[facility] Admission      Date of Admission: 1/15/2022      Primary Care Physician: Joyce Plata MD      Chief Complaint: cough    HPI: 71 year old female with past medical history of DM, chronic systolic CHF, GERD, HTN, HLD, DOROTHY, obesity with BMI 38.24 who presented on 1/15/22 with cough, fatigue, shortness of breath x 2 days.  She also reports feeling chilled, but otherwise denies complaints such as fever, nausea, vomiting, diarrhea, sick contacts, chest pain.  She was evaluated in ED and found to have multifocal pneumonia and will be admitted for further care.     Concurrent Medical History:  has a past medical history of Chronic systolic heart failure (HCC), Diabetes mellitus (HCC), Diabetic neuropathy (HCC), GERD (gastroesophageal reflux disease), Hypercholesterolemia, Hypertension, Low back pain, Nonischemic cardiomyopathy (HCC), Obesity, Sleep apnea, and Vitamin D deficiency.    Past Surgical History:  has a past surgical history that includes Cardiac catheterization; Cataract Extraction; Cataract Extraction; Total abdominal hysterectomy w/ bilateral salpingoophorectomy; Colonoscopy (N/A, 6/14/2017); Pacemaker Implantation; Cardiac catheterization (N/A, 8/23/2018); Cardiac pacemaker placement; and Cardiac electrophysiology procedure (N/A, 6/22/2020).    Family History: family history includes Bone cancer in her brother; Diabetes in her sister.    Social History:  reports that she has quit smoking. She has never used smokeless tobacco. She reports that she does not drink alcohol and does not use drugs.    Allergies:   Allergies   Allergen Reactions   • Aldactone [Spironolactone] Unknown - Low Severity   • Nsaids        Medications:   Prior to Admission medications    Medication Sig Start Date End Date Taking? Authorizing Provider   albuterol sulfate  (90 Base) MCG/ACT inhaler INHALE 2 PUFFS EVERY 4 (FOUR) HOURS AS NEEDED FOR WHEEZING OR  "SHORTNESS OF AIR. 9/2/21   Joyce Plata MD   aspirin (aspirin) 81 MG EC tablet Take 81 mg by mouth Every Night. 1/1/21   ProviderHeidi MD   atorvastatin (LIPITOR) 40 MG tablet Take 1 tablet by mouth Daily. 12/16/21   Joyce Plata MD   B-D UF III MINI PEN NEEDLES 31G X 5 MM misc USE WITH INSULIN INJECTIONS NIGHTLY 12/6/21   Joyce Plata MD   carvedilol (COREG) 25 MG tablet TAKE 1 TABLET BY MOUTH TWICE A DAY WITH MEALS 6/17/21   Joyce Plata MD   cefuroxime (CEFTIN) 500 MG tablet Take 1 tablet by mouth 2 (Two) Times a Day. 12/8/21   Joyce Plata MD   diclofenac (VOLTAREN) 1 % gel gel APPLY TO AFFECTED AREA 4 TIMES A DAY Oral Volteran DC'D) 7/21/20   Joyce Plata MD   furosemide (Lasix) 40 MG tablet Take 1 tablet by mouth 2 (Two) Times a Day. 12/28/21   Joyce Plata MD   guaiFENesin (MUCINEX) 600 MG 12 hr tablet Take 1 tablet by mouth Every 12 (Twelve) Hours. 12/1/21   Justin Wheeler MD   insulin detemir (Levemir FlexTouch) 100 UNIT/ML injection Inject 30 Units under the skin into the appropriate area as directed Every Night. 12/3/21   Joyce Plata MD   Insulin Syringe 31G X 5/16\" 0.5 ML misc Inject 1 each under the skin into the appropriate area as directed Every Night. 12/2/21   Justin Wheeler MD   ipratropium-albuterol (DUO-NEB) 0.5-2.5 mg/3 ml nebulizer Take 3 mL by nebulization 4 (Four) Times a Day. 8/24/18   Kian Bruner, PA   isosorbide mononitrate (IMDUR) 30 MG 24 hr tablet Take 1 tablet by mouth Daily. 12/16/21   Joyce Plata MD   lisinopril (PRINIVIL,ZESTRIL) 10 MG tablet TAKE 1 TABLET BY MOUTH EVERY DAY 3/22/21   Joyce Plata MD   magnesium oxide (MAGOX) 400 (241.3 Mg) MG tablet tablet Take 1 tablet by mouth Daily. 10/6/17   Joyce Plata MD   metFORMIN (GLUCOPHAGE) 1000 MG tablet Take 1 tablet by mouth 2 (Two) Times a Day With Meals. 10/13/20   Joyce Plata MD   nitroglycerin (NITROSTAT) 0.4 MG SL tablet Place 1 tablet " under the tongue Every 5 (Five) Minutes As Needed for Chest Pain. Take no more than 3 doses in 15 minutes. 7/5/18   Joyce Plata MD   pantoprazole (PROTONIX) 40 MG EC tablet Take 1 tablet by mouth Daily. 3/9/20   Joyce Plata MD   potassium chloride (MICRO-K) 10 MEQ CR capsule Take 1 capsule by mouth 2 (Two) Times a Day. 12/28/21   Joyce Plata MD   vitamin D (ERGOCALCIFEROL) 40762 units capsule capsule Take 1 capsule by mouth 1 (One) Time Per Week.  Patient taking differently: Take 50,000 Units by mouth 1 (One) Time Per Week. Take 1 capsule by mouth weekly on Tuesdays 6/5/18   Joyce Plata MD       Review of Systems:  Review of Systems   Constitutional: Positive for activity change, chills and fatigue. Negative for fever.   HENT: Negative for congestion, rhinorrhea and sore throat.    Respiratory: Positive for cough and shortness of breath. Negative for chest tightness and wheezing.    Cardiovascular: Negative for chest pain, palpitations and leg swelling.   Gastrointestinal: Negative for abdominal pain, diarrhea, nausea and vomiting.   Musculoskeletal: Negative for back pain and myalgias.   Skin: Negative for pallor.   Neurological: Negative for dizziness, weakness and headaches.   Psychiatric/Behavioral: Negative for confusion. The patient is not nervous/anxious.             Physical Exam:   Temp:  [98.7 °F (37.1 °C)] 98.7 °F (37.1 °C)  Heart Rate:  [86-98] 90  Resp:  [16-22] 16  BP: (126-140)/() 127/73  Physical Exam  Vitals and nursing note reviewed.   Constitutional:       General: She is not in acute distress.     Appearance: Normal appearance. She is obese.   HENT:      Head: Normocephalic and atraumatic.      Right Ear: External ear normal.      Left Ear: External ear normal.      Nose: Nose normal.      Mouth/Throat:      Mouth: Mucous membranes are moist.      Pharynx: Oropharynx is clear.   Eyes:      General: No scleral icterus.        Right eye: No discharge.         Left  eye: No discharge.      Conjunctiva/sclera: Conjunctivae normal.   Cardiovascular:      Rate and Rhythm: Normal rate and regular rhythm.      Pulses: Normal pulses.      Heart sounds: Normal heart sounds.   Pulmonary:      Effort: Pulmonary effort is normal. No accessory muscle usage or respiratory distress.      Breath sounds: No stridor. Examination of the right-lower field reveals decreased breath sounds. Examination of the left-lower field reveals decreased breath sounds. Decreased breath sounds present. No wheezing, rhonchi or rales.   Abdominal:      General: Bowel sounds are normal. There is no distension.      Palpations: Abdomen is soft.      Tenderness: There is no abdominal tenderness.   Musculoskeletal:         General: No swelling. Normal range of motion.      Cervical back: Normal range of motion and neck supple.   Skin:     General: Skin is warm and dry.   Neurological:      General: No focal deficit present.      Mental Status: She is alert and oriented to person, place, and time.   Psychiatric:         Mood and Affect: Mood normal.         Behavior: Behavior normal.           Results Reviewed:  I have personally reviewed current lab, radiology, and data and agree with results.  Lab Results (last 24 hours)     Procedure Component Value Units Date/Time    Blood Culture - Blood, Arm, Right [[id number]] Collected: 01/15/22 1623    Specimen: Blood from Arm, Right Updated: 01/15/22 1623    Blood Culture - Blood, Arm, Left [[id number]] Collected: 01/15/22 1622    Specimen: Blood from Arm, Left Updated: 01/15/22 1623    Lactic Acid, Plasma [[id number]]  (Normal) Collected: 01/15/22 1520    Specimen: Blood Updated: 01/15/22 1541     Lactate 0.9 mmol/L     [facility] [[id number]] Collected: 01/15/22 1307    Specimen: Blood Updated: 01/15/22 1416    Narrative:      The following orders were created for panel order [facility].  Procedure                               Abnormality         Status                      ---------                               -----------         ------                     Green Top (Gel)[[id number]]                                  Final result               Lavender Top[[id number]]                                     Final result               Gold Top - SST[[id number]]                                   Final result               Light Blue Top[[id number]]                                   Final result                 Please view results for these tests on the individual orders.    Green Top (Gel) [[id number]] Collected: 01/15/22 1307    Specimen: Blood Updated: 01/15/22 1416     Extra Tube Hold for add-ons.     Comment: Auto resulted.       Gold Top - SST [[id number]] Collected: 01/15/22 1307    Specimen: Blood Updated: 01/15/22 1416     Extra Tube Hold for add-ons.     Comment: Auto resulted.       Light Blue Top [[id number]] Collected: 01/15/22 1307    Specimen: Blood Updated: 01/15/22 1416     Extra Tube hold for add-on     Comment: Auto resulted       Lavender Top [[id number]] Collected: 01/15/22 1307    Specimen: Blood Updated: 01/15/22 1416     Extra Tube hold for add-on     Comment: Auto resulted       Comprehensive Metabolic Panel [[id number]]  (Abnormal) Collected: 01/15/22 1307    Specimen: Blood Updated: 01/15/22 1337     Glucose 129 mg/dL      BUN 18 mg/dL      Creatinine 1.14 mg/dL      Sodium 141 mmol/L      Potassium 4.5 mmol/L      Chloride 106 mmol/L      CO2 22.0 mmol/L      Calcium 9.7 mg/dL      Total Protein 6.9 g/dL      Albumin 4.10 g/dL      ALT (SGPT) 59 U/L      AST (SGOT) 55 U/L      Alkaline Phosphatase 132 U/L      Total Bilirubin 0.5 mg/dL      eGFR  African Amer 57 mL/min/1.73      Globulin 2.8 gm/dL      A/G Ratio 1.5 g/dL      BUN/Creatinine Ratio 15.8     Anion Gap 13.0 mmol/L     Narrative:      GFR Normal >60  Chronic Kidney Disease <60  Kidney Failure <15      Troponin [[id number]]  (Normal) Collected: 01/15/22 1307    Specimen: Blood Updated: 01/15/22 1336      Troponin T <0.010 ng/mL     Narrative:      Troponin T Reference Range:  <= 0.03 ng/mL-   Negative for AMI  >0.03 ng/mL-     Abnormal for myocardial necrosis.  Clinicians would have to utilize clinical acumen, EKG, Troponin and serial changes to determine if it is an Acute Myocardial Infarction or myocardial injury due to an underlying chronic condition.       Results may be falsely decreased if patient taking Biotin.      D-dimer, Quantitative [[id number]]  (Abnormal) Collected: 01/15/22 1307    Specimen: Blood Updated: 01/15/22 1335     D-Dimer, Quantitative 1,067 ng/mL (FEU)     Narrative:      Dimer values <500 ng/ml FEU are FDA approved as aid in diagnosis of deep venous thrombosis and pulmonary embolism.  This test should not be used in an exclusion strategy with pretest probability alone.    A recent guideline regarding diagnosis for pulmonary thromboembolism recommends an adjusted exclusion criterion of age x 10 ng/ml FEU for patients >50 years of age ([employer] Intern Med 2015; 163: 701-711).      Protime-INR [[id number]]  (Normal) Collected: 01/15/22 1307    Specimen: Blood Updated: 01/15/22 1335     Protime 14.0 Seconds      INR 1.09    Narrative:      Therapeutic range for most indications is 2.0-3.0 INR,  or 2.5-3.5 for mechanical heart valves.    BNP [[id number]]  (Abnormal) Collected: 01/15/22 1307    Specimen: Blood Updated: 01/15/22 1334     proBNP 916.6 pg/mL     Narrative:      Among patients with dyspnea, NT-proBNP is highly sensitive for the detection of acute congestive heart failure. In addition NT-proBNP of <300 pg/ml effectively rules out acute congestive heart failure with 99% negative predictive value.    Results may be falsely decreased if patient taking Biotin.      CBC & Differential [[id number]]  (Abnormal) Collected: 01/15/22 1307    Specimen: Blood Updated: 01/15/22 1318    Narrative:      The following orders were created for panel order CBC & Differential.  Procedure                                Abnormality         Status                     ---------                               -----------         ------                     CBC Auto Differential[[id number]]        Abnormal            Final result                 Please view results for these tests on the individual orders.    CBC Auto Differential [[id number]]  (Abnormal) Collected: 01/15/22 1307    Specimen: Blood Updated: 01/15/22 1318     WBC 12.47 10*3/mm3      RBC 4.08 10*6/mm3      Hemoglobin 11.4 g/dL      Hematocrit 36.1 %      MCV 88.5 fL      MCH 27.9 pg      MCHC 31.6 g/dL      RDW 15.8 %      RDW-SD 50.8 fl      MPV 9.8 fL      Platelets 327 10*3/mm3      Neutrophil % 68.0 %      Lymphocyte % 22.4 %      Monocyte % 7.6 %      Eosinophil % 1.1 %      Basophil % 0.4 %      Immature Grans % 0.5 %      Neutrophils, Absolute 8.48 10*3/mm3      Lymphocytes, Absolute 2.79 10*3/mm3      Monocytes, Absolute 0.95 10*3/mm3      Eosinophils, Absolute 0.14 10*3/mm3      Basophils, Absolute 0.05 10*3/mm3      Immature Grans, Absolute 0.06 10*3/mm3      nRBC 0.0 /100 WBC     COVID-19 and FLU A/B PCR - Swab, Nasopharynx [[id number]]  (Normal) Collected: 01/15/22 1143    Specimen: Swab from Nasopharynx Updated: 01/15/22 1208     COVID19 Not Detected     Influenza A PCR Not Detected     Influenza B PCR Not Detected    Narrative:      Fact sheet for providers: https://www.fda.gov/media/562227/download    Fact sheet for patients: https://www.fda.gov/media/540766/download    Test performed by PCR.        Imaging Results (Last 24 Hours)     Procedure Component Value Units Date/Time    CT Angiogram Chest [[id number]] Collected: 01/15/22 1548     Updated: 01/15/22 1633    Narrative:      EXAM:  CT CHEST ANGIOGRAPHY WITH IV CONTRAST    ORDERING PROVIDER:  JESUS RAMOS    CLINICAL HISTORY:  Shortness of breath    COMPARISON:      TECHNIQUE:   Chest CT was performed using a high resolution pulmonary  angiogram protocol with 69ml of Isovue-370 as IV contrast  and  reformatted in the sagittal and coronal planes.     3-dimensional images also acquired with special processing of the  CT scan data with a specialized workstation for evaluation.    This examination was performed according to our departmental dose  optimization program which includes automated exposure control,  adjustment of the MA and kV according to patient size, and/or use  of iterative reconstruction technique.     FINDINGS:     LUNGS AND PLEURA: Bilateral small pleural effusion, more on the  right side. Bilateral diffuse airspace process, concerning for  multifocal pneumonia. No pneumothorax. No evidence of gross  endotracheal or endobronchial lesion.    HEART: Prominence in size and configuration. Small amount of  pericardial effusion.     MEDIASTINUM AND RACHNA: No significant adenopathy.     AORTA AND GREAT VESSELS: No aneurysm or dissection.     PULMONARY ARTERIES: No filling defects.     UPPER ABDOMEN: Unremarkable.    MUSCULOSKELETAL: Left pacemaker in place.  No aggressive osseous  lesion.. Unremarkable vertebral body height and alignment. No  lytic or sclerotic lesion.     EXTRATHORACIC SOFT TISSUES: No axillary adenopathy. No mass.  Unremarkable supraclavicular soft tissues.       Impression:      1.  No evidence of pulmonary embolism.  2.  Bilateral small pleural effusion.  3.  Multifocal pneumonia.  4.  Small pericardial effusion.    Electronically signed by:  Reggie Monae MD  1/15/2022 4:32 PM CST  Workstation: [id number]    XR Chest 1 View [[id number]] Collected: 01/15/22 1300     Updated: 01/15/22 1343    Narrative:      PROCEDURE: XR CHEST 1 VW    VIEWS:Single    INDICATION: Chest pain    COMPARISON: CXR: 11/28/2021    FINDINGS:       - lines/tubes: None. Left-sided pacemaker/AICD in stable  position    - cardiac: Mild stable cardiomegaly.    - mediastinum: Contour unchanged from prior.     - lungs: Ill-defined bilateral opacities, right greater than  left, with perihilar predominance.     -  pleura: No evidence of  fluid.      - osseous: Unremarkable for age.        Impression:      Ill-defined prominently perihilar opacity, which could represent  infectious process or more likely, pulmonary edema and clinical  correlation needed    Electronically signed by:  Joyce Joyce MD  1/15/2022 1:42 PM CST  Workstation: [id number]            Assessment:    Active Hospital Problems    Diagnosis    • **Multifocal pneumonia    • Obesity (BMI 30-39.9)    • Mixed hyperlipidemia    • Hypertension    • Diabetes mellitus (HCC)    • Congestive heart failure (HCC)    • GERD (gastroesophageal reflux disease)              Plan:  #1.  Multifocal pneumonia: Continue oxygen support, Rocephin, doxycycline, duo nebs, incentive spirometry.  2.  Hypertension: Continue home meds when confirmed  3.  Hyperlipidemia continue statin  4.:  Congestive heart failure: Currently euvolemic and without exacerbation.  Continue heart healthy diet, daily weights, home meds when confirmed.  5.  Type 2 diabetes: Fingerstick blood sugars before meals and at bedtime with sliding scale insulin coverage.  ADA diet.  6.  Obesity with BMI of 38.24    The orders will depend upon hospital course.  Plan of care discussed with patient.  CODE STATUS confirmed with patient and her wishes are for full CODE STATUS in the event of emergency.  Stagnates her sister, stressful as her decision-maker in the event of emergency.    I confirmed that the patient's Advance Care Plan is present, code status is documented, or surrogate decision maker is listed in the patient's medical record.      I have utilized all available immediate resources to obtain, update, or review the patient's current medications.          This document has been electronically signed by MARYANNE Gutierres on January 15, 2022 17:07 CST

## 2022-01-15 NOTE — ED NOTES
This RN attempted an IV on the patient x2 and was not successful.      Prema Strickland, RN  01/15/22 6139

## 2022-01-16 LAB
GLUCOSE BLDC GLUCOMTR-MCNC: 103 MG/DL (ref 70–130)
GLUCOSE BLDC GLUCOMTR-MCNC: 239 MG/DL (ref 70–130)
GLUCOSE BLDC GLUCOMTR-MCNC: 326 MG/DL (ref 70–130)
QT INTERVAL: 400 MS
QTC INTERVAL: 497 MS

## 2022-01-16 PROCEDURE — 96372 THER/PROPH/DIAG INJ SC/IM: CPT

## 2022-01-16 PROCEDURE — 94664 DEMO&/EVAL PT USE INHALER: CPT

## 2022-01-16 PROCEDURE — 25010000002 HEPARIN (PORCINE) PER 1000 UNITS: Performed by: NURSE PRACTITIONER

## 2022-01-16 PROCEDURE — 63710000001 INSULIN ASPART PER 5 UNITS: Performed by: NURSE PRACTITIONER

## 2022-01-16 PROCEDURE — 94799 UNLISTED PULMONARY SVC/PX: CPT

## 2022-01-16 PROCEDURE — 25010000002 CEFTRIAXONE PER 250 MG: Performed by: NURSE PRACTITIONER

## 2022-01-16 PROCEDURE — 82962 GLUCOSE BLOOD TEST: CPT

## 2022-01-16 PROCEDURE — 94760 N-INVAS EAR/PLS OXIMETRY 1: CPT

## 2022-01-16 PROCEDURE — 63710000001 PREDNISONE PER 1 MG: Performed by: NURSE PRACTITIONER

## 2022-01-16 PROCEDURE — G0378 HOSPITAL OBSERVATION PER HR: HCPCS

## 2022-01-16 PROCEDURE — 63710000001 INSULIN DETEMIR PER 5 UNITS: Performed by: NURSE PRACTITIONER

## 2022-01-16 RX ORDER — LISINOPRIL 10 MG/1
10 TABLET ORAL DAILY
Status: DISCONTINUED | OUTPATIENT
Start: 2022-01-16 | End: 2022-01-19 | Stop reason: HOSPADM

## 2022-01-16 RX ORDER — ASPIRIN 81 MG/1
81 TABLET ORAL NIGHTLY
Status: DISCONTINUED | OUTPATIENT
Start: 2022-01-16 | End: 2022-01-19 | Stop reason: HOSPADM

## 2022-01-16 RX ORDER — ISOSORBIDE MONONITRATE 30 MG/1
30 TABLET, EXTENDED RELEASE ORAL DAILY
Status: DISCONTINUED | OUTPATIENT
Start: 2022-01-16 | End: 2022-01-19 | Stop reason: HOSPADM

## 2022-01-16 RX ORDER — GUAIFENESIN 600 MG/1
600 TABLET, EXTENDED RELEASE ORAL EVERY 12 HOURS SCHEDULED
Status: DISCONTINUED | OUTPATIENT
Start: 2022-01-16 | End: 2022-01-19 | Stop reason: HOSPADM

## 2022-01-16 RX ORDER — PANTOPRAZOLE SODIUM 40 MG/1
40 TABLET, DELAYED RELEASE ORAL DAILY
Status: DISCONTINUED | OUTPATIENT
Start: 2022-01-16 | End: 2022-01-19 | Stop reason: HOSPADM

## 2022-01-16 RX ORDER — PREDNISONE 20 MG/1
40 TABLET ORAL DAILY
Status: DISCONTINUED | OUTPATIENT
Start: 2022-01-16 | End: 2022-01-19 | Stop reason: HOSPADM

## 2022-01-16 RX ORDER — ATORVASTATIN CALCIUM 40 MG/1
40 TABLET, FILM COATED ORAL NIGHTLY
Status: DISCONTINUED | OUTPATIENT
Start: 2022-01-16 | End: 2022-01-19 | Stop reason: HOSPADM

## 2022-01-16 RX ORDER — FUROSEMIDE 40 MG/1
40 TABLET ORAL
Status: DISCONTINUED | OUTPATIENT
Start: 2022-01-16 | End: 2022-01-19 | Stop reason: HOSPADM

## 2022-01-16 RX ORDER — POTASSIUM CHLORIDE 750 MG/1
10 CAPSULE, EXTENDED RELEASE ORAL 2 TIMES DAILY
Status: DISCONTINUED | OUTPATIENT
Start: 2022-01-16 | End: 2022-01-19 | Stop reason: HOSPADM

## 2022-01-16 RX ORDER — CARVEDILOL 25 MG/1
25 TABLET ORAL 2 TIMES DAILY WITH MEALS
Status: DISCONTINUED | OUTPATIENT
Start: 2022-01-16 | End: 2022-01-16

## 2022-01-16 RX ADMIN — INSULIN ASPART 7 UNITS: 100 INJECTION, SOLUTION INTRAVENOUS; SUBCUTANEOUS at 17:40

## 2022-01-16 RX ADMIN — INSULIN ASPART 4 UNITS: 100 INJECTION, SOLUTION INTRAVENOUS; SUBCUTANEOUS at 10:49

## 2022-01-16 RX ADMIN — IPRATROPIUM BROMIDE AND ALBUTEROL SULFATE 3 ML: 2.5; .5 SOLUTION RESPIRATORY (INHALATION) at 23:17

## 2022-01-16 RX ADMIN — LISINOPRIL 10 MG: 10 TABLET ORAL at 11:54

## 2022-01-16 RX ADMIN — CEFTRIAXONE SODIUM 1 G: 1 INJECTION, POWDER, FOR SOLUTION INTRAMUSCULAR; INTRAVENOUS at 18:26

## 2022-01-16 RX ADMIN — HEPARIN SODIUM 5000 UNITS: 5000 INJECTION INTRAVENOUS; SUBCUTANEOUS at 14:24

## 2022-01-16 RX ADMIN — IPRATROPIUM BROMIDE AND ALBUTEROL SULFATE 3 ML: 2.5; .5 SOLUTION RESPIRATORY (INHALATION) at 07:00

## 2022-01-16 RX ADMIN — ASPIRIN 81 MG: 81 TABLET, FILM COATED ORAL at 21:54

## 2022-01-16 RX ADMIN — IPRATROPIUM BROMIDE AND ALBUTEROL SULFATE 3 ML: 2.5; .5 SOLUTION RESPIRATORY (INHALATION) at 19:49

## 2022-01-16 RX ADMIN — POTASSIUM CHLORIDE 10 MEQ: 750 CAPSULE, EXTENDED RELEASE ORAL at 11:55

## 2022-01-16 RX ADMIN — ATORVASTATIN CALCIUM 40 MG: 40 TABLET, FILM COATED ORAL at 21:56

## 2022-01-16 RX ADMIN — SODIUM CHLORIDE, PRESERVATIVE FREE 10 ML: 5 INJECTION INTRAVENOUS at 21:57

## 2022-01-16 RX ADMIN — Medication 400 MG: at 11:54

## 2022-01-16 RX ADMIN — PANTOPRAZOLE SODIUM 40 MG: 40 TABLET, DELAYED RELEASE ORAL at 11:55

## 2022-01-16 RX ADMIN — POTASSIUM CHLORIDE 10 MEQ: 750 CAPSULE, EXTENDED RELEASE ORAL at 21:54

## 2022-01-16 RX ADMIN — SODIUM CHLORIDE, PRESERVATIVE FREE 10 ML: 5 INJECTION INTRAVENOUS at 10:50

## 2022-01-16 RX ADMIN — IPRATROPIUM BROMIDE AND ALBUTEROL SULFATE 3 ML: 2.5; .5 SOLUTION RESPIRATORY (INHALATION) at 11:19

## 2022-01-16 RX ADMIN — FUROSEMIDE 40 MG: 40 TABLET ORAL at 11:54

## 2022-01-16 RX ADMIN — IPRATROPIUM BROMIDE AND ALBUTEROL SULFATE 3 ML: 2.5; .5 SOLUTION RESPIRATORY (INHALATION) at 03:06

## 2022-01-16 RX ADMIN — HEPARIN SODIUM 5000 UNITS: 5000 INJECTION INTRAVENOUS; SUBCUTANEOUS at 05:38

## 2022-01-16 RX ADMIN — INSULIN DETEMIR 30 UNITS: 100 INJECTION, SOLUTION SUBCUTANEOUS at 21:55

## 2022-01-16 RX ADMIN — PREDNISONE 40 MG: 20 TABLET ORAL at 10:49

## 2022-01-16 RX ADMIN — FUROSEMIDE 40 MG: 40 TABLET ORAL at 17:39

## 2022-01-16 RX ADMIN — GUAIFENESIN 600 MG: 600 TABLET, EXTENDED RELEASE ORAL at 21:54

## 2022-01-16 RX ADMIN — ACETAMINOPHEN 650 MG: 325 TABLET, FILM COATED ORAL at 11:55

## 2022-01-16 RX ADMIN — GUAIFENESIN 600 MG: 600 TABLET, EXTENDED RELEASE ORAL at 11:55

## 2022-01-16 RX ADMIN — DOXYCYCLINE 100 MG: 100 INJECTION, POWDER, LYOPHILIZED, FOR SOLUTION INTRAVENOUS at 16:27

## 2022-01-16 RX ADMIN — IPRATROPIUM BROMIDE AND ALBUTEROL SULFATE 3 ML: 2.5; .5 SOLUTION RESPIRATORY (INHALATION) at 14:41

## 2022-01-16 RX ADMIN — HEPARIN SODIUM 5000 UNITS: 5000 INJECTION INTRAVENOUS; SUBCUTANEOUS at 21:55

## 2022-01-16 RX ADMIN — ISOSORBIDE MONONITRATE 30 MG: 30 TABLET, EXTENDED RELEASE ORAL at 11:55

## 2022-01-16 RX ADMIN — DOXYCYCLINE 100 MG: 100 INJECTION, POWDER, LYOPHILIZED, FOR SOLUTION INTRAVENOUS at 05:38

## 2022-01-16 NOTE — PROGRESS NOTES
[facility] Medicine Services  INPATIENT PROGRESS NOTE    Length of Stay: 0  Date of Admission: 1/15/2022  Primary Care Physician: Joyce Plata MD    Subjective   Chief Complaint: wheezing   HPI:  71 year old female with past medical history of DM, chronic systolic CHF, GERD, HTN, HLD, DOROTHY, obesity with BMI 38.24 who presented on 1/15/22 with cough, fatigue, shortness of breath x 2 days.  She is currently admitted due to multifocal pneumonia.  During today's visit, she reports wheezing.    Review of Systems   Constitutional: Positive for activity change and fatigue. Negative for chills and fever.   HENT: Negative for congestion, rhinorrhea and sore throat.    Respiratory: Positive for cough, shortness of breath and wheezing.    Cardiovascular: Negative for chest pain, palpitations and leg swelling.   Gastrointestinal: Negative for abdominal pain, constipation, diarrhea, nausea and vomiting.   Musculoskeletal: Negative for back pain and neck pain.   Skin: Negative for pallor.   Neurological: Negative for dizziness, weakness and headaches.   Psychiatric/Behavioral: Negative for confusion. The patient is not nervous/anxious.         All pertinent negatives and positives are as above. All other systems have been reviewed and are negative unless otherwise stated.     Objective    Temp:  [97 °F (36.1 °C)-99.2 °F (37.3 °C)] 99.2 °F (37.3 °C)  Heart Rate:  [] 98  Resp:  [16-22] 18  BP: (126-171)/() 171/74    Physical Exam  Vitals and nursing note reviewed.   Constitutional:       General: She is not in acute distress.     Appearance: Normal appearance. She is not ill-appearing.   HENT:      Head: Normocephalic and atraumatic.      Right Ear: External ear normal.      Left Ear: External ear normal.      Nose: Nose normal.      Mouth/Throat:      Mouth: Mucous membranes are moist.      Pharynx: Oropharynx is clear.   Eyes:      General: No scleral icterus.         Right eye: No discharge.         Left eye: No discharge.      Conjunctiva/sclera: Conjunctivae normal.   Cardiovascular:      Rate and Rhythm: Normal rate and regular rhythm.      Pulses: Normal pulses.      Heart sounds: Normal heart sounds. No murmur heard.  No friction rub. No gallop.    Pulmonary:      Effort: No accessory muscle usage or respiratory distress.      Breath sounds: No stridor. Examination of the right-lower field reveals decreased breath sounds. Examination of the left-lower field reveals decreased breath sounds. Decreased breath sounds and wheezing present. No rhonchi or rales.      Comments: Mild expiratory wheeze  Abdominal:      General: Bowel sounds are normal. There is no distension.      Palpations: Abdomen is soft.      Tenderness: There is no abdominal tenderness.   Musculoskeletal:         General: Normal range of motion.      Cervical back: Normal range of motion and neck supple.      Right lower leg: No edema.      Left lower leg: No edema.   Skin:     General: Skin is warm and dry.   Neurological:      General: No focal deficit present.      Mental Status: She is alert and oriented to person, place, and time.   Psychiatric:         Mood and Affect: Mood normal.         Behavior: Behavior normal.             Results Review:  I have reviewed the labs, radiology results, and diagnostic studies.    Laboratory Data:   Results from last 7 days   Lab Units 01/15/22  1307   SODIUM mmol/L 141   POTASSIUM mmol/L 4.5   CHLORIDE mmol/L 106   CO2 mmol/L 22.0   BUN mg/dL 18   CREATININE mg/dL 1.14*   GLUCOSE mg/dL 129*   CALCIUM mg/dL 9.7   BILIRUBIN mg/dL 0.5   ALK PHOS U/L 132*   ALT (SGPT) U/L 59*   AST (SGOT) U/L 55*   ANION GAP mmol/L 13.0     Estimated Creatinine Clearance: 48.6 mL/min (A) (by C-G formula based on SCr of 1.14 mg/dL (H)).          Results from last 7 days   Lab Units 01/15/22  1307   WBC 10*3/mm3 12.47*   HEMOGLOBIN g/dL 11.4*   HEMATOCRIT % 36.1   PLATELETS 10*3/mm3 327      Results from last 7 days   Lab Units 01/15/22  1307   INR  1.09       Culture Data:   No results found for: BLOODCX  No results found for: URINECX  No results found for: RESPCX  No results found for: WOUNDCX  No results found for: STOOLCX  No components found for: BODYFLD    Radiology Data:   Imaging Results (Last 24 Hours)     Procedure Component Value Units Date/Time    CT Angiogram Chest [[id number]] Collected: 01/15/22 1548     Updated: 01/15/22 1633    Narrative:      EXAM:  CT CHEST ANGIOGRAPHY WITH IV CONTRAST    ORDERING PROVIDER:  JESUS RAMOS    CLINICAL HISTORY:  Shortness of breath    COMPARISON:      TECHNIQUE:   Chest CT was performed using a high resolution pulmonary  angiogram protocol with 69ml of Isovue-370 as IV contrast and  reformatted in the sagittal and coronal planes.     3-dimensional images also acquired with special processing of the  CT scan data with a specialized workstation for evaluation.    This examination was performed according to our departmental dose  optimization program which includes automated exposure control,  adjustment of the MA and kV according to patient size, and/or use  of iterative reconstruction technique.     FINDINGS:     LUNGS AND PLEURA: Bilateral small pleural effusion, more on the  right side. Bilateral diffuse airspace process, concerning for  multifocal pneumonia. No pneumothorax. No evidence of gross  endotracheal or endobronchial lesion.    HEART: Prominence in size and configuration. Small amount of  pericardial effusion.     MEDIASTINUM AND RACHNA: No significant adenopathy.     AORTA AND GREAT VESSELS: No aneurysm or dissection.     PULMONARY ARTERIES: No filling defects.     UPPER ABDOMEN: Unremarkable.    MUSCULOSKELETAL: Left pacemaker in place.  No aggressive osseous  lesion.. Unremarkable vertebral body height and alignment. No  lytic or sclerotic lesion.     EXTRATHORACIC SOFT TISSUES: No axillary adenopathy. No mass.  Unremarkable  supraclavicular soft tissues.       Impression:      1.  No evidence of pulmonary embolism.  2.  Bilateral small pleural effusion.  3.  Multifocal pneumonia.  4.  Small pericardial effusion.    Electronically signed by:  Reggie Monae MD  1/15/2022 4:32 PM CST  Workstation: [id number]    XR Chest 1 View [[id number]] Collected: 01/15/22 1300     Updated: 01/15/22 1343    Narrative:      PROCEDURE: XR CHEST 1 VW    VIEWS:Single    INDICATION: Chest pain    COMPARISON: CXR: 11/28/2021    FINDINGS:       - lines/tubes: None. Left-sided pacemaker/AICD in stable  position    - cardiac: Mild stable cardiomegaly.    - mediastinum: Contour unchanged from prior.     - lungs: Ill-defined bilateral opacities, right greater than  left, with perihilar predominance.     - pleura: No evidence of  fluid.      - osseous: Unremarkable for age.        Impression:      Ill-defined prominently perihilar opacity, which could represent  infectious process or more likely, pulmonary edema and clinical  correlation needed    Electronically signed by:  Joyce Joyce MD  1/15/2022 1:42 PM CST  Workstation: [id number]          I have reviewed the patient's current medications.     Assessment/Plan     Active Hospital Problems    Diagnosis    • **Multifocal pneumonia    • Obesity (BMI 30-39.9)    • Mixed hyperlipidemia    • Hypertension    • Diabetes mellitus (HCC)    • Congestive heart failure (HCC)    • GERD (gastroesophageal reflux disease)        Plan:    #1.  Multifocal pneumonia: Continue oxygen support, Rocephin, doxycycline, duo nebs, incentive spirometry.  Prednisone, Mucinex added.    2.  Hypertension: Continue Coreg, Lisinopril  3.  Hyperlipidemia continue statin  4.:  Congestive heart failure: Currently euvolemic and without exacerbation.  Continue heart healthy diet, daily weights, Coreg, Lisinopril, Lasix  5.  Type 2 diabetes: Fingerstick blood sugars before meals and at bedtime with sliding scale insulin coverage.  Continue Levemir,  Metformin.  ADA diet.  6.  Obesity with BMI of 38.24    I confirmed that the patient's Advance Care Plan is present, code status is documented, or surrogate decision maker is listed in the patient's medical record.          This document has been electronically signed by MARYANNE Gutierres on January 16, 2022 10:56 CST

## 2022-01-16 NOTE — NURSING NOTE
Pt short of breath with exertion. Wearing o2 @ 2 liters. Cough is infrequent and nonproductive. She has been eating/drinking well. Educated on CHF and pneumonia.

## 2022-01-16 NOTE — NURSING NOTE
"Notified MARYANNE Gutierres that pt reports Dr. Plata told her to \"stop taking her coreg for now but not to get rid of it.\" Pt was unsure why but was clear that she did not want us giving coreg while here. She also said Dr. Plata made changes to 2 or 3 other medications but she was unsure which or why.  "

## 2022-01-17 LAB
ANION GAP SERPL CALCULATED.3IONS-SCNC: 12 MMOL/L (ref 5–15)
BASOPHILS # BLD AUTO: 0.04 10*3/MM3 (ref 0–0.2)
BASOPHILS NFR BLD AUTO: 0.3 % (ref 0–1.5)
BUN SERPL-MCNC: 23 MG/DL (ref 8–23)
BUN/CREAT SERPL: 21.5 (ref 7–25)
CALCIUM SPEC-SCNC: 9.6 MG/DL (ref 8.6–10.5)
CHLORIDE SERPL-SCNC: 106 MMOL/L (ref 98–107)
CO2 SERPL-SCNC: 20 MMOL/L (ref 22–29)
CREAT SERPL-MCNC: 1.07 MG/DL (ref 0.57–1)
DEPRECATED RDW RBC AUTO: 49.5 FL (ref 37–54)
EOSINOPHIL # BLD AUTO: 0.02 10*3/MM3 (ref 0–0.4)
EOSINOPHIL NFR BLD AUTO: 0.1 % (ref 0.3–6.2)
ERYTHROCYTE [DISTWIDTH] IN BLOOD BY AUTOMATED COUNT: 16.1 % (ref 12.3–15.4)
GFR SERPL CREATININE-BSD FRML MDRD: 61 ML/MIN/1.73
GLUCOSE BLDC GLUCOMTR-MCNC: 177 MG/DL (ref 70–130)
GLUCOSE BLDC GLUCOMTR-MCNC: 212 MG/DL (ref 70–130)
GLUCOSE BLDC GLUCOMTR-MCNC: 247 MG/DL (ref 70–130)
GLUCOSE BLDC GLUCOMTR-MCNC: 383 MG/DL (ref 70–130)
GLUCOSE SERPL-MCNC: 272 MG/DL (ref 65–99)
HCT VFR BLD AUTO: 30.7 % (ref 34–46.6)
HGB BLD-MCNC: 10.1 G/DL (ref 12–15.9)
IMM GRANULOCYTES # BLD AUTO: 0.1 10*3/MM3 (ref 0–0.05)
IMM GRANULOCYTES NFR BLD AUTO: 0.7 % (ref 0–0.5)
LYMPHOCYTES # BLD AUTO: 1.08 10*3/MM3 (ref 0.7–3.1)
LYMPHOCYTES NFR BLD AUTO: 7.4 % (ref 19.6–45.3)
MCH RBC QN AUTO: 27.9 PG (ref 26.6–33)
MCHC RBC AUTO-ENTMCNC: 32.9 G/DL (ref 31.5–35.7)
MCV RBC AUTO: 84.8 FL (ref 79–97)
MONOCYTES # BLD AUTO: 0.92 10*3/MM3 (ref 0.1–0.9)
MONOCYTES NFR BLD AUTO: 6.3 % (ref 5–12)
NEUTROPHILS NFR BLD AUTO: 12.47 10*3/MM3 (ref 1.7–7)
NEUTROPHILS NFR BLD AUTO: 85.2 % (ref 42.7–76)
NRBC BLD AUTO-RTO: 0 /100 WBC (ref 0–0.2)
PLATELET # BLD AUTO: 302 10*3/MM3 (ref 140–450)
PMV BLD AUTO: 9.7 FL (ref 6–12)
POTASSIUM SERPL-SCNC: 4.1 MMOL/L (ref 3.5–5.2)
RBC # BLD AUTO: 3.62 10*6/MM3 (ref 3.77–5.28)
SODIUM SERPL-SCNC: 138 MMOL/L (ref 136–145)
TROPONIN T SERPL-MCNC: <0.01 NG/ML (ref 0–0.03)
WBC NRBC COR # BLD: 14.63 10*3/MM3 (ref 3.4–10.8)

## 2022-01-17 PROCEDURE — 63710000001 PREDNISONE PER 1 MG: Performed by: NURSE PRACTITIONER

## 2022-01-17 PROCEDURE — 85025 COMPLETE CBC W/AUTO DIFF WBC: CPT | Performed by: NURSE PRACTITIONER

## 2022-01-17 PROCEDURE — 63710000001 INSULIN ASPART PER 5 UNITS: Performed by: NURSE PRACTITIONER

## 2022-01-17 PROCEDURE — 94799 UNLISTED PULMONARY SVC/PX: CPT

## 2022-01-17 PROCEDURE — G0378 HOSPITAL OBSERVATION PER HR: HCPCS

## 2022-01-17 PROCEDURE — 80048 BASIC METABOLIC PNL TOTAL CA: CPT | Performed by: NURSE PRACTITIONER

## 2022-01-17 PROCEDURE — 96367 TX/PROPH/DG ADDL SEQ IV INF: CPT

## 2022-01-17 PROCEDURE — 25010000002 CEFTRIAXONE PER 250 MG: Performed by: NURSE PRACTITIONER

## 2022-01-17 PROCEDURE — 96366 THER/PROPH/DIAG IV INF ADDON: CPT

## 2022-01-17 PROCEDURE — 84484 ASSAY OF TROPONIN QUANT: CPT | Performed by: PHYSICIAN ASSISTANT

## 2022-01-17 PROCEDURE — 96372 THER/PROPH/DIAG INJ SC/IM: CPT

## 2022-01-17 PROCEDURE — 63710000001 INSULIN DETEMIR PER 5 UNITS: Performed by: NURSE PRACTITIONER

## 2022-01-17 PROCEDURE — 82962 GLUCOSE BLOOD TEST: CPT

## 2022-01-17 PROCEDURE — 25010000002 HEPARIN (PORCINE) PER 1000 UNITS: Performed by: NURSE PRACTITIONER

## 2022-01-17 PROCEDURE — 36415 COLL VENOUS BLD VENIPUNCTURE: CPT | Performed by: NURSE PRACTITIONER

## 2022-01-17 PROCEDURE — 94760 N-INVAS EAR/PLS OXIMETRY 1: CPT

## 2022-01-17 RX ADMIN — Medication 400 MG: at 08:31

## 2022-01-17 RX ADMIN — GUAIFENESIN 600 MG: 600 TABLET, EXTENDED RELEASE ORAL at 08:31

## 2022-01-17 RX ADMIN — IPRATROPIUM BROMIDE AND ALBUTEROL SULFATE 3 ML: 2.5; .5 SOLUTION RESPIRATORY (INHALATION) at 11:30

## 2022-01-17 RX ADMIN — HEPARIN SODIUM 5000 UNITS: 5000 INJECTION INTRAVENOUS; SUBCUTANEOUS at 06:21

## 2022-01-17 RX ADMIN — DOXYCYCLINE 100 MG: 100 INJECTION, POWDER, LYOPHILIZED, FOR SOLUTION INTRAVENOUS at 06:21

## 2022-01-17 RX ADMIN — INSULIN DETEMIR 30 UNITS: 100 INJECTION, SOLUTION SUBCUTANEOUS at 20:55

## 2022-01-17 RX ADMIN — INSULIN ASPART 2 UNITS: 100 INJECTION, SOLUTION INTRAVENOUS; SUBCUTANEOUS at 11:40

## 2022-01-17 RX ADMIN — SODIUM CHLORIDE, PRESERVATIVE FREE 10 ML: 5 INJECTION INTRAVENOUS at 08:34

## 2022-01-17 RX ADMIN — ISOSORBIDE MONONITRATE 30 MG: 30 TABLET, EXTENDED RELEASE ORAL at 08:33

## 2022-01-17 RX ADMIN — INSULIN ASPART 4 UNITS: 100 INJECTION, SOLUTION INTRAVENOUS; SUBCUTANEOUS at 17:11

## 2022-01-17 RX ADMIN — GUAIFENESIN 600 MG: 600 TABLET, EXTENDED RELEASE ORAL at 20:53

## 2022-01-17 RX ADMIN — IPRATROPIUM BROMIDE AND ALBUTEROL SULFATE 3 ML: 2.5; .5 SOLUTION RESPIRATORY (INHALATION) at 15:06

## 2022-01-17 RX ADMIN — LISINOPRIL 10 MG: 10 TABLET ORAL at 08:31

## 2022-01-17 RX ADMIN — FUROSEMIDE 40 MG: 40 TABLET ORAL at 17:11

## 2022-01-17 RX ADMIN — PANTOPRAZOLE SODIUM 40 MG: 40 TABLET, DELAYED RELEASE ORAL at 08:32

## 2022-01-17 RX ADMIN — HEPARIN SODIUM 5000 UNITS: 5000 INJECTION INTRAVENOUS; SUBCUTANEOUS at 14:08

## 2022-01-17 RX ADMIN — ATORVASTATIN CALCIUM 40 MG: 40 TABLET, FILM COATED ORAL at 20:54

## 2022-01-17 RX ADMIN — HEPARIN SODIUM 5000 UNITS: 5000 INJECTION INTRAVENOUS; SUBCUTANEOUS at 20:54

## 2022-01-17 RX ADMIN — POTASSIUM CHLORIDE 10 MEQ: 750 CAPSULE, EXTENDED RELEASE ORAL at 08:32

## 2022-01-17 RX ADMIN — IPRATROPIUM BROMIDE AND ALBUTEROL SULFATE 3 ML: 2.5; .5 SOLUTION RESPIRATORY (INHALATION) at 07:30

## 2022-01-17 RX ADMIN — IPRATROPIUM BROMIDE AND ALBUTEROL SULFATE 3 ML: 2.5; .5 SOLUTION RESPIRATORY (INHALATION) at 19:35

## 2022-01-17 RX ADMIN — FUROSEMIDE 40 MG: 40 TABLET ORAL at 08:31

## 2022-01-17 RX ADMIN — INSULIN ASPART 4 UNITS: 100 INJECTION, SOLUTION INTRAVENOUS; SUBCUTANEOUS at 08:35

## 2022-01-17 RX ADMIN — PREDNISONE 40 MG: 20 TABLET ORAL at 08:32

## 2022-01-17 RX ADMIN — ASPIRIN 81 MG: 81 TABLET, FILM COATED ORAL at 20:54

## 2022-01-17 RX ADMIN — DOXYCYCLINE 100 MG: 100 INJECTION, POWDER, LYOPHILIZED, FOR SOLUTION INTRAVENOUS at 16:09

## 2022-01-17 RX ADMIN — CEFTRIAXONE SODIUM 1 G: 1 INJECTION, POWDER, FOR SOLUTION INTRAMUSCULAR; INTRAVENOUS at 17:41

## 2022-01-17 RX ADMIN — IPRATROPIUM BROMIDE AND ALBUTEROL SULFATE 3 ML: 2.5; .5 SOLUTION RESPIRATORY (INHALATION) at 23:38

## 2022-01-17 RX ADMIN — POTASSIUM CHLORIDE 10 MEQ: 750 CAPSULE, EXTENDED RELEASE ORAL at 20:54

## 2022-01-17 NOTE — PLAN OF CARE
Goal Outcome Evaluation:  Plan of Care Reviewed With: patient        Progress: improving  Outcome Summary: patient has been doing well today. VSS. O2 sats have been fine on room air. no c/o from patient. Hospitalist states patient can restart metformin in the am. Will continue to monitor.

## 2022-01-17 NOTE — PROGRESS NOTES
[facility] Medicine Services  INPATIENT PROGRESS NOTE    Length of Stay: 0  Date of Admission: 1/15/2022  Primary Care Physician: Joyce Plata MD    Subjective   Chief Complaint: wheezing, short of air with exertion.  HPI:  71 year old female with past medical history of DM, chronic systolic CHF, GERD, HTN, HLD, DOROTHY, obesity with BMI 38.24 who presented on 1/15/22 with cough, fatigue, shortness of breath x 2 days.  She is currently admitted due to multifocal pneumonia.  During today's visit, she reports wheezing continues.  She reports she remains short of air with any sort of exertion.      Review of Systems   Constitutional: Positive for activity change and fatigue. Negative for chills and fever.   HENT: Negative for congestion, rhinorrhea and sore throat.    Respiratory: Positive for cough, shortness of breath and wheezing.    Cardiovascular: Negative for chest pain, palpitations and leg swelling.   Gastrointestinal: Negative for abdominal pain, constipation, diarrhea, nausea and vomiting.   Musculoskeletal: Negative for back pain and neck pain.   Skin: Negative for pallor.   Neurological: Negative for dizziness, weakness and headaches.   Psychiatric/Behavioral: Negative for confusion. The patient is not nervous/anxious.         All pertinent negatives and positives are as above. All other systems have been reviewed and are negative unless otherwise stated.     Objective    Temp:  [96.9 °F (36.1 °C)-98.3 °F (36.8 °C)] 98.3 °F (36.8 °C)  Heart Rate:  [] 96  Resp:  [16-20] 18  BP: (119-140)/(64-80) 135/72    Physical Exam  Vitals and nursing note reviewed.   Constitutional:       General: She is not in acute distress.     Appearance: Normal appearance. She is not ill-appearing.   HENT:      Head: Normocephalic and atraumatic.      Right Ear: External ear normal.      Left Ear: External ear normal.      Nose: Nose normal.      Mouth/Throat:      Mouth: Mucous  membranes are moist.      Pharynx: Oropharynx is clear.   Eyes:      General: No scleral icterus.        Right eye: No discharge.         Left eye: No discharge.      Conjunctiva/sclera: Conjunctivae normal.   Cardiovascular:      Rate and Rhythm: Normal rate and regular rhythm.      Pulses: Normal pulses.      Heart sounds: Normal heart sounds. No murmur heard.  No friction rub. No gallop.    Pulmonary:      Effort: No accessory muscle usage or respiratory distress.      Breath sounds: No stridor. Examination of the right-lower field reveals decreased breath sounds. Examination of the left-lower field reveals decreased breath sounds. Decreased breath sounds and wheezing present. No rhonchi or rales.      Comments: Mild expiratory wheeze  Abdominal:      General: Bowel sounds are normal. There is no distension.      Palpations: Abdomen is soft.      Tenderness: There is no abdominal tenderness.   Musculoskeletal:         General: Normal range of motion.      Cervical back: Normal range of motion and neck supple.      Right lower leg: No edema.      Left lower leg: No edema.   Skin:     General: Skin is warm and dry.   Neurological:      General: No focal deficit present.      Mental Status: She is alert and oriented to person, place, and time.   Psychiatric:         Mood and Affect: Mood normal.         Behavior: Behavior normal.             Results Review:  I have reviewed the labs, radiology results, and diagnostic studies.    Laboratory Data:   Results from last 7 days   Lab Units 01/15/22  1307   SODIUM mmol/L 141   POTASSIUM mmol/L 4.5   CHLORIDE mmol/L 106   CO2 mmol/L 22.0   BUN mg/dL 18   CREATININE mg/dL 1.14*   GLUCOSE mg/dL 129*   CALCIUM mg/dL 9.7   BILIRUBIN mg/dL 0.5   ALK PHOS U/L 132*   ALT (SGPT) U/L 59*   AST (SGOT) U/L 55*   ANION GAP mmol/L 13.0     Estimated Creatinine Clearance: 48.6 mL/min (A) (by C-G formula based on SCr of 1.14 mg/dL (H)).          Results from last 7 days   Lab Units  01/15/22  1307   WBC 10*3/mm3 12.47*   HEMOGLOBIN g/dL 11.4*   HEMATOCRIT % 36.1   PLATELETS 10*3/mm3 327     Results from last 7 days   Lab Units 01/15/22  1307   INR  1.09       Culture Data:   Blood Culture   Date Value Ref Range Status   01/15/2022 No growth at 24 hours  Preliminary   01/15/2022 No growth at 24 hours  Preliminary     No results found for: URINECX  No results found for: RESPCX  No results found for: WOUNDCX  No results found for: STOOLCX  No components found for: BODYFLD    Radiology Data:   Imaging Results (Last 24 Hours)     ** No results found for the last 24 hours. **          I have reviewed the patient's current medications.     Assessment/Plan     Active Hospital Problems    Diagnosis    • **Multifocal pneumonia    • Obesity (BMI 30-39.9)    • Mixed hyperlipidemia    • Hypertension    • Diabetes mellitus (HCC)    • Congestive heart failure (HCC)    • GERD (gastroesophageal reflux disease)        Plan:    #1.  Multifocal pneumonia: Continue oxygen support PRN (currently on room air), Rocephin, doxycycline, duo nebs, incentive spirometry, Prednisone, Mucinex.  2.  Hypertension: Continue Coreg, Lisinopril  3.  Hyperlipidemia continue statin  4.:  Congestive heart failure: Currently euvolemic and without exacerbation.  Continue heart healthy diet, daily weights, Coreg, Lisinopril, Lasix  5.  Type 2 diabetes: Fingerstick blood sugars before meals and at bedtime with sliding scale insulin coverage.  Continue Levemir, Metformin.  ADA diet.  6.  Obesity with BMI of 38.24    Discharge planning: Likely discharge in 24 hours if continued improvement.      I confirmed that the patient's Advance Care Plan is present, code status is documented, or surrogate decision maker is listed in the patient's medical record.          This document has been electronically signed by MARYANNE Gutierres on January 17, 2022 13:10 CST

## 2022-01-18 LAB
ANION GAP SERPL CALCULATED.3IONS-SCNC: 13 MMOL/L (ref 5–15)
BASOPHILS # BLD AUTO: 0.02 10*3/MM3 (ref 0–0.2)
BASOPHILS NFR BLD AUTO: 0.2 % (ref 0–1.5)
BUN SERPL-MCNC: 23 MG/DL (ref 8–23)
BUN/CREAT SERPL: 20.7 (ref 7–25)
CALCIUM SPEC-SCNC: 9.6 MG/DL (ref 8.6–10.5)
CHLORIDE SERPL-SCNC: 106 MMOL/L (ref 98–107)
CO2 SERPL-SCNC: 21 MMOL/L (ref 22–29)
CREAT SERPL-MCNC: 1.11 MG/DL (ref 0.57–1)
DEPRECATED RDW RBC AUTO: 48.4 FL (ref 37–54)
EOSINOPHIL # BLD AUTO: 0.05 10*3/MM3 (ref 0–0.4)
EOSINOPHIL NFR BLD AUTO: 0.4 % (ref 0.3–6.2)
ERYTHROCYTE [DISTWIDTH] IN BLOOD BY AUTOMATED COUNT: 16 % (ref 12.3–15.4)
GFR SERPL CREATININE-BSD FRML MDRD: 58 ML/MIN/1.73
GLUCOSE BLDC GLUCOMTR-MCNC: 115 MG/DL (ref 70–130)
GLUCOSE BLDC GLUCOMTR-MCNC: 172 MG/DL (ref 70–130)
GLUCOSE BLDC GLUCOMTR-MCNC: 239 MG/DL (ref 70–130)
GLUCOSE BLDC GLUCOMTR-MCNC: 240 MG/DL (ref 70–130)
GLUCOSE SERPL-MCNC: 165 MG/DL (ref 65–99)
HCT VFR BLD AUTO: 29.2 % (ref 34–46.6)
HGB BLD-MCNC: 9.7 G/DL (ref 12–15.9)
IMM GRANULOCYTES # BLD AUTO: 0.05 10*3/MM3 (ref 0–0.05)
IMM GRANULOCYTES NFR BLD AUTO: 0.4 % (ref 0–0.5)
LYMPHOCYTES # BLD AUTO: 3.75 10*3/MM3 (ref 0.7–3.1)
LYMPHOCYTES NFR BLD AUTO: 28.8 % (ref 19.6–45.3)
MCH RBC QN AUTO: 27.8 PG (ref 26.6–33)
MCHC RBC AUTO-ENTMCNC: 33.2 G/DL (ref 31.5–35.7)
MCV RBC AUTO: 83.7 FL (ref 79–97)
MONOCYTES # BLD AUTO: 1.69 10*3/MM3 (ref 0.1–0.9)
MONOCYTES NFR BLD AUTO: 13 % (ref 5–12)
NEUTROPHILS NFR BLD AUTO: 57.2 % (ref 42.7–76)
NEUTROPHILS NFR BLD AUTO: 7.46 10*3/MM3 (ref 1.7–7)
NRBC BLD AUTO-RTO: 0.2 /100 WBC (ref 0–0.2)
PLATELET # BLD AUTO: 319 10*3/MM3 (ref 140–450)
PMV BLD AUTO: 10.1 FL (ref 6–12)
POTASSIUM SERPL-SCNC: 3.8 MMOL/L (ref 3.5–5.2)
RBC # BLD AUTO: 3.49 10*6/MM3 (ref 3.77–5.28)
SODIUM SERPL-SCNC: 140 MMOL/L (ref 136–145)
WBC NRBC COR # BLD: 13.02 10*3/MM3 (ref 3.4–10.8)

## 2022-01-18 PROCEDURE — 94799 UNLISTED PULMONARY SVC/PX: CPT

## 2022-01-18 PROCEDURE — G0378 HOSPITAL OBSERVATION PER HR: HCPCS

## 2022-01-18 PROCEDURE — 25010000002 CEFTRIAXONE PER 250 MG: Performed by: NURSE PRACTITIONER

## 2022-01-18 PROCEDURE — 96372 THER/PROPH/DIAG INJ SC/IM: CPT

## 2022-01-18 PROCEDURE — 80048 BASIC METABOLIC PNL TOTAL CA: CPT | Performed by: NURSE PRACTITIONER

## 2022-01-18 PROCEDURE — 94760 N-INVAS EAR/PLS OXIMETRY 1: CPT

## 2022-01-18 PROCEDURE — 85025 COMPLETE CBC W/AUTO DIFF WBC: CPT | Performed by: NURSE PRACTITIONER

## 2022-01-18 PROCEDURE — 63710000001 INSULIN DETEMIR PER 5 UNITS: Performed by: NURSE PRACTITIONER

## 2022-01-18 PROCEDURE — 63710000001 PREDNISONE PER 1 MG: Performed by: NURSE PRACTITIONER

## 2022-01-18 PROCEDURE — 25010000002 HEPARIN (PORCINE) PER 1000 UNITS: Performed by: NURSE PRACTITIONER

## 2022-01-18 PROCEDURE — 96366 THER/PROPH/DIAG IV INF ADDON: CPT

## 2022-01-18 PROCEDURE — 63710000001 INSULIN ASPART PER 5 UNITS: Performed by: NURSE PRACTITIONER

## 2022-01-18 PROCEDURE — 82962 GLUCOSE BLOOD TEST: CPT

## 2022-01-18 RX ADMIN — HEPARIN SODIUM 5000 UNITS: 5000 INJECTION INTRAVENOUS; SUBCUTANEOUS at 21:35

## 2022-01-18 RX ADMIN — METFORMIN HYDROCHLORIDE 1000 MG: 500 TABLET ORAL at 07:51

## 2022-01-18 RX ADMIN — HEPARIN SODIUM 5000 UNITS: 5000 INJECTION INTRAVENOUS; SUBCUTANEOUS at 05:11

## 2022-01-18 RX ADMIN — ASPIRIN 81 MG: 81 TABLET, FILM COATED ORAL at 21:35

## 2022-01-18 RX ADMIN — ISOSORBIDE MONONITRATE 30 MG: 30 TABLET, EXTENDED RELEASE ORAL at 07:51

## 2022-01-18 RX ADMIN — GUAIFENESIN 600 MG: 600 TABLET, EXTENDED RELEASE ORAL at 07:51

## 2022-01-18 RX ADMIN — FUROSEMIDE 40 MG: 40 TABLET ORAL at 17:40

## 2022-01-18 RX ADMIN — POTASSIUM CHLORIDE 10 MEQ: 750 CAPSULE, EXTENDED RELEASE ORAL at 07:51

## 2022-01-18 RX ADMIN — FUROSEMIDE 40 MG: 40 TABLET ORAL at 07:52

## 2022-01-18 RX ADMIN — IPRATROPIUM BROMIDE AND ALBUTEROL SULFATE 3 ML: 2.5; .5 SOLUTION RESPIRATORY (INHALATION) at 07:13

## 2022-01-18 RX ADMIN — SODIUM CHLORIDE, PRESERVATIVE FREE 10 ML: 5 INJECTION INTRAVENOUS at 21:38

## 2022-01-18 RX ADMIN — IPRATROPIUM BROMIDE AND ALBUTEROL SULFATE 3 ML: 2.5; .5 SOLUTION RESPIRATORY (INHALATION) at 10:52

## 2022-01-18 RX ADMIN — IPRATROPIUM BROMIDE AND ALBUTEROL SULFATE 3 ML: 2.5; .5 SOLUTION RESPIRATORY (INHALATION) at 03:38

## 2022-01-18 RX ADMIN — ATORVASTATIN CALCIUM 40 MG: 40 TABLET, FILM COATED ORAL at 21:35

## 2022-01-18 RX ADMIN — INSULIN DETEMIR 30 UNITS: 100 INJECTION, SOLUTION SUBCUTANEOUS at 21:35

## 2022-01-18 RX ADMIN — DOXYCYCLINE 100 MG: 100 INJECTION, POWDER, LYOPHILIZED, FOR SOLUTION INTRAVENOUS at 05:11

## 2022-01-18 RX ADMIN — POTASSIUM CHLORIDE 10 MEQ: 750 CAPSULE, EXTENDED RELEASE ORAL at 21:35

## 2022-01-18 RX ADMIN — Medication 400 MG: at 07:52

## 2022-01-18 RX ADMIN — GUAIFENESIN 600 MG: 600 TABLET, EXTENDED RELEASE ORAL at 21:35

## 2022-01-18 RX ADMIN — METFORMIN HYDROCHLORIDE 1000 MG: 500 TABLET ORAL at 17:40

## 2022-01-18 RX ADMIN — IPRATROPIUM BROMIDE AND ALBUTEROL SULFATE 3 ML: 2.5; .5 SOLUTION RESPIRATORY (INHALATION) at 18:54

## 2022-01-18 RX ADMIN — PREDNISONE 40 MG: 20 TABLET ORAL at 07:51

## 2022-01-18 RX ADMIN — LISINOPRIL 10 MG: 10 TABLET ORAL at 07:52

## 2022-01-18 RX ADMIN — INSULIN ASPART 2 UNITS: 100 INJECTION, SOLUTION INTRAVENOUS; SUBCUTANEOUS at 07:53

## 2022-01-18 RX ADMIN — CEFTRIAXONE SODIUM 1 G: 1 INJECTION, POWDER, FOR SOLUTION INTRAMUSCULAR; INTRAVENOUS at 21:35

## 2022-01-18 RX ADMIN — SODIUM CHLORIDE, PRESERVATIVE FREE 10 ML: 5 INJECTION INTRAVENOUS at 07:54

## 2022-01-18 RX ADMIN — HEPARIN SODIUM 5000 UNITS: 5000 INJECTION INTRAVENOUS; SUBCUTANEOUS at 17:40

## 2022-01-18 RX ADMIN — INSULIN ASPART 4 UNITS: 100 INJECTION, SOLUTION INTRAVENOUS; SUBCUTANEOUS at 17:41

## 2022-01-18 RX ADMIN — DOXYCYCLINE 100 MG: 100 INJECTION, POWDER, LYOPHILIZED, FOR SOLUTION INTRAVENOUS at 17:40

## 2022-01-18 RX ADMIN — PANTOPRAZOLE SODIUM 40 MG: 40 TABLET, DELAYED RELEASE ORAL at 07:52

## 2022-01-18 NOTE — PROGRESS NOTES
"    [facility] Medicine Services  INPATIENT PROGRESS NOTE    Length of Stay: 0  Date of Admission: 1/15/2022  Primary Care Physician: Joyce Plata MD    Subjective   Chief Complaint: \"breathing better\"  HPI:  71 year old female with past medical history of DM, chronic systolic CHF, GERD, HTN, HLD, DOROTHY, obesity with BMI 38.24 who presented on 1/15/22 with cough, fatigue, shortness of breath x 2 days.  She is currently admitted due to multifocal pneumonia.  Patient reports feeling better and discharge was being prepared when patient reported that she does not have adequate heat at her home and does not have transportation until tomorrow morning.     Review of Systems   Constitutional: Positive for activity change and fatigue. Negative for chills and fever.   HENT: Negative for congestion, rhinorrhea and sore throat.    Respiratory: Positive for cough. Negative for shortness of breath and wheezing.  Cardiovascular: Negative for chest pain, palpitations and leg swelling.   Gastrointestinal: Negative for abdominal pain, constipation, diarrhea, nausea and vomiting.   Musculoskeletal: Negative for back pain and neck pain.   Skin: Negative for pallor.   Neurological: Negative for dizziness, weakness and headaches.   Psychiatric/Behavioral: Negative for confusion. The patient is not nervous/anxious.         All pertinent negatives and positives are as above. All other systems have been reviewed and are negative unless otherwise stated.     Objective    Temp:  [96.1 °F (35.6 °C)-97.6 °F (36.4 °C)] 97.6 °F (36.4 °C)  Heart Rate:  [] 96  Resp:  [16-20] 16  BP: (134-160)/(73-91) 155/88    Physical Exam  Vitals and nursing note reviewed.   Constitutional:       General: She is not in acute distress.     Appearance: Normal appearance. She is not ill-appearing.   HENT:      Head: Normocephalic and atraumatic.      Right Ear: External ear normal.      Left Ear: External ear normal. "      Nose: Nose normal.      Mouth/Throat:      Mouth: Mucous membranes are moist.      Pharynx: Oropharynx is clear.   Eyes:      General: No scleral icterus.        Right eye: No discharge.         Left eye: No discharge.      Conjunctiva/sclera: Conjunctivae normal.   Cardiovascular:      Rate and Rhythm: Normal rate and regular rhythm.      Pulses: Normal pulses.      Heart sounds: Normal heart sounds. No murmur heard.  No friction rub. No gallop.    Pulmonary:      Effort: No accessory muscle usage or respiratory distress.      Breath sounds: No stridor. Examination of the right-lower field reveals decreased breath sounds. Examination of the left-lower field reveals decreased breath sounds. Decreased breath sounds. No rhonchi or rales.  Abdominal:      General: Bowel sounds are normal. There is no distension.      Palpations: Abdomen is soft.      Tenderness: There is no abdominal tenderness.   Musculoskeletal:         General: Normal range of motion.      Cervical back: Normal range of motion and neck supple.      Right lower leg: No edema.      Left lower leg: No edema.   Skin:     General: Skin is warm and dry.   Neurological:      General: No focal deficit present.      Mental Status: She is alert and oriented to person, place, and time.   Psychiatric:         Mood and Affect: Mood normal.         Behavior: Behavior normal.             Results Review:  I have reviewed the labs, radiology results, and diagnostic studies.    Laboratory Data:   Results from last 7 days   Lab Units 01/18/22  0603 01/17/22  1336 01/15/22  1307   SODIUM mmol/L 140 138 141   POTASSIUM mmol/L 3.8 4.1 4.5   CHLORIDE mmol/L 106 106 106   CO2 mmol/L 21.0* 20.0* 22.0   BUN mg/dL 23 23 18   CREATININE mg/dL 1.11* 1.07* 1.14*   GLUCOSE mg/dL 165* 272* 129*   CALCIUM mg/dL 9.6 9.6 9.7   BILIRUBIN mg/dL  --   --  0.5   ALK PHOS U/L  --   --  132*   ALT (SGPT) U/L  --   --  59*   AST (SGOT) U/L  --   --  55*   ANION GAP mmol/L 13.0 12.0  13.0     Estimated Creatinine Clearance: 49.9 mL/min (A) (by C-G formula based on SCr of 1.11 mg/dL (H)).          Results from last 7 days   Lab Units 01/18/22  0603 01/17/22  1336 01/15/22  1307   WBC 10*3/mm3 13.02* 14.63* 12.47*   HEMOGLOBIN g/dL 9.7* 10.1* 11.4*   HEMATOCRIT % 29.2* 30.7* 36.1   PLATELETS 10*3/mm3 319 302 327     Results from last 7 days   Lab Units 01/15/22  1307   INR  1.09       Culture Data:   Blood Culture   Date Value Ref Range Status   01/15/2022 No growth at 2 days  Preliminary   01/15/2022 No growth at 2 days  Preliminary     No results found for: URINECX  No results found for: RESPCX  No results found for: WOUNDCX  No results found for: STOOLCX  No components found for: BODYFLD    Radiology Data:   Imaging Results (Last 24 Hours)     ** No results found for the last 24 hours. **          I have reviewed the patient's current medications.     Assessment/Plan     Active Hospital Problems    Diagnosis    • **Multifocal pneumonia    • Obesity (BMI 30-39.9)    • Mixed hyperlipidemia    • Hypertension    • Diabetes mellitus (HCC)    • Congestive heart failure (HCC)    • GERD (gastroesophageal reflux disease)        Plan:    #1.  Multifocal pneumonia: Continue oxygen support PRN (currently on room air), Rocephin, doxycycline, duo nebs, incentive spirometry, Prednisone, Mucinex.  2.  Hypertension: Continue Coreg, Lisinopril  3.  Hyperlipidemia continue statin  4.:  Congestive heart failure: Currently euvolemic and without exacerbation.  Continue heart healthy diet, daily weights, Coreg, Lisinopril, Lasix  5.  Type 2 diabetes: Fingerstick blood sugars before meals and at bedtime with sliding scale insulin coverage.  Continue Levemir, Metformin.  ADA diet.  6.  Obesity with BMI of 38.24    Discharge planning: stable for discharge but reports she has no transportation today and does not have adequate heat in her home at this time.  Information relayed to case management.    I confirmed that the  patient's Advance Care Plan is present, code status is documented, or surrogate decision maker is listed in the patient's medical record.          This document has been electronically signed by MARYANNE Gutierres on January 18, 2022 14:54 CST

## 2022-01-18 NOTE — PLAN OF CARE
Patient awake and awaiting breakfast. Patient denies any pain this am. Still having SOA with exertion this am, continues on RA  Goal Outcome Evaluation:

## 2022-01-19 ENCOUNTER — HOME HEALTH ADMISSION (OUTPATIENT)
Dept: HOME HEALTH SERVICES | Facility: HOME HEALTHCARE | Age: 72
End: 2022-01-19

## 2022-01-19 ENCOUNTER — READMISSION MANAGEMENT (OUTPATIENT)
Dept: CALL CENTER | Facility: HOSPITAL | Age: 72
End: 2022-01-19

## 2022-01-19 VITALS
OXYGEN SATURATION: 97 % | TEMPERATURE: 97.2 F | BODY MASS INDEX: 40.54 KG/M2 | RESPIRATION RATE: 18 BRPM | SYSTOLIC BLOOD PRESSURE: 144 MMHG | DIASTOLIC BLOOD PRESSURE: 85 MMHG | HEIGHT: 62 IN | HEART RATE: 109 BPM | WEIGHT: 220.3 LBS

## 2022-01-19 LAB
GLUCOSE BLDC GLUCOMTR-MCNC: 136 MG/DL (ref 70–130)
GLUCOSE BLDC GLUCOMTR-MCNC: 74 MG/DL (ref 70–130)

## 2022-01-19 PROCEDURE — 82962 GLUCOSE BLOOD TEST: CPT

## 2022-01-19 PROCEDURE — 96372 THER/PROPH/DIAG INJ SC/IM: CPT

## 2022-01-19 PROCEDURE — 94664 DEMO&/EVAL PT USE INHALER: CPT

## 2022-01-19 PROCEDURE — G0378 HOSPITAL OBSERVATION PER HR: HCPCS

## 2022-01-19 PROCEDURE — 63710000001 PREDNISONE PER 1 MG: Performed by: NURSE PRACTITIONER

## 2022-01-19 PROCEDURE — 96366 THER/PROPH/DIAG IV INF ADDON: CPT

## 2022-01-19 PROCEDURE — 94760 N-INVAS EAR/PLS OXIMETRY 1: CPT

## 2022-01-19 PROCEDURE — 25010000002 HEPARIN (PORCINE) PER 1000 UNITS: Performed by: NURSE PRACTITIONER

## 2022-01-19 PROCEDURE — 94799 UNLISTED PULMONARY SVC/PX: CPT

## 2022-01-19 RX ORDER — CEFDINIR 300 MG/1
300 CAPSULE ORAL 2 TIMES DAILY
Qty: 10 CAPSULE | Refills: 0 | Status: SHIPPED | OUTPATIENT
Start: 2022-01-19 | End: 2022-01-24

## 2022-01-19 RX ORDER — DOXYCYCLINE HYCLATE 100 MG/1
100 CAPSULE ORAL 2 TIMES DAILY
Qty: 10 CAPSULE | Refills: 0 | Status: SHIPPED | OUTPATIENT
Start: 2022-01-19 | End: 2022-01-24

## 2022-01-19 RX ORDER — PANTOPRAZOLE SODIUM 40 MG/1
40 TABLET, DELAYED RELEASE ORAL DAILY
Qty: 30 TABLET | Refills: 0 | Status: SHIPPED | OUTPATIENT
Start: 2022-01-19 | End: 2022-01-01 | Stop reason: SDUPTHER

## 2022-01-19 RX ORDER — PREDNISONE 20 MG/1
40 TABLET ORAL DAILY
Qty: 4 TABLET | Refills: 0 | Status: SHIPPED | OUTPATIENT
Start: 2022-01-19 | End: 2022-01-21

## 2022-01-19 RX ORDER — ACETAMINOPHEN 325 MG/1
650 TABLET ORAL EVERY 4 HOURS PRN
Start: 2022-01-19 | End: 2023-01-01

## 2022-01-19 RX ADMIN — IPRATROPIUM BROMIDE AND ALBUTEROL SULFATE 3 ML: 2.5; .5 SOLUTION RESPIRATORY (INHALATION) at 07:55

## 2022-01-19 RX ADMIN — LISINOPRIL 10 MG: 10 TABLET ORAL at 08:40

## 2022-01-19 RX ADMIN — PANTOPRAZOLE SODIUM 40 MG: 40 TABLET, DELAYED RELEASE ORAL at 08:40

## 2022-01-19 RX ADMIN — IPRATROPIUM BROMIDE AND ALBUTEROL SULFATE 3 ML: 2.5; .5 SOLUTION RESPIRATORY (INHALATION) at 00:35

## 2022-01-19 RX ADMIN — DOXYCYCLINE 100 MG: 100 INJECTION, POWDER, LYOPHILIZED, FOR SOLUTION INTRAVENOUS at 05:29

## 2022-01-19 RX ADMIN — ISOSORBIDE MONONITRATE 30 MG: 30 TABLET, EXTENDED RELEASE ORAL at 08:40

## 2022-01-19 RX ADMIN — Medication 400 MG: at 08:40

## 2022-01-19 RX ADMIN — HEPARIN SODIUM 5000 UNITS: 5000 INJECTION INTRAVENOUS; SUBCUTANEOUS at 05:29

## 2022-01-19 RX ADMIN — PREDNISONE 40 MG: 20 TABLET ORAL at 08:40

## 2022-01-19 RX ADMIN — GUAIFENESIN 600 MG: 600 TABLET, EXTENDED RELEASE ORAL at 08:40

## 2022-01-19 RX ADMIN — ACETAMINOPHEN 650 MG: 325 TABLET, FILM COATED ORAL at 08:46

## 2022-01-19 RX ADMIN — METFORMIN HYDROCHLORIDE 1000 MG: 500 TABLET ORAL at 08:40

## 2022-01-19 RX ADMIN — POTASSIUM CHLORIDE 10 MEQ: 750 CAPSULE, EXTENDED RELEASE ORAL at 08:40

## 2022-01-19 RX ADMIN — FUROSEMIDE 40 MG: 40 TABLET ORAL at 08:40

## 2022-01-19 RX ADMIN — IPRATROPIUM BROMIDE AND ALBUTEROL SULFATE 3 ML: 2.5; .5 SOLUTION RESPIRATORY (INHALATION) at 03:49

## 2022-01-19 RX ADMIN — SODIUM CHLORIDE, PRESERVATIVE FREE 10 ML: 5 INJECTION INTRAVENOUS at 08:41

## 2022-01-19 NOTE — OUTREACH NOTE
Prep Survey      Responses   [facility] facility patient discharged from? [facility]   Is LACE score < 7 ? No   Emergency Room discharge w/ pulse ox? No   Eligibility [facility]   Date of Admission 01/15/22   Date of Discharge 01/19/22   Discharge Disposition Home-Health Care Svc   Discharge diagnosis Multifocal pneumonia and chf   Does the patient have one of the following disease processes/diagnoses(primary or secondary)? COPD/Pneumonia   Does the patient have Home health ordered? Yes   What is the Home health agency?  [facility]   Is there a DME ordered? No   Prep survey completed? Yes          Brianne Jorge RN

## 2022-01-19 NOTE — DISCHARGE SUMMARY
[facility] Medicine Services  DISCHARGE SUMMARY       Date of Admission: 1/15/2022  Date of Discharge:  1/19/2022  Primary Care Physician: Joyce Plata MD    Presenting Problem/History of Present Illness:  Pneumonia of both lungs due to infectious organism, unspecified part of lung [J18.9]  Chest pain, unspecified type [R07.9]       Final Discharge Diagnoses:  Active Hospital Problems    Diagnosis    • **Multifocal pneumonia    • Obesity (BMI 30-39.9)    • Mixed hyperlipidemia    • Hypertension    • Diabetes mellitus (HCC)    • Congestive heart failure (HCC)    • GERD (gastroesophageal reflux disease)        Consults:   Consults     No orders found from 12/17/2021 to 1/16/2022.          Procedures Performed:                 Pertinent Test Results:   Lab Results (last 24 hours)     Procedure Component Value Units Date/Time    POC Glucose Once [[id number]]  (Normal) Collected: 01/19/22 0553    Specimen: Blood Updated: 01/19/22 0639     Glucose 74 mg/dL      Comment: RN NotifiedOperator: [id number] [place] FAITHMeter ID: [id number]       POC Glucose Once [[id number]]  (Abnormal) Collected: 01/18/22 1930    Specimen: Blood Updated: 01/18/22 2018     Glucose 239 mg/dL      Comment: RN NotifiedOperator: [id number] B[place] SYBILMeter ID: [id number]       POC Glucose Once [[id number]]  (Abnormal) Collected: 01/18/22 1608    Specimen: Blood Updated: 01/18/22 1653     Glucose 240 mg/dL      Comment: RN NotifiedOperator: [id number] VANDANA TIFFANYMeter ID: [id number]       Blood Culture - Blood, Arm, Right [[id number]]  (Normal) Collected: 01/15/22 1623    Specimen: Blood from Arm, Right Updated: 01/18/22 1630     Blood Culture No growth at 3 days    Blood Culture - Blood, Arm, Left [[id number]]  (Normal) Collected: 01/15/22 1622    Specimen: Blood from Arm, Left Updated: 01/18/22 1630     Blood Culture No growth at 3 days    POC Glucose Once [[id number]]  (Normal) Collected:  01/18/22 1028    Specimen: Blood Updated: 01/18/22 1044     Glucose 115 mg/dL      Comment: RN NotifiedOperator: [id number] VANDANA Montague ID: [id number]           Imaging Results (All)     Procedure Component Value Units Date/Time    CT Angiogram Chest [[id number]] Collected: 01/15/22 1548     Updated: 01/15/22 1633    Narrative:      EXAM:  CT CHEST ANGIOGRAPHY WITH IV CONTRAST    ORDERING PROVIDER:  JESUS RAMOS    CLINICAL HISTORY:  Shortness of breath    COMPARISON:      TECHNIQUE:   Chest CT was performed using a high resolution pulmonary  angiogram protocol with 69ml of Isovue-370 as IV contrast and  reformatted in the sagittal and coronal planes.     3-dimensional images also acquired with special processing of the  CT scan data with a specialized workstation for evaluation.    This examination was performed according to our departmental dose  optimization program which includes automated exposure control,  adjustment of the MA and kV according to patient size, and/or use  of iterative reconstruction technique.     FINDINGS:     LUNGS AND PLEURA: Bilateral small pleural effusion, more on the  right side. Bilateral diffuse airspace process, concerning for  multifocal pneumonia. No pneumothorax. No evidence of gross  endotracheal or endobronchial lesion.    HEART: Prominence in size and configuration. Small amount of  pericardial effusion.     MEDIASTINUM AND RACHNA: No significant adenopathy.     AORTA AND GREAT VESSELS: No aneurysm or dissection.     PULMONARY ARTERIES: No filling defects.     UPPER ABDOMEN: Unremarkable.    MUSCULOSKELETAL: Left pacemaker in place.  No aggressive osseous  lesion.. Unremarkable vertebral body height and alignment. No  lytic or sclerotic lesion.     EXTRATHORACIC SOFT TISSUES: No axillary adenopathy. No mass.  Unremarkable supraclavicular soft tissues.       Impression:      1.  No evidence of pulmonary embolism.  2.  Bilateral small pleural effusion.  3.   Multifocal pneumonia.  4.  Small pericardial effusion.    Electronically signed by:  Reggie Monae MD  1/15/2022 4:32 PM CST  Workstation: [id number]    XR Chest 1 View [[id number]] Collected: 01/15/22 1300     Updated: 01/15/22 1343    Narrative:      PROCEDURE: XR CHEST 1 VW    VIEWS:Single    INDICATION: Chest pain    COMPARISON: CXR: 11/28/2021    FINDINGS:       - lines/tubes: None. Left-sided pacemaker/AICD in stable  position    - cardiac: Mild stable cardiomegaly.    - mediastinum: Contour unchanged from prior.     - lungs: Ill-defined bilateral opacities, right greater than  left, with perihilar predominance.     - pleura: No evidence of  fluid.      - osseous: Unremarkable for age.        Impression:      Ill-defined prominently perihilar opacity, which could represent  infectious process or more likely, pulmonary edema and clinical  correlation needed    Electronically signed by:  Joyce Joyce MD  1/15/2022 1:42 PM CST  Workstation: [id number]            Chief Complaint on Day of Discharge: None    Hospital Course:  71-year-old female with past medical history of type 2 diabetes, chronic systolic congestive heart failure, hypertension, hyperlipidemia, obstructive sleep apnea, gastroesophageal reflux disease who presented on 1/15/2022 with complaints of cough, fatigue, shortness of breath.  She was admitted for multifocal community-acquired pneumonia and provided with oxygen therapy, DuoNebs, Rocephin, doxycycline.  With mentioned treatment, patient's condition has improved.  She is currently on room air and participating with activities of daily living.  She remains deconditioned and home health has been arranged for respiratory assessments and therapy.  She has been seen by case management team and offered resources for utility bill assistance and transportation assistance via PACS as these were issues voiced by patient during the hospital stay.  She will discharge home on oral antibiotic therapy and has  "been instructed to follow-up with her primary care provider.    The patient has current or prior documentation of LVEF less than 40%, or moderate to severely depressed left ventricular systolic function. The patent was prescribed or already taking an ACE inhibitor or ARB.      The patient has current or prior documentation of LVEF less than 40%, or moderate to severely depressed left ventricular systolic function. The patient was prescribed or already taking a beta-blocker.        Condition on Discharge: Stable    Physical Exam on Discharge:  /85 (BP Location: Left arm, Patient Position: Sitting)   Pulse 109   Temp 97.2 °F (36.2 °C) (Temporal)   Resp 18   Ht 157.5 cm (62\")   Wt 99.9 kg (220 lb 4.8 oz)   SpO2 97%   BMI 40.29 kg/m²   Physical Exam  Vitals and nursing note reviewed.   Constitutional:       General: She is not in acute distress.     Appearance: Normal appearance. She is not ill-appearing.   HENT:      Head: Normocephalic and atraumatic.      Right Ear: External ear normal.      Left Ear: External ear normal.      Nose: Nose normal.      Mouth/Throat:      Mouth: Mucous membranes are moist.      Pharynx: Oropharynx is clear.   Eyes:      General: No scleral icterus.        Right eye: No discharge.         Left eye: No discharge.      Conjunctiva/sclera: Conjunctivae normal.   Cardiovascular:      Rate and Rhythm: Normal rate and regular rhythm.      Pulses: Normal pulses.      Heart sounds: Normal heart sounds. No murmur heard.  No friction rub. No gallop.    Pulmonary:      Effort: No accessory muscle usage or respiratory distress.      Breath sounds: No stridor. Examination of the right-lower field reveals decreased breath sounds. Examination of the left-lower field reveals decreased breath sounds. Decreased breath sounds. No rhonchi or rales.  Abdominal:      General: Bowel sounds are normal. There is no distension.      Palpations: Abdomen is soft.      Tenderness: There is no abdominal " "tenderness.   Musculoskeletal:         General: Normal range of motion.      Cervical back: Normal range of motion and neck supple.      Right lower leg: No edema.      Left lower leg: No edema.   Skin:     General: Skin is warm and dry.   Neurological:      General: No focal deficit present.      Mental Status: She is alert and oriented to person, place, and time.   Psychiatric:         Mood and Affect: Mood normal.         Behavior: Behavior normal.        [place]charge Disposition:  [place] [facility] [place]charge Medications:     Discharge Medications      New Medications      Instructions Start Date   acetaminophen 325 MG tablet  Commonly known as: TYLENOL   650 mg, Oral, Every 4 Hours PRN      cefdinir 300 MG capsule  Commonly known as: OMNICEF   300 mg, Oral, 2 Times Daily      doxycycline 100 MG capsule  Commonly known as: VIBRAMYCIN   100 mg, Oral, 2 Times Daily      predniSONE 20 MG tablet  Commonly known as: DELTASONE   40 mg, Oral, Daily         Continue These Medications      Instructions Start Date   albuterol sulfate  (90 Base) MCG/ACT inhaler  Commonly known as: PROVENTIL HFA;VENTOLIN HFA;PROAIR HFA   INHALE 2 PUFFS EVERY 4 (FOUR) HOURS AS NEEDED FOR WHEEZING OR SHORTNESS OF AIR.      aspirin 81 MG EC tablet   81 mg, Oral, Nightly      atorvastatin 40 MG tablet  Commonly known as: LIPITOR   40 mg, Oral, Daily      B-D UF III MINI PEN NEEDLES 31G X 5 MM misc  Generic drug: Insulin Pen Needle   USE WITH INSULIN INJECTIONS NIGHTLY      diclofenac 1 % gel gel  Commonly known as: VOLTAREN   APPLY TO AFFECTED AREA 4 TIMES A DAY Oral Volteran DC'D)      furosemide 40 MG tablet  Commonly known as: Lasix   40 mg, Oral, 2 Times Daily      guaiFENesin 600 MG 12 hr tablet  Commonly known as: MUCINEX   600 mg, Oral, Every 12 Hours Scheduled      Insulin Syringe 31G X 5/16\" 0.5 ML misc   1 each, Subcutaneous, Nightly      ipratropium-albuterol 0.5-2.5 mg/3 ml nebulizer  Commonly known as: DUO-NEB   3 mL, " Nebulization, 4 Times Daily - RT      isosorbide mononitrate 30 MG 24 hr tablet  Commonly known as: IMDUR   30 mg, Oral, Daily      Levemir FlexTouch 100 UNIT/ML injection  Generic drug: insulin detemir   30 Units, Subcutaneous, Nightly      lisinopril 10 MG tablet  Commonly known as: PRINIVIL,ZESTRIL   TAKE 1 TABLET BY MOUTH EVERY DAY      magnesium oxide 400 (241.3 Mg) MG tablet tablet  Commonly known as: MAGOX   400 mg, Oral, Daily      metFORMIN 1000 MG tablet  Commonly known as: GLUCOPHAGE   1,000 mg, Oral, 2 Times Daily With Meals      pantoprazole 40 MG EC tablet  Commonly known as: Protonix   40 mg, Oral, Daily      potassium chloride 10 MEQ CR capsule  Commonly known as: MICRO-K   10 mEq, Oral, 2 Times Daily         Stop These Medications    carvedilol 25 MG tablet  Commonly known as: COREG     nitroglycerin 0.4 MG SL tablet  Commonly known as: Nitrostat     vitamin D 1.25 MG (92222 UT) capsule capsule  Commonly known as: ERGOCALCIFEROL            Discharge Diet:   Diet Instructions     Diet: Consistent Carbohydrate, Cardiac; Thin      Discharge Diet:  Consistent Carbohydrate  Cardiac       Fluid Consistency: Thin          Activity at Discharge:   Activity Instructions     Activity as Tolerated            Discharge Care Plan/Instructions: Follow-up with PCP within 1 week of discharge.    Follow-up Appointments:   Additional Instructions for the Follow-ups that You Need to Schedule     Ambulatory Referral to Home Health (Hospital)   As directed      Face to Face Visit Date: 1/18/2022    Follow-up provider for Plan of Care?: I treated the patient in an acute care facility and will not continue treatment after discharge.    Follow-up provider: ALIREZA GREGORY [[id number]]    Reason/Clinical Findings: deconditioning r/t recent pneumonia    Describe mobility limitations that make leaving home difficult: deconditioning r/t recent pneumonia    Nursing/Therapeutic Services Requested: Skilled Nursing    Skilled nursing  orders: Cardiopulmonary assessments    Frequency: 1 Week 1         Discharge Follow-up with PCP   As directed       Currently Documented PCP:    Joyce Plata MD    PCP Phone Number:    [phone]     Follow Up Details: one week            Follow-up Information     Joyce Plata MD. Go on 1/25/2022.    Specialties: Family Medicine, Hospitalist, Emergency Medicine, Urgent Care  Why: one week;Tuesday January 25th 3:15 pm  Contact information:  [facility] [address]  [place] [address]  [phone]                         Test Results Pending at Discharge:   Pending Labs     Order Current Status    Blood Culture - Blood, Arm, Left Preliminary result    Blood Culture - Blood, Arm, Right Preliminary result              This document has been electronically signed by MARYANNE Gutierres on January 19, 2022 10:39 CST        Time: 30 minutes spent on assessment, discussion, management, and discharge planning for this patient.

## 2022-01-19 NOTE — DISCHARGE PLACEMENT REQUEST
"Olga Raymond RN [facility]  [phone] Phone  [phone] Fax    Carline Norris (71 y.o. Female)             Date of Birth Social Security Number Address Home Phone MRN    1950  [id number]  [address]   [facility] [address] [phone] [id number]    Congregation Marital Status             Scientology Single       Admission Date Admission Type Admitting Provider Attending Provider Department, Room/Bed    1/15/22 Emergency Michael Murcia MD Iqbal, Javed Syed, MD [facility] [address] [place], 318/1    Discharge Date Discharge Disposition Discharge Destination           Home-Health Care [facility] [place]              Attending Provider: Michael Murcia MD    Allergies: Aldactone [Spironolactone], Nsaids    Isolation: None   Infection: None   Code Status: CPR   Advance Care Planning Activity    Ht: 157.5 cm (62\")   Wt: 99.9 kg (220 lb 4.8 oz)    Admission Cmt: None   Principal Problem: Multifocal pneumonia [J18.9]                 Active Insurance as of 1/15/2022     Primary Coverage     Payor Plan Insurance Group Employer/Plan Group    MEDICARE MEDICARE A & B      Payor Plan Address Payor Plan Phone Number Payor Plan Fax Number Effective Dates    [address] [phone]  11/1/2000 - None Entered    [facility] [id number]       Subscriber Name Subscriber Birth Date Member ID       CARLINE NORRIS 1950 [id number]           Secondary Coverage     Payor Plan Insurance Group Employer/Plan Group    [place]      Payor Plan Address Payor Plan Phone Number Payor Plan Fax Number Effective Dates    [address] [phone]  10/4/2016 - None Entered    [facility] [place] [address]       Subscriber Name Subscriber Birth Date Member ID       CARLINE NORRIS 1950 [id number]                 Emergency Contacts      (Rel.) Home Phone Work Phone Mobile Phone    MauroYudi (Sister) [phone] -- [phone]    "         Insurance Information                MEDICARE/MEDICARE A & B Phone: [phone]    Subscriber: Lexi Wade Subscriber#: [id number]    Group#: -- Precert#: --        [place] Phone: [phone]    Subscriber: Lexi Wade Subscriber#: [id number]    Group#: -- Precert#: --

## 2022-01-20 ENCOUNTER — HOME CARE VISIT (OUTPATIENT)
Dept: HOME HEALTH SERVICES | Facility: CLINIC | Age: 72
End: 2022-01-20

## 2022-01-20 ENCOUNTER — TRANSITIONAL CARE MANAGEMENT TELEPHONE ENCOUNTER (OUTPATIENT)
Dept: CALL CENTER | Facility: HOSPITAL | Age: 72
End: 2022-01-20

## 2022-01-20 LAB
BACTERIA SPEC AEROBE CULT: NORMAL
BACTERIA SPEC AEROBE CULT: NORMAL

## 2022-01-20 PROCEDURE — G0299 HHS/HOSPICE OF RN EA 15 MIN: HCPCS

## 2022-01-20 NOTE — OUTREACH NOTE
Call Center TCM Note      Responses   [employer] [facility] patient discharged from? [place]   Does the patient have one of the following disease processes/diagnoses(primary or secondary)? COPD/Pneumonia   Was the primary reason for admission: Pneumonia   TCM attempt successful? No  [ verbal release]   Unsuccessful attempts Attempt 1  [attempted pt and emergency contact]   Comments regarding PCP Hospital PCP FOLLOW UP APPOINTMENT IS 22@315pm          Debbie Simmons RN    2022, 14:44 EST

## 2022-01-21 ENCOUNTER — TRANSITIONAL CARE MANAGEMENT TELEPHONE ENCOUNTER (OUTPATIENT)
Dept: CALL CENTER | Facility: HOSPITAL | Age: 72
End: 2022-01-21

## 2022-01-21 VITALS — OXYGEN SATURATION: 98 % | TEMPERATURE: 97.2 F | RESPIRATION RATE: 20 BRPM | HEART RATE: 97 BPM

## 2022-01-21 RX ORDER — INSULIN DETEMIR 100 [IU]/ML
35 INJECTION, SOLUTION SUBCUTANEOUS NIGHTLY
Qty: 5 PEN | Refills: 5 | Status: ON HOLD | OUTPATIENT
Start: 2022-01-21 | End: 2022-01-01 | Stop reason: SDUPTHER

## 2022-01-21 NOTE — OUTREACH NOTE
Call Center TCM Note      Responses   [facility] patient discharged from? [place]   Does the patient have one of the following disease processes/diagnoses(primary or secondary)? COPD/Pneumonia   Was the primary reason for admission: Pneumonia   TCM attempt successful? No   Unsuccessful attempts Attempt 3          Chuy King RN    1/21/2022, 08:46 EST

## 2022-01-24 ENCOUNTER — HOME CARE VISIT (OUTPATIENT)
Dept: HOME HEALTH SERVICES | Facility: CLINIC | Age: 72
End: 2022-01-24

## 2022-01-24 VITALS
RESPIRATION RATE: 20 BRPM | TEMPERATURE: 97.9 F | HEART RATE: 90 BPM | SYSTOLIC BLOOD PRESSURE: 160 MMHG | OXYGEN SATURATION: 96 % | DIASTOLIC BLOOD PRESSURE: 86 MMHG

## 2022-01-24 PROCEDURE — G0493 RN CARE EA 15 MIN HH/HOSPICE: HCPCS

## 2022-01-25 PROCEDURE — G0180 MD CERTIFICATION HHA PATIENT: HCPCS | Performed by: GENERAL PRACTICE

## 2022-01-25 RX ORDER — FUROSEMIDE 40 MG/1
TABLET ORAL
Qty: 60 TABLET | Refills: 1 | Status: SHIPPED | OUTPATIENT
Start: 2022-01-25 | End: 2022-01-27 | Stop reason: SDUPTHER

## 2022-01-27 ENCOUNTER — READMISSION MANAGEMENT (OUTPATIENT)
Dept: CALL CENTER | Facility: HOSPITAL | Age: 72
End: 2022-01-27

## 2022-01-27 DIAGNOSIS — E11.8 TYPE 2 DIABETES MELLITUS WITH COMPLICATION, WITHOUT LONG-TERM CURRENT USE OF INSULIN: Chronic | ICD-10-CM

## 2022-01-27 DIAGNOSIS — E11.49 NEUROLOGIC DISORDER ASSOCIATED WITH DIABETES MELLITUS: Chronic | ICD-10-CM

## 2022-01-27 RX ORDER — FUROSEMIDE 40 MG/1
40 TABLET ORAL 2 TIMES DAILY
Qty: 60 TABLET | Refills: 1 | Status: SHIPPED | OUTPATIENT
Start: 2022-01-27 | End: 2022-02-24

## 2022-01-27 RX ORDER — PREGABALIN 150 MG/1
CAPSULE ORAL
Qty: 180 CAPSULE | Refills: 0 | OUTPATIENT
Start: 2022-01-27

## 2022-01-27 NOTE — OUTREACH NOTE
COPD/PN Week 1 Survey      Responses   [facility] patient discharged from? [place]   Does the patient have one of the following disease processes/diagnoses(primary or secondary)? COPD/Pneumonia   Was the primary reason for admission: Pneumonia   Discharge diagnosis Multifocal pneumonia and chf          Aubree Chavira RN

## 2022-01-28 DIAGNOSIS — E11.49 NEUROLOGIC DISORDER ASSOCIATED WITH DIABETES MELLITUS: Chronic | ICD-10-CM

## 2022-01-28 RX ORDER — PREGABALIN 150 MG/1
CAPSULE ORAL
Qty: 180 CAPSULE | Refills: 0 | OUTPATIENT
Start: 2022-01-28

## 2022-01-28 NOTE — TELEPHONE ENCOUNTER
Lyrica DC  The original prescription was discontinued on 12/6/2021 by Joyce Plata MD. Renewing this prescription may not be appropriate      Next Appt  With Family Medicine (Joyce Plata MD)  02/02/2022 at 8:45 AM

## 2022-01-29 NOTE — TELEPHONE ENCOUNTER
The original prescription was discontinued on 12/6/2021 by Joyce Plata MD. Renewing this prescription may not be appropriate.    Refill Denied, Script DC 12/06/2021

## 2022-01-31 ENCOUNTER — READMISSION MANAGEMENT (OUTPATIENT)
Dept: CALL CENTER | Facility: HOSPITAL | Age: 72
End: 2022-01-31

## 2022-01-31 DIAGNOSIS — E11.49 NEUROLOGIC DISORDER ASSOCIATED WITH DIABETES MELLITUS: Chronic | ICD-10-CM

## 2022-01-31 RX ORDER — PREGABALIN 150 MG/1
CAPSULE ORAL
Qty: 180 CAPSULE | OUTPATIENT
Start: 2022-01-31

## 2022-01-31 NOTE — OUTREACH NOTE
COPD/PN Week 2 Survey      Responses   [facility] patient discharged from? [place]   Does the patient have one of the following disease processes/diagnoses(primary or secondary)? COPD/Pneumonia   Was the primary reason for admission: Pneumonia   Week 2 attempt successful? No   Unsuccessful attempts Attempt 2  [5th unanswered call]   Revoke Decline to participate          Josy Mahajan RN

## 2022-02-01 ENCOUNTER — HOME CARE VISIT (OUTPATIENT)
Dept: HOME HEALTH SERVICES | Facility: CLINIC | Age: 72
End: 2022-02-01

## 2022-02-01 VITALS
HEART RATE: 77 BPM | SYSTOLIC BLOOD PRESSURE: 102 MMHG | RESPIRATION RATE: 18 BRPM | DIASTOLIC BLOOD PRESSURE: 74 MMHG | TEMPERATURE: 97.9 F | OXYGEN SATURATION: 99 %

## 2022-02-01 PROCEDURE — G0493 RN CARE EA 15 MIN HH/HOSPICE: HCPCS

## 2022-02-02 ENCOUNTER — OFFICE VISIT (OUTPATIENT)
Dept: FAMILY MEDICINE CLINIC | Facility: CLINIC | Age: 72
End: 2022-02-02

## 2022-02-02 ENCOUNTER — LAB (OUTPATIENT)
Dept: LAB | Facility: HOSPITAL | Age: 72
End: 2022-02-02

## 2022-02-02 VITALS
BODY MASS INDEX: 39.75 KG/M2 | WEIGHT: 216 LBS | OXYGEN SATURATION: 94 % | SYSTOLIC BLOOD PRESSURE: 122 MMHG | DIASTOLIC BLOOD PRESSURE: 83 MMHG | HEIGHT: 62 IN | HEART RATE: 77 BPM

## 2022-02-02 DIAGNOSIS — R07.89 OTHER CHEST PAIN: ICD-10-CM

## 2022-02-02 DIAGNOSIS — I50.9 CHRONIC CONGESTIVE HEART FAILURE, UNSPECIFIED HEART FAILURE TYPE: ICD-10-CM

## 2022-02-02 DIAGNOSIS — D64.9 ANEMIA, UNSPECIFIED TYPE: ICD-10-CM

## 2022-02-02 DIAGNOSIS — J18.9 MULTIFOCAL PNEUMONIA: Primary | ICD-10-CM

## 2022-02-02 LAB
ANION GAP SERPL CALCULATED.3IONS-SCNC: 13.6 MMOL/L (ref 5–15)
BASOPHILS # BLD AUTO: 0.04 10*3/MM3 (ref 0–0.2)
BASOPHILS NFR BLD AUTO: 0.4 % (ref 0–1.5)
BUN SERPL-MCNC: 12 MG/DL (ref 8–23)
BUN/CREAT SERPL: 10.8 (ref 7–25)
CALCIUM SPEC-SCNC: 9.5 MG/DL (ref 8.6–10.5)
CHLORIDE SERPL-SCNC: 104 MMOL/L (ref 98–107)
CO2 SERPL-SCNC: 22.4 MMOL/L (ref 22–29)
CREAT SERPL-MCNC: 1.11 MG/DL (ref 0.57–1)
DEPRECATED RDW RBC AUTO: 46.9 FL (ref 37–54)
EOSINOPHIL # BLD AUTO: 0.13 10*3/MM3 (ref 0–0.4)
EOSINOPHIL NFR BLD AUTO: 1.2 % (ref 0.3–6.2)
ERYTHROCYTE [DISTWIDTH] IN BLOOD BY AUTOMATED COUNT: 14.5 % (ref 12.3–15.4)
GFR SERPL CREATININE-BSD FRML MDRD: 58 ML/MIN/1.73
GLUCOSE SERPL-MCNC: 143 MG/DL (ref 65–99)
HCT VFR BLD AUTO: 34.2 % (ref 34–46.6)
HGB BLD-MCNC: 11 G/DL (ref 12–15.9)
IMM GRANULOCYTES # BLD AUTO: 0.03 10*3/MM3 (ref 0–0.05)
IMM GRANULOCYTES NFR BLD AUTO: 0.3 % (ref 0–0.5)
LYMPHOCYTES # BLD AUTO: 3.17 10*3/MM3 (ref 0.7–3.1)
LYMPHOCYTES NFR BLD AUTO: 29.8 % (ref 19.6–45.3)
MCH RBC QN AUTO: 28.2 PG (ref 26.6–33)
MCHC RBC AUTO-ENTMCNC: 32.2 G/DL (ref 31.5–35.7)
MCV RBC AUTO: 87.7 FL (ref 79–97)
MONOCYTES # BLD AUTO: 1.09 10*3/MM3 (ref 0.1–0.9)
MONOCYTES NFR BLD AUTO: 10.3 % (ref 5–12)
NEUTROPHILS NFR BLD AUTO: 58 % (ref 42.7–76)
NEUTROPHILS NFR BLD AUTO: 6.16 10*3/MM3 (ref 1.7–7)
NRBC BLD AUTO-RTO: 0.1 /100 WBC (ref 0–0.2)
PLATELET # BLD AUTO: 284 10*3/MM3 (ref 140–450)
PMV BLD AUTO: 10.8 FL (ref 6–12)
POTASSIUM SERPL-SCNC: 4.1 MMOL/L (ref 3.5–5.2)
RBC # BLD AUTO: 3.9 10*6/MM3 (ref 3.77–5.28)
SODIUM SERPL-SCNC: 140 MMOL/L (ref 136–145)
WBC NRBC COR # BLD: 10.62 10*3/MM3 (ref 3.4–10.8)

## 2022-02-02 PROCEDURE — 99495 TRANSJ CARE MGMT MOD F2F 14D: CPT | Performed by: GENERAL PRACTICE

## 2022-02-02 PROCEDURE — 36415 COLL VENOUS BLD VENIPUNCTURE: CPT | Performed by: GENERAL PRACTICE

## 2022-02-02 PROCEDURE — 80048 BASIC METABOLIC PNL TOTAL CA: CPT | Performed by: GENERAL PRACTICE

## 2022-02-02 PROCEDURE — 85025 COMPLETE CBC W/AUTO DIFF WBC: CPT | Performed by: GENERAL PRACTICE

## 2022-02-02 RX ORDER — NITROGLYCERIN 0.4 MG/1
0.4 TABLET SUBLINGUAL
Qty: 25 TABLET | Refills: 0 | Status: SHIPPED | OUTPATIENT
Start: 2022-02-02 | End: 2022-01-01 | Stop reason: SDUPTHER

## 2022-02-02 RX ORDER — CARVEDILOL 25 MG/1
25 TABLET ORAL 2 TIMES DAILY WITH MEALS
Qty: 180 TABLET | Refills: 3 | Status: SHIPPED | OUTPATIENT
Start: 2022-02-02

## 2022-02-02 NOTE — PROGRESS NOTES
Transitional Care Follow Up Visit  Subjective     Lexi Wade is a 71 y.o. female who presents for a transitional care management visit.    Within 48 business hours after discharge our office contacted her via telephone to coordinate her care and needs.      I reviewed and discussed the details of that call along with the discharge summary, hospital problems, inpatient lab results, inpatient diagnostic studies, and consultation reports with Lexi.     Current outpatient and discharge medications have been reconciled for the patient.  Reviewed by: Joyce Plata MD      Date of TCM Phone Call 1/19/2022   [facility]   Date of Admission 1/15/2022   Date of Discharge 1/19/2022   [place] [facility] [place]     Risk for Readmission (LACE) Score: 11 (1/19/2022  5:01 AM)      History of Present Illness   Course During Hospital Stay:  Recent hospitalization, labs, xrays reviewed and medications reconciled.  Had chest pain and found to have multifocal pneumonia.  Treated with IV antibiotics neb treatments and oxygen support.    Copied from hospital record:   Hospital Course:  71-year-old female with past medical history of type 2 diabetes, chronic systolic congestive heart failure, hypertension, hyperlipidemia, obstructive sleep apnea, gastroesophageal reflux disease who presented on 1/15/2022 with complaints of cough, fatigue, shortness of breath.  She was admitted for multifocal community-acquired pneumonia and provided with oxygen therapy, DuoNebs, Rocephin, doxycycline.  With mentioned treatment, patient's condition has improved.  She is currently on room air and participating with activities of daily living.  She remains deconditioned and home health has been arranged for respiratory assessments and therapy.  She has been seen by case management team and offered resources for utility bill assistance and transportation assistance via PACS as these were issues voiced by patient during the  "hospital stay.  She will discharge home on oral antibiotic therapy and has been instructed to follow-up with her primary care provider.     The patient has current or prior documentation of LVEF less than 40%, or moderate to severely depressed left ventricular systolic function. The patent was prescribed or already taking an ACE inhibitor or ARB.       The patient has current or prior documentation of LVEF less than 40%, or moderate to severely depressed left ventricular systolic function. The patient was prescribed or already taking a beta-blocker.       The following portions of the patient's history were reviewed and updated as appropriate: allergies, current medications, past family history, past medical history, past social history, past surgical history and problem list.  Outpatient Medications Prior to Visit   Medication Sig Dispense Refill   • acetaminophen (TYLENOL) 325 MG tablet Take 2 tablets by mouth Every 4 (Four) Hours As Needed for Mild Pain .     • albuterol sulfate  (90 Base) MCG/ACT inhaler INHALE 2 PUFFS EVERY 4 (FOUR) HOURS AS NEEDED FOR WHEEZING OR SHORTNESS OF AIR. 18 g 1   • aspirin (aspirin) 81 MG EC tablet Take 81 mg by mouth Every Night.     • atorvastatin (LIPITOR) 40 MG tablet Take 1 tablet by mouth Daily. 90 tablet 3   • B-D UF III MINI PEN NEEDLES 31G X 5 MM misc USE WITH INSULIN INJECTIONS NIGHTLY 100 each 3   • diclofenac (VOLTAREN) 1 % gel gel APPLY TO AFFECTED AREA 4 TIMES A DAY Oral Volteran DC'D) 100 g 3   • furosemide (LASIX) 40 MG tablet Take 1 tablet by mouth 2 (Two) Times a Day. 60 tablet 1   • guaiFENesin (MUCINEX) 600 MG 12 hr tablet Take 1 tablet by mouth Every 12 (Twelve) Hours. 60 tablet 1   • insulin detemir (Levemir FlexTouch) 100 UNIT/ML injection Inject 35 Units under the skin into the appropriate area as directed Every Night. 5 pen 5   • Insulin Syringe 31G X 5/16\" 0.5 ML misc Inject 1 each under the skin into the appropriate area as directed Every Night. 100 " "each 1   • ipratropium-albuterol (DUO-NEB) 0.5-2.5 mg/3 ml nebulizer Take 3 mL by nebulization 4 (Four) Times a Day. 360 mL 12   • isosorbide mononitrate (IMDUR) 30 MG 24 hr tablet Take 1 tablet by mouth Daily. 90 tablet 3   • lisinopril (PRINIVIL,ZESTRIL) 10 MG tablet TAKE 1 TABLET BY MOUTH EVERY DAY 90 tablet 3   • magnesium oxide (MAGOX) 400 (241.3 Mg) MG tablet tablet Take 1 tablet by mouth Daily. 90 each 3   • metFORMIN (GLUCOPHAGE) 1000 MG tablet Take 1 tablet by mouth 2 (Two) Times a Day With Meals. 180 tablet 0   • pantoprazole (Protonix) 40 MG EC tablet Take 1 tablet by mouth Daily. 30 tablet 0   • potassium chloride (MICRO-K) 10 MEQ CR capsule Take 1 capsule by mouth 2 (Two) Times a Day. 180 capsule 1     No facility-administered medications prior to visit.       Review of Systems  I have reviewed 12 systems with patient. Findings were negative except what is noted below and/or in history of present illness.    Objective   Visit Vitals  /83 (BP Location: Left arm)   Pulse 77   Ht 157.5 cm (62\")   Wt 98 kg (216 lb)   SpO2 94%   BMI 39.51 kg/m²     Physical Exam  Vitals and nursing note reviewed.   Constitutional:       General: She is not in acute distress.     Appearance: She is well-developed.   HENT:      Head: Normocephalic and atraumatic.      Nose: Nose normal.   Eyes:      General:         Right eye: No discharge.         Left eye: No discharge.      Conjunctiva/sclera: Conjunctivae normal.      Pupils: Pupils are equal, round, and reactive to light.   Neck:      Thyroid: No thyromegaly.   Cardiovascular:      Rate and Rhythm: Normal rate and regular rhythm.      Heart sounds: Normal heart sounds.   Pulmonary:      Effort: Pulmonary effort is normal.      Breath sounds: Normal breath sounds.   Lymphadenopathy:      Cervical: No cervical adenopathy.   Skin:     General: Skin is warm and dry.   Neurological:      Mental Status: She is alert and oriented to person, place, and time. "       Assessment/Plan   Diagnoses and all orders for this visit:    1. Multifocal pneumonia (Primary)    2. Chronic congestive heart failure, unspecified heart failure type (HCC)  -     carvedilol (COREG) 25 MG tablet; Take 1 tablet by mouth 2 (Two) Times a Day With Meals.  Dispense: 180 tablet; Refill: 3  -     Basic Metabolic Panel  -     CBC & Differential    3. Other chest pain  -     nitroglycerin (Nitrostat) 0.4 MG SL tablet; Place 1 tablet under the tongue Every 5 (Five) Minutes As Needed for Chest Pain. Take no more than 3 doses in 15 minutes.  Dispense: 25 tablet; Refill: 0    4. Anemia, unspecified type  -     Basic Metabolic Panel  -     CBC & Differential    Restart Coreg. Will notify regarding results. Follow up as scheduled.     New Medications Ordered This Visit   Medications   • nitroglycerin (Nitrostat) 0.4 MG SL tablet     Sig: Place 1 tablet under the tongue Every 5 (Five) Minutes As Needed for Chest Pain. Take no more than 3 doses in 15 minutes.     Dispense:  25 tablet     Refill:  0   • carvedilol (COREG) 25 MG tablet     Sig: Take 1 tablet by mouth 2 (Two) Times a Day With Meals.     Dispense:  180 tablet     Refill:  3       Current outpatient and discharge medications have been reconciled for the patient.  Reviewed by: Joyce Plata MD      No follow-ups on file.

## 2022-02-09 ENCOUNTER — HOME CARE VISIT (OUTPATIENT)
Dept: HOME HEALTH SERVICES | Facility: CLINIC | Age: 72
End: 2022-02-09

## 2022-02-09 ENCOUNTER — TELEPHONE (OUTPATIENT)
Dept: FAMILY MEDICINE CLINIC | Facility: CLINIC | Age: 72
End: 2022-02-09

## 2022-02-09 VITALS
TEMPERATURE: 97.3 F | HEART RATE: 92 BPM | SYSTOLIC BLOOD PRESSURE: 130 MMHG | DIASTOLIC BLOOD PRESSURE: 78 MMHG | OXYGEN SATURATION: 99 % | RESPIRATION RATE: 20 BRPM

## 2022-02-09 PROCEDURE — G0494 LPN CARE EA 15MIN HH/HOSPICE: HCPCS

## 2022-02-10 NOTE — TELEPHONE ENCOUNTER
-Per Dr. Plata, Ms. Wade has been called with recent lab results & recommendations.  Continue current medications and follow-up as planned or sooner if any problems.       ---- Message from Joyce Plata MD sent at 2/8/2022  3:58 PM CST -----  Call and tell labs look better, recheck as scheduled.

## 2022-02-13 ENCOUNTER — HOSPITAL ENCOUNTER (EMERGENCY)
Facility: HOSPITAL | Age: 72
Discharge: HOME | End: 2022-02-13
Attending: FAMILY MEDICINE | Admitting: FAMILY MEDICINE

## 2022-02-13 ENCOUNTER — APPOINTMENT (OUTPATIENT)
Dept: GENERAL RADIOLOGY | Facility: HOSPITAL | Age: 72
End: 2022-02-13

## 2022-02-13 ENCOUNTER — APPOINTMENT (OUTPATIENT)
Dept: CT IMAGING | Facility: HOSPITAL | Age: 72
End: 2022-02-13

## 2022-02-13 VITALS
RESPIRATION RATE: 18 BRPM | OXYGEN SATURATION: 94 % | SYSTOLIC BLOOD PRESSURE: 179 MMHG | BODY MASS INDEX: 39.01 KG/M2 | TEMPERATURE: 98.2 F | HEART RATE: 93 BPM | WEIGHT: 212 LBS | HEIGHT: 62 IN | DIASTOLIC BLOOD PRESSURE: 86 MMHG

## 2022-02-13 DIAGNOSIS — J12.9 VIRAL PNEUMONIA: Primary | ICD-10-CM

## 2022-02-13 LAB
ALBUMIN SERPL-MCNC: 4.1 G/DL (ref 3.5–5.2)
ALBUMIN/GLOB SERPL: 1.8 G/DL
ALP SERPL-CCNC: 94 U/L (ref 39–117)
ALT SERPL W P-5'-P-CCNC: 22 U/L (ref 1–33)
ANION GAP SERPL CALCULATED.3IONS-SCNC: 12 MMOL/L (ref 5–15)
AST SERPL-CCNC: 21 U/L (ref 1–32)
BASOPHILS # BLD AUTO: 0.05 10*3/MM3 (ref 0–0.2)
BASOPHILS NFR BLD AUTO: 0.5 % (ref 0–1.5)
BILIRUB SERPL-MCNC: 0.7 MG/DL (ref 0–1.2)
BUN SERPL-MCNC: 10 MG/DL (ref 8–23)
BUN/CREAT SERPL: 10 (ref 7–25)
CALCIUM SPEC-SCNC: 9.5 MG/DL (ref 8.6–10.5)
CHLORIDE SERPL-SCNC: 108 MMOL/L (ref 98–107)
CO2 SERPL-SCNC: 24 MMOL/L (ref 22–29)
CREAT SERPL-MCNC: 1 MG/DL (ref 0.57–1)
D-DIMER, QUANTITATIVE (MAD,POW, STR): 1317 NG/ML (FEU) (ref 0–470)
D-LACTATE SERPL-SCNC: 1.3 MMOL/L (ref 0.5–2)
DEPRECATED RDW RBC AUTO: 47.8 FL (ref 37–54)
EOSINOPHIL # BLD AUTO: 0.14 10*3/MM3 (ref 0–0.4)
EOSINOPHIL NFR BLD AUTO: 1.3 % (ref 0.3–6.2)
ERYTHROCYTE [DISTWIDTH] IN BLOOD BY AUTOMATED COUNT: 15.4 % (ref 12.3–15.4)
FLUAV SUBTYP SPEC NAA+PROBE: NOT DETECTED
FLUBV RNA ISLT QL NAA+PROBE: NOT DETECTED
GFR SERPL CREATININE-BSD FRML MDRD: 66 ML/MIN/1.73
GLOBULIN UR ELPH-MCNC: 2.3 GM/DL
GLUCOSE SERPL-MCNC: 66 MG/DL (ref 65–99)
HCT VFR BLD AUTO: 33.5 % (ref 34–46.6)
HGB BLD-MCNC: 10.8 G/DL (ref 12–15.9)
HOLD SPECIMEN: NORMAL
HOLD SPECIMEN: NORMAL
IMM GRANULOCYTES # BLD AUTO: 0.05 10*3/MM3 (ref 0–0.05)
IMM GRANULOCYTES NFR BLD AUTO: 0.5 % (ref 0–0.5)
LYMPHOCYTES # BLD AUTO: 3.08 10*3/MM3 (ref 0.7–3.1)
LYMPHOCYTES NFR BLD AUTO: 28.7 % (ref 19.6–45.3)
MCH RBC QN AUTO: 27.6 PG (ref 26.6–33)
MCHC RBC AUTO-ENTMCNC: 32.2 G/DL (ref 31.5–35.7)
MCV RBC AUTO: 85.5 FL (ref 79–97)
MONOCYTES # BLD AUTO: 0.9 10*3/MM3 (ref 0.1–0.9)
MONOCYTES NFR BLD AUTO: 8.4 % (ref 5–12)
NEUTROPHILS NFR BLD AUTO: 6.52 10*3/MM3 (ref 1.7–7)
NEUTROPHILS NFR BLD AUTO: 60.6 % (ref 42.7–76)
NRBC BLD AUTO-RTO: 0 /100 WBC (ref 0–0.2)
NT-PROBNP SERPL-MCNC: 1086 PG/ML (ref 0–900)
PLATELET # BLD AUTO: 349 10*3/MM3 (ref 140–450)
PMV BLD AUTO: 9.4 FL (ref 6–12)
POTASSIUM SERPL-SCNC: 3.3 MMOL/L (ref 3.5–5.2)
PROT SERPL-MCNC: 6.4 G/DL (ref 6–8.5)
QT INTERVAL: 372 MS
QTC INTERVAL: 494 MS
RBC # BLD AUTO: 3.92 10*6/MM3 (ref 3.77–5.28)
SARS-COV-2 RNA PNL SPEC NAA+PROBE: NOT DETECTED
SODIUM SERPL-SCNC: 144 MMOL/L (ref 136–145)
TROPONIN T SERPL-MCNC: <0.01 NG/ML (ref 0–0.03)
WBC NRBC COR # BLD: 10.74 10*3/MM3 (ref 3.4–10.8)
WHOLE BLOOD HOLD SPECIMEN: NORMAL
WHOLE BLOOD HOLD SPECIMEN: NORMAL

## 2022-02-13 PROCEDURE — 71275 CT ANGIOGRAPHY CHEST: CPT

## 2022-02-13 PROCEDURE — 87040 BLOOD CULTURE FOR BACTERIA: CPT | Performed by: FAMILY MEDICINE

## 2022-02-13 PROCEDURE — 80053 COMPREHEN METABOLIC PANEL: CPT | Performed by: STUDENT IN AN ORGANIZED HEALTH CARE EDUCATION/TRAINING PROGRAM

## 2022-02-13 PROCEDURE — 93010 ELECTROCARDIOGRAM REPORT: CPT | Performed by: INTERNAL MEDICINE

## 2022-02-13 PROCEDURE — 83880 ASSAY OF NATRIURETIC PEPTIDE: CPT | Performed by: STUDENT IN AN ORGANIZED HEALTH CARE EDUCATION/TRAINING PROGRAM

## 2022-02-13 PROCEDURE — 93005 ELECTROCARDIOGRAM TRACING: CPT

## 2022-02-13 PROCEDURE — 87636 SARSCOV2 & INF A&B AMP PRB: CPT | Performed by: STUDENT IN AN ORGANIZED HEALTH CARE EDUCATION/TRAINING PROGRAM

## 2022-02-13 PROCEDURE — 85379 FIBRIN DEGRADATION QUANT: CPT | Performed by: STUDENT IN AN ORGANIZED HEALTH CARE EDUCATION/TRAINING PROGRAM

## 2022-02-13 PROCEDURE — 83605 ASSAY OF LACTIC ACID: CPT | Performed by: FAMILY MEDICINE

## 2022-02-13 PROCEDURE — 99284 EMERGENCY DEPT VISIT MOD MDM: CPT

## 2022-02-13 PROCEDURE — 0 IOPAMIDOL PER 1 ML: Performed by: FAMILY MEDICINE

## 2022-02-13 PROCEDURE — 71046 X-RAY EXAM CHEST 2 VIEWS: CPT

## 2022-02-13 PROCEDURE — 93005 ELECTROCARDIOGRAM TRACING: CPT | Performed by: FAMILY MEDICINE

## 2022-02-13 PROCEDURE — 84484 ASSAY OF TROPONIN QUANT: CPT | Performed by: STUDENT IN AN ORGANIZED HEALTH CARE EDUCATION/TRAINING PROGRAM

## 2022-02-13 PROCEDURE — 85025 COMPLETE CBC W/AUTO DIFF WBC: CPT | Performed by: STUDENT IN AN ORGANIZED HEALTH CARE EDUCATION/TRAINING PROGRAM

## 2022-02-13 RX ORDER — SODIUM CHLORIDE 0.9 % (FLUSH) 0.9 %
10 SYRINGE (ML) INJECTION AS NEEDED
Status: DISCONTINUED | OUTPATIENT
Start: 2022-02-13 | End: 2022-02-13 | Stop reason: HOSPADM

## 2022-02-13 RX ORDER — AZITHROMYCIN 250 MG/1
250 TABLET, FILM COATED ORAL DAILY
Qty: 6 TABLET | Refills: 0 | Status: SHIPPED | OUTPATIENT
Start: 2022-02-13 | End: 2022-02-23 | Stop reason: HOSPADM

## 2022-02-13 RX ORDER — POTASSIUM CHLORIDE 750 MG/1
40 CAPSULE, EXTENDED RELEASE ORAL ONCE
Status: COMPLETED | OUTPATIENT
Start: 2022-02-13 | End: 2022-02-13

## 2022-02-13 RX ORDER — DEXAMETHASONE 6 MG/1
6 TABLET ORAL
Qty: 7 TABLET | Refills: 0 | Status: SHIPPED | OUTPATIENT
Start: 2022-02-13 | End: 2022-02-23 | Stop reason: HOSPADM

## 2022-02-13 RX ADMIN — IOPAMIDOL 75 ML: 755 INJECTION, SOLUTION INTRAVENOUS at 14:04

## 2022-02-13 RX ADMIN — POTASSIUM CHLORIDE 40 MEQ: 750 CAPSULE, EXTENDED RELEASE ORAL at 14:16

## 2022-02-13 NOTE — ED NOTES
Dr hernandez notified of pt blood pressure and stated pt is to be discharged     Nimo Woodson, RN  02/13/22 3748

## 2022-02-13 NOTE — ED NOTES
Unsuccessful peripheral attempt x2 by ramon hunter. Third rn to attempt     Nimo Woodson RN  02/13/22 6731

## 2022-02-13 NOTE — ED PROVIDER NOTES
Subjective   71 year old female with CMH of HFrEF, HTN, DM II, HLD presents to ED with complaints of shortness of air. Symptoms began yesterday evening, she feels she is breathing fast and has to slow her breathing down. Symptoms occur both at rest and with activity. Has a recent history of pneumonia which she was admitted for in January 2022. Vaccinated against covid-19. Quit smoking 30 years ago.           Review of Systems   Constitutional: Negative.  Negative for fever.   Respiratory: Positive for shortness of breath.    Cardiovascular: Negative.    Gastrointestinal: Negative.    Endocrine: Negative.    Musculoskeletal: Negative for arthralgias and myalgias.   Skin: Negative.    Neurological: Positive for headaches.   Psychiatric/Behavioral: Negative.  Negative for suicidal ideas.       Past Medical History:   Diagnosis Date   • Chronic systolic heart failure (HCC)    • Diabetes mellitus (HCC)    • Diabetic neuropathy (HCC)    • GERD (gastroesophageal reflux disease)    • Hypercholesterolemia    • Hypertension    • Low back pain    • Nonischemic cardiomyopathy (HCC)    • Obesity    • Sleep apnea    • Vitamin D deficiency        Allergies   Allergen Reactions   • Aldactone [Spironolactone] Unknown - Low Severity   • Nsaids        Past Surgical History:   Procedure Laterality Date   • CARDIAC CATHETERIZATION     • CARDIAC CATHETERIZATION N/A 8/23/2018   • CARDIAC ELECTROPHYSIOLOGY PROCEDURE N/A 6/22/2020   • CARDIAC PACEMAKER PLACEMENT     • CATARACT EXTRACTION     • CATARACT EXTRACTION     • COLONOSCOPY N/A 6/14/2017   • PACEMAKER IMPLANTATION     • TOTAL ABDOMINAL HYSTERECTOMY WITH SALPINGO OOPHORECTOMY         Family History   Problem Relation Age of Onset   • Diabetes Sister    • Bone cancer Brother        Social History     Socioeconomic History   • Marital status: Single   Tobacco Use   • Smoking status: Former Smoker   • Smokeless tobacco: Never Used   Substance and Sexual Activity   • Alcohol use: No   •  "Drug use: No   • Sexual activity: Not Currently         /86 (BP Location: Left arm, Patient Position: Sitting)   Pulse 93   Temp 98.2 °F (36.8 °C) (Infrared)   Resp 18   Ht 157.5 cm (62\")   Wt 96.2 kg (212 lb)   SpO2 94%   BMI 38.78 kg/m²   Objective   Physical Exam  Vitals reviewed.   Constitutional:       General: She is in acute distress.      Appearance: Normal appearance. She is well-developed.   HENT:      Head: Normocephalic and atraumatic.      Nose: Nose normal.      Mouth/Throat:      Mouth: Mucous membranes are moist.   Cardiovascular:      Rate and Rhythm: Regular rhythm. Tachycardia present.      Pulses: Normal pulses.      Heart sounds: Normal heart sounds.   Pulmonary:      Effort: Respiratory distress present.      Breath sounds: Wheezing present.   Abdominal:      General: Abdomen is flat. Bowel sounds are normal.      Palpations: Abdomen is soft.   Musculoskeletal:         General: Normal range of motion.      Cervical back: Normal range of motion.      Right lower leg: No edema.      Left lower leg: No edema.   Skin:     General: Skin is warm and dry.      Capillary Refill: Capillary refill takes less than 2 seconds.   Neurological:      General: No focal deficit present.      Mental Status: She is alert.   Psychiatric:         Mood and Affect: Mood normal.         Behavior: Behavior normal.         Procedures           ED Course  XR Chest 2 View    Result Date: 2/13/2022  As above. Electronically signed by:  Deric Harris MD  2/13/2022 12:13 PM CST Workstation: [id number]    XR Chest 1 View    Result Date: 1/15/2022  Ill-defined prominently perihilar opacity, which could represent infectious process or more likely, pulmonary edema and clinical correlation needed Electronically signed by:  Joyce Joyce MD  1/15/2022 1:42 PM CST Workstation: [id number]    CT Angiogram Chest    Result Date: 2/13/2022  No evidence of pulmonary embolus Bilateral small pleural effusion. Scattered groundglass " opacities in the lung bases, which may be due to viral pneumonitis. Coexisting atelectasis versus infiltrate in the mid to lower lungs bilaterally. Small pericardial effusion. Left pacemaker in place. Electronically signed by:  Reggie Monae MD  2/13/2022 2:40 PM CST Workstation: [id number]    CT Angiogram Chest    Result Date: 1/15/2022  1.  No evidence of pulmonary embolism. 2.  Bilateral small pleural effusion. 3.  Multifocal pneumonia. 4.  Small pericardial effusion. Electronically signed by:  Reggie Monae MD  1/15/2022 4:32 PM CST Workstation: [id number]    Lab Results (last 24 hours)     Procedure Component Value Units Date/Time    [place] Draw [[id number]] Collected: 02/13/22 1237    Specimen: Blood Updated: 02/13/22 1345    Narrative:      The following orders were created for panel order [place] Draw.  Procedure                               Abnormality         Status                     ---------                               -----------         ------                     Green Top (Gel)[[id number]]                                  Final result               Lavender Top[[id number]]                                     Final result               Gold Top - SST[[id number]]                                   Final result               Light Blue Top[[id number]]                                   Final result                 Please view results for these tests on the individual orders.    Light Blue Top [[id number]] Collected: 02/13/22 1238    Specimen: Blood Updated: 02/13/22 1345     Extra Tube hold for add-on     Comment: Auto resulted       Gold Top - SST [[id number]] Collected: 02/13/22 1238    Specimen: Blood Updated: 02/13/22 1345     Extra Tube Hold for add-ons.     Comment: Auto resulted.       Green Top (Gel) [[id number]] Collected: 02/13/22 1237    Specimen: Blood Updated: 02/13/22 1345     Extra Tube Hold for add-ons.     Comment: Auto resulted.       Lavender Top [[id number]] Collected: 02/13/22 1237     Specimen: Blood Updated: 02/13/22 1345     Extra Tube hold for add-on     Comment: Auto resulted       D-dimer, Quantitative [[id number]]  (Abnormal) Collected: 02/13/22 1238    Specimen: Blood Updated: 02/13/22 1306     D-Dimer, Quantitative 1,317 ng/mL (FEU)     Narrative:      Dimer values <500 ng/ml FEU are FDA approved as aid in diagnosis of deep venous thrombosis and pulmonary embolism.  This test should not be used in an exclusion strategy with pretest probability alone.    A recent guideline regarding diagnosis for pulmonary thromboembolism recommends an adjusted exclusion criterion of age x 10 ng/ml FEU for patients >50 years of age ([employer] Intern Med 2015; 163: 701-711).      Comprehensive Metabolic Panel [[id number]]  (Abnormal) Collected: 02/13/22 1237    Specimen: Blood Updated: 02/13/22 1305     Glucose 66 mg/dL      BUN 10 mg/dL      Creatinine 1.00 mg/dL      Sodium 144 mmol/L      Potassium 3.3 mmol/L      Chloride 108 mmol/L      CO2 24.0 mmol/L      Calcium 9.5 mg/dL      Total Protein 6.4 g/dL      Albumin 4.10 g/dL      ALT (SGPT) 22 U/L      AST (SGOT) 21 U/L      Alkaline Phosphatase 94 U/L      Total Bilirubin 0.7 mg/dL      eGFR   Amer 66 mL/min/1.73      Globulin 2.3 gm/dL      A/G Ratio 1.8 g/dL      BUN/Creatinine Ratio 10.0     Anion Gap 12.0 mmol/L     Narrative:      GFR Normal >60  Chronic Kidney Disease <60  Kidney Failure <15      Troponin [[id number]]  (Normal) Collected: 02/13/22 1237    Specimen: Blood Updated: 02/13/22 1305     Troponin T <0.010 ng/mL     Narrative:      Troponin T Reference Range:  <= 0.03 ng/mL-   Negative for AMI  >0.03 ng/mL-     Abnormal for myocardial necrosis.  Clinicians would have to utilize clinical acumen, EKG, Troponin and serial changes to determine if it is an Acute Myocardial Infarction or myocardial injury due to an underlying chronic condition.       Results may be falsely decreased if patient taking Biotin.      BNP [[id number]]  (Abnormal)  Collected: 02/13/22 1237    Specimen: Blood Updated: 02/13/22 1303     proBNP 1,086.0 pg/mL     Narrative:      Among patients with dyspnea, NT-proBNP is highly sensitive for the detection of acute congestive heart failure. In addition NT-proBNP of <300 pg/ml effectively rules out acute congestive heart failure with 99% negative predictive value.    Results may be falsely decreased if patient taking Biotin.      Lactic Acid, Plasma [[id number]]  (Normal) Collected: 02/13/22 1238    Specimen: Blood Updated: 02/13/22 1301     Lactate 1.3 mmol/L     CBC & Differential [[id number]]  (Abnormal) Collected: 02/13/22 1237    Specimen: Blood Updated: 02/13/22 1248    Narrative:      The following orders were created for panel order CBC & Differential.  Procedure                               Abnormality         Status                     ---------                               -----------         ------                     CBC Auto Differential[[id number]]        Abnormal            Final result                 Please view results for these tests on the individual orders.    CBC Auto Differential [[id number]]  (Abnormal) Collected: 02/13/22 1237    Specimen: Blood Updated: 02/13/22 1248     WBC 10.74 10*3/mm3      RBC 3.92 10*6/mm3      Hemoglobin 10.8 g/dL      Hematocrit 33.5 %      MCV 85.5 fL      MCH 27.6 pg      MCHC 32.2 g/dL      RDW 15.4 %      RDW-SD 47.8 fl      MPV 9.4 fL      Platelets 349 10*3/mm3      Neutrophil % 60.6 %      Lymphocyte % 28.7 %      Monocyte % 8.4 %      Eosinophil % 1.3 %      Basophil % 0.5 %      Immature Grans % 0.5 %      Neutrophils, Absolute 6.52 10*3/mm3      Lymphocytes, Absolute 3.08 10*3/mm3      Monocytes, Absolute 0.90 10*3/mm3      Eosinophils, Absolute 0.14 10*3/mm3      Basophils, Absolute 0.05 10*3/mm3      Immature Grans, Absolute 0.05 10*3/mm3      nRBC 0.0 /100 WBC     Blood Culture - Blood, Arm, Right [[id number]] Collected: 02/13/22 1238    Specimen: Blood from Arm, Right  Updated: 02/13/22 1245    COVID-19 and FLU A/B PCR - Swab, Nasopharynx [[id number]]  (Normal) Collected: 02/13/22 1130    Specimen: Swab from Nasopharynx Updated: 02/13/22 1155     COVID19 Not Detected     Influenza A PCR Not Detected     Influenza B PCR Not Detected    Narrative:      Fact sheet for providers: https://www.fda.gov/media/520871/download    Fact sheet for patients: https://www.fda.gov/media/401789/download    Test performed by PCR.               I personally saw and examined the patient. I have reviewed and agree with the residents findings including all diagnostics, interpretations, and treatment plans as documented. I was present for the key portions of the separate performed procedures and inclusive time noted in any critical care situation.                                           MDM  Number of Diagnoses or Management Options  Viral pneumonia  Diagnosis management comments: Patient presented with SOA. She satted above 94% on room air for the duration of her ED visit. Elevated D-dimer was found which was followed up with CTA chest. No Pulmonary embolus was found. Patient has HFrEF with last echo in July 2021 with EF of 36-40%. Patient was instructed to follow up with Dr. Felipe for further heart failure evaluation. Covid-19 was negative. Patient was discharged with Decadron and Zpak for viral pneumonitis found on CTA.        Amount and/or Complexity of Data Reviewed  Clinical lab tests: reviewed  Tests in the radiology section of CPT®: reviewed  Tests in the medicine section of CPT®: reviewed  Decide to obtain previous medical records or to obtain history from someone other than the patient: yes        Final diagnoses:   Viral pneumonia       ED Disposition  ED Disposition     ED Disposition Condition Comment    Discharge Stable           Joyce Plata MD  [address]  [address]  [facility] [address]  [phone]    In 1 week      Wang Felipe MD  [address] [place] [address] [address]  [address]  [place] [id number] [phone]    In 1 week           Medication List      New Prescriptions    azithromycin 250 MG tablet  Commonly known as: ZITHROMAX  Take 1 tablet by mouth Daily. Take 2 tabs on first day and then one tab for 4 days     dexamethasone 6 MG tablet  Commonly known as: DECADRON  Take 1 tablet by mouth Daily With Breakfast for 7 days.           Where to Get Your Medications      These medications were sent to [facility]/pharmacy #[id number] - [place], [place] - [address] [place] [address] - [phone]  - [phone] [place] [id number]    Phone: [phone]   · azithromycin 250 MG tablet  · dexamethasone 6 MG tablet           This document has been electronically signed by Anette Galvez MD on February 13, 2022 14:57 Anette Doe MD  Resident  02/13/22 1456       Lew Schmidt MD  02/13/22 2678

## 2022-02-13 NOTE — DISCHARGE INSTRUCTIONS
Please take medications as prescribed.  Please follow with PCP and Dr. Felipe in one week  Call PCP or return to ED should you develop lightheadedness, confusion, chest pain, shortness of breath or any other concerning symptoms.

## 2022-02-13 NOTE — ED NOTES
Unsuccessful peripheral attempt x2. Tolerated well. Another rn to attempt     Nimo Woodson RN  02/13/22 7412

## 2022-02-18 ENCOUNTER — HOSPITAL ENCOUNTER (INPATIENT)
Facility: HOSPITAL | Age: 72
LOS: 5 days | Discharge: HOME HEALTH | End: 2022-02-23
Attending: FAMILY MEDICINE | Admitting: FAMILY MEDICINE

## 2022-02-18 ENCOUNTER — APPOINTMENT (OUTPATIENT)
Dept: CT IMAGING | Facility: HOSPITAL | Age: 72
End: 2022-02-18

## 2022-02-18 ENCOUNTER — APPOINTMENT (OUTPATIENT)
Dept: GENERAL RADIOLOGY | Facility: HOSPITAL | Age: 72
End: 2022-02-18

## 2022-02-18 ENCOUNTER — HOME CARE VISIT (OUTPATIENT)
Dept: HOME HEALTH SERVICES | Facility: HOME HEALTHCARE | Age: 72
End: 2022-02-18

## 2022-02-18 DIAGNOSIS — I42.8 NICM (NONISCHEMIC CARDIOMYOPATHY): ICD-10-CM

## 2022-02-18 DIAGNOSIS — Z74.09 IMPAIRED FUNCTIONAL MOBILITY, BALANCE, GAIT, AND ENDURANCE: ICD-10-CM

## 2022-02-18 DIAGNOSIS — I50.9 ACUTE ON CHRONIC CONGESTIVE HEART FAILURE, UNSPECIFIED HEART FAILURE TYPE: Primary | ICD-10-CM

## 2022-02-18 DIAGNOSIS — I10 BENIGN HYPERTENSION: ICD-10-CM

## 2022-02-18 DIAGNOSIS — I50.22 CHRONIC SYSTOLIC HEART FAILURE: ICD-10-CM

## 2022-02-18 DIAGNOSIS — Z74.09 IMPAIRED MOBILITY AND ACTIVITIES OF DAILY LIVING: ICD-10-CM

## 2022-02-18 DIAGNOSIS — Z78.9 IMPAIRED MOBILITY AND ACTIVITIES OF DAILY LIVING: ICD-10-CM

## 2022-02-18 DIAGNOSIS — Z95.810 PRESENCE OF BIVENTRICULAR AICD: ICD-10-CM

## 2022-02-18 LAB
A-A DO2: 41.7 MMHG
ALBUMIN SERPL-MCNC: 4.3 G/DL (ref 3.5–5.2)
ALBUMIN/GLOB SERPL: 1.9 G/DL
ALP SERPL-CCNC: 93 U/L (ref 39–117)
ALT SERPL W P-5'-P-CCNC: 19 U/L (ref 1–33)
ANION GAP SERPL CALCULATED.3IONS-SCNC: 11 MMOL/L (ref 5–15)
ARTERIAL PATENCY WRIST A: POSITIVE
AST SERPL-CCNC: 17 U/L (ref 1–32)
ATMOSPHERIC PRESS: 756 MMHG
BACTERIA SPEC AEROBE CULT: NORMAL
BASE EXCESS BLDA CALC-SCNC: -1.3 MMOL/L (ref 0–2)
BASOPHILS # BLD AUTO: 0.06 10*3/MM3 (ref 0–0.2)
BASOPHILS NFR BLD AUTO: 0.4 % (ref 0–1.5)
BDY SITE: ABNORMAL
BILIRUB SERPL-MCNC: 0.8 MG/DL (ref 0–1.2)
BUN SERPL-MCNC: 16 MG/DL (ref 8–23)
BUN/CREAT SERPL: 15.8 (ref 7–25)
CA-I BLD-MCNC: 5.09 MG/DL (ref 4.6–5.6)
CALCIUM SPEC-SCNC: 9.5 MG/DL (ref 8.6–10.5)
CHLORIDE SERPL-SCNC: 107 MMOL/L (ref 98–107)
CO2 SERPL-SCNC: 21 MMOL/L (ref 22–29)
COHGB MFR BLD: 1.8 % (ref 0–5)
CREAT SERPL-MCNC: 1.01 MG/DL (ref 0.57–1)
D-DIMER, QUANTITATIVE (MAD,POW, STR): 1143 NG/ML (FEU) (ref 0–470)
D-LACTATE SERPL-SCNC: 1.3 MMOL/L (ref 0.5–2)
DEPRECATED RDW RBC AUTO: 47.8 FL (ref 37–54)
EOSINOPHIL # BLD AUTO: 0.11 10*3/MM3 (ref 0–0.4)
EOSINOPHIL NFR BLD AUTO: 0.8 % (ref 0.3–6.2)
ERYTHROCYTE [DISTWIDTH] IN BLOOD BY AUTOMATED COUNT: 15.7 % (ref 12.3–15.4)
FLUAV SUBTYP SPEC NAA+PROBE: NOT DETECTED
FLUBV RNA ISLT QL NAA+PROBE: NOT DETECTED
GFR SERPL CREATININE-BSD FRML MDRD: 65 ML/MIN/1.73
GLOBULIN UR ELPH-MCNC: 2.3 GM/DL
GLUCOSE BLDA-MCNC: 166 MMOL/L (ref 65–95)
GLUCOSE BLDC GLUCOMTR-MCNC: 345 MG/DL (ref 70–130)
GLUCOSE SERPL-MCNC: 211 MG/DL (ref 65–99)
HCO3 BLDA-SCNC: 22.2 MMOL/L (ref 20–26)
HCT VFR BLD AUTO: 35.9 % (ref 34–46.6)
HCT VFR BLD CALC: 37.6 % (ref 38–51)
HGB BLD-MCNC: 11.9 G/DL (ref 12–15.9)
HGB BLDA-MCNC: 12.3 G/DL (ref 13.5–17.5)
HOLD SPECIMEN: NORMAL
HOLD SPECIMEN: NORMAL
IMM GRANULOCYTES # BLD AUTO: 0.1 10*3/MM3 (ref 0–0.05)
IMM GRANULOCYTES NFR BLD AUTO: 0.7 % (ref 0–0.5)
INR PPP: 1.12 (ref 0.8–1.2)
LYMPHOCYTES # BLD AUTO: 2.53 10*3/MM3 (ref 0.7–3.1)
LYMPHOCYTES NFR BLD AUTO: 18.1 % (ref 19.6–45.3)
Lab: ABNORMAL
MAGNESIUM SERPL-MCNC: 1.9 MG/DL (ref 1.6–2.4)
MCH RBC QN AUTO: 27.9 PG (ref 26.6–33)
MCHC RBC AUTO-ENTMCNC: 33.1 G/DL (ref 31.5–35.7)
MCV RBC AUTO: 84.3 FL (ref 79–97)
METHGB BLD QL: 0.6 % (ref 0–3)
MODALITY: ABNORMAL
MONOCYTES # BLD AUTO: 0.95 10*3/MM3 (ref 0.1–0.9)
MONOCYTES NFR BLD AUTO: 6.8 % (ref 5–12)
NEUTROPHILS NFR BLD AUTO: 10.24 10*3/MM3 (ref 1.7–7)
NEUTROPHILS NFR BLD AUTO: 73.2 % (ref 42.7–76)
NOTE: ABNORMAL
NRBC BLD AUTO-RTO: 0 /100 WBC (ref 0–0.2)
NT-PROBNP SERPL-MCNC: 2073 PG/ML (ref 0–900)
OXYHGB MFR BLDV: 92.3 % (ref 94–99)
PCO2 BLDA: 32.7 MM HG (ref 35–45)
PCO2 TEMP ADJ BLD: ABNORMAL MM[HG]
PH BLDA: 7.44 PH UNITS (ref 7.35–7.45)
PH, TEMP CORRECTED: ABNORMAL
PLATELET # BLD AUTO: 397 10*3/MM3 (ref 140–450)
PMV BLD AUTO: 9.3 FL (ref 6–12)
PO2 BLDA: 70.3 MM HG (ref 83–108)
PO2 TEMP ADJ BLD: ABNORMAL MM[HG]
POTASSIUM BLDA-SCNC: 3.6 MMOL/L (ref 3.4–4.5)
POTASSIUM SERPL-SCNC: 3.5 MMOL/L (ref 3.5–5.2)
PROT SERPL-MCNC: 6.6 G/DL (ref 6–8.5)
PROTHROMBIN TIME: 14.3 SECONDS (ref 11.1–15.3)
QT INTERVAL: 364 MS
QTC INTERVAL: 495 MS
RBC # BLD AUTO: 4.26 10*6/MM3 (ref 3.77–5.28)
SAO2 % BLDCOA: 94.5 % (ref 94–99)
SARS-COV-2 RNA PNL SPEC NAA+PROBE: NOT DETECTED
SODIUM BLDA-SCNC: 141 MMOL/L (ref 136–146)
SODIUM SERPL-SCNC: 139 MMOL/L (ref 136–145)
TROPONIN T SERPL-MCNC: <0.01 NG/ML (ref 0–0.03)
VENTILATOR MODE: ABNORMAL
WBC NRBC COR # BLD: 13.99 10*3/MM3 (ref 3.4–10.8)
WHOLE BLOOD HOLD SPECIMEN: NORMAL
WHOLE BLOOD HOLD SPECIMEN: NORMAL

## 2022-02-18 PROCEDURE — 87636 SARSCOV2 & INF A&B AMP PRB: CPT | Performed by: PHYSICIAN ASSISTANT

## 2022-02-18 PROCEDURE — 94799 UNLISTED PULMONARY SVC/PX: CPT

## 2022-02-18 PROCEDURE — 83050 HGB METHEMOGLOBIN QUAN: CPT

## 2022-02-18 PROCEDURE — 82375 ASSAY CARBOXYHB QUANT: CPT

## 2022-02-18 PROCEDURE — 25010000002 METHYLPREDNISOLONE PER 125 MG: Performed by: PHYSICIAN ASSISTANT

## 2022-02-18 PROCEDURE — 87040 BLOOD CULTURE FOR BACTERIA: CPT | Performed by: PHYSICIAN ASSISTANT

## 2022-02-18 PROCEDURE — 94761 N-INVAS EAR/PLS OXIMETRY MLT: CPT

## 2022-02-18 PROCEDURE — G0378 HOSPITAL OBSERVATION PER HR: HCPCS

## 2022-02-18 PROCEDURE — 83880 ASSAY OF NATRIURETIC PEPTIDE: CPT | Performed by: FAMILY MEDICINE

## 2022-02-18 PROCEDURE — 83605 ASSAY OF LACTIC ACID: CPT | Performed by: PHYSICIAN ASSISTANT

## 2022-02-18 PROCEDURE — 80053 COMPREHEN METABOLIC PANEL: CPT | Performed by: FAMILY MEDICINE

## 2022-02-18 PROCEDURE — 63710000001 INSULIN DETEMIR PER 5 UNITS: Performed by: FAMILY MEDICINE

## 2022-02-18 PROCEDURE — 36415 COLL VENOUS BLD VENIPUNCTURE: CPT

## 2022-02-18 PROCEDURE — 84484 ASSAY OF TROPONIN QUANT: CPT | Performed by: FAMILY MEDICINE

## 2022-02-18 PROCEDURE — 71275 CT ANGIOGRAPHY CHEST: CPT

## 2022-02-18 PROCEDURE — 82805 BLOOD GASES W/O2 SATURATION: CPT

## 2022-02-18 PROCEDURE — 63710000001 INSULIN ASPART PER 5 UNITS: Performed by: FAMILY MEDICINE

## 2022-02-18 PROCEDURE — 83735 ASSAY OF MAGNESIUM: CPT | Performed by: FAMILY MEDICINE

## 2022-02-18 PROCEDURE — 85379 FIBRIN DEGRADATION QUANT: CPT | Performed by: PHYSICIAN ASSISTANT

## 2022-02-18 PROCEDURE — 85610 PROTHROMBIN TIME: CPT | Performed by: PHYSICIAN ASSISTANT

## 2022-02-18 PROCEDURE — 85025 COMPLETE CBC W/AUTO DIFF WBC: CPT | Performed by: FAMILY MEDICINE

## 2022-02-18 PROCEDURE — 94640 AIRWAY INHALATION TREATMENT: CPT

## 2022-02-18 PROCEDURE — 82962 GLUCOSE BLOOD TEST: CPT

## 2022-02-18 PROCEDURE — 0 IOPAMIDOL PER 1 ML: Performed by: FAMILY MEDICINE

## 2022-02-18 PROCEDURE — 36600 WITHDRAWAL OF ARTERIAL BLOOD: CPT

## 2022-02-18 PROCEDURE — 93005 ELECTROCARDIOGRAM TRACING: CPT | Performed by: FAMILY MEDICINE

## 2022-02-18 PROCEDURE — 25010000002 FUROSEMIDE PER 20 MG: Performed by: PHYSICIAN ASSISTANT

## 2022-02-18 PROCEDURE — 71046 X-RAY EXAM CHEST 2 VIEWS: CPT

## 2022-02-18 PROCEDURE — 93010 ELECTROCARDIOGRAM REPORT: CPT | Performed by: INTERNAL MEDICINE

## 2022-02-18 PROCEDURE — 99284 EMERGENCY DEPT VISIT MOD MDM: CPT

## 2022-02-18 PROCEDURE — 25010000002 CEFTRIAXONE PER 250 MG: Performed by: PHYSICIAN ASSISTANT

## 2022-02-18 PROCEDURE — 25010000002 HEPARIN (PORCINE) PER 1000 UNITS: Performed by: FAMILY MEDICINE

## 2022-02-18 RX ORDER — CARVEDILOL 25 MG/1
25 TABLET ORAL 2 TIMES DAILY WITH MEALS
Status: DISCONTINUED | OUTPATIENT
Start: 2022-02-18 | End: 2022-02-23 | Stop reason: HOSPADM

## 2022-02-18 RX ORDER — ISOSORBIDE MONONITRATE 30 MG/1
30 TABLET, EXTENDED RELEASE ORAL DAILY
Status: DISCONTINUED | OUTPATIENT
Start: 2022-02-18 | End: 2022-02-23 | Stop reason: HOSPADM

## 2022-02-18 RX ORDER — FUROSEMIDE 10 MG/ML
60 INJECTION INTRAMUSCULAR; INTRAVENOUS ONCE
Status: COMPLETED | OUTPATIENT
Start: 2022-02-18 | End: 2022-02-18

## 2022-02-18 RX ORDER — NALOXONE HCL 0.4 MG/ML
0.4 VIAL (ML) INJECTION
Status: DISCONTINUED | OUTPATIENT
Start: 2022-02-18 | End: 2022-02-23 | Stop reason: HOSPADM

## 2022-02-18 RX ORDER — NICOTINE POLACRILEX 4 MG
15 LOZENGE BUCCAL
Status: DISCONTINUED | OUTPATIENT
Start: 2022-02-18 | End: 2022-02-23 | Stop reason: HOSPADM

## 2022-02-18 RX ORDER — SODIUM CHLORIDE 0.9 % (FLUSH) 0.9 %
10 SYRINGE (ML) INJECTION EVERY 12 HOURS SCHEDULED
Status: DISCONTINUED | OUTPATIENT
Start: 2022-02-18 | End: 2022-02-23 | Stop reason: HOSPADM

## 2022-02-18 RX ORDER — PANTOPRAZOLE SODIUM 40 MG/1
40 TABLET, DELAYED RELEASE ORAL DAILY
Status: DISCONTINUED | OUTPATIENT
Start: 2022-02-19 | End: 2022-02-23 | Stop reason: HOSPADM

## 2022-02-18 RX ORDER — FUROSEMIDE 10 MG/ML
40 INJECTION INTRAMUSCULAR; INTRAVENOUS DAILY
Status: DISCONTINUED | OUTPATIENT
Start: 2022-02-19 | End: 2022-02-23 | Stop reason: HOSPADM

## 2022-02-18 RX ORDER — ASPIRIN 81 MG/1
81 TABLET ORAL NIGHTLY
Status: DISCONTINUED | OUTPATIENT
Start: 2022-02-18 | End: 2022-02-23 | Stop reason: HOSPADM

## 2022-02-18 RX ORDER — ATORVASTATIN CALCIUM 40 MG/1
40 TABLET, FILM COATED ORAL DAILY
Status: DISCONTINUED | OUTPATIENT
Start: 2022-02-19 | End: 2022-02-23 | Stop reason: HOSPADM

## 2022-02-18 RX ORDER — HEPARIN SODIUM 5000 [USP'U]/ML
5000 INJECTION, SOLUTION INTRAVENOUS; SUBCUTANEOUS EVERY 12 HOURS SCHEDULED
Status: DISCONTINUED | OUTPATIENT
Start: 2022-02-18 | End: 2022-02-23 | Stop reason: HOSPADM

## 2022-02-18 RX ORDER — METHYLPREDNISOLONE SODIUM SUCCINATE 125 MG/2ML
125 INJECTION, POWDER, LYOPHILIZED, FOR SOLUTION INTRAMUSCULAR; INTRAVENOUS ONCE
Status: COMPLETED | OUTPATIENT
Start: 2022-02-18 | End: 2022-02-18

## 2022-02-18 RX ORDER — LISINOPRIL 10 MG/1
10 TABLET ORAL DAILY
Status: DISCONTINUED | OUTPATIENT
Start: 2022-02-18 | End: 2022-02-23 | Stop reason: HOSPADM

## 2022-02-18 RX ORDER — SODIUM CHLORIDE 0.9 % (FLUSH) 0.9 %
10 SYRINGE (ML) INJECTION AS NEEDED
Status: DISCONTINUED | OUTPATIENT
Start: 2022-02-18 | End: 2022-02-23 | Stop reason: HOSPADM

## 2022-02-18 RX ORDER — MORPHINE SULFATE 2 MG/ML
1 INJECTION, SOLUTION INTRAMUSCULAR; INTRAVENOUS EVERY 4 HOURS PRN
Status: DISCONTINUED | OUTPATIENT
Start: 2022-02-18 | End: 2022-02-23 | Stop reason: HOSPADM

## 2022-02-18 RX ORDER — IPRATROPIUM BROMIDE AND ALBUTEROL SULFATE 2.5; .5 MG/3ML; MG/3ML
3 SOLUTION RESPIRATORY (INHALATION)
Status: COMPLETED | OUTPATIENT
Start: 2022-02-18 | End: 2022-02-18

## 2022-02-18 RX ORDER — DEXTROSE MONOHYDRATE 25 G/50ML
25 INJECTION, SOLUTION INTRAVENOUS
Status: DISCONTINUED | OUTPATIENT
Start: 2022-02-18 | End: 2022-02-23 | Stop reason: HOSPADM

## 2022-02-18 RX ADMIN — INSULIN ASPART 5 UNITS: 100 INJECTION, SOLUTION INTRAVENOUS; SUBCUTANEOUS at 20:40

## 2022-02-18 RX ADMIN — SODIUM CHLORIDE, PRESERVATIVE FREE 10 ML: 5 INJECTION INTRAVENOUS at 21:35

## 2022-02-18 RX ADMIN — IPRATROPIUM BROMIDE AND ALBUTEROL SULFATE 3 ML: .5; 2.5 SOLUTION RESPIRATORY (INHALATION) at 11:47

## 2022-02-18 RX ADMIN — LISINOPRIL 10 MG: 10 TABLET ORAL at 20:39

## 2022-02-18 RX ADMIN — INSULIN DETEMIR 35 UNITS: 100 INJECTION, SOLUTION SUBCUTANEOUS at 21:19

## 2022-02-18 RX ADMIN — IOPAMIDOL 75 ML: 755 INJECTION, SOLUTION INTRAVENOUS at 13:32

## 2022-02-18 RX ADMIN — ISOSORBIDE MONONITRATE 30 MG: 30 TABLET, EXTENDED RELEASE ORAL at 20:39

## 2022-02-18 RX ADMIN — HEPARIN SODIUM 5000 UNITS: 5000 INJECTION, SOLUTION INTRAVENOUS; SUBCUTANEOUS at 20:40

## 2022-02-18 RX ADMIN — ASPIRIN 81 MG: 81 TABLET, FILM COATED ORAL at 21:17

## 2022-02-18 RX ADMIN — CARVEDILOL 25 MG: 25 TABLET, FILM COATED ORAL at 20:40

## 2022-02-18 RX ADMIN — METHYLPREDNISOLONE SODIUM SUCCINATE 125 MG: 125 INJECTION, POWDER, FOR SOLUTION INTRAMUSCULAR; INTRAVENOUS at 11:40

## 2022-02-18 RX ADMIN — CEFTRIAXONE SODIUM 1 G: 1 INJECTION, POWDER, FOR SOLUTION INTRAMUSCULAR; INTRAVENOUS at 15:21

## 2022-02-18 RX ADMIN — IPRATROPIUM BROMIDE AND ALBUTEROL SULFATE 3 ML: .5; 2.5 SOLUTION RESPIRATORY (INHALATION) at 11:37

## 2022-02-18 RX ADMIN — FUROSEMIDE 60 MG: 10 INJECTION, SOLUTION INTRAMUSCULAR; INTRAVENOUS at 12:21

## 2022-02-18 RX ADMIN — SODIUM CHLORIDE 1503 ML: 9 INJECTION, SOLUTION INTRAVENOUS at 10:33

## 2022-02-18 RX ADMIN — IPRATROPIUM BROMIDE AND ALBUTEROL SULFATE 3 ML: .5; 2.5 SOLUTION RESPIRATORY (INHALATION) at 11:36

## 2022-02-19 ENCOUNTER — APPOINTMENT (OUTPATIENT)
Dept: CARDIOLOGY | Facility: HOSPITAL | Age: 72
End: 2022-02-19

## 2022-02-19 LAB
ANION GAP SERPL CALCULATED.3IONS-SCNC: 11 MMOL/L (ref 5–15)
BASOPHILS # BLD AUTO: 0.01 10*3/MM3 (ref 0–0.2)
BASOPHILS NFR BLD AUTO: 0.1 % (ref 0–1.5)
BUN SERPL-MCNC: 23 MG/DL (ref 8–23)
BUN/CREAT SERPL: 18.7 (ref 7–25)
CALCIUM SPEC-SCNC: 9 MG/DL (ref 8.6–10.5)
CHLORIDE SERPL-SCNC: 105 MMOL/L (ref 98–107)
CO2 SERPL-SCNC: 23 MMOL/L (ref 22–29)
CREAT SERPL-MCNC: 1.23 MG/DL (ref 0.57–1)
DEPRECATED RDW RBC AUTO: 48.9 FL (ref 37–54)
EOSINOPHIL # BLD AUTO: 0 10*3/MM3 (ref 0–0.4)
EOSINOPHIL NFR BLD AUTO: 0 % (ref 0.3–6.2)
ERYTHROCYTE [DISTWIDTH] IN BLOOD BY AUTOMATED COUNT: 15.9 % (ref 12.3–15.4)
GFR SERPL CREATININE-BSD FRML MDRD: 52 ML/MIN/1.73
GLUCOSE BLDC GLUCOMTR-MCNC: 180 MG/DL (ref 70–130)
GLUCOSE BLDC GLUCOMTR-MCNC: 245 MG/DL (ref 70–130)
GLUCOSE BLDC GLUCOMTR-MCNC: 262 MG/DL (ref 70–130)
GLUCOSE BLDC GLUCOMTR-MCNC: 284 MG/DL (ref 70–130)
GLUCOSE BLDC GLUCOMTR-MCNC: 318 MG/DL (ref 70–130)
GLUCOSE SERPL-MCNC: 248 MG/DL (ref 65–99)
HCT VFR BLD AUTO: 32.6 % (ref 34–46.6)
HGB BLD-MCNC: 10.7 G/DL (ref 12–15.9)
HOLD SPECIMEN: NORMAL
IMM GRANULOCYTES # BLD AUTO: 0.05 10*3/MM3 (ref 0–0.05)
IMM GRANULOCYTES NFR BLD AUTO: 0.6 % (ref 0–0.5)
LYMPHOCYTES # BLD AUTO: 1.85 10*3/MM3 (ref 0.7–3.1)
LYMPHOCYTES NFR BLD AUTO: 23.6 % (ref 19.6–45.3)
MAGNESIUM SERPL-MCNC: 2 MG/DL (ref 1.6–2.4)
MCH RBC QN AUTO: 27.6 PG (ref 26.6–33)
MCHC RBC AUTO-ENTMCNC: 32.8 G/DL (ref 31.5–35.7)
MCV RBC AUTO: 84.2 FL (ref 79–97)
MONOCYTES # BLD AUTO: 0.89 10*3/MM3 (ref 0.1–0.9)
MONOCYTES NFR BLD AUTO: 11.3 % (ref 5–12)
NEUTROPHILS NFR BLD AUTO: 5.05 10*3/MM3 (ref 1.7–7)
NEUTROPHILS NFR BLD AUTO: 64.4 % (ref 42.7–76)
NRBC BLD AUTO-RTO: 0 /100 WBC (ref 0–0.2)
PLATELET # BLD AUTO: 353 10*3/MM3 (ref 140–450)
PMV BLD AUTO: 9.6 FL (ref 6–12)
POTASSIUM SERPL-SCNC: 4.1 MMOL/L (ref 3.5–5.2)
RBC # BLD AUTO: 3.87 10*6/MM3 (ref 3.77–5.28)
SODIUM SERPL-SCNC: 139 MMOL/L (ref 136–145)
WBC NRBC COR # BLD: 7.85 10*3/MM3 (ref 3.4–10.8)

## 2022-02-19 PROCEDURE — 83735 ASSAY OF MAGNESIUM: CPT | Performed by: FAMILY MEDICINE

## 2022-02-19 PROCEDURE — 93306 TTE W/DOPPLER COMPLETE: CPT

## 2022-02-19 PROCEDURE — 25010000002 CEFTRIAXONE PER 250 MG: Performed by: FAMILY MEDICINE

## 2022-02-19 PROCEDURE — 80048 BASIC METABOLIC PNL TOTAL CA: CPT | Performed by: FAMILY MEDICINE

## 2022-02-19 PROCEDURE — 85025 COMPLETE CBC W/AUTO DIFF WBC: CPT | Performed by: FAMILY MEDICINE

## 2022-02-19 PROCEDURE — 63710000001 INSULIN ASPART PER 5 UNITS: Performed by: FAMILY MEDICINE

## 2022-02-19 PROCEDURE — 82962 GLUCOSE BLOOD TEST: CPT

## 2022-02-19 PROCEDURE — 25010000002 HEPARIN (PORCINE) PER 1000 UNITS: Performed by: FAMILY MEDICINE

## 2022-02-19 PROCEDURE — 36415 COLL VENOUS BLD VENIPUNCTURE: CPT | Performed by: FAMILY MEDICINE

## 2022-02-19 PROCEDURE — 25010000002 FUROSEMIDE PER 20 MG: Performed by: FAMILY MEDICINE

## 2022-02-19 PROCEDURE — 63710000001 INSULIN DETEMIR PER 5 UNITS: Performed by: FAMILY MEDICINE

## 2022-02-19 RX ADMIN — ATORVASTATIN CALCIUM 40 MG: 40 TABLET, FILM COATED ORAL at 08:21

## 2022-02-19 RX ADMIN — INSULIN ASPART 5 UNITS: 100 INJECTION, SOLUTION INTRAVENOUS; SUBCUTANEOUS at 12:11

## 2022-02-19 RX ADMIN — INSULIN ASPART 4 UNITS: 100 INJECTION, SOLUTION INTRAVENOUS; SUBCUTANEOUS at 17:07

## 2022-02-19 RX ADMIN — ISOSORBIDE MONONITRATE 30 MG: 30 TABLET, EXTENDED RELEASE ORAL at 08:21

## 2022-02-19 RX ADMIN — HEPARIN SODIUM 5000 UNITS: 5000 INJECTION, SOLUTION INTRAVENOUS; SUBCUTANEOUS at 08:19

## 2022-02-19 RX ADMIN — SODIUM CHLORIDE, PRESERVATIVE FREE 10 ML: 5 INJECTION INTRAVENOUS at 21:32

## 2022-02-19 RX ADMIN — INSULIN DETEMIR 35 UNITS: 100 INJECTION, SOLUTION SUBCUTANEOUS at 21:31

## 2022-02-19 RX ADMIN — CARVEDILOL 25 MG: 25 TABLET, FILM COATED ORAL at 08:21

## 2022-02-19 RX ADMIN — LISINOPRIL 10 MG: 10 TABLET ORAL at 08:22

## 2022-02-19 RX ADMIN — DOXYCYCLINE 100 MG: 100 INJECTION, POWDER, LYOPHILIZED, FOR SOLUTION INTRAVENOUS at 17:07

## 2022-02-19 RX ADMIN — FUROSEMIDE 40 MG: 10 INJECTION, SOLUTION INTRAVENOUS at 08:20

## 2022-02-19 RX ADMIN — CARVEDILOL 25 MG: 25 TABLET, FILM COATED ORAL at 17:05

## 2022-02-19 RX ADMIN — INSULIN ASPART 3 UNITS: 100 INJECTION, SOLUTION INTRAVENOUS; SUBCUTANEOUS at 08:20

## 2022-02-19 RX ADMIN — HEPARIN SODIUM 5000 UNITS: 5000 INJECTION, SOLUTION INTRAVENOUS; SUBCUTANEOUS at 21:32

## 2022-02-19 RX ADMIN — SODIUM CHLORIDE, PRESERVATIVE FREE 10 ML: 5 INJECTION INTRAVENOUS at 09:10

## 2022-02-19 RX ADMIN — DOXYCYCLINE 100 MG: 100 INJECTION, POWDER, LYOPHILIZED, FOR SOLUTION INTRAVENOUS at 10:25

## 2022-02-19 RX ADMIN — ASPIRIN 81 MG: 81 TABLET, FILM COATED ORAL at 21:32

## 2022-02-19 RX ADMIN — CEFTRIAXONE SODIUM 1 G: 1 INJECTION, POWDER, FOR SOLUTION INTRAMUSCULAR; INTRAVENOUS at 14:28

## 2022-02-19 RX ADMIN — PANTOPRAZOLE SODIUM 40 MG: 40 TABLET, DELAYED RELEASE ORAL at 08:21

## 2022-02-20 LAB
ANION GAP SERPL CALCULATED.3IONS-SCNC: 12 MMOL/L (ref 5–15)
ANISOCYTOSIS BLD QL: ABNORMAL
BH CV ECHO MEAS - ACS: 2 CM
BH CV ECHO MEAS - AO MAX PG (FULL): 6.3 MMHG
BH CV ECHO MEAS - AO MAX PG: 8.9 MMHG
BH CV ECHO MEAS - AO MEAN PG (FULL): 4 MMHG
BH CV ECHO MEAS - AO MEAN PG: 5 MMHG
BH CV ECHO MEAS - AO ROOT AREA (BSA CORRECTED): 1.6
BH CV ECHO MEAS - AO ROOT AREA: 8.6 CM^2
BH CV ECHO MEAS - AO ROOT DIAM: 3.3 CM
BH CV ECHO MEAS - AO V2 MAX: 149 CM/SEC
BH CV ECHO MEAS - AO V2 MEAN: 103 CM/SEC
BH CV ECHO MEAS - AO V2 VTI: 28.4 CM
BH CV ECHO MEAS - ASC AORTA: 3 CM
BH CV ECHO MEAS - AVA(I,A): 1.5 CM^2
BH CV ECHO MEAS - AVA(I,D): 1.5 CM^2
BH CV ECHO MEAS - AVA(V,A): 1.5 CM^2
BH CV ECHO MEAS - AVA(V,D): 1.5 CM^2
BH CV ECHO MEAS - BSA(HAYCOCK): 2.3 M^2
BH CV ECHO MEAS - BSA: 2.1 M^2
BH CV ECHO MEAS - BZI_BMI: 45 KILOGRAMS/M^2
BH CV ECHO MEAS - BZI_METRIC_HEIGHT: 157.5 CM
BH CV ECHO MEAS - BZI_METRIC_WEIGHT: 111.6 KG
BH CV ECHO MEAS - EDV(CUBED): 286.2 ML
BH CV ECHO MEAS - EDV(MOD-SP2): 105 ML
BH CV ECHO MEAS - EDV(MOD-SP4): 110 ML
BH CV ECHO MEAS - EDV(TEICH): 222.8 ML
BH CV ECHO MEAS - EF(CUBED): 51.2 %
BH CV ECHO MEAS - EF(MOD-SP2): 38.4 %
BH CV ECHO MEAS - EF(MOD-SP4): 45.3 %
BH CV ECHO MEAS - EF(TEICH): 42.1 %
BH CV ECHO MEAS - ESV(CUBED): 139.8 ML
BH CV ECHO MEAS - ESV(MOD-SP2): 64.7 ML
BH CV ECHO MEAS - ESV(MOD-SP4): 60.2 ML
BH CV ECHO MEAS - ESV(TEICH): 128.9 ML
BH CV ECHO MEAS - FS: 21.2 %
BH CV ECHO MEAS - IVS/LVPW: 1
BH CV ECHO MEAS - IVSD: 0.96 CM
BH CV ECHO MEAS - LA DIMENSION: 4.2 CM
BH CV ECHO MEAS - LA/AO: 1.3
BH CV ECHO MEAS - LV DIASTOLIC VOL/BSA (35-75): 52.7 ML/M^2
BH CV ECHO MEAS - LV MASS(C)D: 274.3 GRAMS
BH CV ECHO MEAS - LV MASS(C)DI: 131.4 GRAMS/M^2
BH CV ECHO MEAS - LV MAX PG: 2.6 MMHG
BH CV ECHO MEAS - LV MEAN PG: 1 MMHG
BH CV ECHO MEAS - LV SYSTOLIC VOL/BSA (12-30): 28.8 ML/M^2
BH CV ECHO MEAS - LV V1 MAX: 80.9 CM/SEC
BH CV ECHO MEAS - LV V1 MEAN: 51 CM/SEC
BH CV ECHO MEAS - LV V1 VTI: 15.2 CM
BH CV ECHO MEAS - LVIDD: 6.6 CM
BH CV ECHO MEAS - LVIDS: 5.2 CM
BH CV ECHO MEAS - LVLD AP2: 7.5 CM
BH CV ECHO MEAS - LVLD AP4: 8.5 CM
BH CV ECHO MEAS - LVLS AP2: 6.5 CM
BH CV ECHO MEAS - LVLS AP4: 7.6 CM
BH CV ECHO MEAS - LVOT AREA (M): 2.8 CM^2
BH CV ECHO MEAS - LVOT AREA: 2.8 CM^2
BH CV ECHO MEAS - LVOT DIAM: 1.9 CM
BH CV ECHO MEAS - LVPWD: 0.95 CM
BH CV ECHO MEAS - MR MAX PG: 94.5 MMHG
BH CV ECHO MEAS - MR MAX VEL: 486 CM/SEC
BH CV ECHO MEAS - MV A MAX VEL: 85.3 CM/SEC
BH CV ECHO MEAS - MV DEC SLOPE: 744 CM/SEC^2
BH CV ECHO MEAS - MV E MAX VEL: 121 CM/SEC
BH CV ECHO MEAS - MV E/A: 1.4
BH CV ECHO MEAS - MV MAX PG: 8.6 MMHG
BH CV ECHO MEAS - MV MEAN PG: 3 MMHG
BH CV ECHO MEAS - MV P1/2T MAX VEL: 135 CM/SEC
BH CV ECHO MEAS - MV P1/2T: 53.1 MSEC
BH CV ECHO MEAS - MV V2 MAX: 147 CM/SEC
BH CV ECHO MEAS - MV V2 MEAN: 83.3 CM/SEC
BH CV ECHO MEAS - MV V2 VTI: 31.9 CM
BH CV ECHO MEAS - MVA P1/2T LCG: 1.6 CM^2
BH CV ECHO MEAS - MVA(P1/2T): 4.1 CM^2
BH CV ECHO MEAS - MVA(VTI): 1.4 CM^2
BH CV ECHO MEAS - PA MAX PG: 6 MMHG
BH CV ECHO MEAS - PA V2 MAX: 122 CM/SEC
BH CV ECHO MEAS - RAP SYSTOLE: 5 MMHG
BH CV ECHO MEAS - RVDD: 1.9 CM
BH CV ECHO MEAS - RVSP: 39.6 MMHG
BH CV ECHO MEAS - SI(AO): 116.4 ML/M^2
BH CV ECHO MEAS - SI(CUBED): 70.1 ML/M^2
BH CV ECHO MEAS - SI(LVOT): 20.6 ML/M^2
BH CV ECHO MEAS - SI(MOD-SP2): 19.3 ML/M^2
BH CV ECHO MEAS - SI(MOD-SP4): 23.9 ML/M^2
BH CV ECHO MEAS - SI(TEICH): 45 ML/M^2
BH CV ECHO MEAS - SV(AO): 242.9 ML
BH CV ECHO MEAS - SV(CUBED): 146.4 ML
BH CV ECHO MEAS - SV(LVOT): 43.1 ML
BH CV ECHO MEAS - SV(MOD-SP2): 40.3 ML
BH CV ECHO MEAS - SV(MOD-SP4): 49.8 ML
BH CV ECHO MEAS - SV(TEICH): 93.9 ML
BH CV ECHO MEAS - TR MAX VEL: 294 CM/SEC
BUN SERPL-MCNC: 26 MG/DL (ref 8–23)
BUN/CREAT SERPL: 21.1 (ref 7–25)
CALCIUM SPEC-SCNC: 8.8 MG/DL (ref 8.6–10.5)
CHLORIDE SERPL-SCNC: 104 MMOL/L (ref 98–107)
CO2 SERPL-SCNC: 22 MMOL/L (ref 22–29)
CREAT SERPL-MCNC: 1.23 MG/DL (ref 0.57–1)
DEPRECATED RDW RBC AUTO: 49 FL (ref 37–54)
ERYTHROCYTE [DISTWIDTH] IN BLOOD BY AUTOMATED COUNT: 15.8 % (ref 12.3–15.4)
GFR SERPL CREATININE-BSD FRML MDRD: 52 ML/MIN/1.73
GLUCOSE BLDC GLUCOMTR-MCNC: 153 MG/DL (ref 70–130)
GLUCOSE BLDC GLUCOMTR-MCNC: 167 MG/DL (ref 70–130)
GLUCOSE BLDC GLUCOMTR-MCNC: 188 MG/DL (ref 70–130)
GLUCOSE BLDC GLUCOMTR-MCNC: 204 MG/DL (ref 70–130)
GLUCOSE SERPL-MCNC: 164 MG/DL (ref 65–99)
HCT VFR BLD AUTO: 30.3 % (ref 34–46.6)
HGB BLD-MCNC: 9.9 G/DL (ref 12–15.9)
HYPOCHROMIA BLD QL: ABNORMAL
LYMPHOCYTES # BLD MANUAL: 5.8 10*3/MM3 (ref 0.7–3.1)
LYMPHOCYTES NFR BLD MANUAL: 5 % (ref 5–12)
MAGNESIUM SERPL-MCNC: 1.9 MG/DL (ref 1.6–2.4)
MAXIMAL PREDICTED HEART RATE: 147 BPM
MCH RBC QN AUTO: 28.1 PG (ref 26.6–33)
MCHC RBC AUTO-ENTMCNC: 32.7 G/DL (ref 31.5–35.7)
MCV RBC AUTO: 86.1 FL (ref 79–97)
MONOCYTES # BLD: 0.81 10*3/MM3 (ref 0.1–0.9)
NEUTROPHILS # BLD AUTO: 9.5 10*3/MM3 (ref 1.7–7)
NEUTROPHILS NFR BLD MANUAL: 59 % (ref 42.7–76)
PLATELET # BLD AUTO: 331 10*3/MM3 (ref 140–450)
PMV BLD AUTO: 9.6 FL (ref 6–12)
POTASSIUM SERPL-SCNC: 3.3 MMOL/L (ref 3.5–5.2)
POTASSIUM SERPL-SCNC: 4.2 MMOL/L (ref 3.5–5.2)
RBC # BLD AUTO: 3.52 10*6/MM3 (ref 3.77–5.28)
SMALL PLATELETS BLD QL SMEAR: ADEQUATE
SODIUM SERPL-SCNC: 138 MMOL/L (ref 136–145)
STRESS TARGET HR: 125 BPM
VARIANT LYMPHS NFR BLD MANUAL: 36 % (ref 19.6–45.3)
WBC MORPH BLD: NORMAL
WBC NRBC COR # BLD: 16.11 10*3/MM3 (ref 3.4–10.8)

## 2022-02-20 PROCEDURE — 94799 UNLISTED PULMONARY SVC/PX: CPT

## 2022-02-20 PROCEDURE — 85007 BL SMEAR W/DIFF WBC COUNT: CPT | Performed by: FAMILY MEDICINE

## 2022-02-20 PROCEDURE — 82962 GLUCOSE BLOOD TEST: CPT

## 2022-02-20 PROCEDURE — 63710000001 INSULIN ASPART PER 5 UNITS: Performed by: FAMILY MEDICINE

## 2022-02-20 PROCEDURE — 25010000002 HEPARIN (PORCINE) PER 1000 UNITS: Performed by: FAMILY MEDICINE

## 2022-02-20 PROCEDURE — 84132 ASSAY OF SERUM POTASSIUM: CPT | Performed by: FAMILY MEDICINE

## 2022-02-20 PROCEDURE — 63710000001 INSULIN DETEMIR PER 5 UNITS: Performed by: FAMILY MEDICINE

## 2022-02-20 PROCEDURE — 94640 AIRWAY INHALATION TREATMENT: CPT

## 2022-02-20 PROCEDURE — 85025 COMPLETE CBC W/AUTO DIFF WBC: CPT | Performed by: FAMILY MEDICINE

## 2022-02-20 PROCEDURE — 25010000002 FUROSEMIDE PER 20 MG: Performed by: FAMILY MEDICINE

## 2022-02-20 PROCEDURE — 94760 N-INVAS EAR/PLS OXIMETRY 1: CPT

## 2022-02-20 PROCEDURE — 80048 BASIC METABOLIC PNL TOTAL CA: CPT | Performed by: FAMILY MEDICINE

## 2022-02-20 PROCEDURE — 25010000002 CEFTRIAXONE PER 250 MG: Performed by: FAMILY MEDICINE

## 2022-02-20 PROCEDURE — 83735 ASSAY OF MAGNESIUM: CPT | Performed by: FAMILY MEDICINE

## 2022-02-20 RX ORDER — POTASSIUM CHLORIDE 750 MG/1
40 CAPSULE, EXTENDED RELEASE ORAL AS NEEDED
Status: DISCONTINUED | OUTPATIENT
Start: 2022-02-20 | End: 2022-02-23 | Stop reason: HOSPADM

## 2022-02-20 RX ORDER — IPRATROPIUM BROMIDE AND ALBUTEROL SULFATE 2.5; .5 MG/3ML; MG/3ML
3 SOLUTION RESPIRATORY (INHALATION)
Status: DISCONTINUED | OUTPATIENT
Start: 2022-02-20 | End: 2022-02-23 | Stop reason: HOSPADM

## 2022-02-20 RX ORDER — POTASSIUM CHLORIDE 1.5 G/1.77G
40 POWDER, FOR SOLUTION ORAL AS NEEDED
Status: DISCONTINUED | OUTPATIENT
Start: 2022-02-20 | End: 2022-02-23 | Stop reason: HOSPADM

## 2022-02-20 RX ADMIN — FUROSEMIDE 40 MG: 10 INJECTION, SOLUTION INTRAVENOUS at 08:22

## 2022-02-20 RX ADMIN — SODIUM CHLORIDE, PRESERVATIVE FREE 10 ML: 5 INJECTION INTRAVENOUS at 21:34

## 2022-02-20 RX ADMIN — IPRATROPIUM BROMIDE AND ALBUTEROL SULFATE 3 ML: 2.5; .5 SOLUTION RESPIRATORY (INHALATION) at 15:33

## 2022-02-20 RX ADMIN — ISOSORBIDE MONONITRATE 30 MG: 30 TABLET, EXTENDED RELEASE ORAL at 08:25

## 2022-02-20 RX ADMIN — IPRATROPIUM BROMIDE AND ALBUTEROL SULFATE 3 ML: 2.5; .5 SOLUTION RESPIRATORY (INHALATION) at 10:46

## 2022-02-20 RX ADMIN — HEPARIN SODIUM 5000 UNITS: 5000 INJECTION, SOLUTION INTRAVENOUS; SUBCUTANEOUS at 08:21

## 2022-02-20 RX ADMIN — ATORVASTATIN CALCIUM 40 MG: 40 TABLET, FILM COATED ORAL at 08:21

## 2022-02-20 RX ADMIN — CEFTRIAXONE SODIUM 1 G: 1 INJECTION, POWDER, FOR SOLUTION INTRAMUSCULAR; INTRAVENOUS at 15:19

## 2022-02-20 RX ADMIN — POTASSIUM CHLORIDE 40 MEQ: 750 CAPSULE, EXTENDED RELEASE ORAL at 15:00

## 2022-02-20 RX ADMIN — CARVEDILOL 25 MG: 25 TABLET, FILM COATED ORAL at 08:21

## 2022-02-20 RX ADMIN — DOXYCYCLINE 100 MG: 100 INJECTION, POWDER, LYOPHILIZED, FOR SOLUTION INTRAVENOUS at 17:05

## 2022-02-20 RX ADMIN — ASPIRIN 81 MG: 81 TABLET, FILM COATED ORAL at 21:33

## 2022-02-20 RX ADMIN — INSULIN ASPART 3 UNITS: 100 INJECTION, SOLUTION INTRAVENOUS; SUBCUTANEOUS at 10:54

## 2022-02-20 RX ADMIN — SODIUM CHLORIDE, PRESERVATIVE FREE 10 ML: 5 INJECTION INTRAVENOUS at 08:23

## 2022-02-20 RX ADMIN — LISINOPRIL 10 MG: 10 TABLET ORAL at 08:21

## 2022-02-20 RX ADMIN — DOXYCYCLINE 100 MG: 100 INJECTION, POWDER, LYOPHILIZED, FOR SOLUTION INTRAVENOUS at 05:12

## 2022-02-20 RX ADMIN — CARVEDILOL 25 MG: 25 TABLET, FILM COATED ORAL at 17:05

## 2022-02-20 RX ADMIN — PANTOPRAZOLE SODIUM 40 MG: 40 TABLET, DELAYED RELEASE ORAL at 08:21

## 2022-02-20 RX ADMIN — POTASSIUM CHLORIDE 40 MEQ: 750 CAPSULE, EXTENDED RELEASE ORAL at 10:54

## 2022-02-20 RX ADMIN — HEPARIN SODIUM 5000 UNITS: 5000 INJECTION, SOLUTION INTRAVENOUS; SUBCUTANEOUS at 21:33

## 2022-02-20 RX ADMIN — INSULIN ASPART 2 UNITS: 100 INJECTION, SOLUTION INTRAVENOUS; SUBCUTANEOUS at 17:05

## 2022-02-20 RX ADMIN — INSULIN ASPART 2 UNITS: 100 INJECTION, SOLUTION INTRAVENOUS; SUBCUTANEOUS at 08:22

## 2022-02-20 RX ADMIN — INSULIN DETEMIR 35 UNITS: 100 INJECTION, SOLUTION SUBCUTANEOUS at 21:34

## 2022-02-21 LAB
ALBUMIN SERPL-MCNC: 3.8 G/DL (ref 3.5–5.2)
ALBUMIN/GLOB SERPL: 2 G/DL
ALP SERPL-CCNC: 93 U/L (ref 39–117)
ALT SERPL W P-5'-P-CCNC: 78 U/L (ref 1–33)
ANION GAP SERPL CALCULATED.3IONS-SCNC: 10 MMOL/L (ref 5–15)
AST SERPL-CCNC: 110 U/L (ref 1–32)
BASOPHILS # BLD AUTO: 0.05 10*3/MM3 (ref 0–0.2)
BASOPHILS NFR BLD AUTO: 0.3 % (ref 0–1.5)
BILIRUB SERPL-MCNC: 0.2 MG/DL (ref 0–1.2)
BUN SERPL-MCNC: 23 MG/DL (ref 8–23)
BUN/CREAT SERPL: 20.2 (ref 7–25)
CALCIUM SPEC-SCNC: 9.1 MG/DL (ref 8.6–10.5)
CHLORIDE SERPL-SCNC: 109 MMOL/L (ref 98–107)
CO2 SERPL-SCNC: 21 MMOL/L (ref 22–29)
CREAT SERPL-MCNC: 1.14 MG/DL (ref 0.57–1)
DEPRECATED RDW RBC AUTO: 49.5 FL (ref 37–54)
EOSINOPHIL # BLD AUTO: 0.14 10*3/MM3 (ref 0–0.4)
EOSINOPHIL NFR BLD AUTO: 1 % (ref 0.3–6.2)
ERYTHROCYTE [DISTWIDTH] IN BLOOD BY AUTOMATED COUNT: 15.9 % (ref 12.3–15.4)
GFR SERPL CREATININE-BSD FRML MDRD: 57 ML/MIN/1.73
GLOBULIN UR ELPH-MCNC: 1.9 GM/DL
GLUCOSE BLDC GLUCOMTR-MCNC: 122 MG/DL (ref 70–130)
GLUCOSE BLDC GLUCOMTR-MCNC: 124 MG/DL (ref 70–130)
GLUCOSE BLDC GLUCOMTR-MCNC: 128 MG/DL (ref 70–130)
GLUCOSE BLDC GLUCOMTR-MCNC: 148 MG/DL (ref 70–130)
GLUCOSE SERPL-MCNC: 134 MG/DL (ref 65–99)
HCT VFR BLD AUTO: 32.4 % (ref 34–46.6)
HGB BLD-MCNC: 10.3 G/DL (ref 12–15.9)
IMM GRANULOCYTES # BLD AUTO: 0.08 10*3/MM3 (ref 0–0.05)
IMM GRANULOCYTES NFR BLD AUTO: 0.5 % (ref 0–0.5)
LYMPHOCYTES # BLD AUTO: 7.15 10*3/MM3 (ref 0.7–3.1)
LYMPHOCYTES NFR BLD AUTO: 48.6 % (ref 19.6–45.3)
MCH RBC QN AUTO: 27.1 PG (ref 26.6–33)
MCHC RBC AUTO-ENTMCNC: 31.8 G/DL (ref 31.5–35.7)
MCV RBC AUTO: 85.3 FL (ref 79–97)
MONOCYTES # BLD AUTO: 1.21 10*3/MM3 (ref 0.1–0.9)
MONOCYTES NFR BLD AUTO: 8.2 % (ref 5–12)
NEUTROPHILS NFR BLD AUTO: 41.4 % (ref 42.7–76)
NEUTROPHILS NFR BLD AUTO: 6.07 10*3/MM3 (ref 1.7–7)
NRBC BLD AUTO-RTO: 0 /100 WBC (ref 0–0.2)
PLATELET # BLD AUTO: 323 10*3/MM3 (ref 140–450)
PMV BLD AUTO: 9.5 FL (ref 6–12)
POTASSIUM SERPL-SCNC: 3.9 MMOL/L (ref 3.5–5.2)
PROT SERPL-MCNC: 5.7 G/DL (ref 6–8.5)
RBC # BLD AUTO: 3.8 10*6/MM3 (ref 3.77–5.28)
SODIUM SERPL-SCNC: 140 MMOL/L (ref 136–145)
WBC NRBC COR # BLD: 14.7 10*3/MM3 (ref 3.4–10.8)

## 2022-02-21 PROCEDURE — 97165 OT EVAL LOW COMPLEX 30 MIN: CPT

## 2022-02-21 PROCEDURE — 94760 N-INVAS EAR/PLS OXIMETRY 1: CPT

## 2022-02-21 PROCEDURE — 25010000002 HEPARIN (PORCINE) PER 1000 UNITS: Performed by: FAMILY MEDICINE

## 2022-02-21 PROCEDURE — 25010000002 CEFTRIAXONE PER 250 MG: Performed by: FAMILY MEDICINE

## 2022-02-21 PROCEDURE — 82962 GLUCOSE BLOOD TEST: CPT

## 2022-02-21 PROCEDURE — 94799 UNLISTED PULMONARY SVC/PX: CPT

## 2022-02-21 PROCEDURE — 25010000002 ONDANSETRON PER 1 MG: Performed by: INTERNAL MEDICINE

## 2022-02-21 PROCEDURE — 85025 COMPLETE CBC W/AUTO DIFF WBC: CPT | Performed by: FAMILY MEDICINE

## 2022-02-21 PROCEDURE — 25010000002 FUROSEMIDE PER 20 MG: Performed by: FAMILY MEDICINE

## 2022-02-21 PROCEDURE — 80053 COMPREHEN METABOLIC PANEL: CPT | Performed by: FAMILY MEDICINE

## 2022-02-21 PROCEDURE — 97162 PT EVAL MOD COMPLEX 30 MIN: CPT

## 2022-02-21 PROCEDURE — 63710000001 INSULIN DETEMIR PER 5 UNITS: Performed by: FAMILY MEDICINE

## 2022-02-21 RX ORDER — ONDANSETRON 2 MG/ML
4 INJECTION INTRAMUSCULAR; INTRAVENOUS EVERY 6 HOURS PRN
Status: DISCONTINUED | OUTPATIENT
Start: 2022-02-21 | End: 2022-02-23 | Stop reason: HOSPADM

## 2022-02-21 RX ADMIN — IPRATROPIUM BROMIDE AND ALBUTEROL SULFATE 3 ML: 2.5; .5 SOLUTION RESPIRATORY (INHALATION) at 11:08

## 2022-02-21 RX ADMIN — HEPARIN SODIUM 5000 UNITS: 5000 INJECTION, SOLUTION INTRAVENOUS; SUBCUTANEOUS at 08:32

## 2022-02-21 RX ADMIN — DOXYCYCLINE 100 MG: 100 INJECTION, POWDER, LYOPHILIZED, FOR SOLUTION INTRAVENOUS at 19:10

## 2022-02-21 RX ADMIN — IPRATROPIUM BROMIDE AND ALBUTEROL SULFATE 3 ML: 2.5; .5 SOLUTION RESPIRATORY (INHALATION) at 15:04

## 2022-02-21 RX ADMIN — ASPIRIN 81 MG: 81 TABLET, FILM COATED ORAL at 21:03

## 2022-02-21 RX ADMIN — ONDANSETRON 4 MG: 2 INJECTION INTRAMUSCULAR; INTRAVENOUS at 14:56

## 2022-02-21 RX ADMIN — METFORMIN HYDROCHLORIDE 1000 MG: 500 TABLET ORAL at 08:31

## 2022-02-21 RX ADMIN — CARVEDILOL 25 MG: 25 TABLET, FILM COATED ORAL at 17:42

## 2022-02-21 RX ADMIN — CEFTRIAXONE SODIUM 1 G: 1 INJECTION, POWDER, FOR SOLUTION INTRAMUSCULAR; INTRAVENOUS at 18:19

## 2022-02-21 RX ADMIN — METFORMIN HYDROCHLORIDE 1000 MG: 500 TABLET ORAL at 17:45

## 2022-02-21 RX ADMIN — CARVEDILOL 25 MG: 25 TABLET, FILM COATED ORAL at 08:32

## 2022-02-21 RX ADMIN — LISINOPRIL 10 MG: 10 TABLET ORAL at 08:32

## 2022-02-21 RX ADMIN — DOXYCYCLINE 100 MG: 100 INJECTION, POWDER, LYOPHILIZED, FOR SOLUTION INTRAVENOUS at 04:52

## 2022-02-21 RX ADMIN — ISOSORBIDE MONONITRATE 30 MG: 30 TABLET, EXTENDED RELEASE ORAL at 08:32

## 2022-02-21 RX ADMIN — ATORVASTATIN CALCIUM 40 MG: 40 TABLET, FILM COATED ORAL at 08:32

## 2022-02-21 RX ADMIN — HEPARIN SODIUM 5000 UNITS: 5000 INJECTION, SOLUTION INTRAVENOUS; SUBCUTANEOUS at 21:02

## 2022-02-21 RX ADMIN — IPRATROPIUM BROMIDE AND ALBUTEROL SULFATE 3 ML: 2.5; .5 SOLUTION RESPIRATORY (INHALATION) at 07:06

## 2022-02-21 RX ADMIN — SODIUM CHLORIDE, PRESERVATIVE FREE 10 ML: 5 INJECTION INTRAVENOUS at 21:03

## 2022-02-21 RX ADMIN — FUROSEMIDE 40 MG: 10 INJECTION, SOLUTION INTRAVENOUS at 08:32

## 2022-02-21 RX ADMIN — INSULIN DETEMIR 35 UNITS: 100 INJECTION, SOLUTION SUBCUTANEOUS at 21:02

## 2022-02-21 RX ADMIN — IPRATROPIUM BROMIDE AND ALBUTEROL SULFATE 3 ML: 2.5; .5 SOLUTION RESPIRATORY (INHALATION) at 20:09

## 2022-02-21 RX ADMIN — PANTOPRAZOLE SODIUM 40 MG: 40 TABLET, DELAYED RELEASE ORAL at 08:32

## 2022-02-22 ENCOUNTER — HOME CARE VISIT (OUTPATIENT)
Dept: HOME HEALTH SERVICES | Facility: HOME HEALTHCARE | Age: 72
End: 2022-02-22

## 2022-02-22 LAB
ANION GAP SERPL CALCULATED.3IONS-SCNC: 10 MMOL/L (ref 5–15)
BASOPHILS # BLD AUTO: 0.04 10*3/MM3 (ref 0–0.2)
BASOPHILS NFR BLD AUTO: 0.3 % (ref 0–1.5)
BUN SERPL-MCNC: 19 MG/DL (ref 8–23)
BUN/CREAT SERPL: 17.8 (ref 7–25)
CALCIUM SPEC-SCNC: 9.1 MG/DL (ref 8.6–10.5)
CHLORIDE SERPL-SCNC: 108 MMOL/L (ref 98–107)
CO2 SERPL-SCNC: 22 MMOL/L (ref 22–29)
CREAT SERPL-MCNC: 1.07 MG/DL (ref 0.57–1)
DEPRECATED RDW RBC AUTO: 50.1 FL (ref 37–54)
EOSINOPHIL # BLD AUTO: 0.15 10*3/MM3 (ref 0–0.4)
EOSINOPHIL NFR BLD AUTO: 1 % (ref 0.3–6.2)
ERYTHROCYTE [DISTWIDTH] IN BLOOD BY AUTOMATED COUNT: 16 % (ref 12.3–15.4)
GFR SERPL CREATININE-BSD FRML MDRD: 61 ML/MIN/1.73
GLUCOSE BLDC GLUCOMTR-MCNC: 106 MG/DL (ref 70–130)
GLUCOSE BLDC GLUCOMTR-MCNC: 118 MG/DL (ref 70–130)
GLUCOSE BLDC GLUCOMTR-MCNC: 121 MG/DL (ref 70–130)
GLUCOSE BLDC GLUCOMTR-MCNC: 68 MG/DL (ref 70–130)
GLUCOSE BLDC GLUCOMTR-MCNC: 86 MG/DL (ref 70–130)
GLUCOSE SERPL-MCNC: 62 MG/DL (ref 65–99)
HCT VFR BLD AUTO: 30.8 % (ref 34–46.6)
HGB BLD-MCNC: 10 G/DL (ref 12–15.9)
IMM GRANULOCYTES # BLD AUTO: 0.08 10*3/MM3 (ref 0–0.05)
IMM GRANULOCYTES NFR BLD AUTO: 0.5 % (ref 0–0.5)
LYMPHOCYTES # BLD AUTO: 5.72 10*3/MM3 (ref 0.7–3.1)
LYMPHOCYTES NFR BLD AUTO: 39.1 % (ref 19.6–45.3)
MCH RBC QN AUTO: 27.9 PG (ref 26.6–33)
MCHC RBC AUTO-ENTMCNC: 32.5 G/DL (ref 31.5–35.7)
MCV RBC AUTO: 85.8 FL (ref 79–97)
MONOCYTES # BLD AUTO: 1.36 10*3/MM3 (ref 0.1–0.9)
MONOCYTES NFR BLD AUTO: 9.3 % (ref 5–12)
NEUTROPHILS NFR BLD AUTO: 49.8 % (ref 42.7–76)
NEUTROPHILS NFR BLD AUTO: 7.28 10*3/MM3 (ref 1.7–7)
NRBC BLD AUTO-RTO: 0 /100 WBC (ref 0–0.2)
PLATELET # BLD AUTO: 320 10*3/MM3 (ref 140–450)
PMV BLD AUTO: 9.5 FL (ref 6–12)
POTASSIUM SERPL-SCNC: 3.6 MMOL/L (ref 3.5–5.2)
POTASSIUM SERPL-SCNC: 4.9 MMOL/L (ref 3.5–5.2)
RBC # BLD AUTO: 3.59 10*6/MM3 (ref 3.77–5.28)
SODIUM SERPL-SCNC: 140 MMOL/L (ref 136–145)
WBC NRBC COR # BLD: 14.63 10*3/MM3 (ref 3.4–10.8)

## 2022-02-22 PROCEDURE — 63710000001 INSULIN DETEMIR PER 5 UNITS: Performed by: FAMILY MEDICINE

## 2022-02-22 PROCEDURE — 82962 GLUCOSE BLOOD TEST: CPT

## 2022-02-22 PROCEDURE — 94799 UNLISTED PULMONARY SVC/PX: CPT

## 2022-02-22 PROCEDURE — 85025 COMPLETE CBC W/AUTO DIFF WBC: CPT | Performed by: INTERNAL MEDICINE

## 2022-02-22 PROCEDURE — 94664 DEMO&/EVAL PT USE INHALER: CPT

## 2022-02-22 PROCEDURE — 84132 ASSAY OF SERUM POTASSIUM: CPT | Performed by: FAMILY MEDICINE

## 2022-02-22 PROCEDURE — 80048 BASIC METABOLIC PNL TOTAL CA: CPT | Performed by: INTERNAL MEDICINE

## 2022-02-22 PROCEDURE — 97110 THERAPEUTIC EXERCISES: CPT

## 2022-02-22 PROCEDURE — 25010000002 CEFTRIAXONE PER 250 MG: Performed by: FAMILY MEDICINE

## 2022-02-22 PROCEDURE — 97116 GAIT TRAINING THERAPY: CPT

## 2022-02-22 PROCEDURE — 94760 N-INVAS EAR/PLS OXIMETRY 1: CPT

## 2022-02-22 PROCEDURE — 25010000002 FUROSEMIDE PER 20 MG: Performed by: FAMILY MEDICINE

## 2022-02-22 PROCEDURE — 25010000002 HEPARIN (PORCINE) PER 1000 UNITS: Performed by: FAMILY MEDICINE

## 2022-02-22 RX ADMIN — METFORMIN HYDROCHLORIDE 1000 MG: 500 TABLET ORAL at 08:42

## 2022-02-22 RX ADMIN — HEPARIN SODIUM 5000 UNITS: 5000 INJECTION, SOLUTION INTRAVENOUS; SUBCUTANEOUS at 08:43

## 2022-02-22 RX ADMIN — SODIUM CHLORIDE, PRESERVATIVE FREE 10 ML: 5 INJECTION INTRAVENOUS at 08:43

## 2022-02-22 RX ADMIN — IPRATROPIUM BROMIDE AND ALBUTEROL SULFATE 3 ML: 2.5; .5 SOLUTION RESPIRATORY (INHALATION) at 07:44

## 2022-02-22 RX ADMIN — HEPARIN SODIUM 5000 UNITS: 5000 INJECTION, SOLUTION INTRAVENOUS; SUBCUTANEOUS at 21:52

## 2022-02-22 RX ADMIN — METFORMIN HYDROCHLORIDE 1000 MG: 500 TABLET ORAL at 17:57

## 2022-02-22 RX ADMIN — DOXYCYCLINE 100 MG: 100 INJECTION, POWDER, LYOPHILIZED, FOR SOLUTION INTRAVENOUS at 04:48

## 2022-02-22 RX ADMIN — FUROSEMIDE 40 MG: 10 INJECTION, SOLUTION INTRAVENOUS at 08:43

## 2022-02-22 RX ADMIN — INSULIN DETEMIR 35 UNITS: 100 INJECTION, SOLUTION SUBCUTANEOUS at 21:52

## 2022-02-22 RX ADMIN — CARVEDILOL 25 MG: 25 TABLET, FILM COATED ORAL at 08:43

## 2022-02-22 RX ADMIN — IPRATROPIUM BROMIDE AND ALBUTEROL SULFATE 3 ML: 2.5; .5 SOLUTION RESPIRATORY (INHALATION) at 11:47

## 2022-02-22 RX ADMIN — PANTOPRAZOLE SODIUM 40 MG: 40 TABLET, DELAYED RELEASE ORAL at 08:42

## 2022-02-22 RX ADMIN — ISOSORBIDE MONONITRATE 30 MG: 30 TABLET, EXTENDED RELEASE ORAL at 08:43

## 2022-02-22 RX ADMIN — ATORVASTATIN CALCIUM 40 MG: 40 TABLET, FILM COATED ORAL at 08:43

## 2022-02-22 RX ADMIN — IPRATROPIUM BROMIDE AND ALBUTEROL SULFATE 3 ML: 2.5; .5 SOLUTION RESPIRATORY (INHALATION) at 21:59

## 2022-02-22 RX ADMIN — POTASSIUM CHLORIDE 40 MEQ: 750 CAPSULE, EXTENDED RELEASE ORAL at 06:50

## 2022-02-22 RX ADMIN — SODIUM CHLORIDE, PRESERVATIVE FREE 10 ML: 5 INJECTION INTRAVENOUS at 21:54

## 2022-02-22 RX ADMIN — POTASSIUM CHLORIDE 40 MEQ: 750 CAPSULE, EXTENDED RELEASE ORAL at 10:57

## 2022-02-22 RX ADMIN — DOXYCYCLINE 100 MG: 100 INJECTION, POWDER, LYOPHILIZED, FOR SOLUTION INTRAVENOUS at 17:57

## 2022-02-22 RX ADMIN — CEFTRIAXONE SODIUM 1 G: 1 INJECTION, POWDER, FOR SOLUTION INTRAMUSCULAR; INTRAVENOUS at 14:40

## 2022-02-22 RX ADMIN — CARVEDILOL 25 MG: 25 TABLET, FILM COATED ORAL at 17:57

## 2022-02-22 RX ADMIN — ASPIRIN 81 MG: 81 TABLET, FILM COATED ORAL at 21:54

## 2022-02-22 RX ADMIN — LISINOPRIL 10 MG: 10 TABLET ORAL at 08:43

## 2022-02-22 RX ADMIN — IPRATROPIUM BROMIDE AND ALBUTEROL SULFATE 3 ML: 2.5; .5 SOLUTION RESPIRATORY (INHALATION) at 15:50

## 2022-02-23 ENCOUNTER — READMISSION MANAGEMENT (OUTPATIENT)
Dept: CALL CENTER | Facility: HOSPITAL | Age: 72
End: 2022-02-23

## 2022-02-23 VITALS
OXYGEN SATURATION: 99 % | DIASTOLIC BLOOD PRESSURE: 60 MMHG | SYSTOLIC BLOOD PRESSURE: 112 MMHG | HEIGHT: 62 IN | BODY MASS INDEX: 40.12 KG/M2 | HEART RATE: 72 BPM | TEMPERATURE: 97.6 F | RESPIRATION RATE: 18 BRPM | WEIGHT: 218 LBS

## 2022-02-23 LAB
BACTERIA SPEC AEROBE CULT: NORMAL
BACTERIA SPEC AEROBE CULT: NORMAL
GLUCOSE BLDC GLUCOMTR-MCNC: 101 MG/DL (ref 70–130)
GLUCOSE BLDC GLUCOMTR-MCNC: 66 MG/DL (ref 70–130)

## 2022-02-23 PROCEDURE — 25010000002 HEPARIN (PORCINE) PER 1000 UNITS: Performed by: FAMILY MEDICINE

## 2022-02-23 PROCEDURE — 25010000002 FUROSEMIDE PER 20 MG: Performed by: FAMILY MEDICINE

## 2022-02-23 PROCEDURE — 94799 UNLISTED PULMONARY SVC/PX: CPT

## 2022-02-23 PROCEDURE — 82962 GLUCOSE BLOOD TEST: CPT

## 2022-02-23 PROCEDURE — 94760 N-INVAS EAR/PLS OXIMETRY 1: CPT

## 2022-02-23 RX ORDER — IPRATROPIUM BROMIDE AND ALBUTEROL SULFATE 2.5; .5 MG/3ML; MG/3ML
3 SOLUTION RESPIRATORY (INHALATION)
Qty: 360 ML | Refills: 1 | Status: SHIPPED | OUTPATIENT
Start: 2022-02-23 | End: 2022-02-23 | Stop reason: SDUPTHER

## 2022-02-23 RX ORDER — IPRATROPIUM BROMIDE AND ALBUTEROL SULFATE 2.5; .5 MG/3ML; MG/3ML
3 SOLUTION RESPIRATORY (INHALATION) 4 TIMES DAILY
Qty: 360 ML | Refills: 1 | Status: SHIPPED | OUTPATIENT
Start: 2022-02-23

## 2022-02-23 RX ADMIN — ATORVASTATIN CALCIUM 40 MG: 40 TABLET, FILM COATED ORAL at 08:56

## 2022-02-23 RX ADMIN — IPRATROPIUM BROMIDE AND ALBUTEROL SULFATE 3 ML: 2.5; .5 SOLUTION RESPIRATORY (INHALATION) at 07:50

## 2022-02-23 RX ADMIN — ISOSORBIDE MONONITRATE 30 MG: 30 TABLET, EXTENDED RELEASE ORAL at 08:58

## 2022-02-23 RX ADMIN — HEPARIN SODIUM 5000 UNITS: 5000 INJECTION, SOLUTION INTRAVENOUS; SUBCUTANEOUS at 09:00

## 2022-02-23 RX ADMIN — IPRATROPIUM BROMIDE AND ALBUTEROL SULFATE 3 ML: 2.5; .5 SOLUTION RESPIRATORY (INHALATION) at 11:21

## 2022-02-23 RX ADMIN — LISINOPRIL 10 MG: 10 TABLET ORAL at 08:57

## 2022-02-23 RX ADMIN — SODIUM CHLORIDE, PRESERVATIVE FREE 10 ML: 5 INJECTION INTRAVENOUS at 09:10

## 2022-02-23 RX ADMIN — FUROSEMIDE 40 MG: 10 INJECTION, SOLUTION INTRAVENOUS at 09:01

## 2022-02-23 RX ADMIN — CARVEDILOL 25 MG: 25 TABLET, FILM COATED ORAL at 08:57

## 2022-02-23 RX ADMIN — METFORMIN HYDROCHLORIDE 1000 MG: 500 TABLET ORAL at 08:59

## 2022-02-23 RX ADMIN — DOXYCYCLINE 100 MG: 100 INJECTION, POWDER, LYOPHILIZED, FOR SOLUTION INTRAVENOUS at 04:57

## 2022-02-23 RX ADMIN — PANTOPRAZOLE SODIUM 40 MG: 40 TABLET, DELAYED RELEASE ORAL at 08:58

## 2022-02-23 NOTE — OUTREACH NOTE
Prep Survey      Responses   [facility] facility patient discharged from? [place]   Is LACE score < 7 ? No   Emergency Room discharge w/ pulse ox? No   Eligibility [facility]   Date of Admission 02/18/22   Date of Discharge 02/23/22   Discharge Disposition Home or Self Care   Discharge diagnosis Acute on chronic systolic CHF    Does the patient have one of the following disease processes/diagnoses(primary or secondary)? CHF   Does the patient have Home health ordered? Yes   What is the Home health agency?  [facility]   Is there a DME ordered? No   Prep survey completed? Yes          Babita Mar RN

## 2022-02-24 ENCOUNTER — TRANSITIONAL CARE MANAGEMENT TELEPHONE ENCOUNTER (OUTPATIENT)
Dept: CALL CENTER | Facility: HOSPITAL | Age: 72
End: 2022-02-24

## 2022-02-24 ENCOUNTER — HOME CARE VISIT (OUTPATIENT)
Dept: HOME HEALTH SERVICES | Facility: CLINIC | Age: 72
End: 2022-02-24

## 2022-02-24 VITALS
SYSTOLIC BLOOD PRESSURE: 144 MMHG | HEART RATE: 90 BPM | DIASTOLIC BLOOD PRESSURE: 80 MMHG | TEMPERATURE: 97.8 F | OXYGEN SATURATION: 96 % | RESPIRATION RATE: 18 BRPM

## 2022-02-24 PROCEDURE — G0493 RN CARE EA 15 MIN HH/HOSPICE: HCPCS

## 2022-02-24 RX ORDER — FUROSEMIDE 40 MG/1
TABLET ORAL
Qty: 60 TABLET | Refills: 1 | Status: SHIPPED | OUTPATIENT
Start: 2022-02-24 | End: 2022-01-01

## 2022-02-24 NOTE — OUTREACH NOTE
Call Center TCM Note      Responses   [facility] patient discharged from? [place]   Does the patient have one of the following disease processes/diagnoses(primary or secondary)? CHF   TCM attempt successful? No  [verbal release on file]   Unsuccessful attempts Attempt 1  [attempted pt and talked with sister--sister to contact her to alert her to anticipate a call]   Comments regarding PCP Hospital PCP FOLLOW UP APPOINTMENT IS 3/2/22@[facility]           Debbie Simmons RN    2/24/2022, 13:18 EST

## 2022-02-24 NOTE — OUTREACH NOTE
Call Center TCM Note      Responses   [facility] patient discharged from? [place]   Does the patient have one of the following disease processes/diagnoses(primary or secondary)? CHF   TCM attempt successful? No   Unsuccessful attempts Attempt 2   Comments regarding PCP Hospital PCP FOLLOW UP APPOINTMENT IS 3/2/22@0845am           Debbie Simmons, RN    2/24/2022, 15:21 EST

## 2022-02-25 ENCOUNTER — HOME CARE VISIT (OUTPATIENT)
Dept: HOME HEALTH SERVICES | Facility: CLINIC | Age: 72
End: 2022-02-25

## 2022-02-25 ENCOUNTER — HOME CARE VISIT (OUTPATIENT)
Dept: HOME HEALTH SERVICES | Facility: HOME HEALTHCARE | Age: 72
End: 2022-02-25

## 2022-02-25 ENCOUNTER — TRANSITIONAL CARE MANAGEMENT TELEPHONE ENCOUNTER (OUTPATIENT)
Dept: CALL CENTER | Facility: HOSPITAL | Age: 72
End: 2022-02-25

## 2022-02-25 PROCEDURE — G0152 HHCP-SERV OF OT,EA 15 MIN: HCPCS

## 2022-02-25 NOTE — OUTREACH NOTE
Call Center TCM Note      Responses   [facility] patient discharged from? [place]   Does the patient have one of the following disease processes/diagnoses(primary or secondary)? CHF   TCM attempt successful? No   Unsuccessful attempts Attempt 3   Comments regarding PCP Hospital PCP FOLLOW UP APPOINTMENT IS 3/2/22@0845am           Debbie Simmons, RN    2/25/2022, 10:29 EST

## 2022-02-28 ENCOUNTER — HOME CARE VISIT (OUTPATIENT)
Dept: HOME HEALTH SERVICES | Facility: CLINIC | Age: 72
End: 2022-02-28

## 2022-02-28 VITALS
DIASTOLIC BLOOD PRESSURE: 66 MMHG | RESPIRATION RATE: 18 BRPM | HEART RATE: 70 BPM | OXYGEN SATURATION: 97 % | SYSTOLIC BLOOD PRESSURE: 112 MMHG

## 2022-02-28 PROCEDURE — G0299 HHS/HOSPICE OF RN EA 15 MIN: HCPCS

## 2022-02-28 NOTE — HOME HEALTH
Patient is a 71 y.o. female admitted for shortness of breath, lower extremity edema due to an acute exacerbation of the underlying chronic systolic heart failure / non-ischemic cardiomyopathy. Her symptoms improved with the help of supportive care, supplemental oxygen as needed, IV diuresis as well as by adjusting some of her medications as listed.    Past Medical History: has a past medical history of Chronic systolic heart failure (HCC), Diabetes mellitus (HCC), Diabetic neuropathy (HCC), GERD (gastroesophageal reflux disease), Hypercholesterolemia, Hypertension, Low back pain, Nonischemic cardiomyopathy (HCC), Obesity, Sleep apnea, and Vitamin D deficiency.    She lives alone in ground-floor apartment with help from friends getting medicine.    ROM-WFL  [facility]-4/5    Eval only

## 2022-03-01 VITALS
RESPIRATION RATE: 18 BRPM | SYSTOLIC BLOOD PRESSURE: 124 MMHG | HEART RATE: 82 BPM | TEMPERATURE: 97.6 F | DIASTOLIC BLOOD PRESSURE: 76 MMHG

## 2022-03-02 ENCOUNTER — READMISSION MANAGEMENT (OUTPATIENT)
Dept: CALL CENTER | Facility: HOSPITAL | Age: 72
End: 2022-03-02

## 2022-03-02 NOTE — OUTREACH NOTE
CHF Week 2 Survey      Responses   [facility] patient discharged from? [place]   Does the patient have one of the following disease processes/diagnoses(primary or secondary)? CHF   Week 2 attempt successful? Yes   Call start time 1528   Call end time 1540   Meds reviewed with patient/caregiver? Yes   Is the patient having any side effects they believe may be caused by any medication additions or changes? No   Does the patient have all medications ordered at discharge? Yes   Is the patient taking all medications as directed (includes completed medication regime)? Yes   Comments regarding appointments States rescheduled appt with PCP. Cardiology appt 03/28/22.   Does the patient have a primary care provider?  Yes   Has the patient kept scheduled appointments due by today? N/A   Has home health visited the patient within 72 hours of discharge? Yes   Home health comments States  has visited once so far.   Pulse Ox monitoring None   Psychosocial issues? No   Psychosocial comments States is getting ready to move.   Did the patient receive a copy of their discharge instructions? Yes   Nursing interventions Reviewed instructions with patient   What is the patient's perception of their health status since discharge? Improving   Nursing interventions Nurse provided patient education   Is the patient weighing daily? No   Does the patient have scales? No   Daily weight interventions Education provided on importance of daily weight   Is the patient able to teach back Heart Failure diet management? Yes   Is the patient able to teach back Heart Failure Zones? No   Is the patient able to teach back signs and symptoms of worsening condition? (i.e. weight gain, shortness of air, etc.) Yes   If the patient is a current smoker, are they able to teach back resources for cessation? Not a smoker   Is the patient/caregiver able to teach back the hierarchy of who to call/visit for symptoms/problems? PCP, Specialist, Home health  "nurse, Urgent Care, ED, 911 Yes   CHF Week 2 call completed? Yes   Wrap up additional comments States is feeling better-denies any edema or SOA today. States has had one episode of SOA since discharge relieved by breathing tx. Reports BS \"good\". Denies any needs today-states will reschedule PCP appt.          Debby Paredes RN  "

## 2022-03-04 ENCOUNTER — HOME CARE VISIT (OUTPATIENT)
Dept: HOME HEALTH SERVICES | Facility: CLINIC | Age: 72
End: 2022-03-04

## 2022-03-04 VITALS
RESPIRATION RATE: 20 BRPM | TEMPERATURE: 98.2 F | HEART RATE: 84 BPM | SYSTOLIC BLOOD PRESSURE: 142 MMHG | DIASTOLIC BLOOD PRESSURE: 80 MMHG

## 2022-03-04 PROCEDURE — G0494 LPN CARE EA 15MIN HH/HOSPICE: HCPCS

## 2022-03-10 ENCOUNTER — READMISSION MANAGEMENT (OUTPATIENT)
Dept: CALL CENTER | Facility: HOSPITAL | Age: 72
End: 2022-03-10

## 2022-03-10 NOTE — OUTREACH NOTE
CHF Week 3 Survey    Flowsheet Row Responses   [facility] facility patient discharged from? [place]   Does the patient have one of the following disease processes/diagnoses(primary or secondary)? CHF   Week 3 attempt successful? No   Unsuccessful attempts Attempt 1          ARTURO RADFORD - Registered Nurse

## 2022-03-14 ENCOUNTER — HOME CARE VISIT (OUTPATIENT)
Dept: HOME HEALTH SERVICES | Facility: CLINIC | Age: 72
End: 2022-03-14

## 2022-03-14 VITALS
OXYGEN SATURATION: 96 % | SYSTOLIC BLOOD PRESSURE: 144 MMHG | HEART RATE: 84 BPM | RESPIRATION RATE: 18 BRPM | DIASTOLIC BLOOD PRESSURE: 82 MMHG | TEMPERATURE: 97.1 F

## 2022-03-14 PROCEDURE — G0493 RN CARE EA 15 MIN HH/HOSPICE: HCPCS

## 2022-03-15 ENCOUNTER — READMISSION MANAGEMENT (OUTPATIENT)
Dept: CALL CENTER | Facility: HOSPITAL | Age: 72
End: 2022-03-15

## 2022-03-15 NOTE — OUTREACH NOTE
CHF Week 3 Survey    Flowsheet Row Responses   [facility] facility patient discharged from? [place]   Does the patient have one of the following disease processes/diagnoses(primary or secondary)? CHF   Week 3 attempt successful? No   Unsuccessful attempts Attempt 2          EDGAR KRAUS - Registered Nurse

## 2022-04-02 PROBLEM — R06.02 SHORTNESS OF BREATH: Status: ACTIVE | Noted: 2022-01-01

## 2022-04-02 NOTE — ED PROVIDER NOTES
Subjective   Patient presents to the emergency department with shortness of breath, tachypnea, and cough that began last night.  She is not oxygen dependent at home, but states that if she is to walk to the mailbox with her cane, she has to take several stops along the way to catch her breath.        Shortness of Breath  Severity:  Moderate  Onset quality:  Gradual  Duration:  1 day  Timing:  Constant  Progression:  Worsening  Chronicity:  New  Relieved by:  Oxygen and rest  Worsened by:  Activity  Associated symptoms: cough and headaches    Associated symptoms: no abdominal pain, no chest pain, no diaphoresis, no ear pain, no fever, no neck pain, no rash, no sore throat, no vomiting and no wheezing        Review of Systems   Constitutional: Positive for activity change and fatigue. Negative for appetite change, chills, diaphoresis and fever.   HENT: Negative for congestion, ear discharge, ear pain, nosebleeds, rhinorrhea, sinus pressure, sore throat and trouble swallowing.    Eyes: Negative for discharge and redness.   Respiratory: Positive for cough and shortness of breath. Negative for apnea, chest tightness and wheezing.    Cardiovascular: Negative for chest pain.   Gastrointestinal: Positive for diarrhea. Negative for abdominal pain, nausea and vomiting.   Endocrine: Negative for polyuria.   Genitourinary: Negative for dysuria, frequency and urgency.   Musculoskeletal: Negative for myalgias and neck pain.   Skin: Negative for color change and rash.   Allergic/Immunologic: Negative for immunocompromised state.   Neurological: Positive for headaches. Negative for dizziness, seizures, syncope, weakness and light-headedness.   Hematological: Negative for adenopathy. Does not bruise/bleed easily.   Psychiatric/Behavioral: Negative for behavioral problems and confusion.   All other systems reviewed and are negative.      Past Medical History:   Diagnosis Date   • Chronic systolic heart failure (HCC)    • Diabetes  mellitus (HCC)    • Diabetic neuropathy (HCC)    • GERD (gastroesophageal reflux disease)    • Hypercholesterolemia    • Hypertension    • Low back pain    • Nonischemic cardiomyopathy (HCC)    • Obesity    • Sleep apnea    • Vitamin D deficiency        Allergies   Allergen Reactions   • Aldactone [Spironolactone] Unknown - Low Severity   • Nsaids        Past Surgical History:   Procedure Laterality Date   • CARDIAC CATHETERIZATION     • CARDIAC CATHETERIZATION N/A 8/23/2018   • CARDIAC ELECTROPHYSIOLOGY PROCEDURE N/A 6/22/2020   • CARDIAC PACEMAKER PLACEMENT     • CATARACT EXTRACTION     • CATARACT EXTRACTION     • COLONOSCOPY N/A 6/14/2017   • PACEMAKER IMPLANTATION     • TOTAL ABDOMINAL HYSTERECTOMY WITH SALPINGO OOPHORECTOMY         Family History   Problem Relation Age of Onset   • Diabetes Sister    • Bone cancer Brother        Social History     Socioeconomic History   • Marital status: Single   Tobacco Use   • Smoking status: Former Smoker   • Smokeless tobacco: Never Used   Substance and Sexual Activity   • Alcohol use: No   • Drug use: No   • Sexual activity: Not Currently           Objective   Physical Exam  Vitals and nursing note reviewed.   Constitutional:       Appearance: She is well-developed.   HENT:      Head: Normocephalic and atraumatic.      Nose: Nose normal.   Eyes:      General: No scleral icterus.        Right eye: No discharge.         Left eye: No discharge.      Conjunctiva/sclera: Conjunctivae normal.      Pupils: Pupils are equal, round, and reactive to light.   Neck:      Trachea: No tracheal deviation.   Cardiovascular:      Rate and Rhythm: Normal rate and regular rhythm.      Heart sounds: Normal heart sounds. No murmur heard.  Pulmonary:      Effort: Accessory muscle usage present. No respiratory distress.      Breath sounds: No stridor. Decreased breath sounds and rhonchi present. No wheezing or rales.   Abdominal:      General: Bowel sounds are normal. There is no distension.       Palpations: Abdomen is soft. There is no mass.      Tenderness: There is no abdominal tenderness. There is no guarding or rebound.   Musculoskeletal:      Cervical back: Normal range of motion and neck supple.   Skin:     General: Skin is warm and dry.      Findings: No erythema or rash.   Neurological:      Mental Status: She is alert and oriented to person, place, and time.      Coordination: Coordination normal.   Psychiatric:         Behavior: Behavior normal.         Thought Content: Thought content normal.         ECG 12 Lead      Date/Time: 4/2/2022 8:20 PM  Performed by: Jose R Hill MD  Authorized by: Jose R Hill MD   Interpreted by physician  Rate: tachycardic  BPM: 110  ST Segments: ST segments normal                   ED Course                   Labs Reviewed   COMPREHENSIVE METABOLIC PANEL - Abnormal; Notable for the following components:       Result Value    Glucose 244 (*)     Potassium 2.9 (*)     CO2 21.0 (*)     Anion Gap 16.0 (*)     eGFR 59.6 (*)     All other components within normal limits    Narrative:     GFR Normal >60  Chronic Kidney Disease <60  Kidney Failure <15     BNP (IN-HOUSE) - Abnormal; Notable for the following components:    proBNP 2,199.0 (*)     All other components within normal limits    Narrative:     Among patients with dyspnea, NT-proBNP is highly sensitive for the detection of acute congestive heart failure. In addition NT-proBNP of <300 pg/ml effectively rules out acute congestive heart failure with 99% negative predictive value.    Results may be falsely decreased if patient taking Biotin.     CBC WITH AUTO DIFFERENTIAL - Abnormal; Notable for the following components:    WBC 12.80 (*)     Hemoglobin 11.3 (*)     Lymphocyte % 17.5 (*)     Neutrophils, Absolute 9.14 (*)     Monocytes, Absolute 1.24 (*)     Immature Grans, Absolute 0.07 (*)     All other components within normal limits   D-DIMER, QUANTITATIVE - Abnormal; Notable for the following  components:    D-Dimer, Quantitative 975 (*)     All other components within normal limits    Narrative:     Dimer values <500 ng/ml FEU are FDA approved as aid in diagnosis of deep venous thrombosis and pulmonary embolism.  This test should not be used in an exclusion strategy with pretest probability alone.    A recent guideline regarding diagnosis for pulmonary thromboembolism recommends an adjusted exclusion criterion of age x 10 ng/ml FEU for patients >50 years of age ([employer] Intern Med 2015; 163: 701-711).     TROPONIN (IN-HOUSE) - Normal    Narrative:     Troponin T Reference Range:  <= 0.03 ng/mL-   Negative for AMI  >0.03 ng/mL-     Abnormal for myocardial necrosis.  Clinicians would have to utilize clinical acumen, EKG, Troponin and serial changes to determine if it is an Acute Myocardial Infarction or myocardial injury due to an underlying chronic condition.       Results may be falsely decreased if patient taking Biotin.     MAGNESIUM - Normal   CK - Normal   COVID-19 AND FLU A/B, NP SWAB IN TRANSPORT MEDIA 8-12 HR TAT   BLOOD CULTURE   BLOOD CULTURE   RAINBOW DRAW    Narrative:     The following orders were created for panel order [place] Draw.  Procedure                               Abnormality         Status                     ---------                               -----------         ------                     Green Top (Gel)[[id number]]                                  Final result               Lavender Top[[id number]]                                     Final result               Gold Top - SST[[id number]]                                                              Light Blue Top[[id number]]                                   Final result                 Please view results for these tests on the individual orders.   LACTIC ACID, PLASMA   CBC AND DIFFERENTIAL    Narrative:     The following orders were created for panel order CBC & Differential.  Procedure                               Abnormality          Status                     ---------                               -----------         ------                     CBC Auto Differential[[id number]]        Abnormal            Final result                 Please view results for these tests on the individual orders.   GREEN TOP   LAVENDER TOP   LIGHT BLUE TOP   GOLD TOP - SST       CT Angiogram Chest   Final Result   No evidence of pulmonary emboli.   Overall findings probably represent congestive heart failure.   Superimposed pneumonia cannot be excluded.      Electronically signed by:  Viridiana Eason  4/2/2022 6:16 PM CDT   Workstation: [id number]      XR Chest 2 View   Final Result   CONCLUSION:   The lungs are clear of an acute process.   Left-sided pacemaker remains in place with leads projected over   the right atrium, right ventricle and coronary sinus.   Stable cardiomegaly.      [id number]      Electronically signed by:  Silver Lam MD  4/2/2022 4:02 PM CDT   Workstation: [id number]                                             [facility]    Final diagnoses:   Shortness of breath   Acute on chronic congestive heart failure, unspecified heart failure type (HCC)   Pneumonia of both lower lobes due to infectious organism       ED Disposition  ED Disposition     ED Disposition   Decision to Admit    Condition   --    Comment   Level of Care: Telemetry [5]   Diagnosis: Shortness of breath [786.05.ICD-9-CM]   Admitting Physician: TOBY VELASQUEZ [[id number]]   Attending Physician: TOBY VELASQUEZ [[id number]]               No follow-up provider specified.       Medication List      No changes were made to your prescriptions during this visit.          Jose R Hill MD  04/02/22 2026       Jose R Hill MD  04/02/22 2027

## 2022-04-02 NOTE — ED TRIAGE NOTES
Pt arrives via private car with increased shortness of air since last pm. Pt reports she also has a headache, she has not taken any of her meds today including her BP meds. She denies any increased swelling in her legs, she doesn't have cough, congestion or fever.

## 2022-04-03 NOTE — PLAN OF CARE
Problem: Adult Inpatient Plan of Care  Goal: Plan of Care Review  Outcome: Ongoing, Progressing  Flowsheets (Taken 4/3/2022 1340)  Progress: no change  Plan of Care Reviewed With: patient  Outcome Evaluation: Patient independent.  Goal: Patient-Specific Goal (Individualized)  Outcome: Ongoing, Progressing  Goal: Absence of Hospital-Acquired Illness or Injury  Outcome: Ongoing, Progressing  Intervention: Identify and Manage Fall Risk  Recent Flowsheet Documentation  Taken 4/3/2022 1100 by Jackie Polo RN  Safety Promotion/Fall Prevention: safety round/check completed  Taken 4/3/2022 0915 by Jackie Polo RN  Safety Promotion/Fall Prevention: safety round/check completed  Taken 4/3/2022 0820 by Jackie Polo RN  Safety Promotion/Fall Prevention: safety round/check completed  Intervention: Prevent Skin Injury  Recent Flowsheet Documentation  Taken 4/3/2022 1100 by Jackie Polo RN  Body Position: position changed independently  Taken 4/3/2022 0915 by Jackie Polo RN  Body Position: position changed independently  Taken 4/3/2022 0820 by Jackie Polo RN  Body Position:   position changed independently   sitting up in bed  Goal: Optimal Comfort and Wellbeing  Outcome: Ongoing, Progressing  Intervention: Provide Person-Centered Care  Recent Flowsheet Documentation  Taken 4/3/2022 0820 by Jackie Polo RN  Trust Relationship/Rapport: care explained  Goal: Readiness for Transition of Care  Outcome: Ongoing, Progressing   Goal Outcome Evaluation:  Plan of Care Reviewed With: patient        Progress: no change  Outcome Evaluation: Patient independent.

## 2022-04-03 NOTE — PROGRESS NOTES
[facility] Medicine   INPATIENT PROGRESS NOTE      Patient Name: Lexi Wade  Date of Admission: 4/2/2022  Today's Date: 04/03/22  Length of Stay: 0  Primary Care Physician: Joyce Plata MD    Subjective   Chief Complaint: Shortness of breath  HPI   Patient states that her breathing has been steadily improving on IV Lasix therapy.  Admits to home health physical therapy least 2-3 times per week.  Denies fevers, chills, chest discomfort overnight.      Review of Systems   All pertinent negatives and positives are as above. All other systems have been reviewed and are negative unless otherwise stated.     Objective    Temp:  [98 °F (36.7 °C)-98.4 °F (36.9 °C)] 98 °F (36.7 °C)  Heart Rate:  [] 73  Resp:  [16-22] 16  BP: ()/(51-66) 96/51  Physical Exam  Constitutional:       Appearance: Normal appearance. She is normal weight.   HENT:      Head: Normocephalic and atraumatic.      Nose: Nose normal.      Mouth/Throat:      Mouth: Mucous membranes are moist.      Pharynx: Oropharynx is clear.   Eyes:      Extraocular Movements: Extraocular movements intact.      Conjunctiva/sclera: Conjunctivae normal.      Pupils: Pupils are equal, round, and reactive to light.   Cardiovascular:      Rate and Rhythm: Normal rate and regular rhythm.      Pulses: Normal pulses.      Heart sounds: Normal heart sounds.   Pulmonary:      Effort: Pulmonary effort is normal.      Comments: Decreased bibasilar breath sounds  Abdominal:      General: Abdomen is flat. Bowel sounds are normal.      Palpations: Abdomen is soft.   Musculoskeletal:      Cervical back: Normal range of motion and neck supple.      Right lower leg: Edema present.      Left lower leg: Edema present.      Comments: Slight bilateral lower extremity pedal edema   Skin:     General: Skin is warm and dry.      Capillary Refill: Capillary refill takes less than 2 seconds.   Neurological:      General:  No focal deficit present.      Mental Status: She is alert and oriented to person, place, and time. Mental status is at baseline.   Psychiatric:         Mood and Affect: Mood normal.         Behavior: Behavior normal.         Judgment: Judgment normal.             Results Review:  I have reviewed the labs, radiology results, and diagnostic studies.    Laboratory Data:   Results from last 7 days   Lab Units 04/03/22  0713 04/02/22  1546   WBC 10*3/mm3 9.41 12.80*   HEMOGLOBIN g/dL 10.2* 11.3*   HEMATOCRIT % 30.7* 34.7   PLATELETS 10*3/mm3 268 273        Results from last 7 days   Lab Units 04/03/22  0713 04/02/22  1546   SODIUM mmol/L 141 141   POTASSIUM mmol/L 2.7* 2.9*   CHLORIDE mmol/L 105 104   CO2 mmol/L 25.0 21.0*   BUN mg/dL 13 15   CREATININE mg/dL 0.95 1.00   CALCIUM mg/dL 9.0 9.7   BILIRUBIN mg/dL  --  1.0   ALK PHOS U/L  --  104   ALT (SGPT) U/L  --  20   AST (SGOT) U/L  --  20   GLUCOSE mg/dL 91 244*       Culture Data:   No results found for: BLOODCX  No results found for: URINECX  No results found for: RESPCX  No results found for: WOUNDCX  No results found for: STOOLCX  No components found for: BODYFLD    Radiology Data:   Imaging Results (Last 24 Hours)     Procedure Component Value Units Date/Time    CT Angiogram Chest [[id number]] Collected: 04/02/22 1728     Updated: 04/02/22 1818    Narrative:      CT CHEST ANGIOGRAPHY WITH IV CONTRAST    INDICATION: 71 years Female; PE suspected, low/intermediate prob,  positive D-dimer    TECHNIQUE:  CT angiogram of the chest was performed with IV  contrast.  3-D/MIP reconstructions were performed.  This exam was  performed according to our departmental dose-optimization  program, which includes automated exposure control, adjustment of  the mA and/or kV according to patient size and/or use of  iterative reconstruction technique.     COMPARISON: None.    FINDINGS:  Thyroid: Unremarkable.   Heart/Mediastinum: The heart is enlarged. Scattered coronary  artery  calcifications. Pacemaker electrode in the right atrium  right ventricle.   Vasculature: The pulmonary arteries are enlarged with suggest  pulmonary arterial hypertension. No evidence of pulmonary emboli.  Reflux of contrast into the IVC and hepatic veins suggests  right-sided heart failure. Mild atherosclerosis of the aorta  without aneurysm. Evaluation for aortic dissection is limited due  to suboptimal opacification.  Lungs/Pleura: Small bilateral pleural effusions. No pneumothorax.  Patchy groundglass throughout both lungs with interlobular septal  thickening probably represents pulmonary edema. Additional  scattered nodular opacities in the bilateral upper lobes in the  centrilobular distribution may be due to edema or could represent  superimposed pneumonia.   Lymph nodes: No pathologically enlarged lymph nodes.  Bones: Unremarkable.  Soft tissues: Unremarkable.  Incidental findings: None.      Impression:      No evidence of pulmonary emboli.  Overall findings probably represent congestive heart failure.  Superimposed pneumonia cannot be excluded.    Electronically signed by:  Viridiana Eason  4/2/2022 6:16 PM CDT  Workstation: [id number]    XR Chest 2 View [[id number]] Collected: 04/02/22 1527     Updated: 04/02/22 1604    Narrative:      TWO VIEW CHEST    HISTORY: Shortness of air    Frontal and lateral films of the chest were obtained.    COMPARISON: February 18, 2022    FINDINGS:    EKG leads.  The lungs are clear of an acute process.  Left-sided pacemaker remains in place with leads projected over  the right atrium, right ventricle and coronary sinus.  Stable cardiomegaly.  The pulmonary vasculature is not increased.  No pleural effusion.  No pneumothorax.  No acute osseous abnormality.  Degenerative changes are present in the thoracic spine.      Impression:      CONCLUSION:  The lungs are clear of an acute process.  Left-sided pacemaker remains in place with leads projected over  the right atrium, right  ventricle and coronary sinus.  Stable cardiomegaly.    [id number]    Electronically signed by:  Silver Lam MD  4/2/2022 4:02 PM CDT  Workstation: [id number]          Scheduled Meds:aspirin, 81 mg, Oral, Nightly  atorvastatin, 40 mg, Oral, Daily  carvedilol, 25 mg, Oral, BID With Meals  famotidine, 40 mg, Oral, Daily  furosemide, 40 mg, Intravenous, BID  insulin aspart, 0-7 Units, Subcutaneous, TID AC  insulin detemir, 30 Units, Subcutaneous, Nightly  isosorbide mononitrate, 30 mg, Oral, Daily  lisinopril, 10 mg, Oral, Daily  pantoprazole, 40 mg, Oral, Daily  sodium chloride, 10 mL, Intravenous, Q12H      Continuous Infusions:   PRN Meds:.Morphine **AND** naloxone  •  potassium chloride **OR** potassium chloride **OR** potassium chloride  •  sodium chloride  •  sodium chloride    Assessment/Plan     Active Hospital Problems    Diagnosis    • Shortness of breath        Acute on chronic respiratory failure secondary to CHF exacerbation:  -Patient doing well on IV Lasix, continue IV Lasix therapy, daily weights, strict I's and O's, fluid restriction 1200 mL/day.    CAD: Continue Coreg, lisinopril, atorvastatin, Imdur    Diabetes: Levemir 30 units nightly sliding scale insulin with a [facility] Accu-Cheks      Generalized weakness: Patient reports receiving PT/OT home health services at least twice per week at home.  Will order PT/OT evaluation during hospitalization.  Expect patient to discharge home with home health PT as previously prescribed.    FEN diabetic, correct electrolytes as needed electrolyte replacement protocol, fluid restriction 1200 cc/day.  Status: Full  PPX enoxaparin subcutaneous      Discharge Planning: I expect the patient to be discharged to home in 1 to 3 days.    Electronically signed by Daniel Goncalves MD, 04/03/22, 15:51 CDT.

## 2022-04-03 NOTE — H&P
[facility] Medicine Admission      Date of Admission: 4/2/2022      Primary Care Physician: Joyce Plata MD      Chief Complaint: Shortness of air    HPI:    This is a 71-year-old female with concurrent medical history of diabetes, neuropathy, CHF, hypertension, hyperlipidemia, obesity presenting to the ER with complaint of having shortness of air.  She does have a history of CHF and her BNP was elevated in the ER.  She does take Lasix at home.  She denies any chest pain.    Past Medical History:  has a past medical history of Arthritis, CHF (congestive heart failure) (HCC), Chronic systolic heart failure (HCC), Diabetes mellitus (HCC), Diabetic neuropathy (HCC), GERD (gastroesophageal reflux disease), Hypercholesterolemia, Hypertension, Low back pain, Nonischemic cardiomyopathy (HCC), Obesity, Sleep apnea, and Vitamin D deficiency.    Past Surgical History:  has a past surgical history that includes Cardiac catheterization; Cataract Extraction; Cataract Extraction; Total abdominal hysterectomy w/ bilateral salpingoophorectomy; Colonoscopy (N/A, 6/14/2017); Pacemaker Implantation; Cardiac catheterization (N/A, 8/23/2018); Cardiac pacemaker placement; and Cardiac electrophysiology procedure (N/A, 6/22/2020).    Family History: family history includes Bone cancer in her brother; Diabetes in her sister.    Social History:  reports that she has quit smoking. She has never used smokeless tobacco. She reports that she does not drink alcohol and does not use drugs.    Allergies:   Allergies   Allergen Reactions   • Aldactone [Spironolactone] Unknown - Low Severity   • Nsaids        Medications: Scheduled Meds:aspirin, 81 mg, Oral, Nightly  [START ON 4/3/2022] atorvastatin, 40 mg, Oral, Daily  carvedilol, 25 mg, Oral, BID With Meals  [START ON 4/3/2022] famotidine, 40 mg, Oral, Daily  furosemide, 40 mg, Intravenous, Q12H  insulin detemir, 30 Units, Subcutaneous,  Nightly  [START ON 4/3/2022] isosorbide mononitrate, 30 mg, Oral, Daily  [START ON 4/3/2022] lisinopril, 10 mg, Oral, Daily  [START ON 4/3/2022] pantoprazole, 40 mg, Oral, Daily  sodium chloride, 10 mL, Intravenous, Q12H      Continuous Infusions:   PRN Meds:.Morphine **AND** naloxone  •  sodium chloride  •  sodium chloride  No current facility-administered medications on file prior to encounter.     Current Outpatient Medications on File Prior to Encounter   Medication Sig Dispense Refill   • aspirin 81 MG EC tablet Take 81 mg by mouth Every Night.     • atorvastatin (LIPITOR) 40 MG tablet Take 1 tablet by mouth Daily. 90 tablet 3   • carvedilol (COREG) 25 MG tablet Take 1 tablet by mouth 2 (Two) Times a Day With Meals. 180 tablet 3   • furosemide (LASIX) 40 MG tablet TAKE 1 TABLET BY MOUTH TWICE A DAY 60 tablet 1   • guaiFENesin (MUCINEX) 600 MG 12 hr tablet Take 1 tablet by mouth Every 12 (Twelve) Hours. 60 tablet 1   • insulin detemir (Levemir FlexTouch) 100 UNIT/ML injection Inject 35 Units under the skin into the appropriate area as directed Every Night. 5 pen 5   • ipratropium-albuterol (DUO-NEB) 0.5-2.5 mg/3 ml nebulizer Take 3 mL by nebulization 4 (Four) Times a Day. 360 mL 1   • isosorbide mononitrate (IMDUR) 30 MG 24 hr tablet Take 1 tablet by mouth Daily. 90 tablet 3   • lisinopril (PRINIVIL,ZESTRIL) 10 MG tablet TAKE 1 TABLET BY MOUTH EVERY DAY 90 tablet 3   • magnesium oxide (MAGOX) 400 (241.3 Mg) MG tablet tablet Take 1 tablet by mouth Daily. 90 each 3   • metFORMIN (GLUCOPHAGE) 1000 MG tablet Take 1 tablet by mouth 2 (Two) Times a Day With Meals. 180 tablet 0   • pantoprazole (Protonix) 40 MG EC tablet Take 1 tablet by mouth Daily. 30 tablet 0   • potassium chloride (MICRO-K) 10 MEQ CR capsule Take 1 capsule by mouth 2 (Two) Times a Day. 180 capsule 1   • acetaminophen (TYLENOL) 325 MG tablet Take 2 tablets by mouth Every 4 (Four) Hours As Needed for Mild Pain .     • albuterol sulfate  (90  "Base) MCG/ACT inhaler INHALE 2 PUFFS EVERY 4 (FOUR) HOURS AS NEEDED FOR WHEEZING OR SHORTNESS OF AIR. 18 g 1   • B-D UF III MINI PEN NEEDLES 31G X 5 MM misc USE WITH INSULIN INJECTIONS NIGHTLY 100 each 3   • diclofenac (VOLTAREN) 1 % gel gel APPLY TO AFFECTED AREA 4 TIMES A DAY Oral Volteran DC'D) 100 g 3   • Insulin Syringe 31G X 5/16\" 0.5 ML misc Inject 1 each under the skin into the appropriate area as directed Every Night. 100 each 1   • nitroglycerin (Nitrostat) 0.4 MG SL tablet Place 1 tablet under the tongue Every 5 (Five) Minutes As Needed for Chest Pain. Take no more than 3 doses in 15 minutes. 25 tablet 0       Review of Systems:  Review of Systems   Respiratory: Positive for shortness of breath.    Cardiovascular: Negative for chest pain.   Gastrointestinal: Negative for nausea.      Otherwise complete ROS is negative except as mentioned above.    Physical Exam:   Temp:  [97.6 °F (36.4 °C)-98 °F (36.7 °C)] 98 °F (36.7 °C)  Heart Rate:  [] 87  Resp:  [18-22] 20  BP: (106-209)/() 131/65  Physical Exam  Vitals and nursing note reviewed.   Constitutional:       General: She is not in acute distress.     Appearance: She is well-developed. She is not diaphoretic.   HENT:      Head: Normocephalic and atraumatic.   Cardiovascular:      Rate and Rhythm: Normal rate.   Pulmonary:      Effort: Pulmonary effort is normal. No respiratory distress.      Breath sounds: No wheezing.   Abdominal:      General: There is no distension.      Palpations: Abdomen is soft.   Musculoskeletal:         General: Normal range of motion.   Skin:     General: Skin is warm and dry.   Neurological:      Mental Status: She is alert.      Cranial Nerves: No cranial nerve deficit.   Psychiatric:         Behavior: Behavior normal.         Thought Content: Thought content normal.         Judgment: Judgment normal.           Results Reviewed:  I have personally reviewed current lab, radiology, and data and agree with results.  Lab " Results (last 24 hours)     Procedure Component Value Units Date/Time    Phosphorus [[id number]]  (Normal) Collected: 04/02/22 1546    Specimen: Blood Updated: 04/02/22 2218     Phosphorus 3.4 mg/dL     Blood Culture - Blood, Arm, Left [[id number]] Collected: 04/02/22 2208    Specimen: Blood from Arm, Left Updated: 04/02/22 2208    Blood Culture - Blood, Arm, Right [[id number]] Collected: 04/02/22 2208    Specimen: Blood from Arm, Right Updated: 04/02/22 2208    Lactic Acid, Plasma [[id number]] Collected: 04/02/22 2208    Specimen: Blood Updated: 04/02/22 2208    COVID-19 and FLU A/B PCR - Swab, Nasopharynx [[id number]]  (Normal) Collected: 04/02/22 2031    Specimen: Swab from Nasopharynx Updated: 04/02/22 2054     COVID19 Not Detected     Influenza A PCR Not Detected     Influenza B PCR Not Detected    Narrative:      Fact sheet for providers: https://www.fda.gov/media/347807/download    Fact sheet for patients: https://www.fda.gov/media/521688/download    Test performed by PCR.    [place] Draw [[id number]] Collected: 04/02/22 1546    Specimen: Blood Updated: 04/02/22 1704    Narrative:      The following orders were created for panel order [place] Draw.  Procedure                               Abnormality         Status                     ---------                               -----------         ------                     Green Top (Gel)[[id number]]                                  Final result               Lavender Top[[id number]]                                     Final result               Gold Top - SST[[id number]]                                                              Light Blue Top[[id number]]                                   Final result                 Please view results for these tests on the individual orders.    Green Top (Gel) [[id number]] Collected: 04/02/22 1546    Specimen: Blood Updated: 04/02/22 1704     Extra Tube Hold for add-ons.     Comment: Auto resulted.       Lavender Top [[id number]]  Collected: 04/02/22 1546    Specimen: Blood Updated: 04/02/22 1704     Extra Tube hold for add-on     Comment: Auto resulted       Light Blue Top [[id number]] Collected: 04/02/22 1546    Specimen: Blood Updated: 04/02/22 1704     Extra Tube hold for add-on     Comment: Auto resulted       D-dimer, Quantitative [[id number]]  (Abnormal) Collected: 04/02/22 1548    Specimen: Blood Updated: 04/02/22 1628     D-Dimer, Quantitative 975 ng/mL (FEU)     Narrative:      Dimer values <500 ng/ml FEU are FDA approved as aid in diagnosis of deep venous thrombosis and pulmonary embolism.  This test should not be used in an exclusion strategy with pretest probability alone.    A recent guideline regarding diagnosis for pulmonary thromboembolism recommends an adjusted exclusion criterion of age x 10 ng/ml FEU for patients >50 years of age ([employer] Intern Med 2015; 163: 701-711).      Comprehensive Metabolic Panel [[id number]]  (Abnormal) Collected: 04/02/22 1546    Specimen: Blood Updated: 04/02/22 1622     Glucose 244 mg/dL      BUN 15 mg/dL      Creatinine 1.00 mg/dL      Sodium 141 mmol/L      Potassium 2.9 mmol/L      Chloride 104 mmol/L      CO2 21.0 mmol/L      Calcium 9.7 mg/dL      Total Protein 6.2 g/dL      Albumin 3.70 g/dL      ALT (SGPT) 20 U/L      AST (SGOT) 20 U/L      Alkaline Phosphatase 104 U/L      Total Bilirubin 1.0 mg/dL      Globulin 2.5 gm/dL      A/G Ratio 1.5 g/dL      BUN/Creatinine Ratio 15.0     Anion Gap 16.0 mmol/L      eGFR 59.6 mL/min/1.73      Comment: National Kidney Foundation and American Society of Nephrology (ASN) Task Force recommended calculation based on the Chronic Kidney Disease Epidemiology Collaboration (CKD-EPI) equation refit without adjustment for race.       Narrative:      GFR Normal >60  Chronic Kidney Disease <60  Kidney Failure <15      Troponin [[id number]]  (Normal) Collected: 04/02/22 1546    Specimen: Blood Updated: 04/02/22 1622     Troponin T <0.010 ng/mL     Narrative:       Troponin T Reference Range:  <= 0.03 ng/mL-   Negative for AMI  >0.03 ng/mL-     Abnormal for myocardial necrosis.  Clinicians would have to utilize clinical acumen, EKG, Troponin and serial changes to determine if it is an Acute Myocardial Infarction or myocardial injury due to an underlying chronic condition.       Results may be falsely decreased if patient taking Biotin.      Magnesium [[id number]]  (Normal) Collected: 04/02/22 1546    Specimen: Blood Updated: 04/02/22 1622     Magnesium 1.7 mg/dL     CK [[id number]]  (Normal) Collected: 04/02/22 1546    Specimen: Blood Updated: 04/02/22 1622     Creatine Kinase 74 U/L     BNP [[id number]]  (Abnormal) Collected: 04/02/22 1546    Specimen: Blood Updated: 04/02/22 1620     proBNP 2,199.0 pg/mL     Narrative:      Among patients with dyspnea, NT-proBNP is highly sensitive for the detection of acute congestive heart failure. In addition NT-proBNP of <300 pg/ml effectively rules out acute congestive heart failure with 99% negative predictive value.    Results may be falsely decreased if patient taking Biotin.      CBC & Differential [[id number]]  (Abnormal) Collected: 04/02/22 1546    Specimen: Blood Updated: 04/02/22 1558    Narrative:      The following orders were created for panel order CBC & Differential.  Procedure                               Abnormality         Status                     ---------                               -----------         ------                     CBC Auto Differential[[id number]]        Abnormal            Final result                 Please view results for these tests on the individual orders.    CBC Auto Differential [[id number]]  (Abnormal) Collected: 04/02/22 1546    Specimen: Blood Updated: 04/02/22 1558     WBC 12.80 10*3/mm3      RBC 4.11 10*6/mm3      Hemoglobin 11.3 g/dL      Hematocrit 34.7 %      MCV 84.4 fL      MCH 27.5 pg      MCHC 32.6 g/dL      RDW 15.4 %      RDW-SD 47.6 fl      MPV 9.6 fL      Platelets 273 10*3/mm3       Neutrophil % 71.5 %      Lymphocyte % 17.5 %      Monocyte % 9.7 %      Eosinophil % 0.5 %      Basophil % 0.3 %      Immature Grans % 0.5 %      Neutrophils, Absolute 9.14 10*3/mm3      Lymphocytes, Absolute 2.24 10*3/mm3      Monocytes, Absolute 1.24 10*3/mm3      Eosinophils, Absolute 0.07 10*3/mm3      Basophils, Absolute 0.04 10*3/mm3      Immature Grans, Absolute 0.07 10*3/mm3      nRBC 0.0 /100 WBC         Imaging Results (Last 24 Hours)     Procedure Component Value Units Date/Time    CT Angiogram Chest [[id number]] Collected: 04/02/22 1728     Updated: 04/02/22 1818    Narrative:      CT CHEST ANGIOGRAPHY WITH IV CONTRAST    INDICATION: 71 years Female; PE suspected, low/intermediate prob,  positive D-dimer    TECHNIQUE:  CT angiogram of the chest was performed with IV  contrast.  3-D/MIP reconstructions were performed.  This exam was  performed according to our departmental dose-optimization  program, which includes automated exposure control, adjustment of  the mA and/or kV according to patient size and/or use of  iterative reconstruction technique.     COMPARISON: None.    FINDINGS:  Thyroid: Unremarkable.   Heart/Mediastinum: The heart is enlarged. Scattered coronary  artery calcifications. Pacemaker electrode in the right atrium  right ventricle.   Vasculature: The pulmonary arteries are enlarged with suggest  pulmonary arterial hypertension. No evidence of pulmonary emboli.  Reflux of contrast into the IVC and hepatic veins suggests  right-sided heart failure. Mild atherosclerosis of the aorta  without aneurysm. Evaluation for aortic dissection is limited due  to suboptimal opacification.  Lungs/Pleura: Small bilateral pleural effusions. No pneumothorax.  Patchy groundglass throughout both lungs with interlobular septal  thickening probably represents pulmonary edema. Additional  scattered nodular opacities in the bilateral upper lobes in the  centrilobular distribution may be due to edema or could  represent  superimposed pneumonia.   Lymph nodes: No pathologically enlarged lymph nodes.  Bones: Unremarkable.  Soft tissues: Unremarkable.  Incidental findings: None.      Impression:      No evidence of pulmonary emboli.  Overall findings probably represent congestive heart failure.  Superimposed pneumonia cannot be excluded.    Electronically signed by:  Viridiana Eason  4/2/2022 6:16 PM CDT  Workstation: [id number]    XR Chest 2 View [[id number]] Collected: 04/02/22 1527     Updated: 04/02/22 1604    Narrative:      TWO VIEW CHEST    HISTORY: Shortness of air    Frontal and lateral films of the chest were obtained.    COMPARISON: February 18, 2022    FINDINGS:    EKG leads.  The lungs are clear of an acute process.  Left-sided pacemaker remains in place with leads projected over  the right atrium, right ventricle and coronary sinus.  Stable cardiomegaly.  The pulmonary vasculature is not increased.  No pleural effusion.  No pneumothorax.  No acute osseous abnormality.  Degenerative changes are present in the thoracic spine.      Impression:      CONCLUSION:  The lungs are clear of an acute process.  Left-sided pacemaker remains in place with leads projected over  the right atrium, right ventricle and coronary sinus.  Stable cardiomegaly.    [id number]    Electronically signed by:  Silver Lam MD  4/2/2022 4:02 PM CDT  Workstation: [id number]            Assessment:    Active Hospital Problems    Diagnosis    • Shortness of breath            CHF exacerbation-we will start patient on IV Lasix, continue monitoring urine output.  We will continue to monitor    GERD-famotidine will be continued    Coronary artery disease-continue with Lipitor and aspirin    Hypertension-continue with lisinopril and blood pressure monitoring    Hypokalemia-continue to monitor and replace    DVT prophylaxis-SCD    Patient full code      I confirmed that the patient's Advance Care Plan is present, code status is documented, or surrogate decision  maker is listed in the patient's medical record.       Michael Murcia MD  04/02/22  22:22 CDT

## 2022-04-04 NOTE — PLAN OF CARE
Goal Outcome Evaluation:              Outcome Evaluation: Vss; Patient still getting short of air with ambulation. patient has also complained of diarrhea.

## 2022-04-04 NOTE — PROGRESS NOTES
Progress Note  Justin Wheeler MD  Hospitalist    Date of visit: 4/4/2022     LOS: 0 days   Patient Care Team:  Joyce Plata MD as PCP - General  Wang Felipe MD as Consulting Physician (Cardiology)    Chief Complaint: Shortness of breath    Subjective     Interval History:     Patient Complaints: Shortness of breath, leg edema, minimally improved.    History taken from: Patient    Medication Review:   Current Facility-Administered Medications   Medication Dose Route Frequency Provider Last Rate Last Admin   • aspirin EC tablet 81 mg  81 mg Oral Nightly Michael Murcia MD   81 mg at 04/03/22 2056   • atorvastatin (LIPITOR) tablet 40 mg  40 mg Oral Daily Michael Murcia MD   40 mg at 04/04/22 0826   • carvedilol (COREG) tablet 25 mg  25 mg Oral BID With Meals Michael Murcia MD   25 mg at 04/04/22 0826   • dextrose (D50W) (25 g/50 mL) IV injection 25 g  25 g Intravenous Q15 Min PRN Daniel Goncalves MD       • dextrose (GLUTOSE) oral gel 15 g  15 g Oral Q15 Min PRN Daniel Goncalves MD       • famotidine (PEPCID) tablet 40 mg  40 mg Oral Daily Michael Murcia MD   40 mg at 04/04/22 0826   • furosemide (LASIX) injection 40 mg  40 mg Intravenous BID Michael Murcia MD   40 mg at 04/04/22 0825   • glucagon (human recombinant) (GLUCAGEN DIAGNOSTIC) injection 1 mg  1 mg Intramuscular Q15 Min PRN Daniel Goncalves MD       • insulin aspart (novoLOG) injection 0-7 Units  0-7 Units Subcutaneous TID AC Daniel Goncalves MD   2 Units at 04/03/22 1654   • insulin detemir (LEVEMIR) injection 30 Units  30 Units Subcutaneous Nightly Michael Murcia MD   30 Units at 04/03/22 2056   • ipratropium-albuterol (DUO-NEB) nebulizer solution 3 mL  3 mL Nebulization 4x Daily - RT Michael Murcia MD   3 mL at 04/04/22 1035   • isosorbide mononitrate (IMDUR) 24 hr tablet 30 mg  30 mg Oral Daily Michael Murcia MD   30 mg at 04/04/22 0826   • lisinopril (PRINIVIL,ZESTRIL) tablet 10 mg  10 mg Oral Daily Michael Murcia,  MD   10 mg at 04/04/22 0826   • morphine injection 1 mg  1 mg Intravenous Q4H PRN Michael Murcia MD        And   • naloxone (NARCAN) injection 0.4 mg  0.4 mg Intravenous Q5 Min PRN Michael Murcia MD       • pantoprazole (PROTONIX) EC tablet 40 mg  40 mg Oral Daily Michael Murcia MD   40 mg at 04/04/22 0826   • potassium chloride (MICRO-K) CR capsule 40 mEq  40 mEq Oral PRN Daniel Goncalves MD   40 mEq at 04/03/22 1833    Or   • potassium chloride (KLOR-CON) packet 40 mEq  40 mEq Oral PRN Daniel Goncalves MD        Or   • potassium chloride 10 mEq in 100 mL IVPB  10 mEq Intravenous Q1H PRN Daniel Goncalves MD       • sodium chloride 0.9 % flush 10 mL  10 mL Intravenous PRN Michael Murcia MD   10 mL at 04/02/22 1547   • sodium chloride 0.9 % flush 10 mL  10 mL Intravenous Q12H Michael Murcia MD   10 mL at 04/04/22 0826   • sodium chloride 0.9 % flush 10 mL  10 mL Intravenous PRN Michael Murcia MD           Review of Systems:   Review of Systems   Constitutional: Positive for fatigue. Negative for fever.   Respiratory: Positive for cough and shortness of breath. Negative for wheezing.    Cardiovascular: Negative for chest pain, palpitations and leg swelling.   Gastrointestinal: Negative for abdominal distention, abdominal pain, blood in stool, constipation, diarrhea and vomiting.   Genitourinary: Negative for dysuria, flank pain, frequency, hematuria and urgency.   Musculoskeletal: Positive for arthralgias. Negative for back pain.   Skin: Negative for color change, pallor and rash.   Neurological: Positive for weakness. Negative for seizures, syncope, numbness and headaches.   Psychiatric/Behavioral: Negative for agitation, behavioral problems and confusion.   All other systems reviewed and are negative.      Objective     Vital Signs  Temp:  [96.7 °F (35.9 °C)-98.2 °F (36.8 °C)] 96.7 °F (35.9 °C)  Heart Rate:  [72-82] 77  Resp:  [16-20] 18  BP: ()/(55-79) 109/65    Physical Exam:  Physical  Exam  Vitals reviewed.   Constitutional:       Appearance: She is obese. She is ill-appearing.   HENT:      Head: Normocephalic and atraumatic.   Eyes:      General: No scleral icterus.     Extraocular Movements: Extraocular movements intact.      Pupils: Pupils are equal, round, and reactive to light.   Cardiovascular:      Rate and Rhythm: Normal rate and regular rhythm.   Pulmonary:      Effort: No respiratory distress.      Breath sounds: No stridor. No wheezing or rales.   Chest:      Chest wall: No tenderness.   Abdominal:      General: Bowel sounds are normal. There is no distension.      Palpations: Abdomen is soft. There is no mass.      Tenderness: There is no abdominal tenderness. There is no right CVA tenderness, left CVA tenderness or guarding.   Musculoskeletal:         General: No swelling, tenderness or deformity. Normal range of motion.      Cervical back: Normal range of motion and neck supple. No rigidity or tenderness.      Right lower leg: No edema.      Left lower leg: No edema.   Skin:     General: Skin is warm and dry.      Coloration: Skin is not jaundiced or pale.      Findings: No bruising, lesion or rash.   Neurological:      General: No focal deficit present.      Mental Status: She is alert and oriented to person, place, and time. Mental status is at baseline.      Cranial Nerves: No cranial nerve deficit.      Gait: Gait normal.   Psychiatric:         Mood and Affect: Mood normal.         Behavior: Behavior normal.          Results Review:    Lab Results (last 24 hours)     Procedure Component Value Units Date/Time    Basic Metabolic Panel [[id number]]  (Abnormal) Collected: 04/04/22 0601    Specimen: Blood Updated: 04/04/22 0637     Glucose 137 mg/dL      BUN 14 mg/dL      Creatinine 1.18 mg/dL      Sodium 138 mmol/L      Potassium 3.7 mmol/L      Chloride 104 mmol/L      CO2 23.0 mmol/L      Calcium 9.0 mg/dL      BUN/Creatinine Ratio 11.9     Anion Gap 11.0 mmol/L      eGFR 48.9  mL/min/1.73      Comment: National Kidney Foundation and American Society of Nephrology (ASN) Task Force recommended calculation based on the Chronic Kidney Disease Epidemiology Collaboration (CKD-EPI) equation refit without adjustment for race.       Narrative:      GFR Normal >60  Chronic Kidney Disease <60  Kidney Failure <15      Phosphorus [[id number]]  (Normal) Collected: 04/04/22 0601    Specimen: Blood Updated: 04/04/22 0637     Phosphorus 3.2 mg/dL     Magnesium [[id number]]  (Normal) Collected: 04/04/22 0601    Specimen: Blood Updated: 04/04/22 0637     Magnesium 1.8 mg/dL     CBC & Differential [[id number]]  (Abnormal) Collected: 04/04/22 0601    Specimen: Blood Updated: 04/04/22 0615    Narrative:      The following orders were created for panel order CBC & Differential.  Procedure                               Abnormality         Status                     ---------                               -----------         ------                     CBC Auto Differential[[id number]]        Abnormal            Final result                 Please view results for these tests on the individual orders.    CBC Auto Differential [[id number]]  (Abnormal) Collected: 04/04/22 0601    Specimen: Blood Updated: 04/04/22 0615     WBC 11.46 10*3/mm3      RBC 4.00 10*6/mm3      Hemoglobin 11.2 g/dL      Hematocrit 34.4 %      MCV 86.0 fL      MCH 28.0 pg      MCHC 32.6 g/dL      RDW 15.7 %      RDW-SD 48.8 fl      MPV 9.7 fL      Platelets 295 10*3/mm3      Neutrophil % 51.5 %      Lymphocyte % 34.3 %      Monocyte % 11.3 %      Eosinophil % 2.0 %      Basophil % 0.5 %      Immature Grans % 0.4 %      Neutrophils, Absolute 5.90 10*3/mm3      Lymphocytes, Absolute 3.93 10*3/mm3      Monocytes, Absolute 1.29 10*3/mm3      Eosinophils, Absolute 0.23 10*3/mm3      Basophils, Absolute 0.06 10*3/mm3      Immature Grans, Absolute 0.05 10*3/mm3      nRBC 0.0 /100 WBC     Potassium [[id number]]  (Normal) Collected: 04/03/22 6471     Specimen: Blood Updated: 04/03/22 2256     Potassium 4.0 mmol/L     Vivian Draw [[id number]] Collected: 04/02/22 1546    Specimen: Blood Updated: 04/03/22 2231    Narrative:      The following orders were created for panel order Vivian Draw.  Procedure                               Abnormality         Status                     ---------                               -----------         ------                     Green Top (Gel)[[id number]]                                  Final result               Lavender Top[[id number]]                                     Final result               Gold Top - SST[[id number]]                                                              Light Blue Top[[id number]]                                   Final result                 Please view results for these tests on the individual orders.    Blood Culture - Blood, Arm, Left [[id number]]  (Normal) Collected: 04/02/22 2208    Specimen: Blood from Arm, Left Updated: 04/03/22 2217     Blood Culture No growth at 24 hours    Blood Culture - Blood, Arm, Right [[id number]]  (Normal) Collected: 04/02/22 2208    Specimen: Blood from Arm, Right Updated: 04/03/22 2217     Blood Culture No growth at 24 hours          Imaging Results (Last 24 Hours)     ** No results found for the last 24 hours. **          Assessment/Plan       Acute on chronic systolic CHF (congestive heart failure) (HCC)    Nonischemic cardiomyopathy (HCC)    Diabetes mellitus (HCC)    Morbid obesity (HCC)    Continue with the current medications, the IV diuresis.  A recent Echo obtained in February shows a depressed LVEF of 35 to 40% and mild to moderate mitral valve stenosis.    I confirmed that the patient's Advance Care Plan is present, code status is documented, or surrogate decision maker is listed in the patient's medical record.      Justin Wheeler MD  04/04/22  13:56 CDT

## 2022-04-05 NOTE — OUTREACH NOTE
Prep Survey    Flowsheet Row Responses   [facility] facility patient discharged from? [facility]   Is LACE score < 7 ? No   Emergency Room discharge w/ pulse ox? No   Eligibility [facility]   Date of Admission 04/02/22   Date of Discharge 04/05/22   Discharge Disposition Home or Self Care   Discharge diagnosis a/c CHF   Does the patient have one of the following disease processes/diagnoses(primary or secondary)? CHF   Does the patient have Home health ordered? No   Is there a DME ordered? Yes   What DME was ordered? [facility] for quad cane   Prep survey completed? Yes          NAFISA ROUSE - Registered Nurse

## 2022-04-05 NOTE — DISCHARGE PLACEMENT REQUEST
"    Patient has been left the hospital. She will  cane.    Lexi Norris (71 y.o. Female)             Date of Birth   1950    Social Security Number       Address   [address] [facility] [address]    Home Phone   [phone]    MRN   [id number]       Zoroastrian   Tenriism    Marital Status   Single                            Admission Date   4/2/22    Admission Type   Emergency    Admitting Provider   Michael Murcia MD    Attending Provider   Michael Murcia MD    Department, Room/Bed   [address], 331/1       Discharge Date       Discharge Disposition   Home or Self Care    Discharge Destination                               Attending Provider: Michael Murcia MD    Allergies: Aldactone [Spironolactone], Nsaids    Isolation: None   Infection: None   Code Status: CPR   Advance Care Planning Activity    Ht: 157.5 cm (62\")   Wt: 91.1 kg (200 lb 12.8 oz)    Admission Cmt: None   Principal Problem: Acute on chronic systolic CHF (congestive heart failure) (HCC) [I50.23]                 Active Insurance as of 4/2/2022     Primary Coverage     Payor Plan Insurance Group Employer/Plan Group    MEDICARE MEDICARE A & B      Payor Plan Address Payor Plan Phone Number Payor Plan Fax Number Effective Dates    [address] [phone]  11/1/2000 - None Entered    [facility] [address]       Subscriber Name Subscriber Birth Date Member ID       LEXI NORRIS 1950 [id number]           Secondary Coverage     Payor Plan Insurance Group Employer/Plan Group    [place]      Payor Plan Address Payor Plan Phone Number Payor Plan Fax Number Effective Dates    [address] [phone]  10/4/2016 - None Entered    [place] [address]       Subscriber Name Subscriber Birth Date Member ID       LEXI NORRIS 1950 [id number]                 Emergency Contacts      (Rel.) " Home Phone Work Phone Mobile Phone    Yudi Wade (Sister) [phone] -- [phone]            Insurance Information                MEDICARE/MEDICARE A & B Phone: [phone]    Subscriber: Lexi Wade Subscriber#: [id number]    Group#: -- Precert#: --        [place] Phone: [phone]    Subscriber: Lexi Wade Subscriber#: [id number]    Group#: -- Precert#: --             History & Physical      Michael Murcia MD at 04/02/22 2222                [facility] Medicine Admission      Date of Admission: 4/2/2022      Primary Care Physician: Joyce Plata MD      Chief Complaint: Shortness of air    HPI:    This is a 71-year-old female with concurrent medical history of diabetes, neuropathy, CHF, hypertension, hyperlipidemia, obesity presenting to the ER with complaint of having shortness of air.  She does have a history of CHF and her BNP was elevated in the ER.  She does take Lasix at home.  She denies any chest pain.    Past Medical History:  has a past medical history of Arthritis, CHF (congestive heart failure) (HCC), Chronic systolic heart failure (HCC), Diabetes mellitus (HCC), Diabetic neuropathy (HCC), GERD (gastroesophageal reflux disease), Hypercholesterolemia, Hypertension, Low back pain, Nonischemic cardiomyopathy (HCC), Obesity, Sleep apnea, and Vitamin D deficiency.    Past Surgical History:  has a past surgical history that includes Cardiac catheterization; Cataract Extraction; Cataract Extraction; Total abdominal hysterectomy w/ bilateral salpingoophorectomy; Colonoscopy (N/A, 6/14/2017); Pacemaker Implantation; Cardiac catheterization (N/A, 8/23/2018); Cardiac pacemaker placement; and Cardiac electrophysiology procedure (N/A, 6/22/2020).    Family History: family history includes Bone cancer in her brother; Diabetes in her sister.    Social History:  reports that she has quit smoking. She has never used  smokeless tobacco. She reports that she does not drink alcohol and does not use drugs.    Allergies:   Allergies   Allergen Reactions   • Aldactone [Spironolactone] Unknown - Low Severity   • Nsaids        Medications: Scheduled Meds:aspirin, 81 mg, Oral, Nightly  [START ON 4/3/2022] atorvastatin, 40 mg, Oral, Daily  carvedilol, 25 mg, Oral, BID With Meals  [START ON 4/3/2022] famotidine, 40 mg, Oral, Daily  furosemide, 40 mg, Intravenous, Q12H  insulin detemir, 30 Units, Subcutaneous, Nightly  [START ON 4/3/2022] isosorbide mononitrate, 30 mg, Oral, Daily  [START ON 4/3/2022] lisinopril, 10 mg, Oral, Daily  [START ON 4/3/2022] pantoprazole, 40 mg, Oral, Daily  sodium chloride, 10 mL, Intravenous, Q12H      Continuous Infusions:   PRN Meds:.Morphine **AND** naloxone  •  sodium chloride  •  sodium chloride  No current facility-administered medications on file prior to encounter.     Current Outpatient Medications on File Prior to Encounter   Medication Sig Dispense Refill   • aspirin 81 MG EC tablet Take 81 mg by mouth Every Night.     • atorvastatin (LIPITOR) 40 MG tablet Take 1 tablet by mouth Daily. 90 tablet 3   • carvedilol (COREG) 25 MG tablet Take 1 tablet by mouth 2 (Two) Times a Day With Meals. 180 tablet 3   • furosemide (LASIX) 40 MG tablet TAKE 1 TABLET BY MOUTH TWICE A DAY 60 tablet 1   • guaiFENesin (MUCINEX) 600 MG 12 hr tablet Take 1 tablet by mouth Every 12 (Twelve) Hours. 60 tablet 1   • insulin detemir (Levemir FlexTouch) 100 UNIT/ML injection Inject 35 Units under the skin into the appropriate area as directed Every Night. 5 pen 5   • ipratropium-albuterol (DUO-NEB) 0.5-2.5 mg/3 ml nebulizer Take 3 mL by nebulization 4 (Four) Times a Day. 360 mL 1   • isosorbide mononitrate (IMDUR) 30 MG 24 hr tablet Take 1 tablet by mouth Daily. 90 tablet 3   • lisinopril (PRINIVIL,ZESTRIL) 10 MG tablet TAKE 1 TABLET BY MOUTH EVERY DAY 90 tablet 3   • magnesium oxide (MAGOX) 400 (241.3 Mg) MG tablet tablet Take 1  "tablet by mouth Daily. 90 each 3   • metFORMIN (GLUCOPHAGE) 1000 MG tablet Take 1 tablet by mouth 2 (Two) Times a Day With Meals. 180 tablet 0   • pantoprazole (Protonix) 40 MG EC tablet Take 1 tablet by mouth Daily. 30 tablet 0   • potassium chloride (MICRO-K) 10 MEQ CR capsule Take 1 capsule by mouth 2 (Two) Times a Day. 180 capsule 1   • acetaminophen (TYLENOL) 325 MG tablet Take 2 tablets by mouth Every 4 (Four) Hours As Needed for Mild Pain .     • albuterol sulfate  (90 Base) MCG/ACT inhaler INHALE 2 PUFFS EVERY 4 (FOUR) HOURS AS NEEDED FOR WHEEZING OR SHORTNESS OF AIR. 18 g 1   • B-D UF III MINI PEN NEEDLES 31G X 5 MM misc USE WITH INSULIN INJECTIONS NIGHTLY 100 each 3   • diclofenac (VOLTAREN) 1 % gel gel APPLY TO AFFECTED AREA 4 TIMES A DAY Oral Volteran DC'D) 100 g 3   • Insulin Syringe 31G X 5/16\" 0.5 ML misc Inject 1 each under the skin into the appropriate area as directed Every Night. 100 each 1   • nitroglycerin (Nitrostat) 0.4 MG SL tablet Place 1 tablet under the tongue Every 5 (Five) Minutes As Needed for Chest Pain. Take no more than 3 doses in 15 minutes. 25 tablet 0       Review of Systems:  Review of Systems   Respiratory: Positive for shortness of breath.    Cardiovascular: Negative for chest pain.   Gastrointestinal: Negative for nausea.      Otherwise complete ROS is negative except as mentioned above.    Physical Exam:   Temp:  [97.6 °F (36.4 °C)-98 °F (36.7 °C)] 98 °F (36.7 °C)  Heart Rate:  [] 87  Resp:  [18-22] 20  BP: (106-209)/() 131/65  Physical Exam  Vitals and nursing note reviewed.   Constitutional:       General: She is not in acute distress.     Appearance: She is well-developed. She is not diaphoretic.   HENT:      Head: Normocephalic and atraumatic.   Cardiovascular:      Rate and Rhythm: Normal rate.   Pulmonary:      Effort: Pulmonary effort is normal. No respiratory distress.      Breath sounds: No wheezing.   Abdominal:      General: There is no " distension.      Palpations: Abdomen is soft.   Musculoskeletal:         General: Normal range of motion.   Skin:     General: Skin is warm and dry.   Neurological:      Mental Status: She is alert.      Cranial Nerves: No cranial nerve deficit.   Psychiatric:         Behavior: Behavior normal.         Thought Content: Thought content normal.         Judgment: Judgment normal.           Results Reviewed:  I have personally reviewed current lab, radiology, and data and agree with results.  Lab Results (last 24 hours)     Procedure Component Value Units Date/Time    Phosphorus [[id number]]  (Normal) Collected: 04/02/22 1546    Specimen: Blood Updated: 04/02/22 2218     Phosphorus 3.4 mg/dL     Blood Culture - Blood, Arm, Left [[id number]] Collected: 04/02/22 2208    Specimen: Blood from Arm, Left Updated: 04/02/22 2208    Blood Culture - Blood, Arm, Right [[id number]] Collected: 04/02/22 2208    Specimen: Blood from Arm, Right Updated: 04/02/22 2208    Lactic Acid, Plasma [[id number]] Collected: 04/02/22 2208    Specimen: Blood Updated: 04/02/22 2208    COVID-19 and FLU A/B PCR - Swab, Nasopharynx [[id number]]  (Normal) Collected: 04/02/22 2031    Specimen: Swab from Nasopharynx Updated: 04/02/22 2054     COVID19 Not Detected     Influenza A PCR Not Detected     Influenza B PCR Not Detected    Narrative:      Fact sheet for providers: https://www.fda.gov/media/506797/download    Fact sheet for patients: https://www.fda.gov/media/107888/download    Test performed by PCR.    [place] Draw [[id number]] Collected: 04/02/22 1546    Specimen: Blood Updated: 04/02/22 1704    Narrative:      The following orders were created for panel order [place] Draw.  Procedure                               Abnormality         Status                     ---------                               -----------         ------                     Green Top (Gel)[[id number]]                                  Final result               Lavender  Top[[id number]]                                     Final result               Gold Top - SST[[id number]]                                                              Light Blue Top[[id number]]                                   Final result                 Please view results for these tests on the individual orders.    Green Top (Gel) [[id number]] Collected: 04/02/22 1546    Specimen: Blood Updated: 04/02/22 1704     Extra Tube Hold for add-ons.     Comment: Auto resulted.       Lavender Top [[id number]] Collected: 04/02/22 1546    Specimen: Blood Updated: 04/02/22 1704     Extra Tube hold for add-on     Comment: Auto resulted       Light Blue Top [[id number]] Collected: 04/02/22 1546    Specimen: Blood Updated: 04/02/22 1704     Extra Tube hold for add-on     Comment: Auto resulted       D-dimer, Quantitative [[id number]]  (Abnormal) Collected: 04/02/22 1548    Specimen: Blood Updated: 04/02/22 1628     D-Dimer, Quantitative 975 ng/mL (FEU)     Narrative:      Dimer values <500 ng/ml FEU are FDA approved as aid in diagnosis of deep venous thrombosis and pulmonary embolism.  This test should not be used in an exclusion strategy with pretest probability alone.    A recent guideline regarding diagnosis for pulmonary thromboembolism recommends an adjusted exclusion criterion of age x 10 ng/ml FEU for patients >50 years of age ([employer] Intern Med 2015; 163: 701-711).      Comprehensive Metabolic Panel [[id number]]  (Abnormal) Collected: 04/02/22 1546    Specimen: Blood Updated: 04/02/22 1622     Glucose 244 mg/dL      BUN 15 mg/dL      Creatinine 1.00 mg/dL      Sodium 141 mmol/L      Potassium 2.9 mmol/L      Chloride 104 mmol/L      CO2 21.0 mmol/L      Calcium 9.7 mg/dL      Total Protein 6.2 g/dL      Albumin 3.70 g/dL      ALT (SGPT) 20 U/L      AST (SGOT) 20 U/L      Alkaline Phosphatase 104 U/L      Total Bilirubin 1.0 mg/dL      Globulin 2.5 gm/dL      A/G Ratio 1.5 g/dL      BUN/Creatinine Ratio 15.0     Anion Gap 16.0  mmol/L      eGFR 59.6 mL/min/1.73      Comment: National Kidney Foundation and American Society of Nephrology (ASN) Task Force recommended calculation based on the Chronic Kidney Disease Epidemiology Collaboration (CKD-EPI) equation refit without adjustment for race.       Narrative:      GFR Normal >60  Chronic Kidney Disease <60  Kidney Failure <15      Troponin [[id number]]  (Normal) Collected: 04/02/22 1546    Specimen: Blood Updated: 04/02/22 1622     Troponin T <0.010 ng/mL     Narrative:      Troponin T Reference Range:  <= 0.03 ng/mL-   Negative for AMI  >0.03 ng/mL-     Abnormal for myocardial necrosis.  Clinicians would have to utilize clinical acumen, EKG, Troponin and serial changes to determine if it is an Acute Myocardial Infarction or myocardial injury due to an underlying chronic condition.       Results may be falsely decreased if patient taking Biotin.      Magnesium [[id number]]  (Normal) Collected: 04/02/22 1546    Specimen: Blood Updated: 04/02/22 1622     Magnesium 1.7 mg/dL     CK [[id number]]  (Normal) Collected: 04/02/22 1546    Specimen: Blood Updated: 04/02/22 1622     Creatine Kinase 74 U/L     BNP [[id number]]  (Abnormal) Collected: 04/02/22 1546    Specimen: Blood Updated: 04/02/22 1620     proBNP 2,199.0 pg/mL     Narrative:      Among patients with dyspnea, NT-proBNP is highly sensitive for the detection of acute congestive heart failure. In addition NT-proBNP of <300 pg/ml effectively rules out acute congestive heart failure with 99% negative predictive value.    Results may be falsely decreased if patient taking Biotin.      CBC & Differential [[id number]]  (Abnormal) Collected: 04/02/22 1546    Specimen: Blood Updated: 04/02/22 1558    Narrative:      The following orders were created for panel order CBC & Differential.  Procedure                               Abnormality         Status                     ---------                               -----------         ------                      CBC Auto Differential[[id number]]        Abnormal            Final result                 Please view results for these tests on the individual orders.    CBC Auto Differential [[id number]]  (Abnormal) Collected: 04/02/22 1546    Specimen: Blood Updated: 04/02/22 1558     WBC 12.80 10*3/mm3      RBC 4.11 10*6/mm3      Hemoglobin 11.3 g/dL      Hematocrit 34.7 %      MCV 84.4 fL      MCH 27.5 pg      MCHC 32.6 g/dL      RDW 15.4 %      RDW-SD 47.6 fl      MPV 9.6 fL      Platelets 273 10*3/mm3      Neutrophil % 71.5 %      Lymphocyte % 17.5 %      Monocyte % 9.7 %      Eosinophil % 0.5 %      Basophil % 0.3 %      Immature Grans % 0.5 %      Neutrophils, Absolute 9.14 10*3/mm3      Lymphocytes, Absolute 2.24 10*3/mm3      Monocytes, Absolute 1.24 10*3/mm3      Eosinophils, Absolute 0.07 10*3/mm3      Basophils, Absolute 0.04 10*3/mm3      Immature Grans, Absolute 0.07 10*3/mm3      nRBC 0.0 /100 WBC         Imaging Results (Last 24 Hours)     Procedure Component Value Units Date/Time    CT Angiogram Chest [[id number]] Collected: 04/02/22 1728     Updated: 04/02/22 1818    Narrative:      CT CHEST ANGIOGRAPHY WITH IV CONTRAST    INDICATION: 71 years Female; PE suspected, low/intermediate prob,  positive D-dimer    TECHNIQUE:  CT angiogram of the chest was performed with IV  contrast.  3-D/MIP reconstructions were performed.  This exam was  performed according to our departmental dose-optimization  program, which includes automated exposure control, adjustment of  the mA and/or kV according to patient size and/or use of  iterative reconstruction technique.     COMPARISON: None.    FINDINGS:  Thyroid: Unremarkable.   Heart/Mediastinum: The heart is enlarged. Scattered coronary  artery calcifications. Pacemaker electrode in the right atrium  right ventricle.   Vasculature: The pulmonary arteries are enlarged with suggest  pulmonary arterial hypertension. No evidence of pulmonary emboli.  Reflux of contrast into the IVC  and hepatic veins suggests  right-sided heart failure. Mild atherosclerosis of the aorta  without aneurysm. Evaluation for aortic dissection is limited due  to suboptimal opacification.  Lungs/Pleura: Small bilateral pleural effusions. No pneumothorax.  Patchy groundglass throughout both lungs with interlobular septal  thickening probably represents pulmonary edema. Additional  scattered nodular opacities in the bilateral upper lobes in the  centrilobular distribution may be due to edema or could represent  superimposed pneumonia.   Lymph nodes: No pathologically enlarged lymph nodes.  Bones: Unremarkable.  Soft tissues: Unremarkable.  Incidental findings: None.      Impression:      No evidence of pulmonary emboli.  Overall findings probably represent congestive heart failure.  Superimposed pneumonia cannot be excluded.    Electronically signed by:  Viridiana Eason  4/2/2022 6:16 PM CDT  Workstation: [id number]    XR Chest 2 View [[id number]] Collected: 04/02/22 1527     Updated: 04/02/22 1604    Narrative:      TWO VIEW CHEST    HISTORY: Shortness of air    Frontal and lateral films of the chest were obtained.    COMPARISON: February 18, 2022    FINDINGS:    EKG leads.  The lungs are clear of an acute process.  Left-sided pacemaker remains in place with leads projected over  the right atrium, right ventricle and coronary sinus.  Stable cardiomegaly.  The pulmonary vasculature is not increased.  No pleural effusion.  No pneumothorax.  No acute osseous abnormality.  Degenerative changes are present in the thoracic spine.      Impression:      CONCLUSION:  The lungs are clear of an acute process.  Left-sided pacemaker remains in place with leads projected over  the right atrium, right ventricle and coronary sinus.  Stable cardiomegaly.    [id number]    Electronically signed by:  Silver Lam MD  4/2/2022 4:02 PM CDT  Workstation: [id number]            Assessment:    Active Hospital Problems    Diagnosis    • Shortness of breath             CHF exacerbation-we will start patient on IV Lasix, continue monitoring urine output.  We will continue to monitor    GERD-famotidine will be continued    Coronary artery disease-continue with Lipitor and aspirin    Hypertension-continue with lisinopril and blood pressure monitoring    Hypokalemia-continue to monitor and replace    DVT prophylaxis-SCD    Patient full code      I confirmed that the patient's Advance Care Plan is present, code status is documented, or surrogate decision maker is listed in the patient's medical record.       Michael Murcia MD  04/02/22  22:22 CDT                  Electronically signed by Michael Murcia MD at 04/02/22 2224         Miscellaneous DME [[id number]] (Order [id number])  Order  Date: 4/5/2022 Department: [address] Ordering/Authorizing: Justin Wheeler MD                             Order History  Outpatient  Date/Time Action Taken User Additional Information   04/05/22 1453 Sign Justin Wheeler MD        Order Details    Frequency Duration Priority Order Class   None None Routine External       Start Date/Time    Start Date   04/05/22       Order Information    Order Date Service Start Date Start Time   04/05/22 Medicine 04/05/22            Reference Links    Associated Reports   View Encounter       Order Questions    Question Answer   Type of DME quad cane   Length of Need (99 Months = Lifetime) 99 Months = Lifetime   Note: Custom values to enter length of need for DME                        Source Order Set / Preference List    Order Set   [facility] EXPRESS DISCHARGE [[id number]]           Clinical Indications     ICD-10-CM ICD-9-CM   Obesity (BMI 30-39.9)  - Primary     E66.9 278.00                             Reprint Order Requisition    Miscellaneous DME (Order #[id number]) on 4/5/22         Encounter    View Encounter                Order Provider Info        Office phone Pager E-mail   Ordering User Justin Wheeler MD  [phone] -- --   Authorizing Provider Justin Wheeler [phone] -- --   Attending Provider Michael Murcia [phone] -- --       Tracking Reports    Cosign Tracking Order Transmittal Tracking     Authorized by:  Justin Wheeler MD  (NPI: [id number])                Lab Component SmartPhrase Guide    Miscellaneous DME (Order #[id number]) on 4/5/22

## 2022-04-05 NOTE — DISCHARGE SUMMARY
Discharge Summary  Justin Wheeler MD  Hospitalist     Date of Discharge:  4/5/2022    Discharge Diagnosis:      Acute on chronic systolic CHF (congestive heart failure) (HCC)    Nonischemic cardiomyopathy (HCC)    Diabetes mellitus (HCC)    Morbid obesity (HCC)      Presenting Problem/History of Present Illness  Shortness of breath    Hospital Course  Patient is a 71 y.o. female admitted for shortness of breath due to a mild exacerbation of the underlying chronic systolic heart failure, symptom that improved with the help of IV Lasix.     Her vital signs remain stable, her Oxygen saturation on room air is normal and she will be discharged home on the medications as listed.    She will see her primary care provider within one week and Dr. Felipe, her Cardiologist in the next few weeks.    Consults:   Consults     Date and Time Order Name Status Description    4/4/2022 10:19 AM Inpatient Cardiology Consult      4/2/2022  8:19 PM Hospitalist (on-call MD unless specified)          Condition on Discharge: stable    Vital Signs  Temp:  [96 °F (35.6 °C)-97.3 °F (36.3 °C)] 96 °F (35.6 °C)  Heart Rate:  [74-83] 74  Resp:  [18] 18  BP: (106-132)/(56-78) 106/61    Physical Exam:  Physical Exam  Vitals reviewed.   Constitutional:       General: She is not in acute distress.     Appearance: She is obese. She is not ill-appearing.   HENT:      Head: Normocephalic and atraumatic.   Eyes:      General: No scleral icterus.     Extraocular Movements: Extraocular movements intact.      Pupils: Pupils are equal, round, and reactive to light.   Cardiovascular:      Rate and Rhythm: Normal rate and regular rhythm.   Pulmonary:      Effort: No respiratory distress.      Breath sounds: Normal breath sounds. No stridor. No wheezing or rales.   Chest:      Chest wall: No tenderness.   Abdominal:      General: Bowel sounds are normal. There is no distension.      Palpations: Abdomen is soft. There is no mass.      Tenderness: There is no  abdominal tenderness. There is no right CVA tenderness, left CVA tenderness or guarding.      Hernia: No hernia is present.   Musculoskeletal:         General: No swelling, tenderness or deformity.      Cervical back: Normal range of motion and neck supple. No rigidity or tenderness.      Right lower leg: No edema.      Left lower leg: No edema.   Skin:     General: Skin is dry.      Coloration: Skin is not jaundiced or pale.      Findings: No bruising, erythema or lesion.   Neurological:      General: No focal deficit present.      Mental Status: She is alert and oriented to person, place, and time. Mental status is at baseline.      Cranial Nerves: No cranial nerve deficit.      Sensory: No sensory deficit.      Motor: No weakness.      Gait: Gait normal.   Psychiatric:         Mood and Affect: Mood normal.         Behavior: Behavior normal.         Discharge Disposition  Home or Self Care    Discharge Medications     Discharge Medications      New Medications      Instructions Start Date   freestyle lancets   1 each, Other, 3 Times Daily, Use as instructed      FreeStyle Lite device   1 Device, Does not apply, 3 Times Daily      FREESTYLE LITE test strip  Generic drug: glucose blood   1 each, Other, 3 Times Daily, Use as instructed         Continue These Medications      Instructions Start Date   acetaminophen 325 MG tablet  Commonly known as: TYLENOL   650 mg, Oral, Every 4 Hours PRN      albuterol sulfate  (90 Base) MCG/ACT inhaler  Commonly known as: PROVENTIL HFA;VENTOLIN HFA;PROAIR HFA   INHALE 2 PUFFS EVERY 4 (FOUR) HOURS AS NEEDED FOR WHEEZING OR SHORTNESS OF AIR.      aspirin 81 MG EC tablet   81 mg, Oral, Nightly      atorvastatin 40 MG tablet  Commonly known as: LIPITOR   40 mg, Oral, Daily      B-D UF III MINI PEN NEEDLES 31G X 5 MM misc  Generic drug: Insulin Pen Needle   USE WITH INSULIN INJECTIONS NIGHTLY      carvedilol 25 MG tablet  Commonly known as: COREG   25 mg, Oral, 2 Times Daily With  "Meals      diclofenac 1 % gel gel  Commonly known as: VOLTAREN   APPLY TO AFFECTED AREA 4 TIMES A DAY Oral Volteran DC'D)      furosemide 40 MG tablet  Commonly known as: LASIX   TAKE 1 TABLET BY MOUTH TWICE A DAY      guaiFENesin 600 MG 12 hr tablet  Commonly known as: MUCINEX   600 mg, Oral, Every 12 Hours Scheduled      Insulin Syringe 31G X 5/16\" 0.5 ML misc   1 each, Subcutaneous, Nightly      ipratropium-albuterol 0.5-2.5 mg/3 ml nebulizer  Commonly known as: DUO-NEB   3 mL, Nebulization, 4 Times Daily      isosorbide mononitrate 30 MG 24 hr tablet  Commonly known as: IMDUR   30 mg, Oral, Daily      Levemir FlexTouch 100 UNIT/ML injection  Generic drug: insulin detemir   35 Units, Subcutaneous, Nightly      lisinopril 10 MG tablet  Commonly known as: PRINIVIL,ZESTRIL   TAKE 1 TABLET BY MOUTH EVERY DAY      magnesium oxide 400 (241.3 Mg) MG tablet tablet  Commonly known as: MAGOX   400 mg, Oral, Daily      nitroglycerin 0.4 MG SL tablet  Commonly known as: Nitrostat   0.4 mg, Sublingual, Every 5 Minutes PRN, Take no more than 3 doses in 15 minutes.      pantoprazole 40 MG EC tablet  Commonly known as: Protonix   40 mg, Oral, Daily      potassium chloride 10 MEQ CR capsule  Commonly known as: MICRO-K   10 mEq, Oral, 2 Times Daily         Stop These Medications    metFORMIN 1000 MG tablet  Commonly known as: GLUCOPHAGE            Discharge Diet:   Diet Instructions     Diet: Cardiac; Nectar / Syrup Thick      Discharge Diet: Cardiac    Fluid Consistency: Nectar / Syrup Thick          Activity at Discharge:   Activity Instructions     Activity as Tolerated            Follow-up Appointments  Future Appointments   Date Time Provider Department Center   4/12/2022  1:45 PM Joyce Plata MD [facility]   4/25/2022  1:30 PM Joyce Plata MD [address]     Additional Instructions for the Follow-ups that You Need to Schedule     Discharge Follow-up with PCP   As directed       Currently Documented PCP: "    Joyce Pltaa MD    PCP Phone Number:    [phone]     Follow Up Details: in 1 week         Discharge Follow-up with Specified Provider: Dr. Felipe - Cardiology; 2 Weeks   As directed      To: Dr. Felipe - Cardiology    Follow Up: 2 Weeks               Test Results Pending at Discharge  Pending Labs     Order Current Status    Blood Culture - Blood, Arm, Left Preliminary result    Blood Culture - Blood, Arm, Right Preliminary result           Justin Wheeler MD  04/05/22  16:32 CDT

## 2022-04-05 NOTE — PLAN OF CARE
Goal Outcome Evaluation:  Plan of Care Reviewed With: patient        Progress: improving  Outcome Evaluation: VSS. Pt standing weight improved this AM. Pt ambulating per self and reports less shortness of air. No acute events overnight.

## 2022-04-06 NOTE — OUTREACH NOTE
Call Center TCM Note    Flowsheet Row Responses   [facility] patient discharged from? [place]   Does the patient have one of the following disease processes/diagnoses(primary or secondary)? CHF   TCM attempt successful? No   Unsuccessful attempts Attempt 2           Debbie Simmons RN    4/6/2022, 16:11 EDT

## 2022-04-06 NOTE — OUTREACH NOTE
Call Center TCM Note    Flowsheet Row Responses   [facility] patient discharged from? [place]   Does the patient have one of the following disease processes/diagnoses(primary or secondary)? CHF   TCM attempt successful? No  [verbal release for sister]   Unsuccessful attempts Attempt 1  [attempted patient and sister per verbal release]   Comments regarding PCP Hospital PCP FOLLOW UP APPOINTMENT IS 4/12/22@145pm           Debbie Simmons RN    4/6/2022, 15:40 EDT

## 2022-04-07 NOTE — OUTREACH NOTE
Call Center TCM Note    Flowsheet Row Responses   [facility] facility patient discharged from? [place]   Does the patient have one of the following disease processes/diagnoses(primary or secondary)? CHF   TCM attempt successful? No   Unsuccessful attempts Attempt 3           Rita Dia LPN    4/7/2022, 13:45 CDT

## 2022-04-12 NOTE — ED PROVIDER NOTES
Subjective   History of Present Illness  Patient is a 71 year old female who presents to the ED for shortness of breath that started last night. The patient states that she had tried a breathing treatment but it did not help. She states that she was helping to move and her shortness of breath had worsened. The patient denies chest pain. She notes that she has chronic abdominal pain and chronic constipation.             Review of Systems   Constitutional: Negative for chills, fatigue and fever.   HENT: Negative for drooling.    Eyes: Negative for redness.   Respiratory: Positive for shortness of breath. Negative for choking, chest tightness and wheezing.    Cardiovascular: Negative for chest pain and leg swelling.   Gastrointestinal: Negative for abdominal pain, constipation, diarrhea, nausea and vomiting.   Endocrine: Negative for cold intolerance.   Genitourinary: Negative for flank pain and menstrual problem.   Skin: Negative for color change and rash.   Neurological: Negative for dizziness, seizures, weakness, light-headedness, numbness and headaches.   Psychiatric/Behavioral: Negative for confusion.       Past Medical History:   Diagnosis Date   • Arthritis    • CHF (congestive heart failure) (HCC)    • Chronic systolic heart failure (HCC)    • Diabetes mellitus (HCC)    • Diabetic neuropathy (HCC)    • GERD (gastroesophageal reflux disease)    • Hypercholesterolemia    • Hypertension    • Low back pain    • Nonischemic cardiomyopathy (HCC)    • Obesity    • Sleep apnea    • Vitamin D deficiency        Allergies   Allergen Reactions   • Aldactone [Spironolactone] Unknown - Low Severity   • Nsaids        Past Surgical History:   Procedure Laterality Date   • CARDIAC CATHETERIZATION     • CARDIAC CATHETERIZATION N/A 8/23/2018   • CARDIAC ELECTROPHYSIOLOGY PROCEDURE N/A 6/22/2020   • CARDIAC PACEMAKER PLACEMENT     • CATARACT EXTRACTION     • CATARACT EXTRACTION     • COLONOSCOPY N/A 6/14/2017   • PACEMAKER  IMPLANTATION     • TOTAL ABDOMINAL HYSTERECTOMY WITH SALPINGO OOPHORECTOMY         Family History   Problem Relation Age of Onset   • Diabetes Sister    • Bone cancer Brother        Social History     Socioeconomic History   • Marital status: Single   Tobacco Use   • Smoking status: Former Smoker   • Smokeless tobacco: Never Used   Substance and Sexual Activity   • Alcohol use: No   • Drug use: No   • Sexual activity: Not Currently           Objective    Vitals:    04/12/22 1707 04/12/22 1801 04/12/22 1905 04/12/22 2151   BP: 161/99 (!) 179/109 152/91 172/90   BP Location: Left arm      Patient Position: Sitting      Pulse: 91 98 92 96   Resp: 20 22 18 20   Temp: 98.3 °F (36.8 °C)      TempSrc: Oral      SpO2: 96% 99% 99% 96%   Weight:       Height:           Physical Exam  Vitals reviewed.   Constitutional:       Appearance: Normal appearance.   HENT:      Head: Normocephalic and atraumatic.      Right Ear: Tympanic membrane normal.      Left Ear: Tympanic membrane normal.      Nose: Nose normal.      Mouth/Throat:      Mouth: Mucous membranes are moist.   Eyes:      Pupils: Pupils are equal, round, and reactive to light.   Cardiovascular:      Rate and Rhythm: Normal rate.      Pulses: Normal pulses.      Heart sounds: Normal heart sounds.   Pulmonary:      Effort: Pulmonary effort is normal.      Breath sounds: Normal breath sounds.   Abdominal:      General: Abdomen is flat. Bowel sounds are normal.      Palpations: Abdomen is soft.   Musculoskeletal:         General: Normal range of motion.      Cervical back: Normal range of motion and neck supple.   Skin:     General: Skin is warm and dry.      Capillary Refill: Capillary refill takes less than 2 seconds.   Neurological:      General: No focal deficit present.      Mental Status: She is alert and oriented to person, place, and time. Mental status is at baseline.   Psychiatric:         Mood and Affect: Mood normal.         Procedures           ED Course       Vitals:    04/12/22 1707 04/12/22 1801 04/12/22 1905 04/12/22 2151   BP: 161/99 (!) 179/109 152/91 172/90   BP Location: Left arm      Patient Position: Sitting      Pulse: 91 98 92 96   Resp: 20 22 18 20   Temp: 98.3 °F (36.8 °C)      TempSrc: Oral      SpO2: 96% 99% 99% 96%   Weight:       Height:           Results for orders placed or performed during the hospital encounter of 04/12/22   Comprehensive Metabolic Panel    Specimen: Blood   Result Value Ref Range    Glucose 91 65 - 99 mg/dL    BUN 11 8 - 23 mg/dL    Creatinine 1.07 (H) 0.57 - 1.00 mg/dL    Sodium 138 136 - 145 mmol/L    Potassium 4.3 3.5 - 5.2 mmol/L    Chloride 105 98 - 107 mmol/L    CO2 21.0 (L) 22.0 - 29.0 mmol/L    Calcium 10.2 8.6 - 10.5 mg/dL    Total Protein 6.7 6.0 - 8.5 g/dL    Albumin 4.40 3.50 - 5.20 g/dL    ALT (SGPT) 44 (H) 1 - 33 U/L    AST (SGOT) 43 (H) 1 - 32 U/L    Alkaline Phosphatase 125 (H) 39 - 117 U/L    Total Bilirubin 0.8 0.0 - 1.2 mg/dL    Globulin 2.3 gm/dL    A/G Ratio 1.9 g/dL    BUN/Creatinine Ratio 10.3 7.0 - 25.0    Anion Gap 12.0 5.0 - 15.0 mmol/L    eGFR 55.0 (L) >60.0 mL/min/1.73   BNP    Specimen: Blood   Result Value Ref Range    proBNP 1,433.0 (H) 0.0 - 900.0 pg/mL   Troponin    Specimen: Blood   Result Value Ref Range    Troponin T <0.010 0.000 - 0.030 ng/mL   CBC Auto Differential    Specimen: Blood   Result Value Ref Range    WBC 12.96 (H) 3.40 - 10.80 10*3/mm3    RBC 4.42 3.77 - 5.28 10*6/mm3    Hemoglobin 12.4 12.0 - 15.9 g/dL    Hematocrit 37.5 34.0 - 46.6 %    MCV 84.8 79.0 - 97.0 fL    MCH 28.1 26.6 - 33.0 pg    MCHC 33.1 31.5 - 35.7 g/dL    RDW 16.0 (H) 12.3 - 15.4 %    RDW-SD 48.9 37.0 - 54.0 fl    MPV 9.5 6.0 - 12.0 fL    Platelets 370 140 - 450 10*3/mm3    Neutrophil % 60.7 42.7 - 76.0 %    Lymphocyte % 29.5 19.6 - 45.3 %    Monocyte % 7.6 5.0 - 12.0 %    Eosinophil % 1.2 0.3 - 6.2 %    Basophil % 0.6 0.0 - 1.5 %    Immature Grans % 0.4 0.0 - 0.5 %    Neutrophils, Absolute 7.86 (H) 1.70 - 7.00  10*3/mm3    Lymphocytes, Absolute 3.82 (H) 0.70 - 3.10 10*3/mm3    Monocytes, Absolute 0.99 (H) 0.10 - 0.90 10*3/mm3    Eosinophils, Absolute 0.16 0.00 - 0.40 10*3/mm3    Basophils, Absolute 0.08 0.00 - 0.20 10*3/mm3    Immature Grans, Absolute 0.05 0.00 - 0.05 10*3/mm3    nRBC 0.0 0.0 - 0.2 /100 WBC   D-dimer, Quantitative    Specimen: Blood   Result Value Ref Range    D-Dimer, Quantitative 1,631 (H) 0 - 470 ng/mL (FEU)   Green Top (Gel)   Result Value Ref Range    Extra Tube Hold for add-ons.    Lavender Top   Result Value Ref Range    Extra Tube hold for add-on    Gold Top - SST   Result Value Ref Range    Extra Tube Hold for add-ons.    Light Blue Top   Result Value Ref Range    Extra Tube hold for add-on               XR Chest 2 View    Result Date: 4/12/2022  CONCLUSION: Left-sided pacemaker remains in place with leads in the right atrium, right ventricle and coronary sinus Cardiomegaly. Central pulmonary vascular congestion. [id number] Electronically signed by:  Silver Lam MD  4/12/2022 5:41 PM CDT Workstation: [id number]    CT Angiogram Chest    Result Date: 4/12/2022  1.  High-quality assessment of the pulmonary arteries for filling defect. No embolus. 2.  Constellation of findings compatible with chronic right heart failure. Small bilateral pleural effusions and pulmonary edema.. Electronically signed by:  Abbey Dumont MD  4/12/2022 10:06 PM CDT Workstation: [id number]                                     MDM  Number of Diagnoses or Management Options  Acute pulmonary edema (HCC)  Diagnosis management comments: Patient came to the ED for shortness of breath. The patient had mild pulmonary edema on chest xray and Chest CT Angio. The patient did not have any pulmonary embolism on CT chest angio. The patient was given IV Lasix and the patients symptoms improved in the ED.        Amount and/or Complexity of Data Reviewed  Clinical lab tests: reviewed  Tests in the radiology section of CPT®: reviewed  Tests in the  medicine section of CPT®: reviewed  Decide to obtain previous medical records or to obtain history from someone other than the patient: yes        Final diagnoses:   Acute pulmonary edema (HCC)       ED Disposition  ED Disposition     ED Disposition   Discharge    Condition   Stable    Comment   --             Joyce Plata MD  [facility] [address]  [facility] [address]  [phone]    In 2 days           Medication List      No changes were made to your prescriptions during this visit.           This document has been electronically signed by Noah España MD on April 12, 2022 22:29 CDT     Anamaria Kim MD  Resident  04/12/22 [id number]          I personally saw and examined the patient.  I have reviewed and agree with the resident's findings including all diagnostic interpretations, and treatment plans as written.  I was present for the key portions of any procedures performed and the inclusive time noted in any critical care statement.    Patient neurologically intact.  She endorses shortness of breath, but denies other symptoms. Labs are unremarkable.  D-dimer slightly up.  CTA of the chest show some pulmonary edema.  No PE.  Patient received Lasix in the ER.  She ambulate under her own power satting well with no hypoxia.  Recommend she follow-up with her PCP.  Return precautions discussed.  Noha Pham MD, MD  04/12/22 [id number]

## 2022-04-13 NOTE — ED NOTES
This RN attempted 20g IV access x2 and was unsuccessful. Another RN attempted x2 and was unsuccessful. Dr. España aware and ok with using 22g. CT aware of access and conversation with MD.

## 2022-04-14 NOTE — OUTREACH NOTE
CHF Week 2 Survey    Flowsheet Row Responses   [facility] patient discharged from? [place]   Does the patient have one of the following disease processes/diagnoses(primary or secondary)? CHF   Week 2 attempt successful? Yes   Call start time 1748   Call end time 1758   Discharge diagnosis a/c CHF   Meds reviewed with patient/caregiver? Yes   Is the patient taking all medications as directed (includes completed medication regime)? Yes   Has the patient kept scheduled appointments due by today? Yes   Pulse Ox monitoring None   What is the patient's perception of their health status since discharge? Improving   Is the patient weighing daily? No   Is the patient able to teach back Heart Failure Zones? No   CHF Week 2 call completed? Yes   Wrap up additional comments Doing well, denies needs.          DORCAS LUTZ - Registered Nurse

## 2022-04-25 NOTE — OUTREACH NOTE
CHF Week 3 Survey    Flowsheet Row Responses   [facility] patient discharged from? [place]   Does the patient have one of the following disease processes/diagnoses(primary or secondary)? CHF   Week 3 attempt successful? Yes   Call start time 1455   Call end time 1459   Discharge diagnosis a/c CHF   Person spoke with today (if not patient) and relationship sister Miguel Bagley reviewed with patient/caregiver? Yes   Is the patient having any side effects they believe may be caused by any medication additions or changes? No   Does the patient have all medications ordered at discharge? Yes   Is the patient taking all medications as directed (includes completed medication regime)? Yes   Does the patient have a primary care provider?  Yes   Does the patient have an appointment with their PCP within 7 days of discharge? Yes   Has the patient kept scheduled appointments due by today? Yes   Has home health visited the patient within 72 hours of discharge? N/A   Psychosocial issues? No   Did the patient receive a copy of their discharge instructions? Yes   Nursing interventions Reviewed instructions with patient   What is the patient's perception of their health status since discharge? Improving   Nursing interventions Nurse provided patient education   Is the patient weighing daily? No   Does the patient have scales? No   Daily weight interventions Education provided on importance of daily weight   Is the patient able to teach back Heart Failure diet management? Yes   Is the patient able to teach back Heart Failure Zones? Yes   Is the patient able to teach back signs and symptoms of worsening condition? (i.e. weight gain, shortness of air, etc.) Yes   Is the patient/caregiver able to teach back the hierarchy of who to call/visit for symptoms/problems? PCP, Specialist, Home health nurse, Urgent Care, ED, 911 Yes   CHF Week 3 call completed? Yes          ODALYS RADFORD - Registered Nurse

## 2022-05-02 PROBLEM — R41.82 ALTERED MENTAL STATUS: Status: ACTIVE | Noted: 2022-01-01

## 2022-05-02 PROBLEM — E66.01 MORBID OBESITY (HCC): Chronic | Status: ACTIVE | Noted: 2022-01-01

## 2022-05-02 PROBLEM — E66.01 MORBID OBESITY: Status: ACTIVE | Noted: 2022-01-01

## 2022-05-02 PROBLEM — R40.0 DROWSINESS: Status: ACTIVE | Noted: 2022-01-01

## 2022-05-02 NOTE — ED PROVIDER NOTES
"Subjective   Patient presents to the ER with c/o altered mental status. She states about 0200 while doing some house cleaning she felt like her gait became \"off\". She was able to work through this and then lay down on the couch. When she woke up around 0730 she states she couldn't turn the TV to the channel she was wanting and \"felt a little off\". She states she tried to call her nephew to come check on her but had a hard time finding his number in the phone and was unable to call him. She states she just felt confused. She states she feels some better at this time although it is notes she does word search when speaking sometimes. She denies chest pain at this time, c/o epigastric pain but has been told in the past she has stomach ulcers.           Review of Systems   Constitutional: Positive for fatigue. Negative for chills and fever.   HENT: Negative.    Respiratory: Negative for cough and shortness of breath.    Cardiovascular: Negative for chest pain and palpitations.   Gastrointestinal: Positive for abdominal pain. Negative for diarrhea, nausea and vomiting.   Genitourinary: Negative.    Musculoskeletal: Negative.    Skin: Negative.    Neurological: Negative for dizziness, facial asymmetry, weakness, light-headedness, numbness and headaches.   Psychiatric/Behavioral: Negative.        Past Medical History:   Diagnosis Date   • Chronic systolic heart failure (HCC)    • Diabetes mellitus (HCC)    • Diabetic neuropathy (HCC)    • GERD (gastroesophageal reflux disease)    • Hypercholesterolemia    • Hypertension    • Low back pain    • Morbid obesity (HCC)    • Nonischemic cardiomyopathy (HCC)    • Osteoarthritis    • Sleep apnea    • Vitamin D deficiency        Allergies   Allergen Reactions   • Aldactone [Spironolactone] Unknown - Low Severity   • Nsaids        Past Surgical History:   Procedure Laterality Date   • CARDIAC CATHETERIZATION N/A 08/23/2018   • CARDIAC ELECTROPHYSIOLOGY PROCEDURE N/A 06/22/2020   • " "CARDIAC PACEMAKER PLACEMENT     • CATARACT EXTRACTION     • COLONOSCOPY N/A 06/14/2017   • PACEMAKER IMPLANTATION     • TOTAL ABDOMINAL HYSTERECTOMY WITH SALPINGO OOPHORECTOMY         Family History   Problem Relation Age of Onset   • Diabetes Sister    • Bone cancer Brother        Social History     Socioeconomic History   • Marital status: Single   Tobacco Use   • Smoking status: Former Smoker   • Smokeless tobacco: Never Used   Substance and Sexual Activity   • Alcohol use: No   • Drug use: No   • Sexual activity: Not Currently           Objective    /93 (BP Location: Left arm, Patient Position: Lying)   Pulse 93   Temp 98.2 °F (36.8 °C) (Infrared)   Resp 20   Ht 157.5 cm (62\")   Wt 93.9 kg (207 lb)   SpO2 92%   BMI 37.86 kg/m²     Physical Exam  Constitutional:       Appearance: She is not ill-appearing.   HENT:      Head: Normocephalic and atraumatic.      Right Ear: External ear normal.      Left Ear: External ear normal.   Eyes:      Extraocular Movements: Extraocular movements intact.      Conjunctiva/sclera: Conjunctivae normal.      Pupils: Pupils are equal, round, and reactive to light.   Cardiovascular:      Rate and Rhythm: Normal rate and regular rhythm.      Pulses: Normal pulses.      Heart sounds: Normal heart sounds.   Pulmonary:      Effort: Pulmonary effort is normal.      Breath sounds: Normal breath sounds.   Abdominal:      General: Bowel sounds are normal. There is no distension.      Palpations: Abdomen is soft.      Tenderness: There is abdominal tenderness (epigastric).   Musculoskeletal:         General: No tenderness. Normal range of motion.      Cervical back: Normal range of motion and neck supple.   Skin:     General: Skin is warm and dry.      Capillary Refill: Capillary refill takes less than 2 seconds.   Neurological:      Mental Status: She is alert and oriented to person, place, and time.      Cranial Nerves: No cranial nerve deficit.      Sensory: No sensory " deficit.      Motor: No weakness.      Coordination: Coordination normal.      Gait: Gait normal.      Deep Tendon Reflexes: Reflexes normal.   Psychiatric:         Mood and Affect: Mood normal.         Behavior: Behavior normal.         Thought Content: Thought content normal.         Judgment: Judgment normal.         ECG 12 Lead      Date/Time: 5/2/2022 10:51 AM  Performed by: Kim Romo APRN  Authorized by: Kim Romo APRN   Interpreted by physician  Rhythm: paced  Rate: normal  BPM: 91          Results for orders placed or performed during the hospital encounter of 05/02/22   Comprehensive Metabolic Panel    Specimen: Blood   Result Value Ref Range    Glucose 228 (H) 65 - 99 mg/dL    BUN 13 8 - 23 mg/dL    Creatinine 0.89 0.57 - 1.00 mg/dL    Sodium 140 136 - 145 mmol/L    Potassium 3.6 3.5 - 5.2 mmol/L    Chloride 106 98 - 107 mmol/L    CO2 20.0 (L) 22.0 - 29.0 mmol/L    Calcium 9.5 8.6 - 10.5 mg/dL    Total Protein 6.3 6.0 - 8.5 g/dL    Albumin 3.90 3.50 - 5.20 g/dL    ALT (SGPT) 25 1 - 33 U/L    AST (SGOT) 21 1 - 32 U/L    Alkaline Phosphatase 102 39 - 117 U/L    Total Bilirubin 1.0 0.0 - 1.2 mg/dL    Globulin 2.4 gm/dL    A/G Ratio 1.6 g/dL    BUN/Creatinine Ratio 14.6 7.0 - 25.0    Anion Gap 14.0 5.0 - 15.0 mmol/L    eGFR 68.6 >60.0 mL/min/1.73   Urinalysis With Microscopic If Indicated (No Culture) - Urine, Clean Catch    Specimen: Urine, Clean Catch   Result Value Ref Range    Color, UA Dark Yellow Yellow, Straw, Dark Yellow, Lucie    Appearance, UA Cloudy (A) Clear    pH, UA 5.5 5.0 - 9.0    Specific Gravity, UA 1.028 1.003 - 1.030    Glucose,  mg/dL (Trace) (A) Negative    Ketones, UA Trace (A) Negative    Bilirubin, UA Small (1+) (A) Negative    Blood, UA Negative Negative    Protein, UA >=300 mg/dL (3+) (A) Negative    Leuk Esterase, UA Small (1+) (A) Negative    Nitrite, UA Negative Negative    Urobilinogen, UA 1.0 E.U./dL 0.2 - 1.0 E.U./dL   CBC Auto Differential     Specimen: Blood   Result Value Ref Range    WBC 11.89 (H) 3.40 - 10.80 10*3/mm3    RBC 4.20 3.77 - 5.28 10*6/mm3    Hemoglobin 12.0 12.0 - 15.9 g/dL    Hematocrit 36.6 34.0 - 46.6 %    MCV 87.1 79.0 - 97.0 fL    MCH 28.6 26.6 - 33.0 pg    MCHC 32.8 31.5 - 35.7 g/dL    RDW 16.4 (H) 12.3 - 15.4 %    RDW-SD 51.6 37.0 - 54.0 fl    MPV 9.8 6.0 - 12.0 fL    Platelets 292 140 - 450 10*3/mm3    Neutrophil % 66.0 42.7 - 76.0 %    Lymphocyte % 22.5 19.6 - 45.3 %    Monocyte % 9.9 5.0 - 12.0 %    Eosinophil % 1.0 0.3 - 6.2 %    Basophil % 0.3 0.0 - 1.5 %    Immature Grans % 0.3 0.0 - 0.5 %    Neutrophils, Absolute 7.84 (H) 1.70 - 7.00 10*3/mm3    Lymphocytes, Absolute 2.67 0.70 - 3.10 10*3/mm3    Monocytes, Absolute 1.18 (H) 0.10 - 0.90 10*3/mm3    Eosinophils, Absolute 0.12 0.00 - 0.40 10*3/mm3    Basophils, Absolute 0.04 0.00 - 0.20 10*3/mm3    Immature Grans, Absolute 0.04 0.00 - 0.05 10*3/mm3    nRBC 0.0 0.0 - 0.2 /100 WBC   Protime-INR    Specimen: Blood   Result Value Ref Range    Protime 14.9 11.1 - 15.3 Seconds    INR 1.19 0.80 - 1.20   aPTT    Specimen: Blood   Result Value Ref Range    PTT 25.8 20.0 - 40.3 seconds   Troponin    Specimen: Blood   Result Value Ref Range    Troponin T <0.010 0.000 - 0.030 ng/mL   BNP    Specimen: Blood   Result Value Ref Range    proBNP 964.1 (H) 0.0 - 900.0 pg/mL   Urinalysis, Microscopic Only - Urine, Clean Catch    Specimen: Urine, Clean Catch   Result Value Ref Range    RBC, UA None Seen None Seen /HPF    WBC, UA 6-12 (A) None Seen, 0-2, 3-5 /HPF    Bacteria, UA Trace (A) None Seen /HPF    Squamous Epithelial Cells, UA 0-2 None Seen, 0-2 /HPF    Hyaline Casts, UA 3-6 None Seen /LPF    Methodology Manual Light Microscopy    POC Glucose Once    Specimen: Blood   Result Value Ref Range    Glucose 204 (H) 70 - 130 mg/dL   Type & Screen    Specimen: Blood   Result Value Ref Range    ABO Type O     RH type Positive     Antibody Screen Negative     T&S Expiration Date 5/5/2022 11:59:59 PM     PREVIOUS HISTORY    Specimen: Blood   Result Value Ref Range    Previous History No record on File    ABO RH Specimen Verification    Specimen: Blood   Result Value Ref Range    ABO Type O     RH type Positive    Green Top (Gel)   Result Value Ref Range    Extra Tube Hold for add-ons.    Lavender Top   Result Value Ref Range    Extra Tube hold for add-on    Gold Top - SST   Result Value Ref Range    Extra Tube Hold for add-ons.    Light Blue Top   Result Value Ref Range    Extra Tube hold for add-on      CT Abdomen Pelvis Without Contrast    Result Date: 5/2/2022  Comparison:  None Indication:  Epigastric pain Technique:  CT of the abdomen and pelvis was performed without intravenous contrast.  Reformats are obtained. All CT scans at this location are performed using dose modulation techniques as appropriate to a performed exam including the following: automated exposure control; adjustment of the mA and/or kV according to patient size (this includes techniques or standardized protocols for targeted exams where dose is matched to indication / reason for exam; i.e. extremities or head); use of iterative reconstruction technique. Findings: Cardiomegaly. There are small pleural effusions. There are interstitial opacities in the lung bases which may reflect edema. The liver is normal size. There is nodular right lobe liver contour. No intrahepatic or extrahepatic biliary dilatation.  The gallbladder appears partly contracted. The spleen, pancreas, bilateral adrenal glands are normal size configuration. The kidneys are symmetric in size.  There is no hydronephrosis or obstructing calculus. There are subcentimeter nonobstructing right renal calculi. Scattered colonic diverticula. There is no intestinal dilatation or extraluminal air.  The appendix is normal caliber. The urinary bladder is under distended. Uterus is absent. The abdominal aorta is normal caliber.  Atherosclerotic calcification. Nonenlarged retroperitoneal  lymph nodes. Bony structures are intact. Curvature of the spine. Degenerative spine. Degenerative hips. Small fat-containing umbilical hernia.     Impression:  1. Cardiomegaly. Small pleural effusions. Lung basilar interstitial opacities which could reflect presence of mild interstitial edema. 2. No hydronephrosis or obstructing  tract calculus. Nonobstructing subcentimeter right renal calculi. 3. Urinary bladder is under distended with wall thickening. 4. Colonic diverticulosis without evidence of diverticulitis. Moderate retained stool within the colon. 5. Nonacute findings as above. Electronically signed by:  Denton Rhoades MD  5/2/2022 11:18 AM CDT Workstation: [id number]    XR Chest 1 View    Result Date: 5/2/2022  EXAM DESCRIPTION:  XR CHEST 1 VW CLINICAL HISTORY: 71 years  Female  Acute Stroke Protocol (onset < 12 hrs) COMPARISON: April 12, 2022 TECHNIQUE: One view-AP portable radiograph the chest FINDINGS: The lungs are well-expanded. No focal infiltrates are seen. There is no evidence of acute congestive heart failure. A bipolar pacing device is redemonstrated. The overall appearance of the chest is stable.     1. Stable appearance the chest without radiographic evidence of acute cardiopulmonary disease. Electronically signed by:  Carrie Kwan MD  5/2/2022 10:24 AM CDT Workstation: [id number]    CT Head Without Contrast Stroke Protocol    Result Date: 5/2/2022  CT head without IV contrast May 22, 2022 INDICATION: Acute cerebrovascular disease with neurologic symptoms TECHNIQUE: Spiral images obtained from foramen magnum through vertex without IV contrast. Sagittal, axial and coronal reformatted images generated retrospectively. This exam was performed according to our departmental dose-optimization program, which includes automated exposure control, adjustment of the mA and/or kV according to patient size and/or use of iterative reconstruction technique. FINDINGS: No mass effect, midline shift, hemorrhage,  hydrocephalus or extra-axial fluid collections. Atrophy with evidence of chronic small vessel white matter ischemic change. No significant focal or acute parenchymal pathology. Bilateral basal ganglia calcification. Gray-white differentiation largely maintained Visualized mastoids and sinuses grossly clear. No significant focal or acute bony abnormality.     Mild atrophy with evidence of chronic small vessel white matter ischemic change. No significant focal or acute intracranial pathology. Electronically signed by:  Palomo Mccray MD  5/2/2022 10:43 AM CDT Workstation: [id number]             ED Course  ED Course as of 05/02/22 1240   Mon May 02, 2022   1000 Neuro called for neuro evaluation.  [SH]   1007 MARYANNE Ortiz at bedside for neuro.  [SH]   1232 Hospitalist paged. [SH]   1233 Spoke with Dr. Wheeler who agrees to admission.  [SH]   1237 Spoke with Froilan who states patient may go to floor on tele.  [SH]      ED Course User Index  [SH] Kim Romo APRN                                                 [facility]    Final diagnoses:   Altered mental status, unspecified altered mental status type       ED Disposition  ED Disposition     ED Disposition   Decision to Admit    Condition   --    Comment   Level of Care: Telemetry [5]   Diagnosis: Altered mental status, unspecified altered mental status type [[id number]]   Admitting Physician: LOPEZ WHEELER [[id number]]   Attending Physician: LOPEZ WHEELER [[id number]]               No follow-up provider specified.       Medication List      No changes were made to your prescriptions during this visit.          Kim Romo APRN  05/02/22 [id number]

## 2022-05-02 NOTE — NURSING NOTE
Patient arrived to 4W by wheelchair from ER.  Able to ambulate with standby assistance from wheelchair to bed.  Explains she is admitted for taking too much medication for her hernia (note: she has ulcers).  This is not why she is admitted to my knowledge.  See ER notes please.  Other than situation, she is alert to self, place, and time.  Patient is somewhat hard of hearing so she may not have understood all of the questions even though I attempted to clarify them.  She is currently in the bed, relaxing, and watching television.

## 2022-05-02 NOTE — THERAPY EVALUATION
Patient Name: Lexi Wade  : 1950    MRN: [id number]                              Today's Date: 2022       Admit Date: 2022    Visit Dx:     ICD-10-CM ICD-9-CM   1. Altered mental status, unspecified altered mental status type  R41.82 780.97   2. 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)   • Mixed hyperlipidemia   • Menopausal syndrome   • Dyspnea   • Chest pain   • Morbid obesity (HCC)   • Precordial pain   • Acute respiratory failure with hypoxia (HCC)   • 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)   • Implantable cardioverter-defibrillator (ICD) generator end of life   • Multifocal pneumonia   • Acute on chronic systolic CHF (congestive heart failure) (HCC)   • Obesity (BMI 30-39.9)   • Shortness of breath   • Altered mental status     Past Medical History:   Diagnosis Date   • Chronic systolic heart failure (HCC)    • Diabetes mellitus (HCC)    • Diabetic neuropathy (HCC)    • GERD (gastroesophageal reflux disease)    • Hypercholesterolemia    • Hypertension    • Low back pain    • Morbid obesity (HCC)    • Nonischemic cardiomyopathy (HCC)    • Osteoarthritis    • Sleep apnea    • Vitamin D deficiency      Past Surgical History:   Procedure Laterality Date   • CARDIAC CATHETERIZATION N/A 2018   • CARDIAC ELECTROPHYSIOLOGY PROCEDURE N/A 2020   • CARDIAC PACEMAKER PLACEMENT     • CATARACT EXTRACTION     • COLONOSCOPY N/A 2017   • PACEMAKER IMPLANTATION     • TOTAL ABDOMINAL HYSTERECTOMY WITH SALPINGO OOPHORECTOMY        General Information     Row Name  05/02/22 1235          Physical Therapy Time and Intention    Document Type evaluation  -CZ     Mode of Treatment individual therapy;physical therapy  -CZ     Row Name 05/02/22 1235          General Information    Patient Profile Reviewed yes  -CZ     Prior Level of Function independent:;all household mobility  -CZ     Existing Precautions/Restrictions fall  -CZ     Row Name 05/02/22 1235          Living Environment    People in Home alone  -CZ     Row Name 05/02/22 1235          Home Main Entrance    Number of Stairs, Main Entrance none  -CZ     Row Name 05/02/22 1235          Stairs Within Home, Primary    Stairs, Within Home, Primary Ambulates with QC (which his here). Family checks on patient daily.  -CZ     Number of Stairs, Within Home, Primary none  -CZ     Row Name 05/02/22 1235          Cognition    Orientation Status (Cognition) oriented x 4  -CZ     Row Name 05/02/22 1235          Safety Issues, Functional Mobility    Impairments Affecting Function (Mobility) strength;endurance/activity tolerance;balance  -CZ           User Key  (r) = Recorded By, (t) = Taken By, (c) = Cosigned By    Initials Name Provider Type    CZ Frandy Lemos, PT Physical Therapist               Mobility     Row Name 05/02/22 1235          Bed Mobility    Bed Mobility supine-sit;sit-supine  -CZ     Supine-Sit [place] (Bed Mobility) supervision  -CZ     Sit-Supine [place] (Bed Mobility) supervision  -CZ     Assistive Device (Bed Mobility) head of bed elevated;bed rails  -CZ     Row Name 05/02/22 1235          Sit-Stand Transfer    Sit-Stand [place] (Transfers) contact guard  -CZ     Assistive Device (Sit-Stand Transfers) cane, quad  -CZ     Row Name 05/02/22 1235          Gait/Stairs (Locomotion)    [place] Level (Gait) contact guard  -CZ     Assistive Device (Gait) cane, quad  -CZ     Distance in Feet (Gait) 5'x1, foward/back.  -CZ     Comment, (Gait/Stairs) Slow neri, decreased foot clearance.  -CZ            User Key  (r) = Recorded By, (t) = Taken By, (c) = Cosigned By    Initials Name Provider Type    CZ Frandy Lemos, PT Physical Therapist               Obj/Interventions     Row Name 05/02/22 1235          Range of Motion Comprehensive    General Range of Motion bilateral lower extremity ROM WFL  -CZ     Row Name 05/02/22 1235          Strength Comprehensive (MMT)    Comment, General Manual Muscle Testing (MMT) Assessment BLEs: 3+/5 grossly.  Coordination is slightly decreased on L, muscle tone is normal, sensation normal.  -CZ     Row Name 05/02/22 1235          Sensory Assessment (Somatosensory)    Sensory Assessment (Somatosensory) LE sensation intact  -CZ           User Key  (r) = Recorded By, (t) = Taken By, (c) = Cosigned By    Initials Name Provider Type    CZ Frandy Lemos, PT Physical Therapist               Goals/Plan     Row Name 05/02/22 1235          Bed Mobility Goal 1 (PT)    Activity/Assistive Device (Bed Mobility Goal 1, PT) sit to supine/supine to sit  -CZ     [place] Level/Cues Needed (Bed Mobility Goal 1, PT) independent  -CZ     Time Frame (Bed Mobility Goal 1, PT) by discharge  -CZ     Strategies/Barriers (Bed Mobility Goal 1, PT) HOB flat, no bed rails.  -CZ     Progress/Outcomes (Bed Mobility Goal 1, PT) goal not met  -CZ     Row Name 05/02/22 1235          Transfer Goal 1 (PT)    Activity/Assistive Device (Transfer Goal 1, PT) sit-to-stand/stand-to-sit;bed-to-chair/chair-to-bed;cane, quad  -CZ     [place] Level/Cues Needed (Transfer Goal 1, PT) modified independence  -CZ     Time Frame (Transfer Goal 1, PT) by discharge  -CZ     Progress/Outcome (Transfer Goal 1, PT) goal not met  -CZ     Row Name 05/02/22 1235          Gait Training Goal 1 (PT)    Activity/Assistive Device (Gait Training Goal 1, PT) cane, quad  -CZ     [place] Level (Gait Training Goal 1, PT) modified independence  -CZ     Time Frame (Gait Training Goal 1, PT) by discharge  -CZ     Progress/Outcome  (Gait Training Goal 1, PT) goal not met  -CZ     Row Name 05/02/22 1235          Problem Specific Goal 1 (PT)    Problem Specific Goal 1 (PT) Score 24/28 on [place] fall risk assessment.  -CZ     Time Frame (Problem Specific Goal 1, PT) by discharge  -CZ     Progress/Outcome (Problem Specific Goal 1, PT) goal not met  -CZ     Row Name 05/02/22 1235          Therapy Assessment/Plan (PT)    Planned Therapy Interventions (PT) balance training;bed mobility training;gait training;patient/family education;transfer training;ROM (range of motion);strengthening;stretching  -           User Key  (r) = Recorded By, (t) = Taken By, (c) = Cosigned By    Initials Name Provider Type    CZ Frandy Lemos, PT Physical Therapist               Clinical Impression     Row Name 05/02/22 1235          Pain    Pretreatment Pain Rating 0/10 - no pain  -CZ     Posttreatment Pain Rating 0/10 - no pain  -CZ     Row Name 05/02/22 1235          Plan of Care Review    Plan of Care Reviewed With patient  -CZ     Outcome Evaluation Initial PT evaluation complete.  Patient is alert and cooperative, O&A x 4.  She requires SPV with bed mobility, CGA with transfers and gait, ambulating 5'x1, foward/back with her own QC, slow neri, decreased foot clearance.  LE strength is equal L to R, coordination is very slightly diminished on the L, muscle tone is normal, as is sensation. Modified [place], pre: 0, post:4.  Goals established to increase activity tolerance and lower fall risk, HHPT unlikely to be necessary, continue skilled I/P PT.  -     Row Name 05/02/22 1235          Therapy Assessment/Plan (PT)    Rehab Potential (PT) good, to achieve stated therapy goals  -     Criteria for Skilled Interventions Met (PT) yes;skilled treatment is necessary  -CZ     Therapy Frequency (PT) 5 times/wk  -CZ     Row Name 05/02/22 1235          Vital Signs    Pre Systolic BP Rehab 156  -CZ     Pre Treatment Diastolic BP 94  -CZ     Pretreatment Heart Rate  (beats/min) 89  -CZ     Pre SpO2 (%) 98  -CZ     O2 Delivery Pre Treatment room air  -CZ     Pre Patient Position Supine  -CZ     Row Name 05/02/22 1235          Positioning and Restraints    Pre-Treatment Position in bed  -CZ     Post Treatment Position bed  -CZ     In Bed supine;encouraged to call for assist;call light within reach  -CZ           User Key  (r) = Recorded By, (t) = Taken By, (c) = Cosigned By    Initials Name Provider Type    CZ Frandy Lemos, PT Physical Therapist               Outcome Measures     Row Name 05/02/22 1235          How much help from another person do you currently need...    Turning from your back to your side while in flat bed without using bedrails? 3  -CZ     Moving from lying on back to sitting on the side of a flat bed without bedrails? 3  -CZ     Moving to and from a bed to a chair (including a wheelchair)? 3  -CZ     Standing up from a chair using your arms (e.g., wheelchair, bedside chair)? 3  -CZ     Climbing 3-5 steps with a railing? 3  -CZ     To walk in hospital room? 3  -CZ     AM-PAC 6 Clicks Score (PT) 18  -CZ     Highest level of mobility 6 --> Walked 10 steps or more  -CZ     Row Name 05/02/22 1235 05/02/22 1103       Modified [place] Scale    Pre-Stroke Modified [place] Scale 0 - No Symptoms at all.  Pre: 0, post: 4.  -CZ 0 - No Symptoms at all.  -RJ    Row Name 05/02/22 1235          Functional Assessment    Outcome Measure Options AM-PAC 6 Clicks Basic Mobility (PT);[place]  -CZ           User Key  (r) = Recorded By, (t) = Taken By, (c) = Cosigned By    Initials Name Provider Type    Frandy Cosme, PT Physical Therapist    Froilan Simpson APRN Nurse Practitioner                             Physical Therapy Education                 Title: PT OT SLP Therapies (In Progress)     Topic: Physical Therapy (In Progress)     Point: Mobility training (Done)     Learning Progress Summary           Patient Acceptance, EANT,NR by MARK at 5/2/2022 [id number]    Comment:  Hand placement with transfers, use of QC, PT POC.                   Point: Home exercise program (Not Started)     Learner Progress:  Not documented in this visit.          Point: Body mechanics (Not Started)     Learner Progress:  Not documented in this visit.          Point: Precautions (Not Started)     Learner Progress:  Not documented in this visit.                      User Key     Initials Effective Dates Name Provider Type Discipline     06/16/21 -  Frandy Lemos, PT Physical Therapist PT              PT Recommendation and Plan  Planned Therapy Interventions (PT): balance training, bed mobility training, gait training, patient/family education, transfer training, ROM (range of motion), strengthening, stretching  Plan of Care Reviewed With: patient  Outcome Evaluation: Initial PT evaluation complete.  Patient is alert and cooperative, O&A x 4.  She requires SPV with bed mobility, CGA with transfers and gait, ambulating 5'x1, foward/back with her own QC, slow neri, decreased foot clearance.  LE strength is equal L to R, coordination is very slightly diminished on the L, muscle tone is normal, as is sensation. Modified [place], pre: 0, post:4.  Goals established to increase activity tolerance and lower fall risk, HHPT unlikely to be necessary, continue skilled I/P PT.     Time Calculation:    PT Charges     Row Name 05/02/22 1314             Time Calculation    Start Time 1235  -CZ      Stop Time 1313  -CZ      Time Calculation (min) 38 min  -CZ      PT Received On 05/02/22  -      PT Goal Re-Cert Due Date 05/15/22  -              Untimed Charges    PT Eval/Re-eval Minutes 38  -CZ              Total Minutes    Untimed Charges Total Minutes 38  -CZ       Total Minutes 38  -CZ            User Key  (r) = Recorded By, (t) = Taken By, (c) = Cosigned By    Initials Name Provider Type    CZ Frandy Lemos, PT Physical Therapist              Therapy Charges for Today     Code Description Service Date  Service Provider Modifiers Qty    [id number] HC PT EVAL MOD COMPLEXITY 3 5/2/2022 Frandy Lemos, PT GP 1          PT G-Codes  Outcome Measure Options: AM-PAC 6 Clicks Basic Mobility (PT), [place]  AM-PAC 6 Clicks Score (PT): 18    Frandy Lemos, PT  5/2/2022

## 2022-05-02 NOTE — H&P
History and Physical  Justin Wheeler MD  Hospitalist    Date of admission: 5/2/2022    Patient Care Team:  Joyce Plata MD as PCP - General  Yamilka Wilson, RN as Ambulatory  ([facility])    Chief complaint   Chief Complaint   Patient presents with   • Shortness of Breath       Subjective     Patient is a 71 y.o. female admitted again for shortness of breath, orthopnea, leg edema, generalized weakness, all chronic symptoms that are getting worse lately. She claims to have been a little more sluggish and slower to move around earlier in the day and to have recovered by the time she arrived in the ER.    History  Past Medical History:   Diagnosis Date   • Chronic systolic heart failure (HCC)    • Diabetes mellitus (HCC)    • Diabetic neuropathy (HCC)    • GERD (gastroesophageal reflux disease)    • Hypercholesterolemia    • Hypertension    • Low back pain    • Morbid obesity (HCC)    • Nonischemic cardiomyopathy (HCC)    • Osteoarthritis    • Sleep apnea    • Vitamin D deficiency      Past Surgical History:   Procedure Laterality Date   • CARDIAC CATHETERIZATION N/A 08/23/2018   • CARDIAC ELECTROPHYSIOLOGY PROCEDURE N/A 06/22/2020   • CARDIAC PACEMAKER PLACEMENT     • CATARACT EXTRACTION     • COLONOSCOPY N/A 06/14/2017   • PACEMAKER IMPLANTATION     • TOTAL ABDOMINAL HYSTERECTOMY WITH SALPINGO OOPHORECTOMY       Family History   Problem Relation Age of Onset   • Diabetes Sister    • Bone cancer Brother      Social History     Tobacco Use   • Smoking status: Former Smoker   • Smokeless tobacco: Never Used   Substance Use Topics   • Alcohol use: No   • Drug use: No     Medications Prior to Admission   Medication Sig Dispense Refill Last Dose   • acetaminophen (TYLENOL) 325 MG tablet Take 2 tablets by mouth Every 4 (Four) Hours As Needed for Mild Pain .      • albuterol sulfate  (90 Base) MCG/ACT inhaler INHALE 2 PUFFS EVERY 4 (FOUR) HOURS AS NEEDED FOR WHEEZING OR SHORTNESS OF AIR. 18 g 1  "   • aspirin 81 MG EC tablet Take 81 mg by mouth Every Night.      • atorvastatin (LIPITOR) 40 MG tablet Take 1 tablet by mouth Daily. 90 tablet 3    • B-D UF III MINI PEN NEEDLES 31G X 5 MM misc USE WITH INSULIN INJECTIONS NIGHTLY 100 each 3    • Blood Glucose Monitoring Suppl (FreeStyle Lite) device 1 Device 3 (Three) Times a Day. 1 each 0    • carvedilol (COREG) 25 MG tablet Take 1 tablet by mouth 2 (Two) Times a Day With Meals. 180 tablet 3    • diclofenac (VOLTAREN) 1 % gel gel APPLY TO AFFECTED AREA 4 TIMES A DAY Oral Volteran DC'D) 100 g 3    • furosemide (LASIX) 40 MG tablet TAKE 1 TABLET BY MOUTH TWICE A DAY 60 tablet 1    • glucose blood (FREESTYLE LITE) test strip 1 each by Other route 3 (Three) Times a Day. Use as instructed 100 each 1    • guaiFENesin (MUCINEX) 600 MG 12 hr tablet Take 1 tablet by mouth Every 12 (Twelve) Hours. 60 tablet 1    • insulin detemir (Levemir FlexTouch) 100 UNIT/ML injection Inject 35 Units under the skin into the appropriate area as directed Every Night. 5 pen 5    • Insulin Syringe 31G X 5/16\" 0.5 ML misc Inject 1 each under the skin into the appropriate area as directed Every Night. 100 each 1    • ipratropium-albuterol (DUO-NEB) 0.5-2.5 mg/3 ml nebulizer Take 3 mL by nebulization 4 (Four) Times a Day. 360 mL 1    • isosorbide mononitrate (IMDUR) 30 MG 24 hr tablet Take 1 tablet by mouth Daily. 90 tablet 3    • Lancets (freestyle) lancets 1 each by Other route 3 (Three) Times a Day. Use as instructed 100 each 1    • lisinopril (PRINIVIL,ZESTRIL) 10 MG tablet TAKE 1 TABLET BY MOUTH EVERY DAY 90 tablet 3    • magnesium oxide (MAGOX) 400 (241.3 Mg) MG tablet tablet Take 1 tablet by mouth Daily. 90 each 3    • nitroglycerin (Nitrostat) 0.4 MG SL tablet Place 1 tablet under the tongue Every 5 (Five) Minutes As Needed for Chest Pain. Take no more than 3 doses in 15 minutes. 25 tablet 0    • pantoprazole (Protonix) 40 MG EC tablet Take 1 tablet by mouth Daily. 30 tablet 0    • " potassium chloride (MICRO-K) 10 MEQ CR capsule Take 1 capsule by mouth 2 (Two) Times a Day. 180 capsule 1    • sucralfate (CARAFATE) 1 g tablet Take 1 g by mouth 3 (Three) Times a Day.        Allergies:  Aldactone [spironolactone] and Nsaids    Review of Systems  Review of Systems   Constitutional: Positive for fatigue. Negative for fever.   Respiratory: Positive for shortness of breath. Negative for cough and wheezing.    Cardiovascular: Positive for leg swelling. Negative for chest pain and palpitations.   Gastrointestinal: Negative for abdominal distention, abdominal pain, blood in stool, constipation, diarrhea, nausea and vomiting.   Genitourinary: Negative for enuresis, frequency and urgency.   Musculoskeletal: Positive for arthralgias. Negative for back pain.   Skin: Positive for pallor. Negative for color change and rash.   Neurological: Positive for weakness. Negative for dizziness, tremors, syncope, numbness and headaches.   Psychiatric/Behavioral: Positive for decreased concentration. Negative for agitation, behavioral problems and confusion.   All other systems reviewed and are negative.      Objective     Vital Signs  Temp:  [96.8 °F (36 °C)-98.2 °F (36.8 °C)] 96.8 °F (36 °C)  Heart Rate:  [] 99  Resp:  [18-20] 18  BP: (157-180)/(84-93) 157/93    Physical Exam:  Physical Exam  Vitals reviewed.   Constitutional:       General: She is not in acute distress.     Appearance: She is obese. She is ill-appearing.   HENT:      Head: Normocephalic and atraumatic.   Eyes:      General: No scleral icterus.     Extraocular Movements: Extraocular movements intact.      Pupils: Pupils are equal, round, and reactive to light.   Cardiovascular:      Rate and Rhythm: Normal rate and regular rhythm.   Pulmonary:      Breath sounds: No stridor. Rales present. No wheezing or rhonchi.   Abdominal:      General: Bowel sounds are normal. There is no distension.      Palpations: Abdomen is soft. There is no mass.       Tenderness: There is no abdominal tenderness. There is no right CVA tenderness, left CVA tenderness or guarding.   Musculoskeletal:         General: No swelling, tenderness or deformity.      Cervical back: Normal range of motion and neck supple. No rigidity or tenderness.      Right lower leg: Edema present.      Left lower leg: Edema present.   Skin:     General: Skin is dry.      Coloration: Skin is not jaundiced or pale.      Findings: No bruising or lesion.   Neurological:      General: No focal deficit present.      Mental Status: She is alert and oriented to person, place, and time. Mental status is at baseline.      Cranial Nerves: No cranial nerve deficit.      Sensory: No sensory deficit.   Psychiatric:         Mood and Affect: Mood normal.         Behavior: Behavior normal.         Results Review:   Lab Results (last 24 hours)     Procedure Component Value Units Date/Time    POC Glucose Once [[id number]]  (Abnormal) Collected: 05/02/22 1641    Specimen: Blood Updated: 05/02/22 1653     Glucose 184 mg/dL      Comment: RN NotifiedOperator: [id number] CIVILS DAISHAMeter ID: [id number]       Extra Tubes [[id number]] Collected: 05/02/22 1047    Specimen: Blood, Venous Line Updated: 05/02/22 1504    Narrative:      The following orders were created for panel order Extra Tubes.  Procedure                               Abnormality         Status                     ---------                               -----------         ------                     Goncalves Top[[id number]]                                         Final result                 Please view results for these tests on the individual orders.    Goncalves Top [[id number]] Collected: 05/02/22 1047    Specimen: Blood Updated: 05/02/22 1504     Extra Tube Hold for add-ons.     Comment: Auto resulted.       COVID-19 and FLU A/B PCR - Swab, Nasopharynx [[id number]]  (Normal) Collected: 05/02/22 1245    Specimen: Swab from Nasopharynx Updated: 05/02/22 1336     COVID19  Not Detected     Influenza A PCR Not Detected     Influenza B PCR Not Detected    Narrative:      Fact sheet for providers: https://www.fda.gov/media/114578/download    Fact sheet for patients: https://www.fda.gov/media/309654/download    Test performed by PCR.    Hemoglobin A1c [[id number]]  (Abnormal) Collected: 05/02/22 1047    Specimen: Blood Updated: 05/02/22 1322     Hemoglobin A1C 8.20 %     Narrative:      Hemoglobin A1C Ranges:    Increased Risk for Diabetes  5.7% to 6.4%  Diabetes                     >= 6.5%  Diabetic Goal                < 7.0%    Lipid Panel [[id number]]  (Abnormal) Collected: 05/02/22 1047    Specimen: Blood Updated: 05/02/22 1322     Total Cholesterol 164 mg/dL      Triglycerides 112 mg/dL      HDL Cholesterol 41 mg/dL      LDL Cholesterol  103 mg/dL      VLDL Cholesterol 20 mg/dL      LDL/HDL Ratio 2.45    Narrative:      Cholesterol Reference Ranges  (U.S. Department of Health and Human Services ATP III Classifications)    Desirable          <200 mg/dL  Borderline High    200-239 mg/dL  High Risk          >240 mg/dL      Triglyceride Reference Ranges  (U.S. Department of Health and Human Services ATP III Classifications)    Normal           <150 mg/dL  Borderline High  150-199 mg/dL  High             200-499 mg/dL  Very High        >500 mg/dL    HDL Reference Ranges  (U.S. Department of Health and Human Services ATP III Classifications)    Low     <40 mg/dl (major risk factor for CHD)  High    >60 mg/dl ('negative' risk factor for CHD)        LDL Reference Ranges  (U.S. Department of Health and Human Services ATP III Classifications)    Optimal          <100 mg/dL  Near Optimal     100-129 mg/dL  Borderline High  130-159 mg/dL  High             160-189 mg/dL  Very High        >189 mg/dL    Urinalysis, Microscopic Only - Urine, Clean Catch [[id number]]  (Abnormal) Collected: 05/02/22 1159    Specimen: Urine, Clean Catch Updated: 05/02/22 1220     RBC, UA None Seen /HPF      WBC, UA 6-12  /HPF      Bacteria, UA Trace /HPF      Squamous Epithelial Cells, UA 0-2 /HPF      Hyaline Casts, UA 3-6 /LPF      Methodology Manual Light Microscopy    Urinalysis With Microscopic If Indicated (No Culture) - Urine, Clean Catch [[id number]]  (Abnormal) Collected: 05/02/22 1159    Specimen: Urine, Clean Catch Updated: 05/02/22 1208     Color, UA Dark Yellow     Appearance, UA Cloudy     pH, UA 5.5     Specific Gravity, UA 1.028     Glucose,  mg/dL (Trace)     Ketones, UA Trace     Bilirubin, UA Small (1+)     Blood, UA Negative     Protein, UA >=300 mg/dL (3+)     Leuk Esterase, UA Small (1+)     Nitrite, UA Negative     Urobilinogen, UA 1.0 E.U./dL    Braman Draw [[id number]] Collected: 05/02/22 1047    Specimen: Blood Updated: 05/02/22 1204    Narrative:      The following orders were created for panel order Braman Draw.  Procedure                               Abnormality         Status                     ---------                               -----------         ------                     Green Top (Gel)[[id number]]                                  Final result               Lavender Top[[id number]]                                     Final result               Gold Top - SST[[id number]]                                   Final result               Light Blue Top[[id number]]                                   Final result                 Please view results for these tests on the individual orders.    Lavender Top [[id number]] Collected: 05/02/22 1047    Specimen: Blood Updated: 05/02/22 1204     Extra Tube hold for add-on     Comment: Auto resulted       Gold Top - SST [[id number]] Collected: 05/02/22 1047    Specimen: Blood Updated: 05/02/22 1204     Extra Tube Hold for add-ons.     Comment: Auto resulted.       Light Blue Top [[id number]] Collected: 05/02/22 1047    Specimen: Blood Updated: 05/02/22 1204     Extra Tube hold for add-on     Comment: Auto resulted       Green Top (Gel) [[id number]] Collected:  05/02/22 1047    Specimen: Blood Updated: 05/02/22 1204     Extra Tube Hold for add-ons.     Comment: Auto resulted.       Comprehensive Metabolic Panel [[id number]]  (Abnormal) Collected: 05/02/22 1047    Specimen: Blood Updated: 05/02/22 1117     Glucose 228 mg/dL      BUN 13 mg/dL      Creatinine 0.89 mg/dL      Sodium 140 mmol/L      Potassium 3.6 mmol/L      Chloride 106 mmol/L      CO2 20.0 mmol/L      Calcium 9.5 mg/dL      Total Protein 6.3 g/dL      Albumin 3.90 g/dL      ALT (SGPT) 25 U/L      AST (SGOT) 21 U/L      Alkaline Phosphatase 102 U/L      Total Bilirubin 1.0 mg/dL      Globulin 2.4 gm/dL      A/G Ratio 1.6 g/dL      BUN/Creatinine Ratio 14.6     Anion Gap 14.0 mmol/L      eGFR 68.6 mL/min/1.73      Comment: National Kidney Foundation and American Society of Nephrology (ASN) Task Force recommended calculation based on the Chronic Kidney Disease Epidemiology Collaboration (CKD-EPI) equation refit without adjustment for race.       Narrative:      GFR Normal >60  Chronic Kidney Disease <60  Kidney Failure <15      Troponin [[id number]]  (Normal) Collected: 05/02/22 1047    Specimen: Blood Updated: 05/02/22 1116     Troponin T <0.010 ng/mL     Narrative:      Troponin T Reference Range:  <= 0.03 ng/mL-   Negative for AMI  >0.03 ng/mL-     Abnormal for myocardial necrosis.  Clinicians would have to utilize clinical acumen, EKG, Troponin and serial changes to determine if it is an Acute Myocardial Infarction or myocardial injury due to an underlying chronic condition.       Results may be falsely decreased if patient taking Biotin.      BNP [[id number]]  (Abnormal) Collected: 05/02/22 1047    Specimen: Blood Updated: 05/02/22 1114     proBNP 964.1 pg/mL     Narrative:      Among patients with dyspnea, NT-proBNP is highly sensitive for the detection of acute congestive heart failure. In addition NT-proBNP of <300 pg/ml effectively rules out acute congestive heart failure with 99% negative predictive  value.    Results may be falsely decreased if patient taking Biotin.      aPTT [[id number]]  (Normal) Collected: 05/02/22 1047    Specimen: Blood Updated: 05/02/22 1113     PTT 25.8 seconds     Narrative:      The recommended Heparin therapeutic range is 68-97 seconds.    Protime-INR [[id number]]  (Normal) Collected: 05/02/22 1047    Specimen: Blood Updated: 05/02/22 1113     Protime 14.9 Seconds      INR 1.19    Narrative:      Therapeutic range for most indications is 2.0-3.0 INR,  or 2.5-3.5 for mechanical heart valves.    POC Glucose Once [[id number]]  (Abnormal) Collected: 05/02/22 0957    Specimen: Blood Updated: 05/02/22 1102     Glucose 204 mg/dL      Comment: Notify DoctorOperator: [id number] REGINA NGChey ID: [id number]       CBC & Differential [[id number]]  (Abnormal) Collected: 05/02/22 1047    Specimen: Blood Updated: 05/02/22 1057    Narrative:      The following orders were created for panel order CBC & Differential.  Procedure                               Abnormality         Status                     ---------                               -----------         ------                     CBC Auto Differential[[id number]]        Abnormal            Final result                 Please view results for these tests on the individual orders.    CBC Auto Differential [[id number]]  (Abnormal) Collected: 05/02/22 1047    Specimen: Blood Updated: 05/02/22 1057     WBC 11.89 10*3/mm3      RBC 4.20 10*6/mm3      Hemoglobin 12.0 g/dL      Hematocrit 36.6 %      MCV 87.1 fL      MCH 28.6 pg      MCHC 32.8 g/dL      RDW 16.4 %      RDW-SD 51.6 fl      MPV 9.8 fL      Platelets 292 10*3/mm3      Neutrophil % 66.0 %      Lymphocyte % 22.5 %      Monocyte % 9.9 %      Eosinophil % 1.0 %      Basophil % 0.3 %      Immature Grans % 0.3 %      Neutrophils, Absolute 7.84 10*3/mm3      Lymphocytes, Absolute 2.67 10*3/mm3      Monocytes, Absolute 1.18 10*3/mm3      Eosinophils, Absolute 0.12 10*3/mm3      Basophils,  Absolute 0.04 10*3/mm3      Immature Grans, Absolute 0.04 10*3/mm3      nRBC 0.0 /100 WBC           Imaging Results (Last 24 Hours)     Procedure Component Value Units Date/Time    CT Abdomen Pelvis Without Contrast [[id number]] Collected: 05/02/22 1026     Updated: 05/02/22 1120    Narrative:      Comparison:  None    Indication:  Epigastric pain    Technique:  CT of the abdomen and pelvis was performed without  intravenous contrast.  Reformats are obtained.    All CT scans at this location are performed using dose modulation  techniques as appropriate to a performed exam including the  following: automated exposure control; adjustment of the mA  and/or kV according to patient size (this includes techniques or  standardized protocols for targeted exams where dose is matched  to indication / reason for exam; i.e. extremities or head); use  of iterative reconstruction technique.    Findings: Cardiomegaly. There are small pleural effusions. There  are interstitial opacities in the lung bases which may reflect  edema.  The liver is normal size. There is nodular right lobe liver  contour. No intrahepatic or extrahepatic biliary dilatation.  The  gallbladder appears partly contracted.  The spleen, pancreas, bilateral adrenal glands are normal size  configuration.  The kidneys are symmetric in size.  There is no hydronephrosis or  obstructing calculus. There are subcentimeter nonobstructing  right renal calculi.  Scattered colonic diverticula. There is no intestinal dilatation  or extraluminal air.  The appendix is normal caliber.  The urinary bladder is under distended. Uterus is absent.  The abdominal aorta is normal caliber.  Atherosclerotic  calcification. Nonenlarged retroperitoneal lymph nodes.  Bony structures are intact. Curvature of the spine. Degenerative  spine. Degenerative hips.  Small fat-containing umbilical hernia.      Impression:      Impression:    1. Cardiomegaly. Small pleural effusions. Lung  basilar  interstitial opacities which could reflect presence of mild  interstitial edema.      2. No hydronephrosis or obstructing  tract calculus.  Nonobstructing subcentimeter right renal calculi.  3. Urinary bladder is under distended with wall thickening.      4. Colonic diverticulosis without evidence of diverticulitis.  Moderate retained stool within the colon.  5. Nonacute findings as above.          Electronically signed by:  Denton Rhoades MD  5/2/2022 11:18 AM CDT  Workstation: [id number]    CT Head Without Contrast Stroke Protocol [[id number]] Collected: 05/02/22 1023     Updated: 05/02/22 1045    Narrative:      CT head without IV contrast May 22, 2022    INDICATION: Acute cerebrovascular disease with neurologic  symptoms    TECHNIQUE: Spiral images obtained from foramen magnum through  vertex without IV contrast. Sagittal, axial and coronal  reformatted images generated retrospectively.    This exam was performed according to our departmental  dose-optimization program, which includes automated exposure  control, adjustment of the mA and/or kV according to patient size  and/or use of iterative reconstruction technique.      FINDINGS:  No mass effect, midline shift, hemorrhage, hydrocephalus or  extra-axial fluid collections.  Atrophy with evidence of chronic small vessel white matter  ischemic change.  No significant focal or acute parenchymal pathology.  Bilateral basal ganglia calcification.  Gray-white differentiation largely maintained  Visualized mastoids and sinuses grossly clear.  No significant focal or acute bony abnormality.      Impression:      Mild atrophy with evidence of chronic small vessel white matter  ischemic change.  No significant focal or acute intracranial pathology.    Electronically signed by:  Palomo Mccray MD  5/2/2022 10:43 AM  CDT Workstation: [id number]    XR Chest 1 View [[id number]] Collected: 05/02/22 0956     Updated: 05/02/22 1026    Narrative:      EXAM DESCRIPTION:     XR  CHEST 1 VW    CLINICAL HISTORY:     71 years  Female  Acute Stroke Protocol (onset < 12 hrs)    COMPARISON:     April 12, 2022    TECHNIQUE:     One view-AP portable radiograph the chest    FINDINGS:     The lungs are well-expanded. No focal infiltrates are seen. There  is no evidence of acute congestive heart failure. A bipolar  pacing device is redemonstrated. The overall appearance of the  chest is stable.      Impression:          1. Stable appearance the chest without radiographic evidence of  acute cardiopulmonary disease.        Electronically signed by:  Carrie Kwan MD  5/2/2022 10:24 AM  CDT Workstation: [id number]          Assessment/Plan       Drowsiness    Nonischemic cardiomyopathy (HCC)    Hypertension    Diabetes mellitus (HCC)    Morbid obesity (HCC)    Continue with supportive care, Oxygen if necessary, IV diuresis, nebulized treatments. Cover her with a regular Insulin sliding scale. A recent Echo shows a decreased LV EF of 35 to 40%.    I have utilized all available immediate resources to obtain, update, or review the patient's current medications.    I confirmed that the patient's Advance Care Plan is present, code status is documented, or surrogate decision maker is listed in the patient's medical record.      05/02/22  18:30 CDT

## 2022-05-02 NOTE — CONSULTS
"[facility]   Teleneurology Note    Patient Name: Lexi Wade  : 1950  MRN: [id number]  Primary Care Physician: Joyce Plata MD  Referring Site: [place]  Location of Neurologist: [place]    TIME STROKE TEAM CALLED: 10:00 CST     TIME PATIENT SEEN: 10:07 CST       Subjective   Teleneurology Initial Data     Arrival Date Telestroke Site: 22 Arrival Time Telestroke Site: 913   Neurologist Evaluation Date: 22 Neurologist Evaluation Time: 1007   Date Last Known Well: 22 Time Last Known Well: 0200     History     Chief Complaint: Confusion  HPI: Pt is a 71-yr-old right-handed -American female with known diagnosis of hypertension, hyperlipidemia, CAD, heart failure, and DM who presented this morning after an episode of altered mental status after waking up. She stated she was awake at 2:00 this morning and sweeping her floor and had a \"little stumble.\" Then she went to bed and woke up around 7:30 and could not think how to get in touch with her nephew to bring her to the hospital. Said she was feeling confused and not sure where to find his number and lasted about 15 minutes. She denies ever having an episodes such as this but stated that her sister had commented on her being \"out there\" at times. Upon exam, she is alert and oriented X4, strengths are equal 5/5, denies numbness, and visual field is intact.    Stroke Risk Factors/ Pertinent Data     Stroke risk factors: dyslipidemia, hypertension, coronary artery disease, diabetes  Anticoagulants prior to arrival: none  Antiplatelets prior to arrival: aspirin     Pre- Stroke Modified [place] Scale     Pre-Stroke Modified [place] Scale: 0 - No Symptoms at all.    NIH Stroke Scale     NIHSS Performed Date: 22 NIHSS Performed Time: 1000   Interval: baseline  1a. Level of Consciousness: 0-->Alert, keenly responsive  1b. LOC Questions: 0-->Answers both questions correctly  1c. LOC Commands: 0-->Performs both tasks " correctly  2. Best Gaze: 0-->Normal  3. Visual: 0-->No visual loss  4. Facial Palsy: 0-->Normal symmetrical movements  5a. Motor Arm, Left: 0-->No drift, limb holds 90 (or 45) degrees for full 10 secs  5b. Motor Arm, Right: 0-->No drift, limb holds 90 (or 45) degrees for full 10 secs  6a. Motor Leg, Left: 0-->No drift, leg holds 30 degree position for full 5 secs  6b. Motor Leg, Right: 0-->No drift, leg holds 30 degree position for full 5 secs  7. Limb Ataxia: 0-->Absent  8. Sensory: 0-->Normal, no sensory loss  9. Best Language: 0-->No aphasia, normal  10. Dysarthria: 0-->Normal  11. Extinction and Inattention (formerly Neglect): 0-->No abnormality  Total (NIH Stroke Scale): 0     Review of Systems     Review of Systems   HENT: Negative.    Eyes: Negative.    Respiratory: Negative.    Cardiovascular: Negative.    Gastrointestinal: Negative.    Genitourinary: Negative.    Musculoskeletal: Negative.    Skin: Negative.    Neurological: Negative.    Psychiatric/Behavioral: Negative.        Objective   Exam     Exam performed with the help of support staff from the referring site  Neurological Exam  Mental Status  Awake. Oriented to person, place, time and situation. Speech is normal. Language is fluent with no aphasia.    Cranial Nerves  CN II: Visual acuity is normal. Visual fields full to confrontation.  CN III, IV, VI: Extraocular movements intact bilaterally. Normal lids and orbits bilaterally. Pupils equal round and reactive to light bilaterally.  CN V: Facial sensation is normal.  CN VII: Full and symmetric facial movement.  CN IX, X: Palate elevates symmetrically. Normal gag reflex.  CN XI: Shoulder shrug strength is normal.  CN XII: Tongue midline without atrophy or fasciculations.    Motor  Normal muscle bulk throughout. No fasciculations present. Strength is 5/5 throughout all four extremities.    Sensory  Sensation is intact to light touch, pinprick, vibration and proprioception in all four  "extremities.    Reflexes  Not assessed.    Coordination    Finger-to-nose, rapid alternating movements and heel-to-shin normal bilaterally without dysmetria.    Gait  Normal casual, toe, heel and tandem gait.  Walks with cane..      Result Review    Results     Labs  Glucose: 205    Personal review of CNS imaging:(Official report by radiologist pending)  Imaging  CT Imaging Review: CT Imaging reviewed, NO acute infarct/ hemorrhage seen    Thrombolytic   Thrombolytics: tPA not given  Tissue Plasminogen Activator (tPA) Relative Exclusions for All Patients: Only minor non-disabling symptoms     Assessment/Plan   Assessment/ Plan     Assessment: Chief Complaint: Confusion  HPI: Pt is a 71-yr-old right-handed -American female with known diagnosis of hypertension, hyperlipidemia, CAD, heart failure, and DM who presented this morning after an episode of altered mental status after waking up. She stated she was awake at 2:00 this morning and sweeping her floor and had a \"little stumble.\" Then she went to bed and woke up around 7:30 and could not think how to get in touch with her nephew to bring her to the hospital. Said she was feeling confused and not sure where to find his number and lasted about 15 minutes. She denies ever having an episodes such as this but stated that her sister had commented on her being \"out there\" at times. Upon exam, she is alert and oriented X4, strengths are equal 5/5, denies numbness, and visual field is intact. States she feels close to baseline now.           Plan:  1. Brief period of confusion- Resolved, CT neg, will admit for observation and order an EEG. Pt is unable to get a MRI c/t her AICD.   2. Hypertension- Normal BP goals.  3. Hyperlipidemia- Will check lipid panel. Last LDL was 124, continue Lipitor 40 mg/day.  4. DM- Will check A1C. Last A1C was 7.  5. Activity- Up ad milton.  6. Diet- ADA heart-healthy diet.  Case was discussed with pt, Dr. Chris, ER physician, and nursing. " Thank you for the consult.         Disposition     Disposition: The patient will remain at the referring institution for further evaluation and management    Medical Decision Making  Medical Data Reviewed: Data reviewed including: clinical labs, radiology and/or medical tests, Independent review of CNS images, Obtaining/ reviewing old medical records    I spent 60 minutes caring for Lexi Wade  on this date of service. This time includes time spent by me in the following activities: reviewing tests, obtaining and/or reviewing a separately obtained history, performing a medically appropriate examination and/or evaluation, counseling and educating the patient/family/caregiver, ordering medications, tests, or procedures, referring and communicating with other health care professionals, documenting information in the medical record, independently interpreting results and communicating that information with the patient/family/caregiver and care coordination                         MARYNANE Ortiz

## 2022-05-02 NOTE — PLAN OF CARE
Goal Outcome Evaluation:  Plan of Care Reviewed With: patient           Outcome Evaluation: Initial PT evaluation complete.  Patient is alert and cooperative, O&A x 4.  She requires SPV with bed mobility, CGA with transfers and gait, ambulating 5'x1, foward/back with her own QC, slow neri, decreased foot clearance.  LE strength is equal L to R, coordination is very slightly diminished on the L, muscle tone is normal, as is sensation. Modified Andree, pre: 0, post:4.  Goals established to increase activity tolerance and lower fall risk, HHPT unlikely to be necessary, continue skilled I/P PT.

## 2022-05-03 NOTE — THERAPY TREATMENT NOTE
Acute Care - Physical Therapy Treatment Note  [facility]     Patient Name: Lexi Wade  : 1950  MRN: [id number]  Today's Date: 5/3/2022      Visit Dx:     ICD-10-CM ICD-9-CM   1. Altered mental status, unspecified altered mental status type  R41.82 780.97   2. Impaired functional mobility, balance, gait, and endurance  Z74.09 V49.89   3. 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)   • Mixed hyperlipidemia   • Menopausal syndrome   • Dyspnea   • Chest pain   • Morbid obesity (HCC)   • Precordial pain   • Acute respiratory failure with hypoxia (HCC)   • Coronary artery disease involving native heart with angina pectoris ([facility])   • Implantable cardioverter-defibrillator (ICD) generator end of life   • Multifocal pneumonia   • Acute on chronic systolic CHF (congestive heart failure) (HCC)   • Obesity (BMI 30-39.9)   • Shortness of breath   • Altered mental status   • Morbid obesity (HCC)   • Drowsiness     Past Medical History:   Diagnosis Date   • Chronic systolic heart failure (HCC)    • Diabetes mellitus (HCC)    • Diabetic neuropathy (HCC)    • GERD (gastroesophageal reflux disease)    • Hypercholesterolemia    • Hypertension    • Low back pain    • Morbid obesity (HCC)    • Nonischemic cardiomyopathy (HCC)    • Osteoarthritis    • Sleep apnea    • Vitamin D deficiency      Past Surgical History:   Procedure Laterality Date   • CARDIAC CATHETERIZATION N/A 2018   • CARDIAC ELECTROPHYSIOLOGY PROCEDURE N/A 2020   • CARDIAC PACEMAKER PLACEMENT     • CATARACT EXTRACTION     • COLONOSCOPY N/A  06/14/2017   • PACEMAKER IMPLANTATION     • TOTAL ABDOMINAL HYSTERECTOMY WITH SALPINGO OOPHORECTOMY       PT Assessment (last 12 hours)     PT Evaluation and Treatment     Row Name 05/03/22 1357          Physical Therapy Time and Intention    Subjective Information no complaints  -     Document Type therapy note (daily note)  -     Mode of Treatment individual therapy;physical therapy  -     Patient Effort good  -     Comment nsg agreeable to tx  -[facility] Name 05/03/22 1357          General Information    Patient Profile Reviewed yes  -     Existing Precautions/Restrictions fall  -     Row Name 05/03/22 1357          Pain    Pretreatment Pain Rating 0/10 - no pain  -     Posttreatment Pain Rating 0/10 - no pain  -[facility] Name 05/03/22 1357          Cognition    Orientation Status (Cognition) oriented x 4  -     Row Name 05/03/22 1357          Bed Mobility    Bed Mobility supine-sit  -     Supine-Sit [place] (Bed Mobility) supervision  -     Assistive Device (Bed Mobility) head of bed elevated;bed rails  -[facility] Name 05/03/22 1357          Transfers    Sit-Stand [place] (Transfers) contact guard  -     Stand-Sit [place] (Transfers) contact guard  -     Row Name 05/03/22 1357          Sit-Stand Transfer    Assistive Device (Sit-Stand Transfers) cane, quad  -     Row Name 05/03/22 1357          Stand-Sit Transfer    Assistive Device (Stand-Sit Transfers) cane, quad  -[facility] Name 05/03/22 1357          Gait/Stairs (Locomotion)    [place] Level (Gait) contact guard  -     Assistive Device (Gait) cane, quad  -     Distance in Feet (Gait) 30'  -     Deviations/Abnormal Patterns (Gait) neri decreased  -     Row Name 05/03/22 1357          Safety Issues, Functional Mobility    Impairments Affecting Function (Mobility) strength;endurance/activity tolerance;balance  -     Row Name 05/03/22 1357          Motor Skills    Therapeutic Exercise hip;knee;ankle   -[facility] Name 05/03/22 [facility]7          Hip (Therapeutic Exercise)    Hip (Therapeutic Exercise) AROM (active range of motion);isometric exercises  -     Hip AROM (Therapeutic Exercise) bilateral;flexion;sitting  -     Hip Isometrics (Therapeutic Exercise) bilateral;aDduction;sitting  -[facility] Name 05/03/22 1357          Knee (Therapeutic Exercise)    Knee (Therapeutic Exercise) AROM (active range of motion)  -     Knee AROM (Therapeutic Exercise) bilateral;LAQ (long arc quad);sitting  -[facility] Name 05/03/22 1357          Ankle (Therapeutic Exercise)    Ankle (Therapeutic Exercise) AROM (active range of motion)  -     Ankle AROM (Therapeutic Exercise) bilateral;dorsiflexion;plantarflexion;sitting  -[facility] Name 05/03/22 1357          Plan of Care Review    Plan of Care Reviewed With patient  -     Outcome Evaluation pt t/fers sup to sit w/ SBA, sit<>stand w/ CGA/SBA, pt ambulates ~30' w/ CGA and QC, pt w/ c/o's R LE feeling weak towards end of  ambulation, pt performs 2 x 10 reps of B LE seated ther ex  -[facility] Name 05/03/22 [facility]7          Vital Signs    Pre Systolic BP Rehab 103  -     Pre Treatment Diastolic BP 58  -     Pretreatment Heart Rate (beats/min) 72  -     Pre SpO2 (%) 96  -     O2 Delivery Pre Treatment room air  -     Pre Patient Position Supine  -     Intra Patient Position Standing  -JW     Post Patient Position Sitting  -JW     Row Name 05/03/22 1357          Positioning and Restraints    Pre-Treatment Position in bed  -     Post Treatment Position chair  -     In Chair reclined;call light within reach;encouraged to call for assist;exit alarm on  -[facility] Name 05/03/22 0044          Therapy Assessment/Plan (PT)    Rehab Potential (PT) good, to achieve stated therapy goals  -     Criteria for Skilled Interventions Met (PT) yes;skilled treatment is necessary  -     Therapy Frequency (PT) 5 times/wk  -[facility] Name 05/03/22 1357          Bed Mobility Goal  1 (PT)    Activity/Assistive Device (Bed Mobility Goal 1, PT) sit to supine/supine to sit  -     [place] Level/Cues Needed (Bed Mobility Goal 1, PT) independent  -JW     Time Frame (Bed Mobility Goal 1, PT) by discharge  -     Strategies/Barriers (Bed Mobility Goal 1, PT) HOB flat, no bed rails.  -     Progress/Outcomes (Bed Mobility Goal 1, PT) goal not met  -     Row Name 05/03/22 1358          Transfer Goal 1 (PT)    Activity/Assistive Device (Transfer Goal 1, PT) sit-to-stand/stand-to-sit;bed-to-chair/chair-to-bed;cane, quad  -JW     [place] Level/Cues Needed (Transfer Goal 1, PT) modified independence  -JW     Time Frame (Transfer Goal 1, PT) by discharge  -     Progress/Outcome (Transfer Goal 1, PT) goal not met  -     Row Name 05/03/22 1358          Gait Training Goal 1 (PT)    Activity/Assistive Device (Gait Training Goal 1, PT) cane, quad  -JW     [place] Level (Gait Training Goal 1, PT) modified independence  -JW     Time Frame (Gait Training Goal 1, PT) by discharge  -     Progress/Outcome (Gait Training Goal 1, PT) goal not met  -     Row Name 05/03/22 1358          Problem Specific Goal 1 (PT)    Problem Specific Goal 1 (PT) Score 24/28 on [place]etti fall risk assessment.  -     Time Frame (Problem Specific Goal 1, PT) by discharge  -     Progress/Outcome (Problem Specific Goal 1, PT) goal not met  -           User Key  (r) = Recorded By, (t) = Taken By, (c) = Cosigned By    Initials Name Provider Type    Lupe Bonilla, PTA Physical Therapist Assistant                Physical Therapy Education                 Title: PT OT SLP Therapies (In Progress)     Topic: Physical Therapy (In Progress)     Point: Mobility training (Done)     Learning Progress Summary           Patient Acceptance, E, VU,NR by CZ at 5/2/2022 1143    Comment: Hand placement with transfers, use of QC, PT POC.                   Point: Home exercise program (Not Started)     Learner Progress:   Not documented in this visit.          Point: Body mechanics (Not Started)     Learner Progress:  Not documented in this visit.          Point: Precautions (Not Started)     Learner Progress:  Not documented in this visit.                      User Key     Initials Effective Dates Name Provider Type Discipline    CZ 06/16/21 -  Frandy Lemos, PT Physical Therapist PT              PT Recommendation and Plan  Anticipated Discharge Disposition (PT): home  Therapy Frequency (PT): 5 times/wk  Plan of Care Reviewed With: patient  Outcome Evaluation: pt t/fers sup to sit w/ SBA, sit<>stand w/ CGA/SBA, pt ambulates ~30' w/ CGA and QC, pt w/ c/o's R LE feeling weak towards end of  ambulation, pt performs 2 x 10 reps of B LE seated ther ex   Outcome Measures     Row Name 05/03/22 0904             How much help from another is currently needed...    Putting on and taking off regular lower body clothing? 3  -RB      Bathing (including washing, rinsing, and drying) 2  -RB      Toileting (which includes using toilet bed pan or urinal) 3  -RB      Putting on and taking off regular upper body clothing 4  -RB      Taking care of personal grooming (such as brushing teeth) 4  -RB      Eating meals 4  -RB      AM-PAC 6 Clicks Score (OT) 20  -RB              Modified [place] Scale    Pre-Stroke Modified [place] Scale 0 - No Symptoms at all.  -RB              Functional Assessment    Outcome Measure Options AM-PAC 6 Clicks Daily Activity (OT)  -RB            User Key  (r) = Recorded By, (t) = Taken By, (c) = Cosigned By    Initials Name Provider Type    RB Tony Childress, OT Occupational Therapist                 Time Calculation:    PT Charges     Row Name 05/03/22 1357             Time Calculation    Start Time 1357  -JW      Stop Time 1427  -      Time Calculation (min) 30 min  -JW      PT Received On 05/03/22  -              Time Calculation- PT    Total Timed Code Minutes- PT 30 minute(s)  -            User Key  (r) = Recorded  By, (t) = Taken By, (c) = Cosigned By    Initials Name Provider Type    Lupe Bonilla, CLARA Physical Therapist Assistant              Therapy Charges for Today     Code Description Service Date Service Provider Modifiers Qty    37392773673 HC GAIT TRAINING EA 15 MIN 5/3/2022 Lupe Cottrell, CLARA GP 1    02702937419 HC PT THER PROC EA 15 MIN 5/3/2022 Lupe Cottrell, CLARA GP 1          PT G-Codes  Outcome Measure Options: AM-PAC 6 Clicks Daily Activity (OT)  AM-PAC 6 Clicks Score (PT): 18  AM-PAC 6 Clicks Score (OT): 20    Lupe Cottrell PTA  5/3/2022

## 2022-05-03 NOTE — PLAN OF CARE
Goal Outcome Evaluation:  Plan of Care Reviewed With: patient           Outcome Evaluation: OT vj on this date.  Pt was supervision for bed mobility.  She needed CGA for sit to stand to sit and toilet transfer.  Pt out of breath with fatigued after amb to/from bathroom.  Sup to CGA for donning socks.  Pt could benefit from OT services to increase safety and endurance for ADLs and functional mobility.  Rec cont use of C- pap or new sleep study when D/C from hospital.

## 2022-05-03 NOTE — PLAN OF CARE
Goal Outcome Evaluation:  Plan of Care Reviewed With: patient           Outcome Evaluation: pt t/fers sup to sit w/ SBA, sit<>stand w/ CGA/SBA, pt ambulates ~30' w/ CGA and QC, pt w/ c/o's R LE feeling weak towards end of  ambulation, pt performs 2 x 10 reps of B LE seated ther ex

## 2022-05-03 NOTE — PROGRESS NOTES
"Stroke Progress Note       Chief Complaint: Confusion    Subjective     HPI: Pt is a 71-yr-old right-handed -American female with known diagnosis of hypertension, hyperlipidemia, CAD, heart failure, and DM who presented after an episode of altered mental status after waking up. She stated she was awake at 2:00 yesterday morning and sweeping her floor and had a \"little stumble.\" Then she went to bed and woke up around 7:30 and could not think how to get in touch with her nephew to bring her to the hospital. Said she was feeling confused and not sure where to find his number and lasted about 15 minutes. She denies ever having an episodes such as this but stated that her sister had commented on her being \"out there\" at times. Upon exam, she is alert and oriented X4, strengths are equal 5/5, denies numbness, and visual field is intact. States she feels close to baseline.    Review of Systems   HENT: Negative.    Eyes: Negative.    Respiratory: Negative.    Cardiovascular: Negative.    Gastrointestinal: Negative.    Musculoskeletal: Negative.    Skin: Negative.    Hematological: Negative.    Psychiatric/Behavioral: Negative.         Objective      Temp:  [96.6 °F (35.9 °C)-97.4 °F (36.3 °C)] 97.2 °F (36.2 °C)  Heart Rate:  [80-99] 86  Resp:  [16-20] 18  BP: (124-180)/(67-93) 129/75    Neurological Exam  Mental Status  Awake, alert and oriented to person, place and time.Alert. Oriented to person, place, and time. Speech is normal. Language is fluent with no aphasia. Attention and concentration are normal.    Cranial Nerves  CN II: Visual acuity is normal. Visual fields full to confrontation.  CN III, IV, VI: Extraocular movements intact bilaterally. Normal lids and orbits bilaterally. Pupils equal round and reactive to light bilaterally.  CN V: Facial sensation is normal.  CN VII: Full and symmetric facial movement.  CN IX, X: Palate elevates symmetrically. Normal gag reflex.  CN XI: Shoulder shrug strength is " normal.  CN XII: Tongue midline without atrophy or fasciculations.    Motor  Normal muscle bulk throughout. No fasciculations present. Strength is 5/5 throughout all four extremities.    Sensory  Sensation is intact to light touch, pinprick, vibration and proprioception in all four extremities.    Reflexes  Not assessed.    Coordination    Finger-to-nose, rapid alternating movements and heel-to-shin normal bilaterally without dysmetria.    Gait    Not assessed, walks with cane at baseline.      Physical Exam  Vitals and nursing note reviewed.   Constitutional:       Appearance: Normal appearance.   HENT:      Head: Normocephalic and atraumatic.   Eyes:      General: Lids are normal.      Extraocular Movements: Extraocular movements intact.      Pupils: Pupils are equal, round, and reactive to light.   Cardiovascular:      Rate and Rhythm: Normal rate.   Pulmonary:      Effort: Pulmonary effort is normal.   Musculoskeletal:         General: Normal range of motion.      Cervical back: Normal range of motion.   Skin:     General: Skin is warm and dry.   Neurological:      Mental Status: She is alert and oriented to person, place, and time. Mental status is at baseline.      Coordination: Coordination is intact.      Deep Tendon Reflexes: Strength normal.   Psychiatric:         Mood and Affect: Mood normal.         Speech: Speech normal.         Results Review:    I reviewed the patient's new clinical results.    Lab Results (last 24 hours)     Procedure Component Value Units Date/Time    POC Glucose Once [[id number]]  (Normal) Collected: 05/03/22 0703    Specimen: Blood Updated: 05/03/22 0752     Glucose 108 mg/dL      Comment: RN NotifiedOperator: [id number] SANDOVAL CHRISTINAMeter ID: [id number]       Basic Metabolic Panel [[id number]]  (Abnormal) Collected: 05/03/22 0606    Specimen: Blood Updated: 05/03/22 0630     Glucose 107 mg/dL      BUN 13 mg/dL      Creatinine 0.91 mg/dL      Sodium 140 mmol/L      Potassium  3.1 mmol/L      Chloride 106 mmol/L      CO2 21.0 mmol/L      Calcium 9.2 mg/dL      BUN/Creatinine Ratio 14.3     Anion Gap 13.0 mmol/L      eGFR 66.8 mL/min/1.73      Comment: National Kidney Foundation and American Society of Nephrology (ASN) Task Force recommended calculation based on the Chronic Kidney Disease Epidemiology Collaboration (CKD-EPI) equation refit without adjustment for race.       Narrative:      GFR Normal >60  Chronic Kidney Disease <60  Kidney Failure <15      CBC & Differential [[id number]]  (Abnormal) Collected: 05/03/22 0606    Specimen: Blood Updated: 05/03/22 0614    Narrative:      The following orders were created for panel order CBC & Differential.  Procedure                               Abnormality         Status                     ---------                               -----------         ------                     CBC Auto Differential[[id number]]        Abnormal            Final result                 Please view results for these tests on the individual orders.    CBC Auto Differential [[id number]]  (Abnormal) Collected: 05/03/22 0606    Specimen: Blood Updated: 05/03/22 0614     WBC 10.43 10*3/mm3      RBC 3.88 10*6/mm3      Hemoglobin 10.8 g/dL      Hematocrit 33.6 %      MCV 86.6 fL      MCH 27.8 pg      MCHC 32.1 g/dL      RDW 16.3 %      RDW-SD 52.4 fl      MPV 9.5 fL      Platelets 258 10*3/mm3      Neutrophil % 56.6 %      Lymphocyte % 28.5 %      Monocyte % 12.1 %      Eosinophil % 1.8 %      Basophil % 0.5 %      Immature Grans % 0.5 %      Neutrophils, Absolute 5.91 10*3/mm3      Lymphocytes, Absolute 2.97 10*3/mm3      Monocytes, Absolute 1.26 10*3/mm3      Eosinophils, Absolute 0.19 10*3/mm3      Basophils, Absolute 0.05 10*3/mm3      Immature Grans, Absolute 0.05 10*3/mm3      nRBC 0.0 /100 WBC     POC Glucose Once [[id number]]  (Abnormal) Collected: 05/02/22 1931    Specimen: Blood Updated: 05/02/22 1945     Glucose 153 mg/dL      Comment: RN NotifiedOperator:  [id number] [facility] ID: [id number]       POC Glucose Once [[id number]]  (Abnormal) Collected: 05/02/22 1641    Specimen: Blood Updated: 05/02/22 1653     Glucose 184 mg/dL      Comment: RN NotifiedOperator: [id number] [facility] Helen ID: [id number]       Extra Tubes [[id number]] Collected: 05/02/22 1047    Specimen: Blood, Venous Line Updated: 05/02/22 1504    Narrative:      The following orders were created for panel order Extra Tubes.  Procedure                               Abnormality         Status                     ---------                               -----------         ------                     [place] Top[[id number]]                                         Final result                 Please view results for these tests on the individual orders.    [place] Top [[id number]] Collected: 05/02/22 1047    Specimen: Blood Updated: 05/02/22 1504     Extra Tube Hold for add-ons.     Comment: Auto resulted.       COVID-19 and FLU A/B PCR - Swab, Nasopharynx [[id number]]  (Normal) Collected: 05/02/22 1245    Specimen: Swab from Nasopharynx Updated: 05/02/22 1336     COVID19 Not Detected     Influenza A PCR Not Detected     Influenza B PCR Not Detected    Narrative:      Fact sheet for providers: https://www.fda.gov/media/500005/download    Fact sheet for patients: https://www.fda.gov/media/042586/download    Test performed by PCR.    Hemoglobin A1c [[id number]]  (Abnormal) Collected: 05/02/22 1047    Specimen: Blood Updated: 05/02/22 1322     Hemoglobin A1C 8.20 %     Narrative:      Hemoglobin A1C Ranges:    Increased Risk for Diabetes  5.7% to 6.4%  Diabetes                     >= 6.5%  Diabetic Goal                < 7.0%    Lipid Panel [[id number]]  (Abnormal) Collected: 05/02/22 1047    Specimen: Blood Updated: 05/02/22 1322     Total Cholesterol 164 mg/dL      Triglycerides 112 mg/dL      HDL Cholesterol 41 mg/dL      LDL Cholesterol  103 mg/dL      VLDL Cholesterol 20 mg/dL      LDL/HDL Ratio 2.45     Narrative:      Cholesterol Reference Ranges  (U.S. Department of Health and Human Services ATP III Classifications)    Desirable          <200 mg/dL  Borderline High    200-239 mg/dL  High Risk          >240 mg/dL      Triglyceride Reference Ranges  (U.S. Department of Health and Human Services ATP III Classifications)    Normal           <150 mg/dL  Borderline High  150-199 mg/dL  High             200-499 mg/dL  Very High        >500 mg/dL    HDL Reference Ranges  (U.S. Department of Health and Human Services ATP III Classifications)    Low     <40 mg/dl (major risk factor for CHD)  High    >60 mg/dl ('negative' risk factor for CHD)        LDL Reference Ranges  (U.S. Department of Health and Human Services ATP III Classifications)    Optimal          <100 mg/dL  Near Optimal     100-129 mg/dL  Borderline High  130-159 mg/dL  High             160-189 mg/dL  Very High        >189 mg/dL    Urinalysis, Microscopic Only - Urine, Clean Catch [[id number]]  (Abnormal) Collected: 05/02/22 1159    Specimen: Urine, Clean Catch Updated: 05/02/22 1220     RBC, UA None Seen /HPF      WBC, UA 6-12 /HPF      Bacteria, UA Trace /HPF      Squamous Epithelial Cells, UA 0-2 /HPF      Hyaline Casts, UA 3-6 /LPF      Methodology Manual Light Microscopy    Urinalysis With Microscopic If Indicated (No Culture) - Urine, Clean Catch [[id number]]  (Abnormal) Collected: 05/02/22 1159    Specimen: Urine, Clean Catch Updated: 05/02/22 1208     Color, UA Dark Yellow     Appearance, UA Cloudy     pH, UA 5.5     Specific Gravity, UA 1.028     Glucose,  mg/dL (Trace)     Ketones, UA Trace     Bilirubin, UA Small (1+)     Blood, UA Negative     Protein, UA >=300 mg/dL (3+)     Leuk Esterase, UA Small (1+)     Nitrite, UA Negative     Urobilinogen, UA 1.0 E.U./dL    [place] Draw [[id number]] Collected: 05/02/22 1047    Specimen: Blood Updated: 05/02/22 1204    Narrative:      The following orders were created for panel order [place]  Draw.  Procedure                               Abnormality         Status                     ---------                               -----------         ------                     Green Top (Gel)[[id number]]                                  Final result               Lavender Top[[id number]]                                     Final result               Gold Top - SST[[id number]]                                   Final result               Light Blue Top[[id number]]                                   Final result                 Please view results for these tests on the individual orders.    Lavender Top [[id number]] Collected: 05/02/22 1047    Specimen: Blood Updated: 05/02/22 1204     Extra Tube hold for add-on     Comment: Auto resulted       Gold Top - SST [[id number]] Collected: 05/02/22 1047    Specimen: Blood Updated: 05/02/22 1204     Extra Tube Hold for add-ons.     Comment: Auto resulted.       Light Blue Top [[id number]] Collected: 05/02/22 1047    Specimen: Blood Updated: 05/02/22 1204     Extra Tube hold for add-on     Comment: Auto resulted       Green Top (Gel) [[id number]] Collected: 05/02/22 1047    Specimen: Blood Updated: 05/02/22 1204     Extra Tube Hold for add-ons.     Comment: Auto resulted.       Comprehensive Metabolic Panel [[id number]]  (Abnormal) Collected: 05/02/22 1047    Specimen: Blood Updated: 05/02/22 1117     Glucose 228 mg/dL      BUN 13 mg/dL      Creatinine 0.89 mg/dL      Sodium 140 mmol/L      Potassium 3.6 mmol/L      Chloride 106 mmol/L      CO2 20.0 mmol/L      Calcium 9.5 mg/dL      Total Protein 6.3 g/dL      Albumin 3.90 g/dL      ALT (SGPT) 25 U/L      AST (SGOT) 21 U/L      Alkaline Phosphatase 102 U/L      Total Bilirubin 1.0 mg/dL      Globulin 2.4 gm/dL      A/G Ratio 1.6 g/dL      BUN/Creatinine Ratio 14.6     Anion Gap 14.0 mmol/L      eGFR 68.6 mL/min/1.73      Comment: National Kidney Foundation and American Society of Nephrology (ASN) Task Force recommended calculation  based on the Chronic Kidney Disease Epidemiology Collaboration (CKD-EPI) equation refit without adjustment for race.       Narrative:      GFR Normal >60  Chronic Kidney Disease <60  Kidney Failure <15      Troponin [[id number]]  (Normal) Collected: 05/02/22 1047    Specimen: Blood Updated: 05/02/22 1116     Troponin T <0.010 ng/mL     Narrative:      Troponin T Reference Range:  <= 0.03 ng/mL-   Negative for AMI  >0.03 ng/mL-     Abnormal for myocardial necrosis.  Clinicians would have to utilize clinical acumen, EKG, Troponin and serial changes to determine if it is an Acute Myocardial Infarction or myocardial injury due to an underlying chronic condition.       Results may be falsely decreased if patient taking Biotin.      BNP [[id number]]  (Abnormal) Collected: 05/02/22 1047    Specimen: Blood Updated: 05/02/22 1114     proBNP 964.1 pg/mL     Narrative:      Among patients with dyspnea, NT-proBNP is highly sensitive for the detection of acute congestive heart failure. In addition NT-proBNP of <300 pg/ml effectively rules out acute congestive heart failure with 99% negative predictive value.    Results may be falsely decreased if patient taking Biotin.      aPTT [[id number]]  (Normal) Collected: 05/02/22 1047    Specimen: Blood Updated: 05/02/22 1113     PTT 25.8 seconds     Narrative:      The recommended Heparin therapeutic range is 68-97 seconds.    Protime-INR [[id number]]  (Normal) Collected: 05/02/22 1047    Specimen: Blood Updated: 05/02/22 1113     Protime 14.9 Seconds      INR 1.19    Narrative:      Therapeutic range for most indications is 2.0-3.0 INR,  or 2.5-3.5 for mechanical heart valves.    POC Glucose Once [[id number]]  (Abnormal) Collected: 05/02/22 0957    Specimen: Blood Updated: 05/02/22 1102     Glucose 204 mg/dL      Comment: Notify DoctorOperator: [id number] REGINA NGChey ID: [id number]       CBC & Differential [[id number]]  (Abnormal) Collected: 05/02/22 1047    Specimen: Blood  Updated: 05/02/22 1057    Narrative:      The following orders were created for panel order CBC & Differential.  Procedure                               Abnormality         Status                     ---------                               -----------         ------                     CBC Auto Differential[[id number]]        Abnormal            Final result                 Please view results for these tests on the individual orders.    CBC Auto Differential [[id number]]  (Abnormal) Collected: 05/02/22 1047    Specimen: Blood Updated: 05/02/22 1057     WBC 11.89 10*3/mm3      RBC 4.20 10*6/mm3      Hemoglobin 12.0 g/dL      Hematocrit 36.6 %      MCV 87.1 fL      MCH 28.6 pg      MCHC 32.8 g/dL      RDW 16.4 %      RDW-SD 51.6 fl      MPV 9.8 fL      Platelets 292 10*3/mm3      Neutrophil % 66.0 %      Lymphocyte % 22.5 %      Monocyte % 9.9 %      Eosinophil % 1.0 %      Basophil % 0.3 %      Immature Grans % 0.3 %      Neutrophils, Absolute 7.84 10*3/mm3      Lymphocytes, Absolute 2.67 10*3/mm3      Monocytes, Absolute 1.18 10*3/mm3      Eosinophils, Absolute 0.12 10*3/mm3      Basophils, Absolute 0.04 10*3/mm3      Immature Grans, Absolute 0.04 10*3/mm3      nRBC 0.0 /100 WBC         EEG    Result Date: 5/3/2022  Diffuse cerebral dysfunction of mild degree, nonspecific This report is transcribed using the Dragon dictation system.      CT Abdomen Pelvis Without Contrast    Result Date: 5/2/2022  Impression:  1. Cardiomegaly. Small pleural effusions. Lung basilar interstitial opacities which could reflect presence of mild interstitial edema. 2. No hydronephrosis or obstructing  tract calculus. Nonobstructing subcentimeter right renal calculi. 3. Urinary bladder is under distended with wall thickening. 4. Colonic diverticulosis without evidence of diverticulitis. Moderate retained stool within the colon. 5. Nonacute findings as above. Electronically signed by:  Denton Rhoades MD  5/2/2022 11:18 AM CDT Workstation:  [id number]    XR Chest 1 View    Result Date: 5/2/2022  1. Stable appearance the chest without radiographic evidence of acute cardiopulmonary disease. Electronically signed by:  Carrie Kwan MD  5/2/2022 10:24 AM CDT Workstation: [id number]    CT Head Without Contrast Stroke Protocol    Result Date: 5/2/2022  Mild atrophy with evidence of chronic small vessel white matter ischemic change. No significant focal or acute intracranial pathology. Electronically signed by:  Palomo Mccray MD  5/2/2022 10:43 AM CDT Workstation: [id number]    Results for orders placed during the hospital encounter of 02/18/22    Adult Transthoracic Echo Complete W/ Cont if Necessary Per Protocol    Interpretation Summary  · There is mild, bileaflet mitral valve thickening present.  · The left ventricular cavity is mildly dilated.  · Mild to moderate mitral valve stenosis is present.  · The right atrial cavity is mildly dilated.  · Estimated right ventricular systolic pressure from tricuspid regurgitation is mildly elevated (35-45 mmHg).  · Left ventricular ejection fraction appears to be 36 - 40%.  · Left ventricular diastolic function was normal.  · The following left ventricular wall segments are hypokinetic: mid anterior, apical anterior, basal anterolateral, mid anterolateral, apical lateral, basal inferolateral, mid inferolateral, apical inferior, mid inferior, apical septal, basal inferoseptal, mid inferoseptal, apex hypokinetic, mid anteroseptal, basal anterior, basal inferior and basal inferoseptal.        Assessment/Plan     Assessment/Plan: Pt is a 71-yr-old right-handed -American female with known diagnosis of hypertension, hyperlipidemia, CAD, heart failure, and DM who presented after an episode of altered mental status after waking up. She stated she was awake at 2:00 yesterday morning and sweeping her floor and had a \"little stumble.\" Then she went to bed and woke up around 7:30 and could not think how to get in touch with " "her nephew to bring her to the hospital. Said she was feeling confused and not sure where to find his number and lasted about 15 minutes. She denies ever having an episodes such as this but stated that her sister had commented on her being \"out there\" at times. Upon exam, she is alert and oriented X4, strengths are equal 5/5, denies numbness, and visual field is intact. States she feels close to baseline.  1. Brief period of confusion- Resolved, CT neg, EEG shows No focal features or epileptiform activity, Pt is unable to get a MRI d/t her AICD. Will get a repeat CT today. If neg, no other stroke work-up is needed. She has a UTI which could likely have caused her episode of confusion.   2. UTI- Hospitalist managing antibiotic therapy.   3. Hypertension- Normal BP goals.  4. Hyperlipidemia- LDL is 103, continue Lipitor 40 mg/day.  5. DM- A1C is 8.2.   6. Activity- Up ad milton.  7. Diet- ADA heart-healthy diet.  Case was discussed with pt, Dr. Chris, Hospitalist team, and nursing.  Addendum: Repeat CT neg, neurology will sign off.        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)   • Mixed hyperlipidemia   • Menopausal syndrome   • Dyspnea   • Chest pain   • Morbid obesity ([facility])   • Precordial pain   • Acute respiratory failure with hypoxia ([facility])   • Coronary artery disease involving native heart with angina pectoris ([facility])   • Implantable cardioverter-defibrillator (ICD) generator end of life   • Multifocal pneumonia   • Acute on chronic systolic CHF (congestive heart failure) ([facility])   • Obesity (BMI 30-39.9)   • Shortness of breath   • Altered mental status   • Morbid obesity (HCC)   • Drowsiness "           Froilan Kim, APRN  05/03/22  10:31 CDT        I spent 45 minutes caring for Lexi Wade  on this date of service. This time includes time spent by me in the following activities: preparing for the visit, reviewing tests, obtaining and/or reviewing a separately obtained history, performing a medically appropriate examination and/or evaluation, counseling and educating the patient/family/caregiver, ordering medications, tests, or procedures, referring and communicating with other health care professionals, documenting information in the medical record, independently interpreting results and communicating that information with the patient/family/caregiver and care coordination

## 2022-05-03 NOTE — THERAPY EVALUATION
Acute Care - Occupational Therapy Initial Evaluation  [facility]     Patient Name: Lexi Wade  : 1950  MRN: [id number]  Today's Date: 5/3/2022     Date of Referral to OT: 22       Admit Date: 2022       ICD-10-CM ICD-9-CM   1. Altered mental status, unspecified altered mental status type  R41.82 780.97   2. Impaired functional mobility, balance, gait, and endurance  Z74.09 V49.89   3. 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)   • Mixed hyperlipidemia   • Menopausal syndrome   • Dyspnea   • Chest pain   • Morbid obesity (HCC)   • Precordial pain   • Acute respiratory failure with hypoxia ([facility])   • Coronary artery disease involving native heart with angina pectoris ([facility])   • Implantable cardioverter-defibrillator (ICD) generator end of life   • Multifocal pneumonia   • Acute on chronic systolic CHF (congestive heart failure) ([facility])   • Obesity (BMI 30-39.9)   • Shortness of breath   • Altered mental status   • Morbid obesity (HCC)   • Drowsiness     Past Medical History:   Diagnosis Date   • Chronic systolic heart failure (HCC)    • Diabetes mellitus (HCC)    • Diabetic neuropathy (HCC)    • GERD (gastroesophageal reflux disease)    • Hypercholesterolemia    • Hypertension    • Low back pain    • Morbid obesity (HCC)    • Nonischemic cardiomyopathy (HCC)    • Osteoarthritis    • Sleep apnea    • Vitamin D deficiency      Past Surgical History:   Procedure Laterality Date   • CARDIAC CATHETERIZATION N/A 2018   • CARDIAC ELECTROPHYSIOLOGY PROCEDURE N/A 2020   • CARDIAC PACEMAKER  PLACEMENT     • CATARACT EXTRACTION     • COLONOSCOPY N/A 06/14/2017   • PACEMAKER IMPLANTATION     • TOTAL ABDOMINAL HYSTERECTOMY WITH SALPINGO OOPHORECTOMY           OT ASSESSMENT FLOWSHEET (last 12 hours)     OT Evaluation and Treatment     Row Name 05/03/22 0904                   OT Time and Intention    Subjective Information no complaints  -RB        Document Type evaluation  -RB        Mode of Treatment individual therapy;occupational therapy  -RB        Patient Effort good  -RB                  General Information    Patient Profile Reviewed yes  -RB        Prior Level of Function gait;transfer;ADL's;all household mobility;home management;independent:  -RB        Existing Precautions/Restrictions fall  -RB                  Living Environment    Current Living Arrangements apartment  -RB        Home Accessibility wheelchair accessible  -RB        People in Home alone  -RB                  Home Main Entrance    Number of Stairs, Main Entrance none  -RB                  Home Use of Assistive/Adaptive Equipment    Equipment Currently Used at Home cane, quad  -RB                  Pain Assessment    Pretreatment Pain Rating 0/10 - no pain  -RB        Posttreatment Pain Rating 0/10 - no pain  -RB                  Cognition    Orientation Status (Cognition) oriented x 4  -RB                  Range of Motion Comprehensive    General Range of Motion bilateral upper extremity ROM WNL;no range of motion deficits identified  -RB                  Strength Comprehensive (MMT)    General Manual Muscle Testing (MMT) Assessment other (see comments)  -RB        Comment, General Manual Muscle Testing (MMT) Assessment B UE strength was 4+/5 grossly  -RB                  Activities of Daily Living    BADL Assessment/Intervention lower body dressing  -RB                  Lower Body Dressing Assessment/Training    [place] Level (Lower Body Dressing) lower body dressing skills;don;socks;supervision  -RB        Position (Lower  Body Dressing) edge of bed sitting  -RB                  Bed Mobility    Bed Mobility supine-sit;sit-supine  -RB        Supine-Sit [place] (Bed Mobility) supervision  -RB        Sit-Supine [place] (Bed Mobility) supervision  -RB        Assistive Device (Bed Mobility) head of bed elevated;bed rails  -RB                  Functional Mobility    Functional Mobility- Ind. Level contact guard assist  -RB        Functional Mobility- Device walker, front-wheeled  -RB        Functional Mobility-Distance (Feet) 30  -RB        Functional Mobility- Safety Issues balance decreased during turns;step length decreased;sequencing ability decreased  -RB                  Transfer Assessment/Treatment    Transfers sit-stand transfer;stand-sit transfer;toilet transfer  -RB                  Transfers    Sit-Stand [place] (Transfers) contact guard  -RB        Stand-Sit [place] (Transfers) contact guard  -RB        [place] Level (Toilet Transfer) contact guard  -RB        Assistive Device (Toilet Transfer) grab bars/safety frame;raised toilet seat;walker, front-wheeled  -RB                  Sit-Stand Transfer    Assistive Device (Sit-Stand Transfers) walker, front-wheeled  -RB                  Stand-Sit Transfer    Assistive Device (Stand-Sit Transfers) walker, front-wheeled  -RB                  Toilet Transfer    Type (Toilet Transfer) sit-stand;stand-sit  -RB                  Safety Issues, Functional Mobility    Safety Issues Affecting Function (Mobility) awareness of need for assistance;insight into deficits/self-awareness  -RB        Impairments Affecting Function (Mobility) strength;endurance/activity tolerance;balance  -RB                  Plan of Care Review    Plan of Care Reviewed With patient  -RB                  Vital Signs    Pre Systolic BP Rehab 128  -RB        Pre Treatment Diastolic BP 61  -RB        Post Systolic BP Rehab 128  -RB        Post Treatment Diastolic BP 75  -RB        Pretreatment  Heart Rate (beats/min) 85  -RB        Posttreatment Heart Rate (beats/min) 97  -RB        Pre SpO2 (%) 96  -RB        O2 Delivery Pre Treatment room air  -RB        Intra SpO2 (%) 91  -RB        O2 Delivery Intra Treatment room air  -RB        Post SpO2 (%) 97  -RB        O2 Delivery Post Treatment room air  -RB        Pre Patient Position Supine  -RB        Intra Patient Position Standing  -RB        Post Patient Position Supine  -RB                  Positioning and Restraints    Pre-Treatment Position in bed  -RB        Post Treatment Position bed  -RB        In Bed supine;call light within reach;encouraged to call for assist;exit alarm on  -RB                  Therapy Assessment/Plan (OT)    Date of Referral to OT 05/02/22  -RB        Functional Level at Time of Evaluation (OT) Imp mob and ADLs.  -RB        OT Diagnosis Imp mob and ADLs.  -RB        Rehab Potential (OT) good, to achieve stated therapy goals  -RB        Criteria for Skilled Therapeutic Interventions Met (OT) yes;meets criteria  -RB        Therapy Frequency (OT) other (see comments)  3-7 days/wk  -RB        Predicted Duration of Therapy Intervention (OT) Until D/C or goals met.  -RB        Problem List (OT) problems related to;balance;cognition;coordination;mobility;motor control;strength;inability to direct their own care  -RB        Activity Limitations Related to Problem List (OT) unable to ambulate safely;unable to transfer safely;BADLs not performed adequately or safely;IADLs not performed adequately or safely  -RB        Planned Therapy Interventions (OT) activity tolerance training;cognitive/visual perception retraining;adaptive equipment training;BADL retraining;functional balance retraining;occupation/activity based interventions;patient/caregiver education/training;ROM/therapeutic exercise;strengthening exercise;transfer/mobility retraining  -RB                  OT Goals    Transfer Goal Selection (OT) transfer, OT goal 1  -RB         Bathing Goal Selection (OT) bathing, OT goal 1  -RB        Dressing Goal Selection (OT) dressing, OT goal 1  -RB        Toileting Goal Selection (OT) toileting, OT goal 1  -RB                  Transfer Goal 1 (OT)    Activity/Assistive Device (Transfer Goal 1, OT) transfers, all  -RB        [place] Level/Cues Needed (Transfer Goal 1, OT) standby assist  -RB        Time Frame (Transfer Goal 1, OT) long term goal (LTG)  -RB        Progress/Outcome (Transfer Goal 1, OT) goal not met  -RB                  Bathing Goal 1 (OT)    Activity/Device (Bathing Goal 1, OT) bathing skills, all  -RB        [place] Level/Cues Needed (Bathing Goal 1, OT) supervision required  -RB        Time Frame (Bathing Goal 1, OT) long term goal (LTG)  -RB        Progress/Outcomes (Bathing Goal 1, OT) goal not met  -RB                  Dressing Goal 1 (OT)    Activity/Device (Dressing Goal 1, OT) dressing skills, all  -RB        [place]/Cues Needed (Dressing Goal 1, OT) modified independence  -RB        Time Frame (Dressing Goal 1, OT) long term goal (LTG)  -RB        Progress/Outcome (Dressing Goal 1, OT) goal not met  -RB                  Toileting Goal 1 (OT)    Activity/Device (Toileting Goal 1, OT) toileting skills, all  -RB        [place] Level/Cues Needed (Toileting Goal 1, OT) modified independence  -RB        Time Frame (Toileting Goal 1, OT) long term goal (LTG)  -RB        Progress/Outcome (Toileting Goal 1, OT) goal not met  -RB              User Key  (r) = Recorded By, (t) = Taken By, (c) = Cosigned By    Initials Name Effective Dates    RB Tony Childress OT 06/16/21 -                  Occupational Therapy Education                 Title: PT OT SLP Therapies (In Progress)     Topic: Occupational Therapy (In Progress)     Point: ADL training (Not Started)     Description:   Instruct learner(s) on proper safety adaptation and remediation techniques during self care or transfers.   Instruct in proper use of  assistive devices.              Learner Progress:  Not documented in this visit.          Point: Home exercise program (Not Started)     Description:   Instruct learner(s) on appropriate technique for monitoring, assisting and/or progressing therapeutic exercises/activities.              Learner Progress:  Not documented in this visit.          Point: Precautions (Done)     Description:   Instruct learner(s) on prescribed precautions during self-care and functional transfers.              Learning Progress Summary           Patient Acceptance, E, VU by RB at 5/3/2022 8918    Comment: Edu pt on use of gait belt and non skid socks when OOB and no OOB without assist.                   Point: Body mechanics (Not Started)     Description:   Instruct learner(s) on proper positioning and spine alignment during self-care, functional mobility activities and/or exercises.              Learner Progress:  Not documented in this visit.                      User Key     Initials Effective Dates Name Provider Type Discipline    ALEJANDRO 06/16/21 -  Tony Childress, OT Occupational Therapist OT                  OT Recommendation and Plan  Planned Therapy Interventions (OT): activity tolerance training, cognitive/visual perception retraining, adaptive equipment training, BADL retraining, functional balance retraining, occupation/activity based interventions, patient/caregiver education/training, ROM/therapeutic exercise, strengthening exercise, transfer/mobility retraining  Therapy Frequency (OT): other (see comments) (3-7 days/wk)  Plan of Care Review  Plan of Care Reviewed With: patient  Outcome Evaluation: OT vj on this date.  Pt was supervision for bed mobility.  She needed CGA for sit to stand to sit and toilet transfer.  Pt out of breath with fatigued after amb to/from bathroom.  Sup to CGA for donning socks.  Pt could benefit from OT services to increase safety and endurance for ADLs and functional mobility.  Rec cont use of C- pap  or new sleep study when D/C from hospital.  Plan of Care Reviewed With: patient  Outcome Evaluation: OT vj on this date.  Pt was supervision for bed mobility.  She needed CGA for sit to stand to sit and toilet transfer.  Pt out of breath with fatigued after amb to/from bathroom.  Sup to CGA for donning socks.  Pt could benefit from OT services to increase safety and endurance for ADLs and functional mobility.  Rec cont use of C- pap or new sleep study when D/C from hospital.     Outcome Measures     Row Name 05/03/22 0904             How much help from another is currently needed...    Putting on and taking off regular lower body clothing? 3  -RB      Bathing (including washing, rinsing, and drying) 2  -RB      Toileting (which includes using toilet bed pan or urinal) 3  -RB      Putting on and taking off regular upper body clothing 4  -RB      Taking care of personal grooming (such as brushing teeth) 4  -RB      Eating meals 4  -RB      AM-PAC 6 Clicks Score (OT) 20  -RB              Modified Darke Scale    Pre-Stroke Modified Andree Scale 0 - No Symptoms at all.  -RB              Functional Assessment    Outcome Measure Options AM-PAC 6 Clicks Daily Activity (OT)  -RB            User Key  (r) = Recorded By, (t) = Taken By, (c) = Cosigned By    Initials Name Provider Type    Tony Kate OT Occupational Therapist                Time Calculation:    Time Calculation- OT     Row Name 05/03/22 1337             Time Calculation- OT    OT Start Time 0904  -RB      OT Stop Time 0945  -RB      OT Time Calculation (min) 41 min  -RB      OT Received On 05/03/22  -RB      OT Goal Re-Cert Due Date 05/16/22  -RB            User Key  (r) = Recorded By, (t) = Taken By, (c) = Cosigned By    Initials Name Provider Type    Tony Kate OT Occupational Therapist              Therapy Charges for Today     Code Description Service Date Service Provider Modifiers Qty    [id number]  OT VJ MOD COMPLEXITY 3 5/3/2022  Tony Childress, OT GO 1               Tony Childress, OT  5/3/2022

## 2022-05-03 NOTE — PLAN OF CARE
Goal Outcome Evaluation:  Plan of Care Reviewed With: patient        Progress: improving       VSS, no s/s distress, pleasant and cooperative. Has rested well this shfit.    Pt is a SBA with getting OOB for bathroom and btb. Gait slow, steady, occasionally needs CGA to get up from toilet. Fall precautions in place, no falls this shift.     No skin breakdown noted.    Pt is A/O x 4, no obvious unilateral deficits, GCS 15. Hoping for DC home soon.

## 2022-05-03 NOTE — PLAN OF CARE
Goal Outcome Evaluation:      VSS. Pt educated multiple times about call light and bed alarm use. Pt A&O for majority of shift with occasional forgetfulness and pt has turned off bed alarm this shift, on zone 2. 2nd CT completed and ABX started for UTI. No c/o pain or SOB. Pleasant and voiding without difficulty

## 2022-05-03 NOTE — OUTREACH NOTE
CHF Week 4 Survey    Flowsheet Row Responses   [facility] facility patient discharged from? [facility]   Does the patient have one of the following disease processes/diagnoses(primary or secondary)? CHF   Week 4 attempt successful? No   Revoke Readmitted          DEMETRICE KRAUS - Registered Nurse

## 2022-05-03 NOTE — PROGRESS NOTES
[facility] Medicine Services  INPATIENT PROGRESS NOTE    Length of Stay: 0  Date of Admission: 5/2/2022  Primary Care Physician: Joyce Plata MD    Subjective     No acute events overnight.  Mental status seems to have improved.    Review of Systems     All pertinent negatives and positives are as above. All other systems have been reviewed and are negative unless otherwise stated.     Objective    Temp:  [96.6 °F (35.9 °C)-97.4 °F (36.3 °C)] 97.2 °F (36.2 °C)  Heart Rate:  [80-99] 81  Resp:  [16-20] 18  BP: (124-180)/(67-93) 129/75    Physical Exam  Constitutional:       General: She is not in acute distress.  HENT:      Head: Normocephalic and atraumatic.   Eyes:      Extraocular Movements: Extraocular movements intact.   Cardiovascular:      Rate and Rhythm: Normal rate and regular rhythm.   Pulmonary:      Effort: Pulmonary effort is normal. No respiratory distress.      Breath sounds: No wheezing.   Abdominal:      General: Bowel sounds are normal. There is no distension.      Palpations: Abdomen is soft.   Musculoskeletal:         General: No swelling.      Cervical back: Normal range of motion.             Results Review:  I have reviewed the labs, radiology results, and diagnostic studies.    Laboratory Data:   Results from last 7 days   Lab Units 05/03/22  0606 05/02/22  1047   SODIUM mmol/L 140 140   POTASSIUM mmol/L 3.1* 3.6   CHLORIDE mmol/L 106 106   CO2 mmol/L 21.0* 20.0*   BUN mg/dL 13 13   CREATININE mg/dL 0.91 0.89   GLUCOSE mg/dL 107* 228*   CALCIUM mg/dL 9.2 9.5   BILIRUBIN mg/dL  --  1.0   ALK PHOS U/L  --  102   ALT (SGPT) U/L  --  25   AST (SGOT) U/L  --  21   ANION GAP mmol/L 13.0 14.0     Estimated Creatinine Clearance: 58.2 mL/min (by C-G formula based on SCr of 0.91 mg/dL).          Results from last 7 days   Lab Units 05/03/22  0606 05/02/22  1047   WBC 10*3/mm3 10.43 11.89*   HEMOGLOBIN g/dL 10.8* 12.0   HEMATOCRIT % 33.6* 36.6   PLATELETS  10*3/mm3 258 292     Results from last 7 days   Lab Units 05/02/22  1047   INR  1.19       Culture Data:   No results found for: BLOODCX  No results found for: URINECX  No results found for: RESPCX  No results found for: WOUNDCX  No results found for: STOOLCX  No components found for: BODYFLD    Radiology Data:   Imaging Results (Last 24 Hours)     ** No results found for the last 24 hours. **          I have reviewed the patient's current medications.     Assessment/Plan     Active Hospital Problems    Diagnosis    • **Drowsiness    • Morbid obesity (HCC)    • Nonischemic cardiomyopathy (HCC)    • Diabetes mellitus (HCC)    • Hypertension      Altered mental status. Improved.  CT scan of the brain was unremarkable.  EEG shows no focal features or epileptiform activity evaluated by neurology.  Unable to get MRI secondary to AICD.  We will repeat a CT scan.  Urinalysis with mild pyuria.  We will put her on antibiotics.    Abnormal UA.  Urinalysis with mild pyuria.  We will put her on antibiotics.    Congestive heart failure.  Reduced ejection fraction of 35 to 40%.  Currently on IV Lasix.  Continue carvedilol.    Hypokalemia.  Secondary diuretics.  Continue potassium chloride.    DVT prophylaxis.  We will put her on Lovenox.        @[email or web address]@    Discharge Planning: I expect patient to be discharged to home 1-2  days.    Brent Coulter MD

## 2022-05-04 NOTE — PLAN OF CARE
Goal Outcome Evaluation:      VSS. Pt A&O this shift, requested to have bed alarm turned off. Educated pt on precautions for pt's recent AMS, pt refused alarm. Pt is independent at home and has been standby assist while here this admission. Educated pt on Falls risk. No AMS this shift. Pt had visitor this shift. WBC elevated from yesterday ABX continued, no difficultly voiding. Notified Md this am of K+ of 2.9, potassium replacement protocol ordered and given, redraw at 2110. Hypoglycemic this am, Md notified, treated x2 and pt BS WDL, pt was asymptomatic.

## 2022-05-04 NOTE — PLAN OF CARE
Goal Outcome Evaluation:  Plan of Care Reviewed With: patient        Progress: improving  Outcome Evaluation: pt responded well to PT w/ increased gait to 65 ft x2 CGA w/ quad cane and handrail in feng at times. VSS throughout tx. pt declined further PT. no new goals met at this time. pt would continue to benefit from PT services.

## 2022-05-04 NOTE — PROGRESS NOTES
[facility] Medicine Services  INPATIENT PROGRESS NOTE    Length of Stay: 0  Date of Admission: 5/2/2022  Primary Care Physician: Joyce Plata MD    Subjective     Feels better today.  Breathing improved.    Review of Systems     All pertinent negatives and positives are as above. All other systems have been reviewed and are negative unless otherwise stated.     Objective    Temp:  [96.1 °F (35.6 °C)-97.2 °F (36.2 °C)] 97.1 °F (36.2 °C)  Heart Rate:  [62-81] 71  Resp:  [16-20] 16  BP: ()/(56-84) 107/62    Physical Exam  Constitutional:       General: She is not in acute distress.  HENT:      Head: Normocephalic and atraumatic.   Eyes:      Extraocular Movements: Extraocular movements intact.   Cardiovascular:      Rate and Rhythm: Normal rate and regular rhythm.   Pulmonary:      Effort: Pulmonary effort is normal. No respiratory distress.      Breath sounds: No wheezing.   Abdominal:      General: Bowel sounds are normal. There is no distension.      Palpations: Abdomen is soft.   Musculoskeletal:         General: No swelling.      Cervical back: Normal range of motion.       Results Review:  I have reviewed the labs, radiology results, and diagnostic studies.    Laboratory Data:   Results from last 7 days   Lab Units 05/04/22  0538 05/03/22  0606 05/02/22  1047   SODIUM mmol/L 141 140 140   POTASSIUM mmol/L 2.9* 3.1* 3.6   CHLORIDE mmol/L 105 106 106   CO2 mmol/L 21.0* 21.0* 20.0*   BUN mg/dL 13 13 13   CREATININE mg/dL 0.89 0.91 0.89   GLUCOSE mg/dL 53* 107* 228*   CALCIUM mg/dL 9.3 9.2 9.5   BILIRUBIN mg/dL  --   --  1.0   ALK PHOS U/L  --   --  102   ALT (SGPT) U/L  --   --  25   AST (SGOT) U/L  --   --  21   ANION GAP mmol/L 15.0 13.0 14.0     Estimated Creatinine Clearance: 59.9 mL/min (by C-G formula based on SCr of 0.89 mg/dL).  Results from last 7 days   Lab Units 05/04/22  0856   MAGNESIUM mg/dL 1.6         Results from last 7 days   Lab Units  05/04/22  0538 05/03/22  0606 05/02/22  1047   WBC 10*3/mm3 11.15* 10.43 11.89*   HEMOGLOBIN g/dL 11.0* 10.8* 12.0   HEMATOCRIT % 33.3* 33.6* 36.6   PLATELETS 10*3/mm3 288 258 292     Results from last 7 days   Lab Units 05/02/22  1047   INR  1.19       Culture Data:   No results found for: BLOODCX  No results found for: URINECX  No results found for: RESPCX  No results found for: WOUNDCX  No results found for: STOOLCX  No components found for: BODYFLD    Radiology Data:   Imaging Results (Last 24 Hours)     Procedure Component Value Units Date/Time    CT Head Without Contrast [[id number]] Collected: 05/03/22 1247     Updated: 05/03/22 1344    Narrative:        PROCEDURE: CT head without contrast    REASON FOR EXAM: Neuro deficit, acute, stroke suspected, R41.82  Altered mental status, unspecified Z74.09 Other reduced mobility    This exam was performed according to our departmental  dose-optimization program, which includes automated exposure  control, adjustment of the mA and/or kV according to patient size  and/or use of iterative reconstruction technique.    FINDINGS: Comparison study dated May 2, 2022. Axial computer  tomography sequential imaging of the head was performed from the  vertex to the base of the skull. .Sagittal and coronal  reformation was performed .    The skull vault is intact. Paranasal sinuses and bilateral  mastoid air cells are well aerated. Stable punctate bilateral  basal ganglia physiologic calcifications. Stable bilateral  frontal and parietal lobe periventricular deep white matter small  foci of hypodensity. Cerebral and cerebellar parenchymal are  otherwise normal. Ventricular system and subarachnoid spaces are  normal.      Impression:      1.  No acute intracranial abnormality.  2.  Stable bilateral frontal and parietal lobe periventricular  deep white matter small foci of chronic ischemic gliosis  secondary to microvascular disease.    Electronically signed by:  Sreedhar Otero MD   5/3/2022 1:41 PM CDT  Workstation: [id number]          I have reviewed the patient's current medications.     Assessment/Plan     Active Hospital Problems    Diagnosis    • **Drowsiness    • Morbid obesity (HCC)    • Nonischemic cardiomyopathy (HCC)    • Diabetes mellitus (HCC)    • Hypertension        Altered mental status. Improved.  CT scan of the brain was unremarkable.  EEG shows no focal features or epileptiform activity evaluated by neurology.  Unable to get MRI secondary to AICD.     Abnormal UA.  Urinalysis with mild pyuria.    Continue ceftriaxone     Congestive heart failure.  Reduced ejection fraction of 35 to 40%.  Currently on IV Lasix.  Continue carvedilol.  We will switch her to oral Lasix.     Hypokalemia.  Secondary diuretics.  Continue potassium chloride.     DVT prophylaxis.  Continue Lovenox.         The patient was evaluated during the global COVID-19 pandemic, and the diagnosis was suspected/considered upon their initial presentation.  Evaluation, treatment, and testing were consistent with current guidelines for patients who present with complaints or symptoms that may be related to COVID-19.    Discharge Planning: I expect patient to be discharged to home in 1 days.    Brent Coulter MD

## 2022-05-04 NOTE — THERAPY TREATMENT NOTE
Acute Care - Physical Therapy Treatment Note  [facility]     Patient Name: Lexi Wade  : 1950  MRN: [id number]  Today's Date: 2022      Visit Dx:     ICD-10-CM ICD-9-CM   1. Altered mental status, unspecified altered mental status type  R41.82 780.97   2. Impaired functional mobility, balance, gait, and endurance  Z74.09 V49.89   3. 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)   • Mixed hyperlipidemia   • Menopausal syndrome   • Dyspnea   • Chest pain   • Morbid obesity (HCC)   • Precordial pain   • Acute respiratory failure with hypoxia (HCC)   • 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)   • Implantable cardioverter-defibrillator (ICD) generator end of life   • Multifocal pneumonia   • Acute on chronic systolic CHF (congestive heart failure) (HCC)   • Obesity (BMI 30-39.9)   • Shortness of breath   • Altered mental status   • Morbid obesity (HCC)   • Drowsiness     Past Medical History:   Diagnosis Date   • Chronic systolic heart failure (HCC)    • Diabetes mellitus (HCC)    • Diabetic neuropathy (HCC)    • GERD (gastroesophageal reflux disease)    • Hypercholesterolemia    • Hypertension    • Low back pain    • Morbid obesity (HCC)    • Nonischemic cardiomyopathy (HCC)    • Osteoarthritis    • Sleep apnea    • Vitamin D deficiency      Past Surgical History:   Procedure Laterality Date   • CARDIAC CATHETERIZATION N/A 2018   • CARDIAC ELECTROPHYSIOLOGY PROCEDURE N/A 2020   • CARDIAC PACEMAKER PLACEMENT     • CATARACT EXTRACTION     • COLONOSCOPY N/A  06/14/2017   • PACEMAKER IMPLANTATION     • TOTAL ABDOMINAL HYSTERECTOMY WITH SALPINGO OOPHORECTOMY       PT Assessment (last 12 hours)     PT Evaluation and Treatment     [facility] Name 05/04/22 1020          Physical Therapy Time and Intention    Document Type therapy note (daily note)  -     Mode of Treatment individual therapy;physical therapy  -     Patient Effort good  -     Comment nsg approves PT. states that she has already began replacing potassium.  -[facility] Name 05/04/22 1020          General Information    Patient Profile Reviewed yes  -     Existing Precautions/Restrictions fall  -[facility] Name 05/04/22 1020          Pain    Pretreatment Pain Rating 0/10 - no pain  -     Posttreatment Pain Rating 0/10 - no pain  -     Pain Intervention(s) Repositioned  -[facility] Name 05/04/22 1020          Cognition    Affect/Mental Status (Cognition) WFL  -     Orientation Status (Cognition) oriented x 4  -     Personal Safety Interventions fall prevention program maintained;muscle strengthening facilitated;gait belt;nonskid shoes/slippers when out of bed;supervised activity  -[facility] Name 05/04/22 1020          Bed Mobility    Bed Mobility --  -     Supine-Sit [place] (Bed Mobility) --  -     Assistive Device (Bed Mobility) --  -[facility] Name 05/04/22 1020          Transfers    Transfers sit-stand transfer;stand-sit transfer  -     Sit-Stand Harper (Transfers) contact guard  -     Stand-Sit Harper (Transfers) contact guard  -[facility] Name 05/04/22 1020          Sit-Stand Transfer    Assistive Device (Sit-Stand Transfers) cane, quad  -[facility] Name 05/04/22 1020          Stand-Sit Transfer    Assistive Device (Stand-Sit Transfers) cane, quad  -[facility] Name 05/04/22 1020          Gait/Stairs (Locomotion)    Gait/Stairs Locomotion gait/ambulation independence;gait/ambulation assistive device;distance ambulated;gait pattern;gait deviations;maintains weight-bearing  status  -     [place] Level (Gait) contact guard  -     Assistive Device (Gait) cane, quad  pt also held to hand rail in halld appx 40% of the time.  -     Distance in Feet (Gait) 65 ft x2  -     Deviations/Abnormal Patterns (Gait) neri decreased  -     Bilateral Gait Deviations --  mild unsteadiness  -     Row Name 05/04/22 1020          Safety Issues, Functional Mobility    Impairments Affecting Function (Mobility) strength;endurance/activity tolerance;balance  -     Row Name 05/04/22 1020          Motor Skills    Therapeutic Exercise --  pt declines LE ther ex.  -     Row Name 05/04/22 1020          Vital Signs    Pre Systolic BP Rehab 123  -ANTIONETTE     Pre Treatment Diastolic BP 53  -ANTIONETTE     Post Systolic BP Rehab 111  -ANTIONETTE     Post Treatment Diastolic BP 71  -ANTIONETTE     Pretreatment Heart Rate (beats/min) 70  -ANTIONETTE     Intratreatment Heart Rate (beats/min) 88  -ANTIONETTE     Posttreatment Heart Rate (beats/min) 81  -ANTIONETTE     Pre SpO2 (%) 96  -ANTIONETTE     O2 Delivery Pre Treatment room air  -ANTIONETTE     Intra SpO2 (%) 99  -ANTIONETTE     O2 Delivery Intra Treatment room air  -ANTIONETTE     Post SpO2 (%) 99  -ANTIONETTE     O2 Delivery Post Treatment room air  -ANTIONETTE     Pre Patient Position Sitting  -     Post Patient Position Sitting  -[facility] Name 05/04/22 1020          Positioning and Restraints    Pre-Treatment Position sitting in chair/recliner  -ANTIONETTE     Post Treatment Position chair  -ANTIONETTE     In Chair reclined;call light within reach;encouraged to call for assist;exit alarm on  -     Row Name 05/04/22 1020          Therapy Assessment/Plan (PT)    Rehab Potential (PT) good, to achieve stated therapy goals  -     Criteria for Skilled Interventions Met (PT) yes;skilled treatment is necessary  -     Therapy Frequency (PT) 5 times/wk  -     Row Name 05/04/22 1020          Bed Mobility Goal 1 (PT)    Activity/Assistive Device (Bed Mobility Goal 1, PT) sit to supine/supine to sit  -     [place] Level/Cues Needed (Bed Mobility  Goal 1, PT) independent  -ANTIONETTE     Time Frame (Bed Mobility Goal 1, PT) by discharge  -ANTIONETTE     Strategies/Barriers (Bed Mobility Goal 1, PT) HOB flat, no bed rails.  -ANTIONETTE     Progress/Outcomes (Bed Mobility Goal 1, PT) goal not met  -ANTIONETTE     Row Name 05/04/22 1020          Transfer Goal 1 (PT)    Activity/Assistive Device (Transfer Goal 1, PT) sit-to-stand/stand-to-sit;bed-to-chair/chair-to-bed;cane, quad  -ANTIONETTE     [place] Level/Cues Needed (Transfer Goal 1, PT) modified independence  -ANTIONETTE     Time Frame (Transfer Goal 1, PT) by discharge  -ANTIONETTE     Progress/Outcome (Transfer Goal 1, PT) goal not met  -ANTIONETTE     Row Name 05/04/22 1020          Gait Training Goal 1 (PT)    Activity/Assistive Device (Gait Training Goal 1, PT) cane, quad  -ANTIONETTE     [place] Level (Gait Training Goal 1, PT) modified independence  -ANTIONETTE     Time Frame (Gait Training Goal 1, PT) by discharge  -ANTIONETTE     Progress/Outcome (Gait Training Goal 1, PT) goal not met  -ANTIONETTE     Row Name 05/04/22 1020          Problem Specific Goal 1 (PT)    Problem Specific Goal 1 (PT) Score 24/28 on Tinetti fall risk assessment.  -ANTIONETTE     Time Frame (Problem Specific Goal 1, PT) by discharge  -ANTIONETTE     Progress/Outcome (Problem Specific Goal 1, PT) goal not met  -ANTIONETTE           User Key  (r) = Recorded By, (t) = Taken By, (c) = Cosigned By    Initials Name Provider Type    Suleiman Villa, PTA Physical Therapist Assistant                Physical Therapy Education                 Title: PT OT SLP Therapies (In Progress)     Topic: Physical Therapy (In Progress)     Point: Mobility training (In Progress)     Learning Progress Summary           Patient Acceptance, E, NR by ANTIONETTE at 5/4/2022 1301    Acceptance, E, VU,NR by  at 5/2/2022 1309    Comment: Hand placement with transfers, use of QC, PT POC.                   Point: Home exercise program (Not Started)     Learner Progress:  Not documented in this visit.          Point: Body mechanics (Not Started)     Learner Progress:   Not documented in this visit.          Point: Precautions (Not Started)     Learner Progress:  Not documented in this visit.                      User Key     Initials Effective Dates Name Provider Type Discipline     06/16/21 -  Suleiman Christopher, PTA Physical Therapist Assistant PT    CZ 06/16/21 -  Frandy Lemos, PT Physical Therapist PT              PT Recommendation and Plan  Anticipated Discharge Disposition (PT): home  Therapy Frequency (PT): 5 times/wk  Plan of Care Reviewed With: patient  Progress: improving  Outcome Evaluation: pt responded well to PT w/ increased gait to 65 ft x2 CGA w/ quad cane and handrail in feng at times. VSS throughout tx. pt declined further PT. no new goals met at this time. pt would continue to benefit from PT services.   Outcome Measures     Row Name 05/04/22 1020 05/03/22 0904          How much help from another person do you currently need...    Turning from your back to your side while in flat bed without using bedrails? 3  -ANTIONETTE --     Moving from lying on back to sitting on the side of a flat bed without bedrails? 3  -ANTIONETTE --     Moving to and from a bed to a chair (including a wheelchair)? 3  -ANTIONETTE --     Standing up from a chair using your arms (e.g., wheelchair, bedside chair)? 3  -ANTIONETTE --     Climbing 3-5 steps with a railing? 3  -ANTIONETTE --     To walk in hospital room? 3  -ANTIONETTE --     AM-PAC 6 Clicks Score (PT) 18  -ANTIONETTE --            How much help from another is currently needed...    Putting on and taking off regular lower body clothing? -- 3  -RB     Bathing (including washing, rinsing, and drying) -- 2  -RB     Toileting (which includes using toilet bed pan or urinal) -- 3  -RB     Putting on and taking off regular upper body clothing -- 4  -RB     Taking care of personal grooming (such as brushing teeth) -- 4  -RB     Eating meals -- 4  -RB     AM-PAC 6 Clicks Score (OT) -- 20  -RB            Modified [place] Scale    Pre-Stroke Modified [place] Scale -- 0 - No Symptoms at  all.  -RB            Functional Assessment    Outcome Measure Options AM-PAC 6 Clicks Basic Mobility (PT)  -ANTIONETTE AM-PAC 6 Clicks Daily Activity (OT)  -RB           User Key  (r) = Recorded By, (t) = Taken By, (c) = Cosigned By    Initials Name Provider Type    RB Tony Childress, OT Occupational Therapist    Suleiman Villa PTA Physical Therapist Assistant                 Time Calculation:    PT Charges     Row Name 05/04/22 1303             Time Calculation    Start Time 1020  -ANTIONETTE      Stop Time 1047  -ANTIONETTE      Time Calculation (min) 27 min  -ANTIONETTE              Time Calculation- PT    Total Timed Code Minutes- PT 27 minute(s)  -ANTIONETTE              Timed Charges    39260 - Gait Training Minutes  19  -ANTIONETTE      24505 - PT Therapeutic Activity Minutes 8  -ANTIONETTE              Total Minutes    Timed Charges Total Minutes 27  -ANTIONETTE       Total Minutes 27  -ANTIONETTE            User Key  (r) = Recorded By, (t) = Taken By, (c) = Cosigned By    Initials Name Provider Type    Slueiman Villa PTA Physical Therapist Assistant              Therapy Charges for Today     Code Description Service Date Service Provider Modifiers Qty    05430955957 HC GAIT TRAINING EA 15 MIN 5/4/2022 Suleiman Christopher PTA GP 1    08154445101 HC PT THERAPEUTIC ACT EA 15 MIN 5/4/2022 Suleiman Christopher PTA GP 1          PT G-Codes  Outcome Measure Options: AM-PAC 6 Clicks Basic Mobility (PT)  AM-PAC 6 Clicks Score (PT): 18  AM-PAC 6 Clicks Score (OT): 20    Suleiman Christopher PTA  5/4/2022

## 2022-05-04 NOTE — PLAN OF CARE
Goal Outcome Evaluation:  Plan of Care Reviewed With: patient        Progress: improving  Outcome Evaluation: Pt up in recliner upon entry. Pt tolerated BUE ther ex in all planes w/ 2lb HW and good tolerance w/ RB's PRN. Sit-stand-SBA, pt then amb ~10' to BR SBA w/ RW. Toilet t/f-SBA. [employer]-Ind. Grooming-SBA.  Cont OT POC

## 2022-05-04 NOTE — PLAN OF CARE
Goal Outcome Evaluation:  Plan of Care Reviewed With: patient        Progress: improving       A/O x 4, forgetful and requires reminders at times. VSS, no s/s distress.    Fall precautions in place. Pt does not call for assistance, have reminded and educated pt several times. No falls this shift.     No skin breakdown noted.     Denies pain.    Tolerating po without nausea, voiding well.     Hoping for DC today.

## 2022-05-04 NOTE — THERAPY TREATMENT NOTE
Patient Name: Lexi Wade  : 1950    MRN: [id number]                              Today's Date: 2022       Admit Date: 2022    Visit Dx:     ICD-10-CM ICD-9-CM   1. Altered mental status, unspecified altered mental status type  R41.82 780.97   2. Impaired functional mobility, balance, gait, and endurance  Z74.09 V49.89   3. 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)   • Mixed hyperlipidemia   • Menopausal syndrome   • Dyspnea   • Chest pain   • Morbid obesity (HCC)   • Precordial pain   • Acute respiratory failure with hypoxia (HCC)   • 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)   • Implantable cardioverter-defibrillator (ICD) generator end of life   • Multifocal pneumonia   • Acute on chronic systolic CHF (congestive heart failure) (HCC)   • Obesity (BMI 30-39.9)   • Shortness of breath   • Altered mental status   • Morbid obesity (HCC)   • Drowsiness     Past Medical History:   Diagnosis Date   • Chronic systolic heart failure (HCC)    • Diabetes mellitus (HCC)    • Diabetic neuropathy (HCC)    • GERD (gastroesophageal reflux disease)    • Hypercholesterolemia    • Hypertension    • Low back pain    • Morbid obesity (HCC)    • Nonischemic cardiomyopathy (HCC)    • Osteoarthritis    • Sleep apnea    • Vitamin D deficiency      Past Surgical History:   Procedure Laterality Date   • CARDIAC CATHETERIZATION N/A 2018   • CARDIAC ELECTROPHYSIOLOGY PROCEDURE N/A 2020   • CARDIAC PACEMAKER PLACEMENT     • CATARACT EXTRACTION     • COLONOSCOPY N/A 2017   •  PACEMAKER IMPLANTATION     • TOTAL ABDOMINAL HYSTERECTOMY WITH SALPINGO OOPHORECTOMY        General Information     Row Name 05/04/22 1402          OT Time and Intention    Document Type therapy note (daily note)  -     Mode of Treatment individual therapy;occupational therapy  -     Row Name 05/04/22 1402          General Information    Patient Profile Reviewed yes  -KD     Existing Precautions/Restrictions fall  -KD     Row Name 05/04/22 1402          Cognition    Orientation Status (Cognition) oriented x 4  -KD     Row Name 05/04/22 1402          Safety Issues, Functional Mobility    Impairments Affecting Function (Mobility) strength;endurance/activity tolerance;balance  -KD           User Key  (r) = Recorded By, (t) = Taken By, (c) = Cosigned By    Initials Name Provider Type     Prachi Carlos COTA Occupational Therapist Assistant                 Mobility/ADL's     Row Name 05/04/22 1402          Transfers    Sit-Stand [place] (Transfers) standby assist  -KD     Stand-Sit [place] (Transfers) standby assist  -KD     [place] Level (Toilet Transfer) standby assist  -KD     Assistive Device (Toilet Transfer) grab bars/safety frame;raised toilet seat;other (see comments)  Quad cane  -KD     Row Name 05/04/22 1402          Sit-Stand Transfer    Assistive Device (Sit-Stand Transfers) cane, quad  -KD     Row Name 05/04/22 1402          Stand-Sit Transfer    Assistive Device (Stand-Sit Transfers) cane, quad  -KD     Row Name 05/04/22 1402          Functional Mobility    Functional Mobility- Ind. Level supervision required  -KD     Functional Mobility- Device cane, quad  -KD     Functional Mobility-Distance (Feet) 10  -KD     Row Name 05/04/22 1402          Activities of Daily Living    BADL Assessment/Intervention toileting  -     Row Name 05/04/22 1402          Toileting Assessment/Training    [place] Level (Toileting) toileting skills;adjust/manage clothing;perform perineal  hygiene;independent  -     Position (Toileting) unsupported sitting  -           User Key  (r) = Recorded By, (t) = Taken By, (c) = Cosigned By    Initials Name Provider Type    Prachi Borges COTA Occupational Therapist Assistant               Obj/Interventions     Row Name 05/04/22 1402          Shoulder (Therapeutic Exercise)    Shoulder (Therapeutic Exercise) AROM (active range of motion)  -     Shoulder AROM (Therapeutic Exercise) bilateral;flexion;extension;aBduction;aDduction;external rotation;internal rotation  -     Row Name 05/04/22 1402          Elbow/Forearm (Therapeutic Exercise)    Elbow/Forearm (Therapeutic Exercise) AROM (active range of motion)  -     Elbow/Forearm AROM (Therapeutic Exercise) bilateral;flexion;extension;supination;pronation  -     Row Name 05/04/22 1402          Wrist (Therapeutic Exercise)    Wrist (Therapeutic Exercise) AROM (active range of motion)  -     Wrist AROM (Therapeutic Exercise) bilateral;flexion;extension  -     Row Name 05/04/22 1402          Hand (Therapeutic Exercise)    Hand (Therapeutic Exercise) AROM (active range of motion)  -     Hand AROM/AAROM (Therapeutic Exercise) bilateral;finger flexion;finger extension  -     Row Name 05/04/22 1402          Motor Skills    Therapeutic Exercise shoulder;elbow/forearm;wrist;hand  -           User Key  (r) = Recorded By, (t) = Taken By, (c) = Cosigned By    Initials Name Provider Type    Prachi Borges COTA Occupational Therapist Assistant               Goals/Plan     Row Name 05/04/22 1402          Transfer Goal 1 (OT)    Progress/Outcome (Transfer Goal 1, OT) goal met  -           User Key  (r) = Recorded By, (t) = Taken By, (c) = Cosigned By    Initials Name Provider Type    Prachi Borges COTA Occupational Therapist Assistant               Clinical Impression     Row Name 05/04/22 1402          Pain Assessment    Pretreatment Pain Rating 0/10 - no pain  -KD     Posttreatment Pain  Rating 0/10 - no pain  -KD     Row Name 05/04/22 1402          Plan of Care Review    Plan of Care Reviewed With patient  -KD     Progress improving  -KD     Outcome Evaluation Pt up in recliner upon entry. Pt tolerated BUE ther ex in all planes w/ 2lb HW and good tolerance w/ RB's PRN. Sit-stand-SBA, pt then amb ~10' to BR SBA w/ RW. Toilet t/f-SBA. [employer]. Grooming-SBA.  Cont OT POC  -KD     Row Name 05/04/22 1402          Therapy Assessment/Plan (OT)    Therapy Frequency (OT) other (see comments)  3-7 days a week  -KD     Row Name 05/04/22 1402          Vital Signs    Pre Systolic BP Rehab 101  -KD     Pre Treatment Diastolic BP 61  -KD     Pretreatment Heart Rate (beats/min) 65  -KD     Pre SpO2 (%) 100  -KD     O2 Delivery Pre Treatment room air  -KD     Pre Patient Position Sitting  -KD     Intra Patient Position Standing  -KD     Post Patient Position Sitting  -KD     Row Name 05/04/22 1402          Positioning and Restraints    Pre-Treatment Position sitting in chair/recliner  -KD     Post Treatment Position chair  -KD     In Chair sitting;call light within reach;encouraged to call for assist;exit alarm on  -KD           User Key  (r) = Recorded By, (t) = Taken By, (c) = Cosigned By    Initials Name Provider Type    KD Prachi Carlos COTA Occupational Therapist Assistant               Outcome Measures     Row Name 05/04/22 1402          How much help from another is currently needed...    Putting on and taking off regular lower body clothing? 3  -KD     Bathing (including washing, rinsing, and drying) 2  -KD     Toileting (which includes using toilet bed pan or urinal) 3  -KD     Putting on and taking off regular upper body clothing 4  -KD     Taking care of personal grooming (such as brushing teeth) 4  -KD     Eating meals 4  -KD     AM-PAC 6 Clicks Score (OT) 20  -KD     Row Name 05/04/22 1020          How much help from another person do you currently need...    Turning from your back to your side  while in flat bed without using bedrails? 3  -ANTIONETTE     Moving from lying on back to sitting on the side of a flat bed without bedrails? 3  -ANTIONETTE     Moving to and from a bed to a chair (including a wheelchair)? 3  -ANTIONETTE     Standing up from a chair using your arms (e.g., wheelchair, bedside chair)? 3  -ANTIONETTE     Climbing 3-5 steps with a railing? 3  -ANTIONETTE     To walk in hospital room? 3  -ANTIONETTE     AM-PAC 6 Clicks Score (PT) 18  -ANTIONETTE     Highest level of mobility 6 --> Walked 10 steps or more  -     Row Name 05/04/22 1020          Functional Assessment    Outcome Measure Options AM-PAC 6 Clicks Basic Mobility (PT)  -           User Key  (r) = Recorded By, (t) = Taken By, (c) = Cosigned By    Initials Name Provider Type    Suleiman Villa, CLARA Physical Therapist Assistant    Prachi Borges COTA Occupational Therapist Assistant                Occupational Therapy Education                 Title: PT OT SLP Therapies (In Progress)     Topic: Occupational Therapy (In Progress)     Point: ADL training (Not Started)     Description:   Instruct learner(s) on proper safety adaptation and remediation techniques during self care or transfers.   Instruct in proper use of assistive devices.              Learner Progress:  Not documented in this visit.          Point: Home exercise program (Not Started)     Description:   Instruct learner(s) on appropriate technique for monitoring, assisting and/or progressing therapeutic exercises/activities.              Learner Progress:  Not documented in this visit.          Point: Precautions (Done)     Description:   Instruct learner(s) on prescribed precautions during self-care and functional transfers.              Learning Progress Summary           Patient Acceptance, E, VU by RB at 5/3/2022 7098    Comment: Edu pt on use of gait belt and non skid socks when OOB and no OOB without assist.                   Point: Body mechanics (Not Started)     Description:   Instruct learner(s) on  proper positioning and spine alignment during self-care, functional mobility activities and/or exercises.              Learner Progress:  Not documented in this visit.                      User Key     Initials Effective Dates Name Provider Type Discipline    RB 06/16/21 -  oTny Childress, OT Occupational Therapist OT              OT Recommendation and Plan  Therapy Frequency (OT): other (see comments) (3-7 days a week)  Plan of Care Review  Plan of Care Reviewed With: patient  Progress: improving  Outcome Evaluation: Pt up in recliner upon entry. Pt tolerated BUE ther ex in all planes w/ 2lb HW and good tolerance w/ RB's PRN. Sit-stand-SBA, pt then amb ~10' to BR SBA w/ RW. Toilet t/f-SBA. [employer]. Grooming-SBA.  Cont OT POC     Time Calculation:    Time Calculation- OT     Row Name 05/04/22 1402 05/04/22 1303          Time Calculation- OT    OT Start Time 1402  -KD --     OT Stop Time 1426  -KD --     OT Time Calculation (min) 24 min  -KD --     Total Timed Code Minutes- OT 24 minute(s)  -KD --     OT Received On 05/04/22  -KD --            Timed Charges    46332 - Gait Training Minutes  -- 19  -ANTIONETTE     71045 - OT Self Care/Mgmt Minutes 24  -KD --            Total Minutes    Timed Charges Total Minutes 24  -KD 19  -ANTIONETTE      Total Minutes 24  -KD 19  -ANTIONETTE           User Key  (r) = Recorded By, (t) = Taken By, (c) = Cosigned By    Initials Name Provider Type    ANTIONETTE Suleiman Christopher, PTA Physical Therapist Assistant    KD Prachi Carlos COTA Occupational Therapist Assistant              Therapy Charges for Today     Code Description Service Date Service Provider Modifiers Qty    [id number]  OT SELF CARE/MGMT/TRAIN EA 15 MIN 5/4/2022 Prachi Carlos COTA GO 2               HIREN Michelle  5/4/2022

## 2022-05-05 NOTE — OUTREACH NOTE
Prep Survey    Flowsheet Row Responses   [facility] patient discharged from? [facility]   Is LACE score < 7 ? No   Emergency Room discharge w/ pulse ox? No   Eligibility [facility]   Date of Admission 05/02/22   Date of Discharge 05/05/22   Discharge Disposition Home-Health Care S[facility]   Discharge diagnosis AMS, CHF, nonischemic cardiomyopathy, HTN, DM   Does the patient have one of the following disease processes/diagnoses(primary or secondary)? CHF   Does the patient have Home health ordered? Yes   What is the Home health agency?  [facility]   Is there a DME ordered? Yes   What DME was ordered? rolling walker from [facility]    Prep survey completed? Yes          EFRA RADFORD - Registered Nurse

## 2022-05-05 NOTE — PLAN OF CARE
"Goal Outcome Evaluation:      VSS. Notified Md about concern with Levemir and hypoglycemia at night into early morning and about lasix and pt K+ Levels. Pt stated she has had hypoglycemia \"often\". Levemir decreased again to 18 units and potassium dose increased. A&O pleasant and K+ WDL. Md rounded on pt this am and cleared for D/c. Called report to home health           "

## 2022-05-05 NOTE — THERAPY TREATMENT NOTE
Patient Name: Lexi Wade  : 1950    MRN: [id number]                              Today's Date: 2022       Admit Date: 2022    Visit Dx:     ICD-10-CM ICD-9-CM   1. Altered mental status, unspecified altered mental status type  R41.82 780.97   2. Impaired functional mobility, balance, gait, and endurance  Z74.09 V49.89   3. Impaired mobility and activities of daily living  Z74.09 V49.89    Z78.9    4. Acute on chronic systolic CHF (congestive heart failure) ([facility])  I50.23 428.23     428.0   5. Obesity (BMI 30-39.9)  E66.9 278.00   6. Shortness of breath  R06.02 786.05   7. Essential hypertension  I10 401.9   8. Chronic congestive heart failure, unspecified heart failure type ([facility])  I50.9 428.0     Patient Active Problem List   Diagnosis   • Pseudophakia   • Primary open angle glaucoma   • Nonischemic cardiomyopathy (HCC)   • Congestive heart failure (HCC)   • Hypertension   • GERD (gastroesophageal reflux disease)   • Diabetes mellitus ([facility])   • Vitamin D deficiency   • Borderline glaucoma   • Neurologic disorder associated with diabetes mellitus ([facility])   • Mixed hyperlipidemia   • Menopausal syndrome   • Dyspnea   • Chest pain   • Morbid obesity ([facility])   • Precordial pain   • Acute respiratory failure with hypoxia ([facility])   • Coronary artery disease involving native heart with angina pectoris ([facility])   • Implantable cardioverter-defibrillator (ICD) generator end of life   • Multifocal pneumonia   • Acute on chronic systolic CHF (congestive heart failure) ([facility])   • Obesity (BMI 30-39.9)   • Shortness of breath   • Altered mental status   • Morbid obesity (HCC)   • Drowsiness     Past Medical History:   Diagnosis Date   • Chronic systolic heart failure (HCC)    • Diabetes mellitus (HCC)    • Diabetic neuropathy ([facility])    • GERD (gastroesophageal reflux disease)    • Hypercholesterolemia    • Hypertension    • Low back pain    • Morbid obesity (HCC)    • Nonischemic cardiomyopathy (HCC)    •  Osteoarthritis    • Sleep apnea    • Vitamin D deficiency      Past Surgical History:   Procedure Laterality Date   • CARDIAC CATHETERIZATION N/A 08/23/2018   • CARDIAC ELECTROPHYSIOLOGY PROCEDURE N/A 06/22/2020   • CARDIAC PACEMAKER PLACEMENT     • CATARACT EXTRACTION     • COLONOSCOPY N/A 06/14/2017   • PACEMAKER IMPLANTATION     • TOTAL ABDOMINAL HYSTERECTOMY WITH SALPINGO OOPHORECTOMY        General Information     Row Name 05/05/22 0835          OT Time and Intention    Document Type therapy note (daily note)  -     Mode of Treatment individual therapy;occupational therapy  -KD     Row Name 05/05/22 0835          General Information    Patient Profile Reviewed yes  -KD     Existing Precautions/Restrictions fall  -KD     Row Name 05/05/22 0835          Cognition    Orientation Status (Cognition) oriented x 4  -KD     Row Name 05/05/22 0835          Safety Issues, Functional Mobility    Impairments Affecting Function (Mobility) strength;endurance/activity tolerance;balance  -KD           User Key  (r) = Recorded By, (t) = Taken By, (c) = Cosigned By    Initials Name Provider Type     Prachi Carlos COTA Occupational Therapist Assistant                 Mobility/ADL's     Row Name 05/05/22 0835          Bed Mobility    Bed Mobility supine-sit  -KD     Supine-Sit [place] (Bed Mobility) supervision  -KD     Sit-Supine [place] (Bed Mobility) supervision  -KD     Assistive Device (Bed Mobility) head of bed elevated;bed rails  -     Row Name 05/05/22 0835          Transfers    Sit-Stand [place] (Transfers) standby assist  -KD     Stand-Sit [place] (Transfers) standby assist  -KD     [place] Level (Toilet Transfer) standby assist  -KD     Assistive Device (Toilet Transfer) grab bars/safety frame;raised toilet seat;other (see comments)  -KD     Row Name 05/05/22 0835          Sit-Stand Transfer    Assistive Device (Sit-Stand Transfers) cane, quad  -KD     Row Name 05/05/22 0835           Stand-Sit Transfer    Assistive Device (Stand-Sit Transfers) cane, quad  -KD     Row Name 05/05/22 08          Toilet Transfer    Type (Toilet Transfer) sit-stand;stand-sit  -KD     Row Name 05/05/22 08          Functional Mobility    Functional Mobility- Ind. Level supervision required  -     Functional Mobility- Device cane, quad  -KD     Functional Mobility-Distance (Feet) 165  1 standing RB  -KD     Row Name 05/05/22 08          Activities of Daily Living    BADL Assessment/Intervention bathing;upper body dressing;lower body dressing;grooming;toileting  -KD     Row Name 05/05/22 08          Toileting Assessment/Training    [place] Level (Toileting) toileting skills;adjust/manage clothing;perform perineal hygiene;independent  -KD     Position (Toileting) unsupported sitting  -     Row Name 05/05/22 08          Bathing Assessment/Intervention    [place] Level (Bathing) bathing skills;upper body;lower body;standby assist  -     Assistive Devices (Bathing) bath mitt  -     Position (Bathing) unsupported sitting  -     Row Name 05/05/22 08          Upper Body Dressing Assessment/Training    [place] Level (Upper Body Dressing) upper body dressing skills;independent  -KD     Position (Upper Body Dressing) unsupported sitting  -KD     Row Name 05/05/22 08          Lower Body Dressing Assessment/Training    [place] Level (Lower Body Dressing) lower body dressing skills;doff;don;socks;modified independence  -KD     Position (Lower Body Dressing) edge of bed sitting  -     Row Name 05/05/22 08          Grooming Assessment/Training    [place] Level (Grooming) grooming skills;hair care, combing/brushing;wash face, hands;independent  -KD     Position (Grooming) edge of bed sitting  -           User Key  (r) = Recorded By, (t) = Taken By, (c) = Cosigned By    Initials Name Provider Type    Prachi Borges COTA Occupational Therapist Assistant                Obj/Interventions    No documentation.                Goals/Plan     Row Name 05/05/22 0835          Transfer Goal 1 (OT)    Activity/Assistive Device (Transfer Goal 1, OT) transfers, all  -KD     [place] Level/Cues Needed (Transfer Goal 1, OT) standby assist  -KD     Time Frame (Transfer Goal 1, OT) long term goal (LTG)  -KD     Progress/Outcome (Transfer Goal 1, OT) goal met  -KD     Row Name 05/05/22 0835          Bathing Goal 1 (OT)    Activity/Device (Bathing Goal 1, OT) bathing skills, all  -KD     [place] Level/Cues Needed (Bathing Goal 1, OT) supervision required  -KD     Time Frame (Bathing Goal 1, OT) long term goal (LTG)  -KD     Progress/Outcomes (Bathing Goal 1, OT) goal met  -     Row Name 05/05/22 0835          Dressing Goal 1 (OT)    Activity/Device (Dressing Goal 1, OT) dressing skills, all  -KD     [place]/Cues Needed (Dressing Goal 1, OT) modified independence  -KD     Time Frame (Dressing Goal 1, OT) long term goal (LTG)  -KD     Progress/Outcome (Dressing Goal 1, OT) goal met  -     Row Name 05/05/22 0835          Toileting Goal 1 (OT)    Activity/Device (Toileting Goal 1, OT) toileting skills, all  -KD     [place] Level/Cues Needed (Toileting Goal 1, OT) modified independence  -KD     Time Frame (Toileting Goal 1, OT) long term goal (LTG)  -KD     Progress/Outcome (Toileting Goal 1, OT) goal met  -KD           User Key  (r) = Recorded By, (t) = Taken By, (c) = Cosigned By    Initials Name Provider Type     Prachi Carlos COTA Occupational Therapist Assistant               Clinical Impression     Row Name 05/05/22 0835          Pain Assessment    Pretreatment Pain Rating 4/10  -KD     Posttreatment Pain Rating 4/10  -KD     Pain Location - chest  -KD     Pain Intervention(s) Nursing Notified  -KD     Row Name 05/05/22 0835          Therapy Assessment/Plan (OT)    Therapy Frequency (OT) other (see comments)  3-7 days a week  -     Row Name 05/05/22 0835           Vital Signs    Pre Systolic BP Rehab 106  -KD     Pre Treatment Diastolic BP 62  -KD     Pretreatment Heart Rate (beats/min) 73  -KD     Pre SpO2 (%) 98  -KD     O2 Delivery Pre Treatment room air  -KD     Pre Patient Position Supine  -KD     Intra Patient Position Standing  -KD     Post Patient Position Sitting  -KD     Row Name 05/05/22 0835          Positioning and Restraints    Pre-Treatment Position in bed  -KD     Post Treatment Position chair  -KD     In Chair reclined;call light within reach;encouraged to call for assist;exit alarm on  -KD           User Key  (r) = Recorded By, (t) = Taken By, (c) = Cosigned By    Initials Name Provider Type    Prachi Borges COTA Occupational Therapist Assistant               Outcome Measures     Row Name 05/05/22 0835          How much help from another is currently needed...    Putting on and taking off regular lower body clothing? 4  -KD     Bathing (including washing, rinsing, and drying) 4  -KD     Toileting (which includes using toilet bed pan or urinal) 4  -KD     Putting on and taking off regular upper body clothing 4  -KD     Taking care of personal grooming (such as brushing teeth) 4  -KD     Eating meals 4  -KD     AM-PAC 6 Clicks Score (OT) 24  -KD           User Key  (r) = Recorded By, (t) = Taken By, (c) = Cosigned By    Initials Name Provider Type    Prachi Borges COTA Occupational Therapist Assistant                Occupational Therapy Education                 Title: PT OT SLP Therapies (Resolved)     Topic: Occupational Therapy (Resolved)     Point: ADL training (Resolved)     Description:   Instruct learner(s) on proper safety adaptation and remediation techniques during self care or transfers.   Instruct in proper use of assistive devices.              Learner Progress:  Not documented in this visit.          Point: Home exercise program (Resolved)     Description:   Instruct learner(s) on appropriate technique for monitoring, assisting and/or  progressing therapeutic exercises/activities.              Learner Progress:  Not documented in this visit.          Point: Precautions (Resolved)     Description:   Instruct learner(s) on prescribed precautions during self-care and functional transfers.              Learning Progress Summary           Patient Acceptance, AVERY, VU by  at 5/3/2022 [id number]    Comment: Edu pt on use of gait belt and non skid socks when OOB and no OOB without assist.                   Point: Body mechanics (Resolved)     Description:   Instruct learner(s) on proper positioning and spine alignment during self-care, functional mobility activities and/or exercises.              Learner Progress:  Not documented in this visit.                      User Key     Initials Effective Dates Name Provider Type Discipline     06/16/21 -  Tony Childress, OT Occupational Therapist OT              OT Recommendation and Plan  Therapy Frequency (OT): other (see comments) (3-7 days a week)  Plan of Care Review  Plan of Care Reviewed With: patient  Progress: improving  Outcome Evaluation: Pt up in recliner upon entry. Pt tolerated BUE ther ex in all planes w/ 2lb HW and good tolerance w/ RB's PRN. Sit-stand-SBA, pt then amb ~10' to BR SBA w/ RW. Toilet t/f-SBA. [facility]-Ind. Grooming-SBA.  Cont OT POC     Time Calculation:    Time Calculation- OT     Row Name 05/05/22 0835             Time Calculation- OT    OT Start Time 0835  -KD      OT Stop Time 0929  -KD      OT Time Calculation (min) 54 min  -KD      Total Timed Code Minutes- OT 54 minute(s)  -KD      OT Received On 05/05/22  -KD              Timed Charges    42540 - OT Self Care/Mgmt Minutes 54  -KD              Total Minutes    Timed Charges Total Minutes 54  -KD       Total Minutes 54  -KD            User Key  (r) = Recorded By, (t) = Taken By, (c) = Cosigned By    Initials Name Provider Type    Prachi Borges COTA Occupational Therapist Assistant              Therapy Charges for Today     Code  Description Service Date Service Provider Modifiers Qty    15560026176 HC OT SELF CARE/MGMT/TRAIN EA 15 MIN 5/4/2022 Prachi Carlos COTA GO 2    08697192199 HC OT SELF CARE/MGMT/TRAIN EA 15 MIN 5/5/2022 Prachi Carlos, HIREN GO 4               HIREN Michelle  5/5/2022

## 2022-05-05 NOTE — PLAN OF CARE
Goal Outcome Evaluation:  Plan of Care Reviewed With: patient        Progress: improving  Outcome Evaluation: VSS. Night time levemir was decreased from 35units to 25 units due to pt glucose dropping to the 50's in the previous morning. Pt has been resting well between care. Pt has been independent to the bathroom. No complaints of pain at this time.

## 2022-05-05 NOTE — DISCHARGE SUMMARY
[facility] Medicine Services  DISCHARGE SUMMARY       Date of Admission: 5/2/2022  Date of Discharge:  5/5/2022  Primary Care Physician: Joyce Plata MD    Presenting Problem/History of Present Illness:  Impaired functional mobility, balance, gait, and endurance [Z74.09]  Altered mental status, unspecified altered mental status type [R41.82]       Final Discharge Diagnoses:  Active Hospital Problems    Diagnosis    • **Drowsiness    • Morbid obesity (HCC)    • Nonischemic cardiomyopathy (HCC)    • Diabetes mellitus (HCC)    • Hypertension        Consults:   Consults     Date and Time Order Name Status Description    5/2/2022 12:10 PM Inpatient Neurology Consult Stroke Completed     5/2/2022  9:53 AM Inpatient Neurology Consult Stroke Completed     5/2/2022  9:53 AM Inpatient Neurology Consult Stroke Completed                         Pertinent Test Results:   Lab Results (most recent)     Procedure Component Value Units Date/Time    Extra Tubes [[id number]] Collected: 05/05/22 0721    Specimen: Blood, Venous Line Updated: 05/05/22 0833    Narrative:      The following orders were created for panel order Extra Tubes.  Procedure                               Abnormality         Status                     ---------                               -----------         ------                     Lavender Top[[id number]]                                     Final result                 Please view results for these tests on the individual orders.    Lavender Top [[id number]] Collected: 05/05/22 0721    Specimen: Blood Updated: 05/05/22 0833     Extra Tube hold for add-on     Comment: Auto resulted       Basic Metabolic Panel [[id number]]  (Abnormal) Collected: 05/05/22 0721    Specimen: Blood Updated: 05/05/22 0826     Glucose 81 mg/dL      BUN 15 mg/dL      Creatinine 1.06 mg/dL      Sodium 140 mmol/L      Potassium 3.9 mmol/L      Chloride 105 mmol/L      CO2 24.0 mmol/L       Calcium 10.3 mg/dL      BUN/Creatinine Ratio 14.2     Anion Gap 11.0 mmol/L      eGFR 55.6 mL/min/1.73      Comment: National Kidney Foundation and American Society of Nephrology (ASN) Task Force recommended calculation based on the Chronic Kidney Disease Epidemiology Collaboration (CKD-EPI) equation refit without adjustment for race.       Narrative:      GFR Normal >60  Chronic Kidney Disease <60  Kidney Failure <15      Magnesium [[id number]]  (Normal) Collected: 05/05/22 0721    Specimen: Blood Updated: 05/05/22 0826     Magnesium 1.7 mg/dL     POC Glucose Once [[id number]]  (Normal) Collected: 05/05/22 0744    Specimen: Blood Updated: 05/05/22 0801     Glucose 89 mg/dL      Comment: RN NotifiedOperator: [id number] AIDE TAYLORMeter ID: [id number]       POC Glucose Once [[id number]]  (Normal) Collected: 05/05/22 0440    Specimen: Blood Updated: 05/05/22 0452     Glucose 83 mg/dL      Comment: : [id number] WILLIE MELISSAMeter ID: [id number]       Extra Tubes [[id number]] Collected: 05/04/22 2203    Specimen: Blood, Venous Line Updated: 05/04/22 2317    Narrative:      The following orders were created for panel order Extra Tubes.  Procedure                               Abnormality         Status                     ---------                               -----------         ------                     Lavender Top[[id number]]                                     Final result               Gold Top - SST[[id number]]                                   Final result                 Please view results for these tests on the individual orders.    Lavender Top [[id number]] Collected: 05/04/22 2203    Specimen: Blood Updated: 05/04/22 2317     Extra Tube hold for add-on     Comment: Auto resulted       Gold Top - SST [[id number]] Collected: 05/04/22 2203    Specimen: Blood Updated: 05/04/22 2317     Extra Tube Hold for add-ons.     Comment: Auto resulted.       Potassium [[id number]]  (Normal) Collected: 05/04/22  2155    Specimen: Blood Updated: 05/04/22 2221     Potassium 4.1 mmol/L     Magnesium [[id number]]  (Normal) Collected: 05/04/22 0856    Specimen: Blood Updated: 05/04/22 0918     Magnesium 1.6 mg/dL     Basic Metabolic Panel [[id number]]  (Abnormal) Collected: 05/04/22 0538    Specimen: Blood Updated: 05/04/22 0638     Glucose 53 mg/dL      BUN 13 mg/dL      Creatinine 0.89 mg/dL      Sodium 141 mmol/L      Potassium 2.9 mmol/L      Comment: Slight hemolysis detected by analyzer. Results may be affected.        Chloride 105 mmol/L      CO2 21.0 mmol/L      Calcium 9.3 mg/dL      BUN/Creatinine Ratio 14.6     Anion Gap 15.0 mmol/L      eGFR 68.6 mL/min/1.73      Comment: National Kidney Foundation and American Society of Nephrology (ASN) Task Force recommended calculation based on the Chronic Kidney Disease Epidemiology Collaboration (CKD-EPI) equation refit without adjustment for race.       Narrative:      GFR Normal >60  Chronic Kidney Disease <60  Kidney Failure <15      CBC & Differential [[id number]]  (Abnormal) Collected: 05/04/22 0538    Specimen: Blood Updated: 05/04/22 0629    Narrative:      The following orders were created for panel order CBC & Differential.  Procedure                               Abnormality         Status                     ---------                               -----------         ------                     CBC Auto Differential[[id number]]        Abnormal            Final result                 Please view results for these tests on the individual orders.    CBC Auto Differential [[id number]]  (Abnormal) Collected: 05/04/22 0538    Specimen: Blood Updated: 05/04/22 0629     WBC 11.15 10*3/mm3      RBC 3.89 10*6/mm3      Hemoglobin 11.0 g/dL      Hematocrit 33.3 %      MCV 85.6 fL      MCH 28.3 pg      MCHC 33.0 g/dL      RDW 16.3 %      RDW-SD 50.7 fl      MPV 9.9 fL      Platelets 288 10*3/mm3      Neutrophil % 64.0 %      Lymphocyte % 22.4 %      Monocyte % 10.6 %      Eosinophil %  2.2 %      Basophil % 0.4 %      Immature Grans % 0.4 %      Neutrophils, Absolute 7.13 10*3/mm3      Lymphocytes, Absolute 2.50 10*3/mm3      Monocytes, Absolute 1.18 10*3/mm3      Eosinophils, Absolute 0.25 10*3/mm3      Basophils, Absolute 0.05 10*3/mm3      Immature Grans, Absolute 0.04 10*3/mm3      nRBC 0.0 /100 WBC     CBC & Differential [[id number]]  (Abnormal) Collected: 05/03/22 0606    Specimen: Blood Updated: 05/03/22 0614    Narrative:      The following orders were created for panel order CBC & Differential.  Procedure                               Abnormality         Status                     ---------                               -----------         ------                     CBC Auto Differential[[id number]]        Abnormal            Final result                 Please view results for these tests on the individual orders.    CBC Auto Differential [[id number]]  (Abnormal) Collected: 05/03/22 0606    Specimen: Blood Updated: 05/03/22 0614     WBC 10.43 10*3/mm3      RBC 3.88 10*6/mm3      Hemoglobin 10.8 g/dL      Hematocrit 33.6 %      MCV 86.6 fL      MCH 27.8 pg      MCHC 32.1 g/dL      RDW 16.3 %      RDW-SD 52.4 fl      MPV 9.5 fL      Platelets 258 10*3/mm3      Neutrophil % 56.6 %      Lymphocyte % 28.5 %      Monocyte % 12.1 %      Eosinophil % 1.8 %      Basophil % 0.5 %      Immature Grans % 0.5 %      Neutrophils, Absolute 5.91 10*3/mm3      Lymphocytes, Absolute 2.97 10*3/mm3      Monocytes, Absolute 1.26 10*3/mm3      Eosinophils, Absolute 0.19 10*3/mm3      Basophils, Absolute 0.05 10*3/mm3      Immature Grans, Absolute 0.05 10*3/mm3      nRBC 0.0 /100 WBC     Goncalves Top [[id number]] Collected: 05/02/22 1047    Specimen: Blood Updated: 05/02/22 1504     Extra Tube Hold for add-ons.     Comment: Auto resulted.       COVID-19 and FLU A/B PCR - Swab, Nasopharynx [[id number]]  (Normal) Collected: 05/02/22 1245    Specimen: Swab from Nasopharynx Updated: 05/02/22 1336     COVID19 Not Detected      Influenza A PCR Not Detected     Influenza B PCR Not Detected    Narrative:      Fact sheet for providers: https://www.fda.gov/media/028947/download    Fact sheet for patients: https://www.fda.gov/media/466500/download    Test performed by PCR.    Hemoglobin A1c [[id number]]  (Abnormal) Collected: 05/02/22 1047    Specimen: Blood Updated: 05/02/22 1322     Hemoglobin A1C 8.20 %     Narrative:      Hemoglobin A1C Ranges:    Increased Risk for Diabetes  5.7% to 6.4%  Diabetes                     >= 6.5%  Diabetic Goal                < 7.0%    Lipid Panel [[id number]]  (Abnormal) Collected: 05/02/22 1047    Specimen: Blood Updated: 05/02/22 1322     Total Cholesterol 164 mg/dL      Triglycerides 112 mg/dL      HDL Cholesterol 41 mg/dL      LDL Cholesterol  103 mg/dL      VLDL Cholesterol 20 mg/dL      LDL/HDL Ratio 2.45    Narrative:      Cholesterol Reference Ranges  (U.S. Department of Health and Human Services ATP III Classifications)    Desirable          <200 mg/dL  Borderline High    200-239 mg/dL  High Risk          >240 mg/dL      Triglyceride Reference Ranges  (U.S. Department of Health and Human Services ATP III Classifications)    Normal           <150 mg/dL  Borderline High  150-199 mg/dL  High             200-499 mg/dL  Very High        >500 mg/dL    HDL Reference Ranges  (U.S. Department of Health and Human Services ATP III Classifications)    Low     <40 mg/dl (major risk factor for CHD)  High    >60 mg/dl ('negative' risk factor for CHD)        LDL Reference Ranges  (U.S. Department of Health and Human Services ATP III Classifications)    Optimal          <100 mg/dL  Near Optimal     100-129 mg/dL  Borderline High  130-159 mg/dL  High             160-189 mg/dL  Very High        >189 mg/dL    Urinalysis, Microscopic Only - Urine, Clean Catch [[id number]]  (Abnormal) Collected: 05/02/22 1159    Specimen: Urine, Clean Catch Updated: 05/02/22 1220     RBC, UA None Seen /HPF      WBC, UA 6-12 /HPF       Bacteria, UA Trace /HPF      Squamous Epithelial Cells, UA 0-2 /HPF      Hyaline Casts, UA 3-6 /LPF      Methodology Manual Light Microscopy    Urinalysis With Microscopic If Indicated (No Culture) - Urine, Clean Catch [[id number]]  (Abnormal) Collected: 05/02/22 1159    Specimen: Urine, Clean Catch Updated: 05/02/22 1208     Color, UA Dark Yellow     Appearance, UA Cloudy     pH, UA 5.5     Specific Gravity, UA 1.028     Glucose,  mg/dL (Trace)     Ketones, UA Trace     Bilirubin, UA Small (1+)     Blood, UA Negative     Protein, UA >=300 mg/dL (3+)     Leuk Esterase, UA Small (1+)     Nitrite, UA Negative     Urobilinogen, UA 1.0 E.U./dL    [place] Draw [[id number]] Collected: 05/02/22 1047    Specimen: Blood Updated: 05/02/22 1204    Narrative:      The following orders were created for panel order [place] Draw.  Procedure                               Abnormality         Status                     ---------                               -----------         ------                     Green Top (Gel)[[id number]]                                  Final result               Lavender Top[[id number]]                                     Final result               Gold Top - SST[[id number]]                                   Final result               Light Blue Top[[id number]]                                   Final result                 Please view results for these tests on the individual orders.    Gold Top - SST [[id number]] Collected: 05/02/22 1047    Specimen: Blood Updated: 05/02/22 1204     Extra Tube Hold for add-ons.     Comment: Auto resulted.       Light Blue Top [[id number]] Collected: 05/02/22 1047    Specimen: Blood Updated: 05/02/22 1204     Extra Tube hold for add-on     Comment: Auto resulted       Green Top (Gel) [[id number]] Collected: 05/02/22 1047    Specimen: Blood Updated: 05/02/22 1204     Extra Tube Hold for add-ons.     Comment: Auto resulted.       Comprehensive Metabolic Panel [[id number]]   (Abnormal) Collected: 05/02/22 1047    Specimen: Blood Updated: 05/02/22 1117     Glucose 228 mg/dL      BUN 13 mg/dL      Creatinine 0.89 mg/dL      Sodium 140 mmol/L      Potassium 3.6 mmol/L      Chloride 106 mmol/L      CO2 20.0 mmol/L      Calcium 9.5 mg/dL      Total Protein 6.3 g/dL      Albumin 3.90 g/dL      ALT (SGPT) 25 U/L      AST (SGOT) 21 U/L      Alkaline Phosphatase 102 U/L      Total Bilirubin 1.0 mg/dL      Globulin 2.4 gm/dL      A/G Ratio 1.6 g/dL      BUN/Creatinine Ratio 14.6     Anion Gap 14.0 mmol/L      eGFR 68.6 mL/min/1.73      Comment: National Kidney Foundation and American Society of Nephrology (ASN) Task Force recommended calculation based on the Chronic Kidney Disease Epidemiology Collaboration (CKD-EPI) equation refit without adjustment for race.       Narrative:      GFR Normal >60  Chronic Kidney Disease <60  Kidney Failure <15      Troponin [[id number]]  (Normal) Collected: 05/02/22 1047    Specimen: Blood Updated: 05/02/22 1116     Troponin T <0.010 ng/mL     Narrative:      Troponin T Reference Range:  <= 0.03 ng/mL-   Negative for AMI  >0.03 ng/mL-     Abnormal for myocardial necrosis.  Clinicians would have to utilize clinical acumen, EKG, Troponin and serial changes to determine if it is an Acute Myocardial Infarction or myocardial injury due to an underlying chronic condition.       Results may be falsely decreased if patient taking Biotin.      BNP [[id number]]  (Abnormal) Collected: 05/02/22 1047    Specimen: Blood Updated: 05/02/22 1114     proBNP 964.1 pg/mL     Narrative:      Among patients with dyspnea, NT-proBNP is highly sensitive for the detection of acute congestive heart failure. In addition NT-proBNP of <300 pg/ml effectively rules out acute congestive heart failure with 99% negative predictive value.    Results may be falsely decreased if patient taking Biotin.      aPTT [[id number]]  (Normal) Collected: 05/02/22 1047    Specimen: Blood Updated: 05/02/22 1113      PTT 25.8 seconds     Narrative:      The recommended Heparin therapeutic range is 68-97 seconds.    Protime-INR [[id number]]  (Normal) Collected: 05/02/22 1047    Specimen: Blood Updated: 05/02/22 1113     Protime 14.9 Seconds      INR 1.19    Narrative:      Therapeutic range for most indications is 2.0-3.0 INR,  or 2.5-3.5 for mechanical heart valves.        Imaging Results (Most Recent)     Procedure Component Value Units Date/Time    CT Head Without Contrast [[id number]] Collected: 05/03/22 1247     Updated: 05/03/22 1344    Narrative:        PROCEDURE: CT head without contrast    REASON FOR EXAM: Neuro deficit, acute, stroke suspected, R41.82  Altered mental status, unspecified Z74.09 Other reduced mobility    This exam was performed according to our departmental  dose-optimization program, which includes automated exposure  control, adjustment of the mA and/or kV according to patient size  and/or use of iterative reconstruction technique.    FINDINGS: Comparison study dated May 2, 2022. Axial computer  tomography sequential imaging of the head was performed from the  vertex to the base of the skull. .Sagittal and coronal  reformation was performed .    The skull vault is intact. Paranasal sinuses and bilateral  mastoid air cells are well aerated. Stable punctate bilateral  basal ganglia physiologic calcifications. Stable bilateral  frontal and parietal lobe periventricular deep white matter small  foci of hypodensity. Cerebral and cerebellar parenchymal are  otherwise normal. Ventricular system and subarachnoid spaces are  normal.      Impression:      1.  No acute intracranial abnormality.  2.  Stable bilateral frontal and parietal lobe periventricular  deep white matter small foci of chronic ischemic gliosis  secondary to microvascular disease.    Electronically signed by:  Sreedhar Otero MD  5/3/2022 1:41 PM CDT  Workstation: [id number]    CT Abdomen Pelvis Without Contrast [[id number]] Collected: 05/02/22 1026      Updated: 05/02/22 1120    Narrative:      Comparison:  None    Indication:  Epigastric pain    Technique:  CT of the abdomen and pelvis was performed without  intravenous contrast.  Reformats are obtained.    All CT scans at this location are performed using dose modulation  techniques as appropriate to a performed exam including the  following: automated exposure control; adjustment of the mA  and/or kV according to patient size (this includes techniques or  standardized protocols for targeted exams where dose is matched  to indication / reason for exam; i.e. extremities or head); use  of iterative reconstruction technique.    Findings: Cardiomegaly. There are small pleural effusions. There  are interstitial opacities in the lung bases which may reflect  edema.  The liver is normal size. There is nodular right lobe liver  contour. No intrahepatic or extrahepatic biliary dilatation.  The  gallbladder appears partly contracted.  The spleen, pancreas, bilateral adrenal glands are normal size  configuration.  The kidneys are symmetric in size.  There is no hydronephrosis or  obstructing calculus. There are subcentimeter nonobstructing  right renal calculi.  Scattered colonic diverticula. There is no intestinal dilatation  or extraluminal air.  The appendix is normal caliber.  The urinary bladder is under distended. Uterus is absent.  The abdominal aorta is normal caliber.  Atherosclerotic  calcification. Nonenlarged retroperitoneal lymph nodes.  Bony structures are intact. Curvature of the spine. Degenerative  spine. Degenerative hips.  Small fat-containing umbilical hernia.      Impression:      Impression:    1. Cardiomegaly. Small pleural effusions. Lung basilar  interstitial opacities which could reflect presence of mild  interstitial edema.      2. No hydronephrosis or obstructing  tract calculus.  Nonobstructing subcentimeter right renal calculi.  3. Urinary bladder is under distended with wall  thickening.      4. Colonic diverticulosis without evidence of diverticulitis.  Moderate retained stool within the colon.  5. Nonacute findings as above.          Electronically signed by:  Denton Rhoades MD  5/2/2022 11:18 AM CDT  Workstation: [id number]    CT Head Without Contrast Stroke Protocol [[id number]] Collected: 05/02/22 1023     Updated: 05/02/22 1045    Narrative:      CT head without IV contrast May 22, 2022    INDICATION: Acute cerebrovascular disease with neurologic  symptoms    TECHNIQUE: Spiral images obtained from foramen magnum through  vertex without IV contrast. Sagittal, axial and coronal  reformatted images generated retrospectively.    This exam was performed according to our departmental  dose-optimization program, which includes automated exposure  control, adjustment of the mA and/or kV according to patient size  and/or use of iterative reconstruction technique.      FINDINGS:  No mass effect, midline shift, hemorrhage, hydrocephalus or  extra-axial fluid collections.  Atrophy with evidence of chronic small vessel white matter  ischemic change.  No significant focal or acute parenchymal pathology.  Bilateral basal ganglia calcification.  Gray-white differentiation largely maintained  Visualized mastoids and sinuses grossly clear.  No significant focal or acute bony abnormality.      Impression:      Mild atrophy with evidence of chronic small vessel white matter  ischemic change.  No significant focal or acute intracranial pathology.    Electronically signed by:  Palomo Mccray MD  5/2/2022 10:43 AM  CDT Workstation: [id number]    XR Chest 1 View [[id number]] Collected: 05/02/22 0956     Updated: 05/02/22 1026    Narrative:      EXAM DESCRIPTION:     XR CHEST 1 VW    CLINICAL HISTORY:     71 years  Female  Acute Stroke Protocol (onset < 12 hrs)    COMPARISON:     April 12, 2022    TECHNIQUE:     One view-AP portable radiograph the chest    FINDINGS:     The lungs are well-expanded. No focal  "infiltrates are seen. There  is no evidence of acute congestive heart failure. A bipolar  pacing device is redemonstrated. The overall appearance of the  chest is stable.      Impression:          1. Stable appearance the chest without radiographic evidence of  acute cardiopulmonary disease.        Electronically signed by:  Carrie Kwan MD  5/2/2022 10:24 AM  CDT Workstation: [id number]          Chief Complaint on Day of Discharge: None    Hospital Course:  The patient is a 71 y.o. female with a past medical history of hypertension, congestive heart failure, coronary artery disease and diabetes who presented to [facility] with altered mental status, shortness of breath and generalized weakness.  The patient has a history of CHF with decreased ejection fraction of 35 to 40%.  He is on Lasix 40 mg twice daily at home.  The patient was started on IV diuretics with improvement in her breathing.  She was also evaluated by neurology for altered mental status.  Initial CT scan did not show any strokes.  An MRI could not be done due to her AICD.  CT scan was repeated the next day and remained unchanged.  Her potassium dose was increased from 10 mEq use twice daily to 20 mEq twice daily because of hypokalemia.  Her Levemir dose was also decreased because of low blood glucose.  She will be discharged on 18 units of Levemir at night.  The patient will need to follow-up with her primary care physician and cardiologist.  She was discharged with home health.    Condition on Discharge: Stable    Physical Exam on Discharge:  /59 (BP Location: Left arm, Patient Position: Lying)   Pulse 79   Temp 96.3 °F (35.7 °C) (Axillary)   Resp 18   Ht 157.5 cm (62\")   Wt 93 kg (205 lb 1.6 oz)   SpO2 100%   BMI 37.51 kg/m²   Physical Exam  Constitutional:       General: She is not in acute distress.  HENT:      Head: Normocephalic and atraumatic.   Eyes:      Extraocular Movements: Extraocular movements intact. "   Cardiovascular:      Rate and Rhythm: Normal rate and regular rhythm.   Pulmonary:      Effort: Pulmonary effort is normal. No respiratory distress.      Breath sounds: No wheezing.   Abdominal:      General: Bowel sounds are normal. There is no distension.      Palpations: Abdomen is soft.   Musculoskeletal:         General: No swelling.      Cervical back: Normal range of motion.     Discharge Disposition:  [place] [facility] [place]    [place]charge Medications:     Discharge Medications      Changes to Medications      Instructions Start Date   Levemir FlexTouch 100 UNIT/ML injection  Generic drug: insulin detemir  What changed: how much to take   18 Units, Subcutaneous, Nightly      potassium chloride 10 MEQ CR capsule  Commonly known as: MICRO-K  What changed: how much to take   20 mEq, Oral, 2 Times Daily         Continue These Medications      Instructions Start Date   acetaminophen 325 MG tablet  Commonly known as: TYLENOL   650 mg, Oral, Every 4 Hours PRN      albuterol sulfate  (90 Base) MCG/ACT inhaler  Commonly known as: PROVENTIL HFA;VENTOLIN HFA;PROAIR HFA   INHALE 2 PUFFS EVERY 4 (FOUR) HOURS AS NEEDED FOR WHEEZING OR SHORTNESS OF AIR.      aspirin 81 MG EC tablet   81 mg, Oral, Nightly      atorvastatin 40 MG tablet  Commonly known as: LIPITOR   40 mg, Oral, Daily      B-D UF III MINI PEN NEEDLES 31G X 5 MM misc  Generic drug: Insulin Pen Needle   USE WITH INSULIN INJECTIONS NIGHTLY      carvedilol 25 MG tablet  Commonly known as: COREG   25 mg, Oral, 2 Times Daily With Meals      diclofenac 1 % gel gel  Commonly known as: VOLTAREN   APPLY TO AFFECTED AREA 4 TIMES A DAY Oral Volteran DC'D)      freestyle lancets   1 each, Other, 3 Times Daily, Use as instructed      FreeStyle Lite device   1 Device, Does not apply, 3 Times Daily      FREESTYLE LITE test strip  Generic drug: glucose blood   1 each, Other, 3 Times Daily, Use as instructed      furosemide 40 MG tablet  Commonly known as: LASIX   TAKE 1  "TABLET BY MOUTH TWICE A DAY      guaiFENesin 600 MG 12 hr tablet  Commonly known as: MUCINEX   600 mg, Oral, Every 12 Hours Scheduled      Insulin Syringe 31G X 5/16\" 0.5 ML misc   1 each, Subcutaneous, Nightly      ipratropium-albuterol 0.5-2.5 mg/3 ml nebulizer  Commonly known as: DUO-NEB   3 mL, Nebulization, 4 Times Daily      isosorbide mononitrate 30 MG 24 hr tablet  Commonly known as: IMDUR   30 mg, Oral, Daily      lisinopril 10 MG tablet  Commonly known as: PRINIVIL,ZESTRIL   TAKE 1 TABLET BY MOUTH EVERY DAY      magnesium oxide 400 (241.3 Mg) MG tablet tablet  Commonly known as: MAGOX   400 mg, Oral, Daily      nitroglycerin 0.4 MG SL tablet  Commonly known as: Nitrostat   0.4 mg, Sublingual, Every 5 Minutes PRN, Take no more than 3 doses in 15 minutes.      pantoprazole 40 MG EC tablet  Commonly known as: Protonix   40 mg, Oral, Daily      sucralfate 1 g tablet  Commonly known as: CARAFATE   1 g, Oral, 3 Times Daily             Discharge Diet: Cardiac/diabetic    Activity at Discharge: As tolerated    Discharge Care Plan/Instructions: Follow-up with your cardiologist and primary care physician.  Your Levemir dose has been decreased to 18 units at night.  Your potassium chloride dose has been increased to 20 mEq twice a day from 10 mEq twice a day.    Follow-up Appointments:   Future Appointments   Date Time Provider Department Center   5/12/2022 11:30 AM Joyce Plata MD [facility]       Test Results Pending at Discharge:     Brent Coulter MD    Time: 10:25 AM            "

## 2022-05-05 NOTE — DISCHARGE PLACEMENT REQUEST
"Carline Norris (71 y.o. Female)             Date of Birth   1950    Social Security Number       Address   [address] [facility] [address]    Home Phone   [phone]    MRN   [id number]       [employer]   [employer]    Marital Status   Single                            Admission Date   5/2/22    Admission Type   Emergency    Admitting Provider   Justin Wheeler MD    Attending Provider   Justin Wheeler MD    Department, Room/Bed   [address], 410/1       Discharge Date       Discharge Disposition   Home-Health Care [facility] [place]    Discharge Destination                               Attending Provider: Justin Wheeler MD    Allergies: Aldactone [Spironolactone], Nsaids    Isolation: None   Infection: None   Code Status: CPR   Advance Care Planning Activity    Ht: 157.5 cm (62\")   Wt: 93 kg (205 lb 1.6 oz)    Admission Cmt: None   Principal Problem: Drowsiness [R40.0]                 Active Insurance as of 5/2/2022     Primary Coverage     Payor Plan Insurance Group Employer/Plan Group    MEDICARE MEDICARE A & B      Payor Plan Address Payor Plan Phone Number Payor Plan Fax Number Effective Dates    [address] [phone]  11/1/2000 - None Entered    [facility] [id number]       Subscriber Name Subscriber Birth Date Member ID       CARLINE NORRIS 1950 [id number]           Secondary Coverage     Payor Plan Insurance Group Employer/Plan Group    [place]      Payor Plan Address Payor Plan Phone Number Payor Plan Fax Number Effective Dates    [address] [phone]  10/4/2016 - None Entered    [place] [address]       Subscriber Name Subscriber Birth Date Member ID       CARLINE NORRIS 1950 [id number]                 Emergency Contacts      (Rel.) Home Phone Work Phone Mobile Phone    Yudi Norris (Sister) [phone] -- [phone]            [place] [address] " "[facility] [address]  Dept. Phone:  [phone]  Dept. Fax:   Date Ordered: May 5, 2022         Patient:  Lexi Wade MRN:  [id number]   [address]  [facility] [address] :  1950  SSN:    Phone: [phone] Sex:  F     Weight: 93 kg (205 lb 1.6 oz)         Ht Readings from Last 1 Encounters:   22 157.5 cm (62\")         Walker               (Order ID: [id number])    Diagnosis:  Shortness of breath (R06.02 [ICD-10-CM] 786.05 [ICD-9-CM])  Chronic congestive heart failure, unspecified heart failure type (HCC) (I50.9 [ICD-10-CM] 428.0 [ICD-9-CM])   Quantity:  1     Equipment:  Walker Folding with Wheels  Length of Need (99 Months = Lifetime): 99 Months = Lifetime        Authorizing Provider's Phone: [phone]  Authorizing Provider:Brent Coulter MD  Authorizing Provider's NPI: [id number]  Order Entered By: Brent Coulter MD 2022 11:01 AM     Electronically signed by: Brent Coulter MD 2022 11:01 AM    "

## 2022-05-05 NOTE — DISCHARGE INSTRUCTIONS
Follow-up with your cardiologist and primary care physician.  Your Levemir dose has been decreased to 18 units at night.  Your potassium chloride dose has been increased to 20 mEq twice a day from 10 mEq twice a day.

## 2022-05-05 NOTE — DISCHARGE PLACEMENT REQUEST
"Carline Norris (71 y.o. Female)             Date of Birth   1950    Social Security Number       Address   [address] [facility] [address]    Home Phone   [phone]    MRN   [id number]       [place]   [employer]    Marital Status   Single                            Admission Date   5/2/22    Admission Type   Emergency    Admitting Provider   Justin Wheeler MD    Attending Provider   Justin Wheeler MD    Department, Room/Bed   [address], 410/1       Discharge Date       Discharge Disposition   Home-Health Care [facility]    Discharge Destination                               Attending Provider: Justin Wheeler MD    Allergies: Aldactone [Spironolactone], Nsaids    Isolation: None   Infection: None   Code Status: CPR   Advance Care Planning Activity    Ht: 157.5 cm (62\")   Wt: 93 kg (205 lb 1.6 oz)    Admission Cmt: None   Principal Problem: Drowsiness [R40.0]                 Active Insurance as of 5/2/2022     Primary Coverage     Payor Plan Insurance Group Employer/Plan Group    MEDICARE MEDICARE A & B      Payor Plan Address Payor Plan Phone Number Payor Plan Fax Number Effective Dates    [address] [phone]  11/1/2000 - None Entered    [facility] [address]       Subscriber Name Subscriber Birth Date Member ID       CARLINE NORRIS 1950 [id number]           Secondary Coverage     Payor Plan Insurance Group Employer/Plan Group    [place]      Payor Plan Address Payor Plan Phone Number Payor Plan Fax Number Effective Dates    [address] [phone]  10/4/2016 - None Entered    [place] [address]       Subscriber Name Subscriber Birth Date Member ID       CARLINE NORRIS 1950 [id number]                 Emergency Contacts      (Rel.) Home Phone Work Phone Mobile Phone    MauroYudi (Sister) [phone] -- [phone]              "

## 2022-05-05 NOTE — SIGNIFICANT NOTE
05/05/22 1048   OTHER   Discipline physical therapy assistant   Rehab Time/Intention   Session Not Performed patient/family declined treatment  (pt is dressed, sitting in chair w/ personal belongings packed, pt states she is d/cing home, defers PT tx this AM, offered HEP and pt states she alread has one)

## 2022-05-06 NOTE — OUTREACH NOTE
Call Center TCM Note    Flowsheet Row Responses   [facility] patient discharged from? [facility]   Does the patient have one of the following disease processes/diagnoses(primary or secondary)? CHF   TCM attempt successful? No   Unsuccessful attempts Attempt 2  [attempted patient and sister]           Debbie Simmons RN    5/6/2022, 15:41 EDT

## 2022-05-06 NOTE — OUTREACH NOTE
Call Center TCM Note    Flowsheet Row Responses   [facility] patient discharged from? [place]   Does the patient have one of the following disease processes/diagnoses(primary or secondary)? CHF   TCM attempt successful? No  [verbal release on file for sister]   Unsuccessful attempts Attempt 1  [attempted patient and sister]           Debbie Simmons RN    5/6/2022, 14:49 EDT

## 2022-05-08 NOTE — ED PROVIDER NOTES
"Subjective     History provided by:  Patient   used: No    Patient is a 71 years old with past medical history of CHF, diabetes, hypertension, morbid obesity and nonischemic cardiomyopathy who presented here today because of shortness of breath.  Patient was just discharged from the hospital about 4 days ago because of the same complaints.  She said that shortness of breath is worse on exertion.  Denies any chest pain, cough or congestion.  Denies any fever chills or sweating.    Review of Systems   Respiratory: Positive for shortness of breath.    All other systems reviewed and are negative.      Past Medical History:   Diagnosis Date   • Chronic systolic heart failure (HCC)    • Diabetes mellitus (HCC)    • Diabetic neuropathy (HCC)    • GERD (gastroesophageal reflux disease)    • Hypercholesterolemia    • Hypertension    • Low back pain    • Morbid obesity (HCC)    • Nonischemic cardiomyopathy (HCC)    • Osteoarthritis    • Sleep apnea    • Vitamin D deficiency        Allergies   Allergen Reactions   • Aldactone [Spironolactone] Unknown - Low Severity   • Nsaids        Past Surgical History:   Procedure Laterality Date   • CARDIAC CATHETERIZATION N/A 08/23/2018   • CARDIAC ELECTROPHYSIOLOGY PROCEDURE N/A 06/22/2020   • CARDIAC PACEMAKER PLACEMENT     • CATARACT EXTRACTION     • COLONOSCOPY N/A 06/14/2017   • PACEMAKER IMPLANTATION     • TOTAL ABDOMINAL HYSTERECTOMY WITH SALPINGO OOPHORECTOMY         Family History   Problem Relation Age of Onset   • Diabetes Sister    • Bone cancer Brother        Social History     Socioeconomic History   • Marital status: Single   Tobacco Use   • Smoking status: Former Smoker   • Smokeless tobacco: Never Used   Substance and Sexual Activity   • Alcohol use: No   • Drug use: No   • Sexual activity: Not Currently       /99   Pulse 98   Temp 98 °F (36.7 °C) (Infrared)   Resp 24   Ht 157.5 cm (62\")   Wt 93 kg (205 lb)   SpO2 98%   BMI 37.49 kg/m² "     Objective   Physical Exam  Vitals and nursing note reviewed.   Constitutional:       Appearance: She is well-developed. She is obese.   HENT:      Head: Normocephalic and atraumatic.   Eyes:      Extraocular Movements: Extraocular movements intact.      Pupils: Pupils are equal, round, and reactive to light.   Cardiovascular:      Rate and Rhythm: Normal rate and regular rhythm.      Pulses: Normal pulses.      Heart sounds: Normal heart sounds.   Pulmonary:      Effort: Pulmonary effort is normal.      Breath sounds: Normal breath sounds.   Abdominal:      General: Bowel sounds are normal.      Palpations: Abdomen is soft.   Musculoskeletal:         General: Normal range of motion.      Cervical back: Normal range of motion.   Skin:     General: Skin is warm.      Capillary Refill: Capillary refill takes less than 2 seconds.   Neurological:      General: No focal deficit present.      Mental Status: She is alert and oriented to person, place, and time.   Psychiatric:         Mood and Affect: Mood normal.         Behavior: Behavior normal.         Procedures           ED Course  ED Course as of 05/08/22 1207   Sun May 08, 2022   1206 Result was discussed patient.  Dr. vieyra was called who accepted patient. [MO]      ED Course User Index  [MO] Lew Schmidt MD           Labs Reviewed   COMPREHENSIVE METABOLIC PANEL - Abnormal; Notable for the following components:       Result Value    Glucose 250 (*)     CO2 19.0 (*)     All other components within normal limits    Narrative:     GFR Normal >60  Chronic Kidney Disease <60  Kidney Failure <15     BNP (IN-HOUSE) - Abnormal; Notable for the following components:    proBNP 3,910.0 (*)     All other components within normal limits    Narrative:     Among patients with dyspnea, NT-proBNP is highly sensitive for the detection of acute congestive heart failure. In addition NT-proBNP of <300 pg/ml effectively rules out acute congestive heart failure with 99%  negative predictive value.    Results may be falsely decreased if patient taking Biotin.     CBC WITH AUTO DIFFERENTIAL - Abnormal; Notable for the following components:    Hemoglobin 11.7 (*)     RDW 15.9 (*)     Monocytes, Absolute 1.15 (*)     All other components within normal limits   TROPONIN (IN-HOUSE) - Normal    Narrative:     Troponin T Reference Range:  <= 0.03 ng/mL-   Negative for AMI  >0.03 ng/mL-     Abnormal for myocardial necrosis.  Clinicians would have to utilize clinical acumen, EKG, Troponin and serial changes to determine if it is an Acute Myocardial Infarction or myocardial injury due to an underlying chronic condition.       Results may be falsely decreased if patient taking Biotin.     COVID-19 AND FLU A/B, NP SWAB IN TRANSPORT MEDIA 8-12 HR TAT   RAINBOW DRAW    Narrative:     The following orders were created for panel order [place] Draw.  Procedure                               Abnormality         Status                     ---------                               -----------         ------                     Green Top (Gel)[[id number]]                                  In process                 Lavender Top[[id number]]                                     In process                 Gold Top - SST[[id number]]                                                              Light Blue Top[[id number]]                                                                Please view results for these tests on the individual orders.   CBC AND DIFFERENTIAL    Narrative:     The following orders were created for panel order CBC & Differential.  Procedure                               Abnormality         Status                     ---------                               -----------         ------                     CBC Auto Differential[[id number]]        Abnormal            Final result                 Please view results for these tests on the individual orders.   GREEN TOP   LAVENDER TOP   GOLD TOP - SST    LIGHT BLUE TOP       XR Chest 2 View   Final Result   Cardiomegaly.   No definite acute cardiopulmonary process.      Electronically signed by:  Palomo Mccray MD  5/8/2022 10:57 AM   CDT Workstation: [id number]                                            [facility]    Final diagnoses:   Acute on chronic systolic congestive heart failure (HCC)       ED Disposition  ED Disposition     ED Disposition   Decision to Admit    Condition   --    Comment   Level of Care: Telemetry [5]   Diagnosis: Acute on chronic systolic congestive heart failure (HCC) [341250]   Admitting Physician: MARE GORDILLO [[id number]]   Attending Physician: MARE GORDILLO [[id number]]               No follow-up provider specified.       Medication List      No changes were made to your prescriptions during this visit.          Lew Schmidt MD  05/08/22 6046

## 2022-05-08 NOTE — H&P
[facility] Medicine Services  INPATIENT HISTORY AND PHYSICAL       Patient Care Team:  Joyce Plata MD as PCP - General  Yamilka Wilson RN as Ambulatory  ([facility])      Date of Admission: 5/8/2022    Chief complaint   Chief Complaint   Patient presents with   • Shortness of Breath     Subjective     Patient is a 71 y.o. female with a past medical history of essential hypertension, chronic systolic congestive heart failure, coronary artery disease, s/p AICD placement and type II diabetes who presented to [facility] with  complaints of worsening shortness of breath altered mental status, shortness of breath and cough of 3 days duration.    Patient was recently discharged after being treated for congestive heart failure and altered mental status.  She reports that she initially felt good but about 3 days prior to presentation she developed shortness of breath and nonproductive cough. Her shortness of breath is worse on exertion.  She denies fevers or chills.  On account of her symptoms she presented to the emergency room.  Troponin was negative and EKG showed no ST segment elevation.    Review of Systems   Constitutional: Negative for activity change, appetite change, chills, fatigue and fever.   HENT: Negative for congestion, ear pain, rhinorrhea, sore throat and trouble swallowing.    Respiratory: Positive for cough and shortness of breath. Negative for chest tightness and wheezing.    Cardiovascular: Negative for chest pain, palpitations and leg swelling.   Gastrointestinal: Negative for abdominal distention, abdominal pain, diarrhea, nausea and vomiting.   Genitourinary: Negative for difficulty urinating, dysuria and hematuria.   Musculoskeletal: Negative for arthralgias, back pain and myalgias.   Skin: Negative for pallor and rash.   Neurological: Negative for dizziness, syncope, weakness, light-headedness and  headaches.   Hematological: Negative for adenopathy. Does not bruise/bleed easily.   Psychiatric/Behavioral: Negative for agitation and confusion. The patient is not nervous/anxious.      History  Past Medical History:   Diagnosis Date   • Chronic systolic heart failure (HCC)    • Diabetes mellitus (HCC)    • Diabetic neuropathy (HCC)    • GERD (gastroesophageal reflux disease)    • Hypercholesterolemia    • Hypertension    • Low back pain    • Morbid obesity (HCC)    • Nonischemic cardiomyopathy (HCC)    • Osteoarthritis    • Sleep apnea    • Vitamin D deficiency      Past Surgical History:   Procedure Laterality Date   • CARDIAC CATHETERIZATION N/A 08/23/2018   • CARDIAC ELECTROPHYSIOLOGY PROCEDURE N/A 06/22/2020   • CARDIAC PACEMAKER PLACEMENT     • CATARACT EXTRACTION     • COLONOSCOPY N/A 06/14/2017   • PACEMAKER IMPLANTATION     • TOTAL ABDOMINAL HYSTERECTOMY WITH SALPINGO OOPHORECTOMY       Family History   Problem Relation Age of Onset   • Diabetes Sister    • Bone cancer Brother      Social History     Tobacco Use   • Smoking status: Former Smoker   • Smokeless tobacco: Never Used   Substance Use Topics   • Alcohol use: No   • Drug use: No     (Not in a hospital admission)    Allergies:  Aldactone [spironolactone] and Nsaids  Prior to Admission medications    Medication Sig Start Date End Date Taking? Authorizing Provider   acetaminophen (TYLENOL) 325 MG tablet Take 2 tablets by mouth Every 4 (Four) Hours As Needed for Mild Pain . 1/19/22   Swetha Raymond APRN   albuterol sulfate  (90 Base) MCG/ACT inhaler INHALE 2 PUFFS EVERY 4 (FOUR) HOURS AS NEEDED FOR WHEEZING OR SHORTNESS OF AIR. 9/2/21   Joyce Plata MD   aspirin 81 MG EC tablet Take 81 mg by mouth Every Night. 1/1/21   Provider, MD Heidi   atorvastatin (LIPITOR) 40 MG tablet Take 1 tablet by mouth Daily. 12/16/21   Joyce Plata MD   B-D UF III MINI PEN NEEDLES 31G X 5 MM misc USE WITH INSULIN INJECTIONS NIGHTLY 12/6/21    "Joyce Plata MD   Blood Glucose Monitoring Suppl (FreeStyle Lite) device 1 Device 3 (Three) Times a Day. 4/5/22   Justin Wheeler MD   carvedilol (COREG) 25 MG tablet Take 1 tablet by mouth 2 (Two) Times a Day With Meals. 2/2/22   Joyce Plata MD   diclofenac (VOLTAREN) 1 % gel gel APPLY TO AFFECTED AREA 4 TIMES A DAY Oral Volteran DC'D) 7/21/20   Joyce Plata MD   furosemide (LASIX) 40 MG tablet TAKE 1 TABLET BY MOUTH TWICE A DAY 4/12/22   Joyce Plata MD   glucose blood (FREESTYLE LITE) test strip 1 each by Other route 3 (Three) Times a Day. Use as instructed 4/5/22   Justin Wheeler MD   guaiFENesin (MUCINEX) 600 MG 12 hr tablet Take 1 tablet by mouth Every 12 (Twelve) Hours. 12/1/21   Justin Wheeler MD   insulin detemir (Levemir FlexTouch) 100 UNIT/ML injection Inject 18 Units under the skin into the appropriate area as directed Every Night. 5/5/22   Brent Coulter MD   Insulin Syringe 31G X 5/16\" 0.5 ML misc Inject 1 each under the skin into the appropriate area as directed Every Night. 12/2/21   Justin Wheeler MD   ipratropium-albuterol (DUO-NEB) 0.5-2.5 mg/3 ml nebulizer Take 3 mL by nebulization 4 (Four) Times a Day. 2/23/22   Justin Wheeler MD   isosorbide mononitrate (IMDUR) 30 MG 24 hr tablet Take 1 tablet by mouth Daily. 12/16/21   Joyce Plata MD   Lancets (freestyle) lancets 1 each by Other route 3 (Three) Times a Day. Use as instructed 4/5/22   Justin Wheeler MD   lisinopril (PRINIVIL,ZESTRIL) 10 MG tablet TAKE 1 TABLET BY MOUTH EVERY DAY 3/22/21   Joyce Plata MD   magnesium oxide (MAGOX) 400 (241.3 Mg) MG tablet tablet Take 1 tablet by mouth Daily. 10/6/17   Joyce Plata MD   nitroglycerin (Nitrostat) 0.4 MG SL tablet Place 1 tablet under the tongue Every 5 (Five) Minutes As Needed for Chest Pain. Take no more than 3 doses in 15 minutes. 2/2/22   Joyce Plata MD   pantoprazole (Protonix) 40 MG EC tablet Take 1 tablet by mouth Daily. 1/19/22   " Swetha Raymond APRN   potassium chloride (MICRO-K) 10 MEQ CR capsule Take 2 capsules by mouth 2 (Two) Times a Day. 5/5/22   Brent Coulter MD   sucralfate (CARAFATE) 1 g tablet Take 1 g by mouth 3 (Three) Times a Day. 4/21/22   Provider, MD Heidi     I have reviewed the patient's current medications    Objective      Vital Signs  Temp:  [98 °F (36.7 °C)] 98 °F (36.7 °C)  Heart Rate:  [] 98  Resp:  [24] 24  BP: (160-166)/() 166/99    Physical Exam  Constitutional:       General: She is not in acute distress.     Appearance: She is not ill-appearing or diaphoretic.   HENT:      Head: Normocephalic and atraumatic.      Right Ear: External ear normal.      Left Ear: External ear normal.      Nose: No congestion or rhinorrhea.      Mouth/Throat:      Mouth: Mucous membranes are moist.      Pharynx: No oropharyngeal exudate or posterior oropharyngeal erythema.   Eyes:      General: No scleral icterus.     Extraocular Movements: Extraocular movements intact.      Conjunctiva/sclera: Conjunctivae normal.   Cardiovascular:      Rate and Rhythm: Normal rate and regular rhythm.      Heart sounds: Normal heart sounds. No murmur heard.  Pulmonary:      Effort: Pulmonary effort is normal. No respiratory distress.      Breath sounds: Normal breath sounds. No wheezing, rhonchi or rales.   Abdominal:      General: Abdomen is flat. There is no distension.      Palpations: Abdomen is soft.      Tenderness: There is no abdominal tenderness. There is no guarding.   Musculoskeletal:         General: No swelling, tenderness or deformity.      Cervical back: Neck supple. No rigidity. No muscular tenderness.      Right lower leg: No edema.      Left lower leg: No edema.   Lymphadenopathy:      Cervical: No cervical adenopathy.   Skin:     General: Skin is warm and dry.   Neurological:      General: No focal deficit present.      Mental Status: She is alert and oriented to person, place, and time.      Cranial  Nerves: No cranial nerve deficit.      Motor: No weakness.   Psychiatric:         Mood and Affect: Mood normal.         Behavior: Behavior normal.         Thought Content: Thought content normal.       Results Review:     Results from last 7 days   Lab Units 05/08/22  1117 05/05/22  0721 05/04/22  2155 05/04/22  0538 05/03/22  0606 05/02/22  1047   SODIUM mmol/L 139 140  --  141 140 140   POTASSIUM mmol/L 4.1 3.9 4.1 2.9* 3.1* 3.6   CHLORIDE mmol/L 106 105  --  105 106 106   CO2 mmol/L 19.0* 24.0  --  21.0* 21.0* 20.0*   BUN mg/dL 12 15  --  13 13 13   CREATININE mg/dL 0.91 1.06*  --  0.89 0.91 0.89   GLUCOSE mg/dL 250* 81  --  53* 107* 228*   CALCIUM mg/dL 9.5 10.3  --  9.3 9.2 9.5   BILIRUBIN mg/dL 0.8  --   --   --   --  1.0   ALK PHOS U/L 106  --   --   --   --  102   ALT (SGPT) U/L 26  --   --   --   --  25   AST (SGOT) U/L 23  --   --   --   --  21       Results from last 7 days   Lab Units 05/05/22  0721 05/04/22  0856   MAGNESIUM mg/dL 1.7 1.6       Results from last 7 days   Lab Units 05/08/22  1117 05/04/22  0538 05/03/22  0606 05/02/22  1047   WBC 10*3/mm3 10.24 11.15* 10.43 11.89*   HEMOGLOBIN g/dL 11.7* 11.0* 10.8* 12.0   HEMATOCRIT % 36.4 33.3* 33.6* 36.6   PLATELETS 10*3/mm3 294 288 258 292       Results from last 7 days   Lab Units 05/02/22  1047   INR  1.19     Imaging Results (Last 7 Days)     Procedure Component Value Units Date/Time    XR Chest 2 View [[id number]] Collected: 05/08/22 1035     Updated: 05/08/22 1100    Narrative:      AP and lateral chest x-ray May 8, 2022    INDICATION: Shortness of breath    FINDINGS:  Cardiomegaly.  Cardiac pacer/defibrillator wires remain in place.  No gross infiltrates, congestive changes, pneumothorax or pleural  fluid.      Impression:      Cardiomegaly.  No definite acute cardiopulmonary process.    Electronically signed by:  Palomo Mccray MD  5/8/2022 10:57 AM  CDT Workstation: [id number]        Assessment / Plan       Hospital Problem List:  Principal  Problem:    Congestive heart failure (HCC)  Essential hypertension  Chronic systolic congestive heart failure  Coronary artery disease  s/p AICD placement  Type II diabetes mellitus    Plan  Patient has worsening shortness of breath on exertion likely due to exacerbation of her chronic systolic congestive heart failure.  We will admit patient and start diuresis with IV Lasix 40 mg every 12 hours.  Consider consult with patient's cardiologist Dr. Felipe in the morning.  Daily weights.  Monitor on telemetry.    Continue other home medications for coronary artery disease    NovoLog sliding scale and Levemir for type 2 diabetes mellitus    DVT prophylaxis with subcutaneous  Lovenox     CODE STATUS is full code    I discussed the patient's findings and my recommendations with patient    I have utilized all available immediate resources to obtain, update, or review the patient's current medications.      I confirmed that the patient's Advance Care Plan is present, code status is documented, or surrogate decision maker is listed in the patient's medical record.      Shelbie Berry MD  05/08/22  12:03 CDT        Part of this note may be an electronic transcription/translation of spoken language to printed text using the Dragon Dictation System.

## 2022-05-08 NOTE — ED NOTES
"Patient presents to the ED with c/o shortness of breathing. Patient states, \"It's mainly when I'm walking, and sometimes when I'm sitting up.\"    Patient denies any pain at this time and is at 98% SpO2 on room air.   "

## 2022-05-09 NOTE — THERAPY EVALUATION
Patient Name: Lexi Wade  : 1950    MRN: [id number]                              Today's Date: 2022       Admit Date: 2022    Visit Dx:     ICD-10-CM ICD-9-CM   1. Acute on chronic systolic congestive heart failure (HCC)  I50.23 428.23     428.0   2. 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)   • Implantable cardioverter-defibrillator (ICD) generator end of life   • Multifocal pneumonia   • Acute on chronic systolic CHF (congestive heart failure) (HCC)   • Obesity (BMI 30-39.9)   • Shortness of breath   • Altered mental status   • Morbid obesity (HCC)   • Drowsiness     Past Medical History:   Diagnosis Date   • Chronic systolic heart failure (HCC)    • Diabetes mellitus (HCC)    • Diabetic neuropathy (HCC)    • GERD (gastroesophageal reflux disease)    • Hypercholesterolemia    • Hypertension    • Low back pain    • Morbid obesity (HCC)    • Nonischemic cardiomyopathy (HCC)    • Osteoarthritis    • Sleep apnea    • Vitamin D deficiency      Past Surgical History:   Procedure Laterality Date   • CARDIAC CATHETERIZATION N/A 2018   • CARDIAC ELECTROPHYSIOLOGY PROCEDURE N/A 2020   • CARDIAC PACEMAKER PLACEMENT     • CATARACT EXTRACTION     • COLONOSCOPY N/A 2017   • PACEMAKER IMPLANTATION     • TOTAL ABDOMINAL HYSTERECTOMY WITH SALPINGO OOPHORECTOMY        General  Information     Row Name 05/09/22 0841          OT Time and Intention    Document Type evaluation  -     Mode of Treatment co-treatment;physical therapy;occupational therapy  -     Row Name 05/09/22 0841          General Information    Patient Profile Reviewed yes  -     Prior Level of Function independent:;all household mobility;transfer;ADL's;feeding;grooming;bathing;dressing;home management  -     Existing Precautions/Restrictions fall  -     Row Name 05/09/22 0841          Living Environment    People in Home alone  -     Row Name 05/09/22 0841          Home Main Entrance    Number of Stairs, Main Entrance three  -     Stair Railings, Main Entrance railing on right side (ascending)  -     Row Name 05/09/22 0841          Stairs Within Home, Primary    Stairs, Within Home, Primary Apartment. Tub-shower, no seat, does have grab bars, standard toilet. (I) ADLs, iADLs, drives. Ambulates with FWW, also uses SPC. Nephew and sister help with grocery shopping.  -     Row Name 05/09/22 0841          Cognition    Orientation Status (Cognition) oriented x 4  -     Row Name 05/09/22 0841          Safety Issues, Functional Mobility    Safety Issues Affecting Function (Mobility) safety precaution awareness  -     Impairments Affecting Function (Mobility) endurance/activity tolerance  -           User Key  (r) = Recorded By, (t) = Taken By, (c) = Cosigned By    Initials Name Provider Type     Maryan Henry, OT Occupational Therapist                 Mobility/ADL's     Row Name 05/09/22 0841          Transfers    Transfers sit-stand transfer;toilet transfer  -     Sit-Stand [place] (Transfers) supervision  -     [place]st Level (Toilet Transfer) supervision  -     Assistive Device (Toilet Transfer) walker, front-wheeled  -     Row Name 05/09/22 0841          Sit-Stand Transfer    Assistive Device (Sit-Stand Transfers) walker, front-wheeled  -     Row Name 05/09/22 0841           Toilet Transfer    Type (Toilet Transfer) sit-stand;stand-sit  -[facility] Name 05/09/22 0841          Activities of Daily Living    BADL Assessment/Intervention lower body dressing;grooming  -SJ     Row Name 05/09/22 0841          Lower Body Dressing Assessment/Training    [place] Level (Lower Body Dressing) don;doff;socks;supervision  -[facility] Name 05/09/22 0841          Grooming Assessment/Training    [place] Level (Grooming) supervision  -     Position (Grooming) sink side  -     Comment, (Grooming) washing hands standing at sink  -           User Key  (r) = Recorded By, (t) = Taken By, (c) = Cosigned By    Initials Name Provider Type     Maryan Henry OT Occupational Therapist               Obj/Interventions     [facility] Name 05/09/22 0841          Sensory Assessment (Somatosensory)    Sensory Assessment (Somatosensory) UE sensation intact  -SJ     Row Name 05/09/22 0841          Range of Motion Comprehensive    General Range of Motion bilateral upper extremity ROM WFL  -SJ     Row Name 05/09/22 0841          Strength Comprehensive (MMT)    General Manual Muscle Testing (MMT) Assessment other (see comments)  -     Comment, General Manual Muscle Testing (MMT) Assessment BUE 4/5 grossly  -           User Key  (r) = Recorded By, (t) = Taken By, (c) = Cosigned By    Initials Name Provider Type     Maryan Henry OT Occupational Therapist               Goals/Plan     [facility] Name 05/09/22 0841          Transfer Goal 1 (OT)    Activity/Assistive Device (Transfer Goal 1, OT) toilet  -     [place] Level/Cues Needed (Transfer Goal 1, OT) modified independence  -SJ     Time Frame (Transfer Goal 1, OT) long term goal (LTG);by discharge  -     Progress/Outcome (Transfer Goal 1, OT) goal not met  -[facility] Name 05/09/22 0841          Bathing Goal 1 (OT)    Activity/Device (Bathing Goal 1, OT) lower body bathing  -     [place] Level/Cues Needed (Bathing Goal 1, OT) modified  independence  -SJ     Time Frame (Bathing Goal 1, OT) long term goal (LTG);by discharge  -SJ     Progress/Outcomes (Bathing Goal 1, OT) goal not met  -SJ     Row Name 05/09/22 0841          Dressing Goal 1 (OT)    Activity/Device (Dressing Goal 1, OT) lower body dressing  -SJ     [place]/Cues Needed (Dressing Goal 1, OT) independent  -SJ     Time Frame (Dressing Goal 1, OT) long term goal (LTG);by discharge  -     Progress/Outcome (Dressing Goal 1, OT) goal not met  -SJ     Row Name 05/09/22 0841          Toileting Goal 1 (OT)    Activity/Device (Toileting Goal 1, OT) toileting skills, all  -SJ     [place] Level/Cues Needed (Toileting Goal 1, OT) independent  -SJ     Time Frame (Toileting Goal 1, OT) long term goal (LTG);by discharge  -     Progress/Outcome (Toileting Goal 1, OT) goal not met  -SJ     Row Name 05/09/22 0841          Therapy Assessment/Plan (OT)    Planned Therapy Interventions (OT) activity tolerance training;BADL retraining;adaptive equipment training;cognitive/visual perception retraining;edema control/reduction;neuromuscular control/coordination retraining;manual therapy/joint mobilization;IADL retraining;functional balance retraining;occupation/activity based interventions;ROM/therapeutic exercise;strengthening exercise;transfer/mobility retraining;passive ROM/stretching;patient/caregiver education/training  -           User Key  (r) = Recorded By, (t) = Taken By, (c) = Cosigned By    Initials Name Provider Type     Maryan Henry, OT Occupational Therapist               Clinical Impression     Row Name 05/09/22 0841          Pain Assessment    Pretreatment Pain Rating 0/10 - no pain  -     Posttreatment Pain Rating 0/10 - no pain  -SJ     Row Name 05/09/22 0841          Plan of Care Review    Plan of Care Reviewed With patient  -     Outcome Evaluation OT eval complete, co-eval with PT, sitting in recliner upon arrival. Patient agreeable for evaluation. LE dressing with  supervision. Sit to stand and toilet transfer with Supervision and RW. Grooming at sink with supervision. With activity, patient presented SOA, SPO2 % througohut session. Patient with decreased activity tolerance, decreased strength, and decreased safety in ADLs and transfers. Cont inpatient OT to improve self-care and transfers skills Anticipate home with assist and home health OT.  -     Row Name 05/09/22 0841          Therapy Assessment/Plan (OT)    Patient/Family Therapy Goal Statement (OT) return home  -     Rehab Potential (OT) good, to achieve stated therapy goals  -     Criteria for Skilled Therapeutic Interventions Met (OT) yes;skilled treatment is necessary  -     Therapy Frequency (OT) other (see comments)  3-7 d/wk  -     Predicted Duration of Therapy Intervention (OT) until d/c or all goals met  -     Row Name 05/09/22 0841          Therapy Plan Review/Discharge Plan (OT)    Equipment Needs Upon Discharge (OT) shower chair  -     Anticipated Discharge Disposition (OT) home with assist;home with home health  -     Row Name 05/09/22 0841          Vital Signs    Pre Systolic BP Rehab 129  -SJ     Pre Treatment Diastolic BP 69  -SJ     Pretreatment Heart Rate (beats/min) 80  -SJ     Intratreatment Heart Rate (beats/min) 100  -SJ     Posttreatment Heart Rate (beats/min) 98  -SJ     Pre SpO2 (%) 98  -SJ     O2 Delivery Pre Treatment room air  -SJ     Intra SpO2 (%) 99  -SJ     O2 Delivery Intra Treatment room air  -SJ     Post SpO2 (%) 100  -SJ     O2 Delivery Post Treatment room air  -SJ     Pre Patient Position Sitting  -SJ     Intra Patient Position Sitting  -SJ     Post Patient Position Sitting  -     Row Name 05/09/22 0841          Positioning and Restraints    Pre-Treatment Position sitting in chair/recliner  -SJ     Post Treatment Position chair  -SJ     In Chair reclined;call light within reach;encouraged to call for assist  -SJ           User Key  (r) = Recorded By, (t) =  Taken By, (c) = Cosigned By    Initials Name Provider Type    SJ Maryan Henry, OT Occupational Therapist               Outcome Measures     Row Name 05/09/22 0841          How much help from another is currently needed...    Putting on and taking off regular lower body clothing? 3  -SJ     Bathing (including washing, rinsing, and drying) 3  -SJ     Toileting (which includes using toilet bed pan or urinal) 3  -SJ     Putting on and taking off regular upper body clothing 4  -SJ     Taking care of personal grooming (such as brushing teeth) 4  -SJ     Eating meals 4  -SJ     AM-PAC 6 Clicks Score (OT) 21  -SJ     Row Name 05/09/22 0840          How much help from another person do you currently need...    Turning from your back to your side while in flat bed without using bedrails? 3  -CZ     Moving from lying on back to sitting on the side of a flat bed without bedrails? 3  -CZ     Moving to and from a bed to a chair (including a wheelchair)? 3  -CZ     Standing up from a chair using your arms (e.g., wheelchair, bedside chair)? 3  -CZ     Climbing 3-5 steps with a railing? 3  -CZ     To walk in hospital room? 3  -CZ     AM-PAC 6 Clicks Score (PT) 18  -CZ     Highest level of mobility 6 --> Walked 10 steps or more  -     Row Name 05/09/22 0841 05/09/22 0840       Functional Assessment    Outcome Measure Options AM-PAC 6 Clicks Daily Activity (OT)  - AM-PAC 6 Clicks Basic Mobility (PT);Tinetti  -CZ          User Key  (r) = Recorded By, (t) = Taken By, (c) = Cosigned By    Initials Name Provider Type    CZ Frandy Lemos, PT Physical Therapist    Maryan Arechiga, OT Occupational Therapist                Occupational Therapy Education                 Title: PT OT SLP Therapies (In Progress)     Topic: Occupational Therapy (In Progress)     Point: ADL training (Not Started)     Description:   Instruct learner(s) on proper safety adaptation and remediation techniques during self care or transfers.   Instruct in  proper use of assistive devices.              Learner Progress:  Not documented in this visit.          Point: Home exercise program (Not Started)     Description:   Instruct learner(s) on appropriate technique for monitoring, assisting and/or progressing therapeutic exercises/activities.              Learner Progress:  Not documented in this visit.          Point: Precautions (Done)     Description:   Instruct learner(s) on prescribed precautions during self-care and functional transfers.              Learning Progress Summary           Patient Acceptance, E,TB, VU by  at 5/9/2022 0911    Comment: POC, role of OT, transfer training                   Point: Body mechanics (Done)     Description:   Instruct learner(s) on proper positioning and spine alignment during self-care, functional mobility activities and/or exercises.              Learning Progress Summary           Patient Acceptance, E,TB, VU by  at 5/9/2022 0911    Comment: POC, role of OT, transfer training                               User Key     Initials Effective Dates Name Provider Type Discipline     06/14/21 -  Maryan Henry, OT Occupational Therapist OT              OT Recommendation and Plan  Planned Therapy Interventions (OT): activity tolerance training, BADL retraining, adaptive equipment training, cognitive/visual perception retraining, edema control/reduction, neuromuscular control/coordination retraining, manual therapy/joint mobilization, IADL retraining, functional balance retraining, occupation/activity based interventions, ROM/therapeutic exercise, strengthening exercise, transfer/mobility retraining, passive ROM/stretching, patient/caregiver education/training  Therapy Frequency (OT): other (see comments) (3-7 d/wk)  Plan of Care Review  Plan of Care Reviewed With: patient  Outcome Evaluation: OT eval complete, co-eval with PT, sitting in recliner upon arrival. Patient agreeable for evaluation. LE dressing with supervision.  Sit to stand and toilet transfer with Supervision and RW. Grooming at sink with supervision. With activity, patient presented SOA, SPO2 % througohut session. Patient with decreased activity tolerance, decreased strength, and decreased safety in ADLs and transfers. Cont inpatient OT to improve self-care and transfers skills Anticipate home with assist and home health OT.     Time Calculation:    Time Calculation- OT     Row Name 05/09/22 0928             Time Calculation- OT    OT Start Time 0841  -      OT Stop Time 0928  -      OT Time Calculation (min) 47 min  -SJ      OT Received On 05/09/22  -      OT Goal Re-Cert Due Date 05/22/22  -              Untimed Charges    OT Eval/Re-eval Minutes 47  -SJ              Total Minutes    Untimed Charges Total Minutes 47  -SJ       Total Minutes 47  -SJ            User Key  (r) = Recorded By, (t) = Taken By, (c) = Cosigned By    Initials Name Provider Type     Maryan Henry OT Occupational Therapist              Therapy Charges for Today     Code Description Service Date Service Provider Modifiers Qty    [id number] HC OT EVAL LOW COMPLEXITY 3 5/9/2022 Mayran Henry OT GO 1               Maryan Henry OT  5/9/2022

## 2022-05-09 NOTE — OUTREACH NOTE
Call Center TCM Note    Flowsheet Row Responses   [facility] patient discharged from? [place]   Does the patient have one of the following disease processes/diagnoses(primary or secondary)? CHF   TCM attempt successful? No   Unsuccessful attempts Attempt 3   Change in Health Status Readmitted           Debbie Simmons RN    5/9/2022, 08:10 EDT

## 2022-05-09 NOTE — PROGRESS NOTES
[facility]  INPATIENT PROGRESS NOTE    Length of Stay: 0  Date of Admission: 5/8/2022  Primary Care Physician: Joyce Plata MD    Subjective   Chief Complaint: No complaints    HPI: This is a 71-year-old female with past medical history of HTN, CHF, CAD (status post AICD), DM 2 and GERD that presented to [facility] on 5/8/2022 with complaints of worsening shortness of air, altered mental status and cough x3 days.    BNP elevated at 3910. Patient follows with Dr. Felipe for CHF.      Review of Systems   Constitutional: Positive for fatigue. Negative for activity change.   HENT: Negative for ear pain and sore throat.    Eyes: Negative for pain and discharge.   Respiratory: Positive for cough and shortness of breath.    Cardiovascular: Negative for chest pain and palpitations.   Gastrointestinal: Negative for abdominal pain and nausea.   Endocrine: Negative for cold intolerance and heat intolerance.   Genitourinary: Negative for difficulty urinating and dysuria.   Musculoskeletal: Negative for arthralgias and gait problem.   Skin: Negative for color change and rash.   Neurological: Negative for dizziness and weakness.   Psychiatric/Behavioral: Negative for agitation and confusion.       Objective    Temp:  [96.6 °F (35.9 °C)-97.9 °F (36.6 °C)] 96.6 °F (35.9 °C)  Heart Rate:  [] 83  Resp:  [15-28] 15  BP: (123-165)/() 140/92    Physical Exam  Constitutional:       Appearance: She is well-developed.   HENT:      Head: Normocephalic and atraumatic.   Eyes:      Pupils: Pupils are equal, round, and reactive to light.   Cardiovascular:      Rate and Rhythm: Normal rate and regular rhythm.   Pulmonary:      Effort: Pulmonary effort is normal.      Breath sounds: Normal breath sounds.   Abdominal:      General: Bowel sounds are normal.      Palpations: Abdomen is soft.   Musculoskeletal:         General: Normal range of motion.      Cervical back: Normal range of motion  and neck supple.   Skin:     General: Skin is warm and dry.   Neurological:      Mental Status: She is alert and oriented to person, place, and time.   Psychiatric:         Behavior: Behavior normal.       Results Review:  I have reviewed the labs, radiology results, and diagnostic studies.    Laboratory Data:   Results from last 7 days   Lab Units 05/09/22  0519 05/08/22  1117 05/05/22  0721 05/03/22  0606 05/02/22  1047   SODIUM mmol/L 139 139 140   < > 140   POTASSIUM mmol/L 3.8 4.1 3.9   < > 3.6   CHLORIDE mmol/L 105 106 105   < > 106   CO2 mmol/L 20.0* 19.0* 24.0   < > 20.0*   BUN mg/dL 16 12 15   < > 13   CREATININE mg/dL 1.12* 0.91 1.06*   < > 0.89   GLUCOSE mg/dL 206* 250* 81   < > 228*   CALCIUM mg/dL 9.8 9.5 10.3   < > 9.5   BILIRUBIN mg/dL  --  0.8  --   --  1.0   ALK PHOS U/L  --  106  --   --  102   ALT (SGPT) U/L  --  26  --   --  25   AST (SGOT) U/L  --  23  --   --  21   ANION GAP mmol/L 14.0 14.0 11.0   < > 14.0    < > = values in this interval not displayed.     Estimated Creatinine Clearance: 48.7 mL/min (A) (by C-G formula based on SCr of 1.12 mg/dL (H)).  Results from last 7 days   Lab Units 05/05/22  0721 05/04/22  0856   MAGNESIUM mg/dL 1.7 1.6         Results from last 7 days   Lab Units 05/09/22  0519 05/08/22  1117 05/04/22  0538 05/03/22  0606 05/02/22  1047   WBC 10*3/mm3 10.04 10.24 11.15* 10.43 11.89*   HEMOGLOBIN g/dL 11.8* 11.7* 11.0* 10.8* 12.0   HEMATOCRIT % 35.1 36.4 33.3* 33.6* 36.6   PLATELETS 10*3/mm3 307 294 288 258 292     Results from last 7 days   Lab Units 05/02/22  1047   INR  1.19       Culture Data:   No results found for: BLOODCX  No results found for: URINECX  No results found for: RESPCX  No results found for: WOUNDCX  No results found for: STOOLCX  No components found for: BODYFLD    Radiology Data:   Imaging Results (Last 24 Hours)     Procedure Component Value Units Date/Time    XR Chest 2 View [[id number]] Collected: 05/08/22 1035     Updated: 05/08/22 1100     Narrative:      AP and lateral chest x-ray May 8, 2022    INDICATION: Shortness of breath    FINDINGS:  Cardiomegaly.  Cardiac pacer/defibrillator wires remain in place.  No gross infiltrates, congestive changes, pneumothorax or pleural  fluid.      Impression:      Cardiomegaly.  No definite acute cardiopulmonary process.    Electronically signed by:  Palomo Mccray MD  5/8/2022 10:57 AM  CDT Workstation: [id number]          I have reviewed the patient's current medications.     Assessment/Plan     Active Hospital Problems    Diagnosis    • **Congestive heart failure (HCC)        Plan:    1.  Acute on chronic heart failure with reduced ejection fraction: Continue IV Lasix.  Dr. Felipe consulted.  Recent echocardiogram showed EF of 36 to 40%.  2.  CAD/hypertension: Continue home aspirin, Coreg, Imdur and lisinopril.  3.  GERD: Continue home PPI.  4.  Diabetes mellitus, type II: Continue SSI and long-acting.    DVT prophylaxis: Lovenox.      Discharge Planning: I expect patient to be discharged to home in 1-2 days.    I confirmed that the patient's Advance Care Plan is present, code status is documented, or surrogate decision maker is listed in the patient's medical record.      I have utilized all available immediate resources to obtain, update, or review the patient's current medications.         This document has been electronically signed by MARYANNE Strong on May 9, 2022 10:40 CDT

## 2022-05-09 NOTE — DISCHARGE PLACEMENT REQUEST
"Carline Norris (71 y.o. Female)             Date of Birth   1950    Social Security Number       Address   [address] [facility] [address]    Home Phone   [phone]    MRN   [id number]       [employer]ism   Denominational    Marital Status   Single                            Admission Date   5/8/22    Admission Type   Emergency    Admitting Provider   Shelbie Berry MD    Attending Provider   Shelbie Berry MD    Department, Room/Bed   [address], [address]       Discharge Date       Discharge Disposition       Discharge Destination                               Attending Provider: Shelbie Berry MD    Allergies: Aldactone [Spironolactone], Nsaids    Isolation: None   Infection: None   Code Status: CPR   Advance Care Planning Activity    Ht: 162.6 cm (64\")   Wt: 90.3 kg (199 lb 2 oz)    Admission Cmt: None   Principal Problem: Congestive heart failure (HCC) [I50.9]                 Active Insurance as of 5/8/2022     Primary Coverage     Payor Plan Insurance Group Employer/Plan Group    MEDICARE MEDICARE A & B      Payor Plan Address Payor Plan Phone Number Payor Plan Fax Number Effective Dates    [address] [phone]  11/1/2000 - None Entered    [facility] [address]       Subscriber Name Subscriber Birth Date Member ID       CARLINE NORRIS 1950 [id number]           Secondary Coverage     Payor Plan Insurance Group Employer/Plan Group    [place]      Payor Plan Address Payor Plan Phone Number Payor Plan Fax Number Effective Dates    [address] [phone]  10/4/2016 - None Entered    [place] [address]       Subscriber Name Subscriber Birth Date Member ID       CARLINE NORRIS 1950 [id number]                 Emergency Contacts      (Rel.) Home Phone Work Phone Mobile Phone    Yudi Norris (Sister) [phone] -- [phone]            Emergency Contact Information     Name " Relation Home Work Mobile    Yudi Wade [phone]          Insurance Information                MEDICARE/MEDICARE A & B Phone: [phone]    Subscriber: Lexi Wade Subscriber#: [id number]    Group#: -- Precert#: --        [place] Phone: [phone]    Subscriber: Lexi Wade Subscriber#: [id number]    Group#: -- Precert#: --

## 2022-05-09 NOTE — CASE COMMUNICATION
Patient missed a PT admission visit from [facility] on 5/6/22.     Reason: PT attempted to contact patient multiple times on this date w/ no call back after mutliple messages left. PT will receive new orders for admission followign week if able to contact patient at that time. No visit able to be made on this date and will request new SOC date to admit patein following week.       For your records only.   As per home health dilia col, MD must be notified of missed/cancelled visits; therefore the prescribed frequency was not met.

## 2022-05-09 NOTE — PLAN OF CARE
Problem: Adult Inpatient Plan of Care  Goal: Plan of Care Review  Outcome: Ongoing, Progressing  Flowsheets (Taken 5/9/2022 0458)  Progress: no change  Plan of Care Reviewed With: patient  Outcome Evaluation: pt vs stable, no new events at this time.  Goal: Patient-Specific Goal (Individualized)  Outcome: Ongoing, Progressing  Goal: Absence of Hospital-Acquired Illness or Injury  Outcome: Ongoing, Progressing  Intervention: Identify and Manage Fall Risk  Recent Flowsheet Documentation  Taken 5/9/2022 0443 by Nancy Phoenix RN  Safety Promotion/Fall Prevention:   activity supervised   assistive device/personal items within reach   clutter free environment maintained   fall prevention program maintained   safety round/check completed   nonskid shoes/slippers when out of bed  Taken 5/9/2022 0240 by Nancy Phoenix RN  Safety Promotion/Fall Prevention:   activity supervised   clutter free environment maintained   fall prevention program maintained   safety round/check completed   nonskid shoes/slippers when out of bed  Taken 5/9/2022 0024 by Nancy Phoenix RN  Safety Promotion/Fall Prevention:   activity supervised   assistive device/personal items within reach   clutter free environment maintained   fall prevention program maintained   safety round/check completed   nonskid shoes/slippers when out of bed  Taken 5/8/2022 2220 by Nancy Phoenix RN  Safety Promotion/Fall Prevention:   assistive device/personal items within reach   clutter free environment maintained   activity supervised   fall prevention program maintained   safety round/check completed   nonskid shoes/slippers when out of bed  Taken 5/8/2022 2120 by Nancy Phoenix RN  Safety Promotion/Fall Prevention:   activity supervised   assistive device/personal items within reach   clutter free environment maintained   fall prevention program maintained   safety round/check completed   muscle strengthening facilitated  Taken 5/8/2022 2044  by Phoeinx, Nancy L, RN  Safety Promotion/Fall Prevention:   activity supervised   assistive device/personal items within reach   clutter free environment maintained   fall prevention program maintained   safety round/check completed   nonskid shoes/slippers when out of bed  Taken 5/8/2022 1921 by Nancy Phoenix RN  Safety Promotion/Fall Prevention:   safety round/check completed   nonskid shoes/slippers when out of bed   activity supervised   assistive device/personal items within reach   clutter free environment maintained  Intervention: Prevent Skin Injury  Recent Flowsheet Documentation  Taken 5/9/2022 0443 by Nancy Phoenix RN  Body Position:   dangle, side of bed   position changed independently  Taken 5/9/2022 0240 by Nancy Phoenix RN  Body Position:   position changed independently   supine  Taken 5/9/2022 0024 by Nancy Phoenix RN  Body Position:   position changed independently   right   side-lying  Taken 5/8/2022 2220 by Nancy Phoenix RN  Body Position:   position changed independently   dangle, side of bed  Taken 5/8/2022 2044 by Nancy Phoenix RN  Body Position:   position changed independently   sitting up in bed  Intervention: Prevent and Manage VTE (Venous Thromboembolism) Risk  Recent Flowsheet Documentation  Taken 5/8/2022 2044 by Nancy Phoenix RN  Activity Management: activity adjusted per tolerance  VTE Prevention/Management: (lovenox) other (see comments)  Goal: Optimal Comfort and Wellbeing  Outcome: Ongoing, Progressing  Intervention: Provide Person-Centered Care  Recent Flowsheet Documentation  Taken 5/8/2022 2044 by Nancy Phoenix RN  Trust Relationship/Rapport:   care explained   questions answered   thoughts/feelings acknowledged  Goal: Readiness for Transition of Care  Outcome: Ongoing, Progressing   Goal Outcome Evaluation:  Plan of Care Reviewed With: patient        Progress: no change  Outcome Evaluation: pt vs stable, no new events at this  time.

## 2022-05-09 NOTE — PLAN OF CARE
Patient up in chair this morning eating breakfast. No needs verbilized at this time.  Goal Outcome Evaluation:

## 2022-05-09 NOTE — PLAN OF CARE
Goal Outcome Evaluation:  Plan of Care Reviewed With: patient           Outcome Evaluation: OT eval complete, co-eval with PT, sitting in recliner upon arrival. Patient agreeable for evaluation. LE dressing with supervision. Sit to stand and toilet transfer with Supervision and RW. Grooming at sink with supervision. With activity, patient presented SOA, SPO2 % througohut session. Patient with decreased activity tolerance, decreased strength, and decreased safety in ADLs and transfers. Cont inpatient OT to improve self-care and transfers skills Anticipate home with assist and home health OT.

## 2022-05-09 NOTE — CONSULTS
Adult Nutrition  Assessment    Patient Name:  Lexi Wade  YOB: 1950  MRN: [id number]  Admit Date:  5/8/2022    Assessment Date:  5/9/2022    Comments:  RD visit to offer Low Na diet ed r/t admit d/t CHF. Pt w/recent admission for same and reports she has received this information previously. Will monitor course and make recs as indicated.      Reason for Assessment     Row Name 05/09/22 1021          Reason for Assessment    Reason For Assessment per organizational policy     Diagnosis cardiac disease     Identified At Risk by Screening Criteria need for education                Nutrition/Diet History     Row Name 05/09/22 1021          Nutrition/Diet History    Typical Intake (Food/Fluid/EN/PN) Pt very sleepy but was able to tell me that she has received low na diet ed previously.                Anthropometrics     Row Name 05/09/22 0444          Anthropometrics    Weight 90.3 kg (199 lb 2 oz)                Labs/Tests/Procedures/Meds     Row Name 05/09/22 1022          Labs/Procedures/Meds    Lab Results Reviewed reviewed, pertinent     Lab Results Comments Gl 190, A1c 8.2            Medications    Pertinent Medications Reviewed reviewed     Pertinent Medications Comments iv lasix                  Estimated/Assessed Needs - Anthropometrics     Row Name 05/09/22 0444          Anthropometrics    Weight 90.3 kg (199 lb 2 oz)                Nutrition Prescription Ordered     Row Name 05/09/22 1023          Nutrition Prescription PO    Current PO Diet Regular     Fluid Consistency Thin     Common Modifiers Cardiac;Consistent Carbohydrate                       Electronically signed by:  Priyanka Cheney RD  05/09/22 10:24 CDT

## 2022-05-09 NOTE — THERAPY EVALUATION
Patient Name: Lexi Wade  : 1950    MRN: [id number]                              Today's Date: 2022       Admit Date: 2022    Visit Dx:     ICD-10-CM ICD-9-CM   1. Acute on chronic systolic congestive heart failure (HCC)  I50.23 428.23     428.0   2. Impaired mobility and ADLs  Z74.09 V49.89    Z78.9    3. 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)   • Mixed hyperlipidemia   • Menopausal syndrome   • Dyspnea   • Chest pain   • Morbid obesity (HCC)   • Precordial pain   • Acute respiratory failure with hypoxia (HCC)   • 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)   • Implantable cardioverter-defibrillator (ICD) generator end of life   • Multifocal pneumonia   • Acute on chronic systolic CHF (congestive heart failure) (HCC)   • Obesity (BMI 30-39.9)   • Shortness of breath   • Altered mental status   • Morbid obesity (HCC)   • Drowsiness     Past Medical History:   Diagnosis Date   • Chronic systolic heart failure (HCC)    • Diabetes mellitus (HCC)    • Diabetic neuropathy (HCC)    • GERD (gastroesophageal reflux disease)    • Hypercholesterolemia    • Hypertension    • Low back pain    • Morbid obesity (HCC)    • Nonischemic cardiomyopathy (HCC)    • Osteoarthritis    • Sleep apnea    • Vitamin D deficiency      Past Surgical History:   Procedure Laterality Date   • CARDIAC CATHETERIZATION N/A 2018   • CARDIAC ELECTROPHYSIOLOGY PROCEDURE N/A 2020   • CARDIAC PACEMAKER PLACEMENT     • CATARACT EXTRACTION     • COLONOSCOPY N/A 2017   • PACEMAKER IMPLANTATION      • TOTAL ABDOMINAL HYSTERECTOMY WITH SALPINGO OOPHORECTOMY        General Information     Row Name 05/09/22 0840          Physical Therapy Time and Intention    Document Type evaluation  -     Mode of Treatment co-treatment;physical therapy;occupational therapy  -     Row Name 05/09/22 0840          General Information    Patient Profile Reviewed yes  -CZ     Prior Level of Function independent:;all household mobility  -     Existing Precautions/Restrictions fall  -CZ     Row Name 05/09/22 0840          Living Environment    People in Home alone  -     Row Name 05/09/22 0840          Home Main Entrance    Number of Stairs, Main Entrance three  -CZ     Stair Railings, Main Entrance railing on right side (ascending)  -     Row Name 05/09/22 0840          Stairs Within Home, Primary    Stairs, Within Home, Primary Apartent. Tub-shower, no seat, does have grab bars, standard toilet. (I) ADLs, iADLs, drives. Ambulates with FWW, also uses SPC. Nephew and sister help with grocery shopping.  -CZ     Number of Stairs, Within Home, Primary none  -CZ     Row Name 05/09/22 0840          Cognition    Orientation Status (Cognition) oriented x 4  -CZ     Row Name 05/09/22 0840          Safety Issues, Functional Mobility    Impairments Affecting Function (Mobility) strength;endurance/activity tolerance;balance  -           User Key  (r) = Recorded By, (t) = Taken By, (c) = Cosigned By    Initials Name Provider Type    CZ Frandy Lemos, PT Physical Therapist               Mobility     Row Name 05/09/22 0840          Bed Mobility    Bed Mobility --  -     Row Name 05/09/22 0840          Sit-Stand Transfer    Sit-Stand [place] (Transfers) supervision  -     Assistive Device (Sit-Stand Transfers) walker, front-wheeled  -CZ     Row Name 05/09/22 0840          Gait/Stairs (Locomotion)    [place] Level (Gait) supervision  -     Assistive Device (Gait) walker, front-wheeled  -     Distance in Feet (Gait)  15'x1, 75'x2.  -CZ     Comment, (Gait/Stairs) No LOB, cues to remain closer to waker.  -CZ           User Key  (r) = Recorded By, (t) = Taken By, (c) = Cosigned By    Initials Name Provider Type    Frandy Cosme, PT Physical Therapist               Obj/Interventions     Row Name 05/09/22 0840          Range of Motion Comprehensive    General Range of Motion bilateral lower extremity ROM WFL  -CZ     Row Name 05/09/22 0840          Strength Comprehensive (MMT)    Comment, General Manual Muscle Testing (MMT) Assessment BLEs: 3+/5 grossly.  -CZ     Row Name 05/09/22 0840          Sensory Assessment (Somatosensory)    Sensory Assessment (Somatosensory) other (see comments)  Decreased sensation B feet.  -CZ           User Key  (r) = Recorded By, (t) = Taken By, (c) = Cosigned By    Initials Name Provider Type    Frandy Cosme, PT Physical Therapist               Goals/Plan     Row Name 05/09/22 0840          Bed Mobility Goal 1 (PT)    Activity/Assistive Device (Bed Mobility Goal 1, PT) sit to supine/supine to sit  -CZ     [place] Level/Cues Needed (Bed Mobility Goal 1, PT) independent  -CZ     Time Frame (Bed Mobility Goal 1, PT) by discharge  -CZ     Strategies/Barriers (Bed Mobility Goal 1, PT) HOB flat, no bed rails.  -CZ     Progress/Outcomes (Bed Mobility Goal 1, PT) goal not met  -CZ     Row Name 05/09/22 0840          Transfer Goal 1 (PT)    Activity/Assistive Device (Transfer Goal 1, PT) sit-to-stand/stand-to-sit;bed-to-chair/chair-to-bed  -CZ     [place] Level/Cues Needed (Transfer Goal 1, PT) modified independence  -CZ     Time Frame (Transfer Goal 1, PT) by discharge  -CZ     Progress/Outcome (Transfer Goal 1, PT) goal not met  -CZ     Row Name 05/09/22 0840          Gait Training Goal 1 (PT)    Activity/Assistive Device (Gait Training Goal 1, PT) walker, rolling  -CZ     [place] Level (Gait Training Goal 1, PT) modified independence  -CZ     Distance (Gait Training Goal 1, PT)  150'x1.  -CZ     Time Frame (Gait Training Goal 1, PT) by discharge  -CZ     Progress/Outcome (Gait Training Goal 1, PT) goal not met  -CZ     Row Name 05/09/22 0840          Stairs Goal 1 (PT)    Activity/Assistive Device (Stairs Goal 1, PT) using handrail, right  -CZ     [place] Level/Cues Needed (Stairs Goal 1, PT) modified independence  -CZ     Number of Stairs (Stairs Goal 1, PT) 3 steps.  -CZ     Time Frame (Stairs Goal 1, PT) by discharge  -CZ     Progress/Outcome (Stairs Goal 1, PT) goal not met  -CZ     Row Name 05/09/22 0840          Problem Specific Goal 1 (PT)    Problem Specific Goal 1 (PT) Score 25/28 on Tinetti fall risk assessment.  -CZ     Time Frame (Problem Specific Goal 1, PT) by discharge  -CZ     Strategies/Barriers (Problem Specific Goal 1, PT) Decreased stepping response to perturbation.  -CZ     Progress/Outcome (Problem Specific Goal 1, PT) goal not met  -CZ     Row Name 05/09/22 0840          Therapy Assessment/Plan (PT)    Planned Therapy Interventions (PT) balance training;gait training;patient/family education;home exercise program;transfer training;stair training;strengthening;stretching  -CZ           User Key  (r) = Recorded By, (t) = Taken By, (c) = Cosigned By    Initials Name Provider Type    CZ Frandy Lemos, PT Physical Therapist               Clinical Impression     Row Name 05/09/22 0840          Pain    Pretreatment Pain Rating 0/10 - no pain  -CZ     Posttreatment Pain Rating 0/10 - no pain  -CZ     Pre/Posttreatment Pain Comment Chronic arthritis.  -CZ     Row Name 05/09/22 0840          Plan of Care Review    Plan of Care Reviewed With patient  -CZ     Outcome Evaluation Initial PT evaluation complete, co-evaluation with OT.  Patient is alert and cooperative.  She requires SPV with transfers and gait, ambulating 15'x 1, 75'x 2 with FWW, no LOB, cues to remain closer to walker.  T[place] assessed; 22/28 or moderate fall risk.  Recommend continued use of FWW. Patient  already has FWW at home, may benefit from HHPT upon discharge home, alone. Goals established, continue skilled I/P PT.  -CZ     Row Name 05/09/22 0840          Therapy Assessment/Plan (PT)    Rehab Potential (PT) good, to achieve stated therapy goals  -CZ     Criteria for Skilled Interventions Met (PT) yes;skilled treatment is necessary  -CZ     Therapy Frequency (PT) other (see comments)  3-7 days/week  -CZ     Row Name 05/09/22 0840          Vital Signs    Pre Systolic BP Rehab 129  -CZ     Pre Treatment Diastolic BP 69  -CZ     Pretreatment Heart Rate (beats/min) 80  -CZ     Intratreatment Heart Rate (beats/min) 100  -CZ     Posttreatment Heart Rate (beats/min) 98  -CZ     Pre SpO2 (%) 98  -CZ     O2 Delivery Pre Treatment room air  -CZ     Intra SpO2 (%) 99  -CZ     O2 Delivery Intra Treatment room air  -CZ     Post SpO2 (%) 100  -CZ     O2 Delivery Post Treatment room air  -CZ     Pre Patient Position Sitting  -CZ     Intra Patient Position Sitting  -CZ     Post Patient Position Sitting  -CZ     Row Name 05/09/22 0840          Positioning and Restraints    Pre-Treatment Position sitting in chair/recliner  -CZ     Post Treatment Position chair  -CZ     In Chair reclined;encouraged to call for assist;call light within reach  -CZ           User Key  (r) = Recorded By, (t) = Taken By, (c) = Cosigned By    Initials Name Provider Type    CZ Frandy Lemos, PT Physical Therapist               Outcome Measures     Row Name 05/09/22 0840          How much help from another person do you currently need...    Turning from your back to your side while in flat bed without using bedrails? 3  -CZ     Moving from lying on back to sitting on the side of a flat bed without bedrails? 3  -CZ     Moving to and from a bed to a chair (including a wheelchair)? 3  -CZ     Standing up from a chair using your arms (e.g., wheelchair, bedside chair)? 3  -CZ     Climbing 3-5 steps with a railing? 3  -CZ     To walk in hospital room? 3   "-CZ     AM-PAC 6 Clicks Score (PT) 18  -CZ     Highest level of mobility 6 --> Walked 10 steps or more  -CZ     Row Name 05/09/22 0840          Tinetti Assessment    [place] Assessment yes  -CZ     Sitting Balance 1  -CZ     Arises 2  -CZ     Attempts to Rise 2  -CZ     Immediate Standing Balance (first 5 sec) 2  -CZ     Standing Balance 1  -CZ     Sternal Nudge (feet close together) 0  -CZ     Eyes Closed (feet close together) 1  -CZ     Turning 360 Degrees- Steps 1  -CZ     Turning 360 Degrees- Steadiness 1  -CZ     Sitting Down 2  -CZ     [place]etti Balance Score 13  -CZ     Gait Initiation (immediate after told \"go\") 1  -CZ     Step Length- Right Swing 1  -CZ     Step Length- Left Swing 1  -CZ     Foot Clearance- Right Foot 1  -CZ     Foot Clearance- Left Foot 1  -CZ     Step Symmetry 1  -CZ     Step Continuity 1  -CZ     Path (excursion) 1  -CZ     Trunk 0  -CZ     Base of Support 1  -CZ     Gait Score 9  -CZ     [place] Total Score 22  -CZ     [place] Assistive Device rolling walker  -CZ     Row Name 05/09/22 0841 05/09/22 0840       Functional Assessment    Outcome Measure Options AM-PAC 6 Clicks Daily Activity (OT)  - AM-PAC 6 Clicks Basic Mobility (PT);[place]  -CZ          User Key  (r) = Recorded By, (t) = Taken By, (c) = Cosigned By    Initials Name Provider Type    CZ Frandy Lemos, PT Physical Therapist    SJ Maryan Henry, OT Occupational Therapist                             Physical Therapy Education                 Title: PT OT SLP Therapies (In Progress)     Topic: Physical Therapy (Not Started)     Point: Mobility training (Not Started)     Learner Progress:  Not documented in this visit.          Point: Home exercise program (Not Started)     Learner Progress:  Not documented in this visit.          Point: Body mechanics (Not Started)     Learner Progress:  Not documented in this visit.          Point: Precautions (Not Started)     Learner Progress:  Not documented in this visit.          "                 PT Recommendation and Plan  Planned Therapy Interventions (PT): balance training, gait training, patient/family education, home exercise program, transfer training, stair training, strengthening, stretching  Plan of Care Reviewed With: patient  Outcome Evaluation: Initial PT evaluation complete, co-evaluation with OT.  Patient is alert and cooperative.  She requires SPV with transfers and gait, ambulating 15'x 1, 75'x 2 with FWW, no LOB, cues to remain closer to walker.  [place] assessed; 22/28 or moderate fall risk.  Recommend continued use of FWW. Patient already has FWW at home, may benefit from HHPT upon discharge home, alone. Goals established, continue skilled I/P PT.     Time Calculation:    PT Charges     Row Name 05/09/22 0930             Time Calculation    Start Time 0840  -CZ      Stop Time 0925  -CZ      Time Calculation (min) 45 min  -CZ      PT Received On 05/09/22  -CZ      PT Goal Re-Cert Due Date 05/22/22  -CZ              Untimed Charges    PT Eval/Re-eval Minutes 45  -CZ              Total Minutes    Untimed Charges Total Minutes 45  -CZ       Total Minutes 45  -CZ            User Key  (r) = Recorded By, (t) = Taken By, (c) = Cosigned By    Initials Name Provider Type    CZ Frandy Lemos, PT Physical Therapist              Therapy Charges for Today     Code Description Service Date Service Provider Modifiers Qty    [id number]  PT EVAL MOD COMPLEXITY 3 5/9/2022 Frandy Lemos, PT GP 1          PT G-Codes  Outcome Measure Options: AM-PAC 6 Clicks Daily Activity (OT)  AM-PAC 6 Clicks Score (PT): 18  AM-PAC 6 Clicks Score (OT): 21  [place] Total Score: 22    Frandy Lemos PT  5/9/2022

## 2022-05-09 NOTE — OUTREACH NOTE
CHF Week 2 Survey    Flowsheet Row Responses   [facility] facility patient discharged from? [facility]   Does the patient have one of the following disease processes/diagnoses(primary or secondary)? CHF   Week 2 attempt successful? No   Revoke Readmitted          JEFERSON DAVIDSON - Registered Nurse

## 2022-05-09 NOTE — CONSULTS
Cardiology Consultation Note.        Patient Name: Lexi Wade  Age/Sex: 71 y.o. female  : 1950  MRN: [id number]    Date of consultation: 2022  Consulting Physician: Wang Felipe MD  Primary care Physician: Joyce Plata MD  Requesting Physician:   Angelica Sepulveda APRN    Reason for consultation: Shortness of breath dizziness altered mental status      Subjective:       Chief Complaint: Shortness of breath dizziness confusion    History of Present Illness:  Lexi Wade is a 71 y.o. female     Body mass index is 34.18 kg/m².  Body mass index is 38.58 kg/m² (pended).  With a past medical history significant for nonischemic cardiomyopathy with left ventricular systolic dysfunction with an ejection fraction of 25% with improvement to 40 to 45%, previous coronary angiogram done in  which did not reveal of any evidence of any obstructive epicardial coronary artery disease, arterial hypertension, hypertensive heart disease, moderate mitral regurgitation, moderate tricuspid regurgitation, obesity with a body mass index of 36, s/p [employer] biventricular AICD generator placement in 2012 with subsequent generator replacement done in 2020  [employer] model number [id number] and serial #[id number], gastroesophageal reflux disease, reactive airway disease, history of pancreatitis, chronic kidney disease and negative sleep study.     Patient has had increasing episodes of shortness of breath.        Patient has been taking the Lasix.  Patient has had multiple visits to the emergency room with increasing shortness of breath and has been administered Lasix.    Patient has symptoms of shortness of breath along with episodes of confusion.  Patient had transient loss of memory along with confusion and was concern and was seen in the emergency room.    Patient on questioning denies any delivery of shock.    Patient on questioning denies any hemoptysis hematuria bright of blood per  rectum.    Patient denies any symptoms of chest pain.    Patient 10 point review of system except for what stated in the history of present illness negative.           Concurrent  Medical History:  1.  Shortness of breath.  2.  Coronary angiogram done in August 2018 had revealed:  A.  No evidence of any obstructive epicardial coronary artery disease in the circumflex right or the left anterior descending artery.  B.  Calcification noted in the left main without any evidence of dampening of the pressure.  C.  Elevated left ventricular end-diastolic pressure.  3.  Nonischemic cardiomyopathy.  4.  Left ventricular systolic dysfunction with improvement in the ejection fraction from 25% to 42%.  5.  Nonrheumatic moderate mitral regurgitation and nonrheumatic moderate tricuspid regurgitation.  6.  S/p [employer] biventricular  number [id number] and serial #[id number] implanted June 2020.  7.  Explantation of the old [employer] biventricular AICD generator replacement done in 07/2012 with [employer] AICD model #[id number], serial #[id number].  8.  Arterial hypertension.  9.  Hypertensive heart disease.  10.  Non-insulin-dependent diabetes.  11.  Obesity.  12.  Gastroesophageal reflux disease.  13.  History of pancreatitis.  14.  Hyperlipidemia.  15.  Chronic kidney disease.  16.  Vitamin D deficiency.  17.  Diabetic neuropathy.  18.  Reactive airway disease.  19.  Chronic back pain  20.  Anemia with a hemoglobin of 10.  21.  Chronic kidney disease secondary to diabetes.        Past Surgical History:  1.  Coronary angiogram done in 2010 and 2018.  2.  S/p [employer] biventricular AICD generator placement in July 2012 with subsequent generator replacement done in July 2020.  3.  Colonoscopy.  4.  Cataract surgery.  5.  Abdominal hysterectomy with bilateral salpingo-oophorectomy.  6.  Dilatation and curettage.        Family History: Family history significant for diabetes.        Social History: Previous history of tobacco abuse denies  any current tobacco alcohol intake        Cardiac Risk Factors:   1. Postmenopausal   2. Arterial hypertension   3. Hyperlipidemia   4. Diabetes   5. Obesity            Allergies:  Allergies   Allergen Reactions   • Aldactone [Spironolactone] Unknown - Low Severity   • Nsaids        Medication:  Medications Prior to Admission   Medication Sig Dispense Refill Last Dose   • acetaminophen (TYLENOL) 325 MG tablet Take 2 tablets by mouth Every 4 (Four) Hours As Needed for Mild Pain .   Past Week at Unknown time   • albuterol sulfate  (90 Base) MCG/ACT inhaler INHALE 2 PUFFS EVERY 4 (FOUR) HOURS AS NEEDED FOR WHEEZING OR SHORTNESS OF AIR. 18 g 1 Past Week at Unknown time   • aspirin 81 MG EC tablet Take 81 mg by mouth Every Night.   Past Week at Unknown time   • atorvastatin (LIPITOR) 40 MG tablet Take 1 tablet by mouth Daily. 90 tablet 3 5/8/2022 at Unknown time   • carvedilol (COREG) 25 MG tablet Take 1 tablet by mouth 2 (Two) Times a Day With Meals. 180 tablet 3 5/7/2022 at Unknown time   • furosemide (LASIX) 40 MG tablet TAKE 1 TABLET BY MOUTH TWICE A DAY 60 tablet 1 5/7/2022 at Unknown time   • guaiFENesin (MUCINEX) 600 MG 12 hr tablet Take 1 tablet by mouth Every 12 (Twelve) Hours. 60 tablet 1 Past Week at Unknown time   • insulin detemir (Levemir FlexTouch) 100 UNIT/ML injection Inject 18 Units under the skin into the appropriate area as directed Every Night. (Patient taking differently: Inject 20 Units under the skin into the appropriate area as directed Every Night.)   Past Week at Unknown time   • B-D UF III MINI PEN NEEDLES 31G X 5 MM misc USE WITH INSULIN INJECTIONS NIGHTLY 100 each 3    • Blood Glucose Monitoring Suppl (FreeStyle Lite) device 1 Device 3 (Three) Times a Day. 1 each 0    • diclofenac (VOLTAREN) 1 % gel gel APPLY TO AFFECTED AREA 4 TIMES A DAY Oral Volteran DC'D) 100 g 3    • glucose blood (FREESTYLE LITE) test strip 1 each by Other route 3 (Three) Times a Day. Use as instructed 100 each  "1    • Insulin Syringe 31G X 5/16\" 0.5 ML misc Inject 1 each under the skin into the appropriate area as directed Every Night. 100 each 1    • ipratropium-albuterol (DUO-NEB) 0.5-2.5 mg/3 ml nebulizer Take 3 mL by nebulization 4 (Four) Times a Day. 360 mL 1    • isosorbide mononitrate (IMDUR) 30 MG 24 hr tablet Take 1 tablet by mouth Daily. 90 tablet 3    • Lancets (freestyle) lancets 1 each by Other route 3 (Three) Times a Day. Use as instructed 100 each 1    • lisinopril (PRINIVIL,ZESTRIL) 10 MG tablet TAKE 1 TABLET BY MOUTH EVERY DAY 90 tablet 3    • magnesium oxide (MAGOX) 400 (241.3 Mg) MG tablet tablet Take 1 tablet by mouth Daily. 90 each 3    • nitroglycerin (Nitrostat) 0.4 MG SL tablet Place 1 tablet under the tongue Every 5 (Five) Minutes As Needed for Chest Pain. Take no more than 3 doses in 15 minutes. 25 tablet 0    • pantoprazole (Protonix) 40 MG EC tablet Take 1 tablet by mouth Daily. 30 tablet 0    • potassium chloride (MICRO-K) 10 MEQ CR capsule Take 2 capsules by mouth 2 (Two) Times a Day. 60 capsule 0    • sucralfate (CARAFATE) 1 g tablet Take 1 g by mouth 3 (Three) Times a Day.              Review of Systems:       Constitutional:  Denies recent weight loss, weight gain, fever or chills, no change in exercise tolerance.     HENT:  Denies any hearing loss, epistaxis, hoarseness, or difficulty speaking.     Eyes: Wears eyeglasses or contact lenses     Respiratory:  Denies dyspnea with exertion,no cough, wheezing, or hemoptysis.     Cardiovascular: Positive for shortness of breath.  Negative for palpitations, chest pain, orthopnea, PND, peripheral edema, syncope, or claudication.     Gastrointestinal:  Denies change in bowel habits, dyspepsia, ulcer disease, hematochezia, or melena.  No nausea, no vomiting, no hematemesis, no diarrhea or constipation.    Endocrine: Negative for cold intolerance, heat intolerance, polydipsia, polyphagia or polyuria. Denies any history of weight change or unintended " weight loss.    Genitourinary: Negative for hematuria.  No frequent urination or nocturia.      Musculoskeletal: Denies any history of arthritic symptoms or back problems .  No joint pain, joint stiffness, joint swelling, muscle pain, muscle weakness or neck pain.    Skin:  Denies any change in hair or nails, rashes, or skin lesions.     Allergic/Immunologic: Negative.  Negative for environmental allergies, food allergies or immunocompromised state.     Neurological: Positive for confusion.  Denies any history of recurrent headaches, strokes, TIA, or seizure disorder.     Hematological: Denies excessive bleeding, easy bruising, fatigue, lymphadenopathy or petechiae or any bleeding disorders.     Psychiatric/Behavioral: Denies any history of depression, substance abuse, or change in cognitive function. Denies any psychomotor reaction or tangential thought.  No depression, homicidal ideations or suicidal ideations.          Objective:     Objective:  Temp:  [96.6 °F (35.9 °C)-97.9 °F (36.6 °C)] 96.6 °F (35.9 °C)  Heart Rate:  [] 96  Resp:  [15-28] 15  BP: (105-141)/(57-92) 105/57      Body mass index is 34.18 kg/m².           Physical Exam:   General Appearance:    Alert, oriented, cooperative, in no acute distress.   Head:    Normocephalic, atraumatic, without obvious abnormality.   Eyes:           DEONDRE.  Lids and lashes normal, conjunctivae and sclerae normal, no icterus, no pallor.   Ears:    Ears appear intact with no abnormalities noted.   Throat:   Mucous membranes pink and moist.   Neck:   Supple, trachea midline, no carotid bruit, no organomegaly or JVD.   Lungs:     Clear to auscultation and percussion, respirations regular, even and unlabored. No wheezes, rales or rhonchi.    Heart:    Regular rhythm and normal rate, normal S1 and S2, no murmur, no gallop, no rub, no click.   Abdomen:     Soft, nontender, nondistended, no guarding, no rebound tenderness, normal bowel sounds in all four quadrants, no  masses, liver and spleen nonpalpable.   Genitalia:    Deferred.   Extremities:   Moves all extremities well, no edema, no cyanosis, no  redness, no clubbing.   Pulses:   Pulses palpable and equal bilaterally.   Skin:   Moist and warm. No bleeding, bruising or rash.   Neurologic/Psychiatric:   Alert and oriented to person, place, and time.  Motor, power and tone in upper and lower extremities are grossly intact. No focal neurological deficits. Normal cognitive function. No psychomotor reaction or tangential thought. No depression, homicidal ideations and suicidal ideations.       Medication Review:   Current Facility-Administered Medications   Medication Dose Route Frequency Provider Last Rate Last Admin   • acetaminophen (TYLENOL) tablet 650 mg  650 mg Oral Q4H PRN Shelbie Berry MD       • albuterol (PROVENTIL) nebulizer solution 0.083% 2.5 mg/3mL  2.5 mg Nebulization Q6H PRN Shelbie Berry MD       • aspirin EC tablet 81 mg  81 mg Oral Nightly Shelbie Berry MD   81 mg at 05/08/22 2043   • atorvastatin (LIPITOR) tablet 40 mg  40 mg Oral Daily Shelbie Berry MD   40 mg at 05/09/22 0804   • carvedilol (COREG) tablet 25 mg  25 mg Oral BID With Meals Shelbie Berry MD   25 mg at 05/09/22 0804   • dextrose (D50W) (25 g/50 mL) IV injection 25 g  25 g Intravenous Q15 Min PRN Levill, Angelica G, APRN       • dextrose (GLUTOSE) oral gel 15 g  15 g Oral Q15 Min PRN LevillAngelica APRN       • Enoxaparin Sodium (LOVENOX) syringe 40 mg  40 mg Subcutaneous Q24H Shelbie Berry MD   40 mg at 05/08/22 2043   • furosemide (LASIX) injection 40 mg  40 mg Intravenous Q12H Shelbie Berry MD   40 mg at 05/09/22 0607   • glucagon (human recombinant) (GLUCAGEN DIAGNOSTIC) injection 1 mg  1 mg Subcutaneous Q15 Min PRN Levill, Angelica G, APRN       • guaiFENesin (MUCINEX) 12 hr tablet 600 mg  600 mg Oral Q12H Shelbie Berry MD   600 mg at 05/09/22 0804   • Insulin Aspart (novoLOG) injection 0-14 Units   0-14 Units Subcutaneous TID AC Angelica Sepulveda, APRN   3 Units at 05/09/22 1256   • insulin detemir (LEVEMIR) injection 20 Units  20 Units Subcutaneous Nightly Shelbie Berry MD   20 Units at 05/08/22 2044   • ipratropium-albuterol (DUO-NEB) nebulizer solution 3 mL  3 mL Nebulization 4x Daily Shelbie Berry MD   3 mL at 05/09/22 1100   • isosorbide mononitrate (IMDUR) 24 hr tablet 30 mg  30 mg Oral Daily Shelbie Berry MD   30 mg at 05/09/22 0804   • lisinopril (PRINIVIL,ZESTRIL) tablet 10 mg  10 mg Oral Daily Shelbie Berry MD   10 mg at 05/09/22 0804   • magnesium oxide (MAG-OX) tablet 400 mg  400 mg Oral Daily Shelbie Berry MD   400 mg at 05/09/22 0804   • morphine injection 2 mg  2 mg Intravenous Q4H PRN Shelbie Berry MD        And   • naloxone (NARCAN) injection 0.4 mg  0.4 mg Intravenous Q5 Min PRN Shelbie Berry MD       • ondansetron (ZOFRAN) injection 4 mg  4 mg Intravenous Q6H PRN Shelbie Berry MD       • pantoprazole (PROTONIX) EC tablet 40 mg  40 mg Oral Daily Shelbie Berry MD   40 mg at 05/09/22 0804   • potassium chloride (MICRO-K) CR capsule 20 mEq  20 mEq Oral BID Shelbie Berry MD   20 mEq at 05/09/22 0804   • sodium chloride 0.9 % flush 10 mL  10 mL Intravenous PRN Shelbie Berry MD       • sodium chloride 0.9 % flush 10 mL  10 mL Intravenous Q12H Shelbie Berry MD   10 mL at 05/09/22 0805   • sodium chloride 0.9 % flush 10 mL  10 mL Intravenous PRN Shelbie Berry MD       • sucralfate (CARAFATE) tablet 1 g  1 g Oral TID Shelbie Berry MD   1 g at 05/09/22 0804       Lab Review:     Results from last 7 days   Lab Units 05/09/22  0519 05/08/22  1117   SODIUM mmol/L 139 139   POTASSIUM mmol/L 3.8 4.1   CHLORIDE mmol/L 105 106   CO2 mmol/L 20.0* 19.0*   BUN mg/dL 16 12   CREATININE mg/dL 1.12* 0.91   CALCIUM mg/dL 9.8 9.5   BILIRUBIN mg/dL  --  0.8   ALK PHOS U/L  --  106   ALT (SGPT) U/L  --  26   AST (SGOT) U/L  --  23    GLUCOSE mg/dL 206* 250*     Results from last 7 days   Lab Units 05/08/22  1117   TROPONIN T ng/mL <0.010         Results from last 7 days   Lab Units 05/09/22  0519   WBC 10*3/mm3 10.04   HEMOGLOBIN g/dL 11.8*   HEMATOCRIT % 35.1   PLATELETS 10*3/mm3 307         Results from last 7 days   Lab Units 05/05/22  0721   MAGNESIUM mg/dL 1.7                   EKG:   ECG/EMG Results (last 24 hours)     Procedure Component Value Units Date/Time    SCANNED EKG [[id number]] Resulted: 05/08/22     Updated: 05/09/22 1317          ECHO:  Results for orders placed during the hospital encounter of 02/18/22    Adult Transthoracic Echo Complete W/ Cont if Necessary Per Protocol    Interpretation Summary  · There is mild, bileaflet mitral valve thickening present.  · The left ventricular cavity is mildly dilated.  · Mild to moderate mitral valve stenosis is present.  · The right atrial cavity is mildly dilated.  · Estimated right ventricular systolic pressure from tricuspid regurgitation is mildly elevated (35-45 mmHg).  · Left ventricular ejection fraction appears to be 36 - 40%.  · Left ventricular diastolic function was normal.  · The following left ventricular wall segments are hypokinetic: mid anterior, apical anterior, basal anterolateral, mid anterolateral, apical lateral, basal inferolateral, mid inferolateral, apical inferior, mid inferior, apical septal, basal inferoseptal, mid inferoseptal, apex hypokinetic, mid anteroseptal, basal anterior, basal inferior and basal inferoseptal.       Imaging:  Imaging Results (Last 24 Hours)     ** No results found for the last 24 hours. **          I personally viewed and interpreted the patient's EKG/Telemetry data.    Assessment:       1.  Shortness of breath with episodes of confusion.  2.  Coronary angiogram done in 2018 which did not reveal of any evidence of any obstructive epicardial coronary artery disease.  3.  Nonischemic cardiomyopathy.  4.  S/p biventricular [employer] AICD  placement.  5.  Mitral and tricuspid regurgitation.  6.  Obesity.  7.  Diabetes.  8.  History of chronic kidney disease.      Plan:        1.  Shortness of breath with nonischemic cardiomyopathy.  Patient has nonischemic cardiomyopathy with left ventricular systolic dysfunction which had improved to 42%.  Patient has moderate mitral regurgitation and moderate tricuspid regurgitation.  Patient has had multiple evaluation for CHF decompensation.  Patient would be started on IV dobutamine at 8 mcg/kg/min. Patient would be continued on the present dose of the lisinopril 10 mg daily and carvedilol 25 mg twice a day.  Patient has been counseled to decrease her salt intake.  Patient would be continued on oral Lasix 40 mg daily and will follow the renal function.     2.  S/p [employer] biventricular AICD placement done in June 2020.  Patient AICD interrogation had revealed appropriate sensing and pacing function with adequate battery reserve.  Patient did not have any episodes of ventricular tachycardia or any delivery of shock.     P wave sensing was 2.7 mV.  R wave sensing was 12 mV.  Threshold in the atrial lead was 0.75 V.  Threshold in the right ventricular lead was 1 V.  Threshold in the coronary sinus lead was 0.75 V.  Lead impedance in the atrial lead was 310 ohms.  Lead impedance in the ventricular lead was 430 ohms.  Lead impedance in the coronary sinus lead was 430 ohms.  Patient battery current was 19 µA.         3.  Arterial hypertension.  Patient blood pressure has remained stable.  Patient would be continued on the present dose of the antihypertensive medication.  Patient would be continued on the present dose of the lisinopril and the carvedilol.  Patient had denies any episodes of headache.     4.  Hypertensive heart disease with mitral and tricuspid regurgitation.  Patient would be continued on the present afterload reduction with the lisinopril and carvedilol and the present dose of the Lasix which would  be transition to oral Lasix.     5. Atypical symptoms of chest pain.  Patient had undergone previous coronary angiogram in 2018 which had not reveal of any evidence of any obstructive epicardial coronary artery disease.  Patient at the present time would be treated medically and not subjected to any invasive evaluation.  Patient at the present time would not be subjected to any invasive evaluation for ischemic work-up as the patient has undergone 2 previous coronary angiogram which did not reveal of any evidence of any obstructive epicardial coronary artery disease. .     6.  Obesity.  Patient has been counseled on weight reduction lifestyle modification and dietary restriction.  Patient body mass index is 37.     7.  Non-insulin-dependent diabetes.  Patient has been counseled on [employer] diet.  Patient would be continued on the present dose of the Metformin.  Patient blood sugar has been followed by the primary team     8.  Gastroesophageal reflux disease.  Patient is on Protonix 40 mg daily.     9.  Chronic kidney disease with history of diabetes.  Patient renal function has remained stable.     10.  Anemia.  Patient's hemoglobin is 10.  Patient has not had any hemoptysis hematuria bright of blood per rectum.  Patient H&H would be followed     Thank you for the consultation.          The above plan of management were discussed with the patient      Time: Time spent in face-to-face evaluation of greater than 55 minutes interacting, formulating, examining and discussing the plan with the patient with 50% of greater time spent in face-to-face interaction.    Electronically signed by Wang Felipe MD, 05/09/22, 2:01 PM CDT.    Dictated utilizing Dragon dictation.

## 2022-05-09 NOTE — PLAN OF CARE
Goal Outcome Evaluation:  Plan of Care Reviewed With: patient           Outcome Evaluation: Initial PT evaluation complete, co-evaluation with OT.  Patient is alert and cooperative.  She requires SPV with transfers and gait, ambulating 15'x 1, 75'x 2 with FWW, no LOB, cues to remain closer to walker.  Tinetti assessed; 22/28 or moderate fall risk.  Recommend continued use of FWW. Patient already has FWW at home, may benefit from HHPT upon discharge home, alone. Goals established, continue skilled I/P PT.

## 2022-05-10 PROBLEM — I50.23 ACUTE ON CHRONIC SYSTOLIC CONGESTIVE HEART FAILURE (HCC): Status: ACTIVE | Noted: 2022-01-01

## 2022-05-10 NOTE — PROGRESS NOTES
[facility]  INPATIENT PROGRESS NOTE    Length of Stay: 0  Date of Admission: 5/8/2022  Primary Care Physician: Joyce Plata MD    Subjective   Chief Complaint: No complaints    HPI: This is a 71-year-old female with past medical history of HTN, CHF, CAD (status post AICD), DM 2 and GERD that presented to [facility] on 5/8/2022 with complaints of worsening shortness of air, altered mental status and cough x3 days.    BNP elevated at 3910. Patient follows with Dr. Felipe for CHF.      Review of Systems   Constitutional: Positive for fatigue. Negative for activity change.   HENT: Negative for ear pain and sore throat.    Eyes: Negative for pain and discharge.   Respiratory: Positive for cough and shortness of breath.    Cardiovascular: Negative for chest pain and palpitations.   Gastrointestinal: Negative for abdominal pain and nausea.   Endocrine: Negative for cold intolerance and heat intolerance.   Genitourinary: Negative for difficulty urinating and dysuria.   Musculoskeletal: Negative for arthralgias and gait problem.   Skin: Negative for color change and rash.   Neurological: Negative for dizziness and weakness.   Psychiatric/Behavioral: Negative for agitation and confusion.       Objective    Temp:  [96.6 °F (35.9 °C)-97.4 °F (36.3 °C)] 96.7 °F (35.9 °C)  Heart Rate:  [67-96] 74  Resp:  [15-18] 16  BP: (101-124)/(57-66) 118/66    Physical Exam  Constitutional:       Appearance: She is well-developed.   HENT:      Head: Normocephalic and atraumatic.   Eyes:      Pupils: Pupils are equal, round, and reactive to light.   Cardiovascular:      Rate and Rhythm: Normal rate and regular rhythm.   Pulmonary:      Effort: Pulmonary effort is normal.      Breath sounds: Normal breath sounds.   Abdominal:      General: Bowel sounds are normal.      Palpations: Abdomen is soft.   Musculoskeletal:         General: Normal range of motion.      Cervical back: Normal range of motion and  neck supple.   Skin:     General: Skin is warm and dry.   Neurological:      Mental Status: She is alert and oriented to person, place, and time.   Psychiatric:         Behavior: Behavior normal.       Results Review:  I have reviewed the labs, radiology results, and diagnostic studies.    Laboratory Data:   Results from last 7 days   Lab Units 05/10/22  0534 05/09/22  0519 05/08/22  1117   SODIUM mmol/L 141 139 139   POTASSIUM mmol/L 3.3* 3.8 4.1   CHLORIDE mmol/L 105 105 106   CO2 mmol/L 24.0 20.0* 19.0*   BUN mg/dL 16 16 12   CREATININE mg/dL 1.15* 1.12* 0.91   GLUCOSE mg/dL 112* 206* 250*   CALCIUM mg/dL 10.3 9.8 9.5   BILIRUBIN mg/dL  --   --  0.8   ALK PHOS U/L  --   --  106   ALT (SGPT) U/L  --   --  26   AST (SGOT) U/L  --   --  23   ANION GAP mmol/L 12.0 14.0 14.0     Estimated Creatinine Clearance: 46.9 mL/min (A) (by C-G formula based on SCr of 1.15 mg/dL (H)).  Results from last 7 days   Lab Units 05/05/22  0721 05/04/22  0856   MAGNESIUM mg/dL 1.7 1.6         Results from last 7 days   Lab Units 05/10/22  0534 05/09/22  0519 05/08/22  1117 05/04/22  0538   WBC 10*3/mm3 9.32 10.04 10.24 11.15*   HEMOGLOBIN g/dL 10.7* 11.8* 11.7* 11.0*   HEMATOCRIT % 32.5* 35.1 36.4 33.3*   PLATELETS 10*3/mm3 314 307 294 288           Culture Data:   No results found for: BLOODCX  No results found for: URINECX  No results found for: RESPCX  No results found for: WOUNDCX  No results found for: STOOLCX  No components found for: BODYFLD    Radiology Data:   Imaging Results (Last 24 Hours)     ** No results found for the last 24 hours. **          I have reviewed the patient's current medications.     Assessment/Plan     Active Hospital Problems    Diagnosis    • **Congestive heart failure (HCC)        Plan:    1.  Acute on chronic heart failure with reduced ejection fraction: Continue IV Lasix.  Dr. Felipe consulted the patient was started on a dobutamine drip.  Recent echocardiogram showed EF of 36 to 40%.  2.   CAD/hypertension: Continue home aspirin, Coreg, Imdur and lisinopril.  3.  GERD: Continue home PPI.  4.  Diabetes mellitus, type II: Continue SSI and long-acting.    DVT prophylaxis: Lovenox.      Discharge Planning: I expect patient to be discharged to home in 1-2 days.    I confirmed that the patient's Advance Care Plan is present, code status is documented, or surrogate decision maker is listed in the patient's medical record.      I have utilized all available immediate resources to obtain, update, or review the patient's current medications.         This document has been electronically signed by MARYANNE Strong on May 10, 2022 10:24 CDT

## 2022-05-10 NOTE — THERAPY TREATMENT NOTE
Patient Name: Lexi Wade  : 1950    MRN: [id number]                              Today's Date: 5/10/2022       Admit Date: 2022    Visit Dx:     ICD-10-CM ICD-9-CM   1. Acute on chronic systolic congestive heart failure (HCC)  I50.23 428.23     428.0   2. Impaired mobility and ADLs  Z74.09 V49.89    Z78.9    3. 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)   • Mixed hyperlipidemia   • Menopausal syndrome   • Dyspnea   • Chest pain   • Morbid obesity (HCC)   • Precordial pain   • Acute respiratory failure with hypoxia (HCC)   • 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)   • Implantable cardioverter-defibrillator (ICD) generator end of life   • Multifocal pneumonia   • Acute on chronic systolic CHF (congestive heart failure) (HCC)   • Obesity (BMI 30-39.9)   • Shortness of breath   • Altered mental status   • Morbid obesity (HCC)   • Drowsiness   • Acute on chronic systolic congestive heart failure (HCC)     Past Medical History:   Diagnosis Date   • Chronic systolic heart failure (HCC)    • Diabetes mellitus (HCC)    • Diabetic neuropathy (HCC)    • GERD (gastroesophageal reflux disease)    • Hypercholesterolemia    • Hypertension    • Low back pain    • Morbid obesity (HCC)    • Nonischemic cardiomyopathy (HCC)    • Osteoarthritis    • Sleep apnea    • Vitamin D deficiency      Past Surgical History:   Procedure Laterality Date   • CARDIAC CATHETERIZATION N/A 2018   • CARDIAC ELECTROPHYSIOLOGY PROCEDURE N/A 2020   • CARDIAC PACEMAKER PLACEMENT     • CATARACT  EXTRACTION     • COLONOSCOPY N/A 06/14/2017   • PACEMAKER IMPLANTATION     • TOTAL ABDOMINAL HYSTERECTOMY WITH SALPINGO OOPHORECTOMY        General Information     Row Name 05/10/22 0827          OT Time and Intention    Document Type therapy note (daily note)  -BB     Mode of Treatment occupational therapy;individual therapy  -BB     Row Name 05/10/22 0827          General Information    Patient Profile Reviewed yes  -BB     Existing Precautions/Restrictions fall  -BB     Row Name 05/10/22 0827          Cognition    Orientation Status (Cognition) oriented x 4  -BB     Row Name 05/10/22 0827          Safety Issues, Functional Mobility    Impairments Affecting Function (Mobility) endurance/activity tolerance  -BB           User Key  (r) = Recorded By, (t) = Taken By, (c) = Cosigned By    Initials Name Provider Type    BB Daphne Naik COTA Occupational Therapist Assistant                 Mobility/ADL's     Row Name 05/10/22 0827          Transfers    Transfers sit-stand transfer;toilet transfer  -     Sit-Stand [place] (Transfers) supervision  -     [place] Level (Toilet Transfer) supervision  -BB     Assistive Device (Toilet Transfer) walker, front-wheeled  -BB     Row Name 05/10/22 0827          Sit-Stand Transfer    Assistive Device (Sit-Stand Transfers) walker, front-wheeled  -BB     Row Name 05/10/22 0827          Toilet Transfer    Type (Toilet Transfer) sit-stand;stand-sit  -     Row Name 05/10/22 0827          Activities of Daily Living    BADL Assessment/Intervention lower body dressing;grooming;toileting;bathing  -BB     Row Name 05/10/22 0827          Lower Body Dressing Assessment/Training    [place] Level (Lower Body Dressing) don;doff;socks;independent  -BB     Row Name 05/10/22 0827          Grooming Assessment/Training    [place] Level (Grooming) wash face, hands;modified independence  -BB     Position (Grooming) sink side  -BB     Row Name 05/10/22 0827           Toileting Assessment/Training    [place] Level (Toileting) adjust/manage clothing;perform perineal hygiene;standby assist  -BB     Assistive Devices (Toileting) commode  -BB     Position (Toileting) supported standing  -     Row Name 05/10/22 0827          Bathing Assessment/Intervention    Comment, (Bathing) Pt had already completed a bath this morning.  -BB           User Key  (r) = Recorded By, (t) = Taken By, (c) = Cosigned By    Initials Name Provider Type    BB Daphne Naik COTA Occupational Therapist Assistant               Obj/Interventions     Row Name 05/10/22 0827          Range of Motion Comprehensive    General Range of Motion bilateral upper extremity ROM WFL  -     Row Name 05/10/22 0827          Strength Comprehensive (MMT)    General Manual Muscle Testing (MMT) Assessment other (see comments)  -     Row Name 05/10/22 0827          Shoulder (Therapeutic Exercise)    Shoulder (Therapeutic Exercise) AROM (active range of motion);strengthening exercise  -BB     Shoulder Strengthening (Therapeutic Exercise) bilateral;flexion;extension;2 sets;15 repititions;1 lb free weight;sitting  punch outs  -     Row Name 05/10/22 0827          Elbow/Forearm (Therapeutic Exercise)    Elbow/Forearm (Therapeutic Exercise) AROM (active range of motion);strengthening exercise  -BB     Elbow/Forearm AROM (Therapeutic Exercise) bilateral;flexion;extension;sitting;15 repititions;2 sets  -BB     Elbow/Forearm Strengthening (Therapeutic Exercise) 1 lb free weight  -     Row Name 05/10/22 0827          Wrist (Therapeutic Exercise)    Wrist (Therapeutic Exercise) AROM (active range of motion);strengthening exercise  -BB     Wrist Strengthening (Therapeutic Exercise) bilateral;extension;flexion;1 lb free weight;15 repititions;2 sets  -     Row Name 05/10/22 0827          Motor Skills    Therapeutic Exercise shoulder;elbow/forearm;wrist  -BB           User Key  (r) = Recorded By, (t) = Taken By, (c) =  Cosigned By    Initials Name Provider Type    Daphne Hudson COTA Occupational Therapist Assistant               Goals/Plan     Row Name 05/10/22 0827          Transfer Goal 1 (OT)    Activity/Assistive Device (Transfer Goal 1, OT) toilet  -BB     [place] Level/Cues Needed (Transfer Goal 1, OT) modified independence  -BB     Time Frame (Transfer Goal 1, OT) long term goal (LTG);by discharge  -BB     Progress/Outcome (Transfer Goal 1, OT) goal not met  -BB     Row Name 05/10/22 0827          Bathing Goal 1 (OT)    Activity/Device (Bathing Goal 1, OT) lower body bathing  -BB     [place] Level/Cues Needed (Bathing Goal 1, OT) modified independence  -BB     Time Frame (Bathing Goal 1, OT) long term goal (LTG);by discharge  -BB     Progress/Outcomes (Bathing Goal 1, OT) goal not met  -BB     Row Name 05/10/22 0827          Dressing Goal 1 (OT)    Activity/Device (Dressing Goal 1, OT) lower body dressing  -BB     [place]/Cues Needed (Dressing Goal 1, OT) independent  -BB     Time Frame (Dressing Goal 1, OT) long term goal (LTG);by discharge  -BB     Progress/Outcome (Dressing Goal 1, OT) goal not met  -BB     Row Name 05/10/22 0827          Toileting Goal 1 (OT)    Activity/Device (Toileting Goal 1, OT) toileting skills, all  -BB     [place] Level/Cues Needed (Toileting Goal 1, OT) independent  -BB     Time Frame (Toileting Goal 1, OT) long term goal (LTG);by discharge  -BB     Progress/Outcome (Toileting Goal 1, OT) goal not met  -BB           User Key  (r) = Recorded By, (t) = Taken By, (c) = Cosigned By    Initials Name Provider Type    Daphne Hudson COTA Occupational Therapist Assistant               Clinical Impression     Row Name 05/10/22 0827          Pain Assessment    Pretreatment Pain Rating 0/10 - no pain  -BB     Posttreatment Pain Rating 0/10 - no pain  -BB     Row Name 05/10/22 0827          Plan of Care Review    Plan of Care Reviewed With patient  -AILYN     Progress  improving  -BB     Outcome Evaluation Pt agrees to OT this morning. Pt states she already gave herself a bath.Pt is supervision for supine<>sit EOB and supervision for sit<>stand. Pt is Independent for doffing/donning socks while sitting EOB. While using a 1 lb wt pt completed 2 sets x15 reps B UE exercises with good tolerance. Pt is SBA for bed<>toilet t/f.  -BB     Row Name 05/10/22 0808          Therapy Assessment/Plan (OT)    Rehab Potential (OT) good, to achieve stated therapy goals  -BB     Criteria for Skilled Therapeutic Interventions Met (OT) yes;skilled treatment is necessary  -BB     Therapy Frequency (OT) other (see comments)  3-7 d/wk  -BB     Row Name 05/10/22 0891          Therapy Plan Review/Discharge Plan (OT)    Anticipated Discharge Disposition (OT) home with assist;home with home health  -BB     Row Name 05/10/22 1400          Vital Signs    Pre Systolic BP Rehab 121  -BB     Pre Treatment Diastolic BP 68  -BB     Pretreatment Heart Rate (beats/min) 73  -BB     Posttreatment Heart Rate (beats/min) 78  -BB     Pre SpO2 (%) 99  -BB     O2 Delivery Pre Treatment room air  -BB     Post SpO2 (%) 99  -BB     O2 Delivery Post Treatment room air  -BB     Pre Patient Position Supine  -BB     Post Patient Position Supine  -BB     Row Name 05/10/22 1400          Positioning and Restraints    Pre-Treatment Position in bed  -BB     Post Treatment Position bed  -BB     In Bed fowlers;call light within reach;exit alarm on;encouraged to call for assist  -BB           User Key  (r) = Recorded By, (t) = Taken By, (c) = Cosigned By    Initials Name Provider Type    BB Daphne Naik COTA Occupational Therapist Assistant               Outcome Measures     Row Name 05/10/22 7108          How much help from another is currently needed...    Putting on and taking off regular lower body clothing? 3  -BB     Bathing (including washing, rinsing, and drying) 3  -BB     Toileting (which includes using toilet bed pan  or urinal) 3  -BB     Putting on and taking off regular upper body clothing 4  -BB     Taking care of personal grooming (such as brushing teeth) 4  -BB     Eating meals 4  -BB     AM-PAC 6 Clicks Score (OT) 21  -BB           User Key  (r) = Recorded By, (t) = Taken By, (c) = Cosigned By    Initials Name Provider Type    Daphne Hudson COTA Occupational Therapist Assistant                Occupational Therapy Education                 Title: PT OT SLP Therapies (In Progress)     Topic: Occupational Therapy (In Progress)     Point: ADL training (Done)     Description:   Instruct learner(s) on proper safety adaptation and remediation techniques during self care or transfers.   Instruct in proper use of assistive devices.              Learning Progress Summary           Patient Acceptance, E, VU by AILYN at 5/10/2022 1402                   Point: Home exercise program (Not Started)     Description:   Instruct learner(s) on appropriate technique for monitoring, assisting and/or progressing therapeutic exercises/activities.              Learner Progress:  Not documented in this visit.          Point: Precautions (Done)     Description:   Instruct learner(s) on prescribed precautions during self-care and functional transfers.              Learning Progress Summary           Patient Acceptance, E,TB, VU by ANN at 5/9/2022 0911    Comment: POC, role of OT, transfer training                   Point: Body mechanics (Done)     Description:   Instruct learner(s) on proper positioning and spine alignment during self-care, functional mobility activities and/or exercises.              Learning Progress Summary           Patient Acceptance, E, VU by AILYN at 5/10/2022 1402    Acceptance, E,TB, VU by ANN at 5/9/2022 0911    Comment: POC, role of OT, transfer training                               User Key     Initials Effective Dates Name Provider Type [facility] 06/16/21 -  Daphne Naik COTA Occupational Therapist  Assistant OT    ANN 06/14/21 -  aMryan Henry OT Occupational Therapist OT              OT Recommendation and Plan  Therapy Frequency (OT): other (see comments) (3-7 d/wk)  Plan of Care Review  Plan of Care Reviewed With: patient  Progress: improving  Outcome Evaluation: Pt agrees to OT this morning. Pt states she already gave herself a bath.Pt is supervision for supine<>sit EOB and supervision for sit<>stand. Pt is Independent for doffing/donning socks while sitting EOB. While using a 1 lb wt pt completed 2 sets x15 reps B UE exercises with good tolerance. Pt is SBA for bed<>toilet t/f.     Time Calculation:    Time Calculation- OT     Row Name 05/10/22 1403             Time Calculation- OT    OT Start Time 0827  -BB      OT Stop Time 0907  -BB      OT Time Calculation (min) 40 min  -BB      Total Timed Code Minutes- OT 40 minute(s)  -BB      OT Received On 05/10/22  -BB              Timed Charges    07556 - OT Therapeutic Exercise Minutes 25  -BB      57379 - OT Self Care/Mgmt Minutes 15  -BB              Total Minutes    Timed Charges Total Minutes 40  -BB       Total Minutes 40  -BB            User Key  (r) = Recorded By, (t) = Taken By, (c) = Cosigned By    Initials Name Provider Type    BB Daphne Naik COTA Occupational Therapist Assistant              Therapy Charges for Today     Code Description Service Date Service Provider Modifiers Qty    42628465665 HC OT THER PROC EA 15 MIN 5/10/2022 Daphne Naik COTA GO 2    14362356041 HC OT SELF CARE/MGMT/TRAIN EA 15 MIN 5/10/2022 Daphne Naik COTA GO 1               HIREN Starr  5/10/2022

## 2022-05-10 NOTE — THERAPY TREATMENT NOTE
Acute Care - Physical Therapy Treatment Note  [facility]     Patient Name: Lexi Wade  : 1950  MRN: [id number]  Today's Date: 5/10/2022      Visit Dx:     ICD-10-CM ICD-9-CM   1. Acute on chronic systolic congestive heart failure (HCC)  I50.23 428.23     428.0   2. Impaired mobility and ADLs  Z74.09 V49.89    Z78.9    3. 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)   • Mixed hyperlipidemia   • Menopausal syndrome   • Dyspnea   • Chest pain   • Morbid obesity (HCC)   • Precordial pain   • Acute respiratory failure with hypoxia (HCC)   • 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)   • Implantable cardioverter-defibrillator (ICD) generator end of life   • Multifocal pneumonia   • Acute on chronic systolic CHF (congestive heart failure) (HCC)   • Obesity (BMI 30-39.9)   • Shortness of breath   • Altered mental status   • Morbid obesity (HCC)   • Drowsiness   • Acute on chronic systolic congestive heart failure (HCC)     Past Medical History:   Diagnosis Date   • Chronic systolic heart failure (HCC)    • Diabetes mellitus (HCC)    • Diabetic neuropathy (HCC)    • GERD (gastroesophageal reflux disease)    • Hypercholesterolemia    • Hypertension    • Low back pain    • Morbid obesity (HCC)    • Nonischemic cardiomyopathy (HCC)    • Osteoarthritis    • Sleep apnea    • Vitamin D deficiency      Past Surgical History:   Procedure Laterality Date   • CARDIAC CATHETERIZATION N/A 2018   • CARDIAC ELECTROPHYSIOLOGY PROCEDURE N/A 2020   • CARDIAC PACEMAKER PLACEMENT     •  CATARACT EXTRACTION     • COLONOSCOPY N/A 06/14/2017   • PACEMAKER IMPLANTATION     • TOTAL ABDOMINAL HYSTERECTOMY WITH SALPINGO OOPHORECTOMY       PT Assessment (last 12 hours)     PT Evaluation and Treatment     Row Name 05/10/22 1511          Physical Therapy Time and Intention    Subjective Information no complaints  -LN     Document Type therapy note (daily note)  -LN     Mode of Treatment physical therapy  -LN     Row Name 05/10/22 1511          General Information    Patient Profile Reviewed yes  -LN     Existing Precautions/Restrictions fall  -LN     Row Name 05/10/22 1511          Pain    Pretreatment Pain Rating 0/10 - no pain  -LN     Posttreatment Pain Rating 0/10 - no pain  -LN     Row Name 05/10/22 1511          Cognition    Orientation Status (Cognition) oriented x 4  -LN     Row Name 05/10/22 1511          Range of Motion Comprehensive    General Range of Motion bilateral lower extremity ROM WFL  -LN     Row Name 05/10/22 1511          Bed Mobility    Supine-Sit Kramer (Bed Mobility) standby assist  -LN     Sit-Supine Kramer (Bed Mobility) not tested  -LN     Row Name 05/10/22 1511          Transfers    Sit-Stand Kramer (Transfers) supervision  -LN     Stand-Sit Kramer (Transfers) standby assist  -LN     [place] Level (Toilet Transfer) supervision  -LN     Assistive Device (Toilet Transfer) grab bars/safety frame;commode;walker, front-wheeled  -LN     Row Name 05/10/22 1511          Sit-Stand Transfer    Assistive Device (Sit-Stand Transfers) walker, front-wheeled  -LN     [facility] Name 05/10/22 1511          Stand-Sit Transfer    Assistive Device (Stand-Sit Transfers) walker, front-wheeled  -LN     Row Name 05/10/22 1511          Toilet Transfer    Type (Toilet Transfer) sit-stand;stand-sit  -LN     [facility] Name 05/10/22 1511          Gait/Stairs (Locomotion)    [place] Level (Gait) supervision;contact guard  -LN     Assistive Device (Gait) walker, front-wheeled  -LN      Distance in Feet (Gait) 15,50  -LN     Comment, (Gait/Stairs) pt wanting to push rw to far in front of herself  -LN     Row Name 05/10/22 1511          Safety Issues, Functional Mobility    Impairments Affecting Function (Mobility) strength;endurance/activity tolerance;balance  -LN     Row Name 05/10/22 1511          Plan of Care Review    Plan of Care Reviewed With patient  -LN     Outcome Evaluation sup-sit-stand-sit sba of 1;amb 50',15' with rw and cg/sba of 1  -LN     Row Name 05/10/22 1511          Vital Signs    Pre Systolic BP Rehab 125  -LN     Pre Treatment Diastolic BP 62  -LN     Post Systolic BP Rehab 114  -LN     Post Treatment Diastolic BP 64  -LN     Pretreatment Heart Rate (beats/min) 75  -LN     Posttreatment Heart Rate (beats/min) 75  -LN     Pre SpO2 (%) 95  -LN     O2 Delivery Pre Treatment room air  -LN     Post SpO2 (%) 97  -LN     Pre Patient Position Supine  -LN     Intra Patient Position Standing  -LN     Post Patient Position Sitting  -LN     Row Name 05/10/22 1511          Positioning and Restraints    In Bed sitting EOB;call light within reach;encouraged to call for assist;exit alarm on  -LN     Row Name 05/10/22 1511          Therapy Assessment/Plan (PT)    Rehab Potential (PT) good, to achieve stated therapy goals  -LN     Criteria for Skilled Interventions Met (PT) yes;skilled treatment is necessary  -LN     Therapy Frequency (PT) other (see comments)  3-7 days/week  -LN     Row Name 05/10/22 1511          Bed Mobility Goal 1 (PT)    Activity/Assistive Device (Bed Mobility Goal 1, PT) sit to supine/supine to sit  -LN     [place] Level/Cues Needed (Bed Mobility Goal 1, PT) independent  -LN     Time Frame (Bed Mobility Goal 1, PT) by discharge  -LN     Strategies/Barriers (Bed Mobility Goal 1, PT) HOB flat, no bed rails.  -LN     Progress/Outcomes (Bed Mobility Goal 1, PT) goal not met  -LN     Row Name 05/10/22 1511          Transfer Goal 1 (PT)    Activity/Assistive Device  (Transfer Goal 1, PT) sit-to-stand/stand-to-sit;bed-to-chair/chair-to-bed  -LN     [place] Level/Cues Needed (Transfer Goal 1, PT) modified independence  -LN     Time Frame (Transfer Goal 1, PT) by discharge  -LN     Progress/Outcome (Transfer Goal 1, PT) goal not met  -LN     Row Name 05/10/22 1511          Gait Training Goal 1 (PT)    Activity/Assistive Device (Gait Training Goal 1, PT) walker, rolling  -LN     [place] Level (Gait Training Goal 1, PT) modified independence  -LN     Distance (Gait Training Goal 1, PT) 150'x1.  -LN     Time Frame (Gait Training Goal 1, PT) by discharge  -LN     Progress/Outcome (Gait Training Goal 1, PT) goal not met  -LN     Row Name 05/10/22 1511          Stairs Goal 1 (PT)    Activity/Assistive Device (Stairs Goal 1, PT) using handrail, right  -LN     [place] Level/Cues Needed (Stairs Goal 1, PT) modified independence  -LN     Number of Stairs (Stairs Goal 1, PT) 3 steps.  -LN     Time Frame (Stairs Goal 1, PT) by discharge  -LN     Progress/Outcome (Stairs Goal 1, PT) goal not met  -LN     Row Name 05/10/22 1511          Problem Specific Goal 1 (PT)    Problem Specific Goal 1 (PT) Score 25/28 on Tinetti fall risk assessment.  -LN     Time Frame (Problem Specific Goal 1, PT) by discharge  -LN     Strategies/Barriers (Problem Specific Goal 1, PT) Decreased stepping response to perturbation.  -LN     Progress/Outcome (Problem Specific Goal 1, PT) goal not met  -LN           User Key  (r) = Recorded By, (t) = Taken By, (c) = Cosigned By    Initials Name Provider Type    LN Cammie Roach PTA Physical Therapist Assistant                Physical Therapy Education                 Title: PT OT SLP Therapies (In Progress)     Topic: Physical Therapy (Not Started)     Point: Mobility training (Not Started)     Learner Progress:  Not documented in this visit.          Point: Home exercise program (Not Started)     Learner Progress:  Not documented in this visit.           Point: Body mechanics (Not Started)     Learner Progress:  Not documented in this visit.          Point: Precautions (Not Started)     Learner Progress:  Not documented in this visit.                          PT Recommendation and Plan  Therapy Frequency (PT): other (see comments) (3-7 days/week)  Plan of Care Reviewed With: patient  Outcome Evaluation: sup-sit-stand-sit sba of 1;amb 50',15' with rw and cg/sba of 1       Time Calculation:    PT Charges     Row Name 05/10/22 1550             Time Calculation    Start Time 1511  -LN      Stop Time 1535  -LN      Time Calculation (min) 24 min  -LN      PT Received On 05/10/22  -LN              Time Calculation- PT    Total Timed Code Minutes- PT 24 minute(s)  -LN            User Key  (r) = Recorded By, (t) = Taken By, (c) = Cosigned By    Initials Name Provider Type    Cammie Wu PTA Physical Therapist Assistant              Therapy Charges for Today     Code Description Service Date Service Provider Modifiers Qty    06388246010 HC GAIT TRAINING EA 15 MIN 5/10/2022 Cammie Roach PTA GP 1    [id number] HC PT THERAPEUTIC ACT EA 15 MIN 5/10/2022 Cammie Roach PTA GP 1          PT G-Codes  Outcome Measure Options: AM-PAC 6 Clicks Daily Activity (OT)  AM-PAC 6 Clicks Score (PT): 18  AM-PAC 6 Clicks Score (OT): 21  Tinetti Total Score: 22    Cammie Roach PTA  5/10/2022

## 2022-05-10 NOTE — PLAN OF CARE
Problem: Adult Inpatient Plan of Care  Goal: Plan of Care Review  Flowsheets  Taken 5/10/2022 1402  Progress: improving  Plan of Care Reviewed With: patient  Taken 5/10/2022 0814  Progress: improving  Plan of Care Reviewed With: patient  Outcome Evaluation: Pt agrees to OT this morning. Pt states she already gave herself a bath.Pt is supervision for supine<>sit EOB and supervision for sit<>stand. Pt is Independent for doffing/donning socks while sitting EOB. While using a 1 lb wt pt completed 2 sets x15 reps B UE exercises with good tolerance. Pt is SBA for bed<>toilet t/f.   Goal Outcome Evaluation:  Plan of Care Reviewed With: patient        Progress: improving  Outcome Evaluation: Pt agrees to OT this morning. Pt states she already gave herself a bath.Pt is supervision for supine<>sit EOB and supervision for sit<>stand. Pt is Independent for doffing/donning socks while sitting EOB. While using a 1 lb wt pt completed 2 sets x15 reps B UE exercises with good tolerance. Pt is SBA for bed<>toilet t/f.

## 2022-05-10 NOTE — PROGRESS NOTES
Cardiology Progress Note     LOS: 0 days   Patient Care Team:  Joyce Plata MD as PCP - General  Yamilka Wilson, RN as Ambulatory  ([facility])    Subjective:     Chart reviewed. Patient seen and examined. Patient symptoms of shortness of breath is improved.  Would continue with IV dobutamine until morning.  Patient AICD interrogation had revealed no episodes of ventricular tachyarrhythmia with appropriate AICD function.  Patient BUN is 16 and a creatinine of 1.15 with a potassium of 3.3 and a hemoglobin of 10.7.      Objective:  Temp:  [96.6 °F (35.9 °C)-97.4 °F (36.3 °C)] 97 °F (36.1 °C)  Heart Rate:  [67-96] 71  Resp:  [16-18] 16  BP: (101-124)/(58-66) 108/58    Intake/Output Summary (Last 24 hours) at 5/10/2022 1219  Last data filed at 5/10/2022 1200  Gross per 24 hour   Intake 1210 ml   Output 1150 ml   Net 60 ml       Physical Exam:   General Appearance:    Alert, oriented, cooperative, in no acute distress.   Head:    Normocephalic, atraumatic, without obvious abnormality   Eyes:             DEONDRE. Lids and lashes normal, conjunctivae and sclerae normal, no icterus, no pallor.   Ears:    Ears appear intact with no abnormalities noted.   Throat:   Mucous membranes pink and moist.   Neck:  Supple, trachea midline, no carotid bruit, no organomegaly or JVD.   Lungs:    Clear to auscultation and percussion.  Respirations regular, even and unlabored. No wheezes, rales, or rhonchi.    Heart:   Regular S1 and S2 with a soft systolic murmur best heard at the apex.   Abdomen:    Soft, nontender, nondistended, no guarding, no rebound tenderness. Normal bowel sounds in all four quadrants, no masses, liver and spleen nonpalpable.    Genitalia:    Deferred.   Extremities:   Moves all extremities well, no edema, no cyanosis, no       redness, no clubbing.   Pulses:   Pulses palpable and equal bilaterally.   Skin:   Moist and warm. No bleeding, bruising or rash.   Neurologic/Psychiatric:   Alert and  oriented to person, place, and time.  Motor, power and tone in upper and lower extremities are grossly intact.  No focal neurological deficits. Normal cognitive function. No psychomotor reaction or tangential thought. No depression, homicidal ideations and suicidal ideations.          Results Review:    Results from last 7 days   Lab Units 05/10/22  0534 05/09/22  0519 05/08/22  1117   SODIUM mmol/L 141   < > 139   POTASSIUM mmol/L 3.3*   < > 4.1   CHLORIDE mmol/L 105   < > 106   CO2 mmol/L 24.0   < > 19.0*   BUN mg/dL 16   < > 12   CREATININE mg/dL 1.15*   < > 0.91   CALCIUM mg/dL 10.3   < > 9.5   BILIRUBIN mg/dL  --   --  0.8   ALK PHOS U/L  --   --  106   ALT (SGPT) U/L  --   --  26   AST (SGOT) U/L  --   --  23   GLUCOSE mg/dL 112*   < > 250*    < > = values in this interval not displayed.     Results from last 7 days   Lab Units 05/08/22  1117   TROPONIN T ng/mL <0.010         Results from last 7 days   Lab Units 05/10/22  0534   WBC 10*3/mm3 9.32   HEMOGLOBIN g/dL 10.7*   HEMATOCRIT % 32.5*   PLATELETS 10*3/mm3 314         Results from last 7 days   Lab Units 05/05/22  0721   MAGNESIUM mg/dL 1.7                   ECHO:  Results for orders placed during the hospital encounter of 02/18/22    Adult Transthoracic Echo Complete W/ Cont if Necessary Per Protocol    Interpretation Summary  · There is mild, bileaflet mitral valve thickening present.  · The left ventricular cavity is mildly dilated.  · Mild to moderate mitral valve stenosis is present.  · The right atrial cavity is mildly dilated.  · Estimated right ventricular systolic pressure from tricuspid regurgitation is mildly elevated (35-45 mmHg).  · Left ventricular ejection fraction appears to be 36 - 40%.  · Left ventricular diastolic function was normal.  · The following left ventricular wall segments are hypokinetic: mid anterior, apical anterior, basal anterolateral, mid anterolateral, apical lateral, basal inferolateral, mid inferolateral, apical  inferior, mid inferior, apical septal, basal inferoseptal, mid inferoseptal, apex hypokinetic, mid anteroseptal, basal anterior, basal inferior and basal inferoseptal.      ECG 12 Lead   Final Result   Test Reason : SOA   Blood Pressure :   */*   mmHG   Vent. Rate : 103 BPM     Atrial Rate : 103 BPM      P-R Int : 134 ms          QRS Dur : 120 ms       QT Int : 366 ms       P-R-T Axes :  58 -86  47 degrees      QTc Int : 479 ms      Electronic ventricular pacemaker   When compared with ECG of 02-MAY-2022 09:58,   Vent. rate has increased BY   8 BPM      Referred By: ERDR           Confirmed By: NAHUN SILVA      SCANNED EKG   Final Result         SCANNED - TELEMETRY     Final Result         SCANNED - TELEMETRY     Final Result              Medication Review:   Current Facility-Administered Medications   Medication Dose Route Frequency Provider Last Rate Last Admin   • acetaminophen (TYLENOL) tablet 650 mg  650 mg Oral Q4H PRN Shelbie Berry MD       • albuterol (PROVENTIL) nebulizer solution 0.083% 2.5 mg/3mL  2.5 mg Nebulization Q6H PRN Shelbie Berry MD       • aspirin EC tablet 81 mg  81 mg Oral Nightly Shelbie Berry MD   81 mg at 05/09/22 2116   • atorvastatin (LIPITOR) tablet 40 mg  40 mg Oral Daily Shelbie Berry MD   40 mg at 05/10/22 0749   • carvedilol (COREG) tablet 25 mg  25 mg Oral BID With Meals Shelbie Berry MD   25 mg at 05/10/22 0749   • dextrose (D50W) (25 g/50 mL) IV injection 25 g  25 g Intravenous Q15 Min PRN Levill, Angelica G, APRN       • dextrose (GLUTOSE) oral gel 15 g  15 g Oral Q15 Min PRN Levill, Angelica G, APRN       • DOBUTamine (DOBUTREX) 1 mg/mL infusion  8 mcg/kg/min Intravenous Continuous Wang Felipe MD 43.3 mL/hr at 05/10/22 0747 8 mcg/kg/min at 05/10/22 0747   • Enoxaparin Sodium (LOVENOX) syringe 40 mg  40 mg Subcutaneous Q24H Shelbie Berry MD   40 mg at 05/09/22 2116   • furosemide (LASIX) injection 40 mg  40 mg Intravenous Q12H Shelbie Berry  MD AVERY   40 mg at 05/10/22 0525   • glucagon (human recombinant) (GLUCAGEN DIAGNOSTIC) injection 1 mg  1 mg Subcutaneous Q15 Min PRN Angelica Sepulveda APRN       • guaiFENesin (MUCINEX) 12 hr tablet 600 mg  600 mg Oral Q12H Shelbie Berry MD   600 mg at 05/10/22 0748   • Insulin Aspart (novoLOG) injection 0-14 Units  0-14 Units Subcutaneous TID AC Angelica Sepulveda APRN   3 Units at 05/09/22 1256   • insulin detemir (LEVEMIR) injection 20 Units  20 Units Subcutaneous Nightly Shelbie Berry MD   20 Units at 05/09/22 2128   • ipratropium-albuterol (DUO-NEB) nebulizer solution 3 mL  3 mL Nebulization 4x Daily Shelbie Berry MD   3 mL at 05/10/22 1030   • isosorbide mononitrate (IMDUR) 24 hr tablet 30 mg  30 mg Oral Daily Shelbie Berry MD   30 mg at 05/10/22 0749   • lisinopril (PRINIVIL,ZESTRIL) tablet 10 mg  10 mg Oral Daily Shelbie Berry MD   10 mg at 05/10/22 0749   • magnesium oxide (MAG-OX) tablet 400 mg  400 mg Oral Daily Shelbie Berry MD   400 mg at 05/10/22 0749   • morphine injection 2 mg  2 mg Intravenous Q4H PRN Shelbie Berry MD        And   • naloxone (NARCAN) injection 0.4 mg  0.4 mg Intravenous Q5 Min PRN Shelbie Berry MD       • ondansetron (ZOFRAN) injection 4 mg  4 mg Intravenous Q6H PRN Shelbie Berry MD       • pantoprazole (PROTONIX) EC tablet 40 mg  40 mg Oral Daily Shelbie Berry MD   40 mg at 05/10/22 0749   • potassium chloride (MICRO-K) CR capsule 20 mEq  20 mEq Oral BID Shelbie Berry MD   20 mEq at 05/10/22 0748   • sodium chloride 0.9 % flush 10 mL  10 mL Intravenous PRN Shelbie Berry MD       • sodium chloride 0.9 % flush 10 mL  10 mL Intravenous Q12H Shelbie Berry MD   10 mL at 05/10/22 0751   • sodium chloride 0.9 % flush 10 mL  10 mL Intravenous PRN Shelbie Berry MD       • sucralfate (CARAFATE) tablet 1 g  1 g Oral TID Shelbie Berry MD   1 g at 05/10/22 3291       Assessment and Plan:      Congestive heart  failure (HCC)    Acute on chronic systolic congestive heart failure (HCC)  1.  Nonischemic cardiomyopathy with left ventricular systolic dysfunction.  Patient had undergone previous coronary angiogram which had not reveal of any evidence of any obstructive epicardial coronary artery disease.  Patient has nonischemic cardiomyopathy.  Patient has had recurrent hospitalization.  Patient has been counseled to decrease the salt intake.  Patient will be continued on IV dobutamine.  Patient is currently on Lasix 40 mg twice a day.  Patient will be continued on afterload reduction with the lisinopril and carvedilol.  Have discussed with the patient the consideration for Entresto the patient creatinine is mildly elevated.    2.  Atypical chest pain.  Patient has not had any further recurrent symptoms or chest pain.  Patient had undergone previous coronary angiogram which did not reveal of any evidence of any obstructive epicardial coronary artery disease.  Patient would be continued on isosorbide 30 mg daily.    3.  Arterial hypertension.  Patient blood pressure has been on the low side.  Patient would be continued on the present dose of the carvedilol and the lisinopril.  Patient has been counseled to decrease her salt intake.    4.  Obesity with a body mass index of 33.  Patient has been counseled on weight reduction lifestyle modification and dietary restriction.  Patient has been explained the risk associated with obesity and the occurrence and progression of atherosclerotic coronary artery disease and peripheral vascular disease.    5.  Status post [place] AICD.  Patient AICD interrogation had revealed appropriate AICD function.  There was no evidence of any tachyarrhythmia or delivery of shock.    6.  Chronic kidney disease.  Patient renal function would be followed on the present dose of the Lasix.    7.  Potassium of 3.3.  Patient would be supplemented with potassium.    The above plan of management were discussed  with the patient and the nursing staff            Electronically signed by Wang Felipe MD, 05/10/22, 12:19 PM CDT.      Time: Time spent on face-to-face interaction 20 minutes      Dictated utilizing Dragon dictation.

## 2022-05-10 NOTE — PLAN OF CARE
Patient continues on dobutamine drip. VS stable. No needs verbalized.   Goal Outcome Evaluation:

## 2022-05-10 NOTE — PLAN OF CARE
Goal Outcome Evaluation:  Plan of Care Reviewed With: patient           Outcome Evaluation: sup-sit-stand-sit sba of 1;amb 50',15' with rw and cg/sba of 1

## 2022-05-11 NOTE — PLAN OF CARE
Goal Outcome Evaluation:  Plan of Care Reviewed With: patient        Progress: improving  Outcome Evaluation: Bed mobility-Ind. Sit-stand/toilet t/f-Ind. Pericare-Ind. Pt amb ~15' w/no AD and good w/ tolerance. Pt completed UB/LB bathing/dressing and grooming w/ [place]. Pt t/f to recliner w/ no AD -Ind.  Pt tolerated BUE ther ex in all planes w/ 2lb HW and good tolerance w/ RB's PRN. Pt in recliner w/ all needs in reach upon exit. Cont OT POC.

## 2022-05-11 NOTE — THERAPY TREATMENT NOTE
Patient Name: Lexi Wade  : 1950    MRN: [id number]                              Today's Date: 2022       Admit Date: 2022    Visit Dx:     ICD-10-CM ICD-9-CM   1. Acute on chronic systolic congestive heart failure (HCC)  I50.23 428.23     428.0   2. Impaired mobility and ADLs  Z74.09 V49.89    Z78.9    3. Impaired functional mobility, balance, gait, and endurance  Z74.09 V49.89   4. Drowsiness  R40.0 780.09   5. Morbid obesity ([facility])  E66.01 278.01   6. Shortness of breath  R06.02 786.05   7. Obesity (BMI 30-39.9)  E66.9 278.00   8. Acute on chronic systolic CHF (congestive heart failure) ([facility])  I50.23 428.23     428.0   9. Multifocal pneumonia  J18.9 486   10. Implantable cardioverter-defibrillator (ICD) generator end of life  Z45.02 V53.32   11. Coronary artery disease involving native heart with angina pectoris, unspecified vessel or lesion type ([facility])  I25.119 414.01     413.9   12. Acute respiratory failure with hypoxia ([facility])  J96.01 518.81   13. Precordial pain  R07.2 786.51   14. Other chest pain  R07.89 786.59   15. Menopausal syndrome  N95.1 627.2   16. Mixed hyperlipidemia  E78.2 272.2   17. Neurologic disorder associated with diabetes mellitus ([facility])  E11.49 250.60     349.9   18. Borderline glaucoma of both eyes  H40.003 365.00   19. Vitamin D deficiency  E55.9 268.9   20. Gastroesophageal reflux disease without esophagitis  K21.9 530.81   21. Chronic congestive heart failure, unspecified heart failure type ([facility])  I50.9 428.0   22. Nonischemic cardiomyopathy ([facility])  I42.8 425.4   23. Pseudophakia  Z96.1 V43.1     Patient Active Problem List   Diagnosis   • Pseudophakia   • Primary open angle glaucoma   • Nonischemic cardiomyopathy (HCC)   • Congestive heart failure (HCC)   • Hypertension   • GERD (gastroesophageal reflux disease)   • Diabetes mellitus (HCC)   • Vitamin D deficiency   • Borderline glaucoma   • Neurologic disorder associated with diabetes mellitus ([facility])   • Mixed  hyperlipidemia   • Menopausal syndrome   • Dyspnea   • Chest pain   • Morbid obesity (HCC)   • Precordial pain   • Acute respiratory failure with hypoxia (HCC)   • 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)   • Implantable cardioverter-defibrillator (ICD) generator end of life   • Multifocal pneumonia   • Acute on chronic systolic CHF (congestive heart failure) (HCC)   • Obesity (BMI 30-39.9)   • Shortness of breath   • Altered mental status   • Morbid obesity (HCC)   • Drowsiness   • Acute on chronic systolic congestive heart failure (HCC)     Past Medical History:   Diagnosis Date   • Chronic systolic heart failure (HCC)    • Diabetes mellitus (HCC)    • Diabetic neuropathy (HCC)    • GERD (gastroesophageal reflux disease)    • Hypercholesterolemia    • Hypertension    • Low back pain    • Morbid obesity (HCC)    • Nonischemic cardiomyopathy (HCC)    • Osteoarthritis    • Sleep apnea    • Vitamin D deficiency      Past Surgical History:   Procedure Laterality Date   • CARDIAC CATHETERIZATION N/A 08/23/2018   • CARDIAC ELECTROPHYSIOLOGY PROCEDURE N/A 06/22/2020   • CARDIAC PACEMAKER PLACEMENT     • CATARACT EXTRACTION     • COLONOSCOPY N/A 06/14/2017   • PACEMAKER IMPLANTATION     • TOTAL ABDOMINAL HYSTERECTOMY WITH SALPINGO OOPHORECTOMY        General Information     Row Name 05/11/22 1229 05/11/22 0803       OT Time and Intention    Document Type therapy note (daily note)  -KD therapy note (daily note)  -KD    Mode of Treatment individual therapy;occupational therapy  -KD individual therapy;occupational therapy  -KD    Row Name 05/11/22 1229 05/11/22 0803       General Information    Patient Profile Reviewed yes  -KD yes  -KD    Existing Precautions/Restrictions fall  -KD fall  -KD    Row Name 05/11/22 1229 05/11/22 0803       Cognition    Orientation Status (Cognition) oriented x 4  -KD oriented x 4  -KD    Row Name 05/11/22 1229 05/11/22 0803       Safety Issues, Functional  Mobility    Impairments Affecting Function (Mobility) strength;endurance/activity tolerance;balance  -KD strength;endurance/activity tolerance;balance  -KD          User Key  (r) = Recorded By, (t) = Taken By, (c) = Cosigned By    Initials Name Provider Type    Prachi Borges COTA Occupational Therapist Assistant                 Mobility/ADL's     Row Name 05/11/22 0803          Bed Mobility    Supine-Sit [place] (Bed Mobility) independent  -KD     Sit-Supine [place] (Bed Mobility) not tested  -KD     Row Name 05/11/22 1229 05/11/22 0803       Transfers    Transfers -- sit-stand transfer;toilet transfer  -KD    Comment, (Transfers) Pt up in recliner upon entry.  -KD --    Sit-Stand [place] (Transfers) independent  -KD independent  -KD    Stand-Sit [place] (Transfers) independent  -KD independent  -KD    [place] Level (Toilet Transfer) -- independent  -KD    Assistive Device (Toilet Transfer) -- grab bars/safety frame;commode;walker, front-wheeled  -KD    Row Name 05/11/22 1229 05/11/22 0803       Sit-Stand Transfer    Assistive Device (Sit-Stand Transfers) other (see comments)  none  -KD other (see comments)  No AD  -KD    Row Name 05/11/22 0803          Stand-Sit Transfer    Assistive Device (Stand-Sit Transfers) other (see comments)  none  -KD     Row Name 05/11/22 0803          Toilet Transfer    Type (Toilet Transfer) sit-stand;stand-sit  -KD     Row Name 05/11/22 1229 05/11/22 0803       Functional Mobility    Functional Mobility- Ind. Level independent  -KD independent  -KD    Functional Mobility-Distance (Feet) 150  -KD 15  -KD    Row Name 05/11/22 0803          Activities of Daily Living    BADL Assessment/Intervention bathing;upper body dressing;lower body dressing;grooming;toileting  -KD     Row Name 05/11/22 0803          Lower Body Dressing Assessment/Training    [place] Level (Lower Body Dressing) lower body dressing skills;doff;don;pants/bottoms;socks;independent  -KD      Position (Lower Body Dressing) edge of bed sitting;unsupported sitting;unsupported standing  -     Row Name 05/11/22 0803          Grooming Assessment/Training    [place] Level (Grooming) grooming skills;wash face, hands;independent  -     Position (Grooming) edge of bed sitting  -     Row Name 05/11/22 0803          Toileting Assessment/Training    [place] Level (Toileting) toileting skills;adjust/manage clothing;perform perineal hygiene;independent  -     Assistive Devices (Toileting) commode  -     Position (Toileting) unsupported sitting  -     Row Name 05/11/22 0803          Bathing Assessment/Intervention    [place] Level (Bathing) bathing skills;lower body;upper body;independent  -     Position (Bathing) edge of bed sitting  -[facility] Name 05/11/22 0803          Upper Body Dressing Assessment/Training    [place] Level (Upper Body Dressing) upper body dressing skills;doff;don;independent  -     Position (Upper Body Dressing) unsupported sitting;unsupported standing  -           User Key  (r) = Recorded By, (t) = Taken By, (c) = Cosigned By    Initials Name Provider Type     Prachi Carlos COTA Occupational Therapist Assistant               Obj/Interventions     Row Name 05/11/22 1328 05/11/22 1229       Shoulder (Therapeutic Exercise)    Shoulder AROM (Therapeutic Exercise) bilateral;flexion;extension;aBduction;aDduction;external rotation;internal rotation  - bilateral;flexion;aBduction;extension;aDduction;external rotation  -    Row Name 05/11/22 1328 05/11/22 1229       Elbow/Forearm (Therapeutic Exercise)    Elbow/Forearm (Therapeutic Exercise) AROM (active range of motion)  - AROM (active range of motion)  -    Elbow/Forearm AROM (Therapeutic Exercise) bilateral;flexion;extension;supination;pronation  - bilateral;flexion;extension;supination;pronation  -    Row Name 05/11/22 1328 05/11/22 1229       Wrist (Therapeutic Exercise)    Wrist  (Therapeutic Exercise) AROM (active range of motion)  -KD AROM (active range of motion)  -KD    Wrist AROM (Therapeutic Exercise) bilateral;flexion;extension  -KD bilateral;flexion;extension  -KD    Row Name 05/11/22 1328 05/11/22 1229       Hand (Therapeutic Exercise)    Hand (Therapeutic Exercise) AROM (active range of motion)  -KD --    Hand AROM/AAROM (Therapeutic Exercise) bilateral;finger flexion;finger extension  -KD bilateral;finger flexion;finger extension  -KD    Row Name 05/11/22 1328 05/11/22 1229       Motor Skills    Therapeutic Exercise shoulder;elbow/forearm;wrist;hand  -KD shoulder;elbow/forearm;wrist;hand  -KD          User Key  (r) = Recorded By, (t) = Taken By, (c) = Cosigned By    Initials Name Provider Type    Prachi Borges COTA Occupational Therapist Assistant               Goals/Plan     Row Name 05/11/22 1229 05/11/22 0803       Transfer Goal 1 (OT)    Activity/Assistive Device (Transfer Goal 1, OT) toilet  -KD toilet  -KD    [place] Level/Cues Needed (Transfer Goal 1, OT) modified independence  -KD modified independence  -KD    Time Frame (Transfer Goal 1, OT) long term goal (LTG);by discharge  -KD long term goal (LTG);by discharge  -KD    Progress/Outcome (Transfer Goal 1, OT) goal met  -KD goal met  -KD    Row Name 05/11/22 1229 05/11/22 0803       Bathing Goal 1 (OT)    Activity/Device (Bathing Goal 1, OT) lower body bathing  -KD lower body bathing  -KD    [place] Level/Cues Needed (Bathing Goal 1, OT) modified independence  -KD modified independence  -KD    Time Frame (Bathing Goal 1, OT) long term goal (LTG);by discharge  -KD long term goal (LTG);by discharge  -KD    Progress/Outcomes (Bathing Goal 1, OT) goal met  -KD goal met  -KD    Row Name 05/11/22 1229 05/11/22 0803       Dressing Goal 1 (OT)    Activity/Device (Dressing Goal 1, OT) lower body dressing  -KD lower body dressing  -KD    [place]/Cues Needed (Dressing Goal 1, OT) independent  -KD independent  -KD     Time Frame (Dressing Goal 1, OT) long term goal (LTG);by discharge  -KD long term goal (LTG);by discharge  -KD    Progress/Outcome (Dressing Goal 1, OT) goal met  -KD goal met  -KD    Row Name 05/11/22 1229 05/11/22 0803       Toileting Goal 1 (OT)    Activity/Device (Toileting Goal 1, OT) toileting skills, all  -KD toileting skills, all  -KD    [place] Level/Cues Needed (Toileting Goal 1, OT) independent  -KD independent  -KD    Time Frame (Toileting Goal 1, OT) long term goal (LTG);by discharge  -KD long term goal (LTG);by discharge  -KD    Progress/Outcome (Toileting Goal 1, OT) goal met  -KD goal met  -KD          User Key  (r) = Recorded By, (t) = Taken By, (c) = Cosigned By    Initials Name Provider Type     Prachi Carlos COTA Occupational Therapist Assistant               Clinical Impression     Row Name 05/11/22 1229 05/11/22 0803       Pain Assessment    Pretreatment Pain Rating 0/10 - no pain  -KD 0/10 - no pain  -KD    Posttreatment Pain Rating 0/10 - no pain  -KD 0/10 - no pain  -KD    Row Name 05/11/22 0803          Plan of Care Review    Plan of Care Reviewed With patient  -KD     Progress improving  -KD     Outcome Evaluation Bed mobility-Ind. Sit-stand/toilet t/f-Ind. Pericare-Ind. Pt amb ~15' w/no AD and good w/ tolerance. Pt completed UB/LB bathing/dressing and grooming w/ [place]. Pt t/f to recliner w/ no AD -Ind.  Pt tolerated BUE ther ex in all planes w/ 2lb HW and good tolerance w/ RB's PRN. Pt in recliner w/ all needs in reach upon exit. Cont OT POC.  -KD     Row Name 05/11/22 1229 05/11/22 0803       Therapy Assessment/Plan (OT)    Therapy Frequency (OT) other (see comments)  3-7 days a week  -KD other (see comments)  3-7 days a week  -KD    Row Name 05/11/22 1229          Therapy Plan Review/Discharge Plan (OT)    Anticipated Discharge Disposition (OT) home with assist;home with home health  -KD     Row Name 05/11/22 1229 05/11/22 0803       Vital Signs    Pre Systolic BP  Rehab 117  -  -KD    Pre Treatment Diastolic BP 56  -KD 99  -KD    Pretreatment Heart Rate (beats/min) 72  -KD 78  -KD    Pre SpO2 (%) 96  -KD 99  -KD    O2 Delivery Pre Treatment room air  -KD room air  -KD    Pre Patient Position Sitting  -KD Supine  -KD    Intra Patient Position Standing  -KD Standing  -KD    Post Patient Position Sitting  -KD Sitting  -KD    Row Name 05/11/22 1229 05/11/22 0803       Positioning and Restraints    Pre-Treatment Position sitting in chair/recliner  -KD in bed  -KD    Post Treatment Position chair  -KD chair  -KD    In Chair sitting;call light within reach;encouraged to call for assist;exit alarm on  -KD --          User Key  (r) = Recorded By, (t) = Taken By, (c) = Cosigned By    Initials Name Provider Type    Prachi Borges COTA Occupational Therapist Assistant               Outcome Measures     Row Name 05/11/22 1229 05/11/22 0803       How much help from another is currently needed...    Putting on and taking off regular lower body clothing? 4  -KD 4  -KD    Bathing (including washing, rinsing, and drying) 4  -KD 4  -KD    Toileting (which includes using toilet bed pan or urinal) 4  -KD 4  -KD    Putting on and taking off regular upper body clothing 4  -KD 4  -KD    Taking care of personal grooming (such as brushing teeth) 4  -KD 4  -KD    Eating meals 4  -KD 4  -KD    AM-PAC 6 Clicks Score (OT) 24  -KD 24  -KD    Row Name 05/11/22 0800          How much help from another person do you currently need...    Turning from your back to your side while in flat bed without using bedrails? 3  -JY     Moving from lying on back to sitting on the side of a flat bed without bedrails? 3  -JY     Moving to and from a bed to a chair (including a wheelchair)? 3  -JY     Standing up from a chair using your arms (e.g., wheelchair, bedside chair)? 3  -JY     Climbing 3-5 steps with a railing? 3  -JY     To walk in hospital room? 3  -JY     AM-PAC 6 Clicks Score (PT) 18  -JY     Highest  level of mobility 6 --> Walked 10 steps or more  -SIL           User Key  (r) = Recorded By, (t) = Taken By, (c) = Cosigned By    Initials Name Provider Type    Lupe Yoder, RN Registered Nurse    Prachi Borges COTA Occupational Therapist Assistant                Occupational Therapy Education                 Title: PT OT SLP Therapies (In Progress)     Topic: Occupational Therapy (In Progress)     Point: ADL training (Done)     Description:   Instruct learner(s) on proper safety adaptation and remediation techniques during self care or transfers.   Instruct in proper use of assistive devices.              Learning Progress Summary           Patient Acceptance, E, VU by  at 5/10/2022 1402                   Point: Home exercise program (Not Started)     Description:   Instruct learner(s) on appropriate technique for monitoring, assisting and/or progressing therapeutic exercises/activities.              Learner Progress:  Not documented in this visit.          Point: Precautions (Done)     Description:   Instruct learner(s) on prescribed precautions during self-care and functional transfers.              Learning Progress Summary           Patient Acceptance, E,TB, VU by  at 5/9/2022 0911    Comment: POC, role of OT, transfer training                   Point: Body mechanics (Done)     Description:   Instruct learner(s) on proper positioning and spine alignment during self-care, functional mobility activities and/or exercises.              Learning Progress Summary           Patient Acceptance, E, VU by AILYN at 5/10/2022 1402    Acceptance, E,TB, VU by  at 5/9/2022 0911    Comment: POC, role of OT, transfer training                               User Key     Initials Effective Dates Name Provider Type Discipline     06/16/21 -  Daphne Naik COTA Occupational Therapist Assistant OT     06/14/21 -  Maryan Henry OT Occupational Therapist OT              OT Recommendation and  Plan  Therapy Frequency (OT): other (see comments) (3-7 days a week)  Plan of Care Review  Plan of Care Reviewed With: patient  Progress: improving  Outcome Evaluation: Bed mobility-Ind. Sit-stand/toilet t/f-Ind. Pericare-Ind. Pt amb ~15' w/no AD and good w/ tolerance. Pt completed UB/LB bathing/dressing and grooming w/ [place]. Pt t/f to recliner w/ no AD -Ind.  Pt tolerated BUE ther ex in all planes w/ 2lb HW and good tolerance w/ RB's PRN. Pt in recliner w/ all needs in reach upon exit. Cont OT POC.     Time Calculation:    Time Calculation- OT     Row Name 05/11/22 1229 05/11/22 0803          Time Calculation- OT    OT Start Time 1229  -KD 0803  -KD     OT Stop Time 1252  -KD 0857  -KD     OT Time Calculation (min) 23 min  -KD 54 min  -KD     Total Timed Code Minutes- OT 23 minute(s)  -KD 54 minute(s)  -KD     OT Received On 05/11/22  -KD 05/11/22  -KD            Timed Charges    46778 - OT Therapeutic Exercise Minutes 23  -KD --     59233 - OT Self Care/Mgmt Minutes -- 54  -KD            Total Minutes    Timed Charges Total Minutes 23  -KD 54  -KD      Total Minutes 23  -KD 54  -KD           User Key  (r) = Recorded By, (t) = Taken By, (c) = Cosigned By    Initials Name Provider Type     Prachi Carlos COTA Occupational Therapist Assistant              Therapy Charges for Today     Code Description Service Date Service Provider Modifiers Qty    98294852869 HC OT SELF CARE/MGMT/TRAIN EA 15 MIN 5/11/2022 Prachi Carlos COTA GO 4    39018384989 HC OT THER PROC EA 15 MIN 5/11/2022 Prachi Carlos COTA GO 2               HIREN Michelle  5/11/2022

## 2022-05-11 NOTE — PLAN OF CARE
Goal Outcome Evaluation:  Plan of Care Reviewed With: patient        Progress: improving  Outcome Evaluation: Pt up in recliner upon arrival. Pt agreeable to therapy. Pt stood with SPV and amb in room for 78ft using RW and CGA. Pt performed B LE ther ex in sitting with no c/o voiced.Pt's VSS throughout tx with pt having no c/o.  Pt left with all needs met. Pt would cont to benefit from therapy upon DC.

## 2022-05-11 NOTE — DISCHARGE SUMMARY
[facility] Medicine Services  DISCHARGE SUMMARY       Date of Admission: 5/8/2022  Date of Discharge:  5/11/2022  Primary Care Physician: Joyce Plata MD    Presenting Problem/History of Present Illness:  Acute on chronic systolic congestive heart failure (HCC) [I50.23]     Final Discharge Diagnoses:  Active Hospital Problems    Diagnosis    • **Congestive heart failure (HCC)    • Acute on chronic systolic congestive heart failure (HCC)        Consults:   Consults     Date and Time Order Name Status Description    5/9/2022 10:02 AM Inpatient Cardiology Consult Completed     5/2/2022 12:10 PM Inpatient Neurology Consult Stroke Completed     5/2/2022  9:53 AM Inpatient Neurology Consult Stroke Completed     5/2/2022  9:53 AM Inpatient Neurology Consult Stroke Completed         Pertinent Test Results:   Lab Results (last 24 hours)     Procedure Component Value Units Date/Time    POC Glucose Once [[id number]]  (Abnormal) Collected: 05/11/22 1009    Specimen: Blood Updated: 05/11/22 1023     Glucose 239 mg/dL      Comment: RN NotifiedOperator: [id number] [facility]: [id number]       POC Glucose Once [[id number]]  (Abnormal) Collected: 05/11/22 0610    Specimen: Blood Updated: 05/11/22 0649     Glucose 137 mg/dL      Comment: RN NotifiedOperator: [id number] ROYCE NAOMIMeter ID: [id number]       Basic Metabolic Panel [[id number]]  (Abnormal) Collected: 05/11/22 0438    Specimen: Blood Updated: 05/11/22 0544     Glucose 165 mg/dL      BUN 17 mg/dL      Creatinine 1.24 mg/dL      Sodium 139 mmol/L      Potassium 3.7 mmol/L      Comment: Slight hemolysis detected by analyzer. Results may be affected.        Chloride 102 mmol/L      CO2 24.0 mmol/L      Calcium 9.4 mg/dL      BUN/Creatinine Ratio 13.7     Anion Gap 13.0 mmol/L      eGFR 46.0 mL/min/1.73      Comment: National Kidney Foundation and American Society of Nephrology (ASN) Task Force recommended calculation based on the Chronic  Kidney Disease Epidemiology Collaboration (CKD-EPI) equation refit without adjustment for race.       Narrative:      GFR Normal >60  Chronic Kidney Disease <60  Kidney Failure <15      CBC & Differential [[id number]]  (Abnormal) Collected: 05/11/22 0438    Specimen: Blood Updated: 05/11/22 0518    Narrative:      The following orders were created for panel order CBC & Differential.  Procedure                               Abnormality         Status                     ---------                               -----------         ------                     CBC Auto Differential[[id number]]        Abnormal            Final result                 Please view results for these tests on the individual orders.    CBC Auto Differential [[id number]]  (Abnormal) Collected: 05/11/22 0438    Specimen: Blood Updated: 05/11/22 0518     WBC 10.06 10*3/mm3      RBC 3.98 10*6/mm3      Hemoglobin 10.9 g/dL      Hematocrit 34.0 %      MCV 85.4 fL      MCH 27.4 pg      MCHC 32.1 g/dL      RDW 15.5 %      RDW-SD 47.8 fl      MPV 10.2 fL      Platelets 328 10*3/mm3      Neutrophil % 48.3 %      Lymphocyte % 35.9 %      Monocyte % 12.3 %      Eosinophil % 2.8 %      Basophil % 0.5 %      Immature Grans % 0.2 %      Neutrophils, Absolute 4.86 10*3/mm3      Lymphocytes, Absolute 3.61 10*3/mm3      Monocytes, Absolute 1.24 10*3/mm3      Eosinophils, Absolute 0.28 10*3/mm3      Basophils, Absolute 0.05 10*3/mm3      Immature Grans, Absolute 0.02 10*3/mm3      nRBC 0.0 /100 WBC     POC Glucose Once [[id number]]  (Abnormal) Collected: 05/10/22 1916    Specimen: Blood Updated: 05/10/22 2035     Glucose 176 mg/dL      Comment: RN NotifiedOperator: [id number] SHAREE August ID: [id number]       POC Glucose Once [[id number]]  (Abnormal) Collected: 05/10/22 1611    Specimen: Blood Updated: 05/10/22 1628     Glucose 148 mg/dL      Comment: RN NotifiedOperator: [id number] ADRY POWERMeter ID: [id number]           Imaging Results (Last 24 Hours)  "    ** No results found for the last 24 hours. **        Chief Complaint on Day of Discharge: No complaints    Hospital Course:  This is a 71-year-old female with past medical history of HTN, CHF, CAD (status post AICD), DM 2 and GERD that presented to [facility] on 5/8/2022 with complaints of worsening shortness of air, altered mental status and cough x3 days.  BNP elevated at 3910.    Patient was evaluated by her cardiologist, Dr. Felipe, and started on a dobutamine drip.  Patient had improvement of her shortness of air.  Home health was ordered at discharge for skilled, PT and OT.  Patient had a mild elevation of creatinine.  She is to follow-up with her PCP in 1 week with a CMP to reassess creatinine.    Condition on Discharge: Stable    Physical Exam on Discharge:  /56 (BP Location: Right arm, Patient Position: Sitting)   Pulse 72   Temp 97.3 °F (36.3 °C) (Temporal)   Resp 18   Ht 162.6 cm (64\")   Wt 88.5 kg (195 lb)   SpO2 96%   BMI 33.47 kg/m²   Physical Exam  Constitutional:       Appearance: She is well-developed.   HENT:      Head: Normocephalic and atraumatic.   Eyes:      Pupils: Pupils are equal, round, and reactive to light.   Cardiovascular:      Rate and Rhythm: Normal rate and regular rhythm.   Pulmonary:      Effort: Pulmonary effort is normal.      Breath sounds: Normal breath sounds.   Abdominal:      General: Bowel sounds are normal.      Palpations: Abdomen is soft.   Musculoskeletal:         General: Normal range of motion.      Cervical back: Normal range of motion and neck supple.   Skin:     General: Skin is warm and dry.   Neurological:      Mental Status: She is alert and oriented to person, place, and time.   Psychiatric:         Behavior: Behavior normal.       Discharge Disposition:  Home-Health Care [facility] [place]    Discharge Medications:     Discharge Medications      Changes to Medications      Instructions Start Date   Levemir FlexTouch 100 UNIT/ML injection  Generic drug: " "insulin detemir  What changed: how much to take   18 Units, Subcutaneous, Nightly         Continue These Medications      Instructions Start Date   acetaminophen 325 MG tablet  Commonly known as: TYLENOL   650 mg, Oral, Every 4 Hours PRN      albuterol sulfate  (90 Base) MCG/ACT inhaler  Commonly known as: PROVENTIL HFA;VENTOLIN HFA;PROAIR HFA   INHALE 2 PUFFS EVERY 4 (FOUR) HOURS AS NEEDED FOR WHEEZING OR SHORTNESS OF AIR.      aspirin 81 MG EC tablet   81 mg, Oral, Nightly      atorvastatin 40 MG tablet  Commonly known as: LIPITOR   40 mg, Oral, Daily      B-D UF III MINI PEN NEEDLES 31G X 5 MM misc  Generic drug: Insulin Pen Needle   USE WITH INSULIN INJECTIONS NIGHTLY      carvedilol 25 MG tablet  Commonly known as: COREG   25 mg, Oral, 2 Times Daily With Meals      freestyle lancets   1 each, Other, 3 Times Daily, Use as instructed      FreeStyle Lite device   1 Device, Does not apply, 3 Times Daily      FREESTYLE LITE test strip  Generic drug: glucose blood   1 each, Other, 3 Times Daily, Use as instructed      furosemide 40 MG tablet  Commonly known as: LASIX   TAKE 1 TABLET BY MOUTH TWICE A DAY      guaiFENesin 600 MG 12 hr tablet  Commonly known as: MUCINEX   600 mg, Oral, Every 12 Hours Scheduled      Insulin Syringe 31G X 5/16\" 0.5 ML misc   1 each, Subcutaneous, Nightly      ipratropium-albuterol 0.5-2.5 mg/3 ml nebulizer  Commonly known as: DUO-NEB   3 mL, Nebulization, 4 Times Daily      isosorbide mononitrate 30 MG 24 hr tablet  Commonly known as: IMDUR   30 mg, Oral, Daily      lisinopril 10 MG tablet  Commonly known as: PRINIVIL,ZESTRIL   TAKE 1 TABLET BY MOUTH EVERY DAY      magnesium oxide 400 (241.3 Mg) MG tablet tablet  Commonly known as: MAGOX   400 mg, Oral, Daily      nitroglycerin 0.4 MG SL tablet  Commonly known as: Nitrostat   0.4 mg, Sublingual, Every 5 Minutes PRN, Take no more than 3 doses in 15 minutes.      pantoprazole 40 MG EC tablet  Commonly known as: Protonix   40 mg, " Oral, Daily      potassium chloride 10 MEQ CR capsule  Commonly known as: MICRO-K   20 mEq, Oral, 2 Times Daily      sucralfate 1 g tablet  Commonly known as: CARAFATE   1 g, Oral, 3 Times Daily         Stop These Medications    diclofenac 1 % gel gel  Commonly known as: VOLTAREN            Discharge Diet:   Diet Instructions     Diet: Consistent Carbohydrate, Cardiac      Discharge Diet:  Consistent Carbohydrate  Cardiac             Activity at Discharge:   Activity Instructions     Activity as Tolerated            Discharge Care Plan/Instructions: As above    Follow-up Appointment:  Additional Instructions for the Follow-ups that You Need to Schedule     Ambulatory Referral to Home Health (Hospital)   As directed      Face to Face Visit Date: 5/11/2022    Follow-up provider for Plan of Care?: I treated the patient in an acute care facility and will not continue treatment after discharge.    Follow-up provider: ALIREZA PLATA [[id number]]    Reason/Clinical Findings: CHF/ deconditioning    Describe mobility limitations that make leaving home difficult: CHF/ deconditioning    Nursing/Therapeutic Services Requested: Skilled Nursing Physical Therapy Occupational Therapy    Skilled nursing orders: Cardiopulmonary assessments    PT orders: Therapeutic exercise Gait Training Strengthening Home safety assessment    Weight Bearing Status: As Tolerated    Occupational orders: Activities of daily living Energy conservation Strengthening Home safety assessment    Frequency: 1 Week 1         Comprehensive Metabolic Panel    May 18, 2022 (Approximate)      Release to patient: Immediate            Contact information for follow-up providers     Alireza Plata MD Follow up in 1 week(s).    Specialties: Family Medicine, Hospitalist, Emergency Medicine, Urgent Care  Why: Go to lab for CMP prior to appointment/ hospital follow up  Contact information:  [address]  [facility] [address] [phone]                    Contact information for after-discharge care     Home Medical Care     [facility] .    Service: Home Health Services  Contact information:  [address]   [place] [address] [phone]                             Test Results Pending at Discharge:   Pending Labs     Order Current Status    [place] Draw In process            The patient has current or prior documentation of LVEF less than 40%, or moderate to severely depressed left ventricular systolic function. The patent was prescribed or already taking an ACE inhibitor or ARB.      The patient has current or prior documentation of LVEF less than 40%, or moderate to severely depressed left ventricular systolic function. The patient was prescribed or already taking a beta-blocker.      This document has been electronically signed by MARYANNE Strong on May 11, 2022 13:32 CDT        Time: Greater than 30 minutes.

## 2022-05-11 NOTE — THERAPY TREATMENT NOTE
Patient Name: Lexi Wade  : 1950    MRN: [id number]                              Today's Date: 2022       Admit Date: 2022    Visit Dx:     ICD-10-CM ICD-9-CM   1. Acute on chronic systolic congestive heart failure (HCC)  I50.23 428.23     428.0   2. Impaired mobility and ADLs  Z74.09 V49.89    Z78.9    3. Impaired functional mobility, balance, gait, and endurance  Z74.09 V49.89   4. Drowsiness  R40.0 780.09   5. Morbid obesity ([facility])  E66.01 278.01   6. Shortness of breath  R06.02 786.05   7. Obesity (BMI 30-39.9)  E66.9 278.00   8. Acute on chronic systolic CHF (congestive heart failure) ([facility])  I50.23 428.23     428.0   9. Multifocal pneumonia  J18.9 486   10. Implantable cardioverter-defibrillator (ICD) generator end of life  Z45.02 V53.32   11. Coronary artery disease involving native heart with angina pectoris, unspecified vessel or lesion type ([facility])  I25.119 414.01     413.9   12. Acute respiratory failure with hypoxia ([facility])  J96.01 518.81   13. Precordial pain  R07.2 786.51   14. Other chest pain  R07.89 786.59   15. Menopausal syndrome  N95.1 627.2   16. Mixed hyperlipidemia  E78.2 272.2   17. Neurologic disorder associated with diabetes mellitus ([facility])  E11.49 250.60     349.9   18. Borderline glaucoma of both eyes  H40.003 365.00   19. Vitamin D deficiency  E55.9 268.9   20. Gastroesophageal reflux disease without esophagitis  K21.9 530.81   21. Chronic congestive heart failure, unspecified heart failure type ([facility])  I50.9 428.0   22. Nonischemic cardiomyopathy ([facility])  I42.8 425.4   23. Pseudophakia  Z96.1 V43.1     Patient Active Problem List   Diagnosis   • Pseudophakia   • Primary open angle glaucoma   • Nonischemic cardiomyopathy (HCC)   • Congestive heart failure (HCC)   • Hypertension   • GERD (gastroesophageal reflux disease)   • Diabetes mellitus (HCC)   • Vitamin D deficiency   • Borderline glaucoma   • Neurologic disorder associated with diabetes mellitus ([facility])   • Mixed  hyperlipidemia   • Menopausal syndrome   • Dyspnea   • Chest pain   • Morbid obesity (HCC)   • Precordial pain   • Acute respiratory failure with hypoxia (HCC)   • 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)   • Implantable cardioverter-defibrillator (ICD) generator end of life   • Multifocal pneumonia   • Acute on chronic systolic CHF (congestive heart failure) (HCC)   • Obesity (BMI 30-39.9)   • Shortness of breath   • Altered mental status   • Morbid obesity (HCC)   • Drowsiness   • Acute on chronic systolic congestive heart failure (HCC)     Past Medical History:   Diagnosis Date   • Chronic systolic heart failure (HCC)    • Diabetes mellitus (HCC)    • Diabetic neuropathy (HCC)    • GERD (gastroesophageal reflux disease)    • Hypercholesterolemia    • Hypertension    • Low back pain    • Morbid obesity (HCC)    • Nonischemic cardiomyopathy (HCC)    • Osteoarthritis    • Sleep apnea    • Vitamin D deficiency      Past Surgical History:   Procedure Laterality Date   • CARDIAC CATHETERIZATION N/A 08/23/2018   • CARDIAC ELECTROPHYSIOLOGY PROCEDURE N/A 06/22/2020   • CARDIAC PACEMAKER PLACEMENT     • CATARACT EXTRACTION     • COLONOSCOPY N/A 06/14/2017   • PACEMAKER IMPLANTATION     • TOTAL ABDOMINAL HYSTERECTOMY WITH SALPINGO OOPHORECTOMY        General Information     Row Name 05/11/22 0803          OT Time and Intention    Document Type therapy note (daily note)  -KD     Mode of Treatment individual therapy;occupational therapy  -KD     Row Name 05/11/22 0803          General Information    Patient Profile Reviewed yes  -KD     Existing Precautions/Restrictions fall  -KD     Row Name 05/11/22 0803          Cognition    Orientation Status (Cognition) oriented x 4  -KD     Row Name 05/11/22 0803          Safety Issues, Functional Mobility    Impairments Affecting Function (Mobility) strength;endurance/activity tolerance;balance  -KD           User Key  (r) = Recorded By, (t) = Taken  By, (c) = Cosigned By    Initials Name Provider Type     Prachi Carlos COTA Occupational Therapist Assistant                 Mobility/ADL's     Row Name 05/11/22 0803          Bed Mobility    Supine-Sit [place] (Bed Mobility) independent  -KD     Sit-Supine [place] (Bed Mobility) not tested  -[facility] Name 05/11/22 0803          Transfers    Transfers sit-stand transfer;toilet transfer  -KD     Sit-Stand [place] (Transfers) independent  -KD     Stand-Sit [place] (Transfers) independent  -KD     [place] Level (Toilet Transfer) independent  -KD     Assistive Device (Toilet Transfer) grab bars/safety frame;commode;walker, front-wheeled  -     Row Name 05/11/22 0803          Sit-Stand Transfer    Assistive Device (Sit-Stand Transfers) other (see comments)  No AD  -KD     Row Name 05/11/22 0803          Stand-Sit Transfer    Assistive Device (Stand-Sit Transfers) other (see comments)  none  -KD     Row Name 05/11/22 0803          Toilet Transfer    Type (Toilet Transfer) sit-stand;stand-sit  -[facility] Name 05/11/22 0803          Functional Mobility    Functional Mobility- Ind. Level independent  -     Functional Mobility-Distance (Feet) 15  -KD     [facility] Name 05/11/22 0803          Activities of Daily Living    BADL Assessment/Intervention bathing;upper body dressing;lower body dressing;grooming;toileting  -[facility] Name 05/11/22 0803          Lower Body Dressing Assessment/Training    [place] Level (Lower Body Dressing) lower body dressing skills;doff;don;pants/bottoms;socks;independent  -     Position (Lower Body Dressing) edge of bed sitting;unsupported sitting;unsupported standing  -[facility] Name 05/11/22 0803          Grooming Assessment/Training    [place] Level (Grooming) grooming skills;wash face, hands;independent  -     Position (Grooming) edge of bed sitting  -     Row Name 05/11/22 0803          Toileting Assessment/Training    [place] Level  (Toileting) toileting skills;adjust/manage clothing;perform perineal hygiene;independent  -     Assistive Devices (Toileting) commode  -     Position (Toileting) unsupported sitting  -     Row Name 05/11/22 0803          Bathing Assessment/Intervention    [place] Level (Bathing) bathing skills;lower body;upper body;independent  -     Position (Bathing) edge of bed sitting  -     Row Name 05/11/22 0803          Upper Body Dressing Assessment/Training    [place] Level (Upper Body Dressing) upper body dressing skills;doff;don;independent  -     Position (Upper Body Dressing) unsupported sitting;unsupported standing  -           User Key  (r) = Recorded By, (t) = Taken By, (c) = Cosigned By    Initials Name Provider Type    Prachi Borges COTA Occupational Therapist Assistant               Obj/Interventions     Row Name 05/11/22 1328          Shoulder (Therapeutic Exercise)    Shoulder AROM (Therapeutic Exercise) bilateral;flexion;extension;aBduction;aDduction;external rotation;internal rotation  -[facility] Name 05/11/22 1328          Elbow/Forearm (Therapeutic Exercise)    Elbow/Forearm (Therapeutic Exercise) AROM (active range of motion)  -     Elbow/Forearm AROM (Therapeutic Exercise) bilateral;flexion;extension;supination;pronation  -[facility] Name 05/11/22 1328          Wrist (Therapeutic Exercise)    Wrist (Therapeutic Exercise) AROM (active range of motion)  -     Wrist AROM (Therapeutic Exercise) bilateral;flexion;extension  -[facility] Name 05/11/22 1328          Hand (Therapeutic Exercise)    Hand (Therapeutic Exercise) AROM (active range of motion)  -     Hand AROM/AAROM (Therapeutic Exercise) bilateral;finger flexion;finger extension  -     Row Name 05/11/22 1328          Motor Skills    Therapeutic Exercise shoulder;elbow/forearm;wrist;hand  -           User Key  (r) = Recorded By, (t) = Taken By, (c) = Cosigned By    Initials Name Provider Type    Prachi Borges  HIREN KING Occupational Therapist Assistant               Goals/Plan     Row Name 05/11/22 0803          Transfer Goal 1 (OT)    Activity/Assistive Device (Transfer Goal 1, OT) toilet  -KD     [place] Level/Cues Needed (Transfer Goal 1, OT) modified independence  -KD     Time Frame (Transfer Goal 1, OT) long term goal (LTG);by discharge  -KD     Progress/Outcome (Transfer Goal 1, OT) goal met  -KD     Row Name 05/11/22 0803          Bathing Goal 1 (OT)    Activity/Device (Bathing Goal 1, OT) lower body bathing  -KD     [place] Level/Cues Needed (Bathing Goal 1, OT) modified independence  -KD     Time Frame (Bathing Goal 1, OT) long term goal (LTG);by discharge  -KD     Progress/Outcomes (Bathing Goal 1, OT) goal met  -KD     Row Name 05/11/22 0803          Dressing Goal 1 (OT)    Activity/Device (Dressing Goal 1, OT) lower body dressing  -KD     [place]/Cues Needed (Dressing Goal 1, OT) independent  -KD     Time Frame (Dressing Goal 1, OT) long term goal (LTG);by discharge  -KD     Progress/Outcome (Dressing Goal 1, OT) goal met  -KD     Row Name 05/11/22 0803          Toileting Goal 1 (OT)    Activity/Device (Toileting Goal 1, OT) toileting skills, all  -KD     [place] Level/Cues Needed (Toileting Goal 1, OT) independent  -KD     Time Frame (Toileting Goal 1, OT) long term goal (LTG);by discharge  -KD     Progress/Outcome (Toileting Goal 1, OT) goal met  -KD           User Key  (r) = Recorded By, (t) = Taken By, (c) = Cosigned By    Initials Name Provider Type    KD Prachi Carlos COTA Occupational Therapist Assistant               Clinical Impression     Row Name 05/11/22 0803          Pain Assessment    Pretreatment Pain Rating 0/10 - no pain  -KD     Posttreatment Pain Rating 0/10 - no pain  -KD     Row Name 05/11/22 0803          Plan of Care Review    Plan of Care Reviewed With patient  -KD     Progress improving  -KD     Outcome Evaluation Bed mobility-Ind. Sit-stand/toilet t/f-Ind.  Pericare-Ind. Pt amb ~15' w/no AD and good w/ tolerance. Pt completed UB/LB bathing/dressing and grooming w/ [place]. Pt t/f to recliner w/ no AD -Ind.  Pt tolerated BUE ther ex in all planes w/ 2lb HW and good tolerance w/ RB's PRN. Pt in recliner w/ all needs in reach upon exit. Cont OT POC.  -KD     Row Name 05/11/22 0803          Therapy Assessment/Plan (OT)    Therapy Frequency (OT) other (see comments)  3-7 days a week  -KD     Row Name 05/11/22 0803          Vital Signs    Pre Systolic BP Rehab 127  -KD     Pre Treatment Diastolic BP 99  -KD     Pretreatment Heart Rate (beats/min) 78  -KD     Pre SpO2 (%) 99  -KD     O2 Delivery Pre Treatment room air  -KD     Pre Patient Position Supine  -KD     Intra Patient Position Standing  -KD     Post Patient Position Sitting  -KD     Row Name 05/11/22 0803          Positioning and Restraints    Pre-Treatment Position in bed  -KD     Post Treatment Position chair  -KD           User Key  (r) = Recorded By, (t) = Taken By, (c) = Cosigned By    Initials Name Provider Type    KD Prachi Carlos COTA Occupational Therapist Assistant               Outcome Measures     Row Name 05/11/22 0803          How much help from another is currently needed...    Putting on and taking off regular lower body clothing? 4  -KD     Bathing (including washing, rinsing, and drying) 4  -KD     Toileting (which includes using toilet bed pan or urinal) 4  -KD     Putting on and taking off regular upper body clothing 4  -KD     Taking care of personal grooming (such as brushing teeth) 4  -KD     Eating meals 4  -KD     AM-PAC 6 Clicks Score (OT) 24  -KD     Row Name 05/11/22 0800          How much help from another person do you currently need...    Turning from your back to your side while in flat bed without using bedrails? 3  -JY     Moving from lying on back to sitting on the side of a flat bed without bedrails? 3  -JY     Moving to and from a bed to a chair (including a wheelchair)? 3   -JY     Standing up from a chair using your arms (e.g., wheelchair, bedside chair)? 3  -JY     Climbing 3-5 steps with a railing? 3  -JY     To walk in hospital room? 3  -JY     AM-PAC 6 Clicks Score (PT) 18  -JY     Highest level of mobility 6 --> Walked 10 steps or more  -JY           User Key  (r) = Recorded By, (t) = Taken By, (c) = Cosigned By    Initials Name Provider Type    Lupe Yoder, RN Registered Nurse    Prachi Borges COTA Occupational Therapist Assistant                Occupational Therapy Education                 Title: PT OT SLP Therapies (In Progress)     Topic: Occupational Therapy (In Progress)     Point: ADL training (Done)     Description:   Instruct learner(s) on proper safety adaptation and remediation techniques during self care or transfers.   Instruct in proper use of assistive devices.              Learning Progress Summary           Patient Acceptance, E, VU by AILYN at 5/10/2022 1402                   Point: Home exercise program (Not Started)     Description:   Instruct learner(s) on appropriate technique for monitoring, assisting and/or progressing therapeutic exercises/activities.              Learner Progress:  Not documented in this visit.          Point: Precautions (Done)     Description:   Instruct learner(s) on prescribed precautions during self-care and functional transfers.              Learning Progress Summary           Patient Acceptance, E,TB, VU by ANN at 5/9/2022 0911    Comment: POC, role of OT, transfer training                   Point: Body mechanics (Done)     Description:   Instruct learner(s) on proper positioning and spine alignment during self-care, functional mobility activities and/or exercises.              Learning Progress Summary           Patient Acceptance, E, VU by AILYN at 5/10/2022 1402    Acceptance, E,TB, VU by ANN at 5/9/2022 0911    Comment: POC, role of OT, transfer training                               User Key     Initials Effective  Dates Name Provider Type Discipline    BB 06/16/21 -  Daphne Naik COTA Occupational Therapist Assistant OT    SJ 06/14/21 -  Maryan Henry, OT Occupational Therapist OT              OT Recommendation and Plan  Therapy Frequency (OT): other (see comments) (3-7 days a week)  Plan of Care Review  Plan of Care Reviewed With: patient  Progress: improving  Outcome Evaluation: Bed mobility-Ind. Sit-stand/toilet t/f-Ind. Pericare-Ind. Pt amb ~15' w/no AD and good w/ tolerance. Pt completed UB/LB bathing/dressing and grooming w/ [place]. Pt t/f to recliner w/ no AD -Ind.  Pt tolerated BUE ther ex in all planes w/ 2lb HW and good tolerance w/ RB's PRN. Pt in recliner w/ all needs in reach upon exit. Cont OT POC.     Time Calculation:    Time Calculation- OT     Row Name 05/11/22 0803             Time Calculation- OT    OT Start Time 0803  -KD      OT Stop Time 0857  -KD      OT Time Calculation (min) 54 min  -KD      Total Timed Code Minutes- OT 54 minute(s)  -KD      OT Received On 05/11/22  -KD              Timed Charges    84213 - OT Self Care/Mgmt Minutes 54  -KD              Total Minutes    Timed Charges Total Minutes 54  -KD       Total Minutes 54  -KD            User Key  (r) = Recorded By, (t) = Taken By, (c) = Cosigned By    Initials Name Provider Type    KD Prachi Carlos COTA Occupational Therapist Assistant              Therapy Charges for Today     Code Description Service Date Service Provider Modifiers Qty    [id number] HC OT SELF CARE/MGMT/TRAIN EA 15 MIN 5/11/2022 Prachi Carlos COTA GO 4               HIREN Michelle  5/11/2022

## 2022-05-11 NOTE — PROGRESS NOTES
[facility] Medicine Services  INPATIENT PROGRESS NOTE    Length of Stay: 1  Date of Admission: 5/8/2022  Primary Care Physician: Joyce Plata MD    Subjective   Chief Complaint: No complaints    HPI: This is a 71-year-old female with past medical history of HTN, CHF, CAD (status post AICD), DM 2 and GERD that presented to [facility] on 5/8/2022 with complaints of worsening shortness of air, altered mental status and cough x3 days.  BNP elevated at 3910. Patient follows with Dr. Felipe for CHF.  Patient notes improvement in shortness of air today.     Review of Systems   Constitutional: Positive for fatigue. Negative for activity change.   HENT: Negative for ear pain and sore throat.    Eyes: Negative for pain and discharge.   Respiratory: Positive for cough and shortness of breath.    Cardiovascular: Negative for chest pain and palpitations.   Gastrointestinal: Negative for abdominal pain and nausea.   Endocrine: Negative for cold intolerance and heat intolerance.   Genitourinary: Negative for difficulty urinating and dysuria.   Musculoskeletal: Negative for arthralgias and gait problem.   Skin: Negative for color change and rash.   Neurological: Negative for dizziness and weakness.   Psychiatric/Behavioral: Negative for agitation and confusion.       Objective    Temp:  [97.1 °F (36.2 °C)-98.4 °F (36.9 °C)] 97.3 °F (36.3 °C)  Heart Rate:  [70-82] 72  Resp:  [16-20] 18  BP: (113-127)/(56-99) 117/56    Physical Exam  Constitutional:       Appearance: She is well-developed.   HENT:      Head: Normocephalic and atraumatic.   Eyes:      Pupils: Pupils are equal, round, and reactive to light.   Cardiovascular:      Rate and Rhythm: Normal rate and regular rhythm.   Pulmonary:      Effort: Pulmonary effort is normal.      Breath sounds: Normal breath sounds.   Abdominal:      General: Bowel sounds are normal.      Palpations: Abdomen is soft.   Musculoskeletal:         General: Normal range of  motion.      Cervical back: Normal range of motion and neck supple.   Skin:     General: Skin is warm and dry.   Neurological:      Mental Status: She is alert and oriented to person, place, and time.   Psychiatric:         Behavior: Behavior normal.       Results Review:  I have reviewed the labs, radiology results, and diagnostic studies.    Laboratory Data:   Results from last 7 days   Lab Units 05/11/22  0438 05/10/22  0534 05/09/22  0519 05/08/22  1117   SODIUM mmol/L 139 141 139 139   POTASSIUM mmol/L 3.7 3.3* 3.8 4.1   CHLORIDE mmol/L 102 105 105 106   CO2 mmol/L 24.0 24.0 20.0* 19.0*   BUN mg/dL 17 16 16 12   CREATININE mg/dL 1.24* 1.15* 1.12* 0.91   GLUCOSE mg/dL 165* 112* 206* 250*   CALCIUM mg/dL 9.4 10.3 9.8 9.5   BILIRUBIN mg/dL  --   --   --  0.8   ALK PHOS U/L  --   --   --  106   ALT (SGPT) U/L  --   --   --  26   AST (SGOT) U/L  --   --   --  23   ANION GAP mmol/L 13.0 12.0 14.0 14.0     Estimated Creatinine Clearance: 43.5 mL/min (A) (by C-G formula based on SCr of 1.24 mg/dL (H)).  Results from last 7 days   Lab Units 05/05/22  0721   MAGNESIUM mg/dL 1.7         Results from last 7 days   Lab Units 05/11/22  0438 05/10/22  0534 05/09/22  0519 05/08/22  1117   WBC 10*3/mm3 10.06 9.32 10.04 10.24   HEMOGLOBIN g/dL 10.9* 10.7* 11.8* 11.7*   HEMATOCRIT % 34.0 32.5* 35.1 36.4   PLATELETS 10*3/mm3 328 314 307 294           Culture Data:   No results found for: BLOODCX  No results found for: URINECX  No results found for: RESPCX  No results found for: WOUNDCX  No results found for: STOOLCX  No components found for: BODYFLD    Radiology Data:   Imaging Results (Last 24 Hours)     ** No results found for the last 24 hours. **          I have reviewed the patient's current medications.     Assessment/Plan     Active Hospital Problems    Diagnosis    • **Congestive heart failure (HCC)    • Acute on chronic systolic congestive heart failure (HCC)        Plan:    1.  Acute on chronic heart failure with reduced  ejection fraction: Continue IV Lasix.  Dr. Felipe consulted the patient was started on a dobutamine drip.  Recent echocardiogram showed EF of 36 to 40%.  2.  CAD/hypertension: Continue home aspirin, Coreg, Imdur and lisinopril.  3.  GERD: Continue home PPI.  4.  Diabetes mellitus, type II: Continue SSI and long-acting.  5.  Acute kidney injury, mild:  Will continue to monitor and consult nephrology if worsens.     DVT prophylaxis: Lovenox.      Discharge Planning: I expect patient to be discharged to home in 1-2 days.    I confirmed that the patient's Advance Care Plan is present, code status is documented, or surrogate decision maker is listed in the patient's medical record.      I have utilized all available immediate resources to obtain, update, or review the patient's current medications.         This document has been electronically signed by MARYANNE Storng on May 11, 2022 11:51 CDT

## 2022-05-11 NOTE — PLAN OF CARE
Goal Outcome Evaluation:  Plan of Care Reviewed With: patient        Progress: improving  Outcome Evaluation: Dobutamine drip discontinued per Dr Felipe. Patient has rested well all shift. VSS. No c/o SOB or chest pains

## 2022-05-11 NOTE — THERAPY TREATMENT NOTE
Acute Care - Physical Therapy Treatment Note  [facility]     Patient Name: Lexi Wade  : 1950  MRN: [id number]  Today's Date: 2022      Visit Dx:     ICD-10-CM ICD-9-CM   1. Acute on chronic systolic congestive heart failure (HCC)  I50.23 428.23     428.0   2. Impaired mobility and ADLs  Z74.09 V49.89    Z78.9    3. 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)   • Mixed hyperlipidemia   • Menopausal syndrome   • Dyspnea   • Chest pain   • Morbid obesity (HCC)   • Precordial pain   • Acute respiratory failure with hypoxia (HCC)   • 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)   • Implantable cardioverter-defibrillator (ICD) generator end of life   • Multifocal pneumonia   • Acute on chronic systolic CHF (congestive heart failure) (HCC)   • Obesity (BMI 30-39.9)   • Shortness of breath   • Altered mental status   • Morbid obesity (HCC)   • Drowsiness   • Acute on chronic systolic congestive heart failure (HCC)     Past Medical History:   Diagnosis Date   • Chronic systolic heart failure (HCC)    • Diabetes mellitus (HCC)    • Diabetic neuropathy (HCC)    • GERD (gastroesophageal reflux disease)    • Hypercholesterolemia    • Hypertension    • Low back pain    • Morbid obesity (HCC)    • Nonischemic cardiomyopathy (HCC)    • Osteoarthritis    • Sleep apnea    • Vitamin D deficiency      Past Surgical History:   Procedure Laterality Date   • CARDIAC CATHETERIZATION N/A 2018   • CARDIAC ELECTROPHYSIOLOGY PROCEDURE N/A 2020   • CARDIAC PACEMAKER PLACEMENT     •  CATARACT EXTRACTION     • COLONOSCOPY N/A 06/14/2017   • PACEMAKER IMPLANTATION     • TOTAL ABDOMINAL HYSTERECTOMY WITH SALPINGO OOPHORECTOMY       PT Assessment (last 12 hours)     PT Evaluation and Treatment     Row Name 05/11/22 0926          Physical Therapy Time and Intention    Subjective Information no complaints  -TW     Document Type therapy note (daily note)  -TW     Mode of Treatment physical therapy;individual therapy  -TW     Patient Effort good  -TW     Row Name 05/11/22 0926          General Information    Patient Profile Reviewed yes  -TW     Patient Observations alert;cooperative;agree to therapy  -TW     Patient/Family/Caregiver Comments/Observations none  -TW     General Observations of Patient Pt up in recliner upon entering room.  -TW     Existing Precautions/Restrictions fall  -TW     Row Name 05/11/22 0926          Pain    Pretreatment Pain Rating 0/10 - no pain  -TW     Posttreatment Pain Rating 0/10 - no pain  -TW     Row Name 05/11/22 0926          Cognition    Affect/Mental Status (Cognition) WFL  -TW     Orientation Status (Cognition) oriented x 4  -TW     Follows Commands (Cognition) WFL  -TW     Cognitive Function WFL  -TW     Personal Safety Interventions fall prevention program maintained;gait belt;nonskid shoes/slippers when out of bed  -TW     Row Name 05/11/22 0926          Range of Motion Comprehensive    General Range of Motion bilateral lower extremity ROM WFL  -TW     Row Name 05/11/22 0926          Bed Mobility    Supine-Sit [place] (Bed Mobility) not tested  -TW     Sit-Supine [place] (Bed Mobility) not tested  -TW     Comment, (Bed Mobility) Pt up in chair upon arrival and returned to chair when therapy completed.  -TW     Row Name 05/11/22 0926          Transfers    Sit-Stand [place] (Transfers) supervision  -TW     Stand-Sit [place] (Transfers) supervision  -TW     Row Name 05/11/22 0926          Sit-Stand Transfer    Assistive Device (Sit-Stand  Transfers) walker, front-wheeled  -TW     Row Name 05/11/22 0926          Stand-Sit Transfer    Assistive Device (Stand-Sit Transfers) walker, front-wheeled  -TW     Row Name 05/11/22 0926          Gait/Stairs (Locomotion)    [place] Level (Gait) supervision;contact guard  -TW     Assistive Device (Gait) walker, front-wheeled  -TW     Distance in Feet (Gait) 78ft  -TW     Comment, (Gait/Stairs) Pt requires VCs for RW placement.  -TW     Row Name 05/11/22 0926          Safety Issues, Functional Mobility    Impairments Affecting Function (Mobility) strength;endurance/activity tolerance;balance  -TW     Row Name 05/11/22 0926          Motor Skills    Therapeutic Exercise hip;knee;ankle  -TW     Row Name 05/11/22 0926          Hip (Therapeutic Exercise)    Hip (Therapeutic Exercise) AROM (active range of motion)  -TW     Hip AROM (Therapeutic Exercise) bilateral;sitting  -TW     Row Name 05/11/22 0926          Knee (Therapeutic Exercise)    Knee (Therapeutic Exercise) AROM (active range of motion)  -TW     Knee AROM (Therapeutic Exercise) bilateral;sitting  -TW     Row Name 05/11/22 0926          Ankle (Therapeutic Exercise)    Ankle (Therapeutic Exercise) AROM (active range of motion)  -TW     Ankle AROM (Therapeutic Exercise) bilateral;sitting  -TW     Row Name 05/11/22 0926          Plan of Care Review    Plan of Care Reviewed With patient  -TW     Progress improving  -TW     Outcome Evaluation Pt up in recliner upon arrival. Pt agreeable to therapy. Pt stood with SPV and amb in room for 78ft using RW and CGA. Pt performed B LE ther ex in sitting with no c/o voiced.Pt's VSS throughout tx with pt having no c/o.  Pt left with all needs met. Pt would cont to benefit from therapy upon DC.  -TW     Row Name 05/11/22 0926          Vital Signs    Pre Systolic BP Rehab 109  -TW     Pre Treatment Diastolic BP 62  -TW     Intra Systolic BP Rehab 144  -TW     Intra Treatment Diastolic BP 63  -TW     Post Systolic BP Rehab  147  -TW     Post Treatment Diastolic BP 74  -TW     Pretreatment Heart Rate (beats/min) 76  -TW     Intratreatment Heart Rate (beats/min) 82  -TW     Posttreatment Heart Rate (beats/min) 80  -TW     Pre SpO2 (%) 96  -TW     O2 Delivery Pre Treatment room air  -TW     Intra SpO2 (%) 96  -TW     O2 Delivery Intra Treatment room air  -TW     Post SpO2 (%) 97  -TW     O2 Delivery Post Treatment room air  -TW     Pre Patient Position Sitting  -TW     Intra Patient Position Standing  -TW     Post Patient Position Sitting  -TW     Row Name 05/11/22 0926          Positioning and Restraints    Pre-Treatment Position sitting in chair/recliner  -TW     Post Treatment Position chair  -TW     In Chair sitting;call light within reach;encouraged to call for assist  -TW     Row Name 05/11/22 0926          Therapy Assessment/Plan (PT)    Rehab Potential (PT) good, to achieve stated therapy goals  -TW     Criteria for Skilled Interventions Met (PT) yes;skilled treatment is necessary  -TW     Therapy Frequency (PT) other (see comments)  3-7 days/week  -TW     Row Name 05/11/22 0926          Bed Mobility Goal 1 (PT)    Activity/Assistive Device (Bed Mobility Goal 1, PT) sit to supine/supine to sit  -TW     [place] Level/Cues Needed (Bed Mobility Goal 1, PT) independent  -TW     Time Frame (Bed Mobility Goal 1, PT) by discharge  -TW     Strategies/Barriers (Bed Mobility Goal 1, PT) HOB flat, no bed rails.  -TW     Progress/Outcomes (Bed Mobility Goal 1, PT) goal not met  -TW     Row Name 05/11/22 0926          Transfer Goal 1 (PT)    Activity/Assistive Device (Transfer Goal 1, PT) sit-to-stand/stand-to-sit;bed-to-chair/chair-to-bed  -TW     [place] Level/Cues Needed (Transfer Goal 1, PT) modified independence  -TW     Time Frame (Transfer Goal 1, PT) by discharge  -TW     Progress/Outcome (Transfer Goal 1, PT) goal not met  -TW     Row Name 05/11/22 0926          Gait Training Goal 1 (PT)    Activity/Assistive Device (Gait  Training Goal 1, PT) walker, rolling  -TW     [place] Level (Gait Training Goal 1, PT) modified independence  -TW     Distance (Gait Training Goal 1, PT) 150'x1.  -TW     Time Frame (Gait Training Goal 1, PT) by discharge  -TW     Progress/Outcome (Gait Training Goal 1, PT) goal not met  -TW     Row Name 05/11/22 0926          Stairs Goal 1 (PT)    Activity/Assistive Device (Stairs Goal 1, PT) using handrail, right  -TW     [place] Level/Cues Needed (Stairs Goal 1, PT) modified independence  -TW     Number of Stairs (Stairs Goal 1, PT) 3 steps.  -TW     Time Frame (Stairs Goal 1, PT) by discharge  -TW     Progress/Outcome (Stairs Goal 1, PT) goal not met  -TW     Row Name 05/11/22 0926          Problem Specific Goal 1 (PT)    Problem Specific Goal 1 (PT) Score 25/28 on Tinetti fall risk assessment.  -TW     Time Frame (Problem Specific Goal 1, PT) by discharge  -TW     Strategies/Barriers (Problem Specific Goal 1, PT) Decreased stepping response to perturbation.  -TW     Progress/Outcome (Problem Specific Goal 1, PT) goal not met  -TW           User Key  (r) = Recorded By, (t) = Taken By, (c) = Cosigned By    Initials Name Provider Type    TW Robert Pinon PTA Physical Therapist Assistant                Physical Therapy Education                 Title: PT OT SLP Therapies (In Progress)     Topic: Physical Therapy (Not Started)     Point: Mobility training (Not Started)     Learner Progress:  Not documented in this visit.          Point: Home exercise program (Not Started)     Learner Progress:  Not documented in this visit.          Point: Body mechanics (Not Started)     Learner Progress:  Not documented in this visit.          Point: Precautions (Not Started)     Learner Progress:  Not documented in this visit.                          PT Recommendation and Plan  Therapy Frequency (PT): other (see comments) (3-7 days/week)  Plan of Care Reviewed With: patient  Progress: improving  Outcome  Evaluation: Pt up in recliner upon arrival. Pt agreeable to therapy. Pt stood with SPV and amb in room for 78ft using RW and CGA. Pt performed B LE ther ex in sitting with no c/o voiced.Pt's VSS throughout tx with pt having no c/o.  Pt left with all needs met. Pt would cont to benefit from therapy upon DC.       Time Calculation:    PT Charges     Row Name 05/11/22 1102             Time Calculation    Start Time 0926  -TW      Stop Time 0950  -TW      Time Calculation (min) 24 min  -TW              Time Calculation- PT    Total Timed Code Minutes- PT 24 minute(s)  -TW            User Key  (r) = Recorded By, (t) = Taken By, (c) = Cosigned By    Initials Name Provider Type    TW Robert Pinon PTA Physical Therapist Assistant              Therapy Charges for Today     Code Description Service Date Service Provider Modifiers Qty    83804925399 HC GAIT TRAINING EA 15 MIN 5/11/2022 Robert Pinon PTA GP 1    [id number] HC PT THER PROC EA 15 MIN 5/11/2022 Robert Pinon PTA GP 1          PT G-Codes  Outcome Measure Options: AM-PAC 6 Clicks Daily Activity (OT)  AM-PAC 6 Clicks Score (PT): 18  AM-PAC 6 Clicks Score (OT): 21  [place] Total Score: 22    Robert Pinon PTA  5/11/2022

## 2022-05-11 NOTE — OUTREACH NOTE
Prep Survey    Flowsheet Row Responses   [facility] facility patient discharged from? [place]   Is LACE score < 7 ? No   Emergency Room discharge w/ pulse ox? No   Eligibility [facility]   Date of Admission 05/08/22   Date of Discharge 05/11/22   Discharge Disposition Home-Health Care Svc   Discharge diagnosis CHF   Does the patient have one of the following disease processes/diagnoses(primary or secondary)? CHF   Does the patient have Home health ordered? Yes   What is the Home health agency?  [facility]   Is there a DME ordered? No   Prep survey completed? Yes          ROSE MARIE VARELA - Registered Nurse

## 2022-05-12 NOTE — OUTREACH NOTE
Call Center TCM Note    Flowsheet Row Responses   [facility] patient discharged from? [place]   Does the patient have one of the following disease processes/diagnoses(primary or secondary)? CHF   TCM attempt successful? No   Unsuccessful attempts Attempt 1   Does the patient have a primary care provider?  Yes   Does the patient have an appointment with their PCP within 7 days of discharge? Greater than 7 days   Comments regarding PCP HOSP DC FU appt 5/19/22 @ 1pm.    What is preventing the patient from scheduling follow up appointments within 7 days of discharge? Earlier appointment not available   Nursing Interventions Verified appointment date/time/provider   Has the patient kept scheduled appointments due by today? N/A           Lala Bain RN    5/12/2022, 15:43 EDT

## 2022-05-12 NOTE — OUTREACH NOTE
Call Center TCM Note    Flowsheet Row Responses   [facility] patient discharged from? [place]   Does the patient have one of the following disease processes/diagnoses(primary or secondary)? CHF   TCM attempt successful? No   Unsuccessful attempts Attempt 2   Does the patient have a primary care provider?  Yes   Comments regarding PCP HOSP DC FU appt 5/19/22 @ 1pm.            Lala Bain RN    5/12/2022, 16:07 EDT

## 2022-05-13 NOTE — OUTREACH NOTE
Call Center TCM Note    Flowsheet Row Responses   [facility] patient discharged from? [place]   Does the patient have one of the following disease processes/diagnoses(primary or secondary)? CHF   TCM attempt successful? No  [verbal release on file for sister]   Unsuccessful attempts Attempt 3  [attempted both patient and sister]   Comments regarding PCP Hospital PCP FOLLOW UP APPOINTMENT IS 5/19/22@100pm           Debbie Simmons RN    5/13/2022, 13:04 EDT

## 2022-05-13 NOTE — HOME HEALTH
"REASON FOR REFERRAL: Patient admitted to  5/8/2022-5/11/2022 with acute on chronic CHF.  She reports she feels a little weaker but has been trying to walk a little more  DIAGNOSIS: Weakness, abnormal gait  SURGICAL PROCEDURE: N/A  PERTINENT MEDICAL HISTORY: HTN, CHF, CAD s/p AICD placement, DM with neuropathy, GERD, LBP, obesity, OA, cardiomyopathy, DOROTHY, vitamin D deficiency  PRIOR LEVEL OF FUNCTION: Ambulate in home and short distances in community with PRN use of walker  PATIENT GOAL FOR THIS EPISODE OF CARE: \"Be able to do anything that I can\"  HOME SOCIAL ENVIRONMENT: Lives in single story apartment with several steps to enter; multiple throw rugs"

## 2022-05-16 NOTE — HOME HEALTH
REASON FOR REFERRAL:  70 Y/O FEMALE HOSPITALIZED AT [facility] 5/2/22-5/5/22 WITH ALTERED MENTAL STATUS, GENERALIZED WEAKNESS, AND SHORTNESS OF BREATH AND TREATED FOR ACUTE ON CHRONIC CHF.  SHE THEN WAS READMIITTED TO HOSPITAL 5/8/22-5/11/222 FOR CHF EXACERBATION.  PATIENT REPORTED THAT SHE WOULD LIKE TO WORK WITH OT ON B UE STRENGTHENING, B UE HEP FOR STRENGTHENING.     PMH: DM, HTN, CHF, CAD, GERD, OA, NONISCHEMIC CARDIOMYOPATHY, MORBID OBESITY, SLEEP APNEA, PACEMAKER PLACEMENT, CATARACT SURGERY, HYSTERECTOMY.     PLOF:  INDEPENDENT WITH ADLS, SIMPLE IADLS, FUNCTIONAL TRANSFERS/FUNCTIONAL MOBILITY USING QC PRN.       HOME ENVIRONMENT:  PATIENT LIVES ALONE IN ONE LEVEL HOME WITH 3 STEPS TO ENTER WITH 1 HANDRAIL.  HER SISTER OR A FRIEND TAKES HER TO AN APPOINTMENTS AND TO GROCERY STORE.     PRECAUTIONS: FALL RISK     MD APPTS: 5/19/22 DR GREGORY     DME: QC, RW, GRAB BARS X2.     AROM B UES: WFL     STRENGTH B UES: 4-/5 GROSSLY THROUGHOUT.     HAND DOMINANCE: RIGHT HAND DOMINANT, B  STRENGTHS: 4/5.     REHAB POTENTIAL: GOOD FOR GOALS    PATIENT'S GOAL: GET STRONGER    MEDICAL NECISSITY:  PATIENT PRESENTS WITH WEAKNESS AND LIMITED ACTIVITY TOLERANCE AFTER RECENT HOSPITALIZATIONS X2 FOR CHF EXACERBATION AND MENTAL STATUS CHANGE.  SHE REQUIRES SKILLED HOME BASED OT SERVICES FOR B UE STRENGTHENING AND B UE STRENGTHENING HEP.     WISH TO ADDRESS BATH/DRESSING WITH OT: NO

## 2022-05-19 NOTE — PROGRESS NOTES
Transitional Care Follow Up Visit  Subjective     Lexi Wade is a 71 y.o. female who presents for a transitional care management visit.    Within 48 business hours after discharge our office contacted her via telephone to coordinate her care and needs.      I reviewed and discussed the details of that call along with the discharge summary, hospital problems, inpatient lab results, inpatient diagnostic studies, and consultation reports with Lexi.     Current outpatient and discharge medications have been reconciled for the patient.  Reviewed by: Joyce Plata MD      Date of TCM Phone Call 5/11/2022   [facility]   Date of Admission 5/8/2022   Date of Discharge 5/11/2022   [place] [facility]     Risk for Readmission (LACE) Score: 15 (5/11/2022  5:01 AM)      History of Present Illness   Course During Hospital Stay:  Recent hospitalization, labs, xrays reviewed and medications reconciled.     Copied from hospital record:     Hospital Course:  This is a 71-year-old female with past medical history of HTN, CHF, CAD (status post AICD), DM 2 and GERD that presented to [facility] on 5/8/2022 with complaints of worsening shortness of air, altered mental status and cough x3 days.  BNP elevated at 3910.    Patient was evaluated by her cardiologist, Dr. Felipe, and started on a dobutamine drip.  Patient had improvement of her shortness of air.  Home health was ordered at discharge for skilled, PT and OT.  Patient had a mild elevation of creatinine.  She is to follow-up with her PCP in 1 week with a CMP to reassess creatinine.     The following portions of the patient's history were reviewed and updated as appropriate: allergies, current medications, past family history, past medical history, past social history, past surgical history and problem list.  Outpatient Medications Prior to Visit   Medication Sig Dispense Refill   • acetaminophen (TYLENOL)  "325 MG tablet Take 2 tablets by mouth Every 4 (Four) Hours As Needed for Mild Pain .     • albuterol sulfate  (90 Base) MCG/ACT inhaler INHALE 2 PUFFS EVERY 4 (FOUR) HOURS AS NEEDED FOR WHEEZING OR SHORTNESS OF AIR. 18 g 1   • aspirin 81 MG EC tablet Take 81 mg by mouth Every Night.     • atorvastatin (LIPITOR) 40 MG tablet Take 1 tablet by mouth Daily. 90 tablet 3   • B-D UF III MINI PEN NEEDLES 31G X 5 MM misc USE WITH INSULIN INJECTIONS NIGHTLY 100 each 3   • Blood Glucose Monitoring Suppl (FreeStyle Lite) device 1 Device 3 (Three) Times a Day. 1 each 0   • carvedilol (COREG) 25 MG tablet Take 1 tablet by mouth 2 (Two) Times a Day With Meals. 180 tablet 3   • furosemide (LASIX) 40 MG tablet TAKE 1 TABLET BY MOUTH TWICE A DAY 60 tablet 1   • glucose blood (FREESTYLE LITE) test strip 1 each by Other route 3 (Three) Times a Day. Use as instructed 100 each 1   • guaiFENesin (MUCINEX) 600 MG 12 hr tablet Take 1 tablet by mouth Every 12 (Twelve) Hours. 60 tablet 1   • insulin detemir (Levemir FlexTouch) 100 UNIT/ML injection Inject 18 Units under the skin into the appropriate area as directed Every Night. (Patient taking differently: Inject 20 Units under the skin into the appropriate area as directed Every Night.)     • Insulin Syringe 31G X 5/16\" 0.5 ML misc Inject 1 each under the skin into the appropriate area as directed Every Night. 100 each 1   • ipratropium-albuterol (DUO-NEB) 0.5-2.5 mg/3 ml nebulizer Take 3 mL by nebulization 4 (Four) Times a Day. 360 mL 1   • isosorbide mononitrate (IMDUR) 30 MG 24 hr tablet Take 1 tablet by mouth Daily. 90 tablet 3   • Lancets (freestyle) lancets 1 each by Other route 3 (Three) Times a Day. Use as instructed 100 each 1   • lisinopril (PRINIVIL,ZESTRIL) 10 MG tablet TAKE 1 TABLET BY MOUTH EVERY DAY 90 tablet 3   • magnesium oxide (MAGOX) 400 (241.3 Mg) MG tablet tablet Take 1 tablet by mouth Daily. 90 each 3   • nitroglycerin (Nitrostat) 0.4 MG SL tablet Place 1 " "tablet under the tongue Every 5 (Five) Minutes As Needed for Chest Pain. Take no more than 3 doses in 15 minutes. 25 tablet 0   • pantoprazole (Protonix) 40 MG EC tablet Take 1 tablet by mouth Daily. 30 tablet 0   • potassium chloride (MICRO-K) 10 MEQ CR capsule Take 2 capsules by mouth 2 (Two) Times a Day. 60 capsule 0   • sucralfate (CARAFATE) 1 g tablet Take 1 g by mouth 3 (Three) Times a Day.       No facility-administered medications prior to visit.       Review of Systems  I have reviewed 12 systems with patient. Findings were negative except what is noted below and/or in history of present illness.    Objective   Visit Vitals  /86 (BP Location: Left arm, Patient Position: Sitting, Cuff Size: Large Adult)   Pulse 98   Resp 18   Ht 162.6 cm (64.02\")   Wt 89.5 kg (197 lb 4.8 oz)   SpO2 98%   BMI 33.85 kg/m²       Physical Exam  Vitals and nursing note reviewed.   Constitutional:       General: She is not in acute distress.     Appearance: She is well-developed.   HENT:      Head: Normocephalic and atraumatic.      Nose: Nose normal.   Eyes:      General:         Right eye: No discharge.         Left eye: No discharge.      Conjunctiva/sclera: Conjunctivae normal.      Pupils: Pupils are equal, round, and reactive to light.   Neck:      Thyroid: No thyromegaly.   Cardiovascular:      Rate and Rhythm: Normal rate and regular rhythm.      Heart sounds: Normal heart sounds.   Pulmonary:      Effort: Pulmonary effort is normal.      Breath sounds: Normal breath sounds.   Lymphadenopathy:      Cervical: No cervical adenopathy.   Skin:     General: Skin is warm and dry.   Neurological:      Mental Status: She is alert and oriented to person, place, and time.       Assessment & Plan   Diagnoses and all orders for this visit:    1. Acute on chronic systolic CHF (congestive heart failure) (HCC) (Primary)    2. Anemia, unspecified type    3. Renal insufficiency, mild    Will notify regarding results. Continue current " medications. Follow up with specialists as scheduled.     No orders of the defined types were placed in this encounter.      Current outpatient and discharge medications have been reconciled for the patient.  Reviewed by: Joyce Plata MD      Return in about 3 months (around 8/19/2022) for Recheck.

## 2022-05-19 NOTE — PATIENT INSTRUCTIONS
"Low-Sodium Eating Plan  Sodium, which is an element that makes up salt, helps you maintain a healthy balance of fluids in your body. Too much sodium can increase your blood pressure and cause fluid and waste to be held in your body.  Your health care provider or dietitian may recommend following this plan if you have high blood pressure (hypertension), kidney disease, liver disease, or heart failure. Eating less sodium can help lower your blood pressure, reduce swelling, and protect your heart, liver, and kidneys.  What are tips for following this plan?  Reading food labels  The Nutrition Facts label lists the amount of sodium in one serving of the food. If you eat more than one serving, you must multiply the listed amount of sodium by the number of servings.  Choose foods with less than 140 mg of sodium per serving.  Avoid foods with 300 mg of sodium or more per serving.  Shopping    Look for lower-sodium products, often labeled as \"low-sodium\" or \"no salt added.\"  Always check the sodium content, even if foods are labeled as \"unsalted\" or \"no salt added.\"  Buy fresh foods.  Avoid canned foods and pre-made or frozen meals.  Avoid canned, cured, or processed meats.  Buy breads that have less than 80 mg of sodium per slice.    Cooking    Eat more home-cooked food and less restaurant, buffet, and fast food.  Avoid adding salt when cooking. Use salt-free seasonings or herbs instead of table salt or sea salt. Check with your health care provider or pharmacist before using salt substitutes.  Cook with plant-based oils, such as canola, sunflower, or olive oil.    Meal planning  When eating at a restaurant, ask that your food be prepared with less salt or no salt, if possible. Avoid dishes labeled as brined, pickled, cured, smoked, or made with soy sauce, miso, or teriyaki sauce.  Avoid foods that contain MSG (monosodium glutamate). MSG is sometimes added to Chinese food, bouillon, and some canned foods.  Make meals that " "can be grilled, baked, poached, roasted, or steamed. These are generally made with less sodium.  General information  Most people on this plan should limit their sodium intake to 1,500-2,000 mg (milligrams) of sodium each day.  What foods should I eat?  Fruits  Fresh, frozen, or canned fruit. Fruit juice.  Vegetables  Fresh or frozen vegetables. \"No salt added\" canned vegetables. \"No salt added\" tomato sauce and paste. Low-sodium or reduced-sodium tomato and vegetable juice.  Grains  Low-sodium cereals, including oats, puffed wheat and rice, and shredded wheat. Low-sodium crackers. Unsalted rice. Unsalted pasta. Low-sodium bread. Whole-grain breads and whole-grain pasta.  Meats and other proteins  Fresh or frozen (no salt added) meat, poultry, seafood, and fish. Low-sodium canned tuna and salmon. Unsalted nuts. Dried peas, beans, and lentils without added salt. Unsalted canned beans. Eggs. Unsalted nut butters.  Dairy  Milk. Soy milk. Cheese that is naturally low in sodium, such as ricotta cheese, fresh mozzarella, or Swiss cheese. Low-sodium or reduced-sodium cheese. Cream cheese. Yogurt.  Seasonings and condiments  Fresh and dried herbs and spices. Salt-free seasonings. Low-sodium mustard and ketchup. Sodium-free salad dressing. Sodium-free light mayonnaise. Fresh or refrigerated horseradish. Lemon juice. Vinegar.  Other foods  Homemade, reduced-sodium, or low-sodium soups. Unsalted popcorn and pretzels. Low-salt or salt-free chips.  The items listed above may not be a complete list of foods and beverages you can eat. Contact a dietitian for more information.  What foods should I avoid?  Vegetables  Sauerkraut, pickled vegetables, and relishes. Olives. French fries. Onion rings. Regular canned vegetables (not low-sodium or reduced-sodium). Regular canned tomato sauce and paste (not low-sodium or reduced-sodium). Regular tomato and vegetable juice (not low-sodium or reduced-sodium). Frozen vegetables in " sauces.  Grains  Instant hot cereals. Bread stuffing, pancake, and biscuit mixes. Croutons. Seasoned rice or pasta mixes. Noodle soup cups. Boxed or frozen macaroni and cheese. Regular salted crackers. Self-rising flour.  Meats and other proteins  Meat or fish that is salted, canned, smoked, spiced, or pickled. Precooked or cured meat, such as sausages or meat loaves. [place]intero. Ham. Pepperoni. Hot dogs. Corned beef. Chipped beef. Salt pork. Jerky. Pickled herring. Anchovies and sardines. Regular canned tuna. Salted nuts.  Dairy  Processed cheese and cheese spreads. Hard cheeses. Cheese curds. Blue cheese. Feta cheese. String cheese. Regular cottage cheese. Buttermilk. Canned milk.  Fats and oils  Salted butter. Regular margarine. Ghee. Quintero fat.  Seasonings and condiments  Onion salt, garlic salt, seasoned salt, table salt, and sea salt. Canned and packaged gravies. Worcestershire sauce. Tartar sauce. Barbecue sauce. Teriyaki sauce. Soy sauce, including reduced-sodium. Steak sauce. Fish sauce. Oyster sauce. Cocktail sauce. Horseradish that you find on the shelf. Regular ketchup and mustard. Meat flavorings and tenderizers. Bouillon cubes. Hot sauce. Pre-made or packaged marinades. Pre-made or packaged taco seasonings. Relishes. Regular salad dressings. Salsa.  Other foods  Salted popcorn and pretzels. Corn chips and puffs. Potato and tortilla chips. Canned or dried soups. Pizza. Frozen entrees and pot pies.  The items listed above may not be a complete list of foods and beverages you should avoid. Contact a dietitian for more information.  Summary  Eating less sodium can help lower your blood pressure, reduce swelling, and protect your heart, liver, and kidneys.  Most people on this plan should limit their sodium intake to 1,500-2,000 mg (milligrams) of sodium each day.  Canned, boxed, and frozen foods are high in sodium. Restaurant foods, fast foods, and pizza are also very high in sodium. You also get sodium by  adding salt to food.  Try to cook at home, eat more fresh fruits and vegetables, and eat less fast food and canned, processed, or prepared foods.  This information is not intended to replace advice given to you by your health care provider. Make sure you discuss any questions you have with your health care provider.  Document Revised: 01/22/2021 Document Reviewed: 11/18/2020  [employer] Patient Education © 2021 [employer]

## 2022-05-21 NOTE — CASE COMMUNICATION
Patient missed an RICARDO  visit from [facility] on 5/20/22    Reason: Unable to reach patient with multiple attempts.  Will follow up on next scheduled RICARDO visit. Thank you.       For your records only.   As per home health protocol, MD must be notified of missed/cancelled visits; therefore the prescribed frequency was not met.

## 2022-05-23 NOTE — OUTREACH NOTE
CHF Week 2 Survey    Flowsheet Row Responses   [facility] patient discharged from? [facility]   Does the patient have one of the following disease processes/diagnoses(primary or secondary)? CHF   Week 2 attempt successful? No   Revoke Other transitional program  [utr x 3]          GABI RESENDEZ - Registered Nurse

## 2022-05-24 NOTE — CASE COMMUNICATION
OCCUPATIONAL THERAPY DISCHARGE    DISCHARGE STATUS     TO SELF   DISCHARGE CONDITION  GOOD  DISCHARGE REASON    GOALS MET  SERVICES CONTINUING  NURSING  COMMUNICATION / CARE COORDINATION:   OTR/POC        PATIENT IS AGREEABLE WITH DISCHARGE PLAN  SUMMARY OF CARE ALL GOALS MET  PRIOR LEVEL  Patient displayed decreased activity endruance and decreased bue strength.   CURRENT LEVEL Patient demonstrates ability to complete BUE HEP in order  to increase activity tolerance and BUE Strength. Patient demonstrated indpendence around with functional tasks and ADLS /IADLS around the home.

## 2022-05-24 NOTE — CASE COMMUNICATION
Patient  has met OT GOALS per POC, OT Discharge summary is complete and ready for CO-SIGN. THANK YOU.

## 2022-05-25 NOTE — TELEPHONE ENCOUNTER
Per Dr. Plata, Ms. Wade has been called with recent lab results & recommendations.  Continue current medications and follow-up as planned or sooner if any problems.       ----- Message from Joyce Plata MD sent at 5/25/2022  3:42 PM CDT -----  Call and tell sugar is running high, try to decrease sweets in her diet.

## 2022-06-01 NOTE — ED NOTES
Patient presents to ED with c/o chest pain midsternal. The home health nurse called EMS. The patient took nitros and had some relief. She has some discomfort midsternal at this time. AxO x4

## 2022-06-01 NOTE — CASE COMMUNICATION
911 CALLED FOR PATIENT DUE TO C/O CHEST PAIN DESCRIBED AS PRESSURE @ 1420. O2 SAT WAS 97%. PATIENT WAS ALERT BUT WAS SLOW TO ANSWER QUESTIONS AND HAD CONFUSED SPEECH AT TIMES.  AT 1433 PATIENTS B/P /109 - PATIENT TOOK NITRO TAB. PATIENT HAD NO CHANGE IN PAIN.   AT 1442 PATIENTS B/P /99 - PATIENT TOOK NITRO TAB. PATIENT STATED THAT CHEST PAIN MAY BE A LITTLE BETTER.  AT 1445 EMS ARRIVED TO TRANSPORT PATIENT TO ER.

## 2022-06-01 NOTE — ED NOTES
Spoke with pharmacy, he is going to verify Coreg for patients BP. Verified it is okay to give after lasix.

## 2022-06-01 NOTE — ED NOTES
Patient is at an increased risk for falls. Fall light activated, yellow falls bracelet and yellow non-skid socks placed on patient. Call light within reach and patient instructed to call for assistance. Side rails up x2, bed, and gait belt readily accessible.

## 2022-06-01 NOTE — H&P
[facility]  HISTORY AND PHYSICAL      Date of Admission: 6/1/2022  Primary Care Physician: Joyce Plata MD    Subjective     Chief Complaint: Chest discomfort    History of Present Illness  Really nice yet difficult historian type patient who presents with chest discomfort.  Patient is a 71-year-old -American female with a past medical history of hypertension, heart failure reduced ejection fraction, CAD, AICD, diabetes, GERD, struct of sleep apnea.  Patient last admitted to [facility] 5/8 till 5/11 for CHF exacerbation amelioration.  Patient has history of cardiac catheterization 8/2018 with nonobstructive CAD noted.  Cardiac catheterization done by Dr. Felipe at that time.  Patient presents complaining of chest discomfort.  Patient is difficult historian, but states chest discomfort happened sometime earlier today.  Patient describes chest discomfort as 6/10, tightness, slightly decreasing with time, lasting hours, radiating down left arm.  Admits to similar chest pain episode 3 to 4 months ago.  Denies fevers, chills, sick contacts, recent travel.        Review of Systems   Otherwise complete ROS reviewed and negative except as mentioned in the HPI.    Past Medical History:   Past Medical History:   Diagnosis Date   • Chronic systolic heart failure (HCC)    • Diabetes mellitus (HCC)    • Diabetic neuropathy (HCC)    • GERD (gastroesophageal reflux disease)    • Hypercholesterolemia    • Hypertension    • Low back pain    • Morbid obesity (HCC)    • Nonischemic cardiomyopathy (HCC)    • Osteoarthritis    • Sleep apnea    • Vitamin D deficiency      Past Surgical History:  Past Surgical History:   Procedure Laterality Date   • CARDIAC CATHETERIZATION N/A 08/23/2018   • CARDIAC ELECTROPHYSIOLOGY PROCEDURE N/A 06/22/2020   • CARDIAC PACEMAKER PLACEMENT     • CATARACT EXTRACTION     • COLONOSCOPY N/A 06/14/2017   • PACEMAKER  IMPLANTATION     • TOTAL ABDOMINAL HYSTERECTOMY WITH SALPINGO OOPHORECTOMY       Social History:  reports that she has quit smoking. She has never used smokeless tobacco. She reports that she does not drink alcohol and does not use drugs.    Family History: family history includes Bone cancer in her brother; Diabetes in her sister.       Allergies:  Allergies   Allergen Reactions   • Aldactone [Spironolactone] Unknown - Low Severity   • Nsaids        Medications:  Prior to Admission medications    Medication Sig Start Date End Date Taking? Authorizing Provider   acetaminophen (TYLENOL) 325 MG tablet Take 2 tablets by mouth Every 4 (Four) Hours As Needed for Mild Pain . 1/19/22   Swetha Raymond APRN   albuterol sulfate  (90 Base) MCG/ACT inhaler INHALE 2 PUFFS EVERY 4 (FOUR) HOURS AS NEEDED FOR WHEEZING OR SHORTNESS OF AIR. 9/2/21   Joyce Plata MD   aspirin 81 MG EC tablet Take 81 mg by mouth Every Night. 1/1/21   Provider, MD Heidi   atorvastatin (LIPITOR) 40 MG tablet Take 1 tablet by mouth Daily. 12/16/21   Joyce Plata MD   B-D UF III MINI PEN NEEDLES 31G X 5 MM misc USE WITH INSULIN INJECTIONS NIGHTLY 12/6/21   Joyce Plata MD   Blood Glucose Monitoring Suppl (FreeStyle Lite) device 1 Device 3 (Three) Times a Day. 4/5/22   Justin Wheeler MD   carvedilol (COREG) 25 MG tablet Take 1 tablet by mouth 2 (Two) Times a Day With Meals. 2/2/22   Joyce Plata MD   furosemide (LASIX) 40 MG tablet TAKE 1 TABLET BY MOUTH TWICE A DAY 5/13/22   Joyce Plata MD   glucose blood (FREESTYLE LITE) test strip 1 each by Other route 3 (Three) Times a Day. Use as instructed 4/5/22   Justin Wheeler MD   guaiFENesin (MUCINEX) 600 MG 12 hr tablet Take 1 tablet by mouth Every 12 (Twelve) Hours. 12/1/21   Justin Wheeler MD   insulin detemir (Levemir FlexTouch) 100 UNIT/ML injection Inject 18 Units under the skin into the appropriate area as directed Every Night.  Patient taking differently:  "Inject 20 Units under the skin into the appropriate area as directed Every Night. 5/5/22   Brent Coulter MD   Insulin Syringe 31G X 5/16\" 0.5 ML misc Inject 1 each under the skin into the appropriate area as directed Every Night. 12/2/21   Justin Wheeler MD   ipratropium-albuterol (DUO-NEB) 0.5-2.5 mg/3 ml nebulizer Take 3 mL by nebulization 4 (Four) Times a Day. 2/23/22   Justin Wheeler MD   isosorbide mononitrate (IMDUR) 30 MG 24 hr tablet Take 1 tablet by mouth Daily. 12/16/21   Joyce Plata MD   Lancets (freestyle) lancets 1 each by Other route 3 (Three) Times a Day. Use as instructed 4/5/22   Justin Wheeler MD   lisinopril (PRINIVIL,ZESTRIL) 10 MG tablet TAKE 1 TABLET BY MOUTH EVERY DAY 5/23/22   Joyce Plata MD   magnesium oxide (MAGOX) 400 (241.3 Mg) MG tablet tablet Take 1 tablet by mouth Daily. 10/6/17   Joyce Plata MD   nitroglycerin (Nitrostat) 0.4 MG SL tablet Place 1 tablet under the tongue Every 5 (Five) Minutes As Needed for Chest Pain. Take no more than 3 doses in 15 minutes. 2/2/22   Joyce Plata MD   pantoprazole (Protonix) 40 MG EC tablet Take 1 tablet by mouth Daily. 1/19/22   Swetha Raymond APRN   potassium chloride (MICRO-K) 10 MEQ CR capsule Take 2 capsules by mouth 2 (Two) Times a Day. 5/5/22   Brent Coulter MD   sucralfate (CARAFATE) 1 g tablet Take 1 g by mouth 3 (Three) Times a Day. 4/21/22   Provider, MD Heidi     I have utilized all available immediate resources to obtain, update, and review the patient's current medications.    Objective     Vital Signs: BP (!) 174/103   Pulse 102   Temp 98.1 °F (36.7 °C)   Resp 20   Ht 162.6 cm (64\")   Wt 89.8 kg (198 lb)   SpO2 96%   BMI 33.99 kg/m²   Physical Exam  Constitutional:       Appearance: Normal appearance.   HENT:      Head: Normocephalic and atraumatic.      Right Ear: External ear normal.      Left Ear: External ear normal.      Nose: Nose normal.      Mouth/Throat:      Mouth: " Mucous membranes are dry.   Eyes:      Extraocular Movements: Extraocular movements intact.      Conjunctiva/sclera: Conjunctivae normal.      Pupils: Pupils are equal, round, and reactive to light.   Cardiovascular:      Rate and Rhythm: Normal rate and regular rhythm.      Pulses: Normal pulses.      Heart sounds: Normal heart sounds.   Pulmonary:      Effort: Pulmonary effort is normal.      Breath sounds: Normal breath sounds.   Abdominal:      General: Abdomen is flat. Bowel sounds are normal.      Palpations: Abdomen is soft.   Musculoskeletal:         General: Normal range of motion.      Cervical back: Normal range of motion and neck supple.      Right lower leg: Edema present.      Left lower leg: Edema present.   Skin:     General: Skin is warm and dry.      Capillary Refill: Capillary refill takes less than 2 seconds.   Neurological:      General: No focal deficit present.      Mental Status: She is alert. She is disoriented.   Psychiatric:         Mood and Affect: Mood normal.         Behavior: Behavior normal.         Thought Content: Thought content normal.         Judgment: Judgment normal.              Results Reviewed:  Lab Results (last 24 hours)     Procedure Component Value Units Date/Time    COVID-19 and FLU A/B PCR - Swab, Nasopharynx [[id number]]  (Normal) Collected: 06/01/22 1737    Specimen: Swab from Nasopharynx Updated: 06/01/22 1806     COVID19 Not Detected     Influenza A PCR Not Detected     Influenza B PCR Not Detected    Narrative:      Fact sheet for providers: https://www.fda.gov/media/489742/download    Fact sheet for patients: https://www.fda.gov/media/294804/download    Test performed by PCR.    Extra Tubes [[id number]] Collected: 06/01/22 1650    Specimen: Blood, Venous Line Updated: 06/01/22 1802    Narrative:      The following orders were created for panel order Extra Tubes.  Procedure                               Abnormality         Status                     ---------                                -----------         ------                     Lavender Top[[id number]]                                     Final result               Goncalves Top[[id number]]                                         In process                   Please view results for these tests on the individual orders.    Lavender Top [[id number]] Collected: 06/01/22 1650    Specimen: Blood Updated: 06/01/22 1802     Extra Tube hold for add-on     Comment: Auto resulted       Troponin [[id number]]  (Normal) Collected: 06/01/22 1650    Specimen: Blood Updated: 06/01/22 1723     Troponin T <0.010 ng/mL     Narrative:      Troponin T Reference Range:  <= 0.03 ng/mL-   Negative for AMI  >0.03 ng/mL-     Abnormal for myocardial necrosis.  Clinicians would have to utilize clinical acumen, EKG, Troponin and serial changes to determine if it is an Acute Myocardial Infarction or myocardial injury due to an underlying chronic condition.       Results may be falsely decreased if patient taking Biotin.      Extra Tubes [[id number]] Collected: 06/01/22 1602    Specimen: Blood, Venous Line Updated: 06/01/22 1717    Narrative:      The following orders were created for panel order Extra Tubes.  Procedure                               Abnormality         Status                     ---------                               -----------         ------                     Gold Top - SST[[id number]]                                   Final result               Light Blue Top[[id number]]                                   Final result                 Please view results for these tests on the individual orders.    Gold Top - SST [[id number]] Collected: 06/01/22 1602    Specimen: Blood Updated: 06/01/22 1717     Extra Tube Hold for add-ons.     Comment: Auto resulted.       Light Blue Top [[id number]] Collected: 06/01/22 1602    Specimen: Blood Updated: 06/01/22 1717     Extra Tube Hold for add-ons.     Comment: Auto resulted       Gray Top [[id number]]  Collected: 06/01/22 1650    Specimen: Blood Updated: 06/01/22 1701    Blood Culture - Blood, Hand, Right [[id number]] Collected: 06/01/22 1650    Specimen: Blood from Hand, Right Updated: 06/01/22 1700    Urinalysis, Microscopic Only - Urine, Clean Catch [[id number]]  (Abnormal) Collected: 06/01/22 1642    Specimen: Urine, Clean Catch Updated: 06/01/22 1654     RBC, UA 3-5 /HPF      WBC, UA 31-50 /HPF      Bacteria, UA None Seen /HPF      Squamous Epithelial Cells, UA 0-2 /HPF      Hyaline Casts, UA 0-2 /LPF      Methodology Automated Microscopy    Urine Culture - Urine, Urine, Clean Catch [[id number]] Collected: 06/01/22 1642    Specimen: Urine, Clean Catch Updated: 06/01/22 1654    Urinalysis With Culture If Indicated - Urine, Clean Catch [[id number]]  (Abnormal) Collected: 06/01/22 1642    Specimen: Urine, Clean Catch Updated: 06/01/22 1654     Color, UA Dark Yellow     Appearance, UA Cloudy     pH, UA <=5.0     Specific Gravity, UA 1.033     Comment: Result obtained by Refractometer        Glucose,  mg/dL (Trace)     Ketones, UA 15 mg/dL (1+)     Bilirubin, UA Small (1+)     Blood, UA Trace     Protein, UA >=300 mg/dL (3+)     Leuk Esterase, UA Small (1+)     Nitrite, UA Negative     Urobilinogen, UA 1.0 E.U./dL    Narrative:      In absence of clinical symptoms, the presence of pyuria, bacteria, and/or nitrites on the urinalysis result does not correlate with infection.    Blood Culture - Blood, Arm, Right [[id number]] Collected: 06/01/22 1644    Specimen: Blood from Arm, Right Updated: 06/01/22 1644    Comprehensive Metabolic Panel [[id number]]  (Abnormal) Collected: 06/01/22 1600    Specimen: Blood Updated: 06/01/22 1628     Glucose 200 mg/dL      BUN 21 mg/dL      Creatinine 1.03 mg/dL      Sodium 142 mmol/L      Potassium 3.9 mmol/L      Comment: Slight hemolysis detected by analyzer. Results may be affected.        Chloride 109 mmol/L      CO2 16.0 mmol/L      Calcium 10.5 mg/dL      Total Protein 6.6 g/dL       Albumin 3.90 g/dL      ALT (SGPT) 21 U/L      AST (SGOT) 20 U/L      Alkaline Phosphatase 106 U/L      Total Bilirubin 1.4 mg/dL      Globulin 2.7 gm/dL      A/G Ratio 1.4 g/dL      BUN/Creatinine Ratio 20.4     Anion Gap 17.0 mmol/L      eGFR 57.5 mL/min/1.73      Comment: National Kidney Foundation and American Society of Nephrology (ASN) Task Force recommended calculation based on the Chronic Kidney Disease Epidemiology Collaboration (CKD-EPI) equation refit without adjustment for race.       Narrative:      GFR Normal >60  Chronic Kidney Disease <60  Kidney Failure <15      Lipase [[id number]]  (Abnormal) Collected: 06/01/22 1600    Specimen: Blood Updated: 06/01/22 1628     Lipase 194 U/L     Troponin [[id number]]  (Normal) Collected: 06/01/22 1600    Specimen: Blood Updated: 06/01/22 1628     Troponin T <0.010 ng/mL     Narrative:      Troponin T Reference Range:  <= 0.03 ng/mL-   Negative for AMI  >0.03 ng/mL-     Abnormal for myocardial necrosis.  Clinicians would have to utilize clinical acumen, EKG, Troponin and serial changes to determine if it is an Acute Myocardial Infarction or myocardial injury due to an underlying chronic condition.       Results may be falsely decreased if patient taking Biotin.      CK [[id number]]  (Normal) Collected: 06/01/22 1600    Specimen: Blood Updated: 06/01/22 1628     Creatine Kinase 60 U/L     Magnesium [[id number]]  (Normal) Collected: 06/01/22 1600    Specimen: Blood Updated: 06/01/22 1628     Magnesium 1.7 mg/dL     BNP [[id number]]  (Abnormal) Collected: 06/01/22 1600    Specimen: Blood Updated: 06/01/22 1624     proBNP 2,721.0 pg/mL     Narrative:      Among patients with dyspnea, NT-proBNP is highly sensitive for the detection of acute congestive heart failure. In addition NT-proBNP of <300 pg/ml effectively rules out acute congestive heart failure with 99% negative predictive value.    Results may be falsely decreased if patient taking Biotin.      CBC &  Differential [[id number]]  (Abnormal) Collected: 06/01/22 1600    Specimen: Blood Updated: 06/01/22 1604    Narrative:      The following orders were created for panel order CBC & Differential.  Procedure                               Abnormality         Status                     ---------                               -----------         ------                     CBC Auto Differential[[id number]]        Abnormal            Final result                 Please view results for these tests on the individual orders.    CBC Auto Differential [[id number]]  (Abnormal) Collected: 06/01/22 1600    Specimen: Blood Updated: 06/01/22 1604     WBC 9.98 10*3/mm3      RBC 4.20 10*6/mm3      Hemoglobin 11.7 g/dL      Hematocrit 35.9 %      MCV 85.5 fL      MCH 27.9 pg      MCHC 32.6 g/dL      RDW 16.1 %      RDW-SD 49.7 fl      MPV 10.4 fL      Platelets 253 10*3/mm3      Neutrophil % 59.1 %      Lymphocyte % 26.9 %      Monocyte % 12.1 %      Eosinophil % 1.0 %      Basophil % 0.5 %      Immature Grans % 0.4 %      Neutrophils, Absolute 5.90 10*3/mm3      Lymphocytes, Absolute 2.68 10*3/mm3      Monocytes, Absolute 1.21 10*3/mm3      Eosinophils, Absolute 0.10 10*3/mm3      Basophils, Absolute 0.05 10*3/mm3      Immature Grans, Absolute 0.04 10*3/mm3      nRBC 0.0 /100 WBC         Imaging Results (Last 24 Hours)     Procedure Component Value Units Date/Time    XR Chest 1 View [[id number]] Collected: 06/01/22 1605     Updated: 06/01/22 1712    Narrative:          COMPARISON:     5/8/2022    INDICATION:     Chest pain    FINDINGS:     Portable AP chest radiograph is obtained.  Cardiac  ACD, leads intact. Cardiomegaly. Mediastinum is normal width.  Prominence of the main pulmonary artery. Prominent central  vasculature. Lung volumes are within normal limits. There are  basilar opacities which may reflect atelectasis. No significant  effusion. No pneumothorax. Mild prominence of interstitium.      Impression:      1. Cardiomegaly.  Central vascular congestion. Mild central  interstitial prominence which may reflect edema.      2. Lung basilar opacities may reflect atelectasis.    Electronically signed by:  Denton Rhoades MD  6/1/2022 5:09 PM CDT  Workstation: [id number]          Cardiac catheterization 8/23/2018 Dr. Felipe  Impression   1.  No evidence of any obstructive epicardial coronary artery disease in the circumflex right or the left anterior descending artery.  2.  Calcification noted in the left main without any evidence of dampening of the pressure.  3.  Elevated left ventricular end-diastolic pressure.      I have personally reviewed and interpreted the radiology studies and ECG obtained at time of admission.     Assessment / Plan     Assessment:   Active Hospital Problems    Diagnosis    • Chest pain      Plan:   Typical angina versus ACS:  - Reviewed records and noted nonobstructive CAD on cardiac catheterization 8/23/2018.  Will place patient on telemetry overnight, serial troponins, as needed nitro, as needed morphine, cardiogram, and consult cardiology.  -Patient discussed overnight with Dr. Pearce covering for Dr. Felipe.  Dr. Pearce will inform Dr. Felipe of cardiac consultation.    Mild dehydration:  - Ketones noted in urine.  No large anion gap so doubt patient in DKA.  Dehydration likely secondary to Lasix usage in setting of urinary tract infection.  Cautious fluid management throughout hospitalization.    Urinary tract infection:  -UA shows signs of urinary tract infection.  Will await final culture results and empirical Rocephin.    Diabetes: sliding scale insulin, ACHS Accu-Cheks, diabetic diet, Levemir    systolic CHF: Urine shows signs of dehydration, so doubt patient suffering from CHF exacerbation.  Hold Lasix.  Order echocardiogram during hospitalization.    Hypertension: Continue home medications plus as needed IV labetalol/hydralazine for elevated blood pressure spikes    FEN cardiac,diabetic  PPX lovenox SQ    Code  Status/Advanced Care Plan: Full    The patient's surrogate decision maker is pt's family.     I discussed my findings and recommendations with the patient.    Estimated length of stay is 1-4 days.     The patient was seen and examined by me on 6/1/22 at 6pm.    Electronically signed by Daniel Goncalves MD, 06/01/22, 18:10 CDT.

## 2022-06-01 NOTE — ED PROVIDER NOTES
Subjective   Patient presents emergency department chest pain that began this morning.  She states that EMS had given her nitroglycerin in route to the emergency department and that significantly improved her chest pain.  She has no history of known coronary artery disease and does not have any history of coronary artery stents.  She does have a pacemaker, and her cardiologist is Dr. Felipe.          Chest Pain  Pain location:  Substernal area  Pain quality: aching    Pain radiates to:  Upper back  Pain severity:  Moderate  Onset quality:  Sudden  Timing:  Intermittent  Progression:  Waxing and waning  Chronicity:  New  Relieved by:  Nothing  Worsened by:  Deep breathing, exertion and movement  Associated symptoms: fatigue and weakness    Associated symptoms: no abdominal pain, no cough, no diaphoresis, no dizziness, no dysphagia, no fever, no headache, no nausea, no shortness of breath and no vomiting    Risk factors: diabetes mellitus, high cholesterol, hypertension and obesity    Risk factors: no coronary artery disease and no smoking        Review of Systems   Constitutional: Positive for activity change and fatigue. Negative for appetite change, chills, diaphoresis and fever.   HENT: Negative for congestion, ear discharge, ear pain, nosebleeds, rhinorrhea, sinus pressure, sore throat and trouble swallowing.    Eyes: Negative for discharge and redness.   Respiratory: Negative for apnea, cough, chest tightness, shortness of breath and wheezing.    Cardiovascular: Positive for chest pain and leg swelling.   Gastrointestinal: Positive for diarrhea (mild). Negative for abdominal pain, nausea and vomiting.   Endocrine: Negative for polyuria.   Genitourinary: Negative for dysuria, frequency and urgency.   Musculoskeletal: Negative for myalgias and neck pain.   Skin: Negative for color change and rash.   Allergic/Immunologic: Negative for immunocompromised state.   Neurological: Positive for weakness. Negative for  dizziness, seizures, syncope, light-headedness and headaches.   Hematological: Negative for adenopathy. Does not bruise/bleed easily.   Psychiatric/Behavioral: Negative for behavioral problems and confusion.   All other systems reviewed and are negative.      Past Medical History:   Diagnosis Date   • Chronic systolic heart failure (HCC)    • Diabetes mellitus (HCC)    • Diabetic neuropathy (HCC)    • GERD (gastroesophageal reflux disease)    • Hypercholesterolemia    • Hypertension    • Low back pain    • Morbid obesity (HCC)    • Nonischemic cardiomyopathy (HCC)    • Osteoarthritis    • Sleep apnea    • Vitamin D deficiency        Allergies   Allergen Reactions   • Aldactone [Spironolactone] Unknown - Low Severity   • Nsaids        Past Surgical History:   Procedure Laterality Date   • CARDIAC CATHETERIZATION N/A 08/23/2018   • CARDIAC ELECTROPHYSIOLOGY PROCEDURE N/A 06/22/2020   • CARDIAC PACEMAKER PLACEMENT     • CATARACT EXTRACTION     • COLONOSCOPY N/A 06/14/2017   • PACEMAKER IMPLANTATION     • TOTAL ABDOMINAL HYSTERECTOMY WITH SALPINGO OOPHORECTOMY         Family History   Problem Relation Age of Onset   • Diabetes Sister    • Bone cancer Brother        Social History     Socioeconomic History   • Marital status: Single   Tobacco Use   • Smoking status: Former Smoker   • Smokeless tobacco: Never Used   Substance and Sexual Activity   • Alcohol use: No   • Drug use: No   • Sexual activity: Not Currently           Objective   Physical Exam  Vitals and nursing note reviewed.   Constitutional:       Appearance: She is well-developed. She is ill-appearing.   HENT:      Head: Normocephalic and atraumatic.      Nose: Nose normal.   Eyes:      General: No scleral icterus.        Right eye: No discharge.         Left eye: No discharge.      Conjunctiva/sclera: Conjunctivae normal.      Pupils: Pupils are equal, round, and reactive to light.   Neck:      Trachea: No tracheal deviation.   Cardiovascular:      Rate and  Rhythm: Regular rhythm. Tachycardia present.      Heart sounds: Normal heart sounds. No murmur heard.  Pulmonary:      Effort: Pulmonary effort is normal. No respiratory distress.      Breath sounds: Normal breath sounds. No stridor. No wheezing or rales.   Abdominal:      General: Bowel sounds are normal. There is no distension.      Palpations: Abdomen is soft. There is no mass.      Tenderness: There is no abdominal tenderness. There is no guarding or rebound.   Musculoskeletal:      Cervical back: Normal range of motion and neck supple.      Right lower leg: Edema present.      Left lower leg: Edema present.   Skin:     General: Skin is warm and dry.      Findings: No erythema or rash.   Neurological:      Mental Status: She is alert and oriented to person, place, and time.      Coordination: Coordination normal.   Psychiatric:         Behavior: Behavior normal.         Thought Content: Thought content normal.         ECG 12 Lead      Date/Time: 6/1/2022 5:27 PM  Performed by: Jose R Hill MD  Authorized by: Jose R Hill MD   Interpreted by physician  Rhythm: paced  BPM: 108  ST Segments: ST segments normal                   ED Course                  Labs Reviewed   COMPREHENSIVE METABOLIC PANEL - Abnormal; Notable for the following components:       Result Value    Glucose 200 (*)     Creatinine 1.03 (*)     Chloride 109 (*)     CO2 16.0 (*)     Total Bilirubin 1.4 (*)     Anion Gap 17.0 (*)     eGFR 57.5 (*)     All other components within normal limits    Narrative:     GFR Normal >60  Chronic Kidney Disease <60  Kidney Failure <15     LIPASE - Abnormal; Notable for the following components:    Lipase 194 (*)     All other components within normal limits   URINALYSIS W/ CULTURE IF INDICATED - Abnormal; Notable for the following components:    Appearance, UA Cloudy (*)     Specific Gravity, UA 1.033 (*)     Glucose,  mg/dL (Trace) (*)     Ketones, UA 15 mg/dL (1+) (*)     Bilirubin, UA  Small (1+) (*)     Blood, UA Trace (*)     Protein, UA >=300 mg/dL (3+) (*)     Leuk Esterase, UA Small (1+) (*)     All other components within normal limits    Narrative:     In absence of clinical symptoms, the presence of pyuria, bacteria, and/or nitrites on the urinalysis result does not correlate with infection.   BNP (IN-HOUSE) - Abnormal; Notable for the following components:    proBNP 2,721.0 (*)     All other components within normal limits    Narrative:     Among patients with dyspnea, NT-proBNP is highly sensitive for the detection of acute congestive heart failure. In addition NT-proBNP of <300 pg/ml effectively rules out acute congestive heart failure with 99% negative predictive value.    Results may be falsely decreased if patient taking Biotin.     CBC WITH AUTO DIFFERENTIAL - Abnormal; Notable for the following components:    Hemoglobin 11.7 (*)     RDW 16.1 (*)     Monocyte % 12.1 (*)     Monocytes, Absolute 1.21 (*)     All other components within normal limits   URINALYSIS, MICROSCOPIC ONLY - Abnormal; Notable for the following components:    RBC, UA 3-5 (*)     WBC, UA 31-50 (*)     All other components within normal limits   TROPONIN (IN-HOUSE) - Normal    Narrative:     Troponin T Reference Range:  <= 0.03 ng/mL-   Negative for AMI  >0.03 ng/mL-     Abnormal for myocardial necrosis.  Clinicians would have to utilize clinical acumen, EKG, Troponin and serial changes to determine if it is an Acute Myocardial Infarction or myocardial injury due to an underlying chronic condition.       Results may be falsely decreased if patient taking Biotin.     TROPONIN (IN-HOUSE) - Normal    Narrative:     Troponin T Reference Range:  <= 0.03 ng/mL-   Negative for AMI  >0.03 ng/mL-     Abnormal for myocardial necrosis.  Clinicians would have to utilize clinical acumen, EKG, Troponin and serial changes to determine if it is an Acute Myocardial Infarction or myocardial injury due to an underlying chronic  condition.       Results may be falsely decreased if patient taking Biotin.     CK - Normal   MAGNESIUM - Normal   BLOOD CULTURE   BLOOD CULTURE   URINE CULTURE   COVID-19 AND FLU A/B, NP SWAB IN TRANSPORT MEDIA 8-12 HR TAT   CBC AND DIFFERENTIAL    Narrative:     The following orders were created for panel order CBC & Differential.  Procedure                               Abnormality         Status                     ---------                               -----------         ------                     CBC Auto Differential[[id number]]        Abnormal            Final result                 Please view results for these tests on the individual orders.   EXTRA TUBES    Narrative:     The following orders were created for panel order Extra Tubes.  Procedure                               Abnormality         Status                     ---------                               -----------         ------                     Gold Top - SST[[id number]]                                   Final result               Light Blue Top[[id number]]                                   Final result                 Please view results for these tests on the individual orders.   GOLD TOP - SST   LIGHT BLUE TOP   EXTRA TUBES    Narrative:     The following orders were created for panel order Extra Tubes.  Procedure                               Abnormality         Status                     ---------                               -----------         ------                     Lavender Top[[id number]]                                     In process                 Goncalves Top[[id number]]                                         In process                   Please view results for these tests on the individual orders.   LAVENDER TOP   GRAY TOP       XR Chest 1 View   Final Result   1. Cardiomegaly. Central vascular congestion. Mild central   interstitial prominence which may reflect edema.         2. Lung basilar opacities may reflect atelectasis.       Electronically signed by:  Denton Rhoades MD  6/1/2022 5:09 PM CDT   Workstation: [id number]                                         HEART Score (for prediction of 6-week risk of major adverse cardiac event) reviewed and/or performed as part of the patient evaluation and treatment planning process.  The result associated with this review/performance is: 5       MDM    Final diagnoses:   Precordial pain   Acute on chronic congestive heart failure, unspecified heart failure type (HCC)       ED Disposition  ED Disposition     ED Disposition   Decision to Admit    Condition   --    Comment   Level of Care: Telemetry [5]   Diagnosis: Chest pain [059379]   Admitting Physician: MARE GORDILLO [[id number]]   Attending Physician: MARE GORDILLO [[id number]]               No follow-up provider specified.       Medication List      No changes were made to your prescriptions during this visit.          Jose R Hill MD  06/01/22 1726       Jose R Hill MD  06/01/22 1728

## 2022-06-02 NOTE — THERAPY EVALUATION
Patient Name: Lexi Wade  : 1950    MRN: [id number]                              Today's Date: 2022       Admit Date: 2022    Visit Dx:     ICD-10-CM ICD-9-CM   1. Precordial pain  R07.2 786.51   2. Acute on chronic congestive heart failure, unspecified heart failure type (HCC)  I50.9 428.0   3. Impaired mobility and activities of daily living  Z74.09 V49.89    Z78.9    4. 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)   • Mixed hyperlipidemia   • Menopausal syndrome   • Dyspnea   • Chest pain   • Morbid obesity (HCC)   • Precordial pain   • Acute respiratory failure with hypoxia ([facility])   • Coronary artery disease involving native heart with angina pectoris ([facility])   • Implantable cardioverter-defibrillator (ICD) generator end of life   • Multifocal pneumonia   • Acute on chronic systolic CHF (congestive heart failure) (HCC)   • Obesity (BMI 30-39.9)   • Shortness of breath   • Altered mental status   • Morbid obesity (HCC)   • Drowsiness   • Acute on chronic systolic congestive heart failure (HCC)     Past Medical History:   Diagnosis Date   • Chronic systolic heart failure (HCC)    • Diabetes mellitus (HCC)    • Diabetic neuropathy (HCC)    • GERD (gastroesophageal reflux disease)    • Hypercholesterolemia    • Hypertension    • Low back pain    • Morbid obesity (HCC)    • Nonischemic cardiomyopathy (HCC)    • Osteoarthritis    • Sleep apnea    • Vitamin D deficiency      Past Surgical History:   Procedure Laterality Date   • CARDIAC CATHETERIZATION N/A 2018   • CARDIAC ELECTROPHYSIOLOGY PROCEDURE N/A  06/22/2020   • CARDIAC PACEMAKER PLACEMENT     • CATARACT EXTRACTION     • COLONOSCOPY N/A 06/14/2017   • PACEMAKER IMPLANTATION     • TOTAL ABDOMINAL HYSTERECTOMY WITH SALPINGO OOPHORECTOMY        General Information     Row Name 06/02/22 [facility]          Physical Therapy Time and Intention    Document Type evaluation  -     Mode of Treatment co-treatment;physical therapy;occupational therapy  -     Row Name 06/02/22 1353          General Information    Patient Profile Reviewed yes  -     Prior Level of Function independent:;all household mobility  -     Existing Precautions/Restrictions fall  -     Barriers to Rehab cognitive status  -     Row Name 06/02/22 1353          Living Environment    People in Home alone  -     Row Name 06/02/22 1353          Home Main Entrance    Number of Stairs, Main Entrance three  -CZ     Stair Railings, Main Entrance railing on left side (ascending)  -     Row Name 06/02/22 1353          Stairs Within Home, Primary    Stairs, Within Home, Primary Ambulates with FWW. 4 sisters check on patient daily, live nearby.  -     Row Name 06/02/22 1353          Cognition    Orientation Status (Cognition) oriented to;person;place  -     Row Name 06/02/22 1353          Safety Issues, Functional Mobility    Impairments Affecting Function (Mobility) strength;endurance/activity tolerance;balance  -           User Key  (r) = Recorded By, (t) = Taken By, (c) = Cosigned By    Initials Name Provider Type    CZ Frandy Lemos, PT Physical Therapist               Mobility     Row Name 06/02/22 1353          Bed Mobility    Bed Mobility supine-sit;sit-supine  -     Supine-Sit [place] (Bed Mobility) contact guard  -     Row Name 06/02/22 1353          Sit-Stand Transfer    Sit-Stand [place] (Transfers) contact guard  -     Assistive Device (Sit-Stand Transfers) walker, front-wheeled  -     Row Name 06/02/22 1353          Gait/Stairs (Locomotion)    [place] Level  (Gait) contact guard  -CZ     Assistive Device (Gait) walker, front-wheeled  -CZ     Distance in Feet (Gait) 5'x1, 25'x1.  -CZ     Comment, (Gait/Stairs) Slow neri, stooped posture, cues to remain closer to walker.  -CZ           User Key  (r) = Recorded By, (t) = Taken By, (c) = Cosigned By    Initials Name Provider Type    CZ Frandy Lemos, PT Physical Therapist               Obj/Interventions     Row Name 06/02/22 [facility]          Range of Motion Comprehensive    General Range of Motion bilateral lower extremity ROM WFL  -     Row Name 06/02/22 [facility]          Strength Comprehensive (MMT)    General Manual Muscle Testing (MMT) Assessment other (see comments)  -CZ     Comment, General Manual Muscle Testing (MMT) Assessment BLEs: 3+/5 grossly.  -CZ     Row Name 06/02/22 [facility]          Sensory Assessment (Somatosensory)    Sensory Assessment (Somatosensory) LE sensation intact  -CZ           User Key  (r) = Recorded By, (t) = Taken By, (c) = Cosigned By    Initials Name Provider Type    CZ Frandy Lemos, PT Physical Therapist               Goals/Plan     Row Name 06/02/22 [facility]          Bed Mobility Goal 1 (PT)    Activity/Assistive Device (Bed Mobility Goal 1, PT) sit to supine/supine to sit  -CZ     [place] Level/Cues Needed (Bed Mobility Goal 1, PT) independent  -CZ     Time Frame (Bed Mobility Goal 1, PT) by discharge  -CZ     Strategies/Barriers (Bed Mobility Goal 1, PT) HOB flat, no bed rails.  -CZ     Progress/Outcomes (Bed Mobility Goal 1, PT) goal not met  -CZ     Row Name 06/02/22 [facility]          Transfer Goal 1 (PT)    Activity/Assistive Device (Transfer Goal 1, PT) sit-to-stand/stand-to-sit;bed-to-chair/chair-to-bed;walker, rolling  -CZ     [place] Level/Cues Needed (Transfer Goal 1, PT) modified independence  -CZ     Time Frame (Transfer Goal 1, PT) by discharge  -CZ     Strategies/Barriers (Transfers Goal 1, PT) Tends to lean posteriorly upon initial standing.  -CZ     Progress/Outcome  (Transfer Goal 1, PT) goal not met  -CZ     Row Name 06/02/22 [facility]          Gait Training Goal 1 (PT)    Activity/Assistive Device (Gait Training Goal 1, PT) walker, rolling  -CZ     [place] Level (Gait Training Goal 1, PT) modified independence  -CZ     Distance (Gait Training Goal 1, PT) 75'x2.  -CZ     Time Frame (Gait Training Goal 1, PT) by discharge  -CZ     Strategies/Barriers (Gait Training Goal 1, PT) Facilitate upright posture.  -CZ     Progress/Outcome (Gait Training Goal 1, PT) goal not met  -CZ     Row Name 06/02/22 [facility]          Stairs Goal 1 (PT)    Activity/Assistive Device (Stairs Goal 1, PT) using handrail, left  -CZ     [place] Level/Cues Needed (Stairs Goal 1, PT) supervision required  -CZ     Number of Stairs (Stairs Goal 1, PT) 3 steps.  -CZ     Time Frame (Stairs Goal 1, PT) by discharge  -CZ     Progress/Outcome (Stairs Goal 1, PT) goal not met  -CZ     Row Name 06/02/22 [facility]          Problem Specific Goal 1 (PT)    Problem Specific Goal 1 (PT) Score 24/28 on Tinetti fall risk assessment.  -CZ     Time Frame (Problem Specific Goal 1, PT) by discharge  -CZ     Progress/Outcome (Problem Specific Goal 1, PT) goal not met  -CZ     Row Name 06/02/22 [facility]          Therapy Assessment/Plan (PT)    Planned Therapy Interventions (PT) balance training;bed mobility training;gait training;patient/family education;transfer training;strengthening;stretching;stair training  -CZ           User Key  (r) = Recorded By, (t) = Taken By, (c) = Cosigned By    Initials Name Provider Type    CZ Frandy Lemos, PT Physical Therapist               Clinical Impression     Row Name 06/02/22 [facility]          Pain    Pretreatment Pain Rating 0/10 - no pain  -CZ     Posttreatment Pain Rating 0/10 - no pain  -CZ     Row Name 06/02/22 [facility]          Plan of Care Review    Plan of Care Reviewed With patient  -CZ     Outcome Evaluation Initial PT evaluation complete, co-evaluation with OT.  Patient is alert,  cooperative, confused at times. She requires CGA with bed mobility, transfers and gait, ambulating 25'x1 with FWW, cues for upright posture and to remain closer to walker.  Patient has a FWW at home and reports her sisters check up on her daily.  Goals established, continue skilled I/P PT.  -CZ     Row Name 06/02/22 4725          Therapy Assessment/Plan (PT)    Criteria for Skilled Interventions Met (PT) yes;skilled treatment is necessary  -CZ     Therapy Frequency (PT) other (see comments)  3-7 days/week  -CZ     Row Name 06/02/22 135          Vital Signs    Pretreatment Heart Rate (beats/min) 73  -CZ     Intratreatment Heart Rate (beats/min) 92  -CZ     Pre SpO2 (%) 90  -CZ     O2 Delivery Pre Treatment room air  -CZ     Intra SpO2 (%) 97  -CZ     O2 Delivery Intra Treatment room air  -CZ     Pre Patient Position Supine  -CZ     Intra Patient Position Sitting  -CZ     Row Name 06/02/22 8162          Positioning and Restraints    Pre-Treatment Position in bed  -CZ     Post Treatment Position bed  -CZ     In Bed supine;call light within reach;encouraged to call for assist;exit alarm on;with OT  -CZ           User Key  (r) = Recorded By, (t) = Taken By, (c) = Cosigned By    Initials Name Provider Type    CZ Frandy Lemos, PT Physical Therapist               Outcome Measures     Row Name 06/02/22 4275          How much help from another person do you currently need...    Turning from your back to your side while in flat bed without using bedrails? 3  -CZ     Moving from lying on back to sitting on the side of a flat bed without bedrails? 3  -CZ     Moving to and from a bed to a chair (including a wheelchair)? 3  -CZ     Standing up from a chair using your arms (e.g., wheelchair, bedside chair)? 3  -CZ     Climbing 3-5 steps with a railing? 3  -CZ     To walk in hospital room? 3  -CZ     AM-PAC 6 Clicks Score (PT) 18  -CZ     Highest level of mobility 6 --> Walked 10 steps or more  -CZ     Row Name 06/02/22 6680  06/02/22 1352       Functional Assessment    Outcome Measure Options AM-PAC 6 Clicks Basic Mobility (PT);[place]  -CZ AM-PAC 6 Clicks Daily Activity (OT)  -RB          User Key  (r) = Recorded By, (t) = Taken By, (c) = Cosigned By    Initials Name Provider Type    RB Tony Childress, OT Occupational Therapist    Frandy Cosme, PT Physical Therapist                             Physical Therapy Education                 Title: PT OT SLP Therapies (In Progress)     Topic: Physical Therapy (In Progress)     Point: Mobility training (In Progress)     Learning Progress Summary           Patient Acceptance, E, NR by  at 6/2/2022 1542    Comment: PT POC, hand placement with transfers, use of walker, upright posture.                   Point: Home exercise program (Not Started)     Learner Progress:  Not documented in this visit.          Point: Body mechanics (Not Started)     Learner Progress:  Not documented in this visit.          Point: Precautions (Not Started)     Learner Progress:  Not documented in this visit.                      User Key     Initials Effective Dates Name Provider Type Discipline     06/16/21 -  Frandy Lemos, PT Physical Therapist PT              PT Recommendation and Plan  Planned Therapy Interventions (PT): balance training, bed mobility training, gait training, patient/family education, transfer training, strengthening, stretching, stair training  Plan of Care Reviewed With: patient  Outcome Evaluation: Initial PT evaluation complete, co-evaluation with OT.  Patient is alert, cooperative, confused at times. She requires CGA with bed mobility, transfers and gait, ambulating 25'x1 with FWW, cues for upright posture and to remain closer to walker.  Patient has a FWW at home and reports her sisters check up on her daily.  Goals established, continue skilled I/P PT.     Time Calculation:    PT Charges     Row Name 06/02/22 1545             Time Calculation    Start Time 1353  -      Stop  Time 1431  -CZ      Time Calculation (min) 38 min  -CZ      PT Received On 06/02/22  -CZ      PT Goal Re-Cert Due Date 06/15/22  -CZ              Untimed Charges    PT Eval/Re-eval Minutes 38  -CZ              Total Minutes    Untimed Charges Total Minutes 38  -CZ       Total Minutes 38  -CZ            User Key  (r) = Recorded By, (t) = Taken By, (c) = Cosigned By    Initials Name Provider Type    CZ Frandy Lemos, PT Physical Therapist              Therapy Charges for Today     Code Description Service Date Service Provider Modifiers Qty    93028592470 HC PT EVAL MOD COMPLEXITY 3 6/2/2022 Frandy Lemos, PT GP 1          PT G-Codes  Outcome Measure Options: AM-PAC 6 Clicks Basic Mobility (PT)Emmy  AM-PAC 6 Clicks Score (PT): 18  AM-PAC 6 Clicks Score (OT): 20    Frandy Lemos PT  6/2/2022

## 2022-06-02 NOTE — DISCHARGE INSTR - OTHER ORDERS
PACS     call them 3 days before you need to be picked up and let them know that you have Medicaid

## 2022-06-02 NOTE — THERAPY EVALUATION
Acute Care - Occupational Therapy Initial Evaluation  [facility]     Patient Name: Lexi Wade  : 1950  MRN: [id number]  Today's Date: 2022     Date of Referral to OT: 22       Admit Date: 2022       ICD-10-CM ICD-9-CM   1. Precordial pain  R07.2 786.51   2. Acute on chronic congestive heart failure, unspecified heart failure type (HCC)  I50.9 428.0   3. 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)   • Mixed hyperlipidemia   • Menopausal syndrome   • Dyspnea   • Chest pain   • Morbid obesity (HCC)   • Precordial pain   • Acute respiratory failure with hypoxia ([facility])   • Coronary artery disease involving native heart with angina pectoris ([facility])   • Implantable cardioverter-defibrillator (ICD) generator end of life   • Multifocal pneumonia   • Acute on chronic systolic CHF (congestive heart failure) (HCC)   • Obesity (BMI 30-39.9)   • Shortness of breath   • Altered mental status   • Morbid obesity (HCC)   • Drowsiness   • Acute on chronic systolic congestive heart failure (HCC)     Past Medical History:   Diagnosis Date   • Chronic systolic heart failure (HCC)    • Diabetes mellitus (HCC)    • Diabetic neuropathy (HCC)    • GERD (gastroesophageal reflux disease)    • Hypercholesterolemia    • Hypertension    • Low back pain    • Morbid obesity (HCC)    • Nonischemic cardiomyopathy (HCC)    • Osteoarthritis    • Sleep apnea    • Vitamin D deficiency      Past Surgical History:   Procedure Laterality Date   • CARDIAC CATHETERIZATION N/A 2018   • CARDIAC ELECTROPHYSIOLOGY PROCEDURE N/A  06/22/2020   • CARDIAC PACEMAKER PLACEMENT     • CATARACT EXTRACTION     • COLONOSCOPY N/A 06/14/2017   • PACEMAKER IMPLANTATION     • TOTAL ABDOMINAL HYSTERECTOMY WITH SALPINGO OOPHORECTOMY           OT ASSESSMENT FLOWSHEET (last 12 hours)     OT Evaluation and Treatment     Row Name 06/02/22 1352                   OT Time and Intention    Subjective Information no complaints  -RB        Document Type evaluation  -RB        Mode of Treatment co-treatment;physical therapy;occupational therapy  -RB        Patient Effort good  -RB                  General Information    Patient Profile Reviewed yes  -RB        Prior Level of Function independent:;all household mobility;ADL's  -RB        Existing Precautions/Restrictions fall  -RB        Barriers to Rehab none identified  -RB                  Previous Level of Function/Home Environm    Bathing/Grooming, Premorbid Functional Level independent  -RB        Dressing, Premorbid Functional Level independent  -RB        Eating/Feeding, Premorbid Functional Level independent  -RB        Toileting, Premorbid Functional Level independent  -RB                  Living Environment    Current Living Arrangements apartment  -RB        Home Accessibility stairs to enter home  -RB        People in Home alone  -RB        Primary Care Provided by self  -RB                  Home Main Entrance    Number of Stairs, Main Entrance three  -RB        Stair Railings, Main Entrance railing on left side (ascending)  -RB                  Home Use of Assistive/Adaptive Equipment    Equipment Currently Used at Home walker, rolling;glucometer;grab bar  -RB                  Pain Assessment    Pretreatment Pain Rating 0/10 - no pain  -RB        Posttreatment Pain Rating 0/10 - no pain  -RB                  Cognition    Affect/Mental Status (Cognition) WFL  -RB        Orientation Status (Cognition) oriented to;person;place  Cues for present month  -RB                  Range of Motion (ROM)    Range of  Motion ROM is WNL;bilateral upper extremities  -RB                  Strength Comprehensive (MMT)    General Manual Muscle Testing (MMT) Assessment other (see comments)  -RB        Comment, General Manual Muscle Testing (MMT) Assessment B UE strength was 4 to 4+/5 grossly.  -RB                  Activities of Daily Living    BADL Assessment/Intervention lower body dressing  -RB                  Lower Body Dressing Assessment/Training    [place] Level (Lower Body Dressing) lower body dressing skills;don;pants/bottoms;shoes/slippers;contact guard assist  -RB        Position (Lower Body Dressing) edge of bed sitting;supported standing  -RB                  Bed Mobility    Bed Mobility supine-sit  -RB        Supine-Sit [place] (Bed Mobility) contact guard  -RB                  Functional Mobility    Functional Mobility- Ind. Level contact guard assist  -RB        Functional Mobility- Device walker, front-wheeled  -RB        Functional Mobility-Distance (Feet) 30  -RB        Functional Mobility- Safety Issues balance decreased during turns;step length decreased  -RB                  Transfer Assessment/Treatment    Transfers toilet transfer  -RB                  Transfers    Sit-Stand [place] (Transfers) contact guard  -RB        [place] Level (Toilet Transfer) contact guard  -RB        Assistive Device (Toilet Transfer) walker, front-wheeled  -RB                  Sit-Stand Transfer    Assistive Device (Sit-Stand Transfers) walker, front-wheeled  -RB                  Toilet Transfer    Type (Toilet Transfer) sit-stand;stand-sit  -RB                  Safety Issues, Functional Mobility    Safety Issues Affecting Function (Mobility) safety precaution awareness;problem-solving;positioning of assistive device  -RB                  Plan of Care Review    Plan of Care Reviewed With patient  -RB                  Vital Signs    Pretreatment Heart Rate (beats/min) 73  -RB        Intratreatment Heart Rate  (beats/min) 92  -RB        Pre SpO2 (%) 90  -RB        O2 Delivery Pre Treatment room air  -RB        Intra SpO2 (%) 97  -RB        Pre Patient Position Supine  -RB        Intra Patient Position Standing  -RB        Post Patient Position Supine  -RB                  Positioning and Restraints    Pre-Treatment Position in bed  -RB        Post Treatment Position bed  -RB                  Therapy Assessment/Plan (OT)    Date of Referral to OT 06/02/22  -RB        Functional Level at Time of Evaluation (OT) Imp mob and ADLs.  -RB        OT Diagnosis Imp mob and ADLs.  -RB        Rehab Potential (OT) good, to achieve stated therapy goals  -RB        Criteria for Skilled Therapeutic Interventions Met (OT) yes;meets criteria  -RB        Therapy Frequency (OT) other (see comments)  3-7 days/wk  -RB        Predicted Duration of Therapy Intervention (OT) Until D/C or goals met.  -RB        Problem List (OT) problems related to;balance;coordination;mobility;strength;inability to direct their own care  -RB        Activity Limitations Related to Problem List (OT) unable to transfer safely;unable to ambulate safely;BADLs not performed adequately or safely;IADLs not performed adequately or safely  -RB        Planned Therapy Interventions (OT) activity tolerance training;adaptive equipment training;BADL retraining;functional balance retraining;occupation/activity based interventions;patient/caregiver education/training;ROM/therapeutic exercise;strengthening exercise;transfer/mobility retraining  -RB                  Evaluation Complexity (OT)    Review Occupational Profile/Medical/Therapy History Complexity expanded/moderate complexity  -RB        Assessment, Occupational Performance/Identification of Deficit Complexity 3-5 performance deficits  -RB        Clinical Decision Making Complexity (OT) detailed assessment/moderate complexity  -RB        Overall Complexity of Evaluation (OT) moderate complexity  -RB                   Therapy Plan Review/Discharge Plan (OT)    Anticipated Discharge Disposition (OT) home with home health;home with assist  -RB                  OT Goals    Transfer Goal Selection (OT) transfer, OT goal 1  -RB        Bathing Goal Selection (OT) bathing, OT goal 1  -RB        Dressing Goal Selection (OT) dressing, OT goal 1  -RB        Toileting Goal Selection (OT) toileting, OT goal 1  -RB                  Transfer Goal 1 (OT)    Activity/Assistive Device (Transfer Goal 1, OT) transfers, all  -RB        [place] Level/Cues Needed (Transfer Goal 1, OT) modified independence  -RB        Time Frame (Transfer Goal 1, OT) long term goal (LTG)  -RB        Progress/Outcome (Transfer Goal 1, OT) goal not met  -RB                  Bathing Goal 1 (OT)    Activity/Device (Bathing Goal 1, OT) bathing skills, all  -RB        [place] Level/Cues Needed (Bathing Goal 1, OT) modified independence  -RB        Time Frame (Bathing Goal 1, OT) long term goal (LTG)  -RB        Progress/Outcomes (Bathing Goal 1, OT) goal not met  -RB                  Dressing Goal 1 (OT)    Activity/Device (Dressing Goal 1, OT) dressing skills, all  -RB        [place]/Cues Needed (Dressing Goal 1, OT) modified independence  -RB        Time Frame (Dressing Goal 1, OT) long term goal (LTG)  -RB        Progress/Outcome (Dressing Goal 1, OT) goal not met  -RB                  Toileting Goal 1 (OT)    Activity/Device (Toileting Goal 1, OT) toileting skills, all  -RB        [place] Level/Cues Needed (Toileting Goal 1, OT) modified independence  -RB        Time Frame (Toileting Goal 1, OT) long term goal (LTG)  -RB        Progress/Outcome (Toileting Goal 1, OT) goal not met  -RB              User Key  (r) = Recorded By, (t) = Taken By, (c) = Cosigned By    Initials Name Effective Dates    RB Tony Childress, OT 06/16/21 -                  Occupational Therapy Education                 Title: PT OT SLP Therapies (In Progress)     Topic:  Occupational Therapy (In Progress)     Point: ADL training (Not Started)     Description:   Instruct learner(s) on proper safety adaptation and remediation techniques during self care or transfers.   Instruct in proper use of assistive devices.              Learner Progress:  Not documented in this visit.          Point: Home exercise program (Not Started)     Description:   Instruct learner(s) on appropriate technique for monitoring, assisting and/or progressing therapeutic exercises/activities.              Learner Progress:  Not documented in this visit.          Point: Precautions (Done)     Description:   Instruct learner(s) on prescribed precautions during self-care and functional transfers.              Learning Progress Summary           Patient Acceptance, E, VU by RB at 6/2/2022 1501    Comment: Edu pt on use of gait belt and non skid socks when OOB and no OOB without assist.                   Point: Body mechanics (Not Started)     Description:   Instruct learner(s) on proper positioning and spine alignment during self-care, functional mobility activities and/or exercises.              Learner Progress:  Not documented in this visit.                      User Key     Initials Effective Dates Name Provider Type Discipline    ALEJANDRO 06/16/21 -  Tony Childress OT Occupational Therapist OT                  OT Recommendation and Plan  Planned Therapy Interventions (OT): activity tolerance training, adaptive equipment training, BADL retraining, functional balance retraining, occupation/activity based interventions, patient/caregiver education/training, ROM/therapeutic exercise, strengthening exercise, transfer/mobility retraining  Therapy Frequency (OT): other (see comments) (3-7 days/wk)  Plan of Care Review  Plan of Care Reviewed With: patient  Outcome Evaluation: OT eval on this date as co-eval with PT.  Pt was CGA for bed mobility, sit to stand, toilet transfer and LB dressing.  Pt ambulated in room ~ 30' with  CGA and R/W.  Pt could benefit from OT services to increase safety and independence with ADLs and functional mobility.  Plan of Care Reviewed With: patient  Outcome Evaluation: OT vj on this date as co-eval with PT.  Pt was CGA for bed mobility, sit to stand, toilet transfer and LB dressing.  Pt ambulated in room ~ 30' with CGA and R/W.  Pt could benefit from OT services to increase safety and independence with ADLs and functional mobility.     Outcome Measures     Row Name 06/02/22 1352             How much help from another is currently needed...    Putting on and taking off regular lower body clothing? 3  -RB      Bathing (including washing, rinsing, and drying) 2  -RB      Toileting (which includes using toilet bed pan or urinal) 3  -RB      Putting on and taking off regular upper body clothing 4  -RB      Taking care of personal grooming (such as brushing teeth) 4  -RB      Eating meals 4  -RB      AM-PAC 6 Clicks Score (OT) 20  -RB              Functional Assessment    Outcome Measure Options AM-PAC 6 Clicks Daily Activity (OT)  -RB            User Key  (r) = Recorded By, (t) = Taken By, (c) = Cosigned By    Initials Name Provider Type    RB Tony Childress OT Occupational Therapist                Time Calculation:    Time Calculation- OT     Row Name 06/02/22 1506             Time Calculation- OT    OT Start Time 1352  -RB      OT Stop Time 1436  -RB      OT Time Calculation (min) 44 min  -RB      OT Received On 06/02/22  -RB      OT Goal Re-Cert Due Date 06/15/22  -RB            User Key  (r) = Recorded By, (t) = Taken By, (c) = Cosigned By    Initials Name Provider Type    RB Tony Childress OT Occupational Therapist              Therapy Charges for Today     Code Description Service Date Service Provider Modifiers Qty    [id number]  OT EVAL MOD COMPLEXITY 3 6/2/2022 Tony Childress OT GO 1               Tony Childress OT  6/2/2022

## 2022-06-02 NOTE — CONSULTS
Cardiology Consultation Note.        Patient Name: Lexi Wade  Age/Sex: 71 y.o. female  : 1950  MRN: [id number]    Date of consultation: 2022  Consulting Physician: Wang Felipe MD  Primary care Physician: Joyce Plata MD  Requesting Physician:  Daniel Goncalves MD     Reason for consultation: Chest pain shortness of breath      Subjective:       Chief Complaint: Chest pain    History of Present Illness:  Lexi Wade is a 71 y.o. female     Body mass index is 33.63 kg/m². With a past medical history significant for nonischemic cardiomyopathy with left ventricular systolic dysfunction with an ejection fraction of 25% with improvement to 40 to 45%, previous coronary angiogram done in  which did not reveal of any evidence of any obstructive epicardial coronary artery disease, arterial hypertension, hypertensive heart disease, moderate mitral regurgitation, moderate tricuspid regurgitation, obesity with a body mass index of 36, s/p [employer] biventricular AICD generator placement in 2012 with subsequent generator replacement done in 2020  [employer] model number [id number] and serial #[id number], gastroesophageal reflux disease, reactive airway disease, history of pancreatitis, chronic kidney disease and negative sleep study.     Patient has had increasing episodes of shortness of breath.          Patient has been taking the Lasix.  Patient has had multiple visits to the emergency room with increasing shortness of breath and has been administered Lasix.    Patient record was reviewed.  Patient has had recent hospitalization with altered mental status and shortness of breath.  Patient has been compliant with her medication.  Patient had episodes of worsening shortness of breath followed by symptoms of chest tightness.  Review of the record indicate patient last coronary angiogram was done in 2018.  Patient denies any prolonged episodes of palpitation.  Patient has had  worsening shortness of breath along with bilateral increasing lower extremity edema.    Patient denies any delivery of shock.  Patient complains of having symptoms of chest discomfort which she attributed it to worsening shortness of breath.       Patient on questioning denies any hemoptysis hematuria bright of blood per rectum.          Patient 10 point review of system except for what stated in the history of present illness negative.              Concurrent  Medical History:  1.  Shortness of breath.  2.  Coronary angiogram done in August 2018 had revealed:  A.  No evidence of any obstructive epicardial coronary artery disease in the circumflex right or the left anterior descending artery.  B.  Calcification noted in the left main without any evidence of dampening of the pressure.  C.  Elevated left ventricular end-diastolic pressure.  3.  Nonischemic cardiomyopathy.  4.  Left ventricular systolic dysfunction with improvement in the ejection fraction from 25% to 42%.  5.  Nonrheumatic moderate mitral regurgitation and nonrheumatic moderate tricuspid regurgitation.  6.  S/p [employer] biventricular  number [id number] and serial #[id number] implanted June 2020.  7.  Explantation of the old [employer] biventricular AICD generator replacement done in 07/2012 with [employer] AICD model #[id number], serial #[id number].  8.  Arterial hypertension.  9.  Hypertensive heart disease.  10.  Non-insulin-dependent diabetes.  11.  Obesity.  12.  Gastroesophageal reflux disease.  13.  History of pancreatitis.  14.  Hyperlipidemia.  15.  Chronic kidney disease.  16.  Vitamin D deficiency.  17.  Diabetic neuropathy.  18.  Reactive airway disease.  19.  Chronic back pain  20.  Anemia with a hemoglobin of 10.  21.  Chronic kidney disease secondary to diabetes.        Past Surgical History:  1.  Coronary angiogram done in 2010 and 2018.  2.  S/p [employer] biventricular AICD generator placement in July 2012 with subsequent generator replacement  done in July 2020.  3.  Colonoscopy.  4.  Cataract surgery.  5.  Abdominal hysterectomy with bilateral salpingo-oophorectomy.  6.  Dilatation and curettage.        Family History: Family history significant for diabetes.        Social History: Previous history of tobacco abuse denies any current tobacco alcohol intake        Cardiac Risk Factors:   1. Postmenopausal   2. Arterial hypertension   3. Hyperlipidemia   4. Diabetes   5. Obesity          Allergies:  Allergies   Allergen Reactions   • Aldactone [Spironolactone] Unknown - Low Severity   • Nsaids        Medication:  Medications Prior to Admission   Medication Sig Dispense Refill Last Dose   • acetaminophen (TYLENOL) 325 MG tablet Take 2 tablets by mouth Every 4 (Four) Hours As Needed for Mild Pain .      • albuterol sulfate  (90 Base) MCG/ACT inhaler INHALE 2 PUFFS EVERY 4 (FOUR) HOURS AS NEEDED FOR WHEEZING OR SHORTNESS OF AIR. 18 g 1    • aspirin 81 MG EC tablet Take 81 mg by mouth Every Night.      • atorvastatin (LIPITOR) 40 MG tablet Take 1 tablet by mouth Daily. 90 tablet 3    • B-D UF III MINI PEN NEEDLES 31G X 5 MM misc USE WITH INSULIN INJECTIONS NIGHTLY 100 each 3    • Blood Glucose Monitoring Suppl (FreeStyle Lite) device 1 Device 3 (Three) Times a Day. 1 each 0    • carvedilol (COREG) 25 MG tablet Take 1 tablet by mouth 2 (Two) Times a Day With Meals. 180 tablet 3    • furosemide (LASIX) 40 MG tablet TAKE 1 TABLET BY MOUTH TWICE A DAY 60 tablet 1    • glucose blood (FREESTYLE LITE) test strip 1 each by Other route 3 (Three) Times a Day. Use as instructed 100 each 1    • guaiFENesin (MUCINEX) 600 MG 12 hr tablet Take 1 tablet by mouth Every 12 (Twelve) Hours. 60 tablet 1    • insulin detemir (Levemir FlexTouch) 100 UNIT/ML injection Inject 18 Units under the skin into the appropriate area as directed Every Night. (Patient taking differently: Inject 20 Units under the skin into the appropriate area as directed Every Night.)      • Insulin  "Syringe 31G X 5/16\" 0.5 ML misc Inject 1 each under the skin into the appropriate area as directed Every Night. 100 each 1    • ipratropium-albuterol (DUO-NEB) 0.5-2.5 mg/3 ml nebulizer Take 3 mL by nebulization 4 (Four) Times a Day. 360 mL 1    • isosorbide mononitrate (IMDUR) 30 MG 24 hr tablet Take 1 tablet by mouth Daily. 90 tablet 3    • Lancets (freestyle) lancets 1 each by Other route 3 (Three) Times a Day. Use as instructed 100 each 1    • lisinopril (PRINIVIL,ZESTRIL) 10 MG tablet TAKE 1 TABLET BY MOUTH EVERY DAY 90 tablet 3    • magnesium oxide (MAGOX) 400 (241.3 Mg) MG tablet tablet Take 1 tablet by mouth Daily. 90 each 3    • nitroglycerin (Nitrostat) 0.4 MG SL tablet Place 1 tablet under the tongue Every 5 (Five) Minutes As Needed for Chest Pain. Take no more than 3 doses in 15 minutes. 25 tablet 0    • pantoprazole (Protonix) 40 MG EC tablet Take 1 tablet by mouth Daily. 30 tablet 0    • potassium chloride (MICRO-K) 10 MEQ CR capsule Take 2 capsules by mouth 2 (Two) Times a Day. 60 capsule 0    • sucralfate (CARAFATE) 1 g tablet Take 1 g by mouth 3 (Three) Times a Day.              Review of Systems:       Constitutional:  Denies recent weight loss, weight gain, fever or chills, no change in exercise tolerance.     HENT:  Denies any hearing loss, epistaxis, hoarseness, or difficulty speaking.     Eyes: Wears eyeglasses or contact lenses     Respiratory:  Denies dyspnea with exertion,no cough, wheezing, or hemoptysis.     Cardiovascular: Positive for shortness of breath.  Negative for palpitations, chest pain, orthopnea, PND, peripheral edema, syncope, or claudication.     Gastrointestinal:  Denies change in bowel habits, dyspepsia, ulcer disease, hematochezia, or melena.  No nausea, no vomiting, no hematemesis, no diarrhea or constipation.    Endocrine: Negative for cold intolerance, heat intolerance, polydipsia, polyphagia or polyuria. Denies any history of weight change or unintended weight " loss.    Genitourinary: Negative for hematuria.  No frequent urination or nocturia.      Musculoskeletal: Denies any history of arthritic symptoms or back problems .  No joint pain, joint stiffness, joint swelling, muscle pain, muscle weakness or neck pain.    Skin:  Denies any change in hair or nails, rashes, or skin lesions.     Allergic/Immunologic: Negative.  Negative for environmental allergies, food allergies or immunocompromised state.     Neurological:  Denies any history of recurrent headaches, strokes, TIA, or seizure disorder.     Hematological: Denies excessive bleeding, easy bruising, fatigue, lymphadenopathy or petechiae or any bleeding disorders.     Psychiatric/Behavioral: Denies any history of depression, substance abuse, or change in cognitive function. Denies any psychomotor reaction or tangential thought.  No depression, homicidal ideations or suicidal ideations.          Objective:     Objective:  Temp:  [98 °F (36.7 °C)-98.8 °F (37.1 °C)] 98 °F (36.7 °C)  Heart Rate:  [] 109  Resp:  [20-22] 20  BP: (156-174)/() 170/95      Body mass index is 33.63 kg/m².           Physical Exam:   General Appearance:    Alert, oriented, cooperative, in no acute distress.   Head:    Normocephalic, atraumatic, without obvious abnormality.   Eyes:           DEONDRE.  Lids and lashes normal, conjunctivae and sclerae normal, no icterus, no pallor.   Ears:    Ears appear intact with no abnormalities noted.   Throat:   Mucous membranes pink and moist.   Neck:   Supple, trachea midline, no carotid bruit, no organomegaly or JVD.   Lungs:     Clear to auscultation and percussion, respirations regular, even and unlabored. No wheezes, rales or rhonchi.    Heart:    Regular rhythm and normal rate, normal S1 and S2, no murmur, no gallop, no rub, no click.   Abdomen:     Soft, nontender, nondistended, no guarding, no rebound tenderness, normal bowel sounds in all four quadrants, no masses, liver and spleen  nonpalpable.   Genitalia:    Deferred.   Extremities:   Moves all extremities well, 2+ edema, no cyanosis, no  redness, no clubbing.   Pulses:   Pulses palpable and equal bilaterally.   Skin:   Moist and warm. No bleeding, bruising or rash.   Neurologic/Psychiatric:   Alert and oriented to person, place, and time.  Motor, power and tone in upper and lower extremities are grossly intact. No focal neurological deficits. Normal cognitive function. No psychomotor reaction or tangential thought. No depression, homicidal ideations and suicidal ideations.       Medication Review:   Current Facility-Administered Medications   Medication Dose Route Frequency Provider Last Rate Last Admin   • acetaminophen (TYLENOL) tablet 650 mg  650 mg Oral Q4H PRN Daniel Goncalves MD        Or   • acetaminophen (TYLENOL) 160 MG/5ML solution 650 mg  650 mg Oral Q4H PRN Daniel Goncalves MD        Or   • acetaminophen (TYLENOL) suppository 650 mg  650 mg Rectal Q4H PRN Daniel Goncalves MD       • albuterol sulfate HFA (PROVENTIL HFA;VENTOLIN HFA;PROAIR HFA) inhaler 2 puff  2 puff Inhalation Q4H PRN Daniel Goncalves MD       • aspirin EC tablet 81 mg  81 mg Oral Nightly Daniel Goncalves MD       • atorvastatin (LIPITOR) tablet 40 mg  40 mg Oral Daily Daniel Goncalves MD       • calcium carbonate (TUMS) chewable tablet 500 mg (200 mg elemental)  1 tablet Oral BID PRN Daniel Goncalves MD       • carvedilol (COREG) tablet 25 mg  25 mg Oral BID With Meals Daniel Goncalves MD   25 mg at 06/01/22 1856   • cefTRIAXone (ROCEPHIN) 2 g/100 mL 0.9% NS IVPB (MBP)  2 g Intravenous Q24H Daniel Goncalves MD       • dextrose (D50W) (25 g/50 mL) IV injection 25 g  25 g Intravenous Q15 Min PRN Daniel Goncalves MD       • dextrose (GLUTOSE) oral gel 15 g  15 g Oral Q15 Min PRN Daniel Goncalves MD       • Enoxaparin Sodium (LOVENOX) syringe 40 mg  40 mg Subcutaneous Q24H Daniel Goncalves MD       • [START ON 6/2/2022] furosemide (LASIX) tablet 40 mg  40 mg Oral Daily Daniel Goncalves MD        • glucagon (human recombinant) (GLUCAGEN DIAGNOSTIC) injection 1 mg  1 mg Intramuscular Q15 Min PRN Daniel Goncalves MD       • Insulin Aspart (novoLOG) injection 0-14 Units  0-14 Units Subcutaneous TID AC Daniel Goncalves MD       • insulin detemir (LEVEMIR) injection 20 Units  20 Units Subcutaneous Nightly Daniel Goncalves MD       • ipratropium-albuterol (DUO-NEB) nebulizer solution 3 mL  3 mL Nebulization 4x Daily Daniel Goncalves MD       • isosorbide mononitrate (IMDUR) 24 hr tablet 30 mg  30 mg Oral Daily Daniel Goncalves MD       • lisinopril (PRINIVIL,ZESTRIL) tablet 10 mg  10 mg Oral Daily Daniel Goncalves MD       • magnesium oxide (MAG-OX) tablet 400 mg  400 mg Oral Daily Daniel Goncalves MD       • nitroglycerin (NITROSTAT) SL tablet 0.4 mg  0.4 mg Sublingual Q5 Min PRN Daniel Goncalves MD       • ondansetron (ZOFRAN) injection 4 mg  4 mg Intravenous Q6H PRN Daniel Goncalves MD       • pantoprazole (PROTONIX) EC tablet 40 mg  40 mg Oral Daily Daniel Goncalves MD       • potassium chloride (MICRO-K) CR capsule 20 mEq  20 mEq Oral BID Daniel Goncalves MD       • sodium chloride 0.9 % flush 10 mL  10 mL Intravenous Q12H Daniel Goncalves MD       • sodium chloride 0.9 % flush 10 mL  10 mL Intravenous PRN Daniel Goncalves MD       • sodium chloride 0.9 % infusion  75 mL/hr Intravenous Continuous Daniel Goncalves MD       • sucralfate (CARAFATE) tablet 1 g  1 g Oral TID Daniel Goncalves MD           Lab Review:     Results from last 7 days   Lab Units 06/01/22  1600   SODIUM mmol/L 142   POTASSIUM mmol/L 3.9   CHLORIDE mmol/L 109*   CO2 mmol/L 16.0*   BUN mg/dL 21   CREATININE mg/dL 1.03*   CALCIUM mg/dL 10.5   BILIRUBIN mg/dL 1.4*   ALK PHOS U/L 106   ALT (SGPT) U/L 21   AST (SGOT) U/L 20   GLUCOSE mg/dL 200*     Results from last 7 days   Lab Units 06/01/22  1650 06/01/22  1600   CK TOTAL U/L  --  60   TROPONIN T ng/mL <0.010 <0.010         Results from last 7 days   Lab Units 06/01/22  1600   WBC 10*3/mm3 9.98   HEMOGLOBIN g/dL  11.7*   HEMATOCRIT % 35.9   PLATELETS 10*3/mm3 253         Results from last 7 days   Lab Units 06/01/22  1600   MAGNESIUM mg/dL 1.7                   EKG:   ECG/EMG Results (last 24 hours)     Procedure Component Value Units Date/Time    ECG 12 Lead [[id number]] Collected: 06/01/22 1524     Updated: 06/01/22 2106     QT Interval 372 ms      QTC Interval 498 ms     Narrative:      Test Reason : CP  Blood Pressure :   */*   mmHG  Vent. Rate : 108 BPM     Atrial Rate : 108 BPM     P-R Int : 134 ms          QRS Dur : 116 ms      QT Int : 372 ms       P-R-T Axes :  36 267  59 degrees     QTc Int : 498 ms    Electronic ventricular pacemaker  When compared with ECG of 08-MAY-2022 10:10,  Vent. rate has increased BY   5 BPM    Referred By:            Confirmed By: MARIO CORONA MD          ECHO:  Results for orders placed during the hospital encounter of 02/18/22    Adult Transthoracic Echo Complete W/ Cont if Necessary Per Protocol    Interpretation Summary  · There is mild, bileaflet mitral valve thickening present.  · The left ventricular cavity is mildly dilated.  · Mild to moderate mitral valve stenosis is present.  · The right atrial cavity is mildly dilated.  · Estimated right ventricular systolic pressure from tricuspid regurgitation is mildly elevated (35-45 mmHg).  · Left ventricular ejection fraction appears to be 36 - 40%.  · Left ventricular diastolic function was normal.  · The following left ventricular wall segments are hypokinetic: mid anterior, apical anterior, basal anterolateral, mid anterolateral, apical lateral, basal inferolateral, mid inferolateral, apical inferior, mid inferior, apical septal, basal inferoseptal, mid inferoseptal, apex hypokinetic, mid anteroseptal, basal anterior, basal inferior and basal inferoseptal.       Imaging:  Imaging Results (Last 24 Hours)     Procedure Component Value Units Date/Time    XR Chest 1 View [[id number]] Collected: 06/01/22 1605     Updated: 06/01/22 1712     Narrative:          COMPARISON:     5/8/2022    INDICATION:     Chest pain    FINDINGS:     Portable AP chest radiograph is obtained.  Cardiac  ACD, leads intact. Cardiomegaly. Mediastinum is normal width.  Prominence of the main pulmonary artery. Prominent central  vasculature. Lung volumes are within normal limits. There are  basilar opacities which may reflect atelectasis. No significant  effusion. No pneumothorax. Mild prominence of interstitium.      Impression:      1. Cardiomegaly. Central vascular congestion. Mild central  interstitial prominence which may reflect edema.      2. Lung basilar opacities may reflect atelectasis.    Electronically signed by:  Denton Rhoades MD  6/1/2022 5:09 PM CDT  Workstation: [id number]          I personally viewed and interpreted the patient's EKG/Telemetry data.    Assessment:       1.  Chest pain and shortness of breath.  2.  Coronary angiogram done in 2018 which did not reveal of any evidence of any obstructive epicardial coronary artery disease.  3.  Nonischemic cardiomyopathy.  4.  S/p biventricular [employer] AICD placement.  5.  Mitral and tricuspid regurgitation.  6.  Obesity.  7.  Diabetes.  8.  History of chronic kidney disease.      Plan:   1.  Shortness of breath with nonischemic cardiomyopathy.  Patient has nonischemic cardiomyopathy with left ventricular systolic dysfunction which had improved to 42%.  Patient has moderate mitral regurgitation and moderate tricuspid regurgitation.  Patient has had multiple evaluation for CHF decompensation.  Patient would be started on IV dobutamine at 8 mcg/kg/min. Patient would be continued on the present dose of the lisinopril 10 mg daily and carvedilol 25 mg twice a day.  Patient has been counseled to decrease her salt intake.  Patient may require albumin and Lasix if the patient renal function worsen.  Would follow the renal function closely     2.  S/p [employer] biventricular AICD placement done in June 2020.  Patient previous  AICD interrogation had revealed appropriate sensing and pacing function with adequate battery reserve.  Patient did not have any episodes of ventricular tachycardia or any delivery of shock.  Patient would undergo complete AICD interrogation.       3.  Arterial hypertension.  Patient blood pressure has remained stable.  Patient would be continued on the present dose of the antihypertensive medication.  Patient would be continued on the present dose of the lisinopril and the carvedilol.  Patient had denies any episodes of headache.     4.  Hypertensive heart disease with mitral and tricuspid regurgitation.  Patient would be continued on the present afterload reduction with the lisinopril and carvedilol and the present dose of the Lasix which would be transition to oral Lasix.     5. Atypical symptoms of chest pain.  Patient had undergone previous coronary angiogram in 2018 which had not reveal of any evidence of any obstructive epicardial coronary artery disease.  Patient at the present time would be treated medically and not subjected to any invasive evaluation.  Patient at the present time would not be subjected to any invasive evaluation for ischemic work-up as the patient has undergone 2 previous coronary angiogram which did not reveal of any evidence of any obstructive epicardial coronary artery disease.  If the patient continues to have symptoms of chest pain patient may require further invasive evaluation..     6.  Obesity.  Patient has been counseled on weight reduction lifestyle modification and dietary restriction.  Patient body mass index is 37.     7.  Non-insulin-dependent diabetes.  Patient has been counseled on [employer] diet.  Patient would be continued on the present dose of the Metformin.  Patient blood sugar has been followed by the primary team     8.  Gastroesophageal reflux disease.  Patient is on Protonix 40 mg daily.     9.  Chronic kidney disease with history of diabetes.   Patient renal function has remained stable.     10.  Anemia.  Patient's hemoglobin is 11.  Patient has not had any hemoptysis hematuria bright of blood per rectum.  Patient H&H would be followed     Thank you for the consultation.      Time: Time spent in face-to-face evaluation of greater than 55 minutes interacting, formulating, examining and discussing the plan with the patient with 50% of greater time spent in face-to-face interaction.    Electronically signed by Wang Felipe MD, 06/01/22, 9:29 PM CDT.    Dictated utilizing Dragon dictation.

## 2022-06-02 NOTE — PLAN OF CARE
Goal Outcome Evaluation:  Plan of Care Reviewed With: patient           Outcome Evaluation: OT eval on this date as co-eval with PT.  Pt was CGA for bed mobility, sit to stand, toilet transfer and LB dressing.  Pt ambulated in room ~ 30' with CGA and R/W.  Pt could benefit from OT services to increase safety and independence with ADLs and functional mobility.

## 2022-06-02 NOTE — CONSULTS
Adult Nutrition  Assessment    Patient Name:  Lexi Wade  YOB: 1950  MRN: [id number]  Admit Date:  6/1/2022    Assessment Date:  6/2/2022    Comments:  Pt admit d/t CHF. RD visit to offer Low Na diet ed, but pt w/recent admissions and has received education during those admits---denies need for review of Low Na diet at this time. Pt reports good appetite and denies any gi distress. Current weight 194#, slight weight loss reflective of fluid status. Will monitor med course and make recs accordingly.       Reason for Assessment     Row Name 06/02/22 0857          Reason for Assessment    Reason For Assessment per organizational policy     Diagnosis cardiac disease     Identified At Risk by Screening Criteria need for education                Nutrition/Diet History     Row Name 06/02/22 0858          Nutrition/Diet History    Typical Intake (Food/Fluid/EN/PN) Pt ate well at breakfast today. She reports good appetite. Denies need for review of Low Na diet ed (has gotten during recent previous admits).                  Labs/Tests/Procedures/Meds     Row Name 06/02/22 0859          Labs/Procedures/Meds    Lab Results Reviewed reviewed, pertinent     Lab Results Comments A1c 8.7, K 3.1, lipast 194            Medications    Pertinent Medications Reviewed reviewed, pertinent     Pertinent Medications Comments lasix, insulin                    Nutrition Prescription Ordered     Row Name 06/02/22 0900          Nutrition Prescription PO    Current PO Diet Regular     Fluid Consistency Thin     Common Modifiers Cardiac;Fluid Restriction     Fluid Restriction mL per Day Other (comment)  1200                       Electronically signed by:  Priyanka Cheney RD  06/02/22 09:02 CDT

## 2022-06-02 NOTE — PROGRESS NOTES
[facility] Medicine   INPATIENT PROGRESS NOTE      Patient Name: Lexi Wade  Date of Admission: 6/1/2022  Today's Date: 06/02/22  Length of Stay: 0  Primary Care Physician: Joyce Plata MD    Subjective   Chief Complaint: Weakness, chest discomfort  HPI   6/1  Really nice yet difficult historian type patient who presents with chest discomfort.  Patient is a 71-year-old -American female with a past medical history of hypertension, heart failure reduced ejection fraction, CAD, AICD, diabetes, GERD, struct of sleep apnea.  Patient last admitted to [facility] 5/8 till 5/11 for CHF exacerbation amelioration.  Patient has history of cardiac catheterization 8/2018 with nonobstructive CAD noted.  Cardiac catheterization done by Dr. Felipe at that time.  Patient presents complaining of chest discomfort.  Patient is difficult historian, but states chest discomfort happened sometime earlier today.  Patient describes chest discomfort as 6/10, tightness, slightly decreasing with time, lasting hours, radiating down left arm.  Admits to similar chest pain episode 3 to 4 months ago.  Denies fevers, chills, sick contacts, recent travel.    6/2   Patient somnolent but states she feels better than yesterday.  Denies fever/chills overnight.  Pt watching women's volleyball at time of evaluation by Dr. Goncalves.      Review of Systems   All pertinent negatives and positives are as above. All other systems have been reviewed and are negative unless otherwise stated.     Objective    Temp:  [98 °F (36.7 °C)-98.7 °F (37.1 °C)] 98.4 °F (36.9 °C)  Heart Rate:  [] 71  Resp:  [16-20] 16  BP: (102-174)/() 129/78  Physical Exam  Constitutional:       Appearance: She is obese.   HENT:      Head: Normocephalic and atraumatic.      Nose: Nose normal.      Mouth/Throat:      Mouth: Mucous membranes are moist.   Eyes:      Extraocular Movements:  Extraocular movements intact.      Conjunctiva/sclera: Conjunctivae normal.      Pupils: Pupils are equal, round, and reactive to light.   Cardiovascular:      Rate and Rhythm: Normal rate and regular rhythm.      Pulses: Normal pulses.      Heart sounds: Normal heart sounds. No murmur heard.  Pulmonary:      Effort: Pulmonary effort is normal.      Breath sounds: Normal breath sounds.   Abdominal:      General: Abdomen is flat. Bowel sounds are normal.      Palpations: Abdomen is soft.   Musculoskeletal:         General: Normal range of motion.      Cervical back: Normal range of motion and neck supple.      Right lower leg: Edema present.      Left lower leg: Edema present.   Skin:     General: Skin is warm.      Capillary Refill: Capillary refill takes 2 to 3 seconds.   Neurological:      General: No focal deficit present.      Mental Status: She is alert and oriented to person, place, and time. Mental status is at baseline.   Psychiatric:         Mood and Affect: Mood normal.         Behavior: Behavior normal.         Thought Content: Thought content normal.         Judgment: Judgment normal.           Results Review:  I have reviewed the labs, radiology results, and diagnostic studies.    Laboratory Data:   Results from last 7 days   Lab Units 06/02/22  0510 06/01/22  1600   WBC 10*3/mm3 9.89 9.98   HEMOGLOBIN g/dL 11.0* 11.7*   HEMATOCRIT % 33.0* 35.9   PLATELETS 10*3/mm3 215 253        Results from last 7 days   Lab Units 06/02/22  0510 06/01/22  1600   SODIUM mmol/L 141 142   POTASSIUM mmol/L 3.1* 3.9   CHLORIDE mmol/L 107 109*   CO2 mmol/L 21.0* 16.0*   BUN mg/dL 18 21   CREATININE mg/dL 0.97 1.03*   CALCIUM mg/dL 9.6 10.5   BILIRUBIN mg/dL  --  1.4*   ALK PHOS U/L  --  106   ALT (SGPT) U/L  --  21   AST (SGOT) U/L  --  20   GLUCOSE mg/dL 118* 200*       Culture Data:   Blood Culture   Date Value Ref Range Status   06/01/2022 No growth at 24 hours  Preliminary   06/01/2022 No growth at 24 hours  Preliminary      Urine Culture   Date Value Ref Range Status   06/01/2022 50,000 CFU/mL Mixed Malini Isolated  Final     No results found for: RESPCX  No results found for: WOUNDCX  No results found for: STOOLCX  No components found for: BODYFLD    Radiology Data:   Imaging Results (Last 24 Hours)     ** No results found for the last 24 hours. **          Scheduled Meds:aspirin, 81 mg, Oral, Nightly  atorvastatin, 40 mg, Oral, Daily  carvedilol, 25 mg, Oral, BID With Meals  cefTRIAXone, 2 g, Intravenous, Q24H  enoxaparin, 40 mg, Subcutaneous, Nightly  furosemide, 40 mg, Oral, Daily  Insulin Aspart, 0-14 Units, Subcutaneous, TID AC  insulin detemir, 20 Units, Subcutaneous, Nightly  ipratropium-albuterol, 3 mL, Nebulization, 4x Daily  isosorbide mononitrate, 30 mg, Oral, Daily  lisinopril, 10 mg, Oral, Daily  magnesium oxide, 400 mg, Oral, Daily  pantoprazole, 40 mg, Oral, Daily  potassium chloride, 20 mEq, Oral, BID  sodium chloride, 10 mL, Intravenous, Q12H  sucralfate, 1 g, Oral, TID      Continuous Infusions:DOBUTamine, 8 mcg/kg/min, Last Rate: 8 mcg/kg/min (06/02/22 1325)      PRN Meds:.•  acetaminophen **OR** acetaminophen **OR** acetaminophen  •  albuterol  •  calcium carbonate  •  dextrose  •  dextrose  •  glucagon (human recombinant)  •  nitroglycerin  •  ondansetron  •  sodium chloride    Assessment/Plan     Active Hospital Problems    Diagnosis    • Precordial pain    • Chest pain        Typical angina versus ACS:  - Reviewed records and noted nonobstructive CAD on cardiac catheterization 8/23/2018.  Will place patient on telemetry overnight, serial troponins, as needed nitro, as needed morphine, cardiogram, and consult cardiology.  -Patient discussed overnight with Dr. Pearce covering for Dr. Felipe.  Dr. Pearce will inform Dr. Felipe of cardiac consultation.    systolic CHF:   - 6/1 Urine shows signs of dehydration, so doubt patient suffering from CHF exacerbation.  Hold Lasix for 1 day.  Order echocardiogram during  hospitalization.  Consult cardiology for management advice.  -6/2 appreciated cardiology consult.  Patient on IV dobutamine drip.  Continue Lasix 40 mg daily.  Also continue Coreg, nitrate, and lisinopril therapy.  -6/2 starting tomorrow, will change Lasix from 40 mg p.o. daily to 40 mg IV daily to assure patient remains euvolemic.  Patient's home Lasix dosage typically 40 mg p.o. twice daily.  -6/2 PT/OT ordered during hospitalization.  PT/OT notes show great cooperation with therapy.  Patient also appears to have excellent outpatient support systems with sister visiting daily.    Resolved dehydration:  - 6/1 Ketones noted in urine.  Patient also has elevated BUN/creatinine ratio. No large anion gap so doubt patient in DKA.  Dehydration likely secondary to Lasix usage in setting of urinary tract infection.  Cautious fluid management throughout hospitalization.  With 75 cc/h x 7 hours overnight.  -6/2 discontinue IV fluids.  Closely for signs of dehydration.    Urinary tract infection:  -UA shows signs of urinary tract infection.  Will await final culture results and start empirical Rocephin.  - 6/1 Urine culture shows 50,000 colony-forming units of mixed carlitos.  Patient initial confusion at time of admission, will continue empirical Rocephin therapy for now.  Patient's mentation appears to be improving on Rocephin therapy.     Diabetes: sliding scale insulin, ACHS Accu-Cheks, diabetic diet, Levemir        Hypertension: Continue home medications plus as needed IV labetalol/hydralazine for elevated blood pressure spikes     FEN cardiac,diabetic  PPX lovenox SQ     Code Status/Advanced Care Plan: Full     The patient's surrogate decision maker is pt's family.      I discussed my findings and recommendations with the patient.     Estimated length of stay is 1-4 days.        Electronically signed by Daniel Goncalves MD, 06/02/22, 18:00 CDT.

## 2022-06-02 NOTE — PLAN OF CARE
Goal Outcome Evaluation:  Plan of Care Reviewed With: patient           Outcome Evaluation: Pt known to RD from previous admits and has received Low Na diet ed, denies need for review. RD attached info to d/c. Will monitor.

## 2022-06-02 NOTE — PROGRESS NOTES
Cardiology Progress Note     LOS: 0 days   Patient Care Team:  Joyce Plata MD as PCP - General  Yamilka Wilson, RN as Ambulatory  ([facility])    Subjective:   Chart reviewed. Patient seen and examined. Patient has continued to have symptoms of chest pain which is atypical.  Patient troponin is negative.  Patient had undergone previous coronary angiogram which had not reveal of any evidence of any obstructive epicardial coronary artery disease.  Patient has ongoing symptoms of chest pain with recurrent hospitalization.  Patient was hospitalized in November 2021 in February 2022 May 2022 and repeat hospitalization in June 2022.  Due to the patient recurrent hospitalization with chest pain patient would be continued on IV dobutamine and the plan would be to proceed with a coronary angiogram in the morning.  Risk-benefit treatment option for the coronary angiogram were discussed with the patient and an informed consent was obtained.      Objective:  Temp:  [98 °F (36.7 °C)-98.7 °F (37.1 °C)] 98.4 °F (36.9 °C)  Heart Rate:  [] 71  Resp:  [16-20] 16  BP: (102-173)/(55-97) 129/78    Intake/Output Summary (Last 24 hours) at 6/2/2022 1830  Last data filed at 6/2/2022 1817  Gross per 24 hour   Intake 1000 ml   Output 1650 ml   Net -650 ml       Physical Exam:   General Appearance:    Alert, oriented, cooperative, in no acute distress.   Head:    Normocephalic, atraumatic, without obvious abnormality   Eyes:             DEONDRE. Lids and lashes normal, conjunctivae and sclerae normal, no icterus, no pallor.   Ears:    Ears appear intact with no abnormalities noted.   Throat:   Mucous membranes pink and moist.   Neck:  Supple, trachea midline, no carotid bruit, no organomegaly or JVD.   Lungs:    Clear to auscultation and percussion.  Respirations regular, even and unlabored. No wheezes, rales, or rhonchi.    Heart:    Regular rhythm and normal rate, normal S1 and S2, no      murmur, no gallop, no  rub, no click.   Abdomen:    Soft, nontender, nondistended, no guarding, no rebound tenderness. Normal bowel sounds in all four quadrants, no masses, liver and spleen nonpalpable.    Genitalia:    Deferred.   Extremities:   Moves all extremities well, no edema, no cyanosis, no       redness, no clubbing.   Pulses:   Pulses palpable and equal bilaterally.   Skin:   Moist and warm. No bleeding, bruising or rash.   Neurologic/Psychiatric:   Alert and oriented to person, place, and time.  Motor, power and tone in upper and lower extremities are grossly intact.  No focal neurological deficits. Normal cognitive function. No psychomotor reaction or tangential thought. No depression, homicidal ideations and suicidal ideations.          Results Review:    Results from last 7 days   Lab Units 06/02/22  0510 06/01/22  1600   SODIUM mmol/L 141 142   POTASSIUM mmol/L 3.1* 3.9   CHLORIDE mmol/L 107 109*   CO2 mmol/L 21.0* 16.0*   BUN mg/dL 18 21   CREATININE mg/dL 0.97 1.03*   CALCIUM mg/dL 9.6 10.5   BILIRUBIN mg/dL  --  1.4*   ALK PHOS U/L  --  106   ALT (SGPT) U/L  --  21   AST (SGOT) U/L  --  20   GLUCOSE mg/dL 118* 200*     Results from last 7 days   Lab Units 06/02/22  0510 06/01/22  2345 06/01/22  1650 06/01/22  1600   CK TOTAL U/L  --   --   --  60   TROPONIN T ng/mL <0.010 <0.010 <0.010 <0.010         Results from last 7 days   Lab Units 06/02/22  0510   WBC 10*3/mm3 9.89   HEMOGLOBIN g/dL 11.0*   HEMATOCRIT % 33.0*   PLATELETS 10*3/mm3 215         Results from last 7 days   Lab Units 06/02/22  0510   MAGNESIUM mg/dL 1.6                   ECHO:  Results for orders placed during the hospital encounter of 02/18/22    Adult Transthoracic Echo Complete W/ Cont if Necessary Per Protocol    Interpretation Summary  · There is mild, bileaflet mitral valve thickening present.  · The left ventricular cavity is mildly dilated.  · Mild to moderate mitral valve stenosis is present.  · The right atrial cavity is mildly dilated.  ·  Estimated right ventricular systolic pressure from tricuspid regurgitation is mildly elevated (35-45 mmHg).  · Left ventricular ejection fraction appears to be 36 - 40%.  · Left ventricular diastolic function was normal.  · The following left ventricular wall segments are hypokinetic: mid anterior, apical anterior, basal anterolateral, mid anterolateral, apical lateral, basal inferolateral, mid inferolateral, apical inferior, mid inferior, apical septal, basal inferoseptal, mid inferoseptal, apex hypokinetic, mid anteroseptal, basal anterior, basal inferior and basal inferoseptal.      ECG 12 Lead   ED Interpretation   Jose R Hill MD     6/1/2022  5:28 PM   ECG 12 Lead         Date/Time: 6/1/2022 5:27 PM   Performed by: Jose R Hill MD   Authorized by: Jose R Hill MD    Interpreted by physician   Rhythm: paced   BPM: 108   ST Segments: ST segments normal            Final Result   Test Reason : CP   Blood Pressure :   */*   mmHG   Vent. Rate : 108 BPM     Atrial Rate : 108 BPM      P-R Int : 134 ms          QRS Dur : 116 ms       QT Int : 372 ms       P-R-T Axes :  36 267  59 degrees      QTc Int : 498 ms      Electronic ventricular pacemaker   When compared with ECG of 08-MAY-2022 10:10,   Vent. rate has increased BY   5 BPM      Referred By:            Confirmed By: MARIO CORONA MD      SCANNED EKG   Final Result              Medication Review:   Current Facility-Administered Medications   Medication Dose Route Frequency Provider Last Rate Last Admin   • acetaminophen (TYLENOL) tablet 650 mg  650 mg Oral Q4H PRN Daniel Goncalves MD        Or   • acetaminophen (TYLENOL) 160 MG/5ML solution 650 mg  650 mg Oral Q4H PRN Daniel Goncalves MD        Or   • acetaminophen (TYLENOL) suppository 650 mg  650 mg Rectal Q4H PRN Daniel Goncalves MD       • albuterol (PROVENTIL) nebulizer solution 0.083% 2.5 mg/3mL  2.5 mg Nebulization Q4H PRN Daniel Goncalves MD       • aspirin EC tablet 81 mg  81 mg Oral  Nightly Daniel Goncalves MD   81 mg at 06/01/22 2241   • atorvastatin (LIPITOR) tablet 40 mg  40 mg Oral Daily Daniel Goncalves MD   40 mg at 06/01/22 2242   • calcium carbonate (TUMS) chewable tablet 500 mg (200 mg elemental)  1 tablet Oral BID PRN Daniel Goncalves MD       • carvedilol (COREG) tablet 25 mg  25 mg Oral BID With Meals Daniel Goncalves MD   25 mg at 06/02/22 1724   • cefTRIAXone (ROCEPHIN) 2 g/100 mL 0.9% NS IVPB (MBP)  2 g Intravenous Q24H Daniel Goncalves MD   2 g at 06/01/22 2242   • dextrose (D50W) (25 g/50 mL) IV injection 25 g  25 g Intravenous Q15 Min PRN Daniel Goncalves MD       • dextrose (GLUTOSE) oral gel 15 g  15 g Oral Q15 Min PRN Daniel Goncalves MD       • DOBUTamine 500 mg in sodium chloride 0.9 % 250 mL infusion  8 mcg/kg/min Intravenous Continuous Wang Felipe MD 21.3 mL/hr at 06/02/22 1325 8 mcg/kg/min at 06/02/22 1325   • Enoxaparin Sodium (LOVENOX) syringe 40 mg  40 mg Subcutaneous Nightly Daniel Goncalves MD   40 mg at 06/01/22 2242   • [START ON 6/3/2022] furosemide (LASIX) injection 40 mg  40 mg Intravenous Daily Daniel Goncalves MD       • glucagon (human recombinant) (GLUCAGEN DIAGNOSTIC) injection 1 mg  1 mg Intramuscular Q15 Min PRN Daniel Goncalves MD       • Insulin Aspart (novoLOG) injection 0-14 Units  0-14 Units Subcutaneous TID AC Daniel Goncalves MD   3 Units at 06/02/22 1656   • insulin detemir (LEVEMIR) injection 20 Units  20 Units Subcutaneous Nightly Daniel Goncalves MD   20 Units at 06/01/22 2242   • ipratropium-albuterol (DUO-NEB) nebulizer solution 3 mL  3 mL Nebulization 4x Daily Daniel Goncalves MD   3 mL at 06/02/22 1436   • isosorbide mononitrate (IMDUR) 24 hr tablet 30 mg  30 mg Oral Daily Daniel Goncalves MD   30 mg at 06/01/22 2242   • lisinopril (PRINIVIL,ZESTRIL) tablet 10 mg  10 mg Oral Daily Daniel Goncalves MD   10 mg at 06/01/22 2242   • magnesium oxide (MAG-OX) tablet 400 mg  400 mg Oral Daily Daniel Goncalves MD   400 mg at 06/01/22 2242   • magnesium sulfate 2g/50 mL  (PREMIX) infusion  2 g Intravenous Once Daniel Goncalves MD       • nitroglycerin (NITROSTAT) SL tablet 0.4 mg  0.4 mg Sublingual Q5 Min PRN Daniel Goncalves MD       • ondansetron (ZOFRAN) injection 4 mg  4 mg Intravenous Q6H PRN Daniel Goncalves MD       • pantoprazole (PROTONIX) EC tablet 40 mg  40 mg Oral Daily Daniel Goncalves MD   40 mg at 06/02/22 0834   • potassium chloride (KLOR-CON) packet 40 mEq  40 mEq Oral BID Daniel Goncalves MD       • potassium chloride (MICRO-K) CR capsule 20 mEq  20 mEq Oral BID Daniel Goncalves MD   20 mEq at 06/02/22 0834   • sodium chloride 0.9 % flush 10 mL  10 mL Intravenous Q12H Daniel Goncalves MD   10 mL at 06/02/22 0835   • sodium chloride 0.9 % flush 10 mL  10 mL Intravenous PRN Daniel Goncalves MD       • sucralfate (CARAFATE) tablet 1 g  1 g Oral TID Daniel Goncalves MD   1 g at 06/02/22 1657       Assessment and Plan:      Chest pain    Precordial pain  1.  Chest pain.  Patient has symptoms of chest pain which is atypical.  Patient has nonischemic cardiomyopathy with left ventricular systolic dysfunction.  Patient last coronary angiogram had revealed distal left main stenosis.  Patient at the present time has been recommended coronary angiogram due to patient recurrent hospitalization.  Risk-benefit treatment option for the coronary angiogram were discussed with the patient and an informed consent was obtained.  Patient Mallampati score #2 patient ASA classification 1 #2.  Risk for minimal sedation was also discussed with the patient.  Plan was to proceed with a coronary angiogram tomorrow.    2.  Nonischemic cardiomyopathy with left ventricular systolic dysfunction s/p [place] biventricular AICD.  Patient would undergo AICD interrogation.  Patient would be continued on IV dobutamine, Coreg and lisinopril.  Patient will be continued with gentle diuresis.    3.  S/p biventricular [place] AICD implanted in June 2020.  Patient would undergo complete AICD interrogation.  Patient had  not complain of having any delivery of shock.    4.  Arterial hypertension.  Patient blood pressure has been labile but well controlled.  Patient would be continued on the present dose of the lisinopril and the carvedilol.    5.  Obesity with a body mass index of 34.  Patient has been counseled on weight reduction lifestyle modification dietary restriction.  Patient has been explained the risk associated with obesity and the occurrence and progression of atherosclerotic coronary artery disease and peripheral vascular disease.    The above plan of management were discussed with the patient            Electronically signed by Wang Felipe MD, 06/02/22, 6:30 PM CDT.      Time: Time spent on face-to-face interaction 20 minutes      Dictated utilizing Dragon dictation.

## 2022-06-02 NOTE — PROGRESS NOTES
Discharge Planning Assessment  [facility]     Patient Name: Lexi Wade  MRN: [id number]  Today's Date: 6/2/2022    Admit Date: 6/1/2022     Discharge Needs Assessment     Row Name 06/02/22 1218       Living Environment    People in Home alone    Current Living Arrangements apartment    Primary Care Provided by self    Provides Primary Care For no one    Quality of Family Relationships other (see comments)  has a sister listed as emergency contact, however states that 4 of them live close to her.    Able to Return to Prior Arrangements yes       Resource/Environmental Concerns    Resource/Environmental Concerns none    Transportation Concerns no car;public transportation, does not know how to access       Transition Planning    Patient/Family Anticipates Transition to home    Patient/Family Anticipated Services at Transition none    Transportation Anticipated family or friend will provide;public transportation       Discharge Needs Assessment    Equipment Currently Used at Home glucometer;walker, rolling;nebulizer    Concerns to be Addressed denies needs/concerns at this time    Anticipated Changes Related to Illness none    Equipment Needed After Discharge none    Outpatient/Agency/Support Group Needs clinic(s)  [facility]    Current Discharge Risk lives alone    Discharge Coordination/Progress lives alone, however, indicates that 4 family members live close to her.   no issues with rx, however does not have a car and access to transportation is limitied.   does not woen a car, nor is one listed for her address.  explained that she would qualify for pacs.  will place number on avs sheet.   made aware that she would need to call at least 3 days prior to get her name on the list for pickup.   verb understanding.               Discharge Plan     Row Name 06/02/22 1022       Plan    Plan Comments lace attempted x 2   once in bathroom and now out of room  norman santoyo rn              Continued Care and Services  - Admitted Since 6/1/2022    Coordination has not been started for this encounter.     Selected Continued Care - Episodes Includes selections from active Coordinated Care Management episodes    High Risk Care Management Episode start date: 4/15/2022 (Paused)   There are no active outsourced providers for this episode.             Selected Continued Care - Prior Encounters Includes selections from prior encounters from 3/3/2022 to 6/2/2022    Discharged on 5/11/2022 Admission date: 5/8/2022 - Discharge disposition: Home-Health Care [facility] [place]    Home Medical Care     Service Provider Selected Services Address Phone Fax Patient Preferred    [facility]  Home Health Services [address] [facility] [place] [address] [phone] --                Discharged on 4/5/2022 Admission date: 4/2/2022 - Discharge disposition: Home or Self Care    Durable Medical Equipment     Service Provider Selected Services Address Phone Fax Patient Preferred    [facility]  Durable Medical Equipment [address] [facility] [id number] [phone] --                    Expected Discharge Date and Time     Expected Discharge Date Expected Discharge Time    Jun 2, 2022          Demographic Summary     Row Name 06/02/22 1213       General Information    Admission Type observation    Arrived From home    Required Notices Provided Observation Status Notice    Referral Source high risk screening  lace 10    Reason for Consult discharge planning    Preferred Language English    General Information Comments confirmed face sheet and pharmacy               Functional Status    No documentation.                Psychosocial    No documentation.                Abuse/Neglect    No documentation.                Legal    No documentation.                Substance Abuse    No documentation.                Patient Forms    No documentation.                   Vicky Mendoza RN

## 2022-06-02 NOTE — PLAN OF CARE
Goal Outcome Evaluation:  Plan of Care Reviewed With: patient           Outcome Evaluation: Initial PT evaluation complete, co-evaluation with OT.  Patient is alert, cooperative, confused at times. She requires CGA with bed mobility, transfers and gait, ambulating 25'x1 with FWW, cues for upright posture and to remain closer to walker.  Patient has a FWW at home and reports her sisters check up on her daily.  Goals established, continue skilled I/P PT.

## 2022-06-02 NOTE — PLAN OF CARE
Problem: Adult Inpatient Plan of Care  Goal: Plan of Care Review  Outcome: Ongoing, Progressing  Flowsheets (Taken 6/2/2022 0321)  Progress: no change  Plan of Care Reviewed With: patient  Outcome Evaluation: new admit   Goal Outcome Evaluation:  Plan of Care Reviewed With: patient        Progress: no change  Outcome Evaluation: new admit

## 2022-06-03 NOTE — PROGRESS NOTES
[facility] Medicine Services  INPATIENT PROGRESS NOTE    Length of Stay: 1  Date of Admission: 6/1/2022  Primary Care Physician: Joyce Plata MD    Subjective   Chief Complaint: Chest pain  HPI:    Admitted for chest pain although feeling a little better now.    Review of Systems   Respiratory: Negative for shortness of breath.    Cardiovascular: Positive for chest pain.        All pertinent negatives and positives are as above. All other systems have been reviewed and are negative unless otherwise stated.     Objective    Temp:  [97 °F (36.1 °C)-98.4 °F (36.9 °C)] 97 °F (36.1 °C)  Heart Rate:  [71-92] 73  Resp:  [15-18] 18  BP: (102-136)/(55-79) 108/67  Physical Exam  Vitals and nursing note reviewed.   Constitutional:       General: She is not in acute distress.     Appearance: She is well-developed. She is not diaphoretic.   HENT:      Head: Normocephalic and atraumatic.   Cardiovascular:      Rate and Rhythm: Normal rate.   Pulmonary:      Effort: Pulmonary effort is normal. No respiratory distress.      Breath sounds: No wheezing.   Abdominal:      General: There is no distension.      Palpations: Abdomen is soft.   Musculoskeletal:         General: Normal range of motion.   Skin:     General: Skin is warm and dry.   Neurological:      Mental Status: She is alert.      Cranial Nerves: No cranial nerve deficit.   Psychiatric:         Behavior: Behavior normal.         Thought Content: Thought content normal.         Judgment: Judgment normal.             Results Review:  I have reviewed the labs, radiology results, and diagnostic studies.    Laboratory Data:   Lab Results (last 24 hours)     Procedure Component Value Units Date/Time    POC Glucose Once [[id number]]  (Abnormal) Collected: 06/03/22 1022    Specimen: Blood Updated: 06/03/22 1038     Glucose 183 mg/dL      Comment: RN NotifiedOperator: [id number] [facility] ID: [id number]       POC Glucose  Once [[id number]]  (Normal) Collected: 06/03/22 0631    Specimen: Blood Updated: 06/03/22 0645     Glucose 93 mg/dL      Comment: : [id number] OSVALDO Harp ID: [id number]       Basic Metabolic Panel [[id number]]  (Abnormal) Collected: 06/03/22 0535    Specimen: Blood Updated: 06/03/22 0635     Glucose 96 mg/dL      BUN 15 mg/dL      Creatinine 1.02 mg/dL      Sodium 137 mmol/L      Potassium 3.6 mmol/L      Chloride 105 mmol/L      CO2 21.0 mmol/L      Calcium 9.1 mg/dL      BUN/Creatinine Ratio 14.7     Anion Gap 11.0 mmol/L      eGFR 58.2 mL/min/1.73      Comment: National Kidney Foundation and American Society of Nephrology (ASN) Task Force recommended calculation based on the Chronic Kidney Disease Epidemiology Collaboration (CKD-EPI) equation refit without adjustment for race.       Narrative:      GFR Normal >60  Chronic Kidney Disease <60  Kidney Failure <15      Magnesium [[id number]]  (Normal) Collected: 06/03/22 0535    Specimen: Blood Updated: 06/03/22 0635     Magnesium 1.9 mg/dL     CBC & Differential [[id number]]  (Abnormal) Collected: 06/03/22 0535    Specimen: Blood Updated: 06/03/22 0604    Narrative:      The following orders were created for panel order CBC & Differential.  Procedure                               Abnormality         Status                     ---------                               -----------         ------                     CBC Auto Differential[[id number]]        Abnormal            Final result                 Please view results for these tests on the individual orders.    CBC Auto Differential [[id number]]  (Abnormal) Collected: 06/03/22 0535    Specimen: Blood Updated: 06/03/22 0604     WBC 9.67 10*3/mm3      RBC 3.77 10*6/mm3      Hemoglobin 10.5 g/dL      Hematocrit 32.2 %      MCV 85.4 fL      MCH 27.9 pg      MCHC 32.6 g/dL      RDW 16.0 %      RDW-SD 49.6 fl      MPV 10.2 fL      Platelets 221 10*3/mm3      Neutrophil % 53.1 %      Lymphocyte % 31.1 %       Monocyte % 11.9 %      Eosinophil % 2.9 %      Basophil % 0.5 %      Immature Grans % 0.5 %      Neutrophils, Absolute 5.13 10*3/mm3      Lymphocytes, Absolute 3.01 10*3/mm3      Monocytes, Absolute 1.15 10*3/mm3      Eosinophils, Absolute 0.28 10*3/mm3      Basophils, Absolute 0.05 10*3/mm3      Immature Grans, Absolute 0.05 10*3/mm3      nRBC 0.0 /100 WBC     POC Glucose Once [[id number]]  (Abnormal) Collected: 06/02/22 1914    Specimen: Blood Updated: 06/03/22 0100     Glucose 141 mg/dL      Comment: RN NotifiedOperator: [id number] TRUPTI EMMANUELMeter ID: [id number]       Blood Culture - Blood, Hand, Right [[id number]]  (Normal) Collected: 06/01/22 1650    Specimen: Blood from Hand, Right Updated: 06/02/22 1718     Blood Culture No growth at 24 hours    Blood Culture - Blood, Arm, Right [[id number]]  (Normal) Collected: 06/01/22 1644    Specimen: Blood from Arm, Right Updated: 06/02/22 1647     Blood Culture No growth at 24 hours    POC Glucose Once [[id number]]  (Abnormal) Collected: 06/02/22 1604    Specimen: Blood Updated: 06/02/22 1628     Glucose 194 mg/dL      Comment: RN NotifiedOperator: [id number] SOBEIDA LEIVAISMeter ID: [id number]             Culture Data:   Blood Culture   Date Value Ref Range Status   06/01/2022 No growth at 24 hours  Preliminary   06/01/2022 No growth at 24 hours  Preliminary     Urine Culture   Date Value Ref Range Status   06/01/2022 50,000 CFU/mL Mixed Malini Isolated  Final     No results found for: RESPCX  No results found for: WOUNDCX  No results found for: STOOLCX  No components found for: BODYFLD    Radiology Data:   Imaging Results (Last 24 Hours)     ** No results found for the last 24 hours. **          I have reviewed the patient's current medications.     Assessment/Plan     Active Hospital Problems    Diagnosis    • Precordial pain    • Chest pain      Chest pain-Dr. Felipe is managing, planning on cardiac catheterization, we will continue to provide supportive  care    CHF-systolic dysfunction-continue with current medications, will monitor    Zvpvdrzeoma-cemzlrnt-hbmuelkh to monitor    UTI- continue with antibiotic    Diabetes mellitus-sliding scale insulin    Hypertension-continue with medication and monitoring    DVT prophylaxis-SCD    I confirmed that the patient's Advance Care Plan is present, code status is documented, or surrogate decision maker is listed in the patient's medical record.       Michael Murcia MD   06/03/22   11:52 CDT

## 2022-06-03 NOTE — SIGNIFICANT NOTE
06/03/22 1519   OTHER   Discipline occupational therapy assistant   Rehab Time/Intention   Session Not Performed patient unavailable for treatment  (Pt went to cath lab. OT will check back tomorrow.)

## 2022-06-03 NOTE — PROGRESS NOTES
Patient underwent coronary angiogram.  Patient right coronary artery did not show of any evidence of any obstructive epicardial coronary artery disease.  Patient had minimal plaquing in the distal left main and ostial circumflex artery.  The ostial circumflex artery in some view appears to be at least 50% stenosis.  The left anterior descending artery had minimal plaquing with evidence of good LESLIE-3 flow.    Recommendation: Patient was recommended medical management for the coronary artery disease.    The arterial sheath was pulled and closed with an Angio-Seal closure device and patient was transferred to the floor in stable condition.    Patient AICD interrogation revealed appropriate AICD function with adequate battery reserve.

## 2022-06-03 NOTE — SIGNIFICANT NOTE
06/03/22 1435   OTHER   Discipline physical therapy assistant   Rehab Time/Intention   Session Not Performed other (see comments)  (pt gone for heart cath)

## 2022-06-03 NOTE — PROGRESS NOTES
Patient has had recurrent hospitalization with symptoms of chest pain.  Patient at the present time has been recommended coronary angiogram.  Patient last coronary angiogram had revealed some distal left main plaquing.  Due to the patient recurrent hospitalization with left ventricular systolic dysfunction patient at the present time has been recommended coronary angiogram.  Risk-benefit treatment option for the coronary angiogram were discussed with the patient and an informed consent was obtained.  Patient Mallampati score #2 patient ASA classification 1 #2.  Risk for minimal sedation was also discussed with the patient.

## 2022-06-03 NOTE — PLAN OF CARE
Problem: Adult Inpatient Plan of Care  Goal: Plan of Care Review  Outcome: Ongoing, Progressing  Flowsheets  Taken 6/3/2022 0559 by Melinda Busch RN  Outcome Evaluation: pt rested well during the night. no s/s of distress.  Taken 6/2/2022 7417 by Georgette Paredes, RN  Progress: improving  Plan of Care Reviewed With: patient   Goal Outcome Evaluation:  Plan of Care Reviewed With: patient        Progress: no change  Outcome Evaluation: pt rested well during the night. no s/s of distress.

## 2022-06-04 NOTE — THERAPY TREATMENT NOTE
Acute Care - Physical Therapy Treatment Note  [facility]     Patient Name: Lexi Wade  : 1950  MRN: [id number]  Today's Date: 2022      Visit Dx:     ICD-10-CM ICD-9-CM   1. Precordial pain  R07.2 786.51   2. Acute on chronic congestive heart failure, unspecified heart failure type (HCC)  I50.9 428.0   3. Impaired mobility and activities of daily living  Z74.09 V49.89    Z78.9    4. Impaired functional mobility, balance, gait, and endurance  Z74.09 V49.89   5. Chest pain, unspecified type  R07.9 786.50   6. NICM (nonischemic cardiomyopathy) ([facility])  I42.8 425.4   7. Chronic systolic heart failure ([facility])  I50.22 428.22   8. Other chest pain  R07.89 786.59     Patient Active Problem List   Diagnosis   • Pseudophakia   • Primary open angle glaucoma   • Nonischemic cardiomyopathy (HCC)   • Congestive heart failure (HCC)   • Hypertension   • GERD (gastroesophageal reflux disease)   • Diabetes mellitus ([facility])   • Vitamin D deficiency   • Borderline glaucoma   • Neurologic disorder associated with diabetes mellitus ([facility])   • Mixed hyperlipidemia   • Menopausal syndrome   • Dyspnea   • Chest pain   • Morbid obesity ([facility])   • Precordial pain   • Acute respiratory failure with hypoxia ([facility])   • Coronary artery disease involving native heart with angina pectoris ([facility])   • Implantable cardioverter-defibrillator (ICD) generator end of life   • Multifocal pneumonia   • Acute on chronic systolic CHF (congestive heart failure) ([facility])   • Obesity (BMI 30-39.9)   • Shortness of breath   • Altered mental status   • Morbid obesity (HCC)   • Drowsiness   • Acute on chronic systolic congestive heart failure (HCC)     Past Medical History:   Diagnosis Date   • Chronic systolic heart failure ([facility])    • Diabetes mellitus ([facility])    • Diabetic neuropathy ([facility])    • GERD (gastroesophageal reflux disease)    • Hypercholesterolemia    • Hypertension    • Low back pain    • Morbid obesity ([facility])    • Nonischemic cardiomyopathy  ([facility])    • Osteoarthritis    • Sleep apnea    • Vitamin D deficiency      Past Surgical History:   Procedure Laterality Date   • CARDIAC CATHETERIZATION N/A 2018   • CARDIAC ELECTROPHYSIOLOGY PROCEDURE N/A 2020   • CARDIAC PACEMAKER PLACEMENT     • CATARACT EXTRACTION     • COLONOSCOPY N/A 2017   • PACEMAKER IMPLANTATION     • TOTAL ABDOMINAL HYSTERECTOMY WITH SALPINGO OOPHORECTOMY       PT Assessment (last 12 hours)     PT Evaluation and Treatment     [facility] Name 22 1044          Physical Therapy Time and Intention    Subjective Information no complaints  -     Document Type therapy note (daily note)  -RACHEL     Mode of Treatment physical therapy;individual therapy  -     Patient Effort adequate  -RACHEL     Comment Pt. with episode of bladder incontinence while transfer to bathroom this a.m. nsg. assisted with cleaning pt. donned new pair of socks independently  -[facility] Name 22 1044          General Information    Patient Profile Reviewed yes  -RACHEL     Existing Precautions/Restrictions fall  -[facility] Name 22 1044          Pain    Pretreatment Pain Rating 0/10 - no pain  -RACHEL     Posttreatment Pain Rating 0/10 - no pain  -[facility] Name 22 1044          Cognition    Orientation Status (Cognition) oriented to;person;place    -[facility] Name 22 1044          Range of Motion Comprehensive    General Range of Motion bilateral lower extremity ROM WFL  -[facility] Name 22 1044          Strength Comprehensive (MMT)    General Manual Muscle Testing (MMT) Assessment other (see comments)  -[facility] Name 22 1044          Bed Mobility    Bed Mobility supine-sit;sit-supine  -     Supine-Sit [place] (Bed Mobility) standby assist  -     Sit-Supine [place] (Bed Mobility) standby assist  -[facility] Name 22 1044          Transfers    Transfers sit-stand transfer;stand-sit transfer;toilet transfer  -     Sit-Stand [place] (Transfers) contact  guard  -     Stand-Sit [place] (Transfers) contact guard  -     [place] Level (Toilet Transfer) contact guard;minimum assist (75% patient effort)  -     Assistive Device (Toilet Transfer) walker, front-wheeled  -     Row Name 06/04/22 1044          Sit-Stand Transfer    Assistive Device (Sit-Stand Transfers) walker, front-wheeled  -     Row Name 06/04/22 1044          Stand-Sit Transfer    Assistive Device (Stand-Sit Transfers) walker, front-wheeled  -     Row Name 06/04/22 1044          Toilet Transfer    Type (Toilet Transfer) sit-stand;stand-sit  -     Row Name 06/04/22 1044          Gait/Stairs (Locomotion)    [place] Level (Gait) contact guard  -     Assistive Device (Gait) walker, front-wheeled  -     Distance in Feet (Gait) 115  -     Pattern (Gait) step-through  -     Row Name 06/04/22 1044          Safety Issues, Functional Mobility    Impairments Affecting Function (Mobility) strength;endurance/activity tolerance;balance  -[facility] Name 06/04/22 1044          Vital Signs    Pre Systolic BP Rehab 129  -     Pre Treatment Diastolic BP 68  -     Pretreatment Heart Rate (beats/min) 84  -     Pre SpO2 (%) 100  -     O2 Delivery Pre Treatment room air  -     Pre Patient Position Supine  -     Intra Patient Position Standing  -     Post Patient Position Supine  -[facility] Name 06/04/22 1044          Positioning and Restraints    Pre-Treatment Position in bed  -     Post Treatment Position bed  -     In Bed supine;call light within reach;encouraged to call for assist  -[facility] Name 06/04/22 1044          Therapy Assessment/Plan (PT)    Criteria for Skilled Interventions Met (PT) yes;skilled treatment is necessary  -     Therapy Frequency (PT) other (see comments)  3-7 days/week  -     Row Name 06/04/22 1044          Progress Summary (PT)    Progress Toward Functional Goals (PT) progress toward functional goals as expected  -[facility] Name 06/04/22  1044          Bed Mobility Goal 1 (PT)    Activity/Assistive Device (Bed Mobility Goal 1, PT) sit to supine/supine to sit  -JA     [place] Level/Cues Needed (Bed Mobility Goal 1, PT) independent  -JA     Time Frame (Bed Mobility Goal 1, PT) by discharge  -JA     Strategies/Barriers (Bed Mobility Goal 1, PT) HOB flat, no bed rails.  -JA     Progress/Outcomes (Bed Mobility Goal 1, PT) goal not met  -     Row Name 06/04/22 1044          Transfer Goal 1 (PT)    Activity/Assistive Device (Transfer Goal 1, PT) sit-to-stand/stand-to-sit;bed-to-chair/chair-to-bed;walker, rolling  -JA     [place] Level/Cues Needed (Transfer Goal 1, PT) modified independence  -JA     Time Frame (Transfer Goal 1, PT) by discharge  -JA     Strategies/Barriers (Transfers Goal 1, PT) Tends to lean posteriorly upon initial standing.  -JA     Progress/Outcome (Transfer Goal 1, PT) goal not met  -     Row Name 06/04/22 1044          Gait Training Goal 1 (PT)    Activity/Assistive Device (Gait Training Goal 1, PT) walker, rolling  -JA     [place] Level (Gait Training Goal 1, PT) modified independence  -JA     Distance (Gait Training Goal 1, PT) 75'x2.  -JA     Time Frame (Gait Training Goal 1, PT) by discharge  -JA     Strategies/Barriers (Gait Training Goal 1, PT) Facilitate upright posture.  -JA     Progress/Outcome (Gait Training Goal 1, PT) goal not met  -     Row Name 06/04/22 1044          Stairs Goal 1 (PT)    Activity/Assistive Device (Stairs Goal 1, PT) using handrail, left  -JA     [place] Level/Cues Needed (Stairs Goal 1, PT) supervision required  -JA     Number of Stairs (Stairs Goal 1, PT) 3 steps.  -JA     Time Frame (Stairs Goal 1, PT) by discharge  -JA     Progress/Outcome (Stairs Goal 1, PT) goal not met  -     Row Name 06/04/22 1044          Problem Specific Goal 1 (PT)    Problem Specific Goal 1 (PT) Score 24/28 on Tinetti fall risk assessment.  -JA     Time Frame (Problem Specific Goal 1, PT) by  discharge  -RACHEL     Progress/Outcome (Problem Specific Goal 1, PT) goal not met  -RACHEL           User Key  (r) = Recorded By, (t) = Taken By, (c) = Cosigned By    Initials Name Provider Type    Ramses Leon, PTA Physical Therapist Assistant                Physical Therapy Education                 Title: PT OT SLP Therapies (In Progress)     Topic: Physical Therapy (In Progress)     Point: Mobility training (In Progress)     Learning Progress Summary           Patient Acceptance, E, NR by  at 6/2/2022 1582    Comment: PT POC, hand placement with transfers, use of walker, upright posture.                   Point: Home exercise program (Not Started)     Learner Progress:  Not documented in this visit.          Point: Body mechanics (Not Started)     Learner Progress:  Not documented in this visit.          Point: Precautions (Not Started)     Learner Progress:  Not documented in this visit.                      User Key     Initials Effective Dates Name Provider Type [facility] 06/16/21 -  Frandy Lemos, PT Physical Therapist PT              PT Recommendation and Plan  Anticipated Discharge Disposition (PT): home with assist  Therapy Frequency (PT): other (see comments) (3-7 days/week)  Progress Summary (PT)  Progress Toward Functional Goals (PT): progress toward functional goals as expected  Plan of Care Reviewed With: patient  Progress: improving  Outcome Evaluation: Pt. agreeable to treatment this a.m. Pt. with slight confusion at this time. Pt. transferred SBA sup-sit, sit-stand-sit CGA with RW, toilet transfer CGA/Suraj,Pt. amb. 115ft with RW CGA,  pt. incontinent of bladder this a.m. during transfer to bathroom, nsg. notified and assisted with cleaning this a.m. Cont. to mobilize   Outcome Measures     Row Name 06/02/22 1352             How much help from another is currently needed...    Putting on and taking off regular lower body clothing? 3  -RB      Bathing (including washing, rinsing, and  drying) 2  -RB      Toileting (which includes using toilet bed pan or urinal) 3  -RB      Putting on and taking off regular upper body clothing 4  -RB      Taking care of personal grooming (such as brushing teeth) 4  -RB      Eating meals 4  -RB      AM-PAC 6 Clicks Score (OT) 20  -RB              Functional Assessment    Outcome Measure Options AM-PAC 6 Clicks Daily Activity (OT)  -RB            User Key  (r) = Recorded By, (t) = Taken By, (c) = Cosigned By    Initials Name Provider Type    Tony Kate, OT Occupational Therapist                 Time Calculation:    PT Charges     Row Name 06/04/22 1246             Time Calculation    Start Time 1044  -JA      Stop Time 1120  -JA      Time Calculation (min) 36 min  -JA              Time Calculation- PT    Total Timed Code Minutes- PT 36 minute(s)  -JA              Timed Charges    43600 - PT Therapeutic Activity Minutes 36  -JA              Total Minutes    Timed Charges Total Minutes 36  -JA       Total Minutes 36  -JA            User Key  (r) = Recorded By, (t) = Taken By, (c) = Cosigned By    Initials Name Provider Type    Ramses Leon PTA Physical Therapist Assistant              Therapy Charges for Today     Code Description Service Date Service Provider Modifiers Qty    71165679151 HC PT THERAPEUTIC ACT EA 15 MIN 6/4/2022 Ramses Hunt PTA GP 2          PT G-Codes  Outcome Measure Options: AM-PAC 6 Clicks Basic Mobility (PT), [place]  AM-PAC 6 Clicks Score (PT): 18  AM-PAC 6 Clicks Score (OT): 20    Ramses Hunt PTA  6/4/2022

## 2022-06-04 NOTE — PLAN OF CARE
Goal Outcome Evaluation:  Plan of Care Reviewed With: patient        Progress: improving  Outcome Evaluation: Pt. agreeable to treatment this a.m. Pt. with slight confusion at this time. Pt. transferred SBA sup-sit, sit-stand-sit CGA with RW, toilet transfer CGA/Suraj,Pt. amb. 115ft with RW CGA,  pt. incontinent of bladder this a.m. during transfer to bathroom, nsg. notified and assisted with cleaning this a.m. Cont. to mobilize

## 2022-06-04 NOTE — PLAN OF CARE
Goal Outcome Evaluation:              Outcome Evaluation: Post heart cath; groin site is CDI, soft; Dobutamine gtt continues; VSS at this time

## 2022-06-04 NOTE — PLAN OF CARE
Problem: Adult Inpatient Plan of Care  Goal: Plan of Care Review  Outcome: Ongoing, Progressing  Flowsheets (Taken 6/4/2022 1742)  Progress: no change  Plan of Care Reviewed With: patient  Outcome Evaluation: Vital signs stable, patient denies any pain or discomfort, Dobutamine drip still infusing.  Goal: Patient-Specific Goal (Individualized)  Outcome: Ongoing, Progressing  Goal: Absence of Hospital-Acquired Illness or Injury  Outcome: Ongoing, Progressing  Intervention: Identify and Manage Fall Risk  Recent Flowsheet Documentation  Taken 6/4/2022 1706 by Olya Prieto RN  Safety Promotion/Fall Prevention: safety round/check completed  Taken 6/4/2022 1530 by Olya Prieto RN  Safety Promotion/Fall Prevention: safety round/check completed  Taken 6/4/2022 1300 by Olya Prieto RN  Safety Promotion/Fall Prevention: safety round/check completed  Taken 6/4/2022 1140 by Olya Prieto RN  Safety Promotion/Fall Prevention: safety round/check completed  Taken 6/4/2022 0950 by Olya Prieto RN  Safety Promotion/Fall Prevention: safety round/check completed  Taken 6/4/2022 0715 by Olya Prieto RN  Safety Promotion/Fall Prevention: safety round/check completed  Intervention: Prevent and Manage VTE (Venous Thromboembolism) Risk  Recent Flowsheet Documentation  Taken 6/4/2022 1140 by Olya Prieto RN  Activity Management: ambulated to bathroom  Goal: Optimal Comfort and Wellbeing  Outcome: Ongoing, Progressing  Intervention: Provide Person-Centered Care  Recent Flowsheet Documentation  Taken 6/4/2022 0950 by Olya Prieto RN  Trust Relationship/Rapport:   care explained   choices provided  Goal: Readiness for Transition of Care  Outcome: Ongoing, Progressing     Problem: Diabetes Comorbidity  Goal: Blood Glucose Level Within Targeted Range  Outcome: Ongoing, Progressing  Intervention: Monitor and Manage Glycemia  Recent Flowsheet Documentation  Taken 6/4/2022 0950 by Olya Prieto RN  Glycemic Management: blood  glucose monitored     Problem: Heart Failure Comorbidity  Goal: Maintenance of Heart Failure Symptom Control  Outcome: Ongoing, Progressing     Problem: Hypertension Comorbidity  Goal: Blood Pressure in Desired Range  Outcome: Ongoing, Progressing     Problem: Fall Injury Risk  Goal: Absence of Fall and Fall-Related Injury  Outcome: Ongoing, Progressing  Intervention: Promote Injury-Free Environment  Recent Flowsheet Documentation  Taken 6/4/2022 1706 by Olya Prieto RN  Safety Promotion/Fall Prevention: safety round/check completed  Taken 6/4/2022 1530 by Olya Prieto RN  Safety Promotion/Fall Prevention: safety round/check completed  Taken 6/4/2022 1300 by Olya Prieto RN  Safety Promotion/Fall Prevention: safety round/check completed  Taken 6/4/2022 1140 by Olya Prieto RN  Safety Promotion/Fall Prevention: safety round/check completed  Taken 6/4/2022 0950 by Olya Prieto RN  Safety Promotion/Fall Prevention: safety round/check completed  Taken 6/4/2022 0715 by Olya Prieto RN  Safety Promotion/Fall Prevention: safety round/check completed   Goal Outcome Evaluation:  Plan of Care Reviewed With: patient        Progress: no change  Outcome Evaluation: Vital signs stable, patient denies any pain or discomfort, Dobutamine drip still infusing.

## 2022-06-04 NOTE — PROGRESS NOTES
[facility]  INPATIENT PROGRESS NOTE    Length of Stay: 2  Date of Admission: 6/1/2022  Primary Care Physician: Joyce Plata MD    Subjective   Chief Complaint: Chest pain  HPI:    Reports chest pain is getting better.  We will do breathing.  Had cardiac catheterization done yesterday showing nonobstructive coronary artery disease    Review of Systems   Constitutional: Negative for fatigue.   HENT: Negative for congestion.    Respiratory: Positive for shortness of breath.    Cardiovascular: Positive for chest pain.   Gastrointestinal: Negative for diarrhea.          All pertinent negatives and positives are as above. All other systems have been reviewed and are negative unless otherwise stated.     Objective    Temp:  [98 °F (36.7 °C)-98.1 °F (36.7 °C)] 98 °F (36.7 °C)  Heart Rate:  [73-85] 85  Resp:  [15-18] 17  BP: (108-157)/() 129/70  Physical Exam  Vitals and nursing note reviewed.   Constitutional:       General: She is not in acute distress.     Appearance: She is well-developed. She is not diaphoretic.   HENT:      Head: Normocephalic and atraumatic.   Cardiovascular:      Rate and Rhythm: Normal rate.   Pulmonary:      Effort: Pulmonary effort is normal. No respiratory distress.      Breath sounds: No wheezing.   Abdominal:      General: There is no distension.      Palpations: Abdomen is soft.   Musculoskeletal:         General: Normal range of motion.   Skin:     General: Skin is warm and dry.   Neurological:      Mental Status: She is alert.      Cranial Nerves: No cranial nerve deficit.   Psychiatric:         Behavior: Behavior normal.         Thought Content: Thought content normal.         Judgment: Judgment normal.             Results Review:  I have reviewed the labs, radiology results, and diagnostic studies.    Laboratory Data:   Lab Results (last 24 hours)     Procedure Component Value Units Date/Time    Basic Metabolic Panel  [[id number]]  (Abnormal) Collected: 06/04/22 0705    Specimen: Blood Updated: 06/04/22 0736     Glucose 105 mg/dL      BUN 14 mg/dL      Creatinine 1.03 mg/dL      Sodium 142 mmol/L      Potassium 3.9 mmol/L      Comment: Slight hemolysis detected by analyzer. Results may be affected.        Chloride 109 mmol/L      CO2 19.0 mmol/L      Calcium 9.6 mg/dL      BUN/Creatinine Ratio 13.6     Anion Gap 14.0 mmol/L      eGFR 57.5 mL/min/1.73      Comment: National Kidney Foundation and American Society of Nephrology (ASN) Task Force recommended calculation based on the Chronic Kidney Disease Epidemiology Collaboration (CKD-EPI) equation refit without adjustment for race.       Narrative:      GFR Normal >60  Chronic Kidney Disease <60  Kidney Failure <15      Magnesium [[id number]]  (Normal) Collected: 06/04/22 0705    Specimen: Blood Updated: 06/04/22 0734     Magnesium 1.9 mg/dL     CBC & Differential [[id number]]  (Abnormal) Collected: 06/04/22 0705    Specimen: Blood Updated: 06/04/22 0719    Narrative:      The following orders were created for panel order CBC & Differential.  Procedure                               Abnormality         Status                     ---------                               -----------         ------                     CBC Auto Differential[[id number]]        Abnormal            Final result                 Please view results for these tests on the individual orders.    CBC Auto Differential [[id number]]  (Abnormal) Collected: 06/04/22 0705    Specimen: Blood Updated: 06/04/22 0719     WBC 7.92 10*3/mm3      RBC 3.91 10*6/mm3      Hemoglobin 10.9 g/dL      Hematocrit 34.1 %      MCV 87.2 fL      MCH 27.9 pg      MCHC 32.0 g/dL      RDW 16.4 %      RDW-SD 51.2 fl      MPV 9.8 fL      Platelets 220 10*3/mm3      Neutrophil % 52.3 %      Lymphocyte % 31.3 %      Monocyte % 12.6 %      Eosinophil % 2.7 %      Basophil % 0.5 %      Immature Grans % 0.6 %      Neutrophils, Absolute 4.14 10*3/mm3       Lymphocytes, Absolute 2.48 10*3/mm3      Monocytes, Absolute 1.00 10*3/mm3      Eosinophils, Absolute 0.21 10*3/mm3      Basophils, Absolute 0.04 10*3/mm3      Immature Grans, Absolute 0.05 10*3/mm3      nRBC 0.0 /100 WBC     POC Glucose Once [[id number]]  (Normal) Collected: 06/04/22 0621    Specimen: Blood Updated: 06/04/22 0634     Glucose 115 mg/dL      Comment: RN NotifiedOperator: [id number] [employer] ID: [id number]       POC Glucose Once [[id number]]  (Abnormal) Collected: 06/03/22 2009    Specimen: Blood Updated: 06/03/22 2022     Glucose 211 mg/dL      Comment: RN NotifiedOperator: [id number] [employer] ID: [id number]       Blood Culture - Blood, Hand, Right [[id number]]  (Normal) Collected: 06/01/22 1650    Specimen: Blood from Hand, Right Updated: 06/03/22 1702     Blood Culture No growth at 2 days    Blood Culture - Blood, Arm, Right [[id number]]  (Normal) Collected: 06/01/22 1644    Specimen: Blood from Arm, Right Updated: 06/03/22 1646     Blood Culture No growth at 2 days    POC Glucose Once [[id number]]  (Normal) Collected: 06/03/22 1613    Specimen: Blood Updated: 06/03/22 1636     Glucose 114 mg/dL      Comment: RN NotifiedOperator: [id number] [employer] ID: [id number]       POC Glucose Once [[id number]]  (Abnormal) Collected: 06/03/22 1022    Specimen: Blood Updated: 06/03/22 1038     Glucose 183 mg/dL      Comment: RN NotifiedOperator: [id number] [employer] ID: [id number]             Culture Data:   Blood Culture   Date Value Ref Range Status   06/01/2022 No growth at 2 days  Preliminary   06/01/2022 No growth at 2 days  Preliminary     Urine Culture   Date Value Ref Range Status   06/01/2022 50,000 CFU/mL Mixed Malini Isolated  Final     No results found for: RESPCX  No results found for: WOUNDCX  No results found for: STOOLCX  No components found for: BODYFLD    Radiology Data:   Imaging Results (Last 24 Hours)     ** No results found for the  last 24 hours. **          I have reviewed the patient's current medications.     Assessment/Plan     Active Hospital Problems    Diagnosis    • Precordial pain    • Chest pain      Chest pain- cardiology managing, cardiac catheterization was done yesterday showing nonobstructive coronary artery disease.     CHF-systolic dysfunction-continue with current medications, will monitor     Rdxqetwkpbe-ydvuadfm-lhsmiczl to monitor     UTI- continue with antibiotic     Diabetes mellitus-sliding scale insulin     Hypertension-continue with medication and monitoring     DVT prophylaxis-SCD     I confirmed that the patient's Advance Care Plan is present, code status is documented, or surrogate decision maker is listed in the patient's medical record.                 Michael Murcia MD   06/04/22   10:10 CDT

## 2022-06-05 NOTE — PROGRESS NOTES
[facility] Medicine Services  INPATIENT PROGRESS NOTE    Length of Stay: 3  Date of Admission: 6/1/2022  Primary Care Physician: Joyce Plata MD    Subjective   Chief Complaint: Chest pain  HPI:    Reports chest pain has been improving, breathing is improving .    Review of Systems   Constitutional: Negative for fever.   HENT: Negative for congestion.    Gastrointestinal: Negative for constipation.        All pertinent negatives and positives are as above. All other systems have been reviewed and are negative unless otherwise stated.     Objective    Temp:  [97.9 °F (36.6 °C)-98.3 °F (36.8 °C)] 97.9 °F (36.6 °C)  Heart Rate:  [74-82] 79  Resp:  [15-18] 16  BP: (103-132)/(58-76) 125/72  Physical Exam  Constitutional:       General: She is not in acute distress.     Appearance: She is well-developed. She is not diaphoretic.   HENT:      Head: Normocephalic and atraumatic.   Cardiovascular:      Rate and Rhythm: Normal rate.   Pulmonary:      Effort: Pulmonary effort is normal. No respiratory distress.      Breath sounds: No wheezing.   Abdominal:      General: There is no distension.      Palpations: Abdomen is soft.   Musculoskeletal:         General: Normal range of motion.   Skin:     General: Skin is warm and dry.   Neurological:      Mental Status: She is alert.      Cranial Nerves: No cranial nerve deficit.   Psychiatric:         Behavior: Behavior normal.         Thought Content: Thought content normal.         Judgment: Judgment normal.             Results Review:  I have reviewed the labs, radiology results, and diagnostic studies.    Laboratory Data:   Lab Results (last 24 hours)     Procedure Component Value Units Date/Time    POC Glucose Once [[id number]]  (Normal) Collected: 06/05/22 1015    Specimen: Blood Updated: 06/05/22 1048     Glucose 124 mg/dL      Comment: RN NotifiedOperator[facility] [id number] [facility]: [id number]       POC Glucose Once  [[id number]]  (Normal) Collected: 06/05/22 0630    Specimen: Blood Updated: 06/05/22 0654     Glucose 94 mg/dL      Comment: RN NotifiedOperator: [id number] [employer] EMMANU[employer] ID: [id number]       POC Glucose Once [[id number]]  (Abnormal) Collected: 06/04/22 1927    Specimen: Blood Updated: 06/04/22 2105     Glucose 229 mg/dL      Comment: RN NotifiedOperator: [id number] [employer] EMMANU[employer] ID: [id number]       Blood Culture - Blood, Hand, Right [[id number]]  (Normal) Collected: 06/01/22 1650    Specimen: Blood from Hand, Right Updated: 06/04/22 1703     Blood Culture No growth at 3 days    POC Glucose Once [[id number]]  (Abnormal) Collected: 06/04/22 1623    Specimen: Blood Updated: 06/04/22 1649     Glucose 209 mg/dL      Comment: RN NotifiedOperator: [id number] ANAM [employer]AT[employer] ID: [id number]       Blood Culture - Blood, Arm, Right [[id number]]  (Normal) Collected: 06/01/22 1644    Specimen: Blood from Arm, Right Updated: 06/04/22 1648     Blood Culture No growth at 3 days          Culture Data:   No results found for: BLOODCX  No results found for: URINECX  No results found for: RESPCX  No results found for: WOUNDCX  No results found for: STOOLCX  No components found for: BODYFLD    Radiology Data:   Imaging Results (Last 24 Hours)     ** No results found for the last 24 hours. **          I have reviewed the patient's current medications.     Assessment/Plan     Active Hospital Problems    Diagnosis    • Precordial pain    • Chest pain      Chest pain- cardiology managing, cardiac catheterization was done yesterday showing nonobstructive coronary artery disease.  We will continue with medical management.     CHF-systolic dysfunction-continue with current medications, will monitor    Anemia-continue monitoring hemoglobin level, has been stable     Oorkzhvnaok-cqepcxke-kmxxeebd to monitor     UTI- continue with antibiotic     Diabetes mellitus-sliding scale insulin     Hypertension-continue with  medication and monitoring     DVT prophylaxis-SCD     I confirmed that the patient's Advance Care Plan is present, code status is documented, or surrogate decision maker is listed in the patient's medical record.                Michael Murcia MD   06/05/22   11:57 CDT

## 2022-06-05 NOTE — THERAPY TREATMENT NOTE
Patient Name: Lexi Wade  : 1950    MRN: [id number]                              Today's Date: 2022       Admit Date: 2022    Visit Dx:     ICD-10-CM ICD-9-CM   1. Precordial pain  R07.2 786.51   2. Acute on chronic congestive heart failure, unspecified heart failure type (HCC)  I50.9 428.0   3. Impaired mobility and activities of daily living  Z74.09 V49.89    Z78.9    4. Impaired functional mobility, balance, gait, and endurance  Z74.09 V49.89   5. Chest pain, unspecified type  R07.9 786.50   6. NICM (nonischemic cardiomyopathy) ([facility])  I42.8 425.4   7. Chronic systolic heart failure (HCC)  I50.22 428.22   8. Other chest pain  R07.89 786.59     Patient Active Problem List   Diagnosis   • Pseudophakia   • Primary open angle glaucoma   • Nonischemic cardiomyopathy (HCC)   • Congestive heart failure (HCC)   • Hypertension   • GERD (gastroesophageal reflux disease)   • Diabetes mellitus ([facility])   • Vitamin D deficiency   • Borderline glaucoma   • Neurologic disorder associated with diabetes mellitus ([facility])   • Mixed hyperlipidemia   • Menopausal syndrome   • Dyspnea   • Chest pain   • Morbid obesity (HCC)   • Precordial pain   • Acute respiratory failure with hypoxia ([facility])   • Coronary artery disease involving native heart with angina pectoris ([facility])   • Implantable cardioverter-defibrillator (ICD) generator end of life   • Multifocal pneumonia   • Acute on chronic systolic CHF (congestive heart failure) ([facility])   • Obesity (BMI 30-39.9)   • Shortness of breath   • Altered mental status   • Morbid obesity (HCC)   • Drowsiness   • Acute on chronic systolic congestive heart failure (HCC)     Past Medical History:   Diagnosis Date   • Chronic systolic heart failure (HCC)    • Diabetes mellitus (HCC)    • Diabetic neuropathy ([facility])    • GERD (gastroesophageal reflux disease)    • Hypercholesterolemia    • Hypertension    • Low back pain    • Morbid obesity (HCC)    • Nonischemic cardiomyopathy (HCC)    •  Osteoarthritis    • Sleep apnea    • Vitamin D deficiency      Past Surgical History:   Procedure Laterality Date   • CARDIAC CATHETERIZATION N/A 08/23/2018   • CARDIAC ELECTROPHYSIOLOGY PROCEDURE N/A 06/22/2020   • CARDIAC PACEMAKER PLACEMENT     • CATARACT EXTRACTION     • COLONOSCOPY N/A 06/14/2017   • PACEMAKER IMPLANTATION     • TOTAL ABDOMINAL HYSTERECTOMY WITH SALPINGO OOPHORECTOMY        General Information     Row Name 06/05/22 0941          OT Time and Intention    Document Type therapy note (daily note)  -CS     Mode of Treatment occupational therapy  -CS     Row Name 06/05/22 0941          General Information    Patient Profile Reviewed yes  -CS     Existing Precautions/Restrictions fall  -CS     Row Name 06/05/22 0941          Cognition    Orientation Status (Cognition) oriented to;person;place  -CS     Row Name 06/05/22 0941          Safety Issues, Functional Mobility    Impairments Affecting Function (Mobility) strength;endurance/activity tolerance;balance  -CS           User Key  (r) = Recorded By, (t) = Taken By, (c) = Cosigned By    Initials Name Provider Type    CS Faiza Miller COTA Occupational Therapist Assistant                 Mobility/ADL's     Row Name 06/05/22 0941          Bed Mobility    Bed Mobility supine-sit;sit-supine  -     Supine-Sit [place] (Bed Mobility) standby assist  -     Sit-Supine [place] (Bed Mobility) standby assist  -CS     Row Name 06/05/22 0941          Transfers    Transfers sit-stand transfer;stand-sit transfer;toilet transfer  -     Sit-Stand [place] (Transfers) contact guard  -     Stand-Sit [place] (Transfers) contact guard  -     [place] Level (Toilet Transfer) contact guard  -     Assistive Device (Toilet Transfer) walker, front-wheeled;commode;grab bars/safety frame  -     Row Name 06/05/22 0941          Sit-Stand Transfer    Assistive Device (Sit-Stand Transfers) walker, front-wheeled  -     Row Name 06/05/22 0941           Stand-Sit Transfer    Assistive Device (Stand-Sit Transfers) walker, front-wheeled  -[facility] Name 06/05/22 0941          Toilet Transfer    Type (Toilet Transfer) sit-stand;stand-sit  -[facility] Name 06/05/22 0941          Functional Mobility    Functional Mobility- Ind. Level contact guard assist  -     Functional Mobility- Device walker, front-wheeled  -[facility] Name 06/05/22 0941          Activities of Daily Living    BADL Assessment/Intervention toileting;grooming  -[facility] Name 06/05/22 0941          Toileting Assessment/Training    [place] Level (Toileting) toileting skills;contact guard assist  -     Assistive Devices (Toileting) commode;grab bar/safety frame  -     Position (Toileting) supported standing;unsupported sitting  -[facility] Name 06/05/22 0941          Grooming Assessment/Training    [place] Level (Grooming) grooming skills;wash face, hands  -     Position (Grooming) sink side  -           User Key  (r) = Recorded By, (t) = Taken By, (c) = Cosigned By    Initials Name Provider Type     Faiza Miller COTA Occupational Therapist Assistant               Obj/Interventions     [facility] Name 06/05/22 0941          Shoulder (Therapeutic Exercise)    Shoulder (Therapeutic Exercise) AROM (active range of motion)  -     Shoulder AROM (Therapeutic Exercise) bilateral;flexion;extension;external rotation;internal rotation  -[facility] Name 06/05/22 0941          Elbow/Forearm (Therapeutic Exercise)    Elbow/Forearm (Therapeutic Exercise) AROM (active range of motion)  -     Elbow/Forearm AROM (Therapeutic Exercise) bilateral;flexion;extension;supination;pronation  -[facility] Name 06/05/22 0941          Wrist (Therapeutic Exercise)    Wrist (Therapeutic Exercise) AROM (active range of motion)  -     Wrist AROM (Therapeutic Exercise) bilateral;flexion;extension  -[facility] Name 06/05/22 0941          Hand (Therapeutic Exercise)    Hand (Therapeutic Exercise) AROM  (active range of motion)  -CS     Hand AROM/AAROM (Therapeutic Exercise) bilateral;finger flexion;finger extension  -CS     Row Name 06/05/22 0941          Motor Skills    Therapeutic Exercise shoulder;elbow/forearm;wrist;hand  -CS           User Key  (r) = Recorded By, (t) = Taken By, (c) = Cosigned By    Initials Name Provider Type    CS Faiza Miller COTA Occupational Therapist Assistant               Goals/Plan     Row Name 06/05/22 0941          Transfer Goal 1 (OT)    Activity/Assistive Device (Transfer Goal 1, OT) transfers, all  -CS     [place] Level/Cues Needed (Transfer Goal 1, OT) modified independence  -CS     Time Frame (Transfer Goal 1, OT) long term goal (LTG)  -CS     Progress/Outcome (Transfer Goal 1, OT) goal not met  -CS     Row Name 06/05/22 0941          Bathing Goal 1 (OT)    Activity/Device (Bathing Goal 1, OT) bathing skills, all  -CS     [place] Level/Cues Needed (Bathing Goal 1, OT) modified independence  -CS     Time Frame (Bathing Goal 1, OT) long term goal (LTG)  -CS     Progress/Outcomes (Bathing Goal 1, OT) goal not met  -CS     Row Name 06/05/22 0941          Dressing Goal 1 (OT)    Activity/Device (Dressing Goal 1, OT) dressing skills, all  -CS     [place]/Cues Needed (Dressing Goal 1, OT) modified independence  -CS     Time Frame (Dressing Goal 1, OT) long term goal (LTG)  -CS     Progress/Outcome (Dressing Goal 1, OT) goal not met  -CS     Row Name 06/05/22 0941          Toileting Goal 1 (OT)    Activity/Device (Toileting Goal 1, OT) toileting skills, all  -CS     [place] Level/Cues Needed (Toileting Goal 1, OT) modified independence  -CS     Time Frame (Toileting Goal 1, OT) long term goal (LTG)  -CS     Progress/Outcome (Toileting Goal 1, OT) goal not met  -CS           User Key  (r) = Recorded By, (t) = Taken By, (c) = Cosigned By    Initials Name Provider Type    CS Faiza Miller COTA Occupational Therapist Assistant               Clinical  Impression     Row Name 06/05/22 0941          Pain Assessment    Pretreatment Pain Rating 0/10 - no pain  -CS     Posttreatment Pain Rating 0/10 - no pain  -CS     Row Name 06/05/22 0941          Plan of Care Review    Plan of Care Reviewed With patient  -CS     Progress improving  -CS     Outcome Evaluation Pt tolerated tx well this date. Pt was SBA with sup-sit-sup. Pt was CGA with sit-stand-sit. Pt was CGA with toilet t/f. Pt gave good effort with UE ther ex. Continue OT POC.  -CS     Row Name 06/05/22 0941          Therapy Assessment/Plan (OT)    Rehab Potential (OT) good, to achieve stated therapy goals  -CS     Criteria for Skilled Therapeutic Interventions Met (OT) yes;meets criteria  -CS     Row Name 06/05/22 0941          Therapy Plan Review/Discharge Plan (OT)    Anticipated Discharge Disposition (OT) home with home health;home with assist  -CS     Row Name 06/05/22 0941          Vital Signs    Pre Patient Position Supine  -CS     Intra Patient Position Sitting  -CS     Post Patient Position Supine  -CS     Row Name 06/05/22 0941          Positioning and Restraints    Pre-Treatment Position in bed  -CS     Post Treatment Position bed  -CS     In Bed fowlers;call light within reach;encouraged to call for assist;exit alarm on  -CS           User Key  (r) = Recorded By, (t) = Taken By, (c) = Cosigned By    Initials Name Provider Type    CS Faiza Miller COTA Occupational Therapist Assistant               Outcome Measures     Row Name 06/05/22 1305          How much help from another is currently needed...    Putting on and taking off regular lower body clothing? 3  -CS     Bathing (including washing, rinsing, and drying) 2  -CS     Toileting (which includes using toilet bed pan or urinal) 3  -CS     Putting on and taking off regular upper body clothing 4  -CS     Taking care of personal grooming (such as brushing teeth) 4  -CS     Eating meals 4  -CS     AM-PAC 6 Clicks Score (OT) 20  -CS           User  Key  (r) = Recorded By, (t) = Taken By, (c) = Cosigned By    Initials Name Provider Type    CS Faiza Miller COTA Occupational Therapist Assistant                Occupational Therapy Education                 Title: PT OT SLP Therapies (In Progress)     Topic: Occupational Therapy (Done)     Point: ADL training (Done)     Description:   Instruct learner(s) on proper safety adaptation and remediation techniques during self care or transfers.   Instruct in proper use of assistive devices.              Learning Progress Summary           Patient Acceptance, E,TB,D, VU by  at 6/5/2022 1302                   Point: Home exercise program (Done)     Description:   Instruct learner(s) on appropriate technique for monitoring, assisting and/or progressing therapeutic exercises/activities.              Learning Progress Summary           Patient Acceptance, E,TB,D, VU by  at 6/5/2022 1302                   Point: Precautions (Done)     Description:   Instruct learner(s) on prescribed precautions during self-care and functional transfers.              Learning Progress Summary           Patient Acceptance, E,TB,D, VU by  at 6/5/2022 1302    Acceptance, E, VU by  at 6/2/2022 1501    Comment: Edu pt on use of gait belt and non skid socks when OOB and no OOB without assist.                   Point: Body mechanics (Done)     Description:   Instruct learner(s) on proper positioning and spine alignment during self-care, functional mobility activities and/or exercises.              Learning Progress Summary           Patient Acceptance, E,TB,D, VU by  at 6/5/2022 1302                               User Key     Initials Effective Dates Name Provider Type Discipline     06/16/21 -  Tony Childress OT Occupational Therapist OT     06/16/21 -  Faiza Miller COTA Occupational Therapist Assistant OT              OT Recommendation and Plan     Plan of Care Review  Plan of Care Reviewed With: patient  Progress:  improving  Outcome Evaluation: Pt tolerated tx well this date. Pt was SBA with sup-sit-sup. Pt was CGA with sit-stand-sit. Pt was CGA with toilet t/f. Pt gave good effort with UE ther ex. Continue OT POC.     Time Calculation:    Time Calculation- OT     Row Name 06/05/22 1302             Time Calculation- OT    OT Start Time 0941  -CS      OT Stop Time 1022  -CS      OT Time Calculation (min) 41 min  -CS      Total Timed Code Minutes- OT 41 minute(s)  -CS      OT Received On 06/05/22  -CS              Timed Charges    06397 - OT Therapeutic Exercise Minutes 25  -CS      94754 - OT Self Care/Mgmt Minutes 16  -CS              Total Minutes    Timed Charges Total Minutes 41  -CS       Total Minutes 41  -CS            User Key  (r) = Recorded By, (t) = Taken By, (c) = Cosigned By    Initials Name Provider Type    CS Faiza Miller COTA Occupational Therapist Assistant              Therapy Charges for Today     Code Description Service Date Service Provider Modifiers Qty    20488034971 HC OT THER PROC EA 15 MIN 6/5/2022 Faiza Miller COTA GO 2    19575533246 HC OT SELF CARE/MGMT/TRAIN EA 15 MIN 6/5/2022 Faiza Miller COTA GO 1               HIREN Paula  6/5/2022

## 2022-06-05 NOTE — PLAN OF CARE
Goal Outcome Evaluation:              Outcome Evaluation: Pt awake during the night, dobutamine gtt infusing; VSS

## 2022-06-05 NOTE — PLAN OF CARE
Goal Outcome Evaluation:  Plan of Care Reviewed With: patient        Progress: improving  Outcome Evaluation: Pt tolerated tx well this date. Pt was SBA with sup-sit-sup. Pt was CGA with sit-stand-sit. Pt was CGA with toilet t/f. Pt gave good effort with UE ther ex. Continue OT POC.

## 2022-06-06 NOTE — PROGRESS NOTES
TWO PATIENT IDENTIFIERS WERE USED. THE PATIENT WAS DRAPED WITH A FULL BODY DRAPE AND THE PATIENT'S RIGHT ARM WAS PREPPED WITH CHLORA PREP. ULTRASOUND WAS USED TO LOCALIZE THERIGHT BASILIC VEIN. SUBCUTANEOUS TISSUE AT THE CATHETER SITE WAS INFILTRATED WITH 2% LIDOCAINE. UNDER ULTRASOUND GUIDANCE, THE VEIN WAS ACCESSED WITH A 21 GAUGE  NEEDLE. AN 0.018 WIRE WAS THEN THREADED THROUGH THE NEEDLE. THE 21 GAUGE NEEDLE WAS REMOVED AND A 4 Malay SHEATH WAS PLACED OVER THE WIRE INTO THE VEIN.THE MIDLINE CATHETER WAS TRIMMED TO 20CM. THE MIDLINE CATHETER WAS THEN PLACED OVER THE WIRE INTO THE VEIN, THE SHEATH WAS PEELED AWAY, WIRE WAS REMOVED. CATHETER WAS FLUSHED WITH NORMAL SALINE AND CATHETER TIP APPLIED. BIOPATCH PLACED. CATHETER SECURED WITH STAT LOCK AND TEGADERM. PATIENT TOLERATED PROCEDURE WELL. THIS WAS DONE IN THE ANGIOSUITE      IMPRESSION:SUCCESSFUL PLACEMENT OF DUAL LUMEN MIDLINE.           Lupe Lee  6/6/2022  10:09 CDT

## 2022-06-06 NOTE — PLAN OF CARE
Goal Outcome Evaluation:              Outcome Evaluation: Pt awake during much of the night; pt OOB with standby to assistance X1; Order for midline placement today, due to poor venous access; VSS at this time

## 2022-06-06 NOTE — PROGRESS NOTES
[facility] Medicine Services  INPATIENT PROGRESS NOTE    Length of Stay: 4  Date of Admission: 6/1/2022  Primary Care Physician: Joyce Plata MD    Subjective   Chief Complaint: Chest pain  HPI: Patient states she is feeling some better today.  Breathing is better chest pain has improved.    Review of Systems   Constitutional: Negative for appetite change, chills, fatigue, fever and unexpected weight change.   Respiratory: Positive for chest tightness and shortness of breath. Negative for cough, choking and wheezing.    Cardiovascular: Negative for chest pain, palpitations and leg swelling.   Gastrointestinal: Negative for abdominal pain, blood in stool, constipation, diarrhea, nausea and vomiting.   Genitourinary: Negative for dysuria, flank pain and hematuria.   Neurological: Negative for dizziness, seizures, syncope, speech difficulty, weakness, light-headedness, numbness and headaches.   Hematological: Does not bruise/bleed easily.        All pertinent negatives and positives are as above. All other systems have been reviewed and are negative unless otherwise stated.     Objective    Temp:  [97.2 °F (36.2 °C)-97.9 °F (36.6 °C)] 97.9 °F (36.6 °C)  Heart Rate:  [73-92] 73  Resp:  [16-18] 16  BP: (116-142)/(63-88) 124/63    Physical Exam  Vitals reviewed.   Constitutional:       Appearance: She is well-developed.   HENT:      Head: Normocephalic and atraumatic.   Eyes:      Pupils: Pupils are equal, round, and reactive to light.   Cardiovascular:      Rate and Rhythm: Normal rate and regular rhythm.      Heart sounds: Normal heart sounds. No murmur heard.    No friction rub. No gallop.   Pulmonary:      Effort: Pulmonary effort is normal. No respiratory distress.      Breath sounds: Normal breath sounds. No wheezing or rales.   Chest:      Chest wall: No tenderness.   Abdominal:      General: Bowel sounds are normal. There is no distension.      Palpations:  Abdomen is soft.      Tenderness: There is no abdominal tenderness. There is no guarding.   Musculoskeletal:      Cervical back: Normal range of motion and neck supple.   Skin:     General: Skin is warm and dry.   Psychiatric:         Behavior: Behavior normal.         Thought Content: Thought content normal.             Results Review:  I have reviewed the labs, radiology results, and diagnostic studies.    Laboratory Data:   Results from last 7 days   Lab Units 06/04/22  0705 06/03/22  0535 06/02/22  0510 06/01/22  1600   SODIUM mmol/L 142 137 141 142   POTASSIUM mmol/L 3.9 3.6 3.1* 3.9   CHLORIDE mmol/L 109* 105 107 109*   CO2 mmol/L 19.0* 21.0* 21.0* 16.0*   BUN mg/dL 14 15 18 21   CREATININE mg/dL 1.03* 1.02* 0.97 1.03*   GLUCOSE mg/dL 105* 96 118* 200*   CALCIUM mg/dL 9.6 9.1 9.6 10.5   BILIRUBIN mg/dL  --   --   --  1.4*   ALK PHOS U/L  --   --   --  106   ALT (SGPT) U/L  --   --   --  21   AST (SGOT) U/L  --   --   --  20   ANION GAP mmol/L 14.0 11.0 13.0 17.0*     Estimated Creatinine Clearance: 53.3 mL/min (A) (by C-G formula based on SCr of 1.03 mg/dL (H)).  Results from last 7 days   Lab Units 06/04/22  0705 06/03/22  0535 06/02/22  0510   MAGNESIUM mg/dL 1.9 1.9 1.6         Results from last 7 days   Lab Units 06/04/22  0705 06/03/22  0535 06/02/22  0510 06/01/22  1600   WBC 10*3/mm3 7.92 9.67 9.89 9.98   HEMOGLOBIN g/dL 10.9* 10.5* 11.0* 11.7*   HEMATOCRIT % 34.1 32.2* 33.0* 35.9   PLATELETS 10*3/mm3 220 221 215 253           Culture Data:   No results found for: BLOODCX  No results found for: URINECX  No results found for: RESPCX  No results found for: WOUNDCX  No results found for: STOOLCX  No components found for: BODYFLD    Radiology Data:   Imaging Results (Last 24 Hours)     Procedure Component Value Units Date/Time    US Guided Vascular Access [[id number]] Collected: 06/06/22 0816     Updated: 06/06/22 1332    Narrative:      ULTRASOUND-GUIDED VASCULAR ACCESS    INDICATION: IV access poor, R07.2  Precordial pain I50.9 Heart  failure, unspecified Z74.09 Other reduced mobility Z78.9 Other  specified health status Z74.09 Other reduced mobility R07.9 Chest  pain, unspecified I42.8 Other cardiomyopathies I50.22 Chronic  systolic (congestive) heart failure R07.89 Other chest pain    TECHNIQUE: Real-time ultrasound imaging was utilized to visualize  needle entry.     FINDINGS:  Realtime ultrasound imaging was utilized to visualize needle  entry into a patent right basilic vein during midline catheter   placement and a permanent image was stored for permanent  recording and reporting.       Impression:      Imaging obtained during vascular access device placement under  ultrasound guidance.    Electronically signed by:  Jennifer Ramsey MD  6/6/2022 1:30 PM CDT  Workstation: [id number]    IR Insert Midline Without Port Pump 5 Plus [[id number]] Resulted: 06/06/22 1012     Updated: 06/06/22 1012    Narrative:      This procedure was auto-finalized with no dictation required.          I have reviewed the patient's current medications.     Assessment/Plan     Active Hospital Problems    Diagnosis    • Precordial pain    • Chest pain        Plan:  1.  Congestive heart failure: Acute on chronic exacerbation.  Continue dobutamine per cardiology recommendations.    2.  Chest pain: Status post heart cath.  Medical management recommended.  Pain improved.  3.  Urinary tract infection: Culture negative.  Continue empiric antibiotics.  4.  Hypertension: Continue current medication.  5.  Diabetes mellitus: Continue sliding scale insulin.  6.  DVT prophylaxis: SCDs.    The patient was evaluated during the global COVID-19 pandemic, and the diagnosis was suspected/considered upon their initial presentation.  Evaluation, treatment, and testing were consistent with current guidelines for patients who present with complaints or symptoms that may be related to COVID-19.    I confirmed that the patient's Advance Care Plan is present, code  status is documented, or surrogate decision maker is listed in the patient's medical record.       Discharge Planning: I expect patient to be discharged to home versus skilled facility in 1-2 days.        This document has been electronically signed by David Pinon MD on June 6, 2022 16:16 CDT

## 2022-06-06 NOTE — PLAN OF CARE
Goal Outcome Evaluation:  Plan of Care Reviewed With: patient        Progress: improving  Outcome Evaluation: Pt completed sup >< sit EOB w/ SBA, sit >< stand, fxnl mob to/from bathroom with CGA, toileting w/ Mod I for hygiene while standing/sitting unsupported. Pt completed UBB- Mod I, LBB- Mod I, UBD- CGA, LBD- Mod I for donning socks while sitting EOB w/ legs elevated. Pt demo some confusion and difficulty with sequencing/processing during ADL. MARADIAGA recommends SNF for rehab and/or 24/7 care at this time. Pt deferred sitting up in recliner OOB. Pt sitting up in bed completing self-feeding tasks w/ no s/s of distress when MARADIAGA left.

## 2022-06-06 NOTE — THERAPY TREATMENT NOTE
Patient Name: Lexi Wade  : 1950    MRN: [id number]                              Today's Date: 2022       Admit Date: 2022    Visit Dx:     ICD-10-CM ICD-9-CM   1. Precordial pain  R07.2 786.51   2. Acute on chronic congestive heart failure, unspecified heart failure type (HCC)  I50.9 428.0   3. Impaired mobility and activities of daily living  Z74.09 V49.89    Z78.9    4. Impaired functional mobility, balance, gait, and endurance  Z74.09 V49.89   5. Chest pain, unspecified type  R07.9 786.50   6. NICM (nonischemic cardiomyopathy) ([facility])  I42.8 425.4   7. Chronic systolic heart failure (HCC)  I50.22 428.22   8. Other chest pain  R07.89 786.59     Patient Active Problem List   Diagnosis   • Pseudophakia   • Primary open angle glaucoma   • Nonischemic cardiomyopathy (HCC)   • Congestive heart failure (HCC)   • Hypertension   • GERD (gastroesophageal reflux disease)   • Diabetes mellitus ([facility])   • Vitamin D deficiency   • Borderline glaucoma   • Neurologic disorder associated with diabetes mellitus ([facility])   • Mixed hyperlipidemia   • Menopausal syndrome   • Dyspnea   • Chest pain   • Morbid obesity (HCC)   • Precordial pain   • Acute respiratory failure with hypoxia ([facility])   • Coronary artery disease involving native heart with angina pectoris ([facility])   • Implantable cardioverter-defibrillator (ICD) generator end of life   • Multifocal pneumonia   • Acute on chronic systolic CHF (congestive heart failure) ([facility])   • Obesity (BMI 30-39.9)   • Shortness of breath   • Altered mental status   • Morbid obesity (HCC)   • Drowsiness   • Acute on chronic systolic congestive heart failure (HCC)     Past Medical History:   Diagnosis Date   • Chronic systolic heart failure (HCC)    • Diabetes mellitus (HCC)    • Diabetic neuropathy ([facility])    • GERD (gastroesophageal reflux disease)    • Hypercholesterolemia    • Hypertension    • Low back pain    • Morbid obesity (HCC)    • Nonischemic cardiomyopathy (HCC)    •  Osteoarthritis    • Sleep apnea    • Vitamin D deficiency      Past Surgical History:   Procedure Laterality Date   • CARDIAC CATHETERIZATION N/A 08/23/2018   • CARDIAC CATHETERIZATION N/A 6/3/2022    Procedure: Left Heart Cath;  Surgeon: Wang Felipe MD;  Location: [facility] CATH INVASIVE LOCATION;  Service: Cardiology;  Laterality: N/A;   • CARDIAC ELECTROPHYSIOLOGY PROCEDURE N/A 06/22/2020   • CARDIAC PACEMAKER PLACEMENT     • CATARACT EXTRACTION     • COLONOSCOPY N/A 06/14/2017   • PACEMAKER IMPLANTATION     • TOTAL ABDOMINAL HYSTERECTOMY WITH SALPINGO OOPHORECTOMY        General Information     Row Name 06/06/22 1032          OT Time and Intention    Document Type therapy note (daily note)  -CR     Mode of Treatment occupational therapy  -CR     Row Name 06/06/22 1032          General Information    Patient Profile Reviewed yes  -CR     Existing Precautions/Restrictions fall  -CR     Row Name 06/06/22 1032          Cognition    Orientation Status (Cognition) oriented to;person;place  -CR     Row Name 06/06/22 1032          Safety Issues, Functional Mobility    Impairments Affecting Function (Mobility) strength;endurance/activity tolerance;balance  -CR           User Key  (r) = Recorded By, (t) = Taken By, (c) = Cosigned By    Initials Name Provider Type    CR Mey Duffy COTA Occupational Therapist Assistant                 Mobility/ADL's     Row Name 06/06/22 1338          Bed Mobility    Bed Mobility supine-sit;sit-supine  -CR     Supine-Sit [place] (Bed Mobility) standby assist  -CR     Sit-Supine [place] (Bed Mobility) standby assist  -CR     Row Name 06/06/22 1338          Transfers    Transfers sit-stand transfer;stand-sit transfer;toilet transfer  -CR     Sit-Stand [place] (Transfers) contact guard  -CR     Stand-Sit [place] (Transfers) contact guard  -CR     [place] Level (Toilet Transfer) standby assist  -CR     Assistive Device (Toilet Transfer) commode;grab bars/safety  frame  -CR     Row Name 06/06/22 1338          Toilet Transfer    Type (Toilet Transfer) sit-stand;stand-sit  -CR     Row Name 06/06/22 1338          Functional Mobility    Functional Mobility- Ind. Level contact guard assist  -CR     Row Name 06/06/22 1338          Activities of Daily Living    BADL Assessment/Intervention bathing;upper body dressing;lower body dressing;toileting  -CR     Row Name 06/06/22 1338          Toileting Assessment/Training    [place] Level (Toileting) toileting skills;supervision  -CR     Assistive Devices (Toileting) commode;grab bar/safety frame  -CR     Position (Toileting) unsupported sitting;unsupported standing  -CR     Row Name 06/06/22 1338          Lower Body Dressing Assessment/Training    [place] Level (Lower Body Dressing) lower body dressing skills;don;pants/bottoms;shoes/slippers;modified independence  -CR     Position (Lower Body Dressing) edge of bed sitting  -CR           User Key  (r) = Recorded By, (t) = Taken By, (c) = Cosigned By    Initials Name Provider Type    Mey Stovall COTA Occupational Therapist Assistant               Obj/Interventions    No documentation.                Goals/Plan    No documentation.                Clinical Impression     Row Name 06/06/22 1132          Pain Assessment    Pretreatment Pain Rating 5/10  -CR     Posttreatment Pain Rating 5/10  -CR     Pain Intervention(s) Nursing Notified  -CR     Row Name 06/06/22 1132          Plan of Care Review    Plan of Care Reviewed With patient  -CR     Progress improving  -CR     Outcome Evaluation Pt completed sup >< sit EOB w/ SBA, sit >< stand, fxnl mob to/from bathroom with CGA, toileting w/ Mod I for hygiene while standing/sitting unsupported. Pt completed UBB- Mod I, LBB- Mod I, UBD- CGA, LBD- Mod I for donning socks while sitting EOB w/ legs elevated. Pt demo some confusion and difficulty with sequencing/processing during ADL. MARADIAGA recommends SNF for rehab and/or 24/7 care at  this time. Pt deferred sitting up in recliner OOB. Pt sitting up in bed completing self-feeding tasks w/ no s/s of distress when MARADIAGA left.  -CR     Row Name 06/06/22 1132          Therapy Assessment/Plan (OT)    Rehab Potential (OT) good, to achieve stated therapy goals  -CR     Criteria for Skilled Therapeutic Interventions Met (OT) yes;meets criteria  -CR     Therapy Frequency (OT) other (see comments)  3-7 days a week  -CR     Row Name 06/06/22 1132          Therapy Plan Review/Discharge Plan (OT)    Anticipated Discharge Disposition (OT) home with home health;home with assist  -CR     Row Name 06/06/22 1132          Vital Signs    Pre Systolic BP Rehab 142  -CR     Pre Treatment Diastolic BP 88  -CR     Post Systolic BP Rehab 180  -CR     Post Treatment Diastolic BP 92  -CR     Pretreatment Heart Rate (beats/min) 89  -CR     Posttreatment Heart Rate (beats/min) 82  -CR     Pre SpO2 (%) 99  -CR     O2 Delivery Pre Treatment room air  -CR     Post SpO2 (%) 98  -CR     O2 Delivery Post Treatment room air  -CR     Pre Patient Position Supine  -CR     Intra Patient Position Standing  -CR     Post Patient Position Supine  -CR     Row Name 06/06/22 1132          Positioning and Restraints    Pre-Treatment Position in bed  -CR     Post Treatment Position bed  -CR           User Key  (r) = Recorded By, (t) = Taken By, (c) = Cosigned By    Initials Name Provider Type    Mey Stovall COTA Occupational Therapist Assistant               Outcome Measures    No documentation.                 Occupational Therapy Education                 Title: PT OT SLP Therapies (In Progress)     Topic: Occupational Therapy (Done)     Point: ADL training (Done)     Description:   Instruct learner(s) on proper safety adaptation and remediation techniques during self care or transfers.   Instruct in proper use of assistive devices.              Learning Progress Summary           Patient Acceptance, E,TB,D, VU by  at 6/5/2022 1302                    Point: Home exercise program (Done)     Description:   Instruct learner(s) on appropriate technique for monitoring, assisting and/or progressing therapeutic exercises/activities.              Learning Progress Summary           Patient Acceptance, E,TB,D, VU by CS at 6/5/2022 1302                   Point: Precautions (Done)     Description:   Instruct learner(s) on prescribed precautions during self-care and functional transfers.              Learning Progress Summary           Patient Acceptance, E,TB,D, VU by CS at 6/5/2022 1302    Acceptance, E, VU by  at 6/2/2022 1501    Comment: Edu pt on use of gait belt and non skid socks when OOB and no OOB without assist.                   Point: Body mechanics (Done)     Description:   Instruct learner(s) on proper positioning and spine alignment during self-care, functional mobility activities and/or exercises.              Learning Progress Summary           Patient Acceptance, E,TB,D, VU by CS at 6/5/2022 1302                               User Key     Initials Effective Dates Name Provider Type Discipline     06/16/21 -  Tony Childress OT Occupational Therapist OT     06/16/21 -  Faiza Miller COTA Occupational Therapist Assistant OT              OT Recommendation and Plan  Therapy Frequency (OT): other (see comments) (3-7 days a week)  Plan of Care Review  Plan of Care Reviewed With: patient  Progress: improving  Outcome Evaluation: Pt completed sup >< sit EOB w/ SBA, sit >< stand, fxnl mob to/from bathroom with CGA, toileting w/ Mod I for hygiene while standing/sitting unsupported. Pt completed UBB- Mod I, LBB- Mod I, UBD- CGA, LBD- Mod I for donning socks while sitting EOB w/ legs elevated. Pt demo some confusion and difficulty with sequencing/processing during ADL. MARADIAGA recommends SNF for rehab and/or 24/7 care at this time. Pt deferred sitting up in recliner OOB. Pt sitting up in bed completing self-feeding tasks w/ no s/s of distress  when HIREN left.     Time Calculation:    Time Calculation- OT     Row Name 06/06/22 1132             Time Calculation- OT    OT Start Time 1132  -CR      OT Stop Time 1226  -CR      OT Time Calculation (min) 54 min  -CR      Total Timed Code Minutes- OT 24 minute(s)  -CR      OT Received On 06/06/22  -CR              Timed Charges    36044 - OT Self Care/Mgmt Minutes 54  -CR              Total Minutes    Timed Charges Total Minutes 54  -CR       Total Minutes 54  -CR            User Key  (r) = Recorded By, (t) = Taken By, (c) = Cosigned By    Initials Name Provider Type    CR Mey Duffy COTA Occupational Therapist Assistant              Therapy Charges for Today     Code Description Service Date Service Provider Modifiers Qty    83062091846 HC OT SELF CARE/MGMT/TRAIN EA 15 MIN 6/6/2022 Mey Duffy COTA GO 4               HIREN Dominguez  6/6/2022

## 2022-06-06 NOTE — NURSING NOTE
Attempted to get large bore IV access per dobutamine order. IV attempt without US x1 unsuccessful and  US guided IV x1  attempt unsuccessful. Will reach out to MD in regards to IV access.

## 2022-06-06 NOTE — PROGRESS NOTES
Cardiology Progress Note     LOS: 4 days   Patient Care Team:  Joyce Plata MD as PCP - General  Yamilka Wilson, RN as Ambulatory  ([facility])    Subjective:    Chart reviewed. Patient seen and examined. Patient had undergone coronary angiogram which had not reveal of any evidence of any obstructive epicardial coronary artery disease.  Patient does have nonischemic cardiomyopathy and is currently on IV dobutamine.  Patient IV dobutamine would be stopped.      Objective:  Temp:  [97.2 °F (36.2 °C)-97.5 °F (36.4 °C)] 97.2 °F (36.2 °C)  Heart Rate:  [67-92] 82  Resp:  [15-18] 18  BP: (110-142)/(56-88) 142/88    Intake/Output Summary (Last 24 hours) at 6/6/2022 1329  Last data filed at 6/6/2022 1018  Gross per 24 hour   Intake 1580 ml   Output 1750 ml   Net -170 ml       Physical Exam:   General Appearance:    Alert, oriented, cooperative, in no acute distress.   Head:    Normocephalic, atraumatic, without obvious abnormality   Eyes:             DEONDRE. Lids and lashes normal, conjunctivae and sclerae normal, no icterus, no pallor.   Ears:    Ears appear intact with no abnormalities noted.   Throat:   Mucous membranes pink and moist.   Neck:  Supple, trachea midline, no carotid bruit, no organomegaly or JVD.   Lungs:    Clear to auscultation and percussion.  Respirations regular, even and unlabored. No wheezes, rales, or rhonchi.    Heart:    Regular rhythm and normal rate, normal S1 and S2, no      murmur, no gallop, no rub, no click.   Abdomen:    Soft, nontender, nondistended, no guarding, no rebound tenderness. Normal bowel sounds in all four quadrants, no masses, liver and spleen nonpalpable.    Genitalia:    Deferred.   Extremities:   Moves all extremities well, trace edema, no cyanosis, no       redness, no clubbing.   Pulses:   Pulses palpable and equal bilaterally.   Skin:   Moist and warm. No bleeding, bruising or rash.   Neurologic/Psychiatric:   Alert and oriented to person, place,  and time.  Motor, power and tone in upper and lower extremities are grossly intact.  No focal neurological deficits. Normal cognitive function. No psychomotor reaction or tangential thought. No depression, homicidal ideations and suicidal ideations.          Results Review:    Results from last 7 days   Lab Units 06/04/22  0705 06/02/22  0510 06/01/22  1600   SODIUM mmol/L 142   < > 142   POTASSIUM mmol/L 3.9   < > 3.9   CHLORIDE mmol/L 109*   < > 109*   CO2 mmol/L 19.0*   < > 16.0*   BUN mg/dL 14   < > 21   CREATININE mg/dL 1.03*   < > 1.03*   CALCIUM mg/dL 9.6   < > 10.5   BILIRUBIN mg/dL  --   --  1.4*   ALK PHOS U/L  --   --  106   ALT (SGPT) U/L  --   --  21   AST (SGOT) U/L  --   --  20   GLUCOSE mg/dL 105*   < > 200*    < > = values in this interval not displayed.     Results from last 7 days   Lab Units 06/02/22  0510 06/01/22  2345 06/01/22  1650 06/01/22  1600   CK TOTAL U/L  --   --   --  60   TROPONIN T ng/mL <0.010 <0.010 <0.010 <0.010         Results from last 7 days   Lab Units 06/04/22  0705   WBC 10*3/mm3 7.92   HEMOGLOBIN g/dL 10.9*   HEMATOCRIT % 34.1   PLATELETS 10*3/mm3 220         Results from last 7 days   Lab Units 06/04/22  0705   MAGNESIUM mg/dL 1.9                   ECHO:  Results for orders placed during the hospital encounter of 06/01/22    Adult Transthoracic Echo Limited W/ Cont if Necessary Per Protocol    Interpretation Summary  · There is mild, bileaflet mitral valve thickening present.  · Estimated right ventricular systolic pressure from tricuspid regurgitation is markedly elevated (>55 mmHg).  · The right atrial cavity is borderline dilated.  · Left ventricular wall thickness is consistent with mild concentric hypertrophy.  · Left ventricular ejection fraction appears to be 36 - 40%.  · Moderate tricuspid valve regurgitation is present.  · The following left ventricular wall segments are hypokinetic: mid anterior, apical anterior, basal anterolateral, mid anterolateral, apical  lateral, basal inferolateral, mid inferolateral, apical inferior, mid inferior, apical septal, basal inferoseptal, mid inferoseptal, apex hypokinetic, mid anteroseptal, basal anterior, basal inferior and basal inferoseptal.  · Left ventricular diastolic function was normal.      ECG 12 Lead   ED Interpretation   Jose R Hill MD     6/1/2022  5:28 PM   ECG 12 Lead         Date/Time: 6/1/2022 5:27 PM   Performed by: Jose R Hill MD   Authorized by: Jose R Hill MD    Interpreted by physician   Rhythm: paced   BPM: 108   ST Segments: ST segments normal            Final Result   Test Reason : CP   Blood Pressure :   */*   mmHG   Vent. Rate : 108 BPM     Atrial Rate : 108 BPM      P-R Int : 134 ms          QRS Dur : 116 ms       QT Int : 372 ms       P-R-T Axes :  36 267  59 degrees      QTc Int : 498 ms      Electronic ventricular pacemaker   When compared with ECG of 08-MAY-2022 10:10,   Vent. rate has increased BY   5 BPM      Referred By:            Confirmed By: MARIO CORONA MD      SCANNED EKG   Final Result         SCANNED - TELEMETRY     Final Result         SCANNED - TELEMETRY     Final Result         SCANNED - TELEMETRY     Final Result         SCANNED - TELEMETRY     Final Result         SCANNED - TELEMETRY     Final Result              Medication Review:   Current Facility-Administered Medications   Medication Dose Route Frequency Provider Last Rate Last Admin   • acetaminophen (TYLENOL) suppository 650 mg  650 mg Rectal Q4H PRN Wnag Felipe MD       • acetaminophen (TYLENOL) tablet 650 mg  650 mg Oral Q4H PRN Wang Felipe MD       • albuterol (PROVENTIL) nebulizer solution 0.083% 2.5 mg/3mL  2.5 mg Nebulization Q4H PRN Wang Felipe MD       • aspirin EC tablet 81 mg  81 mg Oral Nightly Wang Felipe MD   81 mg at 06/05/22 2113   • atorvastatin (LIPITOR) tablet 40 mg  40 mg Oral Daily Wang Felipe MD   40 mg at 06/06/22 0902   • calcium carbonate (TUMS)  chewable tablet 500 mg (200 mg elemental)  1 tablet Oral BID PRN Wang Felipe MD       • carvedilol (COREG) tablet 25 mg  25 mg Oral BID With Meals Wang Felipe MD   25 mg at 06/06/22 0901   • cefTRIAXone (ROCEPHIN) 2 g/100 mL 0.9% NS IVPB (MBP)  2 g Intravenous Q24H Wang Felipe MD   2 g at 06/05/22 2248   • dextrose (D50W) (25 g/50 mL) IV injection 25 g  25 g Intravenous Q15 Min PRN Wang Felipe MD       • dextrose (GLUTOSE) oral gel 15 g  15 g Oral Q15 Min PRN Wang Felipe MD       • DOBUTamine 500 mg in sodium chloride 0.9 % 250 mL infusion  8 mcg/kg/min Intravenous Continuous Wang Felipe MD 21.3 mL/hr at 06/05/22 1933 8 mcg/kg/min at 06/05/22 1933   • furosemide (LASIX) injection 40 mg  40 mg Intravenous Daily Wang Felipe MD   40 mg at 06/06/22 0902   • glucagon (human recombinant) (GLUCAGEN DIAGNOSTIC) injection 1 mg  1 mg Intramuscular Q15 Min PRN Wang Felipe MD       • Insulin Aspart (novoLOG) injection 0-14 Units  0-14 Units Subcutaneous TID AC Daniel Goncalves MD   5 Units at 06/04/22 1705   • insulin detemir (LEVEMIR) injection 20 Units  20 Units Subcutaneous Nightly Wang Felipe MD   20 Units at 06/05/22 2116   • ipratropium-albuterol (DUO-NEB) nebulizer solution 3 mL  3 mL Nebulization 4x Daily Wang Felipe MD   3 mL at 06/06/22 1123   • isosorbide mononitrate (IMDUR) 24 hr tablet 30 mg  30 mg Oral Daily Wang Felipe MD   30 mg at 06/06/22 0902   • lisinopril (PRINIVIL,ZESTRIL) tablet 10 mg  10 mg Oral Daily Wang Felipe MD   10 mg at 06/06/22 0901   • magnesium oxide (MAG-OX) tablet 400 mg  400 mg Oral Daily Wang Felipe MD   400 mg at 06/06/22 0901   • nitroglycerin (NITROSTAT) SL tablet 0.4 mg  0.4 mg Sublingual Q5 Min PRN Wang Felipe MD       • ondansetron (ZOFRAN) injection 4 mg  4 mg Intravenous Q6H PRN Wang Felipe MD       • pantoprazole (PROTONIX) EC tablet 40 mg  40 mg Oral Daily Wang Felipe MD   40 mg at 06/06/22 0902   •  potassium chloride (MICRO-K) CR capsule 20 mEq  20 mEq Oral BID Wang Felipe MD   20 mEq at 06/06/22 0901   • sodium chloride 0.9 % flush 10 mL  10 mL Intravenous Q12H Wang Felipe MD   10 mL at 06/06/22 0902   • sodium chloride 0.9 % flush 10 mL  10 mL Intravenous PRN Wang Felipe MD       • sodium chloride 0.9 % infusion  30 mL/hr Intravenous Continuous Michael Murcia MD 30 mL/hr at 06/05/22 1933 30 mL/hr at 06/05/22 1933   • sucralfate (CARAFATE) tablet 1 g  1 g Oral TID Wang Felipe MD   1 g at 06/06/22 0902       Assessment and Plan:      Chest pain    Precordial pain  1.  Chest pain.  Patient has symptoms of atypical chest pain.  Patient underwent coronary angiogram which had revealed distal left main minimal plaquing.  Patient did not have any high-grade obstructive stenosis and was recommended medical management.  Patient would be continued on isosorbide.  Have discussed with the patient if the patient continues to have symptoms of chest pain patient would be started on Ranexa.    2.  Nonischemic cardiomyopathy with left ventricular systolic dysfunction.  Patient IV dobutamine would be stopped.  Patient would be continued on the carvedilol and lisinopril.  Patient would be administered Lasix on a daily basis and will follow the fluid status.    3.  Nonischemic cardiomyopathy s/p [place] AICD.  Patient had undergone AICD interrogation which had revealed appropriate sensing and pacing function.    4.  Hyperlipidemia.  Patient has been counseled on low-fat low-cholesterol diet and to continue the present dose of the Lipitor 40 mg.    5.  Obesity with a body mass index of 34.  Patient has been counseled on weight reduction lifestyle modification and dietary restriction.  Patient has been explained the risk associated with obesity and the occurrence and progression of atherosclerotic coronary artery disease and peripheral vascular disease.    6.  Chronic kidney disease.  Patient renal  function would be followed.    The above plan of management were discussed with the patient            Electronically signed by Wang Felipe MD, 06/06/22, 1:29 PM CDT.      Time: Time spent on face-to-face interaction 20 minutes      Dictated utilizing Dragon dictation.

## 2022-06-06 NOTE — CONSULTS
Adult Nutrition  Assessment    Patient Name:  Lexi Wade  YOB: 1950  MRN: [id number]  Admit Date:  6/1/2022    Assessment Date:  6/6/2022    Comments:  RD f/u. Pt is s/p cardiac cath showing no obstruction. PO intakes recorded at %. Weight is stable. RD unable to speak to pt today d/t being busy w/other disciplines x 2 attempts. Will monitor course.     Reason for Assessment     Row Name 06/06/22 1416          Reason for Assessment    Reason For Assessment follow-up protocol                Nutrition/Diet History     Row Name 06/06/22 1416          Nutrition/Diet History    Typical Intake (Food/Fluid/EN/PN) Pt w/other discipline x 2 attempts to visit today.                  Labs/Tests/Procedures/Meds     Row Name 06/06/22 1421          Labs/Procedures/Meds    Lab Results Reviewed reviewed, pertinent     Lab Results Comments lipase 194            Medications    Pertinent Medications Reviewed reviewed                    Nutrition Prescription Ordered     Row Name 06/06/22 1421          Nutrition Prescription PO    Current PO Diet Regular     Fluid Consistency Thin     Common Modifiers Cardiac;Consistent Carbohydrate                Evaluation of Received Nutrient/Fluid Intake     Row Name 06/06/22 1421          PO Evaluation    Number of Meals 6     % PO Intake                      Electronically signed by:  Priyanka Cheney RD  06/06/22 14:22 CDT

## 2022-06-06 NOTE — PLAN OF CARE
Goal Outcome Evaluation:  Plan of Care Reviewed With: patient        Progress: improving  Outcome Evaluation: pt not real motivated and needs time to consider her responses. sba to eob and was standing in room comeing from bathroom upon arrival. trying to sit in chair and has iv stretched to the limit. unaware of dangers involved. does amb with fww x 240 with flexed posture and back to room and eob for some limited therex. will need assist at dc for safety.

## 2022-06-06 NOTE — THERAPY TREATMENT NOTE
Acute Care - Physical Therapy Treatment Note  [facility]     Patient Name: Lexi Wade  : 1950  MRN: [id number]  Today's Date: 2022      Visit Dx:     ICD-10-CM ICD-9-CM   1. Precordial pain  R07.2 786.51   2. Acute on chronic congestive heart failure, unspecified heart failure type (HCC)  I50.9 428.0   3. Impaired mobility and activities of daily living  Z74.09 V49.89    Z78.9    4. Impaired functional mobility, balance, gait, and endurance  Z74.09 V49.89   5. Chest pain, unspecified type  R07.9 786.50   6. NICM (nonischemic cardiomyopathy) ([facility])  I42.8 425.4   7. Chronic systolic heart failure ([facility])  I50.22 428.22   8. Other chest pain  R07.89 786.59     Patient Active Problem List   Diagnosis   • Pseudophakia   • Primary open angle glaucoma   • Nonischemic cardiomyopathy (HCC)   • Congestive heart failure (HCC)   • Hypertension   • GERD (gastroesophageal reflux disease)   • Diabetes mellitus ([facility])   • Vitamin D deficiency   • Borderline glaucoma   • Neurologic disorder associated with diabetes mellitus ([facility])   • Mixed hyperlipidemia   • Menopausal syndrome   • Dyspnea   • Chest pain   • Morbid obesity ([facility])   • Precordial pain   • Acute respiratory failure with hypoxia ([facility])   • Coronary artery disease involving native heart with angina pectoris ([facility])   • Implantable cardioverter-defibrillator (ICD) generator end of life   • Multifocal pneumonia   • Acute on chronic systolic CHF (congestive heart failure) ([facility])   • Obesity (BMI 30-39.9)   • Shortness of breath   • Altered mental status   • Morbid obesity (HCC)   • Drowsiness   • Acute on chronic systolic congestive heart failure (HCC)     Past Medical History:   Diagnosis Date   • Chronic systolic heart failure ([facility])    • Diabetes mellitus ([facility])    • Diabetic neuropathy ([facility])    • GERD (gastroesophageal reflux disease)    • Hypercholesterolemia    • Hypertension    • Low back pain    • Morbid obesity ([facility])    • Nonischemic cardiomyopathy  (HCC)    • Osteoarthritis    • Sleep apnea    • Vitamin D deficiency      Past Surgical History:   Procedure Laterality Date   • CARDIAC CATHETERIZATION N/A 2018   • CARDIAC CATHETERIZATION N/A 6/3/2022    Procedure: Left Heart Cath;  Surgeon: Wang Felipe MD;  Location: [facility] CATH INVASIVE LOCATION;  Service: Cardiology;  Laterality: N/A;   • CARDIAC ELECTROPHYSIOLOGY PROCEDURE N/A 2020   • CARDIAC PACEMAKER PLACEMENT     • CATARACT EXTRACTION     • COLONOSCOPY N/A 2017   • PACEMAKER IMPLANTATION     • TOTAL ABDOMINAL HYSTERECTOMY WITH SALPINGO OOPHORECTOMY       PT Assessment (last 12 hours)     PT Evaluation and Treatment     Row Name 22 1316          Physical Therapy Time and Intention    Document Type therapy note (daily note)  -RW     Mode of Treatment physical therapy;individual therapy  -RW     Patient Effort adequate  -RW     Row Name 22 1316          General Information    Patient Profile Reviewed yes  -RW     Existing Precautions/Restrictions fall  -RW     Row Name 22 1316          Pain    Pretreatment Pain Rating 0/10 - no pain  -RW     Posttreatment Pain Rating 0/10 - no pain  -RW     Row Name 22 1316          Cognition    Orientation Status (Cognition) oriented to;person;place    -RW     Row Name 22 1316          Range of Motion Comprehensive    General Range of Motion bilateral lower extremity ROM WFL  -RW     Row Name 22 1316          Bed Mobility    Bed Mobility supine-sit;sit-supine  -RW     Supine-Sit [place] (Bed Mobility) standby assist  -RW     Sit-Supine [place] (Bed Mobility) standby assist  -RW     Row Name 22 1316          Transfers    Transfers sit-stand transfer;stand-sit transfer;toilet transfer  -RW     Sit-Stand [place] (Transfers) contact guard  -RW     Stand-Sit [place] (Transfers) contact guard  -RW     [place] Level (Toilet Transfer) contact guard;supervision  -RW     Assistive Device  (Toilet Transfer) walker, front-wheeled  -RW     Row Name 06/06/22 1316          Sit-Stand Transfer    Assistive Device (Sit-Stand Transfers) walker, front-wheeled  -RW     Row Name 06/06/22 1316          Stand-Sit Transfer    Assistive Device (Stand-Sit Transfers) walker, front-wheeled  -RW     Row Name 06/06/22 1316          Toilet Transfer    Type (Toilet Transfer) sit-stand;stand-sit  -RW     Row Name 06/06/22 1316          Gait/Stairs (Locomotion)    [place] Level (Gait) contact guard  -RW     Assistive Device (Gait) walker, front-wheeled  -RW     Distance in Feet (Gait) 240  -RW     Pattern (Gait) step-through  -RW     Deviations/Abnormal Patterns (Gait) base of support, wide  -RW     Bilateral Gait Deviations forward flexed posture  -RW     Row Name 06/06/22 1316          Safety Issues, Functional Mobility    Impairments Affecting Function (Mobility) strength;endurance/activity tolerance;balance  -     Row Name 06/06/22 1316          Motor Skills    Therapeutic Exercise hip;knee;ankle  -     Row Name 06/06/22 1316          Knee (Therapeutic Exercise)    Knee (Therapeutic Exercise) AROM (active range of motion)  -RW     Knee AROM (Therapeutic Exercise) LAQ (long arc quad);15 repititions  -     Row Name 06/06/22 1316          Vital Signs    Pre Systolic BP Rehab 155  -RW     Pre Treatment Diastolic BP 81  -RW     Pretreatment Heart Rate (beats/min) 87  -RW     Posttreatment Heart Rate (beats/min) 88  -RW     Pre SpO2 (%) 96  -RW     O2 Delivery Pre Treatment room air  -RW     Post SpO2 (%) 97  -RW     O2 Delivery Post Treatment room air  -     Row Name 06/06/22 1316          Positioning and Restraints    Pre-Treatment Position in bed  -RW     Post Treatment Position bed  -RW     Row Name 06/06/22 1316          Therapy Assessment/Plan (PT)    Criteria for Skilled Interventions Met (PT) yes;skilled treatment is necessary  -     Therapy Frequency (PT) other (see comments)  3-7 days/week  -[facility]  Name 06/06/22 1316          Bed Mobility Goal 1 (PT)    Activity/Assistive Device (Bed Mobility Goal 1, PT) sit to supine/supine to sit  -RW     [place] Level/Cues Needed (Bed Mobility Goal 1, PT) independent  -RW     Time Frame (Bed Mobility Goal 1, PT) by discharge  -RW     Strategies/Barriers (Bed Mobility Goal 1, PT) HOB flat, no bed rails.  -RW     Progress/Outcomes (Bed Mobility Goal 1, PT) goal not met  -RW     Row Name 06/06/22 1316          Transfer Goal 1 (PT)    Activity/Assistive Device (Transfer Goal 1, PT) sit-to-stand/stand-to-sit;bed-to-chair/chair-to-bed;walker, rolling  -RW     [place] Level/Cues Needed (Transfer Goal 1, PT) modified independence  -RW     Time Frame (Transfer Goal 1, PT) by discharge  -RW     Strategies/Barriers (Transfers Goal 1, PT) Tends to lean posteriorly upon initial standing.  -RW     Progress/Outcome (Transfer Goal 1, PT) goal not met  -RW     Row Name 06/06/22 1316          Gait Training Goal 1 (PT)    Activity/Assistive Device (Gait Training Goal 1, PT) walker, rolling  -RW     [place] Level (Gait Training Goal 1, PT) modified independence  -RW     Distance (Gait Training Goal 1, PT) 75'x2.  -RW     Time Frame (Gait Training Goal 1, PT) by discharge  -RW     Strategies/Barriers (Gait Training Goal 1, PT) Facilitate upright posture.  -RW     Progress/Outcome (Gait Training Goal 1, PT) goal not met  -RW     Row Name 06/06/22 1316          Stairs Goal 1 (PT)    Activity/Assistive Device (Stairs Goal 1, PT) using handrail, left  -RW     [place] Level/Cues Needed (Stairs Goal 1, PT) supervision required  -RW     Number of Stairs (Stairs Goal 1, PT) 3 steps.  -RW     Time Frame (Stairs Goal 1, PT) by discharge  -RW     Progress/Outcome (Stairs Goal 1, PT) goal not met  -RW     Row Name 06/06/22 1316          Problem Specific Goal 1 (PT)    Problem Specific Goal 1 (PT) Score 24/28 on [place] fall risk assessment.  -RW     Time Frame (Problem Specific Goal 1,  PT) by discharge  -RW     Progress/Outcome (Problem Specific Goal 1, PT) goal not met  -RW           User Key  (r) = Recorded By, (t) = Taken By, (c) = Cosigned By    Initials Name Provider Type    RW Stan Ron, PTA Physical Therapist Assistant                Physical Therapy Education                 Title: PT OT SLP Therapies (In Progress)     Topic: Physical Therapy (In Progress)     Point: Mobility training (In Progress)     Learning Progress Summary           Patient Acceptance, E, NR by  at 6/2/2022 5652    Comment: PT POC, hand placement with transfers, use of walker, upright posture.                   Point: Home exercise program (Not Started)     Learner Progress:  Not documented in this visit.          Point: Body mechanics (Not Started)     Learner Progress:  Not documented in this visit.          Point: Precautions (Not Started)     Learner Progress:  Not documented in this visit.                      User Key     Initials Effective Dates Name Provider Type Discipline     06/16/21 -  Frandy Lemos, PT Physical Therapist PT              PT Recommendation and Plan  Anticipated Discharge Disposition (PT): home with assist  Therapy Frequency (PT): other (see comments) (3-7 days/week)  Plan of Care Reviewed With: patient  Progress: improving  Outcome Evaluation: pt not real motivated and needs time to consider her responses. sba to eob and was standing in room comeing from bathroom upon arrival. trying to sit in chair and has iv stretched to the limit. unaware of dangers involved. does amb with fww x 240 with flexed posture and back to room and eob for some limited therex. will need assist at dc for safety.       Time Calculation:    PT Charges     Row Name 06/06/22 1439             Time Calculation    Start Time 1316  -RW      Stop Time 1346  -RW      Time Calculation (min) 30 min  -RW              Time Calculation- PT    Total Timed Code Minutes- PT 30 minute(s)  -RW              Timed Charges     57929 - PT Therapeutic Activity Minutes 30  -RW              Total Minutes    Timed Charges Total Minutes 30  -RW       Total Minutes 30  -RW            User Key  (r) = Recorded By, (t) = Taken By, (c) = Cosigned By    Initials Name Provider Type    Stan Crabtree PTA Physical Therapist Assistant              Therapy Charges for Today     Code Description Service Date Service Provider Modifiers Qty    07943240915 HC PT THERAPEUTIC ACT EA 15 MIN 6/6/2022 Stan Ron PTA GP 2          PT G-Codes  Outcome Measure Options: AM-PAC 6 Clicks Basic Mobility (PT), [place]  AM-PAC 6 Clicks Score (PT): 18  AM-PAC 6 Clicks Score (OT): 20    Stan Ron PTA  6/6/2022

## 2022-06-07 PROBLEM — I50.23 ACUTE ON CHRONIC SYSTOLIC HEART FAILURE (HCC): Status: ACTIVE | Noted: 2022-01-01

## 2022-06-07 NOTE — PROGRESS NOTES
Cardiology Progress Note     LOS: 5 days   Patient Care Team:  Joyce Plata MD as PCP - General  Yamilka Wilson, RN as Ambulatory  ([facility])    Subjective:    Chart reviewed. Patient seen and examined. Patient had complained of having symptoms of chest pain and generalized fatigue and weakness.  Patient dobutamine has been stopped though patient complains of having symptoms of fatigue and shortness of breath.  Patient telemetry monitor has not reveal of any evidence of any arrhythmia.  Patient is out of bed in chair.      Objective:  Temp:  [97 °F (36.1 °C)-97.7 °F (36.5 °C)] 97.6 °F (36.4 °C)  Heart Rate:  [70-90] 71  Resp:  [18-20] 18  BP: (112-135)/(59-77) 112/59    Intake/Output Summary (Last 24 hours) at 6/7/2022 1832  Last data filed at 6/7/2022 1800  Gross per 24 hour   Intake 840 ml   Output 900 ml   Net -60 ml       Physical Exam:   General Appearance:    Alert, oriented, cooperative, in no acute distress.   Head:    Normocephalic, atraumatic, without obvious abnormality   Eyes:             DEONDRE. Lids and lashes normal, conjunctivae and sclerae normal, no icterus, no pallor.   Ears:    Ears appear intact with no abnormalities noted.   Throat:   Mucous membranes pink and moist.   Neck:  Supple, trachea midline, no carotid bruit, no organomegaly or JVD.   Lungs:    Clear to auscultation and percussion.  Respirations regular, even and unlabored. No wheezes, rales, or rhonchi.    Heart:    Regular rhythm and normal rate, normal S1 and S2, no      murmur, no gallop, no rub, no click.   Abdomen:    Soft, nontender, nondistended, no guarding, no rebound tenderness. Normal bowel sounds in all four quadrants, no masses, liver and spleen nonpalpable.    Genitalia:    Deferred.   Extremities:   Moves all extremities well, no edema, no cyanosis, no       redness, no clubbing.   Pulses:   Pulses palpable and equal bilaterally.   Skin:   Moist and warm. No bleeding, bruising or rash.    Neurologic/Psychiatric:   Alert and oriented to person, place, and time.  Motor, power and tone in upper and lower extremities are grossly intact.  No focal neurological deficits. Normal cognitive function. No psychomotor reaction or tangential thought. No depression, homicidal ideations and suicidal ideations.          Results Review:    Results from last 7 days   Lab Units 06/04/22  0705 06/02/22  0510 06/01/22  1600   SODIUM mmol/L 142   < > 142   POTASSIUM mmol/L 3.9   < > 3.9   CHLORIDE mmol/L 109*   < > 109*   CO2 mmol/L 19.0*   < > 16.0*   BUN mg/dL 14   < > 21   CREATININE mg/dL 1.03*   < > 1.03*   CALCIUM mg/dL 9.6   < > 10.5   BILIRUBIN mg/dL  --   --  1.4*   ALK PHOS U/L  --   --  106   ALT (SGPT) U/L  --   --  21   AST (SGOT) U/L  --   --  20   GLUCOSE mg/dL 105*   < > 200*    < > = values in this interval not displayed.     Results from last 7 days   Lab Units 06/02/22  0510 06/01/22  2345 06/01/22  1650 06/01/22  1600   CK TOTAL U/L  --   --   --  60   TROPONIN T ng/mL <0.010 <0.010 <0.010 <0.010         Results from last 7 days   Lab Units 06/04/22  0705   WBC 10*3/mm3 7.92   HEMOGLOBIN g/dL 10.9*   HEMATOCRIT % 34.1   PLATELETS 10*3/mm3 220         Results from last 7 days   Lab Units 06/04/22  0705   MAGNESIUM mg/dL 1.9                   ECHO:  Results for orders placed during the hospital encounter of 06/01/22    Adult Transthoracic Echo Limited W/ Cont if Necessary Per Protocol    Interpretation Summary  · There is mild, bileaflet mitral valve thickening present.  · Estimated right ventricular systolic pressure from tricuspid regurgitation is markedly elevated (>55 mmHg).  · The right atrial cavity is borderline dilated.  · Left ventricular wall thickness is consistent with mild concentric hypertrophy.  · Left ventricular ejection fraction appears to be 36 - 40%.  · Moderate tricuspid valve regurgitation is present.  · The following left ventricular wall segments are hypokinetic: mid anterior,  apical anterior, basal anterolateral, mid anterolateral, apical lateral, basal inferolateral, mid inferolateral, apical inferior, mid inferior, apical septal, basal inferoseptal, mid inferoseptal, apex hypokinetic, mid anteroseptal, basal anterior, basal inferior and basal inferoseptal.  · Left ventricular diastolic function was normal.      ECG 12 Lead   ED Interpretation   Jose R Hill MD     6/1/2022  5:28 PM   ECG 12 Lead         Date/Time: 6/1/2022 5:27 PM   Performed by: Jose R Hill MD   Authorized by: Jose R Hill MD    Interpreted by physician   Rhythm: paced   BPM: 108   ST Segments: ST segments normal            Final Result   Test Reason : CP   Blood Pressure :   */*   mmHG   Vent. Rate : 108 BPM     Atrial Rate : 108 BPM      P-R Int : 134 ms          QRS Dur : 116 ms       QT Int : 372 ms       P-R-T Axes :  36 267  59 degrees      QTc Int : 498 ms      Electronic ventricular pacemaker   When compared with ECG of 08-MAY-2022 10:10,   Vent. rate has increased BY   5 BPM      Referred By:            Confirmed By: MARIO CORONA MD      SCANNED EKG   Final Result         SCANNED - TELEMETRY     Final Result         SCANNED - TELEMETRY     Final Result         SCANNED - TELEMETRY     Final Result         SCANNED - TELEMETRY     Final Result         SCANNED - TELEMETRY     Final Result              Medication Review:   Current Facility-Administered Medications   Medication Dose Route Frequency Provider Last Rate Last Admin   • acetaminophen (TYLENOL) suppository 650 mg  650 mg Rectal Q4H PRN Wang Felipe MD       • acetaminophen (TYLENOL) tablet 650 mg  650 mg Oral Q4H PRN Wang Felipe MD       • albuterol (PROVENTIL) nebulizer solution 0.083% 2.5 mg/3mL  2.5 mg Nebulization Q4H PRN Wang Felipe MD       • aspirin EC tablet 81 mg  81 mg Oral Nightly Wang Felipe MD   81 mg at 06/06/22 2046   • atorvastatin (LIPITOR) tablet 40 mg  40 mg Oral Daily Wang Felipe  MD   40 mg at 06/07/22 0831   • calcium carbonate (TUMS) chewable tablet 500 mg (200 mg elemental)  1 tablet Oral BID PRN Wang Felipe MD       • carvedilol (COREG) tablet 25 mg  25 mg Oral BID With Meals Wang Felipe MD   25 mg at 06/07/22 1649   • cefTRIAXone (ROCEPHIN) 2 g/100 mL 0.9% NS IVPB (MBP)  2 g Intravenous Q24H Wang Felipe MD   2 g at 06/06/22 2143   • dextrose (D50W) (25 g/50 mL) IV injection 25 g  25 g Intravenous Q15 Min PRN Wang Felipe MD       • dextrose (GLUTOSE) oral gel 15 g  15 g Oral Q15 Min PRN Wang Felipe MD       • furosemide (LASIX) injection 40 mg  40 mg Intravenous Daily Wang Felipe MD   40 mg at 06/07/22 0831   • glucagon (human recombinant) (GLUCAGEN DIAGNOSTIC) injection 1 mg  1 mg Intramuscular Q15 Min PRN Wang Felipe MD       • Insulin Aspart (novoLOG) injection 0-14 Units  0-14 Units Subcutaneous TID Daniel Rodrigues MD   5 Units at 06/07/22 1649   • insulin detemir (LEVEMIR) injection 20 Units  20 Units Subcutaneous Nightly Wang Felipe MD   20 Units at 06/06/22 2046   • ipratropium-albuterol (DUO-NEB) nebulizer solution 3 mL  3 mL Nebulization 4x Daily Wang Felipe MD   3 mL at 06/07/22 0734   • isosorbide mononitrate (IMDUR) 24 hr tablet 30 mg  30 mg Oral Daily Wang Felipe MD   30 mg at 06/07/22 0831   • lisinopril (PRINIVIL,ZESTRIL) tablet 10 mg  10 mg Oral Daily Wang Felipe MD   10 mg at 06/07/22 0831   • magnesium oxide (MAG-OX) tablet 400 mg  400 mg Oral Daily Wang Felipe MD   400 mg at 06/07/22 0831   • nitroglycerin (NITROSTAT) SL tablet 0.4 mg  0.4 mg Sublingual Q5 Min PRN Wang Felipe MD       • ondansetron (ZOFRAN) injection 4 mg  4 mg Intravenous Q6H PRN Wang Felipe MD       • pantoprazole (PROTONIX) EC tablet 40 mg  40 mg Oral Daily Wang Felipe MD   40 mg at 06/07/22 0831   • potassium chloride (MICRO-K) CR capsule 20 mEq  20 mEq Oral BID Wang Felipe MD   20 mEq at 06/07/22 0831   • sodium  chloride 0.9 % flush 10 mL  10 mL Intravenous Q12H Wang Felipe MD   10 mL at 06/07/22 0832   • sodium chloride 0.9 % flush 10 mL  10 mL Intravenous PRN Wang Felipe MD       • sucralfate (CARAFATE) tablet 1 g  1 g Oral TID Wang Felipe MD   1 g at 06/07/22 1650       Assessment and Plan:      Chest pain    Precordial pain    Acute on chronic systolic heart failure (CMS/HCC)  1.  Chest pain.  Patient had very atypical symptoms of chest discomfort.  Patient underwent coronary angiogram which did not reveal of any evidence of any obstructive epicardial coronary artery disease.  Patient was recommended medical management.  Patient would be continued on isosorbide.  Have discussed with the patient if the patient continues to have recurrence of pain patient would be started on Ranexa 500 mg twice a day.    2.  Nonischemic cardiomyopathy with left ventricular systolic dysfunction.  Patient IV dobutamine has been stopped.  Patient is currently on afterload reduction with lisinopril.  Patient is on Coreg and Lasix.  Clinically at the present time patient does not have any lower extremity edema and is resting comfortably and on examination does not have any appreciable rales.  Patient is in compensated chronic systolic heart failure.  Patient would be continued on the present dose of the Lasix.    3.  Arterial hypertension.  Patient blood pressure has remained stable and patient would be continued on the present dose of the lisinopril and the carvedilol.    4.  Status post [place] AICD.  Patient underwent AICD interrogation which had revealed appropriate AICD function with adequate battery reserve.    5.  Hyperlipidemia.  Patient has been counseled on low-fat low-cholesterol diet and to continue the present dose of the Lipitor.    6.  Obesity with a body mass index of 34.  Patient has been counseled on weight reduction lifestyle modification and dietary restriction.  Patient has been explained the risk  associated with obesity and the current and progression of atherosclerotic coronary artery disease and peripheral vascular disease.    7.  Chronic kidney disease.  Patient renal function has improved with a creatinine of 1.03.  Patient renal function would be followed outpatient on the present dose of the Lasix.      8.  Anemia.  Patient's hemoglobin is 10.9.  Patient has not had any hemoptysis hematuria bright red blood per rectum patient would need follow-up on the H&H on outpatient basis.    The above plan of management were discussed with the patient and the nursing staff      Electronically signed by Wang Felipe MD, 06/07/22, 6:32 PM CDT.      Time: Time spent on face-to-face interaction 20 minutes      Dictated utilizing Dragon dictation.

## 2022-06-07 NOTE — PLAN OF CARE
Goal Outcome Evaluation:           Progress: improving  Outcome Evaluation: pt is impulsive at times with behavior.  vicky jump oob to go to bathroom.  dobutamine gtt d/c pt ready for d/c per Dr. Felipe's standpoint.

## 2022-06-07 NOTE — PLAN OF CARE
Goal Outcome Evaluation:  Plan of Care Reviewed With: patient        Progress: improving  Outcome Evaluation: Pt lying across bed with heart monitor off when MARADIAGA arrived. Pt perf sup-sit w/ CGA; MARADIAGA re-donned heart monitor. Pt demo impulsiveness during OT tx, however is receptive to direction and VCs. Pt perf fxnl mob in room using RWx ~ 15 ft with CGA for safety, toilet t/f w/ CGA and toileting w/ supervision, sit >< stand from toilet w/ Mod I using grab bars, completed ~ 10 sit >< stand t/fs w/ CGA and performed stand piviot t/f to recliner w/ SBA-CGA. Pt seated in recliner w/ all needs met and no s/s of distress. VSS. Pt needs 24/7 assist at this time due to safety concerns.

## 2022-06-07 NOTE — THERAPY TREATMENT NOTE
Patient Name: Lexi Wade  : 1950    MRN: [id number]                              Today's Date: 2022       Admit Date: 2022    Visit Dx:     ICD-10-CM ICD-9-CM   1. Precordial pain  R07.2 786.51   2. Acute on chronic congestive heart failure, unspecified heart failure type (HCC)  I50.9 428.0   3. Impaired mobility and activities of daily living  Z74.09 V49.89    Z78.9    4. Impaired functional mobility, balance, gait, and endurance  Z74.09 V49.89   5. Chest pain, unspecified type  R07.9 786.50   6. NICM (nonischemic cardiomyopathy) ([facility])  I42.8 425.4   7. Chronic systolic heart failure (HCC)  I50.22 428.22   8. 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)   • Mixed hyperlipidemia   • Menopausal syndrome   • Dyspnea   • Chest pain   • Morbid obesity (HCC)   • Precordial pain   • Acute respiratory failure with hypoxia ([facility])   • Coronary artery disease involving native heart with angina pectoris ([facility])   • Implantable cardioverter-defibrillator (ICD) generator end of life   • Multifocal pneumonia   • Acute on chronic systolic CHF (congestive heart failure) ([facility])   • Obesity (BMI 30-39.9)   • Shortness of breath   • Altered mental status   • Morbid obesity (HCC)   • Drowsiness   • Acute on chronic systolic congestive heart failure (HCC)   • Acute on chronic systolic heart failure (CMS/HCC)     Past Medical History:   Diagnosis Date   • Chronic systolic heart failure (HCC)    • Diabetes mellitus (HCC)    • Diabetic neuropathy (HCC)    • GERD (gastroesophageal reflux disease)    • Hypercholesterolemia    • Hypertension    • Low back pain    • Morbid  obesity (HCC)    • Nonischemic cardiomyopathy (HCC)    • Osteoarthritis    • Sleep apnea    • Vitamin D deficiency      Past Surgical History:   Procedure Laterality Date   • CARDIAC CATHETERIZATION N/A 08/23/2018   • CARDIAC CATHETERIZATION N/A 6/3/2022    Procedure: Left Heart Cath;  Surgeon: Wang Felipe MD;  Location: [facility] CATH INVASIVE LOCATION;  Service: Cardiology;  Laterality: N/A;   • CARDIAC ELECTROPHYSIOLOGY PROCEDURE N/A 06/22/2020   • CARDIAC PACEMAKER PLACEMENT     • CATARACT EXTRACTION     • COLONOSCOPY N/A 06/14/2017   • PACEMAKER IMPLANTATION     • TOTAL ABDOMINAL HYSTERECTOMY WITH SALPINGO OOPHORECTOMY        General Information     Row Name 06/07/22 1000          OT Time and Intention    Document Type therapy note (daily note)  -CR     Mode of Treatment occupational therapy  -CR     Row Name 06/07/22 1000          General Information    Patient Profile Reviewed yes  -CR     Existing Precautions/Restrictions fall  -CR     Row Name 06/07/22 1000          Cognition    Orientation Status (Cognition) oriented to;person;place  -CR     Row Name 06/07/22 1000          Safety Issues, Functional Mobility    Impairments Affecting Function (Mobility) strength;endurance/activity tolerance;balance  -CR           User Key  (r) = Recorded By, (t) = Taken By, (c) = Cosigned By    Initials Name Provider Type    CR Mey Duffy COTA Occupational Therapist Assistant                 Mobility/ADL's     Row Name 06/07/22 1000          Bed Mobility    Bed Mobility supine-sit  -CR     Supine-Sit [place] (Bed Mobility) contact guard  -CR     Row Name 06/07/22 1000          Transfers    Transfers sit-stand transfer;stand-sit transfer;toilet transfer;bed-chair transfer  -CR     Sit-Stand [place] (Transfers) contact guard  -CR     Stand-Sit [place] (Transfers) contact guard  -CR     [place] Level (Toilet Transfer) standby assist  -CR     Assistive Device (Toilet Transfer) commode;grab bars/safety  frame  -CR     Row Name 06/07/22 1000          Sit-Stand Transfer    Assistive Device (Sit-Stand Transfers) walker, front-wheeled  -CR     Row Name 06/07/22 1000          Stand-Sit Transfer    Assistive Device (Stand-Sit Transfers) walker, front-wheeled  -CR     Row Name 06/07/22 1000          Toilet Transfer    Type (Toilet Transfer) sit-stand;stand-sit  -CR     Row Name 06/07/22 1000          Functional Mobility    Functional Mobility- Ind. Level contact guard assist  -CR     Functional Mobility- Device walker, front-wheeled  -CR     Functional Mobility-Distance (Feet) 12  -CR     Row Name 06/07/22 1000          Activities of Daily Living    BADL Assessment/Intervention toileting;upper body dressing  -CR     Row Name 06/07/22 1000          Toileting Assessment/Training    [place] Level (Toileting) toileting skills;supervision  -CR     Assistive Devices (Toileting) commode;grab bar/safety frame  -CR     Position (Toileting) unsupported sitting;unsupported standing  -CR     Row Name 06/07/22 1000          Upper Body Dressing Assessment/Training    [place] Level (Upper Body Dressing) doff;don;bra/undergarment;minimum assist (75% patient effort)  -CR     Position (Upper Body Dressing) supported sitting  -CR     Comment, (Upper Body Dressing) gown  -CR           User Key  (r) = Recorded By, (t) = Taken By, (c) = Cosigned By    Initials Name Provider Type    Mey Stovall COTA Occupational Therapist Assistant               Obj/Interventions    No documentation.                Goals/Plan     Row Name 06/07/22 1000          Transfer Goal 1 (OT)    Activity/Assistive Device (Transfer Goal 1, OT) transfers, all  -CR     [place] Level/Cues Needed (Transfer Goal 1, OT) modified independence  -CR     Time Frame (Transfer Goal 1, OT) long term goal (LTG)  -CR     Progress/Outcome (Transfer Goal 1, OT) good progress toward goal  -CR     Row Name 06/07/22 1000          Bathing Goal 1 (OT)    Activity/Device  (Bathing Goal 1, OT) bathing skills, all  -CR     [place] Level/Cues Needed (Bathing Goal 1, OT) modified independence  -CR     Time Frame (Bathing Goal 1, OT) long term goal (LTG)  -CR     Progress/Outcomes (Bathing Goal 1, OT) goal not met  -CR     Row Name 06/07/22 1000          Dressing Goal 1 (OT)    Activity/Device (Dressing Goal 1, OT) dressing skills, all  -CR     [place]/Cues Needed (Dressing Goal 1, OT) modified independence  -CR     Time Frame (Dressing Goal 1, OT) long term goal (LTG)  -CR     Progress/Outcome (Dressing Goal 1, OT) goal not met  -CR     Row Name 06/07/22 1000          Toileting Goal 1 (OT)    Activity/Device (Toileting Goal 1, OT) toileting skills, all  -CR     [place] Level/Cues Needed (Toileting Goal 1, OT) modified independence  -CR     Time Frame (Toileting Goal 1, OT) long term goal (LTG)  -CR     Progress/Outcome (Toileting Goal 1, OT) goal not met  -CR           User Key  (r) = Recorded By, (t) = Taken By, (c) = Cosigned By    Initials Name Provider Type    CR Mey Duffy COTA Occupational Therapist Assistant               Clinical Impression     Row Name 06/07/22 1000          Pain Assessment    Pretreatment Pain Rating 0/10 - no pain  -CR     Posttreatment Pain Rating 0/10 - no pain  -CR     Row Name 06/07/22 1000          Plan of Care Review    Plan of Care Reviewed With patient  -CR     Progress improving  -CR     Outcome Evaluation Pt lying across bed with heart monitor off when MARADIAGA arrived. Pt perf sup-sit w/ CGA; MARADIAGA re-donned heart monitor. Pt demo impulsiveness during OT tx, however is receptive to direction and VCs. Pt perf fxnl mob in room using RWx ~ 15 ft with CGA for safety, toilet t/f w/ CGA and toileting w/ supervision, sit >< stand from toilet w/ Mod I using grab bars, completed ~ 10 sit >< stand t/fs w/ CGA and performed stand piviot t/f to recliner w/ SBA-CGA. Pt seated in recliner w/ all needs met and no s/s of distress. VSS. Pt needs 24/7  assist at this time due to safety concerns.  -CR     Row Name 06/07/22 1000          Therapy Assessment/Plan (OT)    Rehab Potential (OT) good, to achieve stated therapy goals  -CR     Criteria for Skilled Therapeutic Interventions Met (OT) yes;meets criteria  -CR     Therapy Frequency (OT) other (see comments)  3-7 days a week  -CR     Row Name 06/07/22 1000          Therapy Plan Review/Discharge Plan (OT)    Anticipated Discharge Disposition (OT) home with home health;home with assist  -CR     Row Name 06/07/22 1000          Vital Signs    Pre Systolic BP Rehab 127  -CR     Pre Treatment Diastolic BP 76  -CR     Post Systolic BP Rehab 119  -CR     Post Treatment Diastolic BP 68  -CR     Pretreatment Heart Rate (beats/min) 87  -CR     Posttreatment Heart Rate (beats/min) 76  -CR     Pre SpO2 (%) 97  -CR     Post SpO2 (%) 99  -CR     O2 Delivery Post Treatment room air  -CR     Pre Patient Position Supine  -CR     Intra Patient Position Standing  -CR     Post Patient Position Sitting  -CR     Row Name 06/07/22 1000          Positioning and Restraints    Pre-Treatment Position in bed  -CR     Post Treatment Position chair  -CR     In Chair notified nsg;call light within reach;encouraged to call for assist;exit alarm on  -CR           User Key  (r) = Recorded By, (t) = Taken By, (c) = Cosigned By    Initials Name Provider Type    Mey Stovall COTA Occupational Therapist Assistant               Outcome Measures     Row Name 06/07/22 1000          How much help from another is currently needed...    Putting on and taking off regular lower body clothing? 3  -CR     Bathing (including washing, rinsing, and drying) 3  -CR     Toileting (which includes using toilet bed pan or urinal) 4  -CR     Putting on and taking off regular upper body clothing 3  -CR     Taking care of personal grooming (such as brushing teeth) 4  -CR     Eating meals 4  -CR     AM-PAC 6 Clicks Score (OT) 21  -CR           User Key  (r) = Recorded  By, (t) = Taken By, (c) = Cosigned By    Initials Name Provider Type    Mey Stovall COTA Occupational Therapist Assistant                Occupational Therapy Education                 Title: PT OT SLP Therapies (In Progress)     Topic: Occupational Therapy (Done)     Point: ADL training (Done)     Description:   Instruct learner(s) on proper safety adaptation and remediation techniques during self care or transfers.   Instruct in proper use of assistive devices.              Learning Progress Summary           Patient Acceptance, E,TB,D, VU by  at 6/5/2022 1302                   Point: Home exercise program (Done)     Description:   Instruct learner(s) on appropriate technique for monitoring, assisting and/or progressing therapeutic exercises/activities.              Learning Progress Summary           Patient Acceptance, E,TB,D, VU by CS at 6/5/2022 1302                   Point: Precautions (Done)     Description:   Instruct learner(s) on prescribed precautions during self-care and functional transfers.              Learning Progress Summary           Patient Acceptance, E,TB,D, VU by  at 6/5/2022 1302    Acceptance, E, VU by  at 6/2/2022 1501    Comment: Edu pt on use of gait belt and non skid socks when OOB and no OOB without assist.                   Point: Body mechanics (Done)     Description:   Instruct learner(s) on proper positioning and spine alignment during self-care, functional mobility activities and/or exercises.              Learning Progress Summary           Patient Acceptance, E,TB,D, VU by  at 6/5/2022 1302                               User Key     Initials Effective Dates Name Provider Type Discipline     06/16/21 -  Tony Childress OT Occupational Therapist OT     06/16/21 -  Faiza Miller COTA Occupational Therapist Assistant OT              OT Recommendation and Plan  Therapy Frequency (OT): other (see comments) (3-7 days a week)  Plan of Care Review  Plan of Care  Reviewed With: patient  Progress: improving  Outcome Evaluation: Pt lying across bed with heart monitor off when MARADIAGA arrived. Pt perf sup-sit w/ CGA; MARADIAGA re-donned heart monitor. Pt demo impulsiveness during OT tx, however is receptive to direction and VCs. Pt perf fxnl mob in room using RWx ~ 15 ft with CGA for safety, toilet t/f w/ CGA and toileting w/ supervision, sit >< stand from toilet w/ Mod I using grab bars, completed ~ 10 sit >< stand t/fs w/ CGA and performed stand piviot t/f to recliner w/ SBA-CGA. Pt seated in recliner w/ all needs met and no s/s of distress. VSS. Pt needs 24/7 assist at this time due to safety concerns.     Time Calculation:    Time Calculation- OT     Row Name 06/07/22 1000             Time Calculation- OT    OT Start Time 1000  -CR      OT Stop Time 1025  -CR      OT Time Calculation (min) 25 min  -CR      Total Timed Code Minutes- OT 25 minute(s)  -CR      OT Received On 06/07/22  -CR              Timed Charges    14447 - OT Self Care/Mgmt Minutes 25  -CR              Total Minutes    Timed Charges Total Minutes 25  -CR       Total Minutes 25  -CR            User Key  (r) = Recorded By, (t) = Taken By, (c) = Cosigned By    Initials Name Provider Type    CR Mey Duffy COTA Occupational Therapist Assistant              Therapy Charges for Today     Code Description Service Date Service Provider Modifiers Qty    15869194145 HC OT SELF CARE/MGMT/TRAIN EA 15 MIN 6/6/2022 Mey Duffy COTA GO 4    00689042248 HC OT SELF CARE/MGMT/TRAIN EA 15 MIN 6/7/2022 Mey Duffy COTA GO 2               HIREN Dominguez  6/7/2022

## 2022-06-07 NOTE — SIGNIFICANT NOTE
06/07/22 1400   OTHER   Discipline physical therapy assistant   Rehab Time/Intention   Session Not Performed patient/family declined, not feeling well   Checked on x 2. In chair . defers

## 2022-06-07 NOTE — PROGRESS NOTES
[facility] Medicine Services  INPATIENT PROGRESS NOTE    Length of Stay: 5  Date of Admission: 6/1/2022  Primary Care Physician: Joyce Plata MD    Subjective   Chief Complaint: Chest pain  HPI: Feels worse today.  Headache this morning.  More short of breath.  States she is more fatigued with exertion today.    Review of Systems   Constitutional: Negative for appetite change, chills, fatigue, fever and unexpected weight change.   Respiratory: Positive for chest tightness and shortness of breath. Negative for cough, choking and wheezing.    Cardiovascular: Negative for chest pain, palpitations and leg swelling.   Gastrointestinal: Negative for abdominal pain, blood in stool, constipation, diarrhea, nausea and vomiting.   Genitourinary: Negative for dysuria, flank pain and hematuria.   Neurological: Negative for dizziness, seizures, syncope, speech difficulty, weakness, light-headedness, numbness and headaches.   Hematological: Does not bruise/bleed easily.        All pertinent negatives and positives are as above. All other systems have been reviewed and are negative unless otherwise stated.     Objective    Temp:  [97 °F (36.1 °C)-97.9 °F (36.6 °C)] 97.7 °F (36.5 °C)  Heart Rate:  [73-90] 76  Resp:  [16-20] 18  BP: (116-135)/(63-77) 119/68    Physical Exam  Vitals reviewed.   Constitutional:       Appearance: She is well-developed.   HENT:      Head: Normocephalic and atraumatic.   Eyes:      Pupils: Pupils are equal, round, and reactive to light.   Cardiovascular:      Rate and Rhythm: Normal rate and regular rhythm.      Heart sounds: Normal heart sounds. No murmur heard.    No friction rub. No gallop.   Pulmonary:      Effort: Pulmonary effort is normal. No respiratory distress.      Breath sounds: Normal breath sounds. No wheezing or rales.   Chest:      Chest wall: No tenderness.   Abdominal:      General: Bowel sounds are normal. There is no  distension.      Palpations: Abdomen is soft.      Tenderness: There is no abdominal tenderness. There is no guarding.   Musculoskeletal:      Cervical back: Normal range of motion and neck supple.   Skin:     General: Skin is warm and dry.   Psychiatric:         Behavior: Behavior normal.         Thought Content: Thought content normal.             Results Review:  I have reviewed the labs, radiology results, and diagnostic studies.    Laboratory Data:   Results from last 7 days   Lab Units 06/04/22  0705 06/03/22  0535 06/02/22  0510 06/01/22  1600   SODIUM mmol/L 142 137 141 142   POTASSIUM mmol/L 3.9 3.6 3.1* 3.9   CHLORIDE mmol/L 109* 105 107 109*   CO2 mmol/L 19.0* 21.0* 21.0* 16.0*   BUN mg/dL 14 15 18 21   CREATININE mg/dL 1.03* 1.02* 0.97 1.03*   GLUCOSE mg/dL 105* 96 118* 200*   CALCIUM mg/dL 9.6 9.1 9.6 10.5   BILIRUBIN mg/dL  --   --   --  1.4*   ALK PHOS U/L  --   --   --  106   ALT (SGPT) U/L  --   --   --  21   AST (SGOT) U/L  --   --   --  20   ANION GAP mmol/L 14.0 11.0 13.0 17.0*     Estimated Creatinine Clearance: 53.1 mL/min (A) (by C-G formula based on SCr of 1.03 mg/dL (H)).  Results from last 7 days   Lab Units 06/04/22  0705 06/03/22  0535 06/02/22  0510   MAGNESIUM mg/dL 1.9 1.9 1.6         Results from last 7 days   Lab Units 06/04/22  0705 06/03/22  0535 06/02/22  0510 06/01/22  1600   WBC 10*3/mm3 7.92 9.67 9.89 9.98   HEMOGLOBIN g/dL 10.9* 10.5* 11.0* 11.7*   HEMATOCRIT % 34.1 32.2* 33.0* 35.9   PLATELETS 10*3/mm3 220 221 215 253           Culture Data:   No results found for: BLOODCX  No results found for: URINECX  No results found for: RESPCX  No results found for: WOUNDCX  No results found for: STOOLCX  No components found for: BODYFLD    Radiology Data:   Imaging Results (Last 24 Hours)     Procedure Component Value Units Date/Time    US Guided Vascular Access [[id number]] Collected: 06/06/22 0816     Updated: 06/06/22 1332    Narrative:      ULTRASOUND-GUIDED VASCULAR  ACCESS    INDICATION: IV access poor, R07.2 Precordial pain I50.9 Heart  failure, unspecified Z74.09 Other reduced mobility Z78.9 Other  specified health status Z74.09 Other reduced mobility R07.9 Chest  pain, unspecified I42.8 Other cardiomyopathies I50.22 Chronic  systolic (congestive) heart failure R07.89 Other chest pain    TECHNIQUE: Real-time ultrasound imaging was utilized to visualize  needle entry.     FINDINGS:  Realtime ultrasound imaging was utilized to visualize needle  entry into a patent right basilic vein during midline catheter   placement and a permanent image was stored for permanent  recording and reporting.       Impression:      Imaging obtained during vascular access device placement under  ultrasound guidance.    Electronically signed by:  Jennifer Ramsey MD  6/6/2022 1:30 PM CDT  Workstation: [id number]          I have reviewed the patient's current medications.     Assessment/Plan     Active Hospital Problems    Diagnosis    • Acute on chronic systolic heart failure (CMS/HCC)    • Precordial pain    • Chest pain        Plan:  1.  Congestive heart failure: Acute on chronic exacerbation.  Dobutamine discontinued.  Patient still has dyspnea both at rest and exertion.  2.  Chest pain: Status post heart cath.  Medical management recommended.  Pain improved.  3.  Urinary tract infection: Culture negative.  Continue empiric antibiotics.  4.  Hypertension: Continue current medication.  5.  Diabetes mellitus: Continue sliding scale insulin.  6.  DVT prophylaxis: SCDs.    The patient was evaluated during the global COVID-19 pandemic, and the diagnosis was suspected/considered upon their initial presentation.  Evaluation, treatment, and testing were consistent with current guidelines for patients who present with complaints or symptoms that may be related to COVID-19.    I confirmed that the patient's Advance Care Plan is present, code status is documented, or surrogate decision maker is listed in the  patient's medical record.       Discharge Planning: I expect patient to be discharged to home versus skilled facility in 1-2 days.        This document has been electronically signed by David Pinon MD on June 7, 2022 12:28 CDT

## 2022-06-08 NOTE — SIGNIFICANT NOTE
06/08/22 1313   OTHER   Discipline physical therapy assistant   Rehab Time/Intention   Session Not Performed patient/family declined treatment   Defers. Wants to talk to her MD first.

## 2022-06-08 NOTE — DISCHARGE SUMMARY
[facility] Medicine Services  DISCHARGE SUMMARY       Date of Admission: 6/1/2022  Date of Discharge:  6/8/2022  Primary Care Physician: Joyce Plata MD    Presenting Problem/History of Present Illness:  Precordial pain [R07.2]  Chest pain [R07.9]  Acute on chronic congestive heart failure, unspecified heart failure type (HCC) [I50.9]       Final Discharge Diagnoses:  Active Hospital Problems    Diagnosis    • Acute on chronic systolic heart failure (CMS/HCC)    • Precordial pain    • Chest pain        Consults:   Consults     Date and Time Order Name Status Description    6/1/2022  6:49 PM Inpatient Cardiology Consult Completed     6/1/2022  5:23 PM Hospitalist (on-call MD unless specified)      5/9/2022 10:02 AM Inpatient Cardiology Consult Completed     5/2/2022 12:10 PM Inpatient Neurology Consult Stroke Completed     5/2/2022  9:53 AM Inpatient Neurology Consult Stroke Completed     5/2/2022  9:53 AM Inpatient Neurology Consult Stroke Completed           Procedures Performed: Procedure(s):  Left Heart Cath                Pertinent Test Results:   Lab Results (most recent)     Procedure Component Value Units Date/Time    POC Glucose Once [[id number]]  (Abnormal) Collected: 06/08/22 1010    Specimen: Blood Updated: 06/08/22 1042     Glucose 159 mg/dL      Comment: RN NotifiedOperator: [id number] VANDANA PEÑAFANYMeter ID: [id number]       POC Glucose Once [[id number]]  (Normal) Collected: 06/08/22 0604    Specimen: Blood Updated: 06/08/22 0626     Glucose 102 mg/dL      Comment: RN NotifiedOperator: [id number] CARLIN LAURINIFERMeter ID: [id number]       Blood Culture - Blood, Hand, Right [[id number]]  (Normal) Collected: 06/01/22 1650    Specimen: Blood from Hand, Right Updated: 06/06/22 1717     Blood Culture No growth at 5 days    Blood Culture - Blood, Arm, Right [[id number]]  (Normal) Collected: 06/01/22 1644    Specimen: Blood from Arm, Right Updated:  06/06/22 1647     Blood Culture No growth at 5 days    Basic Metabolic Panel [[id number]]  (Abnormal) Collected: 06/04/22 0705    Specimen: Blood Updated: 06/04/22 0736     Glucose 105 mg/dL      BUN 14 mg/dL      Creatinine 1.03 mg/dL      Sodium 142 mmol/L      Potassium 3.9 mmol/L      Comment: Slight hemolysis detected by analyzer. Results may be affected.        Chloride 109 mmol/L      CO2 19.0 mmol/L      Calcium 9.6 mg/dL      BUN/Creatinine Ratio 13.6     Anion Gap 14.0 mmol/L      eGFR 57.5 mL/min/1.73      Comment: National Kidney Foundation and American Society of Nephrology (ASN) Task Force recommended calculation based on the Chronic Kidney Disease Epidemiology Collaboration (CKD-EPI) equation refit without adjustment for race.       Narrative:      GFR Normal >60  Chronic Kidney Disease <60  Kidney Failure <15      Magnesium [[id number]]  (Normal) Collected: 06/04/22 0705    Specimen: Blood Updated: 06/04/22 0734     Magnesium 1.9 mg/dL     CBC & Differential [[id number]]  (Abnormal) Collected: 06/04/22 0705    Specimen: Blood Updated: 06/04/22 0719    Narrative:      The following orders were created for panel order CBC & Differential.  Procedure                               Abnormality         Status                     ---------                               -----------         ------                     CBC Auto Differential[[id number]]        Abnormal            Final result                 Please view results for these tests on the individual orders.    CBC Auto Differential [[id number]]  (Abnormal) Collected: 06/04/22 0705    Specimen: Blood Updated: 06/04/22 0719     WBC 7.92 10*3/mm3      RBC 3.91 10*6/mm3      Hemoglobin 10.9 g/dL      Hematocrit 34.1 %      MCV 87.2 fL      MCH 27.9 pg      MCHC 32.0 g/dL      RDW 16.4 %      RDW-SD 51.2 fl      MPV 9.8 fL      Platelets 220 10*3/mm3      Neutrophil % 52.3 %      Lymphocyte % 31.3 %      Monocyte % 12.6 %      Eosinophil % 2.7 %      Basophil %  0.5 %      Immature Grans % 0.6 %      Neutrophils, Absolute 4.14 10*3/mm3      Lymphocytes, Absolute 2.48 10*3/mm3      Monocytes, Absolute 1.00 10*3/mm3      Eosinophils, Absolute 0.21 10*3/mm3      Basophils, Absolute 0.04 10*3/mm3      Immature Grans, Absolute 0.05 10*3/mm3      nRBC 0.0 /100 WBC     Basic Metabolic Panel [[id number]]  (Abnormal) Collected: 06/03/22 0535    Specimen: Blood Updated: 06/03/22 0635     Glucose 96 mg/dL      BUN 15 mg/dL      Creatinine 1.02 mg/dL      Sodium 137 mmol/L      Potassium 3.6 mmol/L      Chloride 105 mmol/L      CO2 21.0 mmol/L      Calcium 9.1 mg/dL      BUN/Creatinine Ratio 14.7     Anion Gap 11.0 mmol/L      eGFR 58.2 mL/min/1.73      Comment: National Kidney Foundation and American Society of Nephrology (ASN) Task Force recommended calculation based on the Chronic Kidney Disease Epidemiology Collaboration (CKD-EPI) equation refit without adjustment for race.       Narrative:      GFR Normal >60  Chronic Kidney Disease <60  Kidney Failure <15      Magnesium [[id number]]  (Normal) Collected: 06/03/22 0535    Specimen: Blood Updated: 06/03/22 0635     Magnesium 1.9 mg/dL     CBC & Differential [[id number]]  (Abnormal) Collected: 06/03/22 0535    Specimen: Blood Updated: 06/03/22 0604    Narrative:      The following orders were created for panel order CBC & Differential.  Procedure                               Abnormality         Status                     ---------                               -----------         ------                     CBC Auto Differential[[id number]]        Abnormal            Final result                 Please view results for these tests on the individual orders.    CBC Auto Differential [[id number]]  (Abnormal) Collected: 06/03/22 0535    Specimen: Blood Updated: 06/03/22 0604     WBC 9.67 10*3/mm3      RBC 3.77 10*6/mm3      Hemoglobin 10.5 g/dL      Hematocrit 32.2 %      MCV 85.4 fL      MCH 27.9 pg      MCHC 32.6 g/dL      RDW 16.0 %       RDW-SD 49.6 fl      MPV 10.2 fL      Platelets 221 10*3/mm3      Neutrophil % 53.1 %      Lymphocyte % 31.1 %      Monocyte % 11.9 %      Eosinophil % 2.9 %      Basophil % 0.5 %      Immature Grans % 0.5 %      Neutrophils, Absolute 5.13 10*3/mm3      Lymphocytes, Absolute 3.01 10*3/mm3      Monocytes, Absolute 1.15 10*3/mm3      Eosinophils, Absolute 0.28 10*3/mm3      Basophils, Absolute 0.05 10*3/mm3      Immature Grans, Absolute 0.05 10*3/mm3      nRBC 0.0 /100 WBC     Urine Culture - Urine, Urine, Clean Catch [[id number]] Collected: 06/01/22 1642    Specimen: Urine, Clean Catch Updated: 06/02/22 1027     Urine Culture 50,000 CFU/mL Mixed Malini Isolated    Narrative:      Colonization of the urinary tract without infection is common. Treatment is discouraged unless the patient is symptomatic, pregnant, or undergoing an invasive urologic procedure.  Specimen contains mixed organisms of questionable pathogenicity suggestive of contamination. If symptoms persist, suggest recollection.    Troponin [[id number]]  (Normal) Collected: 06/02/22 0510    Specimen: Blood Updated: 06/02/22 0542     Troponin T <0.010 ng/mL     Narrative:      Troponin T Reference Range:  <= 0.03 ng/mL-   Negative for AMI  >0.03 ng/mL-     Abnormal for myocardial necrosis.  Clinicians would have to utilize clinical acumen, EKG, Troponin and serial changes to determine if it is an Acute Myocardial Infarction or myocardial injury due to an underlying chronic condition.       Results may be falsely decreased if patient taking Biotin.      Troponin [[id number]]  (Normal) Collected: 06/01/22 2345    Specimen: Blood Updated: 06/02/22 0007     Troponin T <0.010 ng/mL     Narrative:      Troponin T Reference Range:  <= 0.03 ng/mL-   Negative for AMI  >0.03 ng/mL-     Abnormal for myocardial necrosis.  Clinicians would have to utilize clinical acumen, EKG, Troponin and serial changes to determine if it is an Acute Myocardial Infarction or myocardial  injury due to an underlying chronic condition.       Results may be falsely decreased if patient taking Biotin.      STAT Lactic Acid, Reflex [[id number]]  (Normal) Collected: 06/01/22 2345    Specimen: Blood Updated: 06/02/22 0005     Lactate 1.5 mmol/L     Extra Tubes [[id number]] Collected: 06/01/22 1958    Specimen: Blood, Venous Line Updated: 06/01/22 2103    Narrative:      The following orders were created for panel order Extra Tubes.  Procedure                               Abnormality         Status                     ---------                               -----------         ------                     Lavender Top[[id number]]                                     Final result               Green Top (Gel)[[id number]]                                  Final result                 Please view results for these tests on the individual orders.    Lavender Top [[id number]] Collected: 06/01/22 1958    Specimen: Blood Updated: 06/01/22 2103     Extra Tube hold for add-on     Comment: Auto resulted       Green Top (Gel) [[id number]] Collected: 06/01/22 1958    Specimen: Blood Updated: 06/01/22 2103     Extra Tube Hold for add-ons.     Comment: Auto resulted.       Extra Tubes [[id number]] Collected: 06/01/22 1650    Specimen: Blood Updated: 06/01/22 2103    Narrative:      The following orders were created for panel order Extra Tubes.  Procedure                               Abnormality         Status                     ---------                               -----------         ------                     Lavender Top[[id number]]                                     Final result               Goncalves Top[[id number]]                                         Final result                 Please view results for these tests on the individual orders.    Gray Top [[id number]] Collected: 06/01/22 1650    Specimen: Blood Updated: 06/01/22 2103     Extra Tube Hold for add-ons.     Comment: Auto resulted.       STAT Lactic Acid, Reflex  [[id number]]  (Abnormal) Collected: 06/01/22 1958    Specimen: Blood Updated: 06/01/22 2019     Lactate 2.5 mmol/L     Hemoglobin A1c [[id number]]  (Abnormal) Collected: 06/01/22 1650    Specimen: Blood Updated: 06/01/22 1902     Hemoglobin A1C 8.70 %     Narrative:      Hemoglobin A1C Ranges:    Increased Risk for Diabetes  5.7% to 6.4%  Diabetes                     >= 6.5%  Diabetic Goal                < 7.0%    Lactic Acid, Plasma [[id number]]  (Abnormal) Collected: 06/01/22 1650    Specimen: Blood Updated: 06/01/22 1841     Lactate 2.1 mmol/L     COVID-19 and FLU A/B PCR - Swab, Nasopharynx [[id number]]  (Normal) Collected: 06/01/22 1737    Specimen: Swab from Nasopharynx Updated: 06/01/22 1806     COVID19 Not Detected     Influenza A PCR Not Detected     Influenza B PCR Not Detected    Narrative:      Fact sheet for providers: https://www.fda.gov/media/846763/download    Fact sheet for patients: https://www.fda.gov/media/389182/download    Test performed by PCR.    Lavender Top [[id number]] Collected: 06/01/22 1650    Specimen: Blood Updated: 06/01/22 1802     Extra Tube hold for add-on     Comment: Auto resulted       Gold Top - SST [[id number]] Collected: 06/01/22 1602    Specimen: Blood Updated: 06/01/22 1717     Extra Tube Hold for add-ons.     Comment: Auto resulted.       Light Blue Top [[id number]] Collected: 06/01/22 1602    Specimen: Blood Updated: 06/01/22 1717     Extra Tube Hold for add-ons.     Comment: Auto resulted       Urinalysis, Microscopic Only - Urine, Clean Catch [[id number]]  (Abnormal) Collected: 06/01/22 1642    Specimen: Urine, Clean Catch Updated: 06/01/22 1654     RBC, UA 3-5 /HPF      WBC, UA 31-50 /HPF      Bacteria, UA None Seen /HPF      Squamous Epithelial Cells, UA 0-2 /HPF      Hyaline Casts, UA 0-2 /LPF      Methodology Automated Microscopy    Urinalysis With Culture If Indicated - Urine, Clean Catch [[id number]]  (Abnormal) Collected: 06/01/22 1642    Specimen: Urine, Clean Catch  Updated: 06/01/22 1654     Color, UA Dark Yellow     Appearance, UA Cloudy     pH, UA <=5.0     Specific Gravity, UA 1.033     Comment: Result obtained by Refractometer        Glucose,  mg/dL (Trace)     Ketones, UA 15 mg/dL (1+)     Bilirubin, UA Small (1+)     Blood, UA Trace     Protein, UA >=300 mg/dL (3+)     Leuk Esterase, UA Small (1+)     Nitrite, UA Negative     Urobilinogen, UA 1.0 E.U./dL    Narrative:      In absence of clinical symptoms, the presence of pyuria, bacteria, and/or nitrites on the urinalysis result does not correlate with infection.    Comprehensive Metabolic Panel [[id number]]  (Abnormal) Collected: 06/01/22 1600    Specimen: Blood Updated: 06/01/22 1628     Glucose 200 mg/dL      BUN 21 mg/dL      Creatinine 1.03 mg/dL      Sodium 142 mmol/L      Potassium 3.9 mmol/L      Comment: Slight hemolysis detected by analyzer. Results may be affected.        Chloride 109 mmol/L      CO2 16.0 mmol/L      Calcium 10.5 mg/dL      Total Protein 6.6 g/dL      Albumin 3.90 g/dL      ALT (SGPT) 21 U/L      AST (SGOT) 20 U/L      Alkaline Phosphatase 106 U/L      Total Bilirubin 1.4 mg/dL      Globulin 2.7 gm/dL      A/G Ratio 1.4 g/dL      BUN/Creatinine Ratio 20.4     Anion Gap 17.0 mmol/L      eGFR 57.5 mL/min/1.73      Comment: National Kidney Foundation and American Society of Nephrology (ASN) Task Force recommended calculation based on the Chronic Kidney Disease Epidemiology Collaboration (CKD-EPI) equation refit without adjustment for race.       Narrative:      GFR Normal >60  Chronic Kidney Disease <60  Kidney Failure <15      Lipase [[id number]]  (Abnormal) Collected: 06/01/22 1600    Specimen: Blood Updated: 06/01/22 1628     Lipase 194 U/L     CK [[id number]]  (Normal) Collected: 06/01/22 1600    Specimen: Blood Updated: 06/01/22 1628     Creatine Kinase 60 U/L     BNP [[id number]]  (Abnormal) Collected: 06/01/22 1600    Specimen: Blood Updated: 06/01/22 1624     proBNP 2,721.0 pg/mL      Narrative:      Among patients with dyspnea, NT-proBNP is highly sensitive for the detection of acute congestive heart failure. In addition NT-proBNP of <300 pg/ml effectively rules out acute congestive heart failure with 99% negative predictive value.    Results may be falsely decreased if patient taking Biotin.          Imaging Results (Most Recent)     Procedure Component Value Units Date/Time    US Guided Vascular Access [[id number]] Collected: 06/06/22 0816     Updated: 06/06/22 1332    Narrative:      ULTRASOUND-GUIDED VASCULAR ACCESS    INDICATION: IV access poor, R07.2 Precordial pain I50.9 Heart  failure, unspecified Z74.09 Other reduced mobility Z78.9 Other  specified health status Z74.09 Other reduced mobility R07.9 Chest  pain, unspecified I42.8 Other cardiomyopathies I50.22 Chronic  systolic (congestive) heart failure R07.89 Other chest pain    TECHNIQUE: Real-time ultrasound imaging was utilized to visualize  needle entry.     FINDINGS:  Realtime ultrasound imaging was utilized to visualize needle  entry into a patent right basilic vein during midline catheter   placement and a permanent image was stored for permanent  recording and reporting.       Impression:      Imaging obtained during vascular access device placement under  ultrasound guidance.    Electronically signed by:  Jennifer Ramsey MD  6/6/2022 1:30 PM CDT  Workstation: [id number]    IR Insert Midline Without Port Pump 5 Plus [[id number]] Resulted: 06/06/22 1012     Updated: 06/06/22 1012    Narrative:      This procedure was auto-finalized with no dictation required.    XR Chest 1 View [[id number]] Collected: 06/01/22 1605     Updated: 06/01/22 1712    Narrative:          COMPARISON:     5/8/2022    INDICATION:     Chest pain    FINDINGS:     Portable AP chest radiograph is obtained.  Cardiac  ACD, leads intact. Cardiomegaly. Mediastinum is normal width.  Prominence of the main pulmonary artery. Prominent central  vasculature. Lung volumes are  "within normal limits. There are  basilar opacities which may reflect atelectasis. No significant  effusion. No pneumothorax. Mild prominence of interstitium.      Impression:      1. Cardiomegaly. Central vascular congestion. Mild central  interstitial prominence which may reflect edema.      2. Lung basilar opacities may reflect atelectasis.    Electronically signed by:  Denton Rhoades MD  6/1/2022 5:09 PM CDT  Workstation: [id number]          Chief Complaint on Day of Discharge: None    Hospital Course:  The patient is a 71 y.o. female who presented to [facility] with chest pain.  Patient was noted to have typical type chest pain symptoms as well as urinary tract infection.  Cardiology was consulted.  Patient developed shortness of breath was felt to have CHF exacerbation.  Due to her known severe cardiomyopathy, she was started on dobutamine infusion by Dr. Felipe.  She had negative cardiac enzymes but persistent pain so coronary angiogram was done.  No intervene able lesions were found and medical management was recommended.  Patient finished course of antibiotics for urinary tract infection.  Shortness of breath improved and dobutamine was discontinued.  After dobutamine was discontinued, patient did have some worsening shortness of breath transiently that led to another night in the hospital, but on day of discharge she was at her baseline.  Should be discharged home in her baseline condition.  She will follow-up with her PCP in 1 to 2 weeks and with Dr. Felipe as previously scheduled.    Condition on Discharge: Hemodynamically stable    Physical Exam on Discharge:  /64 (BP Location: Left arm, Patient Position: Lying)   Pulse 80   Temp 97.4 °F (36.3 °C) (Temporal)   Resp 18   Ht 162.6 cm (64.02\")   Wt 89.9 kg (198 lb 3.2 oz)   SpO2 98%   BMI 34.00 kg/m²      Physical Exam  Vitals reviewed.   Constitutional:       Appearance: She is well-developed.   HENT:      Head: Normocephalic and " atraumatic.   Eyes:      Pupils: Pupils are equal, round, and reactive to light.   Cardiovascular:      Rate and Rhythm: Normal rate and regular rhythm.      Heart sounds: Normal heart sounds. No murmur heard.    No friction rub. No gallop.   Pulmonary:      Effort: Pulmonary effort is normal. No respiratory distress.      Breath sounds: Normal breath sounds. No wheezing or rales.   Chest:      Chest wall: No tenderness.   Abdominal:      General: Bowel sounds are normal. There is no distension.      Palpations: Abdomen is soft.      Tenderness: There is no abdominal tenderness. There is no guarding.   Musculoskeletal:      Cervical back: Normal range of motion and neck supple.   Skin:     General: Skin is warm and dry.   Psychiatric:         Behavior: Behavior normal.         Thought Content: Thought content normal.           Discharge Disposition:  Home or Self Care    Discharge Medications:     Discharge Medications      Changes to Medications      Instructions Start Date   Levemir FlexTouch 100 UNIT/ML injection  Generic drug: insulin detemir  What changed: how much to take   18 Units, Subcutaneous, Nightly         Continue These Medications      Instructions Start Date   acetaminophen 325 MG tablet  Commonly known as: TYLENOL   650 mg, Oral, Every 4 Hours PRN      albuterol sulfate  (90 Base) MCG/ACT inhaler  Commonly known as: PROVENTIL HFA;VENTOLIN HFA;PROAIR HFA   INHALE 2 PUFFS EVERY 4 (FOUR) HOURS AS NEEDED FOR WHEEZING OR SHORTNESS OF AIR.      aspirin 81 MG EC tablet   81 mg, Oral, Nightly      atorvastatin 40 MG tablet  Commonly known as: LIPITOR   40 mg, Oral, Daily      B-D UF III MINI PEN NEEDLES 31G X 5 MM misc  Generic drug: Insulin Pen Needle   USE WITH INSULIN INJECTIONS NIGHTLY      carvedilol 25 MG tablet  Commonly known as: COREG   25 mg, Oral, 2 Times Daily With Meals      freestyle lancets   1 each, Other, 3 Times Daily, Use as instructed      FreeStyle Lite device   1 Device, Does  "not apply, 3 Times Daily      FREESTYLE LITE test strip  Generic drug: glucose blood   1 each, Other, 3 Times Daily, Use as instructed      furosemide 40 MG tablet  Commonly known as: LASIX   TAKE 1 TABLET BY MOUTH TWICE A DAY      guaiFENesin 600 MG 12 hr tablet  Commonly known as: MUCINEX   600 mg, Oral, Every 12 Hours Scheduled      Insulin Syringe 31G X 5/16\" 0.5 ML misc   1 each, Subcutaneous, Nightly      ipratropium-albuterol 0.5-2.5 mg/3 ml nebulizer  Commonly known as: DUO-NEB   3 mL, Nebulization, 4 Times Daily      isosorbide mononitrate 30 MG 24 hr tablet  Commonly known as: IMDUR   30 mg, Oral, Daily      lisinopril 10 MG tablet  Commonly known as: PRINIVIL,ZESTRIL   TAKE 1 TABLET BY MOUTH EVERY DAY      magnesium oxide 400 (241.3 Mg) MG tablet tablet  Commonly known as: MAGOX   400 mg, Oral, Daily      nitroglycerin 0.4 MG SL tablet  Commonly known as: Nitrostat   0.4 mg, Sublingual, Every 5 Minutes PRN, Take no more than 3 doses in 15 minutes.      pantoprazole 40 MG EC tablet  Commonly known as: Protonix   40 mg, Oral, Daily      potassium chloride 10 MEQ CR capsule  Commonly known as: MICRO-K   20 mEq, Oral, 2 Times Daily      sucralfate 1 g tablet  Commonly known as: CARAFATE   1 g, Oral, 3 Times Daily             Discharge Diet:   Diet Instructions     Diet: Consistent Carbohydrate, Cardiac      Discharge Diet:  Consistent Carbohydrate  Cardiac       Fluid Restriction per day: 1500 mL Fluid          Activity at Discharge:   Activity Instructions     Activity as Tolerated          Follow-up Appointments:   Future Appointments   Date Time Provider Department Center   6/16/2022 11:30 AM Liset Lozano APRN [facility]   8/18/2022  9:45 AM Joyce Plata MD [facility]     The patient has current or prior documentation of LVEF less than 40%, or moderate to severely depressed left ventricular systolic function. The patent was prescribed or already taking an ACE inhibitor or ARB.     The " patient has current or prior documentation of LVEF less than 40%, or moderate to severely depressed left ventricular systolic function. The patient was prescribed or already taking a beta-blocker.           This document has been electronically signed by David Pinon MD on June 8, 2022 11:40 CDT      Time: 35 minutes

## 2022-06-08 NOTE — OUTREACH NOTE
Prep Survey    Flowsheet Row Responses   [facility] facility patient discharged from? [place]   Is LACE score < 7 ? No   Emergency Room discharge w/ pulse ox? No   Eligibility [facility]   Date of Admission 06/01/22   Date of Discharge 06/08/22   Discharge Disposition Home or Self Care   Discharge diagnosis CARDIAC CATHETERIZATION  CHF  Precordial pain   Does the patient have one of the following disease processes/diagnoses(primary or secondary)? CHF   Does the patient have Home health ordered? Yes   What is the Home health agency?  [facility]    Is there a DME ordered? No   Prep survey completed? Yes          JUANY REAL - Registered Nurse

## 2022-06-09 NOTE — TELEPHONE ENCOUNTER
Incoming Refill Request      Medication requested (name and dose): magnesium oxide (MAGOX) 400 (241.3 Mg) MG tablet tablet  pantoprazole (Protonix) 40 MG EC tablet   aspirin 81 MG EC tablet   guaiFENesin (MUCINEX) 600 MG 12 hr tablet  insulin detemir (Levemir FlexTouch) 100 UNIT/ML injection      Pharmacy where request should be sent: [facility]    Additional details provided by patient: NONE    Best call back number: [phone]    Does the patient have less than a 3 day supply:  [x] Yes  [] No    Heydi Bobby Rep  06/09/22, 14:31 CDT

## 2022-06-09 NOTE — TELEPHONE ENCOUNTER
Next Appt  With Family Medicine (MARYANNE Denton)  06/16/2022 at 11:30 AM    Next Andres with Dr. Plata  08/18/2022

## 2022-06-09 NOTE — OUTREACH NOTE
Call Center TCM Note    Flowsheet Row Responses   [facility] patient discharged from? [place]   Does the patient have one of the following disease processes/diagnoses(primary or secondary)? CHF   TCM attempt successful? Yes  [verbal release for sisters Sofia]   Call start time 1330   Call end time 1340   Discharge diagnosis CARDIAC CATHETERIZATION  CHF  Precordial pain   Meds reviewed with patient/caregiver? Yes   Does the patient have all medications ordered at discharge? N/A   Is the patient taking all medications as directed (includes completed medication regime)? Yes   Medication comments Encouraged compliance with medication regimen   Does the patient have a primary care provider?  Yes   Does the patient have an appointment with their PCP within 7 days of discharge? Greater than 7 days   Comments regarding PCP Hospital PCP FOLLOW UP APPOINTMENT IS 6/16/22@1130am   Nursing Interventions Verified appointment date/time/provider   Has the patient kept scheduled appointments due by today? N/A   What is the Home health agency?  Former[facility]    Has home health visited the patient within 72 hours of discharge? Unsure   Pulse Ox monitoring None   Psychosocial issues? No   Did the patient receive a copy of their discharge instructions? Yes   Nursing interventions Reviewed instructions with patient   What is the patient's perception of their health status since discharge? Improving  [Reports she is fatigued and has had some dependent edema,  encouraged elevation when possible, following her diet as ordered, compliance with medications.]   Nursing interventions Nurse provided patient education  [Groin cath site sore but w/o s/s infection]   Is the patient weighing daily? No  [Patient has purchased scale--encouraged to use daily]   Does the patient have scales? Yes   Daily weight interventions Education provided on importance of daily weight  [Reviewed weight gain parameters of 2-3# day/5 # week  require MD notification--encouraged to weigh daily and record to take to MD appts for review---v/u ]   Is the patient able to teach back Heart Failure diet management? Yes  [Encouraged to follow low sodium guidelines as well as diabetic diet]   Is the patient able to teach back Heart Failure Zones? No   Is the patient able to teach back signs and symptoms of worsening condition? (i.e. weight gain, shortness of air, etc.) Yes   Is the patient/caregiver able to teach back the hierarchy of who to call/visit for symptoms/problems? PCP, Specialist, Home health nurse, Urgent Care, ED, 911 Yes   TCM call completed? Yes           Debbie Simmons RN    6/9/2022, 13:43 EDT

## 2022-06-10 NOTE — HOME HEALTH
REASON FOR REFERRAL:  70 Y/O FEMALE HOSPITALIZED AT [facility] 6/1/22-6/8/22 WITH COMPLAINTS OF CHEST PAIN.  SHE WAS TREATED FOR ACUTE ON CHRONIC CHF, PRECORDIAL PAIN, AND UTI.  PATIENT REPORTED THAT SHE IS DOING PRETTY GOOD SINCE COMING HOME FROM HOSPITAL HOWEVER IS WEAK.  SHE REQUESTED OT FOR STRENGTHENING AND ENDURANCE TRAINING. SHE STATED AT THIS TIME, SHE FEELS SHE DOES NOT WANT OT TO ADDRESS BATHING.    PMH: DM, HTN, CHF, CAD, GERD, OA, NONISCHEMIC CARDIOMYOPATHY, MORBID OBESITY, SLEEP APNEA, PACEMAKER PLACEMENT, CATARACT SURGERY, HYSTERECTOMY    PLOF: INDEPENDENT WITH ADLS, SIMPLE IADLS, FUNCTIONAL TRANSFERS/FUNCTIONAL MOBILITY USING QC PRN.     HOME ENVIRONMENT: PATIENT LIVES ALONE IN ONE LEVEL HOME WITH 3 STEPS TO ENTER WITH 1 HANDRAIL OR CAN USE RAMP. HER SISTER OR A FRIEND TAKES HER TO AN APPOINTMENTS AND TO GROCERY STORE.     PRECAUTIONS: FALL RISK     MD APPTS:  6/16/22 DAIS MADDEN    DME: QC, RW, GRAB BARS X2.     AROM B UES: WFL STRENGTH B UES: 4-/5 GROSSLY THROUGHOUT.     HAND DOMINANCE: RIGHT HAND DOMINANT, B  STRENGTHS: 4/5.    REHAB POTENTIAL: GOOD FOR GOALS     PATIENT'S GOAL: REGAIN STRENGTH    WISH TO ADDRESS BATHING/DRESSING WITH OT: NO    MEDICAL NECISSITY: PATIENT PRESENTS WITH WEAKNESS AND LIMITED ACTIVITY TOLERANCE AFTER RECENT HOSPITALIZATION FOR ACUTE ON CHRONIC CHF AND UTI.    SHE REQUIRES SKILLED HOME BASED OT SERVICES FUNCTIONAL TRANSFER TRAINING FOR ADLS, SAFETY EDUCATION, ACTIVITY TOLERANCE TRAINING, AND B UE THER. EX. FOR STRENGTHEING/REVIEW OF B UE HEP.

## 2022-06-10 NOTE — CASE COMMUNICATION
[facility]  / Case Conference Note  Medical Necessity and focus of care: PATIENT PRESENTS WITH WEAKNESS AND LIMITED ACTIVITY TOLERANCE AFTER RECENT HOSPITALIZATION FOR ACUTE ON CHRONIC CHF AND UTI.  SHE REQUIRES SKILLED HOME BASED OT SERVICES FUNCTIONAL TRANSFER TRAINING FOR ADLS, SAFETY EDUCATION, ACTIVITY TOLERANCE TRAINING, AND B UE THER. EX. FOR STRENGTHEING/REVIEW OF B UE HEP.  Caregiver Status: SELF AND SISTER PRN  Patient's goal(s):  REGAIN STRENGTH  Environmental/ Physical issues: NONE  Any additional needs: NONE    Based upon record review and collaboration conference, the recommended frequency for this patient is   SN-----  PT-----  OT---- 1 WK 3  ST-----  HHA---  MSW---  Provider_DR GREGORY__ Notified @ _____6/9/22____ and agrees with POC     Please verify your eval  scores: (Answer the scores  that pertain to your area of focus remove the others)   : 5  M1 850: 2  : 3

## 2022-06-16 NOTE — OUTREACH NOTE
CHF Week 2 Survey    Flowsheet Row Responses   [facility] [place] facility patient discharged from? [place]   Does the patient have one of the following disease processes/diagnoses(primary or secondary)? CHF   Week 2 attempt successful? No   Unsuccessful attempts Attempt 1          ERICA REAL - Registered Nurse

## 2022-06-17 NOTE — HOME HEALTH
"Subjective/Patient History: 71 y.o. female who presented to [facility] with chest pain.  Patient was noted to have typical type chest pain symptoms as well as urinary tract infection.  Cardiology was consulted.  Patient developed shortness of breath was felt to have CHF exacerbation.  Due to her known severe cardiomyopathy, she was started on dobutamine infusion by Dr. Felipe.  She had negative cardiac enzymes but persistent pain so coronary angiogram was done.  No intervene able lesions were found and medical management was recommended.  Patient finished course of antibiotics for urinary tract infection.  Shortness of breath improved and dobutamine was discontinued.  After dobutamine was discontinued, patient did have some worsening shortness of breath transiently that led to another night in the hospital, but on day of discharge she was at her baseline.  Should be discharged home in her baseline condition.  She will follow-up with her PCP in 1 to 2 weeks and with Dr. Felipe . Patient reports doing fairly well other than being a little more SOB this date. States that she has been sitting a lot today d/t ahving some unsteadiness and dizziness this date. No rpeorts of falls but states that she feels she has gotten a little weaker since recent hospital stay.     Prior Level of Function: Ambulate in home and short distances in community with PRN use of walker    Past Medical History: HTN, CHF, CAD s/p AICD placement, DM with neuropathy, GERD, LBP, obesity, OA, cardiomyopathy, DOROTHY, vitamin D deficiency    Patient Goal: \"walk better, get around without help, do my daily tasks\""

## 2022-06-17 NOTE — CASE COMMUNICATION
Patient missed a RICARDO visit from [facility] on 6/16/22    Reason: Attempted to reach pt by phone and went by patients home with no answer at the door.  Will follow up on next scheduled visit. Thank you.       For your records only.   As per home health protocol, MD must be notified of missed/cancelled visits; therefore the prescribed frequency was not met.

## 2022-06-24 NOTE — CASE COMMUNICATION
Home Health Physical Therapy Discipline Discharge (6/22/22)    SUMMARY OF CARE Pt has met goals and is  I with HEP and mobility in the home and  home exit. Pt has improved strength, gait, transfers, mobility, and general safety in the home. Pt is I with HEP and able to continue. Pt remains home bound with limited activity tolerance but is improving. Pt has good understanding of all education and is agreeable to  PT DC   PRIOR VS CURRENT  LEVEL   PRIOR   GAIT :  50 ft, SBA quad cane,   Patient demosntrating instability with gait. Short distance tolerance and shuffled gait pattenr. Dizziness cuasing lateral deviations and increased risk for falls w/ PT educating patient on use of wuad cane for increased support. Attmepting to ambulate w/o AD at times in home increasing injury risk. xCuing to keep patient on task w/ easy distraction during ambulation. Wide MECHELLE and short s tep legnth    BALANCE 0/4  BED MOBILITY WDL  TRANSFERS SBA  ROM WDL  STRENGTH B LE MMT grossly 4/5   CURRENT   BED MOBILITY Independent   GAIT Pt gait trained with RW, MOD I in the home, slow-fair neri with fair stride. 150 ft. pt gait trained in the home without AD I and home exit with SBQC, MOD I, to mailboxes and back to home. 3 steps, fair stride and neri, slight trunk flexion.   pt went to MD yesterday, able to ambulate to an d from car, into clinic to MD office and back out to car per pt.  TRANSFERS Independent   ROM WFL  STRENGTH  MMT B hip 5/5 B knee 5/5  BALANCE Tinetti 26/28; sit to stand 7x 30 sec  DISCHARGE CONDITION   good  DISCHARGE REASON  pt has met goals    MD VISIT  TBS  INSTRUCTIONS HOME PROGRAM PROVIDED TO Lexi Wade  CONTENT written HEP          PATIENT CAREGIVER LEVEL OF COMPLIANCE Fair  UNMET NEEDS nursing   SERVICES CONTINUING skilled  nursing  COMMUNICATION / CARE COORDINATION  PT and nursing notified of PT DC  HHACN/MEDICARE 2 DAY NOTICE GIVEN HHCCN singed this date.   PATIENT IS AGREEABLE WITH DISCHARGE PLAN YES

## 2022-06-26 NOTE — ED PROVIDER NOTES
Subjective     History provided by:  Patient   used: No    Patient is a 71 years old with past medical history of CHF, diabetes, hypertension, morbid obesity who presented here today because of shortness of breath for the past 2 days and progressively getting worse.  Denies any fever chills or sweating.  Denies any cough or congestion.  She says she is compliant to her medication.  Dr. Felipe is a cardiologist.    Review of Systems   Respiratory: Positive for shortness of breath.    All other systems reviewed and are negative.      Past Medical History:   Diagnosis Date   • Chronic systolic heart failure (HCC)    • Diabetes mellitus (HCC)    • Diabetic neuropathy (HCC)    • GERD (gastroesophageal reflux disease)    • Hypercholesterolemia    • Hypertension    • Low back pain    • Morbid obesity (HCC)    • Nonischemic cardiomyopathy (HCC)    • Osteoarthritis    • Sleep apnea    • Vitamin D deficiency        Allergies   Allergen Reactions   • Aldactone [Spironolactone] Unknown - Low Severity   • Nsaids        Past Surgical History:   Procedure Laterality Date   • CARDIAC CATHETERIZATION N/A 08/23/2018   • CARDIAC CATHETERIZATION N/A 6/3/2022    Procedure: Left Heart Cath;  Surgeon: Wang Felipe MD;  Location: [facility] INVASIVE LOCATION;  Service: Cardiology;  Laterality: N/A;   • CARDIAC ELECTROPHYSIOLOGY PROCEDURE N/A 06/22/2020   • CARDIAC PACEMAKER PLACEMENT     • CATARACT EXTRACTION     • COLONOSCOPY N/A 06/14/2017   • PACEMAKER IMPLANTATION     • TOTAL ABDOMINAL HYSTERECTOMY WITH SALPINGO OOPHORECTOMY         Family History   Problem Relation Age of Onset   • Diabetes Sister    • Bone cancer Brother        Social History     Socioeconomic History   • Marital status: Single   Tobacco Use   • Smoking status: Former Smoker   • Smokeless tobacco: Never Used   Vaping Use   • Vaping Use: Never used   Substance and Sexual Activity   • Alcohol use: No   • Drug use: No   • Sexual activity: Not  "Currently       /64 (BP Location: Right arm, Patient Position: Lying)   Pulse 80   Temp 97.6 °F (36.4 °C) (Oral)   Resp 16   Ht 162.6 cm (64\")   Wt 88.5 kg (195 lb)   SpO2 98%   BMI 33.47 kg/m²     Objective   Physical Exam  Vitals and nursing note reviewed.   Constitutional:       Appearance: She is well-developed. She is obese.   HENT:      Head: Normocephalic and atraumatic.      Mouth/Throat:      Mouth: Mucous membranes are moist.      Pharynx: Oropharynx is clear.   Eyes:      Extraocular Movements: Extraocular movements intact.      Pupils: Pupils are equal, round, and reactive to light.   Cardiovascular:      Rate and Rhythm: Normal rate and regular rhythm.   Pulmonary:      Effort: Pulmonary effort is normal.      Breath sounds: Normal breath sounds.   Abdominal:      General: Bowel sounds are normal.      Palpations: Abdomen is soft.   Musculoskeletal:         General: Normal range of motion.      Cervical back: Normal range of motion and neck supple.   Skin:     General: Skin is warm.      Capillary Refill: Capillary refill takes less than 2 seconds.   Neurological:      General: No focal deficit present.      Mental Status: She is alert and oriented to person, place, and time.   Psychiatric:         Mood and Affect: Mood normal.         Behavior: Behavior normal.         Procedures           ED Course  ED Course as of 06/25/22 2243   Sat Jun 25, 2022 2240 Result discussed with patient.  Told to follow the primary care in 3 days.  Come back to the ER symptoms get worse. [MO]      ED Course User Index  [MO] Lew Schmidt MD                 Labs Reviewed   COMPREHENSIVE METABOLIC PANEL - Abnormal; Notable for the following components:       Result Value    Glucose 381 (*)     Potassium 3.4 (*)     ALT (SGPT) 35 (*)     Alkaline Phosphatase 155 (*)     eGFR 59.6 (*)     All other components within normal limits    Narrative:     GFR Normal >60  Chronic Kidney Disease <60  Kidney " Failure <15     BNP (IN-HOUSE) - Abnormal; Notable for the following components:    proBNP 2,265.0 (*)     All other components within normal limits    Narrative:     Among patients with dyspnea, NT-proBNP is highly sensitive for the detection of acute congestive heart failure. In addition NT-proBNP of <300 pg/ml effectively rules out acute congestive heart failure with 99% negative predictive value.    Results may be falsely decreased if patient taking Biotin.     CBC WITH AUTO DIFFERENTIAL - Abnormal; Notable for the following components:    RDW 15.9 (*)     Lymphocytes, Absolute 3.34 (*)     Monocytes, Absolute 1.12 (*)     All other components within normal limits   TROPONIN (IN-HOUSE) - Normal    Narrative:     Troponin T Reference Range:  <= 0.03 ng/mL-   Negative for AMI  >0.03 ng/mL-     Abnormal for myocardial necrosis.  Clinicians would have to utilize clinical acumen, EKG, Troponin and serial changes to determine if it is an Acute Myocardial Infarction or myocardial injury due to an underlying chronic condition.       Results may be falsely decreased if patient taking Biotin.     RAINBOW DRAW    Narrative:     The following orders were created for panel order [place] Draw.  Procedure                               Abnormality         Status                     ---------                               -----------         ------                     Green Top (Gel)[[id number]]                                  In process                 Lavender Top[[id number]]                                     In process                 Gold Top - SST[[id number]]                                   In process                 Light Blue Top[[id number]]                                   In process                   Please view results for these tests on the individual orders.   CBC AND DIFFERENTIAL    Narrative:     The following orders were created for panel order CBC & Differential.  Procedure                                Abnormality         Status                     ---------                               -----------         ------                     CBC Auto Differential[[id number]]        Abnormal            Final result                 Please view results for these tests on the individual orders.   GREEN TOP   LAVENDER TOP   GOLD TOP - SST   LIGHT BLUE TOP       XR Chest 2 View   Final Result     Central vascular congestion with probable mild interstitial   edema, slightly improved from previous.      Electronically signed by:  Ovidio Camara MD  6/25/2022 10:17 PM   CDT Workstation: [id number]                                   [facility]    Final diagnoses:   Acute on chronic congestive heart failure, unspecified heart failure type (HCC)   Hypokalemia       ED Disposition  ED Disposition     ED Disposition   Discharge    Condition   Stable    Comment   --             Joyce Plata MD  [address] [place] [address] [address]  [facility]  [place] [address]  [phone]    In 3 days           Medication List      No changes were made to your prescriptions during this visit.          Lew Schmidt MD  06/25/22 2242       Lew Schmidt MD  06/25/22 3079

## 2022-07-01 NOTE — OUTREACH NOTE
CHF Week 4 Survey    Flowsheet Row Responses   [facility] facility patient discharged from? [place]   Does the patient have one of the following disease processes/diagnoses(primary or secondary)? CHF   Week 4 attempt successful? No          YAMILET LUTZ - Registered Nurse

## 2022-07-13 PROBLEM — K81.9 CHOLECYSTITIS: Status: ACTIVE | Noted: 2022-01-01

## 2022-07-13 PROBLEM — E87.6 HYPOKALEMIA: Status: ACTIVE | Noted: 2022-01-01

## 2022-07-13 NOTE — ED PROVIDER NOTES
Subjective   Patient presents to the ED with complaint of worsening abdominal pain.  Patient notes this as a cramping abdominal discomfort with decreased appetite.  No bloating.  Patient has had prior hysterectomy.  No bladder issues or dysuria.  No fevers at home.  Patient notes some mild diarrhea.  Patient notes some similar symptoms in the past though usually able to control the symptoms within a day with change in her diet.  Patient notes some prolonged symptoms though does arrive with normal-appearing vital signs.  No known sick contacts.  No bad foods.       History provided by:  Patient      Review of Systems   Constitutional: Positive for appetite change. Negative for activity change, chills and fever.   HENT: Negative.  Negative for congestion.    Eyes: Negative.  Negative for photophobia and visual disturbance.   Respiratory: Negative.  Negative for cough, chest tightness and shortness of breath.    Cardiovascular: Negative.  Negative for chest pain and palpitations.   Gastrointestinal: Positive for abdominal pain, diarrhea and nausea. Negative for constipation and vomiting.   Endocrine: Negative.    Genitourinary: Negative.  Negative for decreased urine volume, dysuria, flank pain and hematuria.   Musculoskeletal: Negative.  Negative for arthralgias, back pain, myalgias, neck pain and neck stiffness.   Skin: Negative.  Negative for pallor.   Neurological: Negative.  Negative for dizziness, syncope, weakness, light-headedness, numbness and headaches.   Psychiatric/Behavioral: Negative.  Negative for confusion and suicidal ideas. The patient is not nervous/anxious.    All other systems reviewed and are negative.      Past Medical History:   Diagnosis Date   • Chronic systolic heart failure (HCC)    • Diabetes mellitus (HCC)    • Diabetic neuropathy (HCC)    • GERD (gastroesophageal reflux disease)    • Hypercholesterolemia    • Hypertension    • Low back pain    • Morbid obesity (HCC)    • Nonischemic  cardiomyopathy (HCC)    • Osteoarthritis    • Sleep apnea    • Vitamin D deficiency        Allergies   Allergen Reactions   • Aldactone [Spironolactone] Unknown - Low Severity   • Nsaids        Past Surgical History:   Procedure Laterality Date   • CARDIAC CATHETERIZATION N/A 08/23/2018   • CARDIAC CATHETERIZATION N/A 6/3/2022    Procedure: Left Heart Cath;  Surgeon: Wang Felipe MD;  Location: [facility] CATH INVASIVE LOCATION;  Service: Cardiology;  Laterality: N/A;   • CARDIAC ELECTROPHYSIOLOGY PROCEDURE N/A 06/22/2020   • CARDIAC PACEMAKER PLACEMENT     • CATARACT EXTRACTION     • COLONOSCOPY N/A 06/14/2017   • PACEMAKER IMPLANTATION     • TOTAL ABDOMINAL HYSTERECTOMY WITH SALPINGO OOPHORECTOMY         Family History   Problem Relation Age of Onset   • Diabetes Sister    • Bone cancer Brother        Social History     Socioeconomic History   • Marital status: Single   Tobacco Use   • Smoking status: Former Smoker   • Smokeless tobacco: Never Used   Vaping Use   • Vaping Use: Never used   Substance and Sexual Activity   • Alcohol use: No   • Drug use: No   • Sexual activity: Not Currently           Objective   Physical Exam  Vitals and nursing note reviewed.   Constitutional:       General: She is not in acute distress.     Appearance: She is well-developed. She is not diaphoretic.   HENT:      Head: Normocephalic and atraumatic.      Nose: Nose normal.   Eyes:      General: No scleral icterus.     Conjunctiva/sclera: Conjunctivae normal.      Pupils: Pupils are equal, round, and reactive to light.   Neck:      Vascular: No JVD.   Cardiovascular:      Rate and Rhythm: Normal rate and regular rhythm.      Heart sounds: Normal heart sounds. No murmur heard.    No friction rub. No gallop.   Pulmonary:      Effort: Pulmonary effort is normal. No respiratory distress.      Breath sounds: No wheezing or rales.   Chest:      Chest wall: No tenderness.   Abdominal:      General: There is no distension.      Palpations:  Abdomen is soft. There is no mass.      Tenderness: There is generalized abdominal tenderness and tenderness in the right lower quadrant, periumbilical area, suprapubic area and left lower quadrant. There is no guarding or rebound.   Musculoskeletal:         General: No tenderness or deformity. Normal range of motion.      Cervical back: Normal range of motion and neck supple.   Lymphadenopathy:      Cervical: No cervical adenopathy.   Skin:     General: Skin is warm and dry.      Capillary Refill: Capillary refill takes less than 2 seconds.      Coloration: Skin is not pale.      Findings: No erythema or rash.   Neurological:      General: No focal deficit present.      Mental Status: She is alert and oriented to person, place, and time.      Cranial Nerves: No cranial nerve deficit.      Motor: No abnormal muscle tone.   Psychiatric:         Behavior: Behavior normal.         Thought Content: Thought content normal.         Judgment: Judgment normal.         Procedures           ED Course                                           MDM  Number of Diagnoses or Management Options  Cholecystitis: new and requires workup  Right upper quadrant abdominal pain: new and requires workup  Diagnosis management comments: Patient with possible stone in gallbladder neck on us, no leukocytosis, though clinically patient appears to have signs c/w cholecystitis.  Plan admission with HIDA scan with general surgery consultation.         Amount and/or Complexity of Data Reviewed  Clinical lab tests: reviewed  Tests in the radiology section of CPT®: reviewed    Risk of Complications, Morbidity, and/or Mortality  Presenting problems: moderate  Diagnostic procedures: moderate  Management options: moderate    Patient Progress  Patient progress: stable      Final diagnoses:   Cholecystitis   Right upper quadrant abdominal pain       ED Disposition  ED Disposition     ED Disposition   Decision to Admit    Condition   --    Comment   Level of  Care: Med/Surg [1]   Diagnosis: Cholecystitis [[id number]]   Admitting Physician: SHEYLA BONILLA [[id number]]   Attending Physician: SHEYLA BONILLA [[id number]]               No follow-up provider specified.       Medication List      No changes were made to your prescriptions during this visit.          Sukhdev Polk MD  07/13/22 2964

## 2022-07-13 NOTE — H&P
[facility]  HISTORY AND PHYSICAL      Date of Admission: 7/13/2022  Primary Care Physician: Joyce Plata MD    Subjective     Chief Complaint: Abdominal Pain    History of Present Illness  Patient is a 71 year old AA Female w/ history of DM/GERD who came to ER for worsening abdominal pain. She tells me that she started having pain three days ago, progressing in pain severity. Pain is mostly in the right upper abdomen and epigastric area. She does notice her appetite is down, but when she has tried to eat, pain has been worse and she has had nausea. No vomiting has occurred. She reports some mild diarrhea. She has felt very chilled, thinking she may have ran a fever at some time. She is on medications for GERD and states this feels different. No previous history of gallbladder disease. ED evaluation found signs of cholecystitis on RUQ US with probable small stone at the neck. ED consulted with general surgery, who agrees to see the patient and wants to perform HIDA scan. IV antibiotics have been initiated.     Patient is insulin-dependent diabetic, only taking nightly Levemir at this time. She is not on an oral hypoglycemic at this time. She also has history of GERD and takes Protonix and Carafate. She also has HTN and is on multiple medications for control.         Review of Systems   Constitutional: Positive for appetite change and chills. Negative for diaphoresis, fatigue and fever.   HENT: Negative for congestion, ear pain, postnasal drip, rhinorrhea, sinus pressure, sinus pain, sneezing, sore throat and trouble swallowing.    Eyes: Negative for photophobia, pain and discharge.   Respiratory: Negative for cough, chest tightness, shortness of breath and wheezing.    Cardiovascular: Negative for chest pain, palpitations and leg swelling.   Gastrointestinal: Positive for abdominal pain, diarrhea and nausea. Negative for blood in stool, constipation  and vomiting.   Endocrine: Negative for polydipsia, polyphagia and polyuria.   Genitourinary: Negative for difficulty urinating, dysuria, flank pain, frequency, hematuria and urgency.   Musculoskeletal: Negative for arthralgias, back pain, myalgias and neck pain.   Skin: Negative for color change, rash and wound.   Neurological: Negative for dizziness, seizures, syncope, weakness and headaches.   Hematological: Does not bruise/bleed easily.   Psychiatric/Behavioral: Negative for behavioral problems, confusion, hallucinations, sleep disturbance and suicidal ideas.        Otherwise complete ROS reviewed and negative except as mentioned in the HPI.    Past Medical History:   Past Medical History:   Diagnosis Date   • Chronic systolic heart failure (HCC)    • Diabetes mellitus (HCC)    • Diabetic neuropathy (HCC)    • GERD (gastroesophageal reflux disease)    • Hypercholesterolemia    • Hypertension    • Low back pain    • Morbid obesity (HCC)    • Nonischemic cardiomyopathy (HCC)    • Osteoarthritis    • Sleep apnea    • Vitamin D deficiency      Past Surgical History:  Past Surgical History:   Procedure Laterality Date   • CARDIAC CATHETERIZATION N/A 08/23/2018   • CARDIAC CATHETERIZATION N/A 6/3/2022    Procedure: Left Heart Cath;  Surgeon: Wang Felipe MD;  Location: [facility] CATH INVASIVE LOCATION;  Service: Cardiology;  Laterality: N/A;   • CARDIAC ELECTROPHYSIOLOGY PROCEDURE N/A 06/22/2020   • CARDIAC PACEMAKER PLACEMENT     • CATARACT EXTRACTION     • COLONOSCOPY N/A 06/14/2017   • PACEMAKER IMPLANTATION     • TOTAL ABDOMINAL HYSTERECTOMY WITH SALPINGO OOPHORECTOMY       Social History:  reports that she has quit smoking. She has never used smokeless tobacco. She reports that she does not drink alcohol and does not use drugs.    Family History: family history includes Bone cancer in her brother; Diabetes in her sister.      Allergies:  Allergies   Allergen Reactions   • Aldactone [Spironolactone] Unknown - Low  "Severity   • Nsaids        Medications:  Prior to Admission medications    Medication Sig Start Date End Date Taking? Authorizing Provider   acetaminophen (TYLENOL) 325 MG tablet Take 2 tablets by mouth Every 4 (Four) Hours As Needed for Mild Pain . 1/19/22  Yes Swetha Raymond APRN   albuterol sulfate  (90 Base) MCG/ACT inhaler INHALE 2 PUFFS EVERY 4 (FOUR) HOURS AS NEEDED FOR WHEEZING OR SHORTNESS OF AIR. 9/2/21  Yes Joyce Plata MD   aspirin 81 MG EC tablet Take 1 tablet by mouth Every Night. 6/21/22  Yes Joyce Plata MD   atorvastatin (LIPITOR) 40 MG tablet Take 1 tablet by mouth Daily. 12/16/21  Yes Joyce Plata MD   B-D UF III MINI PEN NEEDLES 31G X 5 MM misc USE WITH INSULIN INJECTIONS NIGHTLY 12/6/21  Yes Joyce Plata MD   Blood Glucose Monitoring Suppl (FreeStyle Lite) device 1 Device 3 (Three) Times a Day. 4/5/22  Yes Justin Wheeler MD   carvedilol (COREG) 25 MG tablet Take 1 tablet by mouth 2 (Two) Times a Day With Meals. 2/2/22  Yes Joyce Plata MD   furosemide (LASIX) 40 MG tablet TAKE 1 TABLET BY MOUTH TWICE A DAY 5/13/22  Yes Joyce Plata MD   glucose blood (FREESTYLE LITE) test strip 1 each by Other route 3 (Three) Times a Day. Use as instructed 4/5/22  Yes Justin Wheeler MD   guaiFENesin (MUCINEX) 600 MG 12 hr tablet Take 1 tablet by mouth Every 12 (Twelve) Hours. 6/9/22  Yes Joyce Plata MD   insulin detemir (Levemir FlexTouch) 100 UNIT/ML injection Inject 20 Units under the skin into the appropriate area as directed Every Night. 6/9/22  Yes Joyce Plata MD   Insulin Syringe 31G X 5/16\" 0.5 ML misc Inject 1 each under the skin into the appropriate area as directed Every Night. 12/2/21  Yes Justin Wheeler MD   ipratropium-albuterol (DUO-NEB) 0.5-2.5 mg/3 ml nebulizer Take 3 mL by nebulization 4 (Four) Times a Day. 2/23/22  Yes Justin Wheeler MD   isosorbide mononitrate (IMDUR) 30 MG 24 hr tablet Take 1 tablet by mouth Daily. 12/16/21  Yes " Joyce Plata MD   Lancets (freestyle) lancets 1 each by Other route 3 (Three) Times a Day. Use as instructed 4/5/22  Yes Justin Wheeler MD   lisinopril (PRINIVIL,ZESTRIL) 10 MG tablet TAKE 1 TABLET BY MOUTH EVERY DAY 5/23/22  Yes Joyce Plata MD   nitroglycerin (Nitrostat) 0.4 MG SL tablet Place 1 tablet under the tongue Every 5 (Five) Minutes As Needed for Chest Pain. Take no more than 3 doses in 15 minutes. 2/2/22  Yes Joyce Plata MD   pantoprazole (Protonix) 40 MG EC tablet Take 1 tablet by mouth Daily. 6/21/22  Yes Joyce Plata MD   potassium chloride (MICRO-K) 10 MEQ CR capsule Take 2 capsules by mouth 2 (Two) Times a Day. 5/5/22  Yes Brent Coulter MD   sucralfate (CARAFATE) 1 g tablet Take 1 g by mouth 3 (Three) Times a Day. 4/21/22  Yes Heidi Rausch MD   magnesium oxide (MAGOX) 400 (241.3 Mg) MG tablet tablet Take 1 tablet by mouth Daily. 10/6/17   Joyce Plata MD     I have utilized all available immediate resources to obtain, update, and review the patient's current medications.    Objective     Vital Signs: /91   Pulse 76   Temp 97.5 °F (36.4 °C) (Oral)   Resp 16   SpO2 94%   Physical Exam  Vitals and nursing note reviewed.   Constitutional:       Appearance: Normal appearance. She is obese.   HENT:      Head: Normocephalic and atraumatic.      Right Ear: External ear normal.      Left Ear: External ear normal.      Nose: Nose normal. No congestion or rhinorrhea.      Mouth/Throat:      Mouth: Mucous membranes are moist.      Pharynx: Oropharynx is clear.   Eyes:      General: No scleral icterus.     Extraocular Movements: Extraocular movements intact.      Conjunctiva/sclera: Conjunctivae normal.      Pupils: Pupils are equal, round, and reactive to light.   Neck:      Vascular: No carotid bruit.   Cardiovascular:      Rate and Rhythm: Normal rate and regular rhythm.      Pulses: Normal pulses.      Heart sounds: Normal heart sounds. No murmur  heard.  Pulmonary:      Effort: Pulmonary effort is normal.      Breath sounds: Normal breath sounds. No wheezing, rhonchi or rales.   Abdominal:      General: Abdomen is flat. Bowel sounds are normal.      Palpations: Abdomen is soft.      Tenderness: There is abdominal tenderness in the right upper quadrant and epigastric area. There is no guarding.       Musculoskeletal:         General: No swelling or deformity. Normal range of motion.      Cervical back: Normal range of motion and neck supple. No tenderness.      Right lower leg: No edema.      Left lower leg: No edema.   Skin:     General: Skin is warm and dry.      Capillary Refill: Capillary refill takes less than 2 seconds.      Findings: No rash.   Neurological:      General: No focal deficit present.      Mental Status: She is alert and oriented to person, place, and time.      Motor: No weakness.   Psychiatric:         Mood and Affect: Mood normal.         Behavior: Behavior normal.         Thought Content: Thought content normal.         Judgment: Judgment normal.              Results Reviewed:  Lab Results (last 24 hours)     Procedure Component Value Units Date/Time    COVID-19 and FLU A/B PCR - Swab, Nasopharynx [[id number]]  (Normal) Collected: 07/13/22 1500    Specimen: Swab from Nasopharynx Updated: 07/13/22 1526     COVID19 Not Detected     Influenza A PCR Not Detected     Influenza B PCR Not Detected    Narrative:      Fact sheet for providers: https://www.fda.gov/media/324585/download    Fact sheet for patients: https://www.fda.gov/media/545193/download    Test performed by PCR.    Urinalysis, Microscopic Only - Urine, Catheter [[id number]]  (Abnormal) Collected: 07/13/22 1341    Specimen: Urine, Catheter Updated: 07/13/22 1425     RBC, UA 0-2 /HPF      WBC, UA 0-2 /HPF      Bacteria, UA None Seen /HPF      Squamous Epithelial Cells, UA 0-2 /HPF      Hyaline Casts, UA 0-2 /LPF      Methodology Manual Light Microscopy    Urinalysis With  Microscopic If Indicated (No Culture) - Urine, Catheter [[id number]]  (Abnormal) Collected: 07/13/22 1341    Specimen: Urine, Catheter Updated: 07/13/22 1421     Color, UA Yellow     Appearance, UA Clear     pH, UA 6.0     Specific Gravity, UA 1.037     Comment: Result obtained by Refractometer        Glucose,  mg/dL (1+)     Ketones, UA Trace     Bilirubin, UA Negative     Blood, UA Negative     Protein, UA >=300 mg/dL (3+)     Leuk Esterase, UA Negative     Nitrite, UA Negative     Urobilinogen, UA 1.0 E.U./dL    [place] Draw [[id number]] Collected: 07/13/22 1022    Specimen: Blood Updated: 07/13/22 1133    Narrative:      The following orders were created for panel order [place] Draw.  Procedure                               Abnormality         Status                     ---------                               -----------         ------                     Green Top (Gel)[[id number]]                                  Final result               Lavender Top[[id number]]                                     Final result               Gold Top - SST[[id number]]                                   Final result               Light Blue Top[[id number]]                                   Final result                 Please view results for these tests on the individual orders.    Green Top (Gel) [[id number]] Collected: 07/13/22 1022    Specimen: Blood Updated: 07/13/22 1133     Extra Tube Hold for add-ons.     Comment: Auto resulted.       Gold Top - SST [[id number]] Collected: 07/13/22 1022    Specimen: Blood Updated: 07/13/22 1133     Extra Tube Hold for add-ons.     Comment: Auto resulted.       Lavender Top [[id number]] Collected: 07/13/22 1022    Specimen: Blood Updated: 07/13/22 1133     Extra Tube hold for add-on     Comment: Auto resulted       Light Blue Top [[id number]] Collected: 07/13/22 1022    Specimen: Blood Updated: 07/13/22 1133     Extra Tube Hold for add-ons.     Comment: Auto resulted       Comprehensive  Metabolic Panel [[id number]]  (Abnormal) Collected: 07/13/22 1022    Specimen: Blood Updated: 07/13/22 1051     Glucose 281 mg/dL      BUN 16 mg/dL      Creatinine 0.99 mg/dL      Sodium 135 mmol/L      Potassium 3.1 mmol/L      Chloride 99 mmol/L      CO2 21.0 mmol/L      Calcium 9.4 mg/dL      Total Protein 6.5 g/dL      Albumin 4.10 g/dL      ALT (SGPT) 24 U/L      AST (SGOT) 31 U/L      Alkaline Phosphatase 132 U/L      Total Bilirubin 1.1 mg/dL      Globulin 2.4 gm/dL      A/G Ratio 1.7 g/dL      BUN/Creatinine Ratio 16.2     Anion Gap 15.0 mmol/L      eGFR 60.3 mL/min/1.73      Comment: National Kidney Foundation and American Society of Nephrology (ASN) Task Force recommended calculation based on the Chronic Kidney Disease Epidemiology Collaboration (CKD-EPI) equation refit without adjustment for race.       Narrative:      GFR Normal >60  Chronic Kidney Disease <60  Kidney Failure <15      Lipase [[id number]]  (Normal) Collected: 07/13/22 1022    Specimen: Blood Updated: 07/13/22 1051     Lipase 41 U/L     Lactic Acid, Plasma [[id number]]  (Normal) Collected: 07/13/22 1022    Specimen: Blood Updated: 07/13/22 1046     Lactate 1.8 mmol/L     CBC & Differential [[id number]]  (Abnormal) Collected: 07/13/22 1022    Specimen: Blood Updated: 07/13/22 1036    Narrative:      The following orders were created for panel order CBC & Differential.  Procedure                               Abnormality         Status                     ---------                               -----------         ------                     CBC Auto Differential[[id number]]        Abnormal            Final result                 Please view results for these tests on the individual orders.    CBC Auto Differential [[id number]]  (Abnormal) Collected: 07/13/22 1022    Specimen: Blood Updated: 07/13/22 1036     WBC 10.06 10*3/mm3      RBC 4.57 10*6/mm3      Hemoglobin 12.7 g/dL      Hematocrit 38.2 %      MCV 83.6 fL      MCH 27.8 pg      MCHC 33.2  g/dL      RDW 15.9 %      RDW-SD 47.8 fl      MPV 10.3 fL      Platelets 244 10*3/mm3      Neutrophil % 55.4 %      Lymphocyte % 29.7 %      Monocyte % 12.9 %      Eosinophil % 1.1 %      Basophil % 0.6 %      Immature Grans % 0.3 %      Neutrophils, Absolute 5.57 10*3/mm3      Lymphocytes, Absolute 2.99 10*3/mm3      Monocytes, Absolute 1.30 10*3/mm3      Eosinophils, Absolute 0.11 10*3/mm3      Basophils, Absolute 0.06 10*3/mm3      Immature Grans, Absolute 0.03 10*3/mm3      nRBC 0.0 /100 WBC         Imaging Results (Last 24 Hours)     Procedure Component Value Units Date/Time    US Gallbladder [[id number]] Collected: 07/13/22 1346     Updated: 07/13/22 1516    Narrative:        COMPARISON:     CT same day    INDICATION:     Abdominal pain, abnormal gallbladder on CT    FINDINGS:     The liver appears normal size and contour. Mild  increased echogenicity liver. There is no intrahepatic or  extrahepatic biliary dilatation.  The extrahepatic bile duct  measures 0.3 cm which is within normal limits.     The gallbladder is abnormal. Wall thickening.  Pericholecystic  fluid. Possible small stone at the neck of the gallbladder. The  technologist reports a negative sonographic Collins's sign    The right kidney measures 10.8 x 4.9 x 4.2 cm in length.  There  is no hydronephrosis.     The visualized pancreas appears normal size configuration and  echotexture.     There is no ascites.       Impression:      1.  Abnormal appearance of the gallbladder which could reflect  cholecystitis. Probable small stone at the neck of the  gallbladder. If clinically indeterminate consider nuclear  medicine hepatobiliary scan.      2. Question mild hepatic steatosis.    Electronically signed by:  Denton Rhoades MD  7/13/2022 3:14 PM CDT  Workstation: [id number]    CT Abdomen Pelvis With Contrast [[id number]] Collected: 07/13/22 1239     Updated: 07/13/22 1317    Narrative:        PROCEDURE: CT ABDOMEN PELVIS W CONTRAST    COMPARISON: May 2,  2022    HISTORY: abdominal pain]    This exam was performed using radiation doses that are as low as  reasonably achievable (ALARA).  This exam was performed according to our departmental dose  optimization program, which includes automated exposure control,  adjustment of the mA and/or KV according to patient size and/or  use of iterative reconstruction technique.    CONTRAST:   Oral and 100 cc intravenous Isovue 300     TECHNIQUE: Axial images were obtained from level of the lung  bases through the ischial tuberosities. IV contrast was  administered. No oral contrast was given.     FINDINGS:     LOWER CHEST: Interval decrease in the small bilateral pleural  effusions is noted with compared to the prior exam. There is a  tiny pericardial effusion seen as well. There is mild enlargement  of the right and left atrium with mild left ventricular  enlargement.    HEPATOBILIARY: The gallbladder is abnormal appearance with fluid  surrounding the gallbladder. No definitive evidence of  cholelithiasis is seen. Further evaluation with ultrasound and/or  HIDA scan is recommended for more definitive assessment.  SPLEEN: Unremarkable.  PANCREAS: Unremarkable  ADRENAL GLANDS: Unremarkable.  KIDNEYS/URETERS: There is a small right renal cyst. There is no  evidence of obstruction of either kidney.    GASTROINTESTINAL: The small and large intestine are normal in  caliber. No obstructive changes are seen. The gallbladder is  visualized and within normal limits. No abnormal bowel wall  thickening identified.  REPRODUCTIVE ORGANS: The uterus is surgically absent.  URINARY BLADDER: Unremarkable    VASCULAR: ASCVD  LYMPH NODES: No pathologically enlarged nodes by size criteria.  PERITONEUM/RETROPERITONEUM: There is a small amount of free  intraperitoneal fluid noted in the retroperitoneum and right  hemipelvis. These findings may be associated with the abnormal  appearance of the gallbladder/ suspected infection/:  Cystitis.  Pancreatitis can also result in the fluid in the retroperitoneum  of the pancreas demonstrates no definitive focal abnormality or  enlargement.    OSSEOUS STRUCTURES: There is degenerative disc disease at L4-5.  No acute osseous abnormalities are identified        Impression:        1. Abnormal appearance of the gallbladder with pericholecystic  fluid is identified. Further evaluation with ultrasound and/or  HIDA scan is recommended to exclude cholecystitis.  2. Small amount of retroperitoneal free intraperitoneal fluid as  above.    3. Cardiomegaly with a small pericardial effusion with interval  decrease in the small bilateral pleural effusions.  Electronically signed by:  Carrie Kwan MD  7/13/2022 1:15 PM  CDT Workstation: [id number]        I have personally reviewed and interpreted the radiology studies and ECG obtained at time of admission.       Assessment / Plan     Assessment:   Active Hospital Problems    Diagnosis    • **Cholecystitis    • Hypokalemia    • Hypertension    • Diabetes mellitus (HCC)      Plan:    Cholecystitis   -General surgeon consulted, HIDA likely tomorrow; will keep on clear liquid diet and NPO after midnight; IV Zosyn       Hypertension   -Continue home meds, monitor      Diabetes mellitus (HCC)   -Continue night insulin, accuchecks and SS      Hypokalemia   -chronic; 40mEq given PO now, will continue home regimen of 20mEq daily starting tomorrow           I confirmed that the patient's Advance Care Plan is present, code status is documented, or surrogate decision maker is listed in the patient's medical record.       I have utilized all available immediate resources to obtain, update, or review the patient's current medications.     Estimated length of stay is 1-3 days.     The patient was seen and examined by me on 07/13/22  at 1530.      Electronically signed by Pam Francois PA-C, 07/13/22, 16:14 CDT.

## 2022-07-14 NOTE — PLAN OF CARE
Problem: Adult Inpatient Plan of Care  Goal: Plan of Care Review  Outcome: Ongoing, Progressing  Flowsheets (Taken 7/14/2022 5616)  Plan of Care Reviewed With: patient (Pended)   Goal Outcome Evaluation:  Plan of Care Reviewed With: (P) patient    Pt seen MST 2 unintentional wt loss and decreased appetite. Pt is NPO. Pt reports poor appetite a few weeks prior to admission. Reports 10# wt loss in the past couple months. Per epic, wt has fluctuated between 194-201# past month. RDN staff will await begin PO and continue to monitor POC.

## 2022-07-14 NOTE — CONSULTS
"Adult Nutrition  Assessment    Patient Name:  Lexi Wade  YOB: 1950  MRN: [id number]  Admit Date:  7/13/2022    Assessment Date:  7/14/2022    Comments:  Pt admitted with cholecystitis. [facility] HTN, diabetes, CHF. Pt seen MST 2 unintentional wt loss and decreased appetite. Pt is NPO. Pt reports poor appetite a few weeks prior to admission. When asked usual intake pt reports she eats when she is hungry. Pt reports 10# wt loss in the past couple months, however states her wt tends to fluctuate r/t fluid. Per epic, wts fluctuate between 194-201# past month. Discussed low sodium diet and pt states she tries to eat low sodium at home. Says she has had nutrition education before. RDN staff will await begin PO and continue to monitor POC.       Reason for Assessment     Row Name 07/14/22 1629          Reason for Assessment    Reason For Assessment nurse/nurse practitioner consult (P)      Diagnosis gastrointestinal disease (P)      Identified At Risk by Screening Criteria reduced oral intake over the last month;MST SCORE 2+;unintentional loss of 10 lbs or more in the past 2 mos (P)                 Nutrition/Diet History     Row Name 07/14/22 1629          Nutrition/Diet History    Typical Intake (Food/Fluid/EN/PN) Pt reports poor appetite for a few weeks before admission. Pt is NPO and states she is hungry and ready to eat (P)                 Anthropometrics     Row Name 07/14/22 1632          Anthropometrics    Height for Calculation 1.626 m (5' 4.02\") (P)      Weight for Calculation 63.7 kg (140 lb 8.7 oz) (P)   Adjusted IBW                Labs/Tests/Procedures/Meds     Row Name 07/14/22 1632          Labs/Procedures/Meds    Lab Results Reviewed reviewed, pertinent (P)             Diagnostic Tests/Procedures    Diagnostic Test/Procedure Reviewed reviewed, pertinent (P)             Medications    Pertinent Medications Reviewed reviewed, pertinent (P)                   Estimated/Assessed Needs - " "Anthropometrics     Row Name 07/14/22 1632          Anthropometrics    Height for Calculation 1.626 m (5' 4.02\") (P)      Weight for Calculation 63.7 kg (140 lb 8.7 oz) (P)   Adjusted IBW            Estimated/Assessed Needs    Additional Documentation KCAL/KG (Group);Protein Requirements (Group) (P)             KCAL/KG    KCAL/KG 25 Kcal/Kg (kcal);30 Kcal/Kg (kcal) (P)      25 Kcal/Kg (kcal) 1593.75 (P)      30 Kcal/Kg (kcal) 1912.5 (P)             Protein Requirements    Weight Used For Protein Calculations 63.7 kg (140 lb 8.7 oz) (P)   Adjusted IBW     Est Protein Requirement Amount (gms/kg) 1.1 gm protein (P)      Estimated Protein Requirements (gms/day) 70.13 (P)                          Electronically signed by:  Jackie Nugent  07/14/22 16:39 CDT  "

## 2022-07-14 NOTE — CONSULTS
Referring Provider: Dr. Polk emergency room      Patient Care Team:  Joyce Plata MD as PCP - General  Yamilka Wilson, RN as Ambulatory  ([facility])      Subjective .  Cholecystitis    History of present illness: 71-year-old female presented to the emergency room with a 2 to 3-day history of right flank pain.  She said it intermittently radiates to the upper abdomen.  Not related to anything specific.  She denies having it in the past.  No history of jaundice no history of pancreatitis.  CT scan suggested thickened gallbladder wall pericholecystic fluid but no obvious stones.  Ultrasound confirmed thickened gallbladder wall possible small stone in neck of gallbladder but not able to absolutely confirm that.  LFTs were normal except for alkaline phosphatase mildly elevated 132.  White count was 10.  Patient's been admitted by the hospitalist and she is awaiting undergo a HIDA scan later today.  This morning she feels better with IV fluids and antibiotics.  Pain is not completely resolved but clearly is improved and overall she feels much better.  Initially she stated she did not take any blood thinners but asked me to check the chart.  Review of her list on the chart is negative for any obvious anticoagulation other than aspirin.  She does have a cardiac history and she is followed by Dr. Felipe and she has a pacemaker in place.  No history of MI that she is aware of    Review of Systems   Constitutional: Negative for activity change, appetite change, chills and fever.   Respiratory: Negative for apnea, chest tightness and stridor.    Cardiovascular: Negative for chest pain.   Gastrointestinal: Positive for abdominal pain. Negative for abdominal distention, diarrhea, nausea and vomiting.   Genitourinary: Positive for flank pain. Negative for dysuria.   Musculoskeletal: Positive for arthralgias.   Neurological: Negative for dizziness, facial asymmetry and headaches.    Psychiatric/Behavioral: Negative for agitation, behavioral problems and confusion.         History  Past Medical History:   Diagnosis Date   • Chronic systolic heart failure (HCC)    • Diabetes mellitus (HCC)    • Diabetic neuropathy (HCC)    • GERD (gastroesophageal reflux disease)    • Hypercholesterolemia    • Hypertension    • Low back pain    • Morbid obesity (HCC)    • Nonischemic cardiomyopathy (HCC)    • Osteoarthritis    • Sleep apnea    • Vitamin D deficiency    ,   Past Surgical History:   Procedure Laterality Date   • CARDIAC CATHETERIZATION N/A 08/23/2018   • CARDIAC CATHETERIZATION N/A 6/3/2022    Procedure: Left Heart Cath;  Surgeon: Wang Felipe MD;  Location: [facility] CATH INVASIVE LOCATION;  Service: Cardiology;  Laterality: N/A;   • CARDIAC ELECTROPHYSIOLOGY PROCEDURE N/A 06/22/2020   • CARDIAC PACEMAKER PLACEMENT     • CATARACT EXTRACTION     • COLONOSCOPY N/A 06/14/2017   • PACEMAKER IMPLANTATION     • TOTAL ABDOMINAL HYSTERECTOMY WITH SALPINGO OOPHORECTOMY     ,   Family History   Problem Relation Age of Onset   • Diabetes Sister    • Bone cancer Brother    ,   Social History     Tobacco Use   • Smoking status: Former Smoker   • Smokeless tobacco: Never Used   Vaping Use   • Vaping Use: Never used   Substance Use Topics   • Alcohol use: No   • Drug use: No   , Home Medications:  Prior to Admission medications    Medication Sig Start Date End Date Taking? Authorizing Provider   acetaminophen (TYLENOL) 325 MG tablet Take 2 tablets by mouth Every 4 (Four) Hours As Needed for Mild Pain . 1/19/22  Yes Swetha Raymond APRN   albuterol sulfate  (90 Base) MCG/ACT inhaler INHALE 2 PUFFS EVERY 4 (FOUR) HOURS AS NEEDED FOR WHEEZING OR SHORTNESS OF AIR. 9/2/21  Yes Joyce Plata MD   aspirin 81 MG EC tablet Take 1 tablet by mouth Every Night. 6/21/22  Yes Joyce Plata MD   atorvastatin (LIPITOR) 40 MG tablet Take 1 tablet by mouth Daily. 12/16/21  Yes Joyce Plata MD   B-D UF  "III MINI PEN NEEDLES 31G X 5 MM misc USE WITH INSULIN INJECTIONS NIGHTLY 12/6/21  Yes Joyce Plata MD   Blood Glucose Monitoring Suppl (FreeStyle Lite) device 1 Device 3 (Three) Times a Day. 4/5/22  Yes Justin Wheeler MD   carvedilol (COREG) 25 MG tablet Take 1 tablet by mouth 2 (Two) Times a Day With Meals. 2/2/22  Yes Joyce Plata MD   furosemide (LASIX) 40 MG tablet TAKE 1 TABLET BY MOUTH TWICE A DAY 5/13/22  Yes Joyce Plata MD   glucose blood (FREESTYLE LITE) test strip 1 each by Other route 3 (Three) Times a Day. Use as instructed 4/5/22  Yes Justin Wheeler MD   guaiFENesin (MUCINEX) 600 MG 12 hr tablet Take 1 tablet by mouth Every 12 (Twelve) Hours. 6/9/22  Yes Joyce Plata MD   insulin detemir (Levemir FlexTouch) 100 UNIT/ML injection Inject 20 Units under the skin into the appropriate area as directed Every Night. 6/9/22  Yes Joyce Plata MD   Insulin Syringe 31G X 5/16\" 0.5 ML misc Inject 1 each under the skin into the appropriate area as directed Every Night. 12/2/21  Yes Justin Wheeler MD   ipratropium-albuterol (DUO-NEB) 0.5-2.5 mg/3 ml nebulizer Take 3 mL by nebulization 4 (Four) Times a Day. 2/23/22  Yes Justin Wheeler MD   isosorbide mononitrate (IMDUR) 30 MG 24 hr tablet Take 1 tablet by mouth Daily. 12/16/21  Yes Joyce Plata MD   Lancets (freestyle) lancets 1 each by Other route 3 (Three) Times a Day. Use as instructed 4/5/22  Yes Justin Wheeler MD   lisinopril (PRINIVIL,ZESTRIL) 10 MG tablet TAKE 1 TABLET BY MOUTH EVERY DAY 5/23/22  Yes Joyce Plata MD   nitroglycerin (Nitrostat) 0.4 MG SL tablet Place 1 tablet under the tongue Every 5 (Five) Minutes As Needed for Chest Pain. Take no more than 3 doses in 15 minutes. 2/2/22  Yes Joyce Plata MD   pantoprazole (Protonix) 40 MG EC tablet Take 1 tablet by mouth Daily. 6/21/22  Yes Joyce Plata MD   potassium chloride (MICRO-K) 10 MEQ CR capsule Take 2 capsules by mouth 2 (Two) Times a Day. " 5/5/22  Yes Brent Coulter MD   sucralfate (CARAFATE) 1 g tablet Take 1 g by mouth 3 (Three) Times a Day. 4/21/22  Yes Provider, MD Heidi   magnesium oxide (MAGOX) 400 (241.3 Mg) MG tablet tablet Take 1 tablet by mouth Daily. 10/6/17   Joyce Plata MD   , Scheduled Meds:  atorvastatin, 40 mg, Oral, Daily  carvedilol, 25 mg, Oral, BID With Meals  enoxaparin, 40 mg, Subcutaneous, Daily  Insulin Aspart, 0-9 Units, Subcutaneous, TID AC  insulin detemir, 20 Units, Subcutaneous, Nightly  isosorbide mononitrate, 30 mg, Oral, Daily  lisinopril, 10 mg, Oral, Daily  senna-docusate sodium, 2 tablet, Oral, BID  sodium chloride, 10 mL, Intravenous, Q12H    , Continuous Infusions:  lactated ringers, 100 mL/hr, Last Rate: 100 mL/hr (07/13/22 4273)  sodium chloride, 125 mL/hr, Last Rate: Stopped (07/13/22 8192)    , PRN Meds:  senna-docusate sodium **AND** polyethylene glycol **AND** bisacodyl **AND** bisacodyl  •  dextrose  •  dextrose  •  glucagon (human recombinant)  •  HYDROmorphone **AND** naloxone  •  ondansetron **OR** ondansetron  •  sodium chloride  •  sodium chloride and Allergies:    Allergies   Allergen Reactions   • Aldactone [Spironolactone] Unknown - Low Severity   • Nsaids        Objective     Vital Signs   Temp:  [96 °F (35.6 °C)-97.5 °F (36.4 °C)] 96 °F (35.6 °C)  Heart Rate:  [64-90] 64  Resp:  [16-20] 18  BP: (109-158)/(67-95) 109/67    Physical Exam:     General Appearance:    Alert, cooperative, in no acute distress   Head:    Normocephalic, without obvious abnormality, atraumatic   Eyes:            Lids and lashes normal, conjunctivae and sclerae normal, no   icterus, no pallor, corneas clear   Ears:    Ears appear intact with no abnormalities noted        Neck:   No adenopathy, supple, trachea midline, no thyromegaly,   no JVD   Lungs:     Clear to auscultation,respirations regular, even and                  unlabored    Heart:    Regular rhythm and normal rate, normal S1 and S2, no             murmur, no gallop, no rub, no click        Abdomen:     Normal bowel sounds, no masses, no organomegaly, soft        nontender, nondistended, no guarding, no rebound                tenderness   Extremities:   Moves all extremities well, no edema, no cyanosis, no             redness   Skin:   No bleeding, bruising or rash        Neurologic:  Grossly intact, sensation intact       Results Review:   I reviewed the patient's new clinical results.      Assessment & Plan       Cholecystitis    Hypertension    Diabetes mellitus (HCC)    Hypokalemia        I discussed the patient's findings and my recommendations with patient, nursing staff and consulting provider     Agree with HIDA scan.  Will reevaluate after HIDA scan.  Agree with current management of IV fluids and antibiotics.        This document has been electronically signed by Orville Farias MD on July 14, 2022 06:54 CDT     Orville Farias MD  07/14/22  06:54 CDT      Addendum  Patient underwent HIDA scan that demonstrated ejection fraction of 17%.  No evidence of acute cholecystitis though nuclear medicine did report that the gallbladder appeared to be small.  Went over results with the patient.  CT and ultrasound suggest the gallbladder is significantly thickened wall to suggest she has had chronic inflammatory issues with the gallbladder.  I went over this with her.  Is likely this is a malignant process and not see any obvious mass on any of the studies.  Went over all this with the patient.  She feels better but she wishes to go and have surgery if her gallbladder is not normal and was likely causing her discomfort that she had over the past few days.  I explained laparoscopic cholecystectomy to her as well as alternatives risk and benefits and she clearly understands and wishes to proceed.  She understands she is at increased risk for either an open procedure or a possible subtotal cholecystectomy.

## 2022-07-14 NOTE — CONSULTS
Cardiology Consultation Note.        Patient Name: Lexi Wade  Age/Sex: 71 y.o. female  : 1950  MRN: [id number]    Date of consultation: 2022  Consulting Physician: Wang Felipe MD  Primary care Physician: Joyce Plata MD  Requesting Physician:   Angelica Sepulveda APRN   Reason for consultation: Preoperative cardiovascular risk assessment      Subjective:       Chief Complaint: Chest pain and abdominal discomfort    History of Present Illness:  Lexi Wade is a 71 y.o. female     Body mass index is 34.5 kg/m². With a past medical history significant for nonischemic cardiomyopathy with left ventricular systolic dysfunction with an ejection fraction of 25% with improvement to 40 to 45%, previous coronary angiogram done in  which did not reveal of any evidence of any obstructive epicardial coronary artery disease, arterial hypertension, hypertensive heart disease, moderate mitral regurgitation, moderate tricuspid regurgitation, obesity with a body mass index of 36, s/p [employer] biventricular AICD generator placement in 2012 with subsequent generator replacement done in 2020  [employer] model number [id number] and serial #[id number], gastroesophageal reflux disease, reactive airway disease, history of pancreatitis, chronic kidney disease and negative sleep study.     Patient has had increasing episodes of shortness of breath.          Patient has been taking the Lasix.  Patient has had multiple visits to the emergency room with increasing shortness of breath and has been administered Lasix.     Patient record was reviewed.  Patient has had  hospitalization with altered mental status and shortness of breath.  Patient has been compliant with her medication.  Patient had episodes of worsening shortness of breath followed by symptoms of chest tightness.  Review of the record indicate patient last coronary angiogram was done in 2018.  Patient denies any prolonged episodes of  palpitation.  Patient has had worsening shortness of breath along with bilateral increasing lower extremity edema.     Patient denies any delivery of shock.  Patient complains of having symptoms of chest discomfort which she attributed it to worsening shortness of breath.    Patient was recently hospitalized with CHF decompensation and was subsequently on IV dobutamine with improvement of the symptom complex.  Patient now presented with symptoms of abdominal discomfort and right upper quadrant pain.  Due to the patient's symptom complex patient was to undergo cholecystectomy by Dr. Farias.    Patient on further questioning denies any symptoms of severe substernal chest pain.  Patient denies any nausea vomiting.  Patient denies any paroxysmal nocturnal dyspnea orthopnea.    Patient denies any delivery of shock from the AICD        Patient on questioning denies any hemoptysis hematuria bright of blood per rectum.           Patient 10 point review of system except for what stated in the history of present illness negative.              Concurrent  Medical History:  1.  Abdominal discomfort preoperative cardiovascular risk assessment.  2.  Coronary angiogram done in August 2018 had revealed:  A.  No evidence of any obstructive epicardial coronary artery disease in the circumflex right or the left anterior descending artery.  B.  Calcification noted in the left main without any evidence of dampening of the pressure.  C.  Elevated left ventricular end-diastolic pressure.  3.  Nonischemic cardiomyopathy.  4.  Left ventricular systolic dysfunction with improvement in the ejection fraction from 25% to 42%.  5.  Nonrheumatic moderate mitral regurgitation and nonrheumatic moderate tricuspid regurgitation.  6.  S/p [employer] biventricular  number [id number] and serial #[id number] implanted June 2020.  7.  Explantation of the old [employer] biventricular AICD generator replacement done in 07/2012 with [employer] AICD model #[id number],  serial #[id number].  8.  Arterial hypertension.  9.  Hypertensive heart disease.  10.  Non-insulin-dependent diabetes.  11.  Obesity.  12.  Gastroesophageal reflux disease.  13.  History of pancreatitis.  14.  Hyperlipidemia.  15.  Chronic kidney disease.  16.  Vitamin D deficiency.  17.  Diabetic neuropathy.  18.  Reactive airway disease.  19.  Chronic back pain  20.  Anemia with a hemoglobin of 10.  21.  Chronic kidney disease secondary to diabetes.        Past Surgical History:  1.  Coronary angiogram done in 2010 and 2018.  2.  S/p [employer] biventricular AICD generator placement in July 2012 with subsequent generator replacement done in July 2020.  3.  Colonoscopy.  4.  Cataract surgery.  5.  Abdominal hysterectomy with bilateral salpingo-oophorectomy.  6.  Dilatation and curettage.        Family History: Family history significant for diabetes.        Social History: Previous history of tobacco abuse denies any current tobacco alcohol intake        Cardiac Risk Factors:   1. Postmenopausal   2. Arterial hypertension   3. Hyperlipidemia   4. Diabetes   5. Obesity     Allergies:  Allergies   Allergen Reactions   • Aldactone [Spironolactone] Unknown - Low Severity   • Nsaids        Medication:  Medications Prior to Admission   Medication Sig Dispense Refill Last Dose   • acetaminophen (TYLENOL) 325 MG tablet Take 2 tablets by mouth Every 4 (Four) Hours As Needed for Mild Pain .      • albuterol sulfate  (90 Base) MCG/ACT inhaler INHALE 2 PUFFS EVERY 4 (FOUR) HOURS AS NEEDED FOR WHEEZING OR SHORTNESS OF AIR. 18 g 1    • aspirin 81 MG EC tablet Take 1 tablet by mouth Every Night. 90 tablet 3    • atorvastatin (LIPITOR) 40 MG tablet Take 1 tablet by mouth Daily. 90 tablet 3    • B-D UF III MINI PEN NEEDLES 31G X 5 MM misc USE WITH INSULIN INJECTIONS NIGHTLY 100 each 3    • Blood Glucose Monitoring Suppl (FreeStyle Lite) device 1 Device 3 (Three) Times a Day. 1 each 0    • carvedilol (COREG) 25 MG tablet Take 1  "tablet by mouth 2 (Two) Times a Day With Meals. 180 tablet 3    • furosemide (LASIX) 40 MG tablet TAKE 1 TABLET BY MOUTH TWICE A DAY 60 tablet 1    • glucose blood (FREESTYLE LITE) test strip 1 each by Other route 3 (Three) Times a Day. Use as instructed 100 each 1    • guaiFENesin (MUCINEX) 600 MG 12 hr tablet Take 1 tablet by mouth Every 12 (Twelve) Hours. 60 tablet 1    • insulin detemir (Levemir FlexTouch) 100 UNIT/ML injection Inject 20 Units under the skin into the appropriate area as directed Every Night.      • Insulin Syringe 31G X 5/16\" 0.5 ML misc Inject 1 each under the skin into the appropriate area as directed Every Night. 100 each 1    • ipratropium-albuterol (DUO-NEB) 0.5-2.5 mg/3 ml nebulizer Take 3 mL by nebulization 4 (Four) Times a Day. 360 mL 1    • isosorbide mononitrate (IMDUR) 30 MG 24 hr tablet Take 1 tablet by mouth Daily. 90 tablet 3    • Lancets (freestyle) lancets 1 each by Other route 3 (Three) Times a Day. Use as instructed 100 each 1    • lisinopril (PRINIVIL,ZESTRIL) 10 MG tablet TAKE 1 TABLET BY MOUTH EVERY DAY 90 tablet 3    • nitroglycerin (Nitrostat) 0.4 MG SL tablet Place 1 tablet under the tongue Every 5 (Five) Minutes As Needed for Chest Pain. Take no more than 3 doses in 15 minutes. 25 tablet 0    • pantoprazole (Protonix) 40 MG EC tablet Take 1 tablet by mouth Daily. 90 tablet 1    • potassium chloride (MICRO-K) 10 MEQ CR capsule Take 2 capsules by mouth 2 (Two) Times a Day. 60 capsule 0    • sucralfate (CARAFATE) 1 g tablet Take 1 g by mouth 3 (Three) Times a Day.      • magnesium oxide (MAGOX) 400 (241.3 Mg) MG tablet tablet Take 1 tablet by mouth Daily. 90 each 3            Review of Systems:       Constitutional:  Denies recent weight loss, weight gain, fever or chills, no change in exercise tolerance.     HENT:  Denies any hearing loss, epistaxis, hoarseness, or difficulty speaking.     Eyes: Wears eyeglasses or contact lenses     Respiratory:  Denies dyspnea with " exertion,no cough, wheezing, or hemoptysis.     Cardiovascular: Negative for palpitations, chest pain, orthopnea, PND, peripheral edema, syncope, or claudication.     Gastrointestinal: Positive for abdominal pain.  Denies change in bowel habits, dyspepsia, ulcer disease, hematochezia, or melena.  No nausea, no vomiting, no hematemesis, no diarrhea or constipation.    Endocrine: Negative for cold intolerance, heat intolerance, polydipsia, polyphagia or polyuria. Denies any history of weight change or unintended weight loss.    Genitourinary: Negative for hematuria.  No frequent urination or nocturia.      Musculoskeletal: Denies any history of arthritic symptoms or back problems .  No joint pain, joint stiffness, joint swelling, muscle pain, muscle weakness or neck pain.    Skin:  Denies any change in hair or nails, rashes, or skin lesions.     Allergic/Immunologic: Negative.  Negative for environmental allergies, food allergies or immunocompromised state.     Neurological:  Denies any history of recurrent headaches, strokes, TIA, or seizure disorder.     Hematological: Denies excessive bleeding, easy bruising, fatigue, lymphadenopathy or petechiae or any bleeding disorders.     Psychiatric/Behavioral: Denies any history of depression, substance abuse, or change in cognitive function. Denies any psychomotor reaction or tangential thought.  No depression, homicidal ideations or suicidal ideations.          Objective:     Objective:  Temp:  [96 °F (35.6 °C)-96.7 °F (35.9 °C)] 96.2 °F (35.7 °C)  Heart Rate:  [64-82] 69  Resp:  [16-20] 20  BP: (109-152)/(56-95) 123/74      Body mass index is 34.5 kg/m².           Physical Exam:   General Appearance:    Alert, oriented, cooperative, in no acute distress.   Head:    Normocephalic, atraumatic, without obvious abnormality.   Eyes:           DEONDRE.  Lids and lashes normal, conjunctivae and sclerae normal, no icterus, no pallor.   Ears:    Ears appear intact with no  abnormalities noted.   Throat:   Mucous membranes pink and moist.   Neck:   Supple, trachea midline, no carotid bruit, no organomegaly or JVD.   Lungs:     Clear to auscultation and percussion, respirations regular, even and unlabored. No wheezes, rales or rhonchi.    Heart:    Regular rhythm and normal rate, normal S1 and S2, no murmur, no gallop, no rub, no click.   Abdomen:     Soft, nontender, nondistended, no guarding, no rebound tenderness, normal bowel sounds in all four quadrants, no masses, liver and spleen nonpalpable.   Genitalia:    Deferred.   Extremities:   Moves all extremities well, no edema, no cyanosis, no  redness, no clubbing.   Pulses:   Pulses palpable and equal bilaterally.   Skin:   Moist and warm. No bleeding, bruising or rash.   Neurologic/Psychiatric:   Alert and oriented to person, place, and time.  Motor, power and tone in upper and lower extremities are grossly intact. No focal neurological deficits. Normal cognitive function. No psychomotor reaction or tangential thought. No depression, homicidal ideations and suicidal ideations.       Medication Review:   Current Facility-Administered Medications   Medication Dose Route Frequency Provider Last Rate Last Admin   • atorvastatin (LIPITOR) tablet 40 mg  40 mg Oral Daily Pam Francois PA-C   40 mg at 07/13/22 1847   • sennosides-docusate (PERICOLACE) 8.6-50 MG per tablet 2 tablet  2 tablet Oral BID Pam Francois PA-C   2 tablet at 07/13/22 2305    And   • polyethylene glycol (MIRALAX) packet 17 g  17 g Oral Daily PRN Pam Francois PA-C        And   • bisacodyl (DULCOLAX) EC tablet 5 mg  5 mg Oral Daily PRN Pam Francois PA-C        And   • bisacodyl (DULCOLAX) suppository 10 mg  10 mg Rectal Daily PRN Pam Francois PA-C       • carvedilol (COREG) tablet 25 mg  25 mg Oral BID With Meals Pam Francois PA-C   25 mg at 07/13/22 1847   • dextrose (D50W) (25 g/50 mL) IV injection 25 g  25 g Intravenous Q15 Min PRN  Pam Francois PA-C       • dextrose (GLUTOSE) oral gel 15 g  15 g Oral Q15 Min PRN Pam Francois PA-C       • Enoxaparin Sodium (LOVENOX) syringe 40 mg  40 mg Subcutaneous Daily Pam Francois PA-C   40 mg at 07/13/22 2305   • glucagon (human recombinant) (GLUCAGEN DIAGNOSTIC) injection 1 mg  1 mg Intramuscular Q15 Min PRN Pam Francois PA-C       • HYDROmorphone (DILAUDID) injection 0.5 mg  0.5 mg Intravenous Q2H PRN Shara Portillo MD        And   • naloxone (NARCAN) injection 0.4 mg  0.4 mg Intravenous Q5 Min PRN Shara Portillo MD       • Insulin Aspart (novoLOG) injection 0-9 Units  0-9 Units Subcutaneous TID AC Pam Francois PA-C       • insulin detemir (LEVEMIR) injection 20 Units  20 Units Subcutaneous Nightly Pam Francois PA-C   20 Units at 07/13/22 2305   • isosorbide mononitrate (IMDUR) 24 hr tablet 30 mg  30 mg Oral Daily Pam Francois PA-C   30 mg at 07/13/22 1847   • lisinopril (PRINIVIL,ZESTRIL) tablet 10 mg  10 mg Oral Daily Pam Francois PA-C   10 mg at 07/13/22 1847   • ondansetron (ZOFRAN) tablet 4 mg  4 mg Oral Q6H PRN Pam Francois PA-C        Or   • ondansetron (ZOFRAN) injection 4 mg  4 mg Intravenous Q6H PRN Pam Francois PA-C       • sodium chloride 0.9 % flush 10 mL  10 mL Intravenous PRN Sukhdev Polk MD       • sodium chloride 0.9 % flush 10 mL  10 mL Intravenous Q12H Pam Francois PA-C   10 mL at 07/13/22 2306   • sodium chloride 0.9 % flush 10 mL  10 mL Intravenous PRN Pam Francois PA-C       • sodium chloride 0.9 % infusion  75 mL/hr Intravenous Continuous LevAngelica yang APRN 125 mL/hr at 07/14/22 0901 125 mL/hr at 07/14/22 0901       Lab Review:     Results from last 7 days   Lab Units 07/14/22  0620 07/13/22  1022   SODIUM mmol/L 138 135*   POTASSIUM mmol/L 3.6 3.1*   CHLORIDE mmol/L 103 99   CO2 mmol/L 23.0 21.0*   BUN mg/dL 13 16   CREATININE mg/dL 0.83 0.99   CALCIUM mg/dL 9.3 9.4   BILIRUBIN mg/dL  --   1.1   ALK PHOS U/L  --  132*   ALT (SGPT) U/L  --  24   AST (SGOT) U/L  --  31   GLUCOSE mg/dL 90 281*             Results from last 7 days   Lab Units 07/13/22  1022   WBC 10*3/mm3 10.06   HEMOGLOBIN g/dL 12.7   HEMATOCRIT % 38.2   PLATELETS 10*3/mm3 244                           EKG:   ECG/EMG Results (last 24 hours)     ** No results found for the last 24 hours. **          ECHO:  Results for orders placed during the hospital encounter of 06/01/22    Adult Transthoracic Echo Limited W/ Cont if Necessary Per Protocol    Interpretation Summary  · There is mild, bileaflet mitral valve thickening present.  · Estimated right ventricular systolic pressure from tricuspid regurgitation is markedly elevated (>55 mmHg).  · The right atrial cavity is borderline dilated.  · Left ventricular wall thickness is consistent with mild concentric hypertrophy.  · Left ventricular ejection fraction appears to be 36 - 40%.  · Moderate tricuspid valve regurgitation is present.  · The following left ventricular wall segments are hypokinetic: mid anterior, apical anterior, basal anterolateral, mid anterolateral, apical lateral, basal inferolateral, mid inferolateral, apical inferior, mid inferior, apical septal, basal inferoseptal, mid inferoseptal, apex hypokinetic, mid anteroseptal, basal anterior, basal inferior and basal inferoseptal.  · Left ventricular diastolic function was normal.       Imaging:  Imaging Results (Last 24 Hours)     Procedure Component Value Units Date/Time    US Gallbladder [[id number]] Collected: 07/13/22 1346     Updated: 07/13/22 1516    Narrative:        COMPARISON:     CT same day    INDICATION:     Abdominal pain, abnormal gallbladder on CT    FINDINGS:     The liver appears normal size and contour. Mild  increased echogenicity liver. There is no intrahepatic or  extrahepatic biliary dilatation.  The extrahepatic bile duct  measures 0.3 cm which is within normal limits.     The gallbladder is abnormal. Wall  thickening.  Pericholecystic  fluid. Possible small stone at the neck of the gallbladder. The  technologist reports a negative sonographic Collins's sign    The right kidney measures 10.8 x 4.9 x 4.2 cm in length.  There  is no hydronephrosis.     The visualized pancreas appears normal size configuration and  echotexture.     There is no ascites.       Impression:      1.  Abnormal appearance of the gallbladder which could reflect  cholecystitis. Probable small stone at the neck of the  gallbladder. If clinically indeterminate consider nuclear  medicine hepatobiliary scan.      2. Question mild hepatic steatosis.    Electronically signed by:  Denton Rhoades MD  7/13/2022 3:14 PM CDT  Workstation: [id number]    CT Abdomen Pelvis With Contrast [[id number]] Collected: 07/13/22 1239     Updated: 07/13/22 1317    Narrative:        PROCEDURE: CT ABDOMEN PELVIS W CONTRAST    COMPARISON: May 2, 2022    HISTORY: abdominal pain]    This exam was performed using radiation doses that are as low as  reasonably achievable (ALARA).  This exam was performed according to our departmental dose  optimization program, which includes automated exposure control,  adjustment of the mA and/or KV according to patient size and/or  use of iterative reconstruction technique.    CONTRAST:   Oral and 100 cc intravenous Isovue 300     TECHNIQUE: Axial images were obtained from level of the lung  bases through the ischial tuberosities. IV contrast was  administered. No oral contrast was given.     FINDINGS:     LOWER CHEST: Interval decrease in the small bilateral pleural  effusions is noted with compared to the prior exam. There is a  tiny pericardial effusion seen as well. There is mild enlargement  of the right and left atrium with mild left ventricular  enlargement.    HEPATOBILIARY: The gallbladder is abnormal appearance with fluid  surrounding the gallbladder. No definitive evidence of  cholelithiasis is seen. Further evaluation with ultrasound  and/or  HIDA scan is recommended for more definitive assessment.  SPLEEN: Unremarkable.  PANCREAS: Unremarkable  ADRENAL GLANDS: Unremarkable.  KIDNEYS/URETERS: There is a small right renal cyst. There is no  evidence of obstruction of either kidney.    GASTROINTESTINAL: The small and large intestine are normal in  caliber. No obstructive changes are seen. The gallbladder is  visualized and within normal limits. No abnormal bowel wall  thickening identified.  REPRODUCTIVE ORGANS: The uterus is surgically absent.  URINARY BLADDER: Unremarkable    VASCULAR: ASCVD  LYMPH NODES: No pathologically enlarged nodes by size criteria.  PERITONEUM/RETROPERITONEUM: There is a small amount of free  intraperitoneal fluid noted in the retroperitoneum and right  hemipelvis. These findings may be associated with the abnormal  appearance of the gallbladder/ suspected infection/: Cystitis.  Pancreatitis can also result in the fluid in the retroperitoneum  of the pancreas demonstrates no definitive focal abnormality or  enlargement.    OSSEOUS STRUCTURES: There is degenerative disc disease at L4-5.  No acute osseous abnormalities are identified        Impression:        1. Abnormal appearance of the gallbladder with pericholecystic  fluid is identified. Further evaluation with ultrasound and/or  HIDA scan is recommended to exclude cholecystitis.  2. Small amount of retroperitoneal free intraperitoneal fluid as  above.    3. Cardiomegaly with a small pericardial effusion with interval  decrease in the small bilateral pleural effusions.  Electronically signed by:  Carrie Kwan MD  7/13/2022 1:15 PM  CDT Workstation: [id number]          I personally viewed and interpreted the patient's EKG/Telemetry data.    Assessment:     1.  Abdominal discomfort preoperative cardiovascular risk assessment.  2.  Coronary angiogram done in 2018 which did not reveal of any evidence of any obstructive epicardial coronary artery disease.  3.  Nonischemic  cardiomyopathy.  4.  S/p biventricular [employer] AICD placement.  5.  Mitral and tricuspid regurgitation.  6.  Obesity.  7.  Diabetes.  8.  History of chronic kidney disease.         Plan:     1.  Preoperative cardiovascular risk assessment for cholecystectomy.  Patient presents with abdominal discomfort and was evaluated by Dr. Farias.  Patient is to undergo appendectomy.  Patient has not complained of having symptoms of substernal chest pain or symptoms of severe shortness of breath.  Patient last ejection fraction was 42% with moderate mitral and tricuspid regurgitation.  Clinically at the present time patient is euvolemic and not in congestive heart failure.  Patient is at moderate risk for any cardiovascular event for the surgery and would proceed with surgical intervention.    2.  Nonischemic cardiomyopathy. Patient has nonischemic cardiomyopathy with left ventricular systolic dysfunction which had improved to 42%.  Patient has moderate mitral regurgitation and moderate tricuspid regurgitation.  Patient has had multiple evaluation for CHF decompensation.  Patient would be continued on the present dose of the lisinopril 10 mg daily and carvedilol 25 mg twice a day.  Patient has been counseled to decrease her salt intake.  Patient would be continued on oral Lasix 40 mg daily and will follow the renal function.     3.  S/p [employer] biventricular AICD placement done in June 2020.  Patient AICD interrogation had revealed appropriate sensing and pacing function with adequate battery reserve.  Patient did not have any episodes of ventricular tachycardia or any delivery of shock.     P wave sensing was 2.7 mV.  R wave sensing was 12 mV.  Threshold in the atrial lead was 0.75 V.  Threshold in the right ventricular lead was 1 V.  Threshold in the coronary sinus lead was 0.75 V.  Lead impedance in the atrial lead was 310 ohms.  Lead impedance in the ventricular lead was 430 ohms.  Lead impedance in the coronary sinus  lead was 430 ohms.  Patient battery current was 19 µA.          4.  Arterial hypertension.  Patient blood pressure has remained stable.  Patient would be continued on the present dose of the antihypertensive medication.  Patient would be continued on the present dose of the lisinopril and the carvedilol.  Patient had denies any episodes of headache.     5.  Hypertensive heart disease with mitral and tricuspid regurgitation.  Patient would be continued on the present afterload reduction with the lisinopril and carvedilol and the present dose of the Lasix which would be transition to oral Lasix.     6.  Previous presentation with atypical symptoms of chest pain.  Patient had undergone previous coronary angiogram in 2018 which had not reveal of any evidence of any obstructive epicardial coronary artery disease.  Patient at the present time would be treated medically and not subjected to any invasive evaluation.  Patient at the present time would not be subjected to any invasive evaluation for ischemic work-up as the patient has undergone 2 previous coronary angiogram which did not reveal of any evidence of any obstructive epicardial coronary artery disease. .     7.  Obesity.  Patient has been counseled on weight reduction lifestyle modification and dietary restriction.  Patient body mass index is 37.     8.  Non-insulin-dependent diabetes.  Patient has been counseled on [employer] diet.  Patient would be continued on the present dose of the Metformin.  Patient blood sugar has been followed by the primary team     9.  Gastroesophageal reflux disease.  Patient is on Protonix 40 mg daily.     10.  Chronic kidney disease with history of diabetes.  Patient renal function has remained stable.     11.  Anemia.  Patient has not had any hemoptysis hematuria bright red blood per rectum.     Thank you for the consultation.           The above plan of management were discussed with the patient      Time: Time  spent in face-to-face evaluation of greater than 55 minutes interacting, formulating, examining and discussing the plan with the patient with 50% of greater time spent in face-to-face interaction.    Electronically signed by Wang Felipe MD, 07/14/22, 11:36 AM CDT.

## 2022-07-14 NOTE — NURSING NOTE
Spoke with EDMAR Lawler regarding cardiac monitoring due to patient's heart history and pacer.  Orders were given for telemetry.

## 2022-07-14 NOTE — H&P (VIEW-ONLY)
Referring Provider: Dr. Polk emergency room      Patient Care Team:  Joyce Plata MD as PCP - General  Yamilka Wilson, RN as Ambulatory  ([facility] [place])      Subjective .  Cholecystitis    History of present illness: 71-year-old female presented to the emergency room with a 2 to 3-day history of right flank pain.  She said it intermittently radiates to the upper abdomen.  Not related to anything specific.  She denies having it in the past.  No history of jaundice no history of pancreatitis.  CT scan suggested thickened gallbladder wall pericholecystic fluid but no obvious stones.  Ultrasound confirmed thickened gallbladder wall possible small stone in neck of gallbladder but not able to absolutely confirm that.  LFTs were normal except for alkaline phosphatase mildly elevated 132.  White count was 10.  Patient's been admitted by the hospitalist and she is awaiting undergo a HIDA scan later today.  This morning she feels better with IV fluids and antibiotics.  Pain is not completely resolved but clearly is improved and overall she feels much better.  Initially she stated she did not take any blood thinners but asked me to check the chart.  Review of her list on the chart is negative for any obvious anticoagulation other than aspirin.  She does have a cardiac history and she is followed by Dr. Felipe and she has a pacemaker in place.  No history of MI that she is aware of    Review of Systems   Constitutional: Negative for activity change, appetite change, chills and fever.   Respiratory: Negative for apnea, chest tightness and stridor.    Cardiovascular: Negative for chest pain.   Gastrointestinal: Positive for abdominal pain. Negative for abdominal distention, diarrhea, nausea and vomiting.   Genitourinary: Positive for flank pain. Negative for dysuria.   Musculoskeletal: Positive for arthralgias.   Neurological: Negative for dizziness, facial asymmetry and headaches.    Psychiatric/Behavioral: Negative for agitation, behavioral problems and confusion.         History  Past Medical History:   Diagnosis Date   • Chronic systolic heart failure (HCC)    • Diabetes mellitus (HCC)    • Diabetic neuropathy (HCC)    • GERD (gastroesophageal reflux disease)    • Hypercholesterolemia    • Hypertension    • Low back pain    • Morbid obesity (HCC)    • Nonischemic cardiomyopathy (HCC)    • Osteoarthritis    • Sleep apnea    • Vitamin D deficiency    ,   Past Surgical History:   Procedure Laterality Date   • CARDIAC CATHETERIZATION N/A 08/23/2018   • CARDIAC CATHETERIZATION N/A 6/3/2022    Procedure: Left Heart Cath;  Surgeon: Wang Felipe MD;  Location: [facility] CATH INVASIVE LOCATION;  Service: Cardiology;  Laterality: N/A;   • CARDIAC ELECTROPHYSIOLOGY PROCEDURE N/A 06/22/2020   • CARDIAC PACEMAKER PLACEMENT     • CATARACT EXTRACTION     • COLONOSCOPY N/A 06/14/2017   • PACEMAKER IMPLANTATION     • TOTAL ABDOMINAL HYSTERECTOMY WITH SALPINGO OOPHORECTOMY     ,   Family History   Problem Relation Age of Onset   • Diabetes Sister    • Bone cancer Brother    ,   Social History     Tobacco Use   • Smoking status: Former Smoker   • Smokeless tobacco: Never Used   Vaping Use   • Vaping Use: Never used   Substance Use Topics   • Alcohol use: No   • Drug use: No   , Home Medications:  Prior to Admission medications    Medication Sig Start Date End Date Taking? Authorizing Provider   acetaminophen (TYLENOL) 325 MG tablet Take 2 tablets by mouth Every 4 (Four) Hours As Needed for Mild Pain . 1/19/22  Yes Swetha Raymond APRN   albuterol sulfate  (90 Base) MCG/ACT inhaler INHALE 2 PUFFS EVERY 4 (FOUR) HOURS AS NEEDED FOR WHEEZING OR SHORTNESS OF AIR. 9/2/21  Yes Joyce Plata MD   aspirin 81 MG EC tablet Take 1 tablet by mouth Every Night. 6/21/22  Yes Joyce Plata MD   atorvastatin (LIPITOR) 40 MG tablet Take 1 tablet by mouth Daily. 12/16/21  Yes Joyce Plata MD   B-D UF  "III MINI PEN NEEDLES 31G X 5 MM misc USE WITH INSULIN INJECTIONS NIGHTLY 12/6/21  Yes Joyce Plata MD   Blood Glucose Monitoring Suppl (FreeStyle Lite) device 1 Device 3 (Three) Times a Day. 4/5/22  Yes Justin Wheeler MD   carvedilol (COREG) 25 MG tablet Take 1 tablet by mouth 2 (Two) Times a Day With Meals. 2/2/22  Yes Joyce Plata MD   furosemide (LASIX) 40 MG tablet TAKE 1 TABLET BY MOUTH TWICE A DAY 5/13/22  Yes Joyce Plata MD   glucose blood (FREESTYLE LITE) test strip 1 each by Other route 3 (Three) Times a Day. Use as instructed 4/5/22  Yes Justin Wheeler MD   guaiFENesin (MUCINEX) 600 MG 12 hr tablet Take 1 tablet by mouth Every 12 (Twelve) Hours. 6/9/22  Yes Joyce Plata MD   insulin detemir (Levemir FlexTouch) 100 UNIT/ML injection Inject 20 Units under the skin into the appropriate area as directed Every Night. 6/9/22  Yes Joyce Plata MD   Insulin Syringe 31G X 5/16\" 0.5 ML misc Inject 1 each under the skin into the appropriate area as directed Every Night. 12/2/21  Yes Justin Wheeler MD   ipratropium-albuterol (DUO-NEB) 0.5-2.5 mg/3 ml nebulizer Take 3 mL by nebulization 4 (Four) Times a Day. 2/23/22  Yes Justin Wheeler MD   isosorbide mononitrate (IMDUR) 30 MG 24 hr tablet Take 1 tablet by mouth Daily. 12/16/21  Yes Joyce Plata MD   Lancets (freestyle) lancets 1 each by Other route 3 (Three) Times a Day. Use as instructed 4/5/22  Yes Justin Wheeler MD   lisinopril (PRINIVIL,ZESTRIL) 10 MG tablet TAKE 1 TABLET BY MOUTH EVERY DAY 5/23/22  Yes Joyce Plata MD   nitroglycerin (Nitrostat) 0.4 MG SL tablet Place 1 tablet under the tongue Every 5 (Five) Minutes As Needed for Chest Pain. Take no more than 3 doses in 15 minutes. 2/2/22  Yes Joyce Plata MD   pantoprazole (Protonix) 40 MG EC tablet Take 1 tablet by mouth Daily. 6/21/22  Yes Joyce Plata MD   potassium chloride (MICRO-K) 10 MEQ CR capsule Take 2 capsules by mouth 2 (Two) Times a Day. " 5/5/22  Yes Brent Coulter MD   sucralfate (CARAFATE) 1 g tablet Take 1 g by mouth 3 (Three) Times a Day. 4/21/22  Yes Provider, MD Heidi   magnesium oxide (MAGOX) 400 (241.3 Mg) MG tablet tablet Take 1 tablet by mouth Daily. 10/6/17   Joyce Plata MD   , Scheduled Meds:  atorvastatin, 40 mg, Oral, Daily  carvedilol, 25 mg, Oral, BID With Meals  enoxaparin, 40 mg, Subcutaneous, Daily  Insulin Aspart, 0-9 Units, Subcutaneous, TID AC  insulin detemir, 20 Units, Subcutaneous, Nightly  isosorbide mononitrate, 30 mg, Oral, Daily  lisinopril, 10 mg, Oral, Daily  senna-docusate sodium, 2 tablet, Oral, BID  sodium chloride, 10 mL, Intravenous, Q12H    , Continuous Infusions:  lactated ringers, 100 mL/hr, Last Rate: 100 mL/hr (07/13/22 8713)  sodium chloride, 125 mL/hr, Last Rate: Stopped (07/13/22 2722)    , PRN Meds:  senna-docusate sodium **AND** polyethylene glycol **AND** bisacodyl **AND** bisacodyl  •  dextrose  •  dextrose  •  glucagon (human recombinant)  •  HYDROmorphone **AND** naloxone  •  ondansetron **OR** ondansetron  •  sodium chloride  •  sodium chloride and Allergies:    Allergies   Allergen Reactions   • Aldactone [Spironolactone] Unknown - Low Severity   • Nsaids        Objective     Vital Signs   Temp:  [96 °F (35.6 °C)-97.5 °F (36.4 °C)] 96 °F (35.6 °C)  Heart Rate:  [64-90] 64  Resp:  [16-20] 18  BP: (109-158)/(67-95) 109/67    Physical Exam:     General Appearance:    Alert, cooperative, in no acute distress   Head:    Normocephalic, without obvious abnormality, atraumatic   Eyes:            Lids and lashes normal, conjunctivae and sclerae normal, no   icterus, no pallor, corneas clear   Ears:    Ears appear intact with no abnormalities noted        Neck:   No adenopathy, supple, trachea midline, no thyromegaly,   no JVD   Lungs:     Clear to auscultation,respirations regular, even and                  unlabored    Heart:    Regular rhythm and normal rate, normal S1 and S2, no             murmur, no gallop, no rub, no click        Abdomen:     Normal bowel sounds, no masses, no organomegaly, soft        nontender, nondistended, no guarding, no rebound                tenderness   Extremities:   Moves all extremities well, no edema, no cyanosis, no             redness   Skin:   No bleeding, bruising or rash        Neurologic:  Grossly intact, sensation intact       Results Review:   I reviewed the patient's new clinical results.      Assessment & Plan       Cholecystitis    Hypertension    Diabetes mellitus (HCC)    Hypokalemia        I discussed the patient's findings and my recommendations with patient, nursing staff and consulting provider     Agree with HIDA scan.  Will reevaluate after HIDA scan.  Agree with current management of IV fluids and antibiotics.        This document has been electronically signed by Orville Farias MD on July 14, 2022 06:54 CDT     Orville Farias MD  07/14/22  06:54 CDT      Addendum  Patient underwent HIDA scan that demonstrated ejection fraction of 17%.  No evidence of acute cholecystitis though nuclear medicine did report that the gallbladder appeared to be small.  Went over results with the patient.  CT and ultrasound suggest the gallbladder is significantly thickened wall to suggest she has had chronic inflammatory issues with the gallbladder.  I went over this with her.  Is likely this is a malignant process and not see any obvious mass on any of the studies.  Went over all this with the patient.  She feels better but she wishes to go and have surgery if her gallbladder is not normal and was likely causing her discomfort that she had over the past few days.  I explained laparoscopic cholecystectomy to her as well as alternatives risk and benefits and she clearly understands and wishes to proceed.  She understands she is at increased risk for either an open procedure or a possible subtotal cholecystectomy.

## 2022-07-14 NOTE — PROGRESS NOTES
[facility] Medicine Services  INPATIENT PROGRESS NOTE    Length of Stay: 0  Date of Admission: 7/13/2022  Primary Care Physician: Joyce Plata MD    Subjective   Chief Complaint: Abdominal pain    HPI: This is a 71-year-old female with past medical history of DM 2, GERD, and nonischemic cardiomyopathy with left ventricular systolic dysfunction that presented to [facility] on 7/13/2022 with complaints of right upper quadrant abdominal/epigastric pain.     CT scan suggested thickened gallbladder wall pericholecystic fluid but no obvious stones.  Ultrasound confirmed thickened gallbladder wall possible small stone in neck of gallbladder but not able to absolutely confirm that.  HIDA scan demonstrated ejection fraction of 17%.    Review of Systems   Constitutional: Negative for activity change and fatigue.   HENT: Negative for ear pain and sore throat.    Eyes: Negative for pain and discharge.   Respiratory: Negative for cough and shortness of breath.    Cardiovascular: Negative for chest pain and palpitations.   Gastrointestinal: Positive for abdominal pain. Negative for nausea.   Endocrine: Negative for cold intolerance and heat intolerance.   Genitourinary: Negative for difficulty urinating and dysuria.   Musculoskeletal: Negative for arthralgias and gait problem.   Skin: Negative for color change and rash.   Neurological: Negative for dizziness and weakness.   Psychiatric/Behavioral: Negative for agitation and confusion.        Objective    Temp:  [96 °F (35.6 °C)-96.7 °F (35.9 °C)] 96.6 °F (35.9 °C)  Heart Rate:  [64-84] 84  Resp:  [18-20] 18  BP: (109-130)/(56-78) 123/74    Physical Exam  Constitutional:       Appearance: She is well-developed.   HENT:      Head: Normocephalic and atraumatic.   Eyes:      Pupils: Pupils are equal, round, and reactive to light.   Cardiovascular:      Rate and Rhythm: Normal rate and regular rhythm.   Pulmonary:      Effort: Pulmonary effort is normal.       Breath sounds: Normal breath sounds.   Abdominal:      General: Bowel sounds are normal.      Palpations: Abdomen is soft.   Musculoskeletal:         General: Normal range of motion.      Cervical back: Normal range of motion and neck supple.   Skin:     General: Skin is warm and dry.   Neurological:      Mental Status: She is alert and oriented to person, place, and time.   Psychiatric:         Behavior: Behavior normal.       Results Review:  I have reviewed the labs, radiology results, and diagnostic studies.    Laboratory Data:   Results from last 7 days   Lab Units 07/14/22  0620 07/13/22  1022   SODIUM mmol/L 138 135*   POTASSIUM mmol/L 3.6 3.1*   CHLORIDE mmol/L 103 99   CO2 mmol/L 23.0 21.0*   BUN mg/dL 13 16   CREATININE mg/dL 0.83 0.99   GLUCOSE mg/dL 90 281*   CALCIUM mg/dL 9.3 9.4   BILIRUBIN mg/dL  --  1.1   ALK PHOS U/L  --  132*   ALT (SGPT) U/L  --  24   AST (SGOT) U/L  --  31   ANION GAP mmol/L 12.0 15.0     Estimated Creatinine Clearance: 66 mL/min (by C-G formula based on SCr of 0.83 mg/dL).          Results from last 7 days   Lab Units 07/13/22  1022   WBC 10*3/mm3 10.06   HEMOGLOBIN g/dL 12.7   HEMATOCRIT % 38.2   PLATELETS 10*3/mm3 244           Culture Data:   No results found for: BLOODCX  No results found for: URINECX  No results found for: RESPCX  No results found for: WOUNDCX  No results found for: STOOLCX  No components found for: BODYFLD    Radiology Data:   Imaging Results (Last 24 Hours)     Procedure Component Value Units Date/Time    NM HIDA SCAN WITHOUT PHARMACOLOGICAL INTERVENTION [[id number]] Collected: 07/14/22 1222     Updated: 07/14/22 1513    Narrative:      Comparison:  7/13/2022    Indication:  Cholecystitis. Right upper quadrant pain.    Radiopharmaceutical technique:  5.4 mCi technetium 99m Choletec  was administered intravenously. 8 ounces of Boost Plus was  administered by mouth for gallbladder stimulation    Findings:    There is slight delayed clearance of the blood  pool with cardiac  activity remaining through 15-20 minutes..  Bile duct is  visualized at 10 minutes.  Gallbladder is visualized at 15  minutes.  Duodenum is visualized at 15 minutes with small bowel  visualized at 20 minutes.      Region of interest was drawn about the gallbladder fossa and  gallbladder ejection fraction was calculated as 17% at 1 hour.      Impression:      Impression:    1.  No evidence for acute cholecystitis. Specifically, there is  filling of the gallbladder indicating absence of cystic duct  obstruction.  2.  Gallbladder ejection fraction of 17% which is abnormally low.  Findings may be seen in the setting of chronic cholecystitis or  biliary dyskinesia/functional gallbladder disorder.  3. Slight delayed clearance of the blood pool which may be seen  in the setting of hepatobiliary dysfunction.    Electronically signed by:  Denton Rhoades MD  7/14/2022 3:11 PM CDT  Workstation: [id number]          I have reviewed the patient's current medications.     Assessment/Plan     Active Hospital Problems    Diagnosis    • **Cholecystitis    • Hypokalemia    • Hypertension    • Diabetes mellitus (HCC)        Plan:    1.  Cholecystitis: General surgery consult appreciated. CT scan suggested thickened gallbladder wall pericholecystic fluid but no obvious stones.  Ultrasound confirmed thickened gallbladder wall possible small stone in neck of gallbladder but not able to absolutely confirm that.HIDA scan demonstrated ejection fraction of 17%.  Patient is scheduled to undergo laparoscopic cholecystectomy tomorrow with Dr. Farias.  2.  Heart failure with reduced ejection fraction/nonischemic cardiomyopathy: IV fluids decreased.  Dr. Felipe consult appreciated.  3.  Hypertension continue PPI.  Continue home aspirin, Coreg, Imdur and lisinopril.  4.  GERD: Continue home PPI.  5.  Diabetes mellitus, type II: Continue SSI and long-acting.        Discharge Planning: I expect patient to be discharged to home in 1-2  days.    I confirmed that the patient's Advance Care Plan is present, code status is documented, or surrogate decision maker is listed in the patient's medical record.      I have utilized all available immediate resources to obtain, update, or review the patient's current medications.         This document has been electronically signed by MARYANNE Strong on July 14, 2022 18:48 CDT

## 2022-07-14 NOTE — PLAN OF CARE
Goal Outcome Evaluation:           Progress: no change  Outcome Evaluation: VSS. Alert and verbal, independent with transfers. Hida scan done today. Dr. Farias consulted. NPO. Will cont to monitor.

## 2022-07-15 NOTE — INTERVAL H&P NOTE
H&P reviewed. The patient was examined and there are no changes to the H&P.      Temp:  [96 °F (35.6 °C)-97.6 °F (36.4 °C)] 96.8 °F (36 °C)  Heart Rate:  [67-96] 71  Resp:  [16-20] 16  BP: (105-139)/(57-73) 139/73

## 2022-07-15 NOTE — OP NOTE
CHOLECYSTECTOMY LAPAROSCOPIC INTRAOPERATIVE CHOLANGIOGRAM  Procedure Note    Lexi Wade  7/15/2022    Pre-op Diagnosis:   Cholecystitis [K81.9]    Post-op Diagnosis:     Post-Op Diagnosis Codes:     * Cholecystitis [K81.9]    Procedure/CPT® Codes:      Procedure(s):  CHOLECYSTECTOMY LAPAROSCOPIC INTRAOPERATIVE CHOLANGIOGRAM    Surgeon(s):  Orville Farias MD    Anesthesia: General    Staff:   Circulator: Susana Meredith RN  Radiology Technologist: Senia St  Scrub Person: Maria Antonia Ponce  Assistant: Tania Aguillon CSA    Assistant: Tania Aguillon CSA was responsible for performing the following activities: Retraction, Suction, Irrigation, Suturing, Closing and Placing Dressing and their skilled assistance was necessary for the success of this case.     Estimated Blood Loss: minimal    Specimens:                ID Type Source Tests Collected by Time   A (Not marked as sent) :  Tissue Gallbladder TISSUE EXAM, [facility] ([facility]) Orville Farias MD 7/15/2022 1444         Drains:   [REMOVED] External Urinary Catheter (Removed)       Indications: 71-year-old female presented to emergency room with 3-day history of right flank pain radiating to the epigastrium.  Work-up suggest she has chronic cholecystitis she presents now for laparoscopic cholecystectomy and planned intraoperative cholangiogram.    Findings: Chronically inflamed gallbladder.  Laparoscopic cholecystectomy without incident.  Normal interoperative cholangiogram with good spontaneous flow into the duodenum without any obvious common bile duct stones good flow up in the hepatic radicles and adequate small cystic duct    Complications: None    Procedure: Patient was brought to the operating room where she was placed under general endotracheal anesthesia without any difficulty. She received Unasyn IV antibiotics perioperatively. She had SCDs in place. Her abdomen was prepped and draped in normal sterile fashion. Appropriate  time out was taken; everyone was in agreement. Cutdown was performed at the supraumbilical position.  A 10/11 Mary Alice trocar was placed without any difficulty. After adequate pneumoperitoneum was obtained, a TAP abdominal wall block was performed with a total of 30 mL of 0.25% Marcaine; 20 mL were injected on the right side of the abdomen, and 10 mL on the left side, using the laparoscope for guidance. A 5 mm trocar was placed in the epigastrium and two 5 mm trocars along the costal margin laterally. The camera was moved to the lateral port, and looking back omentum had been pulled up with the balloon trocar so that was freed up easily.  There were some adhesions to the lower abdomen, and some adhesions to the right abdomen.  We took a few of the adhesions of the right upper quadrant laterally and took those down using sharp dissection.  We had easy access to the gallbladder from there.  We were able to grasp the gallbladder easily.  We began by taking the gallbladder down in a dome-down type technique using the Harmonic scalpel.  We took the gallbladder down approximately two-thirds of the way out of the bed of the liver and then pulled the gallbladder up over the edge of the liver. We opened the peritoneum on the anterior and posterior aspects of Calot triangle. We dissected the cystic artery.  I followed it well upon the gallbladder.  We then went back and further dissected, top down type of dissection, and was able to dissect down to the neck of the gallbladder junction with the cystic duct.   The only thing that was left at this point was the cystic artery and the cystic duct.  We placed a clip proximally on the cystic duct and then divided the cystic artery with the Harmonic scalpel.  We then placed a Morfin clamp across the neck of the gallbladder and then placed the cholangiogram needle into position into the cystic duct and did our cholangiogram with findings as described. At that point the needle was  removed.  Two clips were placed proximally and 1 clip distally where the cholangiogram was performed.  A #1 PDS Endoloop was then placed around the gallbladder and secured just proximal to the 2 clips along the cystic duct side.  We then divided the cystic duct, leaving 2 clips on the cystic duct and 1 clip on the cystic artery, and Endoloop on the cystic duct.  The gallbladder was then placed in the Endo Catch bag and removed through the cutdown site without any difficulty.   Good hemostasis was noted at the completion. Clips and ties were all in good position.  Trocars were removed. Good hemostasis was noted at all sites. Fascia was closed at the cutdown site with a 2-0 Vicryl figure-of-eight stitch. Skin was closed with 4-0 Monocryl subcuticular stitch. Glue was used for final skin closure. Patient was awakened, extubated and transferred to the recovery room, awake, in stable condition.        Disposition: Transfer to recovery in stable condition        Orville Farias MD     Date: 7/15/2022  Time: 15:11 CDT

## 2022-07-15 NOTE — ANESTHESIA POSTPROCEDURE EVALUATION
Patient: Lexi Wade    Procedure Summary     Date: 07/15/22 Room / Location: [facility] OR 03 / [facility] OR    Anesthesia Start: 1309 Anesthesia Stop: 1509    Procedure: CHOLECYSTECTOMY LAPAROSCOPIC INTRAOPERATIVE CHOLANGIOGRAM (N/A Abdomen) Diagnosis:       Cholecystitis      (Cholecystitis [K81.9])    Surgeons: Orville Farias MD Provider: Bakari Beckford CRNA    Anesthesia Type: general ASA Status: 4          Anesthesia Type: general    Vitals  No vitals data found for the desired time range.          Post Anesthesia Care and Evaluation    Patient location during evaluation: PACU  Patient participation: complete - patient participated  Level of consciousness: awake  Pain score: 0  Pain management: adequate    Airway patency: patent  Anesthetic complications: No anesthetic complications  PONV Status: none  Cardiovascular status: acceptable and hemodynamically stable  Respiratory status: acceptable, face mask and spontaneous ventilation  Hydration status: acceptable    Comments: -------------------------              07/15/22                    1152        -------------------------   BP:         139/73        Pulse:        71          Resp:         16          Temp:   96.8 °F (36 °C)   SpO2:         97%        -------------------------

## 2022-07-15 NOTE — ANESTHESIA PROCEDURE NOTES
Airway  Urgency: elective    Date/Time: 7/15/2022 1:22 PM  Airway not difficult    General Information and Staff    Patient location during procedure: OR  CRNA/CAA: Bakari Beckford CRNA  SRNA: Rober Arias SRNA  Indications and Patient Condition  Indications for airway management: airway protection    Preoxygenated: yes  MILS not maintained throughout  Mask difficulty assessment: 1 - vent by mask    Final Airway Details  Final airway type: endotracheal airway      Successful airway: ETT  Cuffed: yes   Successful intubation technique: video laryngoscopy  Facilitating devices/methods: intubating stylet  Endotracheal tube insertion site: oral  Blade: Hammond  Blade size: 3  ETT size (mm): 7.0  Cormack-Lehane Classification: grade I - full view of glottis  Placement verified by: chest auscultation and capnometry   Measured from: lips  ETT/EBT  to lips (cm): 20  Number of attempts at approach: 1  Assessment: lips, teeth, and gum same as pre-op and atraumatic intubation

## 2022-07-15 NOTE — PROGRESS NOTES
[facility] Medicine Services  INPATIENT PROGRESS NOTE    Length of Stay: 0  Date of Admission: 7/13/2022  Primary Care Physician: Joyce Plata MD    Subjective   Chief Complaint: Abdominal pain    HPI: This is a 71-year-old female with past medical history of DM 2, GERD, and nonischemic cardiomyopathy with left ventricular systolic dysfunction that presented to [facility] on 7/13/2022 with complaints of right upper quadrant abdominal/epigastric pain.     CT scan suggested thickened gallbladder wall pericholecystic fluid but no obvious stones.  Ultrasound confirmed thickened gallbladder wall possible small stone in neck of gallbladder but not able to absolutely confirm that.  HIDA scan demonstrated ejection fraction of 17%.    7/15/2022: Patient underwent laparoscopic cholecystectomy without any complications today.  We will advance her diet.  Minimal crackles noted in the lungs.  We will stop IV fluids and assess need for Lasix.    Review of Systems   Constitutional: Negative for activity change and fatigue.   HENT: Negative for ear pain and sore throat.    Eyes: Negative for pain and discharge.   Respiratory: Negative for cough and shortness of breath.    Cardiovascular: Negative for chest pain and palpitations.   Gastrointestinal: Positive for abdominal pain. Negative for nausea.   Endocrine: Negative for cold intolerance and heat intolerance.   Genitourinary: Negative for difficulty urinating and dysuria.   Musculoskeletal: Negative for arthralgias and gait problem.   Skin: Negative for color change and rash.   Neurological: Negative for dizziness and weakness.   Psychiatric/Behavioral: Negative for agitation and confusion.        Objective    Temp:  [96 °F (35.6 °C)-97.6 °F (36.4 °C)] 96.7 °F (35.9 °C)  Heart Rate:  [67-96] 68  Resp:  [16-20] 18  BP: (105-151)/(57-89) 151/89    Physical Exam  Constitutional:       Appearance: She is well-developed.   HENT:      Head: Normocephalic and  atraumatic.   Eyes:      Pupils: Pupils are equal, round, and reactive to light.   Cardiovascular:      Rate and Rhythm: Normal rate and regular rhythm.   Pulmonary:      Effort: Pulmonary effort is normal.      Breath sounds: Normal breath sounds.   Abdominal:      General: Bowel sounds are normal.      Palpations: Abdomen is soft.   Musculoskeletal:         General: Normal range of motion.      Cervical back: Normal range of motion and neck supple.   Skin:     General: Skin is warm and dry.   Neurological:      Mental Status: She is alert and oriented to person, place, and time.   Psychiatric:         Behavior: Behavior normal.       Results Review:  I have reviewed the labs, radiology results, and diagnostic studies.    Laboratory Data:   Results from last 7 days   Lab Units 07/15/22  0540 07/14/22  0620 07/13/22  1022   SODIUM mmol/L 140 138 135*   POTASSIUM mmol/L 3.2* 3.6 3.1*   CHLORIDE mmol/L 106 103 99   CO2 mmol/L 21.0* 23.0 21.0*   BUN mg/dL 10 13 16   CREATININE mg/dL 0.82 0.83 0.99   GLUCOSE mg/dL 71 90 281*   CALCIUM mg/dL 8.9 9.3 9.4   BILIRUBIN mg/dL 0.8  --  1.1   ALK PHOS U/L 101  --  132*   ALT (SGPT) U/L 14  --  24   AST (SGOT) U/L 22  --  31   ANION GAP mmol/L 13.0 12.0 15.0     Estimated Creatinine Clearance: 67.2 mL/min (by C-G formula based on SCr of 0.82 mg/dL).          Results from last 7 days   Lab Units 07/15/22  0540 07/13/22  1022   WBC 10*3/mm3 7.55 10.06   HEMOGLOBIN g/dL 10.7* 12.7   HEMATOCRIT % 32.9* 38.2   PLATELETS 10*3/mm3 216 244           Culture Data:   No results found for: BLOODCX  No results found for: URINECX  No results found for: RESPCX  No results found for: WOUNDCX  No results found for: STOOLCX  No components found for: BODYFLD    Radiology Data:   Imaging Results (Last 24 Hours)     Procedure Component Value Units Date/Time    FL C Arm During Surgery [[id number]] Resulted: 07/15/22 1508     Updated: 07/15/22 1508          I have reviewed the patient's current  medications.     Assessment/Plan     Active Hospital Problems    Diagnosis    • **Cholecystitis    • Hypokalemia    • Hypertension    • Diabetes mellitus (HCC)        Plan:    1.  Cholecystitis: General surgery consult appreciated. CT scan suggested thickened gallbladder wall pericholecystic fluid but no obvious stones.  Ultrasound confirmed thickened gallbladder wall possible small stone in neck of gallbladder but not able to absolutely confirm that.HIDA scan demonstrated ejection fraction of 17%.  Patient underwent laparoscopic cholecystectomy today Dr. Farias.  2.  Heart failure with reduced ejection fraction/nonischemic cardiomyopathy: IV fluids discontinued.  Will resume oral Lasix 40 mg twice daily.  3.  Hypertension continue PPI.  Continue home aspirin, Coreg, Imdur and lisinopril.  4.  GERD: Continue home PPI.  5.  Diabetes mellitus, type II: Continue SSI and long-acting.        Discharge Planning: I expect patient to be discharged to home in 1-2 days.    I confirmed that the patient's Advance Care Plan is present, code status is documented, or surrogate decision maker is listed in the patient's medical record.      I have utilized all available immediate resources to obtain, update, or review the patient's current medications.         This document has been electronically signed by MARYANNE Strong on July 15, 2022 17:12 CDT

## 2022-07-15 NOTE — ANESTHESIA PREPROCEDURE EVALUATION
Anesthesia Evaluation     no history of anesthetic complications:  NPO Solid Status: > 8 hours  NPO Liquid Status: > 8 hours           Airway   Mallampati: II  TM distance: >3 FB  Neck ROM: full  Possible difficult intubation  Dental    (+) poor dentition    Pulmonary     breath sounds clear to auscultation  (+) COPD mild, asthma,sleep apnea, decreased breath sounds,   (-) not a smoker    ROS comment: cough  Cardiovascular   Exercise tolerance: poor (<4 METS)    ECG reviewed  Patient on routine beta blocker  Rhythm: regular  Rate: normal    (+) pacemaker ICD, pacemaker, hypertension well controlled 2 medications or greater, CAD, angina at rest, CHF Systolic <55%, hyperlipidemia,   (-) past MI, murmur, cardiac stents, CABG, DVT    ROS comment: Atrial-sensed ventricular-paced rhythm  Biatrial enlargement  Abnormal ECG  When compared with ECG of 01-JUN-2022 15:24,  No significant change was found    · There is mild, bileaflet mitral valve thickening present.  · Estimated right ventricular systolic pressure from tricuspid regurgitation is markedly elevated (>55 mmHg).  · The right atrial cavity is borderline dilated.  · Left ventricular wall thickness is consistent with mild concentric hypertrophy.  · Left ventricular ejection fraction appears to be 36 - 40%.  · Moderate tricuspid valve regurgitation is present.  · The following left ventricular wall segments are hypokinetic: mid anterior, apical anterior, basal anterolateral, mid anterolateral, apical lateral, basal inferolateral, mid inferolateral, apical inferior, mid inferior, apical septal, basal inferoseptal, mid inferoseptal, apex hypokinetic, mid anteroseptal, basal anterior, basal inferior and basal inferoseptal.  · Left ventricular diastolic function was normal.    Heart cath   Impression   A.  Ostial circumflex artery with 40 to 50% stenosis with calcification with evidence of good LESLIE-3 flow.  B.  Distal left main minimal plaquing and ostial left anterior  descending artery with luminal irregularity.  C.  No evidence of any obstructive disease noted in the right coronary artery.     Recommendations: Patient was recommended medical management for the coronary artery disease.  Patient arterial sheath was pulled and closed with an Angio-Seal closure device and patient was transferred to the floor in stable condition       Neuro/Psych  (+) headaches (occ), numbness (neuropathy in feet and sometimes hands),    (-) seizures, TIA, CVA, psychiatric history  GI/Hepatic/Renal/Endo    (+) obesity,  GERD well controlled,  diabetes mellitus type 2 well controlled using insulin,   (-) hepatitis, liver disease, no renal disease, no thyroid disorder    Musculoskeletal     (+) arthralgias, back pain,   Abdominal    Substance History   (-) alcohol use, drug use     OB/GYN          Other   arthritis, blood dyscrasia anemia,     (-) history of cancer  ROS/Med Hx Other: Cholecystitis    Denies current pain                  Anesthesia Plan    ASA 4     general     (Will need magnet  Possible arterial line)  intravenous induction     Anesthetic plan, risks, benefits, and alternatives have been provided, discussed and informed consent has been obtained with: patient.        CODE STATUS:    Code Status (Patient has no pulse and is not breathing): CPR (Attempt to Resuscitate)  Medical Interventions (Patient has pulse or is breathing): Full Support

## 2022-07-16 NOTE — DISCHARGE SUMMARY
[facility] Medicine Services  DISCHARGE SUMMARY       Date of Admission: 7/13/2022  Date of Discharge:  7/16/2022  Primary Care Physician: Joyce Plata MD    Presenting Problem/History of Present Illness:  Cholecystitis [K81.9]  Right upper quadrant abdominal pain [R10.11]     Final Discharge Diagnoses:  Active Hospital Problems    Diagnosis    • **Cholecystitis    • Hypokalemia    • Hypertension    • Diabetes mellitus (HCC)        Consults:   Consults     Date and Time Order Name Status Description    7/13/2022  5:32 PM Inpatient General Surgery Consult      7/13/2022  3:22 PM Surgery (on-call MD unless specified) Completed           Procedures Performed: Procedure(s):  CHOLECYSTECTOMY LAPAROSCOPIC INTRAOPERATIVE CHOLANGIOGRAM                Pertinent Test Results:   Lab Results (last 24 hours)     Procedure Component Value Units Date/Time    POC Glucose Once [[id number]]  (Normal) Collected: 07/16/22 0803    Specimen: Blood Updated: 07/16/22 0823     Glucose 82 mg/dL      Comment: RN NotifiedOperator: [id number] AIDE TAYLORMeter ID: [id number]       Comprehensive Metabolic Panel [[id number]]  (Abnormal) Collected: 07/16/22 0518    Specimen: Blood Updated: 07/16/22 0608     Glucose 74 mg/dL      BUN 9 mg/dL      Creatinine 0.95 mg/dL      Sodium 139 mmol/L      Potassium 4.2 mmol/L      Comment: Slight hemolysis detected by analyzer. Results may be affected.        Chloride 107 mmol/L      CO2 18.0 mmol/L      Calcium 9.0 mg/dL      Total Protein 5.7 g/dL      Albumin 3.20 g/dL      ALT (SGPT) 18 U/L      AST (SGOT) 33 U/L      Alkaline Phosphatase 104 U/L      Total Bilirubin 0.8 mg/dL      Globulin 2.5 gm/dL      A/G Ratio 1.3 g/dL      BUN/Creatinine Ratio 9.5     Anion Gap 14.0 mmol/L      eGFR 63.4 mL/min/1.73      Comment: National Kidney Foundation and American Society of Nephrology (ASN) Task Force recommended calculation based on the Chronic Kidney Disease Epidemiology Collaboration  (CKD-EPI) equation refit without adjustment for race.       Narrative:      GFR Normal >60  Chronic Kidney Disease <60  Kidney Failure <15      CBC & Differential [[id number]]  (Abnormal) Collected: 07/16/22 0518    Specimen: Blood Updated: 07/16/22 0545    Narrative:      The following orders were created for panel order CBC & Differential.  Procedure                               Abnormality         Status                     ---------                               -----------         ------                     CBC Auto Differential[[id number]]        Abnormal            Final result                 Please view results for these tests on the individual orders.    CBC Auto Differential [[id number]]  (Abnormal) Collected: 07/16/22 0518    Specimen: Blood Updated: 07/16/22 0545     WBC 10.16 10*3/mm3      RBC 4.16 10*6/mm3      Hemoglobin 11.7 g/dL      Hematocrit 36.3 %      MCV 87.3 fL      MCH 28.1 pg      MCHC 32.2 g/dL      RDW 16.0 %      RDW-SD 49.8 fl      MPV 10.1 fL      Platelets 260 10*3/mm3      Neutrophil % 60.4 %      Lymphocyte % 24.9 %      Monocyte % 12.2 %      Eosinophil % 1.8 %      Basophil % 0.5 %      Immature Grans % 0.2 %      Neutrophils, Absolute 6.14 10*3/mm3      Lymphocytes, Absolute 2.53 10*3/mm3      Monocytes, Absolute 1.24 10*3/mm3      Eosinophils, Absolute 0.18 10*3/mm3      Basophils, Absolute 0.05 10*3/mm3      Immature Grans, Absolute 0.02 10*3/mm3      nRBC 0.0 /100 WBC     POC Glucose Once [[id number]]  (Normal) Collected: 07/15/22 1943    Specimen: Blood Updated: 07/15/22 2005     Glucose 81 mg/dL      Comment: : [id number] WILMAR Ordonez ID: [id number]       TISSUE EXAM, [facility] ([facility]) [[id number]] Collected: 07/15/22 1444    Specimen: Tissue from Gallbladder Updated: 07/15/22 1925    Potassium [[id number]]  (Normal) Collected: 07/15/22 1832    Specimen: Blood Updated: 07/15/22 1921     Potassium 3.6 mmol/L     POC Glucose Once [[id number]]  (Normal)  "Collected: 07/15/22 1604    Specimen: Blood Updated: 07/15/22 1726     Glucose 85 mg/dL      Comment: RN NotifiedOperator: [id number] AIDE Vu ID: [id number]           Imaging Results (Last 24 Hours)     Procedure Component Value Units Date/Time    FL C Arm During Surgery [[id number]] Resulted: 07/15/22 1508     Updated: 07/15/22 1508        Chief Complaint on Day of Discharge: No complaints    Hospital Course:  This is a 71-year-old female with past medical history of DM 2, GERD, and nonischemic cardiomyopathy with left ventricular systolic dysfunction that presented to [facility] on 7/13/2022 with complaints of right upper quadrant abdominal/epigastric pain.      CT scan suggested thickened gallbladder wall pericholecystic fluid but no obvious stones.  Ultrasound confirmed thickened gallbladder wall possible small stone in neck of gallbladder but not able to absolutely confirm that.  HIDA scan demonstrated ejection fraction of 17%.    Patient underwent laparoscopic cholecystectomy on 7/15/2022 by Dr. Farias.  She will follow with PCP in one week.  Follow with Dr. Farias in 2 weeks.     Condition on Discharge:  Stable.     Physical Exam on Discharge:  /63 (BP Location: Left arm, Patient Position: Lying)   Pulse 74   Temp 96.6 °F (35.9 °C) (Oral)   Resp 18   Ht 162.6 cm (64\")   Wt 91.4 kg (201 lb 6.4 oz)   SpO2 97%   BMI 34.57 kg/m²   Physical Exam  Constitutional:       Appearance: She is well-developed.   HENT:      Head: Normocephalic and atraumatic.   Eyes:      Pupils: Pupils are equal, round, and reactive to light.   Cardiovascular:      Rate and Rhythm: Normal rate and regular rhythm.   Pulmonary:      Effort: Pulmonary effort is normal.      Breath sounds: Normal breath sounds.   Abdominal:      General: Bowel sounds are normal.      Palpations: Abdomen is soft.   Musculoskeletal:         General: Normal range of motion.      Cervical back: Normal range of motion and neck " "supple.   Skin:     General: Skin is warm and dry.   Neurological:      Mental Status: She is alert and oriented to person, place, and time.   Psychiatric:         Behavior: Behavior normal.       Discharge Disposition:  Home-Health Care [facility]    Discharge Medications:     Discharge Medications      New Medications      Instructions Start Date   HYDROcodone-acetaminophen 7.5-325 MG per tablet  Commonly known as: NORCO   1 tablet, Oral, Every 8 Hours PRN         Continue These Medications      Instructions Start Date   acetaminophen 325 MG tablet  Commonly known as: TYLENOL   650 mg, Oral, Every 4 Hours PRN      albuterol sulfate  (90 Base) MCG/ACT inhaler  Commonly known as: PROVENTIL HFA;VENTOLIN HFA;PROAIR HFA   INHALE 2 PUFFS EVERY 4 (FOUR) HOURS AS NEEDED FOR WHEEZING OR SHORTNESS OF AIR.      aspirin 81 MG EC tablet   81 mg, Oral, Nightly      atorvastatin 40 MG tablet  Commonly known as: LIPITOR   40 mg, Oral, Daily      B-D UF III MINI PEN NEEDLES 31G X 5 MM misc  Generic drug: Insulin Pen Needle   USE WITH INSULIN INJECTIONS NIGHTLY      carvedilol 25 MG tablet  Commonly known as: COREG   25 mg, Oral, 2 Times Daily With Meals      freestyle lancets   1 each, Other, 3 Times Daily, Use as instructed      FreeStyle Lite device   1 Device, Does not apply, 3 Times Daily      FREESTYLE LITE test strip  Generic drug: glucose blood   1 each, Other, 3 Times Daily, Use as instructed      furosemide 40 MG tablet  Commonly known as: LASIX   TAKE 1 TABLET BY MOUTH TWICE A DAY      guaiFENesin 600 MG 12 hr tablet  Commonly known as: MUCINEX   600 mg, Oral, Every 12 Hours Scheduled      Insulin Syringe 31G X 5/16\" 0.5 ML misc   1 each, Subcutaneous, Nightly      ipratropium-albuterol 0.5-2.5 mg/3 ml nebulizer  Commonly known as: DUO-NEB   3 mL, Nebulization, 4 Times Daily      isosorbide mononitrate 30 MG 24 hr tablet  Commonly known as: IMDUR   30 mg, Oral, Daily      Levemir FlexTouch 100 UNIT/ML injection  Generic " drug: insulin detemir   20 Units, Subcutaneous, Nightly      lisinopril 10 MG tablet  Commonly known as: PRINIVIL,ZESTRIL   TAKE 1 TABLET BY MOUTH EVERY DAY      magnesium oxide 400 (241.3 Mg) MG tablet tablet  Commonly known as: MAGOX   400 mg, Oral, Daily      nitroglycerin 0.4 MG SL tablet  Commonly known as: Nitrostat   0.4 mg, Sublingual, Every 5 Minutes PRN, Take no more than 3 doses in 15 minutes.      pantoprazole 40 MG EC tablet  Commonly known as: Protonix   40 mg, Oral, Daily      potassium chloride 10 MEQ CR capsule  Commonly known as: MICRO-K   20 mEq, Oral, 2 Times Daily      sucralfate 1 g tablet  Commonly known as: CARAFATE   1 g, Oral, 3 Times Daily             Discharge Diet:   Diet Instructions     Diet: Consistent Carbohydrate      Discharge Diet: Consistent Carbohydrate          Activity at Discharge:   Activity Instructions     Activity as Tolerated            Discharge Care Plan/Instructions: As above.     Follow-up Appointment:  Additional Instructions for the Follow-ups that You Need to Schedule     Ambulatory Referral to Home Health (Hospital)   As directed      Face to Face Visit Date: 7/16/2022    Follow-up provider for Plan of Care?: I treated the patient in an acute care facility and will not continue treatment after discharge.    Follow-up provider: ALIREZA GREGORY [[id number]]    Reason/Clinical Findings: post op lap vaughn/ chf/ deconditioning    Describe mobility limitations that make leaving home difficult: post op lap vaughn/ chf/ deconditioning    Nursing/Therapeutic Services Requested: Skilled Nursing Physical Therapy Occupational Therapy    Skilled nursing orders: CHF management Cardiopulmonary assessments    PT orders: Therapeutic exercise Gait Training Strengthening Home safety assessment    Weight Bearing Status: As Tolerated    Occupational orders: Activities of daily living Energy conservation Strengthening Home safety assessment    Frequency: 1 Week 1            Contact  information for follow-up providers     Joyce Plata MD .    Specialties: Family Medicine, Hospitalist, Emergency Medicine, Urgent Care  Why: Please call office on Monday to verify appointment  Contact information:  [address]  [address]  [facility] [address] [phone]             Orville Farias MD Follow up in 2 week(s).    Specialty: General Surgery  Contact information:  [address]  [address]  [place] [id number] [phone]                   Contact information for after-discharge care     Home Medical Care     [facility] .    Service: Home Health Services  Contact information:  [address]   [place] [address] [phone]                             Test Results Pending at Discharge:   Pending Labs     Order Current Status    TISSUE EXAM, [facility] ([facility]) In process          This document has been electronically signed by MARYANNE Strong on July 16, 2022 13:58 CDT        Time: Greater than 30 minutes.

## 2022-07-16 NOTE — DISCHARGE PLACEMENT REQUEST
"Carline Norris (71 y.o. Female)             Date of Birth   1950    Social Security Number       Address   [address] [facility] [address]    Home Phone   [phone]    MRN   [id number]       Pentecostal   Taoism    Marital Status   Single                            Admission Date   7/13/22    Admission Type   Emergency    Admitting Provider   Shara Portillo MD    Attending Provider   Shara Portillo MD    Department, Room/Bed   [address], [address]       Discharge Date       Discharge Disposition   [place] [facility]    [place]charge Destination                               Attending Provider: Shara Portillo MD    Allergies: Aldactone [Spironolactone], Nsaids    Isolation: None   Infection: None   Code Status: CPR   Advance Care Planning Activity    Ht: 162.6 cm (64\")   Wt: 91.4 kg (201 lb 6.4 oz)    Admission Cmt: None   Principal Problem: Cholecystitis [K81.9]                 Active Insurance as of 7/13/2022     Primary Coverage     Payor Plan Insurance Group Employer/Plan Group    MEDICARE MEDICARE A & B      Payor Plan Address Payor Plan Phone Number Payor Plan Fax Number Effective Dates    [address] [phone]  11/1/2000 - None Entered    [facility] [id number]       Subscriber Name Subscriber Birth Date Member ID       CARLINE NORRIS 1950 [id number]           Secondary Coverage     Payor Plan Insurance Group Employer/Plan Group    [place]      Payor Plan Address Payor Plan Phone Number Payor Plan Fax Number Effective Dates    [address] [phone]  10/4/2016 - None Entered    [place] [address]       Subscriber Name Subscriber Birth Date Member ID       CARLINE NORRIS 1950 [id number]                 Emergency Contacts      (Rel.) Home Phone Work Phone Mobile Phone    Yudi Norris (Sister) [phone] -- [phone]              "

## 2022-07-16 NOTE — OUTREACH NOTE
Prep Survey    Flowsheet Row Responses   [facility] facility patient discharged from? [facility]   Is LACE score < 7 ? No   Emergency Room discharge w/ pulse ox? No   Eligibility [facility]   Date of Admission 07/13/22   Date of Discharge 07/16/22   Discharge Disposition Home-Health Care Svc   Discharge diagnosis Lap vaughn, Hypokalemia, DM   Does the patient have one of the following disease processes/diagnoses(primary or secondary)? General Surgery   Does the patient have Home health ordered? Yes   What is the Home health agency?  [facility]    Is there a DME ordered? No   Prep survey completed? Yes          EFRA RADFORD - Registered Nurse         Unna Boot Text: An Unna boot was placed to help immobilize the limb and facilitate more rapid healing.

## 2022-07-16 NOTE — PROGRESS NOTES
GENERAL SURGERY PROGRESS NOTE     LOS: 0 days       Interval History:     No acute events overnight.  Patient's pain is well controlled.  She is tolerating a diet and ambulating to the bathroom.    Medication Review:   atorvastatin, 40 mg, Oral, Daily  carvedilol, 25 mg, Oral, BID With Meals  enoxaparin, 40 mg, Subcutaneous, Daily  furosemide, 40 mg, Oral, BID  Insulin Aspart, 0-9 Units, Subcutaneous, TID AC  insulin detemir, 20 Units, Subcutaneous, Nightly  isosorbide mononitrate, 30 mg, Oral, Daily  lisinopril, 10 mg, Oral, Daily         Objective     Vital Signs:  Temp:  [96.6 °F (35.9 °C)-97.6 °F (36.4 °C)] 96.6 °F (35.9 °C)  Heart Rate:  [67-96] 74  Resp:  [16-20] 18  BP: (119-155)/(60-89) 131/63    Intake/Output Summary (Last 24 hours) at 7/16/2022 0944  Last data filed at 7/16/2022 0516  Gross per 24 hour   Intake 400 ml   Output 900 ml   Net -500 ml       Physical Exam  Constitutional:       Appearance: Normal appearance.   Cardiovascular:      Rate and Rhythm: Normal rate and regular rhythm.   Pulmonary:      Effort: No respiratory distress.   Abdominal:      Comments: Soft, nondistended, appropriately tender to palpation, incisions are clean/dry/intact   Skin:     General: Skin is warm and dry.      Coloration: Skin is not jaundiced.   Neurological:      General: No focal deficit present.      Mental Status: She is alert and oriented to person, place, and time.         Results Review:    Results from last 7 days   Lab Units 07/16/22  0518 07/15/22  1832 07/15/22  0540 07/14/22  0620   SODIUM mmol/L 139  --  140 138   POTASSIUM mmol/L 4.2 3.6 3.2* 3.6   CHLORIDE mmol/L 107  --  106 103   CO2 mmol/L 18.0*  --  21.0* 23.0   BUN mg/dL 9  --  10 13   CREATININE mg/dL 0.95  --  0.82 0.83   GLUCOSE mg/dL 74  --  71 90   CALCIUM mg/dL 9.0  --  8.9 9.3     Results from last 7 days   Lab Units 07/16/22  0518 07/15/22  0540 07/13/22  1022   WBC 10*3/mm3 10.16 7.55 10.06   HEMOGLOBIN g/dL 11.7* 10.7* 12.7   HEMATOCRIT  % 36.3 32.9* 38.2   PLATELETS 10*3/mm3 260 216 244       Assessment:    Cholecystitis    Hypertension    Diabetes mellitus (HCC)    Hypokalemia        Plan:    Okay to discharge from surgery standpoint.  Can follow-up with Dr. Farias in 2 weeks.        This document has been electronically signed by Enrique Hartman MD on July 16, 2022 09:44 CDT

## 2022-07-18 NOTE — OUTREACH NOTE
Call Center TCM Note    Flowsheet Row Responses   [facility] patient discharged from? [facility]   Does the patient have one of the following disease processes/diagnoses(primary or secondary)? General Surgery   TCM attempt successful? Yes   Call start time 1219   Call end time 1226   Discharge diagnosis Lap vaughn, Hypokalemia, DM   Meds reviewed with patient/caregiver? Yes   Is the patient having any side effects they believe may be caused by any medication additions or changes? No   Does the patient have all medications related to this admission filled (includes all antibiotics, pain medications, etc.) Yes   Is the patient taking all medications as directed (includes completed medication regime)? No   What is preventing the patient from taking all medications as directed? Other   Nursing Interventions Nurse provided patient education   Medication comments Sister was no aware Pt had pain med to . She will  for Pt today.    Does the patient have a follow up appointment scheduled with their surgeon? No   What is preventing the patient from scheduling follow up appointments? Unsure of when or with whom  [Reviewed with sister Pt needs appt with surgeon office ( Dr. Farias) in 2 weeks. ]   Nursing Interventions Advised patient to make appointment, Educated patient on importance of making appointment   Has the patient kept scheduled appointments due by today? N/A   Comments HOSP DC FU appt with PCP office 7/21/22 @ 11 am.    What is the Home health agency?  [facility]   Has home health visited the patient within 72 hours of discharge? Call prior to 72 hours   Psychosocial issues? No   Did the patient receive a copy of their discharge instructions? Yes   Nursing interventions Reviewed instructions with patient   What is the patient's perception of their health status since discharge? Improving   Nursing interventions Nurse provided patient education   Is the patient /caregiver able to teach back basic  post-op care? Lifting as instructed by MD in discharge instructions, No tub bath, swimming, or hot tub until instructed by MD, Take showers only when approved by MD-sponge bathe until then, Drive as instructed by MD in discharge instructions   Is the patient/caregiver able to teach back signs and symptoms of incisional infection? Fever, Pus or odor from incision, Incisional warmth, Increased drainage or bleeding, Increased redness, swelling or pain at the incisonal site   Is the patient/caregiver able to teach back steps to recovery at home? Rest and rebuild strength, gradually increase activity, Set small, achievable goals for return to baseline health, Eat a well-balance diet   Is the patient/caregiver able to teach back the hierarchy of who to call/visit for symptoms/problems? PCP, Specialist, Home health nurse, Urgent Care, ED, 911 Yes   TCM call completed? Yes   Wrap up additional comments Sister- Yudi states that Pt still having pain from surgery. She was not aware that Pt was ordered pain med. She will  med for Pt.           Lala Bain RN    7/18/2022, 12:27 CDT

## 2022-07-19 NOTE — HOME HEALTH
"PATIENT WAS HOSPITALIZED FOR A CHOLECYSTECOMY. PATIENT WAS DISCHARGED HOME FOLLOWING SURGERY AND IS DOING OK AT THIS TIME.    PATIENT GOAL\" GET HER MUSCLES GOING AND STRONGER\"    PRIOR LEVEL OF FUNCTION: PATIENT WAS LIVING INDEPENDENTLY AND COMPLETE HER ADL ACTIVITIES"

## 2022-07-21 PROBLEM — J96.01 ACUTE RESPIRATORY FAILURE WITH HYPOXIA (HCC): Status: RESOLVED | Noted: 2019-08-23 | Resolved: 2022-01-01

## 2022-07-21 NOTE — PROGRESS NOTES
Transitional Care Follow Up Visit  Subjective     Lexi Wade is a 71 y.o. female who presents for a transitional care management visit.    Within 48 business hours after discharge our office contacted her via telephone to coordinate her care and needs.      I reviewed and discussed the details of that call along with the discharge summary, hospital problems, inpatient lab results, inpatient diagnostic studies, and consultation reports with Lexi.     Current outpatient and discharge medications have been reconciled for the patient.  Reviewed by: Joyce Plata MD      Date of TCM Phone Call 7/16/2022   [facility]   Date of Admission 7/13/2022   Date of Discharge 7/16/2022   Discharge Disposition [facility]     Risk for Readmission (LACE) Score: 12 (7/16/2022  5:02 AM)      History of Present Illness   Course During Hospital Stay: Recent hospitalization, labs, xrays reviewed and medications reconciled. Had acute cholecystitis and had lap vaughn.      Copied from hospital record:   Hospital Course:  This is a 71-year-old female with past medical history of DM 2, GERD, and nonischemic cardiomyopathy with left ventricular systolic dysfunction that presented to [facility] on 7/13/2022 with complaints of right upper quadrant abdominal/epigastric pain.      CT scan suggested thickened gallbladder wall pericholecystic fluid but no obvious stones.  Ultrasound confirmed thickened gallbladder wall possible small stone in neck of gallbladder but not able to absolutely confirm that.  HIDA scan demonstrated ejection fraction of 17%.     Patient underwent laparoscopic cholecystectomy on 7/15/2022 by Dr. Farias.  She will follow with PCP in one week.  Follow with Dr. Farias in 2 weeks.      The following portions of the patient's history were reviewed and updated as appropriate: allergies, current medications, past family history, past medical history, past social history, past surgical  "history and problem list.  Outpatient Medications Prior to Visit   Medication Sig Dispense Refill   • acetaminophen (TYLENOL) 325 MG tablet Take 2 tablets by mouth Every 4 (Four) Hours As Needed for Mild Pain .     • albuterol sulfate  (90 Base) MCG/ACT inhaler INHALE 2 PUFFS EVERY 4 (FOUR) HOURS AS NEEDED FOR WHEEZING OR SHORTNESS OF AIR. 18 g 1   • aspirin 81 MG EC tablet Take 1 tablet by mouth Every Night. 90 tablet 3   • atorvastatin (LIPITOR) 40 MG tablet Take 1 tablet by mouth Daily. 90 tablet 3   • B-D UF III MINI PEN NEEDLES 31G X 5 MM misc USE WITH INSULIN INJECTIONS NIGHTLY 100 each 3   • Blood Glucose Monitoring Suppl (FreeStyle Lite) device 1 Device 3 (Three) Times a Day. 1 each 0   • carvedilol (COREG) 25 MG tablet Take 1 tablet by mouth 2 (Two) Times a Day With Meals. 180 tablet 3   • furosemide (LASIX) 40 MG tablet TAKE 1 TABLET BY MOUTH TWICE A DAY 60 tablet 1   • glucose blood (FREESTYLE LITE) test strip 1 each by Other route 3 (Three) Times a Day. Use as instructed 100 each 1   • guaiFENesin (MUCINEX) 600 MG 12 hr tablet Take 1 tablet by mouth Every 12 (Twelve) Hours. 60 tablet 1   • HYDROcodone-acetaminophen (NORCO) 7.5-325 MG per tablet Take 1 tablet by mouth Every 6 (Six) Hours As Needed for Moderate Pain .     • insulin detemir (Levemir FlexTouch) 100 UNIT/ML injection Inject 20 Units under the skin into the appropriate area as directed Every Night.     • Insulin Syringe 31G X 5/16\" 0.5 ML misc Inject 1 each under the skin into the appropriate area as directed Every Night. 100 each 1   • ipratropium-albuterol (DUO-NEB) 0.5-2.5 mg/3 ml nebulizer Take 3 mL by nebulization 4 (Four) Times a Day. 360 mL 1   • isosorbide mononitrate (IMDUR) 30 MG 24 hr tablet Take 1 tablet by mouth Daily. 90 tablet 3   • Lancets (freestyle) lancets 1 each by Other route 3 (Three) Times a Day. Use as instructed 100 each 1   • lisinopril (PRINIVIL,ZESTRIL) 10 MG tablet TAKE 1 TABLET BY MOUTH EVERY DAY 90 tablet " "3   • magnesium oxide (MAGOX) 400 (241.3 Mg) MG tablet tablet Take 1 tablet by mouth Daily. 90 each 3   • nitroglycerin (Nitrostat) 0.4 MG SL tablet Place 1 tablet under the tongue Every 5 (Five) Minutes As Needed for Chest Pain. Take no more than 3 doses in 15 minutes. 25 tablet 0   • pantoprazole (Protonix) 40 MG EC tablet Take 1 tablet by mouth Daily. 90 tablet 1   • potassium chloride (MICRO-K) 10 MEQ CR capsule Take 2 capsules by mouth 2 (Two) Times a Day. 60 capsule 0   • sucralfate (CARAFATE) 1 g tablet Take 1 g by mouth 3 (Three) Times a Day.       No facility-administered medications prior to visit.       Review of Systems  I have reviewed 12 systems with patient. Findings were negative except what is noted below and/or in history of present illness.    Objective   Visit Vitals  /64 (BP Location: Left arm, Patient Position: Sitting)   Pulse 94   Ht 162.6 cm (64\")   Wt 90.3 kg (199 lb)   SpO2 97%   BMI 34.16 kg/m²         Physical Exam  Constitutional:       General: She is not in acute distress.     Appearance: She is well-developed.   HENT:      Head: Normocephalic and atraumatic.      Nose: Nose normal.   Eyes:      General:         Right eye: No discharge.         Left eye: No discharge.      Conjunctiva/sclera: Conjunctivae normal.      Pupils: Pupils are equal, round, and reactive to light.   Neck:      Thyroid: No thyromegaly.   Cardiovascular:      Rate and Rhythm: Normal rate and regular rhythm.      Heart sounds: Normal heart sounds. No murmur heard.  Pulmonary:      Effort: Pulmonary effort is normal. No respiratory distress.      Breath sounds: Normal breath sounds. No wheezing or rales.   Chest:      Chest wall: No tenderness.   Abdominal:      General: Bowel sounds are normal. There is no distension.      Palpations: Abdomen is soft. There is no mass.      Tenderness: There is no abdominal tenderness.      Hernia: No hernia is present.   Musculoskeletal:         General: No deformity. " Normal range of motion.      Cervical back: Normal range of motion.   Lymphadenopathy:      Cervical: No cervical adenopathy.   Skin:     General: Skin is warm and dry.      Coloration: Skin is not pale.      Findings: No rash.   Neurological:      Mental Status: She is alert and oriented to person, place, and time.      Deep Tendon Reflexes: Reflexes are normal and symmetric.   Psychiatric:         Behavior: Behavior normal.         Thought Content: Thought content normal.         Judgment: Judgment normal.         Assessment & Plan   Diagnoses and all orders for this visit:    1. Acute cholecystitis (Primary)    2. Status post laparoscopic cholecystectomy    Continue current medications. Follow up as scheduled.     No orders of the defined types were placed in this encounter.      Current outpatient and discharge medications have been reconciled for the patient.  Reviewed by: Joyce Plata MD      Return if symptoms worsen or fail to improve, for Next scheduled follow up.

## 2022-07-27 NOTE — OUTREACH NOTE
General Surgery Week 2 Survey    Flowsheet Row Responses   [facility] facility patient discharged from? [facility]   Does the patient have one of the following disease processes/diagnoses(primary or secondary)? General Surgery   Week 2 attempt successful? No   Unsuccessful attempts Attempt 1          YAMILET Noland Registered Nurse

## 2022-08-02 NOTE — ED PROVIDER NOTES
Subjective   Patient is a 71-year-old female that presents the emergency room with complaints of abdominal pain around laparoscopic gallbladder incisions.  She also complains of some nausea and shortness of breath.  She rates her pain 5/10.  No redness noted around sites, no edema around sites.  All incision sites look well-healed.  She reports low-grade fever at home unsure of the actual number.  She has been taking Norco at home for pain.       History provided by:  Patient  History limited by:  Age   used: No        Review of Systems   Constitutional: Positive for fever. Negative for activity change and appetite change.   HENT: Negative.    Eyes: Negative.    Respiratory: Positive for shortness of breath. Negative for cough.    Cardiovascular: Positive for chest pain. Negative for palpitations and leg swelling.   Gastrointestinal: Positive for abdominal pain and nausea. Negative for constipation, diarrhea and vomiting.   Endocrine: Negative.    Genitourinary: Negative.    Musculoskeletal: Negative.    Skin: Negative.    Allergic/Immunologic: Negative.    Neurological: Negative.    Hematological: Negative.    Psychiatric/Behavioral: Negative.        Past Medical History:   Diagnosis Date   • Chronic systolic heart failure (HCC)    • Diabetes mellitus (HCC)    • Diabetic neuropathy (HCC)    • GERD (gastroesophageal reflux disease)    • Hypercholesterolemia    • Hypertension    • Low back pain    • Morbid obesity (HCC)    • Nonischemic cardiomyopathy (HCC)    • Osteoarthritis    • Sleep apnea    • Vitamin D deficiency        Allergies   Allergen Reactions   • Nsaids Swelling   • Aldactone [Spironolactone] Unknown - Low Severity       Past Surgical History:   Procedure Laterality Date   • CARDIAC CATHETERIZATION N/A 08/23/2018   • CARDIAC CATHETERIZATION N/A 6/3/2022    Procedure: Left Heart Cath;  Surgeon: Wang Felipe MD;  Location: [facility] INVASIVE LOC[facility];  Service: Cardiology;   Laterality: N/A;   • CARDIAC ELECTROPHYSIOLOGY PROCEDURE N/A 06/22/2020   • CARDIAC PACEMAKER PLACEMENT     • CATARACT EXTRACTION     • CHOLECYSTECTOMY WITH INTRAOPERATIVE CHOLANGIOGRAM N/A 7/15/2022    Procedure: CHOLECYSTECTOMY LAPAROSCOPIC INTRAOPERATIVE CHOLANGIOGRAM;  Surgeon: Orville Farias MD;  Location: [facility];  Service: General;  Laterality: N/A;   • COLONOSCOPY N/A 06/14/2017   • PACEMAKER IMPLANTATION     • TOTAL ABDOMINAL HYSTERECTOMY WITH SALPINGO OOPHORECTOMY         Family History   Problem Relation Age of Onset   • Diabetes Sister    • Bone cancer Brother        Social History     Socioeconomic History   • Marital status: Single   Tobacco Use   • Smoking status: Former Smoker   • Smokeless tobacco: Never Used   Vaping Use   • Vaping Use: Never used   Substance and Sexual Activity   • Alcohol use: No   • Drug use: No   • Sexual activity: Not Currently           Objective   Physical Exam  Constitutional:       General: She is not in acute distress.     Appearance: She is obese. She is not ill-appearing, toxic-appearing or diaphoretic.   HENT:      Head: Normocephalic and atraumatic.      Mouth/Throat:      Mouth: Mucous membranes are moist.      Pharynx: Oropharynx is clear. No pharyngeal swelling or oropharyngeal exudate.   Eyes:      Extraocular Movements: Extraocular movements intact.      Pupils: Pupils are equal, round, and reactive to light.   Cardiovascular:      Rate and Rhythm: Regular rhythm. Tachycardia present.      Heart sounds: Normal heart sounds. No murmur heard.    No friction rub. No gallop.   Pulmonary:      Effort: Pulmonary effort is normal. No respiratory distress.      Breath sounds: Normal breath sounds. No stridor. No wheezing, rhonchi or rales.   Chest:      Chest wall: No tenderness.   Abdominal:      General: A surgical scar is present. Bowel sounds are normal. There is no distension or abdominal bruit. There are no signs of injury.      Palpations: Abdomen is soft.  There is no shifting dullness, fluid wave, hepatomegaly, splenomegaly, mass or pulsatile mass.      Tenderness: There is generalized abdominal tenderness.      Hernia: No hernia is present.   Skin:     General: Skin is warm and dry.      Capillary Refill: Capillary refill takes less than 2 seconds.   Neurological:      Mental Status: She is alert and oriented to person, place, and time.   Psychiatric:         Mood and Affect: Mood normal.         Behavior: Behavior normal.         Procedures           ED Course        Labs Reviewed   COMPREHENSIVE METABOLIC PANEL - Abnormal; Notable for the following components:       Result Value    Glucose 230 (*)     BUN 6 (*)     Sodium 135 (*)     CO2 19.0 (*)     Total Protein 5.9 (*)     AST (SGOT) 34 (*)     Alkaline Phosphatase 135 (*)     BUN/Creatinine Ratio 6.6 (*)     Anion Gap 16.0 (*)     All other components within normal limits    Narrative:     GFR Normal >60  Chronic Kidney Disease <60  Kidney Failure <15     URINALYSIS W/ CULTURE IF INDICATED - Abnormal; Notable for the following components:    Protein, UA 30 mg/dL (1+) (*)     Leuk Esterase, UA Trace (*)     All other components within normal limits    Narrative:     In absence of clinical symptoms, the presence of pyuria, bacteria, and/or nitrites on the urinalysis result does not correlate with infection.   CBC WITH AUTO DIFFERENTIAL - Abnormal; Notable for the following components:    RDW 16.8 (*)     Monocyte % 12.8 (*)     Monocytes, Absolute 1.03 (*)     All other components within normal limits   URINALYSIS, MICROSCOPIC ONLY - Abnormal; Notable for the following components:    RBC, UA 0-2 (*)     All other components within normal limits   COVID-19 AND FLU A/B, NP SWAB IN TRANSPORT MEDIA 8-12 HR TAT - Normal    Narrative:     Fact sheet for providers: https://www.fda.gov/media/820645/download    Fact sheet for patients: https://www.fda.gov/media/633127/download    Test performed by PCR.   TROPONIN  (IN-HOUSE) - Normal    Narrative:     Troponin T Reference Range:  <= 0.03 ng/mL-   Negative for AMI  >0.03 ng/mL-     Abnormal for myocardial necrosis.  Clinicians would have to utilize clinical acumen, EKG, Troponin and serial changes to determine if it is an Acute Myocardial Infarction or myocardial injury due to an underlying chronic condition.       Results may be falsely decreased if patient taking Biotin.     TROPONIN (IN-HOUSE) - Normal    Narrative:     Troponin T Reference Range:  <= 0.03 ng/mL-   Negative for AMI  >0.03 ng/mL-     Abnormal for myocardial necrosis.  Clinicians would have to utilize clinical acumen, EKG, Troponin and serial changes to determine if it is an Acute Myocardial Infarction or myocardial injury due to an underlying chronic condition.       Results may be falsely decreased if patient taking Biotin.     LIPASE - Normal   CBC AND DIFFERENTIAL    Narrative:     The following orders were created for panel order CBC & Differential.  Procedure                               Abnormality         Status                     ---------                               -----------         ------                     CBC Auto Differential[[id number]]        Abnormal            Final result                 Please view results for these tests on the individual orders.   EXTRA TUBES    Narrative:     The following orders were created for panel order Extra Tubes.  Procedure                               Abnormality         Status                     ---------                               -----------         ------                     Gold Top - SST[[id number]]                                   Final result               Light Blue Top[[id number]]                                   Final result                 Please view results for these tests on the individual orders.   GOLD TOP - SST   LIGHT BLUE TOP       XR Chest 2 View    (Results Pending)                                            MDM  Number of  Diagnoses or Management Options  Urinary tract infection with hematuria, site unspecified: minor  Diagnosis management comments: Abdominal pain with recent cholecystectomy.  Tenderness around incision sites.  Lab work shows no sign of infection.  UA shows slight UTI.       Amount and/or Complexity of Data Reviewed  Clinical lab tests: reviewed and ordered  Tests in the medicine section of CPT®: ordered and reviewed  Review and summarize past medical records: yes    Risk of Complications, Morbidity, and/or Mortality  Presenting problems: moderate  Diagnostic procedures: minimal  Management options: moderate  General comments: Orders Placed This Encounter      ondansetron (ZOFRAN) injection 4 mg      morphine injection 2 mg      cephalexin (KEFLEX) 500 MG capsule          Sig: Take 1 capsule by mouth 4 (Four) Times a Day for 7 days.          Dispense:  28 capsule          Refill:  0    Discussed taking tylenol for pain.   Follow up if no improvement or worsening    Patient Progress  Patient progress: improved      Final diagnoses:   Urinary tract infection with hematuria, site unspecified       ED Disposition  ED Disposition     ED Disposition   Discharge    Condition   Stable    Comment   --             Joyce Plata MD  [facility] [address]  [place] [address]  [phone]      As needed, If symptoms worsen         Medication List      New Prescriptions    cephalexin 500 MG capsule  Commonly known as: KEFLEX  Take 1 capsule by mouth 4 (Four) Times a Day for 7 days.           Where to Get Your Medications      These medications were sent to [facility]/pharmacy #[id number] - [place], [place] - [address] - [phone]  - [phone] FX  [address] [place] [address]    Phone: [phone]   · cephalexin 500 MG capsule          Nidia Horton, MARYANNE  08/02/22 8319

## 2022-08-09 NOTE — ED PROVIDER NOTES
Subjective   Presents emergency department with intermittent chest pain that is been present for greater than 1 month.  She had a heart catheterization performed on July 3, 2022 by Dr. Felipe with no stent placement.      Chest Pain  Pain location:  Substernal area  Pain radiates to:  Upper back  Pain severity:  Mild  Onset quality:  Unable to specify  Timing:  Intermittent  Progression:  Waxing and waning  Chronicity:  Recurrent  Relieved by:  Nothing  Worsened by:  Nothing  Associated symptoms: nausea and shortness of breath    Associated symptoms: no abdominal pain, no cough, no diaphoresis, no dizziness, no dysphagia, no fatigue, no fever, no headache, no vomiting and no weakness    Risk factors: no smoking    Shortness of Breath  Associated symptoms: chest pain    Associated symptoms: no abdominal pain, no cough, no diaphoresis, no ear pain, no fever, no headaches, no neck pain, no rash, no sore throat, no vomiting and no wheezing        Review of Systems   Constitutional: Negative for appetite change, chills, diaphoresis, fatigue and fever.   HENT: Negative for congestion, ear discharge, ear pain, nosebleeds, rhinorrhea, sinus pressure, sore throat and trouble swallowing.    Eyes: Negative for discharge and redness.   Respiratory: Positive for shortness of breath. Negative for apnea, cough, chest tightness and wheezing.    Cardiovascular: Positive for chest pain.   Gastrointestinal: Positive for diarrhea and nausea. Negative for abdominal pain and vomiting.   Endocrine: Negative for polyuria.   Genitourinary: Negative for dysuria, frequency and urgency.   Musculoskeletal: Negative for myalgias and neck pain.   Skin: Negative for color change and rash.   Allergic/Immunologic: Negative for immunocompromised state.   Neurological: Negative for dizziness, seizures, syncope, weakness, light-headedness and headaches.   Hematological: Negative for adenopathy. Does not bruise/bleed easily.   Psychiatric/Behavioral:  Negative for behavioral problems and confusion.   All other systems reviewed and are negative.      Past Medical History:   Diagnosis Date   • Chronic systolic heart failure (HCC)    • Diabetes mellitus (HCC)    • Diabetic neuropathy (HCC)    • GERD (gastroesophageal reflux disease)    • Hypercholesterolemia    • Hypertension    • Low back pain    • Morbid obesity (HCC)    • Nonischemic cardiomyopathy (HCC)    • Osteoarthritis    • Sleep apnea    • Vitamin D deficiency        Allergies   Allergen Reactions   • Nsaids Swelling   • Aldactone [Spironolactone] Unknown - Low Severity       Past Surgical History:   Procedure Laterality Date   • CARDIAC CATHETERIZATION N/A 08/23/2018   • CARDIAC CATHETERIZATION N/A 6/3/2022    Procedure: Left Heart Cath;  Surgeon: Wang Felipe MD;  Location: [facility] CATH INVASIVE LOCATION;  Service: Cardiology;  Laterality: N/A;   • CARDIAC ELECTROPHYSIOLOGY PROCEDURE N/A 06/22/2020   • CARDIAC PACEMAKER PLACEMENT     • CATARACT EXTRACTION     • CHOLECYSTECTOMY WITH INTRAOPERATIVE CHOLANGIOGRAM N/A 7/15/2022    Procedure: CHOLECYSTECTOMY LAPAROSCOPIC INTRAOPERATIVE CHOLANGIOGRAM;  Surgeon: Orville Farias MD;  Location: [facility] OR;  Service: General;  Laterality: N/A;   • COLONOSCOPY N/A 06/14/2017   • PACEMAKER IMPLANTATION     • TOTAL ABDOMINAL HYSTERECTOMY WITH SALPINGO OOPHORECTOMY         Family History   Problem Relation Age of Onset   • Diabetes Sister    • Bone cancer Brother        Social History     Socioeconomic History   • Marital status: Single   Tobacco Use   • Smoking status: Former Smoker   • Smokeless tobacco: Never Used   Vaping Use   • Vaping Use: Never used   Substance and Sexual Activity   • Alcohol use: No   • Drug use: No   • Sexual activity: Not Currently           Objective   Physical Exam  Vitals and nursing note reviewed.   Constitutional:       Appearance: She is well-developed.   HENT:      Head: Normocephalic and atraumatic.      Nose: Nose normal.    Eyes:      General: No scleral icterus.        Right eye: No discharge.         Left eye: No discharge.      Conjunctiva/sclera: Conjunctivae normal.      Pupils: Pupils are equal, round, and reactive to light.   Neck:      Trachea: No tracheal deviation.   Cardiovascular:      Rate and Rhythm: Normal rate and regular rhythm.      Heart sounds: Normal heart sounds. No murmur heard.  Pulmonary:      Effort: Pulmonary effort is normal. No respiratory distress.      Breath sounds: Normal breath sounds. No stridor. No wheezing or rales.   Abdominal:      General: Bowel sounds are normal. There is no distension.      Palpations: Abdomen is soft. There is no mass.      Tenderness: There is no abdominal tenderness. There is no guarding or rebound.   Musculoskeletal:      Cervical back: Normal range of motion and neck supple.   Skin:     General: Skin is warm and dry.      Findings: No erythema or rash.   Neurological:      Mental Status: She is alert and oriented to person, place, and time.      Coordination: Coordination normal.   Psychiatric:         Behavior: Behavior normal.         Thought Content: Thought content normal.         Procedures           ED Course  ED Course as of 08/09/22 1649   Tue Aug 09, 2022   1649 Findings were discussed with Dr. Felipe who does not feel patient needs any procedures performed at this time and is able to follow-up in clinic. [CB]      ED Course User Index  [CB] Jose R Hill MD           HEART Score for Major Cardiac Events - MDCalc  Calculated on Aug 09 2022 5:41 PM  4 points -> Moderate Score (4-6 points) Risk of MACE of 12-16.6%.                              Labs Reviewed   COMPREHENSIVE METABOLIC PANEL - Abnormal; Notable for the following components:       Result Value    Glucose 160 (*)     CO2 17.0 (*)     Alkaline Phosphatase 125 (*)     Total Bilirubin 1.4 (*)     Anion Gap 20.0 (*)     All other components within normal limits    Narrative:     GFR Normal  >60  Chronic Kidney Disease <60  Kidney Failure <15     BNP (IN-HOUSE) - Abnormal; Notable for the following components:    proBNP 2,738.0 (*)     All other components within normal limits    Narrative:     Among patients with dyspnea, NT-proBNP is highly sensitive for the detection of acute congestive heart failure. In addition NT-proBNP of <300 pg/ml effectively rules out acute congestive heart failure with 99% negative predictive value.    Results may be falsely decreased if patient taking Biotin.     CBC WITH AUTO DIFFERENTIAL - Abnormal; Notable for the following components:    RDW 16.9 (*)     Monocyte % 13.8 (*)     Monocytes, Absolute 1.15 (*)     All other components within normal limits   TROPONIN (IN-HOUSE) - Normal    Narrative:     Troponin T Reference Range:  <= 0.03 ng/mL-   Negative for AMI  >0.03 ng/mL-     Abnormal for myocardial necrosis.  Clinicians would have to utilize clinical acumen, EKG, Troponin and serial changes to determine if it is an Acute Myocardial Infarction or myocardial injury due to an underlying chronic condition.       Results may be falsely decreased if patient taking Biotin.     TROPONIN (IN-HOUSE) - Normal    Narrative:     Troponin T Reference Range:  <= 0.03 ng/mL-   Negative for AMI  >0.03 ng/mL-     Abnormal for myocardial necrosis.  Clinicians would have to utilize clinical acumen, EKG, Troponin and serial changes to determine if it is an Acute Myocardial Infarction or myocardial injury due to an underlying chronic condition.       Results may be falsely decreased if patient taking Biotin.     RAINBOW DRAW    Narrative:     The following orders were created for panel order [place] Draw.  Procedure                               Abnormality         Status                     ---------                               -----------         ------                     Green Top (Gel)[[id number]]                                  Final result               Lavender Top[[id number]]                                      Final result               Gold Top - SST[[id number]]                                   Final result               Light Blue Top[[id number]]                                   Final result                 Please view results for these tests on the individual orders.   CBC AND DIFFERENTIAL    Narrative:     The following orders were created for panel order CBC & Differential.  Procedure                               Abnormality         Status                     ---------                               -----------         ------                     CBC Auto Differential[[id number]]        Abnormal            Final result                 Please view results for these tests on the individual orders.   GREEN TOP   LAVENDER TOP   GOLD TOP - SST   LIGHT BLUE TOP       XR Chest 1 View   Final Result   Mild unchanged cardiomegaly. No acute abnormality identified.         Electronically signed by:  Joyce Joyce MD  8/9/2022 12:33 PM CDT   Workstation: [id number]                 [facility]    Final diagnoses:   Chest pain, unspecified type       ED Disposition  ED Disposition     ED Disposition   Discharge    Condition   Stable    Comment   --             Joyce Plata MD  [address] [place] [address] [address]  [address]  [place] [id number]  [phone]      As scheduled, Even if well    Wang Felipe MD  [address] [place] [address] [address]  [place] [id number] [phone]    In 3 days           Where to Get Your Medications      These medications were sent to [facility]/pharmacy #[id number] - [place], [place] - [address] [place] [address] - [phone]  - [phone] [address] [address]    Phone: [phone]   · nitroglycerin 0.4 MG SL tablet        Medication List      No changes were made to your prescriptions during this visit.          Jose R Hill MD  08/09/22 2762

## 2022-08-09 NOTE — OUTREACH NOTE
General Surgery Week 4 Survey    Flowsheet Row Responses   [facility] facility patient discharged from? [place]   Does the patient have one of the following disease processes/diagnoses(primary or secondary)? General Surgery   Week 4 attempt successful? No  [Pt in ED today]          JUSTO ROE - Licensed Nurse

## 2022-08-17 NOTE — PROGRESS NOTES
Subjective   Lexi Wade is a 71 y.o. female.   Chief Complaint   Patient presents with   • Hypertension   • Diabetes   • Hyperlipidemia     For review and evaluation of management of chronic medical problems. Records reviewed. Any recent labs, xrays reviewed and medications reconciled.   Diabetes  She presents for her follow-up diabetic visit. She has type 2 diabetes mellitus. Her disease course has been stable. Pertinent negatives for hypoglycemia include no dizziness, headaches or nervousness/anxiousness. There are no diabetic associated symptoms. Pertinent negatives for diabetes include no chest pain, no fatigue and no weakness. Diabetic complications include heart disease. Risk factors for coronary artery disease include dyslipidemia, diabetes mellitus and hypertension. Current diabetic treatment includes oral agent (dual therapy) (victoza). She is compliant with treatment all of the time. She is following a generally healthy diet. When asked about meal planning, she reported none. She rarely participates in exercise. There is no change in her home blood glucose trend. An ACE inhibitor/angiotensin II receptor blocker is being taken. Eye exam is current.   Hypertension  The current episode started more than 1 year ago. The problem is unchanged. The problem is controlled. Pertinent negatives include no chest pain, headaches, neck pain, palpitations or shortness of breath. There are no associated agents to hypertension. Risk factors for coronary artery disease include diabetes mellitus, dyslipidemia and obesity. Current antihypertension treatment includes beta blockers, ACE inhibitors and diuretics. The current treatment provides significant improvement. There are no compliance problems.    Hyperlipidemia  This is a chronic problem. The current episode started more than 1 year ago. The problem is controlled. Recent lipid tests were reviewed and are normal. Exacerbating diseases include diabetes and  obesity. There are no known factors aggravating her hyperlipidemia. Associated symptoms include myalgias. Pertinent negatives include no chest pain or shortness of breath. Current antihyperlipidemic treatment includes statins. The current treatment provides significant improvement of lipids. There are no compliance problems.  Risk factors for coronary artery disease include diabetes mellitus, dyslipidemia, post-menopausal and hypertension.   Obesity  This is a chronic problem. The current episode started more than 1 year ago. The problem occurs constantly. The problem has been unchanged. Associated symptoms include myalgias. Pertinent negatives include no chest pain, fatigue, headaches, neck pain or weakness. The symptoms are aggravated by stress. Treatments tried: diet and exercise. The treatment provided mild relief.      The following portions of the patient's history were reviewed and updated as appropriate: allergies, current medications, past social history and problem list.    Outpatient Medications Prior to Visit   Medication Sig Dispense Refill   • acetaminophen (TYLENOL) 325 MG tablet Take 2 tablets by mouth Every 4 (Four) Hours As Needed for Mild Pain .     • albuterol sulfate  (90 Base) MCG/ACT inhaler INHALE 2 PUFFS EVERY 4 (FOUR) HOURS AS NEEDED FOR WHEEZING OR SHORTNESS OF AIR. 18 g 1   • aspirin 81 MG EC tablet Take 1 tablet by mouth Every Night. 90 tablet 3   • atorvastatin (LIPITOR) 40 MG tablet Take 1 tablet by mouth Daily. 90 tablet 3   • B-D UF III MINI PEN NEEDLES 31G X 5 MM misc USE WITH INSULIN INJECTIONS NIGHTLY 100 each 3   • Blood Glucose Monitoring Suppl (FreeStyle Lite) device 1 Device 3 (Three) Times a Day. 1 each 0   • carvedilol (COREG) 25 MG tablet Take 1 tablet by mouth 2 (Two) Times a Day With Meals. 180 tablet 3   • furosemide (LASIX) 40 MG tablet TAKE 1 TABLET BY MOUTH TWICE A DAY 60 tablet 1   • glucose blood (FREESTYLE LITE) test strip 1 each by Other route 3 (Three)  "Times a Day. Use as instructed 100 each 1   • guaiFENesin (MUCINEX) 600 MG 12 hr tablet Take 1 tablet by mouth Every 12 (Twelve) Hours. 60 tablet 1   • insulin detemir (Levemir FlexTouch) 100 UNIT/ML injection Inject 20 Units under the skin into the appropriate area as directed Every Night.     • Insulin Syringe 31G X 5/16\" 0.5 ML misc Inject 1 each under the skin into the appropriate area as directed Every Night. 100 each 1   • ipratropium-albuterol (DUO-NEB) 0.5-2.5 mg/3 ml nebulizer Take 3 mL by nebulization 4 (Four) Times a Day. 360 mL 1   • isosorbide mononitrate (IMDUR) 30 MG 24 hr tablet Take 1 tablet by mouth Daily. 90 tablet 3   • Lancets (freestyle) lancets 1 each by Other route 3 (Three) Times a Day. Use as instructed 100 each 1   • lisinopril (PRINIVIL,ZESTRIL) 10 MG tablet TAKE 1 TABLET BY MOUTH EVERY DAY 90 tablet 3   • magnesium oxide (MAGOX) 400 (241.3 Mg) MG tablet tablet Take 1 tablet by mouth Daily. 90 each 3   • nitroglycerin (Nitrostat) 0.4 MG SL tablet Place 1 tablet under the tongue Every 5 (Five) Minutes As Needed for Chest Pain. Take no more than 3 doses in 15 minutes. 25 tablet 0   • pantoprazole (Protonix) 40 MG EC tablet Take 1 tablet by mouth Daily. 90 tablet 1   • potassium chloride (MICRO-K) 10 MEQ CR capsule Take 2 capsules by mouth 2 (Two) Times a Day. 60 capsule 0   • sucralfate (CARAFATE) 1 g tablet Take 1 g by mouth 3 (Three) Times a Day.     • HYDROcodone-acetaminophen (NORCO) 7.5-325 MG per tablet Take 1 tablet by mouth Every 6 (Six) Hours As Needed for Moderate Pain .       No facility-administered medications prior to visit.       Review of Systems   Constitutional: Negative for fatigue.   Respiratory: Negative for shortness of breath.    Cardiovascular: Negative for chest pain and palpitations.   Musculoskeletal: Positive for myalgias. Negative for neck pain.   Neurological: Negative for dizziness, weakness and headaches.   Psychiatric/Behavioral: The patient is not " "nervous/anxious.      I have reviewed 12 systems with patient. Findings were negative except what is noted below and/or in history of present illness.     Objective   Visit Vitals  /68   Pulse 84   Resp 18   Ht 157.5 cm (62\")   Wt 88.9 kg (196 lb)   SpO2 99%   BMI 35.85 kg/m²     Physical Exam  Vitals and nursing note reviewed.   Constitutional:       General: She is not in acute distress.     Appearance: She is well-developed.   HENT:      Head: Normocephalic and atraumatic.      Nose: Nose normal.   Eyes:      General:         Right eye: No discharge.         Left eye: No discharge.      Conjunctiva/sclera: Conjunctivae normal.      Pupils: Pupils are equal, round, and reactive to light.   Neck:      Thyroid: No thyromegaly.   Cardiovascular:      Rate and Rhythm: Normal rate and regular rhythm.      Heart sounds: Normal heart sounds.   Pulmonary:      Effort: Pulmonary effort is normal.      Breath sounds: Normal breath sounds.   Feet:      Right foot:      Protective Sensation: 5 sites tested. 3 sites sensed.      Skin integrity: Callus and dry skin present.      Left foot:      Protective Sensation: 5 sites tested. 3 sites sensed.      Skin integrity: Callus and dry skin present.   Lymphadenopathy:      Cervical: No cervical adenopathy.   Skin:     General: Skin is warm and dry.   Neurological:      Mental Status: She is alert and oriented to person, place, and time.         Notes brought forward are reviewed and updated if indicated.     Assessment & Plan   Problems Addressed this Visit        Cardiac and Vasculature    Mixed hyperlipidemia (Chronic)    Relevant Orders    CBC & Differential    Comprehensive Metabolic Panel    Hemoglobin A1c    Lipid Panel    MicroAlbumin, Urine, Random - Urine, Clean Catch    Urinalysis With Culture If Indicated - Urine, Clean Catch    Essential hypertension    Relevant Orders    CBC & Differential    Comprehensive Metabolic Panel    Hemoglobin A1c    Lipid Panel    " MicroAlbumin, Urine, Random - Urine, Clean Catch    Urinalysis With Culture If Indicated - Urine, Clean Catch       Endocrine and Metabolic    Type 2 diabetes mellitus with complication, without long-term current use of insulin (HCC) - Primary    Relevant Orders    CBC & Differential    Comprehensive Metabolic Panel    Hemoglobin A1c    Lipid Panel    MicroAlbumin, Urine, Random - Urine, Clean Catch    Urinalysis With Culture If Indicated - Urine, Clean Catch      Diagnoses       Codes Comments    Type 2 diabetes mellitus with complication, without long-term current use of insulin (HCC)    -  Primary ICD-10-CM: E11.8  ICD-9-CM: 250.90     Mixed hyperlipidemia     ICD-10-CM: E78.2  ICD-9-CM: 272.2     Essential hypertension     ICD-10-CM: I10  ICD-9-CM: 401.9           Continue current medications.     No orders of the defined types were placed in this encounter.    Return in about 2 months (around 10/27/2022) for medicare wellness visit, Annual physical.        This document has been electronically signed by Joyce Plata MD on August 17, 2022 16:05 CDT

## 2022-08-18 PROBLEM — I21.4 NSTEMI (NON-ST ELEVATED MYOCARDIAL INFARCTION) (HCC): Status: ACTIVE | Noted: 2022-01-01

## 2022-08-19 PROBLEM — I21.4 NSTEMI (NON-ST ELEVATED MYOCARDIAL INFARCTION): Status: RESOLVED | Noted: 2022-01-01 | Resolved: 2022-01-01

## 2022-08-19 NOTE — H&P
"      [facility] Medicine Admission      Date of Admission: 8/18/2022      Primary Care Physician: Joyce Plata MD      Chief Complaint: chest pain    HPI:  Patient is a 71-year-old female known past medical history of chronic systolic heart failure with left ventricular ejection fraction of 40%, status post left heart catheterization in June 2022 results as below mentioned, diabetes mellitus type 2 insulin-dependent, diabetes neuropathy, hypertension, hyperlipidemia obesity presented to ED with complaint of chest pain.  Hospitalist service was called for admission of the patient.     Patient was seen and examined in ED room 12.  Patient is a poor historian and patient is very nonspecific with giving information.  Per her report she had yesterday some mediastinal chest pain which she describes as mild with no particular radiation.  This morning she woke up again and had intermittent mild mediastinal chest pain with no radiation.  She does not specify quality of her pain.  Her pain was associated with mild nausea but no vomiting.  Pain was not associated with any shortness of breath, syncope, near syncope palpitation back pain, vomiting.  Further only on questioning, patient reports some mild intermittent lower abdominal pain since her gallbladder surgery.  She underwent laparoscopic cholecystectomy on 7/15/2022 by Dr. Farias.  She denies any fall injury trauma fever chills headache sore throat syncope near syncope palpitation shortness of breath back pain diarrhea constipation vomiting UTI URI-like symptoms bleeding.  During encounter lower extremity edema was noticed which present per patient reports for several weeks.  Patient reports is chest pain-free currently, and denies any particular complaint including abdominal pain at this time.      She had in June 2022 left heart catheterization by Dr. Felipe which showed:  \"Impression   A.  Ostial circumflex artery with 40 " "to 50% stenosis with calcification with evidence of good LESLIE-3 flow.  B.  Distal left main minimal plaquing and ostial left anterior descending artery with luminal irregularity.  C.  No evidence of any obstructive disease noted in the right coronary artery.  Recommendations: Patient was recommended medical management for the coronary artery disease.  Patient arterial sheath was pulled and closed with an Angio-Seal closure device and patient was transferred to the floor in stable condition.\"    Echo in June 2022 showed:\"    · There is mild, bileaflet mitral valve thickening present.  · Estimated right ventricular systolic pressure from tricuspid regurgitation is markedly elevated (>55 mmHg).  · The right atrial cavity is borderline dilated.  · Left ventricular wall thickness is consistent with mild concentric hypertrophy.  · Left ventricular ejection fraction appears to be 36 - 40%.  · Moderate tricuspid valve regurgitation is present.  · The following left ventricular wall segments are hypokinetic: mid anterior, apical anterior, basal anterolateral, mid anterolateral, apical lateral, basal inferolateral, mid inferolateral, apical inferior, mid inferior, apical septal, basal inferoseptal, mid inferoseptal, apex hypokinetic, mid anteroseptal, basal anterior, basal inferior and basal inferoseptal.  · Left ventricular diastolic function was normal.\"        Concurrent Medical History:  has a past medical history of Chronic systolic heart failure (HCC), Diabetes mellitus (HCC), Diabetic neuropathy (HCC), GERD (gastroesophageal reflux disease), Hypercholesterolemia, Hypertension, Low back pain, Morbid obesity (HCC), Nonischemic cardiomyopathy (HCC), Osteoarthritis, Sleep apnea, and Vitamin D deficiency.    Past Surgical History:  has a past surgical history that includes Cataract Extraction; Total abdominal hysterectomy w/ bilateral salpingoophorectomy; Colonoscopy (N/A, 06/14/2017); Pacemaker Implantation; Cardiac " catheterization (N/A, 08/23/2018); Cardiac pacemaker placement; Cardiac electrophysiology procedure (N/A, 06/22/2020); Cardiac catheterization (N/A, 6/3/2022); and cholecystectomy with intraoperative cholangiogram (N/A, 7/15/2022).    Family History: family history includes Bone cancer in her brother; Diabetes in her sister.     Social History:  reports that she has quit smoking. She has never used smokeless tobacco. She reports that she does not drink alcohol and does not use drugs.    Allergies:   Allergies   Allergen Reactions   • Nsaids Swelling   • Aldactone [Spironolactone] Unknown - Low Severity       Medications:   Prior to Admission medications    Medication Sig Start Date End Date Taking? Authorizing Provider   acetaminophen (TYLENOL) 325 MG tablet Take 2 tablets by mouth Every 4 (Four) Hours As Needed for Mild Pain . 1/19/22   Swetha Raymond APRN   albuterol sulfate  (90 Base) MCG/ACT inhaler INHALE 2 PUFFS EVERY 4 (FOUR) HOURS AS NEEDED FOR WHEEZING OR SHORTNESS OF AIR. 9/2/21   Joyce Plata MD   aspirin 81 MG EC tablet Take 1 tablet by mouth Every Night. 6/21/22   Joyce Plata MD   atorvastatin (LIPITOR) 40 MG tablet Take 1 tablet by mouth Daily. 12/16/21   Joyce Plata MD   B-D UF III MINI PEN NEEDLES 31G X 5 MM misc USE WITH INSULIN INJECTIONS NIGHTLY 12/6/21   Joyce Plata MD   Blood Glucose Monitoring Suppl (FreeStyle Lite) device 1 Device 3 (Three) Times a Day. 4/5/22   Justin Wheeler MD   carvedilol (COREG) 25 MG tablet Take 1 tablet by mouth 2 (Two) Times a Day With Meals. 2/2/22   Joyce Plata MD   furosemide (LASIX) 40 MG tablet TAKE 1 TABLET BY MOUTH TWICE A DAY 5/13/22   Joyce Plata MD   glucose blood (FREESTYLE LITE) test strip 1 each by Other route 3 (Three) Times a Day. Use as instructed 4/5/22   Justin Wheeler MD   guaiFENesin (MUCINEX) 600 MG 12 hr tablet Take 1 tablet by mouth Every 12 (Twelve) Hours. 6/9/22   Joyce Plata MD  "  insulin detemir (Levemir FlexTouch) 100 UNIT/ML injection Inject 20 Units under the skin into the appropriate area as directed Every Night. 6/9/22   Joyce Plata MD   Insulin Syringe 31G X 5/16\" 0.5 ML misc Inject 1 each under the skin into the appropriate area as directed Every Night. 12/2/21   Justin Wheeler MD   ipratropium-albuterol (DUO-NEB) 0.5-2.5 mg/3 ml nebulizer Take 3 mL by nebulization 4 (Four) Times a Day. 2/23/22   Justin Wheeler MD   isosorbide mononitrate (IMDUR) 30 MG 24 hr tablet Take 1 tablet by mouth Daily. 12/16/21   Joyce Plata MD   Lancets (freestyle) lancets 1 each by Other route 3 (Three) Times a Day. Use as instructed 4/5/22   Justin Wheeler MD   lisinopril (PRINIVIL,ZESTRIL) 10 MG tablet TAKE 1 TABLET BY MOUTH EVERY DAY 5/23/22   Joyce Plata MD   magnesium oxide (MAGOX) 400 (241.3 Mg) MG tablet tablet Take 1 tablet by mouth Daily. 10/6/17   Joyce Plata MD   nitroglycerin (Nitrostat) 0.4 MG SL tablet Place 1 tablet under the tongue Every 5 (Five) Minutes As Needed for Chest Pain. Take no more than 3 doses in 15 minutes. 8/9/22   Jose R Hill MD   pantoprazole (Protonix) 40 MG EC tablet Take 1 tablet by mouth Daily. 6/21/22   Joyce Plata MD   potassium chloride (MICRO-K) 10 MEQ CR capsule Take 2 capsules by mouth 2 (Two) Times a Day. 5/5/22   Brent Coulter MD   sucralfate (CARAFATE) 1 g tablet Take 1 g by mouth 3 (Three) Times a Day. 4/21/22   Provider, MD Heidi       Review of Systems:  Review of Systems   Constitutional: Negative for chills, diaphoresis, fatigue and fever.   HENT: Negative for congestion, dental problem, ear pain, facial swelling, rhinorrhea and sinus pressure.    Eyes: Negative for photophobia, discharge, redness, itching and visual disturbance.   Respiratory: Negative for apnea, cough, choking, chest tightness, shortness of breath, wheezing and stridor.    Cardiovascular: Positive for chest pain and leg " swelling. Negative for palpitations.   Gastrointestinal: Positive for abdominal pain and nausea. Negative for abdominal distention, anal bleeding, blood in stool, diarrhea, rectal pain and vomiting.   Endocrine: Negative for cold intolerance, heat intolerance, polydipsia, polyphagia and polyuria.   Genitourinary: Negative for difficulty urinating, flank pain, frequency, hematuria and urgency.   Musculoskeletal: Negative for arthralgias, back pain, joint swelling and myalgias.   Skin: Negative for pallor, rash and wound.   Allergic/Immunologic: Negative for environmental allergies and immunocompromised state.   Neurological: Negative for dizziness, tremors, seizures, facial asymmetry, speech difficulty, weakness, light-headedness, numbness and headaches.   Hematological: Negative for adenopathy. Does not bruise/bleed easily.   Psychiatric/Behavioral: Negative for agitation, behavioral problems and hallucinations. The patient is not nervous/anxious.       Otherwise complete ROS is negative except as mentioned above.    Physical Exam:   Temp:  [97.4 °F (36.3 °C)] 97.4 °F (36.3 °C)  Heart Rate:  [88-99] 88  Resp:  [18] 18  BP: (139-144)/(81-94) 139/81  Physical Exam  Constitutional:       General: She is not in acute distress.     Appearance: She is normal weight. She is not ill-appearing, toxic-appearing or diaphoretic.   HENT:      Head: Normocephalic and atraumatic.      Right Ear: External ear normal.      Left Ear: External ear normal.      Nose: Nose normal.      Mouth/Throat:      Mouth: Mucous membranes are moist.      Pharynx: Oropharynx is clear.   Eyes:      Extraocular Movements: Extraocular movements intact.      Conjunctiva/sclera: Conjunctivae normal.      Pupils: Pupils are equal, round, and reactive to light.   Cardiovascular:      Rate and Rhythm: Normal rate and regular rhythm.      Heart sounds: Murmur heard.     No friction rub. No gallop.   Pulmonary:      Effort: No respiratory distress.       Breath sounds: No stridor. No wheezing or rales.   Chest:      Chest wall: No tenderness.   Abdominal:      General: Abdomen is flat. There is no distension.      Palpations: Abdomen is soft.      Tenderness: There is no abdominal tenderness. There is no guarding or rebound.   Musculoskeletal:         General: No swelling or tenderness.      Cervical back: No rigidity or tenderness.      Right lower leg: Edema present.      Left lower leg: Edema present.   Lymphadenopathy:      Cervical: No cervical adenopathy.   Skin:     General: Skin is warm and dry.      Coloration: Skin is not jaundiced.      Findings: No erythema.   Neurological:      Mental Status: She is alert and oriented to person, place, and time. Mental status is at baseline.      Sensory: No sensory deficit.      Motor: No weakness.      Coordination: Coordination normal.   Psychiatric:         Mood and Affect: Mood normal.         Behavior: Behavior normal.         Judgment: Judgment normal.           Results Reviewed:  I have personally reviewed current lab, radiology, and data and agree with results.  Lab Results (last 24 hours)     Procedure Component Value Units Date/Time    Lipase [[id number]]  (Normal) Collected: 08/18/22 1940    Specimen: Blood Updated: 08/18/22 2022     Lipase 50 U/L     COVID-19 and FLU A/B PCR - Swab, Nasopharynx [[id number]] Collected: 08/18/22 2020    Specimen: Swab from Nasopharynx Updated: 08/18/22 2020    Protime-INR [[id number]] Collected: 08/18/22 1940    Specimen: Blood Updated: 08/18/22 2011    aPTT [[id number]] Collected: 08/18/22 1940    Specimen: Blood Updated: 08/18/22 2011    Troponin [[id number]]  (Abnormal) Collected: 08/18/22 1940    Specimen: Blood Updated: 08/18/22 2005     Troponin T 0.107 ng/mL     Narrative:      Troponin T Reference Range:  <= 0.03 ng/mL-   Negative for AMI  >0.03 ng/mL-     Abnormal for myocardial necrosis.  Clinicians would have to utilize clinical acumen, EKG, Troponin and serial changes  to determine if it is an Acute Myocardial Infarction or myocardial injury due to an underlying chronic condition.       Results may be falsely decreased if patient taking Biotin.      Comprehensive Metabolic Panel [[id number]]  (Abnormal) Collected: 08/18/22 1940    Specimen: Blood Updated: 08/18/22 2004     Glucose 216 mg/dL      BUN 9 mg/dL      Creatinine 1.02 mg/dL      Sodium 136 mmol/L      Potassium 3.5 mmol/L      Chloride 100 mmol/L      CO2 20.0 mmol/L      Calcium 9.4 mg/dL      Total Protein 6.9 g/dL      Albumin 3.80 g/dL      ALT (SGPT) 11 U/L      AST (SGOT) 24 U/L      Alkaline Phosphatase 133 U/L      Total Bilirubin 1.3 mg/dL      Globulin 3.1 gm/dL      A/G Ratio 1.2 g/dL      BUN/Creatinine Ratio 8.8     Anion Gap 16.0 mmol/L      eGFR 58.2 mL/min/1.73      Comment: National Kidney Foundation and American Society of Nephrology (ASN) Task Force recommended calculation based on the Chronic Kidney Disease Epidemiology Collaboration (CKD-EPI) equation refit without adjustment for race.       Narrative:      GFR Normal >60  Chronic Kidney Disease <60  Kidney Failure <15      BNP [[id number]]  (Abnormal) Collected: 08/18/22 1940    Specimen: Blood Updated: 08/18/22 2001     proBNP 2,131.0 pg/mL     Narrative:      Among patients with dyspnea, NT-proBNP is highly sensitive for the detection of acute congestive heart failure. In addition NT-proBNP of <300 pg/ml effectively rules out acute congestive heart failure with 99% negative predictive value.    Results may be falsely decreased if patient taking Biotin.      CBC & Differential [[id number]]  (Abnormal) Collected: 08/18/22 1940    Specimen: Blood Updated: 08/18/22 1947    Narrative:      The following orders were created for panel order CBC & Differential.  Procedure                               Abnormality         Status                     ---------                               -----------         ------                     CBC Auto  Differential[[id number]]        Abnormal            Final result                 Please view results for these tests on the individual orders.    CBC Auto Differential [[id number]]  (Abnormal) Collected: 08/18/22 1940    Specimen: Blood Updated: 08/18/22 1947     WBC 9.33 10*3/mm3      RBC 4.80 10*6/mm3      Hemoglobin 12.6 g/dL      Hematocrit 38.1 %      MCV 79.4 fL      MCH 26.3 pg      MCHC 33.1 g/dL      RDW 16.7 %      RDW-SD 47.4 fl      MPV 9.2 fL      Platelets 280 10*3/mm3      Neutrophil % 49.2 %      Lymphocyte % 35.5 %      Monocyte % 12.9 %      Eosinophil % 1.2 %      Basophil % 0.6 %      Immature Grans % 0.6 %      Neutrophils, Absolute 4.59 10*3/mm3      Lymphocytes, Absolute 3.31 10*3/mm3      Monocytes, Absolute 1.20 10*3/mm3      Eosinophils, Absolute 0.11 10*3/mm3      Basophils, Absolute 0.06 10*3/mm3      Immature Grans, Absolute 0.06 10*3/mm3      nRBC 0.0 /100 WBC     Green Top (Gel) [[id number]] Collected: 08/18/22 1940    Specimen: Blood Updated: 08/18/22 1943    Gold Top - SST [[id number]] Collected: 08/18/22 1940    Specimen: Blood Updated: 08/18/22 1943    Light Blue Top [[id number]] Collected: 08/18/22 1940    Specimen: Blood Updated: 08/18/22 1943    [facility] [[id number]] Collected: 08/18/22 1940    Specimen: Blood Updated: 08/18/22 1943    Narrative:      The following orders were created for panel order [facility].  Procedure                               Abnormality         Status                     ---------                               -----------         ------                     Green Top (Gel)[[id number]]                                  In process                 Lavender Top[[id number]]                                     In process                 Gold Top - SST[[id number]]                                   In process                 Light Blue Top[[id number]]                                   In process                   Please view results for these tests on the  individual orders.    Tiera Giordano [[id number]] Collected: 08/18/22 1940    Specimen: Blood Updated: 08/18/22 1943        Imaging Results (Last 24 Hours)     ** No results found for the last 24 hours. **        EKG reviewed.    Assessment:      # Nonspecific chest pain with elevated troponin level, non-STEMI cannot be excluded  # Coronary artery disease with  status post left heart catheterization in June 2022  # chronic systolic heart failure with left ventricular ejection fraction of 36 to 40%.  # Elevated proBNP level secondary to above  # Mild acute renal failure with metabolic acidosis  # Nonspecific lower abdominal pain   # Insulin-dependent diabetes mellitus type 2, with diabetes neuropathy   # hypertension   # hyperlipidemia   # obesity .         Plan:  Place on telemetry  Obtain serial EKG and cardiac enzyme  Chest x-ray pending, follow results  Obtain further laboratory work-up including UA  If patient has progressive significantly elevated troponin level consider placing patient on heparin drip.  If patient has recurrent chest pain consider placing on nitro drip.  Continue aspirin, nitro as needed.  Continue home medication aspirin, Coreg, lisinopril, Imdur, Lipitor for immediate care.  Continue home medication sucralfate and Protonix.  Hold Lasix and potassium for tonight considering mild renal failure.  Consider restarting Lasix and potassium in a.m.  Accu-Chek ACH S  Continue home medication Levemir  Insulin sliding scale  Diabetic diet/cardiac dilated low-sodium diet  Reconcile home medication and continue with essential home medications  Please see orders for comprehensive plan    I confirmed that the patient's Advance Care Plan is present, code status is documented, or surrogate decision maker is listed in the patient's medical record.   Patient decided for full code.  Patient decided that her sister Yudi Wade be her healthcare proxy  I have utilized all available immediate resources to obtain,  update, or review the patient's current medications.     I discussed the patient's findings and my recommendations with: Patient and she agreed with above plan of care.      Saeid Behroozi, MD   08/18/22   20:22 CDT

## 2022-08-19 NOTE — OUTREACH NOTE
Prep Survey    Flowsheet Row Responses   Religious facility patient discharged from? [facility]   Is LACE score < 7 ? No   Emergency Room discharge w/ pulse ox? No   Eligibility [facility]   Date of Admission 08/18/22   Date of Discharge 08/18/22   Discharge Disposition Home or Self Care   Discharge diagnosis NSTEMI    Does the patient have one of the following disease processes/diagnoses(primary or secondary)? Acute MI (STEMI,NSTEMI)   Does the patient have Home health ordered? No   Is there a DME ordered? No   Prep survey completed? Yes          ARASH DAVIDSON - Registered Nurse

## 2022-08-19 NOTE — ED PROVIDER NOTES
Subjective     Chest Pain  Pain location:  Substernal area and epigastric  Pain quality: aching    Pain radiates to:  Does not radiate  Pain severity:  Mild  Onset quality:  Gradual  Duration:  2 days (Has been an on and off issue for several months including recent heart cath and somewhat recent removal of her gallbladder for similar symptoms.  Most recent episodes began 2 days ago.)  Timing:  Intermittent  Progression:  Waxing and waning  Chronicity:  Recurrent  Context: movement    Context: not breathing and not trauma    Relieved by:  Rest  Worsened by:  Nothing  Ineffective treatments:  None tried  Associated symptoms: abdominal pain and cough    Associated symptoms: no altered mental status, no anxiety, no back pain, no dysphagia, no fever, no palpitations, no shortness of breath, no syncope and no vomiting        Review of Systems   Constitutional: Negative for fever.   HENT: Negative for trouble swallowing.    Respiratory: Positive for cough. Negative for shortness of breath.    Cardiovascular: Positive for chest pain. Negative for palpitations and syncope.   Gastrointestinal: Positive for abdominal pain. Negative for vomiting.   Musculoskeletal: Negative for back pain.   All other systems reviewed and are negative.      Past Medical History:   Diagnosis Date   • Chronic systolic heart failure (HCC)    • Diabetes mellitus (HCC)    • Diabetic neuropathy (HCC)    • GERD (gastroesophageal reflux disease)    • Hypercholesterolemia    • Hypertension    • Low back pain    • Morbid obesity (HCC)    • Nonischemic cardiomyopathy (HCC)    • Osteoarthritis    • Sleep apnea    • Vitamin D deficiency        Allergies   Allergen Reactions   • Nsaids Swelling   • Aldactone [Spironolactone] Unknown - Low Severity       Past Surgical History:   Procedure Laterality Date   • CARDIAC CATHETERIZATION N/A 08/23/2018   • CARDIAC CATHETERIZATION N/A 6/3/2022    Procedure: Left Heart Cath;  Surgeon: Wang Felipe MD;  Location:  [facility] CATH INVASIVE LOCATION;  Service: Cardiology;  Laterality: N/A;   • CARDIAC ELECTROPHYSIOLOGY PROCEDURE N/A 06/22/2020   • CARDIAC PACEMAKER PLACEMENT     • CATARACT EXTRACTION     • CHOLECYSTECTOMY WITH INTRAOPERATIVE CHOLANGIOGRAM N/A 7/15/2022    Procedure: CHOLECYSTECTOMY LAPAROSCOPIC INTRAOPERATIVE CHOLANGIOGRAM;  Surgeon: Orville Farias MD;  Location: [facility] OR;  Service: General;  Laterality: N/A;   • COLONOSCOPY N/A 06/14/2017   • PACEMAKER IMPLANTATION     • TOTAL ABDOMINAL HYSTERECTOMY WITH SALPINGO OOPHORECTOMY         Family History   Problem Relation Age of Onset   • Diabetes Sister    • Bone cancer Brother        Social History     Socioeconomic History   • Marital status: Single   Tobacco Use   • Smoking status: Former Smoker   • Smokeless tobacco: Never Used   Vaping Use   • Vaping Use: Never used   Substance and Sexual Activity   • Alcohol use: No   • Drug use: No   • Sexual activity: Not Currently           Objective   Physical Exam  Vitals and nursing note reviewed.   Constitutional:       Comments: Elevated BMI   HENT:      Head: Normocephalic and atraumatic.   Neck:      Vascular: No JVD.   Cardiovascular:      Rate and Rhythm: Normal rate and regular rhythm.      Pulses:           Radial pulses are 2+ on the right side and 2+ on the left side.      Heart sounds: Normal heart sounds.   Pulmonary:      Effort: Pulmonary effort is normal.      Breath sounds: Normal breath sounds.   Chest:      Chest wall: No tenderness.   Abdominal:      Palpations: Abdomen is soft.      Tenderness: There is no abdominal tenderness.   Musculoskeletal:      Right lower leg: No tenderness. Edema present.      Left lower leg: No tenderness. Edema present.   Skin:     General: Skin is warm and dry.   Neurological:      Mental Status: She is alert and oriented to person, place, and time.   Psychiatric:         Behavior: Behavior normal.         Procedures           ED Course                      HEART Score:  "5                      MDM  Number of Diagnoses or Management Options     Amount and/or Complexity of Data Reviewed  Decide to obtain previous medical records or to obtain history from someone other than the patient: yes (HEART CATH NOTE:  \"Impression   A.  Ostial circumflex artery with 40 to 50% stenosis with calcification with evidence of good LESLIE-3 flow.  B.  Distal left main minimal plaquing and ostial left anterior descending artery with luminal irregularity.  C.  No evidence of any obstructive disease noted in the right coronary artery.     Recommendations: Patient was recommended medical management for the coronary artery disease.  Patient arterial sheath was pulled and closed with an Angio-Seal closure device and patient was transferred to the floor in stable condition             Electronically signed by Wang Felipe MD, 06/03/22, 2:58 PM CDT.\"  )  Review and summarize past medical records: yes      EKG interpretation: Interpreted by myself.  Normal sinus rhythm.  Rate 98.  Normal P wave and FL interval.  Left axis deviation.  Interventricular conduction delay.  Normal QTc interval.  ST segments are nonspecific.  No significant change compared to prior EKG.    Critical care time:  35 minutes of critical care provided. This time excludes other billable procedures. Time does include preparation of documents, medical consultations, review of old records, and direct bedside care. Patient is at high risk for life-threatening deterioration due to cardiovascular system failure with non-ST elevated myocardial infarction requiring IV heparin.     Final diagnoses:   NSTEMI (non-ST elevated myocardial infarction) ([facility])       ED Disposition  ED Disposition     ED Disposition   Decision to Admit    Condition   --    Comment   Level of Care: Stepdown [25]   Diagnosis: NSTEMI (non-ST elevated myocardial infarction) ([facility]) [[id number]]   Admitting Physician: BEHROOZI, SAEID [[id number]]   Attending Physician: BEHROOZI, SAEID " [[id number]]               No follow-up provider specified.       Medication List      No changes were made to your prescriptions during this visit.          Avinash Mcintosh,   08/19/22 0565

## 2022-08-19 NOTE — DISCHARGE SUMMARY
[facility] Medicine Services  DISCHARGE SUMMARY       Date of Admission: 8/18/2022  Date of Discharge:  8/19/2022  Primary Care Physician: Joyce Plata MD    Presenting Problem/History of Present Illness:  NSTEMI (non-ST elevated myocardial infarction) ([facility]) [I21.4]       Final Discharge Diagnoses:  Chest pain  Ischemic cardiomyopathy    Consults:   Consults     Date and Time Order Name Status Description    8/19/2022  8:24 AM Inpatient Cardiology Consult            Procedures Performed:                 Pertinent Test Results:   Lab Results (most recent)     Procedure Component Value Units Date/Time    Troponin [[id number]]  (Abnormal) Collected: 08/19/22 0811    Specimen: Blood Updated: 08/19/22 0844     Troponin T 0.081 ng/mL     Narrative:      Troponin T Reference Range:  <= 0.03 ng/mL-   Negative for AMI  >0.03 ng/mL-     Abnormal for myocardial necrosis.  Clinicians would have to utilize clinical acumen, EKG, Troponin and serial changes to determine if it is an Acute Myocardial Infarction or myocardial injury due to an underlying chronic condition.       Results may be falsely decreased if patient taking Biotin.      TSH+Free T4 [[id number]]  (Normal) Collected: 08/19/22 0508    Specimen: Blood Updated: 08/19/22 0600     TSH 2.020 uIU/mL      Free T4 1.57 ng/dL      Comment: T4 results may be falsely increased if patient taking Biotin.       Comprehensive Metabolic Panel [[id number]]  (Abnormal) Collected: 08/19/22 0508    Specimen: Blood Updated: 08/19/22 0555     Glucose 219 mg/dL      BUN 9 mg/dL      Creatinine 0.94 mg/dL      Sodium 138 mmol/L      Potassium 3.2 mmol/L      Chloride 101 mmol/L      CO2 24.0 mmol/L      Calcium 8.9 mg/dL      Total Protein 6.0 g/dL      Albumin 3.70 g/dL      ALT (SGPT) 9 U/L      AST (SGOT) 21 U/L      Alkaline Phosphatase 112 U/L      Total Bilirubin 1.2 mg/dL      Globulin 2.3 gm/dL      A/G Ratio 1.6 g/dL       31 BUN/Creatinine Ratio 9.6     Anion Gap 13.0 mmol/L      eGFR 64.2 mL/min/1.73      Comment: National Kidney Foundation and American Society of Nephrology (ASN) Task Force recommended calculation based on the Chronic Kidney Disease Epidemiology Collaboration (CKD-EPI) equation refit without adjustment for race.       Narrative:      GFR Normal >60  Chronic Kidney Disease <60  Kidney Failure <15      Lipid Panel [[id number]]  (Abnormal) Collected: 08/19/22 0508    Specimen: Blood Updated: 08/19/22 0555     Total Cholesterol 143 mg/dL      Triglycerides 72 mg/dL      HDL Cholesterol 30 mg/dL      LDL Cholesterol  99 mg/dL      VLDL Cholesterol 14 mg/dL      LDL/HDL Ratio 3.29    Narrative:      Cholesterol Reference Ranges  (U.S. Department of Health and Human Services ATP III Classifications)    Desirable          <200 mg/dL  Borderline High    200-239 mg/dL  High Risk          >240 mg/dL      Triglyceride Reference Ranges  (U.S. Department of Health and Human Services ATP III Classifications)    Normal           <150 mg/dL  Borderline High  150-199 mg/dL  High             200-499 mg/dL  Very High        >500 mg/dL    HDL Reference Ranges  (U.S. Department of Health and Human Services ATP III Classifications)    Low     <40 mg/dl (major risk factor for CHD)  High    >60 mg/dl ('negative' risk factor for CHD)        LDL Reference Ranges  (U.S. Department of Health and Human Services ATP III Classifications)    Optimal          <100 mg/dL  Near Optimal     100-129 mg/dL  Borderline High  130-159 mg/dL  High             160-189 mg/dL  Very High        >189 mg/dL    Magnesium [[id number]]  (Abnormal) Collected: 08/19/22 0508    Specimen: Blood Updated: 08/19/22 0555     Magnesium 1.3 mg/dL     CBC & Differential [[id number]]  (Abnormal) Collected: 08/19/22 0508    Specimen: Blood Updated: 08/19/22 0534    Narrative:      The following orders were created for panel order CBC & Differential.  Procedure                                Abnormality         Status                     ---------                               -----------         ------                     CBC Auto Differential[[id number]]        Abnormal            Final result                 Please view results for these tests on the individual orders.    CBC Auto Differential [[id number]]  (Abnormal) Collected: 08/19/22 0508    Specimen: Blood Updated: 08/19/22 0534     WBC 7.65 10*3/mm3      RBC 4.29 10*6/mm3      Hemoglobin 11.4 g/dL      Hematocrit 34.9 %      MCV 81.4 fL      MCH 26.6 pg      MCHC 32.7 g/dL      RDW 16.3 %      RDW-SD 47.2 fl      MPV 9.7 fL      Platelets 281 10*3/mm3      Neutrophil % 56.0 %      Lymphocyte % 27.7 %      Monocyte % 14.4 %      Eosinophil % 0.9 %      Basophil % 0.7 %      Immature Grans % 0.3 %      Neutrophils, Absolute 4.29 10*3/mm3      Lymphocytes, Absolute 2.12 10*3/mm3      Monocytes, Absolute 1.10 10*3/mm3      Eosinophils, Absolute 0.07 10*3/mm3      Basophils, Absolute 0.05 10*3/mm3      Immature Grans, Absolute 0.02 10*3/mm3      nRBC 0.0 /100 WBC     Troponin [[id number]]  (Abnormal) Collected: 08/19/22 0151    Specimen: Blood Updated: 08/19/22 0231     Troponin T 0.081 ng/mL     Narrative:      Troponin T Reference Range:  <= 0.03 ng/mL-   Negative for AMI  >0.03 ng/mL-     Abnormal for myocardial necrosis.  Clinicians would have to utilize clinical acumen, EKG, Troponin and serial changes to determine if it is an Acute Myocardial Infarction or myocardial injury due to an underlying chronic condition.       Results may be falsely decreased if patient taking Biotin.      Urinalysis, Microscopic Only - Urine, Clean Catch [[id number]]  (Abnormal) Collected: 08/18/22 2332    Specimen: Urine, Clean Catch Updated: 08/18/22 2351     RBC, UA 0-2 /HPF      WBC, UA 3-5 /HPF      Comment: Urine culture not indicated.        Bacteria, UA None Seen /HPF      Squamous Epithelial Cells, UA 0-2 /HPF      Hyaline Casts, UA None Seen /LPF       Methodology Automated Microscopy    Urinalysis With Culture If Indicated - Urine, Clean Catch [[id number]]  (Abnormal) Collected: 08/18/22 2332    Specimen: Urine, Clean Catch Updated: 08/18/22 2351     Color, UA Yellow     Appearance, UA Clear     pH, UA 6.0     Specific Gravity, UA 1.008     Glucose, UA Negative     Ketones, UA Negative     Bilirubin, UA Negative     Blood, UA Negative     Protein, UA >=300 mg/dL (3+)     Leuk Esterase, UA Trace     Nitrite, UA Negative     Urobilinogen, UA 1.0 E.U./dL    Narrative:      In absence of clinical symptoms, the presence of pyuria, bacteria, and/or nitrites on the urinalysis result does not correlate with infection.    Extra Tubes [[id number]] Collected: 08/18/22 2146    Specimen: Blood, Venous Line Updated: 08/18/22 2302    Narrative:      The following orders were created for panel order Extra Tubes.  Procedure                               Abnormality         Status                     ---------                               -----------         ------                     Lavender Top[[id number]]                                     Final result               Gold Top - SST[[id number]]                                   Final result                 Please view results for these tests on the individual orders.    Lavender Top [[id number]] Collected: 08/18/22 2146    Specimen: Blood Updated: 08/18/22 2302     Extra Tube hold for add-on     Comment: Auto resulted       Gold Top - SST [[id number]] Collected: 08/18/22 2146    Specimen: Blood Updated: 08/18/22 2302     Extra Tube Hold for add-ons.     Comment: Auto resulted.       POC Glucose Once [[id number]]  (Abnormal) Collected: 08/18/22 2124    Specimen: Blood Updated: 08/18/22 2231     Glucose 167 mg/dL      Comment: RN NotifiedOperator: [id number] WEISSGordonMeter ID: [id number]       Protime-INR [[id number]]  (Abnormal) Collected: 08/18/22 1940    Specimen: Blood Updated: 08/18/22 2100     Protime 16.7 Seconds       INR 1.35    Narrative:      Therapeutic range for most indications is 2.0-3.0 INR,  or 2.5-3.5 for mechanical heart valves.    aPTT [[id number]]  (Normal) Collected: 08/18/22 1940    Specimen: Blood Updated: 08/18/22 2100     PTT 22.9 seconds     Narrative:      The recommended Heparin therapeutic range is 68-97 seconds.    [place] Draw [[id number]] Collected: 08/18/22 1940    Specimen: Blood Updated: 08/18/22 2048    Narrative:      The following orders were created for panel order [place] Draw.  Procedure                               Abnormality         Status                     ---------                               -----------         ------                     Green Top (Gel)[[id number]]                                  Final result               Lavender Top[[id number]]                                     Final result               Gold Top - SST[[id number]]                                   Final result               Light Blue Top[[id number]]                                   Final result                 Please view results for these tests on the individual orders.    Gold Top - SST [[id number]] Collected: 08/18/22 1940    Specimen: Blood Updated: 08/18/22 2048     Extra Tube Hold for add-ons.     Comment: Auto resulted.       Green Top (Gel) [[id number]] Collected: 08/18/22 1940    Specimen: Blood Updated: 08/18/22 2048     Extra Tube Hold for add-ons.     Comment: Auto resulted.       Light Blue Top [[id number]] Collected: 08/18/22 1940    Specimen: Blood Updated: 08/18/22 2048     Extra Tube Hold for add-ons.     Comment: Auto resulted       Lavender Top [[id number]] Collected: 08/18/22 1940    Specimen: Blood Updated: 08/18/22 2048     Extra Tube hold for add-on     Comment: Auto resulted       COVID-19 and FLU A/B PCR - Swab, Nasopharynx [[id number]]  (Normal) Collected: 08/18/22 2020    Specimen: Swab from Nasopharynx Updated: 08/18/22 2045     COVID19 Not Detected     Influenza A PCR Not Detected      Influenza B PCR Not Detected    Narrative:      Fact sheet for providers: https://www.fda.gov/media/725307/download    Fact sheet for patients: https://www.fda.gov/media/007564/download    Test performed by PCR.    Lipase [[id number]]  (Normal) Collected: 08/18/22 1940    Specimen: Blood Updated: 08/18/22 2022     Lipase 50 U/L     Comprehensive Metabolic Panel [[id number]]  (Abnormal) Collected: 08/18/22 1940    Specimen: Blood Updated: 08/18/22 2004     Glucose 216 mg/dL      BUN 9 mg/dL      Creatinine 1.02 mg/dL      Sodium 136 mmol/L      Potassium 3.5 mmol/L      Chloride 100 mmol/L      CO2 20.0 mmol/L      Calcium 9.4 mg/dL      Total Protein 6.9 g/dL      Albumin 3.80 g/dL      ALT (SGPT) 11 U/L      AST (SGOT) 24 U/L      Alkaline Phosphatase 133 U/L      Total Bilirubin 1.3 mg/dL      Globulin 3.1 gm/dL      A/G Ratio 1.2 g/dL      BUN/Creatinine Ratio 8.8     Anion Gap 16.0 mmol/L      eGFR 58.2 mL/min/1.73      Comment: National Kidney Foundation and American Society of Nephrology (ASN) Task Force recommended calculation based on the Chronic Kidney Disease Epidemiology Collaboration (CKD-EPI) equation refit without adjustment for race.       Narrative:      GFR Normal >60  Chronic Kidney Disease <60  Kidney Failure <15      BNP [[id number]]  (Abnormal) Collected: 08/18/22 1940    Specimen: Blood Updated: 08/18/22 2001     proBNP 2,131.0 pg/mL     Narrative:      Among patients with dyspnea, NT-proBNP is highly sensitive for the detection of acute congestive heart failure. In addition NT-proBNP of <300 pg/ml effectively rules out acute congestive heart failure with 99% negative predictive value.    Results may be falsely decreased if patient taking Biotin.      CBC & Differential [[id number]]  (Abnormal) Collected: 08/18/22 1940    Specimen: Blood Updated: 08/18/22 1947    Narrative:      The following orders were created for panel order CBC & Differential.  Procedure                                Abnormality         Status                     ---------                               -----------         ------                     CBC Auto Differential[[id number]]        Abnormal            Final result                 Please view results for these tests on the individual orders.    CBC Auto Differential [[id number]]  (Abnormal) Collected: 08/18/22 1940    Specimen: Blood Updated: 08/18/22 1947     WBC 9.33 10*3/mm3      RBC 4.80 10*6/mm3      Hemoglobin 12.6 g/dL      Hematocrit 38.1 %      MCV 79.4 fL      MCH 26.3 pg      MCHC 33.1 g/dL      RDW 16.7 %      RDW-SD 47.4 fl      MPV 9.2 fL      Platelets 280 10*3/mm3      Neutrophil % 49.2 %      Lymphocyte % 35.5 %      Monocyte % 12.9 %      Eosinophil % 1.2 %      Basophil % 0.6 %      Immature Grans % 0.6 %      Neutrophils, Absolute 4.59 10*3/mm3      Lymphocytes, Absolute 3.31 10*3/mm3      Monocytes, Absolute 1.20 10*3/mm3      Eosinophils, Absolute 0.11 10*3/mm3      Basophils, Absolute 0.06 10*3/mm3      Immature Grans, Absolute 0.06 10*3/mm3      nRBC 0.0 /100 WBC         Imaging Results (Most Recent)     Procedure Component Value Units Date/Time    XR Chest 1 View [[id number]] Collected: 08/18/22 2020     Updated: 08/18/22 2116    Narrative:      PROCEDURE:XR CHEST 1 VIEW    HISTORY:Chest Pain triage protocol  , I21.4 Non-ST elevation (NSTEMI) myocardial infarction: Chest  Pain Triage Protocol     COMPARISON:    August 9, 2022    FINDINGS:    The heart is stable in appearance. Left-sided pacemaker,  unchanged from prior study. The lungs are clear there is no  alveolar consolidation, effusion or pneumothorax. There are no  acute bony or soft tissue abnormalities.      Impression:      Stable cardiomegaly with no acute cardiopulmonary process.    Electronically signed by:  Mk Harrington MD, ORIANA  8/18/2022  9:14 PM CDT Workstation: [id number]          Chief Complaint on Day of Discharge: chest pain    Hospital Course:  Patient is a 71-year-old female  "known past medical history of chronic systolic heart failure with left ventricular ejection fraction of 40%, status post left heart catheterization in June 2022 results as below mentioned, diabetes mellitus type 2 insulin-dependent, diabetes neuropathy, hypertension, hyperlipidemia obesity presented to ED with complaint of chest pain.  Hospitalist service was called for admission of the patient.      Patient was seen and examined in ED room 12.  Patient is a poor historian and patient is very nonspecific with giving information.  Per her report she had yesterday some mediastinal chest pain which she describes as mild with no particular radiation.  This morning she woke up again and had intermittent mild mediastinal chest pain with no radiation.  She does not specify quality of her pain.  Her pain was associated with mild nausea but no vomiting.  Pain was not associated with any shortness of breath, syncope, near syncope palpitation back pain, vomiting.  Further only on questioning, patient reports some mild intermittent lower abdominal pain since her gallbladder surgery.  She underwent laparoscopic cholecystectomy on 7/15/2022 by Dr. Farias.  She denies any fall injury trauma fever chills headache sore throat syncope near syncope palpitation shortness of breath back pain diarrhea constipation vomiting UTI URI-like symptoms bleeding.  During encounter lower extremity edema was noticed which present per patient reports for several weeks.  Patient reports is chest pain-free currently, and denies any particular complaint including abdominal pain at this time.       She had in June 2022 left heart catheterization by Dr. Felipe which showed:  \"Impression   A.  Ostial circumflex artery with 40 to 50% stenosis with calcification with evidence of good LESLIE-3 flow.  B.  Distal left main minimal plaquing and ostial left anterior descending artery with luminal irregularity.  C.  No evidence of any obstructive disease noted in " "the right coronary artery.  Recommendations: Patient was recommended medical management for the coronary artery disease.  Patient arterial sheath was pulled and closed with an Angio-Seal closure device and patient was transferred to the floor in stable condition.\"     Echo in June 2022 showed:\"     · There is mild, bileaflet mitral valve thickening present.  · Estimated right ventricular systolic pressure from tricuspid regurgitation is markedly elevated (>55 mmHg).  · The right atrial cavity is borderline dilated.  · Left ventricular wall thickness is consistent with mild concentric hypertrophy.  · Left ventricular ejection fraction appears to be 36 - 40%.  · Moderate tricuspid valve regurgitation is present.  · The following left ventricular wall segments are hypokinetic: mid anterior, apical anterior, basal anterolateral, mid anterolateral, apical lateral, basal inferolateral, mid inferolateral, apical inferior, mid inferior, apical septal, basal inferoseptal, mid inferoseptal, apex hypokinetic, mid anteroseptal, basal anterior, basal inferior and basal inferoseptal.  · Left ventricular diastolic function was normal.\"    The patient had stable troponin through her stay and no events on telemetry.  Her case was discussed with her cardiologist, who stated that due to her history of ischemic cardiomyopathy she would have chronically elevated troponin.  As her chest pain had resolved, she was deemed stable for d/c home with instructions to f/u with her PCP in 1 week.      Condition on Discharge:  stable    Physical Exam on Discharge:  /55 (BP Location: Left arm, Patient Position: Lying)   Pulse 68   Temp 97.7 °F (36.5 °C) (Infrared)   Resp 16   Ht 157.5 cm (62\")   Wt 89 kg (196 lb 3.2 oz)   SpO2 97%   BMI 35.89 kg/m²   Physical Exam  General: NAD  HEENT: AT NC, EOMI  Neck: No JVD  CVS: S1/S2 present, RRR, no M/R/G  Lungs: CTA B/L  Abdomen: soft, NT, ND, BS+  Ext: no edema  Skin: no rashes, bruises or " "discolorations  Neuro: no focal deficits  Psych: Not agitated       Discharge Disposition:  Home or Self Care    Discharge Medications:     Discharge Medications      Continue These Medications      Instructions Start Date   acetaminophen 325 MG tablet  Commonly known as: TYLENOL   650 mg, Oral, Every 4 Hours PRN      albuterol sulfate  (90 Base) MCG/ACT inhaler  Commonly known as: PROVENTIL HFA;VENTOLIN HFA;PROAIR HFA   INHALE 2 PUFFS EVERY 4 (FOUR) HOURS AS NEEDED FOR WHEEZING OR SHORTNESS OF AIR.      aspirin 81 MG EC tablet   81 mg, Oral, Nightly      atorvastatin 40 MG tablet  Commonly known as: LIPITOR   40 mg, Oral, Daily      B-D UF III MINI PEN NEEDLES 31G X 5 MM misc  Generic drug: Insulin Pen Needle   USE WITH INSULIN INJECTIONS NIGHTLY      carvedilol 25 MG tablet  Commonly known as: COREG   25 mg, Oral, 2 Times Daily With Meals      freestyle lancets   1 each, Other, 3 Times Daily, Use as instructed      FreeStyle Lite device   1 Device, Does not apply, 3 Times Daily      FREESTYLE LITE test strip  Generic drug: glucose blood   1 each, Other, 3 Times Daily, Use as instructed      furosemide 40 MG tablet  Commonly known as: LASIX   TAKE 1 TABLET BY MOUTH TWICE A DAY      guaiFENesin 600 MG 12 hr tablet  Commonly known as: MUCINEX   600 mg, Oral, Every 12 Hours Scheduled      Insulin Syringe 31G X 5/16\" 0.5 ML misc   1 each, Subcutaneous, Nightly      ipratropium-albuterol 0.5-2.5 mg/3 ml nebulizer  Commonly known as: DUO-NEB   3 mL, Nebulization, 4 Times Daily      isosorbide mononitrate 30 MG 24 hr tablet  Commonly known as: IMDUR   30 mg, Oral, Daily      Levemir FlexTouch 100 UNIT/ML injection  Generic drug: insulin detemir   20 Units, Subcutaneous, Nightly      lisinopril 10 MG tablet  Commonly known as: PRINIVIL,ZESTRIL   TAKE 1 TABLET BY MOUTH EVERY DAY      magnesium oxide 400 (241.3 Mg) MG tablet tablet  Commonly known as: MAGOX   400 mg, Oral, Daily      nitroglycerin 0.4 MG SL " tablet  Commonly known as: Nitrostat   0.4 mg, Sublingual, Every 5 Minutes PRN, Take no more than 3 doses in 15 minutes.      pantoprazole 40 MG EC tablet  Commonly known as: Protonix   40 mg, Oral, Daily      potassium chloride 10 MEQ CR capsule  Commonly known as: MICRO-K   20 mEq, Oral, 2 Times Daily      sucralfate 1 g tablet  Commonly known as: CARAFATE   1 g, Oral, 3 Times Daily             Discharge Diet: cardiac, diabetic    Activity at Discharge: as tolerated    Discharge Care Plan/Instructions: f/u PCP 1 week    Follow-up Appointments:   Future Appointments   Date Time Provider Department Center   10/27/2022  9:15 AM Joyce Plata MD [facility]           Time: less than 30 minutes

## 2022-08-19 NOTE — ED NOTES
Pt c/o midsternal chest pain with epigastric abd pain since las tnight. Pt took 2 full ASA at home.

## 2022-08-22 NOTE — OUTREACH NOTE
Call Center TCM Note    Flowsheet Row Responses   [facility] patient discharged from? [place]   Does the patient have one of the following disease processes/diagnoses(primary or secondary)? Acute MI (STEMI,NSTEMI)   TCM attempt successful? No   Unsuccessful attempts Attempt 2          Lala Bain RN    8/22/2022, 15:18 CDT

## 2022-08-22 NOTE — OUTREACH NOTE
Call Center TCM Note    Flowsheet Row Responses   [facility] patient discharged from? [place]   Does the patient have one of the following disease processes/diagnoses(primary or secondary)? Acute MI (STEMI,NSTEMI)   TCM attempt successful? No   Unsuccessful attempts Attempt 1   Does the patient have a primary care provider?  Yes   Does the patient have an appointment with their PCP,cardiologist,or clinic within 7 days of discharge? Yes   Comments regarding PCP HOSP DC FU appt 8/26/22 @ 11:30 am.          Lala Bain RN    8/22/2022, 14:52 CDT

## 2022-08-23 NOTE — OUTREACH NOTE
Call Center TCM Note    Flowsheet Row Responses   [facility] facility patient discharged from? [place]   Does the patient have one of the following disease processes/diagnoses(primary or secondary)? Acute MI (STEMI,NSTEMI)   TCM attempt successful? No  [verbal release for Yudi Wade, sister]   Unsuccessful attempts Attempt 3  [attempted pt and sister per verbal release]   Comments regarding PCP HOSP DC FU appt 8/26/22 @ 11:30 am.          Debbie Simmons RN    8/23/2022, 12:09 EDT

## 2022-08-30 NOTE — PROGRESS NOTES
Transitional Care Follow Up Visit  Subjective     Lexi Wade is a 71 y.o. female who presents for a transitional care management visit.    Within 48 business hours after discharge our office contacted her via telephone to coordinate her care and needs.      I reviewed and discussed the details of that call along with the discharge summary, hospital problems, inpatient lab results, inpatient diagnostic studies, and consultation reports with Lexi.     Current outpatient and discharge medications have been reconciled for the patient.  Reviewed by: Joyce Plata MD      Date of TCM Phone Call 8/19/2022   [facility]   Date of Admission 8/18/2022   Date of Discharge 8/18/2022   Discharge Disposition Home or Self Care     Risk for Readmission (LACE) Score: 11 (8/19/2022  5:01 AM)      History of Present Illness   Course During Hospital Stay: Recent hospitalization, labs, xrays reviewed and medications reconciled. Was admitted with chest pain. Troponin was elevated but Dr. Felipe felt this was chronic and related to her ischemic cardiomyopathy.       Copied from hospital record:     Patient was seen and examined in ED room 12.  Patient is a poor historian and patient is very nonspecific with giving information.  Per her report she had yesterday some mediastinal chest pain which she describes as mild with no particular radiation.  This morning she woke up again and had intermittent mild mediastinal chest pain with no radiation.  She does not specify quality of her pain.  Her pain was associated with mild nausea but no vomiting.  Pain was not associated with any shortness of breath, syncope, near syncope palpitation back pain, vomiting.  Further only on questioning, patient reports some mild intermittent lower abdominal pain since her gallbladder surgery.  She underwent laparoscopic cholecystectomy on 7/15/2022 by Dr. Farias.  She denies any fall injury trauma fever chills  headache sore throat syncope near syncope palpitation shortness of breath back pain diarrhea constipation vomiting UTI URI-like symptoms bleeding.  During encounter lower extremity edema was noticed which present per patient reports for several weeks.  Patient reports is chest pain-free currently, and denies any particular complaint including abdominal pain at this time.     The patient had stable troponin through her stay and no events on telemetry.  Her case was discussed with her cardiologist, who stated that due to her history of ischemic cardiomyopathy she would have chronically elevated troponin.  As her chest pain had resolved, she was deemed stable for d/c home with instructions to f/u with her PCP in 1 week.       The following portions of the patient's history were reviewed and updated as appropriate: allergies, current medications, past family history, past medical history, past social history, past surgical history and problem list.     Outpatient Medications Prior to Visit   Medication Sig Dispense Refill   • acetaminophen (TYLENOL) 325 MG tablet Take 2 tablets by mouth Every 4 (Four) Hours As Needed for Mild Pain .     • albuterol sulfate  (90 Base) MCG/ACT inhaler INHALE 2 PUFFS EVERY 4 (FOUR) HOURS AS NEEDED FOR WHEEZING OR SHORTNESS OF AIR. 18 g 1   • aspirin 81 MG EC tablet Take 1 tablet by mouth Every Night. 90 tablet 3   • atorvastatin (LIPITOR) 40 MG tablet Take 1 tablet by mouth Daily. 90 tablet 3   • B-D UF III MINI PEN NEEDLES 31G X 5 MM misc USE WITH INSULIN INJECTIONS NIGHTLY 100 each 3   • Blood Glucose Monitoring Suppl (FreeStyle Lite) device 1 Device 3 (Three) Times a Day. 1 each 0   • carvedilol (COREG) 25 MG tablet Take 1 tablet by mouth 2 (Two) Times a Day With Meals. 180 tablet 3   • furosemide (LASIX) 40 MG tablet TAKE 1 TABLET BY MOUTH TWICE A DAY 60 tablet 1   • glucose blood (FREESTYLE LITE) test strip 1 each by Other route 3 (Three) Times a Day. Use as instructed 100 each  "1   • guaiFENesin (MUCINEX) 600 MG 12 hr tablet Take 1 tablet by mouth Every 12 (Twelve) Hours. 60 tablet 1   • insulin detemir (Levemir FlexTouch) 100 UNIT/ML injection Inject 20 Units under the skin into the appropriate area as directed Every Night.     • Insulin Syringe 31G X 5/16\" 0.5 ML misc Inject 1 each under the skin into the appropriate area as directed Every Night. 100 each 1   • ipratropium-albuterol (DUO-NEB) 0.5-2.5 mg/3 ml nebulizer Take 3 mL by nebulization 4 (Four) Times a Day. 360 mL 1   • isosorbide mononitrate (IMDUR) 30 MG 24 hr tablet Take 1 tablet by mouth Daily. 90 tablet 3   • Lancets (freestyle) lancets 1 each by Other route 3 (Three) Times a Day. Use as instructed 100 each 1   • lisinopril (PRINIVIL,ZESTRIL) 10 MG tablet TAKE 1 TABLET BY MOUTH EVERY DAY 90 tablet 3   • magnesium oxide (MAGOX) 400 (241.3 Mg) MG tablet tablet Take 1 tablet by mouth Daily. 90 each 3   • nitroglycerin (Nitrostat) 0.4 MG SL tablet Place 1 tablet under the tongue Every 5 (Five) Minutes As Needed for Chest Pain. Take no more than 3 doses in 15 minutes. 25 tablet 0   • pantoprazole (Protonix) 40 MG EC tablet Take 1 tablet by mouth Daily. 90 tablet 1   • potassium chloride (MICRO-K) 10 MEQ CR capsule Take 2 capsules by mouth 2 (Two) Times a Day. 60 capsule 0   • sucralfate (CARAFATE) 1 g tablet Take 1 g by mouth 3 (Three) Times a Day.       No facility-administered medications prior to visit.       Review of Systems  I have reviewed 12 systems with patient. Findings were negative except what is noted below and/or in history of present illness.    Objective   Visit Vitals  /68   Pulse 87   Resp 18   Ht 157.5 cm (62\")   Wt 90.8 kg (200 lb 3.2 oz)   SpO2 99%   BMI 36.62 kg/m²     Physical Exam  Vitals and nursing note reviewed.   Constitutional:       General: She is not in acute distress.     Appearance: She is well-developed.   HENT:      Head: Normocephalic and atraumatic.      Nose: Nose normal.   Eyes:      " General:         Right eye: No discharge.         Left eye: No discharge.      Conjunctiva/sclera: Conjunctivae normal.      Pupils: Pupils are equal, round, and reactive to light.   Neck:      Thyroid: No thyromegaly.   Cardiovascular:      Rate and Rhythm: Normal rate and regular rhythm.      Heart sounds: Normal heart sounds.   Pulmonary:      Effort: Pulmonary effort is normal.      Breath sounds: Normal breath sounds.   Lymphadenopathy:      Cervical: No cervical adenopathy.   Skin:     General: Skin is warm and dry.   Neurological:      Mental Status: She is alert and oriented to person, place, and time.       Assessment & Plan   Diagnoses and all orders for this visit:    1. Ischemic cardiomyopathy (Primary)    2. Chronic congestive heart failure, unspecified heart failure type (HCC)    3. Coronary artery disease involving native coronary artery of native heart with angina pectoris (HCC)       Follow up with Dr. Felipe as scheduled. Continue current medications.    No orders of the defined types were placed in this encounter.      Current outpatient and discharge medications have been reconciled for the patient.  Reviewed by: Joyce Plata MD      Return if symptoms worsen or fail to improve, for Next scheduled follow up.

## 2022-09-06 NOTE — OUTREACH NOTE
AMI Week 3 Survey    Flowsheet Row Responses   [facility] facility patient discharged from? [place]   Does the patient have one of the following disease processes/diagnoses(primary or secondary)? Acute MI (STEMI,NSTEMI)   Week 3 attempt successful? No   Unsuccessful attempts Attempt 1          ODALYS RADFORD - Registered Nurse

## 2022-09-09 NOTE — OUTREACH NOTE
AMI Week 3 Survey    Flowsheet Row Responses   [facility] facility patient discharged from? [place]   Does the patient have one of the following disease processes/diagnoses(primary or secondary)? Acute MI (STEMI,NSTEMI)   Week 3 attempt successful? No   Unsuccessful attempts Attempt 2          HAI REAL - Registered Nurse

## 2022-09-15 PROBLEM — K85.00 IDIOPATHIC ACUTE PANCREATITIS WITHOUT INFECTION OR NECROSIS: Status: ACTIVE | Noted: 2022-01-01

## 2022-09-19 PROBLEM — I50.22 CHRONIC SYSTOLIC HEART FAILURE (HCC): Status: ACTIVE | Noted: 2022-01-01

## 2022-09-26 NOTE — OUTREACH NOTE
Prep Survey    Flowsheet Row Responses   [facility] facility patient discharged from? [place]   Is LACE score < 7 ? No   Emergency Room discharge w/ pulse ox? No   Eligibility [facility]   Date of Admission 09/15/22   Date of Discharge 09/26/22   [place] [facility] [place]   Discharge diagnosis idiopathic acute pancreatitis, chronic CHF   Does the patient have one of the following disease processes/diagnoses(primary or secondary)? Other   Does the patient have Home health ordered? Yes   What is the Home health agency?  [facility]   Is there a DME ordered? No   Prep survey completed? Yes          EFRA RADFORD - Registered Nurse

## 2022-09-27 NOTE — OUTREACH NOTE
Call Center TCM Note    Flowsheet Row Responses   [facility] patient discharged from? [place]   Does the patient have one of the following disease processes/diagnoses(primary or secondary)? Other   TCM attempt successful? Yes   Call start time 1443   Call end time 1446   Discharge diagnosis idiopathic acute pancreatitis, chronic CHF   Is patient permission given to speak with other caregiver? Yes   List who call center can speak with Yudi sister   Person spoke with today (if not patient) and relationship Yudi sister    Meds reviewed with patient/caregiver? Yes   Is the patient having any side effects they believe may be caused by any medication additions or changes? No   Does the patient have all medications ordered at discharge? Yes   Is the patient taking all medications as directed (includes completed medication regime)? Yes   Comments Hosp dc fu apt on 10/4/22 with PCP    What is the Home health agency?  [facility]   Has home health visited the patient within 72 hours of discharge? Yes   Home health comments Per EPIC notes, pt was seen by HH today, 9/27/22   Psychosocial issues? No   Did the patient receive a copy of their discharge instructions? Yes   Nursing interventions Reviewed instructions with patient   What is the patient's perception of their health status since discharge? Improving   Is the patient/caregiver able to teach back signs and symptoms related to disease process for when to call PCP? Yes   Is the patient/caregiver able to teach back signs and symptoms related to disease process for when to call 911? Yes   Is the patient/caregiver able to teach back the hierarchy of who to call/visit for symptoms/problems? PCP, Specialist, Home health nurse, Urgent Care, ED, 911 Yes   If the patient is a current smoker, are they able to teach back resources for cessation? Not a smoker   TCM call completed? Yes   Wrap up additional comments Sister Yudi stated she has not seen pt since she brought her  home from the hospital but she was able to provide answers to the best of her ability           Lucie Pereira RN    9/27/2022, 14:48 CDT       [employer]

## 2022-09-27 NOTE — OUTREACH NOTE
Call Center TCM Note    Flowsheet Row Responses   [facility] [place] patient discharged from? [place]   Does the patient have one of the following disease processes/diagnoses(primary or secondary)? Other   TCM attempt successful? No   Unsuccessful attempts Attempt 1  [Verbal release lists Yudi-no answer from pt or sister's number.  VM not set up]          Lucie Pereira RN    9/27/2022, 13:51 CDT

## 2022-10-04 NOTE — PROGRESS NOTES
Transitional Care Follow Up Visit  Subjective     Lexi Wade is a 72 y.o. female who presents for a transitional care management visit.    Within 48 business hours after discharge our office contacted her via telephone to coordinate her care and needs.      I reviewed and discussed the details of that call along with the discharge summary, hospital problems, inpatient lab results, inpatient diagnostic studies, and consultation reports with Lexi.     Current outpatient and discharge medications have been reconciled for the patient.  Reviewed by: Joyce Plata MD      Date of TCM Phone Call 9/26/2022   [facility]   Date of Admission 9/15/2022   Date of Discharge 9/26/2022   [facility]position [facility] [place]     Risk for Readmission (LACE) Score: 17 (9/26/2022  5:00 AM)      History of Present Illness   Course During Hospital Stay: Recent hospitalization, labs, xrays reviewed and medications reconciled. Had abdominal pain, thought related to pancreatitis post-cholecystectomy. Is better and eating. .complaining of hearing problem and tinnitus. Echocardiogram showed decreased EF.    Copied from hospital record:   Hospital Course:  The patient is a 72 y.o. female with medical history notable for chronic systolic heart failure, type 2 diabetes mellitus, GERD, hyperlipidemia, hypertension who presented to [facility] with worsening abdominal pain.  CT scan was performed and showed signs consistent with acute pancreatitis.  Patient was treated this conservatively.  Due to receiving IV fluids patient developed weight gain and worsening edema consistent with acute on chronic systolic heart failure.  Patient was diuresed with IV Lasix.  Her cardiologist was consulted and patient underwent echocardiogram which showed worsening EF.  Patient had gradual improvement in her symptoms during her stay and felt back to baseline and stable for discharge.  She will follow-up with her PCP  "and cardiologist as an outpatient.  Patient was understanding agreement to the plan.       The following portions of the patient's history were reviewed and updated as appropriate: allergies, current medications, past family history, past medical history, past social history, past surgical history and problem list.  Outpatient Medications Prior to Visit   Medication Sig Dispense Refill   • acetaminophen (TYLENOL) 325 MG tablet Take 2 tablets by mouth Every 4 (Four) Hours As Needed for Mild Pain .     • albuterol sulfate  (90 Base) MCG/ACT inhaler INHALE 2 PUFFS EVERY 4 (FOUR) HOURS AS NEEDED FOR WHEEZING OR SHORTNESS OF AIR. 18 g 1   • aspirin 81 MG EC tablet Take 1 tablet by mouth Every Night. 90 tablet 3   • atorvastatin (LIPITOR) 40 MG tablet Take 1 tablet by mouth Daily. 90 tablet 3   • B-D UF III MINI PEN NEEDLES 31G X 5 MM misc USE WITH INSULIN INJECTIONS NIGHTLY 100 each 3   • Blood Glucose Monitoring Suppl (FreeStyle Lite) device 1 Device 3 (Three) Times a Day. 1 each 0   • carvedilol (COREG) 25 MG tablet Take 1 tablet by mouth 2 (Two) Times a Day With Meals. 180 tablet 3   • furosemide (LASIX) 40 MG tablet TAKE 1 TABLET BY MOUTH TWICE A DAY 60 tablet 1   • glucose blood (FREESTYLE LITE) test strip 1 each by Other route 3 (Three) Times a Day. Use as instructed 100 each 1   • insulin detemir (Levemir FlexTouch) 100 UNIT/ML injection Inject 20 Units under the skin into the appropriate area as directed Every Night.     • Insulin Syringe 31G X 5/16\" 0.5 ML misc Inject 1 each under the skin into the appropriate area as directed Every Night. 100 each 1   • ipratropium-albuterol (DUO-NEB) 0.5-2.5 mg/3 ml nebulizer Take 3 mL by nebulization 4 (Four) Times a Day. (Patient taking differently: Take 3 mL by nebulization 4 (Four) Times a Day As Needed for Shortness of Air or Wheezing.) 360 mL 1   • isosorbide mononitrate (IMDUR) 30 MG 24 hr tablet Take 1 tablet by mouth Daily. 90 tablet 3   • Lancets (freestyle) " "lancets 1 each by Other route 3 (Three) Times a Day. Use as instructed 100 each 1   • lisinopril (PRINIVIL,ZESTRIL) 10 MG tablet TAKE 1 TABLET BY MOUTH EVERY DAY 90 tablet 3   • nitroglycerin (Nitrostat) 0.4 MG SL tablet Place 1 tablet under the tongue Every 5 (Five) Minutes As Needed for Chest Pain. Take no more than 3 doses in 15 minutes. 25 tablet 0   • pantoprazole (Protonix) 40 MG EC tablet Take 1 tablet by mouth Daily. 90 tablet 1   • potassium chloride (MICRO-K) 10 MEQ CR capsule Take 2 capsules by mouth 2 (Two) Times a Day. 60 capsule 0   • sucralfate (CARAFATE) 1 g tablet Take 1 g by mouth 3 (Three) Times a Day.       No facility-administered medications prior to visit.       Review of Systems  I have reviewed 12 systems with patient. Findings were negative except what is noted below and/or in history of present illness.    Objective   Visit Vitals  /72   Pulse 87   Resp 18   Ht 162.6 cm (64.02\")   Wt 91.2 kg (201 lb 1.6 oz)   SpO2 99%   BMI 34.50 kg/m²     Physical Exam  Vitals and nursing note reviewed.   Constitutional:       General: She is not in acute distress.     Appearance: She is well-developed.   HENT:      Head: Normocephalic and atraumatic.      Right Ear: Tympanic membrane normal.      Left Ear: Tympanic membrane normal.      Nose: Nose normal.   Eyes:      General:         Right eye: No discharge.         Left eye: No discharge.      Conjunctiva/sclera: Conjunctivae normal.      Pupils: Pupils are equal, round, and reactive to light.   Neck:      Thyroid: No thyromegaly.   Cardiovascular:      Rate and Rhythm: Normal rate and regular rhythm.      Heart sounds: Normal heart sounds.   Pulmonary:      Effort: Pulmonary effort is normal.      Breath sounds: Normal breath sounds.   Lymphadenopathy:      Cervical: No cervical adenopathy.   Skin:     General: Skin is warm and dry.   Neurological:      Mental Status: She is alert and oriented to person, place, and time.         Assessment & " Plan   Diagnoses and all orders for this visit:    1. Other acute pancreatitis without infection or necrosis (Primary)    2. Need for immunization against influenza  -     Fluzone High-Dose 65+yrs ([id number])    3. Hearing problem of both ears  -     Ambulatory Referral to ENT (Otolaryngology)  -     Ambulatory Referral to Audiology    4. Tinnitus of both ears  -     Ambulatory Referral to ENT (Otolaryngology)  -     Ambulatory Referral to Audiology      No orders of the defined types were placed in this encounter.    Continue current medications. Follow up with cardiology for decrease EF. Referral to ENT    Current outpatient and discharge medications have been reconciled for the patient.  Reviewed by: Joyce Plata MD      Return if symptoms worsen or fail to improve, for Next scheduled follow up.

## 2022-10-05 NOTE — PROGRESS NOTES
Cardiology Progress Note     LOS: 8 days   Patient Care Team:  Joyce Plata MD as PCP - General  Yamilka Wilson, RN as Ambulatory  ([facility])    Subjective:    Chart reviewed. Patient seen and examined. Patient symptoms of shortness of breath has improved.  Patient continues to have some abdominal discomfort.  Patient blood pressure remained stable.  Patient is currently on lisinopril and carvedilol.  Patient hilar nodes are negative.  Patient hemoglobin has dropped to 10.7.      Objective:    Vital Signs:  Temp:  [97.5 °F (36.4 °C)-97.8 °F (36.6 °C)] 97.7 °F (36.5 °C)  Heart Rate:  [77-84] 84  Resp:  [16-18] 18  BP: (111-148)/(69-78) 133/78  Vitals               09/21/22  0333 09/22/22  0306 09/22/22  0650   Weight: 97.3 kg (214 lb 9.6 oz) 98 kg (216 lb) 95.7 kg (211 lb)         Body mass index is 36.22 kg/m².     Intake/Output Summary (Last 24 hours) at 9/22/2022 1130  Last data filed at 9/22/2022 0650      Gross per 24 hour   Intake 720 ml   Output 4760 ml   Net -4040 ml      Diet Regular; Cardiac, Daily Fluid Restriction; 1500 mL Fluid Per Day        Physical Exam:   General Appearance:    Alert, oriented, cooperative, in no acute distress.   Head:    Normocephalic, atraumatic, without obvious abnormality   Eyes:             DEONDRE. Lids and lashes normal, conjunctivae and sclerae normal, no icterus, no pallor.   Ears:    Ears appear intact with no abnormalities noted.   Throat:   Mucous membranes pink and moist.   Neck:  Supple, trachea midline, no carotid bruit, no organomegaly or JVD.   Lungs:    Clear to auscultation and percussion.  Respirations regular, even and unlabored. No wheezes, rales, or rhonchi.    Heart:    Regular rhythm and normal rate, normal S1 and S2, no      murmur, no gallop, no rub, no click.   Abdomen:    Soft, nontender, nondistended, no guarding, no rebound tenderness. Normal bowel sounds in all four quadrants, no masses, liver and spleen nonpalpable.     Genitalia:    Deferred.   Extremities:   Moves all extremities well, no edema, no cyanosis, no       redness, no clubbing.   Pulses:   Pulses palpable and equal bilaterally.   Skin:   Moist and warm. No bleeding, bruising or rash.   Neurologic/Psychiatric:   Alert and oriented to person, place, and time.  Motor, power and tone in upper and lower extremities are grossly intact.  No focal neurological deficits. Normal cognitive function. No psychomotor reaction or tangential thought. No depression, homicidal ideations and suicidal ideations.          Results Review:    Tele:    ----------------------------------------------------------------------------------------------------------------------        Results from last 7 days   Lab Units 09/15/22  2324 09/15/22  1826   TROPONIN T ng/mL <0.010 <0.010               Results from last 7 days   Lab Units 09/22/22 0605 09/19/22 0550 09/16/22  0819 09/15/22  2121 09/15/22  1826   LACTATE mmol/L  --   --   --  2.0 2.2*   WBC 10*3/mm3 6.27 8.67 8.91  --  8.86   HEMOGLOBIN g/dL 10.7* 10.8* 12.0  --  11.9*   HEMATOCRIT % 32.5* 32.6* 36.1  --  37.0   MCV fL 82.1 80.7 80.9  --  83.1   MCHC g/dL 32.9 33.1 33.2  --  32.2   PLATELETS 10*3/mm3 287 273 261  --  292                   Results from last 7 days   Lab Units 09/22/22 0605 09/19/22 0550 09/16/22  0819 09/16/22  0616 09/15/22  1826   SODIUM mmol/L 139 132* 138  --  131*   POTASSIUM mmol/L 3.8 4.0 3.5  --  3.5   MAGNESIUM mg/dL  --   --   --  1.6 1.3*   CHLORIDE mmol/L 102 96* 101  --  97*   CO2 mmol/L 28.0 25.0 25.0  --  21.0*   BUN mg/dL 9 9 9  --  10   CREATININE mg/dL 1.05* 0.96 0.94  --  1.06*   CALCIUM mg/dL 9.2 9.2 9.0  --  9.0   GLUCOSE mg/dL 70 91 78  --  225*   ALBUMIN g/dL 3.50  --  3.70  --  3.80   BILIRUBIN mg/dL 0.7  --  1.2  --  1.2   ALK PHOS U/L 149*  --  180*  --  203*   AST (SGOT) U/L 15  --  33*  --  50*   ALT (SGPT) U/L 10  --  22  --  24   Estimated Creatinine Clearance: 52.8 mL/min (A) (by C-G  formula based on SCr of 1.05 mg/dL (H)).     No results found for: AMMONIA       Results from last 7 days   Lab Units 09/16/22  0819   CHOLESTEROL mg/dL 136   TRIGLYCERIDES mg/dL 80   HDL CHOL mg/dL 43   LDL CHOL mg/dL 77      No results found for: BLOODCX  No results found for: URINECX  No results found for: WOUNDCX  No results found for: STOOLCX     I have personally looked at the labs and they are summarized above.  ----------------------------------------------------------------------------------------------------------------------      Imaging Results (Last 24 Hours)      ** No results found for the last 24 hours. **                                             ECHO:  Results for orders placed during the hospital encounter of 09/15/22    Adult Transthoracic Echo Limited W/ Cont if Necessary Per Protocol    Interpretation Summary  · Estimated right ventricular systolic pressure from tricuspid regurgitation is markedly elevated (>55 mmHg).  · The left ventricular cavity is mild to moderately dilated.  · Left ventricular ejection fraction appears to be 21 - 25%.  · Left ventricular diastolic function was normal.  · There is mild, bileaflet mitral valve thickening present.  · Moderate to severe tricuspid valve regurgitation is present.  · Severe pulmonary hypertension is present.  · The right atrial cavity is borderline dilated.  · Moderate to severe mitral valve regurgitation is present.  · The following left ventricular wall segments are hypokinetic: mid anterior, apical anterior, basal anterolateral, mid anterolateral, apical lateral, basal inferolateral, mid inferolateral, apical inferior, mid inferior, apical septal, basal inferoseptal, mid inferoseptal, apex hypokinetic, mid anteroseptal, basal anterior, basal inferior and basal inferoseptal.      ECG 12 Lead   ED Interpretation   Abnormal   Kim Romo, APRN     9/15/2022  9:13 PM   ECG 12 Lead         Date/Time: 9/15/2022 6:44 PM   Performed by:  Kim Romo APRN   Authorized by: Noah España MD    Rhythm: sinus rhythm   Rate: normal   BPM: 86   ST Segments: ST segments normal   Clinical impression: abnormal ECG         ECG 12 Lead   Final Result   Test Reason : shortness of breath   Blood Pressure :   */*   mmHG   Vent. Rate :  86 BPM     Atrial Rate :  86 BPM      P-R Int : 134 ms          QRS Dur : 136 ms       QT Int : 426 ms       P-R-T Axes :  50 -62  45 degrees      QTc Int : 509 ms      Sinus rhythm, Ventricular pacing noted   Abnormal ECG   When compared with ECG of 19-AUG-2022 06:14,   HR has increased      Referred By:            Confirmed By: MARIO CORONA MD      SCANNED - TELEMETRY     Final Result         SCANNED EKG   Final Result         SCANNED - TELEMETRY     Final Result         SCANNED - TELEMETRY     Final Result         SCANNED - TELEMETRY     Final Result         SCANNED - TELEMETRY     Final Result         SCANNED - TELEMETRY     Final Result         SCANNED - TELEMETRY     Final Result         SCANNED - TELEMETRY     Final Result         SCANNED - TELEMETRY     Final Result         SCANNED - TELEMETRY     Final Result         SCANNED - TELEMETRY     Final Result         SCANNED - TELEMETRY     Final Result         SCANNED - TELEMETRY     Final Result         SCANNED - TELEMETRY     Final Result         SCANNED - TELEMETRY     Final Result         SCANNED - TELEMETRY     Final Result         SCANNED - TELEMETRY     Final Result         SCANNED - TELEMETRY     Final Result         SCANNED - TELEMETRY     Final Result         SCANNED - TELEMETRY     Final Result         SCANNED - TELEMETRY     Final Result         SCANNED - TELEMETRY     Final Result         SCANNED - TELEMETRY     Final Result         SCANNED - TELEMETRY     Final Result         SCANNED - TELEMETRY     Final Result         SCANNED - TELEMETRY     Final Result         SCANNED - TELEMETRY     Final Result         SCANNED - TELEMETRY     Final Result  "        SCANNED - TELEMETRY     Final Result         SCANNED - TELEMETRY     Final Result         SCANNED - TELEMETRY     Final Result         SCANNED - TELEMETRY     Final Result              Medication Review:   No current facility-administered medications for this encounter.     Current Outpatient Medications   Medication Sig Dispense Refill   • acetaminophen (TYLENOL) 325 MG tablet Take 2 tablets by mouth Every 4 (Four) Hours As Needed for Mild Pain .     • albuterol sulfate  (90 Base) MCG/ACT inhaler INHALE 2 PUFFS EVERY 4 (FOUR) HOURS AS NEEDED FOR WHEEZING OR SHORTNESS OF AIR. 18 g 1   • aspirin 81 MG EC tablet Take 1 tablet by mouth Every Night. 90 tablet 3   • atorvastatin (LIPITOR) 40 MG tablet Take 1 tablet by mouth Daily. 90 tablet 3   • B-D UF III MINI PEN NEEDLES 31G X 5 MM misc USE WITH INSULIN INJECTIONS NIGHTLY 100 each 3   • Blood Glucose Monitoring Suppl (FreeStyle Lite) device 1 Device 3 (Three) Times a Day. 1 each 0   • carvedilol (COREG) 25 MG tablet Take 1 tablet by mouth 2 (Two) Times a Day With Meals. 180 tablet 3   • furosemide (LASIX) 40 MG tablet TAKE 1 TABLET BY MOUTH TWICE A DAY 60 tablet 1   • glucose blood (FREESTYLE LITE) test strip 1 each by Other route 3 (Three) Times a Day. Use as instructed 100 each 1   • insulin detemir (Levemir FlexTouch) 100 UNIT/ML injection Inject 20 Units under the skin into the appropriate area as directed Every Night.     • Insulin Syringe 31G X 5/16\" 0.5 ML misc Inject 1 each under the skin into the appropriate area as directed Every Night. 100 each 1   • ipratropium-albuterol (DUO-NEB) 0.5-2.5 mg/3 ml nebulizer Take 3 mL by nebulization 4 (Four) Times a Day. (Patient taking differently: Take 3 mL by nebulization 4 (Four) Times a Day As Needed for Shortness of Air or Wheezing.) 360 mL 1   • isosorbide mononitrate (IMDUR) 30 MG 24 hr tablet Take 1 tablet by mouth Daily. 90 tablet 3   • Lancets (freestyle) lancets 1 each by Other route 3 (Three) " Times a Day. Use as instructed 100 each 1   • lisinopril (PRINIVIL,ZESTRIL) 10 MG tablet TAKE 1 TABLET BY MOUTH EVERY DAY 90 tablet 3   • nitroglycerin (Nitrostat) 0.4 MG SL tablet Place 1 tablet under the tongue Every 5 (Five) Minutes As Needed for Chest Pain. Take no more than 3 doses in 15 minutes. 25 tablet 0   • pantoprazole (Protonix) 40 MG EC tablet Take 1 tablet by mouth Daily. 90 tablet 1   • potassium chloride (MICRO-K) 10 MEQ CR capsule Take 2 capsules by mouth 2 (Two) Times a Day. 60 capsule 0   • sucralfate (CARAFATE) 1 g tablet Take 1 g by mouth 3 (Three) Times a Day.         Assessment and Plan:      Acute on chronic systolic CHF (congestive heart failure) (HCC)    Idiopathic acute pancreatitis without infection or necrosis    1.  Shortness of breath.  Patient does have history of nonischemic cardiomyopathy.  Patient has been started on IV Lasix 40 mg every 12 hours.  Patient had increase in the creatinine.  Patient Lasix has been decreased to 40 mg.  Patient at the present time would be continued on gentle diuresis and afterload reduction with lisinopril.  Patient is also on Coreg.  We will follow the blood pressure.    2.  Atherosclerotic coronary artery disease.  Patient underwent coronary angiogram in June 2022 which had revealed nonobstructive disease in the ostial circumflex artery and ostial left into descending artery and distal left main.  Patient had negative troponin.  Patient did not complain of symptoms of severe substernal chest pain.  Patient at the present time will be treated medically.    3.  Nonischemic cardiomyopathy with left ventricular systolic dysfunction with improvement in the ejection fraction.  Patient has worsening symptoms and patient at the present time has been recommended to undergo a repeat transthoracic echocardiogram to evaluate the left-ventricular systolic function.    4. S/p [place] AICD.  Patient underwent AICD interrogation which had revealed appropriate  pacing and sensing function.  Patient did not have any evidence of any ventricular tachyarrhythmia.     5.  Hyperlipidemia.  Patient has been counseled on low-fat cholesterol diet and to continue with the present dose of the Lipitor.    6.  Diabetes.  Patient is currently on sliding scale insulin and has been followed by the hospitalist.     7.  Anemia.  Patient has not had any hemoptysis hematuria bright of blood per rectum.    8.  Obesity.  Patient body mass index is 37.  Patient been counseled on weight reduction [place] modification and dietary restriction.     The above plan of management were discussed with the patient      Electronically signed by Wang Felipe MD, 09/22/22, 5:21 PM CDT.      Time: Time spent face-to-face interaction 20 minutes    Dictated utilizing Dragon dictation.

## 2022-10-05 NOTE — PROGRESS NOTES
Cardiology Progress Note     LOS: 8 days   Patient Care Team:  Joyce Plata MD as PCP - General  Yamilka Wilson, RN as Ambulatory  ([facility])    Subjective:   Chart reviewed. Patient seen and examined.   Patient has improvement in the symptoms of shortness of breath.  Patient denies any symptoms of chest pain.  Patient has been ambulating in the room.  Patient abdominal discomfort has improved.    Patient is tolerating oral feeding.        Objective:    ----------------------------------------------------------------------------------------------------------------------  Vital Signs:  Temp:  [95.2 °F (35.1 °C)-98.2 °F (36.8 °C)] 98 °F (36.7 °C)  Heart Rate:  [75-83] 80  Resp:  [16-19] 18  BP: (121-134)/(67-77) 130/75  Vitals               09/22/22  0306 09/22/22  0650 09/23/22  0350   Weight: 98 kg (216 lb) 95.7 kg (211 lb) 96.9 kg (213 lb 9.6 oz)         Body mass index is 36.66 kg/m².     Intake/Output Summary (Last 24 hours) at 9/23/2022 1120  Last data filed at 9/23/2022 0350      Gross per 24 hour   Intake 240 ml   Output 2600 ml   Net -2360 ml      Diet Regular; Cardiac, Daily Fluid Restriction; 1500 mL Fluid Per Day        Physical Exam:   General Appearance:    Alert, oriented, cooperative, in no acute distress.   Head:    Normocephalic, atraumatic, without obvious abnormality   Eyes:             DEONDRE. Lids and lashes normal, conjunctivae and sclerae normal, no icterus, no pallor.   Ears:    Ears appear intact with no abnormalities noted.   Throat:   Mucous membranes pink and moist.   Neck:  Supple, trachea midline, no carotid bruit, no organomegaly or JVD.   Lungs:    Clear to auscultation and percussion.  Respirations regular, even and unlabored. No wheezes, rales, or rhonchi.    Heart:   Regular S1 and S2 with a soft systolic murmur best heard at the apex radiating to the axilla.   Abdomen:    Soft, nontender, nondistended, no guarding, no rebound tenderness. Normal bowel  sounds in all four quadrants, no masses, liver and spleen nonpalpable.    Genitalia:    Deferred.   Extremities:   Moves all extremities well, 2+ edema, no cyanosis, no       redness, no clubbing.   Pulses:   Pulses palpable and equal bilaterally.   Skin:   Moist and warm. No bleeding, bruising or rash.   Neurologic/Psychiatric:   Alert and oriented to person, place, and time.  Motor, power and tone in upper and lower extremities are grossly intact.  No focal neurological deficits. Normal cognitive function. No psychomotor reaction or tangential thought. No depression, homicidal ideations and suicidal ideations.          Results Review:        Results from last 7 days   Lab Units 09/22/22  0605 09/19/22  0550   WBC 10*3/mm3 6.27 8.67   HEMOGLOBIN g/dL 10.7* 10.8*   HEMATOCRIT % 32.5* 32.6*   MCV fL 82.1 80.7   MCHC g/dL 32.9 33.1   PLATELETS 10*3/mm3 287 273                Results from last 7 days   Lab Units 09/22/22  0605 09/19/22  0550   SODIUM mmol/L 139 132*   POTASSIUM mmol/L 3.8 4.0   CHLORIDE mmol/L 102 96*   CO2 mmol/L 28.0 25.0   BUN mg/dL 9 9   CREATININE mg/dL 1.05* 0.96   CALCIUM mg/dL 9.2 9.2   GLUCOSE mg/dL 70 91   ALBUMIN g/dL 3.50  --    BILIRUBIN mg/dL 0.7  --    ALK PHOS U/L 149*  --    AST (SGOT) U/L 15  --    ALT (SGPT) U/L 10  --    Estimated Creatinine Clearance: 53.1 mL/min (A) (by C-G formula based on SCr of 1.05 mg/dL (H)).     No results found for: AMMONIA      No results found for: BLOODCX  No results found for: URINECX  No results found for: WOUNDCX  No results found for: STOOLCX     I have personally looked at the labs and they are summarized above.  ----------------------------------------------------------------------------------------------------------------------      Imaging Results (Last 24 Hours)      ** No results found for the last 24 hours. **                                               ECHO:  Results for orders placed during the hospital encounter of 09/15/22    Adult  Transthoracic Echo Limited W/ Cont if Necessary Per Protocol    Interpretation Summary  · Estimated right ventricular systolic pressure from tricuspid regurgitation is markedly elevated (>55 mmHg).  · The left ventricular cavity is mild to moderately dilated.  · Left ventricular ejection fraction appears to be 21 - 25%.  · Left ventricular diastolic function was normal.  · There is mild, bileaflet mitral valve thickening present.  · Moderate to severe tricuspid valve regurgitation is present.  · Severe pulmonary hypertension is present.  · The right atrial cavity is borderline dilated.  · Moderate to severe mitral valve regurgitation is present.  · The following left ventricular wall segments are hypokinetic: mid anterior, apical anterior, basal anterolateral, mid anterolateral, apical lateral, basal inferolateral, mid inferolateral, apical inferior, mid inferior, apical septal, basal inferoseptal, mid inferoseptal, apex hypokinetic, mid anteroseptal, basal anterior, basal inferior and basal inferoseptal.      ECG 12 Lead   ED Interpretation   Abnormal   Kim Romo APRN     9/15/2022  9:13 PM   ECG 12 Lead         Date/Time: 9/15/2022 6:44 PM   Performed by: Kim Romo APRN   Authorized by: Noah España MD    Rhythm: sinus rhythm   Rate: normal   BPM: 86   ST Segments: ST segments normal   Clinical impression: abnormal ECG         ECG 12 Lead   Final Result   Test Reason : shortness of breath   Blood Pressure :   */*   mmHG   Vent. Rate :  86 BPM     Atrial Rate :  86 BPM      P-R Int : 134 ms          QRS Dur : 136 ms       QT Int : 426 ms       P-R-T Axes :  50 -62  45 degrees      QTc Int : 509 ms      Sinus rhythm, Ventricular pacing noted   Abnormal ECG   When compared with ECG of 19-AUG-2022 06:14,   HR has increased      Referred By:            Confirmed By: MARIO CORONA MD      SCANNED - TELEMETRY     Final Result         SCANNED EKG   Final Result         SCANNED -  TELEMETRY     Final Result         SCANNED - TELEMETRY     Final Result         SCANNED - TELEMETRY     Final Result         SCANNED - TELEMETRY     Final Result         SCANNED - TELEMETRY     Final Result         SCANNED - TELEMETRY     Final Result         SCANNED - TELEMETRY     Final Result         SCANNED - TELEMETRY     Final Result         SCANNED - TELEMETRY     Final Result         SCANNED - TELEMETRY     Final Result         SCANNED - TELEMETRY     Final Result         SCANNED - TELEMETRY     Final Result         SCANNED - TELEMETRY     Final Result         SCANNED - TELEMETRY     Final Result         SCANNED - TELEMETRY     Final Result         SCANNED - TELEMETRY     Final Result         SCANNED - TELEMETRY     Final Result         SCANNED - TELEMETRY     Final Result         SCANNED - TELEMETRY     Final Result         SCANNED - TELEMETRY     Final Result         SCANNED - TELEMETRY     Final Result         SCANNED - TELEMETRY     Final Result         SCANNED - TELEMETRY     Final Result         SCANNED - TELEMETRY     Final Result         SCANNED - TELEMETRY     Final Result         SCANNED - TELEMETRY     Final Result         SCANNED - TELEMETRY     Final Result         SCANNED - TELEMETRY     Final Result         SCANNED - TELEMETRY     Final Result         SCANNED - TELEMETRY     Final Result         SCANNED - TELEMETRY     Final Result              Medication Review:   No current facility-administered medications for this encounter.     Current Outpatient Medications   Medication Sig Dispense Refill   • acetaminophen (TYLENOL) 325 MG tablet Take 2 tablets by mouth Every 4 (Four) Hours As Needed for Mild Pain .     • albuterol sulfate  (90 Base) MCG/ACT inhaler INHALE 2 PUFFS EVERY 4 (FOUR) HOURS AS NEEDED FOR WHEEZING OR SHORTNESS OF AIR. 18 g 1   • aspirin 81 MG EC tablet Take 1 tablet by mouth Every Night. 90 tablet 3   • atorvastatin (LIPITOR) 40 MG tablet Take 1 tablet by mouth Daily.  "90 tablet 3   • B-D UF III MINI PEN NEEDLES 31G X 5 MM misc USE WITH INSULIN INJECTIONS NIGHTLY 100 each 3   • Blood Glucose Monitoring Suppl (FreeStyle Lite) device 1 Device 3 (Three) Times a Day. 1 each 0   • carvedilol (COREG) 25 MG tablet Take 1 tablet by mouth 2 (Two) Times a Day With Meals. 180 tablet 3   • furosemide (LASIX) 40 MG tablet TAKE 1 TABLET BY MOUTH TWICE A DAY 60 tablet 1   • glucose blood (FREESTYLE LITE) test strip 1 each by Other route 3 (Three) Times a Day. Use as instructed 100 each 1   • insulin detemir (Levemir FlexTouch) 100 UNIT/ML injection Inject 20 Units under the skin into the appropriate area as directed Every Night.     • Insulin Syringe 31G X 5/16\" 0.5 ML misc Inject 1 each under the skin into the appropriate area as directed Every Night. 100 each 1   • ipratropium-albuterol (DUO-NEB) 0.5-2.5 mg/3 ml nebulizer Take 3 mL by nebulization 4 (Four) Times a Day. (Patient taking differently: Take 3 mL by nebulization 4 (Four) Times a Day As Needed for Shortness of Air or Wheezing.) 360 mL 1   • isosorbide mononitrate (IMDUR) 30 MG 24 hr tablet Take 1 tablet by mouth Daily. 90 tablet 3   • Lancets (freestyle) lancets 1 each by Other route 3 (Three) Times a Day. Use as instructed 100 each 1   • lisinopril (PRINIVIL,ZESTRIL) 10 MG tablet TAKE 1 TABLET BY MOUTH EVERY DAY 90 tablet 3   • nitroglycerin (Nitrostat) 0.4 MG SL tablet Place 1 tablet under the tongue Every 5 (Five) Minutes As Needed for Chest Pain. Take no more than 3 doses in 15 minutes. 25 tablet 0   • pantoprazole (Protonix) 40 MG EC tablet Take 1 tablet by mouth Daily. 90 tablet 1   • potassium chloride (MICRO-K) 10 MEQ CR capsule Take 2 capsules by mouth 2 (Two) Times a Day. 60 capsule 0   • sucralfate (CARAFATE) 1 g tablet Take 1 g by mouth 3 (Three) Times a Day.         Assessment and Plan:      Acute on chronic systolic CHF (congestive heart failure) (HCC)    Idiopathic acute pancreatitis without infection or necrosis  1.  " Shortness of breath nonischemic cardiomyopathy with left ventricular systolic dysfunction.  Patient has symptoms of shortness of breath.  Patient does have previous history of nonischemic cardiomyopathy with an ejection fraction of 25% which had improved to 42%.  Patient has a [employer] biventricular AICD.  Patient had presented with symptoms of abdominal discomfort with shortness of breath.  Patient has been diuresed aggressively with improvement in the symptoms.  Patient has been recommended to undergo a repeat transthoracic echocardiogram.  Patient will be continued on the present dose of the Lasix lisinopril and carvedilol.  Patient I's and O's are negative.    2.  Atherosclerotic coronary artery disease.  Patient has not complained of symptoms of severe substernal chest pain.  Patient last coronary angiogram done in June 2022 which had revealed ostial circumflex artery and ostial left anterior descending artery and distal left main stenosis which was nonobstructive.  Patient at the present time has been recommended medical management and would not be subjected to any invasive evaluation from the cardiac standpoint.    3.  S/p biventricular [employer] [place] AICD.  Patient underwent AICD interrogation which had revealed appropriate AICD function.    4.  Renal insufficiency.  Patient creatinine on admission was 0.96 with subsequent creatinine increased to 1.05.  Patient renal function would be followed with diuresis.    5.  Anemia.  Patient's hemoglobin has dropped to 10.7.  Patient has not had any hemoptysis hematuria bright red blood per rectum.    6.  Abdominal discomfort with pancreatitis.  Patient symptoms of abdominal discomfort has improved.    Above plan  of management were discussed with the patient and the nursing staff.            Electronically signed by Wang Felipe MD, 09/23/22, 5:29 PM CDT.      Time: Time spent on face-to-face interaction 20 minutes    Dictated utilizing Dragon dictation.

## 2022-10-06 NOTE — OUTREACH NOTE
Medical Week 2 Survey    Flowsheet Row Responses   [facility] patient discharged from? [place]   Does the patient have one of the following disease processes/diagnoses(primary or secondary)? Other   Week 2 attempt successful? Yes   Call start time 1516   Call end time 1518   Person spoke with today (if not patient) and relationship Yudi-.   Meds reviewed with patient/caregiver? Yes   Is the patient having any side effects they believe may be caused by any medication additions or changes? No   Does the patient have all medications ordered at discharge? Yes   Is the patient taking all medications as directed (includes completed medication regime)? Yes   Has home health visited the patient within 72 hours of discharge? Yes   Home health comments Sister states  nurse visited yesterday.   What is the patient's perception of their health status since discharge? Improving   Week 2 Call Completed? Yes   Wrap up additional comments Sister Yudi states believes patient is improving,  nurse visited yesterday. States unsure if patient has kept PCP appt. Denies any known needs today.          REYNALDO ASHLEY - Registered Nurse

## 2022-10-11 NOTE — TELEPHONE ENCOUNTER
I called pt and she said was filling lots better and wanted to know if she could go to store explained she needed to ware mask and take it easy if any further problems to call us

## 2022-10-19 NOTE — OUTREACH NOTE
Medical Week 4 Survey    Flowsheet Row Responses   [facility] patient discharged from? [place]   Does the patient have one of the following disease processes/diagnoses(primary or secondary)? Other   Week 4 attempt successful? No          YAMILET LUTZ - Registered Nurse

## 2022-10-27 NOTE — PATIENT INSTRUCTIONS
Take 2 tylenol extra strength twice a day for pain;    Medicare Wellness  Personal Prevention Plan of Service     Date of Office Visit:    Encounter Provider:  Joyce Plata MD  Place of Service:  [facility]  Patient Name: Lexi Wade  :  1950    As part of the Medicare Wellness portion of your visit today, we are providing you with this personalized preventive plan of services (PPPS). This plan is based upon recommendations of the [place] Preventive Services Task Force (USPSTF) and the Advisory Committee on Immunization Practices (ACIP).    This lists the preventive care services that should be considered, and provides dates of when you are due. Items listed as completed are up-to-date and do not require any further intervention.    Health Maintenance   Topic Date Due    URINE MICROALBUMIN  10/13/2021    DIABETIC EYE EXAM  2022    DIABETIC FOOT EXAM  2022    COVID-19 Vaccine (5 - Booster for Pfizer series) 10/12/2022    DXA SCAN  10/13/2022    ANNUAL WELLNESS VISIT  10/25/2022    HEMOGLOBIN A1C  2022    LIPID PANEL  2023    MAMMOGRAM  10/25/2023    TDAP/TD VACCINES (2 - Td or Tdap) 2027    COLORECTAL CANCER SCREENING  2027    HEPATITIS C SCREENING  Completed    INFLUENZA VACCINE  Completed    Pneumococcal Vaccine 65+  Completed    ZOSTER VACCINE  Completed       No orders of the defined types were placed in this encounter.      No follow-ups on file.        Calorie Counting for Weight Loss  Calories are units of energy. Your body needs a certain number of calories from food to keep going throughout the day. When you eat or drink more calories than your body needs, your body stores the extra calories mostly as fat. When you eat or drink fewer calories than your body needs, your body burns fat to get the energy it needs.  Calorie counting means keeping track of how many calories you eat and drink  each day. Calorie counting can be helpful if you need to lose weight. If you eat fewer calories than your body needs, you should lose weight. Ask your health care provider what a healthy weight is for you.  For calorie counting to work, you will need to eat the right number of calories each day to lose a healthy amount of weight per week. A dietitian can help you figure out how many calories you need in a day and will suggest ways to reach your calorie goal.  A healthy amount of weight to lose each week is usually 1-2 lb (0.5-0.9 kg). This usually means that your daily calorie intake should be reduced by 500-750 calories.  Eating 1,200-1,500 calories a day can help most women lose weight.  Eating 1,500-1,800 calories a day can help most men lose weight.  What do I need to know about calorie counting?  Work with your health care provider or dietitian to determine how many calories you should get each day. To meet your daily calorie goal, you will need to:  Find out how many calories are in each food that you would like to eat. Try to do this before you eat.  Decide how much of the food you plan to eat.  Keep a food log. Do this by writing down what you ate and how many calories it had.  To successfully lose weight, it is important to balance calorie counting with a healthy lifestyle that includes regular activity.  Where do I find calorie information?  The number of calories in a food can be found on a Nutrition Facts label. If a food does not have a Nutrition Facts label, try to look up the calories online or ask your dietitian for help.  Remember that calories are listed per serving. If you choose to have more than one serving of a food, you will have to multiply the calories per serving by the number of servings you plan to eat. For example, the label on a package of bread might say that a serving size is 1 slice and that there are 90 calories in a serving. If you eat 1 slice, you will have eaten 90 calories. If  you eat 2 slices, you will have eaten 180 calories.  How do I keep a food log?  After each time that you eat, record the following in your food log as soon as possible:  What you ate. Be sure to include toppings, sauces, and other extras on the food.  How much you ate. This can be measured in cups, ounces, or number of items.  How many calories were in each food and drink.  The total number of calories in the food you ate.  Keep your food log near you, such as in a pocket-sized notebook or on an jimmy or website on your mobile phone. Some programs will calculate calories for you and show you how many calories you have left to meet your daily goal.  What are some portion-control tips?  Know how many calories are in a serving. This will help you know how many servings you can have of a certain food.  Use a measuring cup to measure serving sizes. You could also try weighing out portions on a kitchen scale. With time, you will be able to estimate serving sizes for some foods.  Take time to put servings of different foods on your favorite plates or in your favorite bowls and cups so you know what a serving looks like.  Try not to eat straight from a food's packaging, such as from a bag or box. Eating straight from the package makes it hard to see how much you are eating and can lead to overeating. Put the amount you would like to eat in a cup or on a plate to make sure you are eating the right portion.  Use smaller plates, glasses, and bowls for smaller portions and to prevent overeating.  Try not to multitask. For example, avoid watching TV or using your computer while eating. If it is time to eat, sit down at a table and enjoy your food. This will help you recognize when you are full. It will also help you be more mindful of what and how much you are eating.  What are tips for following this plan?  Reading food labels  Check the calorie count compared with the serving size. The serving size may be smaller than what you  are used to eating.  Check the source of the calories. Try to choose foods that are high in protein, fiber, and vitamins, and low in saturated fat, trans fat, and sodium.  Shopping  Read nutrition labels while you shop. This will help you make healthy decisions about which foods to buy.  Pay attention to nutrition labels for low-fat or fat-free foods. These foods sometimes have the same number of calories or more calories than the full-fat versions. They also often have added sugar, starch, or salt to make up for flavor that was removed with the fat.  Make a grocery list of lower-calorie foods and stick to it.  Cooking  Try to cook your favorite foods in a healthier way. For example, try baking instead of frying.  Use low-fat dairy products.  Meal planning  Use more fruits and vegetables. One-half of your plate should be fruits and vegetables.  Include lean proteins, such as chicken, turkey, and fish.  Lifestyle  Each week, aim to do one of the followin minutes of moderate exercise, such as walking.  75 minutes of vigorous exercise, such as running.  General information  Know how many calories are in the foods you eat most often. This will help you calculate calorie counts faster.  Find a way of tracking calories that works for you. Get creative. Try different apps or programs if writing down calories does not work for you.  What foods should I eat?    Eat nutritious foods. It is better to have a nutritious, high-calorie food, such as an avocado, than a food with few nutrients, such as a bag of potato chips.  Use your calories on foods and drinks that will fill you up and will not leave you hungry soon after eating.  Examples of foods that fill you up are nuts and nut butters, vegetables, lean proteins, and high-fiber foods such as whole grains. High-fiber foods are foods with more than 5 g of fiber per serving.  Pay attention to calories in drinks. Low-calorie drinks include water and unsweetened  drinks.  The items listed above may not be a complete list of foods and beverages you can eat. Contact a dietitian for more information.  What foods should I limit?  Limit foods or drinks that are not good sources of vitamins, minerals, or protein or that are high in unhealthy fats. These include:  Candy.  Other sweets.  Sodas, specialty coffee drinks, alcohol, and juice.  The items listed above may not be a complete list of foods and beverages you should avoid. Contact a dietitian for more information.  How do I count calories when eating out?  Pay attention to portions. Often, portions are much larger when eating out. Try these tips to keep portions smaller:  Consider sharing a meal instead of getting your own.  If you get your own meal, eat only half of it. Before you start eating, ask for a container and put half of your meal into it.  When available, consider ordering smaller portions from the menu instead of full portions.  Pay attention to your food and drink choices. Knowing the way food is cooked and what is included with the meal can help you eat fewer calories.  If calories are listed on the menu, choose the lower-calorie options.  Choose dishes that include vegetables, fruits, whole grains, low-fat dairy products, and lean proteins.  Choose items that are boiled, broiled, grilled, or steamed. Avoid items that are buttered, battered, fried, or served with cream sauce. Items labeled as crispy are usually fried, unless stated otherwise.  Choose water, low-fat milk, unsweetened iced tea, or other drinks without added sugar. If you want an alcoholic beverage, choose a lower-calorie option, such as a glass of wine or light beer.  Ask for dressings, sauces, and syrups on the side. These are usually high in calories, so you should limit the amount you eat.  If you want a salad, choose a garden salad and ask for grilled meats. Avoid extra toppings such as finley, cheese, or fried items. Ask for the dressing on the  side, or ask for olive oil and vinegar or lemon to use as dressing.  Estimate how many servings of a food you are given. Knowing serving sizes will help you be aware of how much food you are eating at restaurants.  Where to find more information  Centers for Disease Control and Prevention: www.cdc.gov  [place] Department of Agriculture: myplate.gov  Summary  Calorie counting means keeping track of how many calories you eat and drink each day. If you eat fewer calories than your body needs, you should lose weight.  A healthy amount of weight to lose per week is usually 1-2 lb (0.5-0.9 kg). This usually means reducing your daily calorie intake by 500-750 calories.  The number of calories in a food can be found on a Nutrition Facts label. If a food does not have a Nutrition Facts label, try to look up the calories online or ask your dietitian for help.  Use smaller plates, glasses, and bowls for smaller portions and to prevent overeating.  Use your calories on foods and drinks that will fill you up and not leave you hungry shortly after a meal.  This information is not intended to replace advice given to you by your health care provider. Make sure you discuss any questions you have with your health care provider.  Document Revised: 01/28/2021 Document Reviewed: 01/28/2021  Elsevier Patient Education © 2022 [employer]

## 2022-10-27 NOTE — PROGRESS NOTES
The ABCs of the Annual Wellness Visit  Subsequent Medicare Wellness Visit    Chief Complaint   Patient presents with   • Annual Exam   • Medicare Wellness-subsequent     Rosa Elena prado      Subjective    History of Present Illness:  Lexi Wade is a 72 y.o. female who presents for a Subsequent Medicare Wellness Visit.    The following portions of the patient's history were reviewed and   updated as appropriate: allergies, current medications, past family history, past medical history, past social history, past surgical history and problem list.    Compared to one year ago, the patient feels her physical   health is worse.    Compared to one year ago, the patient feels her mental   health is worse.    Recent Hospitalizations:  This patient has had a [facility] admission record on file within the last 365 days.    Current Medical Providers:  Patient Care Team:  Joyce Plata MD as PCP - General  Yamilka Wilson, RN as Ambulatory  (Formerly [facility])    Outpatient Medications Prior to Visit   Medication Sig Dispense Refill   • acetaminophen (TYLENOL) 325 MG tablet Take 2 tablets by mouth Every 4 (Four) Hours As Needed for Mild Pain .     • albuterol sulfate  (90 Base) MCG/ACT inhaler INHALE 2 PUFFS EVERY 4 (FOUR) HOURS AS NEEDED FOR WHEEZING OR SHORTNESS OF AIR. 18 g 1   • aspirin 81 MG EC tablet Take 1 tablet by mouth Every Night. 90 tablet 3   • atorvastatin (LIPITOR) 40 MG tablet Take 1 tablet by mouth Daily. 90 tablet 3   • B-D UF III MINI PEN NEEDLES 31G X 5 MM misc USE WITH INSULIN INJECTIONS NIGHTLY 100 each 3   • Blood Glucose Monitoring Suppl (FreeStyle Lite) device 1 Device 3 (Three) Times a Day. 1 each 0   • carvedilol (COREG) 25 MG tablet Take 1 tablet by mouth 2 (Two) Times a Day With Meals. 180 tablet 3   • furosemide (LASIX) 40 MG tablet TAKE 1 TABLET BY MOUTH TWICE A DAY 60 tablet 1   • glucose blood (FREESTYLE LITE) test strip 1 each by Other route 3 (Three) Times a  "Day. Use as instructed 100 each 1   • insulin detemir (Levemir FlexTouch) 100 UNIT/ML injection Inject 20 Units under the skin into the appropriate area as directed Every Night.     • Insulin Syringe 31G X 5/16\" 0.5 ML misc Inject 1 each under the skin into the appropriate area as directed Every Night. 100 each 1   • ipratropium-albuterol (DUO-NEB) 0.5-2.5 mg/3 ml nebulizer Take 3 mL by nebulization 4 (Four) Times a Day. (Patient taking differently: Take 3 mL by nebulization 4 (Four) Times a Day As Needed for Shortness of Air or Wheezing.) 360 mL 1   • isosorbide mononitrate (IMDUR) 30 MG 24 hr tablet Take 1 tablet by mouth Daily. 90 tablet 3   • Lancets (freestyle) lancets 1 each by Other route 3 (Three) Times a Day. Use as instructed 100 each 1   • lisinopril (PRINIVIL,ZESTRIL) 10 MG tablet TAKE 1 TABLET BY MOUTH EVERY DAY 90 tablet 3   • nitroglycerin (Nitrostat) 0.4 MG SL tablet Place 1 tablet under the tongue Every 5 (Five) Minutes As Needed for Chest Pain. Take no more than 3 doses in 15 minutes. 25 tablet 0   • pantoprazole (Protonix) 40 MG EC tablet Take 1 tablet by mouth Daily. 90 tablet 1   • potassium chloride (MICRO-K) 10 MEQ CR capsule Take 2 capsules by mouth 2 (Two) Times a Day. 60 capsule 0   • sucralfate (CARAFATE) 1 g tablet Take 1 g by mouth 3 (Three) Times a Day.       No facility-administered medications prior to visit.       No opioid medication identified on active medication list. I have reviewed chart for other potential  high risk medication/s and harmful drug interactions in the elderly.          Aspirin is on active medication list. Aspirin use is indicated based on review of current medical condition/s. Pros and cons of this therapy have been discussed today. Benefits of this medication outweigh potential harm.  Patient has been encouraged to continue taking this medication.  .      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)   • Mixed hyperlipidemia   • Menopausal syndrome   • Dyspnea   • Chest pain   • Morbid obesity (HCC)   • Precordial pain   • 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)   • Implantable cardioverter-defibrillator (ICD) generator end of life   • Multifocal pneumonia   • Acute on chronic systolic CHF (congestive heart failure) (HCC)   • Obesity (BMI 30-39.9)   • Shortness of breath   • Altered mental status   • Morbid obesity (HCC)   • Drowsiness   • Acute on chronic systolic congestive heart failure (HCC)   • Acute on chronic systolic heart failure (CMS/HCC)   • Cholecystitis   • Hypokalemia   • Idiopathic acute pancreatitis without infection or necrosis   • Chronic systolic heart failure (CMS/HCC)     Advance Care Planning  Advance Directive is not on file.  ACP discussion was held with the patient during this visit. Patient does not have an advance directive, information provided.          Objective    Vitals:    10/27/22 0933 10/27/22 1753   BP: 150/90 138/78   Pulse: 80    SpO2: 100%    Weight: 95.3 kg (210 lb)  Comment: pt states was weighted this morning    Height: 162.6 cm (64\")    PainSc:   9    PainLoc: Generalized      Estimated body mass index is 36.05 kg/m² as calculated from the following:    Height as of this encounter: 162.6 cm (64\").    Weight as of this encounter: 95.3 kg (210 lb).    Class 2 Severe Obesity (BMI >=35 and <=39.9). Obesity-related health conditions include the following: hypertension, coronary heart disease, diabetes mellitus and osteoarthritis. Obesity is unchanged. BMI is is above average; BMI management plan is completed. We " discussed portion control and increasing exercise.      Does the patient have evidence of cognitive impairment? No    Physical Exam  Vitals and nursing note reviewed.   Constitutional:       General: She is not in acute distress.     Appearance: She is well-developed.   HENT:      Head: Normocephalic and atraumatic.      Nose: Nose normal.   Eyes:      General:         Right eye: No discharge.         Left eye: No discharge.      Conjunctiva/sclera: Conjunctivae normal.      Pupils: Pupils are equal, round, and reactive to light.   Neck:      Thyroid: No thyromegaly.   Cardiovascular:      Rate and Rhythm: Normal rate and regular rhythm.      Heart sounds: Normal heart sounds.   Pulmonary:      Effort: Pulmonary effort is normal.      Breath sounds: Normal breath sounds.   Lymphadenopathy:      Cervical: No cervical adenopathy.   Skin:     General: Skin is warm and dry.   Neurological:      Mental Status: She is alert and oriented to person, place, and time.       Lab Results   Component Value Date    TRIG 80 09/16/2022    HDL 43 09/16/2022    LDL 77 09/16/2022    VLDL 16 09/16/2022            HEALTH RISK ASSESSMENT    Smoking Status:  Social History     Tobacco Use   Smoking Status Former   Smokeless Tobacco Never     Alcohol Consumption:  Social History     Substance and Sexual Activity   Alcohol Use No     Fall Risk Screen:    STEADI Fall Risk Assessment was completed, and patient is at HIGH risk for falls. Assessment completed on:10/27/2022    Depression Screening:  PHQ-2/PHQ-9 Depression Screening 10/27/2022   Retired PHQ-9 Total Score -   Retired Total Score -   Little Interest or Pleasure in Doing Things 0-->not at all   Feeling Down, Depressed or Hopeless 0-->not at all   PHQ-9: Brief Depression Severity Measure Score 0       Health Habits and Functional and Cognitive Screening:  Functional & Cognitive Status 10/27/2022   Do you have difficulty preparing food and eating? No   Do you have difficulty bathing  yourself, getting dressed or grooming yourself? Yes   Do you have difficulty using the toilet? No   Do you have difficulty moving around from place to place? Yes   Do you have trouble with steps or getting out of a bed or a chair? Yes   Current Diet Unhealthy Diet   Dental Exam Not up to date   Eye Exam Not up to date   Exercise (times per week) 0 times per week   Current Exercises Include No Regular Exercise   Current Exercise Activities Include -   Do you need help using the phone?  No   Are you deaf or do you have serious difficulty hearing?  Yes   Do you need help with transportation? Yes   Do you need help shopping? No   Do you need help preparing meals?  No   Do you need help with housework?  No   Do you need help with laundry? No   Do you need help taking your medications? No   Do you need help managing money? No   Do you ever drive or ride in a car without wearing a seat belt? No   Have you felt unusual stress, anger or loneliness in the last month? No   Who do you live with? Alone   If you need help, do you have trouble finding someone available to you? No   Have you been bothered in the last four weeks by sexual problems? No   Do you have difficulty concentrating, remembering or making decisions? Yes       Age-appropriate Screening Schedule:  Refer to the list below for future screening recommendations based on patient's age, sex and/or medical conditions. Orders for these recommended tests are listed in the plan section. The patient has been provided with a written plan.    Health Maintenance   Topic Date Due   • URINE MICROALBUMIN  10/13/2021   • DIABETIC FOOT EXAM  04/13/2022   • DXA SCAN  10/13/2022   • HEMOGLOBIN A1C  12/01/2022   • LIPID PANEL  09/16/2023   • DIABETIC EYE EXAM  10/20/2023   • MAMMOGRAM  10/25/2023   • TDAP/TD VACCINES (2 - Td or Tdap) 04/04/2027   • INFLUENZA VACCINE  Completed   • ZOSTER VACCINE  Completed              Assessment & Plan   CMS Preventative Services Quick  Reference  Risk Factors Identified During Encounter  Fall Risk-High or Moderate  Immunizations Discussed/Encouraged (specific Immunizations; COVID19  Obesity/Overweight   The above risks/problems have been discussed with the patient.  Follow up actions/plans if indicated are seen below in the Assessment/Plan Section.  Pertinent information has been shared with the patient in the After Visit Summary.    Diagnoses and all orders for this visit:    1. Medicare annual wellness visit, subsequent (Primary)    2. Type 2 diabetes mellitus with complication, without long-term current use of insulin (HCC)      Follow Up:   Return in about 3 months (around 1/27/2023) for Recheck.   Will notify regarding results.     An After Visit Summary and PPPS were made available to the patient.  Information has been scanned into chart. Discussed importance of taking medications as prescribed. Encouraged healthy eating habits with low fat, low salt choices and working towards maintaining a healthy weight. Recommended regular exercise if able as well as care to prevent falls including avoiding anything on the floor that they could slip or trip on such as throw rugs, making sure they have a bathmat to step onto when their feet are wet and having grab bars and railings where needed.

## 2022-11-03 NOTE — TELEPHONE ENCOUNTER
Patient called stating she has been itching and a stinging feeling. Patient said the hydrOXYzine (ATARAX) 10 MG tablet is not helping. She was wanting to let Dr. Plata know. Please advise [phone]    [facility]

## 2022-11-03 NOTE — TELEPHONE ENCOUNTER
Talked with patient, gave Dr. Plata's advise, she is going to get a good lotion to use and see if that helps.

## 2022-11-10 NOTE — ED PROVIDER NOTES
Subjective     Chest Pain  Associated symptoms: cough and shortness of breath    Associated symptoms: no abdominal pain, no dizziness, no dysphagia, no fever, no headache, no nausea and no vomiting      Pt is a 71 yo female with a PMH of CHF with a pacemaker who presents for chest pain. She feels chest pain intermittently, but the pain has become more severe over the past two days. The pain is stinging in character, radiating to the arms occasionally, and is exacerbated by forward flexion. Pt has also experienced difficulty breathing, cough, oliguria, and leg swelling and itching.     Review of Systems   Constitutional: Negative for chills and fever.   HENT: Negative for rhinorrhea, sneezing, sore throat and trouble swallowing.    Eyes: Negative.    Respiratory: Positive for cough and shortness of breath. Negative for apnea.    Cardiovascular: Positive for chest pain and leg swelling.   Gastrointestinal: Negative for abdominal pain, diarrhea, nausea and vomiting.   Endocrine: Negative for polydipsia and polyuria.   Genitourinary: Positive for decreased urine volume (urine is dark in color). Negative for difficulty urinating, dysuria and hematuria.   Musculoskeletal: Negative.    Skin: Negative for pallor.   Allergic/Immunologic: Negative.    Neurological: Negative for dizziness and headaches.   Hematological: Does not bruise/bleed easily.   Psychiatric/Behavioral: Negative.        Past Medical History:   Diagnosis Date   • Chronic systolic heart failure (HCC)    • Diabetes mellitus (HCC)    • Diabetic neuropathy (HCC)    • GERD (gastroesophageal reflux disease)    • Hypercholesterolemia    • Hypertension    • Low back pain    • Morbid obesity (HCC)    • Nonischemic cardiomyopathy (HCC)    • Osteoarthritis    • Sleep apnea    • Vitamin D deficiency        Allergies   Allergen Reactions   • Nsaids Swelling   • Aldactone [Spironolactone] Unknown - Low Severity       Past Surgical History:   Procedure Laterality Date    • CARDIAC CATHETERIZATION N/A 08/23/2018   • CARDIAC CATHETERIZATION N/A 6/3/2022    Procedure: Left Heart Cath;  Surgeon: Wang Felipe MD;  Location: [facility] CATH INVASIVE LOCATION;  Service: Cardiology;  Laterality: N/A;   • CARDIAC ELECTROPHYSIOLOGY PROCEDURE N/A 06/22/2020   • CARDIAC PACEMAKER PLACEMENT     • CATARACT EXTRACTION     • CHOLECYSTECTOMY WITH INTRAOPERATIVE CHOLANGIOGRAM N/A 7/15/2022    Procedure: CHOLECYSTECTOMY LAPAROSCOPIC INTRAOPERATIVE CHOLANGIOGRAM;  Surgeon: Orville Farias MD;  Location: [facility] OR;  Service: General;  Laterality: N/A;   • COLONOSCOPY N/A 06/14/2017   • PACEMAKER IMPLANTATION     • TOTAL ABDOMINAL HYSTERECTOMY WITH SALPINGO OOPHORECTOMY         Family History   Problem Relation Age of Onset   • Diabetes Sister    • Bone cancer Brother        Social History     Socioeconomic History   • Marital status: Single   Tobacco Use   • Smoking status: Former   • Smokeless tobacco: Never   Vaping Use   • Vaping Use: Never used   Substance and Sexual Activity   • Alcohol use: No   • Drug use: No   • Sexual activity: Not Currently           Objective   Physical Exam  Constitutional:       General: She is not in acute distress.  HENT:      Head: Normocephalic and atraumatic.   Cardiovascular:      Rate and Rhythm: Normal rate and regular rhythm.      Heart sounds: Heart sounds not distant. No murmur heard.  Pulmonary:      Effort: Pulmonary effort is normal.      Breath sounds: Examination of the right-upper field reveals wheezing. Examination of the left-upper field reveals wheezing. Examination of the right-middle field reveals wheezing. Examination of the left-middle field reveals wheezing. Examination of the right-lower field reveals wheezing. Examination of the left-lower field reveals wheezing. Wheezing present.   Abdominal:      General: Bowel sounds are normal.      Palpations: Abdomen is soft. There is no fluid wave, hepatomegaly or splenomegaly.   Skin:     General:  Skin is warm and dry.   Neurological:      General: No focal deficit present.      Mental Status: She is alert and oriented to person, place, and time.   Psychiatric:         Mood and Affect: Mood normal.         Behavior: Behavior normal.         ECG 12 Lead      Date/Time: 11/10/2022 7:30 PM  Performed by: Jose R Hill MD  Authorized by: Jose R Hill MD   Interpreted by physician  Rhythm: paced  BPM: 87  ST Segments: ST segments normal                   ED Course      Ordered CBC, CMP, Magnesium, troponin, pro-BNP, and CXR. Give nitroglycerin and aspirin.                                        MDM    Final diagnoses:   Chest pain, unspecified type   Hypomagnesemia   Chronic congestive heart failure, unspecified heart failure type (HCC)       ED Disposition  ED Disposition     ED Disposition   Discharge    Condition   Stable    Comment   --             Joyce Plata MD  [address]  [facility] [address]  [place] [id number] [phone]    In 2 days      Wang Felipe MD  [address] [place] [address] [address]  [place] [id number] [phone]    In 2 days           Medication List      New Prescriptions    Magnesium Oxide 400 (240 Mg) MG tablet  Take 1 tablet by mouth Daily for 21 days.        Changed    ipratropium-albuterol 0.5-2.5 mg/3 ml nebulizer  Commonly known as: DUO-NEB  Take 3 mL by nebulization 4 (Four) Times a Day.  What changed:   · when to take this  · reasons to take this           Where to Get Your Medications      These medications were sent to [facility]/pharmacy #[id number] - [place], [place] - [address] [place] [address] - [phone]  - [phone] [address] [address]    Phone: [phone]   · Magnesium Oxide 400 (240 Mg) MG tablet          Jos eR Hill MD  11/10/22 1933

## 2022-12-01 NOTE — ED PROVIDER NOTES
Subjective   History of Present Illness  Patient presents with 4-month history of itching intermittently.  Patient states his waxes and wanes for her.  Noted no diffuse rash over these areas, possibly small bumps.  This patient is itching her lower extremities today with no skin changes.  The patient without hepatic problems, no problems with her renal function per her account and also seeing a visit in the past 2 weeks, where the patient's labs in this respect looked normal.  Patient was having the itching intermittently at that time as well so does not appear to be associated with that.  Patient has been tried on Atarax in the past without improvement in her itching.  Patient is not taking anything at this time.        Review of Systems   Constitutional: Negative for activity change, appetite change, chills and fever.   HENT: Negative.  Negative for congestion.    Eyes: Negative.  Negative for photophobia and visual disturbance.   Respiratory: Negative.  Negative for cough, chest tightness and shortness of breath.    Cardiovascular: Negative.  Negative for chest pain and palpitations.   Gastrointestinal: Negative.  Negative for abdominal pain, constipation, diarrhea, nausea and vomiting.   Endocrine: Negative.    Genitourinary: Negative.  Negative for decreased urine volume, dysuria, flank pain and hematuria.   Musculoskeletal: Negative.  Negative for arthralgias, back pain, myalgias, neck pain and neck stiffness.   Skin: Negative.  Negative for pallor.   Neurological: Negative.  Negative for dizziness, syncope, weakness, light-headedness, numbness and headaches.   Psychiatric/Behavioral: Negative.  Negative for confusion and suicidal ideas. The patient is not nervous/anxious.    All other systems reviewed and are negative.      Past Medical History:   Diagnosis Date   • Chronic systolic heart failure (HCC)    • Diabetes mellitus (HCC)    • Diabetic neuropathy (HCC)    • GERD (gastroesophageal reflux disease)    •  Hypercholesterolemia    • Hypertension    • Low back pain    • Morbid obesity (HCC)    • Nonischemic cardiomyopathy (HCC)    • Osteoarthritis    • Sleep apnea    • Vitamin D deficiency        Allergies   Allergen Reactions   • Nsaids Swelling   • Aldactone [Spironolactone] Unknown - Low Severity       Past Surgical History:   Procedure Laterality Date   • CARDIAC CATHETERIZATION N/A 08/23/2018   • CARDIAC CATHETERIZATION N/A 6/3/2022    Procedure: Left Heart Cath;  Surgeon: Wang Felipe MD;  Location: [facility] CATH INVASIVE LOCATION;  Service: Cardiology;  Laterality: N/A;   • CARDIAC ELECTROPHYSIOLOGY PROCEDURE N/A 06/22/2020   • CARDIAC PACEMAKER PLACEMENT     • CATARACT EXTRACTION     • CHOLECYSTECTOMY WITH INTRAOPERATIVE CHOLANGIOGRAM N/A 7/15/2022    Procedure: CHOLECYSTECTOMY LAPAROSCOPIC INTRAOPERATIVE CHOLANGIOGRAM;  Surgeon: Orville Farias MD;  Location: [facility] OR;  Service: General;  Laterality: N/A;   • COLONOSCOPY N/A 06/14/2017   • PACEMAKER IMPLANTATION     • TOTAL ABDOMINAL HYSTERECTOMY WITH SALPINGO OOPHORECTOMY         Family History   Problem Relation Age of Onset   • Diabetes Sister    • Bone cancer Brother        Social History     Socioeconomic History   • Marital status: Single   Tobacco Use   • Smoking status: Former   • Smokeless tobacco: Never   Vaping Use   • Vaping Use: Never used   Substance and Sexual Activity   • Alcohol use: No   • Drug use: No   • Sexual activity: Not Currently           Objective   Physical Exam  Vitals and nursing note reviewed.   Constitutional:       General: She is not in acute distress.     Appearance: Normal appearance. She is well-developed. She is not ill-appearing or diaphoretic.   HENT:      Head: Normocephalic and atraumatic.      Nose: Nose normal.   Eyes:      General: No scleral icterus.     Conjunctiva/sclera: Conjunctivae normal.   Neck:      Vascular: No JVD.   Cardiovascular:      Rate and Rhythm: Normal rate and regular rhythm.      Heart  sounds: Normal heart sounds. No murmur heard.    No friction rub. No gallop.   Pulmonary:      Effort: Pulmonary effort is normal. No respiratory distress.      Breath sounds: No wheezing or rales.   Chest:      Chest wall: No tenderness.   Abdominal:      General: There is no distension.      Palpations: Abdomen is soft. There is no mass.      Tenderness: There is no abdominal tenderness. There is no guarding or rebound.   Musculoskeletal:         General: No tenderness or deformity. Normal range of motion.      Cervical back: Normal range of motion and neck supple.   Lymphadenopathy:      Cervical: No cervical adenopathy.   Skin:     General: Skin is warm and dry.      Capillary Refill: Capillary refill takes less than 2 seconds.      Coloration: Skin is not pale.      Findings: No erythema or rash.   Neurological:      General: No focal deficit present.      Mental Status: She is alert and oriented to person, place, and time.      Cranial Nerves: No cranial nerve deficit.      Motor: No abnormal muscle tone.   Psychiatric:         Behavior: Behavior normal.         Thought Content: Thought content normal.         Judgment: Judgment normal.         Procedures           ED Course  ED Course as of 12/01/22 1029   Thu Dec 01, 2022   1001 No skin changes are noted.  No hives are noted.  Patient with itch.  May be secondary to some mild peripheral edema.  No acute findings.   [PC]      ED Course User Index  [PC] Sukhdev Polk MD                                         Labs Reviewed - No data to display    No orders to display           MDM    Final diagnoses:   Pruritus       ED Disposition  ED Disposition     ED Disposition   Discharge    Condition   Stable    Comment   --             Joyce Plata MD  [address]  [address]  [facility] [id number] [phone]    On 12/5/2022  If not improved for further evaluation and care         Medication List      New Prescriptions    methylPREDNISolone 4  MG dose pack  Commonly known as: MEDROL  Take as directed on package instructions.        Changed    ipratropium-albuterol 0.5-2.5 mg/3 ml nebulizer  Commonly known as: DUO-NEB  Take 3 mL by nebulization 4 (Four) Times a Day.  What changed:   · when to take this  · reasons to take this           Where to Get Your Medications      These medications were sent to [facility]/pharmacy #[id number] - [place], [place] - [address] [place] [address] [phone]  - [phone] [address] [address]    Phone: [phone]   · methylPREDNISolone 4 MG dose pack          Sukhdev Polk MD  12/01/22 7263

## 2022-12-01 NOTE — TELEPHONE ENCOUNTER
Patient called and said that the medicine that was called in for itching is not helping. Can you call in something else to [facility]. Phone is [phone].

## 2022-12-01 NOTE — TELEPHONE ENCOUNTER
Patient called stating she wanted to let Dr. Plata know is at the ER now for the itching. Pt# [phone]

## 2022-12-10 NOTE — ED PROVIDER NOTES
Subjective   History of Present Illness  Patient presents emergency department complaint of weakness and rolling out of bed today.  Patient states that she went to bed approximately in the evening and found her self on the ground.  Patient evidently was crawling around because she did not have the strength to get up.  Patient's had similar symptoms in the past.  Patient denies any fevers.  No nausea vomiting.  No trauma has been noted.  Patient has a couple scratches on the legs and some soreness to the left leg though patient has been ambulatory after being helped up.  Patient states she has had some diarrhea over the last 2 days.  She also states she had vomiting 2 days ago but has not had none since then.        Review of Systems   Constitutional: Positive for activity change. Negative for appetite change, chills and fever.   HENT: Negative.  Negative for congestion.    Eyes: Negative.  Negative for photophobia and visual disturbance.   Respiratory: Negative.  Negative for cough, chest tightness and shortness of breath.    Cardiovascular: Negative.  Negative for chest pain and palpitations.   Gastrointestinal: Positive for diarrhea. Negative for abdominal pain, constipation, nausea and vomiting.   Endocrine: Negative.    Genitourinary: Negative.  Negative for decreased urine volume, dysuria, flank pain and hematuria.   Musculoskeletal: Negative.  Negative for arthralgias, back pain, myalgias, neck pain and neck stiffness.   Skin: Negative.  Negative for pallor.   Neurological: Positive for weakness. Negative for dizziness, syncope, light-headedness, numbness and headaches.   Psychiatric/Behavioral: Negative.  Negative for confusion and suicidal ideas. The patient is not nervous/anxious.    All other systems reviewed and are negative.      Past Medical History:   Diagnosis Date   • Chronic systolic heart failure (HCC)    • Diabetes mellitus (HCC)    • Diabetic neuropathy (HCC)    • GERD (gastroesophageal reflux  disease)    • Hypercholesterolemia    • Hypertension    • Low back pain    • Morbid obesity (HCC)    • Nonischemic cardiomyopathy (HCC)    • Osteoarthritis    • Sleep apnea    • Vitamin D deficiency        Allergies   Allergen Reactions   • Nsaids Swelling   • Aldactone [Spironolactone] Unknown - Low Severity       Past Surgical History:   Procedure Laterality Date   • CARDIAC CATHETERIZATION N/A 08/23/2018   • CARDIAC CATHETERIZATION N/A 6/3/2022    Procedure: Left Heart Cath;  Surgeon: Wang Felipe MD;  Location: [facility] CATH INVASIVE LOCATION;  Service: Cardiology;  Laterality: N/A;   • CARDIAC ELECTROPHYSIOLOGY PROCEDURE N/A 06/22/2020   • CARDIAC PACEMAKER PLACEMENT     • CATARACT EXTRACTION     • CHOLECYSTECTOMY WITH INTRAOPERATIVE CHOLANGIOGRAM N/A 7/15/2022    Procedure: CHOLECYSTECTOMY LAPAROSCOPIC INTRAOPERATIVE CHOLANGIOGRAM;  Surgeon: Orville Farias MD;  Location: [facility] OR;  Service: General;  Laterality: N/A;   • COLONOSCOPY N/A 06/14/2017   • PACEMAKER IMPLANTATION     • TOTAL ABDOMINAL HYSTERECTOMY WITH SALPINGO OOPHORECTOMY         Family History   Problem Relation Age of Onset   • Diabetes Sister    • Bone cancer Brother        Social History     Socioeconomic History   • Marital status: Single   Tobacco Use   • Smoking status: Former   • Smokeless tobacco: Never   Vaping Use   • Vaping Use: Never used   Substance and Sexual Activity   • Alcohol use: No   • Drug use: No   • Sexual activity: Not Currently           Objective   Physical Exam  Vitals and nursing note reviewed.   Constitutional:       General: She is not in acute distress.     Appearance: Normal appearance. She is well-developed. She is not ill-appearing or diaphoretic.   HENT:      Head: Normocephalic and atraumatic.      Nose: Nose normal. No congestion or rhinorrhea.      Mouth/Throat:      Mouth: Mucous membranes are moist.   Eyes:      General: No scleral icterus.     Extraocular Movements: Extraocular movements intact.       Conjunctiva/sclera: Conjunctivae normal.   Neck:      Vascular: No JVD.   Cardiovascular:      Rate and Rhythm: Normal rate and regular rhythm.      Heart sounds: Normal heart sounds. No murmur heard.    No friction rub. No gallop.   Pulmonary:      Effort: Pulmonary effort is normal. No respiratory distress.      Breath sounds: No wheezing or rales.   Chest:      Chest wall: No tenderness.   Abdominal:      General: There is no distension.      Palpations: Abdomen is soft. There is no mass.      Tenderness: There is no abdominal tenderness. There is no guarding or rebound.   Musculoskeletal:         General: No tenderness or deformity. Normal range of motion.      Cervical back: Normal range of motion and neck supple. No rigidity or tenderness.      Comments: Patient lower extremities with full range of motion both active and passively.  Trace bilateral edema.   Lymphadenopathy:      Cervical: No cervical adenopathy.   Skin:     General: Skin is warm and dry.      Capillary Refill: Capillary refill takes less than 2 seconds.      Coloration: Skin is not pale.      Findings: No erythema or rash.   Neurological:      General: No focal deficit present.      Mental Status: She is alert and oriented to person, place, and time.      Cranial Nerves: No cranial nerve deficit.      Motor: No abnormal muscle tone.   Psychiatric:         Behavior: Behavior normal.         Thought Content: Thought content normal.         Judgment: Judgment normal.         Procedures           ED Course  ED Course as of 12/10/22 2017   Sat Dec 10, 2022   2016 Patient with out acute findings.  Patient is ambulatory.  Patient be discharged to outpatient follow-up with primary. [PC]      ED Course User Index  [PC] Sukhdev Polk MD                                         Labs Reviewed   COMPREHENSIVE METABOLIC PANEL - Abnormal; Notable for the following components:       Result Value    CO2 18.0 (*)     Albumin 3.40 (*)     AST (SGOT) 36 (*)      Alkaline Phosphatase 127 (*)     Total Bilirubin 1.7 (*)     All other components within normal limits    Narrative:     GFR Normal >60  Chronic Kidney Disease <60  Kidney Failure <15    The GFR formula is only valid for adults with stable renal function between ages 18 and 70.   URINALYSIS W/ MICROSCOPIC IF INDICATED (NO CULTURE) - Abnormal; Notable for the following components:    Bilirubin, UA Small (1+) (*)     Protein, UA >=300 mg/dL (3+) (*)     All other components within normal limits   PROTIME-INR - Abnormal; Notable for the following components:    Protime 17.2 (*)     INR 1.41 (*)     All other components within normal limits    Narrative:     Therapeutic range for most indications is 2.0-3.0 INR,  or 2.5-3.5 for mechanical heart valves.   CBC WITH AUTO DIFFERENTIAL - Abnormal; Notable for the following components:    MCV 78.6 (*)     MCH 26.3 (*)     RDW 20.2 (*)     Monocytes, Absolute 0.99 (*)     All other components within normal limits   URINALYSIS, MICROSCOPIC ONLY - Abnormal; Notable for the following components:    RBC, UA 0-2 (*)     Bacteria, UA 1+ (*)     All other components within normal limits   COVID-19 AND FLU A/B, NP SWAB IN TRANSPORT MEDIA 8-12 HR TAT - Normal    Narrative:     Fact sheet for providers: https://www.fda.gov/media/049076/download    Fact sheet for patients: https://www.fda.gov/media/688312/download    Test performed by PCR.   TROPONIN (IN-HOUSE) - Normal    Narrative:     Troponin T Reference Range:  <= 0.03 ng/mL-   Negative for AMI  >0.03 ng/mL-     Abnormal for myocardial necrosis.  Clinicians would have to utilize clinical acumen, EKG, Troponin and serial changes to determine if it is an Acute Myocardial Infarction or myocardial injury due to an underlying chronic condition.       Results may be falsely decreased if patient taking Biotin.     CK - Normal   POCT GLUCOSE FINGERSTICK   CBC AND DIFFERENTIAL    Narrative:     The following orders were created for panel  order CBC & Differential.  Procedure                               Abnormality         Status                     ---------                               -----------         ------                     CBC Auto Differential[[id number]]        Abnormal            Final result                 Please view results for these tests on the individual orders.   EXTRA TUBES    Narrative:     The following orders were created for panel order Extra Tubes.  Procedure                               Abnormality         Status                     ---------                               -----------         ------                     Gold Top - SST[[id number]]                                   Final result                 Please view results for these tests on the individual orders.   GOLD TOP - SST       XR Chest 1 View   Final Result   Impression:    Stable moderate cardiomegaly.   There is no acute pleural-parenchymal process seen in the imaged   lung fields.      Electronically signed by:  Yann Spann MD  12/10/2022 5:40 PM   [employer] Workstation: [id number]              MDM    Final diagnoses:   Weakness   Fall, initial encounter       ED Disposition  ED Disposition     ED Disposition   Discharge    Condition   Stable    Comment   --             Joyce Plata MD  [address] [place] [address] [address]  [facility] [address] [address] [place], [place] [id number] [phone]    Schedule an appointment as soon as possible for a visit   For further evaluation and management         Medication List      Changed    ipratropium-albuterol 0.5-2.5 mg/3 ml nebulizer  Commonly known as: DUO-NEB  Take 3 mL by nebulization 4 (Four) Times a Day.  What changed:   · when to take this  · reasons to take this             Sukhdev Polk MD  12/10/22 2017

## 2022-12-10 NOTE — ED NOTES
Pt reports she has had many recent falls. She is a poor historian and is unable to report the number of falls or the frequency. She states she fell today around 1000 today and could not get up. She believes she fell asleep in her bed and rolled onto the floor while still asleep. She crawled and scooted around her house until calling an ambulance around 1600. She denies pain.

## 2022-12-12 NOTE — TELEPHONE ENCOUNTER
Patient's sister Yudi called and has some concerns about her sister's health. She fell this weekend. She told her sister she was having some memory issues.She wanted to  Know if she could talk to you. Call her at [phone] after 5:00.

## 2022-12-15 NOTE — PROGRESS NOTES
Subjective   Lexi Wade is a 72 y.o. female.   Chief Complaint   Patient presents with   • Fall   • Leg Swelling     Bilateral      History of Present Illness     Has had multiple falls over last few weeks. Says she uses her walker. Couldn't get up off the floor. Had to call ambulance. Family is with her and is concerned as she does not do anything except sit in a chair all day. Does not do any exercise. Has been in hospital several times in the last year and never followed through on .phyrecommendations.     The following portions of the patient's history were reviewed and updated as appropriate: allergies, current medications, past social history and problem list.    Outpatient Medications Prior to Visit   Medication Sig Dispense Refill   • acetaminophen (TYLENOL) 325 MG tablet Take 2 tablets by mouth Every 4 (Four) Hours As Needed for Mild Pain .     • albuterol sulfate  (90 Base) MCG/ACT inhaler INHALE 2 PUFFS EVERY 4 (FOUR) HOURS AS NEEDED FOR WHEEZING OR SHORTNESS OF AIR. 18 g 1   • aspirin 81 MG EC tablet Take 1 tablet by mouth Every Night. 90 tablet 3   • atorvastatin (LIPITOR) 40 MG tablet TAKE 1 TABLET BY MOUTH EVERY DAY 90 tablet 3   • B-D UF III MINI PEN NEEDLES 31G X 5 MM misc USE WITH INSULIN INJECTIONS NIGHTLY 100 each 3   • Blood Glucose Monitoring Suppl (FreeStyle Lite) device 1 Device 3 (Three) Times a Day. 1 each 0   • carvedilol (COREG) 25 MG tablet Take 1 tablet by mouth 2 (Two) Times a Day With Meals. 180 tablet 3   • furosemide (LASIX) 40 MG tablet TAKE 1 TABLET BY MOUTH TWICE A DAY 60 tablet 1   • glucose blood (FREESTYLE LITE) test strip 1 each by Other route 3 (Three) Times a Day. Use as instructed 100 each 1   • hydrOXYzine (ATARAX) 10 MG tablet Take 1 tablet by mouth Every 8 (Eight) Hours As Needed for Itching. 30 tablet 0   • insulin detemir (Levemir FlexTouch) 100 UNIT/ML injection Inject 20 Units under the skin into the appropriate area as directed Every Night.     •  "Insulin Syringe 31G X 5/16\" 0.5 ML misc Inject 1 each under the skin into the appropriate area as directed Every Night. 100 each 1   • ipratropium-albuterol (DUO-NEB) 0.5-2.5 mg/3 ml nebulizer Take 3 mL by nebulization 4 (Four) Times a Day. (Patient taking differently: Take 3 mL by nebulization 4 (Four) Times a Day As Needed for Shortness of Air or Wheezing.) 360 mL 1   • isosorbide mononitrate (IMDUR) 30 MG 24 hr tablet TAKE 1 TABLET BY MOUTH EVERY DAY 90 tablet 3   • Lancets (freestyle) lancets 1 each by Other route 3 (Three) Times a Day. Use as instructed 100 each 1   • lisinopril (PRINIVIL,ZESTRIL) 10 MG tablet TAKE 1 TABLET BY MOUTH EVERY DAY 90 tablet 3   • methylPREDNISolone (MEDROL) 4 MG dose pack Take as directed on package instructions. 21 tablet 0   • nitroglycerin (NITROSTAT) 0.4 MG SL tablet PLEASE SEE ATTACHED FOR DETAILED DIRECTIONS 25 tablet 0   • pantoprazole (Protonix) 40 MG EC tablet Take 1 tablet by mouth Daily. 90 tablet 1   • potassium chloride (MICRO-K) 10 MEQ CR capsule Take 2 capsules by mouth 2 (Two) Times a Day. 60 capsule 0   • sucralfate (CARAFATE) 1 g tablet Take 1 g by mouth 3 (Three) Times a Day.       No facility-administered medications prior to visit.       Review of Systems  I have reviewed 12 systems with patient. Findings were negative except what is noted below and/or in history of present illness.     Objective   Visit Vitals  /82   Pulse 90   Resp 20   Ht 160 cm (63\")   Wt 101 kg (222 lb)   SpO2 100%   BMI 39.33 kg/m²     Physical Exam  Vitals and nursing note reviewed.   Constitutional:       General: She is not in acute distress.     Appearance: She is well-developed.   HENT:      Head: Normocephalic and atraumatic.      Nose: Nose normal.   Eyes:      General:         Right eye: No discharge.         Left eye: No discharge.      Conjunctiva/sclera: Conjunctivae normal.      Pupils: Pupils are equal, round, and reactive to light.   Neck:      Thyroid: No thyromegaly. "   Cardiovascular:      Rate and Rhythm: Normal rate and regular rhythm.      Heart sounds: Normal heart sounds.   Pulmonary:      Effort: Pulmonary effort is normal.      Breath sounds: Normal breath sounds.   Musculoskeletal:      Right lower le+ Edema present.      Left lower le+ Edema present.   Lymphadenopathy:      Cervical: No cervical adenopathy.   Skin:     General: Skin is warm and dry.   Neurological:      Mental Status: She is alert and oriented to person, place, and time.         Notes brought forward are reviewed and updated if indicated.     Assessment & Plan   Problems Addressed this Visit    None  Visit Diagnoses     Debility    -  Primary    Relevant Orders    Ambulatory Referral to Physical Therapy    At high risk for injury related to fall        Relevant Orders    Ambulatory Referral to Physical Therapy      Diagnoses       Codes Comments    Debility    -  Primary ICD-10-CM: R53.81  ICD-9-CM: 799.3     At high risk for injury related to fall     ICD-10-CM: Z91.81  ICD-9-CM: V49.89          Referral for physical therapy. Encouraged to use her walker at home. Recommended life alert in case of falls at home.     No orders of the defined types were placed in this encounter.    Return if symptoms worsen or fail to improve, for Next scheduled follow up.        This document has been electronically signed by Joyce Plata MD on December 15, 2022 15:46 CST

## 2022-12-31 PROBLEM — N30.01 ACUTE CYSTITIS WITH HEMATURIA: Status: ACTIVE | Noted: 2022-01-01

## 2022-12-31 NOTE — SIGNIFICANT NOTE
12/31/22 1703   OTHER   Discipline physical therapist   Rehab Time/Intention   Session Not Performed other (see comments)  (Pt hypotensive this pm with most recent recorded 72/56. PT will follow-up 1/1/2023)   Recommendation   PT - Next Appointment 01/01/23

## 2022-12-31 NOTE — CONSULTS
"Adult Nutrition  Assessment/PES    Patient Name:  Lexi Wade  YOB: 1950  MRN: [id number]  Admit Date:  12/30/2022    Assessment Date:  12/31/2022    Comments:  Pt admitted with acute cystitis with hematuria.  Hx of CHF, DM, Gerd, HTN, CAD, HLD.  RDN staff seeing for MST.  Pt reports not eating well before admit.  Reports appetite as, \"okay, I guess\".  Stated chewing/swallowing difficulty as \"not too much\" and denied altered textures.  Denies food allergies and change in BMs.  Reports emesis for 2-3 days.  Wt is up and down and does not know UBW.  Pt agreed to drink 2% milk when she feels well enough to.  RDN staff will follow hospital course.       Reason for Assessment     Row Name 12/31/22 1016          Reason for Assessment    Reason For Assessment identified at risk by screening criteria     Identified At Risk by Screening Criteria MST SCORE 2+                Nutrition/Diet History     Row Name 12/31/22 1017          Nutrition/Diet History    Typical Intake (Food/Fluid/EN/PN) pt reports not eating well before admit.  Appetite is \"okay, I guess\"     Factors Affecting Nutritional Intake appetite                Labs/Tests/Procedures/Meds     Row Name 12/31/22 1017          Labs/Procedures/Meds    Lab Results Reviewed reviewed, pertinent     Lab Results Comments Na 135, Cr 1.08, Mg 1.4, Alb 3.4, total bilirubin 2.2        Diagnostic Tests/Procedures    Diagnostic Test/Procedure Reviewed reviewed, pertinent        Medications    Pertinent Medications Reviewed reviewed, pertinent     Pertinent Medications Comments lipitor, rocephin, ssi, levemir, protonix                  Estimated/Assessed Needs - Anthropometrics     Row Name 12/31/22 1018 12/31/22 0245       Anthropometrics    Height -- 162.6 cm (64\")    Weight for Calculation 54.5 kg (120 lb 2.4 oz)  IBW --       Estimated/Assessed Needs    Additional Documentation KCAL/KG (Group);Protein Requirements (Group);Fluid Requirements (Group) -- "       KCAL/KG    KCAL/KG 25 Kcal/Kg (kcal);30 Kcal/Kg (kcal);35 Kcal/Kg (kcal) --    25 Kcal/Kg (kcal) 1362.5 --    30 Kcal/Kg (kcal) 1635 --    35 Kcal/Kg (kcal) 1907.5 --       Protein Requirements    Weight Used For Protein Calculations 54.5 kg (120 lb 2.4 oz) --    Est Protein Requirement Amount (gms/kg) 1.2 gm protein --    Estimated Protein Requirements (gms/day) 65.4 --       Fluid Requirements    Fluid Requirements (mL/day) 1800 --    RDA Method (mL) 1800 --               Nutrition Prescription Ordered     Row Name 12/31/22 1018          Nutrition Prescription PO    Current PO Diet Regular     Common Modifiers Cardiac;Consistent Carbohydrate                     Problem/Interventions:   Problem 1     Row Name 12/31/22 1018          Nutrition Diagnoses Problem 1    Problem 1 Predicted Suboptimal Intake     Etiology (related to) Factors Affecting Nutrition     Appetite Fair     Signs/Symptoms (evidenced by) Report of Mnimal PO Intake                      Intervention Goal     Row Name 12/31/22 1019          Intervention Goal    General Maintain nutrition;Meet nutritional needs for age/condition     PO Increase intake;Meet estimated needs     Weight No significant weight loss                Nutrition Intervention     Row Name 12/31/22 1019          Nutrition Intervention    RD/Tech Action Follow Tx progress;Care plan reviewd;Encourage intake;Recommend/ordered     Recommended/Ordered Supplement                Nutrition Prescription     Row Name 12/31/22 1019          Nutrition Prescription PO    PO Prescription Begin/change supplement     Supplement Milk     Supplement Frequency 3 times a day                Education/Evaluation     Row Name 12/31/22 1019          Education    Education Education topics;Advised regarding habits/behavior     Education Topics Basic nutrition     Advised Regarding Habits/Behavior Use supplement        Monitor/Evaluation    Monitor Per protocol;PO intake;Supplement intake;Pertinent  labs;Weight;Skin status;Symptoms     Education Follow-up Reinforce PRN                 Electronically signed by:  Myrtle Browning  12/31/22 10:19 CST

## 2022-12-31 NOTE — DISCHARGE INSTRUCTIONS
Increase your fluids.  Take your antibiotics as prescribed.  Take Zofran as needed for nausea.  Call your primary care on Tuesday for immediate follow-up.  Return to ED as needed.

## 2022-12-31 NOTE — ED PROVIDER NOTES
Subjective   History of Present Illness  Patient presents with vomiting and abdominal pain for the last 2 days.  Patient endorses chills, headache, fever, dysuria, diarrhea and cough.  She has been able to keep some of her fluids down but any food that she eats comes back up.  Patient is unable to describe the type of pain she is having in her abdomen but is able to state it is in the bilateral lower quadrants.        Review of Systems   Constitutional: Positive for chills and fever. Negative for activity change and appetite change.   HENT: Negative for congestion and ear pain.    Eyes: Negative for pain and discharge.   Respiratory: Positive for cough. Negative for chest tightness and shortness of breath.    Cardiovascular: Negative for chest pain and palpitations.   Gastrointestinal: Positive for abdominal pain, diarrhea, nausea and vomiting. Negative for abdominal distention.   Endocrine: Negative for cold intolerance and heat intolerance.   Genitourinary: Positive for dysuria. Negative for difficulty urinating.   Musculoskeletal: Negative for arthralgias and back pain.   Skin: Negative for color change and rash.   Allergic/Immunologic: Negative for environmental allergies and food allergies.   Neurological: Negative for dizziness and headaches.   Hematological: Negative for adenopathy. Does not bruise/bleed easily.   Psychiatric/Behavioral: Negative for agitation and confusion.       Past Medical History:   Diagnosis Date   • Chronic systolic heart failure (HCC)    • Diabetes mellitus (HCC)    • Diabetic neuropathy (HCC)    • GERD (gastroesophageal reflux disease)    • Hypercholesterolemia    • Hypertension    • Low back pain    • Morbid obesity (HCC)    • Nonischemic cardiomyopathy (HCC)    • Osteoarthritis    • Sleep apnea    • Vitamin D deficiency        Allergies   Allergen Reactions   • Nsaids Swelling   • Aldactone [Spironolactone] Unknown - Low Severity       Past Surgical History:   Procedure Laterality  Date   • CARDIAC CATHETERIZATION N/A 08/23/2018   • CARDIAC CATHETERIZATION N/A 6/3/2022    Procedure: Left Heart Cath;  Surgeon: Wang Felipe MD;  Location: [facility] CATH INVASIVE LOCATION;  Service: Cardiology;  Laterality: N/A;   • CARDIAC ELECTROPHYSIOLOGY PROCEDURE N/A 06/22/2020   • CARDIAC PACEMAKER PLACEMENT     • CATARACT EXTRACTION     • CHOLECYSTECTOMY WITH INTRAOPERATIVE CHOLANGIOGRAM N/A 7/15/2022    Procedure: CHOLECYSTECTOMY LAPAROSCOPIC INTRAOPERATIVE CHOLANGIOGRAM;  Surgeon: Orville Farias MD;  Location: [facility] OR;  Service: General;  Laterality: N/A;   • COLONOSCOPY N/A 06/14/2017   • PACEMAKER IMPLANTATION     • TOTAL ABDOMINAL HYSTERECTOMY WITH SALPINGO OOPHORECTOMY         Family History   Problem Relation Age of Onset   • Diabetes Sister    • Bone cancer Brother        Social History     Socioeconomic History   • Marital status: Single   Tobacco Use   • Smoking status: Former   • Smokeless tobacco: Never   Vaping Use   • Vaping Use: Never used   Substance and Sexual Activity   • Alcohol use: No   • Drug use: No   • Sexual activity: Not Currently           Objective   Physical Exam  Vitals and nursing note reviewed.   Constitutional:       Appearance: She is well-developed.   HENT:      Head: Normocephalic and atraumatic.   Eyes:      Pupils: Pupils are equal, round, and reactive to light.   Neck:      Thyroid: No thyromegaly.      Vascular: No JVD.      Trachea: No tracheal deviation.   Cardiovascular:      Rate and Rhythm: Normal rate.      Pulses:           Radial pulses are 2+ on the right side and 2+ on the left side.        Dorsalis pedis pulses are 2+ on the right side and 2+ on the left side.      Heart sounds: Normal heart sounds, S1 normal and S2 normal.   Pulmonary:      Effort: Pulmonary effort is normal.      Breath sounds: Decreased breath sounds (throughout) present.   Abdominal:      General: Bowel sounds are normal.      Tenderness: There is abdominal tenderness (bilat  lower quad).   Musculoskeletal:         General: Normal range of motion.      Right lower leg: Edema (1+) present.      Left lower leg: Edema (1+) present.   Skin:     General: Skin is warm and dry.      Capillary Refill: Capillary refill takes 2 to 3 seconds.   Neurological:      Mental Status: She is alert and oriented to person, place, and time.      GCS: GCS eye subscore is 4. GCS verbal subscore is 5. GCS motor subscore is 6.   Psychiatric:         Speech: Speech normal.         Behavior: Behavior normal.         Thought Content: Thought content normal.         ECG 12 Lead      Date/Time: 12/31/2022 12:14 AM  Performed by: Jaime Alonso MD  Authorized by: Jaime Alonso MD   Interpreted by physician  Comparison: compared with previous ECG from 12/10/2022  Similar to previous ECG  Rhythm: paced  Comments: 108 bpm,  ms,  ms.                 ED Course  ED Course as of 12/31/22 0015   Fri Dec 30, 2022   2248 Discussed CT findings as well as UTI findings and negative white count with Dr. Hartman, surgery on-call, who agrees patient can be handled as outpatient. [MEGHANA]      ED Course User Index  [MEGHANA] Jaime Alonso MD           Vitals:    12/30/22 2101 12/30/22 2200 12/30/22 2247 12/31/22 0001   BP: 131/91 133/93 101/75 116/78   BP Location: Left arm Left arm Left arm    Patient Position: Lying Lying Lying    Pulse: 102 103 102 102   Resp: 18 18 18 18   Temp:       TempSrc:       SpO2: 97% 98% 97% 97%   Weight:       Height:         Lab Results (last 24 hours)     Procedure Component Value Units Date/Time    Urine Culture - Urine, Straight Cath [[id number]] Collected: 12/30/22 2105    Specimen: Urine from Straight Cath Updated: 12/30/22 2329    Troponin [[id number]]  (Normal) Collected: 12/30/22 2246    Specimen: Blood Updated: 12/30/22 2319     Troponin T <0.010 ng/mL      Comment: Specimen hemolyzed.  Results may be affected.       Narrative:      Troponin T Reference Range:  <= 0.03  ng/mL-   Negative for AMI  >0.03 ng/mL-     Abnormal for myocardial necrosis.  Clinicians would have to utilize clinical acumen, EKG, Troponin and serial changes to determine if it is an Acute Myocardial Infarction or myocardial injury due to an underlying chronic condition.       Results may be falsely decreased if patient taking Biotin.      Urinalysis, Microscopic Only - Urine, Catheter [[id number]]  (Abnormal) Collected: 12/30/22 2105    Specimen: Urine, Catheter Updated: 12/30/22 2144     RBC, UA 13-20 /HPF      WBC, UA Too Numerous to Count /HPF      Bacteria, UA 4+ /HPF      Squamous Epithelial Cells, UA 3-5 /HPF      Renal Epithelial Cells, UA 3-6 /HPF      Hyaline Casts, UA 3-6 /LPF      Methodology Manual Light Microscopy    Urinalysis With Microscopic If Indicated (No Culture) - Urine, Catheter [[id number]]  (Abnormal) Collected: 12/30/22 2105    Specimen: Urine, Catheter Updated: 12/30/22 2115     Color, UA Lucie     Appearance, UA Cloudy     pH, UA 5.5     Specific Gravity, UA 1.031     Comment: Result obtained by Refractometer        Glucose, UA Negative     Ketones, UA Trace     Bilirubin, UA Large (3+)     Blood, UA Moderate (2+)     Protein, UA >=300 mg/dL (3+)     Leuk Esterase, UA Small (1+)     Nitrite, UA Positive     Urobilinogen, UA 1.0 E.U./dL    [place] Draw [[id number]] Collected: 12/30/22 2003    Specimen: Blood Updated: 12/30/22 2115    Narrative:      The following orders were created for panel order [place] Draw.  Procedure                               Abnormality         Status                     ---------                               -----------         ------                     Green Top (Gel)[[id number]]                                  Final result               Lavender Top[[id number]]                                     Final result               Gold Top - SST[[id number]]                                   Final result               Light Blue Top[[id number]]                                    Final result                 Please view results for these tests on the individual orders.    Green Top (Gel) [[id number]] Collected: 12/30/22 2003    Specimen: Blood Updated: 12/30/22 2115     Extra Tube Hold for add-ons.     Comment: Auto resulted.       Gold Top - SST [[id number]] Collected: 12/30/22 2003    Specimen: Blood Updated: 12/30/22 2115     Extra Tube Hold for add-ons.     Comment: Auto resulted.       Lavender Top [[id number]] Collected: 12/30/22 2003    Specimen: Blood Updated: 12/30/22 2115     Extra Tube hold for add-on     Comment: Auto resulted       Light Blue Top [[id number]] Collected: 12/30/22 2003    Specimen: Blood Updated: 12/30/22 2115     Extra Tube Hold for add-ons.     Comment: Auto resulted       COVID-19 and FLU A/B PCR - Swab, Nasopharynx [[id number]]  (Normal) Collected: 12/30/22 2019    Specimen: Swab from Nasopharynx Updated: 12/30/22 2046     COVID19 Not Detected     Influenza A PCR Not Detected     Influenza B PCR Not Detected    Narrative:      Fact sheet for providers: https://www.fda.gov/media/529884/download    Fact sheet for patients: https://www.fda.gov/media/334819/download    Test performed by PCR.    Comprehensive Metabolic Panel [[id number]]  (Abnormal) Collected: 12/30/22 2003    Specimen: Blood Updated: 12/30/22 2029     Glucose 122 mg/dL      BUN 16 mg/dL      Creatinine 1.19 mg/dL      Sodium 134 mmol/L      Potassium 3.5 mmol/L      Chloride 100 mmol/L      CO2 15.0 mmol/L      Calcium 9.2 mg/dL      Total Protein 6.3 g/dL      Albumin 3.7 g/dL      ALT (SGPT) 18 U/L      AST (SGOT) 36 U/L      Alkaline Phosphatase 119 U/L      Total Bilirubin 2.5 mg/dL      Globulin 2.6 gm/dL      A/G Ratio 1.4 g/dL      BUN/Creatinine Ratio 13.4     Anion Gap 19.0 mmol/L      eGFR 48.1 mL/min/1.73      Comment: National Kidney Foundation and American Society of Nephrology (ASN) Task Force recommended calculation based on the Chronic Kidney Disease Epidemiology Collaboration  (CKD-EPI) equation refit without adjustment for race.       Narrative:      GFR Normal >60  Chronic Kidney Disease <60  Kidney Failure <15    The GFR formula is only valid for adults with stable renal function between ages 18 and 70.    Lipase [[id number]]  (Normal) Collected: 12/30/22 2003    Specimen: Blood Updated: 12/30/22 2029     Lipase 25 U/L     Troponin [[id number]]  (Normal) Collected: 12/30/22 2003    Specimen: Blood Updated: 12/30/22 2029     Troponin T <0.010 ng/mL     Narrative:      Troponin T Reference Range:  <= 0.03 ng/mL-   Negative for AMI  >0.03 ng/mL-     Abnormal for myocardial necrosis.  Clinicians would have to utilize clinical acumen, EKG, Troponin and serial changes to determine if it is an Acute Myocardial Infarction or myocardial injury due to an underlying chronic condition.       Results may be falsely decreased if patient taking Biotin.      CBC & Differential [[id number]]  (Abnormal) Collected: 12/30/22 2003    Specimen: Blood Updated: 12/30/22 2018    Narrative:      The following orders were created for panel order CBC & Differential.  Procedure                               Abnormality         Status                     ---------                               -----------         ------                     CBC Auto Differential[[id number]]        Abnormal            Final result                 Please view results for these tests on the individual orders.    CBC Auto Differential [[id number]]  (Abnormal) Collected: 12/30/22 2003    Specimen: Blood Updated: 12/30/22 2018     WBC 6.49 10*3/mm3      RBC 5.42 10*6/mm3      Hemoglobin 14.5 g/dL      Hematocrit 44.2 %      MCV 81.5 fL      MCH 26.8 pg      MCHC 32.8 g/dL      RDW 21.5 %      RDW-SD 57.8 fl      MPV 9.4 fL      Platelets 245 10*3/mm3      Neutrophil % 49.5 %      Lymphocyte % 36.8 %      Monocyte % 12.9 %      Eosinophil % 0.3 %      Basophil % 0.3 %      Immature Grans % 0.2 %      Neutrophils, Absolute 3.21 10*3/mm3       Lymphocytes, Absolute 2.39 10*3/mm3      Monocytes, Absolute 0.84 10*3/mm3      Eosinophils, Absolute 0.02 10*3/mm3      Basophils, Absolute 0.02 10*3/mm3      Immature Grans, Absolute 0.01 10*3/mm3      nRBC 0.0 /100 WBC         CT Abdomen Pelvis With Contrast    Result Date: 12/30/2022  Mild to moderate strandy changes adjacent to duodenum, may reflect duodenitis and/or peptic ulcer disease. Interval development of mild/mild to moderate regions of free fluid in abdomen and pelvis, portions of which measures 20-30 Hounsfield units, consistent with complex free fluid which may reflect proteinaceous, enteric, or hemorrhagic contents. Moderately thick-walled urinary bladder is suspicious for cystitis and/or urinary tract infection. Small left pleural effusion. Moderate right pleural effusion. Partially visualized groundglass opacities in lung bases may reflect atelectasis, edema, or infiltrate. Diffuse anasarca, increased from prior. Electronically signed by:  Charli Cuba MD  12/30/2022 9:20 PM CST Workstation: [employer]    XR Chest 1 View    Result Date: 12/30/2022  Retrocardiac opacification may reflect infiltrate or atelectasis. Large cardiac silhouette. Electronically signed by:  Charli Cuba MD  12/30/2022 9:10 PM CST Workstation: [employer]                                    Medical Decision Making  Patient with acute cystitis with hematuria.  Antibiotics initiated in department.  IV fluids given with Zofran x2 tolerating oral intake at time of discharge.  Findings of free fluid in pelvis on CT scan discussed with surgeon who feels patient can safely follow-up given her WBCs and H&H within normal limits.  All other labs unremarkable.    Acute cystitis with hematuria: acute illness or injury  Lower abdominal pain: acute illness or injury  Amount and/or Complexity of Data Reviewed  Labs: ordered.  Radiology: ordered.  ECG/medicine tests: ordered and independent interpretation performed.      Risk  Prescription  drug management.          Final diagnoses:   Acute cystitis with hematuria   Lower abdominal pain       ED Disposition  ED Disposition     ED Disposition   Discharge    Condition   Stable    Comment   --             Joyce Plata MD  [facility] [address]  [place] [address]  [phone]    Call in 1 day  for follow up         Medication List      New Prescriptions    cefdinir 300 MG capsule  Commonly known as: OMNICEF  Take 1 capsule by mouth 2 (Two) Times a Day for 5 days.     ondansetron ODT 4 MG disintegrating tablet  Commonly known as: ZOFRAN-ODT  Place 1 tablet on the tongue Every 6 (Six) Hours As Needed for Nausea or Vomiting.        Changed    ipratropium-albuterol 0.5-2.5 mg/3 ml nebulizer  Commonly known as: DUO-NEB  Take 3 mL by nebulization 4 (Four) Times a Day.  What changed:   · when to take this  · reasons to take this           Where to Get Your Medications      These medications were sent to [facility]/pharmacy #[id number] - [place], [place] - [address] [place] [address] [phone] [place] [phone] [place] [address]    Phone: [phone]   · cefdinir 300 MG capsule  · ondansetron ODT 4 MG disintegrating tablet       This document has been electronically signed by Jaime Alonso MD on December 31, 2022 00:16 CST    Jaime Alonso MD   Part of this note may be an electronic transcription/translation of spoken language to printed text using the Dragon Dictation System.        Jaime Alonso MD  12/31/22 0016

## 2022-12-31 NOTE — ED NOTES
Nursing report ED to floor  Lexi Wade  72 y.o.  female    HPI:   Chief Complaint   Patient presents with    Vomiting    Dizziness       Admitting doctor:   Howie Guerrier MD    Consulting provider(s):  Consults       No orders found for last 30 day(s).             Admitting diagnosis:   The primary encounter diagnosis was Acute cystitis with hematuria. A diagnosis of Lower abdominal pain was also pertinent to this visit.    Code status:   Current Code Status       Date Active Code Status Order ID Comments User Context       Prior            Allergies:   Nsaids and Aldactone [spironolactone]    Intake and Output    Intake/Output Summary (Last 24 hours) at 12/31/2022 0109  Last data filed at 12/30/2022 2311  Gross per 24 hour   Intake 500 ml   Output --   Net 500 ml       Weight:       12/30/22  1802   Weight: 101 kg (222 lb)       Most recent vitals:   Vitals:    12/30/22 2101 12/30/22 2200 12/30/22 2247 12/31/22 0001   BP: 131/91 133/93 101/75 116/78   BP Location: Left arm Left arm Left arm    Patient Position: Lying Lying Lying    Pulse: 102 103 102 102   Resp: 18 18 18 18   Temp:       TempSrc:       SpO2: 97% 98% 97% 97%   Weight:       Height:         Oxygen Therapy: ra    Active LDAs/IV Access:   Lines, Drains & Airways       Active LDAs       Name Placement date Placement time Site Days    Peripheral IV 12/30/22 2002 Right Antecubital 12/30/22 2002  Antecubital  less than 1                    Labs (abnormal labs have a star):   Labs Reviewed   COMPREHENSIVE METABOLIC PANEL - Abnormal; Notable for the following components:       Result Value    Glucose 122 (*)     Creatinine 1.19 (*)     Sodium 134 (*)     CO2 15.0 (*)     AST (SGOT) 36 (*)     Alkaline Phosphatase 119 (*)     Total Bilirubin 2.5 (*)     Anion Gap 19.0 (*)     eGFR 48.1 (*)     All other components within normal limits    Narrative:     GFR Normal >60  Chronic Kidney Disease <60  Kidney Failure <15    The GFR formula is only valid  for adults with stable renal function between ages 18 and 70.   URINALYSIS W/ MICROSCOPIC IF INDICATED (NO CULTURE) - Abnormal; Notable for the following components:    Appearance, UA Cloudy (*)     Specific Gravity, UA 1.031 (*)     Ketones, UA Trace (*)     Bilirubin, UA Large (3+) (*)     Blood, UA Moderate (2+) (*)     Protein, UA >=300 mg/dL (3+) (*)     Leuk Esterase, UA Small (1+) (*)     Nitrite, UA Positive (*)     All other components within normal limits   CBC WITH AUTO DIFFERENTIAL - Abnormal; Notable for the following components:    RBC 5.42 (*)     RDW 21.5 (*)     RDW-SD 57.8 (*)     Monocyte % 12.9 (*)     All other components within normal limits   URINALYSIS, MICROSCOPIC ONLY - Abnormal; Notable for the following components:    RBC, UA 13-20 (*)     WBC, UA Too Numerous to Count (*)     Bacteria, UA 4+ (*)     Squamous Epithelial Cells, UA 3-5 (*)     Renal Epithelial Cells, UA 3-6 (*)     All other components within normal limits   COVID-19 AND FLU A/B, NP SWAB IN TRANSPORT MEDIA 8-12 HR TAT - Normal    Narrative:     Fact sheet for providers: https://www.fda.gov/media/251927/download    Fact sheet for patients: https://www.fda.gov/media/296881/download    Test performed by PCR.   LIPASE - Normal   TROPONIN (IN-HOUSE) - Normal    Narrative:     Troponin T Reference Range:  <= 0.03 ng/mL-   Negative for AMI  >0.03 ng/mL-     Abnormal for myocardial necrosis.  Clinicians would have to utilize clinical acumen, EKG, Troponin and serial changes to determine if it is an Acute Myocardial Infarction or myocardial injury due to an underlying chronic condition.       Results may be falsely decreased if patient taking Biotin.     TROPONIN (IN-HOUSE) - Normal    Narrative:     Troponin T Reference Range:  <= 0.03 ng/mL-   Negative for AMI  >0.03 ng/mL-     Abnormal for myocardial necrosis.  Clinicians would have to utilize clinical acumen, EKG, Troponin and serial changes to determine if it is an Acute  Myocardial Infarction or myocardial injury due to an underlying chronic condition.       Results may be falsely decreased if patient taking Biotin.     URINE CULTURE   RAINBOW DRAW    Narrative:     The following orders were created for panel order [place] Draw.  Procedure                               Abnormality         Status                     ---------                               -----------         ------                     Green Top (Gel)[[id number]]                                  Final result               Lavender Top[[id number]]                                     Final result               Gold Top - SST[[id number]]                                   Final result               Light Blue Top[[id number]]                                   Final result                 Please view results for these tests on the individual orders.   GREEN TOP   LAVENDER TOP   GOLD TOP - SST   LIGHT BLUE TOP   CBC AND DIFFERENTIAL    Narrative:     The following orders were created for panel order CBC & Differential.  Procedure                               Abnormality         Status                     ---------                               -----------         ------                     CBC Auto Differential[[id number]]        Abnormal            Final result                 Please view results for these tests on the individual orders.       Meds given in ED:   Medications   sodium chloride 0.9 % flush 10 mL (has no administration in time range)   lactated ringers bolus 500 mL (0 mL Intravenous Stopped 12/30/22 2311)   ondansetron (ZOFRAN) injection 4 mg (4 mg Intravenous Given 12/30/22 2020)   iopamidol (ISOVUE-300) 61 % injection 100 mL (90 mL Intravenous Given 12/30/22 2059)   cefTRIAXone (ROCEPHIN) 1 g/100 mL 0.9% NS (MBP) (0 g Intravenous Stopped 12/31/22 0009)   ondansetron (ZOFRAN) injection 4 mg (4 mg Intravenous Given 12/31/22 0038)           NIH Stroke Scale:       Isolation/Infection(s):  No active isolations    COVID (rule out)     COVID Testing  Collected 12/30/22  Resulted negative    Nursing report ED to floor:  Mental status: alert and oriented  Ambulatory status: assist  Precautions: fall    ED nurse phone [phone]- [phone]

## 2022-12-31 NOTE — PROGRESS NOTES
[facility] Medicine Services  INPATIENT PROGRESS NOTE    Length of Stay: 0  Date of Admission: 12/30/2022  Primary Care Physician: Joyce Plata MD    Subjective   Chief Complaint: Vomiting and abdominal pain    HPI: This is a 72-year-old female with past medical history of DM 2, GERD, hypertension, hyperlipidemia and nonischemic cardiomyopathy with left ventricular systolic dysfunction (EF 21-25%) that presented to [facility] on 12/30/2022 with complaints of nausea, vomiting, diarrhea and abdominal pain.     CT of the abdomen and pelvis shows mild to moderate strandy changes adjacent to the duodenum reflecting duodenitis and/or peptic ulcer disease.  The urinary bladder shows moderately thick walled suspicious for cystitis or UTI.  Diffuse anasarca noted.  A moderate right pleural effusion and small left pleural effusion with groundglass opacities in the lung bases.    Review of Systems   Constitutional: Negative for activity change and fatigue.   HENT: Negative for ear pain and sore throat.    Eyes: Negative for pain and discharge.   Respiratory: Negative for cough and shortness of breath.    Cardiovascular: Negative for chest pain and palpitations.   Gastrointestinal: Positive for abdominal pain, diarrhea, nausea and vomiting.   Endocrine: Negative for cold intolerance and heat intolerance.   Genitourinary: Negative for difficulty urinating and dysuria.   Musculoskeletal: Negative for arthralgias and gait problem.   Skin: Negative for color change and rash.   Neurological: Negative for dizziness and weakness.   Psychiatric/Behavioral: Negative for agitation and confusion.        Objective    Temp:  [97.5 °F (36.4 °C)] 97.5 °F (36.4 °C)  Heart Rate:  [100-112] 104  Resp:  [18-20] 18  BP: (101-147)/() 128/78    Physical Exam  Constitutional:       Appearance: She is well-developed.   HENT:      Head: Normocephalic and atraumatic.   Eyes:      Pupils: Pupils are equal, round,  and reactive to light.   Cardiovascular:      Rate and Rhythm: Normal rate and regular rhythm.   Pulmonary:      Effort: Pulmonary effort is normal.      Breath sounds: Normal breath sounds.   Abdominal:      General: Bowel sounds are normal.      Palpations: Abdomen is soft.   Musculoskeletal:         General: Normal range of motion.      Cervical back: Normal range of motion and neck supple.   Skin:     General: Skin is warm and dry.   Neurological:      Mental Status: She is alert and oriented to person, place, and time.   Psychiatric:         Behavior: Behavior normal.       Results Review:  I have reviewed the labs, radiology results, and diagnostic studies.    Laboratory Data:   Results from last 7 days   Lab Units 12/31/22 0635 12/30/22 2003   SODIUM mmol/L 135* 134*   POTASSIUM mmol/L 3.7 3.5   CHLORIDE mmol/L 102 100   CO2 mmol/L 12.0* 15.0*   BUN mg/dL 16 16   CREATININE mg/dL 1.08* 1.19*   GLUCOSE mg/dL 39* 122*   CALCIUM mg/dL 8.9 9.2   BILIRUBIN mg/dL 2.2* 2.5*   ALK PHOS U/L 113 119*   ALT (SGPT) U/L 21 18   AST (SGOT) U/L 49* 36*   ANION GAP mmol/L 21.0* 19.0*     Estimated Creatinine Clearance: 52.8 mL/min (A) (by C-G formula based on SCr of 1.08 mg/dL (H)).  Results from last 7 days   Lab Units 12/31/22 0635   MAGNESIUM mg/dL 1.4*   PHOSPHORUS mg/dL 4.2         Results from last 7 days   Lab Units 12/31/22 0635 12/30/22 2003   WBC 10*3/mm3 7.64 6.49   HEMOGLOBIN g/dL 14.5 14.5   HEMATOCRIT % 44.4 44.2   PLATELETS 10*3/mm3 229 245           Culture Data:   No results found for: BLOODCX  No results found for: URINECX  No results found for: RESPCX  No results found for: WOUNDCX  No results found for: STOOLCX  No components found for: BODYFLD    Radiology Data:   Imaging Results (Last 24 Hours)     Procedure Component Value Units Date/Time    US Abdomen Limited [[id number]] Collected: 12/31/22 0728     Updated: 12/31/22 0822    Narrative:      EXAMINATION:  ultrasounds abdomen, limited  (RUQ)    CLINICAL INDICATION / HISTORY:  Right upper quadrant ultrasound,  choledocholithiasis, N30.01 Acute cystitis with hematuria R10.30  Lower abdominal pain, unspecified    COMPARISON:  none    TECHNIQUE:  ultrasound, limited, right upper quadrant    FULL RESULTS / FINDINGS:    Liver:      size: The liver appears small with coarse echogenicity and  peripheral lobulation suggesting changes from cirrhosis.  no focal mass or intrahepatic biliary ductal dilatation .  Hepatopedal portal vein blood flow.    Biliary:    Gall bladder: Surgically absent.    Common bile duct:  normal caliber      Pancreas (visualized portions):      normal size and echotexture for age    no focal mass or ductal dilatation      Kidney, right (limited):      size:  normal, measuring 11.53 x 4.25 x 5.4 cm.    echotexture:  normal    no nephrolithiasis, solid mass, or collecting system dilation    Vascular (visualized portions):      Aorta:  normal caliber    IVC:  normal caliber, no intraluminal defect(s)    Misc:  Right pleural effusion. Very small amount of ascites  tracking around liver.      Impression:      CONCLUSION:   1.  The liver appears small with coarse echogenicity and  peripheral lobulation suggesting changes from cirrhosis.  2.  Gallbladder surgically absent.  3.  Right pleural effusion.  4.  Very small amount of ascites tracking around liver.  5.  Remainder of right upper abdominal quadrant ultrasound is  unremarkable.    Electronically signed by:  Sreedhar Otero MD  12/31/2022 8:20 AM CST  Workstation: [id number]    CT Abdomen Pelvis With Contrast [[id number]] Collected: 12/30/22 2054     Updated: 12/30/22 2122    Narrative:        PROCEDURE: CT ABDOMEN PELVIS WITH IV CONTRAST, 12/30/2022 8:54 PM  CST     CLINICAL INDICATION:  lower abd pain.    COMPARISON: 9/15/2022    TECHNIQUE: Helical CT acquisition performed of the abdomen and  pelvis with coronal and sagittal sequences. Contrast: Nonionic  iodinated contrast.  PO  Contrast: None.  Complications: None.    CT Dose reduction techniques were utilized for this examination  with 1 or more of the following: Automated exposure control  and/or adjustment of the mA or kV according to patient size,  and/or use of iterative reconstruction technique.    FINDINGS:    Visualized chest base: Small left pleural effusion. Moderate  right pleural effusion. Partially visualized groundglass  opacities in lung bases may reflect atelectasis, edema, or  infiltrate.    Hepatobiliary system: No contour deforming hepatic lesion.      Gallbladder: Surgically absent.    Spleen: Small spleen.    Pancreas: No pancreatic mass identified.    Adrenal glands: No adrenal masses.    Kidneys and collecting system: No hydronephrosis.    Bowel and mesentery: No free air identified. Interval development  of mild/mild to moderate regions of free fluid in abdomen and  pelvis, portions of which measures 20-30 Hounsfield units,  consistent with complex free fluid which may reflect  proteinaceous, enteric, or hemorrhagic contents. Mild to moderate  strandy changes adjacent to duodenum, may reflect duodenitis  and/or peptic ulcer disease. No small bowel structure in  identified.    Appendix: Normal caliber. No significant inflammatory changes.    Pelvic organs: Moderately thick-walled urinary bladder.      Bones: No acute displaced fracture. Diffuse anasarca, increased  from prior.      Impression:        Mild to moderate strandy changes adjacent to duodenum, may  reflect duodenitis and/or peptic ulcer disease.    Interval development of mild/mild to moderate regions of free  fluid in abdomen and pelvis, portions of which measures 20-30  Hounsfield units, consistent with complex free fluid which may  reflect proteinaceous, enteric, or hemorrhagic contents.     Moderately thick-walled urinary bladder is suspicious for  cystitis and/or urinary tract infection.    Small left pleural effusion. Moderate right pleural  effusion.  Partially visualized groundglass opacities in lung bases may  reflect atelectasis, edema, or infiltrate.    Diffuse anasarca, increased from prior.      Electronically signed by:  Charli Cuba MD  12/30/2022 9:20 PM  CST Workstation: [id number]    XR Chest 1 View [[id number]] Collected: 12/30/22 2021     Updated: 12/30/22 2112    Narrative:      PROCEDURE: XR CHEST 1 VIEW, 12/30/2022 8:21 PM CST    CLINICAL INDICATION:    cough.  Vomiting, dizziness.     COMPARISON: 12/10/2022    TECHNIQUE:  AP portable radiograph of chest     FINDINGS:    Left chest wall pacemaker in place. Large cardiac silhouette.  Retrocardiac opacification. No pneumothorax.      Impression:        Retrocardiac opacification may reflect infiltrate or atelectasis.    Large cardiac silhouette.          Electronically signed by:  Charli Cuba MD  12/30/2022 9:10 PM  CST Workstation: [id number]          I have reviewed the patient's current medications.     Assessment/Plan     Active Hospital Problems    Diagnosis    • **Acute cystitis with hematuria        Plan:    1.  Acute cystitis: Continue IV Rocephin.  Urine culture pending.  2.  Heart failure with reduced ejection fraction: IV lasix started.   3.  Nonischemic cardiomyopathy:  Continue aspirin, carvedilol, Imdur and lisinopril.  4.  Liver cirrhosis with anasarca: No GI coverage today.  Hepatitis panel pending.   5.  Diabetes mellitus, type II: Hypoglycemic today.  D/c long-acting for now.   6.  GERD: Continue home Protonix.  7.  Urine retention:  Weber placed.     VTE:  SCDs    Discharge Planning: I expect patient to be discharged to home in 1-2 days.    I confirmed that the patient's Advance Care Plan is present, code status is documented, or surrogate decision maker is listed in the patient's medical record.      I have utilized all available immediate resources to obtain, update, or review the patient's current medications.         This document has been electronically signed by  Angelica Sepulveda, APRN on December 31, 2022 13:36 CST

## 2022-12-31 NOTE — H&P
[facility] Medicine Admission      Date of Admission: 12/30/2022      Primary Care Physician: Joyce Plata MD      Chief Complaint: Generalized weakness, nausea, vomiting    HPI:    Ms. Wade is a 72-year-old female with past medical history significant for chronic systolic congestive heart failure, diabetes mellitus, GERD, dyslipidemia, hypertension, nonischemic cardiomyopathy, history of permanent pacemaker placement who presented to the hospital with complaints of nausea and vomiting along with diarrhea for the last few days.  Patient is not a good historian.  She reports that for the last couple of days, she has nonprojectile intermittent vomiting.  She denies any blood in her vomitus or any hematemesis.  She also reports mild abdominal pain in her lower abdomen that she describes as intermittent, mild, without aggravating or relieving factors.  She did have a few episodes of diarrhea.  She denies any blood or pus in her diarrhea.    She denies any urgency, frequency, hematuria, pyuria but she does report dysuria along with slight fever.    On arrival to the ER, she underwent CT scan abdomen/pelvis which showed mild to moderate strandy changes adjacent to duodenum, may reflect duodenitis or peptic ulcer disease along with findings consistent with possible cystitis.  General surgery was contacted by the ER who recommended the patient to be discharged from the hospital but patient was very weak and could not walk around, that she was admitted to the hospital service for further evaluation and management.          Concurrent Medical History:  has a past medical history of Chronic systolic heart failure (HCC), Diabetes mellitus (HCC), Diabetic neuropathy (HCC), GERD (gastroesophageal reflux disease), Hypercholesterolemia, Hypertension, Low back pain, Morbid obesity (HCC), Nonischemic cardiomyopathy (HCC), Osteoarthritis, Sleep apnea, and Vitamin D  deficiency.    Past Surgical History:  has a past surgical history that includes Cataract Extraction; Total abdominal hysterectomy w/ bilateral salpingoophorectomy; Colonoscopy (N/A, 06/14/2017); Pacemaker Implantation; Cardiac catheterization (N/A, 08/23/2018); Cardiac pacemaker placement; Cardiac electrophysiology procedure (N/A, 06/22/2020); Cardiac catheterization (N/A, 6/3/2022); and cholecystectomy with intraoperative cholangiogram (N/A, 7/15/2022).    Family History: family history includes Bone cancer in her brother; Diabetes in her sister.       Social History:  reports that she has quit smoking. She has never used smokeless tobacco. She reports that she does not drink alcohol and does not use drugs.    Allergies:   Allergies   Allergen Reactions   • Nsaids Swelling   • Aldactone [Spironolactone] Unknown - Low Severity       Medications:   Prior to Admission medications    Medication Sig Start Date End Date Taking? Authorizing Provider   acetaminophen (TYLENOL) 325 MG tablet Take 2 tablets by mouth Every 4 (Four) Hours As Needed for Mild Pain . 1/19/22   Swetha Raymond APRN   albuterol sulfate  (90 Base) MCG/ACT inhaler INHALE 2 PUFFS EVERY 4 (FOUR) HOURS AS NEEDED FOR WHEEZING OR SHORTNESS OF AIR. 9/2/21   Joyce Plata MD   aspirin 81 MG EC tablet Take 1 tablet by mouth Every Night. 6/21/22   Joyce Plata MD   atorvastatin (LIPITOR) 40 MG tablet TAKE 1 TABLET BY MOUTH EVERY DAY 11/1/22   Joyce Plata MD   B-D UF III MINI PEN NEEDLES 31G X 5 MM misc USE WITH INSULIN INJECTIONS NIGHTLY 12/6/21   Joyce Plata MD   Blood Glucose Monitoring Suppl (FreeStyle Lite) device 1 Device 3 (Three) Times a Day. 4/5/22   Justin Wheeler MD   carvedilol (COREG) 25 MG tablet Take 1 tablet by mouth 2 (Two) Times a Day With Meals. 2/2/22   Joyce Plata MD   cefdinir (OMNICEF) 300 MG capsule Take 1 capsule by mouth 2 (Two) Times a Day for 5 days. 12/31/22 1/5/23  Jaime Alonso MD  "  furosemide (LASIX) 40 MG tablet TAKE 1 TABLET BY MOUTH TWICE A DAY 5/13/22   Joyce Plata MD   glucose blood (FREESTYLE LITE) test strip 1 each by Other route 3 (Three) Times a Day. Use as instructed 4/5/22   Justin Wheeler MD   hydrOXYzine (ATARAX) 10 MG tablet Take 1 tablet by mouth Every 8 (Eight) Hours As Needed for Itching. 10/31/22   Joyce Plata MD   insulin detemir (Levemir FlexTouch) 100 UNIT/ML injection Inject 20 Units under the skin into the appropriate area as directed Every Night. 6/9/22   Joyce Plata MD   Insulin Syringe 31G X 5/16\" 0.5 ML misc Inject 1 each under the skin into the appropriate area as directed Every Night. 12/2/21   Justin Wheeler MD   ipratropium-albuterol (DUO-NEB) 0.5-2.5 mg/3 ml nebulizer Take 3 mL by nebulization 4 (Four) Times a Day.  Patient taking differently: Take 3 mL by nebulization 4 (Four) Times a Day As Needed for Shortness of Air or Wheezing. 2/23/22   Justin Wheeler MD   isosorbide mononitrate (IMDUR) 30 MG 24 hr tablet TAKE 1 TABLET BY MOUTH EVERY DAY 11/1/22   Joyce Plata MD   Lancets (freestyle) lancets 1 each by Other route 3 (Three) Times a Day. Use as instructed 4/5/22   Justin Wheeler MD   lisinopril (PRINIVIL,ZESTRIL) 10 MG tablet TAKE 1 TABLET BY MOUTH EVERY DAY 5/23/22   Joyce Plata MD   methylPREDNISolone (MEDROL) 4 MG dose pack Take as directed on package instructions. 12/1/22   Sukhdev Polk MD   nitroglycerin (NITROSTAT) 0.4 MG SL tablet PLEASE SEE ATTACHED FOR DETAILED DIRECTIONS 11/1/22   Joyce Plata MD   ondansetron ODT (ZOFRAN-ODT) 4 MG disintegrating tablet Place 1 tablet on the tongue Every 6 (Six) Hours As Needed for Nausea or Vomiting. 12/31/22   Jaime Alonso MD   pantoprazole (Protonix) 40 MG EC tablet Take 1 tablet by mouth Daily. 6/21/22   oJyce Plata MD   potassium chloride (MICRO-K) 10 MEQ CR capsule Take 2 capsules by mouth 2 (Two) Times a Day. 5/5/22   Brent Coulter MD "   sucralfate (CARAFATE) 1 g tablet Take 1 g by mouth 3 (Three) Times a Day. 4/21/22   Provider, MD Heidi       Review of Systems:  Review of Systems   Otherwise complete ROS is negative except as mentioned above.    Physical Exam:   Temp:  [97.5 °F (36.4 °C)] 97.5 °F (36.4 °C)  Heart Rate:  [100-112] 102  Resp:  [18-20] 18  BP: (101-133)/(75-93) 116/78  Physical Exam  General: [Appears stated age, alert, oriented ×3, cooperative]  HEENT: [Normocephalic, atraumatic, EOMI, PERRLA, unremarkable external ear, moist mucous membranes, supple neck, no lymphadenopathy]  CVS: [RRR, S1, S2, no murmurs, normal peripheral pulses]  Respiratory: [CTA bilaterally, symmetrical expansion, no wheezing, no rales, no crackles]  Gastrointestinal: [Soft, nontender, nondistended, no organomegaly could be appreciated, normal bowel sounds]  Musculoskeletal: [Grossly normal, no tenderness, normal ROM]  Skin: [No rashes, no erythema, no lesions]  Extremities: [Normal inspection.  No edema, no cyanosis, no clubbing.  Normal capillary refill]  Neuro: [Alert, oriented ×3, grossly normal motor and sensory function]  Psychiatry: [No anxiety, no depression, nonsuicidal]        Results Reviewed:  I have personally reviewed current lab, radiology, and data and agree with results.  Lab Results (last 24 hours)     Procedure Component Value Units Date/Time    Urine Culture - Urine, Straight Cath [[id number]] Collected: 12/30/22 2105    Specimen: Urine from Straight Cath Updated: 12/30/22 2329    Troponin [[id number]]  (Normal) Collected: 12/30/22 2246    Specimen: Blood Updated: 12/30/22 2319     Troponin T <0.010 ng/mL      Comment: Specimen hemolyzed.  Results may be affected.       Narrative:      Troponin T Reference Range:  <= 0.03 ng/mL-   Negative for AMI  >0.03 ng/mL-     Abnormal for myocardial necrosis.  Clinicians would have to utilize clinical acumen, EKG, Troponin and serial changes to determine if it is an Acute Myocardial Infarction  or myocardial injury due to an underlying chronic condition.       Results may be falsely decreased if patient taking Biotin.      Urinalysis, Microscopic Only - Urine, Catheter [[id number]]  (Abnormal) Collected: 12/30/22 2105    Specimen: Urine, Catheter Updated: 12/30/22 2144     RBC, UA 13-20 /HPF      WBC, UA Too Numerous to Count /HPF      Bacteria, UA 4+ /HPF      Squamous Epithelial Cells, UA 3-5 /HPF      Renal Epithelial Cells, UA 3-6 /HPF      Hyaline Casts, UA 3-6 /LPF      Methodology Manual Light Microscopy    Urinalysis With Microscopic If Indicated (No Culture) - Urine, Catheter [[id number]]  (Abnormal) Collected: 12/30/22 2105    Specimen: Urine, Catheter Updated: 12/30/22 2115     Color, UA Lucie     Appearance, UA Cloudy     pH, UA 5.5     Specific Gravity, UA 1.031     Comment: Result obtained by Refractometer        Glucose, UA Negative     Ketones, UA Trace     Bilirubin, UA Large (3+)     Blood, UA Moderate (2+)     Protein, UA >=300 mg/dL (3+)     Leuk Esterase, UA Small (1+)     Nitrite, UA Positive     Urobilinogen, UA 1.0 E.U./dL    [place] Draw [[id number]] Collected: 12/30/22 2003    Specimen: Blood Updated: 12/30/22 2115    Narrative:      The following orders were created for panel order [place] Draw.  Procedure                               Abnormality         Status                     ---------                               -----------         ------                     Green Top (Gel)[[id number]]                                  Final result               Lavender Top[[id number]]                                     Final result               Gold Top - SST[[id number]]                                   Final result               Light Blue Top[[id number]]                                   Final result                 Please view results for these tests on the individual orders.    Green Top (Gel) [[id number]] Collected: 12/30/22 2003    Specimen: Blood Updated: 12/30/22 2115     Extra Tube  Hold for add-ons.     Comment: Auto resulted.       Gold Top - SST [[id number]] Collected: 12/30/22 2003    Specimen: Blood Updated: 12/30/22 2115     Extra Tube Hold for add-ons.     Comment: Auto resulted.       Lavender Top [[id number]] Collected: 12/30/22 2003    Specimen: Blood Updated: 12/30/22 2115     Extra Tube hold for add-on     Comment: Auto resulted       Light Blue Top [[id number]] Collected: 12/30/22 2003    Specimen: Blood Updated: 12/30/22 2115     Extra Tube Hold for add-ons.     Comment: Auto resulted       COVID-19 and FLU A/B PCR - Swab, Nasopharynx [[id number]]  (Normal) Collected: 12/30/22 2019    Specimen: Swab from Nasopharynx Updated: 12/30/22 2046     COVID19 Not Detected     Influenza A PCR Not Detected     Influenza B PCR Not Detected    Narrative:      Fact sheet for providers: https://www.fda.gov/media/576828/download    Fact sheet for patients: https://www.fda.gov/media/330249/download    Test performed by PCR.    Comprehensive Metabolic Panel [[id number]]  (Abnormal) Collected: 12/30/22 2003    Specimen: Blood Updated: 12/30/22 2029     Glucose 122 mg/dL      BUN 16 mg/dL      Creatinine 1.19 mg/dL      Sodium 134 mmol/L      Potassium 3.5 mmol/L      Chloride 100 mmol/L      CO2 15.0 mmol/L      Calcium 9.2 mg/dL      Total Protein 6.3 g/dL      Albumin 3.7 g/dL      ALT (SGPT) 18 U/L      AST (SGOT) 36 U/L      Alkaline Phosphatase 119 U/L      Total Bilirubin 2.5 mg/dL      Globulin 2.6 gm/dL      A/G Ratio 1.4 g/dL      BUN/Creatinine Ratio 13.4     Anion Gap 19.0 mmol/L      eGFR 48.1 mL/min/1.73      Comment: National Kidney Foundation and American Society of Nephrology (ASN) Task Force recommended calculation based on the Chronic Kidney Disease Epidemiology Collaboration (CKD-EPI) equation refit without adjustment for race.       Narrative:      GFR Normal >60  Chronic Kidney Disease <60  Kidney Failure <15    The GFR formula is only valid for adults with stable renal function  between ages 18 and 70.    Lipase [[id number]]  (Normal) Collected: 12/30/22 2003    Specimen: Blood Updated: 12/30/22 2029     Lipase 25 U/L     Troponin [[id number]]  (Normal) Collected: 12/30/22 2003    Specimen: Blood Updated: 12/30/22 2029     Troponin T <0.010 ng/mL     Narrative:      Troponin T Reference Range:  <= 0.03 ng/mL-   Negative for AMI  >0.03 ng/mL-     Abnormal for myocardial necrosis.  Clinicians would have to utilize clinical acumen, EKG, Troponin and serial changes to determine if it is an Acute Myocardial Infarction or myocardial injury due to an underlying chronic condition.       Results may be falsely decreased if patient taking Biotin.      CBC & Differential [[id number]]  (Abnormal) Collected: 12/30/22 2003    Specimen: Blood Updated: 12/30/22 2018    Narrative:      The following orders were created for panel order CBC & Differential.  Procedure                               Abnormality         Status                     ---------                               -----------         ------                     CBC Auto Differential[[id number]]        Abnormal            Final result                 Please view results for these tests on the individual orders.    CBC Auto Differential [[id number]]  (Abnormal) Collected: 12/30/22 2003    Specimen: Blood Updated: 12/30/22 2018     WBC 6.49 10*3/mm3      RBC 5.42 10*6/mm3      Hemoglobin 14.5 g/dL      Hematocrit 44.2 %      MCV 81.5 fL      MCH 26.8 pg      MCHC 32.8 g/dL      RDW 21.5 %      RDW-SD 57.8 fl      MPV 9.4 fL      Platelets 245 10*3/mm3      Neutrophil % 49.5 %      Lymphocyte % 36.8 %      Monocyte % 12.9 %      Eosinophil % 0.3 %      Basophil % 0.3 %      Immature Grans % 0.2 %      Neutrophils, Absolute 3.21 10*3/mm3      Lymphocytes, Absolute 2.39 10*3/mm3      Monocytes, Absolute 0.84 10*3/mm3      Eosinophils, Absolute 0.02 10*3/mm3      Basophils, Absolute 0.02 10*3/mm3      Immature Grans, Absolute 0.01 10*3/mm3      nRBC 0.0  /100 WBC         Imaging Results (Last 24 Hours)     Procedure Component Value Units Date/Time    CT Abdomen Pelvis With Contrast [[id number]] Collected: 12/30/22 2054     Updated: 12/30/22 2122    Narrative:        PROCEDURE: CT ABDOMEN PELVIS WITH IV CONTRAST, 12/30/2022 8:54 PM  CST     CLINICAL INDICATION:  lower abd pain.    COMPARISON: 9/15/2022    TECHNIQUE: Helical CT acquisition performed of the abdomen and  pelvis with coronal and sagittal sequences. Contrast: Nonionic  iodinated contrast.  PO Contrast: None.  Complications: None.    CT Dose reduction techniques were utilized for this examination  with 1 or more of the following: Automated exposure control  and/or adjustment of the mA or kV according to patient size,  and/or use of iterative reconstruction technique.    FINDINGS:    Visualized chest base: Small left pleural effusion. Moderate  right pleural effusion. Partially visualized groundglass  opacities in lung bases may reflect atelectasis, edema, or  infiltrate.    Hepatobiliary system: No contour deforming hepatic lesion.      Gallbladder: Surgically absent.    Spleen: Small spleen.    Pancreas: No pancreatic mass identified.    Adrenal glands: No adrenal masses.    Kidneys and collecting system: No hydronephrosis.    Bowel and mesentery: No free air identified. Interval development  of mild/mild to moderate regions of free fluid in abdomen and  pelvis, portions of which measures 20-30 Hounsfield units,  consistent with complex free fluid which may reflect  proteinaceous, enteric, or hemorrhagic contents. Mild to moderate  strandy changes adjacent to duodenum, may reflect duodenitis  and/or peptic ulcer disease. No small bowel structure in  identified.    Appendix: Normal caliber. No significant inflammatory changes.    Pelvic organs: Moderately thick-walled urinary bladder.      Bones: No acute displaced fracture. Diffuse anasarca, increased  from prior.      Impression:        Mild to moderate  strandy changes adjacent to duodenum, may  reflect duodenitis and/or peptic ulcer disease.    Interval development of mild/mild to moderate regions of free  fluid in abdomen and pelvis, portions of which measures 20-30  Hounsfield units, consistent with complex free fluid which may  reflect proteinaceous, enteric, or hemorrhagic contents.     Moderately thick-walled urinary bladder is suspicious for  cystitis and/or urinary tract infection.    Small left pleural effusion. Moderate right pleural effusion.  Partially visualized groundglass opacities in lung bases may  reflect atelectasis, edema, or infiltrate.    Diffuse anasarca, increased from prior.      Electronically signed by:  Charli Cuba MD  12/30/2022 9:20 PM  CST Workstation: [id number]    XR Chest 1 View [[id number]] Collected: 12/30/22 2021     Updated: 12/30/22 2112    Narrative:      PROCEDURE: XR CHEST 1 VIEW, 12/30/2022 8:21 PM CST    CLINICAL INDICATION:    cough.  Vomiting, dizziness.     COMPARISON: 12/10/2022    TECHNIQUE:  AP portable radiograph of chest     FINDINGS:    Left chest wall pacemaker in place. Large cardiac silhouette.  Retrocardiac opacification. No pneumothorax.      Impression:        Retrocardiac opacification may reflect infiltrate or atelectasis.    Large cardiac silhouette.          Electronically signed by:  Charli Cuba MD  12/30/2022 9:10 PM  CST Workstation: [id number]            Assessment:    Active Hospital Problems    Diagnosis    • **Acute cystitis with hematuria              Plan:        72-year-old female past medical history significant for chronic systolic congestive heart failure, hypertension, dyslipidemia, diabetes mellitus, nonischemic cardiomyopathy, GERD, history of permanent pacemaker placement who presented to the hospital with complaints of abdominal pain, nausea, vomiting, diarrhea and dysuria.    Urinary Tract Infection:  Urinalysis positive  Urine cultures in process  IV ceftriaxone    Possible  dehydration/generalized weakness:  Will give normal saline at 75 cc/h, total 500 cc  PT/OT consult  Consider morning cortisol levels if does not improve    Abdominal pain:  Likely secondary to cystitis  Currently abdominal pain-free   CT scan abdomen/pelvis noted    Diabetes Mellitus:  Continue home dose of Levemir  Carbohydrate consistent diet  Sliding scale insulin    Dyslipidemia:  Continue Lipitor per home dose    Hypertension:  Continue home dose of Coreg, Imdur, lisinopril    Chronic systolic congestive heart failure:  Not in acute exacerbation  Will be very cautious with IV fluids  Holding diuretics          Patient is full code  Estimated length of stay: Greater than 2 midnights  DVT prophylaxis SCDs        I discussed the patient's findings and my recommendations with:   Patient    Howie Guerrier MD

## 2022-12-31 NOTE — PLAN OF CARE
Problem: Adult Inpatient Plan of Care  Goal: Plan of Care Review  Outcome: Ongoing, Progressing  Flowsheets (Taken 12/31/2022 1022)  Plan of Care Reviewed With: patient   Goal Outcome Evaluation:  Plan of Care Reviewed With: patient            Reports decreased intake.  Emesis x 2-3 days.  Agreed to milk when she feels well enough to drink.  Was seen eating during breakfast today.

## 2023-01-01 ENCOUNTER — APPOINTMENT (OUTPATIENT)
Dept: INTERVENTIONAL RADIOLOGY/VASCULAR | Facility: HOSPITAL | Age: 73
DRG: 871 | End: 2023-01-01
Payer: MEDICARE

## 2023-01-01 ENCOUNTER — APPOINTMENT (OUTPATIENT)
Dept: CT IMAGING | Facility: HOSPITAL | Age: 73
End: 2023-01-01
Payer: MEDICARE

## 2023-01-01 ENCOUNTER — APPOINTMENT (OUTPATIENT)
Dept: ULTRASOUND IMAGING | Facility: HOSPITAL | Age: 73
DRG: 871 | End: 2023-01-01
Payer: MEDICARE

## 2023-01-01 ENCOUNTER — APPOINTMENT (OUTPATIENT)
Dept: GENERAL RADIOLOGY | Facility: HOSPITAL | Age: 73
End: 2023-01-01
Payer: MEDICARE

## 2023-01-01 ENCOUNTER — HOSPITAL ENCOUNTER (OUTPATIENT)
Facility: HOSPITAL | Age: 73
Setting detail: OBSERVATION
End: 2023-03-16
Attending: EMERGENCY MEDICINE | Admitting: HOSPITALIST
Payer: MEDICARE

## 2023-01-01 VITALS
HEART RATE: 75 BPM | WEIGHT: 202.3 LBS | RESPIRATION RATE: 18 BRPM | BODY MASS INDEX: 34.54 KG/M2 | HEIGHT: 64 IN | DIASTOLIC BLOOD PRESSURE: 72 MMHG | SYSTOLIC BLOOD PRESSURE: 126 MMHG | TEMPERATURE: 97 F | OXYGEN SATURATION: 98 %

## 2023-01-01 VITALS
WEIGHT: 212.8 LBS | BODY MASS INDEX: 36.33 KG/M2 | HEIGHT: 64 IN | SYSTOLIC BLOOD PRESSURE: 115 MMHG | DIASTOLIC BLOOD PRESSURE: 75 MMHG | OXYGEN SATURATION: 99 % | RESPIRATION RATE: 18 BRPM | HEART RATE: 95 BPM | TEMPERATURE: 98.1 F

## 2023-01-01 DIAGNOSIS — Z74.09 IMPAIRED MOBILITY AND ADLS: ICD-10-CM

## 2023-01-01 DIAGNOSIS — N39.0 UTI (URINARY TRACT INFECTION) WITH PYURIA: ICD-10-CM

## 2023-01-01 DIAGNOSIS — Z74.09 IMPAIRED FUNCTIONAL MOBILITY, BALANCE, GAIT, AND ENDURANCE: ICD-10-CM

## 2023-01-01 DIAGNOSIS — E11.649 HYPOGLYCEMIC EPISODE IN PATIENT WITH DIABETES MELLITUS: Primary | ICD-10-CM

## 2023-01-01 DIAGNOSIS — Z78.9 IMPAIRED MOBILITY AND ADLS: ICD-10-CM

## 2023-01-01 DIAGNOSIS — J98.01 BRONCHOSPASM: ICD-10-CM

## 2023-01-01 LAB
ALBUMIN SERPL-MCNC: 3.1 G/DL (ref 3.5–5.2)
ALBUMIN SERPL-MCNC: 3.2 G/DL (ref 3.5–5.2)
ALBUMIN SERPL-MCNC: 3.3 G/DL (ref 3.5–5.2)
ALBUMIN SERPL-MCNC: 3.3 G/DL (ref 3.5–5.2)
ALBUMIN SERPL-MCNC: 3.4 G/DL (ref 3.5–5.2)
ALBUMIN SERPL-MCNC: 3.4 G/DL (ref 3.5–5.2)
ALBUMIN SERPL-MCNC: 3.6 G/DL (ref 3.5–5.2)
ALBUMIN SERPL-MCNC: 3.7 G/DL (ref 3.5–5.2)
ALBUMIN/GLOB SERPL: 1.3 G/DL
ALBUMIN/GLOB SERPL: 1.4 G/DL
ALBUMIN/GLOB SERPL: 1.5 G/DL
ALBUMIN/GLOB SERPL: 1.6 G/DL
ALBUMIN/GLOB SERPL: 1.7 G/DL
ALBUMIN/GLOB SERPL: 2 G/DL
ALBUMIN/GLOB SERPL: 2 G/DL
ALP SERPL-CCNC: 107 U/L (ref 39–117)
ALP SERPL-CCNC: 121 U/L (ref 39–117)
ALP SERPL-CCNC: 124 U/L (ref 39–117)
ALP SERPL-CCNC: 136 U/L (ref 39–117)
ALP SERPL-CCNC: 138 U/L (ref 39–117)
ALP SERPL-CCNC: 140 U/L (ref 39–117)
ALP SERPL-CCNC: 148 U/L (ref 39–117)
ALP SERPL-CCNC: 149 U/L (ref 39–117)
ALP SERPL-CCNC: 156 U/L (ref 39–117)
ALP SERPL-CCNC: 84 U/L (ref 39–117)
ALT SERPL W P-5'-P-CCNC: 10 U/L (ref 1–33)
ALT SERPL W P-5'-P-CCNC: 11 U/L (ref 1–33)
ALT SERPL W P-5'-P-CCNC: 11 U/L (ref 1–33)
ALT SERPL W P-5'-P-CCNC: 13 U/L (ref 1–33)
ALT SERPL W P-5'-P-CCNC: 13 U/L (ref 1–33)
ALT SERPL W P-5'-P-CCNC: 14 U/L (ref 1–33)
ALT SERPL W P-5'-P-CCNC: 14 U/L (ref 1–33)
ALT SERPL W P-5'-P-CCNC: 5 U/L (ref 1–33)
ALT SERPL W P-5'-P-CCNC: 5 U/L (ref 1–33)
ALT SERPL W P-5'-P-CCNC: <5 U/L (ref 1–33)
AMMONIA BLD-SCNC: 28 UMOL/L (ref 11–51)
AMMONIA BLD-SCNC: 44 UMOL/L (ref 11–51)
ANION GAP SERPL CALCULATED.3IONS-SCNC: 12 MMOL/L (ref 5–15)
ANION GAP SERPL CALCULATED.3IONS-SCNC: 13 MMOL/L (ref 5–15)
ANION GAP SERPL CALCULATED.3IONS-SCNC: 14 MMOL/L (ref 5–15)
ANION GAP SERPL CALCULATED.3IONS-SCNC: 15 MMOL/L (ref 5–15)
ANION GAP SERPL CALCULATED.3IONS-SCNC: 15 MMOL/L (ref 5–15)
ANION GAP SERPL CALCULATED.3IONS-SCNC: 16 MMOL/L (ref 5–15)
ANISOCYTOSIS BLD QL: NORMAL
ARTERIAL PATENCY WRIST A: ABNORMAL
AST SERPL-CCNC: 16 U/L (ref 1–32)
AST SERPL-CCNC: 21 U/L (ref 1–32)
AST SERPL-CCNC: 26 U/L (ref 1–32)
AST SERPL-CCNC: 26 U/L (ref 1–32)
AST SERPL-CCNC: 27 U/L (ref 1–32)
AST SERPL-CCNC: 28 U/L (ref 1–32)
AST SERPL-CCNC: 28 U/L (ref 1–32)
AST SERPL-CCNC: 32 U/L (ref 1–32)
ATMOSPHERIC PRESS: 752 MMHG
BACTERIA SPEC AEROBE CULT: ABNORMAL
BACTERIA SPEC AEROBE CULT: NO GROWTH
BACTERIA SPEC AEROBE CULT: NORMAL
BACTERIA SPEC AEROBE CULT: NORMAL
BACTERIA UR QL AUTO: ABNORMAL /HPF
BASE EXCESS BLDA CALC-SCNC: -0.4 MMOL/L (ref 0–2)
BASOPHILS # BLD AUTO: 0.01 10*3/MM3 (ref 0–0.2)
BASOPHILS # BLD AUTO: 0.03 10*3/MM3 (ref 0–0.2)
BASOPHILS # BLD AUTO: 0.04 10*3/MM3 (ref 0–0.2)
BASOPHILS # BLD AUTO: 0.05 10*3/MM3 (ref 0–0.2)
BASOPHILS NFR BLD AUTO: 0.2 % (ref 0–1.5)
BASOPHILS NFR BLD AUTO: 0.4 % (ref 0–1.5)
BASOPHILS NFR BLD AUTO: 0.5 % (ref 0–1.5)
BASOPHILS NFR BLD AUTO: 0.5 % (ref 0–1.5)
BASOPHILS NFR BLD AUTO: 0.6 % (ref 0–1.5)
BASOPHILS NFR BLD AUTO: 0.6 % (ref 0–1.5)
BASOPHILS NFR BLD AUTO: 0.7 % (ref 0–1.5)
BDY SITE: ABNORMAL
BILIRUB SERPL-MCNC: 1 MG/DL (ref 0–1.2)
BILIRUB SERPL-MCNC: 1.1 MG/DL (ref 0–1.2)
BILIRUB SERPL-MCNC: 1.1 MG/DL (ref 0–1.2)
BILIRUB SERPL-MCNC: 1.2 MG/DL (ref 0–1.2)
BILIRUB SERPL-MCNC: 1.3 MG/DL (ref 0–1.2)
BILIRUB SERPL-MCNC: 1.3 MG/DL (ref 0–1.2)
BILIRUB SERPL-MCNC: 1.5 MG/DL (ref 0–1.2)
BILIRUB SERPL-MCNC: 1.7 MG/DL (ref 0–1.2)
BILIRUB SERPL-MCNC: 1.9 MG/DL (ref 0–1.2)
BILIRUB SERPL-MCNC: 2 MG/DL (ref 0–1.2)
BILIRUB UR QL STRIP: ABNORMAL
BUN SERPL-MCNC: 10 MG/DL (ref 8–23)
BUN SERPL-MCNC: 11 MG/DL (ref 8–23)
BUN SERPL-MCNC: 13 MG/DL (ref 8–23)
BUN SERPL-MCNC: 16 MG/DL (ref 8–23)
BUN SERPL-MCNC: 18 MG/DL (ref 8–23)
BUN/CREAT SERPL: 10.7 (ref 7–25)
BUN/CREAT SERPL: 10.9 (ref 7–25)
BUN/CREAT SERPL: 11.7 (ref 7–25)
BUN/CREAT SERPL: 11.8 (ref 7–25)
BUN/CREAT SERPL: 12.6 (ref 7–25)
BUN/CREAT SERPL: 13.1 (ref 7–25)
BUN/CREAT SERPL: 13.9 (ref 7–25)
BUN/CREAT SERPL: 15.5 (ref 7–25)
BUN/CREAT SERPL: 15.5 (ref 7–25)
BUN/CREAT SERPL: 9.7 (ref 7–25)
BURR CELLS BLD QL SMEAR: NORMAL
CALCIUM SPEC-SCNC: 8.3 MG/DL (ref 8.6–10.5)
CALCIUM SPEC-SCNC: 8.6 MG/DL (ref 8.6–10.5)
CALCIUM SPEC-SCNC: 8.7 MG/DL (ref 8.6–10.5)
CALCIUM SPEC-SCNC: 8.7 MG/DL (ref 8.6–10.5)
CALCIUM SPEC-SCNC: 8.8 MG/DL (ref 8.6–10.5)
CALCIUM SPEC-SCNC: 9 MG/DL (ref 8.6–10.5)
CHLORIDE SERPL-SCNC: 101 MMOL/L (ref 98–107)
CHLORIDE SERPL-SCNC: 102 MMOL/L (ref 98–107)
CHLORIDE SERPL-SCNC: 103 MMOL/L (ref 98–107)
CHLORIDE SERPL-SCNC: 103 MMOL/L (ref 98–107)
CHLORIDE SERPL-SCNC: 96 MMOL/L (ref 98–107)
CHLORIDE SERPL-SCNC: 98 MMOL/L (ref 98–107)
CHLORIDE SERPL-SCNC: 98 MMOL/L (ref 98–107)
CHLORIDE SERPL-SCNC: 99 MMOL/L (ref 98–107)
CLARITY UR: CLEAR
CO2 SERPL-SCNC: 15 MMOL/L (ref 22–29)
CO2 SERPL-SCNC: 16 MMOL/L (ref 22–29)
CO2 SERPL-SCNC: 19 MMOL/L (ref 22–29)
CO2 SERPL-SCNC: 21 MMOL/L (ref 22–29)
CO2 SERPL-SCNC: 22 MMOL/L (ref 22–29)
CO2 SERPL-SCNC: 22 MMOL/L (ref 22–29)
CO2 SERPL-SCNC: 24 MMOL/L (ref 22–29)
CO2 SERPL-SCNC: 27 MMOL/L (ref 22–29)
COD CRY URNS QL: ABNORMAL /HPF
COLOR UR: YELLOW
CREAT SERPL-MCNC: 0.93 MG/DL (ref 0.57–1)
CREAT SERPL-MCNC: 0.94 MG/DL (ref 0.57–1)
CREAT SERPL-MCNC: 1.01 MG/DL (ref 0.57–1)
CREAT SERPL-MCNC: 1.03 MG/DL (ref 0.57–1)
CREAT SERPL-MCNC: 1.15 MG/DL (ref 0.57–1)
CREAT SERPL-MCNC: 1.16 MG/DL (ref 0.57–1)
CREAT SERPL-MCNC: 1.16 MG/DL (ref 0.57–1)
CREAT SERPL-MCNC: 1.37 MG/DL (ref 0.57–1)
D-LACTATE SERPL-SCNC: 1.7 MMOL/L (ref 0.5–2)
D-LACTATE SERPL-SCNC: 1.9 MMOL/L (ref 0.5–2)
D-LACTATE SERPL-SCNC: 2.3 MMOL/L (ref 0.5–2)
D-LACTATE SERPL-SCNC: 2.5 MMOL/L (ref 0.5–2)
DEPRECATED RDW RBC AUTO: 55.4 FL (ref 37–54)
DEPRECATED RDW RBC AUTO: 55.8 FL (ref 37–54)
DEPRECATED RDW RBC AUTO: 56.5 FL (ref 37–54)
DEPRECATED RDW RBC AUTO: 57.5 FL (ref 37–54)
DEPRECATED RDW RBC AUTO: 57.7 FL (ref 37–54)
DEPRECATED RDW RBC AUTO: 60.1 FL (ref 37–54)
DEPRECATED RDW RBC AUTO: 61.3 FL (ref 37–54)
DEPRECATED RDW RBC AUTO: 63 FL (ref 37–54)
DEPRECATED RDW RBC AUTO: 64.5 FL (ref 37–54)
DEPRECATED RDW RBC AUTO: 66.8 FL (ref 37–54)
EGFRCR SERPLBLD CKD-EPI 2021: 40.6 ML/MIN/1.73
EGFRCR SERPLBLD CKD-EPI 2021: 49.6 ML/MIN/1.73
EGFRCR SERPLBLD CKD-EPI 2021: 49.6 ML/MIN/1.73
EGFRCR SERPLBLD CKD-EPI 2021: 50.1 ML/MIN/1.73
EGFRCR SERPLBLD CKD-EPI 2021: 57.2 ML/MIN/1.73
EGFRCR SERPLBLD CKD-EPI 2021: 58.5 ML/MIN/1.73
EGFRCR SERPLBLD CKD-EPI 2021: 63.8 ML/MIN/1.73
EGFRCR SERPLBLD CKD-EPI 2021: 64.6 ML/MIN/1.73
EOSINOPHIL # BLD AUTO: 0 10*3/MM3 (ref 0–0.4)
EOSINOPHIL # BLD AUTO: 0.03 10*3/MM3 (ref 0–0.4)
EOSINOPHIL # BLD AUTO: 0.06 10*3/MM3 (ref 0–0.4)
EOSINOPHIL # BLD AUTO: 0.06 10*3/MM3 (ref 0–0.4)
EOSINOPHIL # BLD AUTO: 0.07 10*3/MM3 (ref 0–0.4)
EOSINOPHIL # BLD AUTO: 0.08 10*3/MM3 (ref 0–0.4)
EOSINOPHIL # BLD AUTO: 0.09 10*3/MM3 (ref 0–0.4)
EOSINOPHIL # BLD AUTO: 0.13 10*3/MM3 (ref 0–0.4)
EOSINOPHIL # BLD AUTO: 0.15 10*3/MM3 (ref 0–0.4)
EOSINOPHIL NFR BLD AUTO: 0 % (ref 0.3–6.2)
EOSINOPHIL NFR BLD AUTO: 0.4 % (ref 0.3–6.2)
EOSINOPHIL NFR BLD AUTO: 0.8 % (ref 0.3–6.2)
EOSINOPHIL NFR BLD AUTO: 0.8 % (ref 0.3–6.2)
EOSINOPHIL NFR BLD AUTO: 0.9 % (ref 0.3–6.2)
EOSINOPHIL NFR BLD AUTO: 1.1 % (ref 0.3–6.2)
EOSINOPHIL NFR BLD AUTO: 1.2 % (ref 0.3–6.2)
EOSINOPHIL NFR BLD AUTO: 1.9 % (ref 0.3–6.2)
EOSINOPHIL NFR BLD AUTO: 2.1 % (ref 0.3–6.2)
ERYTHROCYTE [DISTWIDTH] IN BLOOD BY AUTOMATED COUNT: 20 % (ref 12.3–15.4)
ERYTHROCYTE [DISTWIDTH] IN BLOOD BY AUTOMATED COUNT: 20.4 % (ref 12.3–15.4)
ERYTHROCYTE [DISTWIDTH] IN BLOOD BY AUTOMATED COUNT: 20.5 % (ref 12.3–15.4)
ERYTHROCYTE [DISTWIDTH] IN BLOOD BY AUTOMATED COUNT: 20.7 % (ref 12.3–15.4)
ERYTHROCYTE [DISTWIDTH] IN BLOOD BY AUTOMATED COUNT: 20.9 % (ref 12.3–15.4)
ERYTHROCYTE [DISTWIDTH] IN BLOOD BY AUTOMATED COUNT: 21.3 % (ref 12.3–15.4)
ERYTHROCYTE [DISTWIDTH] IN BLOOD BY AUTOMATED COUNT: 21.6 % (ref 12.3–15.4)
ERYTHROCYTE [DISTWIDTH] IN BLOOD BY AUTOMATED COUNT: 21.7 % (ref 12.3–15.4)
ERYTHROCYTE [DISTWIDTH] IN BLOOD BY AUTOMATED COUNT: 21.7 % (ref 12.3–15.4)
ERYTHROCYTE [DISTWIDTH] IN BLOOD BY AUTOMATED COUNT: 22.2 % (ref 12.3–15.4)
GEN 5 2HR TROPONIN T REFLEX: 19 NG/L
GLOBULIN UR ELPH-MCNC: 1.8 GM/DL
GLOBULIN UR ELPH-MCNC: 1.8 GM/DL
GLOBULIN UR ELPH-MCNC: 2 GM/DL
GLOBULIN UR ELPH-MCNC: 2.1 GM/DL
GLOBULIN UR ELPH-MCNC: 2.2 GM/DL
GLOBULIN UR ELPH-MCNC: 2.3 GM/DL
GLOBULIN UR ELPH-MCNC: 2.4 GM/DL
GLOBULIN UR ELPH-MCNC: 2.6 GM/DL
GLUCOSE BLDC GLUCOMTR-MCNC: 105 MG/DL (ref 70–130)
GLUCOSE BLDC GLUCOMTR-MCNC: 106 MG/DL (ref 70–130)
GLUCOSE BLDC GLUCOMTR-MCNC: 107 MG/DL (ref 70–130)
GLUCOSE BLDC GLUCOMTR-MCNC: 109 MG/DL (ref 70–130)
GLUCOSE BLDC GLUCOMTR-MCNC: 111 MG/DL (ref 70–130)
GLUCOSE BLDC GLUCOMTR-MCNC: 112 MG/DL (ref 70–130)
GLUCOSE BLDC GLUCOMTR-MCNC: 116 MG/DL (ref 70–130)
GLUCOSE BLDC GLUCOMTR-MCNC: 119 MG/DL (ref 70–130)
GLUCOSE BLDC GLUCOMTR-MCNC: 124 MG/DL (ref 70–130)
GLUCOSE BLDC GLUCOMTR-MCNC: 124 MG/DL (ref 70–130)
GLUCOSE BLDC GLUCOMTR-MCNC: 127 MG/DL (ref 70–130)
GLUCOSE BLDC GLUCOMTR-MCNC: 134 MG/DL (ref 70–130)
GLUCOSE BLDC GLUCOMTR-MCNC: 135 MG/DL (ref 70–130)
GLUCOSE BLDC GLUCOMTR-MCNC: 141 MG/DL (ref 70–130)
GLUCOSE BLDC GLUCOMTR-MCNC: 144 MG/DL (ref 70–130)
GLUCOSE BLDC GLUCOMTR-MCNC: 145 MG/DL (ref 70–130)
GLUCOSE BLDC GLUCOMTR-MCNC: 149 MG/DL (ref 70–130)
GLUCOSE BLDC GLUCOMTR-MCNC: 153 MG/DL (ref 70–130)
GLUCOSE BLDC GLUCOMTR-MCNC: 157 MG/DL (ref 70–130)
GLUCOSE BLDC GLUCOMTR-MCNC: 158 MG/DL (ref 70–130)
GLUCOSE BLDC GLUCOMTR-MCNC: 159 MG/DL (ref 70–130)
GLUCOSE BLDC GLUCOMTR-MCNC: 159 MG/DL (ref 70–130)
GLUCOSE BLDC GLUCOMTR-MCNC: 169 MG/DL (ref 70–130)
GLUCOSE BLDC GLUCOMTR-MCNC: 184 MG/DL (ref 70–130)
GLUCOSE BLDC GLUCOMTR-MCNC: 194 MG/DL (ref 70–130)
GLUCOSE BLDC GLUCOMTR-MCNC: 199 MG/DL (ref 70–130)
GLUCOSE BLDC GLUCOMTR-MCNC: 264 MG/DL (ref 70–130)
GLUCOSE BLDC GLUCOMTR-MCNC: 336 MG/DL (ref 70–130)
GLUCOSE BLDC GLUCOMTR-MCNC: 35 MG/DL (ref 70–130)
GLUCOSE BLDC GLUCOMTR-MCNC: 36 MG/DL (ref 70–130)
GLUCOSE BLDC GLUCOMTR-MCNC: 43 MG/DL (ref 70–130)
GLUCOSE BLDC GLUCOMTR-MCNC: 72 MG/DL (ref 70–130)
GLUCOSE BLDC GLUCOMTR-MCNC: 72 MG/DL (ref 70–130)
GLUCOSE BLDC GLUCOMTR-MCNC: 76 MG/DL (ref 70–130)
GLUCOSE BLDC GLUCOMTR-MCNC: 77 MG/DL (ref 70–130)
GLUCOSE BLDC GLUCOMTR-MCNC: 82 MG/DL (ref 70–130)
GLUCOSE BLDC GLUCOMTR-MCNC: 83 MG/DL (ref 70–130)
GLUCOSE BLDC GLUCOMTR-MCNC: 84 MG/DL (ref 70–130)
GLUCOSE BLDC GLUCOMTR-MCNC: 85 MG/DL (ref 70–130)
GLUCOSE BLDC GLUCOMTR-MCNC: 87 MG/DL (ref 70–130)
GLUCOSE BLDC GLUCOMTR-MCNC: 97 MG/DL (ref 70–130)
GLUCOSE BLDC GLUCOMTR-MCNC: 99 MG/DL (ref 70–130)
GLUCOSE SERPL-MCNC: 104 MG/DL (ref 65–99)
GLUCOSE SERPL-MCNC: 106 MG/DL (ref 65–99)
GLUCOSE SERPL-MCNC: 128 MG/DL (ref 65–99)
GLUCOSE SERPL-MCNC: 134 MG/DL (ref 65–99)
GLUCOSE SERPL-MCNC: 167 MG/DL (ref 65–99)
GLUCOSE SERPL-MCNC: 82 MG/DL (ref 65–99)
GLUCOSE SERPL-MCNC: 83 MG/DL (ref 65–99)
GLUCOSE SERPL-MCNC: 84 MG/DL (ref 65–99)
GLUCOSE SERPL-MCNC: 85 MG/DL (ref 65–99)
GLUCOSE SERPL-MCNC: 94 MG/DL (ref 65–99)
GLUCOSE UR STRIP-MCNC: ABNORMAL MG/DL
HCO3 BLDA-SCNC: 23 MMOL/L (ref 20–26)
HCT VFR BLD AUTO: 32.3 % (ref 34–46.6)
HCT VFR BLD AUTO: 36 % (ref 34–46.6)
HCT VFR BLD AUTO: 36.6 % (ref 34–46.6)
HCT VFR BLD AUTO: 38.1 % (ref 34–46.6)
HCT VFR BLD AUTO: 38.8 % (ref 34–46.6)
HCT VFR BLD AUTO: 39 % (ref 34–46.6)
HCT VFR BLD AUTO: 39.5 % (ref 34–46.6)
HCT VFR BLD AUTO: 40.4 % (ref 34–46.6)
HCT VFR BLD AUTO: 41.9 % (ref 34–46.6)
HCT VFR BLD AUTO: 42 % (ref 34–46.6)
HGB BLD-MCNC: 11 G/DL (ref 12–15.9)
HGB BLD-MCNC: 12 G/DL (ref 12–15.9)
HGB BLD-MCNC: 12.3 G/DL (ref 12–15.9)
HGB BLD-MCNC: 12.9 G/DL (ref 12–15.9)
HGB BLD-MCNC: 13 G/DL (ref 12–15.9)
HGB BLD-MCNC: 13.4 G/DL (ref 12–15.9)
HGB BLD-MCNC: 13.4 G/DL (ref 12–15.9)
HGB BLD-MCNC: 13.5 G/DL (ref 12–15.9)
HGB BLD-MCNC: 13.6 G/DL (ref 12–15.9)
HGB BLD-MCNC: 13.8 G/DL (ref 12–15.9)
HGB UR QL STRIP.AUTO: ABNORMAL
HOLD SPECIMEN: NORMAL
HOLD SPECIMEN: NORMAL
HYALINE CASTS UR QL AUTO: ABNORMAL /LPF
HYPOCHROMIA BLD QL: NORMAL
IMM GRANULOCYTES # BLD AUTO: 0.01 10*3/MM3 (ref 0–0.05)
IMM GRANULOCYTES # BLD AUTO: 0.02 10*3/MM3 (ref 0–0.05)
IMM GRANULOCYTES # BLD AUTO: 0.03 10*3/MM3 (ref 0–0.05)
IMM GRANULOCYTES # BLD AUTO: 0.04 10*3/MM3 (ref 0–0.05)
IMM GRANULOCYTES NFR BLD AUTO: 0.1 % (ref 0–0.5)
IMM GRANULOCYTES NFR BLD AUTO: 0.3 % (ref 0–0.5)
IMM GRANULOCYTES NFR BLD AUTO: 0.4 % (ref 0–0.5)
IMM GRANULOCYTES NFR BLD AUTO: 0.5 % (ref 0–0.5)
IMM GRANULOCYTES NFR BLD AUTO: 0.5 % (ref 0–0.5)
INR PPP: 1.97 (ref 0.8–1.2)
KETONES UR QL STRIP: ABNORMAL
L PNEUMO1 AG UR QL IA: NEGATIVE
LEUKOCYTE ESTERASE UR QL STRIP.AUTO: NEGATIVE
LIPASE SERPL-CCNC: 87 U/L (ref 13–60)
LYMPHOCYTES # BLD AUTO: 1.14 10*3/MM3 (ref 0.7–3.1)
LYMPHOCYTES # BLD AUTO: 1.58 10*3/MM3 (ref 0.7–3.1)
LYMPHOCYTES # BLD AUTO: 1.59 10*3/MM3 (ref 0.7–3.1)
LYMPHOCYTES # BLD AUTO: 1.62 10*3/MM3 (ref 0.7–3.1)
LYMPHOCYTES # BLD AUTO: 1.66 10*3/MM3 (ref 0.7–3.1)
LYMPHOCYTES # BLD AUTO: 1.78 10*3/MM3 (ref 0.7–3.1)
LYMPHOCYTES # BLD AUTO: 1.94 10*3/MM3 (ref 0.7–3.1)
LYMPHOCYTES # BLD AUTO: 2.23 10*3/MM3 (ref 0.7–3.1)
LYMPHOCYTES # BLD AUTO: 2.63 10*3/MM3 (ref 0.7–3.1)
LYMPHOCYTES NFR BLD AUTO: 19.5 % (ref 19.6–45.3)
LYMPHOCYTES NFR BLD AUTO: 21.6 % (ref 19.6–45.3)
LYMPHOCYTES NFR BLD AUTO: 22.2 % (ref 19.6–45.3)
LYMPHOCYTES NFR BLD AUTO: 23.4 % (ref 19.6–45.3)
LYMPHOCYTES NFR BLD AUTO: 26.6 % (ref 19.6–45.3)
LYMPHOCYTES NFR BLD AUTO: 26.6 % (ref 19.6–45.3)
LYMPHOCYTES NFR BLD AUTO: 26.7 % (ref 19.6–45.3)
LYMPHOCYTES NFR BLD AUTO: 31.4 % (ref 19.6–45.3)
LYMPHOCYTES NFR BLD AUTO: 35.4 % (ref 19.6–45.3)
Lab: ABNORMAL
MAGNESIUM SERPL-MCNC: 1.1 MG/DL (ref 1.6–2.4)
MCH RBC QN AUTO: 26.7 PG (ref 26.6–33)
MCH RBC QN AUTO: 26.8 PG (ref 26.6–33)
MCH RBC QN AUTO: 27 PG (ref 26.6–33)
MCH RBC QN AUTO: 27.2 PG (ref 26.6–33)
MCH RBC QN AUTO: 27.9 PG (ref 26.6–33)
MCH RBC QN AUTO: 27.9 PG (ref 26.6–33)
MCH RBC QN AUTO: 28.6 PG (ref 26.6–33)
MCH RBC QN AUTO: 28.6 PG (ref 26.6–33)
MCHC RBC AUTO-ENTMCNC: 32.4 G/DL (ref 31.5–35.7)
MCHC RBC AUTO-ENTMCNC: 32.9 G/DL (ref 31.5–35.7)
MCHC RBC AUTO-ENTMCNC: 33.3 G/DL (ref 31.5–35.7)
MCHC RBC AUTO-ENTMCNC: 33.3 G/DL (ref 31.5–35.7)
MCHC RBC AUTO-ENTMCNC: 33.4 G/DL (ref 31.5–35.7)
MCHC RBC AUTO-ENTMCNC: 33.6 G/DL (ref 31.5–35.7)
MCHC RBC AUTO-ENTMCNC: 33.9 G/DL (ref 31.5–35.7)
MCHC RBC AUTO-ENTMCNC: 33.9 G/DL (ref 31.5–35.7)
MCHC RBC AUTO-ENTMCNC: 34.1 G/DL (ref 31.5–35.7)
MCHC RBC AUTO-ENTMCNC: 34.5 G/DL (ref 31.5–35.7)
MCV RBC AUTO: 79.5 FL (ref 79–97)
MCV RBC AUTO: 79.9 FL (ref 79–97)
MCV RBC AUTO: 80.2 FL (ref 79–97)
MCV RBC AUTO: 80.4 FL (ref 79–97)
MCV RBC AUTO: 80.7 FL (ref 79–97)
MCV RBC AUTO: 81.6 FL (ref 79–97)
MCV RBC AUTO: 82.4 FL (ref 79–97)
MCV RBC AUTO: 83.9 FL (ref 79–97)
MCV RBC AUTO: 84.8 FL (ref 79–97)
MCV RBC AUTO: 85.9 FL (ref 79–97)
MODALITY: ABNORMAL
MONOCYTES # BLD AUTO: 0.32 10*3/MM3 (ref 0.1–0.9)
MONOCYTES # BLD AUTO: 0.81 10*3/MM3 (ref 0.1–0.9)
MONOCYTES # BLD AUTO: 0.89 10*3/MM3 (ref 0.1–0.9)
MONOCYTES # BLD AUTO: 0.96 10*3/MM3 (ref 0.1–0.9)
MONOCYTES # BLD AUTO: 0.97 10*3/MM3 (ref 0.1–0.9)
MONOCYTES # BLD AUTO: 0.98 10*3/MM3 (ref 0.1–0.9)
MONOCYTES # BLD AUTO: 1.04 10*3/MM3 (ref 0.1–0.9)
MONOCYTES # BLD AUTO: 1.09 10*3/MM3 (ref 0.1–0.9)
MONOCYTES # BLD AUTO: 1.29 10*3/MM3 (ref 0.1–0.9)
MONOCYTES NFR BLD AUTO: 11.3 % (ref 5–12)
MONOCYTES NFR BLD AUTO: 11.9 % (ref 5–12)
MONOCYTES NFR BLD AUTO: 12.8 % (ref 5–12)
MONOCYTES NFR BLD AUTO: 13.2 % (ref 5–12)
MONOCYTES NFR BLD AUTO: 13.3 % (ref 5–12)
MONOCYTES NFR BLD AUTO: 14.3 % (ref 5–12)
MONOCYTES NFR BLD AUTO: 15.4 % (ref 5–12)
MONOCYTES NFR BLD AUTO: 18.2 % (ref 5–12)
MONOCYTES NFR BLD AUTO: 7.5 % (ref 5–12)
MYCOPLASMAE PNEUMONIAE BY PCR: NEGATIVE
NEUTROPHILS NFR BLD AUTO: 2.78 10*3/MM3 (ref 1.7–7)
NEUTROPHILS NFR BLD AUTO: 3.59 10*3/MM3 (ref 1.7–7)
NEUTROPHILS NFR BLD AUTO: 3.63 10*3/MM3 (ref 1.7–7)
NEUTROPHILS NFR BLD AUTO: 3.78 10*3/MM3 (ref 1.7–7)
NEUTROPHILS NFR BLD AUTO: 4.04 10*3/MM3 (ref 1.7–7)
NEUTROPHILS NFR BLD AUTO: 4.27 10*3/MM3 (ref 1.7–7)
NEUTROPHILS NFR BLD AUTO: 4.62 10*3/MM3 (ref 1.7–7)
NEUTROPHILS NFR BLD AUTO: 4.85 10*3/MM3 (ref 1.7–7)
NEUTROPHILS NFR BLD AUTO: 49 % (ref 42.7–76)
NEUTROPHILS NFR BLD AUTO: 5.36 10*3/MM3 (ref 1.7–7)
NEUTROPHILS NFR BLD AUTO: 50.6 % (ref 42.7–76)
NEUTROPHILS NFR BLD AUTO: 56.5 % (ref 42.7–76)
NEUTROPHILS NFR BLD AUTO: 57 % (ref 42.7–76)
NEUTROPHILS NFR BLD AUTO: 58.5 % (ref 42.7–76)
NEUTROPHILS NFR BLD AUTO: 64.5 % (ref 42.7–76)
NEUTROPHILS NFR BLD AUTO: 64.8 % (ref 42.7–76)
NEUTROPHILS NFR BLD AUTO: 65.1 % (ref 42.7–76)
NEUTROPHILS NFR BLD AUTO: 66 % (ref 42.7–76)
NITRITE UR QL STRIP: NEGATIVE
NRBC BLD AUTO-RTO: 0 /100 WBC (ref 0–0.2)
NRBC BLD AUTO-RTO: 0.3 /100 WBC (ref 0–0.2)
NRBC BLD AUTO-RTO: 0.6 /100 WBC (ref 0–0.2)
PCO2 BLDA: 32.9 MM HG (ref 35–45)
PH BLDA: 7.45 PH UNITS (ref 7.35–7.45)
PH UR STRIP.AUTO: 6 [PH] (ref 5–9)
PLAT MORPH BLD: NORMAL
PLATELET # BLD AUTO: 140 10*3/MM3 (ref 140–450)
PLATELET # BLD AUTO: 144 10*3/MM3 (ref 140–450)
PLATELET # BLD AUTO: 146 10*3/MM3 (ref 140–450)
PLATELET # BLD AUTO: 172 10*3/MM3 (ref 140–450)
PLATELET # BLD AUTO: 185 10*3/MM3 (ref 140–450)
PLATELET # BLD AUTO: 187 10*3/MM3 (ref 140–450)
PLATELET # BLD AUTO: 202 10*3/MM3 (ref 140–450)
PLATELET # BLD AUTO: 205 10*3/MM3 (ref 140–450)
PLATELET # BLD AUTO: 214 10*3/MM3 (ref 140–450)
PLATELET # BLD AUTO: 225 10*3/MM3 (ref 140–450)
PMV BLD AUTO: 10.1 FL (ref 6–12)
PMV BLD AUTO: 10.3 FL (ref 6–12)
PMV BLD AUTO: 9.3 FL (ref 6–12)
PMV BLD AUTO: 9.4 FL (ref 6–12)
PMV BLD AUTO: 9.6 FL (ref 6–12)
PMV BLD AUTO: 9.6 FL (ref 6–12)
PMV BLD AUTO: 9.7 FL (ref 6–12)
PMV BLD AUTO: 9.8 FL (ref 6–12)
PO2 BLDA: 81.1 MM HG (ref 83–108)
POTASSIUM SERPL-SCNC: 2.9 MMOL/L (ref 3.5–5.2)
POTASSIUM SERPL-SCNC: 3.1 MMOL/L (ref 3.5–5.2)
POTASSIUM SERPL-SCNC: 3.3 MMOL/L (ref 3.5–5.2)
POTASSIUM SERPL-SCNC: 3.5 MMOL/L (ref 3.5–5.2)
POTASSIUM SERPL-SCNC: 3.5 MMOL/L (ref 3.5–5.2)
POTASSIUM SERPL-SCNC: 3.6 MMOL/L (ref 3.5–5.2)
POTASSIUM SERPL-SCNC: 3.7 MMOL/L (ref 3.5–5.2)
POTASSIUM SERPL-SCNC: 3.9 MMOL/L (ref 3.5–5.2)
POTASSIUM SERPL-SCNC: 3.9 MMOL/L (ref 3.5–5.2)
POTASSIUM SERPL-SCNC: 4.2 MMOL/L (ref 3.5–5.2)
POTASSIUM SERPL-SCNC: 4.6 MMOL/L (ref 3.5–5.2)
POTASSIUM SERPL-SCNC: 4.8 MMOL/L (ref 3.5–5.2)
PROT SERPL-MCNC: 5.3 G/DL (ref 6–8.5)
PROT SERPL-MCNC: 5.4 G/DL (ref 6–8.5)
PROT SERPL-MCNC: 5.5 G/DL (ref 6–8.5)
PROT SERPL-MCNC: 5.5 G/DL (ref 6–8.5)
PROT SERPL-MCNC: 5.6 G/DL (ref 6–8.5)
PROT SERPL-MCNC: 6 G/DL (ref 6–8.5)
PROT SERPL-MCNC: 6.3 G/DL (ref 6–8.5)
PROT UR QL STRIP: ABNORMAL
PROTHROMBIN TIME: 22.4 SECONDS (ref 11.1–15.3)
QT INTERVAL: 376 MS
QT INTERVAL: 384 MS
QTC INTERVAL: 503 MS
QTC INTERVAL: 503 MS
RBC # BLD AUTO: 3.85 10*6/MM3 (ref 3.77–5.28)
RBC # BLD AUTO: 4.41 10*6/MM3 (ref 3.77–5.28)
RBC # BLD AUTO: 4.54 10*6/MM3 (ref 3.77–5.28)
RBC # BLD AUTO: 4.55 10*6/MM3 (ref 3.77–5.28)
RBC # BLD AUTO: 4.77 10*6/MM3 (ref 3.77–5.28)
RBC # BLD AUTO: 4.81 10*6/MM3 (ref 3.77–5.28)
RBC # BLD AUTO: 4.94 10*6/MM3 (ref 3.77–5.28)
RBC # BLD AUTO: 4.97 10*6/MM3 (ref 3.77–5.28)
RBC # BLD AUTO: 5.04 10*6/MM3 (ref 3.77–5.28)
RBC # BLD AUTO: 5.1 10*6/MM3 (ref 3.77–5.28)
RBC # UR STRIP: ABNORMAL /HPF
RBC MORPH BLD: NORMAL
REF LAB TEST METHOD: ABNORMAL
S PNEUM AG SPEC QL LA: NEGATIVE
SAO2 % BLDCOA: 96.7 % (ref 94–99)
SARS-COV-2 RNA PNL SPEC NAA+PROBE: NOT DETECTED
SMALL PLATELETS BLD QL SMEAR: ADEQUATE
SODIUM SERPL-SCNC: 127 MMOL/L (ref 136–145)
SODIUM SERPL-SCNC: 131 MMOL/L (ref 136–145)
SODIUM SERPL-SCNC: 131 MMOL/L (ref 136–145)
SODIUM SERPL-SCNC: 133 MMOL/L (ref 136–145)
SODIUM SERPL-SCNC: 135 MMOL/L (ref 136–145)
SODIUM SERPL-SCNC: 136 MMOL/L (ref 136–145)
SODIUM SERPL-SCNC: 137 MMOL/L (ref 136–145)
SP GR UR STRIP: 1.02 (ref 1–1.03)
SQUAMOUS #/AREA URNS HPF: ABNORMAL /HPF
TARGETS BLD QL SMEAR: NORMAL
TROPONIN T DELTA: 2 NG/L
TROPONIN T SERPL HS-MCNC: 17 NG/L
UROBILINOGEN UR QL STRIP: ABNORMAL
VENTILATOR MODE: ABNORMAL
WBC # UR STRIP: ABNORMAL /HPF
WBC MORPH BLD: NORMAL
WBC MORPH BLD: NORMAL
WBC NRBC COR # BLD: 4.27 10*3/MM3 (ref 3.4–10.8)
WBC NRBC COR # BLD: 6.7 10*3/MM3 (ref 3.4–10.8)
WBC NRBC COR # BLD: 7.09 10*3/MM3 (ref 3.4–10.8)
WBC NRBC COR # BLD: 7.1 10*3/MM3 (ref 3.4–10.8)
WBC NRBC COR # BLD: 7.13 10*3/MM3 (ref 3.4–10.8)
WBC NRBC COR # BLD: 7.16 10*3/MM3 (ref 3.4–10.8)
WBC NRBC COR # BLD: 7.3 10*3/MM3 (ref 3.4–10.8)
WBC NRBC COR # BLD: 7.42 10*3/MM3 (ref 3.4–10.8)
WBC NRBC COR # BLD: 7.49 10*3/MM3 (ref 3.4–10.8)
WBC NRBC COR # BLD: 8.11 10*3/MM3 (ref 3.4–10.8)
WHOLE BLOOD HOLD COAG: NORMAL

## 2023-01-01 PROCEDURE — 99285 EMERGENCY DEPT VISIT HI MDM: CPT

## 2023-01-01 PROCEDURE — 87086 URINE CULTURE/COLONY COUNT: CPT | Performed by: EMERGENCY MEDICINE

## 2023-01-01 PROCEDURE — 82140 ASSAY OF AMMONIA: CPT | Performed by: STUDENT IN AN ORGANIZED HEALTH CARE EDUCATION/TRAINING PROGRAM

## 2023-01-01 PROCEDURE — 25510000001 IOPAMIDOL 61 % SOLUTION: Performed by: EMERGENCY MEDICINE

## 2023-01-01 PROCEDURE — 87581 M.PNEUMON DNA AMP PROBE: CPT | Performed by: CHIROPRACTOR

## 2023-01-01 PROCEDURE — 80053 COMPREHEN METABOLIC PANEL: CPT | Performed by: NURSE PRACTITIONER

## 2023-01-01 PROCEDURE — 85025 COMPLETE CBC W/AUTO DIFF WBC: CPT | Performed by: NURSE PRACTITIONER

## 2023-01-01 PROCEDURE — G0378 HOSPITAL OBSERVATION PER HR: HCPCS

## 2023-01-01 PROCEDURE — 96366 THER/PROPH/DIAG IV INF ADDON: CPT

## 2023-01-01 PROCEDURE — 96372 THER/PROPH/DIAG INJ SC/IM: CPT

## 2023-01-01 PROCEDURE — 80053 COMPREHEN METABOLIC PANEL: CPT | Performed by: EMERGENCY MEDICINE

## 2023-01-01 PROCEDURE — 94799 UNLISTED PULMONARY SVC/PX: CPT

## 2023-01-01 PROCEDURE — 87040 BLOOD CULTURE FOR BACTERIA: CPT | Performed by: EMERGENCY MEDICINE

## 2023-01-01 PROCEDURE — 83690 ASSAY OF LIPASE: CPT | Performed by: EMERGENCY MEDICINE

## 2023-01-01 PROCEDURE — 63710000001 INSULIN ASPART PER 5 UNITS: Performed by: INTERNAL MEDICINE

## 2023-01-01 PROCEDURE — 87635 SARS-COV-2 COVID-19 AMP PRB: CPT | Performed by: HOSPITALIST

## 2023-01-01 PROCEDURE — 82962 GLUCOSE BLOOD TEST: CPT

## 2023-01-01 PROCEDURE — 74177 CT ABD & PELVIS W/CONTRAST: CPT

## 2023-01-01 PROCEDURE — 83735 ASSAY OF MAGNESIUM: CPT | Performed by: INTERNAL MEDICINE

## 2023-01-01 PROCEDURE — 94760 N-INVAS EAR/PLS OXIMETRY 1: CPT

## 2023-01-01 PROCEDURE — 82140 ASSAY OF AMMONIA: CPT | Performed by: INTERNAL MEDICINE

## 2023-01-01 PROCEDURE — 84132 ASSAY OF SERUM POTASSIUM: CPT | Performed by: HOSPITALIST

## 2023-01-01 PROCEDURE — 80053 COMPREHEN METABOLIC PANEL: CPT | Performed by: CHIROPRACTOR

## 2023-01-01 PROCEDURE — 99222 1ST HOSP IP/OBS MODERATE 55: CPT | Performed by: CHIROPRACTOR

## 2023-01-01 PROCEDURE — 25010000002 FUROSEMIDE PER 20 MG: Performed by: HOSPITALIST

## 2023-01-01 PROCEDURE — 83605 ASSAY OF LACTIC ACID: CPT | Performed by: EMERGENCY MEDICINE

## 2023-01-01 PROCEDURE — 93005 ELECTROCARDIOGRAM TRACING: CPT | Performed by: EMERGENCY MEDICINE

## 2023-01-01 PROCEDURE — 96365 THER/PROPH/DIAG IV INF INIT: CPT

## 2023-01-01 PROCEDURE — 87040 BLOOD CULTURE FOR BACTERIA: CPT | Performed by: CHIROPRACTOR

## 2023-01-01 PROCEDURE — 25010000002 MAGNESIUM SULFATE 2 GM/50ML SOLUTION: Performed by: INTERNAL MEDICINE

## 2023-01-01 PROCEDURE — 25010000002 ONDANSETRON PER 1 MG: Performed by: INTERNAL MEDICINE

## 2023-01-01 PROCEDURE — 36415 COLL VENOUS BLD VENIPUNCTURE: CPT | Performed by: EMERGENCY MEDICINE

## 2023-01-01 PROCEDURE — 83605 ASSAY OF LACTIC ACID: CPT | Performed by: INTERNAL MEDICINE

## 2023-01-01 PROCEDURE — 99239 HOSP IP/OBS DSCHRG MGMT >30: CPT | Performed by: CHIROPRACTOR

## 2023-01-01 PROCEDURE — 25010000002 CEFTRIAXONE PER 250 MG: Performed by: EMERGENCY MEDICINE

## 2023-01-01 PROCEDURE — 85025 COMPLETE CBC W/AUTO DIFF WBC: CPT | Performed by: EMERGENCY MEDICINE

## 2023-01-01 PROCEDURE — 36600 WITHDRAWAL OF ARTERIAL BLOOD: CPT

## 2023-01-01 PROCEDURE — 93010 ELECTROCARDIOGRAM REPORT: CPT | Performed by: INTERNAL MEDICINE

## 2023-01-01 PROCEDURE — 96367 TX/PROPH/DG ADDL SEQ IV INF: CPT

## 2023-01-01 PROCEDURE — 25010000002 ALBUMIN HUMAN 5% PER 50 ML: Performed by: INTERNAL MEDICINE

## 2023-01-01 PROCEDURE — 25010000002 CEFTRIAXONE PER 250 MG: Performed by: NURSE PRACTITIONER

## 2023-01-01 PROCEDURE — P9041 ALBUMIN (HUMAN),5%, 50ML: HCPCS | Performed by: INTERNAL MEDICINE

## 2023-01-01 PROCEDURE — 85610 PROTHROMBIN TIME: CPT | Performed by: INTERNAL MEDICINE

## 2023-01-01 PROCEDURE — 84484 ASSAY OF TROPONIN QUANT: CPT | Performed by: EMERGENCY MEDICINE

## 2023-01-01 PROCEDURE — 71045 X-RAY EXAM CHEST 1 VIEW: CPT

## 2023-01-01 PROCEDURE — P9612 CATHETERIZE FOR URINE SPEC: HCPCS

## 2023-01-01 PROCEDURE — 85007 BL SMEAR W/DIFF WBC COUNT: CPT | Performed by: NURSE PRACTITIONER

## 2023-01-01 PROCEDURE — 63710000001 INSULIN DETEMIR PER 5 UNITS: Performed by: CHIROPRACTOR

## 2023-01-01 PROCEDURE — 70450 CT HEAD/BRAIN W/O DYE: CPT

## 2023-01-01 PROCEDURE — 87449 NOS EACH ORGANISM AG IA: CPT | Performed by: CHIROPRACTOR

## 2023-01-01 PROCEDURE — 25010000002 AZITHROMYCIN PER 500 MG: Performed by: CHIROPRACTOR

## 2023-01-01 PROCEDURE — 94761 N-INVAS EAR/PLS OXIMETRY MLT: CPT

## 2023-01-01 PROCEDURE — 97165 OT EVAL LOW COMPLEX 30 MIN: CPT

## 2023-01-01 PROCEDURE — 63710000001 INSULIN ASPART PER 5 UNITS: Performed by: CHIROPRACTOR

## 2023-01-01 PROCEDURE — 84132 ASSAY OF SERUM POTASSIUM: CPT | Performed by: INTERNAL MEDICINE

## 2023-01-01 PROCEDURE — 96368 THER/DIAG CONCURRENT INF: CPT

## 2023-01-01 PROCEDURE — 25010000002 ENOXAPARIN PER 10 MG: Performed by: CHIROPRACTOR

## 2023-01-01 PROCEDURE — 87899 AGENT NOS ASSAY W/OPTIC: CPT | Performed by: CHIROPRACTOR

## 2023-01-01 PROCEDURE — 94664 DEMO&/EVAL PT USE INHALER: CPT

## 2023-01-01 PROCEDURE — 81001 URINALYSIS AUTO W/SCOPE: CPT | Performed by: EMERGENCY MEDICINE

## 2023-01-01 PROCEDURE — 96361 HYDRATE IV INFUSION ADD-ON: CPT

## 2023-01-01 PROCEDURE — 97162 PT EVAL MOD COMPLEX 30 MIN: CPT

## 2023-01-01 PROCEDURE — 25010000002 CEFEPIME PER 500 MG: Performed by: STUDENT IN AN ORGANIZED HEALTH CARE EDUCATION/TRAINING PROGRAM

## 2023-01-01 PROCEDURE — 94640 AIRWAY INHALATION TREATMENT: CPT

## 2023-01-01 PROCEDURE — 96375 TX/PRO/DX INJ NEW DRUG ADDON: CPT

## 2023-01-01 PROCEDURE — 82803 BLOOD GASES ANY COMBINATION: CPT

## 2023-01-01 PROCEDURE — 25010000002 METHYLPREDNISOLONE PER 125 MG: Performed by: EMERGENCY MEDICINE

## 2023-01-01 PROCEDURE — 85027 COMPLETE CBC AUTOMATED: CPT | Performed by: INTERNAL MEDICINE

## 2023-01-01 PROCEDURE — 0 POTASSIUM CHLORIDE 10 MEQ/100ML SOLUTION: Performed by: INTERNAL MEDICINE

## 2023-01-01 PROCEDURE — 85025 COMPLETE CBC W/AUTO DIFF WBC: CPT | Performed by: CHIROPRACTOR

## 2023-01-01 PROCEDURE — 85007 BL SMEAR W/DIFF WBC COUNT: CPT | Performed by: EMERGENCY MEDICINE

## 2023-01-01 PROCEDURE — 87102 FUNGUS ISOLATION CULTURE: CPT | Performed by: STUDENT IN AN ORGANIZED HEALTH CARE EDUCATION/TRAINING PROGRAM

## 2023-01-01 RX ORDER — METHYLPREDNISOLONE SODIUM SUCCINATE 125 MG/2ML
125 INJECTION, POWDER, LYOPHILIZED, FOR SOLUTION INTRAMUSCULAR; INTRAVENOUS ONCE
Status: COMPLETED | OUTPATIENT
Start: 2023-01-01 | End: 2023-01-01

## 2023-01-01 RX ORDER — ALBUTEROL SULFATE 2.5 MG/3ML
2.5 SOLUTION RESPIRATORY (INHALATION) EVERY 4 HOURS PRN
Status: DISCONTINUED | OUTPATIENT
Start: 2023-01-01 | End: 2023-01-01 | Stop reason: HOSPADM

## 2023-01-01 RX ORDER — PANTOPRAZOLE SODIUM 40 MG/1
40 TABLET, DELAYED RELEASE ORAL
Status: DISCONTINUED | OUTPATIENT
Start: 2023-01-01 | End: 2023-01-01 | Stop reason: HOSPADM

## 2023-01-01 RX ORDER — ATORVASTATIN CALCIUM 40 MG/1
40 TABLET, FILM COATED ORAL DAILY
Status: DISCONTINUED | OUTPATIENT
Start: 2023-01-01 | End: 2023-03-17 | Stop reason: HOSPADM

## 2023-01-01 RX ORDER — POTASSIUM CHLORIDE 1.5 G/1.77G
40 POWDER, FOR SOLUTION ORAL AS NEEDED
Status: DISCONTINUED | OUTPATIENT
Start: 2023-01-01 | End: 2023-01-01 | Stop reason: HOSPADM

## 2023-01-01 RX ORDER — MAGNESIUM SULFATE HEPTAHYDRATE 40 MG/ML
4 INJECTION, SOLUTION INTRAVENOUS AS NEEDED
Status: DISCONTINUED | OUTPATIENT
Start: 2023-01-01 | End: 2023-01-01 | Stop reason: HOSPADM

## 2023-01-01 RX ORDER — DEXTROSE AND SODIUM CHLORIDE 5; .9 G/100ML; G/100ML
500 INJECTION, SOLUTION INTRAVENOUS ONCE
Status: COMPLETED | OUTPATIENT
Start: 2023-01-01 | End: 2023-01-01

## 2023-01-01 RX ORDER — SODIUM CHLORIDE 0.9 % (FLUSH) 0.9 %
10 SYRINGE (ML) INJECTION EVERY 12 HOURS SCHEDULED
Status: DISCONTINUED | OUTPATIENT
Start: 2023-01-01 | End: 2023-03-17 | Stop reason: HOSPADM

## 2023-01-01 RX ORDER — LISINOPRIL 10 MG/1
10 TABLET ORAL DAILY
Status: DISCONTINUED | OUTPATIENT
Start: 2023-01-01 | End: 2023-03-17 | Stop reason: HOSPADM

## 2023-01-01 RX ORDER — POTASSIUM CHLORIDE 7.45 MG/ML
10 INJECTION INTRAVENOUS
Status: DISCONTINUED | OUTPATIENT
Start: 2023-01-01 | End: 2023-01-01 | Stop reason: HOSPADM

## 2023-01-01 RX ORDER — AMOXICILLIN 250 MG
2 CAPSULE ORAL 2 TIMES DAILY
Status: DISCONTINUED | OUTPATIENT
Start: 2023-01-01 | End: 2023-03-17 | Stop reason: HOSPADM

## 2023-01-01 RX ORDER — NICOTINE POLACRILEX 4 MG
15 LOZENGE BUCCAL
Status: DISCONTINUED | OUTPATIENT
Start: 2023-01-01 | End: 2023-03-17 | Stop reason: HOSPADM

## 2023-01-01 RX ORDER — DEXTROSE AND SODIUM CHLORIDE 5; .9 G/100ML; G/100ML
75 INJECTION, SOLUTION INTRAVENOUS CONTINUOUS
Status: DISCONTINUED | OUTPATIENT
Start: 2023-01-01 | End: 2023-03-17 | Stop reason: HOSPADM

## 2023-01-01 RX ORDER — POTASSIUM CHLORIDE 750 MG/1
20 CAPSULE, EXTENDED RELEASE ORAL 2 TIMES DAILY
Status: DISCONTINUED | OUTPATIENT
Start: 2023-01-01 | End: 2023-03-17 | Stop reason: HOSPADM

## 2023-01-01 RX ORDER — POLYETHYLENE GLYCOL 3350 17 G/17G
17 POWDER, FOR SOLUTION ORAL DAILY PRN
Status: DISCONTINUED | OUTPATIENT
Start: 2023-01-01 | End: 2023-03-17 | Stop reason: HOSPADM

## 2023-01-01 RX ORDER — MAGNESIUM SULFATE HEPTAHYDRATE 40 MG/ML
2 INJECTION, SOLUTION INTRAVENOUS AS NEEDED
Status: DISCONTINUED | OUTPATIENT
Start: 2023-01-01 | End: 2023-01-01 | Stop reason: HOSPADM

## 2023-01-01 RX ORDER — SODIUM CHLORIDE 0.9 % (FLUSH) 0.9 %
10 SYRINGE (ML) INJECTION AS NEEDED
Status: DISCONTINUED | OUTPATIENT
Start: 2023-01-01 | End: 2023-03-17 | Stop reason: HOSPADM

## 2023-01-01 RX ORDER — POTASSIUM CHLORIDE 750 MG/1
40 CAPSULE, EXTENDED RELEASE ORAL AS NEEDED
Status: DISCONTINUED | OUTPATIENT
Start: 2023-01-01 | End: 2023-01-01 | Stop reason: HOSPADM

## 2023-01-01 RX ORDER — FUROSEMIDE 10 MG/ML
40 INJECTION INTRAMUSCULAR; INTRAVENOUS
Status: DISCONTINUED | OUTPATIENT
Start: 2023-01-01 | End: 2023-01-01 | Stop reason: HOSPADM

## 2023-01-01 RX ORDER — DEXTROSE MONOHYDRATE 25 G/50ML
25 INJECTION, SOLUTION INTRAVENOUS
Status: DISCONTINUED | OUTPATIENT
Start: 2023-01-01 | End: 2023-03-17 | Stop reason: HOSPADM

## 2023-01-01 RX ORDER — SUCRALFATE 1 G/1
1 TABLET ORAL 3 TIMES DAILY
Status: DISCONTINUED | OUTPATIENT
Start: 2023-01-01 | End: 2023-03-17 | Stop reason: HOSPADM

## 2023-01-01 RX ORDER — INSULIN ASPART 100 [IU]/ML
0-7 INJECTION, SOLUTION INTRAVENOUS; SUBCUTANEOUS
Status: DISCONTINUED | OUTPATIENT
Start: 2023-01-01 | End: 2023-03-17 | Stop reason: HOSPADM

## 2023-01-01 RX ORDER — ENOXAPARIN SODIUM 100 MG/ML
40 INJECTION SUBCUTANEOUS DAILY
Status: DISCONTINUED | OUTPATIENT
Start: 2023-01-01 | End: 2023-03-17 | Stop reason: HOSPADM

## 2023-01-01 RX ORDER — ACETAMINOPHEN 325 MG/1
650 TABLET ORAL EVERY 4 HOURS PRN
Status: DISCONTINUED | OUTPATIENT
Start: 2023-01-01 | End: 2023-03-17 | Stop reason: HOSPADM

## 2023-01-01 RX ORDER — FAMOTIDINE 40 MG/1
40 TABLET, FILM COATED ORAL DAILY
Status: DISCONTINUED | OUTPATIENT
Start: 2023-01-01 | End: 2023-03-17 | Stop reason: HOSPADM

## 2023-01-01 RX ORDER — CARVEDILOL 25 MG/1
25 TABLET ORAL 2 TIMES DAILY WITH MEALS
Status: DISCONTINUED | OUTPATIENT
Start: 2023-01-01 | End: 2023-03-17 | Stop reason: HOSPADM

## 2023-01-01 RX ORDER — SODIUM CHLORIDE 9 MG/ML
50 INJECTION, SOLUTION INTRAVENOUS AS NEEDED
Status: DISCONTINUED | OUTPATIENT
Start: 2023-01-01 | End: 2023-01-01 | Stop reason: HOSPADM

## 2023-01-01 RX ORDER — NITROGLYCERIN 0.4 MG/1
0.4 TABLET SUBLINGUAL
Status: DISCONTINUED | OUTPATIENT
Start: 2023-01-01 | End: 2023-03-17 | Stop reason: HOSPADM

## 2023-01-01 RX ORDER — FUROSEMIDE 40 MG/1
40 TABLET ORAL 2 TIMES DAILY
Status: DISCONTINUED | OUTPATIENT
Start: 2023-01-01 | End: 2023-03-17 | Stop reason: HOSPADM

## 2023-01-01 RX ORDER — ACETAMINOPHEN 160 MG/5ML
650 SOLUTION ORAL EVERY 4 HOURS PRN
Status: DISCONTINUED | OUTPATIENT
Start: 2023-01-01 | End: 2023-03-17 | Stop reason: HOSPADM

## 2023-01-01 RX ORDER — IPRATROPIUM BROMIDE AND ALBUTEROL SULFATE 2.5; .5 MG/3ML; MG/3ML
3 SOLUTION RESPIRATORY (INHALATION) 4 TIMES DAILY
Status: DISCONTINUED | OUTPATIENT
Start: 2023-01-01 | End: 2023-03-17 | Stop reason: HOSPADM

## 2023-01-01 RX ORDER — ALBUMIN, HUMAN INJ 5% 5 %
250 SOLUTION INTRAVENOUS ONCE
Status: COMPLETED | OUTPATIENT
Start: 2023-01-01 | End: 2023-01-01

## 2023-01-01 RX ORDER — MIDODRINE HYDROCHLORIDE 5 MG/1
5 TABLET ORAL
Start: 2023-01-01

## 2023-01-01 RX ORDER — SODIUM CHLORIDE 9 MG/ML
40 INJECTION, SOLUTION INTRAVENOUS AS NEEDED
Status: DISCONTINUED | OUTPATIENT
Start: 2023-01-01 | End: 2023-03-17 | Stop reason: HOSPADM

## 2023-01-01 RX ORDER — IPRATROPIUM BROMIDE AND ALBUTEROL SULFATE 2.5; .5 MG/3ML; MG/3ML
3 SOLUTION RESPIRATORY (INHALATION) ONCE
Status: COMPLETED | OUTPATIENT
Start: 2023-01-01 | End: 2023-01-01

## 2023-01-01 RX ORDER — ACETAMINOPHEN 650 MG/1
650 SUPPOSITORY RECTAL EVERY 4 HOURS PRN
Status: DISCONTINUED | OUTPATIENT
Start: 2023-01-01 | End: 2023-03-17 | Stop reason: HOSPADM

## 2023-01-01 RX ORDER — ASPIRIN 81 MG/1
81 TABLET ORAL NIGHTLY
Status: DISCONTINUED | OUTPATIENT
Start: 2023-01-01 | End: 2023-01-01

## 2023-01-01 RX ORDER — MIDODRINE HYDROCHLORIDE 5 MG/1
5 TABLET ORAL
Status: DISCONTINUED | OUTPATIENT
Start: 2023-01-01 | End: 2023-03-17 | Stop reason: HOSPADM

## 2023-01-01 RX ORDER — ONDANSETRON 4 MG/1
4 TABLET, FILM COATED ORAL EVERY 6 HOURS PRN
Status: DISCONTINUED | OUTPATIENT
Start: 2023-01-01 | End: 2023-03-17 | Stop reason: HOSPADM

## 2023-01-01 RX ORDER — ONDANSETRON 2 MG/ML
4 INJECTION INTRAMUSCULAR; INTRAVENOUS EVERY 6 HOURS PRN
Status: DISCONTINUED | OUTPATIENT
Start: 2023-01-01 | End: 2023-03-17 | Stop reason: HOSPADM

## 2023-01-01 RX ORDER — ALBUTEROL SULFATE 2.5 MG/3ML
2.5 SOLUTION RESPIRATORY (INHALATION)
Status: DISCONTINUED | OUTPATIENT
Start: 2023-01-01 | End: 2023-01-01 | Stop reason: SDUPTHER

## 2023-01-01 RX ORDER — ISOSORBIDE MONONITRATE 30 MG/1
30 TABLET, EXTENDED RELEASE ORAL DAILY
Status: DISCONTINUED | OUTPATIENT
Start: 2023-01-01 | End: 2023-03-17 | Stop reason: HOSPADM

## 2023-01-01 RX ORDER — BISACODYL 10 MG
10 SUPPOSITORY, RECTAL RECTAL DAILY PRN
Status: DISCONTINUED | OUTPATIENT
Start: 2023-01-01 | End: 2023-03-17 | Stop reason: HOSPADM

## 2023-01-01 RX ORDER — BISACODYL 5 MG/1
5 TABLET, DELAYED RELEASE ORAL DAILY PRN
Status: DISCONTINUED | OUTPATIENT
Start: 2023-01-01 | End: 2023-03-17 | Stop reason: HOSPADM

## 2023-01-01 RX ADMIN — METHYLPREDNISOLONE SODIUM SUCCINATE 125 MG: 125 INJECTION, POWDER, FOR SOLUTION INTRAMUSCULAR; INTRAVENOUS at 17:01

## 2023-01-01 RX ADMIN — INSULIN ASPART 2 UNITS: 100 INJECTION, SOLUTION INTRAVENOUS; SUBCUTANEOUS at 16:47

## 2023-01-01 RX ADMIN — INSULIN ASPART 2 UNITS: 100 INJECTION, SOLUTION INTRAVENOUS; SUBCUTANEOUS at 17:52

## 2023-01-01 RX ADMIN — MIDODRINE HYDROCHLORIDE 5 MG: 5 TABLET ORAL at 11:43

## 2023-01-01 RX ADMIN — METRONIDAZOLE 500 MG: 500 INJECTION, SOLUTION INTRAVENOUS at 02:49

## 2023-01-01 RX ADMIN — DEXTROSE AND SODIUM CHLORIDE 75 ML/HR: 5; 900 INJECTION, SOLUTION INTRAVENOUS at 21:59

## 2023-01-01 RX ADMIN — ONDANSETRON 4 MG: 2 INJECTION INTRAMUSCULAR; INTRAVENOUS at 10:03

## 2023-01-01 RX ADMIN — SUCRALFATE 1 G: 1 TABLET ORAL at 20:15

## 2023-01-01 RX ADMIN — POTASSIUM CHLORIDE 10 MEQ: 7.46 INJECTION, SOLUTION INTRAVENOUS at 12:59

## 2023-01-01 RX ADMIN — SUCRALFATE 1 G: 1 TABLET ORAL at 07:35

## 2023-01-01 RX ADMIN — ALBUTEROL SULFATE 2.5 MG: 2.5 SOLUTION RESPIRATORY (INHALATION) at 21:09

## 2023-01-01 RX ADMIN — POTASSIUM CHLORIDE 40 MEQ: 750 CAPSULE, EXTENDED RELEASE ORAL at 02:47

## 2023-01-01 RX ADMIN — ASPIRIN 81 MG: 81 TABLET, FILM COATED ORAL at 20:22

## 2023-01-01 RX ADMIN — MIDODRINE HYDROCHLORIDE 5 MG: 5 TABLET ORAL at 11:54

## 2023-01-01 RX ADMIN — FUROSEMIDE 40 MG: 40 TABLET ORAL at 07:35

## 2023-01-01 RX ADMIN — ATORVASTATIN CALCIUM 40 MG: 40 TABLET, FILM COATED ORAL at 09:43

## 2023-01-01 RX ADMIN — METRONIDAZOLE 500 MG: 500 INJECTION, SOLUTION INTRAVENOUS at 11:39

## 2023-01-01 RX ADMIN — INSULIN ASPART 2 UNITS: 100 INJECTION, SOLUTION INTRAVENOUS; SUBCUTANEOUS at 11:51

## 2023-01-01 RX ADMIN — FUROSEMIDE 40 MG: 10 INJECTION, SOLUTION INTRAMUSCULAR; INTRAVENOUS at 09:43

## 2023-01-01 RX ADMIN — MIDODRINE HYDROCHLORIDE 5 MG: 5 TABLET ORAL at 18:44

## 2023-01-01 RX ADMIN — INSULIN ASPART 4 UNITS: 100 INJECTION, SOLUTION INTRAVENOUS; SUBCUTANEOUS at 13:11

## 2023-01-01 RX ADMIN — DEXTROSE AND SODIUM CHLORIDE 125 ML/HR: 5; 900 INJECTION, SOLUTION INTRAVENOUS at 14:12

## 2023-01-01 RX ADMIN — METRONIDAZOLE 500 MG: 500 INJECTION, SOLUTION INTRAVENOUS at 03:09

## 2023-01-01 RX ADMIN — CEFTRIAXONE SODIUM 1 G: 1 INJECTION, POWDER, FOR SOLUTION INTRAMUSCULAR; INTRAVENOUS at 12:36

## 2023-01-01 RX ADMIN — ATORVASTATIN CALCIUM 40 MG: 40 TABLET, FILM COATED ORAL at 08:43

## 2023-01-01 RX ADMIN — SUCRALFATE 1 G: 1 TABLET ORAL at 20:50

## 2023-01-01 RX ADMIN — METRONIDAZOLE 500 MG: 500 INJECTION, SOLUTION INTRAVENOUS at 20:57

## 2023-01-01 RX ADMIN — FUROSEMIDE 40 MG: 10 INJECTION, SOLUTION INTRAMUSCULAR; INTRAVENOUS at 18:46

## 2023-01-01 RX ADMIN — ENOXAPARIN SODIUM 40 MG: 40 INJECTION SUBCUTANEOUS at 20:18

## 2023-01-01 RX ADMIN — CEFEPIME 2 G: 2 INJECTION, POWDER, FOR SOLUTION INTRAVENOUS at 17:22

## 2023-01-01 RX ADMIN — PANTOPRAZOLE SODIUM 40 MG: 40 TABLET, DELAYED RELEASE ORAL at 04:12

## 2023-01-01 RX ADMIN — POTASSIUM CHLORIDE 10 MEQ: 7.46 INJECTION, SOLUTION INTRAVENOUS at 13:58

## 2023-01-01 RX ADMIN — CEFTRIAXONE SODIUM 1 G: 1 INJECTION, POWDER, FOR SOLUTION INTRAMUSCULAR; INTRAVENOUS at 10:46

## 2023-01-01 RX ADMIN — POTASSIUM CHLORIDE 20 MEQ: 10 CAPSULE, COATED, EXTENDED RELEASE ORAL at 20:50

## 2023-01-01 RX ADMIN — FAMOTIDINE 40 MG: 40 TABLET, FILM COATED ORAL at 07:34

## 2023-01-01 RX ADMIN — INSULIN ASPART 2 UNITS: 100 INJECTION, SOLUTION INTRAVENOUS; SUBCUTANEOUS at 17:46

## 2023-01-01 RX ADMIN — ATORVASTATIN CALCIUM 40 MG: 40 TABLET, FILM COATED ORAL at 08:05

## 2023-01-01 RX ADMIN — ASPIRIN 81 MG: 81 TABLET, FILM COATED ORAL at 20:57

## 2023-01-01 RX ADMIN — MIDODRINE HYDROCHLORIDE 5 MG: 5 TABLET ORAL at 20:50

## 2023-01-01 RX ADMIN — ASPIRIN 81 MG: 81 TABLET, FILM COATED ORAL at 20:29

## 2023-01-01 RX ADMIN — PANTOPRAZOLE SODIUM 40 MG: 40 TABLET, DELAYED RELEASE ORAL at 05:32

## 2023-01-01 RX ADMIN — POTASSIUM CHLORIDE 40 MEQ: 750 CAPSULE, EXTENDED RELEASE ORAL at 06:42

## 2023-01-01 RX ADMIN — PANTOPRAZOLE SODIUM 40 MG: 40 INJECTION, POWDER, FOR SOLUTION INTRAVENOUS at 08:58

## 2023-01-01 RX ADMIN — PANTOPRAZOLE SODIUM 40 MG: 40 TABLET, DELAYED RELEASE ORAL at 06:42

## 2023-01-01 RX ADMIN — MIDODRINE HYDROCHLORIDE 5 MG: 5 TABLET ORAL at 08:58

## 2023-01-01 RX ADMIN — MIDODRINE HYDROCHLORIDE 5 MG: 5 TABLET ORAL at 17:32

## 2023-01-01 RX ADMIN — DEXTROSE MONOHYDRATE 75 ML/HR: 100 INJECTION, SOLUTION INTRAVENOUS at 12:01

## 2023-01-01 RX ADMIN — SUCRALFATE 1 G: 1 TABLET ORAL at 16:47

## 2023-01-01 RX ADMIN — Medication 10 ML: at 08:25

## 2023-01-01 RX ADMIN — Medication 10 ML: at 20:57

## 2023-01-01 RX ADMIN — MIDODRINE HYDROCHLORIDE 5 MG: 5 TABLET ORAL at 17:46

## 2023-01-01 RX ADMIN — METRONIDAZOLE 500 MG: 500 INJECTION, SOLUTION INTRAVENOUS at 10:52

## 2023-01-01 RX ADMIN — ATORVASTATIN CALCIUM 40 MG: 40 TABLET, FILM COATED ORAL at 07:35

## 2023-01-01 RX ADMIN — Medication 10 ML: at 10:46

## 2023-01-01 RX ADMIN — FUROSEMIDE 40 MG: 40 TABLET ORAL at 20:15

## 2023-01-01 RX ADMIN — ATORVASTATIN CALCIUM 40 MG: 40 TABLET, FILM COATED ORAL at 09:31

## 2023-01-01 RX ADMIN — MIDODRINE HYDROCHLORIDE 5 MG: 5 TABLET ORAL at 11:34

## 2023-01-01 RX ADMIN — MIDODRINE HYDROCHLORIDE 5 MG: 5 TABLET ORAL at 17:49

## 2023-01-01 RX ADMIN — Medication 10 ML: at 20:23

## 2023-01-01 RX ADMIN — Medication 10 ML: at 20:24

## 2023-01-01 RX ADMIN — ASPIRIN 81 MG: 81 TABLET, FILM COATED ORAL at 21:27

## 2023-01-01 RX ADMIN — MIDODRINE HYDROCHLORIDE 5 MG: 5 TABLET ORAL at 11:35

## 2023-01-01 RX ADMIN — MAGNESIUM SULFATE HEPTAHYDRATE 2 G: 40 INJECTION, SOLUTION INTRAVENOUS at 02:48

## 2023-01-01 RX ADMIN — MIDODRINE HYDROCHLORIDE 5 MG: 5 TABLET ORAL at 18:46

## 2023-01-01 RX ADMIN — Medication 10 ML: at 08:43

## 2023-01-01 RX ADMIN — METRONIDAZOLE 500 MG: 500 INJECTION, SOLUTION INTRAVENOUS at 11:35

## 2023-01-01 RX ADMIN — MAGNESIUM SULFATE HEPTAHYDRATE 2 G: 40 INJECTION, SOLUTION INTRAVENOUS at 04:20

## 2023-01-01 RX ADMIN — ENOXAPARIN SODIUM 40 MG: 40 INJECTION SUBCUTANEOUS at 20:50

## 2023-01-01 RX ADMIN — MIDODRINE HYDROCHLORIDE 5 MG: 5 TABLET ORAL at 16:47

## 2023-01-01 RX ADMIN — ALBUMIN HUMAN 250 ML: 0.05 INJECTION, SOLUTION INTRAVENOUS at 05:38

## 2023-01-01 RX ADMIN — FUROSEMIDE 40 MG: 40 TABLET ORAL at 20:50

## 2023-01-01 RX ADMIN — ATORVASTATIN CALCIUM 40 MG: 40 TABLET, FILM COATED ORAL at 20:50

## 2023-01-01 RX ADMIN — POTASSIUM CHLORIDE 20 MEQ: 10 CAPSULE, COATED, EXTENDED RELEASE ORAL at 07:34

## 2023-01-01 RX ADMIN — POTASSIUM CHLORIDE 10 MEQ: 7.46 INJECTION, SOLUTION INTRAVENOUS at 18:37

## 2023-01-01 RX ADMIN — METRONIDAZOLE 500 MG: 500 INJECTION, SOLUTION INTRAVENOUS at 20:22

## 2023-01-01 RX ADMIN — MIDODRINE HYDROCHLORIDE 5 MG: 5 TABLET ORAL at 07:48

## 2023-01-01 RX ADMIN — ACETAMINOPHEN 650 MG: 325 TABLET ORAL at 03:46

## 2023-01-01 RX ADMIN — AZITHROMYCIN MONOHYDRATE 500 MG: 500 INJECTION, POWDER, LYOPHILIZED, FOR SOLUTION INTRAVENOUS at 21:58

## 2023-01-01 RX ADMIN — Medication 2 G: at 10:19

## 2023-01-01 RX ADMIN — CARVEDILOL 25 MG: 25 TABLET, FILM COATED ORAL at 20:50

## 2023-01-01 RX ADMIN — METRONIDAZOLE 500 MG: 500 INJECTION, SOLUTION INTRAVENOUS at 13:16

## 2023-01-01 RX ADMIN — PANTOPRAZOLE SODIUM 40 MG: 40 TABLET, DELAYED RELEASE ORAL at 05:48

## 2023-01-01 RX ADMIN — CEFTRIAXONE SODIUM 1 G: 1 INJECTION, POWDER, FOR SOLUTION INTRAMUSCULAR; INTRAVENOUS at 16:17

## 2023-01-01 RX ADMIN — LISINOPRIL 10 MG: 10 TABLET ORAL at 20:50

## 2023-01-01 RX ADMIN — Medication 10 ML: at 09:45

## 2023-01-01 RX ADMIN — IPRATROPIUM BROMIDE AND ALBUTEROL SULFATE 3 ML: 2.5; .5 SOLUTION RESPIRATORY (INHALATION) at 16:52

## 2023-01-01 RX ADMIN — METRONIDAZOLE 500 MG: 500 INJECTION, SOLUTION INTRAVENOUS at 20:28

## 2023-01-01 RX ADMIN — CEFTRIAXONE SODIUM 1 G: 1 INJECTION, POWDER, FOR SOLUTION INTRAMUSCULAR; INTRAVENOUS at 11:02

## 2023-01-01 RX ADMIN — Medication 10 ML: at 08:58

## 2023-01-01 RX ADMIN — POTASSIUM CHLORIDE 20 MEQ: 10 CAPSULE, COATED, EXTENDED RELEASE ORAL at 20:15

## 2023-01-01 RX ADMIN — POTASSIUM CHLORIDE 10 MEQ: 7.46 INJECTION, SOLUTION INTRAVENOUS at 16:33

## 2023-01-01 RX ADMIN — FUROSEMIDE 40 MG: 10 INJECTION, SOLUTION INTRAMUSCULAR; INTRAVENOUS at 08:24

## 2023-01-01 RX ADMIN — IPRATROPIUM BROMIDE AND ALBUTEROL SULFATE 3 ML: 2.5; .5 SOLUTION RESPIRATORY (INHALATION) at 20:03

## 2023-01-01 RX ADMIN — FUROSEMIDE 40 MG: 10 INJECTION, SOLUTION INTRAMUSCULAR; INTRAVENOUS at 20:01

## 2023-01-01 RX ADMIN — ATORVASTATIN CALCIUM 40 MG: 40 TABLET, FILM COATED ORAL at 10:51

## 2023-01-01 RX ADMIN — ATORVASTATIN CALCIUM 40 MG: 40 TABLET, FILM COATED ORAL at 08:58

## 2023-01-01 RX ADMIN — INSULIN ASPART 2 UNITS: 100 INJECTION, SOLUTION INTRAVENOUS; SUBCUTANEOUS at 11:39

## 2023-01-01 RX ADMIN — MIDODRINE HYDROCHLORIDE 5 MG: 5 TABLET ORAL at 07:35

## 2023-01-01 RX ADMIN — INSULIN ASPART 2 UNITS: 100 INJECTION, SOLUTION INTRAVENOUS; SUBCUTANEOUS at 11:34

## 2023-01-01 RX ADMIN — FUROSEMIDE 40 MG: 10 INJECTION, SOLUTION INTRAMUSCULAR; INTRAVENOUS at 17:45

## 2023-01-01 RX ADMIN — MIDODRINE HYDROCHLORIDE 5 MG: 5 TABLET ORAL at 08:43

## 2023-01-01 RX ADMIN — FUROSEMIDE 40 MG: 10 INJECTION, SOLUTION INTRAMUSCULAR; INTRAVENOUS at 08:06

## 2023-01-01 RX ADMIN — MAGNESIUM SULFATE HEPTAHYDRATE 2 G: 40 INJECTION, SOLUTION INTRAVENOUS at 06:00

## 2023-01-01 RX ADMIN — POTASSIUM CHLORIDE 10 MEQ: 7.46 INJECTION, SOLUTION INTRAVENOUS at 10:38

## 2023-01-01 RX ADMIN — ASPIRIN 81 MG: 81 TABLET, FILM COATED ORAL at 20:01

## 2023-01-01 RX ADMIN — POTASSIUM CHLORIDE 40 MEQ: 1.5 POWDER, FOR SOLUTION ORAL at 09:31

## 2023-01-01 RX ADMIN — METRONIDAZOLE 500 MG: 500 INJECTION, SOLUTION INTRAVENOUS at 21:27

## 2023-01-01 RX ADMIN — INSULIN ASPART 2 UNITS: 100 INJECTION, SOLUTION INTRAVENOUS; SUBCUTANEOUS at 10:38

## 2023-01-01 RX ADMIN — DEXTROSE AND SODIUM CHLORIDE 125 ML/HR: 5; 900 INJECTION, SOLUTION INTRAVENOUS at 20:54

## 2023-01-01 RX ADMIN — MIDODRINE HYDROCHLORIDE 5 MG: 5 TABLET ORAL at 08:24

## 2023-01-01 RX ADMIN — MIDODRINE HYDROCHLORIDE 5 MG: 5 TABLET ORAL at 09:31

## 2023-01-01 RX ADMIN — METRONIDAZOLE 500 MG: 500 INJECTION, SOLUTION INTRAVENOUS at 11:53

## 2023-01-01 RX ADMIN — MIDODRINE HYDROCHLORIDE 5 MG: 5 TABLET ORAL at 17:29

## 2023-01-01 RX ADMIN — IPRATROPIUM BROMIDE AND ALBUTEROL SULFATE 3 ML: 2.5; .5 SOLUTION RESPIRATORY (INHALATION) at 10:28

## 2023-01-01 RX ADMIN — SODIUM CHLORIDE 500 ML: 9 INJECTION, SOLUTION INTRAVENOUS at 05:04

## 2023-01-01 RX ADMIN — FUROSEMIDE 40 MG: 10 INJECTION, SOLUTION INTRAMUSCULAR; INTRAVENOUS at 13:49

## 2023-01-01 RX ADMIN — INSULIN DETEMIR 10 UNITS: 100 INJECTION, SOLUTION SUBCUTANEOUS at 20:24

## 2023-01-01 RX ADMIN — IPRATROPIUM BROMIDE AND ALBUTEROL SULFATE 3 ML: 2.5; .5 SOLUTION RESPIRATORY (INHALATION) at 19:21

## 2023-01-01 RX ADMIN — ACETAMINOPHEN 650 MG: 650 SOLUTION ORAL at 21:00

## 2023-01-01 RX ADMIN — ATORVASTATIN CALCIUM 40 MG: 40 TABLET, FILM COATED ORAL at 08:25

## 2023-01-01 RX ADMIN — MIDODRINE HYDROCHLORIDE 5 MG: 5 TABLET ORAL at 14:10

## 2023-01-01 RX ADMIN — POTASSIUM CHLORIDE 10 MEQ: 7.46 INJECTION, SOLUTION INTRAVENOUS at 15:00

## 2023-01-01 RX ADMIN — DEXTROSE AND SODIUM CHLORIDE 500 ML: 5; 900 INJECTION, SOLUTION INTRAVENOUS at 15:19

## 2023-01-01 RX ADMIN — CEFTRIAXONE SODIUM 1 G: 1 INJECTION, POWDER, FOR SOLUTION INTRAMUSCULAR; INTRAVENOUS at 09:43

## 2023-01-01 RX ADMIN — METRONIDAZOLE 500 MG: 500 INJECTION, SOLUTION INTRAVENOUS at 19:39

## 2023-01-01 RX ADMIN — MIDODRINE HYDROCHLORIDE 5 MG: 5 TABLET ORAL at 11:39

## 2023-01-01 RX ADMIN — DOCUSATE SODIUM 50 MG AND SENNOSIDES 8.6 MG 2 TABLET: 8.6; 5 TABLET, FILM COATED ORAL at 20:50

## 2023-01-01 RX ADMIN — FUROSEMIDE 40 MG: 10 INJECTION, SOLUTION INTRAMUSCULAR; INTRAVENOUS at 09:31

## 2023-01-01 RX ADMIN — FAMOTIDINE 40 MG: 40 TABLET, FILM COATED ORAL at 20:50

## 2023-01-01 RX ADMIN — FUROSEMIDE 40 MG: 10 INJECTION, SOLUTION INTRAMUSCULAR; INTRAVENOUS at 19:03

## 2023-01-01 RX ADMIN — METRONIDAZOLE 500 MG: 500 INJECTION, SOLUTION INTRAVENOUS at 03:24

## 2023-01-01 RX ADMIN — PANTOPRAZOLE SODIUM 40 MG: 40 INJECTION, POWDER, FOR SOLUTION INTRAVENOUS at 20:29

## 2023-01-01 RX ADMIN — MIDODRINE HYDROCHLORIDE 5 MG: 5 TABLET ORAL at 10:51

## 2023-01-01 RX ADMIN — PANTOPRAZOLE SODIUM 40 MG: 40 TABLET, DELAYED RELEASE ORAL at 06:01

## 2023-01-01 RX ADMIN — METRONIDAZOLE 500 MG: 500 INJECTION, SOLUTION INTRAVENOUS at 04:06

## 2023-01-01 RX ADMIN — METRONIDAZOLE 500 MG: 500 INJECTION, SOLUTION INTRAVENOUS at 03:07

## 2023-01-01 RX ADMIN — DOCUSATE SODIUM 50 MG AND SENNOSIDES 8.6 MG 2 TABLET: 8.6; 5 TABLET, FILM COATED ORAL at 20:16

## 2023-01-01 RX ADMIN — MIDODRINE HYDROCHLORIDE 5 MG: 5 TABLET ORAL at 13:16

## 2023-01-01 RX ADMIN — METRONIDAZOLE 500 MG: 500 INJECTION, SOLUTION INTRAVENOUS at 03:30

## 2023-01-01 RX ADMIN — MIDODRINE HYDROCHLORIDE 5 MG: 5 TABLET ORAL at 08:05

## 2023-01-01 RX ADMIN — ISOSORBIDE MONONITRATE 30 MG: 30 TABLET, EXTENDED RELEASE ORAL at 20:50

## 2023-01-01 RX ADMIN — IPRATROPIUM BROMIDE AND ALBUTEROL SULFATE 3 ML: 2.5; .5 SOLUTION RESPIRATORY (INHALATION) at 15:30

## 2023-01-01 RX ADMIN — Medication 10 ML: at 20:51

## 2023-01-01 RX ADMIN — Medication 10 ML: at 21:27

## 2023-01-01 RX ADMIN — INSULIN DETEMIR 10 UNITS: 100 INJECTION, SOLUTION SUBCUTANEOUS at 22:08

## 2023-01-01 RX ADMIN — IPRATROPIUM BROMIDE AND ALBUTEROL SULFATE 3 ML: 2.5; .5 SOLUTION RESPIRATORY (INHALATION) at 07:02

## 2023-01-01 RX ADMIN — MIDODRINE HYDROCHLORIDE 5 MG: 5 TABLET ORAL at 09:44

## 2023-01-01 RX ADMIN — Medication 10 ML: at 20:29

## 2023-01-01 RX ADMIN — METRONIDAZOLE 500 MG: 500 INJECTION, SOLUTION INTRAVENOUS at 19:49

## 2023-01-01 RX ADMIN — IOPAMIDOL 90 ML: 612 INJECTION, SOLUTION INTRAVENOUS at 17:14

## 2023-01-01 RX ADMIN — METRONIDAZOLE 500 MG: 500 INJECTION, SOLUTION INTRAVENOUS at 03:18

## 2023-01-01 RX ADMIN — Medication 6 MG: at 20:29

## 2023-01-01 NOTE — PROGRESS NOTES
[facility] Medicine Services  INPATIENT PROGRESS NOTE    Length of Stay: 1  Date of Admission: 12/30/2022  Primary Care Physician: Joyce Plata MD    Subjective   Chief Complaint: Generalized weakness, nausea, vomiting  HPI: States she is feeling some better.  Somewhat slow to respond.    As of today, 01/01/23  Review of Systems   Constitutional: Positive for fatigue.   Respiratory: Positive for shortness of breath.    Overall patient is a fairly poor historian so review of systems is not complete    All pertinent negatives and positives are as above. All other systems have been reviewed and are negative unless otherwise stated.     Objective    Temp:  [93.8 °F (34.3 °C)-98.1 °F (36.7 °C)] 97.8 °F (36.6 °C)  Heart Rate:  [60-68] 66  Resp:  [18] 18  BP: ()/(47-68) 119/68    AM-PAC 6 Clicks Score (PT): 10 (01/01/23 0000)    As of today, 01/01/23  Physical Exam  Vitals reviewed.   Constitutional:       Appearance: She is well-developed.   HENT:      Head: Normocephalic and atraumatic.   Eyes:      Pupils: Pupils are equal, round, and reactive to light.   Cardiovascular:      Rate and Rhythm: Normal rate and regular rhythm.      Heart sounds: Normal heart sounds. No murmur heard.    No friction rub. No gallop.   Pulmonary:      Effort: Pulmonary effort is normal. No respiratory distress.      Breath sounds: Normal breath sounds. No wheezing or rales.   Chest:      Chest wall: No tenderness.   Abdominal:      General: Bowel sounds are normal. There is no distension.      Palpations: Abdomen is soft.      Tenderness: There is no abdominal tenderness. There is no guarding.   Musculoskeletal:      Cervical back: Normal range of motion and neck supple.   Skin:     General: Skin is warm and dry.   Psychiatric:         Behavior: Behavior normal.         Thought Content: Thought content normal.             Results Review:  I have reviewed the labs, radiology  results, and diagnostic studies.    Laboratory Data:   Results from last 7 days   Lab Units 01/01/23  0626 12/31/22 2137 12/31/22  0635   SODIUM mmol/L 133* 134* 135*   POTASSIUM mmol/L 3.6 3.8 3.7   CHLORIDE mmol/L 103 102 102   CO2 mmol/L 16.0* 16.0* 12.0*   BUN mg/dL 18 19 16   CREATININE mg/dL 1.16* 1.18* 1.08*   GLUCOSE mg/dL 82 132* 39*   CALCIUM mg/dL 8.3* 8.2* 8.9   BILIRUBIN mg/dL 1.7* 1.6* 2.2*   ALK PHOS U/L 84 83 113   ALT (SGPT) U/L 14 17 21   AST (SGOT) U/L 32 37* 49*   ANION GAP mmol/L 14.0 16.0* 21.0*     Estimated Creatinine Clearance: 50.2 mL/min (A) (by C-G formula based on SCr of 1.16 mg/dL (H)).  Results from last 7 days   Lab Units 12/31/22  0635   MAGNESIUM mg/dL 1.4*   PHOSPHORUS mg/dL 4.2         Results from last 7 days   Lab Units 01/01/23  0626 01/01/23 0029 12/31/22 2136 12/31/22 0635 12/30/22 2003   WBC 10*3/mm3 6.70 7.13 7.06 7.64 6.49   HEMOGLOBIN g/dL 12.3 12.0 11.7* 14.5 14.5   HEMATOCRIT % 36.6 36.0 35.1 44.4 44.2   PLATELETS 10*3/mm3 172 185 176 229 245     Results from last 7 days   Lab Units 01/01/23  0626   INR  1.97*       Culture Data:   Blood Culture   Date Value Ref Range Status   12/31/2022 No growth at 24 hours  Preliminary     Urine Culture   Date Value Ref Range Status   12/30/2022 >100,000 CFU/mL Gram Negative Bacilli (A)  Preliminary     No results found for: RESPCX  No results found for: WOUNDCX  No results found for: STOOLCX  No components found for: BODYFLD    Radiology Data:   Imaging Results (Last 24 Hours)     ** No results found for the last 24 hours. **          ABG:  Results from last 7 days   Lab Units 12/31/22  2214   PH, ARTERIAL pH units 7.390   PO2 ART mm Hg 89.7   PCO2, ARTERIAL mm Hg 33.1*   HCO3 ART mmol/L 20.0       I have reviewed the patient's current medications.     Assessment/Plan     Active Hospital Problems    Diagnosis    • **Acute cystitis with hematuria        Plan:  1.  Shock: Unclear if it is septic or cardiogenic.  Patient has had  good response to Levophed.  Urine output has improved, but is still marginal at best.  Agrees that cardiogenic etiology must be further considered if she decompensates.  If further pressors are needed, will consider dobutamine/milrinone.  2.  Acute cystitis: Gram stain shows greater than 100,000 colony-forming units of gram-negative bacilli.  Continue current antibiotics.  No leukocytosis.  3.  Nonischemic cardiomyopathy with an ejection fraction of 21 to 25%.  Appears euvolemic.  4.  Moderate to severe tricuspid regurgitation  5.  Severe pulmonary hypertension  6.  Moderate to severe mitral regurgitation  7.  Diabetes mellitus: Hypoglycemia seems to have resolved.  Monitor.  8.  Anasarca: Seen on CT scan.  Will attempt to diurese as blood pressure allows.  9.  DVT prophylaxis: SCDs.        I spent 33 minutes providing critical care management to this patient.  This excludes time spent in performing separately billed procedures.    Discharge Planning: I expect patient to be discharged to home versus skilled facility in 4-5 days.        This document has been electronically signed by David Pinon MD on January 1, 2023 14:01 CST         Contraindicated

## 2023-01-01 NOTE — PROGRESS NOTES
Nurse Practitioner - Brief Progress Note  PERMANENT  12/31/2022 20:39    [facility]iatric - CCU/SD - 20 - [place], [place] ([facility])    CARLINE NORRIS    Date of Service 12/31/2022 20:39    HPI/Events of Note [facility] Provider Assessment Note      73 yo female transferred to SDU for Shock in the setting of UTI and possible duodenitis.  Patient was also noted to have hypoglycemia and was initiated on D5 infusion.  Since admission, the patient has been on treatment for acute cystitis.    US Abd -> Liver appears small with coarse echogenicity and peripheral lobulation suggesting changes from cirrhosis.  GB surgically absent.  R pleural effusion.  Very small amount of ascites tracking around liver.    CT A/P W -> Mild to moderate strandy changes adjacent to duodenum, may reflect duodenitis and/or peptic ulcer disease.  Interval development of mild to moderate regions of free fluid in abdomen and pelvis, portions of which measures 20-30 Hounsfield   units, consistent with complex free fluid which may reflect proteinaceous, enteric, or hemorrhagic contents.  Moderately thick-walled urinary bladder suspicious for cystitis and/or urinary tract infection.  Small left pleural effusion.  Moderate right   pleural effusion.  Partially visualized ground-glass opacities in lung bases may reflect atelectasis, edema, or infiltrate.  Diffuse anasarca, increased from prior.  Chest x-ray noted retrocardiac opacification possibly reflecting infiltrate or atelectasis.  Large cardiac silhouette.      Blood and urine cultures pending.  CT above also concerning for possible duodenitis.  LA 3.6 -> 2.4 -> 1.9 and Procal 0.17.  Empiric CTX and Metronidazole.    D5W infusion for hypoglycemia management.  Cont close monitoring.  NEpi gtt to keep MAP > 64 mmHg.  Midodrine TID.        PMH: HTN, HLD, NICM, sCHF, PPM, DM II, GERD, Morbid Obesity, Sleep Apnea, Pfizer Covid Vaccine x 4, Moderna Covid  Vaccine x 1    Vitals: T 97.4, HR 60, BP 91/56 (74), RR 12, Sat 100%      __X___   Video Assessment performed   __X___   Most recent labs reviewed  __X___   Vital Signs reviewed  __X___   Best Practices addressed:                 VTE prophylaxis: SCDs                 SUP (when indicated): Protonix                 Glycemic control: as above                      Please notify bedside physician when present or [facility] if glc > 180 X 2                 Sepsis guidelines: as above                 Lung protective strategy:     _____     Spoke with bedside RN  _____     Orders written    Note drafted by DEMARCO Mohr  Contact [facility] for any needs if bedside physician is not present.      Interventions Major-Infection - evaluation and management, Shock - evaluation and management, Other: Hypoglycemia        Electronically Signed by: Anant Saxena (LEAH) on 12/31/2022 20:44    Annotated By: Griffin Ramirez)    Date: 12/31/22 21:02   I have reviewed the nurse practitioner's note and agree with his findings.  Chart review and video review performed.  I changed the D5 to D10 given her persistent hypoglycemia.  I also ordered magnesium repletion

## 2023-01-01 NOTE — SIGNIFICANT NOTE
Patient has continued to medically decline today.  Patient became hypotensive around 1500 and despite treatment continues to be hypotensive at 72/48.  Patient has also remained hyperglycemic throughout the day.  Gentle D5 started.  Patient will be transferred to ICU for closer monitoring.  Carvedilol, Imdur and lisinopril held.

## 2023-01-01 NOTE — PLAN OF CARE
Goal Outcome Evaluation:  Plan of Care Reviewed With: patient        Progress: improving  Outcome Evaluation: pt a/ox4.  weaning levophed off this AM. pt continues on D10. Blood Sugars low. Pt encourage to eat this AM. pt eating sandwhich and drinking. UOP not adequate. MD aware. See Orders.

## 2023-01-01 NOTE — PROGRESS NOTES
THC Physician - Brief Progress Note  PERMANENT  01/01/2023 04:12    [facility] - CCU/SD - 20 - [place] ([facility])    CARLINE NORRIS    Date of Service 01/01/2023 04:12    HPI/Events of Note Patient with persistent poor urine output have ordered an additional fluid bolus.  If there is no response after these boluses then should consider the addition of dobutamine.  per the RN the patient is now much more awake and alert   than before and feels warm peripherally as opposed to cold as one would expect with a low-flow state.  She is on norepinephrine for blood pressure support      Interventions Major-Shock - evaluation and management  Minor-Communication with other healthcare providers and/or family        Electronically Signed by: Griffin Ramirez) on 01/01/2023 04:13

## 2023-01-01 NOTE — SIGNIFICANT NOTE
01/01/23 0803   OTHER   Discipline physical therapist;occupational therapist   Rehab Time/Intention   Session Not Performed unable to treat, medical status change       Patient transferred to CCU last night, skilled PT discharged.  Please provide PT/OT re-start orders when appropriate.

## 2023-01-01 NOTE — PROGRESS NOTES
THC Physician - Brief Progress Note  PERMANENT  12/31/2022 23:09    [facility] - CCU/SD - 20 - [place] ([facility])    CARLINE NORRIS    Date of Service 12/31/2022 23:09    HPI/Events of Note Care reviewed with the bedside nurse.  Patient has only produced 15 mL of urine over the last 4 hours.  Her blood glucose has improved on the D10.  To me it appears that the patient is in a low flow state given her very poor left   ventricular ejection fraction as well as signs of right heart failure on her CT scan of the abdomen with significant reflux of contrast into the liver.  She also has ascites and generalized anasarca.  I have ordered an additional bolus of saline as well   as 5% albumin to see if we can improve the urine output.  Blood pressure is already supported with norepinephrine.  She is on empiric antibiotics with ceftriaxone and Flagyl.  I have asked the nurse to call me if her urine output does not improve.   Her   hemoglobin has dropped from 14 down to 11.8.  We will check a repeat hemoglobin in 2 hours.  I also ordered an INR and an ammonia level for the morning.  if her urine output remains poor, can consider addition of dobutamine for heart failure.  Prognosis   is guarded      Interventions Major-Shock - evaluation and management  Intermediate-Communication with other healthcare providers and/or family        Electronically Signed by: Griffin Ramirez) on 12/31/2022 23:16

## 2023-01-02 NOTE — SIGNIFICANT NOTE
Alerted physician that patient has only had 45cc urine output in flores since last documented at 0500am 01/01. No new orders

## 2023-01-02 NOTE — PROGRESS NOTES
[facility] Medicine Services  INPATIENT PROGRESS NOTE    Length of Stay: 2  Date of Admission: 12/30/2022  Primary Care Physician: Joyce Plata MD    Subjective   Chief Complaint: Generalized weakness, nausea, vomiting  HPI: States she is feeling better.  No complaints.  States that she slept well and is breathing better.    As of today, 01/02/23  Review of Systems   Constitutional: Positive for fatigue.   Overall patient is a fairly poor historian so review of systems is not complete    All pertinent negatives and positives are as above. All other systems have been reviewed and are negative unless otherwise stated.     Objective    Temp:  [96.2 °F (35.7 °C)-98 °F (36.7 °C)] 96.2 °F (35.7 °C)  Heart Rate:  [68-81] 81  Resp:  [18-20] 18  BP: (110-132)/(66-80) 118/71    AM-PAC 6 Clicks Score (PT): 10 (01/01/23 0000)    As of today, 01/02/23  Physical Exam  Vitals reviewed.   Constitutional:       Appearance: She is well-developed.   HENT:      Head: Normocephalic and atraumatic.   Eyes:      Pupils: Pupils are equal, round, and reactive to light.   Cardiovascular:      Rate and Rhythm: Normal rate and regular rhythm.      Heart sounds: Normal heart sounds. No murmur heard.    No friction rub. No gallop.   Pulmonary:      Effort: Pulmonary effort is normal. No respiratory distress.      Breath sounds: Normal breath sounds. No wheezing or rales.   Chest:      Chest wall: No tenderness.   Abdominal:      General: Bowel sounds are normal. There is no distension.      Palpations: Abdomen is soft.      Tenderness: There is no abdominal tenderness. There is no guarding.   Musculoskeletal:      Cervical back: Normal range of motion and neck supple.   Skin:     General: Skin is warm and dry.   Psychiatric:         Behavior: Behavior normal.         Thought Content: Thought content normal.         Results Review:  I have reviewed the labs, radiology results, and  diagnostic studies.    Laboratory Data:   Results from last 7 days   Lab Units 01/02/23  0649 01/01/23  0626 12/31/22  2137   SODIUM mmol/L 131* 133* 134*   POTASSIUM mmol/L 3.5 3.6 3.8   CHLORIDE mmol/L 99 103 102   CO2 mmol/L 19.0* 16.0* 16.0*   BUN mg/dL 18 18 19   CREATININE mg/dL 1.37* 1.16* 1.18*   GLUCOSE mg/dL 84 82 132*   CALCIUM mg/dL 8.8 8.3* 8.2*   BILIRUBIN mg/dL 1.3* 1.7* 1.6*   ALK PHOS U/L 107 84 83   ALT (SGPT) U/L 13 14 17   AST (SGOT) U/L 26 32 37*   ANION GAP mmol/L 13.0 14.0 16.0*     Estimated Creatinine Clearance: 42.8 mL/min (A) (by C-G formula based on SCr of 1.37 mg/dL (H)).  Results from last 7 days   Lab Units 12/31/22  0635   MAGNESIUM mg/dL 1.4*   PHOSPHORUS mg/dL 4.2         Results from last 7 days   Lab Units 01/02/23  0649 01/01/23  0626 01/01/23  0029 12/31/22 2136 12/31/22  0635   WBC 10*3/mm3 7.10 6.70 7.13 7.06 7.64   HEMOGLOBIN g/dL 12.9 12.3 12.0 11.7* 14.5   HEMATOCRIT % 38.1 36.6 36.0 35.1 44.4   PLATELETS 10*3/mm3 187 172 185 176 229     Results from last 7 days   Lab Units 01/01/23  0626   INR  1.97*       Culture Data:   Blood Culture   Date Value Ref Range Status   12/31/2022 No growth at 2 days  Preliminary   12/31/2022 No growth at 2 days  Preliminary     Urine Culture   Date Value Ref Range Status   12/30/2022 (A)  Final    >100,000 CFU/mL Klebsiella pneumoniae ssp pneumoniae     No results found for: RESPCX  No results found for: WOUNDCX  No results found for: STOOLCX  No components found for: BODYFLD    Radiology Data:   Imaging Results (Last 24 Hours)     ** No results found for the last 24 hours. **          ABG:  Results from last 7 days   Lab Units 12/31/22  2214   PH, ARTERIAL pH units 7.390   PO2 ART mm Hg 89.7   PCO2, ARTERIAL mm Hg 33.1*   HCO3 ART mmol/L 20.0       I have reviewed the patient's current medications.     Assessment/Plan     Active Hospital Problems    Diagnosis    • **Acute cystitis with hematuria        Plan:  1.  Shock: Unclear if it is  septic or cardiogenic.  Patient has had good response to Levophed.  Urine output has improved, but is still marginal at best.  Agrees that cardiogenic etiology must be further considered if she decompensates.  If further pressors are needed, will consider dobutamine/milrinone.  2.  Acute cystitis: Gram stain shows greater than 100,000 colony-forming units of Klebsiella.  Continue current antibiotics.  No leukocytosis.  3.  Nonischemic cardiomyopathy with an ejection fraction of 21 to 25%.  Appears euvolemic.  4.  Moderate to severe tricuspid regurgitation  5.  Severe pulmonary hypertension  6.  Moderate to severe mitral regurgitation  7.  Diabetes mellitus: Hypoglycemia seems to have resolved.  Monitor.  8.  Anasarca: Seen on CT scan.  Will attempt to diurese as blood pressure allows.  9.  DVT prophylaxis: SCDs.    Discharge Planning: I expect patient to be discharged to home versus skilled facility in 4-5 days.        This document has been electronically signed by David Pinon MD on January 2, 2023 15:46 CST

## 2023-01-02 NOTE — PLAN OF CARE
Goal Outcome Evaluation:      New patient from ccu. Low urine output, MD aware. Vital signs stable. Tylenol given for HA.

## 2023-01-02 NOTE — PLAN OF CARE
Goal Outcome Evaluation:           Progress: no change  Outcome Evaluation: patient A/O x3 confused to place. Pulled midline out,  notified. pt pulling at tele. Weber cath remains CDI. VSS. No c/o pain.

## 2023-01-03 NOTE — PROGRESS NOTES
[facility] Medicine Services  INPATIENT PROGRESS NOTE    Length of Stay: 3  Date of Admission: 12/30/2022  Primary Care Physician: Joyce Plata MD    Subjective   Chief Complaint: Generalized weakness, nausea, vomiting  HPI: States she is doing okay.  No specific complaints.  States she rested well last night.    As of today, 01/03/23  Review of Systems   Overall patient is a fairly poor historian so review of systems is unreliable.    All pertinent negatives and positives are as above. All other systems have been reviewed and are negative unless otherwise stated.     Objective    Temp:  [96.2 °F (35.7 °C)-97.8 °F (36.6 °C)] 97.8 °F (36.6 °C)  Heart Rate:  [73-88] 80  Resp:  [18-20] 18  BP: (113-133)/(65-85) 113/65    AM-PAC 6 Clicks Score (PT): 14 (01/03/23 0843)    As of today, 01/03/23  Physical Exam  Vitals reviewed.   Constitutional:       Appearance: She is well-developed.   HENT:      Head: Normocephalic and atraumatic.   Eyes:      Pupils: Pupils are equal, round, and reactive to light.   Cardiovascular:      Rate and Rhythm: Normal rate and regular rhythm.      Heart sounds: Normal heart sounds. No murmur heard.    No friction rub. No gallop.   Pulmonary:      Effort: Pulmonary effort is normal. No respiratory distress.      Breath sounds: Normal breath sounds. No wheezing or rales.   Chest:      Chest wall: No tenderness.   Abdominal:      General: Bowel sounds are normal. There is no distension.      Palpations: Abdomen is soft.      Tenderness: There is no abdominal tenderness. There is no guarding.   Musculoskeletal:      Cervical back: Normal range of motion and neck supple.   Skin:     General: Skin is warm and dry.   Psychiatric:         Behavior: Behavior normal.         Thought Content: Thought content normal.         Results Review:  I have reviewed the labs, radiology results, and diagnostic studies.    Laboratory Data:   Results from last  7 days   Lab Units 01/03/23  0857 01/02/23  0649 01/01/23  0626   SODIUM mmol/L 127* 131* 133*   POTASSIUM mmol/L 3.7 3.5 3.6   CHLORIDE mmol/L 96* 99 103   CO2 mmol/L 15.0* 19.0* 16.0*   BUN mg/dL 18 18 18   CREATININE mg/dL 1.16* 1.37* 1.16*   GLUCOSE mg/dL 94 84 82   CALCIUM mg/dL 8.6 8.8 8.3*   BILIRUBIN mg/dL 1.3* 1.3* 1.7*   ALK PHOS U/L 121* 107 84   ALT (SGPT) U/L 14 13 14   AST (SGOT) U/L 32 26 32   ANION GAP mmol/L 16.0* 13.0 14.0     Estimated Creatinine Clearance: 50.5 mL/min (A) (by C-G formula based on SCr of 1.16 mg/dL (H)).  Results from last 7 days   Lab Units 12/31/22  0635   MAGNESIUM mg/dL 1.4*   PHOSPHORUS mg/dL 4.2         Results from last 7 days   Lab Units 01/03/23  0622 01/02/23  0649 01/01/23  0626 01/01/23  0029 12/31/22  2136   WBC 10*3/mm3 7.42 7.10 6.70 7.13 7.06   HEMOGLOBIN g/dL 13.8 12.9 12.3 12.0 11.7*   HEMATOCRIT % 41.9 38.1 36.6 36.0 35.1   PLATELETS 10*3/mm3 140 187 172 185 176     Results from last 7 days   Lab Units 01/01/23  0626   INR  1.97*       Culture Data:   Blood Culture   Date Value Ref Range Status   12/31/2022 No growth at 2 days  Preliminary     No results found for: URINECX  No results found for: RESPCX  No results found for: WOUNDCX  No results found for: STOOLCX  No components found for: BODYFLD    Radiology Data:   Imaging Results (Last 24 Hours)     ** No results found for the last 24 hours. **          ABG:  Results from last 7 days   Lab Units 12/31/22  2214   PH, ARTERIAL pH units 7.390   PO2 ART mm Hg 89.7   PCO2, ARTERIAL mm Hg 33.1*   HCO3 ART mmol/L 20.0       I have reviewed the patient's current medications.     Assessment/Plan     Active Hospital Problems    Diagnosis    • **Acute cystitis with hematuria        Plan:  1.  Shock: Resolved.    2.  Acute cystitis: Gram stain shows greater than 100,000 colony-forming units of Klebsiella.  Continue current antibiotics to complete course.  No leukocytosis.  3.  Nonischemic cardiomyopathy with an ejection  fraction of 21 to 25%.   4.  Moderate to severe tricuspid regurgitation  5.  Severe pulmonary hypertension  6.  Moderate to severe mitral regurgitation  7.  Diabetes mellitus: Hypoglycemia seems to have resolved.  Monitor.  8.  Anasarca: Seen on CT scan.  Will attempt to diurese as blood pressure allows.  9.  DVT prophylaxis: SCDs.    Discharge Planning: I expect patient to be discharged to home versus skilled facility in 4-5 days.        This document has been electronically signed by David Pinon MD on January 3, 2023 12:55 CST

## 2023-01-03 NOTE — PLAN OF CARE
Problem: Adult Inpatient Plan of Care  Goal: Plan of Care Review  Flowsheets (Taken 1/3/2023 2437)  Progress: improving  Plan of Care Reviewed With: patient  Outcome Evaluation: resting between care, diet tolerated, antibiotics continued, flores continued,   Goal Outcome Evaluation:  Plan of Care Reviewed With: patient        Progress: improving  Outcome Evaluation: resting between care, diet tolerated, antibiotics continued, flores continued,

## 2023-01-03 NOTE — PLAN OF CARE
Goal Outcome Evaluation:  Plan of Care Reviewed With: patient        Progress: no change  Outcome Evaluation: Pt AOx3, disoriented to time. Pt removed telemetry once during the night. Re-oriented her and reiterated to her to leave the telemetry pack on. Pt was able to stand and pivot to the [facility] x2 person assist. Weber catheter remains in place with tea-colored output. Pt received a prn breathing treatment for audible wheezing. All needs have been met. No distress noted.

## 2023-01-03 NOTE — DISCHARGE PLACEMENT REQUEST
"Carline Norris (72 y.o. Female)     Date of Birth   1950    Social Security Number       Address   [address] [facility] [address]    Home Phone   [phone]    MRN   [id number]       Gnosticism   Restorationism    Marital Status   Single                            Admission Date   12/30/22    Admission Type   Emergency    Admitting Provider   Howie Guerrier MD    Attending Provider   Howie Guerrier MD    Department, Room/Bed   [address], 416/1       Discharge Date       Discharge Disposition       Discharge Destination                               Attending Provider: Howie Guerrier MD    Allergies: Nsaids, Aldactone [Spironolactone]    Isolation: None   Infection: None   Code Status: CPR    Ht: 162.6 cm (64\")   Wt: 106 kg (233 lb 6.4 oz)    Admission Cmt: None   Principal Problem: Acute cystitis with hematuria [N30.01]                 Active Insurance as of 12/30/2022     Primary Coverage     Payor Plan Insurance Group Employer/Plan Group    MEDICARE MEDICARE A & B      Payor Plan Address Payor Plan Phone Number Payor Plan Fax Number Effective Dates    [address] [phone]  11/1/2000 - None Entered    [facility] [address]       Subscriber Name Subscriber Birth Date Member ID       CARLINE NORRIS 1950 [id number]           Secondary Coverage     Payor Plan Insurance Group Employer/Plan Group    [place]      Payor Plan Address Payor Plan Phone Number Payor Plan Fax Number Effective Dates    [address] [phone]  10/4/2016 - None Entered    [place] [address]       Subscriber Name Subscriber Birth Date Member ID       CARLINE NORRIS 1950 [id number]                 Emergency Contacts      (Rel.) Home Phone Work Phone Mobile Phone    Yudi Norris (Sister) [phone] -- [phone]            Insurance Information                MEDICARE/MEDICARE A & B Phone: [phone]    " Subscriber: Lexi Wade Subscriber#: [id number]    Group#: -- Precert#: --        [place] Phone: [phone]    Subscriber: Lexi Wade Subscriber#: [id number]    Group#: -- Precert#: --

## 2023-01-03 NOTE — NURSING NOTE
Telemetry Tech reports pt was off tele. Found LL lead off. Re-applied. Assessed pt with no abnormalities noted.

## 2023-01-03 NOTE — NURSING NOTE
Telemetry Tech reports pt was off tele. Found pt holding telemetry leads in her hand upon entering the room. Re-applied leads and reminded pt to not remove the monitor. She verbalized understanding. No distress noted.

## 2023-01-04 NOTE — PROGRESS NOTES
"Adult Nutrition  Assessment/PES    Patient Name:  Lexi Wade  YOB: 1950  MRN: [id number]  Admit Date:  12/30/2022    Assessment Date:  1/4/2023    Comments:  Pt with improved appetite and intake--Average ~67% for the past 3 meals.  Gluc in good control.  Shock resolved.  She remains on Abx for Acute Cystitis.  MD notes Anasarca--on diuresis as Blood pressure allows.  She is also on Midodrine.  RD will continue supplements and monitor POC/Po intake.      Discussed Basic Nutritional needs and good food/beverage choices for increasing protein in the diet.  Supplements can be used to optimize protein and calories in the diet.       Reason for Assessment     Row Name 01/04/23 1308          Reason for Assessment    Reason For Assessment follow-up protocol                Nutrition/Diet History     Row Name 01/04/23 1308          Nutrition/Diet History    Typical Intake (Food/Fluid/EN/PN) Pt claims that her appetite is \"pretty good\"  She is drinking milk with meals.  Denies any Gi distress                Labs/Tests/Procedures/Meds     Row Name 01/04/23 1310          Labs/Procedures/Meds    Lab Results Reviewed reviewed, pertinent     Lab Results Comments Na 131; Gluc 104; Creat 1.15; Alb 3.1        Diagnostic Tests/Procedures    Diagnostic Test/Procedure Reviewed reviewed, pertinent     Diagnostic Test/Procedures Comments Abx; Midodrine        Medications    Pertinent Medications Reviewed reviewed, pertinent     Pertinent Medications Comments Rocephin; Lasix; SSI; Flagyl; Midodrine; Protonix                    Nutrition Prescription Ordered     Row Name 01/04/23 1314          Nutrition Prescription PO    Current PO Diet Regular     Common Modifiers Cardiac;Consistent Carbohydrate                Evaluation of Received Nutrient/Fluid Intake     Row Name 01/04/23 1314          PO Evaluation    Number of Meals 3     % PO Intake 67% average                   Problem/Interventions:   Problem 1     Row " Name 01/04/23 1315          Nutrition Diagnoses Problem 1    Problem 1 Predicted Suboptimal Intake     Etiology (related to) Factors Affecting Nutrition     Appetite Fair;Improved     Signs/Symptoms (evidenced by) PO Intake     Percent (%) intake recorded 67 %     Over number of meals 3                      Intervention Goal     Row Name 01/04/23 1315          Intervention Goal    General Meet nutritional needs for age/condition;Reduce/improve symptoms     PO Tolerate PO;Increase intake;Meet estimated needs     Weight No significant weight loss                Nutrition Intervention     Row Name 01/04/23 1316          Nutrition Intervention    RD/Tech Action Follow Tx progress;Care plan reviewd;Encourage intake;Advise alternate selection;Advise available snack;Interview for preference;Menu provided;Recommend/ordered;Supplement provided     Recommended/Ordered Supplement                Nutrition Prescription     Row Name 01/04/23 1316          Nutrition Prescription PO    PO Prescription Begin/change supplement     Supplement Milk;Ice Cream     Supplement Frequency 2 times a day;3 times a day     New PO Prescription Ordered? Yes                Education/Evaluation     Row Name 01/04/23 1318          Education    Education Education topics;Advised regarding habits/behavior     Education Topics Basic nutrition;Protein     Advised Regarding Habits/Behavior Increased nutrient density;Snacks;Use supplement;Food choices        Monitor/Evaluation    Monitor Per protocol;I&O;PO intake;Supplement intake;Pertinent labs;Weight;Skin status;Symptoms     Education Follow-up Reinforce PRN                 Electronically signed by:  Emma Steel RD  01/04/23 13:22 CST

## 2023-01-04 NOTE — PLAN OF CARE
Goal Outcome Evaluation:  Plan of Care Reviewed With: caregiver, patient        Progress: improving  Outcome Evaluation: Nutrition F/U:  Pt with improved appetite and intake is ~67% average.  She is taking milk with emals.  Gluc in good control. Will monitor and continue supplements

## 2023-01-04 NOTE — PROGRESS NOTES
[facility]  INPATIENT PROGRESS NOTE    Length of Stay: 4  Date of Admission: 12/30/2022  Primary Care Physician: Joyce Plata MD    Subjective   Chief Complaint: Generalized weakness, nausea, vomiting  HPI: States she is doing okay.  No specific complaints.  States she rested well last night.    As of today, 01/04/23  Review of Systems   Overall patient is a fairly poor historian so review of systems is unreliable.    All pertinent negatives and positives are as above. All other systems have been reviewed and are negative unless otherwise stated.     Objective    Temp:  [97.1 °F (36.2 °C)-97.9 °F (36.6 °C)] 97.9 °F (36.6 °C)  Heart Rate:  [61-87] 84  Resp:  [18-19] 18  BP: (106-137)/(61-76) 106/67    AM-PAC 6 Clicks Score (PT): 20 (01/04/23 0830)    As of today, 01/04/23  Physical Exam  Vitals reviewed.   Constitutional:       Appearance: She is well-developed.   HENT:      Head: Normocephalic and atraumatic.   Eyes:      Pupils: Pupils are equal, round, and reactive to light.   Cardiovascular:      Rate and Rhythm: Normal rate and regular rhythm.      Heart sounds: Normal heart sounds. No murmur heard.    No friction rub. No gallop.   Pulmonary:      Effort: Pulmonary effort is normal. No respiratory distress.      Breath sounds: Normal breath sounds. No wheezing or rales.   Chest:      Chest wall: No tenderness.   Abdominal:      General: Bowel sounds are normal. There is no distension.      Palpations: Abdomen is soft.      Tenderness: There is no abdominal tenderness. There is no guarding.   Musculoskeletal:      Cervical back: Normal range of motion and neck supple.   Skin:     General: Skin is warm and dry.   Psychiatric:         Behavior: Behavior normal.         Thought Content: Thought content normal.         Results Review:  I have reviewed the labs, radiology results, and diagnostic studies.    Laboratory Data:   Results from last  7 days   Lab Units 01/04/23  0620 01/03/23  0857 01/02/23  0649   SODIUM mmol/L 131* 127* 131*   POTASSIUM mmol/L 4.2 3.7 3.5   CHLORIDE mmol/L 99 96* 99   CO2 mmol/L 19.0* 15.0* 19.0*   BUN mg/dL 16 18 18   CREATININE mg/dL 1.15* 1.16* 1.37*   GLUCOSE mg/dL 104* 94 84   CALCIUM mg/dL 8.6 8.6 8.8   BILIRUBIN mg/dL 1.2 1.3* 1.3*   ALK PHOS U/L 156* 121* 107   ALT (SGPT) U/L 13 14 13   AST (SGOT) U/L 32 32 26   ANION GAP mmol/L 13.0 16.0* 13.0     Estimated Creatinine Clearance: 50.4 mL/min (A) (by C-G formula based on SCr of 1.15 mg/dL (H)).  Results from last 7 days   Lab Units 12/31/22  0635   MAGNESIUM mg/dL 1.4*   PHOSPHORUS mg/dL 4.2         Results from last 7 days   Lab Units 01/04/23  0620 01/03/23  0622 01/02/23  0649 01/01/23  0626 01/01/23  0029   WBC 10*3/mm3 8.11 7.42 7.10 6.70 7.13   HEMOGLOBIN g/dL 13.4 13.8 12.9 12.3 12.0   HEMATOCRIT % 38.8 41.9 38.1 36.6 36.0   PLATELETS 10*3/mm3 202 140 187 172 185     Results from last 7 days   Lab Units 01/01/23  0626   INR  1.97*       Culture Data:   No results found for: BLOODCX  No results found for: URINECX  No results found for: RESPCX  No results found for: WOUNDCX  No results found for: STOOLCX  No components found for: BODYFLD    Radiology Data:   Imaging Results (Last 24 Hours)     ** No results found for the last 24 hours. **          ABG:  Results from last 7 days   Lab Units 12/31/22  2214   PH, ARTERIAL pH units 7.390   PO2 ART mm Hg 89.7   PCO2, ARTERIAL mm Hg 33.1*   HCO3 ART mmol/L 20.0       I have reviewed the patient's current medications.     Assessment/Plan     Active Hospital Problems    Diagnosis    • **Acute cystitis with hematuria        Plan:  1.  Shock: Resolved.    2.  Acute cystitis: Gram stain shows greater than 100,000 colony-forming units of Klebsiella.  Continue current antibiotics to complete course.  No leukocytosis.  3.  Nonischemic cardiomyopathy with an ejection fraction of 21 to 25%.   4.  Moderate to severe tricuspid  regurgitation  5.  Severe pulmonary hypertension  6.  Moderate to severe mitral regurgitation  7.  Diabetes mellitus: Hypoglycemia seems to have resolved.  Monitor.  8.  Anasarca: Seen on CT scan.  Will attempt to diurese as blood pressure allows.  9.  DVT prophylaxis: SCDs.    Medical Decision Making  Number and Complexity of problems: Multiple problems with high complexity    Conditions and Status:        Condition is improving.     MDM Data  External documents reviewed: Noted external documents reviewed  My EKG interpretation: No new EKGs.     Discussed with: Patient     Treatment Plan  Continue antibiotics and diuretics.    Care Planning  Shared decision making: Continue current treatment  Code status and discussions: Continue full code    Disposition  Social Determinants of Health that impact treatment or disposition: None  I expect the patient to be discharged to skilled facility in the next 4 days.           This document has been electronically signed by David Pinon MD on January 4, 2023 14:26 CST

## 2023-01-04 NOTE — PLAN OF CARE
Goal Outcome Evaluation:  Plan of Care Reviewed With: patient        Progress: no change  Outcome Evaluation: Pt rested well through the night. Administered IV Lasix as ordered, flores output this shift: 3,625 cc. No c/o pain. Lungs are more clear during assessment (compared to the previous night). All needs have been met. No distress noted.

## 2023-01-05 PROBLEM — I50.23 ACUTE ON CHRONIC HFREF (HEART FAILURE WITH REDUCED EJECTION FRACTION) (HCC): Status: ACTIVE | Noted: 2023-01-01

## 2023-01-05 NOTE — PROGRESS NOTES
[facility]  INPATIENT PROGRESS NOTE    Length of Stay: 5  Date of Admission: 12/30/2022  Primary Care Physician: Joyce Plata MD    Subjective   Chief Complaint: Generalized weakness, nausea, vomiting  HPI: States she is doing okay.  No specific complaints.  States she rested well last night.  Breathing is improving.    As of today, 01/05/23  Review of Systems   Constitutional: Negative for appetite change, chills, fatigue, fever and unexpected weight change.   Respiratory: Negative for cough, choking, chest tightness, shortness of breath and wheezing.    Cardiovascular: Negative for chest pain, palpitations and leg swelling.   Gastrointestinal: Negative for abdominal pain, blood in stool, constipation, diarrhea, nausea and vomiting.   Genitourinary: Negative for dysuria, flank pain and hematuria.   Neurological: Negative for dizziness, seizures, syncope, speech difficulty, weakness, light-headedness, numbness and headaches.   Hematological: Does not bruise/bleed easily.          All pertinent negatives and positives are as above. All other systems have been reviewed and are negative unless otherwise stated.     Objective    Temp:  [96.3 °F (35.7 °C)-97 °F (36.1 °C)] 97 °F (36.1 °C)  Heart Rate:  [69-89] 89  Resp:  [17-18] 18  BP: (110-128)/(65-84) 117/65    AM-PAC 6 Clicks Score (PT): 20 (01/05/23 0700)    As of today, 01/05/23  Physical Exam  Vitals reviewed.   Constitutional:       Appearance: She is well-developed.   HENT:      Head: Normocephalic and atraumatic.   Eyes:      Pupils: Pupils are equal, round, and reactive to light.   Cardiovascular:      Rate and Rhythm: Normal rate and regular rhythm.      Heart sounds: Normal heart sounds. No murmur heard.    No friction rub. No gallop.   Pulmonary:      Effort: Pulmonary effort is normal. No respiratory distress.      Breath sounds: Normal breath sounds. No wheezing or rales.   Chest:       Chest wall: No tenderness.   Abdominal:      General: Bowel sounds are normal. There is no distension.      Palpations: Abdomen is soft.      Tenderness: There is no abdominal tenderness. There is no guarding.   Musculoskeletal:      Cervical back: Normal range of motion and neck supple.   Skin:     General: Skin is warm and dry.   Psychiatric:         Behavior: Behavior normal.         Thought Content: Thought content normal.         Results Review:  I have reviewed the labs, radiology results, and diagnostic studies.    Laboratory Data:   Results from last 7 days   Lab Units 01/05/23  0717 01/04/23  0620 01/03/23  0857   SODIUM mmol/L 135* 131* 127*   POTASSIUM mmol/L 3.1* 4.2 3.7   CHLORIDE mmol/L 99 99 96*   CO2 mmol/L 22.0 19.0* 15.0*   BUN mg/dL 13 16 18   CREATININE mg/dL 1.03* 1.15* 1.16*   GLUCOSE mg/dL 128* 104* 94   CALCIUM mg/dL 8.6 8.6 8.6   BILIRUBIN mg/dL 1.1 1.2 1.3*   ALK PHOS U/L 148* 156* 121*   ALT (SGPT) U/L 11 13 14   AST (SGOT) U/L 28 32 32   ANION GAP mmol/L 14.0 13.0 16.0*     Estimated Creatinine Clearance: 56.6 mL/min (A) (by C-G formula based on SCr of 1.03 mg/dL (H)).  Results from last 7 days   Lab Units 12/31/22  0635   MAGNESIUM mg/dL 1.4*   PHOSPHORUS mg/dL 4.2         Results from last 7 days   Lab Units 01/05/23  0717 01/04/23  0620 01/03/23  0622 01/02/23  0649 01/01/23  0626   WBC 10*3/mm3 7.49 8.11 7.42 7.10 6.70   HEMOGLOBIN g/dL 13.5 13.4 13.8 12.9 12.3   HEMATOCRIT % 40.4 38.8 41.9 38.1 36.6   PLATELETS 10*3/mm3 205 202 140 187 172     Results from last 7 days   Lab Units 01/01/23  0626   INR  1.97*       Culture Data:   No results found for: BLOODCX  No results found for: URINECX  No results found for: RESPCX  No results found for: WOUNDCX  No results found for: STOOLCX  No components found for: BODYFLD    Radiology Data:   Imaging Results (Last 24 Hours)     ** No results found for the last 24 hours. **          ABG:  Results from last 7 days   Lab Units 12/31/22  8127    PH, ARTERIAL pH units 7.390   PO2 ART mm Hg 89.7   PCO2, ARTERIAL mm Hg 33.1*   HCO3 ART mmol/L 20.0       I have reviewed the patient's current medications.     Assessment/Plan     Active Hospital Problems    Diagnosis    • **Acute cystitis with hematuria        Plan:  1.  Shock: Resolved.    2.  Acute cystitis: Gram stain shows greater than 100,000 colony-forming units of Klebsiella.  Completed antibiotics.  No leukocytosis.  3.  Nonischemic cardiomyopathy with an ejection fraction of 21 to 25%.   4.  Moderate to severe tricuspid regurgitation  5.  Severe pulmonary hypertension  6.  Moderate to severe mitral regurgitation  7.  Diabetes mellitus: Hypoglycemia seems to have resolved.  Monitor.  8.  Anasarca: Seen on CT scan.  Diuresing well.  Symptoms much better.  9.  DVT prophylaxis: SCDs.    Medical Decision Making  Number and Complexity of problems: Multiple problems with high complexity    Conditions and Status:        Condition is improving.     [facility] Data  External documents reviewed: Noted external documents reviewed  My EKG interpretation: No new EKGs.     Discussed with: Patient     Treatment Plan  Continue antibiotics and diuretics.  Plan to switch antibiotics to oral tomorrow.    Care Planning  Shared decision making: Continue current treatment  Code status and discussions: Continue full code    Disposition  Social Determinants of Health that impact treatment or disposition: None  I expect the patient to be discharged to skilled facility in the next 1-2 days.           This document has been electronically signed by David Pinon MD on January 5, 2023 17:20 CST

## 2023-01-05 NOTE — PLAN OF CARE
Goal Outcome Evaluation:  Pt. Alert/oriented, plans to discharge tomorrow to [facility] and rehab. T continues to void without difficulty.

## 2023-01-05 NOTE — PLAN OF CARE
Goal Outcome Evaluation:  Plan of Care Reviewed With: patient        Progress: improving  Outcome Evaluation: VSS, patient urinating on own with purewick in place adequately, abx infusing per orders, no complaints, resting in between care.

## 2023-01-05 NOTE — PLAN OF CARE
Goal Outcome Evaluation: Pt alert/oriented, flores dc'd, voiding without difficulty,

## 2023-01-06 NOTE — NURSING NOTE
Notified Dr. Behroozi about patient's potassium level of 2.9. Order for electrolyte protocol received.

## 2023-01-06 NOTE — PLAN OF CARE
Goal Outcome Evaluation:  Plan of Care Reviewed With: patient        Progress: improving  Outcome Evaluation: VSS, patient urinating on own with purewick in place adequately, no complaints, resting in between care.

## 2023-01-06 NOTE — PROGRESS NOTES
[facility] Medicine Services  INPATIENT PROGRESS NOTE    Length of Stay: 6  Date of Admission: 12/30/2022  Primary Care Physician: Joyce Plata MD    Subjective   Chief Complaint: Generalized weakness, nausea, vomiting  HPI: Breathing is okay, but she is very nauseated.    As of today, 01/06/23  Review of Systems   Constitutional: Negative for appetite change, chills, fatigue, fever and unexpected weight change.   Respiratory: Negative for cough, choking, chest tightness, shortness of breath and wheezing.    Cardiovascular: Negative for chest pain, palpitations and leg swelling.   Gastrointestinal: Positive for nausea and vomiting. Negative for abdominal pain, blood in stool, constipation and diarrhea.   Genitourinary: Negative for dysuria, flank pain and hematuria.   Neurological: Negative for dizziness, seizures, syncope, speech difficulty, weakness, light-headedness, numbness and headaches.   Hematological: Does not bruise/bleed easily.          All pertinent negatives and positives are as above. All other systems have been reviewed and are negative unless otherwise stated.     Objective    Temp:  [96.2 °F (35.7 °C)-98.1 °F (36.7 °C)] 96.2 °F (35.7 °C)  Heart Rate:  [83-94] 94  Resp:  [18] 18  BP: (111-121)/(65-81) 114/70    AM-PAC 6 Clicks Score (PT): 20 (01/05/23 0700)    As of today, 01/06/23  Physical Exam  Vitals reviewed.   Constitutional:       Appearance: She is well-developed.      Comments: Nauseated with active emesis   HENT:      Head: Normocephalic and atraumatic.   Eyes:      Pupils: Pupils are equal, round, and reactive to light.   Cardiovascular:      Rate and Rhythm: Normal rate and regular rhythm.      Heart sounds: Normal heart sounds. No murmur heard.    No friction rub. No gallop.   Pulmonary:      Effort: Pulmonary effort is normal. No respiratory distress.      Breath sounds: Normal breath sounds. No wheezing or rales.   Chest:       Chest wall: No tenderness.   Abdominal:      General: Bowel sounds are normal. There is no distension.      Palpations: Abdomen is soft.      Tenderness: There is no abdominal tenderness. There is no guarding.   Musculoskeletal:      Cervical back: Normal range of motion and neck supple.   Skin:     General: Skin is warm and dry.   Psychiatric:         Behavior: Behavior normal.         Thought Content: Thought content normal.         Results Review:  I have reviewed the labs, radiology results, and diagnostic studies.    Laboratory Data:   Results from last 7 days   Lab Units 01/06/23  0710 01/05/23  0717 01/04/23  0620   SODIUM mmol/L 137 135* 131*   POTASSIUM mmol/L 2.9* 3.1* 4.2   CHLORIDE mmol/L 98 99 99   CO2 mmol/L 24.0 22.0 19.0*   BUN mg/dL 11 13 16   CREATININE mg/dL 0.93 1.03* 1.15*   GLUCOSE mg/dL 134* 128* 104*   CALCIUM mg/dL 8.7 8.6 8.6   BILIRUBIN mg/dL 1.1 1.1 1.2   ALK PHOS U/L 149* 148* 156*   ALT (SGPT) U/L 10 11 13   AST (SGOT) U/L 27 28 32   ANION GAP mmol/L 15.0 14.0 13.0     Estimated Creatinine Clearance: 60.1 mL/min (by C-G formula based on SCr of 0.93 mg/dL).  Results from last 7 days   Lab Units 12/31/22  0635   MAGNESIUM mg/dL 1.4*   PHOSPHORUS mg/dL 4.2         Results from last 7 days   Lab Units 01/06/23  0710 01/05/23  0717 01/04/23  0620 01/03/23  0622 01/02/23  0649   WBC 10*3/mm3 7.30 7.49 8.11 7.42 7.10   HEMOGLOBIN g/dL 13.4 13.5 13.4 13.8 12.9   HEMATOCRIT % 39.5 40.4 38.8 41.9 38.1   PLATELETS 10*3/mm3 225 205 202 140 187     Results from last 7 days   Lab Units 01/01/23  0626   INR  1.97*       Culture Data:   No results found for: BLOODCX  No results found for: URINECX  No results found for: RESPCX  No results found for: WOUNDCX  No results found for: STOOLCX  No components found for: BODYFLD    Radiology Data:   Imaging Results (Last 24 Hours)     ** No results found for the last 24 hours. **          ABG:  Results from last 7 days   Lab Units 12/31/22  3623   PH, ARTERIAL  pH units 7.390   PO2 ART mm Hg 89.7   PCO2, ARTERIAL mm Hg 33.1*   HCO3 ART mmol/L 20.0       I have reviewed the patient's current medications.     Assessment/Plan     Active Hospital Problems    Diagnosis    • **Acute cystitis with hematuria    • Acute on chronic HFrEF (heart failure with reduced ejection fraction) ([facility])        Plan:  1.  Shock: Resolved.    2.  Acute cystitis: Gram stain shows greater than 100,000 colony-forming units of Klebsiella.  Completed antibiotics.  No leukocytosis.  3.  Nonischemic cardiomyopathy with an ejection fraction of 21 to 25%.  Essentially euvolemic.  4.  Moderate to severe tricuspid regurgitation  5.  Severe pulmonary hypertension  6.  Moderate to severe mitral regurgitation  7.  Diabetes mellitus: Hypoglycemia seems to have resolved.  Monitor.  8.  Anasarca: Seen on CT scan.  Diuresing well.  Symptoms much better.  Plan was to switch to oral Lasix today, but with emesis will continue IV Lasix today.  9.  Hypokalemia: Replace per protocol and monitor.  10.  Nausea/vomiting: Likely secondary to oral potassium.  Treat symptomatically.  11.  DVT prophylaxis: SCDs.    Medical Decision Making  Number and Complexity of problems: Multiple problems with high complexity    Conditions and Status:        Respiratory status continues to improve.  However, patient has significant nausea and vomiting today.     MDM Data  External documents reviewed: Noted external documents reviewed  My EKG interpretation: No new EKGs.     Discussed with: Patient     Treatment Plan  Continue IV diuretics due to nausea and vomiting.  Plan to switch to oral diuretics at discharge.  Completed antibiotics.    Care Planning  Shared decision making: Continue current treatment  Code status and discussions: Continue full code    Disposition  Social Determinants of Health that impact treatment or disposition: None  I expect the patient to be discharged to skilled facility in the next 1-2 days.           This document  has been electronically signed by David Pinon MD on January 6, 2023 12:16 CST

## 2023-01-07 NOTE — PLAN OF CARE
Goal Outcome Evaluation:  Plan of Care Reviewed With: patient        Progress: improving  Outcome Evaluation: VSS. Purewick in place. Potassium and magnesium replaced per protocol. Edema present in bilateral lower extremities. Pt appears to be resting well between care. Tele in place. No complaints of pain at this time.

## 2023-01-07 NOTE — DISCHARGE SUMMARY
[facility] Medicine Services  DISCHARGE SUMMARY       Date of Admission: 12/30/2022  Date of Discharge:  1/7/2023  Primary Care Physician: Joyce Plata MD    Presenting Problem/History of Present Illness:  Lower abdominal pain [R10.30]  Acute cystitis with hematuria [N30.01]  Hypotension [I95.9]       Final Discharge Diagnoses:  Active Hospital Problems    Diagnosis    • **Acute cystitis with hematuria    • Acute on chronic HFrEF (heart failure with reduced ejection fraction) ([facility])        Consults:   Consults     No orders found from 12/1/2022 to 12/31/2022.          Pertinent Test Results:   Lab Results (most recent)     Procedure Component Value Units Date/Time    Potassium [[id number]]  (Normal) Collected: 01/07/23 1117    Specimen: Blood Updated: 01/07/23 1144     Potassium 4.6 mmol/L      Comment: Slight hemolysis detected by analyzer. Results may be affected.       POC Glucose Once [[id number]]  (Abnormal) Collected: 01/07/23 1102    Specimen: Blood Updated: 01/07/23 1141     Glucose 264 mg/dL      Comment: RN NotifiedOperator: [id number] TRI NICHOLSONANNAMeter ID: [id number]       Comprehensive Metabolic Panel [[id number]]  (Abnormal) Collected: 01/07/23 0645    Specimen: Blood Updated: 01/07/23 0752     Glucose 167 mg/dL      BUN 10 mg/dL      Creatinine 1.03 mg/dL      Sodium 137 mmol/L      Potassium 3.9 mmol/L      Comment: Slight hemolysis detected by analyzer. Results may be affected.        Chloride 98 mmol/L      CO2 27.0 mmol/L      Calcium 8.6 mg/dL      Total Protein 5.4 g/dL      Albumin 3.2 g/dL      ALT (SGPT) 11 U/L      AST (SGOT) 28 U/L      Alkaline Phosphatase 138 U/L      Total Bilirubin 1.0 mg/dL      Globulin 2.2 gm/dL      A/G Ratio 1.5 g/dL      BUN/Creatinine Ratio 9.7     Anion Gap 12.0 mmol/L      eGFR 57.2 mL/min/1.73      Comment: National Kidney Foundation and American Society of Nephrology (ASN) Task Force recommended  calculation based on the Chronic Kidney Disease Epidemiology Collaboration (CKD-EPI) equation refit without adjustment for race.       Narrative:      GFR Normal >60  Chronic Kidney Disease <60  Kidney Failure <15    The GFR formula is only valid for adults with stable renal function between ages 18 and 70.    POC Glucose Once [[id number]]  (Abnormal) Collected: 01/07/23 0717    Specimen: Blood Updated: 01/07/23 0743     Glucose 144 mg/dL      Comment: RN NotifiedOperator: [id number] TRI NICHOLSONANNAMeter ID: [id number]       CBC & Differential [[id number]]  (Abnormal) Collected: 01/07/23 0645    Specimen: Blood Updated: 01/07/23 0733    Narrative:      The following orders were created for panel order CBC & Differential.  Procedure                               Abnormality         Status                     ---------                               -----------         ------                     CBC Auto Differential[[id number]]        Abnormal            Final result                 Please view results for these tests on the individual orders.    CBC Auto Differential [[id number]]  (Abnormal) Collected: 01/07/23 0645    Specimen: Blood Updated: 01/07/23 0733     WBC 7.16 10*3/mm3      RBC 5.10 10*6/mm3      Hemoglobin 13.6 g/dL      Hematocrit 42.0 %      MCV 82.4 fL      MCH 26.7 pg      MCHC 32.4 g/dL      RDW 21.7 %      RDW-SD 61.3 fl      MPV 9.6 fL      Platelets 214 10*3/mm3      Neutrophil % 64.5 %      Lymphocyte % 22.2 %      Monocyte % 11.3 %      Eosinophil % 1.1 %      Basophil % 0.6 %      Immature Grans % 0.3 %      Neutrophils, Absolute 4.62 10*3/mm3      Lymphocytes, Absolute 1.59 10*3/mm3      Monocytes, Absolute 0.81 10*3/mm3      Eosinophils, Absolute 0.08 10*3/mm3      Basophils, Absolute 0.04 10*3/mm3      Immature Grans, Absolute 0.02 10*3/mm3      nRBC 0.0 /100 WBC     Magnesium [[id number]]  (Abnormal) Collected: 01/06/23 2326    Specimen: Blood Updated: 01/07/23 0110     Magnesium 1.1 mg/dL      Potassium [[id number]]  (Abnormal) Collected: 01/06/23 2326    Specimen: Blood Updated: 01/07/23 0032     Potassium 3.3 mmol/L     Comprehensive Metabolic Panel [[id number]]  (Abnormal) Collected: 01/06/23 0710    Specimen: Blood Updated: 01/06/23 0740     Glucose 134 mg/dL      BUN 11 mg/dL      Creatinine 0.93 mg/dL      Sodium 137 mmol/L      Potassium 2.9 mmol/L      Chloride 98 mmol/L      CO2 24.0 mmol/L      Calcium 8.7 mg/dL      Total Protein 5.5 g/dL      Albumin 3.3 g/dL      ALT (SGPT) 10 U/L      AST (SGOT) 27 U/L      Alkaline Phosphatase 149 U/L      Total Bilirubin 1.1 mg/dL      Globulin 2.2 gm/dL      A/G Ratio 1.5 g/dL      BUN/Creatinine Ratio 11.8     Anion Gap 15.0 mmol/L      eGFR 64.6 mL/min/1.73      Comment: National Kidney Foundation and American Society of Nephrology (ASN) Task Force recommended calculation based on the Chronic Kidney Disease Epidemiology Collaboration (CKD-EPI) equation refit without adjustment for race.       Narrative:      GFR Normal >60  Chronic Kidney Disease <60  Kidney Failure <15    The GFR formula is only valid for adults with stable renal function between ages 18 and 70.    CBC & Differential [[id number]]  (Abnormal) Collected: 01/06/23 0710    Specimen: Blood Updated: 01/06/23 0721    Narrative:      The following orders were created for panel order CBC & Differential.  Procedure                               Abnormality         Status                     ---------                               -----------         ------                     CBC Auto Differential[[id number]]        Abnormal            Final result                 Please view results for these tests on the individual orders.    CBC Auto Differential [[id number]]  (Abnormal) Collected: 01/06/23 0710    Specimen: Blood Updated: 01/06/23 0721     WBC 7.30 10*3/mm3      RBC 4.97 10*6/mm3      Hemoglobin 13.4 g/dL      Hematocrit 39.5 %      MCV 79.5 fL      MCH 27.0 pg      MCHC 33.9 g/dL      RDW 20.9 %       RDW-SD 56.5 fl      MPV 9.7 fL      Platelets 225 10*3/mm3      Neutrophil % 58.5 %      Lymphocyte % 26.6 %      Monocyte % 13.3 %      Eosinophil % 0.8 %      Basophil % 0.5 %      Immature Grans % 0.3 %      Neutrophils, Absolute 4.27 10*3/mm3      Lymphocytes, Absolute 1.94 10*3/mm3      Monocytes, Absolute 0.97 10*3/mm3      Eosinophils, Absolute 0.06 10*3/mm3      Basophils, Absolute 0.04 10*3/mm3      Immature Grans, Absolute 0.02 10*3/mm3      nRBC 0.0 /100 WBC     COVID-19,CEPHEID/GUANACO,COR/TIMI/PAD/ELZA/MAD IN-HOUSE(OR EMERGENT/ADD-ON),NP SWAB IN TRANSPORT MEDIA 3-4 HR TAT, RT-PCR - Swab, Nasopharynx [[id number]]  (Normal) Collected: 01/05/23 1626    Specimen: Swab from Nasopharynx Updated: 01/05/23 1650     COVID19 Not Detected    Narrative:      Fact sheet for providers: https://www.fda.gov/media/437501/download     Fact sheet for patients: https://www.fda.gov/media/849916/download  Fact sheet for providers: https://www.fda.gov/media/030302/download    Fact sheet for patients: https://www.fda.gov/media/756246/download    Test performed by PCR.    Blood Culture - Blood, Arm, Left [[id number]]  (Normal) Collected: 12/31/22 1445    Specimen: Blood from Arm, Left Updated: 01/05/23 1531     Blood Culture No growth at 5 days    Blood Culture - Blood, Hand, Left [[id number]]  (Normal) Collected: 12/31/22 0930    Specimen: Blood from Hand, Left Updated: 01/05/23 0946     Blood Culture No growth at 5 days    Scan Slide [[id number]] Collected: 01/03/23 0622    Specimen: Blood Updated: 01/03/23 0810     Anisocytosis Slight/1+     Crenated RBC's Slight/1+     Hypochromia Slight/1+     Target Cells Slight/1+     WBC Morphology Normal     Platelet Estimate Adequate    Urine Culture - Urine, Straight Cath [[id number]]  (Abnormal)  (Susceptibility) Collected: 12/30/22 1551    Specimen: Urine from Straight Cath Updated: 01/02/23 1017     Urine Culture >100,000 CFU/mL Klebsiella pneumoniae ssp pneumoniae    Narrative:       Colonization of the urinary tract without infection is common. Treatment is discouraged unless the patient is symptomatic, pregnant, or undergoing an invasive urologic procedure.    Susceptibility      Klebsiella pneumoniae ssp pneumoniae      MENDY      Ampicillin Resistant     Ampicillin + Sulbactam Susceptible      Cefazolin Susceptible      Cefepime Susceptible      Ceftazidime Susceptible      Ceftriaxone Susceptible      Gentamicin Susceptible      Levofloxacin Susceptible      Nitrofurantoin Susceptible      Piperacillin + Tazobactam Susceptible      Trimethoprim + Sulfamethoxazole Susceptible                           Ammonia [[id number]]  (Normal) Collected: 01/01/23 0626    Specimen: Blood Updated: 01/01/23 0701     Ammonia 44 umol/L     Lactic Acid, Plasma [[id number]]  (Normal) Collected: 01/01/23 0626    Specimen: Blood Updated: 01/01/23 0653     Lactate 1.7 mmol/L     Protime-INR [[id number]]  (Abnormal) Collected: 01/01/23 0626    Specimen: Blood Updated: 01/01/23 0653     Protime 22.4 Seconds      INR 1.97    Narrative:      Therapeutic range for most indications is 2.0-3.0 INR,  or 2.5-3.5 for mechanical heart valves.    CBC (No Diff) [[id number]]  (Abnormal) Collected: 01/01/23 0029    Specimen: Blood Updated: 01/01/23 0038     WBC 7.13 10*3/mm3      RBC 4.41 10*6/mm3      Hemoglobin 12.0 g/dL      Hematocrit 36.0 %      MCV 81.6 fL      MCH 27.2 pg      MCHC 33.3 g/dL      RDW 20.5 %      RDW-SD 57.5 fl      MPV 9.8 fL      Platelets 185 10*3/mm3     Blood Gas, Arterial - [[id number]]  (Abnormal) Collected: 12/31/22 2214    Specimen: Arterial Blood Updated: 12/31/22 2220     Site Right Radial     Heber's Test N/A     pH, Arterial 7.390 pH units      pCO2, Arterial 33.1 mm Hg      Comment: 84 Value below reference range        pO2, Arterial 89.7 mm Hg      HCO3, Arterial 20.0 mmol/L      Base Excess, Arterial -4.2 mmol/L      Comment: 84 Value below reference range        O2 Saturation, Arterial 97.0 %   "    Barometric Pressure for Blood Gas 746 mmHg      Modality Room Air     Ventilator Mode NA     Collected by AN     Comment: Meter: [id number]     :  [id number]       Procalcitonin [[id number]]  (Normal) Collected: 12/31/22 1445    Specimen: Blood Updated: 12/31/22 2000     Procalcitonin 0.17 ng/mL     Narrative:      As a Marker for Sepsis (Non-Neonates):    1. <0.5 ng/mL represents a low risk of severe sepsis and/or septic shock.  2. >2 ng/mL represents a high risk of severe sepsis and/or septic shock.    As a Marker for Lower Respiratory Tract Infections that require antibiotic therapy:    PCT on Admission    Antibiotic Therapy       6-12 Hrs later    >0.5                Strongly Recommended  >0.25 - <0.5        Recommended   0.1 - 0.25          Discouraged              Remeasure/reassess PCT  <0.1                Strongly Discouraged     Remeasure/reassess PCT    As 28 day mortality risk marker: \"Change in Procalcitonin Result\" (>80% or <=80%) if Day 0 (or Day 1) and Day 4 values are available. Refer to http://www.[employer]-pct-calculator.com    Change in PCT <=80%  A decrease of PCT levels below or equal to 80% defines a positive change in PCT test result representing a higher risk for 28-day all-cause mortality of patients diagnosed with severe sepsis for septic shock.    Change in PCT >80%  A decrease of PCT levels of more than 80% defines a negative change in PCT result representing a lower risk for 28-day all-cause mortality of patients diagnosed with severe sepsis or septic shock.       STAT Lactic Acid, Reflex [[id number]]  (Normal) Collected: 12/31/22 1806    Specimen: Blood Updated: 12/31/22 1857     Lactate 1.9 mmol/L     Hepatitis Panel, Acute [[id number]]  (Normal) Collected: 12/31/22 1445    Specimen: Blood Updated: 12/31/22 1553     Hepatitis B Surface Ag Non-Reactive     Hep A IgM Non-Reactive     Hep B C IgM Non-Reactive     Hepatitis C Ab Non-Reactive    Narrative:      Results may be " falsely decreased if patient taking Biotin.     STAT Lactic Acid, Reflex [[id number]]  (Abnormal) Collected: 12/31/22 1445    Specimen: Blood Updated: 12/31/22 1541     Lactate 2.4 mmol/L     BNP [[id number]]  (Abnormal) Collected: 12/31/22 0635    Specimen: Blood Updated: 12/31/22 1401     proBNP 5,066.0 pg/mL     Narrative:      Among patients with dyspnea, NT-proBNP is highly sensitive for the detection of acute congestive heart failure. In addition NT-proBNP of <300 pg/ml effectively rules out acute congestive heart failure with 99% negative predictive value.      Lactic Acid, Plasma [[id number]]  (Abnormal) Collected: 12/31/22 0928    Specimen: Blood Updated: 12/31/22 1033     Lactate 3.6 mmol/L     Magnesium [[id number]]  (Abnormal) Collected: 12/31/22 0635    Specimen: Blood Updated: 12/31/22 0733     Magnesium 1.4 mg/dL     Phosphorus [[id number]]  (Normal) Collected: 12/31/22 0635    Specimen: Blood Updated: 12/31/22 0733     Phosphorus 4.2 mg/dL     TSH [[id number]]  (Normal) Collected: 12/30/22 2246    Specimen: Blood Updated: 12/31/22 0309     TSH 3.820 uIU/mL     Troponin [[id number]]  (Normal) Collected: 12/30/22 2246    Specimen: Blood Updated: 12/30/22 2319     Troponin T <0.010 ng/mL      Comment: Specimen hemolyzed.  Results may be affected.       Narrative:      Troponin T Reference Range:  <= 0.03 ng/mL-   Negative for AMI  >0.03 ng/mL-     Abnormal for myocardial necrosis.  Clinicians would have to utilize clinical acumen, EKG, Troponin and serial changes to determine if it is an Acute Myocardial Infarction or myocardial injury due to an underlying chronic condition.       Results may be falsely decreased if patient taking Biotin.      Urinalysis, Microscopic Only - Urine, Catheter [[id number]]  (Abnormal) Collected: 12/30/22 2105    Specimen: Urine, Catheter Updated: 12/30/22 2144     RBC, UA 13-20 /HPF      WBC, UA Too Numerous to Count /HPF      Bacteria, UA 4+ /HPF      Squamous Epithelial Cells, UA  3-5 /HPF      Renal Epithelial Cells, UA 3-6 /HPF      Hyaline Casts, UA 3-6 /LPF      Methodology Manual Light Microscopy    Urinalysis With Microscopic If Indicated (No Culture) - Urine, Catheter [[id number]]  (Abnormal) Collected: 12/30/22 2105    Specimen: Urine, Catheter Updated: 12/30/22 2115     Color, UA Lucie     Appearance, UA Cloudy     pH, UA 5.5     Specific Gravity, UA 1.031     Comment: Result obtained by Refractometer        Glucose, UA Negative     Ketones, UA Trace     Bilirubin, UA Large (3+)     Blood, UA Moderate (2+)     Protein, UA >=300 mg/dL (3+)     Leuk Esterase, UA Small (1+)     Nitrite, UA Positive     Urobilinogen, UA 1.0 E.U./dL    [place] Draw [[id number]] Collected: 12/30/22 2003    Specimen: Blood Updated: 12/30/22 2115    Narrative:      The following orders were created for panel order [place] Draw.  Procedure                               Abnormality         Status                     ---------                               -----------         ------                     Green Top (Gel)[[id number]]                                  Final result               Lavender Top[[id number]]                                     Final result               Gold Top - SST[[id number]]                                   Final result               Light Blue Top[[id number]]                                   Final result                 Please view results for these tests on the individual orders.    Green Top (Gel) [[id number]] Collected: 12/30/22 2003    Specimen: Blood Updated: 12/30/22 2115     Extra Tube Hold for add-ons.     Comment: Auto resulted.       Gold Top - SST [[id number]] Collected: 12/30/22 2003    Specimen: Blood Updated: 12/30/22 2115     Extra Tube Hold for add-ons.     Comment: Auto resulted.       Lavender Top [[id number]] Collected: 12/30/22 2003    Specimen: Blood Updated: 12/30/22 2115     Extra Tube hold for add-on     Comment: Auto resulted       Light Blue Top [[id number]]  Collected: 12/30/22 2003    Specimen: Blood Updated: 12/30/22 2115     Extra Tube Hold for add-ons.     Comment: Auto resulted       COVID-19 and FLU A/B PCR - Swab, Nasopharynx [[id number]]  (Normal) Collected: 12/30/22 2019    Specimen: Swab from Nasopharynx Updated: 12/30/22 2046     COVID19 Not Detected     Influenza A PCR Not Detected     Influenza B PCR Not Detected    Narrative:      Fact sheet for providers: https://www.fda.gov/media/649598/download    Fact sheet for patients: https://www.fda.gov/media/308335/download    Test performed by PCR.    Lipase [[id number]]  (Normal) Collected: 12/30/22 2003    Specimen: Blood Updated: 12/30/22 2029     Lipase 25 U/L     Troponin [[id number]]  (Normal) Collected: 12/30/22 2003    Specimen: Blood Updated: 12/30/22 2029     Troponin T <0.010 ng/mL     Narrative:      Troponin T Reference Range:  <= 0.03 ng/mL-   Negative for AMI  >0.03 ng/mL-     Abnormal for myocardial necrosis.  Clinicians would have to utilize clinical acumen, EKG, Troponin and serial changes to determine if it is an Acute Myocardial Infarction or myocardial injury due to an underlying chronic condition.       Results may be falsely decreased if patient taking Biotin.          Imaging Results (Most Recent)     Procedure Component Value Units Date/Time    US Abdomen Limited [[id number]] Collected: 12/31/22 0728     Updated: 12/31/22 0822    Narrative:      EXAMINATION:  ultrasounds abdomen, limited (RUQ)    CLINICAL INDICATION / HISTORY:  Right upper quadrant ultrasound,  choledocholithiasis, N30.01 Acute cystitis with hematuria R10.30  Lower abdominal pain, unspecified    COMPARISON:  none    TECHNIQUE:  ultrasound, limited, right upper quadrant    FULL RESULTS / FINDINGS:    Liver:      size: The liver appears small with coarse echogenicity and  peripheral lobulation suggesting changes from cirrhosis.  no focal mass or intrahepatic biliary ductal dilatation .  Hepatopedal portal vein blood  flow.    Biliary:    Gall bladder: Surgically absent.    Common bile duct:  normal caliber      Pancreas (visualized portions):      normal size and echotexture for age    no focal mass or ductal dilatation      Kidney, right (limited):      size:  normal, measuring 11.53 x 4.25 x 5.4 cm.    echotexture:  normal    no nephrolithiasis, solid mass, or collecting system dilation    Vascular (visualized portions):      Aorta:  normal caliber    IVC:  normal caliber, no intraluminal defect(s)    Misc:  Right pleural effusion. Very small amount of ascites  tracking around liver.      Impression:      CONCLUSION:   1.  The liver appears small with coarse echogenicity and  peripheral lobulation suggesting changes from cirrhosis.  2.  Gallbladder surgically absent.  3.  Right pleural effusion.  4.  Very small amount of ascites tracking around liver.  5.  Remainder of right upper abdominal quadrant ultrasound is  unremarkable.    Electronically signed by:  Sreedhar Otero MD  12/31/2022 8:20 AM CST  Workstation: [id number]    CT Abdomen Pelvis With Contrast [[id number]] Collected: 12/30/22 2054     Updated: 12/30/22 2122    Narrative:        PROCEDURE: CT ABDOMEN PELVIS WITH IV CONTRAST, 12/30/2022 8:54 PM  CST     CLINICAL INDICATION:  lower abd pain.    COMPARISON: 9/15/2022    TECHNIQUE: Helical CT acquisition performed of the abdomen and  pelvis with coronal and sagittal sequences. Contrast: Nonionic  iodinated contrast.  PO Contrast: None.  Complications: None.    CT Dose reduction techniques were utilized for this examination  with 1 or more of the following: Automated exposure control  and/or adjustment of the mA or kV according to patient size,  and/or use of iterative reconstruction technique.    FINDINGS:    Visualized chest base: Small left pleural effusion. Moderate  right pleural effusion. Partially visualized groundglass  opacities in lung bases may reflect atelectasis, edema, or  infiltrate.    Hepatobiliary  system: No contour deforming hepatic lesion.      Gallbladder: Surgically absent.    Spleen: Small spleen.    Pancreas: No pancreatic mass identified.    Adrenal glands: No adrenal masses.    Kidneys and collecting system: No hydronephrosis.    Bowel and mesentery: No free air identified. Interval development  of mild/mild to moderate regions of free fluid in abdomen and  pelvis, portions of which measures 20-30 Hounsfield units,  consistent with complex free fluid which may reflect  proteinaceous, enteric, or hemorrhagic contents. Mild to moderate  strandy changes adjacent to duodenum, may reflect duodenitis  and/or peptic ulcer disease. No small bowel structure in  identified.    Appendix: Normal caliber. No significant inflammatory changes.    Pelvic organs: Moderately thick-walled urinary bladder.      Bones: No acute displaced fracture. Diffuse anasarca, increased  from prior.      Impression:        Mild to moderate strandy changes adjacent to duodenum, may  reflect duodenitis and/or peptic ulcer disease.    Interval development of mild/mild to moderate regions of free  fluid in abdomen and pelvis, portions of which measures 20-30  Hounsfield units, consistent with complex free fluid which may  reflect proteinaceous, enteric, or hemorrhagic contents.     Moderately thick-walled urinary bladder is suspicious for  cystitis and/or urinary tract infection.    Small left pleural effusion. Moderate right pleural effusion.  Partially visualized groundglass opacities in lung bases may  reflect atelectasis, edema, or infiltrate.    Diffuse anasarca, increased from prior.      Electronically signed by:  Charli Cuba MD  12/30/2022 9:20 PM  CST Workstation: [id number]    XR Chest 1 View [[id number]] Collected: 12/30/22 2021     Updated: 12/30/22 2112    Narrative:      PROCEDURE: XR CHEST 1 VIEW, 12/30/2022 8:21 PM CST    CLINICAL INDICATION:    cough.  Vomiting, dizziness.     COMPARISON: 12/10/2022    TECHNIQUE:  AP  "portable radiograph of chest     FINDINGS:    Left chest wall pacemaker in place. Large cardiac silhouette.  Retrocardiac opacification. No pneumothorax.      Impression:        Retrocardiac opacification may reflect infiltrate or atelectasis.    Large cardiac silhouette.          Electronically signed by:  Charli Cuba MD  12/30/2022 9:10 PM  CST Workstation: [id number]          Chief Complaint on Day of Discharge: None    Hospital Course:  The patient is a 72 y.o. female who presented to [facility] with generalized weakness, nausea, and vomiting.  Patient was found to have a urinary tract infection and volume depletion.  She was started on urine culture came back Klebsiella pneumonia.  Antibiotics were continued to complete course.  Patient developed significant hypotension and was transferred to stepdown unit.  She required transient Levophed.  That was weaned off and she was transferred back to medical floor.  As her blood pressure improved, she was diuresed.  She diuresed well.  On the day prior to admission she was noted to have hypokalemia this was replaced.  Patient had nausea and vomiting with oral potassium.  The remainder the potassium was replaced via IV.  She continued to do well and was discharged to skilled facility in hemodynamically stable condition.    Condition on Discharge: Hemodynamically stable    Physical Exam on Discharge:  /75 (BP Location: Left arm, Patient Position: Lying)   Pulse 95   Temp 98.1 °F (36.7 °C)   Resp 18   Ht 162.6 cm (64\")   Wt 96.5 kg (212 lb 12.8 oz)   SpO2 99%   BMI 36.53 kg/m²      Physical Exam  Vitals reviewed.   Constitutional:       Appearance: She is well-developed.   HENT:      Head: Normocephalic and atraumatic.   Eyes:      Pupils: Pupils are equal, round, and reactive to light.   Cardiovascular:      Rate and Rhythm: Normal rate and regular rhythm.      Heart sounds: Normal heart sounds. No murmur heard.    No friction rub. No " gallop.   Pulmonary:      Effort: Pulmonary effort is normal. No respiratory distress.      Breath sounds: Normal breath sounds. No wheezing or rales.   Chest:      Chest wall: No tenderness.   Abdominal:      General: Bowel sounds are normal. There is no distension.      Palpations: Abdomen is soft.      Tenderness: There is no abdominal tenderness. There is no guarding.   Musculoskeletal:      Cervical back: Normal range of motion and neck supple.   Skin:     General: Skin is warm and dry.   Psychiatric:         Behavior: Behavior normal.         Thought Content: Thought content normal.           Discharge Disposition:  Skilled Nursing Facility (DC - External)    Discharge Medications:     Discharge Medications      New Medications      Instructions Start Date   midodrine 5 MG tablet  Commonly known as: PROAMATINE   5 mg, Oral, 3 Times Daily Before Meals      ondansetron ODT 4 MG disintegrating tablet  Commonly known as: ZOFRAN-ODT   4 mg, Translingual, Every 6 Hours PRN         Changes to Medications      Instructions Start Date   ipratropium-albuterol 0.5-2.5 mg/3 ml nebulizer  Commonly known as: DUO-NEB  What changed:   · when to take this  · reasons to take this   3 mL, Nebulization, 4 Times Daily         Continue These Medications      Instructions Start Date   acetaminophen 325 MG tablet  Commonly known as: TYLENOL   650 mg, Oral, Every 4 Hours PRN      albuterol sulfate  (90 Base) MCG/ACT inhaler  Commonly known as: PROVENTIL HFA;VENTOLIN HFA;PROAIR HFA   INHALE 2 PUFFS EVERY 4 (FOUR) HOURS AS NEEDED FOR WHEEZING OR SHORTNESS OF AIR.      aspirin 81 MG EC tablet   81 mg, Oral, Nightly      atorvastatin 40 MG tablet  Commonly known as: LIPITOR   TAKE 1 TABLET BY MOUTH EVERY DAY      B-D UF III MINI PEN NEEDLES 31G X 5 MM misc  Generic drug: Insulin Pen Needle   USE WITH INSULIN INJECTIONS NIGHTLY      carvedilol 25 MG tablet  Commonly known as: COREG   25 mg, Oral, 2 Times Daily With Meals     "  freestyle lancets   1 each, Other, 3 Times Daily, Use as instructed      FreeStyle Lite device   1 Device, Does not apply, 3 Times Daily      FREESTYLE LITE test strip  Generic drug: glucose blood   1 each, Other, 3 Times Daily, Use as instructed      furosemide 40 MG tablet  Commonly known as: LASIX   TAKE 1 TABLET BY MOUTH TWICE A DAY      hydrOXYzine 10 MG tablet  Commonly known as: ATARAX   10 mg, Oral, Every 8 Hours PRN      Insulin Syringe 31G X 5/16\" 0.5 ML misc   1 each, Subcutaneous, Nightly      isosorbide mononitrate 30 MG 24 hr tablet  Commonly known as: IMDUR   TAKE 1 TABLET BY MOUTH EVERY DAY      Levemir FlexTouch 100 UNIT/ML injection  Generic drug: insulin detemir   20 Units, Subcutaneous, Nightly      lisinopril 10 MG tablet  Commonly known as: PRINIVIL,ZESTRIL   TAKE 1 TABLET BY MOUTH EVERY DAY      methylPREDNISolone 4 MG dose pack  Commonly known as: MEDROL   Take as directed on package instructions.      nitroglycerin 0.4 MG SL tablet  Commonly known as: NITROSTAT   PLEASE SEE ATTACHED FOR DETAILED DIRECTIONS      pantoprazole 40 MG EC tablet  Commonly known as: Protonix   40 mg, Oral, Daily      potassium chloride 10 MEQ CR capsule  Commonly known as: MICRO-K   20 mEq, Oral, 2 Times Daily      sucralfate 1 g tablet  Commonly known as: CARAFATE   1 g, Oral, 3 Times Daily         ASK your doctor about these medications      Instructions Start Date   cefdinir 300 MG capsule  Commonly known as: OMNICEF  Ask about: Should I take this medication?   300 mg, Oral, 2 Times Daily             Discharge Diet:   Diet Instructions     Diet: Consistent Carbohydrate, Specialty Diet; Thin Liquids, No Restrictions; Low Sodium; 2,000 mg Na      Discharge Diet:  Consistent Carbohydrate  Specialty Diet       Fluid Consistency: Thin Liquids, No Restrictions    Specialty Diets: Low Sodium    Low Sodium Restriction: 2,000 mg Na          Activity at Discharge:   Activity Instructions     Activity as Tolerated      " Other Activity Instructions      Activity Instructions: PT/OT        Follow-up Appointments:   Future Appointments   Date Time Provider Department [place]   1/13/2023  3:45 PM Joyce Plata MD [address]   4/27/2023  2:30 PM Joyce Plata MD [address]         The patient has current or prior documentation of left ventricular ejection fraction (LVEF) less than or equal to 40%, or moderate or severely depressed left ventricular systolic function. ; The patient was prescribed or already taking an Angiotensin-Converting Enzyme (ACE) Inhibitor or Angiotensin Receptor Blocker (ARB) and The patient was prescribed or already taking a beta-blocker.            This document has been electronically signed by David Pinon MD on January 7, 2023 12:44 CST      Time: 35 minutes

## 2023-01-20 NOTE — PROGRESS NOTES
"Enter Query Response Below      Query Response:   Septic shock      This document has been electronically signed by David Pinon MD on 2023 09:10 CST               If applicable, please update the problem list.             Patient: Lexi Wade        : 1950  Account: [id number]           Admit Date: 2022        How to Respond to this query:       a. Click New Note     b. Answer query within the yellow box.                c. Update the Problem List, if applicable.      If you have any questions about this query contact me at:     [email or web address]      By submitting this query, we are merely seeking further clarification of the documentation to accurately reflect all conditions that you are monitoring, evaluating, treating or that extends the hospitalization. Please utilize your independent clinical judgment when addressing the question(s) below.    71 y/o female presented on  with nausea, vomiting and abdominal pain. Found to have acute cystitis. Lactate 3.6 on . HR > 90. Pt. became hypotensive requiring transfer to CCU and initiation of IV Vasopressors. \"Shock: Unclear if it is septic or cardiogenic.  Patient has had good response to Levophed.  Urine output has improved, but is still marginal at best.  Agrees that cardiogenic etiology must be further considered if she decompensates.\" per progress notes. Acute on chronic HFrEF noted on discharge summary. Urine culture + for Klebsiella. Treated with IV Lasix, Rocephin and Levophed.     After further study, can the shock be clarified as:    Septic shock  Cardiogenic shock  Other (please specify)________________________  Clinically unable to Determine      Amy PIÑA  Clinical Documentation Integrity  [email or web address]          This query and your response, once completed, will be entered into the legal medical record.        "

## 2023-03-15 PROBLEM — N30.00 ACUTE CYSTITIS WITHOUT HEMATURIA: Status: ACTIVE | Noted: 2023-01-01

## 2023-03-15 PROBLEM — E11.649 HYPOGLYCEMIC EPISODE IN PATIENT WITH DIABETES MELLITUS (HCC): Status: ACTIVE | Noted: 2023-01-01

## 2023-03-15 PROBLEM — R06.02 SHORTNESS OF BREATH: Status: ACTIVE | Noted: 2023-01-01

## 2023-03-15 PROBLEM — E11.649 TYPE 2 DIABETES MELLITUS WITH HYPOGLYCEMIA WITHOUT COMA: Status: ACTIVE | Noted: 2023-01-01

## 2023-03-15 NOTE — ED PROVIDER NOTES
Subjective   History of Present Illness  71yo female pmh significant htn/hyperlipidemia/dm2/chf/nonischemic cardiomyopathy/pacemaker presents ED reported altered mental status/lethargy with acchucheck per EMS 44mg/dl; pt subsequently received D50 IV per EMS with improved mental status.  On arrival, pt c/o generalized fatigue.  Denies ha/chest pain/soa/abd pain/nausea/vomiting.    History provided by:  Patient  Altered Mental Status  Presenting symptoms: confusion and lethargy    Associated symptoms: no headaches        Review of Systems   Constitutional: Positive for fatigue.   HENT: Negative.    Eyes: Negative for redness.   Respiratory: Negative.    Cardiovascular: Negative.    Gastrointestinal: Negative.    Genitourinary: Negative.    Musculoskeletal: Negative.    Skin: Negative.    Neurological: Negative for headaches.   Psychiatric/Behavioral: Positive for confusion.   All other systems reviewed and are negative.      Past Medical History:   Diagnosis Date   • Chronic systolic heart failure (HCC)    • Diabetes mellitus (HCC)    • Diabetic neuropathy (HCC)    • GERD (gastroesophageal reflux disease)    • Hypercholesterolemia    • Hypertension    • Low back pain    • Morbid obesity (HCC)    • Nonischemic cardiomyopathy (HCC)    • Osteoarthritis    • Sleep apnea    • Vitamin D deficiency        Allergies   Allergen Reactions   • Nsaids Swelling   • Aldactone [Spironolactone] Unknown - Low Severity       Past Surgical History:   Procedure Laterality Date   • CARDIAC CATHETERIZATION N/A 08/23/2018   • CARDIAC CATHETERIZATION N/A 6/3/2022    Procedure: Left Heart Cath;  Surgeon: Wang Felipe MD;  Location: [facility] CATH INVASIVE LOCATION;  Service: Cardiology;  Laterality: N/A;   • CARDIAC ELECTROPHYSIOLOGY PROCEDURE N/A 06/22/2020   • CARDIAC PACEMAKER PLACEMENT     • CATARACT EXTRACTION     • CHOLECYSTECTOMY WITH INTRAOPERATIVE CHOLANGIOGRAM N/A 7/15/2022    Procedure: CHOLECYSTECTOMY LAPAROSCOPIC INTRAOPERATIVE  CHOLANGIOGRAM;  Surgeon: Orville Farias MD;  Location: [facility];  Service: General;  Laterality: N/A;   • COLONOSCOPY N/A 06/14/2017   • PACEMAKER IMPLANTATION     • TOTAL ABDOMINAL HYSTERECTOMY WITH SALPINGO OOPHORECTOMY         Family History   Problem Relation Age of Onset   • Diabetes Sister    • Bone cancer Brother        Social History     Socioeconomic History   • Marital status: Single   Tobacco Use   • Smoking status: Former   • Smokeless tobacco: Never   Vaping Use   • Vaping Use: Never used   Substance and Sexual Activity   • Alcohol use: No   • Drug use: No   • Sexual activity: Not Currently           Objective   Physical Exam  Vitals and nursing note reviewed.   Constitutional:       Appearance: Normal appearance.   HENT:      Head: Normocephalic and atraumatic.      Right Ear: Tympanic membrane, ear canal and external ear normal.      Left Ear: Tympanic membrane, ear canal and external ear normal.      Nose: Nose normal.      Mouth/Throat:      Mouth: Mucous membranes are moist.   Eyes:      Conjunctiva/sclera: Conjunctivae normal.      Pupils: Pupils are equal, round, and reactive to light.   Cardiovascular:      Rate and Rhythm: Normal rate and regular rhythm.      Pulses: Normal pulses.      Heart sounds: Normal heart sounds. No murmur heard.    No friction rub. No gallop.   Pulmonary:      Effort: Pulmonary effort is normal. No respiratory distress.      Breath sounds: Normal breath sounds. No wheezing, rhonchi or rales.   Abdominal:      General: Abdomen is protuberant. Bowel sounds are normal. There is no distension.      Palpations: Abdomen is soft.      Tenderness: There is no abdominal tenderness. There is no guarding or rebound.   Musculoskeletal:      Cervical back: Normal range of motion and neck supple. No rigidity.      Right lower leg: No edema.      Left lower leg: No edema.   Lymphadenopathy:      Cervical: No cervical adenopathy.   Skin:     General: Skin is warm.   Neurological:       General: No focal deficit present.      Mental Status: She is alert.      GCS: GCS eye subscore is 4. GCS verbal subscore is 4. GCS motor subscore is 6.      Cranial Nerves: Cranial nerves 2-12 are intact.      Sensory: Sensation is intact.      Motor: Motor function is intact.      Coordination: Coordination is intact.         ECG 12 Lead Other; weakness      Date/Time: 3/15/2023 4:37 PM  Performed by: Lamonte Khan MD  Authorized by: Lamonte Khan MD   Interpreted by physician  Rhythm: paced  BPM: 71  Clinical impression: abnormal ECG                 ED Course      Labs Reviewed   COMPREHENSIVE METABOLIC PANEL - Abnormal; Notable for the following components:       Result Value    Creatinine 1.03 (*)     Alkaline Phosphatase 140 (*)     Total Bilirubin 2.0 (*)     eGFR 57.2 (*)     All other components within normal limits    Narrative:     GFR Normal >60  Chronic Kidney Disease <60  Kidney Failure <15    The GFR formula is only valid for adults with stable renal function between ages 18 and 70.   LIPASE - Abnormal; Notable for the following components:    Lipase 87 (*)     All other components within normal limits   URINALYSIS W/ MICROSCOPIC IF INDICATED (NO CULTURE) - Abnormal; Notable for the following components:    Glucose, UA >=1000 mg/dL (3+) (*)     Ketones, UA Trace (*)     Bilirubin, UA Moderate (2+) (*)     Blood, UA Trace (*)     Protein, UA >=300 mg/dL (3+) (*)     Urobilinogen, UA >=8.0 E.U./dL (*)     All other components within normal limits   CBC WITH AUTO DIFFERENTIAL - Abnormal; Notable for the following components:    RDW 21.6 (*)     RDW-SD 66.8 (*)     Monocyte % 18.2 (*)     Monocytes, Absolute 1.29 (*)     All other components within normal limits   TROPONIN - Abnormal; Notable for the following components:    HS Troponin T 17 (*)     All other components within normal limits    Narrative:     High Sensitive Troponin T Reference Range:  <10.0 ng/L- Negative Female for AMI  <15.0  ng/L- Negative Male for AMI  >=10 - Abnormal Female indicating possible myocardial injury.  >=15 - Abnormal Male indicating possible myocardial injury.   Clinicians would have to utilize clinical acumen, EKG, Troponin, and serial changes to determine if it is an Acute Myocardial Infarction or myocardial injury due to an underlying chronic condition.        LACTIC ACID, PLASMA - Abnormal; Notable for the following components:    Lactate 2.5 (*)     All other components within normal limits   URINALYSIS, MICROSCOPIC ONLY - Abnormal; Notable for the following components:    RBC, UA 6-12 (*)     WBC, UA 6-12 (*)     Bacteria, UA 1+ (*)     Squamous Epithelial Cells, UA 3-5 (*)     All other components within normal limits   SCAN SLIDE - Normal   POCT GLUCOSE FINGERSTICK - Normal   POCT GLUCOSE FINGERSTICK - Normal   BLOOD CULTURE   BLOOD CULTURE   URINE CULTURE   LACTIC ACID, REFLEX   HIGH SENSITIVITIY TROPONIN T 2HR   BLOOD GAS, ARTERIAL   POCT GLUCOSE FINGERSTICK   CBC AND DIFFERENTIAL    Narrative:     The following orders were created for panel order CBC & Differential.  Procedure                               Abnormality         Status                     ---------                               -----------         ------                     CBC Auto Differential[[id number]]        Abnormal            Final result               Scan Slide[[id number]]                   Normal              Final result                 Please view results for these tests on the individual orders.   GOLD TOP - SST   LIGHT BLUE TOP   EXTRA TUBES    Narrative:     The following orders were created for panel order Extra Tubes.  Procedure                               Abnormality         Status                     ---------                               -----------         ------                     Gold Top - SST[[id number]]                                   Final result               Gray Top[[id number]]                                          In process                 Light Blue Top[[id number]]                                   Final result                 Please view results for these tests on the individual orders.   GRAY TOP     CT Head Without Contrast    Result Date: 3/15/2023  Narrative: PROCEDURE: CT head without contrast REASON FOR EXAM: ams This exam was performed according to our departmental dose-optimization program, which includes automated exposure control, adjustment of the mA and/or kV according to patient size and/or use of iterative reconstruction technique. Total DLP = 845.3 mGy. FINDINGS: Comparison study dated May 3, 2022. Axial computer tomography sequential imaging of the head was performed from the vertex to the base of the skull. .Sagittal and coronal reformation was performed . The skull vault is intact. Paranasal sinuses and bilateral mastoid air cells are well aerated. Stable bilateral basal ganglia physiologic calcifications. Stable bilateral frontal and parietal lobe periventricular deep white matter small foci of hypodensity. Cerebral and cerebellar parenchymal are otherwise normal. Ventricular system and subarachnoid spaces are normal.     Impression: 1.  No acute intracranial abnormality. 2.  Stable bilateral basal ganglia physiologic calcifications. 3.  Stable bilateral frontal and parietal lobe periventricular deep white matter small foci of chronic ischemic gliosis secondary to microvascular disease. Electronically signed by:  Sreedhar Otero MD  3/15/2023 3:49 PM CDT Workstation: [id number]    XR Chest 1 View    Result Date: 3/15/2023  Narrative: EXAM DESCRIPTION:  XR CHEST 1 VIEW CLINICAL INDICATION: hypoglycemia COMPARISON: Chest x-ray dated 12/30/2022 FINDINGS: Mild cardiomegaly with mild central pulmonary vascular congestion. No pleural effusion or pneumothorax. Left chest wall ICD.     Impression: Mild cardiomegaly with mild central pulmonary vascular congestion. Electronically signed by:  Christofer Pastrana MD   3/15/2023 2:50 PM CDT Workstation: [id number]                                         Medical Decision Making  gcs 15. Normal nonfocal neuro exam. Pt with persistent expiratory wheezing/tachypnea.  Labs/radiographic studies reviewed. No recurrent hypoglycemic episodes noted.  Plan abg/solu medrol 125mg iv/duoneb/admit 23 observation.    Bronchospasm: acute illness or injury  Hypoglycemic episode in patient with diabetes mellitus (HCC): chronic illness or injury  UTI (urinary tract infection) with pyuria: acute illness or injury  Amount and/or Complexity of Data Reviewed  Labs: ordered.  Radiology: ordered.  ECG/medicine tests: ordered.      Risk  Prescription drug management.  Decision regarding hospitalization.          Final diagnoses:   Hypoglycemic episode in patient with diabetes mellitus (HCC)   Bronchospasm   UTI (urinary tract infection) with pyuria       ED Disposition  ED Disposition     ED Disposition   Decision to Admit    Condition   --    Comment   Level of Care: Telemetry [5]   Admitting Physician: CÉSAR BONE [[id number]]   Attending Physician: CÉSAR BONE [[id number]]   Patient Class: Observation [104]               No follow-up provider specified.       Medication List      No changes were made to your prescriptions during this visit.          Lamonte Khan MD  03/15/23 0291

## 2023-03-15 NOTE — ED NOTES
Nursing report ED to floor  Lexi Wade  72 y.o.  female    HPI:   Chief Complaint   Patient presents with    Hypoglycemia       Admitting doctor:   Jaime Alonso MD    Consulting provider(s):  Consults       Date and Time Order Name Status Description    3/15/2023  4:32 PM Family Practice - Resident (on-call MD unless specified)               Admitting diagnosis:   The primary encounter diagnosis was Hypoglycemic episode in patient with diabetes mellitus (HCC). Diagnoses of Bronchospasm and UTI (urinary tract infection) with pyuria were also pertinent to this visit.    Code status:   Current Code Status       Date Active Code Status Order ID Comments User Context       Prior            Allergies:   Nsaids and Aldactone [spironolactone]    Intake and Output    Intake/Output Summary (Last 24 hours) at 3/15/2023 1645  Last data filed at 3/15/2023 1545  Gross per 24 hour   Intake 500 ml   Output --   Net 500 ml       Weight:       03/15/23  1335   Weight: 96.2 kg (212 lb)       Most recent vitals:   Vitals:    03/15/23 1336 03/15/23 1338 03/15/23 1430 03/15/23 1543   BP:   119/69 126/74   BP Location:    Right arm   Patient Position:    Lying   Pulse: 80  70 69   Resp:   18 18   Temp:  96.7 °F (35.9 °C)     TempSrc:  Axillary     SpO2: 100%  94% 99%   Weight:       Height:         Oxygen Therapy: room air    Active LDAs/IV Access:   Lines, Drains & Airways       Active LDAs       Name Placement date Placement time Site Days    Peripheral IV 12/30/22 2002 Right Antecubital 12/30/22 2002  Antecubital  74    Peripheral IV 03/15/23 1411 Left Antecubital 03/15/23  1411  Antecubital  less than 1    Peripheral IV 03/15/23 1413 Anterior;Right Hand 03/15/23  1413  Hand  less than 1    External Urinary Catheter 01/04/23  --  --  70                    Labs (abnormal labs have a star):   Labs Reviewed   COMPREHENSIVE METABOLIC PANEL - Abnormal; Notable for the following components:       Result Value    Creatinine  1.03 (*)     Alkaline Phosphatase 140 (*)     Total Bilirubin 2.0 (*)     eGFR 57.2 (*)     All other components within normal limits    Narrative:     GFR Normal >60  Chronic Kidney Disease <60  Kidney Failure <15    The GFR formula is only valid for adults with stable renal function between ages 18 and 70.   LIPASE - Abnormal; Notable for the following components:    Lipase 87 (*)     All other components within normal limits   URINALYSIS W/ MICROSCOPIC IF INDICATED (NO CULTURE) - Abnormal; Notable for the following components:    Glucose, UA >=1000 mg/dL (3+) (*)     Ketones, UA Trace (*)     Bilirubin, UA Moderate (2+) (*)     Blood, UA Trace (*)     Protein, UA >=300 mg/dL (3+) (*)     Urobilinogen, UA >=8.0 E.U./dL (*)     All other components within normal limits   CBC WITH AUTO DIFFERENTIAL - Abnormal; Notable for the following components:    RDW 21.6 (*)     RDW-SD 66.8 (*)     Monocyte % 18.2 (*)     Monocytes, Absolute 1.29 (*)     All other components within normal limits   TROPONIN - Abnormal; Notable for the following components:    HS Troponin T 17 (*)     All other components within normal limits    Narrative:     High Sensitive Troponin T Reference Range:  <10.0 ng/L- Negative Female for AMI  <15.0 ng/L- Negative Male for AMI  >=10 - Abnormal Female indicating possible myocardial injury.  >=15 - Abnormal Male indicating possible myocardial injury.   Clinicians would have to utilize clinical acumen, EKG, Troponin, and serial changes to determine if it is an Acute Myocardial Infarction or myocardial injury due to an underlying chronic condition.        LACTIC ACID, PLASMA - Abnormal; Notable for the following components:    Lactate 2.5 (*)     All other components within normal limits   URINALYSIS, MICROSCOPIC ONLY - Abnormal; Notable for the following components:    RBC, UA 6-12 (*)     WBC, UA 6-12 (*)     Bacteria, UA 1+ (*)     Squamous Epithelial Cells, UA 3-5 (*)     All other components within  normal limits   SCAN SLIDE - Normal   POCT GLUCOSE FINGERSTICK - Normal   POCT GLUCOSE FINGERSTICK - Normal   BLOOD CULTURE   BLOOD CULTURE   URINE CULTURE   LACTIC ACID, REFLEX   HIGH SENSITIVITIY TROPONIN T 2HR   BLOOD GAS, ARTERIAL   POCT GLUCOSE FINGERSTICK   CBC AND DIFFERENTIAL    Narrative:     The following orders were created for panel order CBC & Differential.  Procedure                               Abnormality         Status                     ---------                               -----------         ------                     CBC Auto Differential[[id number]]        Abnormal            Final result               Scan Slide[[id number]]                   Normal              Final result                 Please view results for these tests on the individual orders.   GOLD TOP - SST   LIGHT BLUE TOP   EXTRA TUBES    Narrative:     The following orders were created for panel order Extra Tubes.  Procedure                               Abnormality         Status                     ---------                               -----------         ------                     Gold Top - SST[[id number]]                                   Final result               Gray Top[[id number]]                                         In process                 Light Blue Top[[id number]]                                   Final result                 Please view results for these tests on the individual orders.   GRAY TOP       Meds given in ED:   Medications   sodium chloride 0.9 % flush 10 mL (has no administration in time range)   dextrose 5 % and sodium chloride 0.9 % infusion (125 mL/hr Intravenous New Bag 3/15/23 1412)   cefTRIAXone (ROCEPHIN) 1 g/100 mL 0.9% NS (MBP) (1 g Intravenous New Bag 3/15/23 1617)   ipratropium-albuterol (DUO-NEB) nebulizer solution 3 mL (has no administration in time range)   methylPREDNISolone sodium succinate (SOLU-Medrol) injection 125 mg (has no administration in time range)   dextrose 5 % and  sodium chloride 0.9 % infusion 500 mL (0 mL Intravenous Stopped 3/15/23 1545)     dextrose 5 % and sodium chloride 0.9 %, 125 mL/hr, Last Rate: 125 mL/hr (03/15/23 1412)         NIH Stroke Scale:       Isolation/Infection(s):  No active isolations   No active infections     COVID Testing  Collected No  Resulted No    Nursing report ED to floor:  Mental status: A&O  Ambulatory status: x2  Precautions: none    ED nurse phone [phone]

## 2023-03-15 NOTE — H&P
HISTORY AND PHYSICAL  NAME: Lexi Wade  : 1950  MRN: [id number]    DATE OF ADMISSION:  3/15/2023     DATE & TIME SEEN: 03/15/23 at 1740      PCP: Joyce Plata MD    CODE STATUS: Full code    CHIEF COMPLAINT: AMS    HPI:  Lexi Wade is a 72 y.o. female with a CMH of HFrEF, HTN, HLD, T2DM, pacemaker, GERD who presents complaining of altered mental status with abdominal pain.  She comes from [facility] where she is a resident.  According to her nurse Regine whom I spoke to today she has been complaining of abdominal pain and constipation for the past few days.  Today they perceived some change in her mental status with lethargy and called EMS.  They were concerned about the possibility of a stroke.  EMS noted blood sugar at 44.  Gave amp of D50 with subsequent improvement to 208.  Upon arrival in ER blood glucose was 72.  Patient put on D5W.  The patient who is a poor historian and gives a slightly different history.  She reports that she has had some difficulty breathing with some mild shortness of breath and cough for the past few days.  She feels cold and chilly.  She thinks that lately she has been having diarrhea.  She does endorse diffuse abdominal pain that seems to be worse on the lower left side.    CONCURRENT MEDICAL HISTORY:  Past Medical History:   Diagnosis Date   • Chronic systolic heart failure (HCC)    • Diabetes mellitus (HCC)    • Diabetic neuropathy (HCC)    • GERD (gastroesophageal reflux disease)    • Hypercholesterolemia    • Hypertension    • Low back pain    • Morbid obesity (HCC)    • Nonischemic cardiomyopathy (HCC)    • Osteoarthritis    • Sleep apnea    • Vitamin D deficiency        PAST SURGICAL HISTORY:  Past Surgical History:   Procedure Laterality Date   • CARDIAC CATHETERIZATION N/A 2018   • CARDIAC CATHETERIZATION N/A 6/3/2022    Procedure: Left Heart Cath;  Surgeon: Wang Felipe MD;  Location: [facility] CATH INVASIVE LOCATION;  Service:  Cardiology;  Laterality: N/A;   • CARDIAC ELECTROPHYSIOLOGY PROCEDURE N/A 06/22/2020   • CARDIAC PACEMAKER PLACEMENT     • CATARACT EXTRACTION     • CHOLECYSTECTOMY WITH INTRAOPERATIVE CHOLANGIOGRAM N/A 7/15/2022    Procedure: CHOLECYSTECTOMY LAPAROSCOPIC INTRAOPERATIVE CHOLANGIOGRAM;  Surgeon: Orville Farias MD;  Location: [facility];  Service: General;  Laterality: N/A;   • COLONOSCOPY N/A 06/14/2017   • PACEMAKER IMPLANTATION     • TOTAL ABDOMINAL HYSTERECTOMY WITH SALPINGO OOPHORECTOMY         FAMILY HISTORY:  Family History   Problem Relation Age of Onset   • Diabetes Sister    • Bone cancer Brother         SOCIAL HISTORY:  Social History     Socioeconomic History   • Marital status: Single   Tobacco Use   • Smoking status: Former   • Smokeless tobacco: Never   Vaping Use   • Vaping Use: Never used   Substance and Sexual Activity   • Alcohol use: No   • Drug use: No   • Sexual activity: Not Currently       HOME MEDICATIONS:  Prior to Admission medications    Medication Sig Start Date End Date Taking? Authorizing Provider   albuterol sulfate  (90 Base) MCG/ACT inhaler INHALE 2 PUFFS EVERY 4 (FOUR) HOURS AS NEEDED FOR WHEEZING OR SHORTNESS OF AIR. 9/2/21  Yes Joyce Plata MD   aspirin 81 MG EC tablet Take 1 tablet by mouth Every Night. 6/21/22  Yes Joyce Plata MD   atorvastatin (LIPITOR) 40 MG tablet TAKE 1 TABLET BY MOUTH EVERY DAY 11/1/22  Yes Joyce Plata MD   B-D UF III MINI PEN NEEDLES 31G X 5 MM misc USE WITH INSULIN INJECTIONS NIGHTLY 12/6/21  Yes Joyce Plata MD   Blood Glucose Monitoring Suppl (FreeStyle Lite) device 1 Device 3 (Three) Times a Day. 4/5/22  Yes Justin Wheeler MD   carvedilol (COREG) 25 MG tablet Take 1 tablet by mouth 2 (Two) Times a Day With Meals. 2/2/22  Yes Joyce Plata MD   furosemide (LASIX) 40 MG tablet TAKE 1 TABLET BY MOUTH TWICE A DAY 5/13/22  Yes Joyce Plata MD   glucose blood (FREESTYLE LITE) test strip 1 each by Other route 3  "(Three) Times a Day. Use as instructed 4/5/22  Yes Justin Wheeler MD   insulin detemir (Levemir FlexTouch) 100 UNIT/ML injection Inject 20 Units under the skin into the appropriate area as directed Every Night. 6/9/22  Yes Joyce Plata MD   Insulin Syringe 31G X 5/16\" 0.5 ML misc Inject 1 each under the skin into the appropriate area as directed Every Night. 12/2/21  Yes Justin Wheeler MD   ipratropium-albuterol (DUO-NEB) 0.5-2.5 mg/3 ml nebulizer Take 3 mL by nebulization 4 (Four) Times a Day.  Patient taking differently: Take 3 mL by nebulization 4 (Four) Times a Day As Needed for Shortness of Air or Wheezing. 2/23/22  Yes Justin Wheeler MD   Lancets (freestyle) lancets 1 each by Other route 3 (Three) Times a Day. Use as instructed 4/5/22  Yes Justin Wheeler MD   lisinopril (PRINIVIL,ZESTRIL) 10 MG tablet TAKE 1 TABLET BY MOUTH EVERY DAY 5/23/22  Yes Joyce Plata MD   methylPREDNISolone (MEDROL) 4 MG dose pack Take as directed on package instructions. 12/1/22  Yes Sukhdev Polk MD   midodrine (PROAMATINE) 5 MG tablet Take 1 tablet by mouth 3 (Three) Times a Day Before Meals. 1/7/23  Yes David Pinon MD   ondansetron ODT (ZOFRAN-ODT) 4 MG disintegrating tablet Place 1 tablet on the tongue Every 6 (Six) Hours As Needed for Nausea or Vomiting. 12/31/22  Yes Jaime Alonso MD   pantoprazole (Protonix) 40 MG EC tablet Take 1 tablet by mouth Daily. 6/21/22  Yes Joyce Plata MD   potassium chloride (MICRO-K) 10 MEQ CR capsule Take 2 capsules by mouth 2 (Two) Times a Day. 5/5/22  Yes Brent Coulter MD   sucralfate (CARAFATE) 1 g tablet Take 1 tablet by mouth 3 (Three) Times a Day. 4/21/22  Yes Provider, MD Heidi   isosorbide mononitrate (IMDUR) 30 MG 24 hr tablet TAKE 1 TABLET BY MOUTH EVERY DAY 11/1/22   Joyce Plata MD   nitroglycerin (NITROSTAT) 0.4 MG SL tablet PLEASE SEE ATTACHED FOR DETAILED DIRECTIONS 11/1/22   Joyce Plata MD   acetaminophen (TYLENOL) " 325 MG tablet Take 2 tablets by mouth Every 4 (Four) Hours As Needed for Mild Pain . 1/19/22 3/15/23  Swetha Raymond APRN   hydrOXYzine (ATARAX) 10 MG tablet Take 1 tablet by mouth Every 8 (Eight) Hours As Needed for Itching. 10/31/22 3/15/23  Joyce Plata MD       ALLERGIES:  Nsaids and Aldactone [spironolactone]    REVIEW OF SYSTEMS  Review of Systems   Constitutional: Positive for chills. Negative for diaphoresis, fatigue and fever.   HENT: Negative for congestion, rhinorrhea, sore throat and trouble swallowing.    Eyes: Negative for visual disturbance.   Respiratory: Positive for cough and shortness of breath.    Cardiovascular: Negative for chest pain and palpitations.   Gastrointestinal: Positive for abdominal pain, blood in stool and diarrhea.   Genitourinary: Positive for decreased urine volume and dysuria. Negative for hematuria.   Musculoskeletal: Negative for gait problem.   Skin: Negative for color change.   Neurological: Positive for dizziness, numbness and headaches.   Psychiatric/Behavioral: Positive for confusion and decreased concentration. Negative for sleep disturbance.       PHYSICAL EXAM:  Temp:  [96.7 °F (35.9 °C)-98.7 °F (37.1 °C)] 98.7 °F (37.1 °C)  Heart Rate:  [69-80] 73  Resp:  [18-20] 18  BP: (118-152)/(69-89) 123/74  Body mass index is 36.39 kg/m².  Physical Exam  Vitals and nursing note reviewed.   Constitutional:       General: She is not in acute distress.     Appearance: She is obese. She is not toxic-appearing or diaphoretic.   HENT:      Head: Normocephalic and atraumatic.      Right Ear: External ear normal.      Left Ear: External ear normal.      Nose: No congestion or rhinorrhea.      Mouth/Throat:      Mouth: Mucous membranes are dry.      Pharynx: Oropharynx is clear. No oropharyngeal exudate.   Eyes:      General: No scleral icterus.        Right eye: No discharge.         Left eye: No discharge.      Extraocular Movements: Extraocular movements intact.      Pupils:  Pupils are equal, round, and reactive to light.   Neck:      Vascular: No carotid bruit.   Cardiovascular:      Rate and Rhythm: Normal rate and regular rhythm.      Pulses: Normal pulses.      Heart sounds: No murmur heard.  Pulmonary:      Effort: No respiratory distress.      Breath sounds: Wheezing and rales present.   Abdominal:      General: Bowel sounds are normal.      Palpations: There is no mass.      Tenderness: There is abdominal tenderness.      Comments: Diffuse abdominal tenderness most prominent left lower quadrant   Musculoskeletal:      Right lower leg: No edema.      Left lower leg: No edema.   Lymphadenopathy:      Cervical: No cervical adenopathy.   Skin:     Capillary Refill: Capillary refill takes less than 2 seconds.      Findings: No lesion or rash.      Comments: Pacemaker present subcutaneously on chest wall   Neurological:      General: No focal deficit present.      Mental Status: She is alert and oriented to person, place, and time.      Cranial Nerves: No cranial nerve deficit.      Sensory: No sensory deficit.      Motor: No weakness.   Psychiatric:         Mood and Affect: Mood normal.         Thought Content: Thought content normal.         DIAGNOSTIC DATA:   Lab Results (last 24 hours)     Procedure Component Value Units Date/Time    STAT Lactic Acid, Reflex [[id number]] Collected: 03/15/23 2000    Specimen: Blood Updated: 03/15/23 2003    Comprehensive Metabolic Panel [[id number]]  (Abnormal) Collected: 03/15/23 1817    Specimen: Blood Updated: 03/15/23 1856     Glucose 83 mg/dL      BUN 11 mg/dL      Creatinine 0.94 mg/dL      Sodium 137 mmol/L      Potassium 3.9 mmol/L      Comment: Slight hemolysis detected by analyzer. Results may be affected.        Chloride 103 mmol/L      CO2 19.0 mmol/L      Calcium 8.7 mg/dL      Total Protein 6.0 g/dL      Albumin 3.6 g/dL      ALT (SGPT) <5 U/L      AST (SGOT) 21 U/L      Alkaline Phosphatase 136 U/L      Total Bilirubin 1.9 mg/dL       Globulin 2.4 gm/dL      A/G Ratio 1.5 g/dL      BUN/Creatinine Ratio 11.7     Anion Gap 15.0 mmol/L      eGFR 63.8 mL/min/1.73     Narrative:      GFR Normal >60  Chronic Kidney Disease <60  Kidney Failure <15    The GFR formula is only valid for adults with stable renal function between ages 18 and 70.    POC Glucose Once [[id number]]  (Normal) Collected: 03/15/23 1817    Specimen: Blood Updated: 03/15/23 1836     Glucose 82 mg/dL      Comment: : [phone] MARY PHELANITAMeter ID: [id number]       Extra Tubes [[id number]] Collected: 03/15/23 1407    Specimen: Blood Updated: 03/15/23 1816    Narrative:      The following orders were created for panel order Extra Tubes.  Procedure                               Abnormality         Status                     ---------                               -----------         ------                     Gold Top - SST[[id number]]                                   Final result               Gray Top[[id number]]                                         Final result               Light Blue Top[[id number]]                                   Final result                 Please view results for these tests on the individual orders.    Gray Top [[id number]] Collected: 03/15/23 1407    Specimen: Blood Updated: 03/15/23 1816     Extra Tube Hold for add-ons.     Comment: Auto resulted.       Blood Culture - Blood, Hand, Left [[id number]] Collected: 03/15/23 1749    Specimen: Blood from Hand, Left Updated: 03/15/23 1804    STAT Lactic Acid, Reflex [[id number]]  (Abnormal) Collected: 03/15/23 1648    Specimen: Blood Updated: 03/15/23 1744     Lactate 2.3 mmol/L     High Sensitivity Troponin T 2Hr [[id number]]  (Abnormal) Collected: 03/15/23 1652    Specimen: Blood Updated: 03/15/23 1716     HS Troponin T 19 ng/L      Troponin T Delta 2 ng/L     Narrative:      High Sensitive Troponin T Reference Range:  <10.0 ng/L- Negative Female for AMI  <15.0 ng/L- Negative Male for AMI  >=10 - Abnormal  Female indicating possible myocardial injury.  >=15 - Abnormal Male indicating possible myocardial injury.   Clinicians would have to utilize clinical acumen, EKG, Troponin, and serial changes to determine if it is an Acute Myocardial Infarction or myocardial injury due to an underlying chronic condition.         Blood Gas, Arterial - [[id number]]  (Abnormal) Collected: 03/15/23 1654    Specimen: Arterial Blood Updated: 03/15/23 1655     Site Right Brachial     Heber's Test N/A     pH, Arterial 7.453 pH units      Comment: 83 Value above reference range        pCO2, Arterial 32.9 mm Hg      Comment: 84 Value below reference range        pO2, Arterial 81.1 mm Hg      Comment: 84 Value below reference range        HCO3, Arterial 23.0 mmol/L      Base Excess, Arterial -0.4 mmol/L      Comment: 84 Value below reference range        O2 Saturation, Arterial 96.7 %      Barometric Pressure for Blood Gas 752 mmHg      Modality Room Air     Ventilator Mode NA     Collected by priyanka     Comment: Meter: [id number]     :  [id number]       POC Glucose Once [[id number]]  (Normal) Collected: 03/15/23 1621    Specimen: Blood Updated: 03/15/23 1632     Glucose 106 mg/dL      Comment: : [id number] JERRY Bains ID: [id number]       Urine Culture - Urine, Urine, Clean Catch [[id number]] Collected: 03/15/23 1624    Specimen: Urine, Clean Catch Updated: 03/15/23 1624    CBC & Differential [[id number]]  (Abnormal) Collected: 03/15/23 1407    Specimen: Blood Updated: 03/15/23 1554    Narrative:      The following orders were created for panel order CBC & Differential.  Procedure                               Abnormality         Status                     ---------                               -----------         ------                     CBC Auto Differential[[id number]]        Abnormal            Final result               Scan Slide[[id number]]                   Normal              Final result                  Please view results for these tests on the individual orders.    Scan Slide [[id number]]  (Normal) Collected: 03/15/23 1407    Specimen: Blood Updated: 03/15/23 1554     RBC Morphology Normal     WBC Morphology Normal     Platelet Morphology Normal    Blood Culture - Blood, Hand, Left [[id number]] Collected: 03/15/23 1515    Specimen: Blood from Hand, Left Updated: 03/15/23 1518    Gold Top - SST [[id number]] Collected: 03/15/23 1407    Specimen: Blood Updated: 03/15/23 1515     Extra Tube Hold for add-ons.     Comment: Auto resulted.       Light Blue Top [[id number]] Collected: 03/15/23 1407    Specimen: Blood Updated: 03/15/23 1515     Extra Tube Hold for add-ons.     Comment: Auto resulted       High Sensitivity Troponin T [[id number]]  (Abnormal) Collected: 03/15/23 1407    Specimen: Blood Updated: 03/15/23 1502     HS Troponin T 17 ng/L     Narrative:      High Sensitive Troponin T Reference Range:  <10.0 ng/L- Negative Female for AMI  <15.0 ng/L- Negative Male for AMI  >=10 - Abnormal Female indicating possible myocardial injury.  >=15 - Abnormal Male indicating possible myocardial injury.   Clinicians would have to utilize clinical acumen, EKG, Troponin, and serial changes to determine if it is an Acute Myocardial Infarction or myocardial injury due to an underlying chronic condition.         Lactic Acid, Plasma [[id number]]  (Abnormal) Collected: 03/15/23 1407    Specimen: Blood Updated: 03/15/23 1449     Lactate 2.5 mmol/L     Urinalysis, Microscopic Only - Urine, Catheter In/Out [[id number]]  (Abnormal) Collected: 03/15/23 1407    Specimen: Urine, Catheter In/Out Updated: 03/15/23 1446     RBC, UA 6-12 /HPF      WBC, UA 6-12 /HPF      Bacteria, UA 1+ /HPF      Squamous Epithelial Cells, UA 3-5 /HPF      Hyaline Casts, UA None Seen /LPF      Calcium Oxalate Crystals, UA Small/1+ /HPF      Methodology Automated Microscopy    POC Glucose Once [[id number]]  (Normal) Collected: 03/15/23 1431    Specimen: Blood  Updated: 03/15/23 1445     Glucose 72 mg/dL      Comment: : [phone] LB HOANGELINEMeter ID: [id number]       Comprehensive Metabolic Panel [[id number]]  (Abnormal) Collected: 03/15/23 1407    Specimen: Blood Updated: 03/15/23 1442     Glucose 85 mg/dL      BUN 11 mg/dL      Creatinine 1.03 mg/dL      Sodium 136 mmol/L      Potassium 4.8 mmol/L      Comment: Specimen hemolyzed.  Results may be affected.        Chloride 101 mmol/L      CO2 22.0 mmol/L      Calcium 9.0 mg/dL      Total Protein 6.3 g/dL      Albumin 3.7 g/dL      ALT (SGPT) 5 U/L      Comment: Specimen hemolyzed.  Results may be affected.        AST (SGOT) 26 U/L      Comment: Specimen hemolyzed.  Results may be affected.        Alkaline Phosphatase 140 U/L      Total Bilirubin 2.0 mg/dL      Globulin 2.6 gm/dL      A/G Ratio 1.4 g/dL      BUN/Creatinine Ratio 10.7     Anion Gap 13.0 mmol/L      eGFR 57.2 mL/min/1.73     Narrative:      GFR Normal >60  Chronic Kidney Disease <60  Kidney Failure <15    The GFR formula is only valid for adults with stable renal function between ages 18 and 70.    Lipase [[id number]]  (Abnormal) Collected: 03/15/23 1407    Specimen: Blood Updated: 03/15/23 1441     Lipase 87 U/L     Urinalysis With Microscopic If Indicated (No Culture) - Urine, Catheter In/Out [[id number]]  (Abnormal) Collected: 03/15/23 1407    Specimen: Urine, Catheter In/Out Updated: 03/15/23 1440     Color, UA Yellow     Appearance, UA Clear     pH, UA 6.0     Specific Gravity, UA 1.025     Glucose, UA >=1000 mg/dL (3+)     Ketones, UA Trace     Bilirubin, UA Moderate (2+)     Blood, UA Trace     Protein, UA >=300 mg/dL (3+)     Leuk Esterase, UA Negative     Nitrite, UA Negative     Urobilinogen, UA >=8.0 E.U./dL    CBC Auto Differential [[id number]]  (Abnormal) Collected: 03/15/23 1407    Specimen: Blood Updated: 03/15/23 1421     WBC 7.09 10*3/mm3      RBC 4.54 10*6/mm3      Hemoglobin 13.0 g/dL      Hematocrit 39.0 %      MCV 85.9 fL       MCH 28.6 pg      MCHC 33.3 g/dL      RDW 21.6 %      RDW-SD 66.8 fl      MPV 10.3 fL      Platelets 144 10*3/mm3      Neutrophil % 57.0 %      Lymphocyte % 23.4 %      Monocyte % 18.2 %      Eosinophil % 0.4 %      Basophil % 0.6 %      Immature Grans % 0.4 %      Neutrophils, Absolute 4.04 10*3/mm3      Lymphocytes, Absolute 1.66 10*3/mm3      Monocytes, Absolute 1.29 10*3/mm3      Eosinophils, Absolute 0.03 10*3/mm3      Basophils, Absolute 0.04 10*3/mm3      Immature Grans, Absolute 0.03 10*3/mm3      nRBC 0.0 /100 WBC            Imaging Results (Last 24 Hours)     Procedure Component Value Units Date/Time    CT Abdomen Pelvis With Contrast [[id number]] Collected: 03/15/23 1713     Updated: 03/15/23 1749    Narrative:      EXAM:  CT ABDOMEN PELVIS WITH IV CONTRAST    ORDERING PROVIDER:  KAI NICHOLAS    CLINICAL HISTORY:  abdominal pain, E11.649 Type 2 diabetes mellitus with  hypoglycemia without coma J98.01 Acute bronchospasm N39.0 Urinary  tract infection, site not specified    COMPARISON:  12/30/2022    TECHNIQUE:   CT abdomen and pelvis performed with 90ml of Isovue 300 as IV  contrast and reformatted in the sagittal and coronal planes.     This examination was performed according to our departmental dose  optimization program which includes automated exposure control,  adjustment of the MA and kV according to patient size, and/or use  of iterative reconstruction technique.     FINDINGS:    BASILAR CHEST: Mild to moderate right and mild left pleural  effusion. Bilateral scattered airspace process concerning for  multifocal pneumonia. There is cardiomegaly.    LIVER: No mass, enlargement or abnormal density. Diffuse fatty  change.    BILIARY TRACT: Prior cholecystectomy.     SPLEEN: No mass or enlargement.     PANCREAS: No mass or inflammatory process. Normal pancreatic  duct.     ADRENAL GLANDS: Unremarkable. No mass.     URINARY SYSTEM: Kidneys are normal in size. No stone,  hydronephrosis, or mass. Normal  ureters.  Bladder is normal  without mass or stone.      GI TRACT: No mass, dilation, or wall thickening.  No diverticula.   No hernia.  Appendix is not well visualized.      REPRODUCTIVE SYSTEM:    PERITONEAL SPACE:No free air, and with mild to moderate free  ascites fluid, and no gross mass or adenopathy.     RETROPERITONEAL SPACE:  No adenopathy, mass, aneurysm or  significant vascular abnormality.     BONES AND EXTRA-ABDOMINAL SOFT TISSUES: No aggressive osseous  lesion. Diffuse anasarca of the abdominal and pelvic walls. No  inguinal adenopathy or hernia.      Impression:      Mild to moderate right and mild left pleural effusion.   Bilateral scattered airspace process concerning for multifocal  pneumonia.   Diffuse fatty change of liver.  Mild to moderate ascites.  Diffuse anasarca of the abdominal and pelvic walls.   No evidence of obstructive uropathy on either side.  No evidence of bowel obstruction or perienteric inflammation.    Electronically signed by:  Reggie Monae MD  3/15/2023 5:47 PM CDT  Workstation: [id number]    CT Head Without Contrast [[id number]] Collected: 03/15/23 1448     Updated: 03/15/23 1551    Narrative:        PROCEDURE: CT head without contrast    REASON FOR EXAM: ams    This exam was performed according to our departmental  dose-optimization program, which includes automated exposure  control, adjustment of the mA and/or kV according to patient size  and/or use of iterative reconstruction technique. Total DLP =  845.3 mGy.    FINDINGS: Comparison study dated May 3, 2022. Axial computer  tomography sequential imaging of the head was performed from the  vertex to the base of the skull. .Sagittal and coronal  reformation was performed .    The skull vault is intact. Paranasal sinuses and bilateral  mastoid air cells are well aerated. Stable bilateral basal  ganglia physiologic calcifications. Stable bilateral frontal and  parietal lobe periventricular deep white matter small foci  of  hypodensity. Cerebral and cerebellar parenchymal are otherwise  normal. Ventricular system and subarachnoid spaces are normal.      Impression:      1.  No acute intracranial abnormality.  2.  Stable bilateral basal ganglia physiologic calcifications.  3.  Stable bilateral frontal and parietal lobe periventricular  deep white matter small foci of chronic ischemic gliosis  secondary to microvascular disease.    Electronically signed by:  Sreedhar Otero MD  3/15/2023 3:49 PM CDT  Workstation: [id number]    XR Chest 1 View [[id number]] Collected: 03/15/23 1401     Updated: 03/15/23 1452    Narrative:      EXAM DESCRIPTION:  XR CHEST 1 VIEW    CLINICAL INDICATION: hypoglycemia     COMPARISON: Chest x-ray dated 12/30/2022    FINDINGS: Mild cardiomegaly with mild central pulmonary vascular  congestion. No pleural effusion or pneumothorax. Left chest wall  ICD.      Impression:      Mild cardiomegaly with mild central pulmonary  vascular congestion.    Electronically signed by:  Christofer Pastrana MD  3/15/2023 2:50 PM CDT  Workstation: [id number]            I reviewed the patient's new clinical results.    ASSESSMENT AND PLAN: This is a 72 y.o. female known type II diabetic who is a resident of a local nursing home, [facility] [place].  Was found today by staff to have AMS with lethargy.  Sent to ER due to concerns for stroke.  In route found to be hypoglycemic to 44.  Recovered to 208 with administration of 1 amp D50.  Upon presentation to ER glucose again down to 72.  Started on D5W.  CT of head negative for acute pathology.  Altered mental status improving.  Suspicion for CAP high given patient's complaint of shortness of breath, lactate of 2.3, and current CT findings.  Atypicals ordered.  Rocephin and azithromycin started.  UA showing UTI which Rocephin should cover.  Urinalysis ordered.    Active Hospital Problems    Diagnosis  POA   • **Hypoglycemic episode in patient with diabetes mellitus (HCC) [E11.649]  Yes   • Shortness of  breath [R06.02]  Unknown   • Acute cystitis without hematuria [N30.00]  Unknown   • Acute on chronic HFrEF (heart failure with reduced ejection fraction) ([facility]) [I50.23]  Yes   • Altered mental status [R41.82]  Yes   • Implantable cardioverter-defibrillator (ICD) generator end of life [Z45.02]  Not Applicable     Added automatically from request for surgery [id number]     • Mixed hyperlipidemia [E78.2]  Yes   • Essential hypertension [I10]  Yes   • GERD (gastroesophageal reflux disease) [K21.9]  Yes     Hypoglycemia in setting of type 2 diabetes  Patient is a known type II diabetic.  Currently on Levemir 20 units nightly at nursing home.  Given hypoglycemic episode will decrease that by 50%.  - Levemir 10 units nightly hold if glucose under 100  - Low-dose sliding scale  - Cardiac and carbohydrate consistent diet    AMS  Nursing home staff who are well acquainted with the patient noted altered mental status with some concern for stroke.  CT of head in ER negative for acute changes.  At the time of my examination with patient mentally she was awake alert and cooperative.  Currently oriented x3.  Suspicion for stroke low but will keep MRI in our pocket if needed.  Differentials for cause include hypoglycemic episode, bacteremia, and UTI  - Neurochecks every 4 for next 24 hours  - Monitor blood sugar for any further hypoglycemic episodes  - Blood cultures x2 possible bacteremia  - Urinalysis  - Atypicals for CAP    SOB  Patient has no history of smoking.  No prior diagnosis of COPD.  Nursing staff at home reports that she is on albuterol and DuoNebs as needed for occasional shortness of breath.  On examination today patient does have some significant bilateral wheezing with rales.  CAP on differential.  Currently saturating in mid 90s.  - Given 1 dose IV Solu-Medrol 125 mg in ER.  - Azithromycin 500 mg daily  - Rocephin 2 g daily  - Oxygen therapy via nasal cannula  - Albuterol via nebulizer every 4  - DuoNebs via  nebulizer every 4  - Atypicals pending    UTI  Elevated lactate at 2.5.  UA showing RBC, WBC, +1 bacteria.  UTI in elderly known to cause AMS.  - Urine culture  - Creatinine clearance currently 80 mL/min  - Rocephin 2 g every 24.    HFrEF  Echocardiogram done September 2022 showing ejection fraction 21 to 25%, moderate to severe tricuspid regurg, severe pulmonary hypertension, and moderate to severe mitral valve regurg.  - Avoid NSAIDs for heart failure as well as for patient's severe allergy to them.  - Monitor I's and O's strictly  - Patient has previously tolerated Lasix 40 mg twice daily.  We will continue on this dose due to presence of rales on lung examination however closely monitor for dehydration    HTN  -Continue Coreg 25 twice daily  - Continue Imdur 30 mg once daily  - Continue lisinopril 10 mg once daily  - Continue Lasix 40 mg twice daily    GERD  Protonix 40 mg for prophylaxis once daily    HLD  - Atorvastatin 40 mg daily    DVT prophylaxis: Lovenox     Lexi Wade and I have discussed pain goals for this hospitalization after reviewing his current clinical condition, medical history and prior pain experiences.  The goal is to keep the pain level mild to moderate.  To help achieve this, I plan to utilize acetaminophen.    PDMP reviewed and consistent with patient reported medications.    Expected Length of Stay: Where: current living arrangements at [facility]    I discussed the patient's findings and my recommendations with patient.     Dr. Jaime Alonso is the attending on record at time of admission, She is aware of the patient's status and agrees with the above history and physical.        This document has been electronically signed by Jose Davidson MD on March 15, 2023 20:05 CDT

## 2023-03-15 NOTE — ED NOTES
"Patient presents to the ED with c/o \"not talking, only moaning and right sided weakness.\" EMS reports patient's BS 44, amp D50 given to patient and recheck BS was 208.   "

## 2023-03-16 PROBLEM — I50.22 CHRONIC HFREF (HEART FAILURE WITH REDUCED EJECTION FRACTION) (HCC): Status: ACTIVE | Noted: 2023-01-01

## 2023-03-16 NOTE — PLAN OF CARE
Goal Outcome Evaluation:  Plan of Care Reviewed With: patient           Outcome Evaluation: PT eval completed. Patient AOx4 but does demonstrate mild/moderate safety impairments. Patient ModAx1 for supine>sit transfer and SBA for sit>supine transfer with HOB up and bed rails. ModAx1 for sit<>Stand transfer due to mild/moderate posterior lean once she got to standing with RW. MinAx1 for ambulation 4'x1 stepping laterally with RW. Patient plans to return back to SNF secondary to not having anyone at home to help take care of her.

## 2023-03-16 NOTE — PROGRESS NOTES
FAMILY MEDICINE RESIDENCY SERVICE  DAILY PROGRESS NOTE    NAME: Lexi Wade  : 1950  MRN: [id number]      LOS: 0 days     PROVIDER OF SERVICE: Ramses Thorpe MD    Chief Complaint: Nursing home-acquired pneumonia    Subjective:     Interval History:  History taken from: patient chart  Pt w/ fever of 101.8 overnight despite ceftriaxone therapy. Cointues to have soa, diarrhea, and some abdominal pain.     Review of Systems:   Review of Systems   Constitutional: Negative for fever.   Respiratory: Positive for cough, shortness of breath and wheezing.    Cardiovascular: Negative for chest pain and leg swelling.   Gastrointestinal: Positive for abdominal pain (chronic) and diarrhea (chronic). Negative for nausea and vomiting.   Genitourinary:        Chronic stress urinary incontinence        Objective:     Vital Signs  Temp:  [97.3 °F (36.3 °C)-101.8 °F (38.8 °C)] 98.2 °F (36.8 °C)  Heart Rate:  [67-75] 69  Resp:  [16-20] 16  BP: (107-152)/(53-80) 107/60   Body mass index is 34.72 kg/m².    Physical Exam  Physical Exam  Vitals reviewed.   Constitutional:       General: She is not in acute distress.     Interventions: Nasal cannula in place.   HENT:      Head: Normocephalic and atraumatic.   Cardiovascular:      Rate and Rhythm: Normal rate and regular rhythm.      Pulses: Normal pulses.      Heart sounds: Murmur heard.    Systolic murmur is present with a grade of 2/6.  Pulmonary:      Effort: Pulmonary effort is normal. No respiratory distress.      Breath sounds: Examination of the right-lower field reveals rales. Examination of the left-lower field reveals rales. Rales present.      Comments: Coarse breath sounds bilaterally  Abdominal:      Palpations: Abdomen is soft.      Tenderness: There is no abdominal tenderness.   Musculoskeletal:      Right lower leg: No edema.      Left lower leg: No edema.   Skin:     General: Skin is warm.   Neurological:      Mental Status: She is alert. Mental  status is at baseline.   Psychiatric:         Speech: Speech normal.         Behavior: Behavior is cooperative.       Scheduled Meds   atorvastatin, 40 mg, Oral, Daily  azithromycin, 500 mg, Intravenous, Q24H  carvedilol, 25 mg, Oral, BID With Meals  cefepime, 2 g, Intravenous, Q12H  enoxaparin, 40 mg, Subcutaneous, Daily  famotidine, 40 mg, Oral, Daily  furosemide, 40 mg, Oral, BID  Insulin Aspart, 0-7 Units, Subcutaneous, TID AC  insulin detemir, 10 Units, Subcutaneous, Nightly  ipratropium-albuterol, 3 mL, Nebulization, 4x Daily  isosorbide mononitrate, 30 mg, Oral, Daily  lisinopril, 10 mg, Oral, Daily  midodrine, 5 mg, Oral, TID AC  potassium chloride, 20 mEq, Oral, BID  senna-docusate sodium, 2 tablet, Oral, BID  sodium chloride, 10 mL, Intravenous, Q12H  sucralfate, 1 g, Oral, TID       PRN Meds   •  acetaminophen **OR** acetaminophen **OR** acetaminophen  •  senna-docusate sodium **AND** polyethylene glycol **AND** bisacodyl **AND** bisacodyl  •  dextrose  •  dextrose  •  glucagon (human recombinant)  •  nitroglycerin  •  ondansetron **OR** ondansetron  •  Pharmacy to Dose Cefepime  •  [COMPLETED] Insert Peripheral IV **AND** sodium chloride  •  sodium chloride  •  sodium chloride      Diagnostic Data    Lab Results (last 24 hours)     Procedure Component Value Units Date/Time    POC Glucose Once [[id number]]  (Abnormal) Collected: 03/16/23 1045    Specimen: Blood Updated: 03/16/23 1122     Glucose 184 mg/dL      Comment: RN NotifiedOperator: [id number] [facility] ID: [id number]       Ammonia [[id number]]  (Normal) Collected: 03/16/23 0929    Specimen: Blood Updated: 03/16/23 1007     Ammonia 28 umol/L     Urine Culture - Urine, Urine, Clean Catch [[id number]]  (Normal) Collected: 03/15/23 1624    Specimen: Urine, Clean Catch Updated: 03/16/23 0752     Urine Culture No growth    Mycoplasma Pneumoniae PCR - Swab, Throat [[id number]] Collected: 03/16/23 0015    Specimen: Swab from Throat Updated:  03/16/23 0746     MYCOPLASMAE PNEUMONIAE BY PCR Negative    Narrative:      This test is performed by utilizing real time polymerace chain recation (PCR).   This test is performed by utilizing real time polymerace chain recation (PCR).     POC Glucose Once [[id number]]  (Normal) Collected: 03/16/23 0725    Specimen: Blood Updated: 03/16/23 0743     Glucose 127 mg/dL      Comment: RN NotifiedOperator: [id number] TRI NAIFNuhaANNAMeter ID: [id number]       Comprehensive Metabolic Panel [[id number]]  (Abnormal) Collected: 03/16/23 0554    Specimen: Blood Updated: 03/16/23 0656     Glucose 106 mg/dL      BUN 11 mg/dL      Creatinine 1.01 mg/dL      Sodium 137 mmol/L      Potassium 3.5 mmol/L      Chloride 102 mmol/L      CO2 21.0 mmol/L      Calcium 8.6 mg/dL      Total Protein 5.6 g/dL      Albumin 3.4 g/dL      ALT (SGPT) 5 U/L      AST (SGOT) 16 U/L      Alkaline Phosphatase 124 U/L      Total Bilirubin 1.5 mg/dL      Globulin 2.2 gm/dL      A/G Ratio 1.5 g/dL      BUN/Creatinine Ratio 10.9     Anion Gap 14.0 mmol/L      eGFR 58.5 mL/min/1.73     Narrative:      GFR Normal >60  Chronic Kidney Disease <60  Kidney Failure <15    The GFR formula is only valid for adults with stable renal function between ages 18 and 70.    CBC & Differential [[id number]]  (Abnormal) Collected: 03/16/23 0554    Specimen: Blood Updated: 03/16/23 0638    Narrative:      The following orders were created for panel order CBC & Differential.  Procedure                               Abnormality         Status                     ---------                               -----------         ------                     CBC Auto Differential[[id number]]        Abnormal            Final result                 Please view results for these tests on the individual orders.    CBC Auto Differential [[id number]]  (Abnormal) Collected: 03/16/23 0554    Specimen: Blood Updated: 03/16/23 0638     WBC 4.27 10*3/mm3      RBC 3.85 10*6/mm3      Hemoglobin 11.0 g/dL       Hematocrit 32.3 %      MCV 83.9 fL      MCH 28.6 pg      MCHC 34.1 g/dL      RDW 20.7 %      RDW-SD 63.0 fl      MPV 10.1 fL      Platelets 146 10*3/mm3      Neutrophil % 65.1 %      Lymphocyte % 26.7 %      Monocyte % 7.5 %      Eosinophil % 0.0 %      Basophil % 0.2 %      Immature Grans % 0.5 %      Neutrophils, Absolute 2.78 10*3/mm3      Lymphocytes, Absolute 1.14 10*3/mm3      Monocytes, Absolute 0.32 10*3/mm3      Eosinophils, Absolute 0.00 10*3/mm3      Basophils, Absolute 0.01 10*3/mm3      Immature Grans, Absolute 0.02 10*3/mm3      nRBC 0.0 /100 WBC     POC Glucose Once [[id number]]  (Normal) Collected: 03/16/23 0444    Specimen: Blood Updated: 03/16/23 0508     Glucose 111 mg/dL      Comment: : [id number] GLENNA CONSTANCEMeter ID: [id number]       CANDIDA AURIS SCREEN - Swab, Axilla Right, Axilla Left and Groin [[id number]] Collected: 03/16/23 0458    Specimen: Swab from Axilla Right, Axilla Left and Groin Updated: 03/16/23 0458    S. Pneumo Ag Urine or CSF - Urine, Urine, Clean Catch [[id number]]  (Normal) Collected: 03/16/23 0229    Specimen: Urine, Clean Catch Updated: 03/16/23 0258     Strep Pneumo Ag Negative    Legionella Antigen, Urine - Urine, Urine, Clean Catch [[id number]]  (Normal) Collected: 03/16/23 0229    Specimen: Urine, Clean Catch Updated: 03/16/23 0257     LEGIONELLA ANTIGEN, URINE Negative    POC Glucose Once [[id number]]  (Abnormal) Collected: 03/15/23 2207    Specimen: Blood Updated: 03/15/23 2225     Glucose 135 mg/dL      Comment: : [id number] GLENNA CONSTANCEMeter ID: [id number]       STAT Lactic Acid, Reflex [[id number]]  (Normal) Collected: 03/15/23 2000    Specimen: Blood Updated: 03/15/23 2020     Lactate 1.9 mmol/L     POC Glucose Once [[id number]]  (Normal) Collected: 03/15/23 1944    Specimen: Blood Updated: 03/15/23 2018     Glucose 84 mg/dL      Comment: RN NotifiedOperator: [id number] SABINO Vaughn ID: [id number]       Comprehensive  Metabolic Panel [[id number]]  (Abnormal) Collected: 03/15/23 1817    Specimen: Blood Updated: 03/15/23 1856     Glucose 83 mg/dL      BUN 11 mg/dL      Creatinine 0.94 mg/dL      Sodium 137 mmol/L      Potassium 3.9 mmol/L      Comment: Slight hemolysis detected by analyzer. Results may be affected.        Chloride 103 mmol/L      CO2 19.0 mmol/L      Calcium 8.7 mg/dL      Total Protein 6.0 g/dL      Albumin 3.6 g/dL      ALT (SGPT) <5 U/L      AST (SGOT) 21 U/L      Alkaline Phosphatase 136 U/L      Total Bilirubin 1.9 mg/dL      Globulin 2.4 gm/dL      A/G Ratio 1.5 g/dL      BUN/Creatinine Ratio 11.7     Anion Gap 15.0 mmol/L      eGFR 63.8 mL/min/1.73     Narrative:      GFR Normal >60  Chronic Kidney Disease <60  Kidney Failure <15    The GFR formula is only valid for adults with stable renal function between ages 18 and 70.    POC Glucose Once [[id number]]  (Normal) Collected: 03/15/23 1817    Specimen: Blood Updated: 03/15/23 1836     Glucose 82 mg/dL      Comment: : [id number] MARY ANGELITAMeter ID: [id number]       Extra Tubes [[id number]] Collected: 03/15/23 1407    Specimen: Blood Updated: 03/15/23 1816    Narrative:      The following orders were created for panel order Extra Tubes.  Procedure                               Abnormality         Status                     ---------                               -----------         ------                     Gold Top - [facility][[id number]]                                   Final result               Gray Top[[id number]]                                         Final result               Light Blue Top[[id number]]                                   Final result                 Please view results for these tests on the individual orders.    Gray Top [[id number]] Collected: 03/15/23 1407    Specimen: Blood Updated: 03/15/23 1816     Extra Tube Hold for add-ons.     Comment: Auto resulted.       Blood Culture - Blood, Hand, Left [[id number]] Collected: 03/15/23  1749    Specimen: Blood from Hand, Left Updated: 03/15/23 1804    STAT Lactic Acid, Reflex [[id number]]  (Abnormal) Collected: 03/15/23 1648    Specimen: Blood Updated: 03/15/23 1744     Lactate 2.3 mmol/L     High Sensitivity Troponin T 2Hr [[id number]]  (Abnormal) Collected: 03/15/23 1652    Specimen: Blood Updated: 03/15/23 1716     HS Troponin T 19 ng/L      Troponin T Delta 2 ng/L     Narrative:      High Sensitive Troponin T Reference Range:  <10.0 ng/L- Negative Female for AMI  <15.0 ng/L- Negative Male for AMI  >=10 - Abnormal Female indicating possible myocardial injury.  >=15 - Abnormal Male indicating possible myocardial injury.   Clinicians would have to utilize clinical acumen, EKG, Troponin, and serial changes to determine if it is an Acute Myocardial Infarction or myocardial injury due to an underlying chronic condition.         Blood Gas, Arterial - [[id number]]  (Abnormal) Collected: 03/15/23 1654    Specimen: Arterial Blood Updated: 03/15/23 1655     Site Right Brachial     Heber's Test N/A     pH, Arterial 7.453 pH units      Comment: 83 Value above reference range        pCO2, Arterial 32.9 mm Hg      Comment: 84 Value below reference range        pO2, Arterial 81.1 mm Hg      Comment: 84 Value below reference range        HCO3, Arterial 23.0 mmol/L      Base Excess, Arterial -0.4 mmol/L      Comment: 84 Value below reference range        O2 Saturation, Arterial 96.7 %      Barometric Pressure for Blood Gas 752 mmHg      Modality Room Air     Ventilator Mode NA     Collected by priyanka     Comment: Meter: [id number]     :  [id number]       POC Glucose Once [[id number]]  (Normal) Collected: 03/15/23 1621    Specimen: Blood Updated: 03/15/23 1632     Glucose 106 mg/dL      Comment: : [id number] JERRY Bains ID: [id number]       CBC & Differential [[id number]]  (Abnormal) Collected: 03/15/23 1407    Specimen: Blood Updated: 03/15/23 1554    Narrative:      The following orders  were created for panel order CBC & Differential.  Procedure                               Abnormality         Status                     ---------                               -----------         ------                     CBC Auto Differential[[id number]]        Abnormal            Final result               Scan Slide[[id number]]                   Normal              Final result                 Please view results for these tests on the individual orders.    Scan Slide [[id number]]  (Normal) Collected: 03/15/23 1407    Specimen: Blood Updated: 03/15/23 1554     RBC Morphology Normal     WBC Morphology Normal     Platelet Morphology Normal    Blood Culture - Blood, Hand, Left [[id number]] Collected: 03/15/23 1515    Specimen: Blood from Hand, Left Updated: 03/15/23 1518    Gold Top - SST [[id number]] Collected: 03/15/23 1407    Specimen: Blood Updated: 03/15/23 1515     Extra Tube Hold for add-ons.     Comment: Auto resulted.       Light Blue Top [[id number]] Collected: 03/15/23 1407    Specimen: Blood Updated: 03/15/23 1515     Extra Tube Hold for add-ons.     Comment: Auto resulted       High Sensitivity Troponin T [[id number]]  (Abnormal) Collected: 03/15/23 1407    Specimen: Blood Updated: 03/15/23 1502     HS Troponin T 17 ng/L     Narrative:      High Sensitive Troponin T Reference Range:  <10.0 ng/L- Negative Female for AMI  <15.0 ng/L- Negative Male for AMI  >=10 - Abnormal Female indicating possible myocardial injury.  >=15 - Abnormal Male indicating possible myocardial injury.   Clinicians would have to utilize clinical acumen, EKG, Troponin, and serial changes to determine if it is an Acute Myocardial Infarction or myocardial injury due to an underlying chronic condition.         Lactic Acid, Plasma [[id number]]  (Abnormal) Collected: 03/15/23 1407    Specimen: Blood Updated: 03/15/23 1449     Lactate 2.5 mmol/L     Urinalysis, Microscopic Only - Urine, Catheter In/Out [[id number]]  (Abnormal) Collected:  03/15/23 1407    Specimen: Urine, Catheter In/Out Updated: 03/15/23 1446     RBC, UA 6-12 /HPF      WBC, UA 6-12 /HPF      Bacteria, UA 1+ /HPF      Squamous Epithelial Cells, UA 3-5 /HPF      Hyaline Casts, UA None Seen /LPF      Calcium Oxalate Crystals, UA Small/1+ /HPF      Methodology Automated Microscopy    POC Glucose Once [[id number]]  (Normal) Collected: 03/15/23 1431    Specimen: Blood Updated: 03/15/23 1445     Glucose 72 mg/dL      Comment: : [id number] LB EVANGELINEMeter ID: [id number]       Comprehensive Metabolic Panel [[id number]]  (Abnormal) Collected: 03/15/23 1407    Specimen: Blood Updated: 03/15/23 1442     Glucose 85 mg/dL      BUN 11 mg/dL      Creatinine 1.03 mg/dL      Sodium 136 mmol/L      Potassium 4.8 mmol/L      Comment: Specimen hemolyzed.  Results may be affected.        Chloride 101 mmol/L      CO2 22.0 mmol/L      Calcium 9.0 mg/dL      Total Protein 6.3 g/dL      Albumin 3.7 g/dL      ALT (SGPT) 5 U/L      Comment: Specimen hemolyzed.  Results may be affected.        AST (SGOT) 26 U/L      Comment: Specimen hemolyzed.  Results may be affected.        Alkaline Phosphatase 140 U/L      Total Bilirubin 2.0 mg/dL      Globulin 2.6 gm/dL      A/G Ratio 1.4 g/dL      BUN/Creatinine Ratio 10.7     Anion Gap 13.0 mmol/L      eGFR 57.2 mL/min/1.73     Narrative:      GFR Normal >60  Chronic Kidney Disease <60  Kidney Failure <15    The GFR formula is only valid for adults with stable renal function between ages 18 and 70.    Lipase [[id number]]  (Abnormal) Collected: 03/15/23 1407    Specimen: Blood Updated: 03/15/23 1441     Lipase 87 U/L     Urinalysis With Microscopic If Indicated (No Culture) - Urine, Catheter In/Out [[id number]]  (Abnormal) Collected: 03/15/23 1407    Specimen: Urine, Catheter In/Out Updated: 03/15/23 1440     Color, UA Yellow     Appearance, UA Clear     pH, UA 6.0     Specific Gravity, UA 1.025     Glucose, UA >=1000 mg/dL (3+)     Ketones, UA Trace      Bilirubin, UA Moderate (2+)     Blood, UA Trace     Protein, UA >=300 mg/dL (3+)     Leuk Esterase, UA Negative     Nitrite, UA Negative     Urobilinogen, UA >=8.0 E.U./dL    CBC Auto Differential [[id number]]  (Abnormal) Collected: 03/15/23 1407    Specimen: Blood Updated: 03/15/23 1421     WBC 7.09 10*3/mm3      RBC 4.54 10*6/mm3      Hemoglobin 13.0 g/dL      Hematocrit 39.0 %      MCV 85.9 fL      MCH 28.6 pg      MCHC 33.3 g/dL      RDW 21.6 %      RDW-SD 66.8 fl      MPV 10.3 fL      Platelets 144 10*3/mm3      Neutrophil % 57.0 %      Lymphocyte % 23.4 %      Monocyte % 18.2 %      Eosinophil % 0.4 %      Basophil % 0.6 %      Immature Grans % 0.4 %      Neutrophils, Absolute 4.04 10*3/mm3      Lymphocytes, Absolute 1.66 10*3/mm3      Monocytes, Absolute 1.29 10*3/mm3      Eosinophils, Absolute 0.03 10*3/mm3      Basophils, Absolute 0.04 10*3/mm3      Immature Grans, Absolute 0.03 10*3/mm3      nRBC 0.0 /100 WBC           CT Head Without Contrast    Result Date: 3/15/2023  1.  No acute intracranial abnormality. 2.  Stable bilateral basal ganglia physiologic calcifications. 3.  Stable bilateral frontal and parietal lobe periventricular deep white matter small foci of chronic ischemic gliosis secondary to microvascular disease. Electronically signed by:  Sreedhar Otero MD  3/15/2023 3:49 PM CDT Workstation: [id number]    CT Abdomen Pelvis With Contrast    Result Date: 3/15/2023  Mild to moderate right and mild left pleural effusion. Bilateral scattered airspace process concerning for multifocal pneumonia. Diffuse fatty change of liver. Mild to moderate ascites. Diffuse anasarca of the abdominal and pelvic walls. No evidence of obstructive uropathy on either side. No evidence of bowel obstruction or perienteric inflammation. Electronically signed by:  Reggie Monae MD  3/15/2023 5:47 PM CDT Workstation: [id number]    XR Chest 1 View    Result Date: 3/15/2023  Mild cardiomegaly with mild central pulmonary vascular  congestion. Electronically signed by:  Christofer Pastrana MD  3/15/2023 2:50 PM CDT Workstation: [id number]      I reviewed the patient's new clinical results.    Assessment/Plan:     Active and Resolved Problems  Active Hospital Problems    Diagnosis  POA   • **Nursing home-acquired pneumonia [J18.9]  Unknown   • Hypoglycemic episode in patient with diabetes mellitus ([facility]) [E11.649]  Yes   • Shortness of breath [R06.02]  Unknown   • Chronic HFrEF (heart failure with reduced ejection fraction) ([facility]) [I50.22]  Yes   • Altered mental status [R41.82]  Yes   • Implantable cardioverter-defibrillator (ICD) generator end of life [Z45.02]  Not Applicable   • Mixed hyperlipidemia [E78.2]  Yes   • Essential hypertension [I10]  Yes   • GERD (gastroesophageal reflux disease) [K21.9]  Yes      Resolved Hospital Problems   No resolved problems to display.     CAP from SNF  No prior diagnosis of COPD.  Nursing staff at home reports that she is on albuterol and DuoNebs as needed for occasional shortness of breath.    - Given 1 dose IV Solu-Medrol 125 mg in ER.  - Azithromycin 500 mg daily  - Rocephin 2 g daily, switched to cefepime (3/16/23) as patient had fever of 101.8 overnight  - Oxygen therapy via nasal cannula  - Albuterol via nebulizer every 4  - DuoNebs via nebulizer every 4  - Atypicals negative    Type 2 diabetes  - Levemir 10 units nightly hold if glucose under 100  - Low-dose sliding scale  - Cardiac and carbohydrate consistent diet     AMS  Nursing home staff who are well acquainted with the patient noted altered mental status with some concern for stroke.  CT of head in ER negative for acute changes. Differentials for cause include hypoglycemic episode, bacteremia, and UTI  - Monitor blood sugar for any further hypoglycemic episodes  - Blood cultures x2 possible bacteremia  - Urinalysis negative  - Atypicals for CAP negative     HFrEF  Echocardiogram done September 2022 showing ejection fraction 21 to 25%, moderate to  severe tricuspid regurg, severe pulmonary hypertension, and moderate to severe mitral valve regurg.  - Avoid NSAIDs for heart failure as well as for patient's severe allergy to them.  - Monitor I's and O's strictly  - Patient has previously tolerated Lasix 40 mg twice daily.  We will continue on this dose due to presence of rales on lung examination however closely monitor for dehydration     HTN  -Continue Coreg 25 twice daily  - Continue Imdur 30 mg once daily  - Continue lisinopril 10 mg once daily  - Continue Lasix 40 mg twice daily     GERD  Protonix 40 mg for prophylaxis once daily     HLD  - Atorvastatin 40 mg daily    DVT prophylaxis:  Medical DVT prophylaxis orders are present.     Code status is   Code Status and Medical Interventions:   Ordered at: 03/15/23 1804     Code Status (Patient has no pulse and is not breathing):    CPR (Attempt to Resuscitate)     Medical Interventions (Patient has pulse or is breathing):    Full Support       Plan for disposition: SN in 2-3 days    Time: 30 minutes      This document has been electronically signed by Ramses Thorpe MD on March 16, 2023 13:40 CDT

## 2023-03-16 NOTE — PLAN OF CARE
Patient has been sleeping most of the shift. She wakes to verbal stimuli and can answer name and BD, Patient needs frequent reorienting to place, time and situation. She denies any pain at this time, she is cooperative with care, monitoring.   Problem: Adult Inpatient Plan of Care  Goal: Plan of Care Review  Outcome: Ongoing, Progressing   Goal Outcome Evaluation:

## 2023-03-16 NOTE — THERAPY EVALUATION
Patient Name: Lexi Wade  : 1950    MRN: [id number]                              Today's Date: 3/16/2023       Admit Date: 3/15/2023    Visit Dx:     ICD-10-CM ICD-9-CM   1. Hypoglycemic episode in patient with diabetes mellitus (HCC)  E11.649 250.80   2. Bronchospasm  J98.01 519.11   3. UTI (urinary tract infection) with pyuria  N39.0 599.0   4. Impaired functional mobility, balance, gait, and endurance  Z74.09 V49.89     Patient Active Problem List   Diagnosis   • Pseudophakia   • Primary open angle glaucoma   • Nonischemic cardiomyopathy (HCC)   • Congestive heart failure (HCC)   • Essential hypertension   • GERD (gastroesophageal reflux disease)   • Type 2 diabetes mellitus with complication, without long-term current use of insulin (HCC)   • Vitamin D deficiency   • Borderline glaucoma   • Neurologic disorder associated with diabetes mellitus (HCC)   • Mixed hyperlipidemia   • Menopausal syndrome   • Dyspnea   • Chest pain   • Morbid obesity (HCC)   • Precordial pain   • Coronary artery disease involving native heart with angina pectoris ([facility])   • Implantable cardioverter-defibrillator (ICD) generator end of life   • Nursing home-acquired pneumonia   • Obesity (BMI 30-39.9)   • Shortness of breath   • Altered mental status   • Morbid obesity (HCC)   • Drowsiness   • Acute on chronic systolic congestive heart failure (HCC)   • Acute on chronic systolic heart failure (CMS/HCC)   • Cholecystitis   • Hypokalemia   • Idiopathic acute pancreatitis without infection or necrosis   • Chronic systolic heart failure (CMS/HCC)   • Acute cystitis with hematuria   • Chronic HFrEF (heart failure with reduced ejection fraction) ([facility])   • Hypoglycemic episode in patient with diabetes mellitus (HCC)   • Shortness of breath     Past Medical History:   Diagnosis Date   • Chronic systolic heart failure (HCC)    • Diabetes mellitus (HCC)    • Diabetic neuropathy (HCC)    • GERD (gastroesophageal reflux disease)    •  Hypercholesterolemia    • Hypertension    • Low back pain    • Morbid obesity (HCC)    • Nonischemic cardiomyopathy (HCC)    • Osteoarthritis    • Sleep apnea    • Vitamin D deficiency      Past Surgical History:   Procedure Laterality Date   • CARDIAC CATHETERIZATION N/A 08/23/2018   • CARDIAC CATHETERIZATION N/A 6/3/2022    Procedure: Left Heart Cath;  Surgeon: Wang Felipe MD;  Location: [facility] CATH INVASIVE LOCATION;  Service: Cardiology;  Laterality: N/A;   • CARDIAC ELECTROPHYSIOLOGY PROCEDURE N/A 06/22/2020   • CARDIAC PACEMAKER PLACEMENT     • CATARACT EXTRACTION     • CHOLECYSTECTOMY WITH INTRAOPERATIVE CHOLANGIOGRAM N/A 7/15/2022    Procedure: CHOLECYSTECTOMY LAPAROSCOPIC INTRAOPERATIVE CHOLANGIOGRAM;  Surgeon: Orville Farias MD;  Location: [facility] OR;  Service: General;  Laterality: N/A;   • COLONOSCOPY N/A 06/14/2017   • PACEMAKER IMPLANTATION     • TOTAL ABDOMINAL HYSTERECTOMY WITH SALPINGO OOPHORECTOMY        General Information     Row Name 03/16/23 1000          Physical Therapy Time and Intention    Document Type evaluation  -LR     Mode of Treatment individual therapy;physical therapy  -LR     Row Name 03/16/23 1000          General Information    Patient Profile Reviewed yes  -LR     Prior Level of Function independent:;all household mobility;gait;transfer;bed mobility;ADL's  -LR     Existing Precautions/Restrictions fall  -LR     Barriers to Rehab medically complex;previous functional deficit;cognitive status  -LR     Row Name 03/16/23 1000          Living Environment    People in Home facility resident  -LR     Row Name 03/16/23 1000          Cognition    Orientation Status (Cognition) oriented x 4  -LR     Row Name 03/16/23 1000          Safety Issues, Functional Mobility    Safety Issues Affecting Function (Mobility) safety precautions follow-through/compliance;insight into deficits/self-awareness;ability to follow commands;at risk behavior observed;awareness of need for  assistance;positioning of assistive device;safety precaution awareness  -LR     Impairments Affecting Function (Mobility) balance;endurance/activity tolerance;coordination;shortness of breath;strength;sensation/sensory awareness  -LR           User Key  (r) = Recorded By, (t) = Taken By, (c) = Cosigned By    Initials Name Provider Type    LR Deandre Cox Physical Therapist               Mobility     Row Name 03/16/23 1000          Bed Mobility    Bed Mobility supine-sit;sit-supine  -LR     Supine-Sit [place] (Bed Mobility) moderate assist (50% patient effort)  -LR     Sit-Supine [place] (Bed Mobility) standby assist  -LR     Assistive Device (Bed Mobility) bed rails;head of bed elevated  -LR     Row Name 03/16/23 1000          Sit-Stand Transfer    Sit-Stand [place] (Transfers) moderate assist (50% patient effort)  -LR     Comment, (Sit-Stand Transfer) due to mild posterior lean backwards.  -LR     Row Name 03/16/23 1000          Gait/Stairs (Locomotion)    [place] Level (Gait) minimum assist (75% patient effort)  -LR     Assistive Device (Gait) walker, front-wheeled  -LR     Distance in Feet (Gait) 4'x1 laterally,  -LR     Deviations/Abnormal Patterns (Gait) gait speed decreased;neri decreased;stride length decreased;weight shifting decreased  -LR           User Key  (r) = Recorded By, (t) = Taken By, (c) = Cosigned By    Initials Name Provider Type    Deandre Tsai Physical Therapist               Obj/Interventions     Row Name 03/16/23 1000          Range of Motion Comprehensive    General Range of Motion no range of motion deficits identified  -LR     Comment, General Range of Motion BLE grossly WFL  -LR     Row Name 03/16/23 1000          Strength Comprehensive (MMT)    Comment, General Manual Muscle Testing (MMT) Assessment BLE grossly 4/5  -LR     Row Name 03/16/23 1000          Sensory Assessment (Somatosensory)    Sensory Assessment (Somatosensory) bilateral LE  -LR      Bilateral LE Sensory Assessment general sensation;impaired;intact  -LR           User Key  (r) = Recorded By, (t) = Taken By, (c) = Cosigned By    Initials Name Provider Type    LR Deandre Cox Physical Therapist               Goals/Plan     Row Name 03/16/23 1000          Bed Mobility Goal 1 (PT)    Activity/Assistive Device (Bed Mobility Goal 1, PT) sit to supine;supine to sit;rolling to left;rolling to right  -LR     [place] Level/Cues Needed (Bed Mobility Goal 1, PT) modified independence  -LR     Time Frame (Bed Mobility Goal 1, PT) by discharge  -LR     Progress/Outcomes (Bed Mobility Goal 1, PT) goal not met  -LR     Row Name 03/16/23 1000          Transfer Goal 1 (PT)    Activity/Assistive Device (Transfer Goal 1, PT) sit-to-stand/stand-to-sit;bed-to-chair/chair-to-bed  -LR     [place] Level/Cues Needed (Transfer Goal 1, PT) standby assist  -LR     Time Frame (Transfer Goal 1, PT) by discharge  -LR     Progress/Outcome (Transfer Goal 1, PT) goal not met  -LR     Row Name 03/16/23 1000          Gait Training Goal 1 (PT)    Activity/Assistive Device (Gait Training Goal 1, PT) gait (walking locomotion);assistive device use  -LR     [place] Level (Gait Training Goal 1, PT) standby assist  -LR     Distance (Gait Training Goal 1, PT) 50'X3  -LR     Time Frame (Gait Training Goal 1, PT) by discharge  -LR     Progress/Outcome (Gait Training Goal 1, PT) goal not met  -LR     Row Name 03/16/23 1000          Therapy Assessment/Plan (PT)    Planned Therapy Interventions (PT) balance training;bed mobility training;gait training;home exercise program;joint mobilization;lumbar stabilization;manual therapy techniques;neuromuscular re-education;patient/family education;postural re-education;ROM (range of motion);stair training;strengthening;stretching;transfer training;wheelchair management/propulsion training;vestibular therapy  -LR           User Key  (r) = Recorded By, (t) = Taken By, (c) = Cosigned By     Initials Name Provider Type    Deandre Tsai Physical Therapist               Clinical Impression     Row Name 03/16/23 1000          Pain    Pretreatment Pain Rating 0/10 - no pain  -LR     Posttreatment Pain Rating 0/10 - no pain  -LR     Row Name 03/16/23 1000          Plan of Care Review    Plan of Care Reviewed With patient  -LR     Outcome Evaluation PT eval completed. Patient AOx4 but does demonstrate mild/moderate safety impairments. Patient ModAx1 for supine>sit transfer and SBA for sit>supine transfer with HOB up and bed rails. ModAx1 for sit<>Stand transfer due to mild/moderate posterior lean once she got to standing with RW. MinAx1 for ambulation 4'x1 stepping laterally with RW. Patient plans to return back to SNF secondary to not having anyone at home to help take care of her.  -LR     Row Name 03/16/23 1000          Therapy Assessment/Plan (PT)    Patient/Family Therapy Goals Statement (PT) Patient wants to return home.  -LR     Rehab Potential (PT) good, to achieve stated therapy goals  -LR     Criteria for Skilled Interventions Met (PT) yes;meets criteria;skilled treatment is necessary  -LR     Therapy Frequency (PT) other (see comments)  4-6d/week  -LR     Predicted Duration of Therapy Intervention (PT) Until d/c or until all goals met  -LR     Row Name 03/16/23 1000          Vital Signs    Post Systolic BP Rehab 107  -LR     Post Treatment Diastolic BP 60  -LR     Posttreatment Heart Rate (beats/min) 69  -LR     Post SpO2 (%) 98  -LR     O2 Delivery Post Treatment room air  -LR     Post Patient Position Supine  -LR     Row Name 03/16/23 1000          Positioning and Restraints    Pre-Treatment Position in bed  -LR     Post Treatment Position bed  -LR     In Bed notified nsg;call light within reach;encouraged to call for assist;exit alarm on;with nsg  -LR           User Key  (r) = Recorded By, (t) = Taken By, (c) = Cosigned By    Initials Name Provider Type    Deandre Tsai Physical  Therapist               Outcome Measures     Row Name 03/16/23 1315 03/16/23 1000       How much help from another person do you currently need...    Turning from your back to your side while in flat bed without using bedrails? 2  -LR 2  -LRA    Moving from lying on back to sitting on the side of a flat bed without bedrails? 2  -LR 2  -LRA    Moving to and from a bed to a chair (including a wheelchair)? 2  -LR 2  -LRA    Standing up from a chair using your arms (e.g., wheelchair, bedside chair)? 2  -LR 2  -LRA    Climbing 3-5 steps with a railing? 2  -LR 2  -LRA    To walk in hospital room? 2  -LR 3  -LRA    AM-PAC 6 Clicks Score (PT) 12  -LR 13  -LRA    Highest level of mobility 4 --> Transferred to chair/commode  -LR 4 --> Transferred to chair/commode  -LRA    Row Name 03/16/23 1000          Functional Assessment    Outcome Measure Options AM-PAC 6 Clicks Basic Mobility (PT)  -LRA           User Key  (r) = Recorded By, (t) = Taken By, (c) = Cosigned By    Initials Name Provider Type    Avis Cook RN Registered Nurse    Deandre Mojica Physical Therapist                             Physical Therapy Education     Title: PT OT SLP Therapies (In Progress)     Topic: Physical Therapy (Done)     Point: Mobility training (Done)     Learning Progress Summary           Patient Acceptance, E,TB, VU by LR at 3/16/2023 1022    Comment: Educated on PT POC and goals.                   Point: Home exercise program (Done)     Learning Progress Summary           Patient Acceptance, E,TB, VU by LR at 3/16/2023 1022    Comment: Educated on PT POC and goals.                   Point: Body mechanics (Done)     Learning Progress Summary           Patient Acceptance, E,TB, VU by LR at 3/16/2023 1022    Comment: Educated on PT POC and goals.                   Point: Precautions (Done)     Learning Progress Summary           Patient Acceptance, E,TB, VU by LR at 3/16/2023 1022    Comment: Educated on PT POC and goals.                                User Key     Initials Effective Dates Name Provider Type Discipline    LR 06/16/21 -  Deandre Cox Physical Therapist PT              PT Recommendation and Plan  Planned Therapy Interventions (PT): balance training, bed mobility training, gait training, home exercise program, joint mobilization, lumbar stabilization, manual therapy techniques, neuromuscular re-education, patient/family education, postural re-education, ROM (range of motion), stair training, strengthening, stretching, transfer training, wheelchair management/propulsion training, vestibular therapy  Plan of Care Reviewed With: patient  Outcome Evaluation: PT eval completed. Patient AOx4 but does demonstrate mild/moderate safety impairments. Patient ModAx1 for supine>sit transfer and SBA for sit>supine transfer with HOB up and bed rails. ModAx1 for sit<>Stand transfer due to mild/moderate posterior lean once she got to standing with RW. MinAx1 for ambulation 4'x1 stepping laterally with RW. Patient plans to return back to SNF secondary to not having anyone at home to help take care of her.     Time Calculation:    PT Charges     Row Name 03/16/23 1023             Time Calculation    Start Time 1000  -LR      Stop Time 1025  -LR      Time Calculation (min) 25 min  -LR      PT Received On 03/16/23  -LR      PT Goal Re-Cert Due Date 03/29/23  -LR         Untimed Charges    PT Eval/Re-eval Minutes 25  -LR         Total Minutes    Untimed Charges Total Minutes 25  -LR       Total Minutes 25  -LR            User Key  (r) = Recorded By, (t) = Taken By, (c) = Cosigned By    Initials Name Provider Type    LR Deandre Cox Physical Therapist              Therapy Charges for Today     Code Description Service Date Service Provider Modifiers Qty    71750792379 HC PT EVAL MOD COMPLEXITY 2 3/16/2023 Deandre Cox GP 1          PT G-Codes  Outcome Measure Options: AM-PAC 6 Clicks Basic Mobility (PT)  AM-PAC 6 Clicks Score (PT): 12        Deandre Cox  3/16/2023

## 2023-03-16 NOTE — PLAN OF CARE
Goal Outcome Evaluation:  Plan of Care Reviewed With: patient           Outcome Evaluation: OT vj complete, patient alert x 4, but with poor memory. Reports she was at home prior to admission, MD note states patient was at [facility]. Patient throughout session certain she was home alone, but throughout session brings up her therapy at [facility] and how she was there because no one was home with her. Patient frequently looses train of thought mid conversation, can be tangential. Supine to sit with min a, sit to supine with SBA. Sit to stand, functional mobility, toilet t/f (commode over toilet) with CGA. LE dressing mod A (socks) EOB, toileting mod A, grooming set up. With activity, patient wheezing and SOA; SPO2 99% on RA. Patient with decreased activity tolerance, decreased UE strength, decreased safety awareness, and decreased safety with ADLs and transfers. Cont inpatient OT. Recommend SNF at d/c.

## 2023-03-16 NOTE — THERAPY EVALUATION
Patient Name: Lexi Wade  : 1950    MRN: [id number]                              Today's Date: 3/16/2023       Admit Date: 3/15/2023    Visit Dx:     ICD-10-CM ICD-9-CM   1. Hypoglycemic episode in patient with diabetes mellitus (HCC)  E11.649 250.80   2. Bronchospasm  J98.01 519.11   3. UTI (urinary tract infection) with pyuria  N39.0 599.0   4. Impaired functional mobility, balance, gait, and endurance  Z74.09 V49.89   5. 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)   • Mixed hyperlipidemia   • Menopausal syndrome   • Dyspnea   • Chest pain   • Morbid obesity ([facility])   • Precordial pain   • Coronary artery disease involving native heart with angina pectoris ([facility])   • Implantable cardioverter-defibrillator (ICD) generator end of life   • Nursing home-acquired pneumonia   • Obesity (BMI 30-39.9)   • Shortness of breath   • Altered mental status   • Morbid obesity (HCC)   • Drowsiness   • Acute on chronic systolic congestive heart failure (HCC)   • Acute on chronic systolic heart failure (CMS/HCC)   • Cholecystitis   • Hypokalemia   • Idiopathic acute pancreatitis without infection or necrosis   • Chronic systolic heart failure (CMS/HCC)   • Acute cystitis with hematuria   • Chronic HFrEF (heart failure with reduced ejection fraction) ([facility])   • Hypoglycemic episode in patient with diabetes mellitus (HCC)   • Shortness of breath     Past Medical History:   Diagnosis Date   • Chronic systolic heart failure (HCC)    • Diabetes mellitus (HCC)    • Diabetic  neuropathy (HCC)    • GERD (gastroesophageal reflux disease)    • Hypercholesterolemia    • Hypertension    • Low back pain    • Morbid obesity (HCC)    • Nonischemic cardiomyopathy (HCC)    • Osteoarthritis    • Sleep apnea    • Vitamin D deficiency      Past Surgical History:   Procedure Laterality Date   • CARDIAC CATHETERIZATION N/A 08/23/2018   • CARDIAC CATHETERIZATION N/A 6/3/2022    Procedure: Left Heart Cath;  Surgeon: Wang Felipe MD;  Location: [facility] CATH INVASIVE LOCATION;  Service: Cardiology;  Laterality: N/A;   • CARDIAC ELECTROPHYSIOLOGY PROCEDURE N/A 06/22/2020   • CARDIAC PACEMAKER PLACEMENT     • CATARACT EXTRACTION     • CHOLECYSTECTOMY WITH INTRAOPERATIVE CHOLANGIOGRAM N/A 7/15/2022    Procedure: CHOLECYSTECTOMY LAPAROSCOPIC INTRAOPERATIVE CHOLANGIOGRAM;  Surgeon: Orville Farias MD;  Location: [facility] OR;  Service: General;  Laterality: N/A;   • COLONOSCOPY N/A 06/14/2017   • PACEMAKER IMPLANTATION     • TOTAL ABDOMINAL HYSTERECTOMY WITH SALPINGO OOPHORECTOMY        General Information     [facility] Name 03/16/23 1555          OT Time and Intention    Document Type evaluation  -     Mode of Treatment individual therapy;occupational therapy  -     Row Name 03/16/23 1553          General Information    Patient Profile Reviewed yes  -SJ     Prior Level of Function min assist:;gait;transfer;bed mobility;dressing;bathing;independent:;feeding;grooming  -     Existing Precautions/Restrictions fall  -     Barriers to Rehab medically complex;cognitive status;previous functional deficit  -     Row Name 03/16/23 1559          Living Environment    People in Home facility resident  -     Row Name 03/16/23 1553          Stairs Within Home, Primary    Stairs, Within Home, Primary Per chart, patient at [facility]. Patient alert x4, but reports she was home prior to admission. Patient a questionable historian? Walks with a RW.  -     Row Name 03/16/23 1559          Cognition    Orientation Status  (Cognition) oriented x 4  -[facility] Name 03/16/23 1555          Safety Issues, Functional Mobility    Safety Issues Affecting Function (Mobility) awareness of need for assistance;insight into deficits/self-awareness;safety precaution awareness;safety precautions follow-through/compliance;positioning of assistive device  -     Impairments Affecting Function (Mobility) balance;endurance/activity tolerance;coordination;shortness of breath;strength;sensation/sensory awareness  -           User Key  (r) = Recorded By, (t) = Taken By, (c) = Cosigned By    Initials Name Provider Type     Maryan Henry, MAIDA Occupational Therapist                 Mobility/ADL's     Row Name 03/16/23 1555          Bed Mobility    Bed Mobility supine-sit;sit-supine  -     Supine-Sit Ash (Bed Mobility) minimum assist (75% patient effort)  -     Sit-Supine Ash (Bed Mobility) standby assist  -     Assistive Device (Bed Mobility) bed rails;head of bed elevated  -SJ     Row Name 03/16/23 1555          Transfers    Transfers sit-stand transfer;toilet transfer  -[facility] 03/16/23 1555          Sit-Stand Transfer    Sit-Stand Ash (Transfers) contact guard  -     Assistive Device (Sit-Stand Transfers) walker, front-wheeled  -[facility] 03/16/23 1555          Toilet Transfer    Type (Toilet Transfer) sit-stand;stand-sit  -     Ash Level (Toilet Transfer) contact guard  -     Assistive Device (Toilet Transfer) walker, front-wheeled  -     Comment, (Toilet Transfer) commode over toilet  -[facility] 03/16/23 1555          Activities of Daily Living    BADL Assessment/Intervention lower body dressing;grooming;toileting  -SJ     Row Name 03/16/23 1555          Lower Body Dressing Assessment/Training    [place] Level (Lower Body Dressing) doff;don;socks;moderate assist (50% patient effort)  -SJ     Row Name 03/16/23 1555          Grooming Assessment/Training    Ash  Level (Grooming) set up  -[facility] Name 03/16/23 1555          Toileting Assessment/Training    [place] Level (Toileting) moderate assist (50% patient effort)  -           User Key  (r) = Recorded By, (t) = Taken By, (c) = Cosigned By    Initials Name Provider Type     Maryan Henry OT Occupational Therapist               Obj/Interventions     [facility] Name 03/16/23 1555          Sensory Assessment (Somatosensory)    Sensory Assessment (Somatosensory) UE sensation intact  -[facility] 03/16/23 1555          Range of Motion Comprehensive    General Range of Motion bilateral upper extremity ROM WFL  -[facility] Name 03/16/23 1555          Strength Comprehensive (MMT)    General Manual Muscle Testing (MMT) Assessment other (see comments)  -     Comment, General Manual Muscle Testing (MMT) Assessment BUE 4/5 grossly  -           User Key  (r) = Recorded By, (t) = Taken By, (c) = Cosigned By    Initials Name Provider Type     Maryan Henry OT Occupational Therapist               Goals/Plan     Row Name 03/16/23 1555          Transfer Goal 1 (OT)    Activity/Assistive Device (Transfer Goal 1, OT) toilet;shower chair  -     [place] Level/Cues Needed (Transfer Goal 1, OT) supervision required  -SJ     Time Frame (Transfer Goal 1, OT) long term goal (LTG);by discharge  -SJ     Progress/Outcome (Transfer Goal 1, OT) goal not met  -SJ     Row Name 03/16/23 1555          Bathing Goal 1 (OT)    Activity/Device (Bathing Goal 1, OT) lower body bathing  -SJ     [place] Level/Cues Needed (Bathing Goal 1, OT) supervision required  -SJ     Time Frame (Bathing Goal 1, OT) long term goal (LTG);by discharge  -SJ     Progress/Outcomes (Bathing Goal 1, OT) goal not met  -SJ     Row Name 03/16/23 1555          Dressing Goal 1 (OT)    Activity/Device (Dressing Goal 1, OT) lower body dressing  -SJ     [place]/Cues Needed (Dressing Goal 1, OT) supervision required  -SJ     Time Frame (Dressing Goal 1,  OT) long term goal (LTG);by discharge  -     Progress/Outcome (Dressing Goal 1, OT) goal not met  -     Row Name 03/16/23 1556          Toileting Goal 1 (OT)    Activity/Device (Toileting Goal 1, OT) toileting skills, all  -SJ     [place] Level/Cues Needed (Toileting Goal 1, OT) supervision required  -     Time Frame (Toileting Goal 1, OT) long term goal (LTG);by discharge  -     Progress/Outcome (Toileting Goal 1, OT) goal not met  -     Row Name 03/16/23 1557          Therapy Assessment/Plan (OT)    Planned Therapy Interventions (OT) activity tolerance training;cognitive/visual perception retraining;functional balance retraining;neuromuscular control/coordination retraining;patient/caregiver education/training;strengthening exercise;transfer/mobility retraining;ROM/therapeutic exercise;passive ROM/stretching;occupation/activity based interventions;manual therapy/joint mobilization;IADL retraining;edema control/reduction;BADL retraining;adaptive equipment training  -           User Key  (r) = Recorded By, (t) = Taken By, (c) = Cosigned By    Initials Name Provider Type     Maryan Henry, OT Occupational Therapist               Clinical Impression     Row Name 03/16/23 1550          Pain Assessment    Pretreatment Pain Rating 0/10 - no pain  -     Posttreatment Pain Rating 0/10 - no pain  -     Row Name 03/16/23 1553          Plan of Care Review    Plan of Care Reviewed With patient  -     Outcome Evaluation OT eval complete, patient alert x 4, but with poor memory. Reports she was at home prior to admission, MD note states patient was at [facility]. Patient throughout session certain she was home alone, but throughout session brings up her therapy at [facility] and how she was there because no one was home with her. Patient frequently looses train of thought mid conversation, can be tangential. Supine to sit with min a, sit to supine with SBA. Sit to stand, functional mobility, toilet t/f (commode  over toilet) with CGA. LE dressing mod A (socks) EOB, toileting mod A, grooming set up. With activity, patient wheezing and SOA; SPO2 99% on RA. Patient with decreased activity tolerance, decreased UE strength, decreased safety awareness, and decreased safety with ADLs and transfers. Cont inpatient OT. Recommend SNF at d/c.  -     Row Name 03/16/23 9994          Therapy Assessment/Plan (OT)    Patient/Family Therapy Goal Statement (OT) get stronger  -     Rehab Potential (OT) good, to achieve stated therapy goals  -     Criteria for Skilled Therapeutic Interventions Met (OT) yes;skilled treatment is necessary  -     Therapy Frequency (OT) other (see comments)  5-7 d/wk  -     Predicted Duration of Therapy Intervention (OT) until d/c or all goals met  -     Row Name 03/16/23 1441          Therapy Plan Review/Discharge Plan (OT)    Anticipated Discharge Disposition (OT) skilled nursing facility  -     Row Name 03/16/23 4818          Vital Signs    Pre Systolic BP Rehab 104  -SJ     Pre Treatment Diastolic BP 61  -SJ     Post Systolic BP Rehab 128  -SJ     Post Treatment Diastolic BP 92  -SJ     Pretreatment Heart Rate (beats/min) 72  -SJ     Posttreatment Heart Rate (beats/min) 74  -SJ     Pre SpO2 (%) 98  -SJ     O2 Delivery Pre Treatment room air  -SJ     Post SpO2 (%) 99  -SJ     O2 Delivery Post Treatment room air  -SJ     Pre Patient Position Supine  -SJ     Post Patient Position Supine  -SJ     Row Name 03/16/23 8284          Positioning and Restraints    Pre-Treatment Position in bed  -SJ     Post Treatment Position bed  -SJ     In Bed notified nsg;supine;call light within reach;encouraged to call for assist;exit alarm on;side rails up x2  -SJ           User Key  (r) = Recorded By, (t) = Taken By, (c) = Cosigned By    Initials Name Provider Type     Maryan Henry, OT Occupational Therapist               Outcome Measures     Row Name 03/16/23 8843          How much help from another is  currently needed...    Putting on and taking off regular lower body clothing? 2  -SJ     Bathing (including washing, rinsing, and drying) 2  -SJ     Toileting (which includes using toilet bed pan or urinal) 2  -SJ     Putting on and taking off regular upper body clothing 3  -SJ     Taking care of personal grooming (such as brushing teeth) 3  -SJ     Eating meals 4  -SJ     AM-PAC 6 Clicks Score (OT) 16  -SJ     Row Name 03/16/23 1315 03/16/23 1000       How much help from another person do you currently need...    Turning from your back to your side while in flat bed without using bedrails? 2  -LR 2  -LRA    Moving from lying on back to sitting on the side of a flat bed without bedrails? 2  -LR 2  -LRA    Moving to and from a bed to a chair (including a wheelchair)? 2  -LR 2  -LRA    Standing up from a chair using your arms (e.g., wheelchair, bedside chair)? 2  -LR 2  -LRA    Climbing 3-5 steps with a railing? 2  -LR 2  -LRA    To walk in hospital room? 2  -LR 3  -LRA    AM-PAC 6 Clicks Score (PT) 12  -LR 13  -LRA    Highest level of mobility 4 --> Transferred to chair/commode  -LR 4 --> Transferred to chair/commode  -LRA    Row Name 03/16/23 1555 03/16/23 1000       Functional Assessment    Outcome Measure Options AM-PAC 6 Clicks Daily Activity (OT)  - AM-PAC 6 Clicks Basic Mobility (PT)  -LRA          User Key  (r) = Recorded By, (t) = Taken By, (c) = Cosigned By    Initials Name Provider Type    Avis Cook, RN Registered Nurse    LRDeandre Abel Physical Therapist    Maryan Arechiga OT Occupational Therapist                Occupational Therapy Education     Title: PT OT SLP Therapies (In Progress)     Topic: Occupational Therapy (In Progress)     Point: ADL training (Done)     Description:   Instruct learner(s) on proper safety adaptation and remediation techniques during self care or transfers.   Instruct in proper use of assistive devices.              Learning Progress Summary            Patient Acceptance, E,TB, VU,NR by  at 3/16/2023 6821    Comment: POC, role of OT, transfer training                   Point: Home exercise program (Not Started)     Description:   Instruct learner(s) on appropriate technique for monitoring, assisting and/or progressing therapeutic exercises/activities.              Learner Progress:  Not documented in this visit.          Point: Precautions (Not Started)     Description:   Instruct learner(s) on prescribed precautions during self-care and functional transfers.              Learner Progress:  Not documented in this visit.          Point: Body mechanics (Not Started)     Description:   Instruct learner(s) on proper positioning and spine alignment during self-care, functional mobility activities and/or exercises.              Learner Progress:  Not documented in this visit.                      User Key     Initials Effective Dates Name Provider Type Discipline     06/14/21 -  Maryan Henry, OT Occupational Therapist OT              OT Recommendation and Plan  Planned Therapy Interventions (OT): activity tolerance training, cognitive/visual perception retraining, functional balance retraining, neuromuscular control/coordination retraining, patient/caregiver education/training, strengthening exercise, transfer/mobility retraining, ROM/therapeutic exercise, passive ROM/stretching, occupation/activity based interventions, manual therapy/joint mobilization, IADL retraining, edema control/reduction, BADL retraining, adaptive equipment training  Therapy Frequency (OT): other (see comments) (5-7 d/wk)  Plan of Care Review  Plan of Care Reviewed With: patient  Outcome Evaluation: OT eval complete, patient alert x 4, but with poor memory. Reports she was at home prior to admission, MD note states patient was at [facility]. Patient throughout session certain she was home alone, but throughout session brings up her therapy at [facility] and how she was there because no one was home  with her. Patient frequently looses train of thought mid conversation, can be tangential. Supine to sit with min a, sit to supine with SBA. Sit to stand, functional mobility, toilet t/f (commode over toilet) with CGA. LE dressing mod A (socks) EOB, toileting mod A, grooming set up. With activity, patient wheezing and SOA; SPO2 99% on RA. Patient with decreased activity tolerance, decreased UE strength, decreased safety awareness, and decreased safety with ADLs and transfers. Cont inpatient OT. Recommend SNF at d/c.     Time Calculation:    Time Calculation- OT     Row Name 03/16/23 1637             Time Calculation- OT    OT Start Time 1555  -      OT Stop Time 1635  -      OT Time Calculation (min) 40 min  -      OT Received On 03/16/23  -      OT Goal Re-Cert Due Date 03/29/23  -         Untimed Charges    OT Eval/Re-eval Minutes 40  -SJ         Total Minutes    Untimed Charges Total Minutes 40  -SJ       Total Minutes 40  -SJ            User Key  (r) = Recorded By, (t) = Taken By, (c) = Cosigned By    Initials Name Provider Type     Maryan Henry OT Occupational Therapist              Therapy Charges for Today     Code Description Service Date Service Provider Modifiers Qty    [id number] HC OT EVAL LOW COMPLEXITY 3 3/16/2023 Maryan Henry OT GO 1               Maryan Henry OT  3/16/2023

## 2023-03-17 LAB — GLUCOSE BLDC GLUCOMTR-MCNC: 201 MG/DL (ref 70–130)

## 2023-03-17 NOTE — SIGNIFICANT NOTE
At 2304 telemetry noticed the patient was in asystole.  Telemetry called patient's nurse who is AYANA Hinds.  She reports that she ran immediately to the room where patient was unresponsive.  Pushed the code button and started CPR.  Code team quickly joined.  Patient had good IV access in the right arm.  IO access was obtained in right leg.  Overall patient received 6 doses of epinephrine 1 mg.  She also received 1 dose of amiodarone 300 mg.  A second dose of amiodarone 150 mg was administered through new IO acces that was obtained in the left leg.  During the course of the resuscitation attempt 2 shocks were delivered.  Unfortunately we were unable to establish ROSC as evidenced by failure to appreciate pulses.  After consulting with the team in the room and encountering no objections CPR attempt was stopped with time of death 2335.    At 2326 the patient's Sister Yudi was called and informed of the severity of the situation.  She arrived to the hospital after the time of death and was escorted to the room.  Her questions were answered to her satisfaction.  She was left alone with the patient to say her final goodbyes.  She stated that there was a second sister that might come up.  I assured her they could take all the time they needed.        This document has been electronically signed by Jose Davidson MD on March 17, 2023 00:23 CDT

## 2023-03-17 NOTE — CODE DOCUMENTATION
2307: Code Blue called CPR started  2308: Dr. Melo, CCU RNs, Respiratory, and House Supervisor arrive.   2310: Dr. Davidson arrive, 1mg Epi given, ER arrives. FSBS 334  2311: Shock delivered, fine vfib, CPR resumed  2312: 1mg epi, IO inserted RLE, pulse check, no shock advised, cpr resumed  2315: 300mg amio given, pulse check, no shock advised, PEA, CPR resumed  2317: 1mg Epi given, pulse check, no pulse, CPR resumed  2318: 20mg etomidate, 100mg succs given  2319: Pulse check, PEA, no pulse per doppler, CPR resumed  2321: Pulse check, PEA, CPR resumed  2322: 1mg Epi given  2323: Pulse check, PEA, CPR resumed  2325: pulse check, PEA, CPR resumed  2326: 1mg Epi given  2328: Pulse check, Shock delivered, CPR resumed  2330: pulse check, PEA, CPR resumed  2332: 1mg Epi given, IO inserted LLE, pulse check, PEA, CPR resumed  2333: 150mg amio given  2334: pulse check, PEA, CPR  2335: Code Blue End, TOD called by Dr. Davidson.    Family was notified by house supervisor during the code and they arrived shortly after the end of the code.     [facility] notified of pt's passing at 0009.

## 2023-03-17 NOTE — DISCHARGE SUMMARY
DISCHARGE SUMMARY    PATIENT NAME: Lexi Wade       PHYSICIAN: Jose Davidson MD  : 1950  MRN: [id number]    ADMITTED: 3/15/2023     DISCHARGED: 3/16/2023 due to death at 2335    ADMISSION DIAGNOSES:  Active Hospital Problems    Diagnosis  POA   • **Nursing home-acquired pneumonia [J18.9]  Unknown   • Hypoglycemic episode in patient with diabetes mellitus (HCC) [E11.649]  Yes   • Shortness of breath [R06.02]  Unknown   • Chronic HFrEF (heart failure with reduced ejection fraction) ([facility]) [I50.22]  Yes   • Altered mental status [R41.82]  Yes   • Implantable cardioverter-defibrillator (ICD) generator end of life [Z45.02]  Not Applicable   • Mixed hyperlipidemia [E78.2]  Yes   • Essential hypertension [I10]  Yes   • GERD (gastroesophageal reflux disease) [K21.9]  Yes      Resolved Hospital Problems   No resolved problems to display.     DISCHARGE DIAGNOSES:   Active Hospital Problems    Diagnosis  POA   • **Nursing home-acquired pneumonia [J18.9]  Unknown   • Hypoglycemic episode in patient with diabetes mellitus (HCC) [E11.649]  Yes   • Shortness of breath [R06.02]  Unknown   • Chronic HFrEF (heart failure with reduced ejection fraction) ([facility]) [I50.22]  Yes   • Altered mental status [R41.82]  Yes   • Implantable cardioverter-defibrillator (ICD) generator end of life [Z45.02]  Not Applicable   • Mixed hyperlipidemia [E78.2]  Yes   • Essential hypertension [I10]  Yes   • GERD (gastroesophageal reflux disease) [K21.9]  Yes      Resolved Hospital Problems   No resolved problems to display.       SERVICE: Family Medicine Residency  Attending: Jose Raul Merino MD  Resident: Jose Davidson MD    CONSULTS:   Consult Orders (all) (From admission, onward)     Start     Ordered    03/15/23 2237  Inpatient Case Management  Consult  Once        Provider:  (Not yet assigned)    03/15/23 2236    03/15/23 2237  Inpatient Diabetes Educator Consult  Once        Provider:  (Not yet assigned)     03/15/23 2237    03/15/23 1806  Inpatient Heart Failure Navigator Consult  Once        Provider:  (Not yet assigned)    03/15/23 1805    03/15/23 1633  Family Practice - Resident (on-call MD unless specified)  Once        Specialty:  Family Medicine  Provider:  (Not yet assigned)    03/15/23 1632                PROCEDURES:   None    HISTORY OF PRESENT ILLNESS:     Patient is a 72 y.o. female presented with  a CMH of HFrEF, HTN, HLD, T2DM, pacemaker, GERD who presents complaining of altered mental status with abdominal pain.  She comes from [facility] where she is a resident.  According to her nurse Regine whom I spoke to today she has been complaining of abdominal pain and constipation for the past few days.  Today they perceived some change in her mental status with lethargy and called EMS.  They were concerned about the possibility of a stroke.  EMS noted blood sugar at 44.  Gave amp of D50 with subsequent improvement to 208.  Upon arrival in ER blood glucose was 72.  Patient put on D5W.  The patient who is a poor historian and gives a slightly different history.  She reports that she has had some difficulty breathing with some mild shortness of breath and cough for the past few days.  She feels cold and chilly.  She thinks that lately she has been having diarrhea.  She does endorse diffuse abdominal pain that seems to be worse on the lower left side..    DIAGNOSTIC DATA:   Lab Results (last 72 hours)     Procedure Component Value Units Date/Time    Urine Culture - Urine, Urine, Clean Catch [[id number]]  (Normal) Collected: 03/15/23 1624    Specimen: Urine, Clean Catch Updated: 03/16/23 1916     Urine Culture No growth    Blood Culture - Blood, Hand, Left [[id number]]  (Normal) Collected: 03/15/23 1749    Specimen: Blood from Hand, Left Updated: 03/16/23 1816     Blood Culture No growth at 24 hours    POC Glucose Once [[id number]]  (Abnormal) Collected: 03/16/23 1526    Specimen: Blood Updated: 03/16/23 1542      Glucose 194 mg/dL      Comment: RN NotifiedOperator: [id number] TRI NICHOLSONANNAMeter ID: [id number]       Blood Culture - Blood, Hand, Left [[id number]]  (Normal) Collected: 03/15/23 1515    Specimen: Blood from Hand, Left Updated: 03/16/23 1531     Blood Culture No growth at 24 hours    POC Glucose Once [[id number]]  (Abnormal) Collected: 03/16/23 1045    Specimen: Blood Updated: 03/16/23 1122     Glucose 184 mg/dL      Comment: RN NotifiedOperator: [id number] FRANCISCO JAVIER LUCIANOMeter ID: [id number]       Ammonia [[id number]]  (Normal) Collected: 03/16/23 0929    Specimen: Blood Updated: 03/16/23 1007     Ammonia 28 umol/L     Mycoplasma Pneumoniae PCR - Swab, Throat [[id number]] Collected: 03/16/23 0015    Specimen: Swab from Throat Updated: 03/16/23 0746     MYCOPLASMAE PNEUMONIAE BY PCR Negative    Narrative:      This test is performed by utilizing real time polymerace chain recation (PCR).   This test is performed by utilizing real time polymerace chain recation (PCR).     POC Glucose Once [[id number]]  (Normal) Collected: 03/16/23 0725    Specimen: Blood Updated: 03/16/23 0743     Glucose 127 mg/dL      Comment: RN NotifiedOperator: [id number] TRI NICHOLSONANNAMeter ID: [id number]       Comprehensive Metabolic Panel [[id number]]  (Abnormal) Collected: 03/16/23 0554    Specimen: Blood Updated: 03/16/23 0656     Glucose 106 mg/dL      BUN 11 mg/dL      Creatinine 1.01 mg/dL      Sodium 137 mmol/L      Potassium 3.5 mmol/L      Chloride 102 mmol/L      CO2 21.0 mmol/L      Calcium 8.6 mg/dL      Total Protein 5.6 g/dL      Albumin 3.4 g/dL      ALT (SGPT) 5 U/L      AST (SGOT) 16 U/L      Alkaline Phosphatase 124 U/L      Total Bilirubin 1.5 mg/dL      Globulin 2.2 gm/dL      A/G Ratio 1.5 g/dL      BUN/Creatinine Ratio 10.9     Anion Gap 14.0 mmol/L      eGFR 58.5 mL/min/1.73     Narrative:      GFR Normal >60  Chronic Kidney Disease <60  Kidney Failure <15    The GFR formula is only valid for adults with stable  renal function between ages 18 and 70.    CBC & Differential [[id number]]  (Abnormal) Collected: 03/16/23 0554    Specimen: Blood Updated: 03/16/23 0638    Narrative:      The following orders were created for panel order CBC & Differential.  Procedure                               Abnormality         Status                     ---------                               -----------         ------                     CBC Auto Differential[[id number]]        Abnormal            Final result                 Please view results for these tests on the individual orders.    CBC Auto Differential [[id number]]  (Abnormal) Collected: 03/16/23 0554    Specimen: Blood Updated: 03/16/23 0638     WBC 4.27 10*3/mm3      RBC 3.85 10*6/mm3      Hemoglobin 11.0 g/dL      Hematocrit 32.3 %      MCV 83.9 fL      MCH 28.6 pg      MCHC 34.1 g/dL      RDW 20.7 %      RDW-SD 63.0 fl      MPV 10.1 fL      Platelets 146 10*3/mm3      Neutrophil % 65.1 %      Lymphocyte % 26.7 %      Monocyte % 7.5 %      Eosinophil % 0.0 %      Basophil % 0.2 %      Immature Grans % 0.5 %      Neutrophils, Absolute 2.78 10*3/mm3      Lymphocytes, Absolute 1.14 10*3/mm3      Monocytes, Absolute 0.32 10*3/mm3      Eosinophils, Absolute 0.00 10*3/mm3      Basophils, Absolute 0.01 10*3/mm3      Immature Grans, Absolute 0.02 10*3/mm3      nRBC 0.0 /100 WBC     POC Glucose Once [[id number]]  (Normal) Collected: 03/16/23 0444    Specimen: Blood Updated: 03/16/23 0508     Glucose 111 mg/dL      Comment: : [id number] GLENNA CONSTANCEMeter ID: [id number]       CANDIDA AURIS SCREEN - Swab, Axilla Right, Axilla Left and Groin [[id number]] Collected: 03/16/23 0458    Specimen: Swab from Axilla Right, Axilla Left and Groin Updated: 03/16/23 0458    S. Pneumo Ag Urine or CSF - Urine, Urine, Clean Catch [[id number]]  (Normal) Collected: 03/16/23 0229    Specimen: Urine, Clean Catch Updated: 03/16/23 0258     Strep Pneumo Ag Negative    Legionella Antigen, Urine - Urine,  Urine, Clean Catch [[id number]]  (Normal) Collected: 03/16/23 0229    Specimen: Urine, Clean Catch Updated: 03/16/23 0257     LEGIONELLA ANTIGEN, URINE Negative    POC Glucose Once [[id number]]  (Abnormal) Collected: 03/15/23 2207    Specimen: Blood Updated: 03/15/23 2225     Glucose 135 mg/dL      Comment: : [id number] GLENNA CONSTANCEMeter ID: [id number]       STAT Lactic Acid, Reflex [[id number]]  (Normal) Collected: 03/15/23 2000    Specimen: Blood Updated: 03/15/23 2020     Lactate 1.9 mmol/L     POC Glucose Once [[id number]]  (Normal) Collected: 03/15/23 1944    Specimen: Blood Updated: 03/15/23 2018     Glucose 84 mg/dL      Comment: RN NotifiedOperator: [id number] SABINO Vaughn ID: [id number]       Comprehensive Metabolic Panel [[id number]]  (Abnormal) Collected: 03/15/23 1817    Specimen: Blood Updated: 03/15/23 1856     Glucose 83 mg/dL      BUN 11 mg/dL      Creatinine 0.94 mg/dL      Sodium 137 mmol/L      Potassium 3.9 mmol/L      Comment: Slight hemolysis detected by analyzer. Results may be affected.        Chloride 103 mmol/L      CO2 19.0 mmol/L      Calcium 8.7 mg/dL      Total Protein 6.0 g/dL      Albumin 3.6 g/dL      ALT (SGPT) <5 U/L      AST (SGOT) 21 U/L      Alkaline Phosphatase 136 U/L      Total Bilirubin 1.9 mg/dL      Globulin 2.4 gm/dL      A/G Ratio 1.5 g/dL      BUN/Creatinine Ratio 11.7     Anion Gap 15.0 mmol/L      eGFR 63.8 mL/min/1.73     Narrative:      GFR Normal >60  Chronic Kidney Disease <60  Kidney Failure <15    The GFR formula is only valid for adults with stable renal function between ages 18 and 70.    POC Glucose Once [[id number]]  (Normal) Collected: 03/15/23 1817    Specimen: Blood Updated: 03/15/23 1836     Glucose 82 mg/dL      Comment: : [id number] MARY ANGELITAMeter ID: [id number]       Extra Tubes [[id number]] Collected: 03/15/23 1407    Specimen: Blood Updated: 03/15/23 0928    Narrative:      The following orders were created for panel  order Extra Tubes.  Procedure                               Abnormality         Status                     ---------                               -----------         ------                     Gold Top - SST[[id number]]                                   Final result               Gray Top[[id number]]                                         Final result               Light Blue Top[[id number]]                                   Final result                 Please view results for these tests on the individual orders.    Gray Top [[id number]] Collected: 03/15/23 1407    Specimen: Blood Updated: 03/15/23 1816     Extra Tube Hold for add-ons.     Comment: Auto resulted.       STAT Lactic Acid, Reflex [[id number]]  (Abnormal) Collected: 03/15/23 1648    Specimen: Blood Updated: 03/15/23 1744     Lactate 2.3 mmol/L     High Sensitivity Troponin T 2Hr [[id number]]  (Abnormal) Collected: 03/15/23 1652    Specimen: Blood Updated: 03/15/23 1716     HS Troponin T 19 ng/L      Troponin T Delta 2 ng/L     Narrative:      High Sensitive Troponin T Reference Range:  <10.0 ng/L- Negative Female for AMI  <15.0 ng/L- Negative Male for AMI  >=10 - Abnormal Female indicating possible myocardial injury.  >=15 - Abnormal Male indicating possible myocardial injury.   Clinicians would have to utilize clinical acumen, EKG, Troponin, and serial changes to determine if it is an Acute Myocardial Infarction or myocardial injury due to an underlying chronic condition.         Blood Gas, Arterial - [[id number]]  (Abnormal) Collected: 03/15/23 1654    Specimen: Arterial Blood Updated: 03/15/23 1655     Site Right Brachial     Heber's Test N/A     pH, Arterial 7.453 pH units      Comment: 83 Value above reference range        pCO2, Arterial 32.9 mm Hg      Comment: 84 Value below reference range        pO2, Arterial 81.1 mm Hg      Comment: 84 Value below reference range        HCO3, Arterial 23.0 mmol/L      Base Excess, Arterial -0.4 mmol/L       Comment: 84 Value below reference range        O2 Saturation, Arterial 96.7 %      Barometric Pressure for Blood Gas 752 mmHg      Modality Room Air     Ventilator Mode NA     Collected by priyanka     Comment: Meter: [id number]     :  [id number]       POC Glucose Once [[id number]]  (Normal) Collected: 03/15/23 1621    Specimen: Blood Updated: 03/15/23 1632     Glucose 106 mg/dL      Comment: : [id number] JERRY Bains ID: [id number]       CBC & Differential [[id number]]  (Abnormal) Collected: 03/15/23 1407    Specimen: Blood Updated: 03/15/23 1554    Narrative:      The following orders were created for panel order CBC & Differential.  Procedure                               Abnormality         Status                     ---------                               -----------         ------                     CBC Auto Differential[[id number]]        Abnormal            Final result               Scan Slide[[id number]]                   Normal              Final result                 Please view results for these tests on the individual orders.    Scan Slide [[id number]]  (Normal) Collected: 03/15/23 1407    Specimen: Blood Updated: 03/15/23 1554     RBC Morphology Normal     WBC Morphology Normal     Platelet Morphology Normal    Gold Top - SST [[id number]] Collected: 03/15/23 1407    Specimen: Blood Updated: 03/15/23 1515     Extra Tube Hold for add-ons.     Comment: Auto resulted.       Light Blue Top [[id number]] Collected: 03/15/23 1407    Specimen: Blood Updated: 03/15/23 1515     Extra Tube Hold for add-ons.     Comment: Auto resulted       High Sensitivity Troponin T [[id number]]  (Abnormal) Collected: 03/15/23 1407    Specimen: Blood Updated: 03/15/23 1502     HS Troponin T 17 ng/L     Narrative:      High Sensitive Troponin T Reference Range:  <10.0 ng/L- Negative Female for AMI  <15.0 ng/L- Negative Male for AMI  >=10 - Abnormal Female indicating possible myocardial injury.  >=15 -  Abnormal Male indicating possible myocardial injury.   Clinicians would have to utilize clinical acumen, EKG, Troponin, and serial changes to determine if it is an Acute Myocardial Infarction or myocardial injury due to an underlying chronic condition.         Lactic Acid, Plasma [[id number]]  (Abnormal) Collected: 03/15/23 1407    Specimen: Blood Updated: 03/15/23 1449     Lactate 2.5 mmol/L     Urinalysis, Microscopic Only - Urine, Catheter In/Out [[id number]]  (Abnormal) Collected: 03/15/23 1407    Specimen: Urine, Catheter In/Out Updated: 03/15/23 1446     RBC, UA 6-12 /HPF      WBC, UA 6-12 /HPF      Bacteria, UA 1+ /HPF      Squamous Epithelial Cells, UA 3-5 /HPF      Hyaline Casts, UA None Seen /LPF      Calcium Oxalate Crystals, UA Small/1+ /HPF      Methodology Automated Microscopy    POC Glucose Once [[id number]]  (Normal) Collected: 03/15/23 1431    Specimen: Blood Updated: 03/15/23 1445     Glucose 72 mg/dL      Comment: : [id number] LB EVANGELINEMeter ID: [id number]       Comprehensive Metabolic Panel [[id number]]  (Abnormal) Collected: 03/15/23 1407    Specimen: Blood Updated: 03/15/23 1442     Glucose 85 mg/dL      BUN 11 mg/dL      Creatinine 1.03 mg/dL      Sodium 136 mmol/L      Potassium 4.8 mmol/L      Comment: Specimen hemolyzed.  Results may be affected.        Chloride 101 mmol/L      CO2 22.0 mmol/L      Calcium 9.0 mg/dL      Total Protein 6.3 g/dL      Albumin 3.7 g/dL      ALT (SGPT) 5 U/L      Comment: Specimen hemolyzed.  Results may be affected.        AST (SGOT) 26 U/L      Comment: Specimen hemolyzed.  Results may be affected.        Alkaline Phosphatase 140 U/L      Total Bilirubin 2.0 mg/dL      Globulin 2.6 gm/dL      A/G Ratio 1.4 g/dL      BUN/Creatinine Ratio 10.7     Anion Gap 13.0 mmol/L      eGFR 57.2 mL/min/1.73     Narrative:      GFR Normal >60  Chronic Kidney Disease <60  Kidney Failure <15    The GFR formula is only valid for adults with stable renal function  between ages 18 and 70.    Lipase [[id number]]  (Abnormal) Collected: 03/15/23 1407    Specimen: Blood Updated: 03/15/23 1441     Lipase 87 U/L     Urinalysis With Microscopic If Indicated (No Culture) - Urine, Catheter In/Out [[id number]]  (Abnormal) Collected: 03/15/23 1407    Specimen: Urine, Catheter In/Out Updated: 03/15/23 1440     Color, UA Yellow     Appearance, UA Clear     pH, UA 6.0     Specific Gravity, UA 1.025     Glucose, UA >=1000 mg/dL (3+)     Ketones, UA Trace     Bilirubin, UA Moderate (2+)     Blood, UA Trace     Protein, UA >=300 mg/dL (3+)     Leuk Esterase, UA Negative     Nitrite, UA Negative     Urobilinogen, UA >=8.0 E.U./dL    CBC Auto Differential [[id number]]  (Abnormal) Collected: 03/15/23 1407    Specimen: Blood Updated: 03/15/23 1421     WBC 7.09 10*3/mm3      RBC 4.54 10*6/mm3      Hemoglobin 13.0 g/dL      Hematocrit 39.0 %      MCV 85.9 fL      MCH 28.6 pg      MCHC 33.3 g/dL      RDW 21.6 %      RDW-SD 66.8 fl      MPV 10.3 fL      Platelets 144 10*3/mm3      Neutrophil % 57.0 %      Lymphocyte % 23.4 %      Monocyte % 18.2 %      Eosinophil % 0.4 %      Basophil % 0.6 %      Immature Grans % 0.4 %      Neutrophils, Absolute 4.04 10*3/mm3      Lymphocytes, Absolute 1.66 10*3/mm3      Monocytes, Absolute 1.29 10*3/mm3      Eosinophils, Absolute 0.03 10*3/mm3      Basophils, Absolute 0.04 10*3/mm3      Immature Grans, Absolute 0.03 10*3/mm3      nRBC 0.0 /100 WBC         Imaging Results (Last 7 Days)     Procedure Component Value Units Date/Time    CT Abdomen Pelvis With Contrast [[id number]] Collected: 03/15/23 1713     Updated: 03/15/23 1749    Narrative:      EXAM:  CT ABDOMEN PELVIS WITH IV CONTRAST    ORDERING PROVIDER:  KAI NICHOLAS    CLINICAL HISTORY:  abdominal pain, E11.649 Type 2 diabetes mellitus with  hypoglycemia without coma J98.01 Acute bronchospasm N39.0 Urinary  tract infection, site not specified    COMPARISON:  12/30/2022    TECHNIQUE:   CT abdomen and pelvis  performed with 90ml of Isovue 300 as IV  contrast and reformatted in the sagittal and coronal planes.     This examination was performed according to our departmental dose  optimization program which includes automated exposure control,  adjustment of the MA and kV according to patient size, and/or use  of iterative reconstruction technique.     FINDINGS:    BASILAR CHEST: Mild to moderate right and mild left pleural  effusion. Bilateral scattered airspace process concerning for  multifocal pneumonia. There is cardiomegaly.    LIVER: No mass, enlargement or abnormal density. Diffuse fatty  change.    BILIARY TRACT: Prior cholecystectomy.     SPLEEN: No mass or enlargement.     PANCREAS: No mass or inflammatory process. Normal pancreatic  duct.     ADRENAL GLANDS: Unremarkable. No mass.     URINARY SYSTEM: Kidneys are normal in size. No stone,  hydronephrosis, or mass. Normal ureters.  Bladder is normal  without mass or stone.      GI TRACT: No mass, dilation, or wall thickening.  No diverticula.   No hernia.  Appendix is not well visualized.      REPRODUCTIVE SYSTEM:    PERITONEAL SPACE:No free air, and with mild to moderate free  ascites fluid, and no gross mass or adenopathy.     RETROPERITONEAL SPACE:  No adenopathy, mass, aneurysm or  significant vascular abnormality.     BONES AND EXTRA-ABDOMINAL SOFT TISSUES: No aggressive osseous  lesion. Diffuse anasarca of the abdominal and pelvic walls. No  inguinal adenopathy or hernia.      Impression:      Mild to moderate right and mild left pleural effusion.   Bilateral scattered airspace process concerning for multifocal  pneumonia.   Diffuse fatty change of liver.  Mild to moderate ascites.  Diffuse anasarca of the abdominal and pelvic walls.   No evidence of obstructive uropathy on either side.  No evidence of bowel obstruction or perienteric inflammation.    Electronically signed by:  Reggie Monae MD  3/15/2023 5:47 PM CDT  Workstation: [id number]    CT Head Without  Contrast [[id number]] Collected: 03/15/23 1448     Updated: 03/15/23 1551    Narrative:        PROCEDURE: CT head without contrast    REASON FOR EXAM: ams    This exam was performed according to our departmental  dose-optimization program, which includes automated exposure  control, adjustment of the mA and/or kV according to patient size  and/or use of iterative reconstruction technique. Total DLP =  845.3 mGy.    FINDINGS: Comparison study dated May 3, 2022. Axial computer  tomography sequential imaging of the head was performed from the  vertex to the base of the skull. .Sagittal and coronal  reformation was performed .    The skull vault is intact. Paranasal sinuses and bilateral  mastoid air cells are well aerated. Stable bilateral basal  ganglia physiologic calcifications. Stable bilateral frontal and  parietal lobe periventricular deep white matter small foci of  hypodensity. Cerebral and cerebellar parenchymal are otherwise  normal. Ventricular system and subarachnoid spaces are normal.      Impression:      1.  No acute intracranial abnormality.  2.  Stable bilateral basal ganglia physiologic calcifications.  3.  Stable bilateral frontal and parietal lobe periventricular  deep white matter small foci of chronic ischemic gliosis  secondary to microvascular disease.    Electronically signed by:  Sreedhar Otero MD  3/15/2023 3:49 PM CDT  Workstation: [id number]    XR Chest 1 View [[id number]] Collected: 03/15/23 1401     Updated: 03/15/23 1452    Narrative:      EXAM DESCRIPTION:  XR CHEST 1 VIEW    CLINICAL INDICATION: hypoglycemia     COMPARISON: Chest x-ray dated 12/30/2022    FINDINGS: Mild cardiomegaly with mild central pulmonary vascular  congestion. No pleural effusion or pneumothorax. Left chest wall  ICD.      Impression:      Mild cardiomegaly with mild central pulmonary  vascular congestion.    Electronically signed by:  Christofer Pastrana MD  3/15/2023 2:50 PM CDT  Workstation: [id number]            "  HOSPITAL COURSE:  3/15/2023 patient arrived in ED via EMS from [facility] with hypoglycemia and AMS.  CT of head was negative.  Patient had no complaints of chest pain.  Patient admitted to residency service.  Telemetry from ER was continued.  Cardiac cause for AMS ruled out.  CT of abdomen pelvis was found to be concerning for multifocal pneumonia.  Treatment was initially started for community-acquired pneumonia with azithromycin and Rocephin.  Nebulizing treatments were continued. Patient's home medications were continued.  Patient's blood sugar stabilized while on treatment.  PT and OT were consulted to work with patient.  Patient's atypical panel resulted negative.  Antibiotic treatment with Rocephin was switched over to cefepime due to concern for nursing home acquired pneumonia and prior multiple hospitalizations.  Patient's overall condition and mental status improved.  Patient reported no pain on the evening of 3/16 at 2242.  Unfortunately, evening of 3/16 at 2304 telemetry noticed that patient was in asystole.  CODE BLUE was called however in spite of attempts at resuscitation ROSC was never achieved.  Patient pronounced dead at 2335.    DISCHARGE CONDITION:       DISPOSITION: To  home      DISCHARGE MEDICATIONS     Discharge Medications      Continue These Medications      Instructions Start Date   B-D UF III MINI PEN NEEDLES 31G X 5 MM misc  Generic drug: Insulin Pen Needle   USE WITH INSULIN INJECTIONS NIGHTLY      freestyle lancets   1 each, Other, 3 Times Daily, Use as instructed      FreeStyle Lite device   1 Device, Does not apply, 3 Times Daily      Insulin Syringe 31G X \" 0.5 ML misc   1 each, Subcutaneous, Nightly         ASK your doctor about these medications      Instructions Start Date   albuterol sulfate  (90 Base) MCG/ACT inhaler  Commonly known as: PROVENTIL HFA;VENTOLIN HFA;PROAIR HFA   INHALE 2 PUFFS EVERY 4 (FOUR) HOURS AS NEEDED FOR WHEEZING OR " SHORTNESS OF AIR.      aspirin 81 MG EC tablet   81 mg, Oral, Nightly      atorvastatin 40 MG tablet  Commonly known as: LIPITOR   TAKE 1 TABLET BY MOUTH EVERY DAY      carvedilol 25 MG tablet  Commonly known as: COREG   25 mg, Oral, 2 Times Daily With Meals      FREESTYLE LITE test strip  Generic drug: glucose blood   1 each, Other, 3 Times Daily, Use as instructed      furosemide 40 MG tablet  Commonly known as: LASIX   TAKE 1 TABLET BY MOUTH TWICE A DAY      ipratropium-albuterol 0.5-2.5 mg/3 ml nebulizer  Commonly known as: DUO-NEB   3 mL, Nebulization, 4 Times Daily      isosorbide mononitrate 30 MG 24 hr tablet  Commonly known as: IMDUR   TAKE 1 TABLET BY MOUTH EVERY DAY      Levemir FlexTouch 100 UNIT/ML injection  Generic drug: insulin detemir   20 Units, Subcutaneous, Nightly      lisinopril 10 MG tablet  Commonly known as: PRINIVIL,ZESTRIL   TAKE 1 TABLET BY MOUTH EVERY DAY      methylPREDNISolone 4 MG dose pack  Commonly known as: MEDROL   Take as directed on package instructions.      midodrine 5 MG tablet  Commonly known as: PROAMATINE   5 mg, Oral, 3 Times Daily Before Meals      nitroglycerin 0.4 MG SL tablet  Commonly known as: NITROSTAT   PLEASE SEE ATTACHED FOR DETAILED DIRECTIONS      ondansetron ODT 4 MG disintegrating tablet  Commonly known as: ZOFRAN-ODT   4 mg, Translingual, Every 6 Hours PRN      pantoprazole 40 MG EC tablet  Commonly known as: Protonix   40 mg, Oral, Daily      potassium chloride 10 MEQ CR capsule  Commonly known as: MICRO-K   20 mEq, Oral, 2 Times Daily      sucralfate 1 g tablet  Commonly known as: CARAFATE   1 g, Oral, 3 Times Daily             INSTRUCTIONS:  Activity: None,   Diet: None,     FOLLOW UP: None,       PENDING TEST RESULTS AT DISCHARGE  Pending Labs     Order Current Status    Blood Culture - Blood, Hand, Left Preliminary result    Blood Culture - Blood, Hand, Left Preliminary result    CANDIDA AURIS SCREEN - Swab, Axilla Right, Axilla  Left and Groin Preliminary result          Time: >30 minutes were spent in discharge planning, medication reconciliation and coordination of care for this patient.    Jose Raul Merino MD is the attending at time of discharge, He is aware of the patient's status and agrees with the above discharge summary.        This document has been electronically signed by Jose Davidson MD on March 17, 2023 05:53 CDT

## 2023-03-17 NOTE — PLAN OF CARE
Patients family has finished their visit. The spoke with nursing supervisor about  home details and are now leaving.

## 2023-03-17 NOTE — CODE DOCUMENTATION
23    Code Blue Note:  Code Blue was called.  Initial rhythm was asystole - as identified on cardiac telemetry, which is what alerted the nursing staff.    Full ACLS protocol was performed.  For details please see Code Blue documentation.      In summary, patient went into an asystolic cardiorespiratory arrest, as identified by staff cardiac telemetry suite.  When I arrived with the CODE BLUE team, patient's initial rhythm, on our assessment, was V-fib.  We followed the algorithm for V-fib/pulseless VT on the ACLS algorithm including an initial defibrillation and subsequent amiodarone dose.  For the remainder of the CODE BLUE the patient was in PEA arrest, except for occasional V-fib/pulseless VT.  Unfortunately, ROSC was never achieved and a decision was made after approximately 30 minutes To Withdraw care due to the futility of the clinical situation.      Outcome:   Family sister notified  Time Spent: 30 minutes      Axel Melo MD   23   23:48 CDT    Electronically signed by Axel Melo MD, 23, 6:59 AM CDT.

## 2023-03-17 NOTE — PLAN OF CARE
Tele called at [id number] while this nurse was performing patient care in another room. They stated the patient was in asystole. I ran to check the patient and she was unresponsive. I pushed the code button and started CPR. The code team quickly joined me. Please refer to code sheet.

## 2023-03-17 NOTE — PLAN OF CARE
Patient cooperative and pleasant. Denies any pain, IV infusing without difficulty, call light within reach, monitoring.   Problem: Adult Inpatient Plan of Care  Goal: Plan of Care Review  Outcome: Ongoing, Progressing   Goal Outcome Evaluation:

## 2023-03-18 LAB
QT INTERVAL: 390 MS
QTC INTERVAL: 423 MS

## 2023-03-20 LAB
BACTERIA SPEC AEROBE CULT: NORMAL
BACTERIA SPEC AEROBE CULT: NORMAL

## 2023-03-21 LAB — BACTERIA ISLT: NORMAL
